# Patient Record
Sex: MALE | Race: WHITE | NOT HISPANIC OR LATINO | Employment: FULL TIME | URBAN - METROPOLITAN AREA
[De-identification: names, ages, dates, MRNs, and addresses within clinical notes are randomized per-mention and may not be internally consistent; named-entity substitution may affect disease eponyms.]

---

## 2020-06-26 PROBLEM — N18.9 CHRONIC KIDNEY DISEASE: Status: ACTIVE | Noted: 2020-06-26

## 2020-06-26 PROBLEM — K21.9 GASTROESOPHAGEAL REFLUX DISEASE: Status: ACTIVE | Noted: 2020-06-26

## 2020-06-26 PROBLEM — I10 HYPERTENSION: Status: ACTIVE | Noted: 2020-06-26

## 2020-06-26 PROBLEM — E78.2 MIXED HYPERLIPIDEMIA: Status: ACTIVE | Noted: 2020-06-26

## 2020-07-01 PROBLEM — N18.5 STAGE 5 CHRONIC KIDNEY DISEASE NOT ON CHRONIC DIALYSIS (HCC): Status: ACTIVE | Noted: 2020-06-26

## 2022-04-28 PROBLEM — R01.1 HEART MURMUR: Status: ACTIVE | Noted: 2022-04-28

## 2022-06-09 PROBLEM — N18.4 CKD (CHRONIC KIDNEY DISEASE) STAGE 4, GFR 15-29 ML/MIN (HCC): Status: ACTIVE | Noted: 2022-06-09

## 2022-06-09 PROBLEM — K11.5 SIALOLITHIASIS: Status: ACTIVE | Noted: 2022-06-09

## 2022-06-09 PROBLEM — M1A.09X0 CHRONIC GOUT OF MULTIPLE SITES: Status: ACTIVE | Noted: 2022-06-09

## 2022-12-19 ENCOUNTER — PRE-ADMISSION TESTING (OUTPATIENT)
Dept: ADMISSIONS | Facility: MEDICAL CENTER | Age: 57
End: 2022-12-19
Attending: INTERNAL MEDICINE
Payer: COMMERCIAL

## 2022-12-23 ENCOUNTER — APPOINTMENT (OUTPATIENT)
Dept: RADIOLOGY | Facility: MEDICAL CENTER | Age: 57
End: 2022-12-23
Attending: INTERNAL MEDICINE
Payer: COMMERCIAL

## 2022-12-23 ENCOUNTER — HOSPITAL ENCOUNTER (OUTPATIENT)
Facility: MEDICAL CENTER | Age: 57
End: 2022-12-23
Attending: INTERNAL MEDICINE | Admitting: INTERNAL MEDICINE
Payer: COMMERCIAL

## 2022-12-23 VITALS
DIASTOLIC BLOOD PRESSURE: 88 MMHG | WEIGHT: 181 LBS | SYSTOLIC BLOOD PRESSURE: 140 MMHG | OXYGEN SATURATION: 95 % | HEART RATE: 74 BPM | RESPIRATION RATE: 16 BRPM | HEIGHT: 67 IN | BODY MASS INDEX: 28.41 KG/M2 | TEMPERATURE: 97.2 F

## 2022-12-23 DIAGNOSIS — M32.9 SLE (SYSTEMIC LUPUS ERYTHEMATOSUS RELATED SYNDROME) (HCC): ICD-10-CM

## 2022-12-23 DIAGNOSIS — E78.5 HYPERLIPIDEMIA, UNSPECIFIED HYPERLIPIDEMIA TYPE: ICD-10-CM

## 2022-12-23 DIAGNOSIS — N25.81 SECONDARY HYPERPARATHYROIDISM (HCC): ICD-10-CM

## 2022-12-23 DIAGNOSIS — N18.4 CHRONIC KIDNEY DISEASE, STAGE IV (SEVERE) (HCC): ICD-10-CM

## 2022-12-23 LAB
INR PPP: 1.02 (ref 0.87–1.13)
PATHOLOGY CONSULT NOTE: NORMAL
PROTHROMBIN TIME: 13.3 SEC (ref 12–14.6)

## 2022-12-23 PROCEDURE — 160036 HCHG PACU - EA ADDL 30 MINS PHASE I

## 2022-12-23 PROCEDURE — 88346 IMFLUOR 1ST 1ANTB STAIN PX: CPT

## 2022-12-23 PROCEDURE — 85610 PROTHROMBIN TIME: CPT

## 2022-12-23 PROCEDURE — 88350 IMFLUOR EA ADDL 1ANTB STN PX: CPT

## 2022-12-23 PROCEDURE — 160002 HCHG RECOVERY MINUTES (STAT)

## 2022-12-23 PROCEDURE — 160035 HCHG PACU - 1ST 60 MINS PHASE I

## 2022-12-23 PROCEDURE — 88348 ELECTRON MICROSCOPY DX: CPT

## 2022-12-23 PROCEDURE — 36415 COLL VENOUS BLD VENIPUNCTURE: CPT

## 2022-12-23 PROCEDURE — 700111 HCHG RX REV CODE 636 W/ 250 OVERRIDE (IP): Performed by: RADIOLOGY

## 2022-12-23 PROCEDURE — 88300 SURGICAL PATH GROSS: CPT

## 2022-12-23 PROCEDURE — 88305 TISSUE EXAM BY PATHOLOGIST: CPT

## 2022-12-23 PROCEDURE — 77012 CT SCAN FOR NEEDLE BIOPSY: CPT

## 2022-12-23 PROCEDURE — 700111 HCHG RX REV CODE 636 W/ 250 OVERRIDE (IP)

## 2022-12-23 PROCEDURE — 160046 HCHG PACU - 1ST 60 MINS PHASE II

## 2022-12-23 PROCEDURE — 88313 SPECIAL STAINS GROUP 2: CPT | Mod: 91

## 2022-12-23 RX ORDER — ONDANSETRON 2 MG/ML
4 INJECTION INTRAMUSCULAR; INTRAVENOUS EVERY 8 HOURS PRN
Status: DISCONTINUED | OUTPATIENT
Start: 2022-12-23 | End: 2022-12-23 | Stop reason: HOSPADM

## 2022-12-23 RX ORDER — OXYCODONE HYDROCHLORIDE 5 MG/1
5 TABLET ORAL
Status: DISCONTINUED | OUTPATIENT
Start: 2022-12-23 | End: 2022-12-23 | Stop reason: HOSPADM

## 2022-12-23 RX ORDER — SODIUM CHLORIDE 9 MG/ML
INJECTION, SOLUTION INTRAVENOUS ONCE
Status: DISCONTINUED | OUTPATIENT
Start: 2022-12-23 | End: 2022-12-23 | Stop reason: HOSPADM

## 2022-12-23 RX ORDER — SODIUM CHLORIDE 9 MG/ML
500 INJECTION, SOLUTION INTRAVENOUS
Status: DISCONTINUED | OUTPATIENT
Start: 2022-12-23 | End: 2022-12-23 | Stop reason: HOSPADM

## 2022-12-23 RX ORDER — BETAMETHASONE DIPROPIONATE 0.05 %
1 OINTMENT (GRAM) TOPICAL 2 TIMES DAILY
COMMUNITY
Start: 2022-12-04 | End: 2023-03-03 | Stop reason: SDUPTHER

## 2022-12-23 RX ORDER — PERMETHRIN 50 MG/G
1 CREAM TOPICAL PRN
COMMUNITY
Start: 2022-12-06 | End: 2023-03-03

## 2022-12-23 RX ORDER — MIDAZOLAM HYDROCHLORIDE 1 MG/ML
.5-2 INJECTION INTRAMUSCULAR; INTRAVENOUS PRN
Status: DISCONTINUED | OUTPATIENT
Start: 2022-12-23 | End: 2022-12-23 | Stop reason: HOSPADM

## 2022-12-23 RX ORDER — HYDROMORPHONE HYDROCHLORIDE 1 MG/ML
0.5 INJECTION, SOLUTION INTRAMUSCULAR; INTRAVENOUS; SUBCUTANEOUS
Status: DISCONTINUED | OUTPATIENT
Start: 2022-12-23 | End: 2022-12-23 | Stop reason: HOSPADM

## 2022-12-23 RX ORDER — MIDAZOLAM HYDROCHLORIDE 1 MG/ML
INJECTION INTRAMUSCULAR; INTRAVENOUS
Status: COMPLETED
Start: 2022-12-23 | End: 2022-12-23

## 2022-12-23 RX ORDER — ONDANSETRON 2 MG/ML
4 INJECTION INTRAMUSCULAR; INTRAVENOUS PRN
Status: DISCONTINUED | OUTPATIENT
Start: 2022-12-23 | End: 2022-12-23 | Stop reason: HOSPADM

## 2022-12-23 RX ADMIN — FENTANYL CITRATE 50 MCG: 50 INJECTION INTRAMUSCULAR; INTRAVENOUS at 11:25

## 2022-12-23 RX ADMIN — MIDAZOLAM HYDROCHLORIDE 2 MG: 1 INJECTION, SOLUTION INTRAMUSCULAR; INTRAVENOUS at 11:25

## 2022-12-23 RX ADMIN — MIDAZOLAM HYDROCHLORIDE 2 MG: 1 INJECTION, SOLUTION INTRAMUSCULAR; INTRAVENOUS at 11:29

## 2022-12-23 RX ADMIN — FENTANYL CITRATE 50 MCG: 50 INJECTION, SOLUTION INTRAMUSCULAR; INTRAVENOUS at 11:29

## 2022-12-23 RX ADMIN — FENTANYL CITRATE 50 MCG: 50 INJECTION, SOLUTION INTRAMUSCULAR; INTRAVENOUS at 11:25

## 2022-12-23 ASSESSMENT — PAIN DESCRIPTION - PAIN TYPE
TYPE: SURGICAL PAIN

## 2022-12-23 ASSESSMENT — FIBROSIS 4 INDEX: FIB4 SCORE: 1.43

## 2022-12-23 NOTE — OR SURGEON
Immediate Post- Operative Note    PostOp Diagnosis: RENAL INSUFFICIENCY      Procedure(s): CT GUIDED LEFT RENAL BIOPSY    18G CORES BIOPINCE NEEDLE X 4    SUBMITTED TO ATTENDING PATHOLOGIST      Estimated Blood Loss: <5CC      FINDINGS: NEEDLE IN GOOD POSITION    SMALL - MOD PERINEPHRIC HEMATOMA, STABLE AFTER MANUAL COMPRESSION        Complications: NONE            12/23/2022     12:18 PM     Angel Luis Harris M.D.

## 2022-12-23 NOTE — DISCHARGE INSTRUCTIONS
Open Kidney Biopsy, Care After  This sheet gives you information about how to care for yourself after your procedure. Your health care provider may also give you more specific instructions. If you have problems or questions, contact your health care provider.  What can I expect after the procedure?  After the procedure, it is common to have:  Pain or soreness near the incision.  Bright pink or cloudy urine for 24 hours after the procedure. This is normal.  Follow these instructions at home:  Incision care    Follow instructions from your health care provider about how to take care of your incision. Make sure you:  Wash your hands with soap and water before you change your bandage (dressing). If soap and water are not available, use hand .  Change your dressing as told by your health care provider.  Leave stitches (sutures), skin glue, or adhesive strips in place. These skin closures may need to stay in place for 2 weeks or longer. If adhesive strip edges start to loosen and curl up, you may trim the loose edges. Do not remove adhesive strips completely unless your health care provider tells you to do that.  Check your incision area every day for signs of infection. Check for:  More redness, swelling, or pain.  More fluid or blood.  Warmth.  Pus or a bad smell.  Activity  Return to your normal activities as told by your health care provider. Ask your health care provider what activities are safe for you.  Do not lift anything that is heavier than 10 lb (4.5 kg), or the limit that you are told, until your health care provider tells you that it is safe.  Avoid activities that may put pressure on your abdomen, such as forceful coughing or straining.  Avoid activities that take a lot of effort (are strenuous) until your health care provider approves. Most people will have to wait 2 weeks before returning to activities such as exercise or sexual intercourse.  General instructions  Take over-the-counter and  prescription medicines only as told by your health care provider.  You may eat and drink after your procedure. Follow instructions from your health care provider about eating or drinking restrictions.  Keep all follow-up visits as told by your health care provider. This is important.  Contact a health care provider if:  You have more redness, swelling, or pain around your incision.  You have more fluid or blood coming from your incision.  Your incision feels warm to the touch.  You have pus or a bad smell coming from your incision.  You have blood in your urine more than 24 hours after your procedure.  Get help right away if:  Your urine is dark red or brown.  You have a fever.  You are unable to urinate.  You feel burning when you urinate.  You feel like you may faint.  You have severe pain in your abdomen or side.  Summary  After the procedure, it is common to have pain or soreness near the incision.  Bright pink or cloudy urine for 24 hours after the procedure is also normal.  Contact your health care provider if you have worsening pain, signs of infection, or blood in your urine more than 24 hours after your procedure.  This information is not intended to replace advice given to you by your health care provider. Make sure you discuss any questions you have with your health care provider.  Document Released: 06/21/2018 Document Revised: 04/17/2020 Document Reviewed: 06/21/2018  Elsevier Patient Education © 2020 [employer]

## 2022-12-23 NOTE — OR NURSING
Patient arrived to PACU in stable condition.  Biopsy site to L flank, dressing CDI  Patient comfortable and denies pain  Patient able to void, urine pale yellow, no signs of blood, sediment or clots in urine.   Paulette updated on patient condition   Patient tolerated clears without nausea or vomiting  Report given to Rae KINCAID. Patient transferred to phase 2

## 2022-12-23 NOTE — OR NURSING
Received hand off from PACU RN Britany    Reported pt did the required bed rest and was able to void clear urine in PACU meeting DC criteria  Belongings returned to bedside  PIV removed  Nil pain, n/v  Ambulatory  CDI x 1 dressing  Happy to go home  Dc instructions given to pt and wife

## 2023-03-03 PROBLEM — R22.31 LUMP OF SKIN OF RIGHT UPPER EXTREMITY: Status: ACTIVE | Noted: 2023-03-03

## 2023-03-03 PROBLEM — N05.1 FSGS (FOCAL SEGMENTAL GLOMERULOSCLEROSIS): Status: ACTIVE | Noted: 2021-10-11

## 2023-03-03 PROBLEM — L98.9 SKIN LESIONS: Status: ACTIVE | Noted: 2023-03-03

## 2023-03-03 PROBLEM — I35.0 AORTIC STENOSIS: Status: ACTIVE | Noted: 2021-07-01

## 2023-05-02 ENCOUNTER — OFFICE VISIT (OUTPATIENT)
Dept: VASCULAR SURGERY | Facility: MEDICAL CENTER | Age: 58
End: 2023-05-02
Payer: COMMERCIAL

## 2023-05-02 VITALS
BODY MASS INDEX: 28.33 KG/M2 | WEIGHT: 180.5 LBS | HEART RATE: 74 BPM | TEMPERATURE: 98.6 F | HEIGHT: 67 IN | OXYGEN SATURATION: 97 % | DIASTOLIC BLOOD PRESSURE: 80 MMHG | SYSTOLIC BLOOD PRESSURE: 144 MMHG

## 2023-05-02 DIAGNOSIS — E78.5 DYSLIPIDEMIA: ICD-10-CM

## 2023-05-02 DIAGNOSIS — I10 PRIMARY HYPERTENSION: ICD-10-CM

## 2023-05-02 DIAGNOSIS — N18.5 CHRONIC KIDNEY DISEASE, STAGE V (VERY SEVERE) (HCC): ICD-10-CM

## 2023-05-02 PROCEDURE — 99204 OFFICE O/P NEW MOD 45 MIN: CPT | Performed by: SURGERY

## 2023-05-02 RX ORDER — TAMSULOSIN HYDROCHLORIDE 0.4 MG/1
0.4 CAPSULE ORAL EVERY EVENING
COMMUNITY
Start: 2023-04-03 | End: 2023-06-07

## 2023-05-02 ASSESSMENT — FIBROSIS 4 INDEX: FIB4 SCORE: 1.26

## 2023-05-02 NOTE — PROGRESS NOTES
VASCULAR SURGERY SERVICE  CONSULT NOTE      Date: 5/2/2023    Referring Provider: [facility].    Consulting Physician: Shikha Alexander MD - [facility]     -------------------------------------------------------------------------------------------------    Reason for consultation:  Peritoneal dialysis catheter placement.    HPI:  This is a 58 y.o. male with multiple medical problems including stage V chronic kidney disease with hemodialysis anticipated.  Patient is referred for peritoneal dialysis catheter placement.  He does not want to be on hemodialysis.  Of note, patient had umbilical hernia repair x4.    Past Medical History:   Diagnosis Date    Heart murmur     Hypertension 2009    PONV (postoperative nausea and vomiting)     Renal disorder 2007    Stage IV       Past Surgical History:   Procedure Laterality Date    UMBILICAL HERNIA REPAIR  2012    4 separate surgeries between [id number]    OTHER  2007    Kidney biopsy, can't recall laterality,    HIP ARTHROSCOPY Bilateral 2002    TONSILLECTOMY N/A 1987    HAND SURGERY Right 1985    Hand and wrist    SHOULDER ARTHROSCOPY      Can't recall date       Current Outpatient Medications   Medication Sig Dispense Refill    tamsulosin (FLOMAX) 0.4 MG capsule Take 0.4 mg by mouth every evening.      betamethasone dipropionate 0.05 % Ointment Apply 1 Application. topically 2 times a day. Do not use for more than 2 weeks at a time 50 g 0    ezetimibe (ZETIA) 10 MG Tab Take 1 Tablet by mouth every evening. 30 Tablet 0    verapamil ER (CALAN-SR) 240 MG Tab CR Take 1 Tablet by mouth every morning. 90 Tablet 1    esomeprazole (NEXIUM) 40 MG delayed-release capsule Take 40 mg by mouth every evening.       No current facility-administered medications for this visit.       Social History     Socioeconomic History    Marital status:      Spouse name: Not on file    Number of children: Not on file    Years of education: Not on file    Highest  education level: Master's degree (e.g., MA, MS, Kenn, MEd, MSW, ELIEC[employer])   Occupational History    Not on file   Tobacco Use    Smoking status: Never    Smokeless tobacco: Never   Vaping Use    Vaping Use: Never used   Substance and Sexual Activity    Alcohol use: Not Currently    Drug use: Not Currently    Sexual activity: Yes     Partners: Female   Other Topics Concern    Not on file   Social History Narrative    Not on file     Social Determinants of Health     Financial Resource Strain: Low Risk     Difficulty of Paying Living Expenses: Not hard at all   Food Insecurity: No Food Insecurity    Worried About Running Out of Food in the Last Year: Never true    Ran Out of Food in the Last Year: Never true   Transportation Needs: No Transportation Needs    Lack of Transportation (Medical): No    Lack of Transportation (Non-Medical): No   Physical Activity: Sufficiently Active    Days of Exercise per Week: 3 days    Minutes of Exercise per Session: 60 min   Stress: Stress Concern Present    Feeling of Stress : To some extent   Social Connections: Moderately Isolated    Frequency of Communication with Friends and Family: Once a week    Frequency of Social Gatherings with Friends and Family: More than three times a week    Attends [employer] Services: Never    Active Member of Clubs or Organizations: No    Attends Club or Organization Meetings: Never    Marital Status: Living with partner   Intimate Partner Violence: Not on file   Housing Stability: Low Risk     Unable to Pay for Housing in the Last Year: No    Number of Places Lived in the Last Year: 1    Unstable Housing in the Last Year: No       Family History   Problem Relation Age of Onset    Hypertension Father        Allergies:  Allopurinol and Hydralazine hcl    Review of Systems:    Constitutional: Negative for fever, chills, weight loss,   HENT:   Negative for hearing loss or tinnitus    Eyes:    Negative for blurred vision, double vision, or loss of  "vision  Respiratory:  Negative for cough, hemoptysis, or wheezing    Cardiac:  Negative for chest pain or palpitations or orthopnea  Vascular:  Negative for claudication or rest pain   Gastrointestinal: Negative for nausea, vomiting, or abdominal pain     Negative for hematochezia or melena   Genitourinary: Negative for dysuria, frequency, or hematuria   Musculoskeletal: Negative for myalgias, back pain, or joint pain  Skin:   Negative for itching or rash  Neurological:  Negative for dizziness, headaches, or tremors     Negative for speech disturbance     Negative for extremity weakness or paresthesias  Endo/Heme:  Negative for easy bruising or bleeding  Psychiatric:  Negative for depression, suicidal ideas, or hallucinations    Physical Exam:  BP (!) 144/80 (BP Location: Left arm, Patient Position: Sitting, BP Cuff Size: Adult)   Pulse 74   Temp 37 °C (98.6 °F) (Temporal)   Ht 1.702 m (5' 7\")   Wt 81.9 kg (180 lb 8 oz)   SpO2 97%     Constitutional: Alert, oriented, no acute distress  HEENT:  Defect and scar on right parotid area   Neck:   Supple, no JVD  Cardiovascular: Regular rate and rhythm  Pulmonary:  Good air entry bilaterally  Abdominal:  Soft, non-tender, non-distended     Well-healed scars in umbilical area with palpable suture/mesh subcutaneously.  Musculoskeletal: No edema, no tenderness  Neurological:  CN II-XII grossly intact, no focal deficits  Skin:   Skin is warm and dry. No rash noted.  Psychiatric:  Normal mood and affect.  Vascular:  Extremities warm and well perfused      Assessment:  -Stage V chronic kidney disease with hemodialysis anticipated.  -Dyslipidemia.  -Hypertension.    Plan:  I had a long discussion with patient.  I discussed with him the option of undergoing laparoscopic placement of peritoneal dialysis catheter.  Risks and benefits were discussed.  Risks include, but not limited to, bleeding, infection, intestinal injury which if happened would require open repair, umbilical " hernia recurrence, and perioperative anesthetic complications.  The alternative of not having the peritoneal dialysis catheter placed and having a fistula created instead was also discussed.  Patient told me that he absolutely does not want to be on hemodialysis and insisted on having peritoneal dialysis catheter placed, fully understanding all risks him.  All questions were answered.      Shikha Alexander MD  [facility]   [place] preferred or call my office [phone]  __________________________________________________________________  Patient:Gurdeep Guerra   MRN:[id number]   CSN:[id number]

## 2023-05-04 ENCOUNTER — TELEPHONE (OUTPATIENT)
Dept: VASCULAR SURGERY | Facility: MEDICAL CENTER | Age: 58
End: 2023-05-04
Payer: COMMERCIAL

## 2023-05-04 ENCOUNTER — APPOINTMENT (OUTPATIENT)
Dept: ADMISSIONS | Facility: MEDICAL CENTER | Age: 58
End: 2023-05-04
Attending: SURGERY
Payer: COMMERCIAL

## 2023-05-04 NOTE — TELEPHONE ENCOUNTER
I called patient and scheduled procedure with Dr. Alexander on 5/15 @ 12:30 pm. Patient is to check in @ 10:30 am in the [facility] tower.

## 2023-05-12 ENCOUNTER — TELEPHONE (OUTPATIENT)
Dept: VASCULAR SURGERY | Facility: MEDICAL CENTER | Age: 58
End: 2023-05-12
Payer: COMMERCIAL

## 2023-05-12 ENCOUNTER — PRE-ADMISSION TESTING (OUTPATIENT)
Dept: ADMISSIONS | Facility: MEDICAL CENTER | Age: 58
End: 2023-05-12
Attending: SURGERY
Payer: COMMERCIAL

## 2023-05-12 NOTE — TELEPHONE ENCOUNTER
I called patient and confirmed date, time and location with Dr. Alexander on 5/15 @ 11:00 am.     Procedure time changed due to a cancellation. Patient is to check in @ 9:00 am in the [employer] tower.     Conf time change with Belen in OR as well.

## 2023-05-14 ENCOUNTER — ANESTHESIA EVENT (OUTPATIENT)
Dept: SURGERY | Facility: MEDICAL CENTER | Age: 58
End: 2023-05-14
Payer: COMMERCIAL

## 2023-05-15 ENCOUNTER — ANESTHESIA (OUTPATIENT)
Dept: SURGERY | Facility: MEDICAL CENTER | Age: 58
End: 2023-05-15
Payer: COMMERCIAL

## 2023-05-15 ENCOUNTER — HOSPITAL ENCOUNTER (OUTPATIENT)
Facility: MEDICAL CENTER | Age: 58
End: 2023-05-15
Attending: SURGERY | Admitting: SURGERY
Payer: COMMERCIAL

## 2023-05-15 VITALS
HEIGHT: 67 IN | BODY MASS INDEX: 27.54 KG/M2 | SYSTOLIC BLOOD PRESSURE: 94 MMHG | RESPIRATION RATE: 27 BRPM | HEART RATE: 75 BPM | WEIGHT: 175.49 LBS | TEMPERATURE: 97.8 F | OXYGEN SATURATION: 90 % | DIASTOLIC BLOOD PRESSURE: 61 MMHG

## 2023-05-15 DIAGNOSIS — N18.4 BENIGN HYPERTENSION WITH CKD (CHRONIC KIDNEY DISEASE) STAGE IV (HCC): ICD-10-CM

## 2023-05-15 DIAGNOSIS — I12.9 BENIGN HYPERTENSION WITH CKD (CHRONIC KIDNEY DISEASE) STAGE IV (HCC): ICD-10-CM

## 2023-05-15 DIAGNOSIS — I10 HYPERTENSION, UNSPECIFIED TYPE: ICD-10-CM

## 2023-05-15 LAB
ANION GAP SERPL CALC-SCNC: 18 MMOL/L (ref 7–16)
BUN SERPL-MCNC: 107 MG/DL (ref 8–22)
CALCIUM SERPL-MCNC: 8.4 MG/DL (ref 8.5–10.5)
CHLORIDE SERPL-SCNC: 103 MMOL/L (ref 96–112)
CO2 SERPL-SCNC: 15 MMOL/L (ref 20–33)
CREAT SERPL-MCNC: 8.8 MG/DL (ref 0.5–1.4)
EKG IMPRESSION: NORMAL
ERYTHROCYTE [DISTWIDTH] IN BLOOD BY AUTOMATED COUNT: 44.6 FL (ref 35.9–50)
GFR SERPLBLD CREATININE-BSD FMLA CKD-EPI: 6 ML/MIN/1.73 M 2
GLUCOSE SERPL-MCNC: 110 MG/DL (ref 65–99)
HCT VFR BLD AUTO: 40.3 % (ref 42–52)
HGB BLD-MCNC: 14.1 G/DL (ref 14–18)
MCH RBC QN AUTO: 32.3 PG (ref 27–33)
MCHC RBC AUTO-ENTMCNC: 35 G/DL (ref 33.7–35.3)
MCV RBC AUTO: 92.2 FL (ref 81.4–97.8)
PLATELET # BLD AUTO: 179 K/UL (ref 164–446)
PMV BLD AUTO: 9.8 FL (ref 9–12.9)
POTASSIUM SERPL-SCNC: 4.6 MMOL/L (ref 3.6–5.5)
RBC # BLD AUTO: 4.37 M/UL (ref 4.7–6.1)
SODIUM SERPL-SCNC: 136 MMOL/L (ref 135–145)
WBC # BLD AUTO: 6.1 K/UL (ref 4.8–10.8)

## 2023-05-15 PROCEDURE — 93010 ELECTROCARDIOGRAM REPORT: CPT | Performed by: INTERNAL MEDICINE

## 2023-05-15 PROCEDURE — 160028 HCHG SURGERY MINUTES - 1ST 30 MINS LEVEL 3: Performed by: SURGERY

## 2023-05-15 PROCEDURE — 93005 ELECTROCARDIOGRAM TRACING: CPT | Performed by: SURGERY

## 2023-05-15 PROCEDURE — 160039 HCHG SURGERY MINUTES - EA ADDL 1 MIN LEVEL 3: Performed by: SURGERY

## 2023-05-15 PROCEDURE — 80048 BASIC METABOLIC PNL TOTAL CA: CPT

## 2023-05-15 PROCEDURE — 700111 HCHG RX REV CODE 636 W/ 250 OVERRIDE (IP): Performed by: STUDENT IN AN ORGANIZED HEALTH CARE EDUCATION/TRAINING PROGRAM

## 2023-05-15 PROCEDURE — 700105 HCHG RX REV CODE 258: Performed by: STUDENT IN AN ORGANIZED HEALTH CARE EDUCATION/TRAINING PROGRAM

## 2023-05-15 PROCEDURE — 160048 HCHG OR STATISTICAL LEVEL 1-5: Performed by: SURGERY

## 2023-05-15 PROCEDURE — 36415 COLL VENOUS BLD VENIPUNCTURE: CPT

## 2023-05-15 PROCEDURE — 160009 HCHG ANES TIME/MIN: Performed by: SURGERY

## 2023-05-15 PROCEDURE — 700102 HCHG RX REV CODE 250 W/ 637 OVERRIDE(OP): Performed by: STUDENT IN AN ORGANIZED HEALTH CARE EDUCATION/TRAINING PROGRAM

## 2023-05-15 PROCEDURE — 160046 HCHG PACU - 1ST 60 MINS PHASE II: Performed by: SURGERY

## 2023-05-15 PROCEDURE — 85027 COMPLETE CBC AUTOMATED: CPT

## 2023-05-15 PROCEDURE — 700101 HCHG RX REV CODE 250: Performed by: SURGERY

## 2023-05-15 PROCEDURE — A9270 NON-COVERED ITEM OR SERVICE: HCPCS | Performed by: STUDENT IN AN ORGANIZED HEALTH CARE EDUCATION/TRAINING PROGRAM

## 2023-05-15 PROCEDURE — 160047 HCHG PACU  - EA ADDL 30 MINS PHASE II: Performed by: SURGERY

## 2023-05-15 PROCEDURE — 700105 HCHG RX REV CODE 258: Performed by: SURGERY

## 2023-05-15 PROCEDURE — 700111 HCHG RX REV CODE 636 W/ 250 OVERRIDE (IP): Performed by: SURGERY

## 2023-05-15 PROCEDURE — 00840 ANES IPER PX LOWER ABD NOS: CPT | Performed by: STUDENT IN AN ORGANIZED HEALTH CARE EDUCATION/TRAINING PROGRAM

## 2023-05-15 PROCEDURE — 160025 RECOVERY II MINUTES (STATS): Performed by: SURGERY

## 2023-05-15 PROCEDURE — 160035 HCHG PACU - 1ST 60 MINS PHASE I: Performed by: SURGERY

## 2023-05-15 PROCEDURE — 700101 HCHG RX REV CODE 250: Performed by: STUDENT IN AN ORGANIZED HEALTH CARE EDUCATION/TRAINING PROGRAM

## 2023-05-15 PROCEDURE — 110371 HCHG SHELL REV 272: Performed by: SURGERY

## 2023-05-15 PROCEDURE — 49324 LAP INSERT TUNNEL IP CATH: CPT | Performed by: SURGERY

## 2023-05-15 PROCEDURE — 160002 HCHG RECOVERY MINUTES (STAT): Performed by: SURGERY

## 2023-05-15 PROCEDURE — C1750 CATH, HEMODIALYSIS,LONG-TERM: HCPCS | Performed by: SURGERY

## 2023-05-15 DEVICE — IMPLANTABLE DEVICE: Type: IMPLANTABLE DEVICE | Site: ABDOMEN | Status: FUNCTIONAL

## 2023-05-15 RX ORDER — SODIUM CHLORIDE 9 MG/ML
INJECTION, SOLUTION INTRAVENOUS ONCE
Status: DISCONTINUED | OUTPATIENT
Start: 2023-05-15 | End: 2023-05-15 | Stop reason: HOSPADM

## 2023-05-15 RX ORDER — HYDROMORPHONE HYDROCHLORIDE 1 MG/ML
0.4 INJECTION, SOLUTION INTRAMUSCULAR; INTRAVENOUS; SUBCUTANEOUS
Status: DISCONTINUED | OUTPATIENT
Start: 2023-05-15 | End: 2023-05-15 | Stop reason: HOSPADM

## 2023-05-15 RX ORDER — CEFAZOLIN SODIUM 1 G/3ML
INJECTION, POWDER, FOR SOLUTION INTRAMUSCULAR; INTRAVENOUS PRN
Status: DISCONTINUED | OUTPATIENT
Start: 2023-05-15 | End: 2023-05-15 | Stop reason: SURG

## 2023-05-15 RX ORDER — BUPIVACAINE HYDROCHLORIDE AND EPINEPHRINE 5; 5 MG/ML; UG/ML
INJECTION, SOLUTION EPIDURAL; INTRACAUDAL; PERINEURAL
Status: DISCONTINUED
Start: 2023-05-15 | End: 2023-05-15 | Stop reason: HOSPADM

## 2023-05-15 RX ORDER — SCOLOPAMINE TRANSDERMAL SYSTEM 1 MG/1
PATCH, EXTENDED RELEASE TRANSDERMAL
Status: DISCONTINUED
Start: 2023-05-15 | End: 2023-05-15 | Stop reason: HOSPADM

## 2023-05-15 RX ORDER — LIDOCAINE HYDROCHLORIDE 20 MG/ML
INJECTION, SOLUTION EPIDURAL; INFILTRATION; INTRACAUDAL; PERINEURAL PRN
Status: DISCONTINUED | OUTPATIENT
Start: 2023-05-15 | End: 2023-05-15 | Stop reason: SURG

## 2023-05-15 RX ORDER — DEXAMETHASONE SODIUM PHOSPHATE 4 MG/ML
INJECTION, SOLUTION INTRA-ARTICULAR; INTRALESIONAL; INTRAMUSCULAR; INTRAVENOUS; SOFT TISSUE PRN
Status: DISCONTINUED | OUTPATIENT
Start: 2023-05-15 | End: 2023-05-15 | Stop reason: SURG

## 2023-05-15 RX ORDER — EPHEDRINE SULFATE 50 MG/ML
5 INJECTION, SOLUTION INTRAVENOUS
Status: DISCONTINUED | OUTPATIENT
Start: 2023-05-15 | End: 2023-05-15 | Stop reason: HOSPADM

## 2023-05-15 RX ORDER — SODIUM CHLORIDE 9 MG/ML
INJECTION, SOLUTION INTRAVENOUS
Status: DISCONTINUED | OUTPATIENT
Start: 2023-05-15 | End: 2023-05-15 | Stop reason: SURG

## 2023-05-15 RX ORDER — SODIUM CHLORIDE, SODIUM LACTATE, POTASSIUM CHLORIDE, CALCIUM CHLORIDE 600; 310; 30; 20 MG/100ML; MG/100ML; MG/100ML; MG/100ML
INJECTION, SOLUTION INTRAVENOUS CONTINUOUS
Status: DISCONTINUED | OUTPATIENT
Start: 2023-05-15 | End: 2023-05-15 | Stop reason: HOSPADM

## 2023-05-15 RX ORDER — HYDROMORPHONE HYDROCHLORIDE 1 MG/ML
0.2 INJECTION, SOLUTION INTRAMUSCULAR; INTRAVENOUS; SUBCUTANEOUS
Status: DISCONTINUED | OUTPATIENT
Start: 2023-05-15 | End: 2023-05-15 | Stop reason: HOSPADM

## 2023-05-15 RX ORDER — ONDANSETRON 2 MG/ML
4 INJECTION INTRAMUSCULAR; INTRAVENOUS
Status: COMPLETED | OUTPATIENT
Start: 2023-05-15 | End: 2023-05-15

## 2023-05-15 RX ORDER — OXYCODONE HCL 5 MG/5 ML
10 SOLUTION, ORAL ORAL
Status: COMPLETED | OUTPATIENT
Start: 2023-05-15 | End: 2023-05-15

## 2023-05-15 RX ORDER — VASOPRESSIN 20 U/ML
INJECTION PARENTERAL PRN
Status: DISCONTINUED | OUTPATIENT
Start: 2023-05-15 | End: 2023-05-15 | Stop reason: SURG

## 2023-05-15 RX ORDER — OXYCODONE HCL 5 MG/5 ML
5 SOLUTION, ORAL ORAL
Status: COMPLETED | OUTPATIENT
Start: 2023-05-15 | End: 2023-05-15

## 2023-05-15 RX ORDER — BUPIVACAINE HYDROCHLORIDE AND EPINEPHRINE 5; 5 MG/ML; UG/ML
INJECTION, SOLUTION EPIDURAL; INTRACAUDAL; PERINEURAL
Status: DISCONTINUED | OUTPATIENT
Start: 2023-05-15 | End: 2023-05-15 | Stop reason: HOSPADM

## 2023-05-15 RX ORDER — HALOPERIDOL 5 MG/ML
1 INJECTION INTRAMUSCULAR
Status: DISCONTINUED | OUTPATIENT
Start: 2023-05-15 | End: 2023-05-15 | Stop reason: HOSPADM

## 2023-05-15 RX ORDER — ALBUTEROL SULFATE 90 UG/1
AEROSOL, METERED RESPIRATORY (INHALATION) PRN
Status: DISCONTINUED | OUTPATIENT
Start: 2023-05-15 | End: 2023-05-15 | Stop reason: SURG

## 2023-05-15 RX ORDER — HYDROMORPHONE HYDROCHLORIDE 1 MG/ML
0.1 INJECTION, SOLUTION INTRAMUSCULAR; INTRAVENOUS; SUBCUTANEOUS
Status: DISCONTINUED | OUTPATIENT
Start: 2023-05-15 | End: 2023-05-15 | Stop reason: HOSPADM

## 2023-05-15 RX ORDER — PHENYLEPHRINE HYDROCHLORIDE 10 MG/ML
INJECTION, SOLUTION INTRAMUSCULAR; INTRAVENOUS; SUBCUTANEOUS PRN
Status: DISCONTINUED | OUTPATIENT
Start: 2023-05-15 | End: 2023-05-15 | Stop reason: SURG

## 2023-05-15 RX ORDER — DIPHENHYDRAMINE HYDROCHLORIDE 50 MG/ML
12.5 INJECTION INTRAMUSCULAR; INTRAVENOUS
Status: DISCONTINUED | OUTPATIENT
Start: 2023-05-15 | End: 2023-05-15 | Stop reason: HOSPADM

## 2023-05-15 RX ADMIN — PHENYLEPHRINE HYDROCHLORIDE 100 MCG: 10 INJECTION INTRAVENOUS at 12:04

## 2023-05-15 RX ADMIN — ROCURONIUM BROMIDE 15 MG: 10 INJECTION, SOLUTION INTRAVENOUS at 11:15

## 2023-05-15 RX ADMIN — PHENYLEPHRINE HYDROCHLORIDE 200 MCG: 10 INJECTION INTRAVENOUS at 12:07

## 2023-05-15 RX ADMIN — VASOPRESSIN 2 UNITS: 20 INJECTION INTRAVENOUS at 11:21

## 2023-05-15 RX ADMIN — VASOPRESSIN 1 UNITS: 20 INJECTION INTRAVENOUS at 11:25

## 2023-05-15 RX ADMIN — FENTANYL CITRATE 100 MCG: 50 INJECTION, SOLUTION INTRAMUSCULAR; INTRAVENOUS at 11:00

## 2023-05-15 RX ADMIN — ROCURONIUM BROMIDE 35 MG: 10 INJECTION, SOLUTION INTRAVENOUS at 11:00

## 2023-05-15 RX ADMIN — PHENYLEPHRINE HYDROCHLORIDE 200 MCG: 10 INJECTION INTRAVENOUS at 11:25

## 2023-05-15 RX ADMIN — PHENYLEPHRINE HYDROCHLORIDE 200 MCG: 10 INJECTION INTRAVENOUS at 11:19

## 2023-05-15 RX ADMIN — EPHEDRINE SULFATE 30 MG: 50 INJECTION, SOLUTION INTRAVENOUS at 11:23

## 2023-05-15 RX ADMIN — EPHEDRINE SULFATE 10 MG: 50 INJECTION, SOLUTION INTRAVENOUS at 11:21

## 2023-05-15 RX ADMIN — OXYCODONE HYDROCHLORIDE 10 MG: 5 SOLUTION ORAL at 13:01

## 2023-05-15 RX ADMIN — PROPOFOL 150 MG: 10 INJECTION, EMULSION INTRAVENOUS at 11:00

## 2023-05-15 RX ADMIN — ALBUTEROL SULFATE 4 PUFF: 90 AEROSOL, METERED RESPIRATORY (INHALATION) at 11:20

## 2023-05-15 RX ADMIN — CEFAZOLIN 2 G: 1 INJECTION, POWDER, FOR SOLUTION INTRAMUSCULAR; INTRAVENOUS at 11:05

## 2023-05-15 RX ADMIN — EPHEDRINE SULFATE 5 MG: 50 INJECTION, SOLUTION INTRAVENOUS at 11:20

## 2023-05-15 RX ADMIN — PHENYLEPHRINE HYDROCHLORIDE 100 MCG: 10 INJECTION INTRAVENOUS at 11:12

## 2023-05-15 RX ADMIN — VASOPRESSIN 1 UNITS: 20 INJECTION INTRAVENOUS at 11:27

## 2023-05-15 RX ADMIN — SUGAMMADEX 200 MG: 100 INJECTION, SOLUTION INTRAVENOUS at 12:21

## 2023-05-15 RX ADMIN — FENTANYL CITRATE 25 MCG: 50 INJECTION, SOLUTION INTRAMUSCULAR; INTRAVENOUS at 13:07

## 2023-05-15 RX ADMIN — LIDOCAINE HYDROCHLORIDE 80 MG: 20 INJECTION, SOLUTION EPIDURAL; INFILTRATION; INTRACAUDAL at 11:00

## 2023-05-15 RX ADMIN — DEXAMETHASONE SODIUM PHOSPHATE 4 MG: 4 INJECTION INTRA-ARTICULAR; INTRALESIONAL; INTRAMUSCULAR; INTRAVENOUS; SOFT TISSUE at 11:05

## 2023-05-15 RX ADMIN — PHENYLEPHRINE HYDROCHLORIDE 300 MCG: 10 INJECTION INTRAVENOUS at 11:17

## 2023-05-15 RX ADMIN — PHENYLEPHRINE HYDROCHLORIDE 200 MCG: 10 INJECTION INTRAVENOUS at 11:14

## 2023-05-15 RX ADMIN — VASOPRESSIN 1 UNITS: 20 INJECTION INTRAVENOUS at 11:20

## 2023-05-15 RX ADMIN — FENTANYL CITRATE 25 MCG: 50 INJECTION, SOLUTION INTRAMUSCULAR; INTRAVENOUS at 13:01

## 2023-05-15 RX ADMIN — VASOPRESSIN 2 UNITS: 20 INJECTION INTRAVENOUS at 11:23

## 2023-05-15 RX ADMIN — ONDANSETRON 4 MG: 2 INJECTION INTRAMUSCULAR; INTRAVENOUS at 14:20

## 2023-05-15 RX ADMIN — EPHEDRINE SULFATE 5 MG: 50 INJECTION, SOLUTION INTRAVENOUS at 11:18

## 2023-05-15 RX ADMIN — SODIUM CHLORIDE: 9 INJECTION, SOLUTION INTRAVENOUS at 10:57

## 2023-05-15 ASSESSMENT — PAIN DESCRIPTION - PAIN TYPE
TYPE: SURGICAL PAIN

## 2023-05-15 ASSESSMENT — FIBROSIS 4 INDEX: FIB4 SCORE: 1.26

## 2023-05-15 NOTE — ANESTHESIA TIME REPORT
Anesthesia Start and Stop Event Times     Date Time Event    5/15/2023 1028 Ready for Procedure     1057 Anesthesia Start     1240 Anesthesia Stop        Responsible Staff  05/15/23    Name Role Begin End    Antwan Street M.D. Anesth 1057 1240        Overtime Reason:  no overtime (within assigned shift)    Comments:

## 2023-05-15 NOTE — DISCHARGE INSTRUCTIONS
Activity: As tolerated except no lifting more than 10 pounds for 2 weeks.   Follow up with [facility] dialysis coordinator, for PD catheter maintenance teaching.  Phone  number [phone].   Resume home medications.    May take Tylenol as needed for pain.   Follow-up with Dr. Alexander in 2 to 3 weeks.  Call [phone] for appointment and for any problems.    If any questions arise, call your provider.  If your provider is not available, please feel free to call the [facility] at [phone].    MEDICATIONS: Resume taking daily medication.  Take prescribed pain medication with food.  If no medication is prescribed, you may take non-aspirin pain medication if needed.  PAIN MEDICATION CAN BE VERY CONSTIPATING.  Take a stool softener or laxative such as senokot, pericolace, or milk of magnesia if needed.    Last pain medication given at 1:00 pm- Oxycodone.     What to Expect Post Anesthesia    Rest and take it easy for the first 24 hours.  A responsible adult is recommended to remain with you during that time.  It is normal to feel sleepy.  We encourage you to not do anything that requires balance, judgment or coordination.    FOR 24 HOURS DO NOT:  Drive, operate machinery or run household appliances.  Drink beer or alcoholic beverages.  Make important decisions or sign legal documents.    To avoid nausea, slowly advance diet as tolerated, avoiding spicy or greasy foods for the first day.  Add more substantial food to your diet according to your provider's instructions.  Babies can be fed formula or breast milk as soon as they are hungry.  INCREASE FLUIDS AND FIBER TO AVOID CONSTIPATION.    MILD FLU-LIKE SYMPTOMS ARE NORMAL.  YOU MAY EXPERIENCE GENERALIZED MUSCLE ACHES, THROAT IRRITATION, HEADACHE AND/OR SOME NAUSEA.

## 2023-05-15 NOTE — ANESTHESIA PROCEDURE NOTES
Airway    Date/Time: 5/15/2023 11:03 AM    Performed by: Antwan Street M.D.  Authorized by: Antwan Street M.D.    Location:  OR  Urgency:  Elective  Indications for Airway Management:  Anesthesia      Spontaneous Ventilation: absent    Sedation Level:  Deep  Preoxygenated: Yes    Patient Position:  Sniffing  Final Airway Type:  Endotracheal airway  Final Endotracheal Airway:  ETT  Cuffed: Yes    Technique Used for Successful ETT Placement:  Direct laryngoscopy    Insertion Site:  Oral  Blade Type:  Donna  Laryngoscope Blade/Videolaryngoscope Blade Size:  4  ETT Size (mm):  6.5  Measured from:  Teeth  ETT to Teeth (cm):  22  Placement Verified by: auscultation and capnometry    Cormack-Lehane Classification:  Grade IIa - partial view of glottis  Number of Attempts at Approach:  1

## 2023-05-15 NOTE — OR NURSING
1237 received patient from OR. Report from Anesthesiologist and OR RN. Patient on 10L at O2. VSS. Monitor connected. S/P abdominal lap sites, incisions CDI with dialysis cath in place.    1250 significant other Paulette updated via phone call; states she is waiting in Percol[place]. Dr. Alexander at bedside, spoke with patient.     1305 Medicated for pain; see MAR. Pt tolerating po sips, denies nausea at this time.     1420 Zofran provided for nausea.     1444 Discharge education discussed with patient and SO at bedside, all questions answered. PIV removed. Tolerating room air    1447 Patient escorted out to responsible adult via wheelchair by RN. Belongings with patient, ambulated with steady gait.

## 2023-05-15 NOTE — OR SURGEON
VASCULAR SURGERY IMMEDIATE POST OP NOTE       PreOp Diagnosis: Severe chronic kidney disease with dialysis anticipated.      PostOp Diagnosis: Same.      Procedure(s):  LAPAROSCOPIC PLACEMENT OF PERITONEAL DIALYSIS CATHERTER - Wound Class: Clean    Surgeon(s):  Shikha Alexander M.D.    Anesthesiologist/Type of Anesthesia:  Anesthesiologist: Antwan Street M.D./General    Surgical Staff:  Circulator: Nguyen Roque R.N.; Daysi Handy R.N.  Scrub Person: Tiffany Curiel; Lalitha Garcia    Specimens removed if any:  * No specimens in log *    Estimated Blood Loss: 50 mL.      Findings: Catheter easily flushed.    Complications: None.    Dictated, #[id number].        5/15/2023 12:41 PM Shikha Alexander M.D.

## 2023-05-15 NOTE — ANESTHESIA POSTPROCEDURE EVALUATION
Patient: Gurdeep Guerra    Procedure Summary     Date: 05/15/23 Room / Location: [facility] ROOM 23 / SURGERY SAME DAY [facility]    Anesthesia Start: 1057 Anesthesia Stop: 1240    Procedure: LAPAROSCOPIC PLACEMENT OF PERITONEAL DIALYSIS CATHERTER (Abdomen) Diagnosis: (SEVERE CHRONIC KIDNEY DISEASE)    Surgeons: Shikha Alexander M.D. Responsible Provider: Antwan Street M.D.    Anesthesia Type: general ASA Status: 3          Final Anesthesia Type: general  Last vitals  BP   Blood Pressure: 94/61    Temp   36.6 °C (97.8 °F)    Pulse   75   Resp   (!) 27    SpO2   90 %      Anesthesia Post Evaluation    Patient location during evaluation: PACU  Patient participation: complete - patient participated  Level of consciousness: awake and alert    Airway patency: patent  Anesthetic complications: yes (Venous air embolus)  Cardiovascular status: hypotensive (will treat with ephedrine, reassess)  Respiratory status: acceptable  Hydration status: euvolemic    PONV: none          Encounter Notable Events   Notable Event Outcome Phase Comment   Venous air embolism  Intraprocedure most likely vs. IVC compression vs. variable right mainstem ETT on insufflation        Nurse Pain Score: 4 (NPRS)

## 2023-05-15 NOTE — OP REPORT
VASCULAR SURGERY OPERATIVE REPORT       DATE OF SERVICE:  05/15/2023     SURGEON:  Shikha Alexander MD     ANESTHESIOLOGIST:  Antwan Street MD     TYPE OF ANESTHESIA:  General anesthesia.     PREOPERATIVE DIAGNOSIS:  Severe chronic kidney disease with hemodialysis   anticipated.     POSTOPERATIVE DIAGNOSIS:  Severe chronic kidney disease with hemodialysis   anticipated.     PROCEDURE:  Laparoscopic placement of 62 cm long peritoneal dialysis catheter.     INDICATIONS FOR PROCEDURE:  This is a 58-year-old male with multiple medical   problems including severe chronic kidney disease with dialysis anticipated.    The patient is referred to see me for a laparoscopic peritoneal dialysis   catheter placement.  Discussion was made with the patient.  He would like to   proceed, fully understanding all risks.     DESCRIPTION OF PROCEDURE:  Informed consent was obtained.  The patient was   taken to the operating room and was placed in the supine position.  Sequential   compression devices were applied.  The patient was given Ancef intravenously.    General anesthesia was induced.     Next, his abdomen was sterilely prepped and draped in the normal fashion.  The   skin and subcutaneous tissue in the right subcostal area was anesthetized   with 0.5% Marcaine solution.  A small skin nick was made.  Veress needle was   inserted.  Insufflation of the abdominal cavity was performed.  The Veress   needle was then removed.  Next, the skin and subcutaneous tissues in the left   subcostal area was anesthetized with Marcaine solution.  A small incision was   made.  The 5 mm trocar was inserted.  Laparoscope was introduced into the   abdominal cavity.     Next, under direct visualization, another 5 mm trocar were placed on the left   mid abdomen, away from the omental adhesion.  The abdominal cavity was   explored.  There was a small bleeding at the falciform ligament.  This was   controlled with the Harmonic scalpel.  There were  omental adhesions stuck to   the abdominal wall.  The omental adhesion was taken down using Harmonic scalpel to allow   visualization of the pelvic cavity.     Next, a 5 mm trocar was tunneled subcutaneously and then into the peritoneal   cavity.  A 62 cm long double cuff dialysis catheter was inserted and placed   into the pelvis with the cuffs in the subcutaneous spaces.  The external part of   the catheter was brought inferiorly in a gentle curve configuration.  The   connector was connected.  The catheter was easily flushed with 100 mL of   heparinized saline solution.  The catheter was secured to the skin with 3-0   Prolene suture.  The wounds were closed with 4-0 Monocryl subcuticularly.  The   wounds were cleaned and sterile dressings were applied.     I contacted Chela, the dialysis coordinator postoperatively and informed her   that the procedure was done so that she can make arrangement for patient to be   seen for catheter maintenance teaching.        ______________________________  MD ELIZABETH Lincoln/SARATH/DAVIS    DD:  05/15/2023 12:47  DT:  05/15/2023 13:35    Job#:  [id number]

## 2023-05-15 NOTE — ANESTHESIA PREPROCEDURE EVALUATION
Case: [id number] Date/Time: 05/15/23 1100    Procedure: LAPAROSCOPIC PLACEMENT OF PERITONEAL DIALYSIS CATHERTER    Pre-op diagnosis: SEVERE CHRONIC KIDNEY DISEASE    Location: [facility] ROOM 23 / SURGERY SAME DAY [facility]    Surgeons: Shikha Alexander M.D.        59 yo M w/ CKD, HTN, h/o PONV    Relevant Problems   CARDIAC   (positive) Hypertension      GI   (positive) Gastroesophageal reflux disease         (positive) FSGS (focal segmental glomerulosclerosis)   (positive) Stage 5 chronic kidney disease not on chronic dialysis (HCC)      Other   (positive) Chronic gout of multiple sites       Physical Exam    Airway   Mallampati: III  TM distance: >3 FB  Neck ROM: full       Cardiovascular - normal exam  Rhythm: regular  Rate: normal  (-) murmur     Dental - normal exam           Pulmonary - normal exam  Breath sounds clear to auscultation     Abdominal    Neurological - normal exam                 Anesthesia Plan    ASA 3 (ESRD)       Plan - general       Airway plan will be ETT          Induction: intravenous    Postoperative Plan: Postoperative administration of opioids is intended.    Pertinent diagnostic labs and testing reviewed    Informed Consent:    Anesthetic plan and risks discussed with patient.    Use of blood products discussed with: patient whom consented to blood products.

## 2023-05-23 ENCOUNTER — APPOINTMENT (OUTPATIENT)
Dept: CARDIOLOGY | Facility: MEDICAL CENTER | Age: 58
DRG: 280 | End: 2023-05-23
Attending: INTERNAL MEDICINE
Payer: COMMERCIAL

## 2023-05-23 ENCOUNTER — TELEPHONE (OUTPATIENT)
Dept: VASCULAR SURGERY | Facility: MEDICAL CENTER | Age: 58
End: 2023-05-23
Payer: COMMERCIAL

## 2023-05-23 ENCOUNTER — APPOINTMENT (OUTPATIENT)
Dept: RADIOLOGY | Facility: MEDICAL CENTER | Age: 58
DRG: 280 | End: 2023-05-23
Attending: EMERGENCY MEDICINE
Payer: COMMERCIAL

## 2023-05-23 ENCOUNTER — HOSPITAL ENCOUNTER (INPATIENT)
Facility: MEDICAL CENTER | Age: 58
LOS: 4 days | DRG: 280 | End: 2023-05-27
Attending: EMERGENCY MEDICINE | Admitting: HOSPITALIST
Payer: COMMERCIAL

## 2023-05-23 DIAGNOSIS — R79.89 ELEVATED TROPONIN: ICD-10-CM

## 2023-05-23 DIAGNOSIS — I35.0 AORTIC VALVE STENOSIS, ETIOLOGY OF CARDIAC VALVE DISEASE UNSPECIFIED: ICD-10-CM

## 2023-05-23 DIAGNOSIS — R79.89 ELEVATED BRAIN NATRIURETIC PEPTIDE (BNP) LEVEL: ICD-10-CM

## 2023-05-23 DIAGNOSIS — R07.9 ACUTE CHEST PAIN: ICD-10-CM

## 2023-05-23 DIAGNOSIS — T85.9XXA DISORDER OF PERITONEAL DIALYSIS CATHETER, INITIAL ENCOUNTER: ICD-10-CM

## 2023-05-23 PROBLEM — I21.4 NSTEMI (NON-ST ELEVATED MYOCARDIAL INFARCTION) (HCC): Status: ACTIVE | Noted: 2023-05-23

## 2023-05-23 PROBLEM — E87.22 CHRONIC METABOLIC ACIDOSIS: Status: ACTIVE | Noted: 2023-05-23

## 2023-05-23 PROBLEM — R06.09 DYSPNEA ON EXERTION: Status: ACTIVE | Noted: 2023-05-23

## 2023-05-23 LAB
ALBUMIN SERPL BCP-MCNC: 3.4 G/DL (ref 3.2–4.9)
ALBUMIN/GLOB SERPL: 1.2 G/DL
ALP SERPL-CCNC: 56 U/L (ref 30–99)
ALT SERPL-CCNC: 10 U/L (ref 2–50)
ANION GAP SERPL CALC-SCNC: 17 MMOL/L (ref 7–16)
APTT PPP: 28.6 SEC (ref 24.7–36)
AST SERPL-CCNC: 25 U/L (ref 12–45)
BASOPHILS # BLD AUTO: 0.4 % (ref 0–1.8)
BASOPHILS # BLD: 0.02 K/UL (ref 0–0.12)
BILIRUB SERPL-MCNC: 0.3 MG/DL (ref 0.1–1.5)
BUN SERPL-MCNC: 104 MG/DL (ref 8–22)
CALCIUM ALBUM COR SERPL-MCNC: 9.1 MG/DL (ref 8.5–10.5)
CALCIUM SERPL-MCNC: 8.6 MG/DL (ref 8.5–10.5)
CHLORIDE SERPL-SCNC: 104 MMOL/L (ref 96–112)
CHOLEST SERPL-MCNC: 171 MG/DL (ref 100–199)
CO2 SERPL-SCNC: 17 MMOL/L (ref 20–33)
CREAT SERPL-MCNC: 9.34 MG/DL (ref 0.5–1.4)
EKG IMPRESSION: NORMAL
EOSINOPHIL # BLD AUTO: 0.07 K/UL (ref 0–0.51)
EOSINOPHIL NFR BLD: 1.3 % (ref 0–6.9)
ERYTHROCYTE [DISTWIDTH] IN BLOOD BY AUTOMATED COUNT: 45.2 FL (ref 35.9–50)
GFR SERPLBLD CREATININE-BSD FMLA CKD-EPI: 6 ML/MIN/1.73 M 2
GLOBULIN SER CALC-MCNC: 2.9 G/DL (ref 1.9–3.5)
GLUCOSE SERPL-MCNC: 93 MG/DL (ref 65–99)
HCT VFR BLD AUTO: 37.6 % (ref 42–52)
HDLC SERPL-MCNC: 40 MG/DL
HGB BLD-MCNC: 12.7 G/DL (ref 14–18)
IMM GRANULOCYTES # BLD AUTO: 0.02 K/UL (ref 0–0.11)
IMM GRANULOCYTES NFR BLD AUTO: 0.4 % (ref 0–0.9)
INR PPP: 1.2 (ref 0.87–1.13)
LDLC SERPL CALC-MCNC: 120 MG/DL
LV EJECT FRACT  99904: 60
LV EJECT FRACT MOD 2C 99903: 65.17
LV EJECT FRACT MOD 4C 99902: 53.53
LV EJECT FRACT MOD BP 99901: 61.25
LYMPHOCYTES # BLD AUTO: 0.48 K/UL (ref 1–4.8)
LYMPHOCYTES NFR BLD: 9.1 % (ref 22–41)
MCH RBC QN AUTO: 31.8 PG (ref 27–33)
MCHC RBC AUTO-ENTMCNC: 33.8 G/DL (ref 32.3–36.5)
MCV RBC AUTO: 94.2 FL (ref 81.4–97.8)
MONOCYTES # BLD AUTO: 0.34 K/UL (ref 0–0.85)
MONOCYTES NFR BLD AUTO: 6.5 % (ref 0–13.4)
NEUTROPHILS # BLD AUTO: 4.32 K/UL (ref 1.82–7.42)
NEUTROPHILS NFR BLD: 82.3 % (ref 44–72)
NRBC # BLD AUTO: 0 K/UL
NRBC BLD-RTO: 0 /100 WBC (ref 0–0.2)
NT-PROBNP SERPL IA-MCNC: 7374 PG/ML (ref 0–125)
PLATELET # BLD AUTO: 161 K/UL (ref 164–446)
PMV BLD AUTO: 9.6 FL (ref 9–12.9)
POTASSIUM SERPL-SCNC: 5 MMOL/L (ref 3.6–5.5)
PROT SERPL-MCNC: 6.3 G/DL (ref 6–8.2)
PROTHROMBIN TIME: 15 SEC (ref 12–14.6)
RBC # BLD AUTO: 3.99 M/UL (ref 4.7–6.1)
SODIUM SERPL-SCNC: 138 MMOL/L (ref 135–145)
TRIGL SERPL-MCNC: 56 MG/DL (ref 0–149)
TROPONIN T SERPL-MCNC: 294 NG/L (ref 6–19)
TROPONIN T SERPL-MCNC: 321 NG/L (ref 6–19)
TROPONIN T SERPL-MCNC: 344 NG/L (ref 6–19)
TROPONIN T SERPL-MCNC: 356 NG/L (ref 6–19)
UFH PPP CHRO-ACNC: 0.25 IU/ML
UFH PPP CHRO-ACNC: <0.1 IU/ML
WBC # BLD AUTO: 5.3 K/UL (ref 4.8–10.8)

## 2023-05-23 PROCEDURE — 85610 PROTHROMBIN TIME: CPT

## 2023-05-23 PROCEDURE — 770020 HCHG ROOM/CARE - TELE (206)

## 2023-05-23 PROCEDURE — 700102 HCHG RX REV CODE 250 W/ 637 OVERRIDE(OP)

## 2023-05-23 PROCEDURE — 85025 COMPLETE CBC W/AUTO DIFF WBC: CPT

## 2023-05-23 PROCEDURE — 93306 TTE W/DOPPLER COMPLETE: CPT | Mod: 26 | Performed by: INTERNAL MEDICINE

## 2023-05-23 PROCEDURE — 700111 HCHG RX REV CODE 636 W/ 250 OVERRIDE (IP)

## 2023-05-23 PROCEDURE — 96365 THER/PROPH/DIAG IV INF INIT: CPT

## 2023-05-23 PROCEDURE — 80053 COMPREHEN METABOLIC PANEL: CPT

## 2023-05-23 PROCEDURE — 36415 COLL VENOUS BLD VENIPUNCTURE: CPT

## 2023-05-23 PROCEDURE — 84484 ASSAY OF TROPONIN QUANT: CPT

## 2023-05-23 PROCEDURE — 93306 TTE W/DOPPLER COMPLETE: CPT

## 2023-05-23 PROCEDURE — 85520 HEPARIN ASSAY: CPT

## 2023-05-23 PROCEDURE — 99285 EMERGENCY DEPT VISIT HI MDM: CPT | Performed by: INTERNAL MEDICINE

## 2023-05-23 PROCEDURE — A9270 NON-COVERED ITEM OR SERVICE: HCPCS

## 2023-05-23 PROCEDURE — 71045 X-RAY EXAM CHEST 1 VIEW: CPT

## 2023-05-23 PROCEDURE — A9270 NON-COVERED ITEM OR SERVICE: HCPCS | Performed by: STUDENT IN AN ORGANIZED HEALTH CARE EDUCATION/TRAINING PROGRAM

## 2023-05-23 PROCEDURE — 83880 ASSAY OF NATRIURETIC PEPTIDE: CPT

## 2023-05-23 PROCEDURE — 93005 ELECTROCARDIOGRAM TRACING: CPT | Performed by: EMERGENCY MEDICINE

## 2023-05-23 PROCEDURE — 99285 EMERGENCY DEPT VISIT HI MDM: CPT

## 2023-05-23 PROCEDURE — 93005 ELECTROCARDIOGRAM TRACING: CPT

## 2023-05-23 PROCEDURE — 700102 HCHG RX REV CODE 250 W/ 637 OVERRIDE(OP): Performed by: STUDENT IN AN ORGANIZED HEALTH CARE EDUCATION/TRAINING PROGRAM

## 2023-05-23 PROCEDURE — 700102 HCHG RX REV CODE 250 W/ 637 OVERRIDE(OP): Performed by: EMERGENCY MEDICINE

## 2023-05-23 PROCEDURE — 96375 TX/PRO/DX INJ NEW DRUG ADDON: CPT

## 2023-05-23 PROCEDURE — 99223 1ST HOSP IP/OBS HIGH 75: CPT | Mod: GC | Performed by: HOSPITALIST

## 2023-05-23 PROCEDURE — 85730 THROMBOPLASTIN TIME PARTIAL: CPT

## 2023-05-23 PROCEDURE — 93010 ELECTROCARDIOGRAM REPORT: CPT | Performed by: INTERNAL MEDICINE

## 2023-05-23 PROCEDURE — A9270 NON-COVERED ITEM OR SERVICE: HCPCS | Performed by: EMERGENCY MEDICINE

## 2023-05-23 PROCEDURE — 80061 LIPID PANEL: CPT

## 2023-05-23 RX ORDER — HEPARIN SODIUM 1000 [USP'U]/ML
2000 INJECTION, SOLUTION INTRAVENOUS; SUBCUTANEOUS PRN
Status: DISCONTINUED | OUTPATIENT
Start: 2023-05-23 | End: 2023-05-25

## 2023-05-23 RX ORDER — VERAPAMIL HYDROCHLORIDE 240 MG/1
360 TABLET, FILM COATED, EXTENDED RELEASE ORAL EVERY MORNING
Status: ON HOLD | COMMUNITY
End: 2023-05-27

## 2023-05-23 RX ORDER — BETAMETHASONE DIPROPIONATE 0.05 %
1 OINTMENT (GRAM) TOPICAL 2 TIMES DAILY PRN
COMMUNITY
End: 2023-06-07

## 2023-05-23 RX ORDER — ASPIRIN 81 MG/1
81 TABLET ORAL DAILY
COMMUNITY

## 2023-05-23 RX ORDER — ATORVASTATIN CALCIUM 80 MG/1
80 TABLET, FILM COATED ORAL EVERY EVENING
Status: DISCONTINUED | OUTPATIENT
Start: 2023-05-23 | End: 2023-05-27 | Stop reason: HOSPADM

## 2023-05-23 RX ORDER — NITROGLYCERIN 0.4 MG/1
0.4 TABLET SUBLINGUAL ONCE
Status: DISPENSED | OUTPATIENT
Start: 2023-05-23 | End: 2023-05-24

## 2023-05-23 RX ORDER — BISACODYL 10 MG
10 SUPPOSITORY, RECTAL RECTAL
Status: DISCONTINUED | OUTPATIENT
Start: 2023-05-23 | End: 2023-05-27 | Stop reason: HOSPADM

## 2023-05-23 RX ORDER — AZITHROMYCIN 500 MG/1
500 TABLET, FILM COATED ORAL DAILY
Status: SHIPPED | COMMUNITY
Start: 2023-04-28 | End: 2023-05-23

## 2023-05-23 RX ORDER — AMOXICILLIN 250 MG
2 CAPSULE ORAL 2 TIMES DAILY
Status: DISCONTINUED | OUTPATIENT
Start: 2023-05-23 | End: 2023-05-27 | Stop reason: HOSPADM

## 2023-05-23 RX ORDER — MORPHINE SULFATE 4 MG/ML
1 INJECTION INTRAVENOUS EVERY 4 HOURS PRN
Status: DISCONTINUED | OUTPATIENT
Start: 2023-05-23 | End: 2023-05-27 | Stop reason: HOSPADM

## 2023-05-23 RX ORDER — HEPARIN SODIUM 5000 [USP'U]/100ML
0-30 INJECTION, SOLUTION INTRAVENOUS CONTINUOUS
Status: DISCONTINUED | OUTPATIENT
Start: 2023-05-23 | End: 2023-05-25

## 2023-05-23 RX ORDER — ACETAMINOPHEN 325 MG/1
650 TABLET ORAL EVERY 6 HOURS PRN
Status: DISCONTINUED | OUTPATIENT
Start: 2023-05-23 | End: 2023-05-27 | Stop reason: HOSPADM

## 2023-05-23 RX ORDER — NITROGLYCERIN 0.4 MG/1
0.4 TABLET SUBLINGUAL ONCE
Status: COMPLETED | OUTPATIENT
Start: 2023-05-23 | End: 2023-05-23

## 2023-05-23 RX ORDER — ASPIRIN 325 MG
325 TABLET ORAL ONCE
Status: COMPLETED | OUTPATIENT
Start: 2023-05-23 | End: 2023-05-23

## 2023-05-23 RX ORDER — OMEPRAZOLE 20 MG/1
20 CAPSULE, DELAYED RELEASE ORAL NIGHTLY
Status: DISCONTINUED | OUTPATIENT
Start: 2023-05-23 | End: 2023-05-27 | Stop reason: HOSPADM

## 2023-05-23 RX ORDER — HEPARIN SODIUM 1000 [USP'U]/ML
4000 INJECTION, SOLUTION INTRAVENOUS; SUBCUTANEOUS ONCE
Status: COMPLETED | OUTPATIENT
Start: 2023-05-23 | End: 2023-05-23

## 2023-05-23 RX ORDER — TAMSULOSIN HYDROCHLORIDE 0.4 MG/1
0.4 CAPSULE ORAL EVERY EVENING
Status: DISCONTINUED | OUTPATIENT
Start: 2023-05-23 | End: 2023-05-27 | Stop reason: HOSPADM

## 2023-05-23 RX ORDER — LABETALOL HYDROCHLORIDE 5 MG/ML
10 INJECTION, SOLUTION INTRAVENOUS EVERY 4 HOURS PRN
Status: DISCONTINUED | OUTPATIENT
Start: 2023-05-23 | End: 2023-05-27 | Stop reason: HOSPADM

## 2023-05-23 RX ORDER — POLYETHYLENE GLYCOL 3350 17 G/17G
1 POWDER, FOR SOLUTION ORAL
Status: DISCONTINUED | OUTPATIENT
Start: 2023-05-23 | End: 2023-05-27 | Stop reason: HOSPADM

## 2023-05-23 RX ORDER — METOPROLOL TARTRATE 50 MG/1
50 TABLET, FILM COATED ORAL TWICE DAILY
Status: DISCONTINUED | OUTPATIENT
Start: 2023-05-24 | End: 2023-05-27 | Stop reason: HOSPADM

## 2023-05-23 RX ORDER — ASPIRIN 81 MG/1
81 TABLET ORAL DAILY
Status: DISCONTINUED | OUTPATIENT
Start: 2023-05-24 | End: 2023-05-27 | Stop reason: HOSPADM

## 2023-05-23 RX ORDER — NITROGLYCERIN 0.4 MG/1
0.4 TABLET SUBLINGUAL
Status: DISCONTINUED | OUTPATIENT
Start: 2023-05-23 | End: 2023-05-27 | Stop reason: HOSPADM

## 2023-05-23 RX ADMIN — OMEPRAZOLE 20 MG: 20 CAPSULE, DELAYED RELEASE ORAL at 22:05

## 2023-05-23 RX ADMIN — ATORVASTATIN CALCIUM 80 MG: 80 TABLET, FILM COATED ORAL at 17:49

## 2023-05-23 RX ADMIN — METOPROLOL TARTRATE 25 MG: 25 TABLET, FILM COATED ORAL at 22:25

## 2023-05-23 RX ADMIN — TAMSULOSIN HYDROCHLORIDE 0.4 MG: 0.4 CAPSULE ORAL at 17:49

## 2023-05-23 RX ADMIN — HEPARIN SODIUM 2000 UNITS: 1000 INJECTION, SOLUTION INTRAVENOUS; SUBCUTANEOUS at 22:02

## 2023-05-23 RX ADMIN — ASPIRIN 325 MG: 325 TABLET ORAL at 13:06

## 2023-05-23 RX ADMIN — HEPARIN SODIUM 12 UNITS/KG/HR: 5000 INJECTION, SOLUTION INTRAVENOUS at 16:07

## 2023-05-23 RX ADMIN — HEPARIN SODIUM 4000 UNITS: 1000 INJECTION, SOLUTION INTRAVENOUS; SUBCUTANEOUS at 16:03

## 2023-05-23 RX ADMIN — NITROGLYCERIN 0.4 MG: 0.4 TABLET, ORALLY DISINTEGRATING SUBLINGUAL at 13:52

## 2023-05-23 ASSESSMENT — PATIENT HEALTH QUESTIONNAIRE - PHQ9
7. TROUBLE CONCENTRATING ON THINGS, SUCH AS READING THE NEWSPAPER OR WATCHING TELEVISION: NOT AT ALL
6. FEELING BAD ABOUT YOURSELF - OR THAT YOU ARE A FAILURE OR HAVE LET YOURSELF OR YOUR FAMILY DOWN: NOT AL ALL
3. TROUBLE FALLING OR STAYING ASLEEP OR SLEEPING TOO MUCH: NOT AT ALL
SUM OF ALL RESPONSES TO PHQ9 QUESTIONS 1 AND 2: 1
4. FEELING TIRED OR HAVING LITTLE ENERGY: NOT AT ALL
1. LITTLE INTEREST OR PLEASURE IN DOING THINGS: NOT AT ALL
8. MOVING OR SPEAKING SO SLOWLY THAT OTHER PEOPLE COULD HAVE NOTICED. OR THE OPPOSITE, BEING SO FIGETY OR RESTLESS THAT YOU HAVE BEEN MOVING AROUND A LOT MORE THAN USUAL: NOT AT ALL
9. THOUGHTS THAT YOU WOULD BE BETTER OFF DEAD, OR OF HURTING YOURSELF: NOT AT ALL
5. POOR APPETITE OR OVEREATING: NOT AT ALL
SUM OF ALL RESPONSES TO PHQ QUESTIONS 1-9: 1
2. FEELING DOWN, DEPRESSED, IRRITABLE, OR HOPELESS: SEVERAL DAYS

## 2023-05-23 ASSESSMENT — COGNITIVE AND FUNCTIONAL STATUS - GENERAL
SUGGESTED CMS G CODE MODIFIER DAILY ACTIVITY: CH
MOBILITY SCORE: 24
SUGGESTED CMS G CODE MODIFIER MOBILITY: CH
DAILY ACTIVITIY SCORE: 24

## 2023-05-23 ASSESSMENT — ENCOUNTER SYMPTOMS
FEVER: 0
ORTHOPNEA: 0
ABDOMINAL PAIN: 0
BLOOD IN STOOL: 0
FOCAL WEAKNESS: 0
VOMITING: 0
SPUTUM PRODUCTION: 0
DIAPHORESIS: 1
BLURRED VISION: 0
CHILLS: 0
SHORTNESS OF BREATH: 1
NAUSEA: 0
WEIGHT LOSS: 0
PALPITATIONS: 0
SENSORY CHANGE: 0
TINGLING: 0
HEADACHES: 1
DIZZINESS: 0
COUGH: 0
DIARRHEA: 0
CONSTIPATION: 0

## 2023-05-23 ASSESSMENT — FIBROSIS 4 INDEX
FIB4 SCORE: 1.2
FIB4 SCORE: 2.75

## 2023-05-23 ASSESSMENT — LIFESTYLE VARIABLES
ALCOHOL_USE: NO
CONSUMPTION TOTAL: NEGATIVE
EVER HAD A DRINK FIRST THING IN THE MORNING TO STEADY YOUR NERVES TO GET RID OF A HANGOVER: NO
EVER FELT BAD OR GUILTY ABOUT YOUR DRINKING: NO
HOW MANY TIMES IN THE PAST YEAR HAVE YOU HAD 5 OR MORE DRINKS IN A DAY: 0
HAVE PEOPLE ANNOYED YOU BY CRITICIZING YOUR DRINKING: NO
TOTAL SCORE: 0
DOES PATIENT WANT TO STOP DRINKING: NO
HAVE YOU EVER FELT YOU SHOULD CUT DOWN ON YOUR DRINKING: NO
TOTAL SCORE: 0
ON A TYPICAL DAY WHEN YOU DRINK ALCOHOL HOW MANY DRINKS DO YOU HAVE: 0
AVERAGE NUMBER OF DAYS PER WEEK YOU HAVE A DRINK CONTAINING ALCOHOL: 0
SUBSTANCE_ABUSE: 0
TOTAL SCORE: 0

## 2023-05-23 ASSESSMENT — PAIN DESCRIPTION - PAIN TYPE: TYPE: ACUTE PAIN

## 2023-05-23 ASSESSMENT — HEART SCORE
AGE: 45-64
ECG: NORMAL
HEART SCORE: 6
TROPONIN: GREATER THAN OR EQUAL TO 3 TIMES NORMAL LIMIT
HISTORY: HIGHLY SUSPICIOUS
RISK FACTORS: 1-2 RISK FACTORS

## 2023-05-23 NOTE — SENIOR ADMIT NOTE
"Senior Admit Note    Pertinent History  Pt states that at midnight he had a sudden onset of L sided \"pressure\" like and constant CP which did radiate to his jaw. He states that there is associated diaphoresis. He denies any N/V. He states that the CP is better with rest, worse with walking and is better with nitrates as well. He has no h/o similar sx. Although the CP is trending down, he felt that he was unable to go to work due to his concomitant fatigue.     ROS:  -SOB but no orthopnea  -denies F/C/S  -RLQ abd pain at his PD cath site ever since the PD cath was placed ~1 week ago    Pertinent Physical Exam Findings:  -systolic murmur  -no chest wall tenderness  -RLQ PD cath in place with no erythema/induration/crepitus/warmth. However, the RLQ is tender to palpation.     Assessment/Plan  58M with a h/o ESRD (secondary to genetic FSGS per [facility], which was dx by a kidney biopsy in 2022, with a PD cath placed on 5/15/23, but because he is making urine has never not yet required dialysis), Moderate/Severe AS, HTN, HLD, GERD, Umbilical hernia repair x4, Hip Arthroplasty (2002), and shoulder surgery.    He presented with CP.     Hospital Course:  -5/23 - on admission, due to pt's typical CP, cardiology was consulted who recommended trending trops. Because trop was trending up, a heparin drip was started. Serial trops/Nephro recs/Cards recs/Echo = pending.    ------------------------------------------------------------------------------------------------------------  #NSTEMI  (Acute onset that is typical with 3/3 features is concerning, but thankfully EKG does not show ST elevations. Lack of Tachycardia or Hypoxia makes PE unlikely. R/o PNA/PTX)  -trop = 294 --> 321   -ASA/statin  -PRN Nitrates  -PRN Morphine  -heparin drip (stated 05/23/23)  -per Cards, LHC may be considered soon  -ACEI could be considered at a later date  -tele    #RLQ Pain s/p PD Cath Placement  (No signs of infection on physical exam and no " leukocytosis)  -ctm    #AGMA in the setting of ESRD  (Most likely from organic acids)  -bicarb = 17  -AG = 17  -Nephro recs = pending        -DVT ppx = subQ Enoxaparin  -Code Status = Full Code     -Dispo = pending Cards and Nephro recs

## 2023-05-23 NOTE — ASSESSMENT & PLAN NOTE
Previous home regimen of verapamil. History of poor tolerance of other antihypertensives.   -Continue metoprolol (this will replace home verapamil)  -Amlodipine (new this admission)

## 2023-05-23 NOTE — ASSESSMENT & PLAN NOTE
Chronic. Patient has 6-month to 1 year history of dyspnea on exertion and paroxysmal nocturnal dyspnea.  I suspect this was related to his aortic valve disease.  Updated echo shows moderate to severe aortic stenosis and severe aortic regurgitation.  -Referral to structural heart disease, Dr. Sharma, on discharge

## 2023-05-23 NOTE — ED NOTES
"Pt ambulated to room with a steady gait, placed on monitoring, chart up for ERP.   Pt states he is concerned about a \"lump\" above his peritoneal dialysis catheter. Reports pain as well. States was just recently placed. Hx of FSGS, has been on transplant list for a few years, states was hoping to not have to do dialysis but has been told he needs to start. Has not had any sessions yet.   Labs collected while pt in lobby  "

## 2023-05-23 NOTE — ED NOTES
2nd PIV started, blue top sent to lab. Heparin bolus and gtt initiated per procotol with 2nd RN verification. Pt denies any chest pain at this time. Will continue to monitor.

## 2023-05-23 NOTE — H&P
[facility] Internal Medicine History & Physical Note    Date of Service  5/23/2023    [facility] Team: [facility] Team   Attending: JEREMIAH Jacobson M.d.  Senior Resident: Dr. Jose Angel Singh  Intern:  Dr. Phil Doty  Contact Number: [phone]    Primary Care Physician  Drew T. Blumberg, D.O.    Consultants  cardiology and nephrology    Specialist Names: Dr. Salty Lipscomb (cardiology)    Code Status  Full Code    Chief Complaint  Chief Complaint   Patient presents with    Post-Op Complications     Patient had a LAPAROSCOPIC PLACEMENT OF PERITONEAL DIALYSIS CATHERTER placed 5/15. Patient reports yesterday was 1st time flishing catherter and a lump on the right lower abd with pain.    Chest Pain     Patient reports chest pain since 0000. Patient states felt like he was SOB and something was sitting on his chest. Patient reports he took extra medications this morning and pain has decreased to a dull pain.        History of Presenting Illness (HPI):   Gurdeep Guerra is a 58 y.o. male with ESRD due to FSGS with recent PD cath placement and initiation 5/22, moderate to severe aortic stenosis and aortic regurgitation, family history of CAD, hypertension, hyperlipidemia, 30 year history of anabolic steroid use who presented 5/23/2023 with chest pain.  Chest pain woke patient from sleep around midnight last night.  Described as a severe chest pressure in the left side radiating to left shoulder and jaw.  He felt sweaty and with shortness of breath at the time.  He measured his blood pressure and it was about 220/120 and so he took a and extra verapamil.  The chest pressure eventually went away and he went back to sleep.  He woke up and went to work, but was feeling overall crappy with lethargy and general malaise.  The chest pain was still somewhat present and so he presented to the ED.  Currently, the chest pressure is still there, but not as intense.  The pressure was worse with walking and better with laying down.  No change with  laying flat.  No lower extremity edema.  No abdominal pain, nausea, vomiting, diarrhea, constipation, hematemesis or melena, dysuria.    Of note, he underwent surgery on the 15th for placement of a peritoneal dialysis catheter.  Yesterday, 5/22, he initiated his first round of peritoneal dialysis flushing and anticipating of starting peritoneal dialysis.    In the ED, vital signs were normal.  Labs notable for troponin of 294 that increased to 321 on repeat.  BNP elevated to 7300.  EKG shows junctional rhythm and no ST or T wave changes to suggest ischemia.  Metabolic acidosis with bicarb 17, chronic.  Borderline elevated potassium of 5.0.  Significantly elevated BUN and creatinine consistent with ESRD.  Hemoglobin 12.7.  He was given aspirin, nitroglycerin.  Cardiology was consulted and an echo is pending.  Initiating on heparin drip for NSTEMI.    I discussed the plan of care with patient.    Review of Systems  Review of Systems   Constitutional:  Positive for malaise/fatigue. Negative for chills, fever and weight loss.   Respiratory:  Positive for shortness of breath. Negative for cough and sputum production.    Cardiovascular:  Positive for chest pain. Negative for palpitations, orthopnea and leg swelling.   Gastrointestinal:  Negative for abdominal pain, blood in stool, constipation, diarrhea, melena, nausea and vomiting.   Genitourinary:  Negative for dysuria.   Neurological:  Positive for headaches. Negative for dizziness, sensory change and focal weakness.       Past Medical History   has a past medical history of Anesthesia, Aortic stenosis, Chronic gout of multiple sites, Dyspnea on exertion (5/23/2023), Elevated troponin (5/23/2023), Heart burn, Heart murmur, High cholesterol, Hypertension (2009), NSTEMI (non-ST elevated myocardial infarction) (HCC) (5/23/2023), Pain in the chest (5/23/2023), PONV (postoperative nausea and vomiting), Renal disorder (2007), Sleep apnea (2000), and Snoring  ().    Surgical History   has a past surgical history that includes other (); umbilical hernia repair (); shoulder arthroscopy; hip arthroscopy (Bilateral, ); tonsillectomy (N/A, ); hand surgery (Right, ); and cath placement capd (N/A, 5/15/2023).     Family History  family history includes Hyperlipidemia in his mother; Hypertension in his father.   Family history reviewed with patient.   His mother had her first MI at age 63 and later  from another MI.  Maternal grandmother also had an MI.  Father had hypertension.  Paternal grandmother had a stroke.    Social History  Tobacco: None, never smoker  Alcohol: None  Recreational drugs (illegal or prescription): 30+ year history of anabolic steroid use, and other performance-enhancing drugs such as clenbuterol and thyroid hormone, stopped 10 years ago  Employment: Works as a   Living Situation: Lives with significant other in [place]  Recent Travel: None  Primary Care Provider: Reviewed    Other (stressors, spirituality, exposures): None    Allergies  Allergies   Allergen Reactions    Allopurinol Unspecified     Pt states his reaction is similar to lupus    Hydralazine Hcl Unspecified     Pt states he had a reaction similar to lupus       Medications  Prior to Admission Medications   Prescriptions Last Dose Informant Patient Reported? Taking?   Lactobacillus (ACIDOPHILUS PO) 2023 at AM Patient Yes Yes   Sig: Take 1 Tablet by mouth every day.   aspirin 81 MG EC tablet 2023 at AM Patient Yes Yes   Sig: Take 81 mg by mouth every day.   azithromycin (ZITHROMAX) 500 MG tablet COMPLETED at COMPLETED Patient Yes No   Sig: Take 500 mg by mouth every day. 5 day course   betamethasone dipropionate 0.05 % Ointment 2023 at PM Patient Yes Yes   Sig: Apply 1 Application topically 2 times a day as needed.   esomeprazole (NEXIUM) 40 MG delayed-release capsule 2023 at PM Patient Yes No   Sig: Take 40 mg by mouth every evening.    ezetimibe (ZETIA) 10 MG Tab 5/22/2023 at PM Patient No No   Sig: Take 1 Tablet by mouth every evening.   tamsulosin (FLOMAX) 0.4 MG capsule 5/22/2023 at PM Patient Yes No   Sig: Take 0.4 mg by mouth every evening.   verapamil ER (CALAN-SR) 240 MG Tab CR 5/23/2023 at AM Patient Yes Yes   Sig: Take 360 mg by mouth every morning.      Facility-Administered Medications: None       Physical Exam  Temp:  [36.8 °C (98.2 °F)] 36.8 °C (98.2 °F)  Pulse:  [61-87] 66  Resp:  [15-18] 17  BP: (100-151)/(63-85) 151/81  SpO2:  [94 %-98 %] 97 %  Blood Pressure: 137/71   Temperature: 36.8 °C (98.2 °F)   Pulse: 67   Respiration: 15   Pulse Oximetry: 95 %       Physical Exam  Constitutional:       General: He is not in acute distress.     Appearance: He is not ill-appearing.   HENT:      Head: Normocephalic.      Mouth/Throat:      Mouth: Mucous membranes are moist.      Pharynx: Oropharynx is clear.   Eyes:      Extraocular Movements: Extraocular movements intact.      Conjunctiva/sclera: Conjunctivae normal.   Cardiovascular:      Rate and Rhythm: Normal rate and regular rhythm.      Heart sounds: Murmur (Holosystolic murmur heard in all areas and radiates to carotids) heard.   Pulmonary:      Effort: Pulmonary effort is normal.      Breath sounds: Normal breath sounds.   Abdominal:      Palpations: Abdomen is soft.      Tenderness: There is no abdominal tenderness.      Comments: Recently placed peritoneal dialysis catheter on the right side, with clean dressings, mild tenderness, no significant erythema or discharge   Musculoskeletal:         General: No swelling.      Cervical back: Normal range of motion.   Skin:     General: Skin is warm and dry.   Neurological:      General: No focal deficit present.      Mental Status: He is alert.   Psychiatric:         Mood and Affect: Mood normal.         Behavior: Behavior normal.         Laboratory:  Recent Labs     05/23/23  1108   WBC 5.3   RBC 3.99*   HEMOGLOBIN 12.7*   HEMATOCRIT  37.6*   MCV 94.2   MCH 31.8   MCHC 33.8   RDW 45.2   PLATELETCT 161*   MPV 9.6     Recent Labs     05/23/23  1108   SODIUM 138   POTASSIUM 5.0   CHLORIDE 104   CO2 17*   GLUCOSE 93   *   CREATININE 9.34*   CALCIUM 8.6     Recent Labs     05/23/23  1108   ALTSGPT 10   ASTSGOT 25   ALKPHOSPHAT 56   TBILIRUBIN 0.3   GLUCOSE 93         Recent Labs     05/23/23  1108   NTPROBNP 7374*         Recent Labs     05/23/23  1108 05/23/23  1314   TROPONINT 294* 321*       Imaging:  DX-CHEST-PORTABLE (1 VIEW)   Final Result      Cardiomegaly. No focal consolidation or pleural effusions.      EC-ECHOCARDIOGRAM COMPLETE W/O CONT    (Results Pending)       X-Ray:  My impression is: Cardiomegaly, otherwise normal chest x-ray  EKG:  My impression is: Accelerated junctional rhythm versus multifocal atrial tachycardia, PACs, no significant ST changes or T wave changes    Assessment/Plan:  Problem Representation:   Gurdeep Guerra is a 58 y.o. 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 Cardiology consult pending.     I anticipate this patient will require at least two midnights for appropriate medical management, necessitating inpatient admission because diagnosis of acute NSTEMI, necessity for cardiology consultation and possible procedure, nephrology consultation and consideration of dialysis    Patient will need a Telemetry bed on MEDICAL service .  The need is secondary to acute NSTEMI requiring close cardiac monitoring.    * NSTEMI (non-ST elevated myocardial infarction) (HCC)- (present on admission)  Assessment & Plan  Presented with typical chest pain consisting of chest pressure with exertional component and relieved with  rest, dyspnea, diaphoresis.  Initial troponin of 290 and slight increase on repeat. EKG shows accelerated junctional rhythm versus multifocal atrial tachycardia, PACs, no significant ST changes or T wave changes.  -Start heparin drip  -Aspirin 325 given, continue home aspirin 81  -Atorvastatin 80  -Cardiology consult for consideration of cath  -Resume home verapamil, will not add beta blocker  -Patient reports previous intolerance of ACE/ARB due to hyperkalemia    Aortic stenosis- (present on admission)  Assessment & Plan  Moderate to severe aortic stenosis and regurgitation on echo 2021.  Patient reports she saw a cardiologist once for this.  -Echo pending    Stage 5 chronic kidney disease not on chronic dialysis (HCC)- (present on admission)  Assessment & Plan  Due to FSGS, suspected related to long-term use of anabolic steroids.  Follows with [facility] nephrology and is on the transplant list.  Recently underwent placement of peritoneal dialysis catheter on 5/15 and underwent initial PD cath flushing 5/22.  No emergent need for dialysis.  -Nephrology consult for consideration of dialysis and further management  -Renally dose all medications  -Avoid nephrotoxins as possible    Chronic metabolic acidosis  Assessment & Plan  Secondary to ESRD and at baseline bicarb around 17.    Dyspnea on exertion  Assessment & Plan  See NSTEMI    Elevated troponin  Assessment & Plan  See NSTEMI    Pain in the chest  Assessment & Plan  The ASCVD Risk score ([place] DK, et al., 2019) failed to calculate for the following reasons:    The patient has a prior MI or stroke diagnosis  See above    Mixed hyperlipidemia- (present on admission)  Assessment & Plan  Outpatient regimen of ezetemibe.  - Lipid panel pending  - Start atorvastatin 80    Hypertension- (present on admission)  Assessment & Plan  Home regimen of verapamil. History of poor tolerance of other antihypertensives.         VTE prophylaxis: therapeutic anticoagulation with  heparin drip

## 2023-05-23 NOTE — ASSESSMENT & PLAN NOTE
Due to FSGS, suspected related to long-term use of anabolic steroids.  Follows with nephrology and is on the transplant list at [facility].  Recently underwent placement of peritoneal dialysis catheter on 5/15 and underwent initial PD cath flushing 5/22.  -Nephrology following, no emergent need for dialysis  -Sevelamer per nephrology  -Sodium bicarb per nephrology  -Renally dose all medications  -Avoid nephrotoxins as possible

## 2023-05-23 NOTE — CONSULTS
Cardiology Consult Note:    Memo Lipscomb M.D.  Date & Time note created:    5/23/2023   1:27 PM     Referring MD:  Dr. Dupree    Patient ID:   Name:             Gurdeep Guerra   YOB: 1965  Age:                 58 y.o.  male   MRN:               [id number]                                                             Chief Complaint / Reason for consult:  Elevated troponin, chest pain.    History of Present Illness:    This is a 58 years old man with end-stage renal disease on chronic dialysis, presented to the hospital with chest pain, onset last night.  Pressure-like sensation.  At the same time, patient noticed his blood pressure was extremely elevated with systolic blood pressure of over 200.  Patient presented to the ER today and found to have elevated troponin T of 294, elevated NT proBNP of 7374.    Patient did have recent transthoracic echocardiogram in 2021 which showed normal LV function without significant valvular disease.    Patient also had recent nuclear stress test in 2021 which showed no evidence of coronary ischemia at that time.    Of note, patient has been evaluated for kidney transplant.    I have personally interpreted EKG today with patient, there is no evidence of acute coronary syndrome, no evidence of prior infarct, normal IL and QT interval, no significant conduction disease. Sinus rhythm.    Review of Systems:      Constitutional: Denies fevers, Denies weight changes  Eyes: Denies changes in vision, no eye pain  Ears/Nose/Throat/Mouth: Denies nasal congestion or sore throat   Cardiovascular: no chest pain, no palpitations   Respiratory: no shortness of breath , Denies cough  Gastrointestinal/Hepatic: Denies abdominal pain, nausea, vomiting, diarrhea, constipation or GI bleeding   Genitourinary: Denies dysuria or frequency  Musculoskeletal/Rheum: Denies  joint pain and swelling   Skin: Denies rash  Neurological: Denies headache, confusion, memory loss or focal  weakness/parasthesias  Psychiatric: denies mood disorder   Endocrine: Melyssa thyroid problems  Heme/Oncology/Lymph Nodes: Denies enlarged lymph nodes, denies brusing or known bleeding disorder  All other systems were reviewed and are negative (AMA/CMS criteria)                Past Medical History:   Past Medical History:   Diagnosis Date    Anesthesia     Hiccups for over 24 hours after a previous sugery.    Aortic stenosis     Chronic gout of multiple sites     Dyspnea on exertion 5/23/2023    Elevated troponin 5/23/2023    Heart burn     1990    Heart murmur     High cholesterol     All always    Hypertension 2009    Pain in the chest 5/23/2023    PONV (postoperative nausea and vomiting)     Renal disorder 2007    Stage IV    Sleep apnea 2000    Snoring 1990     Active Hospital Problems    Diagnosis     Pain in the chest [R07.9]     Elevated troponin [R77.8]     Dyspnea on exertion [R06.09]     Stage 5 chronic kidney disease not on chronic dialysis (HCC) [N18.5]        Past Surgical History:  Past Surgical History:   Procedure Laterality Date    CATH PLACEMENT CAPD N/A 5/15/2023    Procedure: LAPAROSCOPIC PLACEMENT OF PERITONEAL DIALYSIS CATHERTER;  Surgeon: Shikha Alexander M.D.;  Location: SURGERY SAME DAY [facility];  Service: General    UMBILICAL HERNIA REPAIR  2012    4 separate surgeries between [id number]    OTHER  2007    Kidney biopsy, can't recall laterality,    HIP ARTHROSCOPY Bilateral 2002    TONSILLECTOMY N/A 1987    HAND SURGERY Right 1985    Hand and wrist    SHOULDER ARTHROSCOPY      Can't recall date       Hospital Medications:  No current facility-administered medications for this encounter.    Current Outpatient Medications:     ASPIRIN 81 PO, Take  by mouth every day., Disp: , Rfl:     tamsulosin (FLOMAX) 0.4 MG capsule, Take 0.4 mg by mouth every evening., Disp: , Rfl:     betamethasone dipropionate 0.05 % Ointment, Apply 1 Application. topically 2 times a day. Do not use for more than 2 weeks at  a time, Disp: 50 g, Rfl: 0    ezetimibe (ZETIA) 10 MG Tab, Take 1 Tablet by mouth every evening., Disp: 30 Tablet, Rfl: 0    verapamil ER (CALAN-SR) 240 MG Tab CR, Take 1 Tablet by mouth every morning. (Patient taking differently: Take 360 mg by mouth every morning.), Disp: 90 Tablet, Rfl: 1    esomeprazole (NEXIUM) 40 MG delayed-release capsule, Take 40 mg by mouth every evening., Disp: , Rfl:     Current Outpatient Medications:  (Not in a hospital admission)      Medication Allergy:  Allergies   Allergen Reactions    Allopurinol Unspecified     Pt states his reaction is similar to lupus    Hydralazine Hcl Unspecified     Pt states he had a reaction similar to lupus       Family History:  Family History   Problem Relation Age of Onset    Hyperlipidemia Mother     Hypertension Father        Social History:  Social History     Socioeconomic History    Marital status:      Spouse name: Not on file    Number of children: Not on file    Years of education: Not on file    Highest education level: Master's degree (e.g., MA, MS, Kenn, MEd, MSW, ELIECER)   Occupational History    Not on file   Tobacco Use    Smoking status: Never    Smokeless tobacco: Never   Vaping Use    Vaping Use: Never used   Substance and Sexual Activity    Alcohol use: Never    Drug use: Not Currently    Sexual activity: Yes     Partners: Female   Other Topics Concern    Not on file   Social History Narrative    Not on file     Social Determinants of Health     Financial Resource Strain: Low Risk  (1/4/2023)    Overall Financial Resource Strain (CARDIA)     Difficulty of Paying Living Expenses: Not hard at all   Food Insecurity: No Food Insecurity (1/4/2023)    Hunger Vital Sign     Worried About Running Out of Food in the Last Year: Never true     Ran Out of Food in the Last Year: Never true   Transportation Needs: No Transportation Needs (1/4/2023)    PRAPARE - Transportation     Lack of Transportation (Medical): No     Lack of Transportation  "(Non-Medical): No   Physical Activity: Sufficiently Active (1/4/2023)    Exercise Vital Sign     Days of Exercise per Week: 3 days     Minutes of Exercise per Session: 60 min   Stress: Stress Concern Present (1/4/2023)    [employer] - Occupational Stress Questionnaire     Feeling of Stress : To some extent   Social Connections: Moderately Isolated (1/4/2023)    Social Connection and Isolation Panel [NHANES]     Frequency of Communication with Friends and Family: Once a week     Frequency of Social Gatherings with Friends and Family: More than three times a week     Attends Sikhism Services: Never     Active Member of Clubs or Organizations: No     Attends Club or Organization Meetings: Never     Marital Status: Living with partner   Intimate Partner Violence: Not on file   Housing Stability: Low Risk  (1/4/2023)    Housing Stability Vital Sign     Unable to Pay for Housing in the Last Year: No     Number of Places Lived in the Last Year: 1     Unstable Housing in the Last Year: No         Physical Exam:  Vitals/ General Appearance:   Weight/BMI: Body mass index is 27.28 kg/m².  /79   Pulse 61   Temp 36.8 °C (98.2 °F) (Temporal)   Resp 16   Ht 1.702 m (5' 7\")   Wt 79 kg (174 lb 2.6 oz)   SpO2 96%   Vitals:    05/23/23 1055 05/23/23 1144 05/23/23 1202 05/23/23 1302   BP:  130/85 124/75 133/79   Pulse:  78 72 61   Resp:  17 16    Temp:       TempSrc:       SpO2:  97% 96% 96%   Weight: 79 kg (174 lb 2.6 oz)      Height: 1.702 m (5' 7\")        Oxygen Therapy:  Pulse Oximetry: 96 %    Constitutional:   No acute distress  HENMT:  Normocephalic, Atraumatic.  Eyes:  EOMI, No discharge.  Neck:  no JVD.  Cardiovascular:  Normal heart rate, Normal rhythm.  Extremitites with intact distal pulses, no cyanosis, or edema.  Lungs:  No respiratory distress.  Abdomen: Soft, No tenderness, No guarding, No rebound.  Skin: No significant rash.  Neurologic: Alert & oriented x 3, No focal " deficits noted, cranial nerves II through X are intact.  Psychiatric: Affect normal, Judgment normal, Mood normal.      MDM (Data Review):     Records reviewed and summarized in current documentation    Lab Data Review:  Recent Results (from the past 24 hour(s))   EKG    Collection Time: 23 11:02 AM   Result Value Ref Range    Report       [facility] Emergency Dept.    Test Date:  2023  Pt Name:    LORIE KAYE               Department: ER  MRN:        [id number]                      Room:  Gender:     Male                         Technician: [id number]  :        1965                   Requested By:ER TRIAGE PROTOCOL  Order #:    [id number]                    Reading MD: LARA MULLEN MD    Measurements  Intervals                                Axis  Rate:       79                           P:          0  GA:         151                          QRS:        15  QRSD:       77                           T:          41  QT:         385  QTc:        442    Interpretive Statements  Unknown rhythm, irregular rate  Probable left ventricular hypertrophy  Compared to ECG 05/15/2023 09:36:30  Early repolarization no longer present  Electronically Signed On 2023 13:02:42 PDT by LARA MULLEN MD     CBC with Differential    Collection Time: 23 11:08 AM   Result Value Ref Range    WBC 5.3 4.8 - 10.8 K/uL    RBC 3.99 (L) 4.70 - 6.10 M/uL    Hemoglobin 12.7 (L) 14.0 - 18.0 g/dL    Hematocrit 37.6 (L) 42.0 - 52.0 %    MCV 94.2 81.4 - 97.8 fL    MCH 31.8 27.0 - 33.0 pg    MCHC 33.8 32.3 - 36.5 g/dL    RDW 45.2 35.9 - 50.0 fL    Platelet Count 161 (L) 164 - 446 K/uL    MPV 9.6 9.0 - 12.9 fL    Neutrophils-Polys 82.30 (H) 44.00 - 72.00 %    Lymphocytes 9.10 (L) 22.00 - 41.00 %    Monocytes 6.50 0.00 - 13.40 %    Eosinophils 1.30 0.00 - 6.90 %    Basophils 0.40 0.00 - 1.80 %    Immature Granulocytes 0.40 0.00 - 0.90 %    Nucleated RBC 0.00 0.00 - 0.20 /100 WBC    Neutrophils  (Absolute) 4.32 1.82 - 7.42 K/uL    Lymphs (Absolute) 0.48 (L) 1.00 - 4.80 K/uL    Monos (Absolute) 0.34 0.00 - 0.85 K/uL    Eos (Absolute) 0.07 0.00 - 0.51 K/uL    Baso (Absolute) 0.02 0.00 - 0.12 K/uL    Immature Granulocytes (abs) 0.02 0.00 - 0.11 K/uL    NRBC (Absolute) 0.00 K/uL   Complete Metabolic Panel (CMP)    Collection Time: 23 11:08 AM   Result Value Ref Range    Sodium 138 135 - 145 mmol/L    Potassium 5.0 3.6 - 5.5 mmol/L    Chloride 104 96 - 112 mmol/L    Co2 17 (L) 20 - 33 mmol/L    Anion Gap 17.0 (H) 7.0 - 16.0    Glucose 93 65 - 99 mg/dL    Bun 104 (H) 8 - 22 mg/dL    Creatinine 9.34 (HH) 0.50 - 1.40 mg/dL    Calcium 8.6 8.5 - 10.5 mg/dL    AST(SGOT) 25 12 - 45 U/L    ALT(SGPT) 10 2 - 50 U/L    Alkaline Phosphatase 56 30 - 99 U/L    Total Bilirubin 0.3 0.1 - 1.5 mg/dL    Albumin 3.4 3.2 - 4.9 g/dL    Total Protein 6.3 6.0 - 8.2 g/dL    Globulin 2.9 1.9 - 3.5 g/dL    A-G Ratio 1.2 g/dL   Troponins NOW    Collection Time: 23 11:08 AM   Result Value Ref Range    Troponin T 294 (H) 6 - 19 ng/L   proBrain Natriuretic Peptide, NT    Collection Time: 23 11:08 AM   Result Value Ref Range    NT-proBNP 7374 (H) 0 - 125 pg/mL   CORRECTED CALCIUM    Collection Time: 23 11:08 AM   Result Value Ref Range    Correct Calcium 9.1 8.5 - 10.5 mg/dL   ESTIMATED GFR    Collection Time: 23 11:08 AM   Result Value Ref Range    GFR (CKD-EPI) 6 (A) >60 mL/min/1.73 m 2   EKG    Collection Time: 23  1:07 PM   Result Value Ref Range    Report       [facility] Emergency Dept.    Test Date:  2023  Pt Name:    LORIE KAYE               Department: ER  MRN:        [id number]                      Room:  Gender:     Male                         Technician: [id number]  :        1965                   Requested By:ER TRIAGE PROTOCOL  Order #:    [id number]                    Reading MD:    Measurements  Intervals                                Axis  Rate:       76                            P:  NC:                                      QRS:        20  QRSD:       112                          T:          68  QT:         387  QTc:        436    Interpretive Statements  Accelerated junctional rhythm  RSR' in V1 or V2, probably normal variant  Left ventricular hypertrophy  Compared to ECG 05/23/2023 11:02:39  Accelerated junctional rhythm now present  RSR' in V1 or V2 now present         Imaging/Procedures Review:    Chest Xray:  Reviewed    EKG:   As in HPI.     MDM (Assessment and Plan):     Active Hospital Problems    Diagnosis     Pain in the chest [R07.9]     Elevated troponin [R77.8]     Dyspnea on exertion [R06.09]     Stage 5 chronic kidney disease not on chronic dialysis (HCC) [N18.5]      I have independently interpreted and reviewed echocardiogram's actual images with patient which showed normal left ventricular systolic function. No wall motion abnormality. No evidence of pulmonary hypertension. Moderate AS, severe AI.    At this time, we will continue to trend his troponin.  Given the history and clinical presentation, could be hypertensive emergency related, especially in setting of aortic valve disease  However, based on his overall risk profile, we will continue to observe for potential acute coronary syndrome.  In the meantime, optimize blood pressure control.    Based on the normal EF on transthoracic echocardiogram, there is no indication for further invasive cardiac work-up at this time.  Overall, troponin elevation is likely related to demand ischemia secondary to hypertensive episode.    In terms of his renal transplant work-up, patient eventually will get an elective cardiac catheterization for work-up.  However, that does not need to be done during the hospitalization.  That could be done electively in the outpatient setting preferentially at the facility where he will be evaluated for renal transplant.      Thank you for referring this patient to our cardiology  service.  We will follow patient with you.      Memo Lipscomb MD.   Cardiology Inpatient Service.  [facility].  [phone].  [place].

## 2023-05-23 NOTE — ED TRIAGE NOTES
Chief Complaint   Patient presents with    Post-Op Complications     Patient had a LAPAROSCOPIC PLACEMENT OF PERITONEAL DIALYSIS CATHERTER placed 5/15. Patient reports yesterday was 1st time flishing catherter and a lump on the right lower abd with pain.    Chest Pain     Patient reports chest pain since 0000. Patient states felt like he was SOB and something was sitting on his chest. Patient reports he took extra medications this morning and pain has decreased to a dull pain.

## 2023-05-23 NOTE — ASSESSMENT & PLAN NOTE
The ASCVD Risk score (Sean CAT, et al., 2019) failed to calculate for the following reasons:    The patient has a prior MI or stroke diagnosis  See above

## 2023-05-23 NOTE — TELEPHONE ENCOUNTER
Patient called today stating that he has not been feeling well after surgery. Patient states that his blood pressure was in the 200's last night. He states that he is still not feeling well. Let patient know that he should follow up in the ER to get checked out as his kidney function is low. Patient wanted to know if it was ok for him to follow up over at the [facility], let the patient know that it was up to his discretion. Advised patient that our doctors here only have privileges here at [facility]. Patient states that he will come over here to the [facility] ER to be checked. Let patient know that one of our doctors is always on call, so if needed they will be paged. Patient understood and had no further questions.

## 2023-05-23 NOTE — H&P
"      [facility] MEDICINE H&P        PATIENT ID:  NAME:  Gurdeep Guerra  MRN:               [id number]  YOB: 1965    Date of Admission: 5/23/2023     Attending: JEREMIAH Jacobson M.d.    Student: Sage Odom, Student    Primary Care Physician:  Drew T. Blumberg, D.O.    CC:  Chest Pain    HPI: Patient is a 58 y.o. 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 Patient states the pain woke him up from sleep. Patient had associating dyspnea and diaphoresis. Patient then took his blood pressure and found it to be 210 systolic. Patient took Verapamil and this brought his BP to 170-180 systolic. Patient subsequently went back to sleep. The next morning, patient continued to feel lethargic while at work, and so he came to the ED for evaluation. Patient states the pain has improved, worsens with exertion, and radiates to his jaw. He also desciribes the pain as a \"pressure.\" Patient has associating muscle weakness, diaphoresis, SOB, and headache. Patient denies any recent similar episodes.    Patient recently had a Peritoneal Dialysis Catheter placed on May 15. Patient has had 1 flush, with 2 more anticipated flushes prior to use.       ERCourse:  Patient was given Aspirin and Nitroglycerin, and patient stated this improved his CP. EKG showed LVH, with no ST elevation. Initial Trop 294 and proBNP of 7300. Follow up Trop increased to 321. Patient was subsequently admitted for continued management of chest pain. Patient started on Heparin Drip    REVIEW OF SYSTEMS:   Review of Systems   Constitutional:  Positive for diaphoresis and malaise/fatigue. Negative for weight loss.   Eyes:  Negative for blurred vision.   Respiratory:  Positive for shortness of breath.    Cardiovascular:  Positive for chest pain. Negative for orthopnea and leg swelling. " "  Gastrointestinal:  Negative for abdominal pain, blood in stool, diarrhea, nausea and vomiting.   Genitourinary:  Negative for dysuria and hematuria.   Neurological:  Positive for headaches. Negative for tingling.   Psychiatric/Behavioral:  Negative for substance abuse.                  PAST MEDICAL HISTORY:  HTN  Aortic Stenosis  FSGS  Chronic Kidney Disease Stage IV  Drug-induced Lupus  Dyslipidemia  GERD    PAST SURGICAL HISTORY:  PD catheter placement 5/15/23  4 Umbilical Hernia Surgeries  B/L Hip Arthroscopy  Tonsillectomy  Hand and wrist surgery  Shoulder surgery  Parotid gland stone removal    FAMILY HISTORY:  Mother had 2 previous MI's, as well as an AV replacement  Father had HTN  Maternal Grandfather had \"heart issues\"  Paternal Grandmother had a stroke      SOCIAL HISTORY:   Patient states he currently lives in [facility] [facility] with his girlfriend and her son. Patient denies tobacco use. Rare alcohol use. Rare recreational marijuana use. Patient currently works as a  in [place]. Patient states he was a  for 30+ years (teens-40s). He consistently used anabolic steroids, and thyroid medication during this time period.    DIET:   Orders Placed This Encounter   Procedures    Diet NPO Restrict to: Sips with Medications     Standing Status:   Standing     Number of Occurrences:   1     Order Specific Question:   Diet NPO Restrict to:     Answer:   Sips with Medications [3]       ALLERGIES:  Allergies   Allergen Reactions    Allopurinol Unspecified     Pt states his reaction is similar to lupus    Hydralazine Hcl Unspecified     Pt states he had a reaction similar to lupus       OUTPATIENT MEDICATIONS:      aspirin 81 MG EC tablet, Take 81 mg by mouth every day., Disp: , Rfl:     betamethasone dipropionate 0.05 % Ointment, Apply 1 Application topically 2 times a day as needed., Disp: , Rfl:     verapamil ER (CALAN-SR) 240 MG Tab CR, Take 360 mg by mouth every morning., Disp: , Rfl:     " Lactobacillus (ACIDOPHILUS PO), Take 1 Tablet by mouth every day., Disp: , Rfl:     azithromycin (ZITHROMAX) 500 MG tablet, Take 500 mg by mouth every day. 5 day course, Disp: , Rfl:     tamsulosin (FLOMAX) 0.4 MG capsule, Take 0.4 mg by mouth every evening., Disp: , Rfl:     ezetimibe (ZETIA) 10 MG Tab, Take 1 Tablet by mouth every evening., Disp: 30 Tablet, Rfl: 0    esomeprazole (NEXIUM) 40 MG delayed-release capsule, Take 40 mg by mouth every evening., Disp: , Rfl:     PHYSICAL EXAM:  Vitals:    23 1302 23 1353 23 1402 23 1502   BP: 133/79 (!) 151/80 125/63 137/71   Pulse: 61 65 75 67   Resp:  18 15    Temp:       TempSrc:       SpO2: 96% 98% 94% 95%   Weight:       Height:       , Temp (24hrs), Av.8 °C (98.2 °F), Min:36.8 °C (98.2 °F), Max:36.8 °C (98.2 °F)  , Pulse Oximetry: 95 %    General: Pt resting in NAD, cooperative   Skin:  Pink, warm and dry.  No rashes  HEENT: NC/AT. EOMI. MMM. No nasal discharge.   Neck:  No JVD   Lungs:  Symmetrical.  CTAB with no W/R/R.  Good air movement   Cardiovascular:  Holosystolic murmur of in all areas  Abdomen:  Peritoneal Dialysis in place, with no surrounding erythema. Palpable internal abdominal tubing  Extremities:  Full range of motion. No gross deformities noted.   CNS:  Muscle tone is normal. Cranial nerves II-XII grossly intact.       LAB TESTS:   Recent Labs     23  1108   WBC 5.3   RBC 3.99*   HEMOGLOBIN 12.7*   HEMATOCRIT 37.6*   MCV 94.2   MCH 31.8   RDW 45.2   PLATELETCT 161*   MPV 9.6   NEUTSPOLYS 82.30*   LYMPHOCYTES 9.10*   MONOCYTES 6.50   EOSINOPHILS 1.30   BASOPHILS 0.40         Recent Labs     23  1108   SODIUM 138   POTASSIUM 5.0   CHLORIDE 104   CO2 17*   *   CREATININE 9.34*   CALCIUM 8.6   ALBUMIN 3.4         IMAGES:  DX-CHEST-PORTABLE (1 VIEW)   Final Result      Cardiomegaly. No focal consolidation or pleural effusions.      EC-ECHOCARDIOGRAM COMPLETE W/O CONT    (Results Pending)       CONSULTS:     [facility] - Cardiology    ASSESSMENT/PLAN:    RPatient is a 58 y.o. 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     #NSTEMI  Patient presented with Chest pain, with associating diaphoresis, and SOB. The pain was constant, sudden in onset, pressure-like, worse with exertion, and with radiation to the jaw. This is most consistent with a myocardial infarction. BNP of 7300, and troponin increase from 294 to 321. EKG showed no ST elevation, though with evidence of LVH. This is most consistent with an NSTEMI. CXR showed cardiomegaly. Pain also improved with initial Nitroglycerin. Patient  has used ACE inhibitors and ARBs in the past, with adverse side effects including an increase in K+ and muscle cramping. Patient currently on Ca Channel blocker   - Pharmacological Management    - Aspirin 81 mg PO qd    - Atorvastatin 80 mg PO qd    - Nitroglycerin 0.4 mg SL    - Heparin IV Drip    - Patient currently on Ca Channel blocker, consult with cardiology regarding Ca Channel blocker or B-Blocker use   - Cardiology Consult   - ECHO ordered   - Consider Heart Catheterization with increasing Troponin    # Stage 5 CKD  Previous diagnosis. Peritoneal Catheter placed on 5/15. Chronically elevated Cr, baseline increased from 3-4 to 6-8 in the past year. Cr on admission 9.34   - Nephrology Consult   - Continue to monitor electrolytes   - Continue to monitr Cr    #Dyslipidemia  Previous Diagnosis. Likely contributing to coronary pathology   - Atorvastatin 80 mg PO qd   - Lipid Profile    # GERD  Previous Diagnosis.    - Continue Esomeprazole 40 mg PO q    Disposition: Inpatient for continued evaluation and management of chest pain       Sage Odom, Student  [employer] [facility]

## 2023-05-23 NOTE — ED PROVIDER NOTES
ED Provider Note    CHIEF COMPLAINT  Chief Complaint   Patient presents with    Post-Op Complications     Patient had a LAPAROSCOPIC PLACEMENT OF PERITONEAL DIALYSIS CATHERTER placed 5/15. Patient reports yesterday was 1st time flishing catherter and a lump on the right lower abd with pain.    Chest Pain     Patient reports chest pain since 0000. Patient states felt like he was SOB and something was sitting on his chest. Patient reports he took extra medications this morning and pain has decreased to a dull pain.        EXTERNAL RECORDS REVIEWED  Other op report from 5/15/2023 by Dr. Alexander from vascular surgery regarding peritoneal dialysis catheter placement.    HPI/ROS  LIMITATION TO HISTORY   Select: : None  OUTSIDE HISTORIAN(S):  None    Gurdeep Guerra is a 58 y.o. 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    Patient states he had chest pain that was acute in onset at midnight last night.  He was at rest when this began.  He describes the pain as pressure-like, 6 out of 10, nonradiating and associated with shortness of breath.  He reports associated diaphoresis as well.  Patient checked his blood pressure at this time it was 200-2 10.  He took an extra verapamil which reduced his blood pressure to 180.  His pain continued throughout the night and is currently 4 out of 10.  He states the pain decreases with lying down.    Patient is also concerned regarding a bump/lump that he feels just above the insertion site of the peritoneal dialysis catheter which was used yesterday without issue.  He denies drainage, fever, chills, trauma.    PAST MEDICAL HISTORY   has a past medical history of Anesthesia, Aortic stenosis, Chronic gout of multiple sites, Dyspnea on exertion (5/23/2023), Elevated troponin (5/23/2023), Heart burn,  "Heart murmur, High cholesterol, Hypertension (2009), Pain in the chest (5/23/2023), PONV (postoperative nausea and vomiting), Renal disorder (2007), Sleep apnea (2000), and Snoring (1990).    SURGICAL HISTORY   has a past surgical history that includes other (2007); umbilical hernia repair (2012); shoulder arthroscopy; hip arthroscopy (Bilateral, 2002); tonsillectomy (N/A, 1987); hand surgery (Right, 1985); and cath placement capd (N/A, 5/15/2023).    FAMILY HISTORY  Family History   Problem Relation Age of Onset    Hyperlipidemia Mother     Hypertension Father        SOCIAL HISTORY  Social History     Tobacco Use    Smoking status: Never    Smokeless tobacco: Never   Vaping Use    Vaping Use: Never used   Substance and Sexual Activity    Alcohol use: Never    Drug use: Not Currently    Sexual activity: Yes     Partners: Female       CURRENT MEDICATIONS  Home Medications       Reviewed by Rhonda Rueda (Pharmacy Tech) on 05/23/23 at 1350  Med List Status: Complete     Medication Last Dose Status   aspirin 81 MG EC tablet 5/23/2023 Active   azithromycin (ZITHROMAX) 500 MG tablet COMPLETED Active   betamethasone dipropionate 0.05 % Ointment 5/22/2023 Active   esomeprazole (NEXIUM) 40 MG delayed-release capsule 5/22/2023 Active   ezetimibe (ZETIA) 10 MG Tab 5/22/2023 Active   Lactobacillus (ACIDOPHILUS PO) 5/23/2023 Active   tamsulosin (FLOMAX) 0.4 MG capsule 5/22/2023 Active   verapamil ER (CALAN-SR) 240 MG Tab CR 5/23/2023 Active                    ALLERGIES  Allergies   Allergen Reactions    Allopurinol Unspecified     Pt states his reaction is similar to lupus    Hydralazine Hcl Unspecified     Pt states he had a reaction similar to lupus       PHYSICAL EXAM  VITAL SIGNS: /79   Pulse 61   Temp 36.8 °C (98.2 °F) (Temporal)   Resp 16   Ht 1.702 m (5' 7\")   Wt 79 kg (174 lb 2.6 oz)   SpO2 96%   BMI 27.28 kg/m²    General:  WDWN male, nontoxic appearing in NAD; A+Ox3; V/S as above; afebrile, not " hypoxic or tachycardic  Skin: warm and dry; good color; no rash  HEENT: NCAT; EOMs intact; PERRL; no scleral icterus   Neck: FROM; JVD present  Cardiovascular: Regular heart rate and rhythm.  No murmurs, rubs, or gallops; pulses 2+ bilaterally radially and DP areas; no chest wall tenderness or crepitus  Lungs: No respiratory distress or tachypnea; Clear to auscultation with good air movement bilaterally.  No wheezes, rhonchi, or rales.   Abdomen: BS present; soft; NTND; no rebound, guarding, or rigidity.  No organomegaly or pulsatile mass; peritoneal dialysis catheter present with a firm, palpable, rigid area in the superior aspect of the insertion site.  No drainage or erythema, no streaking  Extremities: LUZ x 4; no e/o trauma; no pedal edema; neg Isidro's  Neurologic: CNs III-XII grossly intact; speech clear; distal sensation intact; strength 5/5 UE/LEs  Psychiatric: Appropriate affect, normal mood      DIAGNOSTIC STUDIES / PROCEDURES  EKG  I have independently interpreted this EKG. No acute ST changes on 2 EKGs.    LABS  Results for orders placed or performed during the hospital encounter of 05/23/23   CBC with Differential   Result Value Ref Range    WBC 5.3 4.8 - 10.8 K/uL    RBC 3.99 (L) 4.70 - 6.10 M/uL    Hemoglobin 12.7 (L) 14.0 - 18.0 g/dL    Hematocrit 37.6 (L) 42.0 - 52.0 %    MCV 94.2 81.4 - 97.8 fL    MCH 31.8 27.0 - 33.0 pg    MCHC 33.8 32.3 - 36.5 g/dL    RDW 45.2 35.9 - 50.0 fL    Platelet Count 161 (L) 164 - 446 K/uL    MPV 9.6 9.0 - 12.9 fL    Neutrophils-Polys 82.30 (H) 44.00 - 72.00 %    Lymphocytes 9.10 (L) 22.00 - 41.00 %    Monocytes 6.50 0.00 - 13.40 %    Eosinophils 1.30 0.00 - 6.90 %    Basophils 0.40 0.00 - 1.80 %    Immature Granulocytes 0.40 0.00 - 0.90 %    Nucleated RBC 0.00 0.00 - 0.20 /100 WBC    Neutrophils (Absolute) 4.32 1.82 - 7.42 K/uL    Lymphs (Absolute) 0.48 (L) 1.00 - 4.80 K/uL    Monos (Absolute) 0.34 0.00 - 0.85 K/uL    Eos (Absolute) 0.07 0.00 - 0.51 K/uL    Baso  (Absolute) 0.02 0.00 - 0.12 K/uL    Immature Granulocytes (abs) 0.02 0.00 - 0.11 K/uL    NRBC (Absolute) 0.00 K/uL   Complete Metabolic Panel (CMP)   Result Value Ref Range    Sodium 138 135 - 145 mmol/L    Potassium 5.0 3.6 - 5.5 mmol/L    Chloride 104 96 - 112 mmol/L    Co2 17 (L) 20 - 33 mmol/L    Anion Gap 17.0 (H) 7.0 - 16.0    Glucose 93 65 - 99 mg/dL    Bun 104 (H) 8 - 22 mg/dL    Creatinine 9.34 (HH) 0.50 - 1.40 mg/dL    Calcium 8.6 8.5 - 10.5 mg/dL    AST(SGOT) 25 12 - 45 U/L    ALT(SGPT) 10 2 - 50 U/L    Alkaline Phosphatase 56 30 - 99 U/L    Total Bilirubin 0.3 0.1 - 1.5 mg/dL    Albumin 3.4 3.2 - 4.9 g/dL    Total Protein 6.3 6.0 - 8.2 g/dL    Globulin 2.9 1.9 - 3.5 g/dL    A-G Ratio 1.2 g/dL   Troponins NOW   Result Value Ref Range    Troponin T 294 (H) 6 - 19 ng/L   proBrain Natriuretic Peptide, NT   Result Value Ref Range    NT-proBNP 7374 (H) 0 - 125 pg/mL   CORRECTED CALCIUM   Result Value Ref Range    Correct Calcium 9.1 8.5 - 10.5 mg/dL   ESTIMATED GFR   Result Value Ref Range    GFR (CKD-EPI) 6 (A) >60 mL/min/1.73 m 2   EKG   Result Value Ref Range    Report       [facility] Emergency Dept.    Test Date:  2023  Pt Name:    LORIE KAYE               Department: ER  MRN:        [id number]                      Room:  Gender:     Male                         Technician: [id number]  :        1965                   Requested By:ER TRIAGE PROTOCOL  Order #:    [id number]                    Reading MD: LARA MULLEN MD    Measurements  Intervals                                Axis  Rate:       79                           P:          0  NY:         151                          QRS:        15  QRSD:       77                           T:          41  QT:         385  QTc:        442    Interpretive Statements  Unknown rhythm, irregular rate  Probable left ventricular hypertrophy  Compared to ECG 05/15/2023 09:36:30  Early repolarization no longer present  Electronically  Signed On 2023 13:02:42 PDT by LARA MULLEN MD     EKG   Result Value Ref Range    Report       [facility] Emergency Dept.    Test Date:  2023  Pt Name:    LORIE KAYE               Department: ER  MRN:        [id number]                      Room:  Gender:     Male                         Technician: [id number]  :        1965                   Requested By:ER TRIAGE PROTOCOL  Order #:    [id number]                    Reading MD: LARA MULLEN MD    Measurements  Intervals                                Axis  Rate:       76                           P:  CO:                                      QRS:        20  QRSD:       112                          T:          68  QT:         387  QTc:        436    Interpretive Statements  Accelerated junctional rhythm  RSR' in V1 or V2, probably normal variant  Left ventricular hypertrophy  Compared to ECG 2023 11:02:39  Accelerated junctional rhythm now present  RSR' in V1 or V2 now present  Electronically Signed On 2023 13:44:39 PDT by LARA MULLEN MD           RADIOLOGY  I have independently interpreted the diagnostic imaging associated with this visit and am waiting the final reading from the radiologist.   My preliminary interpretation is as follows: No acute pathology  Radiologist interpretation:   DX-CHEST-PORTABLE (1 VIEW)   Final Result      Cardiomegaly. No focal consolidation or pleural effusions.      EC-ECHOCARDIOGRAM COMPLETE W/O CONT    (Results Pending)         COURSE & MEDICAL DECISION MAKING    ED Observation Status? No; Patient does not meet criteria for ED Observation.     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    Patient has JVD on exam but is not in respiratory distress.  He describes his current chest pain is 4 out of 10.  Aspirin administered given troponin of 294.  BNP is also elevated to 7374.    No prior troponins with which to compare today's value 2.  Creatinine is 9.34.  Potassium is normal at 5.    Discussed the case with Dr. Lipscomb from cardiology who will consult.  He has reviewed the EKGs and is aware of the elevated troponin.    Nitroglycerin ordered for 4 out of 10 chest pain.  Blood pressure 133 systolic currently.    1:45 PM  Paging nephrology    2:01 PM  I discussed the case with the resident team who agrees to admit the patient.  We discussed cardiology's recommendations which were trending the troponin, blood pressure control, and echo for now.  If next troponin is elevated, heparinization would be indicated.  Current thought was that this may be hypertensive emergency.      The total critical care time spent caring for this patient totaled 35 minutes due to the high probability of life-threatening condition such as non-STEMI versus CHF and threatened life/limb/vital organ function given elevated troponin to 294 and active chest pain.  This time included frequent reassessments and intervening with necessary action such as aspirin, nitroglycerin, repeat EKGs, having multiple conversations with multiple consultants including nephrology and cardiology, and speaking with the admitting physician.    DISPOSITION AND DISCUSSIONS  I have discussed management of the patient with the following physicians and JADEN's:    To  [facility] resident  Nephrology/[place]    Decision tools and prescription drugs considered including, but not limited to: HEART Score 4-6 .    FINAL DIAGNOSIS  1. Acute chest pain    2. Elevated troponin    3. Elevated brain natriuretic peptide (BNP) level    4. Disorder of peritoneal dialysis catheter, initial encounter           Electronically signed by: Chanda Dupree M.D., 5/23/2023  11:54 AM

## 2023-05-23 NOTE — ED NOTES
Dr. Dupree at bedside. EKG repeated & repeat trop sent, ASA given. Pt states CP is 4/10 currently, was 6/10 this morning.

## 2023-05-23 NOTE — ED NOTES
Med rec completed per pt   Allergies reviewed     Pt completed a 5 day course of Azithromycin about 3 weeks ago     Pt states that he took an extra dose of his Verapamil last night (5/22/2023)

## 2023-05-23 NOTE — ASSESSMENT & PLAN NOTE
Presented with typical chest pain consisting of chest pressure with exertional component and relieved with rest, dyspnea, diaphoresis.  Initial troponin of 290 and slight increase on repeat. EKG shows accelerated junctional rhythm versus multifocal atrial tachycardia, PACs, no significant ST changes or T wave changes.  Troponins gradually uptrending.  EKG remains without ST changes.  After a long discussion regarding the risks and benefits of left heart cath in the setting of his advanced CKD, eventually decided to proceed with left heart cath 5/25 which showed chronic coronary artery disease, no need for stenting.  Ultimately, NSTEMI is likely more demand ischemia related to aortic stenosis, possible hypertensive urgency, on top of ESRD.  -Stop anticoagulation  -Continue home aspirin 81  -Atorvastatin 80  -Metoprolol 50 mg twice daily (this will replace home verapamil)  -Patient reports previous intolerance of ACE/ARB due to hyperkalemia

## 2023-05-24 ENCOUNTER — APPOINTMENT (OUTPATIENT)
Dept: CARDIOLOGY | Facility: MEDICAL CENTER | Age: 58
DRG: 280 | End: 2023-05-24
Attending: PHYSICIAN ASSISTANT
Payer: COMMERCIAL

## 2023-05-24 PROBLEM — E83.39 HYPERPHOSPHATEMIA: Status: ACTIVE | Noted: 2023-05-24

## 2023-05-24 LAB
ALBUMIN SERPL BCP-MCNC: 2.9 G/DL (ref 3.2–4.9)
ALBUMIN/GLOB SERPL: 1.2 G/DL
ALP SERPL-CCNC: 45 U/L (ref 30–99)
ALT SERPL-CCNC: 12 U/L (ref 2–50)
ANION GAP SERPL CALC-SCNC: 16 MMOL/L (ref 7–16)
AST SERPL-CCNC: 18 U/L (ref 12–45)
BILIRUB SERPL-MCNC: 0.4 MG/DL (ref 0.1–1.5)
BUN SERPL-MCNC: 109 MG/DL (ref 8–22)
CALCIUM ALBUM COR SERPL-MCNC: 9.6 MG/DL (ref 8.5–10.5)
CALCIUM SERPL-MCNC: 8.7 MG/DL (ref 8.5–10.5)
CHLORIDE SERPL-SCNC: 109 MMOL/L (ref 96–112)
CO2 SERPL-SCNC: 16 MMOL/L (ref 20–33)
CREAT SERPL-MCNC: 9.45 MG/DL (ref 0.5–1.4)
ERYTHROCYTE [DISTWIDTH] IN BLOOD BY AUTOMATED COUNT: 43.7 FL (ref 35.9–50)
GFR SERPLBLD CREATININE-BSD FMLA CKD-EPI: 6 ML/MIN/1.73 M 2
GLOBULIN SER CALC-MCNC: 2.4 G/DL (ref 1.9–3.5)
GLUCOSE SERPL-MCNC: 81 MG/DL (ref 65–99)
HCT VFR BLD AUTO: 32.8 % (ref 42–52)
HGB BLD-MCNC: 11.5 G/DL (ref 14–18)
MAGNESIUM SERPL-MCNC: 1.6 MG/DL (ref 1.5–2.5)
MCH RBC QN AUTO: 32 PG (ref 27–33)
MCHC RBC AUTO-ENTMCNC: 35.1 G/DL (ref 32.3–36.5)
MCV RBC AUTO: 91.4 FL (ref 81.4–97.8)
PHOSPHATE SERPL-MCNC: 6.4 MG/DL (ref 2.5–4.5)
PLATELET # BLD AUTO: 154 K/UL (ref 164–446)
PMV BLD AUTO: 9.6 FL (ref 9–12.9)
POTASSIUM SERPL-SCNC: 4.5 MMOL/L (ref 3.6–5.5)
PROT SERPL-MCNC: 5.3 G/DL (ref 6–8.2)
RBC # BLD AUTO: 3.59 M/UL (ref 4.7–6.1)
SODIUM SERPL-SCNC: 141 MMOL/L (ref 135–145)
TROPONIN T SERPL-MCNC: 377 NG/L (ref 6–19)
TROPONIN T SERPL-MCNC: 397 NG/L (ref 6–19)
TROPONIN T SERPL-MCNC: 399 NG/L (ref 6–19)
TROPONIN T SERPL-MCNC: 402 NG/L (ref 6–19)
UFH PPP CHRO-ACNC: 0.35 IU/ML
UFH PPP CHRO-ACNC: 0.36 IU/ML
WBC # BLD AUTO: 5.2 K/UL (ref 4.8–10.8)

## 2023-05-24 PROCEDURE — 93005 ELECTROCARDIOGRAM TRACING: CPT

## 2023-05-24 PROCEDURE — 700102 HCHG RX REV CODE 250 W/ 637 OVERRIDE(OP)

## 2023-05-24 PROCEDURE — 99418 PROLNG IP/OBS E/M EA 15 MIN: CPT | Performed by: HOSPITALIST

## 2023-05-24 PROCEDURE — 36415 COLL VENOUS BLD VENIPUNCTURE: CPT

## 2023-05-24 PROCEDURE — A9270 NON-COVERED ITEM OR SERVICE: HCPCS | Performed by: INTERNAL MEDICINE

## 2023-05-24 PROCEDURE — 99291 CRITICAL CARE FIRST HOUR: CPT | Performed by: INTERNAL MEDICINE

## 2023-05-24 PROCEDURE — 85520 HEPARIN ASSAY: CPT

## 2023-05-24 PROCEDURE — 700111 HCHG RX REV CODE 636 W/ 250 OVERRIDE (IP)

## 2023-05-24 PROCEDURE — 700102 HCHG RX REV CODE 250 W/ 637 OVERRIDE(OP): Performed by: INTERNAL MEDICINE

## 2023-05-24 PROCEDURE — A9270 NON-COVERED ITEM OR SERVICE: HCPCS | Performed by: STUDENT IN AN ORGANIZED HEALTH CARE EDUCATION/TRAINING PROGRAM

## 2023-05-24 PROCEDURE — 83735 ASSAY OF MAGNESIUM: CPT

## 2023-05-24 PROCEDURE — 85027 COMPLETE CBC AUTOMATED: CPT

## 2023-05-24 PROCEDURE — 700102 HCHG RX REV CODE 250 W/ 637 OVERRIDE(OP): Performed by: STUDENT IN AN ORGANIZED HEALTH CARE EDUCATION/TRAINING PROGRAM

## 2023-05-24 PROCEDURE — 84100 ASSAY OF PHOSPHORUS: CPT

## 2023-05-24 PROCEDURE — 770020 HCHG ROOM/CARE - TELE (206)

## 2023-05-24 PROCEDURE — A9270 NON-COVERED ITEM OR SERVICE: HCPCS

## 2023-05-24 PROCEDURE — 84484 ASSAY OF TROPONIN QUANT: CPT

## 2023-05-24 PROCEDURE — 80053 COMPREHEN METABOLIC PANEL: CPT

## 2023-05-24 PROCEDURE — 99233 SBSQ HOSP IP/OBS HIGH 50: CPT | Performed by: INTERNAL MEDICINE

## 2023-05-24 PROCEDURE — 99233 SBSQ HOSP IP/OBS HIGH 50: CPT | Mod: GC | Performed by: HOSPITALIST

## 2023-05-24 RX ORDER — AMLODIPINE BESYLATE 10 MG/1
10 TABLET ORAL
Status: DISCONTINUED | OUTPATIENT
Start: 2023-05-24 | End: 2023-05-27 | Stop reason: HOSPADM

## 2023-05-24 RX ORDER — SODIUM BICARBONATE 650 MG/1
1300 TABLET ORAL 2 TIMES DAILY
Status: DISCONTINUED | OUTPATIENT
Start: 2023-05-24 | End: 2023-05-27 | Stop reason: HOSPADM

## 2023-05-24 RX ORDER — SEVELAMER CARBONATE 800 MG/1
800 TABLET, FILM COATED ORAL
Status: DISCONTINUED | OUTPATIENT
Start: 2023-05-24 | End: 2023-05-27 | Stop reason: HOSPADM

## 2023-05-24 RX ORDER — MAGNESIUM SULFATE HEPTAHYDRATE 40 MG/ML
2 INJECTION, SOLUTION INTRAVENOUS ONCE
Status: COMPLETED | OUTPATIENT
Start: 2023-05-24 | End: 2023-05-24

## 2023-05-24 RX ADMIN — METOPROLOL TARTRATE 50 MG: 50 TABLET, FILM COATED ORAL at 17:13

## 2023-05-24 RX ADMIN — ASPIRIN 81 MG: 81 TABLET, COATED ORAL at 05:11

## 2023-05-24 RX ADMIN — SODIUM BICARBONATE 1300 MG: 650 TABLET ORAL at 11:48

## 2023-05-24 RX ADMIN — MAGNESIUM SULFATE HEPTAHYDRATE 2 G: 2 INJECTION, SOLUTION INTRAVENOUS at 06:48

## 2023-05-24 RX ADMIN — METOPROLOL TARTRATE 50 MG: 50 TABLET, FILM COATED ORAL at 05:11

## 2023-05-24 RX ADMIN — SEVELAMER CARBONATE 800 MG: 800 TABLET, FILM COATED ORAL at 17:12

## 2023-05-24 RX ADMIN — SENNOSIDES AND DOCUSATE SODIUM 2 TABLET: 50; 8.6 TABLET ORAL at 05:12

## 2023-05-24 RX ADMIN — SODIUM BICARBONATE 1300 MG: 650 TABLET ORAL at 17:15

## 2023-05-24 RX ADMIN — ATORVASTATIN CALCIUM 80 MG: 80 TABLET, FILM COATED ORAL at 17:13

## 2023-05-24 RX ADMIN — AMLODIPINE BESYLATE 10 MG: 10 TABLET ORAL at 11:48

## 2023-05-24 RX ADMIN — TAMSULOSIN HYDROCHLORIDE 0.4 MG: 0.4 CAPSULE ORAL at 17:12

## 2023-05-24 RX ADMIN — OMEPRAZOLE 20 MG: 20 CAPSULE, DELAYED RELEASE ORAL at 21:35

## 2023-05-24 RX ADMIN — HEPARIN SODIUM 14 UNITS/KG/HR: 5000 INJECTION, SOLUTION INTRAVENOUS at 15:32

## 2023-05-24 RX ADMIN — SEVELAMER CARBONATE 800 MG: 800 TABLET, FILM COATED ORAL at 11:48

## 2023-05-24 ASSESSMENT — FIBROSIS 4 INDEX: FIB4 SCORE: 1.889509971893320684

## 2023-05-24 ASSESSMENT — ENCOUNTER SYMPTOMS
WEIGHT LOSS: 0
PND: 1
ABDOMINAL PAIN: 0
ORTHOPNEA: 0
FOCAL WEAKNESS: 0
VOMITING: 0
HEADACHES: 0
NAUSEA: 0
CONSTIPATION: 0
FEVER: 0
CHILLS: 0

## 2023-05-24 ASSESSMENT — PAIN DESCRIPTION - PAIN TYPE: TYPE: ACUTE PAIN

## 2023-05-24 NOTE — ED NOTES
Pt transported to T8 by RN on monitor with chart and all belongings in stable condition. Heparin gtt continued to floor.

## 2023-05-24 NOTE — PROGRESS NOTES
[facility] -  PROGRESS NOTE               Author: Kodak Harvey M.D. Date & Time: 5/24/2023  9:33 AM     HPI:  58 y.o. male with  chronic anabolic steroid use, ESRD sec to to Bx proven sec FSGS currently not on dialysis, moderate to moderate AS and  moderate to severe AR, HTN presenting to ER with CP,onset last night, CP not positional.   He noticed his BP was significantly elevated ~ 200/120. His BP improved to 160  after he took extra dose of  verapamil last night. His CP has continued through out the night and lasted during the day. He reports that the pain  gradually subsided  after he received ASA, NG.   Labs notable for troponin T of 294, repeat increased to 321  elevated NT proBNP of 7374.   He is is started  on heparin drip EKG with no ST- T wave changes,   Labs also noted for  unchanged from last month BUN ~ 107 and  Cr 9.34 increased increased from 7 a month ago.    He recently underwent PD catheter placement on May 15.   He had the PD  catheter was flushed and dressing changed yesterday with out any issues.   No F/C/N/V/SOB.  No melena, hematochezia, hematemesis.  No HA, visual changes, or abdominal pain. Denies dysgeusia, poor appetite.    He has ongoing fatigue which is chronic unchanged from baseline.     DAILY NEPHROLOGY SUMMARY:  5/23- Consult done   5/24- BP control sub optimal, denies CP, no complaints,case reviewed  with cardiology Dr. Lipscomb, per cardiology no compelling indication for LHC at this time and if pt to undergo LHC it will push him to needing dialysis given contrast exposure, pt wants to start PD as outpatient and would like to avoid tunnel dialysis cath.     REVIEW OF SYSTEMS:    10 point ROS reviewed and is as per HPI/daily summary or otherwise negative    PMH/PSH/SH/FH:  Reviewed and unchanged since admission note    CURRENT MEDICATIONS:  Reviewed from admission to present day    VS:  BP (!) 161/92   Pulse 65   Temp 36.1 °C (97 °F) (Temporal)    "Resp 17   Ht 1.702 m (5' 7\")   Wt 78.6 kg (173 lb 4.5 oz)   SpO2 96%   BMI 27.14 kg/m²   Physical Exam  Constitutional:       General: He is not in acute distress.     Appearance: He is not ill-appearing.   HENT:      Head: Normocephalic and atraumatic.      Mouth/Throat:      Mouth: Mucous membranes are moist.   Eyes:      Conjunctiva/sclera: Conjunctivae normal.   Cardiovascular:      Rate and Rhythm: Normal rate and regular rhythm.      Heart sounds: Murmur heard.      No friction rub.   Pulmonary:      Effort: Pulmonary effort is normal. No respiratory distress.      Breath sounds: Normal breath sounds. No wheezing or rales.   Abdominal:      General: Abdomen is flat.      Palpations: Abdomen is soft.      Tenderness: There is no abdominal tenderness.      Comments: PD cath RLQ,Exit site covered with dressing c/d/Kleber tunnel tenderness    Musculoskeletal:      Right lower leg: No edema.      Left lower leg: No edema.   Skin:     General: Skin is warm and moist.   Neurological:      Mental Status: He is alert and oriented to person, place, and time.   Psychiatric:         Mood and Affect: Affect normal.     Fluids:    Intake/Output Summary (Last 24 hours) at 5/24/2023 0933  Last data filed at 5/24/2023 0516  Gross per 24 hour   Intake --   Output 1100 ml   Net -1100 ml       LABS:  Labs reviewed, pertinent labs below.    Recent Labs     05/23/23  1108 05/23/23  1558 05/24/23  0111   RBC 3.99*  --  3.59*   HEMOGLOBIN 12.7*  --  11.5*   HEMATOCRIT 37.6*  --  32.8*   PLATELETCT 161*  --  154*   PROTHROMBTM  --  15.0*  --    APTT  --  28.6  --    INR  --  1.20*  --        Recent Labs     05/23/23  1108 05/24/23  0111   SODIUM 138 141   POTASSIUM 5.0 4.5   CHLORIDE 104 109   CO2 17* 16*   GLUCOSE 93 81   * 109*   CREATININE 9.34* 9.45*   CALCIUM 8.6 8.7        IMAGING:  All imaging reviewed from admission to present day      IMPRESSION:  # ESRD sec to sec FSGS currently not on dialysis   - s/p PD cath " placement 5/15 with plans to start PD in next couple of weeks   - No overt uremic symptoms at present, no pericardial rub on exam   # Chest pain   - Elevated troponin   - ECHO with normal LVEF, moderate AS and severe AI  - Currently being observed for ACS  # HTN   - BP not at goal   - No signs of hypervolemia on exam    # CKD- MBD  - Ca 8.6   - P 6.4   # Anemia not present   # Presumed metabolic acidosis   # Hyperphosphatemia      PLAN:  - No compelling indication for RRT  - Daily evaluation for RRT needs  -- Dose all meds per eGFR < 15  - Renal diet when not NPO  - Amlodipine 10 mg started 5/24   - Start Renvela 800 mg TID AC  - Start Sodium bicarbonate 1300 mg bid   - Defer cardiology reg LHC,If pt to undergo LHC and need to initiate dialysis will consider on initiating low volume PD, awaiting call back from  home dialysis PD nurse.

## 2023-05-24 NOTE — CARE PLAN
Problem: Knowledge Deficit - Standard  Goal: Patient and family/care givers will demonstrate understanding of plan of care, disease process/condition, diagnostic tests and medications  5/24/2023 0238 by Pao Matos R.N.  Outcome: Progressing  Note: Discuss and review POC with patient/family. Updated pts whiteboard. All questions answered at this time. Re-educate as needed.    5/24/2023 0238 by Pao Matos R.N.  Note: Discuss and review POC with patient/family. Updated pts whiteboard. All questions answered at this time. Re-educate as needed.       Problem: Respiratory  Goal: Patient will achieve/maintain optimum respiratory ventilation and gas exchange  5/24/2023 0238 by Pao Matos R.N.  Outcome: Progressing  Note: Assess and monitor pulmonary status. Encourage ambulation, turning, coughing and deep breathing. No complaints of SOB. Continue to monitor.     5/24/2023 0238 by Pao Matos R.N.  Note: Assess and monitor pulmonary status. Encourage ambulation, turning, coughing and deep breathing. No complaints of SOB. Continue to monitor.      The patient is Watcher - Medium risk of patient condition declining or worsening    Shift Goals  Clinical Goals: Heparin gtt, monitor chest pain, ekg, and trops  Patient Goals: Eat    Progress made toward(s) clinical / shift goals:  No complaints of SOB or chest pain. NPO at this time.     Patient is not progressing towards the following goals: Trops are still trending up.

## 2023-05-24 NOTE — HOSPITAL COURSE
Gurdeep Guerra is a 58 y.o. 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 ECHO 5/23 with EF 60%, moderate AS, moderate to severe AI. Left heart cath 5/25 with chronic CAD, but nothing to suggest ACS. NSTEMI likely secondary to aortic valve disease, hypertension, CKD.

## 2023-05-24 NOTE — PROGRESS NOTES
Bedside report received from Patricia KINCAID. Pt A&Ox4. Complains of no pain at this time. Pt is medical. Call light and belongings within reach. Bed locked and in lowest position. Alarm and fall precautions in place.

## 2023-05-24 NOTE — CARE PLAN
Problem: Knowledge Deficit - Standard  Goal: Patient and family/care givers will demonstrate understanding of plan of care, disease process/condition, diagnostic tests and medications  Outcome: Progressing     Problem: Hemodynamics  Goal: Patient's hemodynamics, fluid balance and neurologic status will be stable or improve  Outcome: Progressing     Problem: Respiratory  Goal: Patient will achieve/maintain optimum respiratory ventilation and gas exchange  Outcome: Progressing     Problem: Self Care  Goal: Patient will have the ability to perform ADLs independently or with assistance (bathe, groom, dress, toilet and feed)  Outcome: Progressing   The patient is Stable - Low risk of patient condition declining or worsening    Shift Goals  Clinical Goals: mobility, safety, possible heart cath  Patient Goals: communication from providers  Family Goals: marisa    Progress made toward(s) clinical / shift goals:  advance diet, heparin gtt    Patient is not progressing towards the following goals:

## 2023-05-24 NOTE — CONSULTS
[facility] -  CONSULTATION NOTE               Author: Kodak Harvey M.D. Date & Time: 5/23/2023  5:01 PM       REASON FOR CONSULTATION:   MCKENNA    CHIEF COMPLAINT:       HISTORY OF PRESENT ILLNESS:    58 y.o. male with  chronic anabolic steroid use, ESRD sec to to Bx proven sec FSGS currently not on dialysis, moderate to moderate AS and  moderate to severe AR, HTN presenting to ER with CP,onset last night, CP not positional.   He noticed his BP was significantly elevated ~ 200/120. His BP improved to 160  after he took extra dose of  verapamil last night. His CP has continued through out the night and lasted during the day. He reports that the pain  gradually subsided  after he received ASA, NG.   Labs notable for troponin T of 294, repeat increased to 321  elevated NT proBNP of 7374.   He is is started  on heparin drip EKG with no ST- T wave changes,   Labs also noted for  unchanged from last month BUN ~ 107 and  Cr 9.34 increased increased from 7 a month ago.    He recently underwent PD catheter placement on May 15.   He had the PD  catheter was flushed and dressing changed yesterday with out any issues.   No F/C/N/V/SOB.  No melena, hematochezia, hematemesis.  No HA, visual changes, or abdominal pain. Denies dysgeusia, poor appetite.    He has ongoing fatigue which is chronic unchanged from baseline.     REVIEW OF SYSTEMS:    10 point ROS was performed and is as per HPI or otherwise negative    PAST MEDICAL HISTORY:   Past Medical History:   Diagnosis Date    Anesthesia     Hiccups for over 24 hours after a previous sugery.    Aortic stenosis     Chronic gout of multiple sites     Dyspnea on exertion 5/23/2023    Elevated troponin 5/23/2023    Heart burn     1990    Heart murmur     High cholesterol     All always    Hypertension 2009    NSTEMI (non-ST elevated myocardial infarction) (HCC) 5/23/2023    Pain in the chest 5/23/2023    PONV (postoperative nausea and vomiting)      Renal disorder 2007    Stage IV    Sleep apnea 2000    Snoring 1990       PAST SURGICAL HISTORY:   Past Surgical History:   Procedure Laterality Date    CATH PLACEMENT CAPD N/A 5/15/2023    Procedure: LAPAROSCOPIC PLACEMENT OF PERITONEAL DIALYSIS CATHERTER;  Surgeon: Shikha Alexander M.D.;  Location: SURGERY SAME DAY [facility];  Service: General    UMBILICAL HERNIA REPAIR  2012    4 separate surgeries between [id number]    OTHER  2007    Kidney biopsy, can't recall laterality,    HIP ARTHROSCOPY Bilateral 2002    TONSILLECTOMY N/A 1987    HAND SURGERY Right 1985    Hand and wrist    SHOULDER ARTHROSCOPY      Can't recall date       FAMILY HISTORY:   family history includes Hyperlipidemia in his mother; Hypertension in his father.    SOCIAL HISTORY:   Social History     Tobacco Use    Smoking status: Never    Smokeless tobacco: Never   Vaping Use    Vaping Use: Never used   Substance Use Topics    Alcohol use: Never    Drug use: Not Currently       MEDICATIONS:   Home medications reviewed and documented in chart    No current facility-administered medications on file prior to encounter.     Current Outpatient Medications on File Prior to Encounter   Medication Sig Dispense Refill    aspirin 81 MG EC tablet Take 81 mg by mouth every day.      betamethasone dipropionate 0.05 % Ointment Apply 1 Application topically 2 times a day as needed.      verapamil ER (CALAN-SR) 240 MG Tab CR Take 360 mg by mouth every morning.      Lactobacillus (ACIDOPHILUS PO) Take 1 Tablet by mouth every day.      tamsulosin (FLOMAX) 0.4 MG capsule Take 0.4 mg by mouth every evening.      ezetimibe (ZETIA) 10 MG Tab Take 1 Tablet by mouth every evening. 30 Tablet 0    esomeprazole (NEXIUM) 40 MG delayed-release capsule Take 40 mg by mouth every evening.         Current Facility-Administered Medications   Medication Dose Route Frequency Provider Last Rate Last Admin    nitroglycerin (NITROSTAT) tablet 0.4 mg  0.4 mg Sublingual Once Phil Doty  M.D.        senna-docusate (PERICOLACE or SENOKOT S) 8.6-50 MG per tablet 2 Tablet  2 Tablet Oral BID Phil Doty M.D.        And    polyethylene glycol/lytes (MIRALAX) PACKET 1 Packet  1 Packet Oral QDAY PRN Phil Doty M.D.        And    magnesium hydroxide (MILK OF MAGNESIA) suspension 30 mL  30 mL Oral QDAY PRN Phil Doty M.D.        And    bisacodyl (DULCOLAX) suppository 10 mg  10 mg Rectal QDAY PRN Phil Doty M.D.        acetaminophen (Tylenol) tablet 650 mg  650 mg Oral Q6HRS PRN Phil Doty M.D.        nitroglycerin (NITROSTAT) tablet 0.4 mg  0.4 mg Sublingual Q5 MIN PRN Phil Doty M.D.        atorvastatin (LIPITOR) tablet 80 mg  80 mg Oral Q EVENING Phil Doty M.D.        heparin infusion 25,000 units in 500 mL 0.45% NACL  0-30 Units/kg/hr Intravenous Continuous Phil Doty M.D. 19 mL/hr at 05/23/23 1607 12 Units/kg/hr at 05/23/23 1607    heparin injection 2,000 Units  2,000 Units Intravenous PRN Phil Doty M.D.        [START ON 5/24/2023] aspirin EC tablet 81 mg  81 mg Oral DAILY Phil Doty M.D.        omeprazole (PRILOSEC) capsule 20 mg  20 mg Oral Nightly Phil Doty M.D.        tamsulosin (FLOMAX) capsule 0.4 mg  0.4 mg Oral Q EVENING Phil Doty M.D.        [START ON 5/24/2023] verapamil ER (CALAN-SR) tablet 360 mg  360 mg Oral QAM Phil Doty M.D.        morphine 4 MG/ML injection 1 mg  1 mg Intravenous Q4HRS PRN Poncho Singh M.D.         Current Outpatient Medications   Medication Sig Dispense Refill    aspirin 81 MG EC tablet Take 81 mg by mouth every day.      betamethasone dipropionate 0.05 % Ointment Apply 1 Application topically 2 times a day as needed.      verapamil ER (CALAN-SR) 240 MG Tab CR Take 360 mg by mouth every morning.      Lactobacillus (ACIDOPHILUS PO) Take 1 Tablet by mouth every day.      tamsulosin (FLOMAX) 0.4 MG capsule Take 0.4 mg by mouth every evening.      ezetimibe (ZETIA) 10 MG Tab Take 1 Tablet by mouth every evening. 30  "Tablet 0    esomeprazole (NEXIUM) 40 MG delayed-release capsule Take 40 mg by mouth every evening.         ALLERGIES:  Allopurinol and Hydralazine hcl    VS:  BP (!) 153/76   Pulse 73   Temp 36.8 °C (98.2 °F) (Temporal)   Resp 17   Ht 1.702 m (5' 7\")   Wt 79 kg (174 lb 2.6 oz)   SpO2 96%   BMI 27.28 kg/m²   Physical Exam  Constitutional:       General: He is not in acute distress.     Appearance: He is not ill-appearing.   HENT:      Head: Normocephalic and atraumatic.      Mouth/Throat:      Mouth: Mucous membranes are moist.   Eyes:      Conjunctiva/sclera: Conjunctivae normal.   Cardiovascular:      Rate and Rhythm: Normal rate and regular rhythm.      Heart sounds: Murmur heard.      No friction rub.   Pulmonary:      Effort: Pulmonary effort is normal. No respiratory distress.      Breath sounds: Normal breath sounds. No wheezing or rales.   Abdominal:      General: Abdomen is flat.      Palpations: Abdomen is soft.      Tenderness: There is no abdominal tenderness.      Comments: PD cath RLQ  Exit site covered with dressing c/d/I  No tunnel tenderness    Musculoskeletal:      Right lower leg: No edema.      Left lower leg: No edema.   Skin:     General: Skin is warm and moist.   Neurological:      Mental Status: He is alert and oriented to person, place, and time.   Psychiatric:         Mood and Affect: Affect normal.         FLUID BALANCE:  No intake or output data in the 24 hours ending 05/23/23 1701    LABS:  Recent Labs     05/23/23  1108 05/23/23  1558   RBC 3.99*  --    HEMOGLOBIN 12.7*  --    HEMATOCRIT 37.6*  --    PLATELETCT 161*  --    PROTHROMBTM  --  15.0*   APTT  --  28.6   INR  --  1.20*       Recent Labs     05/23/23  1108   SODIUM 138   POTASSIUM 5.0   CHLORIDE 104   CO2 17*   GLUCOSE 93   *   CREATININE 9.34*   CALCIUM 8.6        URINALYSIS:  No results found for: COLORURINE, CLARITY, SPECGRAVITY, PHURINE, KETONES, PROTEINURIN, BILIRUBINUR, UROBILU, NITRITE, LEUKESTERAS, " OCCULTBLOOD    [facility]  Lab Results   Component Value Date/Time    TOTPROTUR 43.3 (H) 01/26/2022 1200      Lab Results   Component Value Date/Time    CREATININEU 56 04/02/2023 1445       IMAGING:  Imaging studies reviewed by me    EC-ECHOCARDIOGRAM COMPLETE W/O CONT   Final Result      DX-CHEST-PORTABLE (1 VIEW)   Final Result      Cardiomegaly. No focal consolidation or pleural effusions.          IMPRESSION:  # ESRD sec to sec FSGS currently not on dialysis   - s/p PD cath placement 5/15 with plans to start PD in next couple of weeks   - No overt uremic symptoms at present, no pericardial rub on exam   # Chest pain   - Elevated troponin   - ECHO with normal LVEF, moderate AS and severe AI  - Currently being observed for ACS  # HTN   - BP not at goal   - No signs of hypervolemia on exam    # CKD- MBD  - Ca 8.6   - P pending   # Anemia not present     PLAN:  - No compelling indication for RRT  - Daily evaluation for RRT needs  - Dose all meds per eGFR < 15  - Renal diet when not NPO  - Titrate anti -HTN for BP goal < 140/90       Thank you for the consultation!

## 2023-05-24 NOTE — PROGRESS NOTES
Pt arrives to floor via gurney with ACLS RN. Tele box applied and monitor room called. Pt denies CP and SOB. VS within normal limits. Pt ambulated to bed standby assist. Bed locked and in its lowest position. Call light within reach. Heparin gtt is observed running at 12u/kg/hr for weight programmed as 79kg and 19mL/hr as ordered. ED RN started gtt at 1400, Xa lab draw timed for 2000 per protocol. Heart and lung sounds within normal limits. Pt denies any needs at this time. Resident messaged regarding trending troponin and diet order for patient.

## 2023-05-24 NOTE — PROGRESS NOTES
Cardiologist and nephrologist at bedside. Cardiologist states heart cath will be canceled and patient can be started on a cardiac renal diet. Resident notified. No diet order received at this time.

## 2023-05-24 NOTE — DISCHARGE PLANNING
HTH/SCP TCN chart review completed. Collaborated with JOSE Coughlin prior to meeting with the pt. The most current review of medical record, knowledge of pt's PLOF and social support, LACE+ score of 42, 6 clicks scores of 24 ADL's and 24 mobility were considered.  Per chart review, patient admitted for NSTEMI and patient is s/p cath placement on 5/15/23 with plans to start dialysis in the next couple weeks.      Pt seen at bedside. Introduced TCN program. Provided education regarding post acute levels of care. Discussed HTH/SCP plan benefits (Meds to Beds, medical uber and [facility] transitional care). Pt verbalizes understanding.     Patient states he lives in an apartment with his s/o Paulette and was independent with ADL's, IADL's, ambulation (no AD) and driving at baseline.  He has no concerns with food, housing or transportation and states he is at his baseline level of function.  He is on RA and has no AD's at home.      TCN will continue to follow and collaborate with discharge planning team as additional post acute needs arise. Thank you.     Completed today:  Choice obtained: None.  Patient reports at baseline level of function,   [facility] referral (N). Not sent.  Low LACE. Resides in [place].

## 2023-05-24 NOTE — PROGRESS NOTES
[facility] Internal Medicine Daily Progress Note    Date of Service  5/24/2023    R Team: [employer] [facility] Team   Attending: JEREMIAH Jacobson M.d.  Senior Resident: Dr. Jose Angel Singh   Intern:  Dr. Phil Doty  Contact Number: [phone]    Chief Complaint  Gurdeep Guerra is a 58 y.o. male admitted 5/23/2023 with chest pain    Hospital Course  Gurdeep Guerra is a 58 y.o. 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 ECHO 5/23 with EF 60%, moderate AS, moderate to severe AI. Cardiology considering risks/benefits of left heart cath in the setting of severe renal disease.     Interval Problem Update  Remained on heparin drip overnight and was chest pain free.  Troponin gradually, but steadily increasing.  Initial plans for left heart cath per cardiology APRN this morning, but after discussion with Dr. Lipscomb, Dr. Harvey and patient, elected to defer left heart cath due to risk of pushing patient into dialysis.  This was discussed again with our team and patient is agreeable to left heart cath if there is significant possibility that he may have stenosis and risk for a larger MI in the future.    Patient is otherwise feeling much better.  No chest pain since in the ED.  No shortness of breath or orthopnea.  Patient does report a 6-month 1 year history of dyspnea on exertion, relieved with rest, along with intermittent waking up gasping for air.    I have discussed this patient's plan of care and discharge plan at IDT rounds today with Case Management, Nursing, Nursing leadership, and other members of the IDT team.    Consultants/Specialty  cardiology and nephrology    Code Status  Full Code    Disposition  The patient is not  medically cleared for discharge to home or a post-acute facility.  Anticipate discharge to: home with close outpatient follow-up    I have placed the appropriate orders for post-discharge needs.    Review of Systems  Review of Systems   Constitutional:  Negative for chills, fever and weight loss.   Cardiovascular:  Positive for PND. Negative for chest pain, orthopnea and leg swelling.   Gastrointestinal:  Negative for abdominal pain, constipation, nausea and vomiting.   Genitourinary:  Negative for dysuria.   Neurological:  Negative for focal weakness and headaches.        Physical Exam  Temp:  [36.1 °C (97 °F)-37.2 °C (99 °F)] 36.6 °C (97.9 °F)  Pulse:  [62-73] 68  Resp:  [16-18] 18  BP: (102-172)/() 165/93  SpO2:  [94 %-98 %] 98 %    Physical Exam  Constitutional:       General: He is not in acute distress.  HENT:      Head: Normocephalic.      Mouth/Throat:      Mouth: Mucous membranes are moist.      Pharynx: Oropharynx is clear.   Eyes:      Extraocular Movements: Extraocular movements intact.      Conjunctiva/sclera: Conjunctivae normal.   Cardiovascular:      Rate and Rhythm: Normal rate and regular rhythm.      Heart sounds: Murmur (Holosystolic murmur heard in all fields and radiates to carotids) heard.   Pulmonary:      Effort: Pulmonary effort is normal.      Breath sounds: Normal breath sounds.   Abdominal:      Palpations: Abdomen is soft.      Tenderness: There is no abdominal tenderness.   Musculoskeletal:         General: No swelling.      Cervical back: Normal range of motion.   Skin:     General: Skin is warm and dry.   Neurological:      General: No focal deficit present.      Mental Status: He is alert.   Psychiatric:         Mood and Affect: Mood normal.         Behavior: Behavior normal.         Fluids    Intake/Output Summary (Last 24 hours) at 5/24/2023 1446  Last data filed at 5/24/2023 1300  Gross per 24 hour   Intake 140 ml   Output 1900 ml   Net -1760 ml       Laboratory  Recent  Labs     05/23/23  1108 05/24/23  0111   WBC 5.3 5.2   RBC 3.99* 3.59*   HEMOGLOBIN 12.7* 11.5*   HEMATOCRIT 37.6* 32.8*   MCV 94.2 91.4   MCH 31.8 32.0   MCHC 33.8 35.1   RDW 45.2 43.7   PLATELETCT 161* 154*   MPV 9.6 9.6     Recent Labs     05/23/23  1108 05/24/23  0111   SODIUM 138 141   POTASSIUM 5.0 4.5   CHLORIDE 104 109   CO2 17* 16*   GLUCOSE 93 81   * 109*   CREATININE 9.34* 9.45*   CALCIUM 8.6 8.7     Recent Labs     05/23/23  1558   APTT 28.6   INR 1.20*         Recent Labs     05/23/23  1108   TRIGLYCERIDE 56   HDL 40   *       Imaging  EC-ECHOCARDIOGRAM COMPLETE W/O CONT   Final Result      DX-CHEST-PORTABLE (1 VIEW)   Final Result      Cardiomegaly. No focal consolidation or pleural effusions.           Assessment/Plan  Problem Representation:    * NSTEMI (non-ST elevated myocardial infarction) (HCC)- (present on admission)  Assessment & Plan  Presented with typical chest pain consisting of chest pressure with exertional component and relieved with rest, dyspnea, diaphoresis.  Initial troponin of 290 and slight increase on repeat. EKG shows accelerated junctional rhythm versus multifocal atrial tachycardia, PACs, no significant ST changes or T wave changes.  Troponins gradually uptrending.  EKG remains stable and without ST changes. Ongoing discussion with cardiology, nephrology regarding the need for left heart cath and the risk of patient requiring hemodialysis, the patient already meets criteria for indefinite dialysis.  At this point, patient is agreeable to left heart cath if indicated, knowing it may push him to require dialysis.  -Continue heparin drip  -Continue home aspirin 81  -Atorvastatin 80  -Cardiology consult for consideration of cath, no plans at the moment  -Metoprolol 50 mg twice daily (this will replace home verapamil)  -Patient reports previous intolerance of ACE/ARB due to hyperkalemia    Aortic stenosis- (present on admission)  Assessment & Plan  Chronic. Patient has  6-month to 1 year history of dyspnea on exertion and paroxysmal nocturnal dyspnea.  I suspect this was related to his aortic valve disease.  Updated echo shows moderate to severe aortic stenosis and severe aortic regurgitation.  -Cardiology following  -Patient will likely require aortic valve replacement in the future    Stage 5 chronic kidney disease not on chronic dialysis (HCC)- (present on admission)  Assessment & Plan  Due to FSGS, suspected related to long-term use of anabolic steroids.  Follows with nephrology and is on the transplant list at [facility].  Recently underwent placement of peritoneal dialysis catheter on 5/15 and underwent initial PD cath flushing 5/22.  -Nephrology following, no emergent need for dialysis  -Sevelamer per nephrology  -Sodium bicarb per nephrology  -Renally dose all medications  -Avoid nephrotoxins as possible    Hyperphosphatemia  Assessment & Plan  Due to CKD.  -Sevelamer per nephrology    Chronic metabolic acidosis  Assessment & Plan  Secondary to ESRD and at baseline bicarb around 17.  -Sodium bicarb per nephrology    Dyspnea on exertion  Assessment & Plan  See NSTEMI    Elevated troponin  Assessment & Plan  See NSTEMI    Pain in the chest  Assessment & Plan  The ASCVD Risk score (Sean DK, et al., 2019) failed to calculate for the following reasons:    The patient has a prior MI or stroke diagnosis  See above    Mixed hyperlipidemia- (present on admission)  Assessment & Plan  Outpatient regimen of ezetemibe.  - Lipid panel pending  - Start atorvastatin 80    Hypertension- (present on admission)  Assessment & Plan  Previous home regimen of verapamil. History of poor tolerance of other antihypertensives.   -Continue metoprolol (this will replace home verapamil)  -Amlodipine (new this admission)         VTE prophylaxis: therapeutic anticoagulation with heparin    I have performed a physical exam and reviewed and updated ROS and Plan today (5/24/2023). In review of yesterday's  note (5/23/2023), there are no changes except as documented above.

## 2023-05-24 NOTE — PROGRESS NOTES
Informed by resident to keep pt NPO for abnormal EKGs. Cards note states no intervention at this time. Notified night shift RN and patient

## 2023-05-24 NOTE — PROGRESS NOTES
"       [facility] Cardiology Follow-up Note    Date of Service:    5/24/2023      Name:   Gurdeep Guerra   YOB: 1965  Age:   58 y.o.  male   MRN:   [id number]    Reason for cardiology consult: Chest pain    Attending Provider: Dr Jacobson    HPI:  Mr Guerra is a 58 y.o male with end-stage renal disease recently started PD, who presented to the hospital with chest pain x 12 hrs on 5/23/23. At the same time, patient noticed his blood pressure was extremely elevated with systolic blood pressure of over 200.  294, troponins have been elevated in the 300s proBNP of 7374.     Interim Events:  - Personal Telemetry interpretation: SR   - Overnight events: Chest pain lessened, mild overnight, \"barely present this morning\" per patient. Nitro helped with subsiding chest pain yesterday  - Vitals: BP much improved    - Labs reviewed: GFR 9, trops still slowly rising 297 >>> now 397  - Per Pt Mother has MI at 62    ROS  Constitutional: Denies fatigue, Denies weight gain.  Respiratory:  Denies shortness of breath, no cough.  Cardiovascular: mild chest pain.  No lower extremity edema.  Denies orthopnea or PND.  : No polyuria, no dysuria.  GI:  Denies nausea/vomiting.  No abdominal distention.  Neuro:  Denies dizziness, syncope.  Hem/lymph: Denies easy bleeding/bruising.      All other review of systems reviewed and negative.    Past medical, surgical, social, and family history reviewed and unchanged from admission except as noted in HPI.    Medications: Reviewed in MAR  Current Facility-Administered Medications   Medication Dose Frequency Provider Last Rate Last Admin    magnesium sulfate IVPB premix 2 g  2 g Once Phil Doty M.D. 25 mL/hr at 05/24/23 0648 2 g at 05/24/23 0648    nitroglycerin (NITROSTAT) tablet 0.4 mg  0.4 mg Once Phil Doty M.D.        senna-docusate (PERICOLACE or SENOKOT S) 8.6-50 MG per tablet 2 Tablet  2 Tablet BID Phil Doty M.D.   2 Tablet at 05/24/23 0512    And    polyethylene " "glycol/lytes (MIRALAX) PACKET 1 Packet  1 Packet QDAY PRN Phil Doty M.D.        And    magnesium hydroxide (MILK OF MAGNESIA) suspension 30 mL  30 mL QDAY PRN Phil Doty M.D.        And    bisacodyl (DULCOLAX) suppository 10 mg  10 mg QDAY PRN Phil Doty M.D.        acetaminophen (Tylenol) tablet 650 mg  650 mg Q6HRS PRN Phil Doty M.D.        nitroglycerin (NITROSTAT) tablet 0.4 mg  0.4 mg Q5 MIN PRN Phil Doty M.D.        atorvastatin (LIPITOR) tablet 80 mg  80 mg Q EVENING Phil Doty M.D.   80 mg at 05/23/23 1749    heparin infusion 25,000 units in 500 mL 0.45% NACL  0-30 Units/kg/hr Continuous Phil Doty M.D. 22.1 mL/hr at 05/24/23 0729 14 Units/kg/hr at 05/24/23 0729    heparin injection 2,000 Units  2,000 Units PRN Phil Doty M.D.   2,000 Units at 05/23/23 2202    aspirin EC tablet 81 mg  81 mg DAILY Phil Doty M.D.   81 mg at 05/24/23 0511    omeprazole (PRILOSEC) capsule 20 mg  20 mg Nightly Phil Doty M.D.   20 mg at 05/23/23 2205    tamsulosin (FLOMAX) capsule 0.4 mg  0.4 mg Q EVENING Phil Doty M.D.   0.4 mg at 05/23/23 1749    morphine 4 MG/ML injection 1 mg  1 mg Q4HRS PRN Poncho Singh M.D.        metoprolol tartrate (LOPRESSOR) tablet 50 mg  50 mg TWICE DAILY Sultan ELISA Clark M.D.   50 mg at 05/24/23 0511    labetalol (NORMODYNE/TRANDATE) injection 10 mg  10 mg Q4HRS PRN Sultan ELISA Clark M.D.       Last reviewed on 5/23/2023  1:50 PM by Estela Vigil PhT   Allergies   Allergen Reactions    Allopurinol Unspecified     Pt states his reaction is similar to lupus    Hydralazine Hcl Unspecified     Pt states he had a reaction similar to lupus       Physical Exam  Body mass index is 27.14 kg/m². /56   Pulse 68   Temp 37.2 °C (99 °F) (Temporal)   Resp 17   Ht 1.702 m (5' 7\")   Wt 78.6 kg (173 lb 4.5 oz)   SpO2 96%    Vitals:    05/23/23 1720 05/23/23 2045 05/23/23 2359 05/24/23 0449   BP:  (!) 157/90 (!) 152/92 102/56   Pulse:  73 62 68   Resp:  17 16 " "17   Temp:  37.2 °C (99 °F) 37 °C (98.6 °F) 37.2 °C (99 °F)   TempSrc:  Temporal Temporal Temporal   SpO2:  95% 94% 96%   Weight: 78.6 kg (173 lb 4.5 oz)      Height: 1.702 m (5' 7\")       Oxygen Therapy:  Pulse Oximetry: 96 %, O2 (LPM): 0, O2 Delivery Device: None - Room Air    General: no acute distress, well- appearing  Neck: No JVD, no bruits  Lungs: CTAB, normal effort. no wheezing, rales, or rhonchi  Heart: RRR, normal S1 /S2, no murmur, no rub  EXT: No lower extremity edema, 2+ radial pulses. 2+ pedal pulses.   Abdomen: soft, non tender, non distended  Neurological: No focal deficits, no facial asymmetry.  Normal speech  Psychiatric: Appropriate affect, alert and oriented x 3  Skin: Warm and dry extremities, no rashes    Labs (personally reviewed):     Lab Results   Component Value Date/Time    SODIUM 141 05/24/2023 01:11 AM    POTASSIUM 4.5 05/24/2023 01:11 AM    CHLORIDE 109 05/24/2023 01:11 AM    CO2 16 (L) 05/24/2023 01:11 AM    GLUCOSE 81 05/24/2023 01:11 AM     (H) 05/24/2023 01:11 AM    CREATININE 9.45 (HH) 05/24/2023 01:11 AM    BUNCREATRAT 17 05/09/2022 09:49 AM    GLOMRATE 7 (L) 04/02/2023 11:54 AM     Lab Results   Component Value Date/Time    ALKPHOSPHAT 45 05/24/2023 01:11 AM    ASTSGOT 18 05/24/2023 01:11 AM    ALTSGPT 12 05/24/2023 01:11 AM    TBILIRUBIN 0.4 05/24/2023 01:11 AM      Lab Results   Component Value Date/Time    CHOLSTRLTOT 171 05/23/2023 11:08 AM     (H) 05/23/2023 11:08 AM    HDL 40 05/23/2023 11:08 AM    TRIGLYCERIDE 56 05/23/2023 11:08 AM       Cardiac Imaging and Procedures Review:      (Please see attestation for imaging review by Dr Lipscomb)    Assessment and Medical Decision Making:    Elavated Trop  Chest pain  HTN urgency  ESDR Stage 5  Moderate AS, Severe AI  Familial History of early CAD    -BP has improved as well as CP  -Given continued rise in trop and family history of early CAD we will plan on [facility] today, discussed plan with Dr Lipscomb    The risks, benefits, " and alternatives to coronary angiography with IV sedation were discussed in great detail. Specific risks mentioned include bleeding, infection, kidney damage, allergic reaction, cardiac perforation with possible tamponade requiring pericardiocentesis or possibly open heart surgery. In addition, we discussed that 10% of patients will experience small to moderate bruising at the site of the arterial puncture. Lastly, the risks of heart attack, stroke and death were discussed; the risk of major complications such as heart attack or stroke caused by the angiogram is approximately 1%; the risk of death is approximately 1 in 1000. The patient verbalized understanding of the potential complications and wishes to proceed with this procedure.       Thank you for allowing me to participate in this patients care.  Please contact me with any questions or concerns.    Please see Dr Lipscomb's attestation for additions and further recommendations.    I personally spent a total of 16 minutes which includes face-to-face time and non-face-to-face time spent on preparing to see the patient, reviewing hospital notes and tests, obtaining history from the patient, performing a medically appropriate exam, counseling and educating the patient, ordering medications/tests/procedures/referrals as clinically indicated, and documenting information in the electronic medical record.    PLEASE NOTE: Some of this dictation was created using voice recognition software. I have made every reasonable attempt to correct obvious errors, but I expect that there are errors of grammar and possibly content that I did not discover before finalizing the note.     CARISSA Lauren.   [facility]  [phone]

## 2023-05-24 NOTE — PROGRESS NOTES
Monitor Summary  Rhythm: SR  Rate: 62-80  Ectopy: (R) PVC, (O) PVC, (R) PAC  .18 / .06 / .43

## 2023-05-25 ENCOUNTER — APPOINTMENT (OUTPATIENT)
Dept: CARDIOLOGY | Facility: MEDICAL CENTER | Age: 58
DRG: 280 | End: 2023-05-25
Attending: PHYSICIAN ASSISTANT
Payer: COMMERCIAL

## 2023-05-25 LAB
ALBUMIN SERPL BCP-MCNC: 2.9 G/DL (ref 3.2–4.9)
ALBUMIN/GLOB SERPL: 1.1 G/DL
ALP SERPL-CCNC: 53 U/L (ref 30–99)
ALT SERPL-CCNC: 10 U/L (ref 2–50)
ANION GAP SERPL CALC-SCNC: 14 MMOL/L (ref 7–16)
AST SERPL-CCNC: 19 U/L (ref 12–45)
BILIRUB SERPL-MCNC: 0.2 MG/DL (ref 0.1–1.5)
BUN SERPL-MCNC: 105 MG/DL (ref 8–22)
CALCIUM ALBUM COR SERPL-MCNC: 9.2 MG/DL (ref 8.5–10.5)
CALCIUM SERPL-MCNC: 8.3 MG/DL (ref 8.5–10.5)
CHLORIDE SERPL-SCNC: 105 MMOL/L (ref 96–112)
CO2 SERPL-SCNC: 20 MMOL/L (ref 20–33)
CREAT SERPL-MCNC: 9.44 MG/DL (ref 0.5–1.4)
EKG IMPRESSION: NORMAL
ERYTHROCYTE [DISTWIDTH] IN BLOOD BY AUTOMATED COUNT: 43.8 FL (ref 35.9–50)
GFR SERPLBLD CREATININE-BSD FMLA CKD-EPI: 6 ML/MIN/1.73 M 2
GLOBULIN SER CALC-MCNC: 2.6 G/DL (ref 1.9–3.5)
GLUCOSE SERPL-MCNC: 83 MG/DL (ref 65–99)
HCT VFR BLD AUTO: 36 % (ref 42–52)
HGB BLD-MCNC: 12.4 G/DL (ref 14–18)
MAGNESIUM SERPL-MCNC: 2.2 MG/DL (ref 1.5–2.5)
MCH RBC QN AUTO: 32.1 PG (ref 27–33)
MCHC RBC AUTO-ENTMCNC: 34.4 G/DL (ref 32.3–36.5)
MCV RBC AUTO: 93.3 FL (ref 81.4–97.8)
PHOSPHATE SERPL-MCNC: 5.8 MG/DL (ref 2.5–4.5)
PLATELET # BLD AUTO: 181 K/UL (ref 164–446)
PMV BLD AUTO: 10 FL (ref 9–12.9)
POTASSIUM SERPL-SCNC: 4.4 MMOL/L (ref 3.6–5.5)
PROT SERPL-MCNC: 5.5 G/DL (ref 6–8.2)
RBC # BLD AUTO: 3.86 M/UL (ref 4.7–6.1)
SODIUM SERPL-SCNC: 139 MMOL/L (ref 135–145)
TROPONIN T SERPL-MCNC: 380 NG/L (ref 6–19)
TROPONIN T SERPL-MCNC: 389 NG/L (ref 6–19)
TROPONIN T SERPL-MCNC: 401 NG/L (ref 6–19)
UFH PPP CHRO-ACNC: 0.28 IU/ML
UFH PPP CHRO-ACNC: 0.76 IU/ML
WBC # BLD AUTO: 6 K/UL (ref 4.8–10.8)

## 2023-05-25 PROCEDURE — 85027 COMPLETE CBC AUTOMATED: CPT

## 2023-05-25 PROCEDURE — 700105 HCHG RX REV CODE 258: Performed by: INTERNAL MEDICINE

## 2023-05-25 PROCEDURE — 93458 L HRT ARTERY/VENTRICLE ANGIO: CPT | Mod: 26 | Performed by: INTERNAL MEDICINE

## 2023-05-25 PROCEDURE — 700111 HCHG RX REV CODE 636 W/ 250 OVERRIDE (IP)

## 2023-05-25 PROCEDURE — 99153 MOD SED SAME PHYS/QHP EA: CPT

## 2023-05-25 PROCEDURE — 84100 ASSAY OF PHOSPHORUS: CPT

## 2023-05-25 PROCEDURE — 85520 HEPARIN ASSAY: CPT

## 2023-05-25 PROCEDURE — 700102 HCHG RX REV CODE 250 W/ 637 OVERRIDE(OP): Performed by: STUDENT IN AN ORGANIZED HEALTH CARE EDUCATION/TRAINING PROGRAM

## 2023-05-25 PROCEDURE — 700102 HCHG RX REV CODE 250 W/ 637 OVERRIDE(OP): Performed by: INTERNAL MEDICINE

## 2023-05-25 PROCEDURE — 99232 SBSQ HOSP IP/OBS MODERATE 35: CPT | Mod: GC | Performed by: HOSPITALIST

## 2023-05-25 PROCEDURE — 80053 COMPREHEN METABOLIC PANEL: CPT

## 2023-05-25 PROCEDURE — B2111ZZ FLUOROSCOPY OF MULTIPLE CORONARY ARTERIES USING LOW OSMOLAR CONTRAST: ICD-10-PCS | Performed by: INTERNAL MEDICINE

## 2023-05-25 PROCEDURE — 700101 HCHG RX REV CODE 250

## 2023-05-25 PROCEDURE — 99233 SBSQ HOSP IP/OBS HIGH 50: CPT | Performed by: INTERNAL MEDICINE

## 2023-05-25 PROCEDURE — A9270 NON-COVERED ITEM OR SERVICE: HCPCS | Performed by: INTERNAL MEDICINE

## 2023-05-25 PROCEDURE — 93005 ELECTROCARDIOGRAM TRACING: CPT

## 2023-05-25 PROCEDURE — 93010 ELECTROCARDIOGRAM REPORT: CPT | Mod: 59,76 | Performed by: INTERNAL MEDICINE

## 2023-05-25 PROCEDURE — 700117 HCHG RX CONTRAST REV CODE 255: Performed by: INTERNAL MEDICINE

## 2023-05-25 PROCEDURE — 93010 ELECTROCARDIOGRAM REPORT: CPT | Mod: 59 | Performed by: INTERNAL MEDICINE

## 2023-05-25 PROCEDURE — 4A023N7 MEASUREMENT OF CARDIAC SAMPLING AND PRESSURE, LEFT HEART, PERCUTANEOUS APPROACH: ICD-10-PCS | Performed by: INTERNAL MEDICINE

## 2023-05-25 PROCEDURE — 84484 ASSAY OF TROPONIN QUANT: CPT | Mod: 91

## 2023-05-25 PROCEDURE — 770020 HCHG ROOM/CARE - TELE (206)

## 2023-05-25 PROCEDURE — A9270 NON-COVERED ITEM OR SERVICE: HCPCS | Performed by: STUDENT IN AN ORGANIZED HEALTH CARE EDUCATION/TRAINING PROGRAM

## 2023-05-25 PROCEDURE — 83735 ASSAY OF MAGNESIUM: CPT

## 2023-05-25 PROCEDURE — 36415 COLL VENOUS BLD VENIPUNCTURE: CPT

## 2023-05-25 PROCEDURE — 99152 MOD SED SAME PHYS/QHP 5/>YRS: CPT | Performed by: INTERNAL MEDICINE

## 2023-05-25 PROCEDURE — 700101 HCHG RX REV CODE 250: Performed by: STUDENT IN AN ORGANIZED HEALTH CARE EDUCATION/TRAINING PROGRAM

## 2023-05-25 PROCEDURE — 700102 HCHG RX REV CODE 250 W/ 637 OVERRIDE(OP)

## 2023-05-25 PROCEDURE — A9270 NON-COVERED ITEM OR SERVICE: HCPCS

## 2023-05-25 RX ORDER — MIDAZOLAM HYDROCHLORIDE 1 MG/ML
INJECTION INTRAMUSCULAR; INTRAVENOUS
Status: COMPLETED
Start: 2023-05-25 | End: 2023-05-25

## 2023-05-25 RX ORDER — HEPARIN SODIUM 200 [USP'U]/100ML
INJECTION, SOLUTION INTRAVENOUS
Status: COMPLETED
Start: 2023-05-25 | End: 2023-05-25

## 2023-05-25 RX ORDER — SODIUM CHLORIDE 9 MG/ML
INJECTION, SOLUTION INTRAVENOUS CONTINUOUS
Status: ACTIVE | OUTPATIENT
Start: 2023-05-25 | End: 2023-05-25

## 2023-05-25 RX ORDER — VERAPAMIL HYDROCHLORIDE 2.5 MG/ML
INJECTION, SOLUTION INTRAVENOUS
Status: COMPLETED
Start: 2023-05-25 | End: 2023-05-25

## 2023-05-25 RX ORDER — ONDANSETRON 2 MG/ML
INJECTION INTRAMUSCULAR; INTRAVENOUS
Status: COMPLETED
Start: 2023-05-25 | End: 2023-05-25

## 2023-05-25 RX ORDER — LIDOCAINE HYDROCHLORIDE 20 MG/ML
INJECTION, SOLUTION INFILTRATION; PERINEURAL
Status: COMPLETED
Start: 2023-05-25 | End: 2023-05-25

## 2023-05-25 RX ORDER — HEPARIN SODIUM 1000 [USP'U]/ML
INJECTION, SOLUTION INTRAVENOUS; SUBCUTANEOUS
Status: COMPLETED
Start: 2023-05-25 | End: 2023-05-25

## 2023-05-25 RX ADMIN — SEVELAMER CARBONATE 800 MG: 800 TABLET, FILM COATED ORAL at 18:00

## 2023-05-25 RX ADMIN — FENTANYL CITRATE 100 MCG: 50 INJECTION, SOLUTION INTRAMUSCULAR; INTRAVENOUS at 17:08

## 2023-05-25 RX ADMIN — SODIUM BICARBONATE 1300 MG: 650 TABLET ORAL at 18:00

## 2023-05-25 RX ADMIN — ATORVASTATIN CALCIUM 80 MG: 80 TABLET, FILM COATED ORAL at 18:00

## 2023-05-25 RX ADMIN — IOHEXOL 36 ML: 350 INJECTION, SOLUTION INTRAVENOUS at 17:23

## 2023-05-25 RX ADMIN — TAMSULOSIN HYDROCHLORIDE 0.4 MG: 0.4 CAPSULE ORAL at 18:00

## 2023-05-25 RX ADMIN — HEPARIN SODIUM 2000 UNITS: 200 INJECTION, SOLUTION INTRAVENOUS at 16:33

## 2023-05-25 RX ADMIN — LIDOCAINE HYDROCHLORIDE: 20 INJECTION, SOLUTION INFILTRATION; PERINEURAL at 16:50

## 2023-05-25 RX ADMIN — SODIUM BICARBONATE 1300 MG: 650 TABLET ORAL at 05:00

## 2023-05-25 RX ADMIN — VERAPAMIL HYDROCHLORIDE 2.5 MG: 2.5 INJECTION, SOLUTION INTRAVENOUS at 16:50

## 2023-05-25 RX ADMIN — NITROGLYCERIN 10 ML: 20 INJECTION INTRAVENOUS at 16:50

## 2023-05-25 RX ADMIN — METOPROLOL TARTRATE 50 MG: 50 TABLET, FILM COATED ORAL at 18:01

## 2023-05-25 RX ADMIN — ASPIRIN 81 MG: 81 TABLET, COATED ORAL at 05:00

## 2023-05-25 RX ADMIN — MIDAZOLAM HYDROCHLORIDE 2 MG: 2 INJECTION, SOLUTION INTRAMUSCULAR; INTRAVENOUS at 16:57

## 2023-05-25 RX ADMIN — HEPARIN SODIUM 2000 UNITS: 1000 INJECTION, SOLUTION INTRAVENOUS; SUBCUTANEOUS at 12:28

## 2023-05-25 RX ADMIN — HEPARIN SODIUM 2000 UNITS: 200 INJECTION, SOLUTION INTRAVENOUS at 17:09

## 2023-05-25 RX ADMIN — LABETALOL HYDROCHLORIDE 10 MG: 5 INJECTION INTRAVENOUS at 18:31

## 2023-05-25 RX ADMIN — HEPARIN SODIUM: 1000 INJECTION, SOLUTION INTRAVENOUS; SUBCUTANEOUS at 16:50

## 2023-05-25 RX ADMIN — ONDANSETRON 4 MG: 2 INJECTION INTRAMUSCULAR; INTRAVENOUS at 16:51

## 2023-05-25 RX ADMIN — AMLODIPINE BESYLATE 10 MG: 10 TABLET ORAL at 05:00

## 2023-05-25 RX ADMIN — SODIUM CHLORIDE: 9 INJECTION, SOLUTION INTRAVENOUS at 18:12

## 2023-05-25 RX ADMIN — MIDAZOLAM HYDROCHLORIDE 0.5 MG: 2 INJECTION, SOLUTION INTRAMUSCULAR; INTRAVENOUS at 17:23

## 2023-05-25 RX ADMIN — HEPARIN SODIUM 16 UNITS/KG/HR: 5000 INJECTION, SOLUTION INTRAVENOUS at 12:31

## 2023-05-25 RX ADMIN — METOPROLOL TARTRATE 50 MG: 50 TABLET, FILM COATED ORAL at 05:00

## 2023-05-25 RX ADMIN — OMEPRAZOLE 20 MG: 20 CAPSULE, DELAYED RELEASE ORAL at 20:44

## 2023-05-25 RX ADMIN — SENNOSIDES AND DOCUSATE SODIUM 2 TABLET: 50; 8.6 TABLET ORAL at 18:00

## 2023-05-25 ASSESSMENT — ENCOUNTER SYMPTOMS
CONSTIPATION: 0
ORTHOPNEA: 0
VOMITING: 0
FOCAL WEAKNESS: 0
HEADACHES: 0
ABDOMINAL PAIN: 0
PND: 1
NAUSEA: 0
CHILLS: 0
FEVER: 0
WEIGHT LOSS: 0

## 2023-05-25 ASSESSMENT — PAIN DESCRIPTION - PAIN TYPE
TYPE: ACUTE PAIN
TYPE: ACUTE PAIN

## 2023-05-25 NOTE — DISCHARGE PLANNING
Outpatient Dialysis Note     Home PD clinic:     [address] [address]     Schedule: PD     Patient is followed by Dr. Gene Bernal     Spoke with Tiffanie at facility who confirmed patient information.   Forwarded records for review.     Xitlaly Reyes   Dialysis Coordinator / [facility]  Ph: [phone]

## 2023-05-25 NOTE — CARE PLAN
Problem: Knowledge Deficit - Standard  Goal: Patient and family/care givers will demonstrate understanding of plan of care, disease process/condition, diagnostic tests and medications  Outcome: Progressing  Note: Discuss and review POC with patient/family. Updated pts whiteboard. All questions answered at this time. Re-educate as needed.      Problem: Hemodynamics  Goal: Patient's hemodynamics, fluid balance and neurologic status will be stable or improve  5/25/2023 0230 by Pao Matos R.N.  Outcome: Progressing  Note: Monitor VS, monitoring ekgs and chest pain  5/25/2023 0230 by Pao Matos R.N.  Note: Monitor VS, monitoring ekgs and chest pain   The patient is Watcher - Medium risk of patient condition declining or worsening    Shift Goals  Clinical Goals: Monitor ekg and trops, cath tomorrow  Patient Goals: Rest  Family Goals: marisa    Progress made toward(s) clinical / shift goals:  No complaints of chest pain at this time. Monitoring trops and ekgs. Heparin gtt therapeutic.     Patient is not progressing towards the following goals:

## 2023-05-25 NOTE — CARE PLAN
Problem: Knowledge Deficit - Standard  Goal: Patient and family/care givers will demonstrate understanding of plan of care, disease process/condition, diagnostic tests and medications  Outcome: Progressing     Problem: Hemodynamics  Goal: Patient's hemodynamics, fluid balance and neurologic status will be stable or improve  Outcome: Progressing     Problem: Respiratory  Goal: Patient will achieve/maintain optimum respiratory ventilation and gas exchange  Outcome: Progressing     Problem: Self Care  Goal: Patient will have the ability to perform ADLs independently or with assistance (bathe, groom, dress, toilet and feed)  Outcome: Progressing     Problem: Pain - Standard  Goal: Alleviation of pain or a reduction in pain to the patient’s comfort goal  Outcome: Progressing   The patient is Stable - Low risk of patient condition declining or worsening    Shift Goals  Clinical Goals: monitor chest pain  Patient Goals: to have cath  Family Goals: marisa    Progress made toward(s) clinical / shift goals:  Plan of care discussed with patient. Awaiting heart cath. Chest pain rated at a 2/10.     Patient is not progressing towards the following goals:

## 2023-05-25 NOTE — PROGRESS NOTES
Cardiology Progress Note:    Memo Lipscomb M.D.  Date & Time note created:    5/25/2023   4:02 PM     Referring MD:  Dr. Jacobson    Patient ID:   Name:             Gurdeep Guerra   YOB: 1965  Age:                 58 y.o.  male   MRN:               [id number]                                                             Chief Complaint / Reason for consult:  Chest pain.    History of Present Illness:    This is a 58 years old man with end-stage renal disease not yet on dialysis due to FSGS, presented to the hospital with chest pain.  Patient was found to have moderate aortic stenosis and aortic regurgitation, normal LV function, extremely elevated high blood pressure, elevated troponin.    Overnight events:  Still having on and off chest pain.    Review of Systems:      Constitutional: Denies fevers, Denies weight changes  Eyes: Denies changes in vision, no eye pain  Ears/Nose/Throat/Mouth: Denies nasal congestion or sore throat   Cardiovascular: yes chest pain, no palpitations   Respiratory: no shortness of breath , Denies cough  Gastrointestinal/Hepatic: Denies abdominal pain, nausea, vomiting, diarrhea, constipation or GI bleeding   Genitourinary: Denies dysuria or frequency  Musculoskeletal/Rheum: Denies  joint pain and swelling   Skin: Denies rash  Neurological: Denies headache, confusion, memory loss or focal weakness/parasthesias  Psychiatric: denies mood disorder   Endocrine: Melyssa thyroid problems  Heme/Oncology/Lymph Nodes: Denies enlarged lymph nodes, denies brusing or known bleeding disorder  All other systems were reviewed and are negative (AMA/CMS criteria)                Past Medical History:   Past Medical History:   Diagnosis Date    Anesthesia     Hiccups for over 24 hours after a previous sugery.    Aortic stenosis     Chronic gout of multiple sites     Dyspnea on exertion 5/23/2023    Elevated troponin 5/23/2023    Heart burn     1990    Heart murmur     High cholesterol      All always    Hypertension 2009    NSTEMI (non-ST elevated myocardial infarction) (HCC) 5/23/2023    Pain in the chest 5/23/2023    PONV (postoperative nausea and vomiting)     Renal disorder 2007    Stage IV    Sleep apnea 2000    Snoring 1990     Active Hospital Problems    Diagnosis     Hyperphosphatemia [E83.39]     Pain in the chest [R07.9]     Elevated troponin [R77.8]     Dyspnea on exertion [R06.09]     NSTEMI (non-ST elevated myocardial infarction) (HCC) [I21.4]     Chronic metabolic acidosis [E87.22]     Aortic stenosis [I35.0]     Stage 5 chronic kidney disease not on chronic dialysis (HCC) [N18.5]     Hypertension [I10]     Mixed hyperlipidemia [E78.2]        Past Surgical History:  Past Surgical History:   Procedure Laterality Date    CATH PLACEMENT CAPD N/A 5/15/2023    Procedure: LAPAROSCOPIC PLACEMENT OF PERITONEAL DIALYSIS CATHERTER;  Surgeon: Shikha Alexander M.D.;  Location: SURGERY SAME DAY [facility];  Service: General    UMBILICAL HERNIA REPAIR  2012    4 separate surgeries between [id number]    OTHER  2007    Kidney biopsy, can't recall laterality,    HIP ARTHROSCOPY Bilateral 2002    TONSILLECTOMY N/A 1987    HAND SURGERY Right 1985    Hand and wrist    SHOULDER ARTHROSCOPY      Can't recall date       Hospital Medications:    Current Facility-Administered Medications:     amLODIPine (NORVASC) tablet 10 mg, 10 mg, Oral, Q DAY, Memo Lipscomb M.D., 10 mg at 05/25/23 0500    sodium bicarbonate tablet 1,300 mg, 1,300 mg, Oral, BID, Kodak Harvey M.D., 1,300 mg at 05/25/23 0500    sevelamer carbonate (RENVELA) tablet 800 mg, 800 mg, Oral, TID WITH MEALS, Kodak Harvey M.D., 800 mg at 05/24/23 1712    senna-docusate (PERICOLACE or SENOKOT S) 8.6-50 MG per tablet 2 Tablet, 2 Tablet, Oral, BID, 2 Tablet at 05/24/23 0512 **AND** polyethylene glycol/lytes (MIRALAX) PACKET 1 Packet, 1 Packet, Oral, QDAY PRN **AND** magnesium hydroxide (MILK OF MAGNESIA) suspension 30 mL, 30 mL, Oral,  QDAY PRN **AND** bisacodyl (DULCOLAX) suppository 10 mg, 10 mg, Rectal, QDAY PRN, Phil Doty M.D.    acetaminophen (Tylenol) tablet 650 mg, 650 mg, Oral, Q6HRS PRN, Phil Doty M.D.    nitroglycerin (NITROSTAT) tablet 0.4 mg, 0.4 mg, Sublingual, Q5 MIN PRN, Phil Doty M.D.    atorvastatin (LIPITOR) tablet 80 mg, 80 mg, Oral, Q EVENING, Phil Doty M.D., 80 mg at 05/24/23 1713    heparin infusion 25,000 units in 500 mL 0.45% NACL, 0-30 Units/kg/hr, Intravenous, Continuous, Phil Doty M.D., Last Rate: 25.3 mL/hr at 05/25/23 1231, 16 Units/kg/hr at 05/25/23 1231    heparin injection 2,000 Units, 2,000 Units, Intravenous, PRN, Phil Doty M.D., 2,000 Units at 05/25/23 1228    aspirin EC tablet 81 mg, 81 mg, Oral, DAILY, Phil Doty M.D., 81 mg at 05/25/23 0500    omeprazole (PRILOSEC) capsule 20 mg, 20 mg, Oral, Nightly, Phil Doty M.D., 20 mg at 05/24/23 2135    tamsulosin (FLOMAX) capsule 0.4 mg, 0.4 mg, Oral, Q EVENING, Phil Doty M.D., 0.4 mg at 05/24/23 1712    morphine 4 MG/ML injection 1 mg, 1 mg, Intravenous, Q4HRS PRN, Poncho Singh M.D.    metoprolol tartrate (LOPRESSOR) tablet 50 mg, 50 mg, Oral, TWICE DAILY, Sultan ELISA Clark M.D., 50 mg at 05/25/23 0500    labetalol (NORMODYNE/TRANDATE) injection 10 mg, 10 mg, Intravenous, Q4HRS PRN, Sultan ELISA Clark M.D.    Current Outpatient Medications:  Medications Prior to Admission   Medication Sig Dispense Refill Last Dose    aspirin 81 MG EC tablet Take 81 mg by mouth every day.   5/23/2023 at AM    betamethasone dipropionate 0.05 % Ointment Apply 1 Application topically 2 times a day as needed.   5/22/2023 at PM    verapamil ER (CALAN-SR) 240 MG Tab CR Take 360 mg by mouth every morning.   5/23/2023 at AM    Lactobacillus (ACIDOPHILUS PO) Take 1 Tablet by mouth every day.   5/23/2023 at AM    tamsulosin (FLOMAX) 0.4 MG capsule Take 0.4 mg by mouth every evening.   5/22/2023 at PM    ezetimibe (ZETIA) 10 MG Tab Take 1 Tablet by mouth  every evening. 30 Tablet 0 5/22/2023 at PM    esomeprazole (NEXIUM) 40 MG delayed-release capsule Take 40 mg by mouth every evening.   5/22/2023 at PM       Medication Allergy:  Allergies   Allergen Reactions    Allopurinol Unspecified     Pt states his reaction is similar to lupus    Hydralazine Hcl Unspecified     Pt states he had a reaction similar to lupus       Family History:  Family History   Problem Relation Age of Onset    Hyperlipidemia Mother     Hypertension Father        Social History:  Social History     Socioeconomic History    Marital status:      Spouse name: Not on file    Number of children: Not on file    Years of education: Not on file    Highest education level: Master's degree (e.g., MA, MS, Kenn, MEd, MSW, ELIECER)   Occupational History    Not on file   Tobacco Use    Smoking status: Never    Smokeless tobacco: Never   Vaping Use    Vaping Use: Never used   Substance and Sexual Activity    Alcohol use: Never    Drug use: Not Currently    Sexual activity: Yes     Partners: Female   Other Topics Concern    Not on file   Social History Narrative    Not on file     Social Determinants of Health     Financial Resource Strain: Low Risk  (1/4/2023)    Overall Financial Resource Strain (CARDIA)     Difficulty of Paying Living Expenses: Not hard at all   Food Insecurity: No Food Insecurity (1/4/2023)    Hunger Vital Sign     Worried About Running Out of Food in the Last Year: Never true     Ran Out of Food in the Last Year: Never true   Transportation Needs: No Transportation Needs (1/4/2023)    PRAPARE - Transportation     Lack of Transportation (Medical): No     Lack of Transportation (Non-Medical): No   Physical Activity: Sufficiently Active (1/4/2023)    Exercise Vital Sign     Days of Exercise per Week: 3 days     Minutes of Exercise per Session: 60 min   Stress: Stress Concern Present (1/4/2023)    [employer] - Occupational Stress Questionnaire     Feeling of  "Stress : To some extent   Social Connections: Moderately Isolated (1/4/2023)    Social Connection and Isolation Panel [NHANES]     Frequency of Communication with Friends and Family: Once a week     Frequency of Social Gatherings with Friends and Family: More than three times a week     Attends Worship Services: Never     Active Member of Clubs or Organizations: No     Attends Club or Organization Meetings: Never     Marital Status: Living with partner   Intimate Partner Violence: Not on file   Housing Stability: Low Risk  (1/4/2023)    Housing Stability Vital Sign     Unable to Pay for Housing in the Last Year: No     Number of Places Lived in the Last Year: 1     Unstable Housing in the Last Year: No         Physical Exam:  Vitals/ General Appearance:   Weight/BMI: Body mass index is 26.28 kg/m².  BP (!) 144/86   Pulse 60   Temp 36.8 °C (98.2 °F) (Temporal)   Resp 17   Ht 1.702 m (5' 7\")   Wt 76.1 kg (167 lb 12.3 oz)   SpO2 97%   Vitals:    05/25/23 0347 05/25/23 0752 05/25/23 1126 05/25/23 1549   BP: (!) 147/82 (!) 143/83 125/80 (!) 144/86   Pulse: 60 61 66 60   Resp: 16 17 18 17   Temp: 36.9 °C (98.4 °F) 36.4 °C (97.5 °F) 36.6 °C (97.9 °F) 36.8 °C (98.2 °F)   TempSrc: Temporal Temporal Temporal Temporal   SpO2: 97% 98% 96% 97%   Weight:       Height:         Oxygen Therapy:  Pulse Oximetry: 97 %, O2 (LPM): 0, O2 Delivery Device: None - Room Air    Constitutional:   Well developed, Well nourished, No acute distress  HENMT:  Normocephalic, Atraumatic, Oropharynx moist mucous membranes, No oral exudates, Nose normal.  No thyromegaly.  Eyes:  EOMI, Conjunctiva normal, No discharge.  Neck:  Normal range of motion, No cervical tenderness,  no JVD.  Cardiovascular:  Normal heart rate, Normal rhythm, No murmurs, No rubs, No gallops.   Extremitites with intact distal pulses, no cyanosis, or edema.  Lungs:  Normal breath sounds, breath sounds clear to auscultation bilaterally,  no rales, no rhonchi, no wheezing. "   Abdomen: Bowel sounds normal, Soft, No tenderness, No guarding, No rebound, No masses, No hepatosplenomegaly.  Skin: Warm, Dry, No erythema, No rash, no induration.  Neurologic: Alert & oriented x 3, No focal deficits noted, cranial nerves II through X are intact.  Psychiatric: Affect normal, Judgment normal, Mood normal.      MDM (Data Review):     Records reviewed and summarized in current documentation    Lab Data Review:  Recent Results (from the past 24 hour(s))   EKG    Collection Time: 23  6:35 PM   Result Value Ref Range    Report       [facility]    Test Date:  2023  Pt Name:    LORIE KAYE               Department: 183  MRN:        [id number]                      Room:       T821  Gender:     Male                         Technician: GARETT  :        1965                   Requested By:NEDA BERRIOS  Order #:    [id number]                    Reading MD: Arron Lomax MD    Measurements  Intervals                                Axis  Rate:       55                           P:          55  SC:         148                          QRS:        28  QRSD:       84                           T:          44  QT:         479  QTc:        459    Interpretive Statements  Sinus bradycardia  Probable left atrial enlargement  Left ventricular hypertrophy  Compared to ECG 2023 14:10:17  NO SIGNIFICANT CHANGES  Electronically Signed On 2023 0:38:37 PDT by Arron Lomax MD     TROPONIN    Collection Time: 23  6:53 PM   Result Value Ref Range    Troponin T 402 (H) 6 - 19 ng/L   EKG    Collection Time: 23 10:19 PM   Result Value Ref Range    Report       [facility]    Test Date:  2023  Pt Name:    LORIE KAYE               Department: 183  MRN:        [id number]                      Room:       T821  Gender:     Male                         Technician: FELIPE  :        1965                   Requested By:NEDA BERRIOS  Order #:    [id number]                     Reading MD: Arron Lomax MD    Measurements  Intervals                                Axis  Rate:       59                           P:          51  GA:         147                          QRS:        26  QRSD:       81                           T:          27  QT:         465  QTc:        461    Interpretive Statements  Sinus bradycardia  Left ventricular hypertrophy  Compared to ECG 05/24/2023 18:35:32  No significant changes  Electronically Signed On 5- 0:39:21 PDT by Arron Lomax MD     Comp Metabolic Panel    Collection Time: 05/25/23 12:55 AM   Result Value Ref Range    Sodium 139 135 - 145 mmol/L    Potassium 4.4 3.6 - 5.5 mmol/L    Chloride 105 96 - 112 mmol/L    Co2 20 20 - 33 mmol/L    Anion Gap 14.0 7.0 - 16.0    Glucose 83 65 - 99 mg/dL    Bun 105 (H) 8 - 22 mg/dL    Creatinine 9.44 (HH) 0.50 - 1.40 mg/dL    Calcium 8.3 (L) 8.5 - 10.5 mg/dL    AST(SGOT) 19 12 - 45 U/L    ALT(SGPT) 10 2 - 50 U/L    Alkaline Phosphatase 53 30 - 99 U/L    Total Bilirubin 0.2 0.1 - 1.5 mg/dL    Albumin 2.9 (L) 3.2 - 4.9 g/dL    Total Protein 5.5 (L) 6.0 - 8.2 g/dL    Globulin 2.6 1.9 - 3.5 g/dL    A-G Ratio 1.1 g/dL   PHOSPHORUS    Collection Time: 05/25/23 12:55 AM   Result Value Ref Range    Phosphorus 5.8 (H) 2.5 - 4.5 mg/dL   MAGNESIUM    Collection Time: 05/25/23 12:55 AM   Result Value Ref Range    Magnesium 2.2 1.5 - 2.5 mg/dL   CBC WITHOUT DIFFERENTIAL    Collection Time: 05/25/23 12:55 AM   Result Value Ref Range    WBC 6.0 4.8 - 10.8 K/uL    RBC 3.86 (L) 4.70 - 6.10 M/uL    Hemoglobin 12.4 (L) 14.0 - 18.0 g/dL    Hematocrit 36.0 (L) 42.0 - 52.0 %    MCV 93.3 81.4 - 97.8 fL    MCH 32.1 27.0 - 33.0 pg    MCHC 34.4 32.3 - 36.5 g/dL    RDW 43.8 35.9 - 50.0 fL    Platelet Count 181 164 - 446 K/uL    MPV 10.0 9.0 - 12.9 fL   TROPONIN    Collection Time: 05/25/23 12:55 AM   Result Value Ref Range    Troponin T 380 (H) 6 - 19 ng/L   CORRECTED CALCIUM    Collection Time: 05/25/23 12:55 AM    Result Value Ref Range    Correct Calcium 9.2 8.5 - 10.5 mg/dL   ESTIMATED GFR    Collection Time: 23 12:55 AM   Result Value Ref Range    GFR (CKD-EPI) 6 (A) >60 mL/min/1.73 m 2   EKG    Collection Time: 23  2:10 AM   Result Value Ref Range    Report       [facility]    Test Date:  2023  Pt Name:    LORIE KAYE               Department: 183  MRN:        [id number]                      Room:       T821  Gender:     Male                         Technician: FELIPE  :        1965                   Requested By:NEDA BERRIOS  Order #:    [id number]                    Reading MD: Arron Lomax MD    Measurements  Intervals                                Axis  Rate:       61                           P:          64  NE:         143                          QRS:        40  QRSD:       83                           T:          57  QT:         442  QTc:        446    Interpretive Statements  Sinus rhythm  Left ventricular hypertrophy  Compared to ECG 2023 22:19:55  NO SIGNIFICANT CHANGES  Electronically Signed On 2023 7:15:46 PDT by Arron Lomax MD     Heparin Anti-Xa    Collection Time: 23 10:35 AM   Result Value Ref Range    Heparin Xa (UFH) 0.28 IU/mL   TROPONIN    Collection Time: 23 10:35 AM   Result Value Ref Range    Troponin T 389 (H) 6 - 19 ng/L       Imaging/Procedures Review:    Chest Xray:  Reviewed    EKG:   As in HPI.     MDM (Assessment and Plan):     Active Hospital Problems    Diagnosis     Hyperphosphatemia [E83.39]     Pain in the chest [R07.9]     Elevated troponin [R77.8]     Dyspnea on exertion [R06.09]     NSTEMI (non-ST elevated myocardial infarction) (HCC) [I21.4]     Chronic metabolic acidosis [E87.22]     Aortic stenosis [I35.0]     Stage 5 chronic kidney disease not on chronic dialysis (HCC) [N18.5]     Hypertension [I10]     Mixed hyperlipidemia [E78.2]      Of note, I am concerned about aortic valve disease.    Patient has now  accepted the risk of permanent kidneys damage. He had a thorough conversation with his nephrologist and has agreed to proceed with cardiac catherization. In addition, both hospitalist team and nephrology strongly requests the cardiac catherization to be done due to patient's remote location as well. At this time, I do think that it is reasonable to proceed with cardiac catherization and we will also employ potential TAVR work up as well. I am greatly concerned about his aortic valve disease rather than obstructive CAD.    Thank you for referring this patient to our cardiology service.  We will follow patient with you.      Memo Lipscomb MD.   Cardiology Inpatient Service.  [facility].  [phone].  [place], [place].

## 2023-05-25 NOTE — PROGRESS NOTES
[facility] -  PROGRESS NOTE               Author: Kodak Harvey M.D. Date & Time: 5/25/2023  8:10 AM     HPI:  58 y.o. male with  chronic anabolic steroid use, ESRD sec to to Bx proven sec FSGS currently not on dialysis, moderate to moderate AS and  moderate to severe AR, HTN presenting to ER with CP,onset last night, CP not positional.   He noticed his BP was significantly elevated ~ 200/120. His BP improved to 160  after he took extra dose of  verapamil last night. His CP has continued through out the night and lasted during the day. He reports that the pain  gradually subsided  after he received ASA, NG.   Labs notable for troponin T of 294, repeat increased to 321  elevated NT proBNP of 7374.   He is is started  on heparin drip EKG with no ST- T wave changes,   Labs also noted for  unchanged from last month BUN ~ 107 and  Cr 9.34 increased increased from 7 a month ago.    He recently underwent PD catheter placement on May 15.   He had the PD  catheter was flushed and dressing changed yesterday with out any issues.   No F/C/N/V/SOB.  No melena, hematochezia, hematemesis.  No HA, visual changes, or abdominal pain. Denies dysgeusia, poor appetite.    He has ongoing fatigue which is chronic unchanged from baseline.     DAILY NEPHROLOGY SUMMARY:  5/23- Consult done   5/24- BP control sub optimal, denies CP, no complaints,case reviewed  with cardiology Dr. Lipscomb, per cardiology no compelling indication for LHC at this time and if pt to undergo LHC it will push him to needing dialysis given contrast exposure, pt wants to start PD as outpatient and would like to avoid tunnel dialysis cath.   5/25- Continue to have CP otherwise no other specific symptoms, going for LHC today     REVIEW OF SYSTEMS:    10 point ROS reviewed and is as per HPI/daily summary or otherwise negative    PMH/PSH/SH/FH:  Reviewed and unchanged since admission note    CURRENT MEDICATIONS:  Reviewed from admission  "to present day    VS:  BP (!) 143/83   Pulse 61   Temp 36.4 °C (97.5 °F) (Temporal)   Resp 17   Ht 1.702 m (5' 7\")   Wt 76.1 kg (167 lb 12.3 oz)   SpO2 98%   BMI 26.28 kg/m²   Physical Exam  Constitutional:       General: He is not in acute distress.     Appearance: He is not ill-appearing.   HENT:      Head: Normocephalic and atraumatic.      Mouth/Throat:      Mouth: Mucous membranes are moist.   Eyes:      Conjunctiva/sclera: Conjunctivae normal.   Cardiovascular:      Rate and Rhythm: Normal rate and regular rhythm.      Heart sounds: Murmur heard.      No friction rub.   Pulmonary:      Effort: Pulmonary effort is normal. No respiratory distress.      Breath sounds: Normal breath sounds. No wheezing or rales.   Abdominal:      General: Abdomen is flat.      Palpations: Abdomen is soft.      Tenderness: There is no abdominal tenderness.      Comments: PD cath RLQ,Exit site covered with dressing c/d/Kleber tunnel tenderness    Musculoskeletal:      Right lower leg: No edema.      Left lower leg: No edema.   Skin:     General: Skin is warm and moist.   Neurological:      Mental Status: He is alert and oriented to person, place, and time.   Psychiatric:         Mood and Affect: Affect normal.     Fluids:    Intake/Output Summary (Last 24 hours) at 5/25/2023 0810  Last data filed at 5/25/2023 0700  Gross per 24 hour   Intake 140 ml   Output 3405 ml   Net -3265 ml         LABS:  Labs reviewed, pertinent labs below.    Recent Labs     05/23/23  1108 05/23/23  1558 05/24/23  0111 05/25/23  0055   RBC 3.99*  --  3.59* 3.86*   HEMOGLOBIN 12.7*  --  11.5* 12.4*   HEMATOCRIT 37.6*  --  32.8* 36.0*   PLATELETCT 161*  --  154* 181   PROTHROMBTM  --  15.0*  --   --    APTT  --  28.6  --   --    INR  --  1.20*  --   --          Recent Labs     05/23/23  1108 05/24/23  0111 05/25/23  0055   SODIUM 138 141 139   POTASSIUM 5.0 4.5 4.4   CHLORIDE 104 109 105   CO2 17* 16* 20   GLUCOSE 93 81 83   * 109* 105*   CREATININE " 9.34* 9.45* 9.44*   CALCIUM 8.6 8.7 8.3*          IMAGING:  All imaging reviewed from admission to present day      IMPRESSION:  # ESRD sec to sec FSGS currently not on dialysis   - s/p PD cath placement 5/15 with plans to start PD in next couple of weeks   - No overt uremic symptoms at present, no pericardial rub on exam   # Chest pain   - Elevated troponin   - ECHO with normal LVEF, moderate AS and severe AI  - Currently being observed for ACS  # HTN   - BP not at goal   - No signs of hypervolemia on exam    # CKD- MBD  - Ca 8.6   - P 6.4   # Anemia not present   # Presumed metabolic acidosis   # Hyperphosphatemia      PLAN:  - No compelling indication for RRT  - Daily evaluation for RRT needs  -- Dose all meds per eGFR < 15  - Renal diet when not NPO  - Amlodipine 10 mg started 5/24   - Renvela 800 mg TID AC  - Sodium bicarbonate 1300 mg bid   -Will start low volume PD if pt would require initiation of PD during hospital stay

## 2023-05-25 NOTE — PROGRESS NOTES
Bedside report received from Patricia KINCAID. Pt A&Ox4. Complains of no pain at this time. SR 62 on the monitors. POC discussed with pt. Pt verbalizes understanding. Call light and belongings within reach. Bed locked and in lowest position. Alarm and fall precautions in place.

## 2023-05-25 NOTE — PROGRESS NOTES
[facility] Internal Medicine Daily Progress Note    Date of Service  5/25/2023    [employer] Team: [facility] Team   Attending: JEREMIAH Jacobson M.d.  Senior Resident: Dr. Jose Angel Singh   Intern:  Dr. Phil Doty  Contact Number: [phone]    Chief Complaint  Gurdeep Guerra is a 58 y.o. male admitted 5/23/2023 with chest pain    Hospital Course  Gurdeep Guerra is a 58 y.o. 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 ECHO 5/23 with EF 60%, moderate AS, moderate to severe AI. Cardiology considering risks/benefits of left heart cath in the setting of severe renal disease.     Interval Problem Update  Pt continues to endorse CP today, but it is clearly pleuritic this time.     I have discussed this patient's plan of care and discharge plan at IDT rounds today with Case Management, Nursing, Nursing leadership, and other members of the IDT team.    Consultants/Specialty  cardiology and nephrology    Code Status  Full Code    Disposition  Medically Cleared  I have placed the appropriate orders for post-discharge needs.    Review of Systems  Review of Systems   Constitutional:  Negative for chills, fever and weight loss.   Cardiovascular:  Positive for PND. Negative for chest pain, orthopnea and leg swelling.   Gastrointestinal:  Negative for abdominal pain, constipation, nausea and vomiting.   Genitourinary:  Negative for dysuria.   Neurological:  Negative for focal weakness and headaches.        Physical Exam  Temp:  [36.4 °C (97.5 °F)-37.1 °C (98.8 °F)] 36.6 °C (97.9 °F)  Pulse:  [60-66] 66  Resp:  [16-18] 18  BP: (104-147)/(64-84) 125/80  SpO2:  [96 %-99 %] 96 %    -General: NAD, converses well  -Cardio: no murmurs or  gallops  -Resp: lungs CTAB, symmetric expansion  -Abd: soft and nontender, no guarding or rebound      Fluids    Intake/Output Summary (Last 24 hours) at 5/25/2023 1547  Last data filed at 5/25/2023 0900  Gross per 24 hour   Intake --   Output 2255 ml   Net -2255 ml         Laboratory  Recent Labs     05/23/23  1108 05/24/23  0111 05/25/23  0055   WBC 5.3 5.2 6.0   RBC 3.99* 3.59* 3.86*   HEMOGLOBIN 12.7* 11.5* 12.4*   HEMATOCRIT 37.6* 32.8* 36.0*   MCV 94.2 91.4 93.3   MCH 31.8 32.0 32.1   MCHC 33.8 35.1 34.4   RDW 45.2 43.7 43.8   PLATELETCT 161* 154* 181   MPV 9.6 9.6 10.0       Recent Labs     05/23/23  1108 05/24/23  0111 05/25/23  0055   SODIUM 138 141 139   POTASSIUM 5.0 4.5 4.4   CHLORIDE 104 109 105   CO2 17* 16* 20   GLUCOSE 93 81 83   * 109* 105*   CREATININE 9.34* 9.45* 9.44*   CALCIUM 8.6 8.7 8.3*       Recent Labs     05/23/23  1558   APTT 28.6   INR 1.20*           Recent Labs     05/23/23  1108   TRIGLYCERIDE 56   HDL 40   *         Imaging  EC-ECHOCARDIOGRAM COMPLETE W/O CONT   Final Result      DX-CHEST-PORTABLE (1 VIEW)   Final Result      Cardiomegaly. No focal consolidation or pleural effusions.      CL-PRE-TRANSCATHETER AORTIC VALVE REPLACEMENT    (Results Pending)          Assessment/Plan  Problem Representation:    58M with a h/o ESRD (secondary to genetic FSGS per [facility], which was dx by a kidney biopsy in 2022, with a PD cath placed on 5/15/23, but because he is making urine has never not yet required dialysis, and pending kidney transplant), Moderate/Severe AS, HTN, HLD, GERD, BPH, Umbilical hernia repair x4, Hip Arthroplasty (2002), and shoulder surgery.    He was admitted with an NSTEMI. LHC on 5/25 is pending. Serial trops = pending.     * NSTEMI (non-ST elevated myocardial infarction) (HCC)- (present on admission)  Assessment & Plan  Presented with typical chest pain consisting of chest pressure with exertional component and relieved with rest, dyspnea, diaphoresis.   Initial troponin of 290 and slight increase on repeat. EKG shows accelerated junctional rhythm versus multifocal atrial tachycardia, PACs, no significant ST changes or T wave changes.  Troponins gradually uptrending.  EKG remains stable and without ST changes. Ongoing discussion with cardiology, nephrology regarding the need for left heart cath and the risk of patient requiring hemodialysis, the patient already meets criteria for indefinite dialysis.  At this point, patient is agreeable to left heart cath if indicated, knowing it may push him to require dialysis.  -Continue heparin drip  -Continue home aspirin 81  -Atorvastatin 80  -cath planned for 05/25/23  -still trending trops  -Metoprolol 50 mg twice daily (this will replace home verapamil)  -Patient reports previous intolerance of ACE/ARB due to hyperkalemia    Hyperphosphatemia  Assessment & Plan  Due to CKD.  -Sevelamer per nephrology    Chronic metabolic acidosis  Assessment & Plan  Secondary to ESRD and at baseline bicarb around 17.  -Sodium bicarb per nephrology    Dyspnea on exertion  Assessment & Plan  See NSTEMI    Elevated troponin  Assessment & Plan  See NSTEMI    Pain in the chest  Assessment & Plan  The ASCVD Risk score (Brook[place] DK, et al., 2019) failed to calculate for the following reasons:    The patient has a prior MI or stroke diagnosis  See above    Aortic stenosis- (present on admission)  Assessment & Plan  Chronic. Patient has 6-month to 1 year history of dyspnea on exertion and paroxysmal nocturnal dyspnea.  I suspect this was related to his aortic valve disease.  Updated echo shows moderate to severe aortic stenosis and severe aortic regurgitation.  -Cardiology following  -Patient will likely require aortic valve replacement in the future    Mixed hyperlipidemia- (present on admission)  Assessment & Plan  Outpatient regimen of ezetemibe.  - Lipid panel pending  - Start atorvastatin 80    Stage 5 chronic kidney disease not on chronic  dialysis (HCC)- (present on admission)  Assessment & Plan  Due to FSGS, suspected related to long-term use of anabolic steroids.  Follows with nephrology and is on the transplant list at [facility].  Recently underwent placement of peritoneal dialysis catheter on 5/15 and underwent initial PD cath flushing 5/22.  -Nephrology following, no emergent need for dialysis  -Sevelamer per nephrology  -Sodium bicarb per nephrology  -Renally dose all medications  -Avoid nephrotoxins as possible    Hypertension- (present on admission)  Assessment & Plan  Previous home regimen of verapamil. History of poor tolerance of other antihypertensives.   -Continue metoprolol (this will replace home verapamil)  -Amlodipine (new this admission)         VTE prophylaxis: therapeutic anticoagulation with heparin    I have performed a physical exam and reviewed and updated ROS and Plan today (5/25/2023). In review of yesterday's note (5/24/2023), there are no changes except as documented above.

## 2023-05-25 NOTE — PROGRESS NOTES
[facility] PROGRESS NOTE        Attending:   Dr. Jacobson    Student:   Sage Odom, Student    PATIENT:   Gurdeep Guerra; [id number]; 1965    SUBJECTIVE:   Hospital Course: Patient is 57 y/o M w/ HTN, AS, ESRD secondary to FSGS, Dyslipidemia, GERD, 30 year hx of anabolic steroid abuse, and PD catheter placement on 5/15/23, who was admitted for management of an NSTEMI. Patient currently undergoing pharmacological treatment for NSTEMI and HTN. Cardiology currently consulting as well, heart catheterization anticipated 5/25    Interval: Patient states he had an episode of lower L-sided, constant, stabbing chest pain last night at 12 am. It lasted approximately 1.5 hours. He had associating dyspnea. He notified staff, who told him there were no acute changes on tele monitoring. Patient went back to sleep. When he awoke this morning, the pain had subsided. However, patient states he can still feel some discomfort in that area during our conversation. He has not felt pain in this area before, and it is more inferior to the area of his previous chest pain.     Patient is also agreeable to the heart catheterization taking place today.     OBJECTIVE:  Vitals:    05/24/23 2339 05/25/23 0347 05/25/23 0752 05/25/23 1126   BP: 104/64 (!) 147/82 (!) 143/83 125/80   Pulse: 60 60 61 66   Resp: 16 16 17 18   Temp: 37.1 °C (98.8 °F) 36.9 °C (98.4 °F) 36.4 °C (97.5 °F) 36.6 °C (97.9 °F)   TempSrc: Temporal Temporal Temporal Temporal   SpO2: 96% 97% 98% 96%   Weight:       Height:           Intake/Output Summary (Last 24 hours) at 5/24/2023 0821  Last data filed at 5/24/2023 0516  Gross per 24 hour   Intake --   Output 1100 ml   Net -1100 ml       PHYSICAL EXAM:  General: Pt resting in NAD, cooperative   Skin:  Pink, warm and dry.  No rashes  HEENT: NC/AT. EOMI. MMM. No nasal discharge.   Neck:  No JVD   Lungs:  Symmetrical.  CTAB with no W/R/R.  Good air movement   Cardiovascular:  Holosystolic murmur of in all areas, radiating  to the carotids. Mild TTP of anterior lower chest  Abdomen:  Peritoneal Dialysis Catheter in place, with no surrounding erythema. Palpable internal abdominal tubing  Extremities:  Full range of motion. No gross deformities noted.   CNS:  Muscle tone is normal.     LABS:  Recent Labs     05/23/23 1108 05/24/23 0111 05/25/23  0055   WBC 5.3 5.2 6.0   RBC 3.99* 3.59* 3.86*   HEMOGLOBIN 12.7* 11.5* 12.4*   HEMATOCRIT 37.6* 32.8* 36.0*   MCV 94.2 91.4 93.3   MCH 31.8 32.0 32.1   RDW 45.2 43.7 43.8   PLATELETCT 161* 154* 181   MPV 9.6 9.6 10.0   NEUTSPOLYS 82.30*  --   --    LYMPHOCYTES 9.10*  --   --    MONOCYTES 6.50  --   --    EOSINOPHILS 1.30  --   --    BASOPHILS 0.40  --   --      Recent Labs     05/23/23 1108 05/24/23 0111 05/25/23  0055   SODIUM 138 141 139   POTASSIUM 5.0 4.5 4.4   CHLORIDE 104 109 105   CO2 17* 16* 20   * 109* 105*   CREATININE 9.34* 9.45* 9.44*   CALCIUM 8.6 8.7 8.3*   MAGNESIUM  --  1.6 2.2   PHOSPHORUS  --  6.4* 5.8*   ALBUMIN 3.4 2.9* 2.9*     Estimated GFR/CRCL = Estimated Creatinine Clearance: 8.2 mL/min (A) (by C-G formula based on SCr of 9.44 mg/dL ()).  Recent Labs     05/23/23 1108 05/24/23 0111 05/25/23  0055   GLUCOSE 93 81 83     Recent Labs     05/23/23 1108 05/23/23 1558 05/24/23  0111 05/25/23  0055   ASTSGOT 25  --  18 19   ALTSGPT 10  --  12 10   TBILIRUBIN 0.3  --  0.4 0.2   ALKPHOSPHAT 56  --  45 53   GLOBULIN 2.9  --  2.4 2.6   INR  --  1.20*  --   --              Recent Labs     05/23/23 1558   INR 1.20*   APTT 28.6         IMAGING:  EC-ECHOCARDIOGRAM COMPLETE W/O CONT   Final Result      DX-CHEST-PORTABLE (1 VIEW)   Final Result      Cardiomegaly. No focal consolidation or pleural effusions.      CL-PRE-TRANSCATHETER AORTIC VALVE REPLACEMENT    (Results Pending)       MEDS:  Current Facility-Administered Medications   Medication Last Admin    amLODIPine (NORVASC) tablet 10 mg 10 mg at 05/25/23 0500    sodium bicarbonate tablet 1,300 mg 1,300 mg at  05/25/23 0500    sevelamer carbonate (RENVELA) tablet 800 mg 800 mg at 05/24/23 1712    senna-docusate (PERICOLACE or SENOKOT S) 8.6-50 MG per tablet 2 Tablet 2 Tablet at 05/24/23 0512    And    polyethylene glycol/lytes (MIRALAX) PACKET 1 Packet      And    magnesium hydroxide (MILK OF MAGNESIA) suspension 30 mL      And    bisacodyl (DULCOLAX) suppository 10 mg      acetaminophen (Tylenol) tablet 650 mg      nitroglycerin (NITROSTAT) tablet 0.4 mg      atorvastatin (LIPITOR) tablet 80 mg 80 mg at 05/24/23 1713    heparin infusion 25,000 units in 500 mL 0.45% NACL 14 Units/kg/hr at 05/24/23 1922    heparin injection 2,000 Units 2,000 Units at 05/23/23 2202    aspirin EC tablet 81 mg 81 mg at 05/25/23 0500    omeprazole (PRILOSEC) capsule 20 mg 20 mg at 05/24/23 2135    tamsulosin (FLOMAX) capsule 0.4 mg 0.4 mg at 05/24/23 1712    morphine 4 MG/ML injection 1 mg      metoprolol tartrate (LOPRESSOR) tablet 50 mg 50 mg at 05/25/23 0500    labetalol (NORMODYNE/TRANDATE) injection 10 mg         ASSESSMENT/PLAN:  Patient is a 58 y.o. male with HTN, Aortic Stenosis, Dyslipidemia, Stage V CKD secondary to FSGS, 30 year history of anabolic steroid use, and PD catheter placement on 5/15/23  who was admitted on 5/23 for management of NSTEMI    #NSTEMI  Patient presented with sudden-onset, constant, pressure-like Chest pain, with associating diaphoresis, and SOB, most consistent with a ischemia. Initial BNP of 7300, troponin of 294. Troponin has continued to increase. EKG showed no ST elevation, though with evidence of LVH. This is most consistent with a NSTEMI. CXR showed cardiomegaly. ECHO showed EF of 60%, Mild MR, AV stenosis with Aortic Insufficiency and Tricuspid Reurgitation.CORNELL score of 4. Patient has used ACE inhibitors and ARBs in the past, with adverse side effects including an increase in K+ and muscle cramping.               - Pharmacological Management                          - Aspirin 81 mg PO qd                           - Atorvastatin 80 mg PO qd                          - Nitroglycerin 0.4 mg SL                          - Heparin IV Drip                          - Metoprolol 50 mg PO BID              - Cardiology Consult               - Recommended continued management of patient BP  - Heart catheterization on 5/25, with TAVR work-up  - Continue to trend Troponin     # Stage 5 CKD  Previous diagnosis. Peritoneal Catheter placed on 5/15. Chronically elevated Cr, baseline increased from 3-4 to 6-8 in the past year. Cr on admission 9.34, has remained stable               - Nephrology Consult  - No need for RRT at this time  - Will re-evaluate patient following heart catheterization              - Continue to monitor electrolytes              - Continue to monitor Cr    #Hypertension  Previous diagnosis. Patient was hypertensive during initial episode of CP. Patient has used ACE inhibitors and ARBs in the past, with adverse side effects including an increase in K+ and muscle cramping. Patient currently on Ca Channel blocker   - Pharmacological Management  - Metoprolol 50 mg PO BID  - Amlodipine 10mg PO qd  - Cardiology Consult.     #Aortic Stenosis  Patient stated he has had  SOB with exertion as well as paroxysmal nocturnal dyspnea. ECHO showed EF of 60%, Mild MR, AV stenosis with Aortic Insufficiency and Tricuspid Reurgitation   - Cardiology consult    - TAVR workup with heart catheterization     #Hyperphosphatemia  Phosphorus 6.4 on 5/24. Likely secondary to ESRD   - Sevelamer 800 mg 3x daily with meals     #Dyslipidemia  Previous Diagnosis. Likely contributing to coronary pathology. Lipid Profile shows elevated LDL              - Atorvastatin 80 mg PO qd     # GERD  Previous Diagnosis.               - Continue Esomeprazole 40 mg PO qd     Disposition: Inpatient for continued evaluation and management of NSTEMI    Sage Odom, Student

## 2023-05-26 PROBLEM — I25.10 CAD (CORONARY ARTERY DISEASE): Status: ACTIVE | Noted: 2023-05-26

## 2023-05-26 LAB
ALBUMIN SERPL BCP-MCNC: 2.9 G/DL (ref 3.2–4.9)
ALBUMIN/GLOB SERPL: 1.2 G/DL
ALP SERPL-CCNC: 53 U/L (ref 30–99)
ALT SERPL-CCNC: 9 U/L (ref 2–50)
ANION GAP SERPL CALC-SCNC: 12 MMOL/L (ref 7–16)
AST SERPL-CCNC: 18 U/L (ref 12–45)
BASOPHILS # BLD AUTO: 0.4 % (ref 0–1.8)
BASOPHILS # BLD: 0.02 K/UL (ref 0–0.12)
BILIRUB SERPL-MCNC: 0.2 MG/DL (ref 0.1–1.5)
BUN SERPL-MCNC: 105 MG/DL (ref 8–22)
CALCIUM ALBUM COR SERPL-MCNC: 9 MG/DL (ref 8.5–10.5)
CALCIUM SERPL-MCNC: 8.1 MG/DL (ref 8.5–10.5)
CHLORIDE SERPL-SCNC: 103 MMOL/L (ref 96–112)
CO2 SERPL-SCNC: 22 MMOL/L (ref 20–33)
CREAT SERPL-MCNC: 8.98 MG/DL (ref 0.5–1.4)
EOSINOPHIL # BLD AUTO: 0.2 K/UL (ref 0–0.51)
EOSINOPHIL NFR BLD: 4.3 % (ref 0–6.9)
ERYTHROCYTE [DISTWIDTH] IN BLOOD BY AUTOMATED COUNT: 44.6 FL (ref 35.9–50)
GFR SERPLBLD CREATININE-BSD FMLA CKD-EPI: 6 ML/MIN/1.73 M 2
GLOBULIN SER CALC-MCNC: 2.4 G/DL (ref 1.9–3.5)
GLUCOSE SERPL-MCNC: 102 MG/DL (ref 65–99)
HCT VFR BLD AUTO: 34 % (ref 42–52)
HGB BLD-MCNC: 11.8 G/DL (ref 14–18)
IMM GRANULOCYTES # BLD AUTO: 0.02 K/UL (ref 0–0.11)
IMM GRANULOCYTES NFR BLD AUTO: 0.4 % (ref 0–0.9)
LYMPHOCYTES # BLD AUTO: 0.78 K/UL (ref 1–4.8)
LYMPHOCYTES NFR BLD: 16.7 % (ref 22–41)
MAGNESIUM SERPL-MCNC: 2.1 MG/DL (ref 1.5–2.5)
MCH RBC QN AUTO: 32.4 PG (ref 27–33)
MCHC RBC AUTO-ENTMCNC: 34.7 G/DL (ref 32.3–36.5)
MCV RBC AUTO: 93.4 FL (ref 81.4–97.8)
MONOCYTES # BLD AUTO: 0.43 K/UL (ref 0–0.85)
MONOCYTES NFR BLD AUTO: 9.2 % (ref 0–13.4)
NEUTROPHILS # BLD AUTO: 3.23 K/UL (ref 1.82–7.42)
NEUTROPHILS NFR BLD: 69 % (ref 44–72)
NRBC # BLD AUTO: 0 K/UL
NRBC BLD-RTO: 0 /100 WBC (ref 0–0.2)
PHOSPHATE SERPL-MCNC: 6.4 MG/DL (ref 2.5–4.5)
PLATELET # BLD AUTO: 160 K/UL (ref 164–446)
PMV BLD AUTO: 9.9 FL (ref 9–12.9)
POTASSIUM SERPL-SCNC: 4.7 MMOL/L (ref 3.6–5.5)
PROT SERPL-MCNC: 5.3 G/DL (ref 6–8.2)
RBC # BLD AUTO: 3.64 M/UL (ref 4.7–6.1)
SODIUM SERPL-SCNC: 137 MMOL/L (ref 135–145)
TROPONIN T SERPL-MCNC: 443 NG/L (ref 6–19)
WBC # BLD AUTO: 4.7 K/UL (ref 4.8–10.8)

## 2023-05-26 PROCEDURE — 84484 ASSAY OF TROPONIN QUANT: CPT

## 2023-05-26 PROCEDURE — 700111 HCHG RX REV CODE 636 W/ 250 OVERRIDE (IP)

## 2023-05-26 PROCEDURE — 85025 COMPLETE CBC W/AUTO DIFF WBC: CPT

## 2023-05-26 PROCEDURE — A9270 NON-COVERED ITEM OR SERVICE: HCPCS | Performed by: STUDENT IN AN ORGANIZED HEALTH CARE EDUCATION/TRAINING PROGRAM

## 2023-05-26 PROCEDURE — A9270 NON-COVERED ITEM OR SERVICE: HCPCS | Performed by: INTERNAL MEDICINE

## 2023-05-26 PROCEDURE — 700102 HCHG RX REV CODE 250 W/ 637 OVERRIDE(OP): Performed by: INTERNAL MEDICINE

## 2023-05-26 PROCEDURE — A9270 NON-COVERED ITEM OR SERVICE: HCPCS

## 2023-05-26 PROCEDURE — 700102 HCHG RX REV CODE 250 W/ 637 OVERRIDE(OP)

## 2023-05-26 PROCEDURE — 99232 SBSQ HOSP IP/OBS MODERATE 35: CPT | Performed by: INTERNAL MEDICINE

## 2023-05-26 PROCEDURE — 700102 HCHG RX REV CODE 250 W/ 637 OVERRIDE(OP): Performed by: STUDENT IN AN ORGANIZED HEALTH CARE EDUCATION/TRAINING PROGRAM

## 2023-05-26 PROCEDURE — 84100 ASSAY OF PHOSPHORUS: CPT

## 2023-05-26 PROCEDURE — 99232 SBSQ HOSP IP/OBS MODERATE 35: CPT | Mod: GC | Performed by: HOSPITALIST

## 2023-05-26 PROCEDURE — 80053 COMPREHEN METABOLIC PANEL: CPT

## 2023-05-26 PROCEDURE — 770020 HCHG ROOM/CARE - TELE (206)

## 2023-05-26 PROCEDURE — 83735 ASSAY OF MAGNESIUM: CPT

## 2023-05-26 RX ORDER — HEPARIN SODIUM 5000 [USP'U]/ML
5000 INJECTION, SOLUTION INTRAVENOUS; SUBCUTANEOUS EVERY 8 HOURS
Status: DISCONTINUED | OUTPATIENT
Start: 2023-05-26 | End: 2023-05-27 | Stop reason: HOSPADM

## 2023-05-26 RX ADMIN — TAMSULOSIN HYDROCHLORIDE 0.4 MG: 0.4 CAPSULE ORAL at 16:24

## 2023-05-26 RX ADMIN — SEVELAMER CARBONATE 800 MG: 800 TABLET, FILM COATED ORAL at 16:24

## 2023-05-26 RX ADMIN — METOPROLOL TARTRATE 50 MG: 50 TABLET, FILM COATED ORAL at 16:24

## 2023-05-26 RX ADMIN — HEPARIN SODIUM 5000 UNITS: 5000 INJECTION, SOLUTION INTRAVENOUS; SUBCUTANEOUS at 20:40

## 2023-05-26 RX ADMIN — SEVELAMER CARBONATE 800 MG: 800 TABLET, FILM COATED ORAL at 11:28

## 2023-05-26 RX ADMIN — SODIUM BICARBONATE 1300 MG: 650 TABLET ORAL at 16:23

## 2023-05-26 RX ADMIN — METOPROLOL TARTRATE 50 MG: 50 TABLET, FILM COATED ORAL at 08:05

## 2023-05-26 RX ADMIN — HEPARIN SODIUM 5000 UNITS: 5000 INJECTION, SOLUTION INTRAVENOUS; SUBCUTANEOUS at 09:36

## 2023-05-26 RX ADMIN — SEVELAMER CARBONATE 800 MG: 800 TABLET, FILM COATED ORAL at 08:04

## 2023-05-26 RX ADMIN — ATORVASTATIN CALCIUM 80 MG: 80 TABLET, FILM COATED ORAL at 16:24

## 2023-05-26 RX ADMIN — HEPARIN SODIUM 5000 UNITS: 5000 INJECTION, SOLUTION INTRAVENOUS; SUBCUTANEOUS at 15:31

## 2023-05-26 RX ADMIN — AMLODIPINE BESYLATE 10 MG: 10 TABLET ORAL at 05:32

## 2023-05-26 RX ADMIN — OMEPRAZOLE 20 MG: 20 CAPSULE, DELAYED RELEASE ORAL at 20:40

## 2023-05-26 RX ADMIN — ASPIRIN 81 MG: 81 TABLET, COATED ORAL at 05:32

## 2023-05-26 RX ADMIN — SODIUM BICARBONATE 1300 MG: 650 TABLET ORAL at 05:33

## 2023-05-26 RX ADMIN — SENNOSIDES AND DOCUSATE SODIUM 2 TABLET: 50; 8.6 TABLET ORAL at 05:33

## 2023-05-26 RX ADMIN — ACETAMINOPHEN 650 MG: 325 TABLET, FILM COATED ORAL at 15:31

## 2023-05-26 ASSESSMENT — ENCOUNTER SYMPTOMS
NAUSEA: 0
FEVER: 0
FOCAL WEAKNESS: 0
ABDOMINAL PAIN: 0
PND: 0
ORTHOPNEA: 0
HEADACHES: 0
CONSTIPATION: 0
WEIGHT LOSS: 0
VOMITING: 0
CHILLS: 0

## 2023-05-26 ASSESSMENT — FIBROSIS 4 INDEX: FIB4 SCORE: 2.1

## 2023-05-26 ASSESSMENT — PAIN DESCRIPTION - PAIN TYPE: TYPE: ACUTE PAIN

## 2023-05-26 NOTE — DISCHARGE PLANNING
HTH/SCP TCN chart review completed. Current discharge considerations are home with follow up appointments. Patient reports that he is at baseline. His 6 clicks remain at 24 and he has been ambulating in house without AD. No new needs identified at this time.     TCN will continue to follow and collaborate with discharge planning team as additional post acute needs arise. Thank you.    Completed:  Choice obtained: None.  Patient reports at baseline level of function,   [facility] referral (N). Not sent.  Low LACE. Resides in [place].

## 2023-05-26 NOTE — DISCHARGE PLANNING
HTH/SCP TCN chart review completed. Current discharge considerations are home with follow up appointments. Patient reports that he is at baseline. His 6 clicks remain at 24 and he has been ambulating in house without AD. No new needs identified at this time.      TCN will continue to follow and collaborate with discharge planning team as additional post acute needs arise. Thank you.     Completed:  Choice obtained: None.  Patient reports at baseline level of function,   [facility] referral (N). Not sent.  Low LACE. Resides in [place].

## 2023-05-26 NOTE — CARE PLAN
The patient is Stable - Low risk of patient condition declining or worsening    Shift Goals  Clinical Goals: monitor cath site, monitor kidney funtion  Patient Goals: juice, discharge planning  Family Goals: n/a    Progress made toward(s) clinical / shift goals:    Problem: Knowledge Deficit - Standard  Goal: Patient and family/care givers will demonstrate understanding of plan of care, disease process/condition, diagnostic tests and medications  Outcome: Progressing     Problem: Hemodynamics  Goal: Patient's hemodynamics, fluid balance and neurologic status will be stable or improve  Outcome: Progressing     Problem: Respiratory  Goal: Patient will achieve/maintain optimum respiratory ventilation and gas exchange  Outcome: Progressing     Problem: Self Care  Goal: Patient will have the ability to perform ADLs independently or with assistance (bathe, groom, dress, toilet and feed)  Outcome: Progressing     Problem: Pain - Standard  Goal: Alleviation of pain or a reduction in pain to the patient’s comfort goal  Outcome: Progressing       Patient is not progressing towards the following goals:

## 2023-05-26 NOTE — DIETARY
Nutrition Services: Heart Healthy Nutrition Education Consult   Day 3 of admit.  Gurdeep Guerra is a 58 y.o. male with admitting DX of NSTEMI (non-ST elevated myocardial infarction) ([facility])    RD able to visit pt at bedside to provide heart healthy nutrition education. Pt declined education due to already following low saturated fat, low sodium, and high fiber diet. Pt declined handout. RD able to answer all questions to patient's satisfaction.     No other education needs identified at this time. Consider referral to outpatient nutrition services for continuation of education as indicated or per pt preferences.     Please re-consult RD as indicated.

## 2023-05-26 NOTE — PROCEDURES
"CARDIAC CATHETERIZATION REPORT    REFERRING: Salty Lipscomb M.D.    PROCEDURE PHYSICIAN: Dontrell Sharma MD, [facility]  ASSISTANT: None    IMPRESSIONS:  1.  Type II MI due to ESRD with superimposed hypertensive emergency  2. Obstructive one-vessel coronary artery disease involving total occlusions of the terminal circumflex and first obtuse marginal-supplying a small area of myocardium  3.  Diffuse, nonobstructive coronary atherosclerosis in other segments  4.  At least moderate aortic stenosis with invasive mean gradient of 33 mmHg and moderate aortic insufficiency by noninvasive evaluation  5.  Mild elevation LVEDP at 17 mmHg    Recommendations:  Further recommendations per the rounding team    Could consider work-up for aortic valve replacement pending   determination of need by transplant nephrology as well as clinical circumstance -which at the current time is ambiguous as to whether there are significant symptoms present related to the aortic valve    Pre-procedure diagnosis:  Chronic myocardial injury, aortic stenosis  Post-procedure diagnosis: Same    Procedure performed  Selective coronary angiography  Left heart catheterization    Conscious sedation was supervised by myself and administered by trained personnel using fentanyl and versed between 449 and 516. The patient tolerated sedation without complication.     Procedure Description  1. Access: 5/6 [place] right radial artery Micropuncture technique was utilized following local anesthesia with lidocaine.  Fluoroscopic guidance was utilized for femoral access    2. Diagnostic description: The catheter was passed to the central circulation with the aide of J tipped 0.35\" wire. A 5F TIG 4.0, 6F Multipurpose, and 6 [place] dual-lumen pigtail catheter  were used to inject the coronary circulation and enter the left ventricle during invasive hemodynamic monitoring  or recording simultaneous LV and aortic pressure tracings .      3. Closing: At completion of the " procedure the relevant equipment was removed from the body and hemostasis achieved by Radial band    Findings   Hemodynamics:   Aorta: 121/74 mmHg  LVEDP: 17 mmHg  Aortic valve mean gradient: 33 mmHg    Coronary Anatomy   Left Main:  Large, distal 10% stenosis   LAD:  Proximal 30% stenosis. The first diagonal has proximal 40% stenosis   LCx:  100% occluded in the terminal segment there is persistent faint filling of the terminal vessel which appears very small.  This area is also collateralized from the right coronary artery.  The first OM is a small to medium sized vessel and has 100% ostial occlusion and fills by L-L collaterals.  The second OM is the main branch of the circumflex and has proximal 40% stenosis.   RCA: Dominant, 30% proximal stenosis.  There is a high branching posterior descending artery from the mid segment of the vessel which is highly tortuous and without any significant stenosis.  The posterolateral branch has a 50% stenosis.    Technical Factors  1. Complications: None  2. Estimated Blood Loss: <50 cc  3. Specimens: None  4. Contrast Volume: 35 ml  5. Medications: Radial cocktail (Verapamil 2.5 mg, Nitroglycerin 100 mcg) Heparin 5000 Units  6. Radiation (air kerma): 235 mGy

## 2023-05-26 NOTE — PROGRESS NOTES
[facility] -  PROGRESS NOTE               Author: Kodak Harvey M.D. Date & Time: 5/26/2023  7:58 AM     HPI:  58 y.o. male with  chronic anabolic steroid use, ESRD sec to to Bx proven sec FSGS currently not on dialysis, moderate to moderate AS and  moderate to severe AR, HTN presenting to ER with CP,onset last night, CP not positional.   He noticed his BP was significantly elevated ~ 200/120. His BP improved to 160  after he took extra dose of  verapamil last night. His CP has continued through out the night and lasted during the day. He reports that the pain  gradually subsided  after he received ASA, NG.   Labs notable for troponin T of 294, repeat increased to 321  elevated NT proBNP of 7374.   He is is started  on heparin drip EKG with no ST- T wave changes,   Labs also noted for  unchanged from last month BUN ~ 107 and  Cr 9.34 increased increased from 7 a month ago.    He recently underwent PD catheter placement on May 15.   He had the PD  catheter was flushed and dressing changed yesterday with out any issues.   No F/C/N/V/SOB.  No melena, hematochezia, hematemesis.  No HA, visual changes, or abdominal pain. Denies dysgeusia, poor appetite.    He has ongoing fatigue which is chronic unchanged from baseline.     DAILY NEPHROLOGY SUMMARY:  5/23- Consult done   5/24- BP control sub optimal, denies CP, no complaints,case reviewed  with cardiology Dr. Lipscomb, per cardiology no compelling indication for LHC at this time and if pt to undergo LHC it will push him to needing dialysis given contrast exposure, pt wants to start PD as outpatient and would like to avoid tunnel dialysis cath.   5/25- Continue to have CP otherwise no other specific symptoms, going for LHC today   5/26 - LHC yesterday, no PCI, VSS, reports tingling in his L hand started this AM, no overt uremic symptoms      REVIEW OF SYSTEMS:    10 point ROS reviewed and is as per HPI/daily summary or otherwise  "negative    PMH/PSH/SH/FH:  Reviewed and unchanged since admission note    CURRENT MEDICATIONS:  Reviewed from admission to present day    VS:  /73   Pulse 60   Temp 36.5 °C (97.7 °F) (Temporal)   Resp 16   Ht 1.702 m (5' 7\")   Wt 76.1 kg (167 lb 12.3 oz)   SpO2 97%   BMI 26.28 kg/m²   Physical Exam  Constitutional:       General: He is not in acute distress.     Appearance: He is not ill-appearing.   HENT:      Head: Normocephalic and atraumatic.      Mouth/Throat:      Mouth: Mucous membranes are moist.   Eyes:      Conjunctiva/sclera: Conjunctivae normal.   Cardiovascular:      Rate and Rhythm: Normal rate and regular rhythm.      Heart sounds: Murmur heard.      No friction rub.   Pulmonary:      Effort: Pulmonary effort is normal. No respiratory distress.      Breath sounds: Normal breath sounds. No wheezing or rales.   Abdominal:      General: Abdomen is flat.      Palpations: Abdomen is soft.      Tenderness: There is no abdominal tenderness.      Comments: PD cath RLQ,Exit site covered with dressing c/d/Kleber tunnel tenderness    Musculoskeletal:      Right lower leg: No edema.      Left lower leg: No edema.   Skin:     General: Skin is warm and moist.   Neurological:      Mental Status: He is alert and oriented to person, place, and time.   Psychiatric:         Mood and Affect: Affect normal.     Fluids:    Intake/Output Summary (Last 24 hours) at 5/26/2023 0758  Last data filed at 5/25/2023 2000  Gross per 24 hour   Intake 240 ml   Output 830 ml   Net -590 ml       LABS:  Labs reviewed, pertinent labs below.    Recent Labs     05/23/23  1558 05/24/23  0111 05/25/23 0055 05/26/23  0127   RBC  --  3.59* 3.86* 3.64*   HEMOGLOBIN  --  11.5* 12.4* 11.8*   HEMATOCRIT  --  32.8* 36.0* 34.0*   PLATELETCT  --  154* 181 160*   PROTHROMBTM 15.0*  --   --   --    APTT 28.6  --   --   --    INR 1.20*  --   --   --        Recent Labs     05/24/23  0111 05/25/23 0055 05/26/23  0127   SODIUM 141 139 137 "   POTASSIUM 4.5 4.4 4.7   CHLORIDE 109 105 103   CO2 16* 20 22   GLUCOSE 81 83 102*   * 105* 105*   CREATININE 9.45* 9.44* 8.98*   CALCIUM 8.7 8.3* 8.1*        IMAGING:  All imaging reviewed from admission to present day      IMPRESSION:  # ESRD sec to sec FSGS currently not on dialysis   - s/p PD cath placement 5/15 with plans to start PD in next couple of weeks   - No overt uremic symptoms at present, no pericardial rub on exam   # Chest pain   - Elevated troponin   - ECHO with normal LVEF, moderate AS and severe AI  - s/p LHC, with no significant obstructive CAD for PCI  # HTN   - BP not at goal   - No signs of hypervolemia on exam    # CKD- MBD  - Ca 8.6   - P 6.4   # Anemia not present   # Presumed metabolic acidosis   # Hyperphosphatemia      PLAN:  - No compelling indication for RRT  - Daily evaluation for RRT needs  -- Dose all meds per eGFR < 15  - Renal diet when not NPO  - Amlodipine 10 mg started 5/24   - Renvela 800 mg TID AC  - Sodium bicarbonate 1300 mg bid    -Should he require to start  dialysis during hospital stay, will consider low volume PD   - L hand numbness evaluation and work up per primary team

## 2023-05-26 NOTE — CARE PLAN
The patient is Stable - Low risk of patient condition declining or worsening    Shift Goals  Clinical Goals: Monitor cath site, remove TR band without complication  Patient Goals: food  Family Goals: marisa    Progress made toward(s) clinical / shift goals:      Problem: Hemodynamics  Goal: Patient's hemodynamics, fluid balance and neurologic status will be stable or improve  Outcome: Progressing  Note: No complications following TR band removal, vR radial site is clean, dry, and soft. No signs of hematoma at this time. Vitals signs following procedure stable.      Problem: Self Care  Goal: Patient will have the ability to perform ADLs independently or with assistance (bathe, groom, dress, toilet and feed)  Outcome: Progressing  Note: Patient is up self and able to complete all ADLs.      Problem: Pain - Standard  Goal: Alleviation of pain or a reduction in pain to the patient’s comfort goal  Outcome: Progressing       Patient is not progressing towards the following goals:

## 2023-05-26 NOTE — ASSESSMENT & PLAN NOTE
Left heart cath 5/25 showed:  1. Obstructive one-vessel coronary artery disease involving total occlusions of the terminal circumflex and first obtuse marginal-supplying a small area of myocardium  2.  Diffuse, nonobstructive coronary atherosclerosis in other segments

## 2023-05-26 NOTE — PROGRESS NOTES
[facility] PROGRESS NOTE        Attending:   Dr. Jacobson    Student:   Sage Odom, Student    PATIENT:   Gurdeep Guerra; [id number]; 1965    SUBJECTIVE:   Hospital Course: Patient is 59 y/o M w/ HTN, AS, ESRD secondary to FSGS, Dyslipidemia, GERD, 30 year hx of anabolic steroid abuse, and PD catheter placement on 5/15/23, who was admitted for management of an NSTEMI. Patient currently undergoing pharmacological treatment for NSTEMI and HTN. Heart catheterization conducted on 5/25 showed chronic CAD with collateral circulation, not requiring PCI. Patient is currently continuing to be monitored following his heart catheterization with contrast, in the context of ESRD.     Interval: No acute overnight events. Patient denies CP or dyspnea. Patient states he is aware that the main concern at this time is his Aortic Valve stenosis. Patient states he would prefer the valve was managed as soon as possible, as he believes this will negatively impact his standing for a kidney transplant. Patient also states he has had a pruritic rash for nearly a year, that improves with steroid cream. He is concerned that they keep recurring, and that he has a rash near his eye that he is unable to apply steroid cream to.     OBJECTIVE:  Vitals:    05/25/23 2300 05/26/23 0300 05/26/23 0532 05/26/23 0802   BP: 108/61 118/67 137/73 128/78   Pulse: (!) 56 (!) 54 60 61   Resp: 16 16  18   Temp: 36.4 °C (97.6 °F) 36.5 °C (97.7 °F)  37 °C (98.6 °F)   TempSrc: Temporal Temporal  Temporal   SpO2: 96% 97%  97%   Weight:       Height:           Intake/Output Summary (Last 24 hours) at 5/24/2023 0821  Last data filed at 5/24/2023 0516  Gross per 24 hour   Intake --   Output 1100 ml   Net -1100 ml       PHYSICAL EXAM:  General: Pt resting in NAD, cooperative   Skin:  Pink, warm and dry.  No rashes  HEENT: NC/AT. EOMI. MMM. No nasal discharge.   Neck:  No JVD   Lungs:  Symmetrical.  CTAB with no W/R/R.  Good air movement   Cardiovascular:   Holosystolic murmur of in all areas, radiating to the carotids.   Abdomen:  Peritoneal Dialysis Catheter in place, with no surrounding erythema. Palpable internal abdominal tubing  Extremities:  Full range of motion. No gross deformities noted.   CNS:  Muscle tone is normal.     LABS:  Recent Labs     05/24/23 0111 05/25/23 0055 05/26/23 0127   WBC 5.2 6.0 4.7*   RBC 3.59* 3.86* 3.64*   HEMOGLOBIN 11.5* 12.4* 11.8*   HEMATOCRIT 32.8* 36.0* 34.0*   MCV 91.4 93.3 93.4   MCH 32.0 32.1 32.4   RDW 43.7 43.8 44.6   PLATELETCT 154* 181 160*   MPV 9.6 10.0 9.9   NEUTSPOLYS  --   --  69.00   LYMPHOCYTES  --   --  16.70*   MONOCYTES  --   --  9.20   EOSINOPHILS  --   --  4.30   BASOPHILS  --   --  0.40     Recent Labs     05/24/23 0111 05/25/23 0055 05/26/23 0127   SODIUM 141 139 137   POTASSIUM 4.5 4.4 4.7   CHLORIDE 109 105 103   CO2 16* 20 22   * 105* 105*   CREATININE 9.45* 9.44* 8.98*   CALCIUM 8.7 8.3* 8.1*   MAGNESIUM 1.6 2.2 2.1   PHOSPHORUS 6.4* 5.8* 6.4*   ALBUMIN 2.9* 2.9* 2.9*     Estimated GFR/CRCL = Estimated Creatinine Clearance: 8.6 mL/min (A) (by C-G formula based on SCr of 8.98 mg/dL ()).  Recent Labs     05/24/23 0111 05/25/23 0055 05/26/23 0127   GLUCOSE 81 83 102*     Recent Labs     05/23/23 1558 05/24/23 0111 05/25/23 0055 05/26/23 0127   ASTSGOT  --  18 19 18   ALTSGPT  --  12 10 9   TBILIRUBIN  --  0.4 0.2 0.2   ALKPHOSPHAT  --  45 53 53   GLOBULIN  --  2.4 2.6 2.4   INR 1.20*  --   --   --              Recent Labs     05/23/23  1558   INR 1.20*   APTT 28.6         IMAGING:  EC-ECHOCARDIOGRAM COMPLETE W/O CONT   Final Result      DX-CHEST-PORTABLE (1 VIEW)   Final Result      Cardiomegaly. No focal consolidation or pleural effusions.      CL-PRE-TRANSCATHETER AORTIC VALVE REPLACEMENT    (Results Pending)       MEDS:  Current Facility-Administered Medications   Medication Last Admin    heparin injection 5,000 Units 5,000 Units at 05/26/23 0936    amLODIPine (NORVASC) tablet 10 mg  10 mg at 05/26/23 0532    sodium bicarbonate tablet 1,300 mg 1,300 mg at 05/26/23 0533    sevelamer carbonate (RENVELA) tablet 800 mg 800 mg at 05/26/23 1128    senna-docusate (PERICOLACE or SENOKOT S) 8.6-50 MG per tablet 2 Tablet 2 Tablet at 05/26/23 0533    And    polyethylene glycol/lytes (MIRALAX) PACKET 1 Packet      And    magnesium hydroxide (MILK OF MAGNESIA) suspension 30 mL      And    bisacodyl (DULCOLAX) suppository 10 mg      acetaminophen (Tylenol) tablet 650 mg      nitroglycerin (NITROSTAT) tablet 0.4 mg      atorvastatin (LIPITOR) tablet 80 mg 80 mg at 05/25/23 1800    aspirin EC tablet 81 mg 81 mg at 05/26/23 0532    omeprazole (PRILOSEC) capsule 20 mg 20 mg at 05/25/23 2044    tamsulosin (FLOMAX) capsule 0.4 mg 0.4 mg at 05/25/23 1800    morphine 4 MG/ML injection 1 mg      metoprolol tartrate (LOPRESSOR) tablet 50 mg 50 mg at 05/26/23 0805    labetalol (NORMODYNE/TRANDATE) injection 10 mg 10 mg at 05/25/23 1831       ASSESSMENT/PLAN:  Patient is a 58 y.o. male with HTN, Aortic Stenosis, Dyslipidemia, Stage V CKD secondary to FSGS, 30 year history of anabolic steroid use, and PD catheter placement on 5/15/23 who was admitted on 5/23 for management of NSTEMI    #NSTEMI  Patient presented with sudden-onset, constant, pressure-like Chest pain, with associating diaphoresis, and SOB, most consistent with a ischemia. Initial BNP of 7300, troponin of 294. Troponin has continued to increase. EKG showed no ST elevation, though with evidence of LVH. This is most consistent with a NSTEMI. CXR showed cardiomegaly. ECHO showed EF of 60%, Mild MR, AV stenosis with Aortic Insufficiency and Tricuspid Reurgitation. Heart catheterization conducted on 5/25, showed distal occlusion of the L circumflex artery, with collateral circulation. This is most consistent with chronic CAD rather than ACS. Current presentation likely affected by AS. No surgical management at this time. With less concern for ACS and recent heart  catheterization, troponin does not need to be trended.               - Pharmacological Management                          - Aspirin 81 mg PO qd                          - Atorvastatin 80 mg PO qd                          - Nitroglycerin 0.4 mg SL                          - Heparin IV Drip     - Discontinued 5/26                          - Metoprolol 50 mg PO BID              - Cardiology Consult               - Recommended continued management of patient BP     # Stage 5 CKD  Previous diagnosis. Peritoneal Catheter placed on 5/15. Chronically elevated Cr, baseline increased from 3-4 to 6-8 in the past year. Cr on admission 9.34, has remained stable               - Nephrology Consult  - No need for RRT at this time  - Patient will continue to be monitored for the next 24-48 hours following heart catheterization for any acute changes in kidney function   - Can be discharged if there are no acute changes   - Will begin PD    #Hypertension  Previous diagnosis. Patient was hypertensive during initial episode of CP. Patient has used ACE inhibitors and ARBs in the past, with adverse side effects including an increase in K+ and muscle cramping. Patient currently on Ca Channel blocker at home   - Pharmacological Management  - Metoprolol 50 mg PO BID  - Amlodipine 10mg PO qd  - Cardiology Consult.     #Aortic Stenosis  Patient stated he has had SOB with exertion as well as paroxysmal nocturnal dyspnea. ECHO showed EF of 60%, Mild MR, AV stenosis with Aortic Insufficiency and Tricuspid Reurgitation   - Cardiology consult   - Will be managed by Cardiology outpatient for AV replacement     #Hyperphosphatemia  Phosphorus 6.4 on 5/24. Likely secondary to ESRD   - Sevelamer 800 mg 3x daily with meals     #Dyslipidemia  Previous Diagnosis. Likely contributing to coronary pathology. Lipid Profile shows elevated LDL              - Atorvastatin 80 mg PO qd     # GERD  Previous Diagnosis.               - Continue Esomeprazole 40 mg PO  qd     Disposition: Inpatient for continued monitoring of kidney function following heart catheterization. Can be discharged if no acute changes to kidney function, with outpatient cardiology follow-up    Sage Odom, Student

## 2023-05-26 NOTE — PROGRESS NOTES
Assumed care of patient, bedside report received from Michelle KINCAID. Updated on POC, call light within reach and fall precautions in place. Bed locked and in lowest position. Patient instructed to call for assistance before getting out of bed. All questions answered, no other needs at this time. TR band in place, air removed per protocol. No signs of bleeding or hematoma at R radial site.

## 2023-05-26 NOTE — PROGRESS NOTES
[facility] Internal Medicine Daily Progress Note    Date of Service  5/26/2023    [facility] Team: [facility]   Attending: JEREMIAH Jacobson M.d.  Senior Resident: Dr. Jose Angel Singh   Intern:  Dr. Phil Doty  Contact Number: [phone]    Chief Complaint  Gurdeep Guerra is a 58 y.o. male admitted 5/23/2023 with chest pain    Hospital Course  Gurdeep Guerra is a 58 y.o. 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 ECHO 5/23 with EF 60%, moderate AS, moderate to severe AI. Left heart cath 5/25 with chronic CAD, but nothing to suggest ACS. NSTEMI likely secondary to aortic valve disease, hypertension, CKD.     Interval Problem Update  No acute overnight events. Patient denies CP or dyspnea. Patient states he is aware that the main concern at this time is his Aortic Valve stenosis. Patient states he would prefer the valve was managed as soon as possible, as he believes this will negatively impact his standing for a kidney transplant.    Kidney function stable this morning, though he is not out of the window for contrast-induced nephropathy and will repeat labs tomorrow.    I have discussed this patient's plan of care and discharge plan at IDT rounds today with Case Management, Nursing, Nursing leadership, and other members of the IDT team.    Consultants/Specialty  cardiology and nephrology    Code Status  Full Code    Disposition  The patient is not medically cleared for discharge to home or a post-acute facility.  Anticipate discharge to: home with close outpatient follow-up    I have placed the appropriate orders for post-discharge needs.    Review of Systems  Review of Systems   Constitutional:  Negative for chills,  fever and weight loss.   Cardiovascular:  Negative for chest pain, orthopnea, leg swelling and PND.   Gastrointestinal:  Negative for abdominal pain, constipation, nausea and vomiting.   Genitourinary:  Negative for dysuria.   Neurological:  Negative for focal weakness and headaches.        Physical Exam  Temp:  [36.4 °C (97.6 °F)-37 °C (98.6 °F)] 37 °C (98.6 °F)  Pulse:  [54-69] 61  Resp:  [16-18] 18  BP: (108-168)/(61-90) 128/78  SpO2:  [92 %-99 %] 97 %    Physical Exam  Constitutional:       General: He is not in acute distress.     Appearance: He is normal weight. He is not ill-appearing.   HENT:      Head: Normocephalic.      Mouth/Throat:      Mouth: Mucous membranes are moist.      Pharynx: Oropharynx is clear.   Eyes:      Conjunctiva/sclera: Conjunctivae normal.   Cardiovascular:      Rate and Rhythm: Normal rate and regular rhythm.      Heart sounds: Murmur (Loud systolic murmur that radiates to the carotids) heard.   Pulmonary:      Effort: Pulmonary effort is normal.      Breath sounds: Normal breath sounds.   Abdominal:      General: Abdomen is flat.      Palpations: Abdomen is soft.      Tenderness: There is no abdominal tenderness.      Comments: Recently placed PD cath is clean dry and intact   Musculoskeletal:         General: No swelling.   Skin:     General: Skin is warm and dry.   Neurological:      General: No focal deficit present.      Mental Status: He is alert and oriented to person, place, and time.             Fluids    Intake/Output Summary (Last 24 hours) at 5/26/2023 1341  Last data filed at 5/26/2023 1029  Gross per 24 hour   Intake 360 ml   Output 730 ml   Net -370 ml       Laboratory  Recent Labs     05/24/23  0111 05/25/23  0055 05/26/23  0127   WBC 5.2 6.0 4.7*   RBC 3.59* 3.86* 3.64*   HEMOGLOBIN 11.5* 12.4* 11.8*   HEMATOCRIT 32.8* 36.0* 34.0*   MCV 91.4 93.3 93.4   MCH 32.0 32.1 32.4   MCHC 35.1 34.4 34.7   RDW 43.7 43.8 44.6   PLATELETCT 154* 181 160*   MPV 9.6 10.0 9.9      Recent Labs     05/24/23  0111 05/25/23  0055 05/26/23  0127   SODIUM 141 139 137   POTASSIUM 4.5 4.4 4.7   CHLORIDE 109 105 103   CO2 16* 20 22   GLUCOSE 81 83 102*   * 105* 105*   CREATININE 9.45* 9.44* 8.98*   CALCIUM 8.7 8.3* 8.1*     Recent Labs     05/23/23  1558   APTT 28.6   INR 1.20*                 Imaging  EC-ECHOCARDIOGRAM COMPLETE W/O CONT   Final Result      DX-CHEST-PORTABLE (1 VIEW)   Final Result      Cardiomegaly. No focal consolidation or pleural effusions.      CL-PRE-TRANSCATHETER AORTIC VALVE REPLACEMENT    (Results Pending)          Assessment/Plan  Problem Representation:    * NSTEMI (non-ST elevated myocardial infarction) (HCC)- (present on admission)  Assessment & Plan  Presented with typical chest pain consisting of chest pressure with exertional component and relieved with rest, dyspnea, diaphoresis.  Initial troponin of 290 and slight increase on repeat. EKG shows accelerated junctional rhythm versus multifocal atrial tachycardia, PACs, no significant ST changes or T wave changes.  Troponins gradually uptrending.  EKG remains without ST changes.  After a long discussion regarding the risks and benefits of left heart cath in the setting of his advanced CKD, eventually decided to proceed with left heart cath 5/25 which showed chronic coronary artery disease, no need for stenting.  Ultimately, NSTEMI is likely more demand ischemia related to aortic stenosis, possible hypertensive urgency, on top of ESRD.  -Stop anticoagulation  -Continue home aspirin 81  -Atorvastatin 80  -Metoprolol 50 mg twice daily (this will replace home verapamil)  -Patient reports previous intolerance of ACE/ARB due to hyperkalemia    Aortic stenosis- (present on admission)  Assessment & Plan  Chronic. Patient has 6-month to 1 year history of dyspnea on exertion and paroxysmal nocturnal dyspnea.  I suspect this was related to his aortic valve disease.  Updated echo shows moderate to severe aortic stenosis  and severe aortic regurgitation.  -Referral to structural heart disease, Dr. Sharma, on discharge    Stage 5 chronic kidney disease not on chronic dialysis (HCC)- (present on admission)  Assessment & Plan  Due to FSGS, suspected related to long-term use of anabolic steroids.  Follows with nephrology and is on the transplant list at [facility].  Recently underwent placement of peritoneal dialysis catheter on 5/15 and underwent initial PD cath flushing 5/22.  -Nephrology following, no emergent need for dialysis  -Sevelamer per nephrology  -Sodium bicarb per nephrology  -Renally dose all medications  -Avoid nephrotoxins as possible    CAD (coronary artery disease)  Assessment & Plan  Left heart cath 5/25 showed:  1. Obstructive one-vessel coronary artery disease involving total occlusions of the terminal circumflex and first obtuse marginal-supplying a small area of myocardium  2.  Diffuse, nonobstructive coronary atherosclerosis in other segments    Hyperphosphatemia  Assessment & Plan  Due to CKD.  -Sevelamer per nephrology    Chronic metabolic acidosis  Assessment & Plan  Secondary to ESRD and at baseline bicarb around 17.  -Sodium bicarb per nephrology    Dyspnea on exertion  Assessment & Plan  See NSTEMI    Elevated troponin  Assessment & Plan  See NSTEMI    Pain in the chest  Assessment & Plan  The ASCVD Risk score (Sean DK, et al., 2019) failed to calculate for the following reasons:    The patient has a prior MI or stroke diagnosis  See above    Mixed hyperlipidemia- (present on admission)  Assessment & Plan  Outpatient regimen of ezetemibe.  - Lipid panel pending  - Start atorvastatin 80    Hypertension- (present on admission)  Assessment & Plan  Previous home regimen of verapamil. History of poor tolerance of other antihypertensives.   -Continue metoprolol (this will replace home verapamil)  -Amlodipine (new this admission)         VTE prophylaxis: therapeutic anticoagulation with heparin    I have performed a  physical exam and reviewed and updated ROS and Plan today (5/26/2023). In review of yesterday's note (5/25/2023), there are no changes except as documented above.

## 2023-05-27 ENCOUNTER — PHARMACY VISIT (OUTPATIENT)
Dept: PHARMACY | Facility: MEDICAL CENTER | Age: 58
End: 2023-05-27
Payer: COMMERCIAL

## 2023-05-27 VITALS
RESPIRATION RATE: 17 BRPM | WEIGHT: 168.43 LBS | SYSTOLIC BLOOD PRESSURE: 138 MMHG | HEART RATE: 59 BPM | HEIGHT: 67 IN | TEMPERATURE: 97.7 F | DIASTOLIC BLOOD PRESSURE: 80 MMHG | BODY MASS INDEX: 26.44 KG/M2 | OXYGEN SATURATION: 99 %

## 2023-05-27 LAB
ALBUMIN SERPL BCP-MCNC: 2.9 G/DL (ref 3.2–4.9)
ALBUMIN/GLOB SERPL: 1.3 G/DL
ALP SERPL-CCNC: 53 U/L (ref 30–99)
ALT SERPL-CCNC: 14 U/L (ref 2–50)
ANION GAP SERPL CALC-SCNC: 13 MMOL/L (ref 7–16)
AST SERPL-CCNC: 21 U/L (ref 12–45)
BASOPHILS # BLD AUTO: 0.8 % (ref 0–1.8)
BASOPHILS # BLD: 0.04 K/UL (ref 0–0.12)
BILIRUB SERPL-MCNC: 0.2 MG/DL (ref 0.1–1.5)
BUN SERPL-MCNC: 101 MG/DL (ref 8–22)
CALCIUM ALBUM COR SERPL-MCNC: 8.9 MG/DL (ref 8.5–10.5)
CALCIUM SERPL-MCNC: 8 MG/DL (ref 8.5–10.5)
CHLORIDE SERPL-SCNC: 105 MMOL/L (ref 96–112)
CO2 SERPL-SCNC: 21 MMOL/L (ref 20–33)
CREAT SERPL-MCNC: 9.01 MG/DL (ref 0.5–1.4)
EOSINOPHIL # BLD AUTO: 0.26 K/UL (ref 0–0.51)
EOSINOPHIL NFR BLD: 5.2 % (ref 0–6.9)
ERYTHROCYTE [DISTWIDTH] IN BLOOD BY AUTOMATED COUNT: 44.2 FL (ref 35.9–50)
GFR SERPLBLD CREATININE-BSD FMLA CKD-EPI: 6 ML/MIN/1.73 M 2
GLOBULIN SER CALC-MCNC: 2.2 G/DL (ref 1.9–3.5)
GLUCOSE SERPL-MCNC: 103 MG/DL (ref 65–99)
HCT VFR BLD AUTO: 33.8 % (ref 42–52)
HGB BLD-MCNC: 11.5 G/DL (ref 14–18)
IMM GRANULOCYTES # BLD AUTO: 0.01 K/UL (ref 0–0.11)
IMM GRANULOCYTES NFR BLD AUTO: 0.2 % (ref 0–0.9)
LYMPHOCYTES # BLD AUTO: 0.89 K/UL (ref 1–4.8)
LYMPHOCYTES NFR BLD: 17.9 % (ref 22–41)
MAGNESIUM SERPL-MCNC: 1.8 MG/DL (ref 1.5–2.5)
MCH RBC QN AUTO: 31.8 PG (ref 27–33)
MCHC RBC AUTO-ENTMCNC: 34 G/DL (ref 32.3–36.5)
MCV RBC AUTO: 93.4 FL (ref 81.4–97.8)
MONOCYTES # BLD AUTO: 0.47 K/UL (ref 0–0.85)
MONOCYTES NFR BLD AUTO: 9.5 % (ref 0–13.4)
NEUTROPHILS # BLD AUTO: 3.29 K/UL (ref 1.82–7.42)
NEUTROPHILS NFR BLD: 66.4 % (ref 44–72)
NRBC # BLD AUTO: 0 K/UL
NRBC BLD-RTO: 0 /100 WBC (ref 0–0.2)
PHOSPHATE SERPL-MCNC: 5.3 MG/DL (ref 2.5–4.5)
PLATELET # BLD AUTO: 169 K/UL (ref 164–446)
PMV BLD AUTO: 10.2 FL (ref 9–12.9)
POTASSIUM SERPL-SCNC: 5 MMOL/L (ref 3.6–5.5)
PROT SERPL-MCNC: 5.1 G/DL (ref 6–8.2)
RBC # BLD AUTO: 3.62 M/UL (ref 4.7–6.1)
SODIUM SERPL-SCNC: 139 MMOL/L (ref 135–145)
WBC # BLD AUTO: 5 K/UL (ref 4.8–10.8)

## 2023-05-27 PROCEDURE — 83735 ASSAY OF MAGNESIUM: CPT

## 2023-05-27 PROCEDURE — 700102 HCHG RX REV CODE 250 W/ 637 OVERRIDE(OP)

## 2023-05-27 PROCEDURE — 84100 ASSAY OF PHOSPHORUS: CPT

## 2023-05-27 PROCEDURE — A9270 NON-COVERED ITEM OR SERVICE: HCPCS | Performed by: STUDENT IN AN ORGANIZED HEALTH CARE EDUCATION/TRAINING PROGRAM

## 2023-05-27 PROCEDURE — 99239 HOSP IP/OBS DSCHRG MGMT >30: CPT | Mod: GC | Performed by: HOSPITALIST

## 2023-05-27 PROCEDURE — 80053 COMPREHEN METABOLIC PANEL: CPT

## 2023-05-27 PROCEDURE — A9270 NON-COVERED ITEM OR SERVICE: HCPCS | Performed by: INTERNAL MEDICINE

## 2023-05-27 PROCEDURE — RXMED WILLOW AMBULATORY MEDICATION CHARGE

## 2023-05-27 PROCEDURE — A9270 NON-COVERED ITEM OR SERVICE: HCPCS

## 2023-05-27 PROCEDURE — 700102 HCHG RX REV CODE 250 W/ 637 OVERRIDE(OP): Performed by: INTERNAL MEDICINE

## 2023-05-27 PROCEDURE — 700102 HCHG RX REV CODE 250 W/ 637 OVERRIDE(OP): Performed by: STUDENT IN AN ORGANIZED HEALTH CARE EDUCATION/TRAINING PROGRAM

## 2023-05-27 PROCEDURE — 85025 COMPLETE CBC W/AUTO DIFF WBC: CPT

## 2023-05-27 PROCEDURE — 700111 HCHG RX REV CODE 636 W/ 250 OVERRIDE (IP)

## 2023-05-27 RX ORDER — ATORVASTATIN CALCIUM 80 MG/1
80 TABLET, FILM COATED ORAL EVERY EVENING
Qty: 30 TABLET | Refills: 0 | Status: SHIPPED | OUTPATIENT
Start: 2023-05-27 | End: 2023-06-07

## 2023-05-27 RX ORDER — AMLODIPINE BESYLATE 10 MG/1
10 TABLET ORAL DAILY
Qty: 30 TABLET | Refills: 0 | Status: SHIPPED | OUTPATIENT
Start: 2023-05-28 | End: 2023-06-07

## 2023-05-27 RX ORDER — METOPROLOL SUCCINATE 50 MG/1
50 TABLET, EXTENDED RELEASE ORAL DAILY
Qty: 30 TABLET | Refills: 0 | Status: SHIPPED | OUTPATIENT
Start: 2023-05-28 | End: 2023-06-07

## 2023-05-27 RX ORDER — SEVELAMER CARBONATE 800 MG/1
800 TABLET, FILM COATED ORAL
Qty: 90 TABLET | Refills: 0 | Status: SHIPPED | OUTPATIENT
Start: 2023-05-27 | End: 2023-06-07

## 2023-05-27 RX ORDER — NITROGLYCERIN 0.4 MG/1
0.4 TABLET SUBLINGUAL PRN
Qty: 25 TABLET | Refills: 0 | Status: SHIPPED | OUTPATIENT
Start: 2023-05-27 | End: 2023-11-27

## 2023-05-27 RX ORDER — SODIUM BICARBONATE 650 MG/1
1300 TABLET ORAL 2 TIMES DAILY
Qty: 120 TABLET | Refills: 0 | Status: SHIPPED | OUTPATIENT
Start: 2023-05-27 | End: 2023-06-07

## 2023-05-27 RX ADMIN — SODIUM BICARBONATE 1300 MG: 650 TABLET ORAL at 05:53

## 2023-05-27 RX ADMIN — SENNOSIDES AND DOCUSATE SODIUM 2 TABLET: 50; 8.6 TABLET ORAL at 05:53

## 2023-05-27 RX ADMIN — METOPROLOL TARTRATE 50 MG: 50 TABLET, FILM COATED ORAL at 05:52

## 2023-05-27 RX ADMIN — ACETAMINOPHEN 650 MG: 325 TABLET, FILM COATED ORAL at 07:30

## 2023-05-27 RX ADMIN — ASPIRIN 81 MG: 81 TABLET, COATED ORAL at 05:52

## 2023-05-27 RX ADMIN — HEPARIN SODIUM 5000 UNITS: 5000 INJECTION, SOLUTION INTRAVENOUS; SUBCUTANEOUS at 05:52

## 2023-05-27 RX ADMIN — SEVELAMER CARBONATE 800 MG: 800 TABLET, FILM COATED ORAL at 07:24

## 2023-05-27 RX ADMIN — AMLODIPINE BESYLATE 10 MG: 10 TABLET ORAL at 05:52

## 2023-05-27 NOTE — PROGRESS NOTES
[facility] -  PROGRESS NOTE               Author: Kodak Harvey M.D. Date & Time: 5/27/2023  10:23 AM     HPI:  58 y.o. male with  chronic anabolic steroid use, ESRD sec to to Bx proven sec FSGS currently not on dialysis, moderate to moderate AS and  moderate to severe AR, HTN presenting to ER with CP,onset last night, CP not positional.   He noticed his BP was significantly elevated ~ 200/120. His BP improved to 160  after he took extra dose of  verapamil last night. His CP has continued through out the night and lasted during the day. He reports that the pain  gradually subsided  after he received ASA, NG.   Labs notable for troponin T of 294, repeat increased to 321  elevated NT proBNP of 7374.   He is is started  on heparin drip EKG with no ST- T wave changes,   Labs also noted for  unchanged from last month BUN ~ 107 and  Cr 9.34 increased increased from 7 a month ago.    He recently underwent PD catheter placement on May 15.   He had the PD  catheter was flushed and dressing changed yesterday with out any issues.   No F/C/N/V/SOB.  No melena, hematochezia, hematemesis.  No HA, visual changes, or abdominal pain. Denies dysgeusia, poor appetite.    He has ongoing fatigue which is chronic unchanged from baseline.     DAILY NEPHROLOGY SUMMARY:  5/23- Consult done   5/24- BP control sub optimal, denies CP, no complaints,case reviewed  with cardiology Dr. Lipscomb, per cardiology no compelling indication for LHC at this time and if pt to undergo LHC it will push him to needing dialysis given contrast exposure, pt wants to start PD as outpatient and would like to avoid tunnel dialysis cath.   5/25- Continue to have CP otherwise no other specific symptoms, going for LHC today   5/26 - LHC yesterday, no PCI, VSS, reports tingling in his L hand started this AM, no overt uremic symptoms  5/27-No events, denies dyspnea,CP,N/V, abdominal pain, appetites is good, VSS, pt states he  would  "prefer to start PD next week if possible. He sees PD clinic on Wed.       REVIEW OF SYSTEMS:    10 point ROS reviewed and is as per HPI/daily summary or otherwise negative    PMH/PSH/SH/FH:  Reviewed and unchanged since admission note    CURRENT MEDICATIONS:  Reviewed from admission to present day    VS:  /80   Pulse (!) 59   Temp 36.5 °C (97.7 °F) (Temporal)   Resp 17   Ht 1.702 m (5' 7\")   Wt 76.4 kg (168 lb 6.9 oz)   SpO2 99%   BMI 26.38 kg/m²   Physical Exam  Constitutional:       General: He is not in acute distress.     Appearance: He is not ill-appearing.   HENT:      Head: Normocephalic and atraumatic.      Mouth/Throat:      Mouth: Mucous membranes are moist.   Eyes:      Conjunctiva/sclera: Conjunctivae normal.   Cardiovascular:      Rate and Rhythm: Normal rate and regular rhythm.      Heart sounds: Murmur heard.      No friction rub.   Pulmonary:      Effort: Pulmonary effort is normal. No respiratory distress.      Breath sounds: Normal breath sounds. No wheezing or rales.   Abdominal:      General: Abdomen is flat.      Palpations: Abdomen is soft.      Tenderness: There is no abdominal tenderness.      Comments: PD cath RLQ,Exit site covered with dressing c/d/Kleber tunnel tenderness    Musculoskeletal:      Right lower leg: No edema.      Left lower leg: No edema.   Skin:     General: Skin is warm and moist.   Neurological:      Mental Status: He is alert and oriented to person, place, and time.   Psychiatric:         Mood and Affect: Affect normal.     Fluids:    Intake/Output Summary (Last 24 hours) at 5/27/2023 1023  Last data filed at 5/27/2023 0600  Gross per 24 hour   Intake 1000 ml   Output 150 ml   Net 850 ml         LABS:  Labs reviewed, pertinent labs below.    Recent Labs     05/25/23  0055 05/26/23  0127 05/27/23  0144   RBC 3.86* 3.64* 3.62*   HEMOGLOBIN 12.4* 11.8* 11.5*   HEMATOCRIT 36.0* 34.0* 33.8*   PLATELETCT 181 160* 169         Recent Labs     05/25/23  0055 " 05/26/23  0127 05/27/23  0144   SODIUM 139 137 139   POTASSIUM 4.4 4.7 5.0   CHLORIDE 105 103 105   CO2 20 22 21   GLUCOSE 83 102* 103*   * 105* 101*   CREATININE 9.44* 8.98* 9.01*   CALCIUM 8.3* 8.1* 8.0*      Left heart cath 5/25 showed:  1. Obstructive one-vessel coronary artery disease involving total occlusions of the terminal circumflex and first obtuse marginal-supplying a small area of myocardium  2.  Diffuse, nonobstructive coronary atherosclerosis in other segments       IMAGING:  All imaging reviewed from admission to present day      IMPRESSION:  # ESRD sec to sec FSGS currently not on dialysis   - s/p PD cath placement 5/15 with plans to start PD in next couple of weeks   - No overt uremic symptoms at present, no pericardial rub on exam   # Chest pain   - Elevated troponin   - ECHO with normal LVEF, moderate AS and severe AI  - s/p LHC, with no significant obstructive CAD  - Referral to CT sx for evaluation for valve sx on DC   # HTN   - BP improved overall on amlodipine and metoprolol   - No signs of hypervolemia on exam    # CKD- MBD  - Ca 8.6   - P 6.4   # Anemia not present   # Presumed metabolic acidosis   # Hyperphosphatemia      PLAN:  - No compelling indication for RRT  - Daily evaluation for RRT needs  -- Dose all meds per eGFR < 15  - Renal diet   - Renvela 800 mg TID AC  - Sodium bicarbonate 1300 mg bid   - FU with home PD program next week  for PD cath flushes and start PD as outpatient     Discussed with primary team.

## 2023-05-27 NOTE — DISCHARGE INSTRUCTIONS
You were admitted for chest pain.  With your elevated cardiac enzymes (troponin), we were concerned that this may be a heart attack.  You were taken for a left heart catheterization which showed that you have coronary artery disease (atherosclerosis and narrowing of your coronary arteries).  However, this appeared chronic and it did not appear that you are having an acute heart attack.  You should stay on aspirin and a statin indefinitely to prevent future heart attack.  We have also made a few adjustments to your blood pressure regimen.  Please review this and make sure you are taking only what is prescribed and get rid of your old medications.    The echocardiogram of your heart shows that you have significant aortic valve disease with both narrowing/stenosis and regurgitation.  Please see the attached handout regarding this disease, symptoms and the possible treatment.  It is possible, that your chest pain was related to your valve disease.  It is also difficult to interpret the cardiac troponins in the setting of advanced kidney disease.  We will place a referral for you to follow-up with our cardiology team for consideration of valve replacement.    Please follow-up with your nephrologist with repeat labs and initiation of peritoneal dialysis.  You are started on a phosphate binder called sevelamer for hyperphosphatemia of CKD and bicarbonate tablets for CKD-related metabolic acidosis.

## 2023-05-27 NOTE — PROGRESS NOTES
Discharge instructions given to patient; patient verbalizes understanding, all questions answered.  Copy of DC summary provided, signed copy in chart.  5 prescriptions electronically sent to pt's pharmacy/provided to patient, copies in chart.  Pt states personal belongings are in possession.  Pt escorted off unit by this RN without incident.  Patient IV was discontinued prior to arrival.

## 2023-05-27 NOTE — DISCHARGE SUMMARY
[facility] Discharge Summary    Attending: Agustin Jacobson MD  Senior Resident: Dr. Jose Angel Singh  Intern:  Dr. Phil Doty   Contact Number: [phone]    CHIEF COMPLAINT ON ADMISSION  Chief Complaint   Patient presents with    Post-Op Complications     Patient had a LAPAROSCOPIC PLACEMENT OF PERITONEAL DIALYSIS CATHERTER placed 5/15. Patient reports yesterday was 1st time flishing catherter and a lump on the right lower abd with pain.    Chest Pain     Patient reports chest pain since 0000. Patient states felt like he was SOB and something was sitting on his chest. Patient reports he took extra medications this morning and pain has decreased to a dull pain.        Reason for Admission  Post Op Complication/Chest Pain     Admission Date  5/23/2023    Discharge Date  5/27/2023    CODE STATUS  Prior    HPI & HOSPITAL COURSE  Gurdeep Guerra is a 58 y.o. male with ESRD due to FSGS with recent PD cath placement and initiation 5/22, moderate to severe aortic stenosis and aortic regurgitation, family history of CAD, hypertension, hyperlipidemia, 30 year history of anabolic steroid use who presented 5/23/2023 with exertional chest pain and dyspnea, found to have NSTEMI and initiatied heparin drip. ECHO 5/23 with EF 60%, moderate AS, moderate to severe AI. Left heart cath 5/25 with chronic CAD, but nothing to suggest ACS. NSTEMI likely secondary to aortic valve disease, hypertension, CKD.  Renal function did not worsen and there is no indication for emergent dialysis new/discharged home with close outpatient follow-up.    * NSTEMI (non-ST elevated myocardial infarction) (HCC)- (present on admission)  CAD (coronary artery disease)  Assessment & Plan  Presented with typical cardiac chest pain consisting of chest pressure with exertional component and relieved with rest, dyspnea, diaphoresis.  Initial troponin of 290 and slight increase on repeat. EKG shows accelerated junctional rhythm versus multifocal atrial  tachycardia, PACs, no significant ST changes or T wave changes.  Troponins gradually uptrended, EKGs were trended and returned to normal sinus without ST changes.  After a long discussion regarding the risks and benefits of left heart cath in the setting of his advanced CKD, eventually decided to proceed with left heart cath 5/25 which showed chronic coronary artery disease, no need for stenting. Ultimately, NSTEMI is likely more demand ischemia related to aortic stenosis, possible hypertensive urgency, on top of ESRD.  -Continue home aspirin 81  -Start Atorvastatin 80  -Metoprolol 50 mg twice daily (this will replace home verapamil)     Aortic stenosis- (present on admission)  Assessment & Plan  Chronic. Patient has 6-month to 1 year history of dyspnea on exertion and paroxysmal nocturnal dyspnea.  I suspect this was related to his aortic valve disease.  Updated echo shows moderate to severe aortic stenosis and severe aortic regurgitation. Left heart cath included work up for aortic valve replacement.   -Referral to structural heart disease, Dr. Sharma, for further management      Stage 5 chronic kidney disease not on chronic dialysis (HCC)- (present on admission)  Metabolic acidosis  Hyperphosphatemia   Assessment & Plan  Due to FSGS, suspected related to long-term use of anabolic steroids.  Follows with nephrology and is on the transplant list at [facility].  Recently underwent placement of peritoneal dialysis catheter on 5/15 and underwent initial PD cath flushing 5/22. Renal function did not worsen and there is no indication for emergent dialysis.   -Follow up with nephrology   -Start Sevelamer per nephrology  -Start Sodium bicarb per nephrology       Hypertension- (present on admission)  Assessment & Plan  Previous home regimen of verapamil. History of poor tolerance of other antihypertensives.   -Start metoprolol (this will replace home verapamil)  -Start Amlodipine (new this admission)      Therefore, he is  discharged in good and stable condition to home with close outpatient follow-up.    The patient met 2-midnight criteria for an inpatient stay at the time of discharge.      Physical Exam on Day of Discharge  Constitutional:       General: He is not in acute distress.     Appearance: He is normal weight. He is not ill-appearing.   HENT:      Head: Normocephalic.      Mouth/Throat:      Mouth: Mucous membranes are moist.      Pharynx: Oropharynx is clear.   Eyes:      Conjunctiva/sclera: Conjunctivae normal.   Cardiovascular:      Rate and Rhythm: Normal rate and regular rhythm.      Heart sounds: Murmur (Loud systolic murmur that radiates to the carotids) heard.   Pulmonary:      Effort: Pulmonary effort is normal.      Breath sounds: Normal breath sounds.   Abdominal:      General: Abdomen is flat.      Palpations: Abdomen is soft.      Tenderness: There is no abdominal tenderness.      Comments: Recently placed PD cath is clean dry and intact   Musculoskeletal:         General: No swelling.   Skin:     General: Skin is warm and dry.   Neurological:      General: No focal deficit present.      Mental Status: He is alert and oriented to person, place, and time.     FOLLOW UP ITEMS POST DISCHARGE  Follow-up with nephrology for ESRD and initiation of dialysis  Follow-up with cardiology for aortic stenosis and coronary artery disease  Follow-up with primary care for all other chronic medical conditions    DISCHARGE DIAGNOSES  Principal Problem:    NSTEMI (non-ST elevated myocardial infarction) (HCC) (POA: Yes)  Active Problems:    Stage 5 chronic kidney disease not on chronic dialysis (HCC) (POA: Yes)    Aortic stenosis (POA: Yes)    Hypertension (POA: Yes)    Mixed hyperlipidemia (POA: Yes)    Pain in the chest (POA: Unknown)    Elevated troponin (POA: Unknown)    Dyspnea on exertion (POA: Unknown)    Chronic metabolic acidosis (POA: Unknown)    Hyperphosphatemia (POA: Unknown)    CAD (coronary artery disease) (POA:  Unknown)  Resolved Problems:    * No resolved hospital problems. *      FOLLOW UP  No future appointments.  Drew T. Blumberg, D.O.  [address]  [facility] [id number] [phone]    Follow up  follow up after discharge      MEDICATIONS ON DISCHARGE     Medication List        START taking these medications        Instructions   amLODIPine 10 MG Tabs  Start taking on: May 28, 2023  Commonly known as: NORVASC   Take 1 Tablet by mouth every day.  Dose: 10 mg     atorvastatin 80 MG tablet  Commonly known as: LIPITOR   Take 1 Tablet by mouth every evening.  Dose: 80 mg     metoprolol SR 50 MG Tb24  Start taking on: May 28, 2023  Commonly known as: TOPROL XL   Take 1 Tablet by mouth every day.  Dose: 50 mg     nitroglycerin 0.4 MG Subl  Commonly known as: NITROSTAT   Place 1 Tablet under the tongue as needed for Chest Pain.  Dose: 0.4 mg     sevelamer carbonate 800 MG Tabs tablet  Commonly known as: RENVELA   Take 1 Tablet by mouth 3 times a day with meals for 30 days.  Dose: 800 mg     sodium bicarbonate 650 MG Tabs  Commonly known as: SODIUM BICARBONATE   Take 2 Tablets by mouth 2 times a day for 30 days.  Dose: 1,300 mg            CONTINUE taking these medications        Instructions   ACIDOPHILUS PO   Take 1 Tablet by mouth every day.  Dose: 1 Tablet     aspirin 81 MG EC tablet   Take 81 mg by mouth every day.  Dose: 81 mg     betamethasone dipropionate 0.05 % Oint   Apply 1 Application topically 2 times a day as needed.  Dose: 1 Application     esomeprazole 40 MG delayed-release capsule  Commonly known as: NEXIUM   Take 40 mg by mouth every evening.  Dose: 40 mg     tamsulosin 0.4 MG capsule  Commonly known as: FLOMAX   Take 0.4 mg by mouth every evening.  Dose: 0.4 mg            STOP taking these medications      ezetimibe 10 MG Tabs  Commonly known as: ZETIA     verapamil  MG Tbcr  Commonly known as: CALAN-SR              Allergies  Allergies   Allergen Reactions    Allopurinol Unspecified     Pt  states his reaction is similar to lupus    Hydralazine Hcl Unspecified     Pt states he had a reaction similar to lupus       DIET  No orders of the defined types were placed in this encounter.      ACTIVITY  As tolerated.  Weight bearing as tolerated    CONSULTATIONS  Cardiology, nephrology    PROCEDURES  Left heart cath 5/25    LABORATORY  Lab Results   Component Value Date    SODIUM 139 05/27/2023    POTASSIUM 5.0 05/27/2023    CHLORIDE 105 05/27/2023    CO2 21 05/27/2023    GLUCOSE 103 (H) 05/27/2023     (H) 05/27/2023    CREATININE 9.01 (HH) 05/27/2023    GLOMRATE 7 (L) 04/02/2023        Lab Results   Component Value Date    WBC 5.0 05/27/2023    HEMOGLOBIN 11.5 (L) 05/27/2023    HEMATOCRIT 33.8 (L) 05/27/2023    PLATELETCT 169 05/27/2023        Total time of the discharge process was 31 minutes.

## 2023-05-27 NOTE — CARE PLAN
The patient is Stable - Low risk of patient condition declining or worsening    Shift Goals  Clinical Goals: Monitor labs, safety  Patient Goals: discharge  Family Goals: FARIDA    Progress made toward(s) clinical / shift goals:    Problem: Pain - Standard  Goal: Alleviation of pain or a reduction in pain to the patient’s comfort goal  Outcome: Progressing     Problem: Hemodynamics  Goal: Patient's hemodynamics, fluid balance and neurologic status will be stable or improve  Outcome: Progressing     Problem: Knowledge Deficit - Standard  Goal: Patient and family/care givers will demonstrate understanding of plan of care, disease process/condition, diagnostic tests and medications  Outcome: Progressing

## 2023-05-31 NOTE — DOCUMENTATION QUERY
"                                                                         [facility]                                                                       Query Response Note      PATIENT:               LORIE KAYE  ACCT #:                  [id number]  MRN:                     [id number]  :                      1965  ADMIT DATE:       2023 11:22 AM  DISCH DATE:        2023 11:48 AM  RESPONDING  PROVIDER #:        [id number]           QUERY TEXT:    58 year old male with chest pain and hypertension admitted with \"NSTEMI\" and \"typical chest pain consisting of chest pressure with exertional component and relieved with rest, dyspnea, diaphoresis\" per history and physical.   Cardiologist states \"sinus rhythm\", \"no evidence of acute coronary syndrome\", \"hypertensive emergency with troponin T's positive but flat, moderate aortic stenosis and aortic regurgitation\" and \"patient does not have evidence of acute coronary syndrome\".  Cath impression \"Type II MI due to ESRD with superimposed hypertensive emergency\"    NSTEMI and type II MI due to ESRD with superimposed hypertensive emergency is documented in the record. Can the patient's condition be further specified as any of the following:    The patient's clinical indicators include:  Clinical indicators: troponin 294, 321, 356, 344, 377, 397, 399, 402, 380  GFR remains at 6 throughout the stay thus far with Creatinine of 9.34, 9.45, 9.44.   Chest pain \"pressure-like sensation\", cardiologist states \"I have personally interpreted EKG today with patient, there is no evidence of acute coronary syndrome, no evidence of prior infarct, normal NV and QT interval, no significant conduction disease. Sinus rhythm\" and \"could be hypertensive emergency\".   cardiology progress note states \"hypertensive emergency with troponin T's positive but flat, moderate aortic stenosis and aortic regurgitation\" and \"patient does not have evidence of acute coronary " "syndrome\".  Potential workup for TAVR.   Pt reports BP at home 200/120 decreased to 160 after taking additional Verapamil.     Risk factors: ESRD, aortic stenosis, HTN, CAD     Treatment: heparin drip, ASA PO, Verapamil.     Thank you for your time and attention.    Janine Price RN, CCDS, Lead CDIS  [email or web address]  Connect via LalaTE or Teams messenger  Options provided:   -- Type II MI due to ESRD with superimposed hypertensive emergency, present on admission   -- NSTEMI, present on admission   -- Demand ischemia due to hypertensive emergency, present on admission   -- Other explanation, Please specify      Query created by: Janine Price on 5/31/2023 10:12 AM    RESPONSE TEXT:    Type II MI due to ESRD with superimposed hypertensive emergency, present on admission          Electronically signed by:  SPENCER CERVANTES MD 5/31/2023 12:49 PM              "

## 2023-06-02 ENCOUNTER — TELEPHONE (OUTPATIENT)
Dept: CARDIOLOGY | Facility: MEDICAL CENTER | Age: 58
End: 2023-06-02
Payer: COMMERCIAL

## 2023-06-02 NOTE — TELEPHONE ENCOUNTER
Referral from: Dr. Phil Doty for Moderate AS, Moderate-Severe AI    Patient called on 06/02/2023.    Discussed referral and recommendation for consultation. Patient scheduled to see Dr. Sharma 06/07/2023.    All questions answered.    Phone number given to patient for [facility] for any further questions or concerns.

## 2023-06-07 ENCOUNTER — OFFICE VISIT (OUTPATIENT)
Dept: CARDIOLOGY | Facility: MEDICAL CENTER | Age: 58
End: 2023-06-07
Attending: INTERNAL MEDICINE
Payer: COMMERCIAL

## 2023-06-07 VITALS
RESPIRATION RATE: 16 BRPM | WEIGHT: 168 LBS | SYSTOLIC BLOOD PRESSURE: 132 MMHG | HEART RATE: 54 BPM | OXYGEN SATURATION: 99 % | HEIGHT: 67 IN | DIASTOLIC BLOOD PRESSURE: 80 MMHG | BODY MASS INDEX: 26.37 KG/M2

## 2023-06-07 DIAGNOSIS — E78.5 DYSLIPIDEMIA: ICD-10-CM

## 2023-06-07 DIAGNOSIS — I25.10 CORONARY ARTERY DISEASE DUE TO LIPID RICH PLAQUE: ICD-10-CM

## 2023-06-07 DIAGNOSIS — I35.0 AORTIC VALVE STENOSIS, ETIOLOGY OF CARDIAC VALVE DISEASE UNSPECIFIED: ICD-10-CM

## 2023-06-07 DIAGNOSIS — I25.83 CORONARY ARTERY DISEASE DUE TO LIPID RICH PLAQUE: ICD-10-CM

## 2023-06-07 DIAGNOSIS — N18.5 STAGE 5 CHRONIC KIDNEY DISEASE NOT ON CHRONIC DIALYSIS (HCC): ICD-10-CM

## 2023-06-07 PROBLEM — I21.4 NSTEMI (NON-ST ELEVATED MYOCARDIAL INFARCTION) (HCC): Status: RESOLVED | Noted: 2023-05-23 | Resolved: 2023-06-07

## 2023-06-07 PROCEDURE — 99215 OFFICE O/P EST HI 40 MIN: CPT | Performed by: INTERNAL MEDICINE

## 2023-06-07 PROCEDURE — 3079F DIAST BP 80-89 MM HG: CPT | Performed by: INTERNAL MEDICINE

## 2023-06-07 PROCEDURE — 3075F SYST BP GE 130 - 139MM HG: CPT | Performed by: INTERNAL MEDICINE

## 2023-06-07 PROCEDURE — 99211 OFF/OP EST MAY X REQ PHY/QHP: CPT | Performed by: INTERNAL MEDICINE

## 2023-06-07 RX ORDER — ATORVASTATIN CALCIUM 40 MG/1
40 TABLET, FILM COATED ORAL NIGHTLY
Qty: 100 TABLET | Refills: 3 | Status: SHIPPED | OUTPATIENT
Start: 2023-06-07

## 2023-06-07 RX ORDER — VERAPAMIL HYDROCHLORIDE 240 MG/1
240 TABLET, FILM COATED, EXTENDED RELEASE ORAL DAILY
Qty: 100 TABLET | Refills: 3 | Status: ON HOLD | OUTPATIENT
Start: 2023-06-07 | End: 2023-11-17

## 2023-06-07 RX ORDER — EZETIMIBE 10 MG/1
10 TABLET ORAL EVERY EVENING
COMMUNITY
Start: 2023-06-05 | End: 2023-06-07

## 2023-06-07 ASSESSMENT — FIBROSIS 4 INDEX: FIB4 SCORE: 1.86

## 2023-06-07 NOTE — PROGRESS NOTES
"CARDIOLOGY STRUCTURAL HEART FOLLOWUP    PCP: Drew T. Blumberg, D.O.  REFERRING: Dr. Lipscomb    1. Aortic valve stenosis, etiology of cardiac valve disease unspecified    2. Stage 5 chronic kidney disease not on chronic dialysis (HCC)    3. Coronary artery disease due to lipid rich plaque    4. 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  I advised reassessment of his symptoms in 3 months as dialysis is planned to start in the next week or 2.  I also advised changing from metoprolol back to verapamil as he has developed diarrhea after making the change.  Furthermore, I advised using atorvastatin instead of Zetia.  He should continue on the other medications.  Blood pressure target less than 130/80.  LDL target less than 70.  He will contact me if the blood pressure becomes uncontrolled with these changes.    Follow up: 3 months    History: Gurdeep Guerra is a 58 y.o. male with history of end-stage renal disease presenting for assessment of aortic stenosis.  He was hospitalized last month with a bout of chest pain, elevated blood pressure, cardiac biomarker elevation.  He underwent catheterization which showed occlusion of a terminal OM branch which was managed medically.  LVEF is normal.  There is moderate to severe mixed AS and AI, an invasive gradient in the mid 30s.     Since discharge he has developed bilateral axillary discomfort, diarrhea and continues to feel general malaise.  Some of the symptoms did improve after a test flush on the peritoneal dialysis catheter.  And he is optimistic symptoms will continue to improve with initiation of dialysis next week.    PE:  /80 (BP Location: Left arm, Patient Position: Sitting, BP Cuff Size: Adult)   Pulse (!) 54   Resp 16   Ht 1.702 m (5' 7\")   Wt 76.2 kg (168 lb)   SpO2 99%   BMI 26.31 kg/m²   GEN: NAD  CARDIAC: +ERICKSON regular no JVP Normal S1 Preserved " S2  VASCULATURE: diminished and delayed carotid pulse  RESP: Clear to auscultation bilaterally  ABD: Soft, non-tender, non-distended  EXT: No edema  NEURO: No focal deficit    Past Medical History:   Diagnosis Date    Anesthesia     Hiccups for over 24 hours after a previous sugery.    Aortic stenosis     Chronic gout of multiple sites     Dyspnea on exertion 5/23/2023    Elevated troponin 5/23/2023    Heart burn     1990    Heart murmur     High cholesterol     All always    Hypertension 2009    NSTEMI (non-ST elevated myocardial infarction) (HCC) 5/23/2023    Pain in the chest 5/23/2023    PONV (postoperative nausea and vomiting)     Renal disorder 2007    Stage IV    Sleep apnea 2000    Snoring 1990     Allergies   Allergen Reactions    Allopurinol Unspecified     Pt states his reaction is similar to lupus    Hydralazine Hcl Unspecified     Pt states he had a reaction similar to lupus     Outpatient Encounter Medications as of 6/7/2023   Medication Sig Dispense Refill    verapamil ER (CALAN-SR) 240 MG Tab CR Take 1 Tablet by mouth every day. 100 Tablet 3    atorvastatin (LIPITOR) 40 MG Tab Take 1 Tablet by mouth every evening. 100 Tablet 3    nitroglycerin (NITROSTAT) 0.4 MG SL Tab Place 1 Tablet under the tongue as needed for Chest Pain. 25 Tablet 0    aspirin 81 MG EC tablet Take 81 mg by mouth every day.      esomeprazole (NEXIUM) 40 MG delayed-release capsule Take 40 mg by mouth every evening.      [DISCONTINUED] ezetimibe (ZETIA) 10 MG Tab Take 10 mg by mouth every evening.      [DISCONTINUED] amLODIPine (NORVASC) 10 MG Tab Take 1 Tablet by mouth every day. (Patient not taking: Reported on 6/7/2023) 30 Tablet 0    [DISCONTINUED] atorvastatin (LIPITOR) 80 MG tablet Take 1 Tablet by mouth every evening. (Patient not taking: Reported on 6/7/2023) 30 Tablet 0    [DISCONTINUED] sevelamer carbonate (RENVELA) 800 MG Tab tablet Take 1 Tablet by mouth 3 times a day with meals for 30 days. (Patient not taking:  Reported on 6/7/2023) 90 Tablet 0    [DISCONTINUED] sodium bicarbonate (SODIUM BICARBONATE) 650 MG Tab Take 2 Tablets by mouth 2 times a day for 30 days. (Patient not taking: Reported on 6/7/2023) 120 Tablet 0    [DISCONTINUED] metoprolol SR (TOPROL XL) 50 MG TABLET SR 24 HR Take 1 Tablet by mouth every day. 30 Tablet 0    [DISCONTINUED] betamethasone dipropionate 0.05 % Ointment Apply 1 Application topically 2 times a day as needed. (Patient not taking: Reported on 6/7/2023)      [DISCONTINUED] Lactobacillus (ACIDOPHILUS PO) Take 1 Tablet by mouth every day. (Patient not taking: Reported on 6/7/2023)      [DISCONTINUED] tamsulosin (FLOMAX) 0.4 MG capsule Take 0.4 mg by mouth every evening. (Patient not taking: Reported on 6/7/2023)       No facility-administered encounter medications on file as of 6/7/2023.     Social History     Socioeconomic History    Marital status:      Spouse name: Not on file    Number of children: Not on file    Years of education: Not on file    Highest education level: Master's degree (e.g., MA, MS, Kenn, MEd, MSW, ELIECER)   Occupational History    Not on file   Tobacco Use    Smoking status: Never    Smokeless tobacco: Never   Vaping Use    Vaping Use: Never used   Substance and Sexual Activity    Alcohol use: Never    Drug use: Not Currently    Sexual activity: Yes     Partners: Female   Other Topics Concern    Not on file   Social History Narrative    Not on file     Social Determinants of Health     Financial Resource Strain: Low Risk  (1/4/2023)    Overall Financial Resource Strain (CARDIA)     Difficulty of Paying Living Expenses: Not hard at all   Food Insecurity: No Food Insecurity (1/4/2023)    Hunger Vital Sign     Worried About Running Out of Food in the Last Year: Never true     Ran Out of Food in the Last Year: Never true   Transportation Needs: No Transportation Needs (1/4/2023)    PRAPARE - Transportation     Lack of Transportation (Medical): No     Lack of  Transportation (Non-Medical): No   Physical Activity: Sufficiently Active (1/4/2023)    Exercise Vital Sign     Days of Exercise per Week: 3 days     Minutes of Exercise per Session: 60 min   Stress: Stress Concern Present (1/4/2023)    [place] [employer] - Occupational Stress Questionnaire     Feeling of Stress : To some extent   Social Connections: Moderately Isolated (1/4/2023)    Social Connection and Isolation Panel [NHANES]     Frequency of Communication with Friends and Family: Once a week     Frequency of Social Gatherings with Friends and Family: More than three times a week     Attends Latter day Services: Never     Active Member of Clubs or Organizations: No     Attends Club or Organization Meetings: Never     Marital Status: Living with partner   Intimate Partner Violence: Not on file   Housing Stability: Low Risk  (1/4/2023)    Housing Stability Vital Sign     Unable to Pay for Housing in the Last Year: No     Number of Places Lived in the Last Year: 1     Unstable Housing in the Last Year: No       Studies  Lab Results   Component Value Date/Time    CHOLSTRLTOT 171 05/23/2023 11:08 AM     (H) 05/23/2023 11:08 AM    HDL 40 05/23/2023 11:08 AM    TRIGLYCERIDE 56 05/23/2023 11:08 AM       Lab Results   Component Value Date/Time    SODIUM 139 05/27/2023 01:44 AM    POTASSIUM 5.0 05/27/2023 01:44 AM    CHLORIDE 105 05/27/2023 01:44 AM    CO2 21 05/27/2023 01:44 AM    GLUCOSE 103 (H) 05/27/2023 01:44 AM     (H) 05/27/2023 01:44 AM    CREATININE 9.01 (HH) 05/27/2023 01:44 AM    BUNCREATRAT 17 05/09/2022 09:49 AM    GLOMRATE 7 (L) 04/02/2023 11:54 AM     Lab Results   Component Value Date/Time    ALKPHOSPHAT 53 05/27/2023 01:44 AM    ASTSGOT 21 05/27/2023 01:44 AM    ALTSGPT 14 05/27/2023 01:44 AM    TBILIRUBIN 0.2 05/27/2023 01:44 AM                 40-54 minutes of physician total time spent on the date of the encounter ([id number])    Chief Complaint   Patient presents with     Aortic Stenosis     ROS:   10 point review systems is otherwise negative except as per the HPI

## 2023-06-27 ENCOUNTER — OFFICE VISIT (OUTPATIENT)
Dept: VASCULAR SURGERY | Facility: MEDICAL CENTER | Age: 58
End: 2023-06-27
Payer: COMMERCIAL

## 2023-06-27 VITALS
WEIGHT: 180.3 LBS | HEIGHT: 67 IN | DIASTOLIC BLOOD PRESSURE: 76 MMHG | BODY MASS INDEX: 28.3 KG/M2 | SYSTOLIC BLOOD PRESSURE: 128 MMHG | OXYGEN SATURATION: 99 % | TEMPERATURE: 98.9 F | HEART RATE: 80 BPM

## 2023-06-27 DIAGNOSIS — N18.6 ESRD (END STAGE RENAL DISEASE) (HCC): ICD-10-CM

## 2023-06-27 PROCEDURE — 3074F SYST BP LT 130 MM HG: CPT | Performed by: SURGERY

## 2023-06-27 PROCEDURE — 99024 POSTOP FOLLOW-UP VISIT: CPT | Performed by: SURGERY

## 2023-06-27 PROCEDURE — 3078F DIAST BP <80 MM HG: CPT | Performed by: SURGERY

## 2023-06-27 ASSESSMENT — FIBROSIS 4 INDEX: FIB4 SCORE: 1.86

## 2023-06-27 NOTE — PROGRESS NOTES
"                 VASCULAR SURGERY                    Postop Note  _________________________________    6/27/2023    Mr. Guerra comes to the clinic today for follow-up, status post laparoscopic placement of peritoneal dialysis catheter on May 17, 2023.  He tells me that his catheter has been flushing and draining well.  Due to patient not having much body fat, the catheter could be seen subcutaneously underneath the skin.  Patient was told by the dialysis people that he needs to have the catheter re-tunneled.    Vitals  /76 (BP Location: Left arm, Patient Position: Sitting, BP Cuff Size: Adult)   Pulse 80   Temp 37.2 °C (98.9 °F) (Temporal)   Ht 1.702 m (5' 7\")   Wt 81.8 kg (180 lb 4.8 oz)   SpO2 99%   BMI 28.24 kg/m²     Exam  General: Pleasant male in none apparent distress.  Abdomen: Incisions are well-healed.  Catheter in place with no erythema, drainage or fluctuation at the exit site.  The subcutaneous part of the catheter is directly underneath the skin with no pressure pushing on the skin.    Assessment: Status post laparoscopic peritoneal dialysis catheter placement.    Plan: Doing well.  I told patient that since he essentially has no subcutaneous fat, the subcutaneous part of the catheter could be seen underneath the skin.  I told him that I would be more than happy to re-tunnel catheter more deeply but I have to re-tunnel it underneath some of the muscle.  Patient told me that the catheter does not bother him and therefore he would like to leave it alone.  I asked patient to keep an eye on it and let me know immediately if the catheter starts exerting pressure on the skin.  Patient indicated understanding and agreed with plan.  All questions were answered.      Shikha Alexander MD  [facility]  [phone]  [address], [place] [address]   "

## 2023-07-11 NOTE — PROGRESS NOTES
Cardiology Progress Note:    Memo Lipscomb M.D.  Date & Time note created:    5/26/2023   7:54 AM     Referring MD:  Dr. Jacobson    Patient ID:   Name:             Gurdeep Guerra   YOB: 1965  Age:                 58 y.o.  male   MRN:               [id number]                                                             Chief Complaint / Reason for consult:  Chest pain.    History of Present Illness:    This is a 58 years old man with end-stage renal disease not yet on dialysis due to FSGS, presented to the hospital with chest pain.  Patient was found to have moderate aortic stenosis and aortic regurgitation, normal LV function, extremely elevated high blood pressure, elevated troponin.    No significant obstructive CAD found on coronary angiogram. At least moderate AS seen.    Overnight events:  Still having on and off chest pain.    Review of Systems:      Constitutional: Denies fevers, Denies weight changes  Eyes: Denies changes in vision, no eye pain  Ears/Nose/Throat/Mouth: Denies nasal congestion or sore throat   Cardiovascular: yes chest pain, no palpitations   Respiratory: no shortness of breath , Denies cough  Gastrointestinal/Hepatic: Denies abdominal pain, nausea, vomiting, diarrhea, constipation or GI bleeding   Genitourinary: Denies dysuria or frequency  Musculoskeletal/Rheum: Denies  joint pain and swelling   Skin: Denies rash  Neurological: Denies headache, confusion, memory loss or focal weakness/parasthesias  Psychiatric: denies mood disorder   Endocrine: Melyssa thyroid problems  Heme/Oncology/Lymph Nodes: Denies enlarged lymph nodes, denies brusing or known bleeding disorder  All other systems were reviewed and are negative (AMA/CMS criteria)                Past Medical History:   Past Medical History:   Diagnosis Date    Anesthesia     Hiccups for over 24 hours after a previous sugery.    Aortic stenosis     Chronic gout of multiple sites     Dyspnea on exertion 5/23/2023  Please see the attached refill request.    Will establish with you in two months      Elevated troponin 5/23/2023    Heart burn     1990    Heart murmur     High cholesterol     All always    Hypertension 2009    NSTEMI (non-ST elevated myocardial infarction) (HCC) 5/23/2023    Pain in the chest 5/23/2023    PONV (postoperative nausea and vomiting)     Renal disorder 2007    Stage IV    Sleep apnea 2000    Snoring 1990     Active Hospital Problems    Diagnosis     Hyperphosphatemia [E83.39]     Pain in the chest [R07.9]     Elevated troponin [R77.8]     Dyspnea on exertion [R06.09]     NSTEMI (non-ST elevated myocardial infarction) (HCC) [I21.4]     Chronic metabolic acidosis [E87.22]     Aortic stenosis [I35.0]     Stage 5 chronic kidney disease not on chronic dialysis (HCC) [N18.5]     Hypertension [I10]     Mixed hyperlipidemia [E78.2]        Past Surgical History:  Past Surgical History:   Procedure Laterality Date    CATH PLACEMENT CAPD N/A 5/15/2023    Procedure: LAPAROSCOPIC PLACEMENT OF PERITONEAL DIALYSIS CATHERTER;  Surgeon: Shikha Alexander M.D.;  Location: SURGERY SAME DAY [facility];  Service: General    UMBILICAL HERNIA REPAIR  2012    4 separate surgeries between [id number]    OTHER  2007    Kidney biopsy, can't recall laterality,    HIP ARTHROSCOPY Bilateral 2002    TONSILLECTOMY N/A 1987    HAND SURGERY Right 1985    Hand and wrist    SHOULDER ARTHROSCOPY      Can't recall date       Hospital Medications:    Current Facility-Administered Medications:     amLODIPine (NORVASC) tablet 10 mg, 10 mg, Oral, Q DAY, Memo Lipscomb M.D., 10 mg at 05/26/23 0532    sodium bicarbonate tablet 1,300 mg, 1,300 mg, Oral, BID, Kodak Harvey M.D., 1,300 mg at 05/26/23 0533    sevelamer carbonate (RENVELA) tablet 800 mg, 800 mg, Oral, TID WITH MEALS, Kodak Harvey M.D., 800 mg at 05/25/23 1800    senna-docusate (PERICOLACE or SENOKOT S) 8.6-50 MG per tablet 2 Tablet, 2 Tablet, Oral, BID, 2 Tablet at 05/26/23 0533 **AND** polyethylene glycol/lytes (MIRALAX) PACKET 1 Packet, 1 Packet,  Oral, QDAY PRN **AND** magnesium hydroxide (MILK OF MAGNESIA) suspension 30 mL, 30 mL, Oral, QDAY PRN **AND** bisacodyl (DULCOLAX) suppository 10 mg, 10 mg, Rectal, QDAY PRN, Phil Doty M.D.    acetaminophen (Tylenol) tablet 650 mg, 650 mg, Oral, Q6HRS PRN, Phil Doty M.D.    nitroglycerin (NITROSTAT) tablet 0.4 mg, 0.4 mg, Sublingual, Q5 MIN PRN, Phil Doty M.D.    atorvastatin (LIPITOR) tablet 80 mg, 80 mg, Oral, Q EVENING, Phil Doty M.D., 80 mg at 05/25/23 1800    aspirin EC tablet 81 mg, 81 mg, Oral, DAILY, Phil Doty M.D., 81 mg at 05/26/23 0532    omeprazole (PRILOSEC) capsule 20 mg, 20 mg, Oral, Nightly, Phil Doty M.D., 20 mg at 05/25/23 2044    tamsulosin (FLOMAX) capsule 0.4 mg, 0.4 mg, Oral, Q EVENING, Phil Doty M.D., 0.4 mg at 05/25/23 1800    morphine 4 MG/ML injection 1 mg, 1 mg, Intravenous, Q4HRS PRN, Poncho Singh M.D.    metoprolol tartrate (LOPRESSOR) tablet 50 mg, 50 mg, Oral, TWICE DAILY, Sultan ELISA Clark M.D., 50 mg at 05/25/23 1801    labetalol (NORMODYNE/TRANDATE) injection 10 mg, 10 mg, Intravenous, Q4HRS PRN, Sultan ELISA Clark M.D., 10 mg at 05/25/23 1831    Current Outpatient Medications:  Medications Prior to Admission   Medication Sig Dispense Refill Last Dose    aspirin 81 MG EC tablet Take 81 mg by mouth every day.   5/23/2023 at AM    betamethasone dipropionate 0.05 % Ointment Apply 1 Application topically 2 times a day as needed.   5/22/2023 at PM    verapamil ER (CALAN-SR) 240 MG Tab CR Take 360 mg by mouth every morning.   5/23/2023 at AM    Lactobacillus (ACIDOPHILUS PO) Take 1 Tablet by mouth every day.   5/23/2023 at AM    tamsulosin (FLOMAX) 0.4 MG capsule Take 0.4 mg by mouth every evening.   5/22/2023 at PM    ezetimibe (ZETIA) 10 MG Tab Take 1 Tablet by mouth every evening. 30 Tablet 0 5/22/2023 at PM    esomeprazole (NEXIUM) 40 MG delayed-release capsule Take 40 mg by mouth every evening.   5/22/2023 at PM       Medication Allergy:  Allergies    Allergen Reactions    Allopurinol Unspecified     Pt states his reaction is similar to lupus    Hydralazine Hcl Unspecified     Pt states he had a reaction similar to lupus       Family History:  Family History   Problem Relation Age of Onset    Hyperlipidemia Mother     Hypertension Father        Social History:  Social History     Socioeconomic History    Marital status:      Spouse name: Not on file    Number of children: Not on file    Years of education: Not on file    Highest education level: Master's degree (e.g., MA, MS, Kenn, MEd, MSW, ELIECER)   Occupational History    Not on file   Tobacco Use    Smoking status: Never    Smokeless tobacco: Never   Vaping Use    Vaping Use: Never used   Substance and Sexual Activity    Alcohol use: Never    Drug use: Not Currently    Sexual activity: Yes     Partners: Female   Other Topics Concern    Not on file   Social History Narrative    Not on file     Social Determinants of Health     Financial Resource Strain: Low Risk  (1/4/2023)    Overall Financial Resource Strain (CARDIA)     Difficulty of Paying Living Expenses: Not hard at all   Food Insecurity: No Food Insecurity (1/4/2023)    Hunger Vital Sign     Worried About Running Out of Food in the Last Year: Never true     Ran Out of Food in the Last Year: Never true   Transportation Needs: No Transportation Needs (1/4/2023)    PRAPARE - Transportation     Lack of Transportation (Medical): No     Lack of Transportation (Non-Medical): No   Physical Activity: Sufficiently Active (1/4/2023)    Exercise Vital Sign     Days of Exercise per Week: 3 days     Minutes of Exercise per Session: 60 min   Stress: Stress Concern Present (1/4/2023)    [place] of [employer]ational Health - Occupational Stress Questionnaire     Feeling of Stress : To some extent   Social Connections: Moderately Isolated (1/4/2023)    Social Connection and Isolation Panel [NHANES]     Frequency of Communication with Friends and Family: Once  "a week     Frequency of Social Gatherings with Friends and Family: More than three times a week     Attends Sabianist Services: Never     Active Member of Clubs or Organizations: No     Attends Club or Organization Meetings: Never     Marital Status: Living with partner   Intimate Partner Violence: Not on file   Housing Stability: Low Risk  (1/4/2023)    Housing Stability Vital Sign     Unable to Pay for Housing in the Last Year: No     Number of Places Lived in the Last Year: 1     Unstable Housing in the Last Year: No         Physical Exam:  Vitals/ General Appearance:   Weight/BMI: Body mass index is 26.28 kg/m².  /73   Pulse 60   Temp 36.5 °C (97.7 °F) (Temporal)   Resp 16   Ht 1.702 m (5' 7\")   Wt 76.1 kg (167 lb 12.3 oz)   SpO2 97%   Vitals:    05/25/23 2100 05/25/23 2300 05/26/23 0300 05/26/23 0532   BP: 123/74 108/61 118/67 137/73   Pulse: (!) 57 (!) 56 (!) 54 60   Resp:  16 16    Temp:  36.4 °C (97.6 °F) 36.5 °C (97.7 °F)    TempSrc:  Temporal Temporal    SpO2: 98% 96% 97%    Weight:       Height:         Oxygen Therapy:  Pulse Oximetry: 97 %, O2 (LPM): 0, O2 Delivery Device: None - Room Air    Constitutional:   No acute distress  HENMT:  Normocephalic, Atraumatic, Oropharynx moist mucous membranes, No oral exudates, Nose normal.  No thyromegaly.  Eyes:  EOMI, Conjunctiva normal, No discharge.  Neck:  Normal range of motion, No cervical tenderness,  no JVD.  Cardiovascular:  Normal heart rate, Normal rhythm, No murmurs, No rubs, No gallops.   Extremitites with intact distal pulses, no cyanosis, or edema.  Lungs:  Normal breath sounds, breath sounds clear to auscultation bilaterally,  no rales, no rhonchi, no wheezing.   Abdomen: Bowel sounds normal, Soft, No tenderness, No guarding, No rebound, No masses, No hepatosplenomegaly.  Skin: Warm, Dry, No erythema, No rash, no induration.  Neurologic: Alert & oriented x 3, No focal deficits noted, cranial nerves II through X are intact.  Psychiatric: " Affect normal, Judgment normal, Mood normal.      MDM (Data Review):     Records reviewed and summarized in current documentation    Lab Data Review:  Recent Results (from the past 24 hour(s))   Heparin Anti-Xa    Collection Time: 05/25/23 10:35 AM   Result Value Ref Range    Heparin Xa (UFH) 0.28 IU/mL   TROPONIN    Collection Time: 05/25/23 10:35 AM   Result Value Ref Range    Troponin T 389 (H) 6 - 19 ng/L   TROPONIN    Collection Time: 05/25/23  5:46 PM   Result Value Ref Range    Troponin T 401 (H) 6 - 19 ng/L   Heparin Anti-Xa    Collection Time: 05/25/23  5:46 PM   Result Value Ref Range    Heparin Xa (UFH) 0.76 IU/mL   CBC WITH DIFFERENTIAL    Collection Time: 05/26/23  1:27 AM   Result Value Ref Range    WBC 4.7 (L) 4.8 - 10.8 K/uL    RBC 3.64 (L) 4.70 - 6.10 M/uL    Hemoglobin 11.8 (L) 14.0 - 18.0 g/dL    Hematocrit 34.0 (L) 42.0 - 52.0 %    MCV 93.4 81.4 - 97.8 fL    MCH 32.4 27.0 - 33.0 pg    MCHC 34.7 32.3 - 36.5 g/dL    RDW 44.6 35.9 - 50.0 fL    Platelet Count 160 (L) 164 - 446 K/uL    MPV 9.9 9.0 - 12.9 fL    Neutrophils-Polys 69.00 44.00 - 72.00 %    Lymphocytes 16.70 (L) 22.00 - 41.00 %    Monocytes 9.20 0.00 - 13.40 %    Eosinophils 4.30 0.00 - 6.90 %    Basophils 0.40 0.00 - 1.80 %    Immature Granulocytes 0.40 0.00 - 0.90 %    Nucleated RBC 0.00 0.00 - 0.20 /100 WBC    Neutrophils (Absolute) 3.23 1.82 - 7.42 K/uL    Lymphs (Absolute) 0.78 (L) 1.00 - 4.80 K/uL    Monos (Absolute) 0.43 0.00 - 0.85 K/uL    Eos (Absolute) 0.20 0.00 - 0.51 K/uL    Baso (Absolute) 0.02 0.00 - 0.12 K/uL    Immature Granulocytes (abs) 0.02 0.00 - 0.11 K/uL    NRBC (Absolute) 0.00 K/uL   Comp Metabolic Panel    Collection Time: 05/26/23  1:27 AM   Result Value Ref Range    Sodium 137 135 - 145 mmol/L    Potassium 4.7 3.6 - 5.5 mmol/L    Chloride 103 96 - 112 mmol/L    Co2 22 20 - 33 mmol/L    Anion Gap 12.0 7.0 - 16.0    Glucose 102 (H) 65 - 99 mg/dL    Bun 105 (H) 8 - 22 mg/dL    Creatinine 8.98 (HH) 0.50 - 1.40 mg/dL     Calcium 8.1 (L) 8.5 - 10.5 mg/dL    AST(SGOT) 18 12 - 45 U/L    ALT(SGPT) 9 2 - 50 U/L    Alkaline Phosphatase 53 30 - 99 U/L    Total Bilirubin 0.2 0.1 - 1.5 mg/dL    Albumin 2.9 (L) 3.2 - 4.9 g/dL    Total Protein 5.3 (L) 6.0 - 8.2 g/dL    Globulin 2.4 1.9 - 3.5 g/dL    A-G Ratio 1.2 g/dL   PHOSPHORUS    Collection Time: 05/26/23  1:27 AM   Result Value Ref Range    Phosphorus 6.4 (H) 2.5 - 4.5 mg/dL   MAGNESIUM    Collection Time: 05/26/23  1:27 AM   Result Value Ref Range    Magnesium 2.1 1.5 - 2.5 mg/dL   TROPONIN    Collection Time: 05/26/23  1:27 AM   Result Value Ref Range    Troponin T 443 (H) 6 - 19 ng/L   CORRECTED CALCIUM    Collection Time: 05/26/23  1:27 AM   Result Value Ref Range    Correct Calcium 9.0 8.5 - 10.5 mg/dL   ESTIMATED GFR    Collection Time: 05/26/23  1:27 AM   Result Value Ref Range    GFR (CKD-EPI) 6 (A) >60 mL/min/1.73 m 2       Imaging/Procedures Review:    Chest Xray:  Reviewed    EKG:   As in HPI.     MDM (Assessment and Plan):     Active Hospital Problems    Diagnosis     Hyperphosphatemia [E83.39]     Pain in the chest [R07.9]     Elevated troponin [R77.8]     Dyspnea on exertion [R06.09]     NSTEMI (non-ST elevated myocardial infarction) (HCC) [I21.4]     Chronic metabolic acidosis [E87.22]     Aortic stenosis [I35.0]     Stage 5 chronic kidney disease not on chronic dialysis (HCC) [N18.5]     Hypertension [I10]     Mixed hyperlipidemia [E78.2]      Of note, I am concerned about aortic valve disease.   Will follow with structural heart for evaluation of AS treatment.  Optimize meds for CAD with small branch disease.  Continue Metoprolol 50 mg BID and Atorvastatin 80 mg daily.  Amlodipine 10 mg daily.    Will sign off at this time, please call us with further questions.  Patient will be followed in the outpatient cardiology clinic for further cardiac care.    Thank you for referring this patient to our cardiology service.    Memo Lipscomb MD.   [facility].

## 2023-07-18 ENCOUNTER — TELEPHONE (OUTPATIENT)
Dept: VASCULAR SURGERY | Facility: MEDICAL CENTER | Age: 58
End: 2023-07-18
Payer: COMMERCIAL

## 2023-07-18 NOTE — PROGRESS NOTES
"Patient called me and told me that his PD catheter is not flowing and it \"has not been working from the get-go\".  This is contrary to the in-patient note from the nephrologist on July 27, 2023 stating that \"He had the PD catheter was flushed and dressing changed yesterday with out any issues\".  Furthermore, when I saw Mr. Guerra in the office on June 27, 2023 for the catheter pushing up on the skin from the subcutaneous tunnel, I offered to re-tunnel the catheter deeper for Mr. Guerra so it would not be obvious underneath the skin, he refused to have it done and told me that the catheter did not bother him and he saw no need to have anything done since the catheter has been flushing and draining well.    Mr. Guerra also complained that when he was admitted to the hospital for his MI, \"no one from vascular service came to see me\".  I told him that if we were not notified, we would not know that he was admitted.  He then changed the statement and told me \"one of your associates came by, took a look, and told me that everything looked fine\".    I offered to have Mr. Guerra come to see me in the clinic tomorrow so that I can schedule his catheter repositioning since sometime the catheter may be blocked by omentum and not draining well; however, Mr. Guerra yelled at me over the phone stating that \"You screwed up.  I do not want you to work on me again\".  I told Mr. Guerra that he has the option of seeing Dr. Sales or Dr. Rodas or one of the community surgeons.  He kept yelling at me.  I finally told him to call my office in the morning and schedule appointment to see Dr. Sales or Dr. Rodas if he wants to be seen by our group.      "

## 2023-07-18 NOTE — TELEPHONE ENCOUNTER
Dr. Alexander wanted me to reach out to Gurdeep regarding his PB Cath that he placed. Catherine Taveras got a call that it wasn't working anymore but the patient did not want to be scheduled with him again. Dr. Alexander let the patient know that Dr. Sales or Dr. Rodas could see him. I was reaching out to Gurdeep today to see if he did want to be schedule with a different provider.

## 2023-09-05 ENCOUNTER — OFFICE VISIT (OUTPATIENT)
Dept: VASCULAR SURGERY | Facility: MEDICAL CENTER | Age: 58
End: 2023-09-05
Payer: COMMERCIAL

## 2023-09-05 ENCOUNTER — APPOINTMENT (OUTPATIENT)
Dept: ADMISSIONS | Facility: MEDICAL CENTER | Age: 58
End: 2023-09-05
Attending: SURGERY
Payer: COMMERCIAL

## 2023-09-05 VITALS
WEIGHT: 180 LBS | OXYGEN SATURATION: 99 % | TEMPERATURE: 98 F | HEIGHT: 67 IN | SYSTOLIC BLOOD PRESSURE: 144 MMHG | HEART RATE: 96 BPM | DIASTOLIC BLOOD PRESSURE: 82 MMHG | BODY MASS INDEX: 28.25 KG/M2

## 2023-09-05 DIAGNOSIS — N18.6 ESRD (END STAGE RENAL DISEASE) (HCC): ICD-10-CM

## 2023-09-05 PROCEDURE — 99214 OFFICE O/P EST MOD 30 MIN: CPT | Performed by: SURGERY

## 2023-09-05 PROCEDURE — 3077F SYST BP >= 140 MM HG: CPT | Performed by: SURGERY

## 2023-09-05 PROCEDURE — 3079F DIAST BP 80-89 MM HG: CPT | Performed by: SURGERY

## 2023-09-05 ASSESSMENT — FIBROSIS 4 INDEX: FIB4 SCORE: 1.86

## 2023-09-05 NOTE — H&P
Vascular Surgery            New Patient Consultation    Patient:Gurdeep Guerra  MRN:[id number]  Primary care physician:Drew T. Blumberg, D.O.  Referring Provider: Drew T. Blumberg, D.O.    Vascular Consultant: Dontrell Sales MD    Date: 9/5/2023  _____________________________________________________    Chief Complaint:     PD cath malfunction    History of Present Illness:   Gurdeep Guerra  is a 58 y.o. year old male with a PD cath placed in May 2023. It is now functioning poorly and needs to be replaced.  Patient does get pain with fluid exchanges which I explained to him may continue happening even after replacing the catheter and he understands that.  He may also need a tunneled hemodialysis catheter while he's healing - will check with his nephrologist Gene Bernal.    Past Medical History:     Past Medical History:   Diagnosis Date    Anesthesia     Hiccups for over 24 hours after a previous sugery.    Aortic stenosis     Chronic gout of multiple sites     Dialysis patient ([facility])     peritoneal daily    Dyspnea on exertion 05/23/2023    Elevated troponin 05/23/2023    Heart burn     1990    Heart murmur     High cholesterol     All always    Hypertension 2009    NSTEMI (non-ST elevated myocardial infarction) ([facility]) 05/23/2023    no stents placed    Pain in the chest 05/23/2023    PONV (postoperative nausea and vomiting)     Renal disorder 2007    Stage IV    Sleep apnea 2000    Snoring 1990     Past Surgical History:     Past Surgical History:   Procedure Laterality Date    [place] INJ LUMBAR/SACRAL,W/ IMAGING Left 8/24/2023    Procedure: LUMBAR EPIDURAL STEROID INJECTION, LEFT L5-S1 INTERLAMINAR UNDER FLUOROSCOPY;  Surgeon: Benito Herrera D.O.;  Location: SURGERY [facility];  Service: Orthopedics    CATH PLACEMENT CAPD N/A 5/15/2023    Procedure: LAPAROSCOPIC PLACEMENT OF PERITONEAL DIALYSIS CATHERTER;  Surgeon: Shikha Alexander M.D.;  Location: SURGERY [facility];  Service: General     UMBILICAL HERNIA REPAIR  2012    4 separate surgeries between [id number]    OTHER  2007    Kidney biopsy, can't recall laterality,    HIP ARTHROSCOPY Bilateral 2002    TONSILLECTOMY N/A 1987    HAND SURGERY Right 1985    Hand and wrist    SHOULDER ARTHROSCOPY      Can't recall date     Allergies:     Allergies   Allergen Reactions    Allopurinol Itching    Hydralazine Hcl Unspecified     Pt states he had a reaction similar to lupus     Medications:     Outpatient Encounter Medications as of 9/5/2023   Medication Sig Dispense Refill    betamethasone dipropionate 0.05 % Cream APPLY CREAM TOPICALLY TWICE DAILY TO AFFECTED AREA      Peritoneal [facility]ialysis Solutions (ULTRABAG/DIANEAL PD-2/2.5% DEX) 396 MOSM/L Solution Inject  into the abdomen/abdominal cavity.      hydrocortisone 2.5 % Ointment Apply  topically.      verapamil ER (CALAN-SR) 240 MG Tab CR Take 1 Tablet by mouth every day. 100 Tablet 3    atorvastatin (LIPITOR) 40 MG Tab Take 1 Tablet by mouth every evening. 100 Tablet 3    nitroglycerin (NITROSTAT) 0.4 MG SL Tab Place 1 Tablet under the tongue as needed for Chest Pain. 25 Tablet 0    aspirin 81 MG EC tablet Take 81 mg by mouth every day.      esomeprazole (NEXIUM) 40 MG delayed-release capsule Take 40 mg by mouth every evening.       No facility-administered encounter medications on file as of 9/5/2023.     Social History:     Social History     Socioeconomic History    Marital status:      Spouse name: Not on file    Number of children: Not on file    Years of education: Not on file    Highest education level: Master's degree (e.g., MA, MS, Kenn, MEd, MSW, ELIECER)   Occupational History    Not on file   Tobacco Use    Smoking status: Never    Smokeless tobacco: Never   Vaping Use    Vaping Use: Never used   Substance and Sexual Activity    Alcohol use: Never    Drug use: Not Currently    Sexual activity: Yes     Partners: Female   Other Topics Concern    Not on file   Social History Narrative    Not on  file     Social Determinants of Health     Financial Resource Strain: Low Risk  (1/4/2023)    Overall Financial Resource Strain (CARDIA)     Difficulty of Paying Living Expenses: Not hard at all   Food Insecurity: No Food Insecurity (1/4/2023)    Hunger Vital Sign     Worried About Running Out of Food in the Last Year: Never true     Ran Out of Food in the Last Year: Never true   Transportation Needs: No Transportation Needs (1/4/2023)    PRAPARE - Transportation     Lack of Transportation (Medical): No     Lack of Transportation (Non-Medical): No   Physical Activity: Sufficiently Active (1/4/2023)    Exercise Vital Sign     Days of Exercise per Week: 3 days     Minutes of Exercise per Session: 60 min   Stress: Stress Concern Present (1/4/2023)    [employer] [place] [employer] - Occupational Stress Questionnaire     Feeling of Stress : To some extent   Social Connections: Moderately Isolated (1/4/2023)    Social Connection and Isolation Panel [NHANES]     Frequency of Communication with Friends and Family: Once a week     Frequency of Social Gatherings with Friends and Family: More than three times a week     Attends Worship Services: Never     Active Member of Clubs or Organizations: No     Attends Club or Organization Meetings: Never     Marital Status: Living with partner   Intimate Partner Violence: Not on file   Housing Stability: Low Risk  (1/4/2023)    Housing Stability Vital Sign     Unable to Pay for Housing in the Last Year: No     Number of Places Lived in the Last Year: 1     Unstable Housing in the Last Year: No      Social History     Tobacco Use   Smoking Status Never   Smokeless Tobacco Never     Social History     Substance and Sexual Activity   Alcohol Use Never     Social History     Substance and Sexual Activity   Drug Use Not Currently      Family History:     Family History   Problem Relation Age of Onset    Hyperlipidemia Mother     Hypertension Father        Review of Systems:  "  Constitutional: Negative for fever or chills  HENT:   Negative for hearing loss or tinnitus    Eyes:    Negative for blurred vision or loss of vision  Respiratory:  Negative for cough or hemoptysis  Cardiac:  Negative for chest pain or palpitations  Vascular:  Negative for claudication, Negative for rest pain  Gastrointestinal: Negative for vomiting or abdominal pain     Negative for hematochezia or melena   Genitourinary: Negative for dysuria or hematuria   Musculoskeletal: Negative for myalgias or acute joint pain  Skin:   Negative for itching or rash  Neurological:  Negative for dizziness or headaches     Negative for speech disturbance     Negative for extremity weakness or paresthesias  Endo/Heme:  Negative for easy bruising or bleeding       Exam:   BP (!) 144/82 (BP Location: Left arm, Patient Position: Sitting, BP Cuff Size: Adult)   Pulse 96   Temp 36.7 °C (98 °F) (Temporal)   Ht 1.702 m (5' 7\")   Wt 81.6 kg (180 lb)   SpO2 99%   BMI 28.19 kg/m²     Constitutional: Alert, oriented, no acute distress  HEENT:  Normocephalic and atraumatic, EOMI  Neck:   Supple, no JVD,   Cardiovascular: Regular rate and rhythm,   Pulmonary:  Good air entry bilaterally,    Abdominal:  Soft, non-tender, non-distended, PD catheter exiting right side of abdomen  Musculoskeletal: No tenderness, no deformity  Neurological:  CN II-XII grossly intact, no focal deficits  Skin:   Skin is warm and dry. No rash noted.  Vascular exam: Warm and well perfused      Assessment and Plan:   - ESRD  - PD Cath malfunction  - Severe aortic valve stenosis    Plan to replace the PD catheter and perform omentopexy. May also need a permacath while healing from the PD cath placement. Procedure and risks discussed, questions answered, agrees to proceed.      _____________________________________________________  Dontrell Sales MD  [facility]  [phone]  [address], [place] [address]      "

## 2023-09-06 ENCOUNTER — ANESTHESIA EVENT (OUTPATIENT)
Dept: SURGERY | Facility: MEDICAL CENTER | Age: 58
End: 2023-09-06
Payer: COMMERCIAL

## 2023-09-06 ENCOUNTER — APPOINTMENT (OUTPATIENT)
Dept: RADIOLOGY | Facility: MEDICAL CENTER | Age: 58
End: 2023-09-06
Attending: SURGERY
Payer: COMMERCIAL

## 2023-09-06 ENCOUNTER — HOSPITAL ENCOUNTER (OUTPATIENT)
Facility: MEDICAL CENTER | Age: 58
End: 2023-09-06
Attending: SURGERY | Admitting: SURGERY
Payer: COMMERCIAL

## 2023-09-06 ENCOUNTER — ANESTHESIA (OUTPATIENT)
Dept: SURGERY | Facility: MEDICAL CENTER | Age: 58
End: 2023-09-06
Payer: COMMERCIAL

## 2023-09-06 VITALS
RESPIRATION RATE: 18 BRPM | BODY MASS INDEX: 28.3 KG/M2 | DIASTOLIC BLOOD PRESSURE: 81 MMHG | HEART RATE: 71 BPM | HEIGHT: 67 IN | WEIGHT: 180.34 LBS | OXYGEN SATURATION: 95 % | TEMPERATURE: 97.7 F | SYSTOLIC BLOOD PRESSURE: 155 MMHG

## 2023-09-06 DIAGNOSIS — G89.18 POSTOPERATIVE PAIN: ICD-10-CM

## 2023-09-06 LAB
ALBUMIN SERPL BCP-MCNC: 4 G/DL (ref 3.2–4.9)
ALBUMIN/GLOB SERPL: 1.4 G/DL
ALP SERPL-CCNC: 72 U/L (ref 30–99)
ALT SERPL-CCNC: 22 U/L (ref 2–50)
ANION GAP SERPL CALC-SCNC: 14 MMOL/L (ref 7–16)
AST SERPL-CCNC: 19 U/L (ref 12–45)
BILIRUB SERPL-MCNC: 0.3 MG/DL (ref 0.1–1.5)
BUN SERPL-MCNC: 87 MG/DL (ref 8–22)
CALCIUM ALBUM COR SERPL-MCNC: 8.9 MG/DL (ref 8.5–10.5)
CALCIUM SERPL-MCNC: 8.9 MG/DL (ref 8.5–10.5)
CHLORIDE SERPL-SCNC: 106 MMOL/L (ref 96–112)
CO2 SERPL-SCNC: 23 MMOL/L (ref 20–33)
CREAT SERPL-MCNC: 8.75 MG/DL (ref 0.5–1.4)
EKG IMPRESSION: NORMAL
ERYTHROCYTE [DISTWIDTH] IN BLOOD BY AUTOMATED COUNT: 47.8 FL (ref 35.9–50)
GFR SERPLBLD CREATININE-BSD FMLA CKD-EPI: 7 ML/MIN/1.73 M 2
GLOBULIN SER CALC-MCNC: 2.8 G/DL (ref 1.9–3.5)
GLUCOSE SERPL-MCNC: 84 MG/DL (ref 65–99)
HCT VFR BLD AUTO: 49.5 % (ref 42–52)
HGB BLD-MCNC: 16.7 G/DL (ref 14–18)
MCH RBC QN AUTO: 33 PG (ref 27–33)
MCHC RBC AUTO-ENTMCNC: 33.7 G/DL (ref 32.3–36.5)
MCV RBC AUTO: 97.8 FL (ref 81.4–97.8)
PLATELET # BLD AUTO: 163 K/UL (ref 164–446)
PMV BLD AUTO: 10 FL (ref 9–12.9)
POTASSIUM SERPL-SCNC: 4.7 MMOL/L (ref 3.6–5.5)
PROT SERPL-MCNC: 6.8 G/DL (ref 6–8.2)
RBC # BLD AUTO: 5.06 M/UL (ref 4.7–6.1)
SODIUM SERPL-SCNC: 143 MMOL/L (ref 135–145)
WBC # BLD AUTO: 6.1 K/UL (ref 4.8–10.8)

## 2023-09-06 PROCEDURE — 36415 COLL VENOUS BLD VENIPUNCTURE: CPT

## 2023-09-06 PROCEDURE — 160009 HCHG ANES TIME/MIN: Performed by: SURGERY

## 2023-09-06 PROCEDURE — 110371 HCHG SHELL REV 272: Performed by: SURGERY

## 2023-09-06 PROCEDURE — 93005 ELECTROCARDIOGRAM TRACING: CPT | Performed by: SURGERY

## 2023-09-06 PROCEDURE — 85027 COMPLETE CBC AUTOMATED: CPT

## 2023-09-06 PROCEDURE — 93010 ELECTROCARDIOGRAM REPORT: CPT | Performed by: INTERNAL MEDICINE

## 2023-09-06 PROCEDURE — A9270 NON-COVERED ITEM OR SERVICE: HCPCS | Performed by: ANESTHESIOLOGY

## 2023-09-06 PROCEDURE — 160025 RECOVERY II MINUTES (STATS): Performed by: SURGERY

## 2023-09-06 PROCEDURE — 700111 HCHG RX REV CODE 636 W/ 250 OVERRIDE (IP): Mod: JZ | Performed by: ANESTHESIOLOGY

## 2023-09-06 PROCEDURE — 160039 HCHG SURGERY MINUTES - EA ADDL 1 MIN LEVEL 3: Performed by: SURGERY

## 2023-09-06 PROCEDURE — 160046 HCHG PACU - 1ST 60 MINS PHASE II: Performed by: SURGERY

## 2023-09-06 PROCEDURE — 700101 HCHG RX REV CODE 250: Performed by: SURGERY

## 2023-09-06 PROCEDURE — 700105 HCHG RX REV CODE 258: Performed by: SURGERY

## 2023-09-06 PROCEDURE — 700101 HCHG RX REV CODE 250: Performed by: ANESTHESIOLOGY

## 2023-09-06 PROCEDURE — 49324 LAP INSERT TUNNEL IP CATH: CPT | Performed by: SURGERY

## 2023-09-06 PROCEDURE — 160048 HCHG OR STATISTICAL LEVEL 1-5: Performed by: SURGERY

## 2023-09-06 PROCEDURE — 160002 HCHG RECOVERY MINUTES (STAT): Performed by: SURGERY

## 2023-09-06 PROCEDURE — 160035 HCHG PACU - 1ST 60 MINS PHASE I: Performed by: SURGERY

## 2023-09-06 PROCEDURE — 700102 HCHG RX REV CODE 250 W/ 637 OVERRIDE(OP): Performed by: ANESTHESIOLOGY

## 2023-09-06 PROCEDURE — 160036 HCHG PACU - EA ADDL 30 MINS PHASE I: Performed by: SURGERY

## 2023-09-06 PROCEDURE — 700105 HCHG RX REV CODE 258: Performed by: ANESTHESIOLOGY

## 2023-09-06 PROCEDURE — 80053 COMPREHEN METABOLIC PANEL: CPT

## 2023-09-06 PROCEDURE — C1750 CATH, HEMODIALYSIS,LONG-TERM: HCPCS | Performed by: SURGERY

## 2023-09-06 PROCEDURE — 160028 HCHG SURGERY MINUTES - 1ST 30 MINS LEVEL 3: Performed by: SURGERY

## 2023-09-06 DEVICE — IMPLANTABLE DEVICE: Type: IMPLANTABLE DEVICE | Site: ABDOMEN | Status: FUNCTIONAL

## 2023-09-06 RX ORDER — SODIUM CHLORIDE 9 MG/ML
INJECTION, SOLUTION INTRAVENOUS
Status: DISCONTINUED | OUTPATIENT
Start: 2023-09-06 | End: 2023-09-06 | Stop reason: SURG

## 2023-09-06 RX ORDER — OXYCODONE HCL 5 MG/5 ML
5 SOLUTION, ORAL ORAL
Status: DISCONTINUED | OUTPATIENT
Start: 2023-09-06 | End: 2023-09-06 | Stop reason: HOSPADM

## 2023-09-06 RX ORDER — DIPHENHYDRAMINE HYDROCHLORIDE 50 MG/ML
12.5 INJECTION INTRAMUSCULAR; INTRAVENOUS
Status: DISCONTINUED | OUTPATIENT
Start: 2023-09-06 | End: 2023-09-06 | Stop reason: HOSPADM

## 2023-09-06 RX ORDER — SODIUM CHLORIDE 9 MG/ML
INJECTION, SOLUTION INTRAVENOUS ONCE
Status: COMPLETED | OUTPATIENT
Start: 2023-09-06 | End: 2023-09-06

## 2023-09-06 RX ORDER — PHENYLEPHRINE HYDROCHLORIDE 10 MG/ML
INJECTION, SOLUTION INTRAMUSCULAR; INTRAVENOUS; SUBCUTANEOUS PRN
Status: DISCONTINUED | OUTPATIENT
Start: 2023-09-06 | End: 2023-09-06 | Stop reason: SURG

## 2023-09-06 RX ORDER — TRAMADOL HYDROCHLORIDE 50 MG/1
50-100 TABLET ORAL EVERY 6 HOURS PRN
Qty: 20 TABLET | Refills: 0 | Status: SHIPPED | OUTPATIENT
Start: 2023-09-06 | End: 2023-09-09

## 2023-09-06 RX ORDER — DEXAMETHASONE SODIUM PHOSPHATE 4 MG/ML
INJECTION, SOLUTION INTRA-ARTICULAR; INTRALESIONAL; INTRAMUSCULAR; INTRAVENOUS; SOFT TISSUE PRN
Status: DISCONTINUED | OUTPATIENT
Start: 2023-09-06 | End: 2023-09-06 | Stop reason: SURG

## 2023-09-06 RX ORDER — ROCURONIUM BROMIDE 10 MG/ML
INJECTION, SOLUTION INTRAVENOUS PRN
Status: DISCONTINUED | OUTPATIENT
Start: 2023-09-06 | End: 2023-09-06 | Stop reason: SURG

## 2023-09-06 RX ORDER — CEFAZOLIN SODIUM 1 G/3ML
INJECTION, POWDER, FOR SOLUTION INTRAMUSCULAR; INTRAVENOUS PRN
Status: DISCONTINUED | OUTPATIENT
Start: 2023-09-06 | End: 2023-09-06 | Stop reason: SURG

## 2023-09-06 RX ORDER — TAMSULOSIN HYDROCHLORIDE 0.4 MG/1
0.4 CAPSULE ORAL DAILY
COMMUNITY
End: 2024-03-14

## 2023-09-06 RX ORDER — OXYCODONE HCL 5 MG/5 ML
10 SOLUTION, ORAL ORAL
Status: DISCONTINUED | OUTPATIENT
Start: 2023-09-06 | End: 2023-09-06 | Stop reason: HOSPADM

## 2023-09-06 RX ORDER — BUPIVACAINE HYDROCHLORIDE AND EPINEPHRINE 5; 5 MG/ML; UG/ML
INJECTION, SOLUTION PERINEURAL
Status: DISCONTINUED | OUTPATIENT
Start: 2023-09-06 | End: 2023-09-06 | Stop reason: HOSPADM

## 2023-09-06 RX ORDER — HYDROMORPHONE HYDROCHLORIDE 1 MG/ML
0.4 INJECTION, SOLUTION INTRAMUSCULAR; INTRAVENOUS; SUBCUTANEOUS
Status: DISCONTINUED | OUTPATIENT
Start: 2023-09-06 | End: 2023-09-06 | Stop reason: HOSPADM

## 2023-09-06 RX ORDER — ACETAMINOPHEN 500 MG
1000 TABLET ORAL ONCE
Status: COMPLETED | OUTPATIENT
Start: 2023-09-06 | End: 2023-09-06

## 2023-09-06 RX ORDER — SODIUM CHLORIDE, SODIUM LACTATE, POTASSIUM CHLORIDE, CALCIUM CHLORIDE 600; 310; 30; 20 MG/100ML; MG/100ML; MG/100ML; MG/100ML
INJECTION, SOLUTION INTRAVENOUS CONTINUOUS
Status: CANCELLED | OUTPATIENT
Start: 2023-09-06 | End: 2023-09-06 | Stop reason: CLARIF

## 2023-09-06 RX ORDER — ONDANSETRON 2 MG/ML
4 INJECTION INTRAMUSCULAR; INTRAVENOUS
Status: DISCONTINUED | OUTPATIENT
Start: 2023-09-06 | End: 2023-09-06 | Stop reason: HOSPADM

## 2023-09-06 RX ORDER — ONDANSETRON 2 MG/ML
INJECTION INTRAMUSCULAR; INTRAVENOUS PRN
Status: DISCONTINUED | OUTPATIENT
Start: 2023-09-06 | End: 2023-09-06 | Stop reason: SURG

## 2023-09-06 RX ORDER — HYDROMORPHONE HYDROCHLORIDE 1 MG/ML
0.2 INJECTION, SOLUTION INTRAMUSCULAR; INTRAVENOUS; SUBCUTANEOUS
Status: DISCONTINUED | OUTPATIENT
Start: 2023-09-06 | End: 2023-09-06 | Stop reason: HOSPADM

## 2023-09-06 RX ORDER — HALOPERIDOL 5 MG/ML
1 INJECTION INTRAMUSCULAR
Status: DISCONTINUED | OUTPATIENT
Start: 2023-09-06 | End: 2023-09-06 | Stop reason: HOSPADM

## 2023-09-06 RX ORDER — LIDOCAINE HYDROCHLORIDE 40 MG/ML
SOLUTION TOPICAL PRN
Status: DISCONTINUED | OUTPATIENT
Start: 2023-09-06 | End: 2023-09-06 | Stop reason: SURG

## 2023-09-06 RX ORDER — HYDROMORPHONE HYDROCHLORIDE 1 MG/ML
0.1 INJECTION, SOLUTION INTRAMUSCULAR; INTRAVENOUS; SUBCUTANEOUS
Status: DISCONTINUED | OUTPATIENT
Start: 2023-09-06 | End: 2023-09-06 | Stop reason: HOSPADM

## 2023-09-06 RX ORDER — SODIUM CHLORIDE, SODIUM LACTATE, POTASSIUM CHLORIDE, CALCIUM CHLORIDE 600; 310; 30; 20 MG/100ML; MG/100ML; MG/100ML; MG/100ML
INJECTION, SOLUTION INTRAVENOUS CONTINUOUS
Status: DISCONTINUED | OUTPATIENT
Start: 2023-09-06 | End: 2023-09-06 | Stop reason: HOSPADM

## 2023-09-06 RX ORDER — LABETALOL HYDROCHLORIDE 5 MG/ML
5 INJECTION, SOLUTION INTRAVENOUS
Status: DISCONTINUED | OUTPATIENT
Start: 2023-09-06 | End: 2023-09-06 | Stop reason: HOSPADM

## 2023-09-06 RX ADMIN — SUGAMMADEX 200 MG: 100 INJECTION, SOLUTION INTRAVENOUS at 11:20

## 2023-09-06 RX ADMIN — CEFAZOLIN 2 G: 1 INJECTION, POWDER, FOR SOLUTION INTRAMUSCULAR; INTRAVENOUS at 10:38

## 2023-09-06 RX ADMIN — SODIUM CHLORIDE: 9 INJECTION, SOLUTION INTRAVENOUS at 10:30

## 2023-09-06 RX ADMIN — PHENYLEPHRINE HYDROCHLORIDE 200 MCG: 10 INJECTION INTRAVENOUS at 11:20

## 2023-09-06 RX ADMIN — ROCURONIUM BROMIDE 50 MG: 50 INJECTION, SOLUTION INTRAVENOUS at 10:35

## 2023-09-06 RX ADMIN — LIDOCAINE HYDROCHLORIDE 4 ML: 40 SOLUTION TOPICAL at 10:36

## 2023-09-06 RX ADMIN — ACETAMINOPHEN 1000 MG: 500 TABLET, FILM COATED ORAL at 13:02

## 2023-09-06 RX ADMIN — PHENYLEPHRINE HYDROCHLORIDE 100 MCG: 10 INJECTION INTRAVENOUS at 11:18

## 2023-09-06 RX ADMIN — PROPOFOL 200 MG: 10 INJECTION, EMULSION INTRAVENOUS at 10:35

## 2023-09-06 RX ADMIN — ONDANSETRON 4 MG: 2 INJECTION INTRAMUSCULAR; INTRAVENOUS at 10:35

## 2023-09-06 RX ADMIN — FENTANYL CITRATE 100 MCG: 50 INJECTION, SOLUTION INTRAMUSCULAR; INTRAVENOUS at 10:56

## 2023-09-06 RX ADMIN — DEXAMETHASONE SODIUM PHOSPHATE 4 MG: 4 INJECTION INTRA-ARTICULAR; INTRALESIONAL; INTRAMUSCULAR; INTRAVENOUS; SOFT TISSUE at 10:35

## 2023-09-06 RX ADMIN — PHENYLEPHRINE HYDROCHLORIDE 100 MCG: 10 INJECTION INTRAVENOUS at 11:16

## 2023-09-06 ASSESSMENT — PAIN DESCRIPTION - PAIN TYPE
TYPE: SURGICAL PAIN

## 2023-09-06 ASSESSMENT — FIBROSIS 4 INDEX: FIB4 SCORE: 1.86

## 2023-09-06 NOTE — ANESTHESIA POSTPROCEDURE EVALUATION
Patient: Gurdeep Guerra    Procedure Summary     Date: 09/06/23 Room / Location: [place] [address] / SURGERY [facility]    Anesthesia Start: 1030 Anesthesia Stop: 1130    Procedure: LAPAROSCOPIC INSERTION OF PERITONEAL DIALYSIS CATHETER (Right: Abdomen) Diagnosis: (END STAGE RENAL DISEASE)    Surgeons: Dontrell Sales M.D. Responsible Provider: Raúl Valentine M.D.    Anesthesia Type: general ASA Status: 4          Final Anesthesia Type: general  Last vitals  BP   Blood Pressure: 101/57    Temp   36.7 °C (98 °F)    Pulse   63   Resp   14    SpO2   98 %      Anesthesia Post Evaluation    Patient location during evaluation: PACU  Patient participation: complete - patient participated  Level of consciousness: awake and alert    Airway patency: patent  Anesthetic complications: no  Cardiovascular status: hemodynamically stable  Respiratory status: acceptable  Hydration status: euvolemic    PONV: none          No notable events documented.     Nurse Pain Score: 0 (NPRS)

## 2023-09-06 NOTE — ANESTHESIA TIME REPORT
Anesthesia Start and Stop Event Times     Date Time Event    9/6/2023 0940 Ready for Procedure     1030 Anesthesia Start     1130 Anesthesia Stop        Responsible Staff  09/06/23    Name Role Begin End    Raúl Valentine M.D. Anesth 1030 1130        Overtime Reason:  no overtime (within assigned shift)    Comments:

## 2023-09-06 NOTE — ANESTHESIA PREPROCEDURE EVALUATION
" Case: [id number] Date/Time: 09/06/23 1015    Procedure: LAPAROSCOPIC INSERTION OF PERITONEAL DIALYSIS CATHETER AND INSERTION OF TUNNELED HEMODIALYSIS CATHETER WITH FLUROSCOPY    Pre-op diagnosis: END STAGE RENAL DISEASE    Location: [facility] OR 10 / SURGERY [facility]    Surgeons: Dontrell Sales M.D.          Relevant Problems   PULMONARY   (positive) Dyspnea on exertion      CARDIAC   (positive) Aortic stenosis   (positive) CAD (coronary artery disease)   (positive) Dyspnea on exertion   (positive) Heart murmur   (positive) Hypertension      GI   (positive) Gastroesophageal reflux disease         (positive) CKD (chronic kidney disease) stage 4, GFR 15-29 ml/min (HCC)   (positive) FSGS (focal segmental glomerulosclerosis)   (positive) Stage 5 chronic kidney disease not on chronic dialysis (HCC)      Other   (positive) Chronic gout of multiple sites     BP (!) 143/83   Pulse 85   Temp 36.7 °C (98.1 °F) (Temporal)   Resp 16   Ht 1.702 m (5' 7\")   Wt 81.8 kg (180 lb 5.4 oz)   SpO2 98%   BMI 28.24 kg/m²     Physical Exam    Airway   Mallampati: II  TM distance: >3 FB  Neck ROM: full       Cardiovascular - normal exam  Rhythm: regular  Rate: normal  (-) murmur     Dental - normal exam           Pulmonary - normal exam  Breath sounds clear to auscultation     Abdominal    Neurological - normal exam                 Anesthesia Plan    ASA 4       Plan - general       Airway plan will be ETT          Induction: intravenous    Postoperative Plan: Postoperative administration of opioids is intended.    Pertinent diagnostic labs and testing reviewed    Informed Consent:    Anesthetic plan and risks discussed with patient.    Use of blood products discussed with: patient whom consented to blood products.         "

## 2023-09-06 NOTE — PROGRESS NOTES
Pt VSS, pain controlled, tolerating po intake. Pt and family given discharge education/instructions, all questions answered. IV discontinued, site wnl. Surgical site RLQ, drsg CDI, jaime w/ DB, CDI, site wnl. Pt discharged home in stable condition with all belongings.

## 2023-09-06 NOTE — DISCHARGE INSTRUCTIONS
HOME CARE INSTRUCTIONS    ACTIVITY: Rest and take it easy for the first 24 hours.  A responsible adult is recommended to remain with you during that time.  It is normal to feel sleepy.  We encourage you to not do anything that requires balance, judgment or coordination.    FOR 24 HOURS DO NOT:  Drive, operate machinery or run household appliances.  Drink beer or alcoholic beverages.  Make important decisions or sign legal documents.    SPECIAL INSTRUCTIONS: Discharge Instructions: Laparoscopic Peritoneal Catheter Insertion  ================================================    1. DIET: Resume regular diet    2. ACTIVITIES: After discharge from the hospital, you may resume routine activities. However, there should be no heavy lifting (greater than 15 pounds) and no strenuous activities for 2 weeks. Otherwise, routine activities of daily living are acceptable.    3. DRIVING: You may drive whenever you are off pain medications and are able to perform the activities needed to drive, i.e. turning, bending, twisting, etc.    4. BATHING: OK to shower starting two days after surgery.  Avoid submerging the incisions in water (tub, bath, pool) for at least a week. If the incisions are covered with skin glue it is waterproof and it will start to peel off on its own in 5-7 days which is normal.  If you have white bandages, you can remove then two days after surgery but leave the bandage on the catheter site.    5. BOWEL FUNCTION: A few patients, after this operation, will develop either frequent or loose stools after meals. This usually corrects itself after a few days to a few weeks. If this occurs, do not worry; it is not unusual and will resolve. Much more common than loose stools, is constipation. The combination of pain medication and decreased activity level can cause constipation in otherwise normal patients. If you feel this is occurring, take a laxative (Milk of Magnesia, Ex-Lax, Senokot, etc.) until the problem has  resolved.    6. PAIN MEDICATION: You may be given a prescription for pain medication at discharge. Please take these as directed. It is important to remember not to take medications on an empty stomach as this may cause nausea.  You can transition from the prescription-strength pain medication to over-the-counter medications as your pain improves, such as tylenol, ibuprofen, or Aleve.    7.CALL IF YOU HAVE: (1) Fevers to more than 101.5 F, (2) Unusual chest or leg pain, (3) Drainage or fluid from incision that may be foul smelling, increased tenderness or soreness at the wound or the wound edges are no longer together, redness or swelling at the incision site. Please do not hesitate to call with any other questions.     8. APPOINTMENT: Contact our office at [phone] to schedule a follow-up appointment about 2 weeks following your procedure.    If you have any additional questions, please do not hesitate to call the office and speak to either myself or the physician on call.    Office address:   [facility]  [facility] [address]  [place] [id number] [phone]  Fax [phone]    MEDICATIONS: Resume taking daily medication.  Take prescribed pain medication with food.  If no medication is prescribed, you may take non-aspirin pain medication if needed.  PAIN MEDICATION CAN BE VERY CONSTIPATING.  Take a stool softener or laxative such as senokot, pericolace, or milk of magnesia if needed.    Prescription given for _Tramadol_.      A follow-up appointment should be arranged with your doctor; call to schedule.    You should CALL YOUR PHYSICIAN if you develop:  Fever greater than 101 degrees F.  Pain not relieved by medication, or persistent nausea or vomiting.  Excessive bleeding (blood soaking through dressing) or unexpected drainage from the wound.  Extreme redness or swelling around the incision site, drainage of pus or foul smelling drainage.  Inability to urinate or empty your bladder within 8  hours.  Problems with breathing or chest pain.    You should call 911 if you develop problems with breathing or chest pain.  If you are unable to contact your doctor or surgical center, you should go to the nearest emergency room or urgent care center.  Physician's telephone #: [phone]    MILD FLU-LIKE SYMPTOMS ARE NORMAL.  YOU MAY EXPERIENCE GENERALIZED MUSCLE ACHES, THROAT IRRITATION, HEADACHE AND/OR SOME NAUSEA.    If any questions arise, call your doctor.  If your doctor is not available, please feel free to call the Surgical Center at [phone].  The Center is open Monday through Friday from 7AM to 7PM.      A registered nurse may call you a few days after your surgery to see how you are doing after your procedure.    You may also receive a survey in the mail within the next two weeks and we ask that you take a few moments to complete the survey and return it to us.  Our goal is to provide you with very good care and we value your comments.     Depression / Suicide Risk    As you are discharged from this [facility] facility, it is important to learn how to keep safe from harming yourself.    Recognize the warning signs:  Abrupt changes in personality, positive or negative- including increase in energy   Giving away possessions  Change in eating patterns- significant weight changes-  positive or negative  Change in sleeping patterns- unable to sleep or sleeping all the time   Unwillingness or inability to communicate  Depression  Unusual sadness, discouragement and loneliness  Talk of wanting to die  Neglect of personal appearance   Rebelliousness- reckless behavior  Withdrawal from people/activities they love  Confusion- inability to concentrate     If you or a loved one observes any of these behaviors or has concerns about self-harm, here's what you can do:  Talk about it- your feelings and reasons for harming yourself  Remove any means that you might use to hurt yourself (examples: pills, rope,  extension cords, firearm)  Get professional help from the community (Mental Health, Substance Abuse, psychological counseling)  Do not be alone:Call your Safe Contact- someone whom you trust who will be there for you.  Call your local CRISIS HOTLINE 484-8600 or 764-442-3880  Call your local Children's Mobile Crisis Response Team [place] (323) 620-0404 or www.[employer]  Call the toll free National Suicide Prevention Hotlines   National Suicide Prevention Lifeline 786-094-TCJG (4813)  [facility] Line Network 800-SUICIDE (453-9697)    I acknowledge receipt and understanding of these Home Care instructions.

## 2023-09-06 NOTE — OP REPORT
VASCULAR SURGERY SERVICE  Operative Note  __________________________________________________________    Date:  2023    Patient: Gurdeep Guerra  :  1965  MRN:  [id number]  __________________________________________________________    Preoperative Diagnosis:  - End-stage renal disease    Postoperative Diagnosis:  - End-stage renal disease    Procedure:  [id number]: Laparoscopic insertion of CAPD catheter  __________________________________________________________    Surgeon:                                 Dontrell Sales MD    Assistant:   Ernestine TOLLIVER    Anesthesia:                             General plus local anesthetic    EBL:                                        minimal     Complications:                        none    Disposition:                             Tolerated well, sent to recovery in stable condition    Justification for use of Surgical First Assist:  An experienced first assistant was utilized during this operation due to the complexity of the operation.  My assistant participated with patient preparation for surgery, incision, surgical exposure including retraction, dissection, and ligation to isolate the target structures and preserve nearby structures, and closure of the field of dissection.  The presence of the expert assist increased the efficiency of the operation and decreased the risk of intraoperative surgical complications.    __________________________________________________________       DESCRIPTION OF PROCEDURE:  Following informed consent, patient was placed supine on the operating table and general anesthesia was administered. Patient was prepped and draped in the usual sterile fashion.  Surgical time-out was called and correct.  Patient received preoperative antibiotics.     Local anesthetic was used at all port sites.  We began with a 5-mm incision in the left upper quadrant and we carefully entered the abdomen with a Veress needle and we then established  pneumoperitoneum to a pressure of 15.  Once insufflated, a 5-mm port was placed and the abdomen was inspected.  We found no evidence of intra-abdominal injury from insertion of the Veress needle or trocar.  There was no evidence of any inflammatory or malignant condition inside of the abdomen and everything appeared unremarkable. At this point, we made another 1-cm incision in the right upper quadrant and we tunneled through the subcutaneous tissue down to the right lower quadrant and entered into the abdominal cavity using an 11-mm laparoscopic trocar.  We then grasped the peritoneal catheter and inserted it through this port down into the pelvis and the port was removed and the cuffs were positioned in the subcutaneous tract.  The catheter itself was then brought out through a small stab incision just above the incision in the right upper quadrant and then the 1-cm incision that was used for the port insertion was closed with subcuticular 4-0 Vicryl suture and skin glue.  A dry sterile bandage was applied around the catheter.  We then desufflated the abdomen and we removed the 5-mm port in the left upper quadrant.  We closed the incision with subcuticular 4-0 Vicryl suture and a dressing was applied.       All counts were correct.  Patient tolerated the procedure well and was sent to recovery in stable condition.     ____________________________________________________    Dontrell Sales MD  [facility]     normal... well appearing, well nourished, and in no apparent distress.

## 2023-09-21 ENCOUNTER — OFFICE VISIT (OUTPATIENT)
Dept: CARDIOLOGY | Facility: MEDICAL CENTER | Age: 58
End: 2023-09-21
Attending: INTERNAL MEDICINE
Payer: COMMERCIAL

## 2023-09-21 VITALS
HEIGHT: 67 IN | RESPIRATION RATE: 18 BRPM | HEART RATE: 90 BPM | OXYGEN SATURATION: 96 % | WEIGHT: 176 LBS | SYSTOLIC BLOOD PRESSURE: 130 MMHG | DIASTOLIC BLOOD PRESSURE: 80 MMHG | BODY MASS INDEX: 27.62 KG/M2

## 2023-09-21 DIAGNOSIS — I25.83 CORONARY ARTERY DISEASE DUE TO LIPID RICH PLAQUE: ICD-10-CM

## 2023-09-21 DIAGNOSIS — I25.10 CORONARY ARTERY DISEASE DUE TO LIPID RICH PLAQUE: ICD-10-CM

## 2023-09-21 DIAGNOSIS — N18.6 ESRD (END STAGE RENAL DISEASE) (HCC): ICD-10-CM

## 2023-09-21 DIAGNOSIS — E78.5 DYSLIPIDEMIA: ICD-10-CM

## 2023-09-21 DIAGNOSIS — I35.0 NONRHEUMATIC AORTIC VALVE STENOSIS: ICD-10-CM

## 2023-09-21 PROBLEM — N18.4 CKD (CHRONIC KIDNEY DISEASE) STAGE 4, GFR 15-29 ML/MIN (HCC): Status: RESOLVED | Noted: 2022-06-09 | Resolved: 2023-09-21

## 2023-09-21 PROCEDURE — 3079F DIAST BP 80-89 MM HG: CPT | Performed by: INTERNAL MEDICINE

## 2023-09-21 PROCEDURE — 99212 OFFICE O/P EST SF 10 MIN: CPT | Performed by: INTERNAL MEDICINE

## 2023-09-21 PROCEDURE — 99215 OFFICE O/P EST HI 40 MIN: CPT | Performed by: INTERNAL MEDICINE

## 2023-09-21 PROCEDURE — 3075F SYST BP GE 130 - 139MM HG: CPT | Performed by: INTERNAL MEDICINE

## 2023-09-21 ASSESSMENT — FIBROSIS 4 INDEX: FIB4 SCORE: 1.39

## 2023-09-21 NOTE — PROGRESS NOTES
REFERRING PHYSICIAN: Dontrell Sharma MD    CONSULTING PHYSICIAN: Osmel Blanca MD, FACS    CHIEF COMPLAINT: Shortness of breath    HISTORY OF PRESENT ILLNESS: The patient is a 58 y.o. male with history of hypertension, hyperlipidemia, ESRD on peritoneal dialysis for the last few months, coronary artery disease and severe aortic stenosis. He is on the kidney transplant list with [facility] and has been for the last 3 years. Today, he states   He has shortness of breath for the last few months. He also has chronic cough and lower extremity edema. He has occasional chest pain that resolves on its own. He has dizziness with position changes. 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 He denies orthopnea.     PAST MEDICAL HISTORY:   Active Ambulatory Problems     Diagnosis Date Noted    Hypertension 06/26/2020    Stage 5 chronic kidney disease not on chronic dialysis (HCC) 06/26/2020    Mixed hyperlipidemia 06/26/2020    Gastroesophageal reflux disease 06/26/2020    Heart murmur 04/28/2022    Chronic gout of multiple sites 06/09/2022    Sialolithiasis 06/09/2022    Aortic stenosis 07/01/2021    FSGS (focal segmental glomerulosclerosis) 10/11/2021    Skin lesions 03/03/2023    Lump of skin of right upper extremity 03/03/2023    Pain in the chest 05/23/2023    Elevated troponin 05/23/2023    Dyspnea on exertion 05/23/2023    Chronic metabolic acidosis 05/23/2023    Hyperphosphatemia 05/24/2023    Coronary artery disease due to lipid rich plaque 05/26/2023    Dyslipidemia 09/21/2023     Resolved Ambulatory Problems     Diagnosis Date Noted    CKD (chronic kidney disease) stage 4, GFR 15-29 ml/min ([facility]) 06/09/2022    NSTEMI (non-ST elevated myocardial infarction) ([facility]) 05/23/2023     Past Medical History:   Diagnosis Date    Anesthesia     Dialysis patient ([facility])     Heart burn     High cholesterol     PONV (postoperative nausea and vomiting)     Renal disorder 2007    Sleep apnea 2000     Snoring 1990     PAST SURGICAL HISTORY:   Past Surgical History:   Procedure Laterality Date    CATH PLACEMENT CAPD Right 9/6/2023    Procedure: LAPAROSCOPIC INSERTION OF PERITONEAL DIALYSIS CATHETER;  Surgeon: Dontrell Sales M.D.;  Location: SURGERY [facility];  Service: Vascular    PA INJ LUMBAR/SACRAL,W/ IMAGING Left 8/24/2023    Procedure: LUMBAR EPIDURAL STEROID INJECTION, LEFT L5-S1 INTERLAMINAR UNDER FLUOROSCOPY;  Surgeon: Benito Herrera D.O.;  Location: SURGERY [facility];  Service: Orthopedics    CATH PLACEMENT CAPD N/A 5/15/2023    Procedure: LAPAROSCOPIC PLACEMENT OF PERITONEAL DIALYSIS CATHERTER;  Surgeon: Shikha Alexander M.D.;  Location: SURGERY SAME DAY [facility];  Service: General    UMBILICAL HERNIA REPAIR  2012    4 separate surgeries between [id number]    OTHER  2007    Kidney biopsy, can't recall laterality,    HIP ARTHROSCOPY Bilateral 2002    TONSILLECTOMY N/A 1987    HAND SURGERY Right 1985    Hand and wrist    SHOULDER ARTHROSCOPY      Can't recall date     ALLERGIES:   Allergies   Allergen Reactions    Allopurinol Itching    Hydralazine Hcl Unspecified     Pt states he had a reaction similar to lupus     CURRENT MEDICATIONS:   Current Outpatient Medications:     tamsulosin (FLOMAX) 0.4 MG capsule, Take 0.4 mg by mouth every day., Disp: , Rfl:     betamethasone dipropionate 0.05 % Cream, APPLY CREAM TOPICALLY TWICE DAILY TO AFFECTED AREA, Disp: , Rfl:     Peritoneal [facility] (ULTRABAG/DIANEAL PD-2/2.5% DEX) 396 MOSM/L Solution, Inject  into the abdomen/abdominal cavity., Disp: , Rfl:     hydrocortisone 2.5 % Ointment, Apply  topically., Disp: , Rfl:     verapamil ER (CALAN-SR) 240 MG Tab CR, Take 1 Tablet by mouth every day., Disp: 100 Tablet, Rfl: 3    atorvastatin (LIPITOR) 40 MG Tab, Take 1 Tablet by mouth every evening., Disp: 100 Tablet, Rfl: 3    nitroglycerin (NITROSTAT) 0.4 MG SL Tab, Place 1 Tablet under the tongue as needed for Chest Pain., Disp: 25 Tablet, Rfl: 0     aspirin 81 MG EC tablet, Take 81 mg by mouth every day., Disp: , Rfl:     esomeprazole (NEXIUM) 40 MG delayed-release capsule, Take 40 mg by mouth every evening., Disp: , Rfl:     FAMILY HISTORY:   Family History   Problem Relation Age of Onset    Hyperlipidemia Mother     Hypertension Father      SOCIAL HISTORY:   Social History     Socioeconomic History    Marital status:      Spouse name: Not on file    Number of children: Not on file    Years of education: Not on file    Highest education level: Master's degree (e.g., MA, MS, Kenn, MEd, MSW, ELIECER)   Occupational History    Not on file   Tobacco Use    Smoking status: Never    Smokeless tobacco: Never   Vaping Use    Vaping Use: Never used   Substance and Sexual Activity    Alcohol use: Never    Drug use: Not Currently    Sexual activity: Yes     Partners: Female   Other Topics Concern    Not on file   Social History Narrative    Not on file     Social Determinants of Health     Financial Resource Strain: Low Risk  (1/4/2023)    Overall Financial Resource Strain (CARDIA)     Difficulty of Paying Living Expenses: Not hard at all   Food Insecurity: No Food Insecurity (1/4/2023)    Hunger Vital Sign     Worried About Running Out of Food in the Last Year: Never true     Ran Out of Food in the Last Year: Never true   Transportation Needs: No Transportation Needs (1/4/2023)    PRAPARE - Transportation     Lack of Transportation (Medical): No     Lack of Transportation (Non-Medical): No   Physical Activity: Sufficiently Active (1/4/2023)    Exercise Vital Sign     Days of Exercise per Week: 3 days     Minutes of Exercise per Session: 60 min   Stress: Stress Concern Present (1/4/2023)    [employer] [place] [employer] - Occupational Stress Questionnaire     Feeling of Stress : To some extent   Social Connections: Moderately Isolated (1/4/2023)    Social Connection and Isolation Panel [NHANES]     Frequency of Communication with Friends and Family: Once a  "week     Frequency of Social Gatherings with Friends and Family: More than three times a week     Attends Gnosticism Services: Never     Active Member of Clubs or Organizations: No     Attends Club or Organization Meetings: Never     Marital Status: Living with partner   Intimate Partner Violence: Not on file   Housing Stability: Low Risk  (1/4/2023)    Housing Stability Vital Sign     Unable to Pay for Housing in the Last Year: No     Number of Places Lived in the Last Year: 1     Unstable Housing in the Last Year: No     REVIEW OF SYSTEMS:  Review of Systems   Constitutional:  Positive for malaise/fatigue.   HENT: Negative.     Eyes: Negative.    Respiratory:  Positive for cough and shortness of breath.    Cardiovascular:  Positive for chest pain and leg swelling.   Gastrointestinal: Negative.    Genitourinary: Negative.    Musculoskeletal:  Positive for back pain.   Skin: Negative.    Neurological:  Positive for dizziness.   Endo/Heme/Allergies: Negative.    Psychiatric/Behavioral: Negative.       PHYSICAL EXAMINATION:    BP (!) 150/74 (BP Location: Left arm, Patient Position: Sitting, BP Cuff Size: Adult)   Pulse 84   Temp 36.8 °C (98.2 °F) (Temporal)   Ht 1.702 m (5' 7\")   Wt 85.3 kg (188 lb)   SpO2 97%   BMI 29.44 kg/m²      Physical Exam  Constitutional:       General: He is not in acute distress.  HENT:      Head: Normocephalic.   Eyes:      Pupils: Pupils are equal, round, and reactive to light.   Cardiovascular:      Rate and Rhythm: Normal rate and regular rhythm.      Heart sounds: Murmur heard.      Systolic murmur is present with a grade of 3/6.      No gallop.   Pulmonary:      Effort: Pulmonary effort is normal. No respiratory distress.      Breath sounds: Normal breath sounds. No wheezing or rales.   Abdominal:      General: Bowel sounds are normal. There is no distension.      Palpations: Abdomen is soft.      Tenderness: There is no abdominal tenderness.      Comments: PD catheter "   Musculoskeletal:         General: Normal range of motion.      Cervical back: Neck supple.   Skin:     General: Skin is warm and dry.   Neurological:      Mental Status: He is alert and oriented to person, place, and time.   Psychiatric:         Mood and Affect: Mood and affect normal.         Cognition and Memory: Memory normal.         Judgment: Judgment normal.     LABS REVIEWED:  Lab Results   Component Value Date/Time    SODIUM 143 09/06/2023 09:28 AM    POTASSIUM 4.7 09/06/2023 09:28 AM    CHLORIDE 106 09/06/2023 09:28 AM    CO2 23 09/06/2023 09:28 AM    GLUCOSE 84 09/06/2023 09:28 AM    BUN 87 (H) 09/06/2023 09:28 AM    CREATININE 8.75 (HH) 09/06/2023 09:28 AM    BUNCREATRAT 17 05/09/2022 09:49 AM    GLOMRATE 7 (L) 04/02/2023 11:54 AM      Lab Results   Component Value Date/Time    PROTHROMBTM 15.0 (H) 05/23/2023 03:58 PM    INR 1.20 (H) 05/23/2023 03:58 PM      Lab Results   Component Value Date/Time    WBC 6.1 09/06/2023 09:28 AM    RBC 5.06 09/06/2023 09:28 AM    HEMOGLOBIN 16.7 09/06/2023 09:28 AM    HEMATOCRIT 49.5 09/06/2023 09:28 AM    MCV 97.8 09/06/2023 09:28 AM    MCH 33.0 09/06/2023 09:28 AM    MCHC 33.7 09/06/2023 09:28 AM    MPV 10.0 09/06/2023 09:28 AM    NEUTSPOLYS 66.40 05/27/2023 01:44 AM    LYMPHOCYTES 17.90 (L) 05/27/2023 01:44 AM    MONOCYTES 9.50 05/27/2023 01:44 AM    EOSINOPHILS 5.20 05/27/2023 01:44 AM    BASOPHILS 0.80 05/27/2023 01:44 AM     IMAGING REVIEWED AND INTERPRETED:    ECHOCARDIOGRAM [facility] 5/23/2023:  The left ventricular ejection fraction is 60%.  Mild mitral regurgitation.  Calcified aortic valve leaflets.  Moderate aortic valve stenosis.  Aortic valve area calculated from the continuity equation is 1.3 cm2. Vmax is 3.7 m/s. Transvalvular gradients are - Peak: 55 mmHg, Mean: 36 mmHg.  Moderate to severe aortic insufficiency.  Mild tricuspid regurgitation.  Estimated right ventricular systolic pressure is 40 mmHg.    CARDIAC CATHETERIZATION [facility] 5/25/2023:  1.  Type II MI  due to ESRD with superimposed hypertensive emergency  2. Obstructive one-vessel coronary artery disease involving total occlusions of the terminal circumflex and first obtuse marginal-supplying a small area of myocardium  3.  Diffuse, nonobstructive coronary atherosclerosis in other segments  4.  At least moderate aortic stenosis with invasive mean gradient of 33 mmHg and moderate aortic insufficiency by noninvasive evaluation  5.  Mild elevation LVEDP at 17 mmHg      IMPRESSION:  Moderate to severe aortic stenosis and regurgitation, ascending aortic aneurysm (4.5 cm by echo), ESRD on peritoneal dialysis, hypertension, dyslipidemia    PLAN:  I recommend that he undergo aortic valve replacement, ascending aortic aneurysm repair, possible aortic root replacement and intraoperative transesophageal echocardiography.    The procedure, its risks, benefits, potential complications and alternative treatments were discussed with the patient in detail including the risks should he decide not to undergo my recommended treatment. All of his questions were answered to his satisfaction and he is willing to proceed with the operation. The risks include death, stroke, infection: to include a rare bacterial infection related to the use of the heart/lung machine, jayy-operative myocardial infarction, dysrhythmias, diaphragmatic paralysis, chest wall paresthesia, tracheostomy, kidney or other organ failure, possible return to the operating room for bleeding, bleeding requiring transfusion with its attendant risks including AIDS or hepatitis, dehiscence of surgical incisions, respiratory complications including the need for prolonged ventilator support, Protamine or other drug reaction, peripheral neuropathy, loss of limb, and miscount of surgical items. The operative mortality risk is approximately 2%. The STS mortality risk score is 1.8% and the morbidity and mortality risk score is 18% aortic valve replacement. The scores were  discussed with patient.    The operation is scheduled for Friday, November 3, 2023 at 7:30 AM at [facility].  A CT chest without contrast will be obtained prior to the operation to document the size and extent of the aneurysm.  The differences between tissue and mechanical valves was discussed with the patient in detail including the anticoagulation requirements with each.  The patient would prefer to have a mechanical valve implanted.    Findings and recommendations have been discussed with the patient’s cardiologist, Dontrell Sharma MD.  Thank you for this very challenging consultation and participation in the patient’s care.  I will keep you apprised of all future developments.    Sincerely,    Osmel Blanca MD, FACS

## 2023-09-21 NOTE — PROGRESS NOTES
"CARDIOLOGY OUTPATIENT FOLLOWUP    PCP: Drew T. Blumberg, D.O.    1. Nonrheumatic aortic valve stenosis    2. Coronary artery disease due to lipid rich plaque    3. ESRD (end stage renal disease) ([facility])    4. 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  He requires aortic valve intervention and I recommended a mechanical aortic valve replacement though we did discuss a possible alternate pathway utilizing biologic valves and TAVR devices.  An added advantage of the surgical aortic valve would be the possibility that the occluded OM and moderate to severely diseased right coronary artery could be bypassed-though these territories are small and unclear if revascularization would lead to any meaningful long-term improvement.    Follow up: 3 months    History: Gurdeep Guerra is a 58 y.o. male with history of end-stage renal disease presenting for follow-up of aortic stenosis and coronary artery disease.  LVEF is normal.  He has moderate to severe mixed aortic valve disease as well as chronically occluded obtuse marginal branch-supplying a small territory and 60% stenosis of the small territory distal RCA.     When I saw him last he was having uremic symptoms and was difficult to sort out whether the valve was creating any problems.  He has now had uremia corrected after institution of dialysis and is now noticing persistent fatigue and shortness of breath.  He desires the best long-term durable treatment for his condition.      Physical Exam:  /80 (BP Location: Left arm, Patient Position: Sitting, BP Cuff Size: Adult)   Pulse 90   Resp 18   Ht 1.702 m (5' 7\")   Wt 79.8 kg (176 lb)   SpO2 96%   BMI 27.57 kg/m²   GEN: NAD  CARDIAC: +ERICSKON harsh regular no JVP Normal S1 Normal S2  VASCULATURE: Preserved carotid upstrokes  RESP: Clear to auscultation bilaterally  ABD: Soft, non-tender, non-distended  EXT: " No edema  NEURO: No focal deficit    The ASCVD Risk score (Pearlington DK, et al., 2019) failed to calculate.    40-54 minutes of physician total time spent on the date of the encounter (42176)    Studies  Lab Results   Component Value Date/Time    CHOLSTRLTOT 171 05/23/2023 11:08 AM     (H) 05/23/2023 11:08 AM    HDL 40 05/23/2023 11:08 AM    TRIGLYCERIDE 56 05/23/2023 11:08 AM       Lab Results   Component Value Date/Time    SODIUM 143 09/06/2023 09:28 AM    POTASSIUM 4.7 09/06/2023 09:28 AM    CHLORIDE 106 09/06/2023 09:28 AM    CO2 23 09/06/2023 09:28 AM    GLUCOSE 84 09/06/2023 09:28 AM    BUN 87 (H) 09/06/2023 09:28 AM    CREATININE 8.75 (HH) 09/06/2023 09:28 AM    BUNCREATRAT 17 05/09/2022 09:49 AM    GLOMRATE 7 (L) 04/02/2023 11:54 AM     Lab Results   Component Value Date/Time    ALKPHOSPHAT 72 09/06/2023 09:28 AM    ASTSGOT 19 09/06/2023 09:28 AM    ALTSGPT 22 09/06/2023 09:28 AM    TBILIRUBIN 0.3 09/06/2023 09:28 AM        Past Medical History:   Diagnosis Date    Anesthesia     Hiccups for over 24 hours after a previous sugery.    Aortic stenosis     Chronic gout of multiple sites     Dialysis patient ([facility])     peritoneal daily    Dyspnea on exertion 05/23/2023    Elevated troponin 05/23/2023    Heart burn     1990    Heart murmur     High cholesterol     All always    Hypertension 2009    NSTEMI (non-ST elevated myocardial infarction) ([facility]) 05/23/2023    no stents placed    Pain in the chest 05/23/2023    PONV (postoperative nausea and vomiting)     Renal disorder 2007    Stage IV    Sleep apnea 2000    Snoring 1990     Allergies   Allergen Reactions    Allopurinol Itching    Hydralazine Hcl Unspecified     Pt states he had a reaction similar to lupus     Outpatient Encounter Medications as of 9/21/2023   Medication Sig Dispense Refill    tamsulosin (FLOMAX) 0.4 MG capsule Take 0.4 mg by mouth every day.      betamethasone dipropionate 0.05 % Cream APPLY CREAM TOPICALLY TWICE DAILY TO AFFECTED AREA       Peritoneal Dialysis Solutions (ULTRABAG/DIANEAL PD-2/2.5% DEX) 396 MOSM/L Solution Inject  into the abdomen/abdominal cavity.      hydrocortisone 2.5 % Ointment Apply  topically.      verapamil ER (CALAN-SR) 240 MG Tab CR Take 1 Tablet by mouth every day. 100 Tablet 3    atorvastatin (LIPITOR) 40 MG Tab Take 1 Tablet by mouth every evening. 100 Tablet 3    nitroglycerin (NITROSTAT) 0.4 MG SL Tab Place 1 Tablet under the tongue as needed for Chest Pain. 25 Tablet 0    aspirin 81 MG EC tablet Take 81 mg by mouth every day.      esomeprazole (NEXIUM) 40 MG delayed-release capsule Take 40 mg by mouth every evening.       No facility-administered encounter medications on file as of 9/21/2023.     Social History     Socioeconomic History    Marital status:      Spouse name: Not on file    Number of children: Not on file    Years of education: Not on file    Highest education level: Master's degree (e.g., MA, MS, Kenn, MEd, MSW, ELIECER)   Occupational History    Not on file   Tobacco Use    Smoking status: Never    Smokeless tobacco: Never   Vaping Use    Vaping Use: Never used   Substance and Sexual Activity    Alcohol use: Never    Drug use: Not Currently    Sexual activity: Yes     Partners: Female   Other Topics Concern    Not on file   Social History Narrative    Not on file     Social Determinants of Health     Financial Resource Strain: Low Risk  (1/4/2023)    Overall Financial Resource Strain (CARDIA)     Difficulty of Paying Living Expenses: Not hard at all   Food Insecurity: No Food Insecurity (1/4/2023)    Hunger Vital Sign     Worried About Running Out of Food in the Last Year: Never true     Ran Out of Food in the Last Year: Never true   Transportation Needs: No Transportation Needs (1/4/2023)    PRAPARE - Transportation     Lack of Transportation (Medical): No     Lack of Transportation (Non-Medical): No   Physical Activity: Sufficiently Active (1/4/2023)    Exercise Vital Sign     Days of Exercise  per Week: 3 days     Minutes of Exercise per Session: 60 min   Stress: Stress Concern Present (1/4/2023)    [employer] [place] [employer] - Occupational Stress Questionnaire     Feeling of Stress : To some extent   Social Connections: Moderately Isolated (1/4/2023)    Social Connection and Isolation Panel [NHANES]     Frequency of Communication with Friends and Family: Once a week     Frequency of Social Gatherings with Friends and Family: More than three times a week     Attends Scientology Services: Never     Active Member of Clubs or Organizations: No     Attends Club or Organization Meetings: Never     Marital Status: Living with partner   Intimate Partner Violence: Not on file   Housing Stability: Low Risk  (1/4/2023)    Housing Stability Vital Sign     Unable to Pay for Housing in the Last Year: No     Number of Places Lived in the Last Year: 1     Unstable Housing in the Last Year: No         ROS:   10 point review systems is otherwise negative except as per the HPI    Chief Complaint   Patient presents with    Aortic Stenosis

## 2023-09-27 ENCOUNTER — TELEPHONE (OUTPATIENT)
Dept: CARDIOTHORACIC SURGERY | Facility: MEDICAL CENTER | Age: 58
End: 2023-09-27

## 2023-09-27 ENCOUNTER — OFFICE VISIT (OUTPATIENT)
Dept: CARDIOTHORACIC SURGERY | Facility: MEDICAL CENTER | Age: 58
End: 2023-09-27
Payer: COMMERCIAL

## 2023-09-27 VITALS
HEART RATE: 84 BPM | HEIGHT: 67 IN | DIASTOLIC BLOOD PRESSURE: 74 MMHG | SYSTOLIC BLOOD PRESSURE: 150 MMHG | TEMPERATURE: 98.2 F | BODY MASS INDEX: 29.51 KG/M2 | OXYGEN SATURATION: 97 % | WEIGHT: 188 LBS

## 2023-09-27 DIAGNOSIS — I35.0 SEVERE AORTIC STENOSIS: ICD-10-CM

## 2023-09-27 PROCEDURE — 99205 OFFICE O/P NEW HI 60 MIN: CPT | Performed by: THORACIC SURGERY (CARDIOTHORACIC VASCULAR SURGERY)

## 2023-09-27 PROCEDURE — 3078F DIAST BP <80 MM HG: CPT | Performed by: THORACIC SURGERY (CARDIOTHORACIC VASCULAR SURGERY)

## 2023-09-27 PROCEDURE — 3077F SYST BP >= 140 MM HG: CPT | Performed by: THORACIC SURGERY (CARDIOTHORACIC VASCULAR SURGERY)

## 2023-09-27 ASSESSMENT — FIBROSIS 4 INDEX: FIB4 SCORE: 1.39

## 2023-09-27 ASSESSMENT — ENCOUNTER SYMPTOMS
SHORTNESS OF BREATH: 1
BACK PAIN: 1
COUGH: 1
GASTROINTESTINAL NEGATIVE: 1
PSYCHIATRIC NEGATIVE: 1
EYES NEGATIVE: 1
DIZZINESS: 1

## 2023-09-27 NOTE — PROGRESS NOTES
Problem: Prehabilitation    Goal: optimize identified modifiable risk factors prior to cardiac surgery.     Intervention: Screening and interventions for the following  risk factors with educational materials provided if indicated  and patient demonstrates readiness to participate.  Dentition, malnutrition, CAD and dietary cholesterol,  obesity, alcohol and tobacco abuse, illegal drug use, and   social support system for post discharge planning.    Additionally, home exercise regimen initiation or continuation  (if appropriate), and teaching of and participation of  inspiratory muscle training via incentive spirometer (provided).    Review of post-surgical physical limitations, upcoming  appointments & testing, ordered diagnostics, and medication review  to identify and educate on anticoagulant and antihypertensives  that need cessation in the days prior to surgery.    The cardiac surgery prehabilitation sheet, surgery instruction sheet,  MAP, education booklet, vitals logbook, normals after heart surgery,  and cardiac rehab flier was provided for patient to read and review.    Surgical CHG wipes were also provided with instructions on use.    DENTITION:  Routine dental appointment prior to surgery is   indicated for valve procedure.    he was no encouraged to get a dental   cleaning as he  does get regular  dental cleaning and denies current dental   infection or issues that should be  addressed prior to surgery.    INCENTIVE SPIROMETRY:  Discussed importance of incentive spirometry (IS) use,  20 times a day AT MINIMUM but more often if possible to  optimize cardio-pulmonary function prior to surgery.  They were instructed to allow a 5 minute rest in  between uses to achieve max IS volume.  Education with return demonstration performed.   Patient is effective, coaching was not needed.    Prehabilitation IS baseline is 2500.    HOME EXERCISE REGIMEN:  We discussed importance of preventing deconditioning and  muscle  wasting in the time preceding surgery as this will occur  post surgery.    > 50 % left main disease present? NO  EF-- 60%  Active sub lingual nitro use? NO  he is appropriate for initiation  of a home exercise regimen.    he is minimally physically active; current  exercise tolerance/level is high due to some  symptoms of SOB with activity.    he  was educated to start an   exercise regimen of walking on   a flat surface 30 minutes a day, adjusting the  length for tolerance level.     he was educated  that if chest pain or SOB occurs patient is to stop  immediately and if symptoms do not  resolve after stopping to call 911.    he was also encouraged to practice sit to stand  from a chair with one arm to assist or no arm assistance  12 times a day to strengthen quadriceps and improve balance.    CAD:  Patient does not have known CAD; education on  cholesterol, diet, and the heart are not indicated  and were not provided.    OBESITY:  Patient's BMI is not over 30.  Education regarding portion sizing  and diet are not indicated and were not provided.    MALNUTRITION:  Malnutrition screening tool (MST) shows he is not at risk  with a score of 0. (0-1 not at risk; greater or equal to 2 is at risk).    Given MST score, functional capacity,   and no reported muscle loss, it was not  recommended they increase their protein  intake to 1.2 to 2.5 gram/kg/day    SMOKING:  Patient denies current smoking history.  Smoking risks  and cessation education not indicated and was not provided.    ALCOHOL ABUSE:  Patient denies alcohol abuse.  Alcohol risks and cessation  education not indicated and was not provided.    ILLEGAL DRUG USE:  Patient denies illicit drug use.  Illicit drug use risks  and cessation education not indicated and was not provided    SOCIAL SUPPORT SYSTEM FOR DISCHARGE NEEDS:    Patient does have family,  his GF Paulette to stay for  1-week post discharge to assist with ADL's. No barriers  to hospital discharge  anticipated.    PSYCHOLOGICAL PREPARATION:  We discussed the basics of physical limitation post op to include:               No driving for 4 weeks               No lifting, pushing or pulling > 10 lbs for 6 weeks  Sternal precautions to include moving within the tube and  safe mobility in and out of bed and the chair.    ANTIBIOTIC STEWARDSHIP:  MRSA swab will be collected by [facility] during pre-admit testing.    MEDICATION AN UPCOMING APPOINTMENTS REVIEW:  Current medications were reviewed that may need  specific stop dates prior to surgery. The patient was instructed   to stop the following medications after the indicated date.    No meds to stop    Vascular scheduling sheet was provided and explained.    Walk test Times: 4 4 4    A phone appointment for pre op education was made  for 10/31 at lunch time to review all provided education materials.

## 2023-09-28 ENCOUNTER — APPOINTMENT (OUTPATIENT)
Dept: ADMISSIONS | Facility: MEDICAL CENTER | Age: 58
DRG: 219 | End: 2023-09-28
Attending: THORACIC SURGERY (CARDIOTHORACIC VASCULAR SURGERY)
Payer: COMMERCIAL

## 2023-10-03 ENCOUNTER — HOSPITAL ENCOUNTER (OUTPATIENT)
Dept: RADIOLOGY | Facility: MEDICAL CENTER | Age: 58
End: 2023-10-03
Attending: NURSE PRACTITIONER
Payer: COMMERCIAL

## 2023-10-03 DIAGNOSIS — I35.0 NODULAR CALCIFIC AORTIC VALVE STENOSIS: ICD-10-CM

## 2023-10-03 DIAGNOSIS — I35.0 SEVERE AORTIC STENOSIS: ICD-10-CM

## 2023-10-03 PROCEDURE — 93880 EXTRACRANIAL BILAT STUDY: CPT

## 2023-10-03 PROCEDURE — 71250 CT THORAX DX C-: CPT

## 2023-10-11 ENCOUNTER — PRE-ADMISSION TESTING (OUTPATIENT)
Dept: ADMISSIONS | Facility: MEDICAL CENTER | Age: 58
DRG: 219 | End: 2023-10-11
Attending: THORACIC SURGERY (CARDIOTHORACIC VASCULAR SURGERY)
Payer: COMMERCIAL

## 2023-10-11 NOTE — OR NURSING
Surgical Clearance Recommended, per [facility] Pre-Procedural Clearance Protocol. Faxed to Dr. Blanca on 10/11/23 at [phone].

## 2023-10-31 ENCOUNTER — TELEPHONE (OUTPATIENT)
Dept: CARDIOTHORACIC SURGERY | Facility: MEDICAL CENTER | Age: 58
End: 2023-10-31

## 2023-10-31 ENCOUNTER — PRE-ADMISSION TESTING (OUTPATIENT)
Dept: ADMISSIONS | Facility: MEDICAL CENTER | Age: 58
DRG: 219 | End: 2023-10-31
Attending: THORACIC SURGERY (CARDIOTHORACIC VASCULAR SURGERY)
Payer: COMMERCIAL

## 2023-10-31 ENCOUNTER — HOSPITAL ENCOUNTER (OUTPATIENT)
Dept: RADIOLOGY | Facility: MEDICAL CENTER | Age: 58
DRG: 219 | End: 2023-10-31
Attending: THORACIC SURGERY (CARDIOTHORACIC VASCULAR SURGERY)
Payer: COMMERCIAL

## 2023-10-31 DIAGNOSIS — Z01.812 PRE-OPERATIVE LABORATORY EXAMINATION: ICD-10-CM

## 2023-10-31 DIAGNOSIS — Z01.811 PRE-OPERATIVE RESPIRATORY EXAMINATION: ICD-10-CM

## 2023-10-31 DIAGNOSIS — E78.2 MIXED HYPERLIPIDEMIA: ICD-10-CM

## 2023-10-31 DIAGNOSIS — Z01.810 PRE-OPERATIVE CARDIOVASCULAR EXAMINATION: ICD-10-CM

## 2023-10-31 DIAGNOSIS — E78.5 HYPERLIPIDEMIA, UNSPECIFIED HYPERLIPIDEMIA TYPE: ICD-10-CM

## 2023-10-31 LAB
ABO GROUP BLD: NORMAL
ALBUMIN SERPL BCP-MCNC: 3.9 G/DL (ref 3.2–4.9)
ALBUMIN/GLOB SERPL: 1.3 G/DL
ALP SERPL-CCNC: 69 U/L (ref 30–99)
ALT SERPL-CCNC: 15 U/L (ref 2–50)
AMPHET UR QL SCN: NEGATIVE
ANION GAP SERPL CALC-SCNC: 12 MMOL/L (ref 7–16)
APPEARANCE UR: CLEAR
APTT PPP: 27.9 SEC (ref 24.7–36)
AST SERPL-CCNC: 11 U/L (ref 12–45)
BACTERIA #/AREA URNS HPF: NEGATIVE /HPF
BARBITURATES UR QL SCN: NEGATIVE
BASOPHILS # BLD AUTO: 0.3 % (ref 0–1.8)
BASOPHILS # BLD: 0.02 K/UL (ref 0–0.12)
BENZODIAZ UR QL SCN: NEGATIVE
BILIRUB SERPL-MCNC: 0.3 MG/DL (ref 0.1–1.5)
BILIRUB UR QL STRIP.AUTO: NEGATIVE
BLD GP AB SCN SERPL QL: NORMAL
BUN SERPL-MCNC: 73 MG/DL (ref 8–22)
BZE UR QL SCN: NEGATIVE
CALCIUM ALBUM COR SERPL-MCNC: 9.5 MG/DL (ref 8.5–10.5)
CALCIUM SERPL-MCNC: 9.4 MG/DL (ref 8.5–10.5)
CANNABINOIDS UR QL SCN: NEGATIVE
CHLORIDE SERPL-SCNC: 104 MMOL/L (ref 96–112)
CO2 SERPL-SCNC: 26 MMOL/L (ref 20–33)
COLOR UR: YELLOW
CREAT SERPL-MCNC: 9.53 MG/DL (ref 0.5–1.4)
EOSINOPHIL # BLD AUTO: 0.2 K/UL (ref 0–0.51)
EOSINOPHIL NFR BLD: 3.5 % (ref 0–6.9)
EPI CELLS #/AREA URNS HPF: NEGATIVE /HPF
ERYTHROCYTE [DISTWIDTH] IN BLOOD BY AUTOMATED COUNT: 45.3 FL (ref 35.9–50)
EST. AVERAGE GLUCOSE BLD GHB EST-MCNC: 97 MG/DL
FENTANYL UR QL: NEGATIVE
GFR SERPLBLD CREATININE-BSD FMLA CKD-EPI: 6 ML/MIN/1.73 M 2
GLOBULIN SER CALC-MCNC: 2.9 G/DL (ref 1.9–3.5)
GLUCOSE SERPL-MCNC: 101 MG/DL (ref 65–99)
GLUCOSE UR STRIP.AUTO-MCNC: NEGATIVE MG/DL
HBA1C MFR BLD: 5 % (ref 4–5.6)
HCT VFR BLD AUTO: 48.2 % (ref 42–52)
HGB BLD-MCNC: 16.2 G/DL (ref 14–18)
HYALINE CASTS #/AREA URNS LPF: ABNORMAL /LPF
IMM GRANULOCYTES # BLD AUTO: 0.01 K/UL (ref 0–0.11)
IMM GRANULOCYTES NFR BLD AUTO: 0.2 % (ref 0–0.9)
INR PPP: 1.06 (ref 0.87–1.13)
KETONES UR STRIP.AUTO-MCNC: NEGATIVE MG/DL
LEUKOCYTE ESTERASE UR QL STRIP.AUTO: NEGATIVE
LYMPHOCYTES # BLD AUTO: 0.69 K/UL (ref 1–4.8)
LYMPHOCYTES NFR BLD: 11.9 % (ref 22–41)
MCH RBC QN AUTO: 33.1 PG (ref 27–33)
MCHC RBC AUTO-ENTMCNC: 33.6 G/DL (ref 32.3–36.5)
MCV RBC AUTO: 98.4 FL (ref 81.4–97.8)
METHADONE UR QL SCN: NEGATIVE
MICRO URNS: ABNORMAL
MONOCYTES # BLD AUTO: 0.39 K/UL (ref 0–0.85)
MONOCYTES NFR BLD AUTO: 6.7 % (ref 0–13.4)
NEUTROPHILS # BLD AUTO: 4.47 K/UL (ref 1.82–7.42)
NEUTROPHILS NFR BLD: 77.4 % (ref 44–72)
NITRITE UR QL STRIP.AUTO: NEGATIVE
NRBC # BLD AUTO: 0 K/UL
NRBC BLD-RTO: 0 /100 WBC (ref 0–0.2)
OPIATES UR QL SCN: NEGATIVE
OXYCODONE UR QL SCN: NEGATIVE
PCP UR QL SCN: NEGATIVE
PH UR STRIP.AUTO: 6.5 [PH] (ref 5–8)
PLATELET # BLD AUTO: 184 K/UL (ref 164–446)
PMV BLD AUTO: 9.8 FL (ref 9–12.9)
POTASSIUM SERPL-SCNC: 5.1 MMOL/L (ref 3.6–5.5)
PROPOXYPH UR QL SCN: NEGATIVE
PROT SERPL-MCNC: 6.8 G/DL (ref 6–8.2)
PROT UR QL STRIP: 300 MG/DL
PROTHROMBIN TIME: 14 SEC (ref 12–14.6)
RBC # BLD AUTO: 4.9 M/UL (ref 4.7–6.1)
RBC # URNS HPF: ABNORMAL /HPF
RBC UR QL AUTO: ABNORMAL
RH BLD: NORMAL
SCCMEC + MECA PNL NOSE NAA+PROBE: NEGATIVE
SCCMEC + MECA PNL NOSE NAA+PROBE: NEGATIVE
SODIUM SERPL-SCNC: 142 MMOL/L (ref 135–145)
SP GR UR STRIP.AUTO: 1.01
UROBILINOGEN UR STRIP.AUTO-MCNC: 0.2 MG/DL
WBC # BLD AUTO: 5.8 K/UL (ref 4.8–10.8)
WBC #/AREA URNS HPF: ABNORMAL /HPF

## 2023-10-31 PROCEDURE — 85610 PROTHROMBIN TIME: CPT

## 2023-10-31 PROCEDURE — 80053 COMPREHEN METABOLIC PANEL: CPT

## 2023-10-31 PROCEDURE — 83036 HEMOGLOBIN GLYCOSYLATED A1C: CPT

## 2023-10-31 PROCEDURE — 36415 COLL VENOUS BLD VENIPUNCTURE: CPT

## 2023-10-31 PROCEDURE — 71046 X-RAY EXAM CHEST 2 VIEWS: CPT

## 2023-10-31 PROCEDURE — 86901 BLOOD TYPING SEROLOGIC RH(D): CPT

## 2023-10-31 PROCEDURE — 80307 DRUG TEST PRSMV CHEM ANLYZR: CPT

## 2023-10-31 PROCEDURE — 85730 THROMBOPLASTIN TIME PARTIAL: CPT

## 2023-10-31 PROCEDURE — 85025 COMPLETE CBC W/AUTO DIFF WBC: CPT

## 2023-10-31 PROCEDURE — 86850 RBC ANTIBODY SCREEN: CPT

## 2023-10-31 PROCEDURE — 87640 STAPH A DNA AMP PROBE: CPT

## 2023-10-31 PROCEDURE — 87641 MR-STAPH DNA AMP PROBE: CPT

## 2023-10-31 PROCEDURE — 81001 URINALYSIS AUTO W/SCOPE: CPT

## 2023-10-31 PROCEDURE — 93005 ELECTROCARDIOGRAM TRACING: CPT

## 2023-10-31 PROCEDURE — 86900 BLOOD TYPING SEROLOGIC ABO: CPT

## 2023-10-31 NOTE — TELEPHONE ENCOUNTER
Patient was reminded that AVR, TAA repair, possible aortic root replacement surgery with Dr. Blanca is on 11/3 at 0730 in the morning. he   are aware check in is at 0515 am.    Baseline IS was 2500. he has been somewhat compliant   with the IS. Volume has not improved past 2500.    he was prescribed a walking regimen or  told to continue he current regimen and   he has been compliant.   he has been practicing sit to stand.    The CHG wipes instructions were reviewed and understood.    PAT date and time was reviewed with patient and  verified it is within 72 hours of surgery.    Vascular studies and procedures: carotids and CT of chest  were scheduled, completed,  and reviewed by myself for concerning  results did not need escalation to the JADEN/MD.    he did not need a dental check/work.    He had no meds to stop.    he was told that esomeprazole, tamsulosin, verapamil ER, medication (s) are okay to  take the morning of surgery prior to check in.     he was reminded no food after midnight.    Call time 9 minutes

## 2023-11-01 LAB — EKG IMPRESSION: NORMAL

## 2023-11-01 PROCEDURE — 93010 ELECTROCARDIOGRAM REPORT: CPT | Performed by: INTERNAL MEDICINE

## 2023-11-03 ENCOUNTER — APPOINTMENT (OUTPATIENT)
Dept: RADIOLOGY | Facility: MEDICAL CENTER | Age: 58
DRG: 219 | End: 2023-11-03
Attending: THORACIC SURGERY (CARDIOTHORACIC VASCULAR SURGERY)
Payer: COMMERCIAL

## 2023-11-03 ENCOUNTER — APPOINTMENT (OUTPATIENT)
Dept: CARDIOLOGY | Facility: MEDICAL CENTER | Age: 58
DRG: 219 | End: 2023-11-03
Attending: NURSE PRACTITIONER
Payer: COMMERCIAL

## 2023-11-03 ENCOUNTER — ANESTHESIA (OUTPATIENT)
Dept: SURGERY | Facility: MEDICAL CENTER | Age: 58
DRG: 219 | End: 2023-11-03
Payer: COMMERCIAL

## 2023-11-03 ENCOUNTER — APPOINTMENT (OUTPATIENT)
Dept: RADIOLOGY | Facility: MEDICAL CENTER | Age: 58
DRG: 219 | End: 2023-11-03
Attending: INTERNAL MEDICINE
Payer: COMMERCIAL

## 2023-11-03 ENCOUNTER — HOSPITAL ENCOUNTER (INPATIENT)
Facility: MEDICAL CENTER | Age: 58
LOS: 14 days | DRG: 219 | End: 2023-11-17
Attending: THORACIC SURGERY (CARDIOTHORACIC VASCULAR SURGERY) | Admitting: THORACIC SURGERY (CARDIOTHORACIC VASCULAR SURGERY)
Payer: COMMERCIAL

## 2023-11-03 ENCOUNTER — APPOINTMENT (OUTPATIENT)
Dept: CARDIOLOGY | Facility: MEDICAL CENTER | Age: 58
DRG: 219 | End: 2023-11-03
Attending: ANESTHESIOLOGY
Payer: COMMERCIAL

## 2023-11-03 ENCOUNTER — APPOINTMENT (OUTPATIENT)
Dept: CARDIOLOGY | Facility: MEDICAL CENTER | Age: 58
DRG: 219 | End: 2023-11-03
Attending: INTERNAL MEDICINE
Payer: COMMERCIAL

## 2023-11-03 ENCOUNTER — ANESTHESIA EVENT (OUTPATIENT)
Dept: SURGERY | Facility: MEDICAL CENTER | Age: 58
DRG: 219 | End: 2023-11-03
Payer: COMMERCIAL

## 2023-11-03 DIAGNOSIS — Z95.2 S/P AVR: ICD-10-CM

## 2023-11-03 DIAGNOSIS — Z86.79 S/P ASCENDING AORTIC ANEURYSM REPAIR: ICD-10-CM

## 2023-11-03 DIAGNOSIS — N18.6 ESRD (END STAGE RENAL DISEASE) (HCC): ICD-10-CM

## 2023-11-03 DIAGNOSIS — Z98.890 S/P ASCENDING AORTIC ANEURYSM REPAIR: ICD-10-CM

## 2023-11-03 DIAGNOSIS — G89.18 ACUTE POST-OPERATIVE PAIN: ICD-10-CM

## 2023-11-03 PROBLEM — Z99.11 ON MECHANICALLY ASSISTED VENTILATION (HCC): Status: ACTIVE | Noted: 2023-11-03

## 2023-11-03 LAB
ABO + RH BLD: NORMAL
ACT BLD: 119 SEC (ref 74–137)
ACT BLD: 131 SEC (ref 74–137)
ACT BLD: 474 SEC (ref 74–137)
ACT BLD: 528 SEC (ref 74–137)
ACT BLD: 552 SEC (ref 74–137)
ACT BLD: 594 SEC (ref 74–137)
APTT PPP: 30.6 SEC (ref 24.7–36)
ARTERIAL PATENCY WRIST A: ABNORMAL
BARCODED ABORH UBTYP: 6200
BARCODED ABORH UBTYP: 6200
BARCODED ABORH UBTYP: 8400
BARCODED ABORH UBTYP: 8400
BARCODED PRD CODE UBPRD: NORMAL
BARCODED UNIT NUM UBUNT: NORMAL
BASE EXCESS BLDA CALC-SCNC: -1 MMOL/L (ref -4–3)
BASE EXCESS BLDA CALC-SCNC: -1 MMOL/L (ref -4–3)
BASE EXCESS BLDA CALC-SCNC: -2 MMOL/L (ref -4–3)
BASE EXCESS BLDA CALC-SCNC: -2 MMOL/L (ref -4–3)
BASE EXCESS BLDA CALC-SCNC: -3 MMOL/L (ref -4–3)
BASE EXCESS BLDA CALC-SCNC: -3 MMOL/L (ref -4–3)
BASE EXCESS BLDA CALC-SCNC: -4 MMOL/L (ref -4–3)
BASE EXCESS BLDA CALC-SCNC: -5 MMOL/L (ref -4–3)
BASE EXCESS BLDA CALC-SCNC: -5 MMOL/L (ref -4–3)
BASE EXCESS BLDA CALC-SCNC: -7 MMOL/L (ref -4–3)
BASE EXCESS BLDA CALC-SCNC: -8 MMOL/L (ref -4–3)
BASE EXCESS BLDA CALC-SCNC: -9 MMOL/L (ref -4–3)
BASE EXCESS BLDA CALC-SCNC: 0 MMOL/L (ref -4–3)
BASE EXCESS BLDV CALC-SCNC: -2 MMOL/L (ref -4–3)
BODY TEMPERATURE: ABNORMAL DEGREES
BREATHS SETTING VENT: 24
BREATHS SETTING VENT: 28
BREATHS SETTING VENT: 28
CA-I BLD ISE-SCNC: 0.99 MMOL/L (ref 1.1–1.3)
CA-I BLD ISE-SCNC: 1.04 MMOL/L (ref 1.1–1.3)
CA-I BLD ISE-SCNC: 1.07 MMOL/L (ref 1.1–1.3)
CA-I BLD ISE-SCNC: 1.08 MMOL/L (ref 1.1–1.3)
CA-I BLD ISE-SCNC: 1.17 MMOL/L (ref 1.1–1.3)
CA-I BLD ISE-SCNC: 1.18 MMOL/L (ref 1.1–1.3)
CA-I BLD ISE-SCNC: 1.19 MMOL/L (ref 1.1–1.3)
CA-I BLD ISE-SCNC: 1.23 MMOL/L (ref 1.1–1.3)
CA-I BLD ISE-SCNC: 1.27 MMOL/L (ref 1.1–1.3)
CA-I BLD ISE-SCNC: 1.35 MMOL/L (ref 1.1–1.3)
CA-I BLD ISE-SCNC: 1.48 MMOL/L (ref 1.1–1.3)
CO2 BLDA-SCNC: 16 MMOL/L (ref 20–33)
CO2 BLDA-SCNC: 19 MMOL/L (ref 20–33)
CO2 BLDA-SCNC: 21 MMOL/L (ref 20–33)
CO2 BLDA-SCNC: 21 MMOL/L (ref 20–33)
CO2 BLDA-SCNC: 23 MMOL/L (ref 20–33)
CO2 BLDA-SCNC: 24 MMOL/L (ref 20–33)
CO2 BLDA-SCNC: 25 MMOL/L (ref 20–33)
CO2 BLDA-SCNC: 25 MMOL/L (ref 20–33)
CO2 BLDA-SCNC: 26 MMOL/L (ref 20–33)
CO2 BLDA-SCNC: 26 MMOL/L (ref 20–33)
CO2 BLDA-SCNC: 27 MMOL/L (ref 20–33)
CO2 BLDV-SCNC: 26 MMOL/L (ref 20–33)
COMPONENT CT 8504CT: NORMAL
COMPONENT F 8504F: NORMAL
COMPONENT F 8504F: NORMAL
COMPONENT P 8504P: NORMAL
DELSYS IDSYS: ABNORMAL
EKG IMPRESSION: NORMAL
END TIDAL CARBON DIOXIDE IECO2: 28 MMHG
END TIDAL CARBON DIOXIDE IECO2: 32 MMHG
END TIDAL CARBON DIOXIDE IECO2: 37 MMHG
END TIDAL CARBON DIOXIDE IECO2: 38 MMHG
END TIDAL CARBON DIOXIDE IECO2: 40 MMHG
END TIDAL CARBON DIOXIDE IECO2: 43 MMHG
FACT VIII ACT/NOR PPP: 193 % (ref 45–145)
GLUCOSE BLD STRIP.AUTO-MCNC: 123 MG/DL (ref 65–99)
GLUCOSE BLD STRIP.AUTO-MCNC: 126 MG/DL (ref 65–99)
GLUCOSE BLD STRIP.AUTO-MCNC: 139 MG/DL (ref 65–99)
GLUCOSE BLD STRIP.AUTO-MCNC: 143 MG/DL (ref 65–99)
GLUCOSE BLD STRIP.AUTO-MCNC: 143 MG/DL (ref 65–99)
GLUCOSE BLD STRIP.AUTO-MCNC: 145 MG/DL (ref 65–99)
GLUCOSE BLD STRIP.AUTO-MCNC: 151 MG/DL (ref 65–99)
GLUCOSE BLD STRIP.AUTO-MCNC: 157 MG/DL (ref 65–99)
GLUCOSE BLD STRIP.AUTO-MCNC: 162 MG/DL (ref 65–99)
GLUCOSE BLD STRIP.AUTO-MCNC: 173 MG/DL (ref 65–99)
HAV IGM SERPL QL IA: NORMAL
HBV CORE IGM SER QL: NORMAL
HBV SURFACE AB SERPL IA-ACNC: 497 MIU/ML (ref 0–10)
HBV SURFACE AG SER QL: NORMAL
HCO3 BLDA-SCNC: 15.6 MMOL/L (ref 17–25)
HCO3 BLDA-SCNC: 17.7 MMOL/L (ref 17–25)
HCO3 BLDA-SCNC: 19.3 MMOL/L (ref 17–25)
HCO3 BLDA-SCNC: 19.6 MMOL/L (ref 17–25)
HCO3 BLDA-SCNC: 21.5 MMOL/L (ref 17–25)
HCO3 BLDA-SCNC: 22 MMOL/L (ref 17–25)
HCO3 BLDA-SCNC: 22 MMOL/L (ref 17–25)
HCO3 BLDA-SCNC: 22.1 MMOL/L (ref 17–25)
HCO3 BLDA-SCNC: 22.2 MMOL/L (ref 17–25)
HCO3 BLDA-SCNC: 22.7 MMOL/L (ref 17–25)
HCO3 BLDA-SCNC: 22.7 MMOL/L (ref 17–25)
HCO3 BLDA-SCNC: 23.1 MMOL/L (ref 17–25)
HCO3 BLDA-SCNC: 23.1 MMOL/L (ref 17–25)
HCO3 BLDA-SCNC: 23.7 MMOL/L (ref 17–25)
HCO3 BLDA-SCNC: 24.7 MMOL/L (ref 17–25)
HCO3 BLDA-SCNC: 24.7 MMOL/L (ref 17–25)
HCO3 BLDA-SCNC: 25.4 MMOL/L (ref 17–25)
HCO3 BLDV-SCNC: 24.6 MMOL/L (ref 24–28)
HCT VFR BLD AUTO: 27.5 % (ref 42–52)
HCT VFR BLD AUTO: 27.7 % (ref 42–52)
HCT VFR BLD AUTO: 35.1 % (ref 42–52)
HCT VFR BLD CALC: 18 % (ref 42–52)
HCT VFR BLD CALC: 26 % (ref 42–52)
HCT VFR BLD CALC: 26 % (ref 42–52)
HCT VFR BLD CALC: 31 % (ref 42–52)
HCT VFR BLD CALC: 32 % (ref 42–52)
HCT VFR BLD CALC: 32 % (ref 42–52)
HCT VFR BLD CALC: 33 % (ref 42–52)
HCT VFR BLD CALC: 38 % (ref 42–52)
HCT VFR BLD CALC: 39 % (ref 42–52)
HCV AB SER QL: NORMAL
HGB BLD-MCNC: 10.5 G/DL (ref 14–18)
HGB BLD-MCNC: 10.9 G/DL (ref 14–18)
HGB BLD-MCNC: 10.9 G/DL (ref 14–18)
HGB BLD-MCNC: 11.2 G/DL (ref 14–18)
HGB BLD-MCNC: 12 G/DL (ref 14–18)
HGB BLD-MCNC: 12.9 G/DL (ref 14–18)
HGB BLD-MCNC: 13.3 G/DL (ref 14–18)
HGB BLD-MCNC: 6.1 G/DL (ref 14–18)
HGB BLD-MCNC: 8.8 G/DL (ref 14–18)
HGB BLD-MCNC: 8.8 G/DL (ref 14–18)
HGB BLD-MCNC: 9.4 G/DL (ref 14–18)
HGB BLD-MCNC: 9.7 G/DL (ref 14–18)
HOROWITZ INDEX BLDA+IHG-RTO: 108 MM[HG]
HOROWITZ INDEX BLDA+IHG-RTO: 140 MM[HG]
HOROWITZ INDEX BLDA+IHG-RTO: 153 MM[HG]
HOROWITZ INDEX BLDA+IHG-RTO: 163 MM[HG]
HOROWITZ INDEX BLDA+IHG-RTO: 235 MM[HG]
HOROWITZ INDEX BLDA+IHG-RTO: 239 MM[HG]
HOROWITZ INDEX BLDA+IHG-RTO: 245 MM[HG]
HOROWITZ INDEX BLDA+IHG-RTO: 270 MM[HG]
HOROWITZ INDEX BLDA+IHG-RTO: 290 MM[HG]
INHIBITOR INDICATED 1863: NO
INR PPP: 1.11 (ref 0.87–1.13)
INR PPP: 1.36 (ref 0.87–1.13)
LACTATE BLD-SCNC: 0.8 MMOL/L (ref 0.5–2)
LACTATE BLD-SCNC: 1.4 MMOL/L (ref 0.5–2)
LACTATE BLD-SCNC: 2.5 MMOL/L (ref 0.5–2)
LACTATE BLD-SCNC: 3.4 MMOL/L (ref 0.5–2)
MAGNESIUM SERPL-MCNC: 3.2 MG/DL (ref 1.5–2.5)
MODE IMODE: ABNORMAL
O2/TOTAL GAS SETTING VFR VENT: 100 %
O2/TOTAL GAS SETTING VFR VENT: 30 %
O2/TOTAL GAS SETTING VFR VENT: 40 %
O2/TOTAL GAS SETTING VFR VENT: 60 %
O2/TOTAL GAS SETTING VFR VENT: 80 %
PATHOLOGY CONSULT NOTE: NORMAL
PCO2 BLDA: 23.7 MMHG (ref 26–37)
PCO2 BLDA: 33.6 MMHG (ref 26–37)
PCO2 BLDA: 37.4 MMHG (ref 26–37)
PCO2 BLDA: 38 MMHG (ref 26–37)
PCO2 BLDA: 38.1 MMHG (ref 26–37)
PCO2 BLDA: 41.6 MMHG (ref 26–37)
PCO2 BLDA: 42.1 MMHG (ref 26–37)
PCO2 BLDA: 42.9 MMHG (ref 26–37)
PCO2 BLDA: 43.1 MMHG (ref 26–37)
PCO2 BLDA: 43.8 MMHG (ref 26–37)
PCO2 BLDA: 44.6 MMHG (ref 26–37)
PCO2 BLDA: 45.3 MMHG (ref 26–37)
PCO2 BLDA: 46.2 MMHG (ref 26–37)
PCO2 BLDA: 46.3 MMHG (ref 26–37)
PCO2 BLDA: 47.5 MMHG (ref 26–37)
PCO2 BLDA: 48 MMHG (ref 26–37)
PCO2 BLDA: 49 MMHG (ref 26–37)
PCO2 BLDV: 51.4 MMHG (ref 41–51)
PCO2 TEMP ADJ BLDA: 22.2 MMHG (ref 26–37)
PCO2 TEMP ADJ BLDA: 32.7 MMHG (ref 26–37)
PCO2 TEMP ADJ BLDA: 36.6 MMHG (ref 26–37)
PCO2 TEMP ADJ BLDA: 37.3 MMHG (ref 26–37)
PCO2 TEMP ADJ BLDA: 37.5 MMHG (ref 26–37)
PCO2 TEMP ADJ BLDA: 38.5 MMHG (ref 26–37)
PCO2 TEMP ADJ BLDA: 39.8 MMHG (ref 26–37)
PCO2 TEMP ADJ BLDA: 40.1 MMHG (ref 26–37)
PCO2 TEMP ADJ BLDA: 41.2 MMHG (ref 26–37)
PCO2 TEMP ADJ BLDA: 41.6 MMHG (ref 26–37)
PCO2 TEMP ADJ BLDA: 42.5 MMHG (ref 26–37)
PCO2 TEMP ADJ BLDA: 45.3 MMHG (ref 26–37)
PCO2 TEMP ADJ BLDA: 46.2 MMHG (ref 26–37)
PCO2 TEMP ADJ BLDA: 46.2 MMHG (ref 26–37)
PCO2 TEMP ADJ BLDA: 46.3 MMHG (ref 26–37)
PCO2 TEMP ADJ BLDA: 46.7 MMHG (ref 26–37)
PCO2 TEMP ADJ BLDA: 49 MMHG (ref 26–37)
PCO2 TEMP ADJ BLDV: 48.7 MMHG (ref 41–51)
PEEP END EXPIRATORY PRESSURE IPEEP: 8 CMH20
PERCENT MINUTE VOLUME IPMV: 130
PERCENT MINUTE VOLUME IPMV: 130
PERCENT MINUTE VOLUME IPMV: 160
PH BLDA: 7.22 [PH] (ref 7.4–7.5)
PH BLDA: 7.26 [PH] (ref 7.4–7.5)
PH BLDA: 7.27 [PH] (ref 7.4–7.5)
PH BLDA: 7.28 [PH] (ref 7.4–7.5)
PH BLDA: 7.29 [PH] (ref 7.4–7.5)
PH BLDA: 7.29 [PH] (ref 7.4–7.5)
PH BLDA: 7.31 [PH] (ref 7.4–7.5)
PH BLDA: 7.32 [PH] (ref 7.4–7.5)
PH BLDA: 7.34 [PH] (ref 7.4–7.5)
PH BLDA: 7.36 [PH] (ref 7.4–7.5)
PH BLDA: 7.37 [PH] (ref 7.4–7.5)
PH BLDA: 7.38 [PH] (ref 7.4–7.5)
PH BLDA: 7.38 [PH] (ref 7.4–7.5)
PH BLDA: 7.43 [PH] (ref 7.4–7.5)
PH BLDV: 7.29 [PH] (ref 7.31–7.45)
PH TEMP ADJ BLDA: 7.23 [PH] (ref 7.4–7.5)
PH TEMP ADJ BLDA: 7.26 [PH] (ref 7.4–7.5)
PH TEMP ADJ BLDA: 7.28 [PH] (ref 7.4–7.5)
PH TEMP ADJ BLDA: 7.29 [PH] (ref 7.4–7.5)
PH TEMP ADJ BLDA: 7.29 [PH] (ref 7.4–7.5)
PH TEMP ADJ BLDA: 7.3 [PH] (ref 7.4–7.5)
PH TEMP ADJ BLDA: 7.31 [PH] (ref 7.4–7.5)
PH TEMP ADJ BLDA: 7.33 [PH] (ref 7.4–7.5)
PH TEMP ADJ BLDA: 7.34 [PH] (ref 7.4–7.5)
PH TEMP ADJ BLDA: 7.37 [PH] (ref 7.4–7.5)
PH TEMP ADJ BLDA: 7.38 [PH] (ref 7.4–7.5)
PH TEMP ADJ BLDA: 7.39 [PH] (ref 7.4–7.5)
PH TEMP ADJ BLDA: 7.39 [PH] (ref 7.4–7.5)
PH TEMP ADJ BLDA: 7.4 [PH] (ref 7.4–7.5)
PH TEMP ADJ BLDA: 7.45 [PH] (ref 7.4–7.5)
PH TEMP ADJ BLDV: 7.3 [PH] (ref 7.31–7.45)
PLATELET # BLD AUTO: 166 K/UL (ref 164–446)
PO2 BLDA: 108 MMHG (ref 64–87)
PO2 BLDA: 108 MMHG (ref 64–87)
PO2 BLDA: 131 MMHG (ref 64–87)
PO2 BLDA: 141 MMHG (ref 64–87)
PO2 BLDA: 147 MMHG (ref 64–87)
PO2 BLDA: 191 MMHG (ref 64–87)
PO2 BLDA: 201 MMHG (ref 64–87)
PO2 BLDA: 215 MMHG (ref 64–87)
PO2 BLDA: 237 MMHG (ref 64–87)
PO2 BLDA: 244 MMHG (ref 64–87)
PO2 BLDA: 253 MMHG (ref 64–87)
PO2 BLDA: 271 MMHG (ref 64–87)
PO2 BLDA: 378 MMHG (ref 64–87)
PO2 BLDA: 61 MMHG (ref 64–87)
PO2 BLDA: 65 MMHG (ref 64–87)
PO2 BLDA: 84 MMHG (ref 64–87)
PO2 BLDA: 87 MMHG (ref 64–87)
PO2 BLDV: 58 MMHG (ref 25–40)
PO2 TEMP ADJ BLDA: 103 MMHG (ref 64–87)
PO2 TEMP ADJ BLDA: 106 MMHG (ref 64–87)
PO2 TEMP ADJ BLDA: 131 MMHG (ref 64–87)
PO2 TEMP ADJ BLDA: 134 MMHG (ref 64–87)
PO2 TEMP ADJ BLDA: 139 MMHG (ref 64–87)
PO2 TEMP ADJ BLDA: 187 MMHG (ref 64–87)
PO2 TEMP ADJ BLDA: 201 MMHG (ref 64–87)
PO2 TEMP ADJ BLDA: 215 MMHG (ref 64–87)
PO2 TEMP ADJ BLDA: 237 MMHG (ref 64–87)
PO2 TEMP ADJ BLDA: 242 MMHG (ref 64–87)
PO2 TEMP ADJ BLDA: 249 MMHG (ref 64–87)
PO2 TEMP ADJ BLDA: 266 MMHG (ref 64–87)
PO2 TEMP ADJ BLDA: 367 MMHG (ref 64–87)
PO2 TEMP ADJ BLDA: 58 MMHG (ref 64–87)
PO2 TEMP ADJ BLDA: 61 MMHG (ref 64–87)
PO2 TEMP ADJ BLDA: 75 MMHG (ref 64–87)
PO2 TEMP ADJ BLDA: 86 MMHG (ref 64–87)
PO2 TEMP ADJ BLDV: 54 MMHG (ref 25–40)
POTASSIUM BLD-SCNC: 4.2 MMOL/L (ref 3.6–5.5)
POTASSIUM BLD-SCNC: 4.4 MMOL/L (ref 3.6–5.5)
POTASSIUM BLD-SCNC: 4.5 MMOL/L (ref 3.6–5.5)
POTASSIUM BLD-SCNC: 4.7 MMOL/L (ref 3.6–5.5)
POTASSIUM BLD-SCNC: 4.7 MMOL/L (ref 3.6–5.5)
POTASSIUM BLD-SCNC: 4.8 MMOL/L (ref 3.6–5.5)
POTASSIUM BLD-SCNC: 4.9 MMOL/L (ref 3.6–5.5)
POTASSIUM BLD-SCNC: 4.9 MMOL/L (ref 3.6–5.5)
POTASSIUM BLD-SCNC: 5.3 MMOL/L (ref 3.6–5.5)
POTASSIUM SERPL-SCNC: 4.8 MMOL/L (ref 3.6–5.5)
POTASSIUM SERPL-SCNC: 5 MMOL/L (ref 3.6–5.5)
POTASSIUM SERPL-SCNC: 5 MMOL/L (ref 3.6–5.5)
POTASSIUM SERPL-SCNC: 5.2 MMOL/L (ref 3.6–5.5)
PRESSURE SUPPORT SETTING VENT: 5 CM[H2O]
PRODUCT TYPE UPROD: NORMAL
PROTHROMBIN TIME: 14.4 SEC (ref 12–14.6)
PROTHROMBIN TIME: 17 SEC (ref 12–14.6)
SAO2 % BLDA: 100 % (ref 93–99)
SAO2 % BLDA: 86 % (ref 93–99)
SAO2 % BLDA: 90 % (ref 93–99)
SAO2 % BLDA: 96 % (ref 93–99)
SAO2 % BLDA: 96 % (ref 93–99)
SAO2 % BLDA: 98 % (ref 93–99)
SAO2 % BLDA: 98 % (ref 93–99)
SAO2 % BLDA: 99 % (ref 93–99)
SAO2 % BLDV: 86 %
SODIUM BLD-SCNC: 139 MMOL/L (ref 135–145)
SODIUM BLD-SCNC: 139 MMOL/L (ref 135–145)
SODIUM BLD-SCNC: 140 MMOL/L (ref 135–145)
SODIUM BLD-SCNC: 141 MMOL/L (ref 135–145)
SODIUM BLD-SCNC: 144 MMOL/L (ref 135–145)
SODIUM BLD-SCNC: 145 MMOL/L (ref 135–145)
SPECIMEN DRAWN FROM PATIENT: ABNORMAL
TIDAL VOLUME IVT: 400 ML
UNIT STATUS USTAT: NORMAL

## 2023-11-03 PROCEDURE — 82962 GLUCOSE BLOOD TEST: CPT

## 2023-11-03 PROCEDURE — P9034 PLATELETS, PHERESIS: HCPCS

## 2023-11-03 PROCEDURE — 700105 HCHG RX REV CODE 258: Performed by: THORACIC SURGERY (CARDIOTHORACIC VASCULAR SURGERY)

## 2023-11-03 PROCEDURE — 160048 HCHG OR STATISTICAL LEVEL 1-5: Performed by: THORACIC SURGERY (CARDIOTHORACIC VASCULAR SURGERY)

## 2023-11-03 PROCEDURE — 85240 CLOT FACTOR VIII AHG 1 STAGE: CPT

## 2023-11-03 PROCEDURE — P9016 RBC LEUKOCYTES REDUCED: HCPCS

## 2023-11-03 PROCEDURE — 700111 HCHG RX REV CODE 636 W/ 250 OVERRIDE (IP): Mod: JZ | Performed by: ANESTHESIOLOGY

## 2023-11-03 PROCEDURE — 80074 ACUTE HEPATITIS PANEL: CPT

## 2023-11-03 PROCEDURE — P9047 ALBUMIN (HUMAN), 25%, 50ML: HCPCS

## 2023-11-03 PROCEDURE — 93319 3D ECHO IMG CGEN CAR ANOMAL: CPT

## 2023-11-03 PROCEDURE — 02UW0JZ SUPPLEMENT THORACIC AORTA, DESCENDING WITH SYNTHETIC SUBSTITUTE, OPEN APPROACH: ICD-10-PCS | Performed by: THORACIC SURGERY (CARDIOTHORACIC VASCULAR SURGERY)

## 2023-11-03 PROCEDURE — P9045 ALBUMIN (HUMAN), 5%, 250 ML: HCPCS | Mod: JZ | Performed by: ANESTHESIOLOGY

## 2023-11-03 PROCEDURE — 700101 HCHG RX REV CODE 250: Performed by: ANESTHESIOLOGY

## 2023-11-03 PROCEDURE — A9270 NON-COVERED ITEM OR SERVICE: HCPCS | Performed by: ANESTHESIOLOGY

## 2023-11-03 PROCEDURE — 700111 HCHG RX REV CODE 636 W/ 250 OVERRIDE (IP): Performed by: NURSE PRACTITIONER

## 2023-11-03 PROCEDURE — 93010 ELECTROCARDIOGRAM REPORT: CPT | Performed by: INTERNAL MEDICINE

## 2023-11-03 PROCEDURE — 71045 X-RAY EXAM CHEST 1 VIEW: CPT

## 2023-11-03 PROCEDURE — 35820 EXPLORE CHEST VESSELS: CPT | Mod: AS,78 | Performed by: NURSE PRACTITIONER

## 2023-11-03 PROCEDURE — 5A1221Z PERFORMANCE OF CARDIAC OUTPUT, CONTINUOUS: ICD-10-PCS | Performed by: THORACIC SURGERY (CARDIOTHORACIC VASCULAR SURGERY)

## 2023-11-03 PROCEDURE — 85014 HEMATOCRIT: CPT | Mod: 91

## 2023-11-03 PROCEDURE — 700101 HCHG RX REV CODE 250: Performed by: NURSE PRACTITIONER

## 2023-11-03 PROCEDURE — 30233N0 TRANSFUSION OF AUTOLOGOUS RED BLOOD CELLS INTO PERIPHERAL VEIN, PERCUTANEOUS APPROACH: ICD-10-PCS | Performed by: THORACIC SURGERY (CARDIOTHORACIC VASCULAR SURGERY)

## 2023-11-03 PROCEDURE — 700101 HCHG RX REV CODE 250

## 2023-11-03 PROCEDURE — 85610 PROTHROMBIN TIME: CPT

## 2023-11-03 PROCEDURE — 88304 TISSUE EXAM BY PATHOLOGIST: CPT

## 2023-11-03 PROCEDURE — C1729 CATH, DRAINAGE: HCPCS | Performed by: THORACIC SURGERY (CARDIOTHORACIC VASCULAR SURGERY)

## 2023-11-03 PROCEDURE — P9047 ALBUMIN (HUMAN), 25%, 50ML: HCPCS | Mod: JZ

## 2023-11-03 PROCEDURE — 93308 TTE F-UP OR LMTD: CPT | Mod: 26 | Performed by: INTERNAL MEDICINE

## 2023-11-03 PROCEDURE — 02RX0JZ REPLACEMENT OF THORACIC AORTA, ASCENDING/ARCH WITH SYNTHETIC SUBSTITUTE, OPEN APPROACH: ICD-10-PCS | Performed by: THORACIC SURGERY (CARDIOTHORACIC VASCULAR SURGERY)

## 2023-11-03 PROCEDURE — 700102 HCHG RX REV CODE 250 W/ 637 OVERRIDE(OP): Performed by: ANESTHESIOLOGY

## 2023-11-03 PROCEDURE — 82803 BLOOD GASES ANY COMBINATION: CPT | Mod: 91

## 2023-11-03 PROCEDURE — 700105 HCHG RX REV CODE 258: Performed by: ANESTHESIOLOGY

## 2023-11-03 PROCEDURE — 36556 INSERT NON-TUNNEL CV CATH: CPT | Performed by: INTERNAL MEDICINE

## 2023-11-03 PROCEDURE — 700105 HCHG RX REV CODE 258: Performed by: INTERNAL MEDICINE

## 2023-11-03 PROCEDURE — 90935 HEMODIALYSIS ONE EVALUATION: CPT

## 2023-11-03 PROCEDURE — 700111 HCHG RX REV CODE 636 W/ 250 OVERRIDE (IP): Performed by: INTERNAL MEDICINE

## 2023-11-03 PROCEDURE — 33859 AS-AORT GRF F/DS OTH/THN DSJ: CPT | Mod: AS | Performed by: NURSE PRACTITIONER

## 2023-11-03 PROCEDURE — 35820 EXPLORE CHEST VESSELS: CPT | Mod: XE | Performed by: THORACIC SURGERY (CARDIOTHORACIC VASCULAR SURGERY)

## 2023-11-03 PROCEDURE — P9017 PLASMA 1 DONOR FRZ W/IN 8 HR: HCPCS | Mod: 91

## 2023-11-03 PROCEDURE — 02HV33Z INSERTION OF INFUSION DEVICE INTO SUPERIOR VENA CAVA, PERCUTANEOUS APPROACH: ICD-10-PCS | Performed by: INTERNAL MEDICINE

## 2023-11-03 PROCEDURE — C1889 IMPLANT/INSERT DEVICE, NOC: HCPCS | Performed by: THORACIC SURGERY (CARDIOTHORACIC VASCULAR SURGERY)

## 2023-11-03 PROCEDURE — 0W3C0ZZ CONTROL BLEEDING IN MEDIASTINUM, OPEN APPROACH: ICD-10-PCS | Performed by: THORACIC SURGERY (CARDIOTHORACIC VASCULAR SURGERY)

## 2023-11-03 PROCEDURE — 85347 COAGULATION TIME ACTIVATED: CPT | Mod: 91

## 2023-11-03 PROCEDURE — 5A1D70Z PERFORMANCE OF URINARY FILTRATION, INTERMITTENT, LESS THAN 6 HOURS PER DAY: ICD-10-PCS | Performed by: INTERNAL MEDICINE

## 2023-11-03 PROCEDURE — 700111 HCHG RX REV CODE 636 W/ 250 OVERRIDE (IP)

## 2023-11-03 PROCEDURE — 84132 ASSAY OF SERUM POTASSIUM: CPT | Mod: 91

## 2023-11-03 PROCEDURE — 3E043XZ INTRODUCTION OF VASOPRESSOR INTO CENTRAL VEIN, PERCUTANEOUS APPROACH: ICD-10-PCS | Performed by: THORACIC SURGERY (CARDIOTHORACIC VASCULAR SURGERY)

## 2023-11-03 PROCEDURE — 99292 CRITICAL CARE ADDL 30 MIN: CPT | Mod: 25 | Performed by: INTERNAL MEDICINE

## 2023-11-03 PROCEDURE — 160031 HCHG SURGERY MINUTES - 1ST 30 MINS LEVEL 5: Performed by: THORACIC SURGERY (CARDIOTHORACIC VASCULAR SURGERY)

## 2023-11-03 PROCEDURE — 93005 ELECTROCARDIOGRAM TRACING: CPT | Performed by: NURSE PRACTITIONER

## 2023-11-03 PROCEDURE — 93005 ELECTROCARDIOGRAM TRACING: CPT | Performed by: THORACIC SURGERY (CARDIOTHORACIC VASCULAR SURGERY)

## 2023-11-03 PROCEDURE — 33859 AS-AORT GRF F/DS OTH/THN DSJ: CPT | Performed by: THORACIC SURGERY (CARDIOTHORACIC VASCULAR SURGERY)

## 2023-11-03 PROCEDURE — 02RF0JZ REPLACEMENT OF AORTIC VALVE WITH SYNTHETIC SUBSTITUTE, OPEN APPROACH: ICD-10-PCS | Performed by: THORACIC SURGERY (CARDIOTHORACIC VASCULAR SURGERY)

## 2023-11-03 PROCEDURE — 160009 HCHG ANES TIME/MIN: Performed by: THORACIC SURGERY (CARDIOTHORACIC VASCULAR SURGERY)

## 2023-11-03 PROCEDURE — 82330 ASSAY OF CALCIUM: CPT | Mod: 91

## 2023-11-03 PROCEDURE — 700105 HCHG RX REV CODE 258

## 2023-11-03 PROCEDURE — 700102 HCHG RX REV CODE 250 W/ 637 OVERRIDE(OP): Performed by: THORACIC SURGERY (CARDIOTHORACIC VASCULAR SURGERY)

## 2023-11-03 PROCEDURE — 33405 REPLACEMENT AORTIC VALVE OPN: CPT | Mod: AS | Performed by: NURSE PRACTITIONER

## 2023-11-03 PROCEDURE — 700111 HCHG RX REV CODE 636 W/ 250 OVERRIDE (IP): Mod: JZ | Performed by: INTERNAL MEDICINE

## 2023-11-03 PROCEDURE — C1751 CATH, INF, PER/CENT/MIDLINE: HCPCS | Performed by: THORACIC SURGERY (CARDIOTHORACIC VASCULAR SURGERY)

## 2023-11-03 PROCEDURE — C1713 ANCHOR/SCREW BN/BN,TIS/BN: HCPCS | Performed by: THORACIC SURGERY (CARDIOTHORACIC VASCULAR SURGERY)

## 2023-11-03 PROCEDURE — 770022 HCHG ROOM/CARE - ICU (200)

## 2023-11-03 PROCEDURE — 700101 HCHG RX REV CODE 250: Performed by: THORACIC SURGERY (CARDIOTHORACIC VASCULAR SURGERY)

## 2023-11-03 PROCEDURE — 94799 UNLISTED PULMONARY SVC/PX: CPT

## 2023-11-03 PROCEDURE — 503001 HCHG PERFUSION: Performed by: THORACIC SURGERY (CARDIOTHORACIC VASCULAR SURGERY)

## 2023-11-03 PROCEDURE — 88311 DECALCIFY TISSUE: CPT

## 2023-11-03 PROCEDURE — C1751 CATH, INF, PER/CENT/MIDLINE: HCPCS

## 2023-11-03 PROCEDURE — 88305 TISSUE EXAM BY PATHOLOGIST: CPT

## 2023-11-03 PROCEDURE — P9012 CRYOPRECIPITATE EACH UNIT: HCPCS | Mod: 91

## 2023-11-03 PROCEDURE — 84295 ASSAY OF SERUM SODIUM: CPT | Mod: 91

## 2023-11-03 PROCEDURE — 86706 HEP B SURFACE ANTIBODY: CPT

## 2023-11-03 PROCEDURE — 85730 THROMBOPLASTIN TIME PARTIAL: CPT

## 2023-11-03 PROCEDURE — 94003 VENT MGMT INPAT SUBQ DAY: CPT

## 2023-11-03 PROCEDURE — 36556 INSERT NON-TUNNEL CV CATH: CPT

## 2023-11-03 PROCEDURE — 93325 DOPPLER ECHO COLOR FLOW MAPG: CPT

## 2023-11-03 PROCEDURE — 30233R1 TRANSFUSION OF NONAUTOLOGOUS PLATELETS INTO PERIPHERAL VEIN, PERCUTANEOUS APPROACH: ICD-10-PCS | Performed by: THORACIC SURGERY (CARDIOTHORACIC VASCULAR SURGERY)

## 2023-11-03 PROCEDURE — 700105 HCHG RX REV CODE 258: Performed by: NURSE PRACTITIONER

## 2023-11-03 PROCEDURE — 0W9D0ZZ DRAINAGE OF PERICARDIAL CAVITY, OPEN APPROACH: ICD-10-PCS | Performed by: THORACIC SURGERY (CARDIOTHORACIC VASCULAR SURGERY)

## 2023-11-03 PROCEDURE — A9270 NON-COVERED ITEM OR SERVICE: HCPCS | Performed by: THORACIC SURGERY (CARDIOTHORACIC VASCULAR SURGERY)

## 2023-11-03 PROCEDURE — P9047 ALBUMIN (HUMAN), 25%, 50ML: HCPCS | Mod: JZ | Performed by: INTERNAL MEDICINE

## 2023-11-03 PROCEDURE — 83605 ASSAY OF LACTIC ACID: CPT | Mod: 91

## 2023-11-03 PROCEDURE — 99291 CRITICAL CARE FIRST HOUR: CPT | Mod: 25 | Performed by: INTERNAL MEDICINE

## 2023-11-03 PROCEDURE — 700111 HCHG RX REV CODE 636 W/ 250 OVERRIDE (IP): Mod: JZ

## 2023-11-03 PROCEDURE — 30233N1 TRANSFUSION OF NONAUTOLOGOUS RED BLOOD CELLS INTO PERIPHERAL VEIN, PERCUTANEOUS APPROACH: ICD-10-PCS | Performed by: THORACIC SURGERY (CARDIOTHORACIC VASCULAR SURGERY)

## 2023-11-03 PROCEDURE — 160042 HCHG SURGERY MINUTES - EA ADDL 1 MIN LEVEL 5: Performed by: THORACIC SURGERY (CARDIOTHORACIC VASCULAR SURGERY)

## 2023-11-03 PROCEDURE — 85018 HEMOGLOBIN: CPT

## 2023-11-03 PROCEDURE — C1768 GRAFT, VASCULAR: HCPCS | Performed by: THORACIC SURGERY (CARDIOTHORACIC VASCULAR SURGERY)

## 2023-11-03 PROCEDURE — 85049 AUTOMATED PLATELET COUNT: CPT

## 2023-11-03 PROCEDURE — 36430 TRANSFUSION BLD/BLD COMPNT: CPT

## 2023-11-03 PROCEDURE — 94002 VENT MGMT INPAT INIT DAY: CPT

## 2023-11-03 PROCEDURE — C1898 LEAD, PMKR, OTHER THAN TRANS: HCPCS | Performed by: THORACIC SURGERY (CARDIOTHORACIC VASCULAR SURGERY)

## 2023-11-03 PROCEDURE — 83735 ASSAY OF MAGNESIUM: CPT

## 2023-11-03 PROCEDURE — 94150 VITAL CAPACITY TEST: CPT

## 2023-11-03 PROCEDURE — 86923 COMPATIBILITY TEST ELECTRIC: CPT

## 2023-11-03 PROCEDURE — B24BZZ4 ULTRASONOGRAPHY OF HEART WITH AORTA, TRANSESOPHAGEAL: ICD-10-PCS | Performed by: ANESTHESIOLOGY

## 2023-11-03 PROCEDURE — 33405 REPLACEMENT AORTIC VALVE OPN: CPT | Performed by: THORACIC SURGERY (CARDIOTHORACIC VASCULAR SURGERY)

## 2023-11-03 PROCEDURE — 36415 COLL VENOUS BLD VENIPUNCTURE: CPT

## 2023-11-03 DEVICE — BONE PUTTY HEMOSTATIC ABSORBABLE (12/BX): Type: IMPLANTABLE DEVICE | Site: HEART | Status: FUNCTIONAL

## 2023-11-03 DEVICE — IMPLANTABLE DEVICE: Type: IMPLANTABLE DEVICE | Site: HEART | Status: FUNCTIONAL

## 2023-11-03 DEVICE — GRAFT HEMASHIELD PLAT 30MM - 30CM WDV: Type: IMPLANTABLE DEVICE | Site: HEART | Status: FUNCTIONAL

## 2023-11-03 DEVICE — GRAFT HEMASHIELD PLAT 32MM - 30CM WDV: Type: IMPLANTABLE DEVICE | Site: HEART | Status: FUNCTIONAL

## 2023-11-03 RX ORDER — MORPHINE SULFATE 4 MG/ML
4 INJECTION INTRAVENOUS
Status: DISCONTINUED | OUTPATIENT
Start: 2023-11-03 | End: 2023-11-04

## 2023-11-03 RX ORDER — HEPARIN SODIUM,PORCINE 1000/ML
VIAL (ML) INJECTION PRN
Status: DISCONTINUED | OUTPATIENT
Start: 2023-11-03 | End: 2023-11-03

## 2023-11-03 RX ORDER — VASOPRESSIN 20 U/ML
INJECTION PARENTERAL PRN
Status: DISCONTINUED | OUTPATIENT
Start: 2023-11-03 | End: 2023-11-03 | Stop reason: SURG

## 2023-11-03 RX ORDER — PHENYLEPHRINE HYDROCHLORIDE 10 MG/ML
INJECTION, SOLUTION INTRAMUSCULAR; INTRAVENOUS; SUBCUTANEOUS PRN
Status: DISCONTINUED | OUTPATIENT
Start: 2023-11-03 | End: 2023-11-03 | Stop reason: SURG

## 2023-11-03 RX ORDER — SODIUM CHLORIDE 9 MG/ML
INJECTION, SOLUTION INTRAVENOUS
Status: DISCONTINUED | OUTPATIENT
Start: 2023-11-03 | End: 2023-11-03

## 2023-11-03 RX ORDER — DEXMEDETOMIDINE HYDROCHLORIDE 4 UG/ML
0-1.5 INJECTION, SOLUTION INTRAVENOUS CONTINUOUS
Status: DISCONTINUED | OUTPATIENT
Start: 2023-11-03 | End: 2023-11-05

## 2023-11-03 RX ORDER — MAGNESIUM SULFATE 1 G/100ML
1 INJECTION INTRAVENOUS DAILY
Status: DISPENSED | OUTPATIENT
Start: 2023-11-03 | End: 2023-11-06

## 2023-11-03 RX ORDER — AMIODARONE HYDROCHLORIDE 200 MG/1
400 TABLET ORAL 2 TIMES DAILY
Status: DISCONTINUED | OUTPATIENT
Start: 2023-11-04 | End: 2023-11-06

## 2023-11-03 RX ORDER — SODIUM CHLORIDE, SODIUM GLUCONATE, SODIUM ACETATE, POTASSIUM CHLORIDE AND MAGNESIUM CHLORIDE 526; 502; 368; 37; 30 MG/100ML; MG/100ML; MG/100ML; MG/100ML; MG/100ML
INJECTION, SOLUTION INTRAVENOUS
Status: DISCONTINUED | OUTPATIENT
Start: 2023-11-03 | End: 2023-11-03 | Stop reason: SURG

## 2023-11-03 RX ORDER — SODIUM CHLORIDE 9 MG/ML
INJECTION, SOLUTION INTRAVENOUS PRN
Status: DISCONTINUED | OUTPATIENT
Start: 2023-11-03 | End: 2023-11-08

## 2023-11-03 RX ORDER — ALBUMIN (HUMAN) 12.5 G/50ML
50 SOLUTION INTRAVENOUS ONCE
Status: COMPLETED | OUTPATIENT
Start: 2023-11-03 | End: 2023-11-04

## 2023-11-03 RX ORDER — LIDOCAINE HYDROCHLORIDE 20 MG/ML
INJECTION, SOLUTION EPIDURAL; INFILTRATION; INTRACAUDAL; PERINEURAL PRN
Status: DISCONTINUED | OUTPATIENT
Start: 2023-11-03 | End: 2023-11-03

## 2023-11-03 RX ORDER — NOREPINEPHRINE BITARTRATE 0.03 MG/ML
0-1 INJECTION, SOLUTION INTRAVENOUS CONTINUOUS
Status: DISCONTINUED | OUTPATIENT
Start: 2023-11-03 | End: 2023-11-05

## 2023-11-03 RX ORDER — DEXMEDETOMIDINE HYDROCHLORIDE 4 UG/ML
0-1.5 INJECTION, SOLUTION INTRAVENOUS CONTINUOUS
Status: DISCONTINUED | OUTPATIENT
Start: 2023-11-03 | End: 2023-11-03

## 2023-11-03 RX ORDER — OMEPRAZOLE 20 MG/1
20 CAPSULE, DELAYED RELEASE ORAL DAILY
Status: DISCONTINUED | OUTPATIENT
Start: 2023-11-04 | End: 2023-11-11

## 2023-11-03 RX ORDER — ACETAMINOPHEN 500 MG
1000 TABLET ORAL EVERY 6 HOURS
Status: DISPENSED | OUTPATIENT
Start: 2023-11-03 | End: 2023-11-13

## 2023-11-03 RX ORDER — DEXMEDETOMIDINE HYDROCHLORIDE 4 UG/ML
INJECTION, SOLUTION INTRAVENOUS
Status: DISCONTINUED | OUTPATIENT
Start: 2023-11-03 | End: 2023-11-03 | Stop reason: SURG

## 2023-11-03 RX ORDER — OXYCODONE HYDROCHLORIDE 5 MG/1
5 TABLET ORAL
Status: DISCONTINUED | OUTPATIENT
Start: 2023-11-03 | End: 2023-11-17 | Stop reason: HOSPADM

## 2023-11-03 RX ORDER — SODIUM CHLORIDE 9 MG/ML
INJECTION, SOLUTION INTRAVENOUS CONTINUOUS
Status: DISCONTINUED | OUTPATIENT
Start: 2023-11-03 | End: 2023-11-05

## 2023-11-03 RX ORDER — ACETAMINOPHEN 500 MG
1000 TABLET ORAL ONCE
Status: COMPLETED | OUTPATIENT
Start: 2023-11-03 | End: 2023-11-03

## 2023-11-03 RX ORDER — HYDROMORPHONE HYDROCHLORIDE 2 MG/ML
INJECTION, SOLUTION INTRAMUSCULAR; INTRAVENOUS; SUBCUTANEOUS PRN
Status: DISCONTINUED | OUTPATIENT
Start: 2023-11-03 | End: 2023-11-03

## 2023-11-03 RX ORDER — INSULIN LISPRO 100 [IU]/ML
0-14 INJECTION, SOLUTION INTRAVENOUS; SUBCUTANEOUS
Status: DISCONTINUED | OUTPATIENT
Start: 2023-11-03 | End: 2023-11-03

## 2023-11-03 RX ORDER — HEPARIN SODIUM 1000 [USP'U]/ML
2400 INJECTION, SOLUTION INTRAVENOUS; SUBCUTANEOUS
Status: DISCONTINUED | OUTPATIENT
Start: 2023-11-03 | End: 2023-11-13

## 2023-11-03 RX ORDER — LORAZEPAM 0.5 MG/1
0.5 TABLET ORAL ONCE
Status: COMPLETED | OUTPATIENT
Start: 2023-11-03 | End: 2023-11-03

## 2023-11-03 RX ORDER — PHENYLEPHRINE HCL IN 0.9% NACL 0.5 MG/5ML
SYRINGE (ML) INTRAVENOUS PRN
Status: DISCONTINUED | OUTPATIENT
Start: 2023-11-03 | End: 2023-11-03

## 2023-11-03 RX ORDER — SODIUM CHLORIDE, SODIUM GLUCONATE, SODIUM ACETATE, POTASSIUM CHLORIDE AND MAGNESIUM CHLORIDE 526; 502; 368; 37; 30 MG/100ML; MG/100ML; MG/100ML; MG/100ML; MG/100ML
INJECTION, SOLUTION INTRAVENOUS
Status: DISCONTINUED | OUTPATIENT
Start: 2023-11-03 | End: 2023-11-03

## 2023-11-03 RX ORDER — ACETAMINOPHEN 650 MG/1
650 SUPPOSITORY RECTAL EVERY 4 HOURS PRN
Status: DISCONTINUED | OUTPATIENT
Start: 2023-11-03 | End: 2023-11-17 | Stop reason: HOSPADM

## 2023-11-03 RX ORDER — ASPIRIN 81 MG/1
81 TABLET ORAL DAILY
Status: DISCONTINUED | OUTPATIENT
Start: 2023-11-04 | End: 2023-11-17 | Stop reason: HOSPADM

## 2023-11-03 RX ORDER — ALBUMIN, HUMAN INJ 5% 5 %
SOLUTION INTRAVENOUS PRN
Status: DISCONTINUED | OUTPATIENT
Start: 2023-11-03 | End: 2023-11-03 | Stop reason: SURG

## 2023-11-03 RX ORDER — ALBUMIN (HUMAN) 12.5 G/50ML
50 SOLUTION INTRAVENOUS ONCE
Status: DISCONTINUED | OUTPATIENT
Start: 2023-11-03 | End: 2023-11-03

## 2023-11-03 RX ORDER — EPINEPHRINE HCL IN 0.9 % NACL 4MG/250ML
0-.5 PLASTIC BAG, INJECTION (ML) INTRAVENOUS CONTINUOUS
Status: DISCONTINUED | OUTPATIENT
Start: 2023-11-03 | End: 2023-11-05

## 2023-11-03 RX ORDER — CEFAZOLIN SODIUM 1 G/3ML
INJECTION, POWDER, FOR SOLUTION INTRAMUSCULAR; INTRAVENOUS PRN
Status: DISCONTINUED | OUTPATIENT
Start: 2023-11-03 | End: 2023-11-03

## 2023-11-03 RX ORDER — HEPARIN SODIUM 5000 [USP'U]/ML
5000 INJECTION, SOLUTION INTRAVENOUS; SUBCUTANEOUS EVERY 8 HOURS
Status: DISCONTINUED | OUTPATIENT
Start: 2023-11-04 | End: 2023-11-15

## 2023-11-03 RX ORDER — SODIUM CHLORIDE, SODIUM LACTATE, POTASSIUM CHLORIDE, CALCIUM CHLORIDE 600; 310; 30; 20 MG/100ML; MG/100ML; MG/100ML; MG/100ML
INJECTION, SOLUTION INTRAVENOUS
Status: DISCONTINUED | OUTPATIENT
Start: 2023-11-03 | End: 2023-11-03 | Stop reason: SURG

## 2023-11-03 RX ORDER — CEFAZOLIN SODIUM 1 G/3ML
INJECTION, POWDER, FOR SOLUTION INTRAMUSCULAR; INTRAVENOUS PRN
Status: DISCONTINUED | OUTPATIENT
Start: 2023-11-03 | End: 2023-11-03 | Stop reason: SURG

## 2023-11-03 RX ORDER — EPINEPHRINE HCL IN 0.9 % NACL 4MG/250ML
0-.5 PLASTIC BAG, INJECTION (ML) INTRAVENOUS CONTINUOUS
Status: DISCONTINUED | OUTPATIENT
Start: 2023-11-03 | End: 2023-11-03

## 2023-11-03 RX ORDER — ALUMINA, MAGNESIA, AND SIMETHICONE 2400; 2400; 240 MG/30ML; MG/30ML; MG/30ML
30 SUSPENSION ORAL EVERY 4 HOURS PRN
Status: DISCONTINUED | OUTPATIENT
Start: 2023-11-03 | End: 2023-11-12

## 2023-11-03 RX ORDER — ACETAMINOPHEN 500 MG
1000 TABLET ORAL EVERY 6 HOURS PRN
Status: DISCONTINUED | OUTPATIENT
Start: 2023-11-13 | End: 2023-11-17 | Stop reason: HOSPADM

## 2023-11-03 RX ORDER — ENOXAPARIN SODIUM 100 MG/ML
40 INJECTION SUBCUTANEOUS DAILY
Status: DISCONTINUED | OUTPATIENT
Start: 2023-11-04 | End: 2023-11-03

## 2023-11-03 RX ORDER — POLYETHYLENE GLYCOL 3350 17 G/17G
1 POWDER, FOR SOLUTION ORAL DAILY
Status: DISCONTINUED | OUTPATIENT
Start: 2023-11-04 | End: 2023-11-17 | Stop reason: HOSPADM

## 2023-11-03 RX ORDER — ALBUMIN (HUMAN) 12.5 G/50ML
SOLUTION INTRAVENOUS
Status: COMPLETED
Start: 2023-11-03 | End: 2023-11-03

## 2023-11-03 RX ORDER — BISACODYL 10 MG
10 SUPPOSITORY, RECTAL RECTAL
Status: DISCONTINUED | OUTPATIENT
Start: 2023-11-03 | End: 2023-11-17 | Stop reason: HOSPADM

## 2023-11-03 RX ORDER — CALCIUM CHLORIDE 100 MG/ML
INJECTION INTRAVENOUS; INTRAVENTRICULAR PRN
Status: DISCONTINUED | OUTPATIENT
Start: 2023-11-03 | End: 2023-11-03 | Stop reason: SURG

## 2023-11-03 RX ORDER — METHADONE HYDROCHLORIDE 10 MG/ML
INJECTION, SOLUTION INTRAMUSCULAR; INTRAVENOUS; SUBCUTANEOUS PRN
Status: DISCONTINUED | OUTPATIENT
Start: 2023-11-03 | End: 2023-11-03 | Stop reason: SURG

## 2023-11-03 RX ORDER — ONDANSETRON 2 MG/ML
8 INJECTION INTRAMUSCULAR; INTRAVENOUS EVERY 6 HOURS PRN
Status: DISCONTINUED | OUTPATIENT
Start: 2023-11-03 | End: 2023-11-17 | Stop reason: HOSPADM

## 2023-11-03 RX ORDER — ALBUMIN (HUMAN) 12.5 G/50ML
50 SOLUTION INTRAVENOUS ONCE
Status: COMPLETED | OUTPATIENT
Start: 2023-11-03 | End: 2023-11-03

## 2023-11-03 RX ORDER — OXYCODONE HYDROCHLORIDE 10 MG/1
10 TABLET ORAL
Status: DISCONTINUED | OUTPATIENT
Start: 2023-11-03 | End: 2023-11-17 | Stop reason: HOSPADM

## 2023-11-03 RX ORDER — PROTAMINE SULFATE 10 MG/ML
INJECTION, SOLUTION INTRAVENOUS PRN
Status: DISCONTINUED | OUTPATIENT
Start: 2023-11-03 | End: 2023-11-03

## 2023-11-03 RX ORDER — NITROGLYCERIN 20 MG/100ML
0-100 INJECTION INTRAVENOUS CONTINUOUS
Status: DISCONTINUED | OUTPATIENT
Start: 2023-11-03 | End: 2023-11-05

## 2023-11-03 RX ORDER — SODIUM CHLORIDE 9 MG/ML
INJECTION, SOLUTION INTRAVENOUS ONCE
Status: COMPLETED | OUTPATIENT
Start: 2023-11-03 | End: 2023-11-03

## 2023-11-03 RX ORDER — TRAMADOL HYDROCHLORIDE 50 MG/1
50 TABLET ORAL EVERY 12 HOURS PRN
Status: DISCONTINUED | OUTPATIENT
Start: 2023-11-03 | End: 2023-11-17 | Stop reason: HOSPADM

## 2023-11-03 RX ORDER — ACETAMINOPHEN 325 MG/1
650 TABLET ORAL EVERY 4 HOURS PRN
Status: DISCONTINUED | OUTPATIENT
Start: 2023-11-03 | End: 2023-11-17 | Stop reason: HOSPADM

## 2023-11-03 RX ORDER — MIDAZOLAM HYDROCHLORIDE 1 MG/ML
2 INJECTION INTRAMUSCULAR; INTRAVENOUS
Status: DISCONTINUED | OUTPATIENT
Start: 2023-11-03 | End: 2023-11-04

## 2023-11-03 RX ORDER — AMOXICILLIN 250 MG
2 CAPSULE ORAL 2 TIMES DAILY
Status: DISCONTINUED | OUTPATIENT
Start: 2023-11-03 | End: 2023-11-17 | Stop reason: HOSPADM

## 2023-11-03 RX ORDER — NOREPINEPHRINE BITARTRATE 0.03 MG/ML
0-1 INJECTION, SOLUTION INTRAVENOUS CONTINUOUS
Status: DISCONTINUED | OUTPATIENT
Start: 2023-11-03 | End: 2023-11-03

## 2023-11-03 RX ORDER — CALCIUM CHLORIDE 100 MG/ML
INJECTION INTRAVENOUS; INTRAVENTRICULAR PRN
Status: DISCONTINUED | OUTPATIENT
Start: 2023-11-03 | End: 2023-11-03

## 2023-11-03 RX ORDER — PROCHLORPERAZINE EDISYLATE 5 MG/ML
10 INJECTION INTRAMUSCULAR; INTRAVENOUS EVERY 6 HOURS PRN
Status: DISCONTINUED | OUTPATIENT
Start: 2023-11-03 | End: 2023-11-17 | Stop reason: HOSPADM

## 2023-11-03 RX ORDER — ROCURONIUM BROMIDE 10 MG/ML
INJECTION, SOLUTION INTRAVENOUS PRN
Status: DISCONTINUED | OUTPATIENT
Start: 2023-11-03 | End: 2023-11-03

## 2023-11-03 RX ADMIN — SODIUM CHLORIDE 2 UNITS/HR: 9 INJECTION, SOLUTION INTRAVENOUS at 08:36

## 2023-11-03 RX ADMIN — SODIUM CHLORIDE 1000 ML: 9 INJECTION, SOLUTION INTRAVENOUS at 14:45

## 2023-11-03 RX ADMIN — ALBUMIN (HUMAN) 25 G: 12.5 SOLUTION INTRAVENOUS at 21:03

## 2023-11-03 RX ADMIN — PHENYLEPHRINE HYDROCHLORIDE 200 MCG: 10 INJECTION INTRAVENOUS at 15:45

## 2023-11-03 RX ADMIN — DEXMEDETOMIDINE HYDROCHLORIDE 0.8 MCG/KG/HR: 100 INJECTION, SOLUTION INTRAVENOUS at 19:44

## 2023-11-03 RX ADMIN — SODIUM BICARBONATE 50 MEQ: 84 INJECTION INTRAVENOUS at 14:15

## 2023-11-03 RX ADMIN — NOREPINEPHRINE BITARTRATE 0.1 MCG/KG/MIN: 1 INJECTION, SOLUTION, CONCENTRATE INTRAVENOUS at 07:46

## 2023-11-03 RX ADMIN — COAGULATION FACTOR VIIA (RECOMBINANT) 1000 MCG: KIT at 17:06

## 2023-11-03 RX ADMIN — ROCURONIUM BROMIDE 50 MG: 50 INJECTION, SOLUTION INTRAVENOUS at 08:28

## 2023-11-03 RX ADMIN — HEPARIN SODIUM 30000 UNITS: 1000 INJECTION, SOLUTION INTRAVENOUS; SUBCUTANEOUS at 08:35

## 2023-11-03 RX ADMIN — ROCURONIUM BROMIDE 100 MG: 10 INJECTION, SOLUTION INTRAVENOUS at 15:22

## 2023-11-03 RX ADMIN — CALCIUM CHLORIDE 1 G: 100 INJECTION, SOLUTION INTRAVENOUS; INTRAVENTRICULAR at 11:16

## 2023-11-03 RX ADMIN — Medication 1 APPLICATOR: at 12:15

## 2023-11-03 RX ADMIN — Medication 1 APPLICATOR: at 21:33

## 2023-11-03 RX ADMIN — EPINEPHRINE 0.01 MCG/KG/MIN: 1 INJECTION INTRAMUSCULAR; INTRAVENOUS; SUBCUTANEOUS at 14:09

## 2023-11-03 RX ADMIN — MIDAZOLAM 2 MG: 1 INJECTION, SOLUTION INTRAMUSCULAR; INTRAVENOUS at 18:43

## 2023-11-03 RX ADMIN — SODIUM BICARBONATE 50 MEQ: 84 INJECTION INTRAVENOUS at 14:35

## 2023-11-03 RX ADMIN — NOREPINEPHRINE BITARTRATE 0.09 MCG/KG/MIN: 1 INJECTION, SOLUTION, CONCENTRATE INTRAVENOUS at 15:22

## 2023-11-03 RX ADMIN — HYDROMORPHONE HYDROCHLORIDE 0.5 MG: 2 INJECTION INTRAMUSCULAR; INTRAVENOUS; SUBCUTANEOUS at 08:46

## 2023-11-03 RX ADMIN — SODIUM CHLORIDE: 9 INJECTION, SOLUTION INTRAVENOUS at 07:27

## 2023-11-03 RX ADMIN — VASOPRESSIN 3 UNITS: 20 INJECTION INTRAVENOUS at 15:49

## 2023-11-03 RX ADMIN — METHADONE HYDROCHLORIDE 10 MG: 10 INJECTION, SOLUTION INTRAMUSCULAR; INTRAVENOUS; SUBCUTANEOUS at 08:30

## 2023-11-03 RX ADMIN — ALBUMIN (HUMAN) 50 G: 12.5 SOLUTION INTRAVENOUS at 14:46

## 2023-11-03 RX ADMIN — AMINOCAPROIC ACID 2 G/HR: 250 INJECTION, SOLUTION INTRAVENOUS at 09:05

## 2023-11-03 RX ADMIN — SODIUM CHLORIDE 1000 ML: 9 INJECTION, SOLUTION INTRAVENOUS at 12:27

## 2023-11-03 RX ADMIN — SODIUM CHLORIDE: 9 INJECTION, SOLUTION INTRAVENOUS at 12:57

## 2023-11-03 RX ADMIN — DEXMEDETOMIDINE HYDROCHLORIDE 0.5 MCG/KG/HR: 100 INJECTION, SOLUTION INTRAVENOUS at 10:48

## 2023-11-03 RX ADMIN — SODIUM CHLORIDE, POTASSIUM CHLORIDE, SODIUM LACTATE AND CALCIUM CHLORIDE: 600; 310; 30; 20 INJECTION, SOLUTION INTRAVENOUS at 15:22

## 2023-11-03 RX ADMIN — SODIUM BICARBONATE 50 MEQ: 84 INJECTION, SOLUTION INTRAVENOUS at 16:46

## 2023-11-03 RX ADMIN — MIDAZOLAM HYDROCHLORIDE 2 MG: 2 INJECTION, SOLUTION INTRAMUSCULAR; INTRAVENOUS at 07:34

## 2023-11-03 RX ADMIN — PHENYLEPHRINE HYDROCHLORIDE 0.5 MCG/KG/MIN: 100 INJECTION INTRAVENOUS at 15:12

## 2023-11-03 RX ADMIN — METHADONE HYDROCHLORIDE 10 MG: 10 INJECTION, SOLUTION INTRAMUSCULAR; INTRAVENOUS; SUBCUTANEOUS at 07:37

## 2023-11-03 RX ADMIN — PHENYLEPHRINE HYDROCHLORIDE 300 MCG: 10 INJECTION INTRAVENOUS at 15:46

## 2023-11-03 RX ADMIN — VASOPRESSIN 0.03 UNITS/MIN: 20 INJECTION INTRAVENOUS at 15:22

## 2023-11-03 RX ADMIN — ROCURONIUM BROMIDE 50 MG: 50 INJECTION, SOLUTION INTRAVENOUS at 10:28

## 2023-11-03 RX ADMIN — SODIUM CHLORIDE: 9 INJECTION, SOLUTION INTRAVENOUS at 06:50

## 2023-11-03 RX ADMIN — ALBUMIN (HUMAN) 250 ML: 2.5 SOLUTION INTRAVENOUS at 11:33

## 2023-11-03 RX ADMIN — PROTAMINE SULFATE 250 MG: 10 INJECTION, SOLUTION INTRAVENOUS at 10:46

## 2023-11-03 RX ADMIN — SODIUM CHLORIDE, SODIUM GLUCONATE, SODIUM ACETATE, POTASSIUM CHLORIDE AND MAGNESIUM CHLORIDE: 526; 502; 368; 37; 30 INJECTION, SOLUTION INTRAVENOUS at 15:27

## 2023-11-03 RX ADMIN — LORAZEPAM 0.5 MG: 0.5 TABLET ORAL at 06:54

## 2023-11-03 RX ADMIN — SODIUM CHLORIDE: 9 INJECTION, SOLUTION INTRAVENOUS at 13:34

## 2023-11-03 RX ADMIN — METHADONE HYDROCHLORIDE 10 MG: 10 INJECTION, SOLUTION INTRAMUSCULAR; INTRAVENOUS; SUBCUTANEOUS at 17:03

## 2023-11-03 RX ADMIN — MIDAZOLAM 2 MG: 1 INJECTION, SOLUTION INTRAMUSCULAR; INTRAVENOUS at 18:36

## 2023-11-03 RX ADMIN — PROPOFOL 150 MG: 10 INJECTION, EMULSION INTRAVENOUS at 07:45

## 2023-11-03 RX ADMIN — METOPROLOL TARTRATE 12.5 MG: 25 TABLET, FILM COATED ORAL at 06:50

## 2023-11-03 RX ADMIN — HEPARIN SODIUM 2400 UNITS: 1000 INJECTION, SOLUTION INTRAVENOUS; SUBCUTANEOUS at 22:25

## 2023-11-03 RX ADMIN — NOREPINEPHRINE BITARTRATE 0.04 MCG/KG/MIN: 1 INJECTION, SOLUTION, CONCENTRATE INTRAVENOUS at 23:50

## 2023-11-03 RX ADMIN — DEXMEDETOMIDINE HYDROCHLORIDE 1 MCG/KG/HR: 100 INJECTION, SOLUTION INTRAVENOUS at 17:09

## 2023-11-03 RX ADMIN — VASOPRESSIN 0.03 UNITS/MIN: 20 INJECTION INTRAVENOUS at 14:34

## 2023-11-03 RX ADMIN — SODIUM CHLORIDE, SODIUM GLUCONATE, SODIUM ACETATE, POTASSIUM CHLORIDE AND MAGNESIUM CHLORIDE: 526; 502; 368; 37; 30 INJECTION, SOLUTION INTRAVENOUS at 16:35

## 2023-11-03 RX ADMIN — DESMOPRESSIN ACETATE 25 MCG: 4 INJECTION INTRAVENOUS; SUBCUTANEOUS at 10:46

## 2023-11-03 RX ADMIN — SODIUM CHLORIDE, SODIUM GLUCONATE, SODIUM ACETATE, POTASSIUM CHLORIDE AND MAGNESIUM CHLORIDE: 526; 502; 368; 37; 30 INJECTION, SOLUTION INTRAVENOUS at 08:00

## 2023-11-03 RX ADMIN — CEFAZOLIN 2 G: 1 INJECTION, POWDER, FOR SOLUTION INTRAMUSCULAR; INTRAVENOUS at 15:24

## 2023-11-03 RX ADMIN — CALCIUM CHLORIDE 1 G: 100 INJECTION, SOLUTION INTRAVENOUS; INTRAVENTRICULAR at 17:07

## 2023-11-03 RX ADMIN — Medication 200 MCG: at 08:59

## 2023-11-03 RX ADMIN — SODIUM CHLORIDE 4 UNITS/HR: 9 INJECTION, SOLUTION INTRAVENOUS at 15:22

## 2023-11-03 RX ADMIN — ROCURONIUM BROMIDE 50 MG: 50 INJECTION, SOLUTION INTRAVENOUS at 07:45

## 2023-11-03 RX ADMIN — AMIODARONE HYDROCHLORIDE 1 MG/MIN: 1.8 INJECTION, SOLUTION INTRAVENOUS at 21:29

## 2023-11-03 RX ADMIN — COAGULATION FACTOR VIIA (RECOMBINANT) 1000 MCG: KIT at 14:40

## 2023-11-03 RX ADMIN — AMINOCAPROIC ACID 8.53 G: 250 INJECTION, SOLUTION INTRAVENOUS at 08:35

## 2023-11-03 RX ADMIN — ACETAMINOPHEN 1000 MG: 500 TABLET, FILM COATED ORAL at 06:49

## 2023-11-03 RX ADMIN — CEFAZOLIN 2 G: 1 INJECTION, POWDER, FOR SOLUTION INTRAMUSCULAR; INTRAVENOUS at 08:25

## 2023-11-03 RX ADMIN — SODIUM CHLORIDE, POTASSIUM CHLORIDE, SODIUM LACTATE AND CALCIUM CHLORIDE: 600; 310; 30; 20 INJECTION, SOLUTION INTRAVENOUS at 16:35

## 2023-11-03 RX ADMIN — CALCIUM CHLORIDE 1 G: 100 INJECTION, SOLUTION INTRAVENOUS; INTRAVENTRICULAR at 15:47

## 2023-11-03 RX ADMIN — LIDOCAINE HYDROCHLORIDE 60 MG: 20 INJECTION, SOLUTION EPIDURAL; INFILTRATION; INTRACAUDAL at 07:45

## 2023-11-03 RX ADMIN — CALCIUM CHLORIDE 1000 MG: 100 INJECTION, SOLUTION INTRAVENOUS at 12:58

## 2023-11-03 RX ADMIN — ROCURONIUM BROMIDE 50 MG: 50 INJECTION, SOLUTION INTRAVENOUS at 08:59

## 2023-11-03 RX ADMIN — CEFAZOLIN 2 G: 2 INJECTION, POWDER, FOR SOLUTION INTRAMUSCULAR; INTRAVENOUS at 23:48

## 2023-11-03 RX ADMIN — SODIUM BICARBONATE 50 MEQ: 84 INJECTION INTRAVENOUS at 15:10

## 2023-11-03 ASSESSMENT — COPD QUESTIONNAIRES
DO YOU EVER COUGH UP ANY MUCUS OR PHLEGM?: NO/ONLY WITH OCCASIONAL COLDS OR INFECTIONS
DURING THE PAST 4 WEEKS HOW MUCH DID YOU FEEL SHORT OF BREATH: NONE/LITTLE OF THE TIME
HAVE YOU SMOKED AT LEAST 100 CIGARETTES IN YOUR ENTIRE LIFE: NO/DON'T KNOW
COPD SCREENING SCORE: 1

## 2023-11-03 ASSESSMENT — PAIN DESCRIPTION - PAIN TYPE
TYPE: SURGICAL PAIN

## 2023-11-03 ASSESSMENT — FIBROSIS 4 INDEX: FIB4 SCORE: 0.86

## 2023-11-03 ASSESSMENT — PULMONARY FUNCTION TESTS: FVC: 1.1

## 2023-11-03 ASSESSMENT — ENCOUNTER SYMPTOMS
MUSCULOSKELETAL NEGATIVE: 1
GASTROINTESTINAL NEGATIVE: 1
PSYCHIATRIC NEGATIVE: 1
CHILLS: 0
SHORTNESS OF BREATH: 1
EYES NEGATIVE: 1
WEIGHT LOSS: 0
NEUROLOGICAL NEGATIVE: 1

## 2023-11-03 NOTE — CARE PLAN
The patient is Unstable - High likelihood or risk of patient condition declining or worsening      Progress made toward(s) clinical / shift goals:  returned from OR after hematoma evacuation on less pressor support and less output from mediastinal tube, plan to extubate in 6 hours if stable    Patient is not progressing towards the following goals:      Problem: Day of surgery post CABG/Heart valve replacement  Goal: Stabilization in immediate post op period  Outcome: Progressing  Intervention: VS q 15 min x 4 hours, then q 1 hour. Include temperature immediately upon arrival. Check CO/CI q 2-4 hours and PRN  Note: [place] not in place; vitals performed q15min and via continuous arterial line.   Intervention: If radial artery used, elevate arm, no BP checks or needle sticks from affected arm, monitor ulnar pulse and capillary refill  Note: Radial artery not used  Intervention: First post op hour labs and EKG per order  Note: EKG shows junctional rhythm 50-55, decision made to pace at 90 bpm, after returning from OR patient is sinus rhythm  Intervention: Serum K q 6 hours x 24 hours.  ABG and CBC prn.  Note: Completed; patient is ESRD with nightly periotneal dialysis, did not require potassium or magnesium replacement  Intervention: Initiate post cardiac insulin infusion protocol orders for FSBS greater than 140 and check frequency per protocol  Note: Insulin running from OR; BG checked Q1hour  Intervention: FSBS frequency as per Cardiac Surgery Insulin Drip Protocol  Note: Checked Q1 hour  Intervention: For patients on Beta Blockers: verify dose given prior to surgery or within 6 hours after arrival to the unit  Note: Beta blocker given in pre op  Intervention: Chest tube to 20 cm suction, record CT drainage with VS, and check for air leak  Note: Air leak not present, CT drainage checked per protocol  Intervention: For CT drainage >300 mL in first hour post op and/or 150 mL in subsequent hours: Stat platelets, PT, INR,  TEG, iSTAT, and H&H per order  Note: Chest tube output 875 for first hour on floor, APRN aware, 2 FFP and 1 cryo given shortly after arrival to unit; APRN Daniel suctioned mediastinal tube and evacuated small amount of clot. Drainage from chest tube slowed and was at a total of 1200mL before patient returned to OR for hematoma evacuation. After OR output more stable  Intervention: VAP protocol in place  Note: Oral care performed x1  Intervention: IS q 1 hour while awake post extubation  Note: Unable to perform  Intervention: Bedrest until extubated and groin lines out  Note: completed  Intervention: Dangle within 4 hours post extubation  Note: Unable to complete  Intervention: Up in chair 4 hours, day of extubation  Note: Unable to complete  Intervention: Maintain all original surgical dressings per provider orders and specifications  Note: Surgical dressing maintained, island dressing on mediastinal incision in place  Intervention: Discontinue [place] ken and arterial line 12-18 hours post op if hemodynamically stable and off vasoactive drips  Note: [place] ken not in place, arterial line present at end of shift  Intervention: A-Fib and DVT prophylaxis per MD order or contraindications documented (refer to DVT/VTE problem on Care Plan)  Note: SCDs worn while in bed; no occurrence of afib  Intervention: Amiodarone protocol per MD order  Note: Not applicable       Problem: Pain - Standard  Goal: Alleviation of pain or a reduction in pain to the patient’s comfort goal  Outcome: Progressing  Note: CPOT 0

## 2023-11-03 NOTE — PROGRESS NOTES
Carmina at bedside to suction chest tubes. Few clots removed. 5-10cc total blood removed with suctioning. 1g calcium ordered. Decision made to hold Factor VII at this time.

## 2023-11-03 NOTE — DIETARY
Nutrition Services: Day 0 of admit.  Gurdeep Guerra is a 58 y.o. male with admitting DX of Severe aortic stenosis.    Consult received for Cardiac Rehab Diet Education.  S/p AORTIC VALVE REPLACEMENT, ASCENDING AORTIC ANEURYSM REPAIR.  Specific cardiac diet education not indicated for the above Dx/procedures.

## 2023-11-03 NOTE — PROGRESS NOTES
Updated Carmina that pt bp dropped to 50's. Started pacing pt at 90 and increased Levo to .4 from .2. Pt had received 2 FFP and 1 cryo. 1L out of chest tube.   Was able to not pace and reduce vasopressor support after approx 5 min

## 2023-11-03 NOTE — ANESTHESIA PROCEDURE NOTES
Central Venous Line    Performed by: Robert Mayo M.D.  Authorized by: Robert Mayo M.D.    Start Time:  11/3/2023 7:56 AM  Patient Location:  OR  Indication: central venous access and hemodynamic monitoring        provider hand hygiene performed prior to central venous catheter insertion, all 5 sterile barriers used (gloves, gown, cap, mask, large sterile drape) during central venous catheter insertion and skin prep agent completely dried prior to procedure    Patient Position:  Trendelenburg  Laterality:  Right  Site:  Subclavian  Prep:  Chlorhexidine  Catheter Size:  7 Fr  Catheter Length (cm):  20  Number of Lumens:  Triple lumen  target vein identified, needle advanced into vein and blood aspirated and guidewire advanced into vein    Seldinger Technique?: Yes    Intravenous Verification: venous blood return and chest x-ray pending    all ports aspirated, all ports flushed easily, guidewire was removed intact, biopatch was applied, line was sutured in place and dressing was applied    Events: patient tolerated procedure well with no complications     Placed with one needle pass

## 2023-11-03 NOTE — ASSESSMENT & PLAN NOTE
On PD normally  No transitioning to HD for the time being as PD was causing fluid shifts including increased CT output  Requiring vasopressor support to tolerate HD iso hypotension    Minimal edema, no significant VOL

## 2023-11-03 NOTE — ASSESSMENT & PLAN NOTE
AVR and Ao root repair with Dr. Blanca 11/3  C/b focal pericardial tamponade that was managed operatively  Appropriate post-cardiac surgery protocols are in place

## 2023-11-03 NOTE — ANESTHESIA PREPROCEDURE EVALUATION
Case: [id number] Date/Time: 11/03/23 0715    Procedures:       AORTIC VALVE REPLACEMENT, ASCENDING AORTIC ANEURYSM REPAIR, POSSIBLE AORTIC ROOT REPLACEMENT, TRANSESOPHAGEAL ECHOCARDIOGRAM (Chest)      REPAIR, ANEURYSM OR DISSECTION, AORTA, ASCENDING (Chest)      ECHOCARDIOGRAM, TRANSESOPHAGEAL, INTRAOPERATIVE (Esophagus)    Anesthesia type: General    Pre-op diagnosis: SEVERE AORTIC STENOSIS    Location: [place] [id number] / SURGERY [facility]    Surgeons: Osmel Blanca M.D.        Past Medical History:   Diagnosis Date   • Anesthesia     Hiccups for over 24 hours after a previous sugery.   • Aortic stenosis    • Chronic gout of multiple sites    • Dialysis patient ([facility])     peritoneal daily   • Dyspnea on exertion 05/23/2023   • Elevated troponin 05/23/2023   • Heart burn     1990   • Heart murmur    • High cholesterol     All always   • Hypertension 2009   • NSTEMI (non-ST elevated myocardial infarction) ([facility]) 05/23/2023    no stents placed   • Pain in the chest 05/23/2023   • PONV (postoperative nausea and vomiting)    • Renal disorder 2007    Stage IV   • Sleep apnea 2000   • Snoring 1990     Past Surgical History:   Procedure Laterality Date   • CATH PLACEMENT CAPD Right 9/6/2023    Procedure: LAPAROSCOPIC INSERTION OF PERITONEAL DIALYSIS CATHETER;  Surgeon: Dontrell Sales M.D.;  Location: SURGERY [facility];  Service: Vascular   • VT INJ LUMBAR/SACRAL,W/ IMAGING Left 8/24/2023    Procedure: LUMBAR EPIDURAL STEROID INJECTION, LEFT L5-S1 INTERLAMINAR UNDER FLUOROSCOPY;  Surgeon: Benito Herrera D.O.;  Location: SURGERY [facility];  Service: Orthopedics   • CATH PLACEMENT CAPD N/A 5/15/2023    Procedure: LAPAROSCOPIC PLACEMENT OF PERITONEAL DIALYSIS CATHERTER;  Surgeon: Shikha Alexander M.D.;  Location: SURGERY SAME DAY [facility];  Service: General   • UMBILICAL HERNIA REPAIR  2012    4 separate surgeries between [id number]   • OTHER  2007    Kidney biopsy, can't recall laterality,   • HIP ARTHROSCOPY Bilateral 2002   •  TONSILLECTOMY N/A 1987   • HAND SURGERY Right 1985    Hand and wrist   • SHOULDER ARTHROSCOPY      Can't recall date     Current Outpatient Medications   Medication Instructions   • aspirin 81 mg, Oral, DAILY   • atorvastatin (LIPITOR) 40 mg, Oral, NIGHTLY   • betamethasone dipropionate 0.05 % Cream APPLY CREAM TOPICALLY TWICE DAILY TO AFFECTED AREA   • CALCIUM PO Oral   • esomeprazole (NEXIUM) 40 mg, Oral, NIGHTLY   • Ferrous Sulfate (IRON PO) Oral   • hydrocortisone 2.5 % Ointment Topical, PRN   • nitroglycerin (NITROSTAT) 0.4 mg, Sublingual, PRN   • Peritoneal Dialysis Solutions (ULTRABAG/DIANEAL PD-2/2.5% DEX) 396 MOSM/L Solution Intraperitoneal   • tamsulosin (FLOMAX) 0.4 mg, Oral, DAILY   • verapamil ER (CALAN SR) 240 mg, Oral, DAILY     Vitals:    11/03/23 0515   BP: (!) 147/81   Pulse: 82   Resp:    Temp:    SpO2: 98%     Allergies   Allergen Reactions   • Allopurinol Itching   • Hydralazine Hcl Unspecified     Pt states he had a reaction similar to lupus     Lab Results   Component Value Date/Time    SODIUM 142 10/31/2023 10:25 AM    POTASSIUM 5.1 10/31/2023 10:25 AM    CHLORIDE 104 10/31/2023 10:25 AM    CO2 26 10/31/2023 10:25 AM    GLUCOSE 101 (H) 10/31/2023 10:25 AM    BUN 73 (H) 10/31/2023 10:25 AM    CREATININE 9.53 (HH) 10/31/2023 10:25 AM    BUNCREATRAT 17 05/09/2022 09:49 AM    GLOMRATE 7 (L) 04/02/2023 11:54 AM      Lab Results   Component Value Date/Time    WBC 5.8 10/31/2023 09:25 AM    RBC 4.90 10/31/2023 09:25 AM    HEMOGLOBIN 16.2 10/31/2023 09:25 AM    HEMATOCRIT 48.2 10/31/2023 09:25 AM    MCV 98.4 (H) 10/31/2023 09:25 AM    MCH 33.1 (H) 10/31/2023 09:25 AM    MCHC 33.6 10/31/2023 09:25 AM    MPV 9.8 10/31/2023 09:25 AM    NEUTSPOLYS 77.40 (H) 10/31/2023 09:25 AM    LYMPHOCYTES 11.90 (L) 10/31/2023 09:25 AM    MONOCYTES 6.70 10/31/2023 09:25 AM    EOSINOPHILS 3.50 10/31/2023 09:25 AM    BASOPHILS 0.30 10/31/2023 09:25 AM      Lab Results   Component Value Date/Time    PROTHROMBTM 14.0  10/31/2023 09:25 AM    INR 1.06 10/31/2023 09:25 AM          TTE 5/2023:  CONCLUSIONS  No prior study is available for comparison.   Normal left ventricular systolic function.  The left ventricular ejection fraction is visually estimated to be 60%.  Mild mitral regurgitation.  Calcified aortic valve leaflets.  Moderate aortic valve stenosis.  Aortic valve area calculated from the continuity equation is 1.3 cm2.  Vmax is 3.7 m/s. Transvalvular gradients are - Peak: 55 mmHg, Mean: 36 mmHg.  Moderate to severe aortic insufficiency.  Mild tricuspid regurgitation.  Estimated right ventricular systolic pressure is 40 mmHg.    [place] [facility]:  1.  Type II MI due to ESRD with superimposed hypertensive emergency  2. Obstructive one-vessel coronary artery disease involving total occlusions of the terminal circumflex and first obtuse marginal-supplying a small area of myocardium  3.  Diffuse, nonobstructive coronary atherosclerosis in other segments  4.  At least moderate aortic stenosis with invasive mean gradient of 33 mmHg and moderate aortic insufficiency by noninvasive evaluation  5.  Mild elevation LVEDP at 17 mmHg    Relevant Problems   PULMONARY   (positive) Dyspnea on exertion      CARDIAC   (positive) Aortic stenosis   (positive) Coronary artery disease due to lipid rich plaque   (positive) Dyspnea on exertion   (positive) Heart murmur   (positive) Hypertension      GI   (positive) Gastroesophageal reflux disease         (positive) FSGS (focal segmental glomerulosclerosis)   (positive) Stage 5 chronic kidney disease not on chronic dialysis (HCC)      Other   (positive) Chronic gout of multiple sites       Physical Exam    Airway   Mallampati: II  TM distance: >3 FB  Neck ROM: full       Cardiovascular   Rhythm: regular  Rate: normal  (+) murmur     Dental - normal exam           Pulmonary - normal exam  Breath sounds clear to auscultation     Abdominal    Neurological - normal exam                  Anesthesia Plan    ASA 4 (Aortic stenosis, CAD)       Plan - general       Airway plan will be ETT  CARMELITA Planned  (Arterial and central lines)      Induction: intravenous    Postoperative Plan: Postoperative administration of opioids is intended.    Pertinent diagnostic labs and testing reviewed    Informed Consent:    Anesthetic plan and risks discussed with patient.    Use of blood products discussed with: patient whom consented to blood products.       Patient fully consented for general anesthesia. Anesthetic procedure and risks discussed in detail. Risks include but are not limited to PONV, pain, sore throat, damage to teeth/lips/gums, positioning injury, allergic reaction, vocal cord or esophageal injury, and/or cardiopulmonary problems up to and including death. Patient indicates complete understanding. All patient questions answered to full satisfaction and they agree to proceed as planned.

## 2023-11-03 NOTE — PROGRESS NOTES
1335: Pt starting to wake up. Opens eyes slightly to voice. Not following commands. Initiating his own breaths on ASV.  BP dropped again to the 50's tirtrated up to 0.6 on levo with minimal change. Fluids also being infused.  1340: Updated Carmina of above and that new H&H arrived with result of 9.7 and 27. And 185 out of chest tube over last hour. Total output:1060.  1350: Updated Carmina on starting pacing pt again at 90 to assist bp and up to 0.8 on levo and 1L of NS infused with bp remaining 50-60's    Orders received to start vaso, turn up pacing to 100 and assess efficacy. Infuse 1 PRBC

## 2023-11-03 NOTE — ANESTHESIA PROCEDURE NOTES
Airway    Date/Time: 11/3/2023 7:46 AM    Performed by: Robert Mayo M.D.  Authorized by: Robert Mayo M.D.    Location:  OR  Urgency:  Elective  Indications for Airway Management:  Anesthesia      Spontaneous Ventilation: absent    Sedation Level:  Deep  Preoxygenated: Yes    Patient Position:  Sniffing  Final Airway Type:  Endotracheal airway  Final Endotracheal Airway:  ETT  Cuffed: Yes    Technique Used for Successful ETT Placement:  Direct laryngoscopy    Insertion Site:  Oral  Blade Type:  Donna  Laryngoscope Blade/Videolaryngoscope Blade Size:  4  ETT Size (mm):  8.0  Measured from:  Teeth  ETT to Teeth (cm):  22  Placement Verified by: auscultation and capnometry    Cormack-Lehane Classification:  Grade I - full view of glottis  Number of Attempts at Approach:  1

## 2023-11-03 NOTE — ANESTHESIA PROCEDURE NOTES
Arterial Line    Performed by: Robert Mayo M.D.  Authorized by: Robert Mayo M.D.    Start Time:  11/3/2023 7:40 AM  Localization: surface landmarks    Patient Location:  OR  Indication: continuous blood pressure monitoring        Catheter Size:  20 G  Seldinger Technique?: Yes    Laterality:  Left  Site:  Radial artery  Line Secured:  Antimicrobial disc, tape and transparent dressing  Events: patient tolerated procedure well with no complications and hematoma     Attempted R. Unable to thread. + hematoma. Pressure dressing. One attempt on L.

## 2023-11-03 NOTE — ANESTHESIA PROCEDURE NOTES
CARMELITA    Date/Time: 11/3/2023 8:03 AM    Performed by: Robert Mayo M.D.  Authorized by: Robert Mayo M.D.    Start Time:11/3/2023 8:03 AM  Preanesthetic Checklist: patient identified, IV checked, site marked, risks and benefits discussed, surgical consent, monitors and equipment checked, pre-op evaluation and timeout performed    Indication for CARMELITA: diagnostic   Patient Location: OR  Intubated: Yes  Bite Block: Yes  Heart Visualized: Yes  Insertion: atraumatic    **See FULL CARMELITA report in patient's chart via CV Synapse**

## 2023-11-03 NOTE — PROGRESS NOTES
1415: Dr. Marrufo called to bedside to perform bedside echo as Bp's remained 50-60's now maxed on levo. Awaiting vaso to initiate.   Orders received to pressure bag fluids in. 1 PRBC pressure bagged in along with 1.5L NS. 50 mEq of bicarb given for ABG per protocol. Stat Echo ordered.   Dr. Marrufo called and spoke with Carmina TOLLIVER requesting her and Evangelist to bedside.    1420: Factor VII ordered. BP remains 50-60's with aggressive fluid administration and pressor support. Chest tubes suctioned by Dr. Marrufo. Small clots removed 10-15ml blood removed. Pt placed on CMV mode for vent per Dr. Marrufo.     1440: BP starting to increase to 80-90's. Vaso gtt started. Factor VII given. Dr Marrufo ordered albumin 25% 50g. Another 50mEq bicarb ordered and given. also phenylephrine ordered as a back up if we need more vasopressor support. Dex increased to 1 per MD.    1445: Dr Blanca and Carmina at bedside. Chest tubes suctioned by MD with a few small clots removed. Decision made to return to OR. OR team called and Pt prepped to return to OR. Messaged left with significant other to call unit for update.    1515: New blood gas obtained. Additional 50mEq given per protocol. Updated significant other over phone of plan to return to OR. Understands plan of care. Pt transported with Dr. Bland and OR staff to OR on Zoll monitor.

## 2023-11-03 NOTE — ANESTHESIA TIME REPORT
Anesthesia Start and Stop Event Times     Date Time Event    11/3/2023 0719 Ready for Procedure     0727 Anesthesia Start     1201 Anesthesia Stop        Responsible Staff  11/03/23    Name Role Begin End    Robert Mayo M.D. Anesth 0727 1201        Overtime Reason:  no overtime (within assigned shift)    Comments: Arterial line with U/S, central line with U/S, and complete CARMELITA exam

## 2023-11-03 NOTE — OR SURGEON
OPERATIVE REPORT    Operation date: 11/3/2023    Referring physician: Dontrell Sharma MD    PreOp Diagnosis: Moderate to severe aortic stenosis and regurgitation, ascending aortic aneurysm (4.5 cm by echo), ESRD on peritoneal dialysis, hypertension, dyslipidemia    PostOp Diagnosis: Moderate to severe aortic stenosis and regurgitation, ascending aortic aneurysm (4.5 cm by echo), ESRD on peritoneal dialysis, hypertension, dyslipidemia    Procedure(s):  AORTIC VALVE REPLACEMENT (23 mm On-X mechanical valve), ASCENDING AORTIC ANEURYSM REPAIR (30 mm Hemashield tube graft) and intraoperative transesophageal echocardiography    Surgeon(s):  Osmel Blanca M.D.    Assistant:  CALI Dsouza    Anesthesiologist/Type of Anesthesia:  Anesthesiologist: Robert Mayo M.D./General    Surgical Staff:  Circulator: Agusitn Lewis R.N.  Perfusionist: Anna Riley Circulator: Mayra Barrett R.N.  Scrub Person: Ame Williamson Assist: TEX Lyle    Specimens removed if any:  ID Type Source Tests Collected by Time Destination   A : ascending aortic aneurysm Tissue Heart PATHOLOGY SPECIMEN Osmel Blanca M.D. 11/3/2023  9:11 AM    B : aortic Tissue Heart Valve PATHOLOGY SPECIMEN Osmel Blanca M.D. 11/3/2023  9:19 AM      Estimated Blood Loss: Minimal    Findings: Moderate aortic stenosis and regurgitation, ascending aortic aneurysm (4.5 cm)    Complications: None    Indications:  Mr. Guerra is a 58-year-old male with end-stage renal disease on peritoneal dialysis who has moderate aortic stenosis and regurgitation and a 4.5 cm ascending aortic aneurysm.    Procedure:  The patient was brought to the operating room and placed on the operating room table in the supine position.  After successful induction of general anesthesia endotracheal intubation, the patient was prepped and draped in the usual sterile fashion.  CALI Dsouza assisted with retraction and exposure during the  operation and closed the sternal wound.  Preoperative transesophageal echocardiography showed moderate aortic stenosis and regurgitation and good left ventricular ejection fraction of approximately 60%.    An incision was made from the sternal notch to the xiphoid.  The sternum was opened longitudinally with a sternal saw.  Hemostasis was obtained with electrocautery at the sternal edges.  The patient was systemically heparinized.  The pericardium was opened longitudinally and tented anteriorly with Ethibond stay stitches.  A 22 [place] femoral arterial cannula was placed in the proximal arch of the aorta in the usual fashion followed by a dual stage venous cannula.  An antegrade cardioplegia cannula was placed in the distal ascending aorta.  Cardiopulmonary bypass was instituted.  The aorta was crossclamped and the patient was given 1 L of cold [place] cardioplegia in an antegrade fashion.  There was prompt cardiac arrest.  Ice slush was placed on the heart for further myocardial protection.  A phrenic nerve protector pad was used.    The mid the ascending aorta was aneurysmal and visually appeared to be 4.5 cm.  It was excised from the sinotubular junction to just below the aortic cross-clamp.  The aortic valve was tricuspid with moderate to severe calcifications.  The aortic valve leaflets were excised and the annulus was sharply debrided with a rongeur.  Pledgeted #2-0 Ethibond stitches were then placed around the aortic valve annulus and horizontal mattress fashion with the pledgets in the subannular position.  A 23 mm On-X mechanical valve was then placed in the aortic valve annulus and secured in place with the Ethibond stitches utilizing the CorKnot device.  The valve was properly positioned and both coronary ostia were visualized and were patent.  Rewarming of the patient was initiated.  A proximal anastomosis was performed between a 30 mm Hemashield tube graft and the aorta at the sinotubular junction  using a #5-0 Prolene suture in a running fashion.  A distal anastomosis was performed between the distal ascending aorta and the Hemashield tube graft using a #5-0 Prolene suture in a running fashion.  Approximately 300 mL of warm blood was given in an antegrade fashion and the aortic cross-clamp was removed.  Aortic cross-clamp time was 87 minutes and total cardiopulmonary bypass time was 102 minutes.  The left ventricle was de-aired in the usual fashion.  The carbon dioxide which had been released over the operative field during the operation was discontinued.  A straight and an angled 32 [place] chest tubes were placed in the mediastinum.  Temporary epicardial ventricular pacemaker wires were inserted.  There was spontaneous conversion into sinus rhythm.  The antegrade cardioplegia cannula was removed.  When he was adequately warmed, slowly taken off cardiopulmonary bypass which he tolerated well.  The dual stage venous cannula was removed.  Protamine was given to reverse the effects of the heparin.  The aortic cannula was removed.  When adequate hemostasis had been obtained, the sternum was reapproximated using size 5 sternal wires and the remainder of the incision was closed in several layers using Vicryl sutures.    Intraoperative transesophageal echocardiography showed a properly positioned and functioning aortic valve mechanical prosthesis without any paravalvular leaks left ventricular ejection fraction remained normal at approximately 60%.  There were no apparent complications.  The patient tolerated the procedure well and left the operating room in guarded condition.      11/3/2023 11:15 AM Osmel Blanca MD, FACS

## 2023-11-03 NOTE — PROGRESS NOTES
Received orders for 2 FFp and 1 cryo upon arrival to unit from [facility].  Updated Carmina APRN of 210 output in first 15 min from chest tubes

## 2023-11-03 NOTE — ANESTHESIA PROCEDURE NOTES
CARMELITA    Date/Time: 11/3/2023 3:27 PM    Performed by: Danie Bland M.D.  Authorized by: Danie Bland M.D.    Start Time:11/3/2023 3:27 PM  Preanesthetic Checklist: patient identified, IV checked, site marked, risks and benefits discussed, surgical consent, monitors and equipment checked, pre-op evaluation and timeout performed    Indication for CARMELITA: diagnostic   Patient Location: OR  Intubated: Yes  Bite Block: No  Heart Visualized: Yes  Insertion: atraumatic    **See FULL CARMELITA report in patient's chart via [facility]**

## 2023-11-03 NOTE — PROGRESS NOTES
Pt arrived to unit s/p AVReplace and AAA repair. Report received from Anesthesiologist Gael. V wires in place Pacer tested back up rate 50bpm 10Ma 2Mv. Temperature 36.7 no Altagracia hugger applied at this time. Pt has peritoneal dialysis catheter in place on R upper quadrant. Consult placed to nephrologist for dialysis plan of care.

## 2023-11-03 NOTE — CONSULTS
Critical Care Consultation    Date of consult: 11/3/2023    Referring Physician  Osmel Blanca M.D.    Reason for Consultation  Post-AVR    History of Presenting Illness  58 y.o. male who presented 11/3/2023 for aortic valve replacement and aortic root repair.  He has a history of ESRD on PD (and on transplant list) d/t FSGS, HTN, DM, coronary disease. Underwent uneventful procedure today with Dr. Blanca.  Returns to the ICU on precedex and levophed.     During his post-op course patient developed significant hypotension and progressed to a junctional rhythm.  Pacing was initiated and vasopressors titrated. Patient became progressively hypotensive so I was called to bedside.  POCUS showed a fluid collection compressing the RA, which I communicated emergently to the cardiothoracic team.  I suctioned his mediastinal tubes with some small clot output. I escalate vasopressors including adding vasopressin and phenylephrine and uptitrating levophed.  I also gave volume rapidly including PRBC, bicarb, albumin and crystalloid. Factor VII given her [facility]S request. Ventilator switched to APV-CMV and rate increased given acidosis. Sonographer TTE confirmed my finding, but between my images and the echo tech the fluid collection developed the appearance of clot rather than simple fluid. [facility] service arrived at bedside and will take patient back to the OR.  I gave signout directly to the anesthesiologist.     Code Status  Full Code    Review of Systems  Review of Systems   Unable to perform ROS: Intubated       Past Medical History   has a past medical history of Anesthesia, Aortic stenosis, Chronic gout of multiple sites, Dialysis patient (Formerly [facility]), Dyspnea on exertion (05/23/2023), Elevated troponin (05/23/2023), Heart burn, Heart murmur, High cholesterol, Hypertension (2009), NSTEMI (non-ST elevated myocardial infarction) (Formerly [facility]) (05/23/2023), Pain in the chest (05/23/2023), PONV (postoperative nausea and vomiting), Renal disorder  (2007), Sleep apnea (2000), and Snoring (1990).    He has no past medical history of Acute nasopharyngitis, Anginal syndrome (HCC), Arrhythmia, Asthma, Blood clotting disorder (HCC), Bowel habit changes, Breath shortness, Bronchitis, Cancer (HCC), Carcinoma in situ of respiratory system, Cataract, Congestive heart failure (HCC), Continuous ambulatory peritoneal dialysis status (HCC), COPD (chronic obstructive pulmonary disease) (HCC), Coughing blood, Dental disorder, Diabetes (HCC), Disorder of thyroid, Emphysema of lung (HCC), Glaucoma, Gynecological disorder, Hemorrhagic disorder (HCC), Hepatitis A, Hepatitis B, Hepatitis C, Hiatus hernia syndrome, Indigestion, Infectious disease, Jaundice, Pacemaker, Pneumonia, Pregnant, Psychiatric problem, Rheumatic fever, Seizure (HCC), Stroke (HCC), Tuberculosis, Urinary bladder disorder, or Urinary incontinence.    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    Family History  family history includes Hyperlipidemia in his mother; Hypertension in his father.    Social History   reports that he has never smoked. He has never used smokeless tobacco. He reports that he does not currently use drugs. He reports that he does not drink alcohol.    Medications  Home Medications       Reviewed by Lindsey Swann R.N. (Registered Nurse) on 11/03/23 at 0643  Med List Status: Complete     Medication Last Dose Status   aspirin 81 MG EC tablet 11/2/2023 Active   atorvastatin (LIPITOR) 40 MG Tab 11/1/2023 Active   betamethasone dipropionate 0.05 %  Cream 11/2/2023 Active   CALCIUM PO 11/1/2023 Active   esomeprazole (NEXIUM) 40 MG delayed-release capsule 11/2/2023 Active   Ferrous Sulfate (IRON PO) 11/1/2023 Active   hydrocortisone 2.5 % Ointment 11/2/2023 Active   nitroglycerin (NITROSTAT) 0.4 MG SL Tab  Active   Peritoneal [employer] [employer] (ULTRABAG/DIANEAL PD-2/2.5% DEX) 396 MOSM/L Solution  Active   tamsulosin (FLOMAX) 0.4 MG capsule 11/3/2023 Active   verapamil ER (CALAN-SR) 240 MG Tab CR 11/3/2023 Active                  Current Facility-Administered Medications   Medication Dose Route Frequency Provider Last Rate Last Admin    insulin regular (HumuLIN R,NovoLIN R) injection  0-14 Units Intravenous Once Osmel Blanca M.D.        insulin regular (Humulin R) 100 Units in  mL Infusion  0-29 Units/hr Intravenous Continuous Osmel Blanca M.D.        dextrose 10 % BOLUS 12.5-25 g  12.5-25 g Intravenous PRN Osmel Blanca M.D.        [START ON 11/4/2023] MD Alert...Pharmacy to initiate transition from insulin infusion to subcutaneous insulin for cardiothoracic surgery 1 Each  1 Each Other Continuous Osmel Blanca M.D.        norepinephrine (Levophed) 8 mg in 250 mL NS infusion (premix)  0-1 mcg/kg/min (Ideal) Intravenous Continuous Osmel Blanca M.D. 37.2 mL/hr at 11/03/23 1256 0.3 mcg/kg/min at 11/03/23 1256    EPINEPHrine (Adrenalin) infusion 4 mg/250 mL (premix)  0-0.5 mcg/kg/min (Ideal) Intravenous Continuous Osmel Blanca M.D. 2.5 mL/hr at 11/03/23 1409 0.01 mcg/kg/min at 11/03/23 1409    Respiratory Therapy Consult   Nebulization Continuous RT FRANKLIN LyleP.RYolyNYoly        NS infusion   Intravenous Continuous MAGGY Lyle.RYolyN.   Stopped at 11/03/23 1257    NS infusion   Intravenous Continuous FRANKLIN LyleP.RYolyN. 30 mL/hr at 11/03/23 1334 New Bag at 11/03/23 1334    NS (Bolus) 0.9 infusion   Intravenous PRN FRANKLIN LyleP.R.NYoly 999 mL/hr at 11/03/23 1227 1,000 mL at 11/03/23 1227    Nozin nasal  swab  1 Applicator  Each Nostril BID Major Barfield, A.P.R.N.   1 Applicator at 11/03/23 1215    calcium CHLORIDE 10 % 1,000 mg in  mL IVPB  1,000 mg Intravenous Once PRN Major Barfield, A.P.R.N.        magnesium sulfate in D5W IVPB premix 1 g  1 g Intravenous DAILY Major Barfield, A.P.R.N.   Held at 11/03/23 1215    K+ Scale: Goal of 4.5  1 Each Intravenous Q6HRS Major Barfield, A.P.R.N.        [START ON 11/4/2023] metoprolol tartrate (Lopressor) tablet 12.5 mg  12.5 mg Oral BID Major Barfield, A.P.R.N.        Followed by    [START ON 11/5/2023] metoprolol tartrate (Lopressor) tablet 25 mg  25 mg Oral BID Major Barfield, A.P.R.N.        [START ON 11/4/2023] aspirin EC tablet 81 mg  81 mg Oral DAILY Major Barfield, A.P.R.N.        clevidipine (Cleviprex) IV emulsion  0-21 mg/hr Intravenous Continuous Major Barfield, A.P.R.N.   Dose not Required at 11/03/23 1215    nitroglycerin 50 mg in D5W 250 ml infusion  0-100 mcg/min Intravenous Continuous Major Barfield, A.P.R.N.   Dose not Required at 11/03/23 1215    Pharmacy Consult Request ...Pain Management Review 1 Each  1 Each Other PHARMACY TO DOSE Major Barfield, A.P.R.N.        acetaminophen (Tylenol) tablet 1,000 mg  1,000 mg Oral Q6HRS Major Barfield, A.P.R.N.        Followed by    [START ON 11/13/2023] acetaminophen (Tylenol) tablet 1,000 mg  1,000 mg Oral Q6HRS PRN Major Barfield, A.P.R.N.        oxyCODONE immediate-release (Roxicodone) tablet 5 mg  5 mg Oral Q3HRS PRN Major Barfield, A.P.R.N.        Or    oxyCODONE immediate release (Roxicodone) tablet 10 mg  10 mg Oral Q3HRS PRN Major Barfield, A.P.R.N.        Or    fentaNYL (Sublimaze) injection 50 mcg  50 mcg Intravenous Q3HRS PRN Major Barfield, A.P.R.N.        traMADol (Ultram) 50 MG tablet 50 mg  50 mg Oral Q12HRS PRN Major Barfield, A.P.R.N.        midazolam (Versed) injection 2 mg  2 mg Intravenous Q HOUR PRN Major Barfield, A.P.R.N.        dexmedetomidine (PRECEDEX) 400 mcg/100mL NS premix infusion  0-1.5 mcg/kg/hr (Ideal) Intravenous  Continuous Major Barfield, A.P.R.N. 8.3 mL/hr at 11/03/23 1052 0.5 mcg/kg/hr at 11/03/23 1052    sodium bicarbonate 8.4 % injection 50 mEq  50 mEq Intravenous Q HOUR PRN Major Barfield, A.P.R.N.   50 mEq at 11/03/23 1435    morphine 4 MG/ML injection 4 mg  4 mg Intravenous Q HOUR PRN Major Barfield, A.P.R.N.        ondansetron (Zofran) syringe/vial injection 8 mg  8 mg Intravenous Q6HRS PRN Major Barfield, A.P.R.N.        Or    prochlorperazine (Compazine) injection 10 mg  10 mg Intravenous Q6HRS PRN Major Barfield, A.P.R.N.        acetaminophen (Tylenol) tablet 650 mg  650 mg Oral Q4HRS PRN Major Barfield, A.P.R.N.        Or    acetaminophen (Tylenol) suppository 650 mg  650 mg Rectal Q4HRS PRN Major Barfield, A.P.R.N.        senna-docusate (Pericolace Or Senokot S) 8.6-50 MG per tablet 2 Tablet  2 Tablet Oral BID Major Barfield, A.P.R.N.        And    [START ON 11/4/2023] polyethylene glycol/lytes (Miralax) PACKET 1 Packet  1 Packet Oral DAILY Major Barfield, A.P.R.N.        And    [START ON 11/5/2023] magnesium hydroxide (Milk Of Magnesia) suspension 30 mL  30 mL Oral DAILY Major Barfield, A.P.R.N.        And    bisacodyl (Dulcolax) suppository 10 mg  10 mg Rectal QDAY PRN Major Barfield, A.P.R.N.        [START ON 11/4/2023] omeprazole (PriLOSEC) capsule 20 mg  20 mg Oral DAILY Major Barfield, A.P.R.N.        mag hydrox-al hydrox-simeth (Maalox Plus Es Or Mylanta Ds) suspension 30 mL  30 mL Oral Q4HRS PRN Major Barfield, A.P.R.N.        ceFAZolin (Ancef) 2 g in  mL IVPB  2 g Intravenous Once TEX Lyle        [START ON 11/4/2023] MD Alert...Warfarin per Pharmacy   Other PHARMACY TO DOSE TEX Lyle        [START ON 11/4/2023] heparin injection 5,000 Units  5,000 Units Subcutaneous Q8HRS TEX Lyle        vasopressin (Vasostrict) 20 Units in  mL Infusion  0.03 Units/min Intravenous Continuous TEX Mclaughlin 9 mL/hr at 11/03/23 1434 0.03 Units/min at 11/03/23 1434     coagulation factor VIIa recomb (Novoseven) inj 1,000 mcg  1,000 mcg Intravenous Once Shruti Marrufo M.D.        ALBUMIN HUMAN 25 % IV SOLN                Allergies  Allergies   Allergen Reactions    Allopurinol Itching    Hydralazine Hcl Unspecified     Pt states he had a reaction similar to lupus       Vital Signs last 24 hours  Temp:  [36.2 °C (97.2 °F)-36.8 °C (98.2 °F)] 36.2 °C (97.2 °F)  Pulse:  [] 99  Resp:  [16-29] 21  BP: ()/(32-90) 52/32  SpO2:  [97 %-100 %] 100 %    Physical Exam  Physical Exam  Vitals and nursing note reviewed.   Constitutional:       Appearance: He is ill-appearing.      Interventions: He is sedated and intubated.   Eyes:      Pupils: Pupils are equal, round, and reactive to light.   Cardiovascular:      Rate and Rhythm: Normal rate and regular rhythm.   Pulmonary:      Effort: He is intubated.      Comments: Passive on ventilator  Abdominal:      General: Bowel sounds are normal.      Palpations: Abdomen is soft.      Comments: PD catheter dressed, c/d/i   Musculoskeletal:      Right lower leg: No edema.      Left lower leg: No edema.   Skin:     General: Skin is warm and dry.   Neurological:      Comments: Remains deeply sedated   Psychiatric:      Comments: Unable to assess         Fluids    Intake/Output Summary (Last 24 hours) at 11/3/2023 1440  Last data filed at 11/3/2023 1300  Gross per 24 hour   Intake 5251.3 ml   Output 2189 ml   Net 3062.3 ml       Laboratory  Recent Results (from the past 48 hour(s))   COMPONENT CELLULAR    Collection Time: 11/03/23  5:00 AM   Result Value Ref Range    Component R       R66                 Red Blood Cells6    [id number]   issued       11/03/23   14:09      Product Type Red Blood Cells  LR Pheresis     Dispense Status issued     Unit Number (Barcoded) [id number]     Product Code (Barcoded) D1986F87     Blood Type (Barcoded) 0600    PLATELETS REQUEST    Collection Time: 11/03/23  6:10 AM   Result Value Ref Range    Component  P       P72                 Plts,Pheresis       [id number]   issued       11/03/23   11:00      Product Type Platelets Pheresis LR     Dispense Status issued     Unit Number (Barcoded) [id number]     Product Code (Barcoded) A9009U41     Blood Type (Barcoded) 6200    ABO Rh Confirm    Collection Time: 11/03/23  6:44 AM   Result Value Ref Range    ABO Rh Confirm A POS    Potassium Serum (K)    Collection Time: 11/03/23  6:44 AM   Result Value Ref Range    Potassium 5.2 3.6 - 5.5 mmol/L   POCT glucose device results    Collection Time: 11/03/23  8:03 AM   Result Value Ref Range    POC Glucose, Blood 123 (H) 65 - 99 mg/dL   POCT activated clotting time device results    Collection Time: 11/03/23  8:04 AM   Result Value Ref Range    Istat Activated Clotting Time 131 74 - 137 sec   POCT arterial blood gas device results    Collection Time: 11/03/23  8:05 AM   Result Value Ref Range    Ph 7.319 (L) 7.400 - 7.500    Pco2 42.9 (H) 26.0 - 37.0 mmHg    Po2 253 (H) 64 - 87 mmHg    Tco2 23 20 - 33 mmol/L    S02 100 (H) 93 - 99 %    Hco3 22.0 17.0 - 25.0 mmol/L    BE -4 -4 - 3 mmol/L    Body Temp 36.1 C degrees    Ph Temp Madelaine 7.332 (L) 7.400 - 7.500    Pco2 Temp Co 41.2 (H) 26.0 - 37.0 mmHg    Po2 Temp Cor 249 (H) 64 - 87 mmHg    Specimen Arterial    POCT sodium device results    Collection Time: 11/03/23  8:05 AM   Result Value Ref Range    Istat Sodium 141 135 - 145 mmol/L   POCT potassium device results    Collection Time: 11/03/23  8:05 AM   Result Value Ref Range    Istat Potassium 4.4 3.6 - 5.5 mmol/L   POCT ionized CA device results    Collection Time: 11/03/23  8:05 AM   Result Value Ref Range    Istat Ionized Calcium 1.17 1.10 - 1.30 mmol/L   POCT hematocrit and hemoglobin device results    Collection Time: 11/03/23  8:05 AM   Result Value Ref Range    Istat Hematocrit 38 (L) 42 - 52 %    Istat Hemoglobin 12.9 (L) 14.0 - 18.0 g/dL   POCT glucose device results    Collection Time: 11/03/23  8:41 AM   Result Value  Ref Range    POC Glucose, Blood 151 (H) 65 - 99 mg/dL   POCT activated clotting time device results    Collection Time: 11/03/23  8:42 AM   Result Value Ref Range    Istat Activated Clotting Time 528 (H) 74 - 137 sec   POCT arterial blood gas device results    Collection Time: 11/03/23  8:43 AM   Result Value Ref Range    Ph 7.295 (LL) 7.400 - 7.500    Pco2 47.5 (H) 26.0 - 37.0 mmHg    Po2 244 (H) 64 - 87 mmHg    Tco2 25 20 - 33 mmol/L    S02 100 (H) 93 - 99 %    Hco3 23.1 17.0 - 25.0 mmol/L    BE -4 -4 - 3 mmol/L    Body Temp 36.6 C degrees    Ph Temp Madelaine 7.301 (L) 7.400 - 7.500    Pco2 Temp Co 46.7 (H) 26.0 - 37.0 mmHg    Po2 Temp Cor 242 (H) 64 - 87 mmHg    Specimen Arterial    POCT sodium device results    Collection Time: 11/03/23  8:43 AM   Result Value Ref Range    Istat Sodium 140 135 - 145 mmol/L   POCT potassium device results    Collection Time: 11/03/23  8:43 AM   Result Value Ref Range    Istat Potassium 4.5 3.6 - 5.5 mmol/L   POCT ionized CA device results    Collection Time: 11/03/23  8:43 AM   Result Value Ref Range    Istat Ionized Calcium 1.19 1.10 - 1.30 mmol/L   POCT hematocrit and hemoglobin device results    Collection Time: 11/03/23  8:43 AM   Result Value Ref Range    Istat Hematocrit 39 (L) 42 - 52 %    Istat Hemoglobin 13.3 (L) 14.0 - 18.0 g/dL   Histology Request    Collection Time: 11/03/23  9:19 AM   Result Value Ref Range    Pathology Request Sent to Histo    POCT glucose device results    Collection Time: 11/03/23  9:30 AM   Result Value Ref Range    POC Glucose, Blood 143 (H) 65 - 99 mg/dL   POCT activated clotting time device results    Collection Time: 11/03/23  9:31 AM   Result Value Ref Range    Istat Activated Clotting Time 474 (H) 74 - 137 sec   POCT venous blood gas device results    Collection Time: 11/03/23  9:32 AM   Result Value Ref Range    Ph 7.288 (L) 7.310 - 7.450    Pco2 51.4 (H) 41.0 - 51.0 mmHg    Po2 58 (H) 25 - 40 mmHg    Tco2 26 20 - 33 mmol/L    SO2 86 %    Hco3  24.6 24.0 - 28.0 mmol/L    BE -2 -4 - 3 mmol/L    Body Temp 35.8 C degrees    Ph Temp Correc 7.305 (L) 7.310 - 7.450    Pco2 Temp Madelaine 48.7 41.0 - 51.0 mmHg    Po2 Temp Corre 54 (H) 25 - 40 mmHg    Specimen Venous    POCT sodium device results    Collection Time: 11/03/23  9:32 AM   Result Value Ref Range    Istat Sodium 140 135 - 145 mmol/L   POCT potassium device results    Collection Time: 11/03/23  9:32 AM   Result Value Ref Range    Istat Potassium 4.8 3.6 - 5.5 mmol/L   POCT ionized CA device results    Collection Time: 11/03/23  9:32 AM   Result Value Ref Range    Istat Ionized Calcium 1.08 (L) 1.10 - 1.30 mmol/L   POCT hematocrit and hemoglobin device results    Collection Time: 11/03/23  9:32 AM   Result Value Ref Range    Istat Hematocrit 32 (L) 42 - 52 %    Istat Hemoglobin 10.9 (L) 14.0 - 18.0 g/dL   POCT glucose device results    Collection Time: 11/03/23  9:57 AM   Result Value Ref Range    POC Glucose, Blood 126 (H) 65 - 99 mg/dL   POCT activated clotting time device results    Collection Time: 11/03/23  9:58 AM   Result Value Ref Range    Istat Activated Clotting Time 552 (H) 74 - 137 sec   POCT arterial blood gas device results    Collection Time: 11/03/23  9:59 AM   Result Value Ref Range    Ph 7.359 (L) 7.400 - 7.500    Pco2 43.8 (H) 26.0 - 37.0 mmHg    Po2 378 (H) 64 - 87 mmHg    Tco2 26 20 - 33 mmol/L    S02 100 (H) 93 - 99 %    Hco3 24.7 17.0 - 25.0 mmol/L    BE -1 -4 - 3 mmol/L    Body Temp 34.8 C degrees    Ph Temp Madelaine 7.391 (L) 7.400 - 7.500    Pco2 Temp Co 39.8 (H) 26.0 - 37.0 mmHg    Po2 Temp Cor 367 (H) 64 - 87 mmHg    Specimen Arterial    POCT sodium device results    Collection Time: 11/03/23  9:59 AM   Result Value Ref Range    Istat Sodium 139 135 - 145 mmol/L   POCT potassium device results    Collection Time: 11/03/23  9:59 AM   Result Value Ref Range    Istat Potassium 4.8 3.6 - 5.5 mmol/L   POCT ionized CA device results    Collection Time: 11/03/23  9:59 AM   Result Value Ref  Range    Istat Ionized Calcium 1.04 (L) 1.10 - 1.30 mmol/L   POCT hematocrit and hemoglobin device results    Collection Time: 11/03/23  9:59 AM   Result Value Ref Range    Istat Hematocrit 31 (L) 42 - 52 %    Istat Hemoglobin 10.5 (L) 14.0 - 18.0 g/dL   POCT glucose device results    Collection Time: 11/03/23 10:26 AM   Result Value Ref Range    POC Glucose, Blood 139 (H) 65 - 99 mg/dL   POCT activated clotting time device results    Collection Time: 11/03/23 10:27 AM   Result Value Ref Range    Istat Activated Clotting Time 594 (H) 74 - 137 sec   POCT arterial blood gas device results    Collection Time: 11/03/23 10:28 AM   Result Value Ref Range    Ph 7.363 (L) 7.400 - 7.500    Pco2 44.6 (H) 26.0 - 37.0 mmHg    Po2 271 (H) 64 - 87 mmHg    Tco2 27 20 - 33 mmol/L    S02 100 (H) 93 - 99 %    Hco3 25.4 (H) 17.0 - 25.0 mmol/L    BE 0 -4 - 3 mmol/L    Body Temp 35.9 C degrees    Ph Temp Madelaine 7.379 (L) 7.400 - 7.500    Pco2 Temp Co 42.5 (H) 26.0 - 37.0 mmHg    Po2 Temp Cor 266 (H) 64 - 87 mmHg    Specimen Arterial    POCT sodium device results    Collection Time: 11/03/23 10:28 AM   Result Value Ref Range    Istat Sodium 139 135 - 145 mmol/L   POCT potassium device results    Collection Time: 11/03/23 10:28 AM   Result Value Ref Range    Istat Potassium 5.3 3.6 - 5.5 mmol/L   POCT ionized CA device results    Collection Time: 11/03/23 10:28 AM   Result Value Ref Range    Istat Ionized Calcium 1.07 (L) 1.10 - 1.30 mmol/L   POCT hematocrit and hemoglobin device results    Collection Time: 11/03/23 10:28 AM   Result Value Ref Range    Istat Hematocrit 33 (L) 42 - 52 %    Istat Hemoglobin 11.2 (L) 14.0 - 18.0 g/dL   POCT activated clotting time device results    Collection Time: 11/03/23 11:12 AM   Result Value Ref Range    Istat Activated Clotting Time 119 74 - 137 sec   POCT glucose device results    Collection Time: 11/03/23 11:12 AM   Result Value Ref Range    POC Glucose, Blood 162 (H) 65 - 99 mg/dL   POCT arterial  blood gas device results    Collection Time: 11/03/23 11:13 AM   Result Value Ref Range    Ph 7.307 (L) 7.400 - 7.500    Pco2 46.2 (H) 26.0 - 37.0 mmHg    Po2 131 (H) 64 - 87 mmHg    Tco2 24 20 - 33 mmol/L    S02 99 93 - 99 %    Hco3 23.1 17.0 - 25.0 mmol/L    BE -3 -4 - 3 mmol/L    Body Temp 37.0 C degrees    Ph Temp Madelaine 7.307 (L) 7.400 - 7.500    Pco2 Temp Co 46.2 (H) 26.0 - 37.0 mmHg    Po2 Temp Cor 131 (H) 64 - 87 mmHg    Specimen Arterial    POCT sodium device results    Collection Time: 11/03/23 11:13 AM   Result Value Ref Range    Istat Sodium 140 135 - 145 mmol/L   POCT potassium device results    Collection Time: 11/03/23 11:13 AM   Result Value Ref Range    Istat Potassium 4.9 3.6 - 5.5 mmol/L   POCT ionized CA device results    Collection Time: 11/03/23 11:13 AM   Result Value Ref Range    Istat Ionized Calcium 0.99 (L) 1.10 - 1.30 mmol/L   POCT hematocrit and hemoglobin device results    Collection Time: 11/03/23 11:13 AM   Result Value Ref Range    Istat Hematocrit 32 (L) 42 - 52 %    Istat Hemoglobin 10.9 (L) 14.0 - 18.0 g/dL   Platelet count    Collection Time: 11/03/23 11:53 AM   Result Value Ref Range    Platelet Count 166 164 - 446 K/uL   Magnesium    Collection Time: 11/03/23 11:53 AM   Result Value Ref Range    Magnesium 3.2 (H) 1.5 - 2.5 mg/dL   Prothrombin time (INR)    Collection Time: 11/03/23 11:53 AM   Result Value Ref Range    PT 17.0 (H) 12.0 - 14.6 sec    INR 1.36 (H) 0.87 - 1.13   APTT (PTT)    Collection Time: 11/03/23 11:53 AM   Result Value Ref Range    APTT 30.6 24.7 - 36.0 sec   Hemoglobin and Hematocrit upon arrival to unit    Collection Time: 11/03/23 11:53 AM   Result Value Ref Range    Hemoglobin 12.0 (L) 14.0 - 18.0 g/dL    Hematocrit 35.1 (L) 42.0 - 52.0 %   POTASSIUM SERUM (K)    Collection Time: 11/03/23 11:53 AM   Result Value Ref Range    Potassium 4.8 3.6 - 5.5 mmol/L   CRYOPRECIPITATE    Collection Time: 11/03/23 11:54 AM   Result Value Ref Range    Component Ct        CTP                 Cryoprecipitate     [id number]   issued       23   12:11      Product Type CTP     Dispense Status issued     Unit Number (Barcoded) [id number]     Product Code (Barcoded) E0396H40     Blood Type (Barcoded) 8400    FRESH FROZEN PLASMA    Collection Time: 23 11:54 AM   Result Value Ref Range    Component F       TA1                 Thawed Plasma 1     [id number]   issued       23   11:59      Product Type Thawed Apheresis Plasma 1st Cont     Dispense Status issued     Unit Number (Barcoded) [id number]     Product Code (Barcoded) U4526A14     Blood Type (Barcoded) 6200     Component F       TA2                 Thawed Plasma 2     [id number]   issued       23   12:21      Product Type Thawed Apheresis Plasma 2nd Cont     Dispense Status issued     Unit Number (Barcoded) [id number]     Product Code (Barcoded) I7308J14     Blood Type (Barcoded) 8400    EKG on arrival to CSU    Collection Time: 23 11:56 AM   Result Value Ref Range    Report       [facility]    Test Date:  2023  Pt Name:    LORIE KAYE               Department: 161  MRN:        [id number]                      Room:       [facility]  Gender:     Male                         Technician: [facility]  :        [id number]                   Requested By:MADDY GONZALES  Order #:    [id number]                    Reading MD: Dontrell Sharma MD    Measurements  Intervals                                Axis  Rate:       59                           P:          0  VA:         0                            QRS:        42  QRSD:       75                           T:          30  QT:         539  QTc:        534    Interpretive Statements  JUNCTIONAL RHYTHM  LEFT VENTRICULAR HYPERTROPHY  PROLONGED QT INTERVAL  Electronically Signed On 2023 14:08:22 PDT by Dontrell Sharma MD     POCT arterial blood gas device results    Collection Time: 23 11:57 AM   Result Value Ref Range    Ph 7.361 (L) 7.400  - 7.500    Pco2 38.0 (H) 26.0 - 37.0 mmHg    Po2 108 (H) 64 - 87 mmHg    Tco2 23 20 - 33 mmol/L    S02 98 93 - 99 %    Hco3 21.5 17.0 - 25.0 mmol/L    BE -4 -4 - 3 mmol/L    Body Temp 36.7 C degrees    O2 Therapy 100 %    iPF Ratio 108     Ph Temp Madelaine 7.365 (L) 7.400 - 7.500    Pco2 Temp Co 37.5 (H) 26.0 - 37.0 mmHg    Po2 Temp Cor 106 (H) 64 - 87 mmHg    Specimen Arterial     DelSys Vent     Peep End Expiratory Pressure 8 cmh20    Percent Minute Volume 160     Mode ASV    POCT sodium device results    Collection Time: 11/03/23 11:57 AM   Result Value Ref Range    Istat Sodium 140 135 - 145 mmol/L   POCT potassium device results    Collection Time: 11/03/23 11:57 AM   Result Value Ref Range    Istat Potassium 4.7 3.6 - 5.5 mmol/L   POCT ionized CA device results    Collection Time: 11/03/23 11:57 AM   Result Value Ref Range    Istat Ionized Calcium 1.23 1.10 - 1.30 mmol/L   POCT arterial blood gas device results    Collection Time: 11/03/23 12:50 PM   Result Value Ref Range    Ph 7.374 (L) 7.400 - 7.500    Pco2 33.6 26.0 - 37.0 mmHg    Po2 191 (H) 64 - 87 mmHg    Tco2 21 20 - 33 mmol/L    S02 100 (H) 93 - 99 %    Hco3 19.6 17.0 - 25.0 mmol/L    BE -5 (L) -4 - 3 mmol/L    Body Temp 36.4 C degrees    O2 Therapy 80 %    iPF Ratio 239     Ph Temp Madelaine 7.383 (L) 7.400 - 7.500    Pco2 Temp Co 32.7 26.0 - 37.0 mmHg    Po2 Temp Cor 187 (H) 64 - 87 mmHg    Specimen Arterial     DelSys Vent     Peep End Expiratory Pressure 8 cmh20    Percent Minute Volume 160     Mode ASV    POCT lactate device results    Collection Time: 11/03/23 12:50 PM   Result Value Ref Range    iStat Lactate 1.4 0.5 - 2.0 mmol/L   HEMOGLOBIN AND HEMATOCRIT    Collection Time: 11/03/23 12:51 PM   Result Value Ref Range    Hemoglobin 9.7 (L) 14.0 - 18.0 g/dL    Hematocrit 27.7 (L) 42.0 - 52.0 %   POCT arterial blood gas device results    Collection Time: 11/03/23  2:10 PM   Result Value Ref Range    Ph 7.222 (LL) 7.400 - 7.500    Pco2 43.1 (H) 26.0 -  37.0 mmHg    Po2 61 (L) 64 - 87 mmHg    Tco2 19 (L) 20 - 33 mmol/L    S02 86 (L) 93 - 99 %    Hco3 17.7 17.0 - 25.0 mmol/L    BE -9 (L) -4 - 3 mmol/L    Body Temp 36.2 C degrees    O2 Therapy 40 %    iPF Ratio 153     Ph Temp Madelaine 7.233 (LL) 7.400 - 7.500    Pco2 Temp Co 41.6 (H) 26.0 - 37.0 mmHg    Po2 Temp Cor 58 (L) 64 - 87 mmHg    Specimen Arterial     DelSys Vent     End Tidal Carbon Dioxide 32 mmhg    Peep End Expiratory Pressure 8 cmh20    Percent Minute Volume 130     Mode ASV    POCT lactate device results    Collection Time: 11/03/23  2:10 PM   Result Value Ref Range    iStat Lactate 3.4 (H) 0.5 - 2.0 mmol/L       Imaging  EC-CARMELITA W/O CONT         EC-ECHOCARDIOGRAM LTD W/O CONT   Final Result      DX-CHEST-PORTABLE (1 VIEW)   Final Result      Satisfactory postoperative appearance of the chest with BILATERAL perihilar and LEFT basilar opacities which are probably areas of atelectasis          Assessment/Plan  Status post cardiac surgery  Assessment & Plan  AVR and Ao root repair with Dr. Blanca 11/3  C/b hematoma compressing RA, back to the OR emergently  I optimized mean-circulatory filling volume with pressors and fluids to fill the RA while prepping patient for the OR again    On mechanically assisted ventilation (HCC)  Assessment & Plan  Switched off rapid ASV wean to control ventilation given his decompensation  Intubated for cardiac surgery  Intubated date: 11/3  Lung protective ventilation   ABCDEF bundle  CXR as indicated: monitor lung volumes and tube/line placement  VAP bundle prevention, oral care, post pyloric feeding if remains intubated  Head of bed > 30 degree  GI prophylaxis  Daily awakening and SBT trials unless contraindicated  Monitor for liberation  Respiratory treatments: prn    Dyslipidemia- (present on admission)  Assessment & Plan  statin    Aortic stenosis- (present on admission)  Assessment & Plan  Now s/p aortic valve replacement and aortic root repair    Gastroesophageal reflux  disease- (present on admission)  Assessment & Plan  PPI    Hypertension- (present on admission)  Assessment & Plan  Post-cardiac surgery BP protocols        Discussed patient condition and risk of morbidity and/or mortality with RN, RT, Pharmacy, [employer] resident, Charge nurse / hot rounds, Patient, and CVS.    The patient remains critically ill on mechanical ventilation, vasopressors with life-threatening jayy-cardiac hematoma requiring emergent return to the OR.  Critical care time = 90 minutes in directly providing and coordinating critical care and extensive data review.  No time overlap and excludes procedures.

## 2023-11-03 NOTE — H&P
REFERRING PHYSICIAN: Dontrell Sharma MD    CONSULTING PHYSICIAN: Osmel Blanca MD, FACS    CHIEF COMPLAINT: Shortness of breath    HISTORY OF PRESENT ILLNESS: The patient is a 58 y.o. male with history of hypertension, hyperlipidemia, ESRD on peritoneal dialysis for the last few months, coronary artery disease and severe aortic stenosis. He is on the kidney transplant list with [facility] and has been for the last 3 years. Today, he states   He has shortness of breath for the last few months. He also has chronic cough and lower extremity edema. He has occasional chest pain that resolves on its own. He has dizziness with position changes. 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 He denies orthopnea. He has elected to undergo surgical intervention.     PAST MEDICAL HISTORY:   Past Medical History:   Diagnosis Date    Anesthesia     Hiccups for over 24 hours after a previous sugery.    Aortic stenosis     Chronic gout of multiple sites     Dialysis patient ([facility])     peritoneal daily    Dyspnea on exertion 05/23/2023    Elevated troponin 05/23/2023    Heart burn     1990    Heart murmur     High cholesterol     All always    Hypertension 2009    NSTEMI (non-ST elevated myocardial infarction) ([facility]) 05/23/2023    no stents placed    Pain in the chest 05/23/2023    PONV (postoperative nausea and vomiting)     Renal disorder 2007    Stage IV    Sleep apnea 2000    Snoring 1990     PAST SURGICAL HISTORY:   Past Surgical History:   Procedure Laterality Date    CATH PLACEMENT CAPD Right 9/6/2023    Procedure: LAPAROSCOPIC INSERTION OF PERITONEAL DIALYSIS CATHETER;  Surgeon: Dontrell Sales M.D.;  Location: SURGERY [facility];  Service: Vascular    GA INJ LUMBAR/SACRAL,W/ IMAGING Left 8/24/2023    Procedure: LUMBAR EPIDURAL STEROID INJECTION, LEFT L5-S1 INTERLAMINAR UNDER FLUOROSCOPY;  Surgeon: Benito Herrera D.O.;  Location: SURGERY [facility];  Service: Orthopedics    CATH  PLACEMENT CAPD N/A 5/15/2023    Procedure: LAPAROSCOPIC PLACEMENT OF PERITONEAL DIALYSIS CATHERTER;  Surgeon: Shikha Alexander M.D.;  Location: SURGERY SAME DAY [facility];  Service: General    UMBILICAL HERNIA REPAIR  2012    4 separate surgeries between [id number]    OTHER  2007    Kidney biopsy, can't recall laterality,    HIP ARTHROSCOPY Bilateral 2002    TONSILLECTOMY N/A 1987    HAND SURGERY Right 1985    Hand and wrist    SHOULDER ARTHROSCOPY      Can't recall date     FAMILY HISTORY:   Family History   Problem Relation Age of Onset    Hyperlipidemia Mother     Hypertension Father      SOCIAL HISTORY:   Social History     Socioeconomic History    Marital status:      Spouse name: Not on file    Number of children: Not on file    Years of education: Not on file    Highest education level: Master's degree (e.g., MA, MS, Kenn, MEd, MSW, ELIECER)   Occupational History    Not on file   Tobacco Use    Smoking status: Never    Smokeless tobacco: Never   Vaping Use    Vaping Use: Never used   Substance and Sexual Activity    Alcohol use: Never    Drug use: Not Currently    Sexual activity: Yes     Partners: Female   Other Topics Concern    Not on file   Social History Narrative    Not on file     Social Determinants of Health     Financial Resource Strain: Low Risk  (1/4/2023)    Overall Financial Resource Strain (CARDIA)     Difficulty of Paying Living Expenses: Not hard at all   Food Insecurity: No Food Insecurity (1/4/2023)    Hunger Vital Sign     Worried About Running Out of Food in the Last Year: Never true     Ran Out of Food in the Last Year: Never true   Transportation Needs: No Transportation Needs (1/4/2023)    PRAPARE - Transportation     Lack of Transportation (Medical): No     Lack of Transportation (Non-Medical): No   Physical Activity: Sufficiently Active (1/4/2023)    Exercise Vital Sign     Days of Exercise per Week: 3 days     Minutes of Exercise per Session: 60 min   Stress: Stress Concern  Present (1/4/2023)    [facility] Occupational Health - Occupational Stress Questionnaire     Feeling of Stress : To some extent   Social Connections: Moderately Isolated (1/4/2023)    Social Connection and Isolation Panel [NHANES]     Frequency of Communication with Friends and Family: Once a week     Frequency of Social Gatherings with Friends and Family: More than three times a week     Attends Restoration Services: Never     Active Member of Clubs or Organizations: No     Attends Club or Organization Meetings: Never     Marital Status: Living with partner   Intimate Partner Violence: Not on file   Housing Stability: Low Risk  (1/4/2023)    Housing Stability Vital Sign     Unable to Pay for Housing in the Last Year: No     Number of Places Lived in the Last Year: 1     Unstable Housing in the Last Year: No     ALLERGIES:   Allergies   Allergen Reactions    Allopurinol Itching    Hydralazine Hcl Unspecified     Pt states he had a reaction similar to lupus     CURRENT MEDICATIONS:     Current Facility-Administered Medications:     acetaminophen (Tylenol) tablet 1,000 mg, 1,000 mg, Oral, Once, Osmel Blanca M.D.    Cardiac Surgery Prophylactic Antibiotics per Pharmacy, , Other, PHARMACY TO DOSE, Osmel Blanca M.D.    lidocaine (Xylocaine) 1 % injection 0.5 mL, 0.5 mL, Intradermal, Once PRN, Osmel Blanca M.D.    metoprolol tartrate (Lopressor) tablet 12.5 mg, 12.5 mg, Oral, Once, Osmel Blanca M.D.    ceFAZolin (Ancef) 2 g in  mL IVPB, 2 g, Intravenous, Once, Osmel Blanca M.D.    lidocaine (Xylocaine) 1 % injection 0.5 mL, 0.5 mL, Intradermal, Once PRN, Osmel Blanca M.D.    NS infusion, , Intravenous, Once, Osmel Blanca M.D.     LABS REVIEWED:  Lab Results   Component Value Date/Time    SODIUM 142 10/31/2023 10:25 AM    POTASSIUM 5.1 10/31/2023 10:25 AM    CHLORIDE 104 10/31/2023 10:25 AM    CO2 26 10/31/2023 10:25 AM    GLUCOSE 101 (H) 10/31/2023 10:25 AM    BUN 73 (H) 10/31/2023 10:25  AM    CREATININE 9.53 (HH) 10/31/2023 10:25 AM    BUNCREATRAT 17 05/09/2022 09:49 AM    GLOMRATE 7 (L) 04/02/2023 11:54 AM      Lab Results   Component Value Date/Time    PROTHROMBTM 14.0 10/31/2023 09:25 AM    INR 1.06 10/31/2023 09:25 AM      Lab Results   Component Value Date/Time    WBC 5.8 10/31/2023 09:25 AM    RBC 4.90 10/31/2023 09:25 AM    HEMOGLOBIN 16.2 10/31/2023 09:25 AM    HEMATOCRIT 48.2 10/31/2023 09:25 AM    MCV 98.4 (H) 10/31/2023 09:25 AM    MCH 33.1 (H) 10/31/2023 09:25 AM    MCHC 33.6 10/31/2023 09:25 AM    MPV 9.8 10/31/2023 09:25 AM    NEUTSPOLYS 77.40 (H) 10/31/2023 09:25 AM    LYMPHOCYTES 11.90 (L) 10/31/2023 09:25 AM    MONOCYTES 6.70 10/31/2023 09:25 AM    EOSINOPHILS 3.50 10/31/2023 09:25 AM    BASOPHILS 0.30 10/31/2023 09:25 AM        IMAGING REVIEWED AND INTERPRETED:    ECHOCARDIOGRAM [facility] 5/23/2023:  The left ventricular ejection fraction is 60%.  Mild mitral regurgitation.  Calcified aortic valve leaflets.  Moderate aortic valve stenosis.  Aortic valve area calculated from the continuity equation is 1.3 cm2. Vmax is 3.7 m/s. Transvalvular gradients are - Peak: 55 mmHg, Mean: 36 mmHg.  Moderate to severe aortic insufficiency.  Mild tricuspid regurgitation.  Estimated right ventricular systolic pressure is 40 mmHg.     CARDIAC CATHETERIZATION [facility] 5/25/2023:  1.  Type II MI due to ESRD with superimposed hypertensive emergency  2. Obstructive one-vessel coronary artery disease involving total occlusions of the terminal circumflex and first obtuse marginal-supplying a small area of myocardium  3.  Diffuse, nonobstructive coronary atherosclerosis in other segments  4.  At least moderate aortic stenosis with invasive mean gradient of 33 mmHg and moderate aortic insufficiency by noninvasive evaluation  5.  Mild elevation LVEDP at 17 mmHg     CT Chest 10/3/23 [facility]  1. No acute process.  2. There are two tiny benign left lung nodules which measure 1 mm and 2 mm.  3. Cardiomegaly.  4. 4.5 cm  "ascending thoracic aortic aneurysm.  5. Aortic valve and coronary artery calcifications.  6. 7 cm left back lipoma, overlying the scapula.    REVIEW OF SYSTEMS:   Review of Systems   Constitutional:  Positive for malaise/fatigue. Negative for chills and weight loss.   HENT: Negative.     Eyes: Negative.    Respiratory:  Positive for shortness of breath.    Cardiovascular:  Negative for chest pain.   Gastrointestinal: Negative.    Genitourinary: Negative.    Musculoskeletal: Negative.    Skin: Negative.    Neurological: Negative.    Endo/Heme/Allergies: Negative.    Psychiatric/Behavioral: Negative.       PHYSICAL EXAMINATION:   BP (!) 147/81   Pulse 82   Temp 36.5 °C (97.7 °F) (Temporal)   Resp 16   Ht 1.702 m (5' 7\")   Wt 84.6 kg (186 lb 8.2 oz)   SpO2 98%   BMI 29.21 kg/m²     Physical Exam  Vitals reviewed.   Constitutional:       General: He is not in acute distress.     Appearance: Normal appearance.   HENT:      Head: Normocephalic and atraumatic.      Right Ear: External ear normal.      Left Ear: External ear normal.      Nose: Nose normal.      Mouth/Throat:      Mouth: Mucous membranes are moist.   Eyes:      Extraocular Movements: Extraocular movements intact.      Conjunctiva/sclera: Conjunctivae normal.      Pupils: Pupils are equal, round, and reactive to light.   Cardiovascular:      Rate and Rhythm: Normal rate and regular rhythm.      Pulses: Normal pulses.      Heart sounds: Murmur heard.   Pulmonary:      Effort: Pulmonary effort is normal.   Abdominal:      General: Abdomen is flat. There is no distension.      Palpations: Abdomen is soft.      Comments: Pd catheter   Musculoskeletal:         General: Normal range of motion.      Cervical back: Normal range of motion.   Skin:     General: Skin is warm and dry.      Capillary Refill: Capillary refill takes less than 2 seconds.   Neurological:      General: No focal deficit present.      Mental Status: He is alert and oriented to person, " place, and time.   Psychiatric:         Mood and Affect: Mood normal.         Behavior: Behavior normal.       IMPRESSION:  Moderate to severe aortic stenosis and regurgitation, ascending aortic aneurysm (4.5 cm by echo), ESRD on peritoneal dialysis, hypertension, dyslipidemia    PLAN:  I recommend that he undergo aortic valve replacement, ascending aortic aneurysm repair, possible aortic root replacement and intraoperative transesophageal echocardiography.     The procedure, its risks, benefits, potential complications and alternative treatments were discussed with the patient in detail including the risks should he decide not to undergo my recommended treatment. All of his questions were answered to his satisfaction and he is willing to proceed with the operation. The risks include death, stroke, infection: to include a rare bacterial infection related to the use of the heart/lung machine, jayy-operative myocardial infarction, dysrhythmias, diaphragmatic paralysis, chest wall paresthesia, tracheostomy, kidney or other organ failure, possible return to the operating room for bleeding, bleeding requiring transfusion with its attendant risks including AIDS or hepatitis, dehiscence of surgical incisions, respiratory complications including the need for prolonged ventilator support, Protamine or other drug reaction, peripheral neuropathy, loss of limb, and miscount of surgical items. The operative mortality risk is approximately 2%. The STS mortality risk score is 1.8% and the morbidity and mortality risk score is 18% aortic valve replacement. The scores were discussed with patient.     The operation is scheduled for Friday, November 3, 2023 at 7:30 AM at [facility].  A CT chest without contrast will be obtained prior to the operation to document the size and extent of the aneurysm.  The differences between tissue and mechanical valves was discussed with the patient in detail including the  anticoagulation requirements with each.  The patient would prefer to have a mechanical valve implanted.     Findings and recommendations have been discussed with the patient’s cardiologist, Dontrell Sharma MD.  Thank you for this very challenging consultation and participation in the patient’s care.  I will keep you apprised of all future developments.     Sincerely,     Osmel Blanca MD, FACS

## 2023-11-03 NOTE — CONSULTS
"[facility] -  CONSULTATION NOTE               Author: Justo Ríos M.D. Date & Time: 11/3/2023  3:21 PM       REASON FOR CONSULTATION:   Inpatient hemodialysis management.    CHIEF COMPLAINT:   \"Aortic valve repair\"    HISTORY OF PRESENT ILLNESS:    58 y.o. male with  chronic anabolic steroid use, ESRD sec to to Bx proven sec FSGS currently on PD and compeltes three excahnges/ day, moderate to moderate AS and  moderate to severe AR, HTN presented for aortic valve and ascending anerysm repair. Pt had post operative complication of tamponade and is back in OR. Pt has received signficant colloid/ IVF for support. He remains intubated and not able to offer additional history    REVIEW OF SYSTEMS:    10 point ROS not performed second to critical illness    PAST MEDICAL HISTORY:   Past Medical History:   Diagnosis Date    Anesthesia     Hiccups for over 24 hours after a previous sugery.    Aortic stenosis     Chronic gout of multiple sites     Dialysis patient ([facility])     peritoneal daily    Dyspnea on exertion 05/23/2023    Elevated troponin 05/23/2023    Heart burn     1990    Heart murmur     High cholesterol     All always    Hypertension 2009    NSTEMI (non-ST elevated myocardial infarction) ([facility]) 05/23/2023    no stents placed    Pain in the chest 05/23/2023    PONV (postoperative nausea and vomiting)     Renal disorder 2007    Stage IV    Sleep apnea 2000    Snoring 1990       PAST SURGICAL HISTORY:   Past Surgical History:   Procedure Laterality Date    AORTIC VALVE REPLACEMENT  11/3/2023    Procedure: AORTIC VALVE REPLACEMENT, ASCENDING AORTIC ANEURYSM REPAIR, TRANSESOPHAGEAL ECHOCARDIOGRAM;  Surgeon: Osmel Blanca M.D.;  Location: SURGERY [facility];  Service: Cardiothoracic    AORTIC ASCENDING DISSECTION  11/3/2023    Procedure: REPAIR, ANEURYSM OR DISSECTION, AORTA, ASCENDING;  Surgeon: Osmel Blanca M.D.;  Location: SURGERY [facility];  Service: Cardiothoracic    " "ECHOCARDIOGRAM, TRANSESOPHAGEAL, INTRAOPERATIVE  11/3/2023    Procedure: ECHOCARDIOGRAM, TRANSESOPHAGEAL, INTRAOPERATIVE;  Surgeon: Osmel Blanca M.D.;  Location: SURGERY [facility];  Service: Cardiothoracic    CATH PLACEMENT CAPD Right 9/6/2023    Procedure: LAPAROSCOPIC INSERTION OF PERITONEAL DIALYSIS CATHETER;  Surgeon: Dontrell Sales M.D.;  Location: SURGERY [facility];  Service: Vascular    MD INJ LUMBAR/SACRAL,W/ IMAGING Left 8/24/2023    Procedure: LUMBAR EPIDURAL STEROID INJECTION, LEFT L5-S1 INTERLAMINAR UNDER FLUOROSCOPY;  Surgeon: Benito Herrera D.O.;  Location: SURGERY [facility];  Service: Orthopedics    CATH PLACEMENT CAPD N/A 5/15/2023    Procedure: LAPAROSCOPIC PLACEMENT OF PERITONEAL DIALYSIS CATHERTER;  Surgeon: Shikha Alexander M.D.;  Location: SURGERY SAME DAY [facility];  Service: General    UMBILICAL HERNIA REPAIR  2012    4 separate surgeries between 9469-0350    OTHER  2007    Kidney biopsy, can't recall laterality,    HIP ARTHROSCOPY Bilateral 2002    TONSILLECTOMY N/A 1987    HAND SURGERY Right 1985    Hand and wrist    SHOULDER ARTHROSCOPY      Can't recall date       FAMILY HISTORY:   Family History   Problem Relation Age of Onset    Hyperlipidemia Mother     Hypertension Father        SOCIAL HISTORY:   Social History     Tobacco Use   Smoking Status Never   Smokeless Tobacco Never     Social History     Substance and Sexual Activity   Alcohol Use Never     Social History     Substance and Sexual Activity   Drug Use Not Currently       HOME MEDICATIONS:   Reviewed and documented in chart.    LABORATORY STUDIES:   Recent Labs     11/03/23  0644 11/03/23  1153   POTASSIUM 5.2 4.8       ALLERGIES:  Allopurinol and Hydralazine hcl    VS:  BP (!) 52/32   Pulse 100   Temp 36.2 °C (97.2 °F)   Resp (!) 21   Ht 1.702 m (5' 7.01\")   Wt 84.6 kg (186 lb 8.2 oz)   SpO2 100%   BMI 29.20 kg/m²     Physical Exam     General: ill appearing  HEENT: ETT in palce  Heart: RR   Pulm: thoracic drain " in place  Abdomen: PD cath in place  Extremites: no edema  Derm: no rashes  Neuro: sedated  Vasc Access: PD catheter  FLUID BALANCE:  No intake/output data recorded.    IMAGING:  All imaging reviewed from admission to present day    IMPRESSION:  # ESRD  - Etiology likely 2/2 FSGS  - PD  # Volume overload  -may require HD or PD 4.25  # S/P AVR/ Ascending aneurysm repair  # Anemia of CKD  # CKD-MBD  # REspiratoy FAilure on vent      PLAN:  - REnal replacemnt therapy once stabilized from OR  - will discuss with ICU, but probably short term HD with UF  - Vent per ICU    Thank you for the consultation!

## 2023-11-03 NOTE — ANESTHESIA PREPROCEDURE EVALUATION
Case: [id number] Anesthesia Start Date/Time: 11/03/23 1522    Procedures:       CONTROL OF HEMORRHAGE (Chest)      STERNOTOMY, EVACUATION OF CLOT AND WASHOUT    Anesthesia type: general    Location: [place] [address] / SURGERY [facility]    Surgeons: Osmel Blanca M.D.      Hemorrhagic shock    Relevant Problems   PULMONARY   (positive) Dyspnea on exertion      CARDIAC   (positive) Aortic stenosis   (positive) Coronary artery disease due to lipid rich plaque   (positive) Dyspnea on exertion   (positive) Heart murmur   (positive) Hypertension      GI   (positive) Gastroesophageal reflux disease         (positive) ESRD (end stage renal disease) (HCC)   (positive) FSGS (focal segmental glomerulosclerosis)   (positive) Stage 5 chronic kidney disease not on chronic dialysis (HCC)      Other   (positive) Chronic gout of multiple sites       Physical Exam    Airway - unable to assess  Patient is intubated/trached     Cardiovascular - normal exam  Rhythm: regular  Rate: normal  (-) murmur     Dental     Unable to assess dental       Pulmonary - normal exam  Breath sounds clear to auscultation     Abdominal    Neurological - sedated/unconcious                 Anesthesia Plan    ASA 4- EMERGENT   ASA physical status 4 criteria: shockASA physical status emergent criteria: acute hemorrhage    Plan - general       Airway plan will be ETT  CARMELITA Planned        Induction: intravenous      Pertinent diagnostic labs and testing reviewed    Informed Consent:  Emergent - Consent given by clinician        
Yes

## 2023-11-04 ENCOUNTER — APPOINTMENT (OUTPATIENT)
Dept: RADIOLOGY | Facility: MEDICAL CENTER | Age: 58
DRG: 219 | End: 2023-11-04
Attending: NURSE PRACTITIONER
Payer: COMMERCIAL

## 2023-11-04 LAB
ANION GAP SERPL CALC-SCNC: 11 MMOL/L (ref 7–16)
BARCODED ABORH UBTYP: 5100
BARCODED ABORH UBTYP: 600
BARCODED ABORH UBTYP: 6200
BARCODED ABORH UBTYP: 6200
BARCODED PRD CODE UBPRD: NORMAL
BARCODED UNIT NUM UBUNT: NORMAL
BUN SERPL-MCNC: 50 MG/DL (ref 8–22)
CALCIUM SERPL-MCNC: 8.4 MG/DL (ref 8.5–10.5)
CHLORIDE SERPL-SCNC: 108 MMOL/L (ref 96–112)
CO2 SERPL-SCNC: 24 MMOL/L (ref 20–33)
COMPONENT R 8504R: NORMAL
CREAT SERPL-MCNC: 5.66 MG/DL (ref 0.5–1.4)
EKG IMPRESSION: NORMAL
EKG IMPRESSION: NORMAL
ERYTHROCYTE [DISTWIDTH] IN BLOOD BY AUTOMATED COUNT: 53.2 FL (ref 35.9–50)
GFR SERPLBLD CREATININE-BSD FMLA CKD-EPI: 11 ML/MIN/1.73 M 2
GLUCOSE BLD STRIP.AUTO-MCNC: 101 MG/DL (ref 65–99)
GLUCOSE BLD STRIP.AUTO-MCNC: 102 MG/DL (ref 65–99)
GLUCOSE BLD STRIP.AUTO-MCNC: 108 MG/DL (ref 65–99)
GLUCOSE BLD STRIP.AUTO-MCNC: 112 MG/DL (ref 65–99)
GLUCOSE BLD STRIP.AUTO-MCNC: 113 MG/DL (ref 65–99)
GLUCOSE BLD STRIP.AUTO-MCNC: 115 MG/DL (ref 65–99)
GLUCOSE BLD STRIP.AUTO-MCNC: 123 MG/DL (ref 65–99)
GLUCOSE BLD STRIP.AUTO-MCNC: 125 MG/DL (ref 65–99)
GLUCOSE BLD STRIP.AUTO-MCNC: 126 MG/DL (ref 65–99)
GLUCOSE BLD STRIP.AUTO-MCNC: 126 MG/DL (ref 65–99)
GLUCOSE BLD STRIP.AUTO-MCNC: 131 MG/DL (ref 65–99)
GLUCOSE BLD STRIP.AUTO-MCNC: 131 MG/DL (ref 65–99)
GLUCOSE BLD STRIP.AUTO-MCNC: 98 MG/DL (ref 65–99)
GLUCOSE SERPL-MCNC: 124 MG/DL (ref 65–99)
HCT VFR BLD AUTO: 22.4 % (ref 42–52)
HGB BLD-MCNC: 7.8 G/DL (ref 14–18)
INR PPP: 1.28 (ref 0.87–1.13)
LV EJECT FRACT  99904: 60
MAGNESIUM SERPL-MCNC: 2.2 MG/DL (ref 1.5–2.5)
MCH RBC QN AUTO: 32.4 PG (ref 27–33)
MCHC RBC AUTO-ENTMCNC: 34.8 G/DL (ref 32.3–36.5)
MCV RBC AUTO: 92.9 FL (ref 81.4–97.8)
PLATELET # BLD AUTO: 83 K/UL (ref 164–446)
PLATELETS.RETICULATED NFR BLD AUTO: 3.9 % (ref 0.6–13.1)
PMV BLD AUTO: 10.3 FL (ref 9–12.9)
POTASSIUM SERPL-SCNC: 4.8 MMOL/L (ref 3.6–5.5)
POTASSIUM SERPL-SCNC: 5.1 MMOL/L (ref 3.6–5.5)
PRODUCT TYPE UPROD: NORMAL
PROTHROMBIN TIME: 16.1 SEC (ref 12–14.6)
RBC # BLD AUTO: 2.41 M/UL (ref 4.7–6.1)
SODIUM SERPL-SCNC: 143 MMOL/L (ref 135–145)
UNIT STATUS USTAT: NORMAL
WBC # BLD AUTO: 6.5 K/UL (ref 4.8–10.8)

## 2023-11-04 PROCEDURE — 99233 SBSQ HOSP IP/OBS HIGH 50: CPT | Performed by: INTERNAL MEDICINE

## 2023-11-04 PROCEDURE — 700111 HCHG RX REV CODE 636 W/ 250 OVERRIDE (IP): Mod: JZ | Performed by: NURSE PRACTITIONER

## 2023-11-04 PROCEDURE — 86923 COMPATIBILITY TEST ELECTRIC: CPT

## 2023-11-04 PROCEDURE — 770022 HCHG ROOM/CARE - ICU (200)

## 2023-11-04 PROCEDURE — 85055 RETICULATED PLATELET ASSAY: CPT

## 2023-11-04 PROCEDURE — 93010 ELECTROCARDIOGRAM REPORT: CPT | Mod: 76 | Performed by: INTERNAL MEDICINE

## 2023-11-04 PROCEDURE — 93010 ELECTROCARDIOGRAM REPORT: CPT | Performed by: INTERNAL MEDICINE

## 2023-11-04 PROCEDURE — 80048 BASIC METABOLIC PNL TOTAL CA: CPT

## 2023-11-04 PROCEDURE — 71045 X-RAY EXAM CHEST 1 VIEW: CPT

## 2023-11-04 PROCEDURE — 700102 HCHG RX REV CODE 250 W/ 637 OVERRIDE(OP): Performed by: NURSE PRACTITIONER

## 2023-11-04 PROCEDURE — 85027 COMPLETE CBC AUTOMATED: CPT

## 2023-11-04 PROCEDURE — 85610 PROTHROMBIN TIME: CPT

## 2023-11-04 PROCEDURE — 83735 ASSAY OF MAGNESIUM: CPT

## 2023-11-04 PROCEDURE — 94669 MECHANICAL CHEST WALL OSCILL: CPT

## 2023-11-04 PROCEDURE — 82962 GLUCOSE BLOOD TEST: CPT | Mod: 91

## 2023-11-04 PROCEDURE — 700111 HCHG RX REV CODE 636 W/ 250 OVERRIDE (IP): Performed by: NURSE PRACTITIONER

## 2023-11-04 PROCEDURE — 84132 ASSAY OF SERUM POTASSIUM: CPT

## 2023-11-04 PROCEDURE — A9270 NON-COVERED ITEM OR SERVICE: HCPCS | Performed by: NURSE PRACTITIONER

## 2023-11-04 PROCEDURE — 90945 DIALYSIS ONE EVALUATION: CPT

## 2023-11-04 PROCEDURE — P9016 RBC LEUKOCYTES REDUCED: HCPCS

## 2023-11-04 PROCEDURE — 93005 ELECTROCARDIOGRAM TRACING: CPT | Performed by: NURSE PRACTITIONER

## 2023-11-04 PROCEDURE — 36430 TRANSFUSION BLD/BLD COMPNT: CPT

## 2023-11-04 RX ORDER — CARVEDILOL 3.12 MG/1
3.12 TABLET ORAL 2 TIMES DAILY WITH MEALS
Status: DISCONTINUED | OUTPATIENT
Start: 2023-11-04 | End: 2023-11-06

## 2023-11-04 RX ORDER — GENTAMICIN SULFATE 1 MG/G
CREAM TOPICAL
Status: DISPENSED | OUTPATIENT
Start: 2023-11-05 | End: 2023-11-11

## 2023-11-04 RX ORDER — GABAPENTIN 100 MG/1
200 CAPSULE ORAL
Status: DISCONTINUED | OUTPATIENT
Start: 2023-11-04 | End: 2023-11-17 | Stop reason: HOSPADM

## 2023-11-04 RX ORDER — BACLOFEN 5 MG/1
2.5 TABLET ORAL 2 TIMES DAILY PRN
Status: DISCONTINUED | OUTPATIENT
Start: 2023-11-04 | End: 2023-11-17 | Stop reason: HOSPADM

## 2023-11-04 RX ADMIN — DOCUSATE SODIUM 50 MG AND SENNOSIDES 8.6 MG 2 TABLET: 8.6; 5 TABLET, FILM COATED ORAL at 17:16

## 2023-11-04 RX ADMIN — POLYETHYLENE GLYCOL 3350 1 PACKET: 17 POWDER, FOR SOLUTION ORAL at 05:20

## 2023-11-04 RX ADMIN — PROCHLORPERAZINE EDISYLATE 10 MG: 5 INJECTION INTRAMUSCULAR; INTRAVENOUS at 05:20

## 2023-11-04 RX ADMIN — OMEPRAZOLE 20 MG: 20 CAPSULE, DELAYED RELEASE ORAL at 05:20

## 2023-11-04 RX ADMIN — Medication 1 APPLICATOR: at 08:13

## 2023-11-04 RX ADMIN — OXYCODONE 5 MG: 5 TABLET ORAL at 06:24

## 2023-11-04 RX ADMIN — GABAPENTIN 200 MG: 100 CAPSULE ORAL at 16:07

## 2023-11-04 RX ADMIN — ACETAMINOPHEN 1000 MG: 500 TABLET, FILM COATED ORAL at 00:51

## 2023-11-04 RX ADMIN — ACETAMINOPHEN 1000 MG: 500 TABLET, FILM COATED ORAL at 17:16

## 2023-11-04 RX ADMIN — OXYCODONE HYDROCHLORIDE 10 MG: 10 TABLET ORAL at 15:10

## 2023-11-04 RX ADMIN — FENTANYL CITRATE 50 MCG: 50 INJECTION, SOLUTION INTRAMUSCULAR; INTRAVENOUS at 13:19

## 2023-11-04 RX ADMIN — ASPIRIN 81 MG: 81 TABLET, COATED ORAL at 05:20

## 2023-11-04 RX ADMIN — DOCUSATE SODIUM 50 MG AND SENNOSIDES 8.6 MG 2 TABLET: 8.6; 5 TABLET, FILM COATED ORAL at 05:20

## 2023-11-04 RX ADMIN — HEPARIN SODIUM 5000 UNITS: 5000 INJECTION, SOLUTION INTRAVENOUS; SUBCUTANEOUS at 21:16

## 2023-11-04 RX ADMIN — OXYCODONE HYDROCHLORIDE 10 MG: 10 TABLET ORAL at 12:01

## 2023-11-04 RX ADMIN — ACETAMINOPHEN 1000 MG: 500 TABLET, FILM COATED ORAL at 23:16

## 2023-11-04 RX ADMIN — AMIODARONE HYDROCHLORIDE 400 MG: 200 TABLET ORAL at 08:13

## 2023-11-04 RX ADMIN — OXYCODONE HYDROCHLORIDE 10 MG: 10 TABLET ORAL at 18:07

## 2023-11-04 RX ADMIN — ACETAMINOPHEN 1000 MG: 500 TABLET, FILM COATED ORAL at 11:11

## 2023-11-04 RX ADMIN — AMIODARONE HYDROCHLORIDE 400 MG: 200 TABLET ORAL at 21:16

## 2023-11-04 RX ADMIN — CARVEDILOL 3.12 MG: 3.12 TABLET, FILM COATED ORAL at 17:16

## 2023-11-04 RX ADMIN — TRAMADOL HYDROCHLORIDE 50 MG: 50 TABLET ORAL at 10:29

## 2023-11-04 RX ADMIN — ACETAMINOPHEN 1000 MG: 500 TABLET, FILM COATED ORAL at 05:20

## 2023-11-04 RX ADMIN — Medication 1 APPLICATOR: at 21:16

## 2023-11-04 RX ADMIN — AMIODARONE HYDROCHLORIDE 0.5 MG/MIN: 1.8 INJECTION, SOLUTION INTRAVENOUS at 03:10

## 2023-11-04 RX ADMIN — OXYCODONE 5 MG: 5 TABLET ORAL at 00:52

## 2023-11-04 RX ADMIN — OXYCODONE HYDROCHLORIDE 10 MG: 10 TABLET ORAL at 21:16

## 2023-11-04 RX ADMIN — TRAMADOL HYDROCHLORIDE 50 MG: 50 TABLET ORAL at 03:33

## 2023-11-04 ASSESSMENT — PAIN DESCRIPTION - PAIN TYPE
TYPE: SURGICAL PAIN

## 2023-11-04 ASSESSMENT — COGNITIVE AND FUNCTIONAL STATUS - GENERAL
DRESSING REGULAR LOWER BODY CLOTHING: A LITTLE
CLIMB 3 TO 5 STEPS WITH RAILING: A LOT
SUGGESTED CMS G CODE MODIFIER DAILY ACTIVITY: CJ
DAILY ACTIVITIY SCORE: 20
TURNING FROM BACK TO SIDE WHILE IN FLAT BAD: A LITTLE
DRESSING REGULAR UPPER BODY CLOTHING: A LITTLE
SUGGESTED CMS G CODE MODIFIER MOBILITY: CK
MOBILITY SCORE: 15
TOILETING: A LITTLE
HELP NEEDED FOR BATHING: A LITTLE
STANDING UP FROM CHAIR USING ARMS: A LOT
WALKING IN HOSPITAL ROOM: A LITTLE
MOVING FROM LYING ON BACK TO SITTING ON SIDE OF FLAT BED: A LITTLE
MOVING TO AND FROM BED TO CHAIR: A LOT

## 2023-11-04 ASSESSMENT — LIFESTYLE VARIABLES
ALCOHOL_USE: NO
TOTAL SCORE: 0
TOTAL SCORE: 0
EVER HAD A DRINK FIRST THING IN THE MORNING TO STEADY YOUR NERVES TO GET RID OF A HANGOVER: NO
ON A TYPICAL DAY WHEN YOU DRINK ALCOHOL HOW MANY DRINKS DO YOU HAVE: 0
TOTAL SCORE: 0
CONSUMPTION TOTAL: NEGATIVE
HAVE PEOPLE ANNOYED YOU BY CRITICIZING YOUR DRINKING: NO
HOW MANY TIMES IN THE PAST YEAR HAVE YOU HAD 5 OR MORE DRINKS IN A DAY: 0
EVER FELT BAD OR GUILTY ABOUT YOUR DRINKING: NO
HAVE YOU EVER FELT YOU SHOULD CUT DOWN ON YOUR DRINKING: NO
AVERAGE NUMBER OF DAYS PER WEEK YOU HAVE A DRINK CONTAINING ALCOHOL: 0

## 2023-11-04 ASSESSMENT — PAIN SCALES - GENERAL: PAIN_LEVEL: 0

## 2023-11-04 ASSESSMENT — FIBROSIS 4 INDEX: FIB4 SCORE: 1.92

## 2023-11-04 ASSESSMENT — ENCOUNTER SYMPTOMS
SHORTNESS OF BREATH: 0
DIZZINESS: 0

## 2023-11-04 ASSESSMENT — PATIENT HEALTH QUESTIONNAIRE - PHQ9
2. FEELING DOWN, DEPRESSED, IRRITABLE, OR HOPELESS: NOT AT ALL
1. LITTLE INTEREST OR PLEASURE IN DOING THINGS: NOT AT ALL
SUM OF ALL RESPONSES TO PHQ9 QUESTIONS 1 AND 2: 0

## 2023-11-04 NOTE — PROGRESS NOTES
[facility] Progress Note     Hemodialysis treatment #1 x 2 hours completed as ordered per Dr. Ríos/Dr. Freed  Treatment performed at bedside started at 2014 and ended at 2215  Treatment shortened by 30 mins, total run time 2 hours per Dr. Freed     Net UF Removed: 0 per orders- modified mid treatment by Dr. Freed     Hemodialysis treatment orders and patient labs reviewed.  Consent for treatment earlier from patient's wife through phone per PCN  Patient and dialysis access assessed pre and post treatment.  Patient became hypotensive within 15 minutes after initiation of treatment requiring increased vasopressor support, additional vasopressor initiated and gradually titrated by PCN per ICU parameters with positive effect.  Cardiac rhythm changes noted intra treatment from SR to A. Fib, w/ ectopies PVCs  Nephrologist on call Dr. Freed notified and made aware of patient intradialytic hypotension and cardiac rhythm changes with orders to administer Albumin 25 gms IV stat and another Albumin 25 gms IV after an hour (see MAR)  and to shorten treatment time to 2 hours with no UF.   Right IJ non-tunneled triple lumen CVC access with good patency and flow x 2  Blood pressures more stable post treatment, cardiac rhythm eventually converted to SR per PCN.  See dialysis  flow sheets under media for details.      Post tx access: Right IJ non-tunneled HD catheter flushed with saline then locked with Heparin 1000 units/ml per designated amount in each lumen (see MAR) then clamped, capped aseptically and labeled properly. CVC dressings clean, dry and intact. Heparin lock to be aspirated prior to next dialysis/CVC use by dialysis RN only. Please do not flush or draw from ports.     Please notify Nephrologist/Dialysis for follow-up.     Report given to primary care nurse RAY Vasquez RN.

## 2023-11-04 NOTE — CARE PLAN
The patient is Watcher - Medium risk of patient condition declining or worsening    Shift Goals  Clinical Goals: Hemodialysis, extubate  Patient Goals: FARIDA  Family Goals: FARIDA    Progress made toward(s) clinical / shift goals:      Problem: Knowledge Deficit - Standard  Goal: Patient and family/care givers will demonstrate understanding of plan of care, disease process/condition, diagnostic tests and medications  Outcome: Progressing     Problem: Pain - Standard  Goal: Alleviation of pain or a reduction in pain to the patient’s comfort goal  Outcome: Progressing     Problem: Respiratory  Goal: Patient will achieve/maintain optimum respiratory ventilation and gas exchange  Outcome: Progressing     Problem: Hemodynamics  Goal: Patient's hemodynamics, fluid balance and neurologic status will be stable or improve  Outcome: Progressing     Problem: Day of surgery post CABG/Heart valve replacement  Goal: Stabilization in immediate post op period  Outcome: Progressing  Intervention: VS q 15 min x 4 hours, then q 1 hour. Include temperature immediately upon arrival. Check CO/CI q 2-4 hours and PRN  Note: Following protocol, temp 36.6 glynn hugger discontinued at 2120. No PA catheter.  Intervention: If radial artery used, elevate arm, no BP checks or needle sticks from affected arm, monitor ulnar pulse and capillary refill  Note: Art line discontinued per protocol   Intervention: First post op hour labs and EKG per order  Note: Completed  Intervention: Serum K q 6 hours x 24 hours.  ABG and CBC prn.  Note: Serum K checked q 6hrs. ABG PRN.  Intervention: Initiate post cardiac insulin infusion protocol orders for FSBS greater than 140 and check frequency per protocol  Note: BG checked q1hr. BG less than 140.  Intervention: FSBS frequency as per Cardiac Surgery Insulin Drip Protocol  Note: Insulin gtt not started BG less than 180.  Intervention: Chest tube to 20 cm suction, record CT drainage with VS, and check for air  leak  Note: Chest tube to 20 cm suction, 5-30 cc serosanguinous out, no air leak.  Intervention: For CT drainage >300 mL in first hour post op and/or 150 mL in subsequent hours: Stat platelets, PT, INR, TEG, iSTAT, and H&H per order  Note: CT drainage minimal 5-30 cc/hr.  Intervention: Titrate and wean off vasoactive drips per patient's condition and per MD order while maintaining SBP  mmHg per MD order  Note: Norepinephrine and vasopressin off. Hemodynamically stable, maintaining goal  mmHg.  Intervention: Wean from Vent per protocol (see protocol), extubation goal within 6 hours post op  Note: Extubated at 2324. Total intubation time 5 hours and 56 mins.  Intervention: IS q 1 hour while awake post extubation  Note:   Intervention: Bedrest until extubated and groin lines out  Note: Edge off bed 4 hours post extubation.  Intervention: Dangle within 4 hours post extubation  Note: Edge of bed, tolerated well.  Intervention: Up in chair 4 hours, day of extubation  Note: UP in chair for breakfast.  Intervention: Maintain all original surgical dressings per provider orders and specifications  Note: Dressings intact and in place.  Intervention: Clear liquids post extubation, order carbohydrate free (post cardiac surgery) diet, advance as tolerated  Note: Passed swallow evaluation. Clear liquid diet ordered.  Intervention: Discontinue Arnold ken and arterial line 12-18 hours post op if hemodynamically stable and off vasoactive drips  Note: Off vasopressors, art line discontinued.  Intervention: A-Fib and DVT prophylaxis per MD order or contraindications documented (refer to DVT/VTE problem on Care Plan)  Note: Pt converted to Afib upon starting dialysis. Amiodarone gtt started. Pt converted back to SR.  Intervention: Amiodarone protocol per MD order  Note: Amiodarone protocol ordered.       Patient is not progressing towards the following goals:

## 2023-11-04 NOTE — ANESTHESIA POSTPROCEDURE EVALUATION
Patient: Gurdeep Guerra    Procedure Summary     Date: 11/03/23 Room / Location: [facility] 02 / SURGERY [facility]    Anesthesia Start: 1522 Anesthesia Stop: 1743    Procedures:       RESTORATION OF HEMOSTATIS (Chest)      MEDIASTINAL EXPLORATION (Chest) Diagnosis: (POST OPERATIVE BLEEDING)    Surgeons: Osmel Blanca M.D. Responsible Provider: Danie Bland M.D.    Anesthesia Type: general ASA Status: 4 - Emergent          Final Anesthesia Type: general  Last vitals  BP   Blood Pressure: 93/51, Arterial BP: (!) 86/53    Temp   36.8 °C (98.2 °F)    Pulse   71   Resp   18    SpO2   98 %      Anesthesia Post Evaluation    Patient location during evaluation: ICU  Level of consciousness: awake and alert    Airway patency: patent  Anesthetic complications: no  Cardiovascular status: adequate  Respiratory status: acceptable  Hydration status: hypervolemic              No notable events documented.     Nurse Pain Score: 2 (NPRS)

## 2023-11-04 NOTE — PROGRESS NOTES
Pt arrived to unit s/p hematoma evacuation. Report received from MD Bland. Pacer tested set at back up 50/10/2. Altagracia stein applied for temp 35.2. Orders received from Dr.Romanas to wean per protocol for extubation.     Updated Dr. Ríos of pt being back from OR as he tried to see pt for consult as pt was leaving to go to OR.

## 2023-11-04 NOTE — PROGRESS NOTES
Critical Care Progress Note    Date of admission  11/3/2023    Chief Complaint  58 y.o. male who presented 11/3/2023 for aortic valve replacement and aortic root repair.  He has a history of ESRD on PD (and on transplant list) d/t FSGS, HTN, DM, coronary disease. Underwent uneventful procedure 11/3  with Dr. Blanca. Post-op course c/b focal tamponade with large hematoma compressing the RA. Patient required large volume and vasopressor resuscitation and brought emergently back to the OR for management of the hematoma.  HD catheter placed post-OR and attempted HD for volume removal but got quite hypotensive with HD.     Hospital Course  No notes on file    Interval Problem Update  Reviewed last 24 hour events:  Neuro: awake, alert  HR: afib during HD. Oral amio, now sinus  SBP: MAP hovering 65  CVP 6-10  Tmax: AF  GI: cardiac diet, BM PTA  I/O: 350  Lines: subclavian TLC, PIV, HD cath  1U PRBC  Mobility: up to chair  Resp: PEP, IS   Vte: hold warfarin, DESTINEY  PPI/H2:omeprazole  Antibx: na    Tried HD overnight, got hypotensive  Review of Systems  Review of Systems   Respiratory:  Negative for shortness of breath.    Cardiovascular:  Positive for chest pain.   Neurological:  Negative for dizziness.        Vital Signs for last 24 hours   Temp:  [35.3 °C (95.5 °F)-36.8 °C (98.2 °F)] 36.8 °C (98.2 °F)  Pulse:  [] 73  Resp:  [11-32] 11  BP: ()/(32-95) 101/55  SpO2:  [93 %-100 %] 98 %    Hemodynamic parameters for last 24 hours  CVP:  [4 MM HG-27 MM HG] 5 MM HG    Respiratory Information for the last 24 hours  Vent Mode: ASV  Rate (breaths/min): 28  Vt Target (mL): 400  PEEP/CPAP: 8  MAP: 12  Control VTE (exp VT): 678    Physical Exam   Physical Exam  Constitutional:       General: He is not in acute distress.     Appearance: He is ill-appearing.   HENT:      Mouth/Throat:      Mouth: Mucous membranes are dry.   Eyes:      Pupils: Pupils are equal, round, and reactive to light.   Cardiovascular:      Rate and  Rhythm: Normal rate and regular rhythm.      Comments: Mechanical valve click  Pulmonary:      Effort: Pulmonary effort is normal.      Breath sounds: Normal breath sounds.   Abdominal:      General: Bowel sounds are normal.      Tenderness: There is no abdominal tenderness.   Musculoskeletal:      Right lower leg: Edema (trace) present.      Left lower leg: Edema (trace) present.   Skin:     General: Skin is warm and dry.   Neurological:      General: No focal deficit present.      Mental Status: He is alert and oriented to person, place, and time.   Psychiatric:         Mood and Affect: Mood normal.         Medications  Current Facility-Administered Medications   Medication Dose Route Frequency Provider Last Rate Last Admin    insulin regular (HumuLIN R,NovoLIN R) injection  0-14 Units Intravenous Once Osmel Blanca M.D.        insulin regular (Humulin R) 100 Units in  mL Infusion  0-29 Units/hr Intravenous Continuous Osmel Blanca M.D.   Stopped at 11/03/23 1607    dextrose 10 % BOLUS 12.5-25 g  12.5-25 g Intravenous PRN Osmel Blanca M.D. MD Alert...Pharmacy to initiate transition from insulin infusion to subcutaneous insulin for cardiothoracic surgery 1 Each  1 Each Other Continuous Osmel Blanca M.D.        norepinephrine (Levophed) 8 mg in 250 mL NS infusion (premix)  0-1 mcg/kg/min (Ideal) Intravenous Continuous Osmel Blanca M.D.   Stopped at 11/04/23 0117    EPINEPHrine (Adrenalin) infusion 4 mg/250 mL (premix)  0-0.5 mcg/kg/min (Ideal) Intravenous Continuous Osmel Blanca M.D.   Stopped at 11/03/23 1502    Respiratory Therapy Consult   Nebulization Continuous RT Major Barfield A.P.R.N.        NS infusion   Intravenous Continuous ANIBAL Lyle.P.R.N.   Stopped at 11/04/23 0108    NS infusion   Intravenous Continuous ANIBAL Lyle.P.R.N.   Stopped at 11/04/23 0400    NS (Bolus) 0.9 infusion   Intravenous PRN ANIBAL Lyle.P.R.N. 999 mL/hr at 11/03/23 1445 1,000 mL at  11/03/23 1445    Nozin nasal  swab  1 Applicator Each Nostril BID Major Barfield, A.P.R.N.   1 Applicator at 11/03/23 2133    calcium CHLORIDE 10 % 1,000 mg in  mL IVPB  1,000 mg Intravenous Once PRN Major Barfield, A.P.R.N.        magnesium sulfate in D5W IVPB premix 1 g  1 g Intravenous DAILY Major Barfield, A.P.R.N.   Held at 11/03/23 1215    K+ Scale: Goal of 4.5  1 Each Intravenous Q6HRS Major Barfield, A.P.R.N.        metoprolol tartrate (Lopressor) tablet 12.5 mg  12.5 mg Oral BID Major Barfield, A.P.R.N.        Followed by    [START ON 11/5/2023] metoprolol tartrate (Lopressor) tablet 25 mg  25 mg Oral BID Major Barfield, A.P.R.N.        aspirin EC tablet 81 mg  81 mg Oral DAILY Major Barifeld, A.P.R.N.   81 mg at 11/04/23 0520    clevidipine (Cleviprex) IV emulsion  0-21 mg/hr Intravenous Continuous Major Barfield, A.P.R.N.   Dose not Required at 11/03/23 1215    nitroglycerin 50 mg in D5W 250 ml infusion  0-100 mcg/min Intravenous Continuous Major Barfield, A.P.R.N.   Dose not Required at 11/03/23 1215    Pharmacy Consult Request ...Pain Management Review 1 Each  1 Each Other PHARMACY TO DOSE Major Barfield, A.P.R.N.        acetaminophen (Tylenol) tablet 1,000 mg  1,000 mg Oral Q6HRS Major Barfield, A.P.R.N.   1,000 mg at 11/04/23 0520    Followed by    [START ON 11/13/2023] acetaminophen (Tylenol) tablet 1,000 mg  1,000 mg Oral Q6HRS PRN Major Barfield, A.P.R.N.        oxyCODONE immediate-release (Roxicodone) tablet 5 mg  5 mg Oral Q3HRS PRN Major Barfield, A.P.R.N.   5 mg at 11/04/23 0624    Or    oxyCODONE immediate release (Roxicodone) tablet 10 mg  10 mg Oral Q3HRS PRN Major Barfield, A.P.R.N.        Or    fentaNYL (Sublimaze) injection 50 mcg  50 mcg Intravenous Q3HRS PRN Major Barfield, A.P.R.N.        traMADol (Ultram) 50 MG tablet 50 mg  50 mg Oral Q12HRS PRN Major Barfield, A.P.R.N.   50 mg at 11/04/23 0333    dexmedetomidine (PRECEDEX) 400 mcg/100mL NS premix infusion  0-1.5 mcg/kg/hr (Ideal)  Intravenous Continuous Major Barfield, A.P.R.N.   Stopped at 11/03/23 2228    sodium bicarbonate 8.4 % injection 50 mEq  50 mEq Intravenous Q HOUR PRN Major Barfield, A.P.R.N.   50 mEq at 11/03/23 1510    ondansetron (Zofran) syringe/vial injection 8 mg  8 mg Intravenous Q6HRS PRN Major Barfield, A.P.R.N.        Or    prochlorperazine (Compazine) injection 10 mg  10 mg Intravenous Q6HRS PRN Major Barfield, A.P.R.N.   10 mg at 11/04/23 0520    acetaminophen (Tylenol) tablet 650 mg  650 mg Oral Q4HRS PRN Major Barfield, A.P.R.N.        Or    acetaminophen (Tylenol) suppository 650 mg  650 mg Rectal Q4HRS PRN Major Barfield, A.P.R.N.        senna-docusate (Pericolace Or Senokot S) 8.6-50 MG per tablet 2 Tablet  2 Tablet Oral BID Major Barfield, A.P.R.N.   2 Tablet at 11/04/23 0520    And    polyethylene glycol/lytes (Miralax) PACKET 1 Packet  1 Packet Oral DAILY Major Barfield A.P.R.N.   1 Packet at 11/04/23 0520    And    [START ON 11/5/2023] magnesium hydroxide (Milk Of Magnesia) suspension 30 mL  30 mL Oral DAILY Major Barfield, A.P.R.N.        And    bisacodyl (Dulcolax) suppository 10 mg  10 mg Rectal QDAY PRN Major Barfield, A.P.R.N.        omeprazole (PriLOSEC) capsule 20 mg  20 mg Oral DAILY Major Barfield, A.P.R.N.   20 mg at 11/04/23 0520    mag hydrox-al hydrox-simeth (Maalox Plus Es Or Mylanta Ds) suspension 30 mL  30 mL Oral Q4HRS PRN Major Barfield, A.P.R.N.        MD Alert...Warfarin per Pharmacy   Other PHARMACY TO DOSE ANIBAL Lyle.P.R.N.        heparin injection 5,000 Units  5,000 Units Subcutaneous Q8HRS Major Barfield, A.P.R.N.        vasopressin (Vasostrict) 20 Units in  mL Infusion  0.03 Units/min Intravenous Continuous TEX Mclaughlin   Stopped at 11/03/23 2217    phenylephrine 40 mg/250 mL NS premix  0-5 mcg/kg/min (Ideal) Intravenous Continuous Shruti Marrufo M.D.   Stopped at 11/03/23 1512    amiodarone (Cordarone) tablet 400 mg  400 mg Enteral Tube BID TEX Mclaughlin         amiodarone (Nexterone) 360 mg/200 mL infusion  0.5 mg/min Intravenous Continuous TEX Mclaughlin 16.7 mL/hr at 11/04/23 0310 0.5 mg/min at 11/04/23 0310    heparin injection 2,400 Units  2,400 Units Intracatheter ACUTE DIALYSIS PRN Justo Ríos M.D.   2,400 Units at 11/03/23 2225       Fluids    Intake/Output Summary (Last 24 hours) at 11/4/2023 0801  Last data filed at 11/4/2023 0707  Gross per 24 hour   Intake 40321.33 ml   Output 5320 ml   Net 8753.33 ml       Laboratory  Recent Labs     11/03/23  1857 11/03/23  2100 11/03/23  2312   ISTATAPH 7.427 7.383* 7.381*   ISTATAPCO2 23.7* 38.1* 37.4*   ISTATAPO2 147* 108* 87   ISTATATCO2 16* 24 23   JGRSNOI4VPW 99 98 96   ISTATARTHCO3 15.6* 22.7 22.2   ISTATARTBE -8* -2 -3   ISTATTEMP 35.5 C 36.1 C 36.9 C   ISTATFIO2 60 40 30   ISTATSPEC Arterial Arterial Arterial   ISTATAPHTC 7.449 7.396* 7.383*   UDAEONNM6AD 139* 103* 86         Recent Labs     11/03/23  1153 11/03/23  1740 11/03/23  2116 11/04/23  0000 11/04/23  0430   SODIUM  --   --   --  143  --    POTASSIUM 4.8   < > 5.0 4.8 5.1   CHLORIDE  --   --   --  108  --    CO2  --   --   --  24  --    BUN  --   --   --  50*  --    CREATININE  --   --   --  5.66*  --    MAGNESIUM 3.2*  --   --  2.2  --    CALCIUM  --   --   --  8.4*  --     < > = values in this interval not displayed.     Recent Labs     11/04/23  0000   GLUCOSE 124*     Recent Labs     11/04/23  0430   WBC 6.5     Recent Labs     11/03/23  1153 11/03/23  1251 11/03/23  1458 11/03/23  1740 11/04/23  0430   RBC  --   --   --   --  2.41*   HEMOGLOBIN 12.0* 9.7*  --  9.4* 7.8*   HEMATOCRIT 35.1* 27.7*  --  27.5* 22.4*   PLATELETCT 166  --   --   --  83*   PROTHROMBTM 17.0*  --  14.4  --  16.1*   APTT 30.6  --   --   --   --    INR 1.36*  --  1.11  --  1.28*       Imaging  X-Ray:  I have personally reviewed the images and compared with prior images.    Assessment/Plan  Status post cardiac surgery  Assessment & Plan  AVR and Ao root repair  with Dr. Blanca 11/3  C/b focal pericardial tamponade that was managed operatively  Appropriate post-cardiac surgery protocols are in place    On mechanically assisted ventilation (HCC)  Assessment & Plan  Successfully extubated  Low flow O2    ESRD (end stage renal disease) ([facility])  Assessment & Plan  On PD normally  Required large volume resuscitation to maintain BP with the tamponade physiology  HD catheter was placed last night and HD attempted, but this resulted in hypotension and afib while on circuit.  Patient is only minimally edematous.  Will discuss with nephrology but he can likely slowly remove the excess fluid with PD and a higher sugar concentration    Dyslipidemia- (present on admission)  Assessment & Plan  statin    Aortic stenosis- (present on admission)  Assessment & Plan  Now s/p aortic valve replacement and aortic root repair    Gastroesophageal reflux disease- (present on admission)  Assessment & Plan  PPI    Hypertension- (present on admission)  Assessment & Plan  Post-cardiac surgery BP protocols         VTE:  Contraindicated  Ulcer: PPI  Lines: Central Line  Ongoing indication addressed, Arterial Line  Ongoing indication addressed, and Flowers Catheter  Ongoing indication addressed    I have performed a physical exam and reviewed and updated ROS and Plan today (11/4/2023). In review of yesterday's note (11/3/2023), there are no changes except as documented above.     Discussed patient condition and risk of morbidity and/or mortality with Family, RN, RT, Charge nurse / hot rounds, Patient, and CVS

## 2023-11-04 NOTE — PROGRESS NOTES
Patient meeting all extubation parameters, follows commands, LUZ, NIF -30, Vt 915. ABG WNL.      Patient extubated at 2324 by RT, placed on 4 L NC oxygen saturation 97%. No stridor.

## 2023-11-04 NOTE — PROCEDURES
Central Line Insertion    Date/Time: 11/3/2023 7:08 PM    Performed by: Poncho Claudio D.O.  Authorized by: Poncho Claudio D.O.    Consent:     Consent obtained:  Verbal    Consent given by:  Spouse    Risks discussed:  Arterial puncture, incorrect placement, infection, bleeding, pneumothorax and nerve damage    Alternatives discussed:  No treatment, delayed treatment, alternative treatment and observation  Pre-procedure details:     Hand hygiene: Hand hygiene performed prior to insertion      Sterile barrier technique: All elements of maximal sterile technique followed      Skin preparation:  Betadine    Skin preparation agent: Skin preparation agent completely dried prior to procedure    Sedation:     Sedation type:  Deep (on ventilator)  Procedure details:     Location:  R internal jugular    Patient position:  Flat    Procedural supplies:  Triple lumen    Landmarks identified: yes      Ultrasound guidance: yes      Sterile ultrasound techniques: Sterile gel and sterile probe covers were used      Number of attempts:  1    Successful placement: yes    Post-procedure details:     Post-procedure:  Line sutured and dressing applied    Guidewire: guidewire removal confirmed      Assessment:  Blood return through all ports (CXR pending)    Patient tolerance of procedure:  Tolerated well, no immediate complications  Comments:      ICU time: 25 minutes  Poncho Claudio D.O.

## 2023-11-04 NOTE — THERAPY
Physical Therapy Contact Note    Patient Name: Gurdeep Guerra  Age:  58 y.o., Sex:  male  Medical Record #: [id number]  Today's Date: 11/4/2023 11/04/23 0712   Interdisciplinary Plan of Care Collaboration   Collaboration Comments PT consult received, pt POD #1 OHS per chart.  Will HOLD PT eval until at least POD #2 per orders.

## 2023-11-04 NOTE — OR SURGEON
OPERATIVE REPORT    Operation date: 11/3/2023    Referring physician: Dontrell Sharma MD    PreOp Diagnosis: Postoperative bleeding, status post aortic valve replacement and ascending aortic aneurysm repair    PostOp Diagnosis: Postoperative bleeding, status post aortic valve replacement and ascending aortic aneurysm repair    Procedure(s):  MEDIASTINAL EXPLORATION FOR POSTOPERATIVE BLEEDING AND RESTORATION OF HEMOSTASIS    Surgeon(s):  Osmel Blanca M.D.    Assistant:  CALI Rojo    Anesthesiologist/Type of Anesthesia:  Anesthesiologist: Danie Bland M.D./General    Surgical Staff:  Assistant: TEX Mclaughlin  Circulator: Ame Dutton R.N.  Perfusionist: Lakshmi Tobar  Scrub Person: Irma Govea; Luis F Cruz    Specimens removed if any: None    Estimated Blood Loss: Approximately 800 mL of blood and 500 mL of thrombus    Findings: Intrapericardial thrombus, hemopericardium, pericardial tamponade    Complications: None    Indications:  Mr. Guerra 58-year-old male who earlier in the day underwent aortic valve replacement and ascending aortic aneurysm repair.  He developed significant postoperative bleeding with pericardial tamponade.    Procedure:  The patient was brought to the operating room and placed on the operating room table in the supine position.  Since he was already endotracheally intubated general anesthesia was administered.  He was then prepped and draped in the usual sterile fashion.  CALI Jin assisted with retraction and exposure during the operation and closed the sternal wound.  Intraoperative transesophageal echocardiography showed a large pericardial thrombus causing pericardial tamponade.    The sternal wound was reopened and the sternal wires were removed.  Approximately 500 mL of thrombus was evacuated from the pericardial cavity.  The pericardium was tented anteriorly with Ethibond stay stitches.  Approximately 800 mL of blood was evacuated from the  pericardial cavity.  A small amount of bleeding was coming out of the posterior aspect of the distal anastomosis.  A single pledgeted #4-0 Ethibond suture was required to obtain hemostasis.  Most of the blood was coming from the left side of the thymus.  There were several thymic veins that were bleeding.  This was controlled with several large clips and electrocautery.  The distal ascending aorta was wrapped with a 2 mm Hemashield tube graft.  Fibrillar was placed in the thymic area and posterior aorta overlying the right pulmonary artery.  When adequate hemostasis had been obtained, the sternum was reapproximated using size 5 sternal wires and the remainder of the incision was closed in multiple layers using Vicryl sutures.  There were no apparent complications.  The patient tolerated the procedure well and left the operating room in guarded condition.      11/3/2023 5:12 PM Osmel Blanca MD, FACS

## 2023-11-04 NOTE — PROGRESS NOTES
Updates:    Out of OR this evening.  Received 3L cystalloid and 2 U PRBC + cell saver.  Received 2 amps of calcium and 1 amp of bicarbonate.  On low dose levophed post-op.     Additional ICU time: 10 minutes    Poncho Claudio D.O.

## 2023-11-04 NOTE — PROGRESS NOTES
Called [facility] for clarification of orders s/p coming back from OR  Updated on most recent H&H9.4 and 27.5 chest tube output 150 since arrival, Dr. Ríos wanting a dialysis catheter to be placed for pt. Asked about antibiotics and free fluid plans    Orders for ancef 8 hrs post op per normal protocol, Can give 500ml of NS and see how pt responds and update her for further orders for any other fluid resuscitation.

## 2023-11-04 NOTE — PROGRESS NOTES
4 Eyes Skin Assessment Completed by CODI Adkins and CODI Villanueva.    Head WDL  Ears WDL  Nose WDL  Mouth WDL  Neck WDL  Breast/Chest WDL, midline incision  Shoulder Blades WDL  Spine WDL  (R) Arm/Elbow/Hand WDL  (L) Arm/Elbow/Hand WDL  Abdomen WDL  Groin WDL  Scrotum/Coccyx/Buttocks WDL  (R) Leg WDL  (L) Leg WDL  (R) Heel/Foot/Toe WDL  (L) Heel/Foot/Toe WDL          Devices In Places ECG, Blood Pressure Cuff, Pulse Ox, Flowers, Central Line, Pacer, and Nasal Cannula      Interventions In Place Chair Waffle, Pillows, Q2 Turns, and Low Air Loss Mattress    Possible Skin Injury No    Pictures Uploaded Into Epic N/A  Wound Consult Placed N/A  RN Wound Prevention Protocol Ordered No

## 2023-11-04 NOTE — PROGRESS NOTES
Cardiovascular Surgery Progress Note    Name: Gurdeep Guerra  MRN: [id number]  : 1965  Admit Date: 11/3/2023  4:59 AM  1 Day Post-Op     Procedure:  Procedure(s) and Anesthesia Type:  *AORTIC VALVE REPLACEMENT (23 mm On-X mechanical valve), ASCENDING AORTIC ANEURYSM REPAIR (30 mm Hemashield tube graft) and intraoperative transesophageal echocardiography    *MEDIASTINAL EXPLORATION FOR POSTOPERATIVE BLEEDING AND RESTORATION OF HEMOSTASIS    Vitals:  Vitals:    23 0630 23 0800 23 0815 23 0830   BP: 101/55 (!) 83/48 (!) 87/49 95/54   Pulse: 73 72 87 75   Resp: (!) 11 (!) 10 (!) 25 (!) 11   Temp:  37 °C (98.6 °F)     TempSrc:  Bladder     SpO2: 98% 97% 96% 97%   Weight:       Height:          Temp (24hrs), Av.4 °C (97.5 °F), Min:35.3 °C (95.5 °F), Max:37 °C (98.6 °F)      Respiratory:  Vent Mode: ASV, Rate (breaths/min): 28, PEEP/CPAP: 8, PIP: 14, MAP: 12 Respiration: (!) 11, Pulse Oximetry: 97 %       Fluids:    Intake/Output Summary (Last 24 hours) at 2023 0858  Last data filed at 2023 0800  Gross per 24 hour   Intake 27045.66 ml   Output 5320 ml   Net 8886.66 ml     Admit weight: Weight: 84.6 kg (186 lb 8.2 oz)  Current weight: Weight: 91 kg (200 lb 9.9 oz) (23 0600)    Labs:  Recent Labs     23  1153 23  1251 23  1740 23  0430   WBC  --   --   --  6.5   RBC  --   --   --  2.41*   HEMOGLOBIN 12.0* 9.7* 9.4* 7.8*   HEMATOCRIT 35.1* 27.7* 27.5* 22.4*   MCV  --   --   --  92.9   MCH  --   --   --  32.4   MCHC  --   --   --  34.8   RDW  --   --   --  53.2*   PLATELETCT 166  --   --  83*   MPV  --   --   --  10.3     Recent Labs     23  2116 23  0000 23   SODIUM  --  143  --    POTASSIUM 5.0 4.8 5.1   CHLORIDE  --  108  --    CO2  --  24  --    GLUCOSE  --  124*  --    BUN  --  50*  --    CREATININE  --  5.66*  --    CALCIUM  --  8.4*  --      Recent Labs     23  1153 23  1458 23  043   APTT 30.6  --    --    INR 1.36* 1.11 1.28*       HgbA1c:  5    Diabetic Educator Consult:  no    Medications:  Scheduled Medications   Medication Dose Frequency    insulin regular  0-14 Units Once    Nozin nasal  swab  1 Applicator BID    magnesium sulfate  1 g DAILY    K+ Scale: Goal of 4.5  1 Each Q6HRS    metoprolol tartrate  12.5 mg BID    Followed by    [START ON 11/5/2023] metoprolol tartrate  25 mg BID    aspirin  81 mg DAILY    Pharmacy Consult Request  1 Each PHARMACY TO DOSE    acetaminophen  1,000 mg Q6HRS    senna-docusate  2 Tablet BID    And    polyethylene glycol/lytes  1 Packet DAILY    And    [START ON 11/5/2023] magnesium hydroxide  30 mL DAILY    omeprazole  20 mg DAILY    MD Alert...Warfarin per Pharmacy   PHARMACY TO DOSE    heparin  5,000 Units Q8HRS    amiodarone  400 mg BID        Exam:   Physical Exam  Vitals and nursing note reviewed.   Constitutional:       General: He is not in acute distress.     Appearance: Normal appearance.   HENT:      Head: Normocephalic.      Right Ear: External ear normal.      Left Ear: External ear normal.      Nose: Nose normal. No congestion.      Mouth/Throat:      Mouth: Mucous membranes are moist.      Pharynx: Oropharynx is clear.   Eyes:      Extraocular Movements: Extraocular movements intact.      Pupils: Pupils are equal, round, and reactive to light.   Cardiovascular:      Rate and Rhythm: Normal rate and regular rhythm.      Pulses: Normal pulses.      Heart sounds: Normal heart sounds.   Pulmonary:      Effort: Pulmonary effort is normal.      Breath sounds: Decreased breath sounds present.   Abdominal:      General: Bowel sounds are decreased. There is no distension.      Palpations: Abdomen is soft.      Comments: PD catheter   Musculoskeletal:      Cervical back: Normal range of motion and neck supple. No tenderness.      Right lower leg: Edema present.      Left lower leg: Edema present.   Skin:     General: Skin is warm and dry.      Comments: Midline  surgical incision   Neurological:      General: No focal deficit present.      Mental Status: He is alert and oriented to person, place, and time. Mental status is at baseline.   Psychiatric:         Mood and Affect: Mood normal.         Behavior: Behavior normal.         Thought Content: Thought content normal.         Cardiac Medications:    ASA - Yes    Plavix - No; contraindicated because of On Coumadin / NOAC    Post-operative Beta Blockers - Yes    Ace/ARB- No; contraindicated because of Normal EF    Statin - No; contraindicated because of No CAD    Aldactone- No; contraindicated because of Normal EF    SGLT2i-  No contraindicated because of No; contraindicated because of Normal EF    Ejection Fraction:  60%    Telemetry:   11/4 SR- a fib overnight    Assessment/Plan:  POD 1  HDS, SR- was a fib overnight, on amio gtt, neuro intact, wounds intact, abdomen soft, fluid balance positive, wt up,  2 L NC. 270 mL out of chest tubes overnight after return to OR. H/H 7.8/22.4, plat 83. Plan: One unit PRBCs this morning. Keep chest tubes and pacing wires. DC amio gtt, continue PO. Dialysis per neph. IS/ambulate.     Disposition:  TBD

## 2023-11-04 NOTE — CARE PLAN
Problem: Hyperinflation  Goal: Prevent or improve atelectasis  Description: Target End Date:  3 to 4 days    1. Instruct incentive spirometry usage  2.  Perform hyperinflation therapy as indicated  Outcome: Progressing     PEP QID  IS: 500

## 2023-11-04 NOTE — ANESTHESIA POSTPROCEDURE EVALUATION
Patient: Gurdeep Guerra    Procedure Summary     Date: 11/03/23 Room / Location: [facility] 02 / SURGERY [facility]    Anesthesia Start: 0727 Anesthesia Stop: 1201    Procedures:       AORTIC VALVE REPLACEMENT, ASCENDING AORTIC ANEURYSM REPAIR, TRANSESOPHAGEAL ECHOCARDIOGRAM (Chest)      REPAIR, ANEURYSM OR DISSECTION, AORTA, ASCENDING (Chest)      ECHOCARDIOGRAM, TRANSESOPHAGEAL, INTRAOPERATIVE (Esophagus) Diagnosis: (SEVERE AORTIC STENOSIS)    Surgeons: Osmel Blanca M.D. Responsible Provider: Robert Mayo M.D.    Anesthesia Type: general ASA Status: 4          Final Anesthesia Type: general  Last vitals  BP   Blood Pressure: 111/55, Arterial BP: (!) 86/53    Temp   36.8 °C (98.2 °F)    Pulse   68   Resp   (!) 25    SpO2   99 %      Anesthesia Post Evaluation    Patient location during evaluation: ICU  Patient participation: complete - patient cannot participate  Level of consciousness: obtunded/minimal responses  Pain score: 0    Airway patency: patent  Anesthetic complications: no  Cardiovascular status: hypotensive and hemodynamically unstable  Respiratory status: intubated and ventilator  Hydration status: hypovolemic  Comments: Patient unstable with severe hypotension and high pressure requirements.  Postop bleeding and possible pericardial tamponade on bedside echo.  Likely will return to OR for exploration.    PONV: none          There were no known notable events for this encounter.     Nurse Pain Score: 2 (NPRS)

## 2023-11-04 NOTE — PROGRESS NOTES
Pt became hypotensive SBP 70s-80s upon starting dialysis. Norepinephrine titrated to goal. Also pt had a rhythm change. Stat EKG ordered. Pt converted to Afib 60s-70s. Notified Carmina TOLLIVER. Orders received to start vasopressin if norepi get up 0.2 mcg/kg/hr. Also start amiodarone gtt, no bolus. See MAR.

## 2023-11-04 NOTE — ANESTHESIA TIME REPORT
Anesthesia Start and Stop Event Times     Date Time Event    11/3/2023 1522 Anesthesia Start     1743 Anesthesia Stop        Responsible Staff  11/03/23    Name Role Begin End    Danie Bland M.D. Anesth 1522 1741        Overtime Reason:  no overtime (within assigned shift)    Comments:

## 2023-11-04 NOTE — ANESTHESIA POSTPROCEDURE EVALUATION
Patient: Gurdeep Guerra    Procedure Summary     Date: 11/03/23 Room / Location: [facility] OR 02 / SURGERY [facility]    Anesthesia Start: 0727 Anesthesia Stop: 1201    Procedures:       AORTIC VALVE REPLACEMENT, ASCENDING AORTIC ANEURYSM REPAIR, TRANSESOPHAGEAL ECHOCARDIOGRAM (Chest)      REPAIR, ANEURYSM OR DISSECTION, AORTA, ASCENDING (Chest)      ECHOCARDIOGRAM, TRANSESOPHAGEAL, INTRAOPERATIVE (Esophagus) Diagnosis: (SEVERE AORTIC STENOSIS)    Surgeons: Osmel Blanca M.D. Responsible Provider: Robert Mayo M.D.    Anesthesia Type: general ASA Status: 4          Final Anesthesia Type: general  Last vitals  BP   Blood Pressure: 111/55, Arterial BP: (!) 86/53    Temp   36.8 °C (98.2 °F)    Pulse   68   Resp   (!) 25    SpO2   99 %      Anesthesia Post Evaluation    There were no known notable events for this encounter.     Nurse Pain Score: 2 (NPRS)

## 2023-11-05 LAB
ANION GAP SERPL CALC-SCNC: 14 MMOL/L (ref 7–16)
BUN SERPL-MCNC: 67 MG/DL (ref 8–22)
CALCIUM SERPL-MCNC: 8.5 MG/DL (ref 8.5–10.5)
CHLORIDE SERPL-SCNC: 97 MMOL/L (ref 96–112)
CO2 SERPL-SCNC: 25 MMOL/L (ref 20–33)
CREAT SERPL-MCNC: 7.57 MG/DL (ref 0.5–1.4)
ERYTHROCYTE [DISTWIDTH] IN BLOOD BY AUTOMATED COUNT: 55.4 FL (ref 35.9–50)
GFR SERPLBLD CREATININE-BSD FMLA CKD-EPI: 8 ML/MIN/1.73 M 2
GLUCOSE BLD STRIP.AUTO-MCNC: 114 MG/DL (ref 65–99)
GLUCOSE BLD STRIP.AUTO-MCNC: 125 MG/DL (ref 65–99)
GLUCOSE BLD STRIP.AUTO-MCNC: 126 MG/DL (ref 65–99)
GLUCOSE BLD STRIP.AUTO-MCNC: 85 MG/DL (ref 65–99)
GLUCOSE SERPL-MCNC: 129 MG/DL (ref 65–99)
HCT VFR BLD AUTO: 28.6 % (ref 42–52)
HGB BLD-MCNC: 9.3 G/DL (ref 14–18)
INR PPP: 1.29 (ref 0.87–1.13)
MCH RBC QN AUTO: 30.9 PG (ref 27–33)
MCHC RBC AUTO-ENTMCNC: 32.5 G/DL (ref 32.3–36.5)
MCV RBC AUTO: 95 FL (ref 81.4–97.8)
PLATELET # BLD AUTO: 100 K/UL (ref 164–446)
PLATELETS.RETICULATED NFR BLD AUTO: 4.3 % (ref 0.6–13.1)
PMV BLD AUTO: 10.6 FL (ref 9–12.9)
POTASSIUM SERPL-SCNC: 4.8 MMOL/L (ref 3.6–5.5)
PROTHROMBIN TIME: 16.2 SEC (ref 12–14.6)
RBC # BLD AUTO: 3.01 M/UL (ref 4.7–6.1)
SODIUM SERPL-SCNC: 136 MMOL/L (ref 135–145)
WBC # BLD AUTO: 10.3 K/UL (ref 4.8–10.8)

## 2023-11-05 PROCEDURE — A9270 NON-COVERED ITEM OR SERVICE: HCPCS | Performed by: NURSE PRACTITIONER

## 2023-11-05 PROCEDURE — 94669 MECHANICAL CHEST WALL OSCILL: CPT

## 2023-11-05 PROCEDURE — 97535 SELF CARE MNGMENT TRAINING: CPT

## 2023-11-05 PROCEDURE — 90935 HEMODIALYSIS ONE EVALUATION: CPT

## 2023-11-05 PROCEDURE — 80048 BASIC METABOLIC PNL TOTAL CA: CPT

## 2023-11-05 PROCEDURE — 3E1M39Z IRRIGATION OF PERITONEAL CAVITY USING DIALYSATE, PERCUTANEOUS APPROACH: ICD-10-PCS | Performed by: INTERNAL MEDICINE

## 2023-11-05 PROCEDURE — 700111 HCHG RX REV CODE 636 W/ 250 OVERRIDE (IP): Performed by: NURSE PRACTITIONER

## 2023-11-05 PROCEDURE — 700102 HCHG RX REV CODE 250 W/ 637 OVERRIDE(OP): Performed by: NURSE PRACTITIONER

## 2023-11-05 PROCEDURE — 85610 PROTHROMBIN TIME: CPT

## 2023-11-05 PROCEDURE — 85055 RETICULATED PLATELET ASSAY: CPT

## 2023-11-05 PROCEDURE — 90945 DIALYSIS ONE EVALUATION: CPT

## 2023-11-05 PROCEDURE — 85027 COMPLETE CBC AUTOMATED: CPT

## 2023-11-05 PROCEDURE — 82962 GLUCOSE BLOOD TEST: CPT

## 2023-11-05 PROCEDURE — 97162 PT EVAL MOD COMPLEX 30 MIN: CPT

## 2023-11-05 PROCEDURE — 770020 HCHG ROOM/CARE - TELE (206)

## 2023-11-05 RX ORDER — TAMSULOSIN HYDROCHLORIDE 0.4 MG/1
0.4 CAPSULE ORAL
Status: DISCONTINUED | OUTPATIENT
Start: 2023-11-05 | End: 2023-11-17 | Stop reason: HOSPADM

## 2023-11-05 RX ORDER — VERAPAMIL HYDROCHLORIDE 80 MG/1
80 TABLET ORAL EVERY 8 HOURS
Status: DISCONTINUED | OUTPATIENT
Start: 2023-11-05 | End: 2023-11-06

## 2023-11-05 RX ADMIN — PROCHLORPERAZINE EDISYLATE 10 MG: 5 INJECTION INTRAMUSCULAR; INTRAVENOUS at 10:38

## 2023-11-05 RX ADMIN — VERAPAMIL HYDROCHLORIDE 80 MG: 80 TABLET ORAL at 15:31

## 2023-11-05 RX ADMIN — TAMSULOSIN HYDROCHLORIDE 0.4 MG: 0.4 CAPSULE ORAL at 11:41

## 2023-11-05 RX ADMIN — ACETAMINOPHEN 1000 MG: 500 TABLET, FILM COATED ORAL at 05:17

## 2023-11-05 RX ADMIN — ACETAMINOPHEN 1000 MG: 500 TABLET, FILM COATED ORAL at 11:42

## 2023-11-05 RX ADMIN — OXYCODONE HYDROCHLORIDE 10 MG: 10 TABLET ORAL at 04:19

## 2023-11-05 RX ADMIN — GABAPENTIN 200 MG: 100 CAPSULE ORAL at 05:17

## 2023-11-05 RX ADMIN — ONDANSETRON 8 MG: 2 INJECTION INTRAMUSCULAR; INTRAVENOUS at 04:14

## 2023-11-05 RX ADMIN — ASPIRIN 81 MG: 81 TABLET, COATED ORAL at 05:18

## 2023-11-05 RX ADMIN — AMIODARONE HYDROCHLORIDE 400 MG: 200 TABLET ORAL at 21:31

## 2023-11-05 RX ADMIN — HEPARIN SODIUM 5000 UNITS: 5000 INJECTION, SOLUTION INTRAVENOUS; SUBCUTANEOUS at 05:18

## 2023-11-05 RX ADMIN — POLYETHYLENE GLYCOL 3350 1 PACKET: 17 POWDER, FOR SOLUTION ORAL at 05:16

## 2023-11-05 RX ADMIN — VERAPAMIL HYDROCHLORIDE 80 MG: 80 TABLET ORAL at 08:11

## 2023-11-05 RX ADMIN — HEPARIN SODIUM 2400 UNITS: 1000 INJECTION, SOLUTION INTRAVENOUS; SUBCUTANEOUS at 15:15

## 2023-11-05 RX ADMIN — AMIODARONE HYDROCHLORIDE 400 MG: 200 TABLET ORAL at 08:02

## 2023-11-05 RX ADMIN — HEPARIN SODIUM 5000 UNITS: 5000 INJECTION, SOLUTION INTRAVENOUS; SUBCUTANEOUS at 15:08

## 2023-11-05 RX ADMIN — CARVEDILOL 3.12 MG: 3.12 TABLET, FILM COATED ORAL at 08:02

## 2023-11-05 RX ADMIN — DOCUSATE SODIUM 50 MG AND SENNOSIDES 8.6 MG 2 TABLET: 8.6; 5 TABLET, FILM COATED ORAL at 05:17

## 2023-11-05 RX ADMIN — HEPARIN SODIUM 5000 UNITS: 5000 INJECTION, SOLUTION INTRAVENOUS; SUBCUTANEOUS at 21:32

## 2023-11-05 RX ADMIN — CARVEDILOL 3.12 MG: 3.12 TABLET, FILM COATED ORAL at 18:16

## 2023-11-05 RX ADMIN — Medication 1 APPLICATOR: at 08:03

## 2023-11-05 RX ADMIN — Medication 1 APPLICATOR: at 21:32

## 2023-11-05 RX ADMIN — OMEPRAZOLE 20 MG: 20 CAPSULE, DELAYED RELEASE ORAL at 05:17

## 2023-11-05 RX ADMIN — ACETAMINOPHEN 1000 MG: 500 TABLET, FILM COATED ORAL at 23:29

## 2023-11-05 RX ADMIN — DOCUSATE SODIUM 50 MG AND SENNOSIDES 8.6 MG 2 TABLET: 8.6; 5 TABLET, FILM COATED ORAL at 18:16

## 2023-11-05 RX ADMIN — OXYCODONE HYDROCHLORIDE 10 MG: 10 TABLET ORAL at 08:02

## 2023-11-05 RX ADMIN — VERAPAMIL HYDROCHLORIDE 80 MG: 80 TABLET ORAL at 23:29

## 2023-11-05 RX ADMIN — ACETAMINOPHEN 1000 MG: 500 TABLET, FILM COATED ORAL at 18:16

## 2023-11-05 RX ADMIN — MAGNESIUM HYDROXIDE 30 ML: 1200 LIQUID ORAL at 05:15

## 2023-11-05 RX ADMIN — ALUMINUM HYDROXIDE, MAGNESIUM HYDROXIDE, AND DIMETHICONE 30 ML: 400; 400; 40 SUSPENSION ORAL at 21:38

## 2023-11-05 ASSESSMENT — FIBROSIS 4 INDEX: FIB4 SCORE: 1.59

## 2023-11-05 ASSESSMENT — COGNITIVE AND FUNCTIONAL STATUS - GENERAL
MOVING TO AND FROM BED TO CHAIR: A LITTLE
MOBILITY SCORE: 17
CLIMB 3 TO 5 STEPS WITH RAILING: A LOT
MOVING FROM LYING ON BACK TO SITTING ON SIDE OF FLAT BED: A LITTLE
TURNING FROM BACK TO SIDE WHILE IN FLAT BAD: A LITTLE
WALKING IN HOSPITAL ROOM: A LITTLE
STANDING UP FROM CHAIR USING ARMS: A LITTLE
SUGGESTED CMS G CODE MODIFIER MOBILITY: CK

## 2023-11-05 ASSESSMENT — PAIN DESCRIPTION - PAIN TYPE
TYPE: SURGICAL PAIN
TYPE: SURGICAL PAIN;ACUTE PAIN

## 2023-11-05 ASSESSMENT — GAIT ASSESSMENTS: GAIT LEVEL OF ASSIST: UNABLE TO PARTICIPATE

## 2023-11-05 NOTE — PROGRESS NOTES
Received report and assumed patient care. Pt is awake and finishing up PD. Will get patient to chair once PD finished. Pt is alert and orientedx4, VSS

## 2023-11-05 NOTE — THERAPY
"Physical Therapy   Initial Evaluation (Cardiac Rehab Phase I)     Patient Name: Gurdeep Guerra  Age:  58 y.o., Sex:  male  Medical Record #: [id number]  Today's Date: 11/5/2023     Precautions  Precautions: Cardiac Precautions (See Comments);Sternal Precautions (See Comments)    Assessment  Patient is 58 y.o. male presenting POD2 AVR and AAA repair w/ a PMH of ESRD on peritoneal dialysis, HTN, HLD, CAD and severe aortic stenosis.  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      Currently, the pt displays reduced balance and poor functional endurance 2/2 symptoms of dizziness and nausea, otherwise demonstrating adequate strength and ROM for functional mobility tasks.  He was received in his chair, and able to demo STS CGA w/ FWW, however unable to participate in gait 2/2 dizziness and severe nausea (HR, BP and O2 stable throughout).  Provided pt and his wife verbal and written edu re: sternal precautions, use of RPE scale/\"talk test\"/HR to monitor and progress exercise, home activity modifications, and importance of avoiding valsalva during functional mobility tasks w/ good understanding verbalized by the pt and his wife.  Recommend pt DC home w/ OP cardiac rehab f/u as appropriate given dx and extent of strength and mobility currently demo'd by the pt in anticipation of quick progression of functional endurance w/ resolution of dizziness and nausea.  Will follow for acute PT needs.     Plan    Physical Therapy Initial Treatment Plan   Treatment Plan : Bed Mobility, Equipment, Gait Training, Neuro Re-Education / Balance, Self Care / Home Evaluation, Stair Training, Therapeutic Activities, Therapeutic Exercise  Treatment Frequency: 5 Times per Week  Duration: Until Therapy Goals Met    DC Equipment Recommendations: Front-Wheel Walker  Discharge Recommendations: Other - (OP cardiac rehab as " "appropriate)       Subjective/Objective       11/05/23 1024   Prior Living Situation   Prior Services Home-Independent   Housing / Facility 1 Story Apartment / Condo   Steps Into Home 0   Steps In Home 0   Equipment Owned None   Lives with - Patient's Self Care Capacity Spouse   Comments Pt lives w/ his wife, Yamilet (present throughout), who works during the day but is able to assist w/ IADL's when home. He works as a  and spends a lot of time on his feet.   Prior Level of Functional Mobility   Bed Mobility Independent   Transfer Status Independent   Ambulation Independent   Ambulation Distance Community distances   Assistive Devices Used None   Stairs Independent   History of Falls   History of Falls No   Date of Last Fall   (pt denies any recent falls)   Cognition    Cognition / Consciousness X   Level of Consciousness Responds to voice   Comments pt lethargic, however pleasant and cooperative   Passive ROM Lower Body   Passive ROM Lower Body WDL   Active ROM Lower Body    Active ROM Lower Body  WDL   Comments observed during functional mobility   Strength Lower Body   Lower Body Strength  WDL   Comments as above   Sensation Lower Body   Lower Extremity Sensation   Not Tested   Coordination Lower Body    Coordination Lower Body  Not Tested   Other Treatments   Other Treatments Provided Edu re: sternal precautions, use of RPE scale/\"talk test\"/HR to monitor and progress exercise, and home activity modifications   Balance Assessment   Sitting Balance (Static) Fair +   Sitting Balance (Dynamic) Fair   Standing Balance (Static) Fair -   Standing Balance (Dynamic) Fair -   Weight Shift Sitting Good   Weight Shift Standing Fair   Comments stand w/ FWW   Bed Mobility    Comments pt position seated in recliner pre- and post-assessment   Gait Analysis   Gait Level Of Assist Unable to Participate   Weight Bearing Status no restrictions   Comments Gait deferred 2/2 dizziness and severe nausea  (HR, BP and O2 stable " "throughout mobility tasks)   Functional Mobility   Sit to Stand Contact Guard Assist   Bed, Chair, Wheelchair Transfer Unable to Participate   Mobility STS recliner w/ FWW   Activity Tolerance   Sitting in Chair >30min   Standing 5min   Edema / Skin Assessment   Edema / Skin  X   Comments Chest tube sealed, in-place   Short Term Goals    Short Term Goal # 1 The pt will demo supine<>sit EOB spv w/ HOB flat and no rails in 6 visits for independence w/ bed mobility tasks.   Short Term Goal # 2 The pt will demo stand-step txr EOB<>chair w/ FWW spv in 6 visits for OOB mobility.   Short Term Goal # 3 The pt will demo gait >150' using FWW spv in a moving environment in 6 visits for household ambulation.   Short Term Goal # 4 The pt will demo proper use of RPE scale, \"talk test\" and/or HR to monitor and progress exercise independently in 6 visits.   Education Group   Education Provided Sternal Precautions;Cardiac Precautions;Role of Physical Therapist   Cardiac Precautions Patient Response Patient;Significant Other;Acceptance;Explanation;Demonstration;Handout;Verbal Demonstration;Action Demonstration   Sternal Precautions Patient Response Patient;Significant Other;Acceptance;Explanation;Demonstration;Handout;Verbal Demonstration;Action Demonstration   Role of Physical Therapist Patient Response Patient;Significant Other;Acceptance;Explanation;Demonstration;Action Demonstration   Additional Comments Pt and pt's wife both very receptive of edu provided   Problem List    Problems Pain;Impaired Bed Mobility;Impaired Transfers;Impaired Ambulation;Impaired Balance;Decreased Activity Tolerance   Interdisciplinary Plan of Care Collaboration   IDT Collaboration with  Nursing;Family / Caregiver   Patient Position at End of Therapy Seated;Call Light within Reach;Tray Table within Reach;Phone within Reach;Family / Friend in Room   Collaboration Comments regarding outcome of tx session   Session Information   Date / Session Number  " 11/5- 1 (1/5, 11/11)   Priority   ([facility])

## 2023-11-05 NOTE — CARE PLAN
The patient is Watcher - Medium risk of patient condition declining or worsening    Shift Goals  Clinical Goals: hemodynamic stability, wean 02 mobility  Patient Goals: FARIDA  Family Goals: FARIDA    Progress made toward(s) clinical / shift goals:    Problem: Pain - Standard  Goal: Alleviation of pain or a reduction in pain to the patient’s comfort goal  Outcome: Progressing  Note: Pt having pain in chest from incision site. Carmina TOLLIVER ordered more medications to assist with pain management.      Problem: Post Op Day 1 CABG/Heart Valve Replacement  Goal: Optimal care of the post op CABG/heart valve replacement Post Op Day 1  Intervention: EKG and CXR completed  Note: Completed this am  Intervention: All valve patients: PT/INR daily  Note: Obtained with morning labs. Plan to hold off on warfarin for today and reassess tomorrow  Intervention: Antibiotics are discontinued within 24 hours of anesthesia end time unless indication documented for continuation beyond 24 hours  Note: D/C'd  Intervention: Daily weights in the morning  Note: Completed with noc shift  Intervention: Up in chair for all meals  Note: Pt up in chair for all meals  Intervention: Ambulate in am if stable. First ambulation 25 feet. Repeat x 3 as tolerated  Note: Pt walked with walker 25ft. Shuffle with walker  Intervention: Discontinue freeman catheter unless documented reason for continuation  Note: Keep per orders  Intervention: Assess surgical dressing and check provider orders for potential removal  Note: Dressing removed per protocol at 24 hrs  Intervention: Ensure referal to intensive cardiac rehab is ordered, and smoking cessation education if appropriate  Note: Completed on admot  Intervention: [facility] trained RN to remove chest tubes if ordered by provider  Note: Chest tubes to remain in place  Intervention: IS q 1 hour while awake and record best IS volume  Note: Best   Intervention: Knee high ARIEL hose, on during the day, off at night  Note: On  for day shift  Intervention: Saline lock IV  Note: IV's saline locked  Intervention: Transfer to tele status, begin VS q 4 hours  Note: N/A will be completed on Post op day 2 per APRN  Intervention: After 24th hour post-anesthesia end time, transition patient to Cardiac Surgery SQ Insulin Protocol  Note: SubQ initiated  Intervention: If patient is CABG or on home beta-blocker, start/resume beta-blocker on POD 1 or POD 2 or document contraindication  Note: Beta blocker initiated       Patient is not progressing towards the following goals:

## 2023-11-05 NOTE — PROGRESS NOTES
[facility] Progress Note     CCPD tx completed. Vital signs stable; patient awake and oriented x 4; denies chest pain.Aseptically disconnected PD catheter from cycle; PD catheter secured to patient.    Total UF : 538 mL (Initial Drain 79 mL +  mL)  Effluent bloody; Dr. Aida Peraza informed     Report given to Primary RN Caity Ash

## 2023-11-05 NOTE — PROGRESS NOTES
"[facility] -  PROGRESS NOTE               Author: Aida Peraza M.D. Date & Time: 11/4/2023  5:27 PM     HPI:          DAILY NEPHROLOGY SUMMARY:  58 y.o. male with  chronic anabolic steroid use, ESRD sec to to Bx proven sec FSGS currently on PD and compeltes three excahnges/ day, moderate to moderate AS and  moderate to severe AR, HTN presented for aortic valve and ascending anerysm repair. Pt had post operative complication of tamponade and is back in OR. Pt has received signficant colloid/ IVF for support. He remains intubated and not able to offer additional history    REVIEW OF SYSTEMS:    10 point ROS reviewed and is as per HPI/daily summary or otherwise negative    PMH/PSH/SH/FH: Reviewed and unchanged since admission note  CURRENT MEDICATIONS: Reviewed from admission to present day    VS:  /63   Pulse 87   Temp 36.9 °C (98.4 °F) (Bladder)   Resp (!) 28   Ht 1.702 m (5' 7.01\")   Wt 91 kg (200 lb 9.9 oz)   SpO2 94%   BMI 31.41 kg/m²   Physical Exam  Vitals reviewed.   HENT:      Head: Normocephalic and atraumatic.      Mouth/Throat:      Mouth: Mucous membranes are moist.   Eyes:      Extraocular Movements: Extraocular movements intact.      Conjunctiva/sclera: Conjunctivae normal.      Pupils: Pupils are equal, round, and reactive to light.   Cardiovascular:      Rate and Rhythm: Normal rate. Rhythm irregular.      Comments: Right IJ HD catheter  Pulmonary:      Breath sounds: Decreased breath sounds present. No rhonchi.      Comments: Patient with shallow breaths  Abdominal:      General: Bowel sounds are normal.      Palpations: Abdomen is soft.      Comments: PD catheter in place   Musculoskeletal:         General: Swelling present.      Cervical back: Neck supple.   Skin:     General: Skin is warm and dry.   Neurological:      General: No focal deficit present.      Mental Status: He is alert and oriented to person, place, and time.   Psychiatric:         Mood and " Affect: Mood normal.         Fluids:  In: 05782.3 [P.O.:120; I.V.:27719.1; Blood:2253; Dialysis:500]  Out: 5320     LABS:  Recent Labs     11/03/23 2116 11/04/23  0000 11/04/23  0430   SODIUM  --  143  --    POTASSIUM 5.0 4.8 5.1   CHLORIDE  --  108  --    CO2  --  24  --    GLUCOSE  --  124*  --    BUN  --  50*  --    CREATININE  --  5.66*  --    CALCIUM  --  8.4*  --        IMPRESSION:    # ESRD  - Etiology likely 2/2 FSGS  - PD  # Volume overload  - Start PD tonight  # S/P AVR/ Ascending aneurysm repair  # Anemia of CKD  # CKD-MBD  # REspiratoy FAilure on vent    PLAN:    - Plan PD tonight  - intolerant to HD with Atrial fibrillation and hemodynamic instability  - transfusion as per primary team  - Dose all medications as per ESRD  - monitor H/H

## 2023-11-05 NOTE — PROGRESS NOTES
Discussed plan of care with UNRULY Sanchez. Will leave chest tubes for increased output overnight. Okay to remove freeman. Plan to transfer patient to tele.

## 2023-11-05 NOTE — PROGRESS NOTES
Doing well off drips.  Critical care will sign off. Don't hesitate to re-consult with additional questions or concerns.

## 2023-11-05 NOTE — PROGRESS NOTES
[facility] Progress Note     Hemodialysis treatment x2.5 hours completed as ordered per    Treatment performed at bedside started at 1330 and ended at 1515.    @1500 Pt went back in A-Fib, but vitals were still stable and Pt stated no symptoms felt. MD notified. RN told to turn off UF and run treatment for 15 more minutes to see if Pt will go back to Sinus Rhythm. Pt was not able to go back to Sinus in that time frame so Treatment terminated early. Blood was returned successfully. Pt stated no symptoms felt during entirety of treatment.       Net UF Removed: 1041 mL     Patient tolerated treatment well. UF goal reached as tolerated.  R-side IJ non tunneled CVC access with good patency and flow x 2  Patient stable during and post treatment.   See Acute HD flow sheets on clinical data notes under media for details.      Post tx access: R-side IJ non tunneled HD catheter flushed with saline then locked with heparin 1000 units/ml per designated amount in each lumen (see MAR) then clamped, capped aseptically and labeled properly. CVC dressings clean, dry and intact. No s/s of infection to HD catheter site. Heparin lock to be aspirated prior to next dialysis/CVC use by dialysis RN only. Please do not flush or draw from ports.     Please notify Nephrologist/Dialysis for follow-up.     Report given to primary care nurse .

## 2023-11-05 NOTE — PROGRESS NOTES
[facility] Progress Note     CCPD therapy initiated at 2240 x 8 hours,1500 mL fills x 3 cycles using  2.5% PD solutions ordered per Dr. FRANKLIN Peraza  Orders reviewed with patient, CCPD therapy program settings entered and verified on cycler.  Patient and PD access assessed prior to therapy  Patient AOx4, resting calmly, no signs of distress, VS stable, denies complaints at this time  RLQ PD catheter exit site in good condition, slightly pinkish, no signs of infection noted, no drainage, no pain or discomfort reported, CDI dressing in place  PD catheter exit site cleansed with antiseptic and dressings changed per policy     Initial drain: 79 mL (bloody effluent, no fibrin or large clots)    Dr. Peraza notified and made aware of bloody initial drainage.     Report and in service/trouble shooting given to primary care nurse RAY Rendon RN     Patient on dwell 1/3 prior to departure from bedside.     Please contact on call dialysis RN for any issues with persistent alarms/assistance with troubleshooting or patient not tolerating therapy.     For on-call Dialysis RN, pls contact [facility] at [phone] or Voalte under Dialysis

## 2023-11-05 NOTE — CARE PLAN
The patient is Watcher - Medium risk of patient condition declining or worsening    Shift Goals  Clinical Goals: Stable Hemodynamics, peritoneal dialysis  Patient Goals: Dialysis  Family Goals: Rest    Progress made toward(s) clinical / shift goals:      Problem: Knowledge Deficit - Standard  Goal: Patient and family/care givers will demonstrate understanding of plan of care, disease process/condition, diagnostic tests and medications  Outcome: Progressing     Problem: Pain - Standard  Goal: Alleviation of pain or a reduction in pain to the patient’s comfort goal  Outcome: Progressing     Problem: Post Op Day 1 CABG/Heart Valve Replacement  Goal: Optimal care of the post op CABG/heart valve replacement Post Op Day 1  Intervention: EKG and CXR completed  Note: EKG & CXR Completed  Intervention: All valve patients: PT/INR daily  Note: PT/INR completed  Intervention: Antibiotics are discontinued within 24 hours of anesthesia end time unless indication documented for continuation beyond 24 hours  Note: Antibiotics D/C'd  Intervention: Daily weights in the morning  Note: Pt. Is still hooked up to peritoneal dialysis, day shift will take AM standing weight once pt. Is disconnected from dialysis.   Intervention: Up in chair for all meals  Note: Pt. Up in chair for all meals  Intervention: Ambulate in am if stable. First ambulation 25 feet. Repeat x 3 as tolerated  Note: Pt. Ambulated 3x   Intervention: Discontinue freeman catheter unless documented reason for continuation  Note: Freeman still in per order  Intervention: Assess surgical dressing and check provider orders for potential removal  Note: Surgical dressing done and care was done with foam cleanser and wash cloth  Intervention: [facility] trained RN to remove chest tubes if ordered by provider  Note: Chest tubes still in place  Intervention: IS q 1 hour while awake and record best IS volume  Note: Q1 IS completed and 600 was best volume  Intervention: Knee high ARIEL hose, on  during the day, off at night  Note: ARIEL hose on during the day and off at night  Intervention: Saline lock IV  Note: All IV's Saline locked  Intervention: Transfer to tele status, begin VS q 4 hours  Note: Pt. Still ICU status  Intervention: After 24th hour post-anesthesia end time, transition patient to Cardiac Surgery SQ Insulin Protocol  Note: Pt. On SQ insulin protocol.  Intervention: If patient is CABG or on home beta-blocker, start/resume beta-blocker on POD 1 or POD 2 or document contraindication  Note: Beta-blocker started

## 2023-11-06 ENCOUNTER — APPOINTMENT (OUTPATIENT)
Dept: RADIOLOGY | Facility: MEDICAL CENTER | Age: 58
DRG: 219 | End: 2023-11-06
Attending: NURSE PRACTITIONER
Payer: COMMERCIAL

## 2023-11-06 PROBLEM — R57.9 SHOCK (HCC): Status: ACTIVE | Noted: 2023-11-06

## 2023-11-06 PROBLEM — Z99.11 ON MECHANICALLY ASSISTED VENTILATION (HCC): Status: RESOLVED | Noted: 2023-11-03 | Resolved: 2023-11-06

## 2023-11-06 LAB
ANION GAP SERPL CALC-SCNC: 12 MMOL/L (ref 7–16)
BUN SERPL-MCNC: 53 MG/DL (ref 8–22)
CALCIUM SERPL-MCNC: 8 MG/DL (ref 8.5–10.5)
CHLORIDE SERPL-SCNC: 97 MMOL/L (ref 96–112)
CO2 SERPL-SCNC: 27 MMOL/L (ref 20–33)
CREAT SERPL-MCNC: 6.67 MG/DL (ref 0.5–1.4)
EKG IMPRESSION: NORMAL
EKG IMPRESSION: NORMAL
ERYTHROCYTE [DISTWIDTH] IN BLOOD BY AUTOMATED COUNT: 52.6 FL (ref 35.9–50)
GFR SERPLBLD CREATININE-BSD FMLA CKD-EPI: 9 ML/MIN/1.73 M 2
GLUCOSE SERPL-MCNC: 118 MG/DL (ref 65–99)
GRAM STN SPEC: NORMAL
HCT VFR BLD AUTO: 27.6 % (ref 42–52)
HGB BLD-MCNC: 9.4 G/DL (ref 14–18)
INR PPP: 1.3 (ref 0.87–1.13)
LACTATE SERPL-SCNC: 0.7 MMOL/L (ref 0.5–2)
LACTATE SERPL-SCNC: 0.9 MMOL/L (ref 0.5–2)
MAGNESIUM SERPL-MCNC: 2.3 MG/DL (ref 1.5–2.5)
MCH RBC QN AUTO: 32.2 PG (ref 27–33)
MCHC RBC AUTO-ENTMCNC: 34.1 G/DL (ref 32.3–36.5)
MCV RBC AUTO: 94.5 FL (ref 81.4–97.8)
PLATELET # BLD AUTO: 99 K/UL (ref 164–446)
PLATELETS.RETICULATED NFR BLD AUTO: 3.2 % (ref 0.6–13.1)
PMV BLD AUTO: 10.7 FL (ref 9–12.9)
POTASSIUM SERPL-SCNC: 4.6 MMOL/L (ref 3.6–5.5)
POTASSIUM SERPL-SCNC: 5 MMOL/L (ref 3.6–5.5)
PROTHROMBIN TIME: 16.3 SEC (ref 12–14.6)
RBC # BLD AUTO: 2.92 M/UL (ref 4.7–6.1)
SIGNIFICANT IND 70042: NORMAL
SITE SITE: NORMAL
SODIUM SERPL-SCNC: 136 MMOL/L (ref 135–145)
SOURCE SOURCE: NORMAL
WBC # BLD AUTO: 7 K/UL (ref 4.8–10.8)

## 2023-11-06 PROCEDURE — 93005 ELECTROCARDIOGRAM TRACING: CPT | Performed by: THORACIC SURGERY (CARDIOTHORACIC VASCULAR SURGERY)

## 2023-11-06 PROCEDURE — 700101 HCHG RX REV CODE 250: Performed by: NURSE PRACTITIONER

## 2023-11-06 PROCEDURE — 84132 ASSAY OF SERUM POTASSIUM: CPT

## 2023-11-06 PROCEDURE — 93010 ELECTROCARDIOGRAM REPORT: CPT | Performed by: INTERNAL MEDICINE

## 2023-11-06 PROCEDURE — 700111 HCHG RX REV CODE 636 W/ 250 OVERRIDE (IP): Mod: JZ | Performed by: NURSE PRACTITIONER

## 2023-11-06 PROCEDURE — 700105 HCHG RX REV CODE 258: Performed by: NURSE PRACTITIONER

## 2023-11-06 PROCEDURE — 99291 CRITICAL CARE FIRST HOUR: CPT | Performed by: EMERGENCY MEDICINE

## 2023-11-06 PROCEDURE — 770022 HCHG ROOM/CARE - ICU (200)

## 2023-11-06 PROCEDURE — 700102 HCHG RX REV CODE 250 W/ 637 OVERRIDE(OP): Performed by: NURSE PRACTITIONER

## 2023-11-06 PROCEDURE — 87040 BLOOD CULTURE FOR BACTERIA: CPT

## 2023-11-06 PROCEDURE — 87205 SMEAR GRAM STAIN: CPT

## 2023-11-06 PROCEDURE — 85055 RETICULATED PLATELET ASSAY: CPT

## 2023-11-06 PROCEDURE — 99024 POSTOP FOLLOW-UP VISIT: CPT | Performed by: NURSE PRACTITIONER

## 2023-11-06 PROCEDURE — 85610 PROTHROMBIN TIME: CPT

## 2023-11-06 PROCEDURE — 93971 EXTREMITY STUDY: CPT | Mod: LT

## 2023-11-06 PROCEDURE — A9270 NON-COVERED ITEM OR SERVICE: HCPCS | Performed by: NURSE PRACTITIONER

## 2023-11-06 PROCEDURE — 80048 BASIC METABOLIC PNL TOTAL CA: CPT

## 2023-11-06 PROCEDURE — 90945 DIALYSIS ONE EVALUATION: CPT

## 2023-11-06 PROCEDURE — 87070 CULTURE OTHR SPECIMN AEROBIC: CPT

## 2023-11-06 PROCEDURE — 90935 HEMODIALYSIS ONE EVALUATION: CPT

## 2023-11-06 PROCEDURE — 94669 MECHANICAL CHEST WALL OSCILL: CPT

## 2023-11-06 PROCEDURE — 85027 COMPLETE CBC AUTOMATED: CPT

## 2023-11-06 PROCEDURE — 83605 ASSAY OF LACTIC ACID: CPT

## 2023-11-06 PROCEDURE — 83735 ASSAY OF MAGNESIUM: CPT

## 2023-11-06 PROCEDURE — 700111 HCHG RX REV CODE 636 W/ 250 OVERRIDE (IP): Performed by: NURSE PRACTITIONER

## 2023-11-06 PROCEDURE — 700111 HCHG RX REV CODE 636 W/ 250 OVERRIDE (IP): Performed by: INTERNAL MEDICINE

## 2023-11-06 RX ORDER — AMIODARONE HYDROCHLORIDE 200 MG/1
400 TABLET ORAL
Status: DISCONTINUED | OUTPATIENT
Start: 2023-11-07 | End: 2023-11-06

## 2023-11-06 RX ORDER — NOREPINEPHRINE BITARTRATE 0.03 MG/ML
0-1 INJECTION, SOLUTION INTRAVENOUS CONTINUOUS
Status: DISCONTINUED | OUTPATIENT
Start: 2023-11-06 | End: 2023-11-08

## 2023-11-06 RX ORDER — AMIODARONE HYDROCHLORIDE 200 MG/1
400 TABLET ORAL 2 TIMES DAILY
Status: DISCONTINUED | OUTPATIENT
Start: 2023-11-07 | End: 2023-11-09

## 2023-11-06 RX ADMIN — ACETAMINOPHEN 1000 MG: 500 TABLET, FILM COATED ORAL at 12:10

## 2023-11-06 RX ADMIN — TRAMADOL HYDROCHLORIDE 50 MG: 50 TABLET ORAL at 04:19

## 2023-11-06 RX ADMIN — OMEPRAZOLE 20 MG: 20 CAPSULE, DELAYED RELEASE ORAL at 04:19

## 2023-11-06 RX ADMIN — HEPARIN SODIUM 2400 UNITS: 1000 INJECTION, SOLUTION INTRAVENOUS; SUBCUTANEOUS at 20:15

## 2023-11-06 RX ADMIN — DOCUSATE SODIUM 50 MG AND SENNOSIDES 8.6 MG 2 TABLET: 8.6; 5 TABLET, FILM COATED ORAL at 04:19

## 2023-11-06 RX ADMIN — VERAPAMIL HYDROCHLORIDE 80 MG: 80 TABLET ORAL at 04:18

## 2023-11-06 RX ADMIN — TAMSULOSIN HYDROCHLORIDE 0.4 MG: 0.4 CAPSULE ORAL at 08:23

## 2023-11-06 RX ADMIN — Medication 1 APPLICATOR: at 08:22

## 2023-11-06 RX ADMIN — ONDANSETRON 8 MG: 2 INJECTION INTRAMUSCULAR; INTRAVENOUS at 04:19

## 2023-11-06 RX ADMIN — DOCUSATE SODIUM 50 MG AND SENNOSIDES 8.6 MG 2 TABLET: 8.6; 5 TABLET, FILM COATED ORAL at 17:14

## 2023-11-06 RX ADMIN — Medication 1 APPLICATOR: at 21:49

## 2023-11-06 RX ADMIN — AMIODARONE HYDROCHLORIDE 150 MG: 1.5 INJECTION, SOLUTION INTRAVENOUS at 15:32

## 2023-11-06 RX ADMIN — ACETAMINOPHEN 1000 MG: 500 TABLET, FILM COATED ORAL at 17:12

## 2023-11-06 RX ADMIN — ASPIRIN 81 MG: 81 TABLET, COATED ORAL at 04:19

## 2023-11-06 RX ADMIN — ACETAMINOPHEN 1000 MG: 500 TABLET, FILM COATED ORAL at 04:18

## 2023-11-06 RX ADMIN — GABAPENTIN 200 MG: 100 CAPSULE ORAL at 04:19

## 2023-11-06 RX ADMIN — NOREPINEPHRINE BITARTRATE 0.02 MCG/KG/MIN: 1 INJECTION, SOLUTION, CONCENTRATE INTRAVENOUS at 10:56

## 2023-11-06 RX ADMIN — PIPERACILLIN AND TAZOBACTAM 4.5 G: 4; .5 INJECTION, POWDER, FOR SOLUTION INTRAVENOUS at 12:09

## 2023-11-06 RX ADMIN — VANCOMYCIN HYDROCHLORIDE 2250 MG: 5 INJECTION, POWDER, LYOPHILIZED, FOR SOLUTION INTRAVENOUS at 13:44

## 2023-11-06 RX ADMIN — PIPERACILLIN AND TAZOBACTAM 4.5 G: 4; .5 INJECTION, POWDER, FOR SOLUTION INTRAVENOUS at 16:34

## 2023-11-06 RX ADMIN — MAGNESIUM HYDROXIDE 30 ML: 1200 LIQUID ORAL at 04:19

## 2023-11-06 RX ADMIN — HEPARIN SODIUM 5000 UNITS: 5000 INJECTION, SOLUTION INTRAVENOUS; SUBCUTANEOUS at 16:35

## 2023-11-06 RX ADMIN — HEPARIN SODIUM 5000 UNITS: 5000 INJECTION, SOLUTION INTRAVENOUS; SUBCUTANEOUS at 21:49

## 2023-11-06 RX ADMIN — AMIODARONE HYDROCHLORIDE 1 MG/MIN: 1.8 INJECTION, SOLUTION INTRAVENOUS at 15:43

## 2023-11-06 RX ADMIN — AMIODARONE HYDROCHLORIDE 0.5 MG/MIN: 1.8 INJECTION, SOLUTION INTRAVENOUS at 21:48

## 2023-11-06 RX ADMIN — HEPARIN SODIUM 5000 UNITS: 5000 INJECTION, SOLUTION INTRAVENOUS; SUBCUTANEOUS at 04:20

## 2023-11-06 RX ADMIN — POLYETHYLENE GLYCOL 3350 1 PACKET: 17 POWDER, FOR SOLUTION ORAL at 04:18

## 2023-11-06 ASSESSMENT — PAIN DESCRIPTION - PAIN TYPE
TYPE: SURGICAL PAIN

## 2023-11-06 ASSESSMENT — FIBROSIS 4 INDEX: FIB4 SCORE: 1.606570869537891449

## 2023-11-06 NOTE — PROGRESS NOTES
Patient arrived to T629 from T7.  Bedside report received from CODI Guerin.  Patient is A&O x4.  Substernal chest tube in place, dressing CDI.  No output since 0730, verified no air leak and to 20 cm suction.  Accelerated junctional rhythm, rate in 70s. Safety precautions in place, no needs at this time.

## 2023-11-06 NOTE — CARE PLAN
Problem: Hyperinflation  Goal: Prevent or improve atelectasis  Description: Target End Date:  3 to 4 days    1. Instruct incentive spirometry usage  2.  Perform hyperinflation therapy as indicated  Outcome: Not Met   PEP QID

## 2023-11-06 NOTE — PROGRESS NOTES
Assumed care on patient post bedside report, alert and oriented x 4, on 2LNC and respiration even and unlabored. PD ongoing and catheter intact and patent. Denied pain, c/o nausea. Tele on and heart rhythm and rate checked on monitor.     CT present, draining serousanguineous fluid, dressing cleam, dry and intact.    Plan of care - v/s q 4, peritoneal dialysis, check lab values, monitor chest tube output and patency, ambulate in hallway and maintain safety, he verbalized understanding.     Safety precautions are non skid socks on, side rails up x 2, bed to lowest level and bed alarm on. Instructed to use call light for needs and assistance, will round hourly and as needed.

## 2023-11-06 NOTE — PROGRESS NOTES
4 Eyes Skin Assessment Completed by CODI Ren and CODI Mckeon.    Head WDL  Ears WDL  Nose WDL  Mouth WDL  Neck Incision (R IJ for HD)  Breast/Chest Incision (midsternal incision, staples intact open to air.)   Shoulder Blades WDL  Spine WDL  (R) Arm/Elbow/Hand WDL  (L) Arm/Elbow/Hand WDL  Abdomen Incision (Chest tube incision sites, dressing CDI, PD, dressings CDI)  Groin WDL  Scrotum/Coccyx/Buttocks WDL  (R) Leg WDL  (L) Leg WDL  (R) Heel/Foot/Toe WDL  (L) Heel/Foot/Toe WDL          Devices In Places Tele Box, Blood Pressure Cuff, Pulse Ox, Central Line, Nasal Cannula, and ARIEL's      Interventions In Place Pillows and Pressure Redistribution Mattress    Possible Skin Injury No    Pictures Uploaded Into Epic N/A  Wound Consult Placed N/A  RN Wound Prevention Protocol Ordered No

## 2023-11-06 NOTE — CARE PLAN
The patient is Stable - Low risk of patient condition declining or worsening    Shift Goals  Clinical Goals: stable hemodynamics, PD, check lab values, control pain, ambulate in hallway  Patient Goals: sleep  Family Goals: Rest    Progress made toward(s) clinical / shift goals:      Problem: Knowledge Deficit - Standard  Goal: Patient and family/care givers will demonstrate understanding of plan of care, disease process/condition, diagnostic tests and medications  Description: Target End Date:  1-3 days or as soon as patient condition allows    Document in Patient Education    1.  Patient and family/caregiver oriented to unit, equipment, visitation policy and means for communicating concern  2.  Complete/review Learning Assessment  3.  Assess knowledge level of disease process/condition, treatment plan, diagnostic tests and medications  4.  Explain disease process/condition, treatment plan, diagnostic tests and medications  Outcome: Progressing     Problem: Post Op Day 3 CABG/Heart Valve replacement  Goal: Optimal care of the post op CABG/Heart Valve replacement post op day 3  Note: UNABLE TO AMBULATE PATIENT IN  THE EVENING D/T PERITONEAL DIALYSIS ONGOING. PAIN AT TOLERABLE RATE. WILL SHOWER AROUND 0500.      Problem: Hemodynamics  Goal: Patient's hemodynamics, fluid balance and neurologic status will be stable or improve  Description: Target End Date:  Prior to discharge or change in level of care    Document on Assessment and I/O flowsheet templates    1.  Monitor vital signs, pulse oximetry and cardiac monitor per provider order and/or policy  2.  Maintain blood pressure per provider order  3.  Hemodynamic monitoring per provider order  4.  Manage IV fluids and IV infusions  5.  Monitor intake and output  6.  Daily weights per unit policy or provider order  7.  Assess peripheral pulses and capillary refill  8.  Assess color and body temperature  9.  Position patient for maximum circulation/cardiac output  10.  Monitor for signs/symptoms of excessive bleeding  11. Assess mental status, restlessness and changes in level of consciousness  12. Monitor temperature and report fever or hypothermia to provider immediately. Consideration of targeted temperature management.  Outcome: Progressing       Patient is not progressing towards the following goals:      Problem: Nutrition - Advanced  Goal: Patient will display progressive weight gain toward goal have adequate food and fluid intake  Description: Target End Date:  Prior to discharge or change in level of care    1.  Daily weights  2.  Monitor dietary intake  3.  Promote systemic fluid hydration within cardiac tolerance  4.  Albumin and PreAlbumin per provider order  5.  Enteral and parenteral nutrition per provider order  6.  Calorie count  7.  Provide oral care  8.  Collaborate with Clinical Dietician  9.  Consider Speech Therapy consult for swallowing difficulties  10. Administer supplemental oxygen during meals as indicated  Outcome: Not Progressing  Note: POOR APPETITE D/T NAUSEA.     Problem: Self Care  Goal: Patient will have the ability to perform ADLs independently or with assistance (bathe, groom, dress, toilet and feed)  Description: Target End Date:  Prior to discharge or change in level of care    Document on ADL flowsheet    1.  Assess the capability and level of deficiency to perform ADLs  2.  Encourage family/care giver involvement  3.  Provide assistive devices  4.  Consider PT/OT evaluations  5.  Maintain support, give positive feedback, encourage self-care allowing extra time and verbal cuing as needed  6.  Avoid doing something for patients they can do themselves, but provide assistance as needed  7.  Assist in anticipating/planning individual needs  8.  Collaborate with Case Management and  to meet discharge needs  Outcome: Not Progressing     Problem: Pain - Standard  Goal: Alleviation of pain or a reduction in pain to the patient’s comfort  goal  Description: Target End Date:  Prior to discharge or change in level of care    Document on Vitals flowsheet    1.  Document pain using the appropriate pain scale per order or unit policy  2.  Educate and implement non-pharmacologic comfort measures (i.e. relaxation, distraction, massage, cold/heat therapy, etc.)  3.  Pain management medications as ordered  4.  Reassess pain after pain med administration per policy  5.  If opiods administered assess patient's response to pain medication is appropriate per POSS sedation scale  6.  Follow pain management plan developed in collaboration with patient and interdisciplinary team (including palliative care or pain specialists if applicable)  Outcome: Progressing     Problem: Skin Integrity  Goal: Skin integrity is maintained or improved  Description: Target End Date:  Prior to discharge or change in level of care    Document interventions on Skin Risk/Hira flowsheet groups and corresponding LDA    1.  Assess and monitor skin integrity, appearance and/or temperature  2.  Assess risk factors for impaired skin integrity and/or pressures ulcers  3.  Implement precautions to protect skin integrity in collaboration with interdisciplinary team  4.  Implement pressure ulcer prevention protocol if at risk for skin breakdown  5.  Confirm wound care consult if at risk for skin breakdown  6.  Ensure patient use of pressure relieving devices  (Low air loss bed, waffle overlay, heel protectors, ROHO cushion, etc)  Outcome: Progressing     Problem: Fall Risk  Goal: Patient will remain free from falls  Description: Target End Date:  Prior to discharge or change in level of care    Document interventions on the [facility] Fall Risk Assessment    1.  Assess for fall risk factors  2.  Implement fall precautions  Outcome: Progressing

## 2023-11-06 NOTE — PROGRESS NOTES
Pharmacy Vancomycin Kinetics Note for 11/6/2023     58 y.o. male on Vancomycin day # 1     Vancomycin Indication (Trough based Dosing): Sepsis (goal trough 15-20)    Provider specified end date: 11/13/23    Active Antibiotics (From admission, onward)      Ordered     Ordering Provider       Mon Nov 6, 2023  9:57 AM    11/06/23 0957  vancomycin (Vancocin) 2,250 mg in  mL IVPB  (vancomycin (VANCOCIN) IV (LD + Maintenance))  ONCE        Note to Pharmacy: Per P&T Kinetics Protocol    JOAN Munoz       Mon Nov 6, 2023  9:54 AM    11/06/23 0954  MD Alert...Vancomycin per Pharmacy  PHARMACY TO DOSE        Question:  Indication(s) for vancomycin?  Answer:  Unknown source of infection    JOAN Munoz       Mon Nov 6, 2023  9:51 AM    11/06/23 0951  piperacillin-tazobactam (Zosyn) 4.5 g in  mL IVPB  (piperacillin-tazobactam (ZOSYN) IV (Extended-infusion) PANEL )  EVERY 12 HOURS        See [facility] for full Linked Orders Report.    JOAN Munoz            Dosing Weight: 89.3 kg (196 lb 13.9 oz)      Admission History: Admitted on 11/3/2023 for Severe aortic stenosis [I35.0]  Pertinent history: Patient is POD 3 for aortic valve replacement and aortic aneurysm repair.  Transferred back to the ICU this morning with hypotension and concern for septic shock/possible mediastinitis.  Empiric Zosyn/vancomycin strated.    Allergies:     Allopurinol and Hydralazine hcl     Pertinent cultures to date:     Results       Procedure Component Value Units Date/Time    FLUID CULTURE W/GRAM STAIN [[id number]] Collected: 11/06/23 1127    Order Status: Sent Specimen: Body Fluid from Pleural Fluid Updated: 11/06/23 1221    Narrative:      [facility]  Release to patient->Immediate    BLOOD CULTURE [[id number]] Collected: 11/06/23 1105    Order Status: Sent Specimen: Blood from Peripheral Updated: 11/06/23 1124    Narrative:      Per Hospital Policy: Only change Specimen Src:  "to \"Line\" if  specified by physician order.  Release to patient->Immediate    BLOOD CULTURE [[id number]] Collected: 23 1105    Order Status: Sent Specimen: Blood from Peripheral Updated: 23 112    Narrative:      Per Hospital Policy: Only change Specimen Src: to \"Line\" if  specified by physician order.  Release to patient->Immediate    URINE CULTURE(NEW) [[id number]]     Order Status: No result Specimen: Urine, Clean Catch             Labs:     Estimated Creatinine Clearance: 13.2 mL/min (A) (by C-G formula based on SCr of 6.67 mg/dL (HH)).  Recent Labs     23  0430 23  0417 23  0000   WBC 6.5 10.3 7.0     Recent Labs     23  0000 23  0417 23  0000   BUN 50* 67* 53*   CREATININE 5.66* 7.57* 6.67*       Intake/Output Summary (Last 24 hours) at 2023 1406  Last data filed at 2023 1333  Gross per 24 hour   Intake 797.22 ml   Output 3135 ml   Net -2337.78 ml      Blood Pressure 92/50   Pulse (Abnormal) 104   Temperature 36.7 °C (98.1 °F) (Temporal)   Respiration (Abnormal) 23   Height 1.702 m (5' 7.01\")   Weight 89.3 kg (196 lb 13.9 oz)   Oxygen Saturation 95%  Temp (24hrs), Av.1 °C (98.8 °F), Min:36.7 °C (98.1 °F), Max:37.4 °C (99.3 °F)      List concerns for Vancomycin clearance:     BUN/Scr ratio greater than 20:1;ESRD;Hypermetabolic State (SIRS);Obesity    Pharmacokinetics:   Trough kinetics:   No results for input(s): \"VANCOTROUGH\", \"VANCOPEAK\", \"VANCORANDOM\" in the last 72 hours.    A/P:     -  Vancomycin dose: 2250 mg IV x 1     -  Next vancomycin level(s):    - Vr with AM labs (not ordered)     -  Comments: Empiric antibiotics initiated for possible septic shock.  Patient has ESRD so pulse dosing initiated.  Plan for repeat level as above.  Cultures collected and will follow for de-escalation.      Kiya Mckay, PharmD, BCPS, BCCCP      "

## 2023-11-06 NOTE — PROGRESS NOTES
[facility] connected aseptically at 1700.   Patient stable, alert and oriented x 4.   PD exit site assessed. No s/sx of infection, good-looking exit site , no discharges. PD dressing clean dry and intact.    PD dressing change done for tonight. Will be due tomorrow.     Effluent is bloody and pink-tinged. MD notified. Patient stated that it was lighter color compared to yesterday and felt no discomfort or pain around CCPD Catheter. MD gave OK to continue treatment and to monitor effluent color and patient condition.     Initial drain of 167mL        In service report given to Primary Care Nurse

## 2023-11-06 NOTE — DISCHARGE PLANNING
Discharge Appointments/outpatient referrals/ and HH set up:    Cardiac surgery follow up appointment made for 4-5 weeks out    Hospital discharge team/schedulers called for cardiology f/u appointment for MD or APRN within 3-4 weeks if possible.    Aortic surveillance program/vascular medicine referral needed, orders placed.    Anticoagulation referral/ coumadin clinic referral needed and orders placed .    INR draws are indicated for mechanical On-X AVR procedure and warfarin     Will speak with patient this morning regarding INR draw plan.    Will await PT/OT notes and medical progression to determine further discharge needs.

## 2023-11-06 NOTE — CODE DOCUMENTATION
Patient dizzy, BP 98/70 in bed. AOx4, chest tube otuput 890 mL overnight. Bethany TOLLIVER at bedside. Pending her decision with transfer back to [facility].

## 2023-11-06 NOTE — PROGRESS NOTES
Unable to ambulate patient d/t ongoing peritoneal dialysis. Treatment completed. Able to get a hold of dialysis nurse on call (CODI Bentley) and she stated to leave it connected to patient, per patient request connection clamped. Dialysis RN will round around 0600. Will continue to monitor patient.

## 2023-11-06 NOTE — PROGRESS NOTES
Bedside report received from NOC RN. Assumed care of pt. Pt awake, laying in bed. A/Ox4, VSS. Patient reporting feeling tired. Pain midsternal pain 5/10 tolerable at this time. Pt educated to call before getting out of bed. POC reviewed and white board updated. Tele box on. Aflutt 73 on the monitor. Call light in reach. Bed locked in lowest position with 2 upper bed rails up. Bed alarm on.

## 2023-11-06 NOTE — PROGRESS NOTES
[facility] treatment aseptically disconnected at 0515.Effluent remains bloody.PD cath dressing change due tonight.BP 93/57.Patient  with c/o dizziness.Report given to primary Rn.      24  H UF : 214 ml ( TUF 47 ml +  ml - LF 0 ml )

## 2023-11-06 NOTE — CARE PLAN
Problem: Post op day 2 CABG/Heart Valve Replacement  Goal: Optimal care of the post op CABG/heart valve replacement post op day 2  Outcome: Progressing  Intervention: FSBS: when 2 consecutive BS < 130 after post op day 2, discontinue FSBS unless patient is insulin dependent diabetic  Note: Completed  Intervention: Daily weights in the morning  Note: Pt weighed on stand up scale this am  Intervention: Up in chair for all meals  Note: Pt up to meal for breakfast, back to bed before lunch for Dialysis   Intervention: Ambulate 4 times daily, increasing the distance each time  Note: Pt ambulated 100 ft today, with FWW and one person assist.   Intervention: Stand at sink and wash up with assistance.  Clean incisions twice daily with soap and water.  Note: Completed, incision care education completed  Intervention: IS q 1 hour while awake and record best IS volume  Note:   Intervention: Consider pacer wire removal by MD  Note: Completed  Intervention: Consider removal of freeman and chest tube if not already done  Note: CT not pulled for high output     Problem: Pain - Standard  Goal: Alleviation of pain or a reduction in pain to the patient’s comfort goal  Outcome: Progressing     The patient is Watcher - Medium risk of patient condition declining or worsening    Shift Goals  Clinical Goals: Stable Hemodynamics, peritoneal dialysis  Patient Goals: Dialysis  Family Goals: Rest    Progress made toward(s) clinical / shift goals:  Freeman removed    Patient is not progressing towards the following goals: Not meeting ambulation goals, CT in place

## 2023-11-06 NOTE — PROGRESS NOTES
Patient arrived to unit-[id number] via wheelchair Received report from Caity RN, Pt assessed, A&Ox4,  vitals stable, pt reports 6/10 mid sternal pain, tolerable at this time. Pt is on 2L 02 via NC. Pt has Peritoneal Dialysis Catheter to Right lower abdomen, Chest Tube Left Mediastinal, Chest Tube Right Mediastinal, mid sternal incision open to air.Pt updated on plan of care, Call light within reach, personal belongings within reach.  Bed in lowest position. Tele monitor on and monitor room notified, rhythm is SR 75.. Will continue to monitor. Hourly rounding in place.

## 2023-11-06 NOTE — THERAPY
Physical Therapy Contact Note    Patient Name: Gurdeep Guerra  Age:  58 y.o., Sex:  male  Medical Record #: [id number]  Today's Date: 11/6/2023      Rapid response called this am, pt transferred to T6. PT to follow and resume once reorders are received. -Maryam Crawford, PT     11/06/23 1140   Interdisciplinary Plan of Care Collaboration   Collaboration Comments Rapid response called due to low BP, dizzy and in aflutter. Will need reorders.   Session Information   Date / Session Number  11/6: HOLD  (need reorders)

## 2023-11-06 NOTE — PROGRESS NOTES
RAPID RN NOTIFIED OF CHEST TUBE OUTPUT @ 400 ML SEROUSANGUINEOUS. AND AT BEDSIDE TO SEE PATIENT. WILL CONTINUE TO MONITOR.

## 2023-11-06 NOTE — PROGRESS NOTES
Cardiovascular Surgery Progress Note    Name: Gurdeep Guerra  MRN: [id number]  : 1965  Admit Date: 11/3/2023  4:59 AM  3 Days Post-Op     Procedure:  Procedure(s) and Anesthesia Type:  *AORTIC VALVE REPLACEMENT (23 mm On-X mechanical valve), ASCENDING AORTIC ANEURYSM REPAIR (30 mm Hemashield tube graft) and intraoperative transesophageal echocardiography    *MEDIASTINAL EXPLORATION FOR POSTOPERATIVE BLEEDING AND RESTORATION OF HEMOSTASIS    Vitals:  Vitals:    23 0728 23 0819 23 0938 23 0952   BP: 90/48 (!) 85/51 98/70    Pulse: 75 77 76 74   Resp: 18  18 18   Temp: 36.7 °C (98.1 °F)      TempSrc: Temporal      SpO2: 92%  93% 93%   Weight:       Height:          Temp (24hrs), Av.1 °C (98.8 °F), Min:36.7 °C (98.1 °F), Max:37.4 °C (99.3 °F)      Respiratory:    Respiration: 18, Pulse Oximetry: 93 %       Fluids:    Intake/Output Summary (Last 24 hours) at 2023 1002  Last data filed at 2023 0600  Gross per 24 hour   Intake 700 ml   Output 3210 ml   Net -2510 ml       Admit weight: Weight: 84.6 kg (186 lb 8.2 oz)  Current weight: Weight: 89.3 kg (196 lb 13.9 oz) (23 0334)    Labs:  Recent Labs     23  0430 23  0417 23  0000   WBC 6.5 10.3 7.0   RBC 2.41* 3.01* 2.92*   HEMOGLOBIN 7.8* 9.3* 9.4*   HEMATOCRIT 22.4* 28.6* 27.6*   MCV 92.9 95.0 94.5   MCH 32.4 30.9 32.2   MCHC 34.8 32.5 34.1   RDW 53.2* 55.4* 52.6*   PLATELETCT 83* 100* 99*   MPV 10.3 10.6 10.7       Recent Labs     23  0000 23  0430 23  0417 23  0000   SODIUM 143  --  136 136   POTASSIUM 4.8 5.1 4.8 4.6   CHLORIDE 108  --  97 97   CO2 24  --  25 27   GLUCOSE 124*  --  129* 118*   BUN 50*  --  67* 53*   CREATININE 5.66*  --  7.57* 6.67*   CALCIUM 8.4*  --  8.5 8.0*       Recent Labs     23  1153 23  1458 23  0430 23  0417 23  0000   APTT 30.6  --   --   --   --    INR 1.36*   < > 1.28* 1.29* 1.30*    < > = values in this interval not  displayed.         HgbA1c:  5    Diabetic Educator Consult:  no    Medications:  Scheduled Medications   Medication Dose Frequency    piperacillin-tazobactam  4.5 g Once    And    piperacillin-tazobactam  4.5 g Q12HRS    MD Alert...Vancomycin per Pharmacy   PHARMACY TO DOSE    vancomycin  25 mg/kg Once    verapamil  80 mg Q8HRS    tamsulosin  0.4 mg AFTER BREAKFAST    [Held by provider] MD Alert...Warfarin per Pharmacy   PHARMACY TO DOSE    gabapentin  200 mg Q DAY    carvedilol  3.125 mg BID WITH MEALS    Nozin nasal  swab  1 Applicator BID    aspirin  81 mg DAILY    Pharmacy Consult Request  1 Each PHARMACY TO DOSE    acetaminophen  1,000 mg Q6HRS    senna-docusate  2 Tablet BID    And    polyethylene glycol/lytes  1 Packet DAILY    And    magnesium hydroxide  30 mL DAILY    omeprazole  20 mg DAILY    heparin  5,000 Units Q8HRS    amiodarone  400 mg BID        Exam:   Physical Exam  Vitals and nursing note reviewed.   Constitutional:       General: He is not in acute distress.     Appearance: Normal appearance.   HENT:      Head: Normocephalic.      Right Ear: External ear normal.      Left Ear: External ear normal.      Nose: Nose normal. No congestion.      Mouth/Throat:      Mouth: Mucous membranes are moist.      Pharynx: Oropharynx is clear.   Eyes:      Extraocular Movements: Extraocular movements intact.      Pupils: Pupils are equal, round, and reactive to light.   Cardiovascular:      Rate and Rhythm: Normal rate and regular rhythm.      Pulses: Normal pulses.      Heart sounds: Normal heart sounds.   Pulmonary:      Effort: Pulmonary effort is normal.      Breath sounds: Decreased breath sounds present.   Abdominal:      General: Bowel sounds are decreased. There is no distension.      Palpations: Abdomen is soft.      Comments: PD catheter   Musculoskeletal:      Cervical back: Normal range of motion and neck supple. No tenderness.      Right lower leg: Edema present.      Left lower leg: Edema  present.      Comments: LUE swelling/ecchymosis   Skin:     General: Skin is warm and dry.      Comments: Midline surgical incision   Neurological:      General: No focal deficit present.      Mental Status: He is alert and oriented to person, place, and time. Mental status is at baseline.   Psychiatric:         Mood and Affect: Mood normal.         Behavior: Behavior normal.         Thought Content: Thought content normal.         Cardiac Medications:    ASA - Yes    Plavix - No; contraindicated because of On Coumadin / NOAC    Post-operative Beta Blockers - Yes    Ace/ARB- No; contraindicated because of Normal EF    Statin - No; contraindicated because of No CAD    Aldactone- No; contraindicated because of Normal EF    SGLT2i-  No contraindicated because of No; contraindicated because of Normal EF    Ejection Fraction:  60%    Telemetry:   11/4 SR- a fib overnight  11/5 SR  11/6 SR/Aflutter    Assessment/Plan:  POD 1  HDS, SR- was a fib overnight, on amio gtt, neuro intact, wounds intact, abdomen soft, fluid balance positive, wt up,  2 L NC. 270 mL out of chest tubes overnight after return to OR. H/H 7.8/22.4, plat 83. Plan: One unit PRBCs this morning. Keep chest tubes and pacing wires. DC amio gtt, continue PO. Dialysis per neph. IS/ambulate.     POD 2  HTN, SR, neuro intact, wounds intact, abdomen soft no BM, fluid balance positive, wt up, 2 L NC. 590 mL out of chest tubes overnight. 500 off with PD last night. Pacing wires removed.  Plan:  Keep chest tubes for moderate output. Hold coumadin one more day. Resume verapamil, IS/ambulate. Transfer to tele.     POD 3 HOTN- transfer back to [facility]- start pressors, SR/flutter- non-sustained- on amio PO, Oxygen demands up- fluid overload, ESRD- concern PD dilalysate came out of mediastinal CT- will have to convert to HD until peritoneum heals and scars down, Risk for mediastinitis/sepsis- pan culture- send CT fluid for culture- start Zoysn/vanco, LUE swelling/ecchymosis  - ck US    Disposition:  TBD

## 2023-11-06 NOTE — PROGRESS NOTES
"[facility] -  PROGRESS NOTE               Author: Zac Daily D.O. Date & Time: 11/6/2023  1:49 PM     HPI:  58 y.o. male with  chronic anabolic steroid use, ESRD sec to to Bx proven sec FSGS currently on PD and compeltes three excahnges/ day, moderate to moderate AS and  moderate to severe AR, HTN presented for aortic valve and ascending anerysm repair. Pt had post operative complication of tamponade and is back in OR. Pt has received signficant colloid/ IVF for support.     DAILY NEPHROLOGY SUMMARY:  11/4- Patient intolerant to HD last night  11/5- Patient s/p PD last night. Reports shortness of breath but better then yesterday  11/6: pt had PD done with 214cc UF removed. 1000cc drainage noted from chest tube overnight. Cardiology concerned for PD fluid draining from chest tube.    REVIEW OF SYSTEMS:    10 point ROS reviewed and is as per HPI/daily summary or otherwise negative    PMH/PSH/SH/FH: Reviewed and unchanged since admission note  CURRENT MEDICATIONS: Reviewed from admission to present day    VS:  BP 92/50   Pulse (!) 104   Temp 36.7 °C (98.1 °F) (Temporal)   Resp (!) 23   Ht 1.702 m (5' 7.01\")   Wt 89.3 kg (196 lb 13.9 oz)   SpO2 95%   BMI 30.83 kg/m²   Physical Exam  Vitals reviewed.   HENT:      Head: Normocephalic and atraumatic.      Mouth/Throat:      Mouth: Mucous membranes are moist.   Eyes:      Extraocular Movements: Extraocular movements intact.      Conjunctiva/sclera: Conjunctivae normal.      Pupils: Pupils are equal, round, and reactive to light.   Cardiovascular:      Rate and Rhythm: Normal rate and regular rhythm.      Comments: Right IJ HD catheter  Pulmonary:      Breath sounds: Decreased breath sounds present. No rhonchi.      Comments: Patient with shallow breaths  Abdominal:      General: Bowel sounds are normal.      Palpations: Abdomen is soft.      Comments: PD catheter in place   Musculoskeletal:         General: Swelling present.      Cervical " back: Neck supple.   Skin:     General: Skin is warm and dry.   Neurological:      General: No focal deficit present.      Mental Status: He is alert and oriented to person, place, and time.   Psychiatric:         Mood and Affect: Mood normal.         Fluids:  In: 700 [P.O.:200; Dialysis:500]  Out: 3748     LABS:  Recent Labs     11/04/23  0000 11/04/23  0430 11/05/23  0417 11/06/23  0000   SODIUM 143  --  136 136   POTASSIUM 4.8 5.1 4.8 4.6   CHLORIDE 108  --  97 97   CO2 24  --  25 27   GLUCOSE 124*  --  129* 118*   BUN 50*  --  67* 53*   CREATININE 5.66*  --  7.57* 6.67*   CALCIUM 8.4*  --  8.5 8.0*         IMPRESSION:    # ESRD on outpt PD    - Etiology likely 2/2 FSGS    - has catheter for iHD during hospitalization  # Volume overload  # S/P AVR/ Ascending aneurysm repair     -High chest tube drainage only noted at night while on PD     -concern for PD fluid leaking through into chest cavity per cards         On 11/6  # Anemia of CKD  # CKD-MBD  # Acute Respiratoy failure on vent, extubated    PLAN:    - Pt remains hypervolemic with hypotension  - PD able to remove some fluid but only 214cc UF   - cardiology concerned for PD fluid leaking into chest cavity  - will abort PD to prevent complications and perform iHD  - recommended obtaining non-con CT Chest/ABD/Pelv to further eval, but defer to primary team  - agree with upgrade to ICU, and pt may need pressor support for UF removal via hemodialysis  - will PUF today to reduce risk of hypotension   - will order additional HD tomorrow and eval for daily needs  - transfusion as per primary team  - Dose all medications as per ESRD    Discussed case directly with cardiology team  Please page nephrology with any questions or concerns

## 2023-11-06 NOTE — PROGRESS NOTES
"[facility] -  PROGRESS NOTE               Author: Aida Peraza M.D. Date & Time: 11/5/2023  4:07 PM     HPI:  58 y.o. male with  chronic anabolic steroid use, ESRD sec to to Bx proven sec FSGS currently on PD and compeltes three excahnges/ day, moderate to moderate AS and  moderate to severe AR, HTN presented for aortic valve and ascending anerysm repair. Pt had post operative complication of tamponade and is back in OR. Pt has received signficant colloid/ IVF for support.     DAILY NEPHROLOGY SUMMARY:  11/4- Patient intolerant to HD last night  11/5- Patient s/p PD last night. Reports shortness of breath but better then yesterday    REVIEW OF SYSTEMS:    10 point ROS reviewed and is as per HPI/daily summary or otherwise negative    PMH/PSH/SH/FH: Reviewed and unchanged since admission note  CURRENT MEDICATIONS: Reviewed from admission to present day    VS:  /71   Pulse 75   Temp 37.3 °C (99.1 °F) (Bladder)   Resp (!) 24   Ht 1.702 m (5' 7.01\")   Wt 92.7 kg (204 lb 5.9 oz)   SpO2 94%   BMI 32.00 kg/m²   Physical Exam  Vitals reviewed.   HENT:      Head: Normocephalic and atraumatic.      Mouth/Throat:      Mouth: Mucous membranes are moist.   Eyes:      Extraocular Movements: Extraocular movements intact.      Conjunctiva/sclera: Conjunctivae normal.      Pupils: Pupils are equal, round, and reactive to light.   Cardiovascular:      Rate and Rhythm: Normal rate and regular rhythm.      Comments: Right IJ HD catheter  Pulmonary:      Breath sounds: Decreased breath sounds present. No rhonchi.      Comments: Patient with shallow breaths  Abdominal:      General: Bowel sounds are normal.      Palpations: Abdomen is soft.      Comments: PD catheter in place   Musculoskeletal:         General: Swelling present.      Cervical back: Neck supple.   Skin:     General: Skin is warm and dry.   Neurological:      General: No focal deficit present.      Mental Status: He is alert and " oriented to person, place, and time.   Psychiatric:         Mood and Affect: Mood normal.         Fluids:  In: 2152.3 [P.O.:1550; I.V.:302.3; Blood:300]  Out: 1230     LABS:  Recent Labs     11/04/23  0000 11/04/23  0430 11/05/23  0417   SODIUM 143  --  136   POTASSIUM 4.8 5.1 4.8   CHLORIDE 108  --  97   CO2 24  --  25   GLUCOSE 124*  --  129*   BUN 50*  --  67*   CREATININE 5.66*  --  7.57*   CALCIUM 8.4*  --  8.5         IMPRESSION:    # ESRD  - Etiology likely 2/2 FSGS  - PD  # Volume overload  - Start PD tonight  # S/P AVR/ Ascending aneurysm repair  # Anemia of CKD  # CKD-MBD  # REspiratoy FAilure on vent    PLAN:    - Plan PD tonight  - intolerant to HD with Atrial fibrillation and hemodynamic [id number]  -Marked volume overload- attempt HD with UF as tolerated  - transfusion as per primary team  - Dose all medications as per ESRD  - monitor H/H

## 2023-11-06 NOTE — PROGRESS NOTES
08:25 DAHIANA Munoz. paged to the bedside due to patient's low BP 85/51, and sustaining in aflutter 70s. Patient reporting dizziness.      09:30 Rapid called for above concerns. Patient converted to Accelerated junctional, BP 85/51.     0938  DAHIANA Munoz at the bedside. Patient will be transferred to [facility]. Family notified, report given to CODI Kaye.

## 2023-11-06 NOTE — PROGRESS NOTES
Monitor Summary  SR 70s-80s, intermittent Afib during dialysis, frequent PACs and PVCs

## 2023-11-06 NOTE — PROGRESS NOTES
CHG completed, dressings changed, sheets replaced and assisted patient to chair @ 0430. Due meds given.     @ 0530 Dialysis nurse in to complete CCPD treatment, patient c/o dizziness. Patient encouraged to remain in chair for mobilization and for breakfast.    @ 0550 patient asked for charge RN, charge RN in with CNA to assist patient back to bed. Up to chair for meals, increase mobility, IS use education reviewed with patient and he verbalized understanding. Will endorse.

## 2023-11-06 NOTE — ASSESSMENT & PLAN NOTE
11/6 Developing hypotension transferred back to ICU for vasopressor support  Etiology uncertain, pre- op TTE with preserved BiV function but with e/o elevated PASP    11/7-Off NE, lactate clear, well appearing, tolerating HD without support    Pan culture  Empiric antibiotics  Vaso actives to maintain MAP targets, requiring low-dose NE currently  Afebrile, lactate negative, no overt s/o sepsis  Rec CT C/A/P to evaluate for abscess or occult source

## 2023-11-06 NOTE — CODE DOCUMENTATION
Per Bethany Castillo, plan is to start antibiotics and do hemodialysis instead of peritoneal dialysis. Will consult Dr. Hammond for ICU.

## 2023-11-06 NOTE — CONSULTS
Critical Care Consultation    Date of consult: 11/6/2023    Referring Physician  Osmel Blanca M.D.    Reason for Consultation  Hypotension, post-op AVR    History of Presenting Illness  58 y.o. male who presented 11/3/2023 with past medical history of hypertension, hyperlipidemia, ESRD, he is on PD at home, CAD, severe aortic stenosis.  He is currently on the transplant list at [facility] for the last few years.     On 11/3/2023 patient was taken to the operating room by CTS for redo sternotomy for AVR, ascending aortic aneurysm repair.  He went back to the OR for mediastinal exploration for postoperative bleeding and restoration of hemostasis same day in the setting of worsening shock, requiring transfusions management of metabolic acidosis.    Patient was transferred to the floor 11/5 and subsequently has developed worsening hypotension with large volume output from chest tubes.  Patient is in a flutter on Amio increased oxygen demands, volume overload and has been receiving peritoneal dialysis.    ICU was consulted for hypotension, requiring hemodialysis and vasopressor support.    Code Status  Full Code    Review of Systems  Review of Systems   All other systems reviewed and are negative.      Past Medical History   has a past medical history of Anesthesia, Aortic stenosis, Chronic gout of multiple sites, Dialysis patient ([facility]), Dyspnea on exertion (05/23/2023), Elevated troponin (05/23/2023), Heart burn, Heart murmur, High cholesterol, Hypertension (2009), NSTEMI (non-ST elevated myocardial infarction) ([facility]) (05/23/2023), Pain in the chest (05/23/2023), PONV (postoperative nausea and vomiting), Renal disorder (2007), Sleep apnea (2000), and Snoring (1990).    He has no past medical history of Acute nasopharyngitis, Anginal syndrome ([facility]), Arrhythmia, Asthma, Blood clotting disorder ([facility]), Bowel habit changes, Breath shortness, Bronchitis, Cancer ([facility]), Carcinoma in situ of respiratory system, Cataract,  Congestive heart failure (HCC), Continuous ambulatory peritoneal dialysis status (HCC), COPD (chronic obstructive pulmonary disease) (HCC), Coughing blood, Dental disorder, Diabetes (HCC), Disorder of thyroid, Emphysema of lung (HCC), Glaucoma, Gynecological disorder, Hemorrhagic disorder (HCC), Hepatitis A, Hepatitis B, Hepatitis C, Hiatus hernia syndrome, Indigestion, Infectious disease, Jaundice, Pacemaker, Pneumonia, Pregnant, Psychiatric problem, Rheumatic fever, Seizure (HCC), Stroke (HCC), Tuberculosis, Urinary bladder disorder, or Urinary incontinence.    Surgical History   has a past surgical history that includes other (2007); umbilical hernia repair (2012); shoulder arthroscopy; hip arthroscopy (Bilateral, 2002); tonsillectomy (N/A, 1987); hand surgery (Right, 1985); cath placement capd (N/A, 5/15/2023); pr inj lumbar/sacral,w/ imaging (Left, 8/24/2023); cath placement capd (Right, 9/6/2023); aortic valve replacement (11/3/2023); aortic ascending dissection (11/3/2023); echocardiogram, transesophageal, intraoperative (11/3/2023); restorate hemostas (11/3/2023); and sternotomy (11/3/2023).    Family History  family history includes Hyperlipidemia in his mother; Hypertension in his father.    Social History   reports that he has never smoked. He has never used smokeless tobacco. He reports that he does not currently use drugs. He reports that he does not drink alcohol.    Medications  Home Medications       Reviewed by Ame Dutton R.N. (Registered Nurse) on 11/03/23 at 1538  Med List Status: Complete     Medication Last Dose Status   acetaminophen (Tylenol) suppository 650 mg  Active   acetaminophen (Tylenol) tablet 1,000 mg  Active   acetaminophen (Tylenol) tablet 1,000 mg  Active   acetaminophen (Tylenol) tablet 650 mg  Active   aspirin 81 MG EC tablet 11/2/2023 Active   aspirin EC tablet 81 mg  Active   atorvastatin (LIPITOR) 40 MG Tab 11/1/2023 Active   betamethasone dipropionate 0.05 % Cream  11/2/2023 Active   bisacodyl (Dulcolax) suppository 10 mg  Active   calcium CHLORIDE 10 % 1,000 mg in  mL IVPB  Active   CALCIUM PO 11/1/2023 Active   ceFAZolin (Ancef) 2 g in  mL IVPB  Active   clevidipine (Cleviprex) IV emulsion  Active   dexmedetomidine (PRECEDEX) 400 mcg/100mL NS premix infusion  Active   dextrose 10 % BOLUS 12.5-25 g  Active   EPINEPHrine (Adrenalin) infusion 4 mg/250 mL (premix)  Active   esomeprazole (NEXIUM) 40 MG delayed-release capsule 11/2/2023 Active   fentaNYL (Sublimaze) injection 50 mcg  Active   Ferrous Sulfate (IRON PO) 11/1/2023 Active   heparin injection 5,000 Units  Active   hydrocortisone 2.5 % Ointment 11/2/2023 Active   insulin regular (Humulin R) 100 Units in  mL Infusion  Active   insulin regular (HumuLIN R,NovoLIN R) injection  Active   K+ Scale: Goal of 4.5  Active   mag hydrox-al hydrox-simeth (Maalox Plus Es Or Mylanta Ds) suspension 30 mL  Active   magnesium hydroxide (Milk Of Magnesia) suspension 30 mL  Active   magnesium sulfate in D5W IVPB premix 1 g  Active   MD Alert...Pharmacy to initiate transition from insulin infusion to subcutaneous insulin for cardiothoracic surgery 1 Each  Active   MD Alert...Warfarin per Pharmacy  Active   metoprolol tartrate (Lopressor) tablet 12.5 mg  Active   metoprolol tartrate (Lopressor) tablet 25 mg  Active   midazolam (Versed) injection 2 mg  Active   morphine 4 MG/ML injection 4 mg  Active   nitroglycerin (NITROSTAT) 0.4 MG SL Tab  Active   nitroglycerin 50 mg in D5W 250 ml infusion  Active   norepinephrine (Levophed) 8 mg in 250 mL NS infusion (premix)  Active   Nozin nasal  swab  Active   NS (Bolus) 0.9 infusion  Active   NS infusion  Active   NS infusion  Active   omeprazole (PriLOSEC) capsule 20 mg  Active   ondansetron (Zofran) syringe/vial injection 8 mg  Active   oxyCODONE immediate release (Roxicodone) tablet 10 mg  Active   oxyCODONE immediate-release (Roxicodone) tablet 5 mg  Active   Peritoneal  [employer] [employer] (ULTRABAG/DIANEAL PD-2/2.5% DEX) 396 MOSM/L Solution  Active   Pharmacy Consult Request ...Pain Management Review 1 Each  Active   phenylephrine 40 mg/250 mL NS premix  Active   polyethylene glycol/lytes (Miralax) PACKET 1 Packet  Active   prochlorperazine (Compazine) injection 10 mg  Active   Respiratory Therapy Consult  Active   senna-docusate (Pericolace Or Senokot S) 8.6-50 MG per tablet 2 Tablet  Active   sodium bicarbonate 8.4 % injection 50 mEq  Active   tamsulosin (FLOMAX) 0.4 MG capsule 11/3/2023 Active   traMADol (Ultram) 50 MG tablet 50 mg  Active   vasopressin (Vasostrict) 20 Units in  mL Infusion  Active   verapamil ER (CALAN-SR) 240 MG Tab CR 11/3/2023 Active                  Current Facility-Administered Medications   Medication Dose Route Frequency Provider Last Rate Last Admin    piperacillin-tazobactam (Zosyn) 4.5 g in  mL IVPB  4.5 g Intravenous Q12HRS Bethany Castillo A.P.N. 25 mL/hr at 11/06/23 1634 4.5 g at 11/06/23 1634    MD Alert...Vancomycin per Pharmacy   Other PHARMACY TO DOSE Bethany Castillo A.P.N.        norepinephrine (Levophed) 8 mg in 250 mL NS infusion (premix)  0-1 mcg/kg/min (Ideal) Intravenous Continuous Bethany Castillo A.P.N. 24.8 mL/hr at 11/06/23 1056 0.2 mcg/kg/min at 11/06/23 1056    amiodarone (Nexterone) 360 mg/200 mL infusion  1 mg/min Intravenous Once Bethany Castillo A.P.N. 33.3 mL/hr at 11/06/23 1630 1 mg/min at 11/06/23 1630    Followed by    amiodarone (Nexterone) 360 mg/200 mL infusion  0.5 mg/min Intravenous Continuous Bethany Castillo A.P.N.        [START ON 11/7/2023] amiodarone (Cordarone) tablet 400 mg  400 mg Oral BID Bethany Castillo A.P.N.        tamsulosin (Flomax) capsule 0.4 mg  0.4 mg Oral AFTER BREAKFAST TEX Mclaughlin   0.4 mg at 11/06/23 0823    [Held by provider] MD Alert...Warfarin per Pharmacy   Other PHARMACY TO DOSE TEX Mclaughlin        gabapentin (Neurontin) capsule 200 mg  200 mg Oral Q  DAY FRANKLIN MclaughlinP.R.N.   200 mg at 11/06/23 0419    baclofen (Lioresal) tablet 2.5 mg  2.5 mg Oral BID PRN FRANKLIN MclaughlinP.R.NYoly        gentamicin (Garamycin) 0.1 % cream   Topical ACUTE DIALYSIS PRN Aida Peraza M.D.        Respiratory Therapy Consult   Nebulization Continuous RT ANIBAL Lyle.P.R.DANDRE        NS (Bolus) 0.9 infusion   Intravenous PRN ANIBAL Lyle.P.R.N. 999 mL/hr at 11/03/23 1445 1,000 mL at 11/03/23 1445    Nozin nasal  swab  1 Applicator Each Nostril BID ANIBAL Lyle.P.R.N.   1 Applicator at 11/06/23 0822    aspirin EC tablet 81 mg  81 mg Oral DAILY Major Barfield A.P.R.N.   81 mg at 11/06/23 0419    Pharmacy Consult Request ...Pain Management Review 1 Each  1 Each Other PHARMACY TO DOSE ANIBAL Lyle.P.R.NYoly        acetaminophen (Tylenol) tablet 1,000 mg  1,000 mg Oral Q6HRS Major Barfield A.P.R.N.   1,000 mg at 11/06/23 1712    Followed by    [START ON 11/13/2023] acetaminophen (Tylenol) tablet 1,000 mg  1,000 mg Oral Q6HRS PRN Major Barfield, A.P.R.N.        oxyCODONE immediate-release (Roxicodone) tablet 5 mg  5 mg Oral Q3HRS PRN Major Barfield, A.P.R.N.   5 mg at 11/04/23 0624    Or    oxyCODONE immediate release (Roxicodone) tablet 10 mg  10 mg Oral Q3HRS PRN Major Barfield, A.P.R.N.   10 mg at 11/05/23 0802    Or    fentaNYL (Sublimaze) injection 50 mcg  50 mcg Intravenous Q3HRS PRN Major Barfield, A.P.R.N.   50 mcg at 11/04/23 1319    traMADol (Ultram) 50 MG tablet 50 mg  50 mg Oral Q12HRS PRN Major Barfield, A.P.R.N.   50 mg at 11/06/23 0419    sodium bicarbonate 8.4 % injection 50 mEq  50 mEq Intravenous Q HOUR PRN Major Barfield A.P.R.N.   50 mEq at 11/03/23 1510    ondansetron (Zofran) syringe/vial injection 8 mg  8 mg Intravenous Q6HRS PRN Major Barfield, A.P.R.N.   8 mg at 11/06/23 0419    Or    prochlorperazine (Compazine) injection 10 mg  10 mg Intravenous Q6HRS PRN Major Barfield, A.P.R.N.   10 mg at 11/05/23 1038    acetaminophen (Tylenol) tablet 650 mg   650 mg Oral Q4HRS PRN Major Barfield, A.P.R.N.        Or    acetaminophen (Tylenol) suppository 650 mg  650 mg Rectal Q4HRS PRN Major Barfield, A.P.R.N.        senna-docusate (Pericolace Or Senokot S) 8.6-50 MG per tablet 2 Tablet  2 Tablet Oral BID Major Barfield, A.P.R.N.   2 Tablet at 11/06/23 1714    And    polyethylene glycol/lytes (Miralax) PACKET 1 Packet  1 Packet Oral DAILY Major Barfield, A.P.R.N.   1 Packet at 11/06/23 0418    And    magnesium hydroxide (Milk Of Magnesia) suspension 30 mL  30 mL Oral DAILY Major Barfield, A.P.R.N.   30 mL at 11/06/23 0419    And    bisacodyl (Dulcolax) suppository 10 mg  10 mg Rectal QDAY PRN Major Barfield, A.P.R.N.        omeprazole (PriLOSEC) capsule 20 mg  20 mg Oral DAILY Major Barfield, A.P.R.N.   20 mg at 11/06/23 0419    mag hydrox-al hydrox-simeth (Maalox Plus Es Or Mylanta Ds) suspension 30 mL  30 mL Oral Q4HRS PRN Major Barfield, A.P.R.N.   30 mL at 11/05/23 2138    heparin injection 5,000 Units  5,000 Units Subcutaneous Q8HRS Major Barfield, A.P.R.N.   5,000 Units at 11/06/23 1635    heparin injection 2,400 Units  2,400 Units Intracatheter ACUTE DIALYSIS PRN Justo Ríos M.D.   2,400 Units at 11/05/23 1515       Allergies  Allergies   Allergen Reactions    Allopurinol Itching    Hydralazine Hcl Unspecified     Pt states he had a reaction similar to lupus       Vital Signs last 24 hours  Temp:  [36.3 °C (97.4 °F)-37.4 °C (99.3 °F)] 36.3 °C (97.4 °F)  Pulse:  [] 110  Resp:  [18-27] 20  BP: ()/(48-83) 138/66  SpO2:  [89 %-98 %] 98 %    Physical Exam  Physical Exam  Constitutional:       General: He is not in acute distress.     Appearance: He is not ill-appearing.   HENT:      Head: Normocephalic.      Mouth/Throat:      Mouth: Mucous membranes are dry.   Eyes:      Pupils: Pupils are equal, round, and reactive to light.   Cardiovascular:      Rate and Rhythm: Normal rate and regular rhythm.      Comments: Mechanical valve click  Pulmonary:      Effort:  Pulmonary effort is normal.      Breath sounds: Normal breath sounds.   Abdominal:      General: Bowel sounds are normal.      Tenderness: There is no abdominal tenderness.   Musculoskeletal:      Right lower leg: No edema (trace).      Left lower leg: No edema (trace).      Comments: LUE with some bruising to forewarm, minimal ttp , compartments are soft   Skin:     General: Skin is warm and dry.   Neurological:      General: No focal deficit present.      Mental Status: He is alert and oriented to person, place, and time.   Psychiatric:         Mood and Affect: Mood normal.         Fluids    Intake/Output Summary (Last 24 hours) at 11/6/2023 1828  Last data filed at 11/6/2023 1630  Gross per 24 hour   Intake 416.97 ml   Output 1454 ml   Net -1037.03 ml       Laboratory  Recent Results (from the past 48 hour(s))   POCT glucose device results    Collection Time: 11/04/23  8:55 PM   Result Value Ref Range    POC Glucose, Blood 125 (H) 65 - 99 mg/dL   CBC without Differential Critical Care 0130    Collection Time: 11/05/23  4:17 AM   Result Value Ref Range    WBC 10.3 4.8 - 10.8 K/uL    RBC 3.01 (L) 4.70 - 6.10 M/uL    Hemoglobin 9.3 (L) 14.0 - 18.0 g/dL    Hematocrit 28.6 (L) 42.0 - 52.0 %    MCV 95.0 81.4 - 97.8 fL    MCH 30.9 27.0 - 33.0 pg    MCHC 32.5 32.3 - 36.5 g/dL    RDW 55.4 (H) 35.9 - 50.0 fL    Platelet Count 100 (L) 164 - 446 K/uL    MPV 10.6 9.0 - 12.9 fL   Basic Metabolic Panel (BMP) Critical Care 0130    Collection Time: 11/05/23  4:17 AM   Result Value Ref Range    Sodium 136 135 - 145 mmol/L    Potassium 4.8 3.6 - 5.5 mmol/L    Chloride 97 96 - 112 mmol/L    Co2 25 20 - 33 mmol/L    Glucose 129 (H) 65 - 99 mg/dL    Bun 67 (H) 8 - 22 mg/dL    Creatinine 7.57 (HH) 0.50 - 1.40 mg/dL    Calcium 8.5 8.5 - 10.5 mg/dL    Anion Gap 14.0 7.0 - 16.0   Prothrombin Time    Collection Time: 11/05/23  4:17 AM   Result Value Ref Range    PT 16.2 (H) 12.0 - 14.6 sec    INR 1.29 (H) 0.87 - 1.13   IMMATURE PLT  FRACTION    Collection Time: 11/05/23  4:17 AM   Result Value Ref Range    Imm. Plt Fraction 4.3 0.6 - 13.1 %   ESTIMATED GFR    Collection Time: 11/05/23  4:17 AM   Result Value Ref Range    GFR (CKD-EPI) 8 (A) >60 mL/min/1.73 m 2   POCT glucose device results    Collection Time: 11/05/23  8:00 AM   Result Value Ref Range    POC Glucose, Blood 114 (H) 65 - 99 mg/dL   POCT glucose device results    Collection Time: 11/05/23  9:29 PM   Result Value Ref Range    POC Glucose, Blood 126 (H) 65 - 99 mg/dL   CBC without Differential Critical Care 0130    Collection Time: 11/06/23 12:00 AM   Result Value Ref Range    WBC 7.0 4.8 - 10.8 K/uL    RBC 2.92 (L) 4.70 - 6.10 M/uL    Hemoglobin 9.4 (L) 14.0 - 18.0 g/dL    Hematocrit 27.6 (L) 42.0 - 52.0 %    MCV 94.5 81.4 - 97.8 fL    MCH 32.2 27.0 - 33.0 pg    MCHC 34.1 32.3 - 36.5 g/dL    RDW 52.6 (H) 35.9 - 50.0 fL    Platelet Count 99 (L) 164 - 446 K/uL    MPV 10.7 9.0 - 12.9 fL   Basic Metabolic Panel (BMP) Critical Care 0130    Collection Time: 11/06/23 12:00 AM   Result Value Ref Range    Sodium 136 135 - 145 mmol/L    Potassium 4.6 3.6 - 5.5 mmol/L    Chloride 97 96 - 112 mmol/L    Co2 27 20 - 33 mmol/L    Glucose 118 (H) 65 - 99 mg/dL    Bun 53 (H) 8 - 22 mg/dL    Creatinine 6.67 (HH) 0.50 - 1.40 mg/dL    Calcium 8.0 (L) 8.5 - 10.5 mg/dL    Anion Gap 12.0 7.0 - 16.0   Prothrombin Time    Collection Time: 11/06/23 12:00 AM   Result Value Ref Range    PT 16.3 (H) 12.0 - 14.6 sec    INR 1.30 (H) 0.87 - 1.13   IMMATURE PLT FRACTION    Collection Time: 11/06/23 12:00 AM   Result Value Ref Range    Imm. Plt Fraction 3.2 0.6 - 13.1 %   ESTIMATED GFR    Collection Time: 11/06/23 12:00 AM   Result Value Ref Range    GFR (CKD-EPI) 9 (A) >60 mL/min/1.73 m 2   EKG    Collection Time: 11/06/23  1:07 AM   Result Value Ref Range    Report       [facility]    Test Date:  2023-11-06  Pt Name:    LORIE KAYE               Department: 171  MRN:        [id number]                       Room:       T723  Gender:     Male                         Technician: TXM  :        1965                   Requested By:KESHA CASANOVA  Order #:    [id number]                    Reading MD: Memo Lipscomb MD    Measurements  Intervals                                Axis  Rate:       89                           P:          36  GA:         208                          QRS:        9  QRSD:       90                           T:          186  QT:         360  QTc:        439    Interpretive Statements  Sinus rhythm  Borderline prolonged GA interval  Left ventricular hypertrophy  Abnormal T, consider ischemia, diffuse leads  Compared to ECG 2023 00:59:16  T-wave abnormality now present  Possible ischemia now present  ST (T wave) deviation no longer present  Early repolarization no longer present  Prolonged QT interval no longer p resent  Electronically Signed On 2023 05:45:08 PST by Memo Lipscomb MD     EKG    Collection Time: 23  8:08 AM   Result Value Ref Range    Report       [facility]    Test Date:  2023  Pt Name:    LORIE KAYE               Department: [facility]  MRN:        [id number]                      Room:       [facility]  Gender:     Male                         Technician: FGJ  :        1965                   Requested By:KESHA CASANOVA  Order #:    [id number]                    Reading MD: Danielito Anna MD    Measurements  Intervals                                Axis  Rate:       78                           P:          0  GA:         0                            QRS:        7  QRSD:       92                           T:          260  QT:         407  QTc:        464    Interpretive Statements  Atrial flutter with predominant 3:1 AV block  Nonspecific T abnormalities, inferior leads  Electronically Signed On 2023 08:45:12 PST by Danielito Anna MD     EKG    Collection Time: 23  9:38 AM   Result Value Ref Range    Report       [facility]    Test Date:  2023  Pt Name:    LORIE KAYE               Department: 171  MRN:        [id number]                      Room:       T723  Gender:     Male                         Technician: [facility]  :        1965                   Requested By:KESHA CASANOVA  Order #:    [id number]                    Reading MD:    Measurements  Intervals                                Axis  Rate:       74                           P:          214  IN:         278                          QRS:        3  QRSD:       80                           T:          120  QT:         378  QTc:        420    Interpretive Statements  SINUS OR ECTOPIC ATRIAL RHYTHM  PROLONGED IN INTERVAL  CONSIDER LEFT VENTRICULAR HYPERTROPHY  NONSPECIFIC T ABNORMALITIES, LATERAL LEADS  ST ELEVATION, CONSIDER LATERAL INJURY  Compared to ECG 2023 08:08:50  Ectopic atrial rhythm now present  First degree AV block now present  ST (T wave) deviation now present  Myocardial infarct finding now present   Atrial flutter no longer present  AV block, advanced (high-grade) no longer present  T-wave abnormality still present     FLUID CULTURE W/GRAM STAIN    Collection Time: 23 11:27 AM    Specimen: Pleural Fluid; Body Fluid   Result Value Ref Range    Significant Indicator NEG     Source BF     Site Peritoneal Dialysate     Culture Result -     Gram Stain Result Few WBCs.  No organisms seen.      GRAM STAIN    Collection Time: 23 11:27 AM    Specimen: Body Fluid   Result Value Ref Range    Significant Indicator .     Source BF     Site Peritoneal Dialysate     Gram Stain Result Few WBCs.  No organisms seen.      LACTIC ACID    Collection Time: 23 12:20 PM   Result Value Ref Range    Lactic Acid 0.7 0.5 - 2.0 mmol/L   Magnesium    Collection Time: 23  5:15 PM   Result Value Ref Range    Magnesium 2.3 1.5 - 2.5 mg/dL       Imaging  US-EXTREMITY VENOUS UPPER UNILAT LEFT   Final Result      DX-CHEST-PORTABLE (1 VIEW)   Final  Result      1.  Low lung volumes. Bibasilar atelectasis.      DX-CHEST-PORTABLE (1 VIEW)   Final Result      1.  Tubes and lines in good position.      2.  Right basilar atelectasis.      DX-CHEST-PORTABLE (1 VIEW)   Final Result      1.  Mild cardiomegaly.      2.  Tubes and lines in good position.      3.  Right basilar atelectasis.      EC-CARMELITA W/O CONT   Final Result      EC-ECHOCARDIOGRAM LTD W/O CONT   Final Result      DX-CHEST-PORTABLE (1 VIEW)   Final Result      Satisfactory postoperative appearance of the chest with BILATERAL perihilar and LEFT basilar opacities which are probably areas of atelectasis          Assessment/Plan  Shock (HCC)  Assessment & Plan  11/6 Developing hypotension transferred back to ICU for vasopressor support  Etiology uncertain, pre- op TTE with preserved BiV function but with e/o elevated PASP    Pan culture  Empiric antibiotics  Vaso actives to maintain MAP targets, requiring low-dose NE currently  Afebrile, lactate negative, no overt s/o sepsis  Rec CT C/A/P to evaluate for abscess or occult source        Status post cardiac surgery  Assessment & Plan  AVR and Ao root repair with Dr. Blanca 11/3  C/b focal pericardial tamponade that was managed operatively  Appropriate post-cardiac surgery protocols are in place    ESRD (end stage renal disease) ([facility])  Assessment & Plan  On PD normally  No transitioning to HD for the time being as PD was causing fluid shifts including increased CT output  Requiring vasopressor support to tolerate HD iso hypotension    Minimal edema, no significant VOL    Dyslipidemia- (present on admission)  Assessment & Plan  statin    Aortic stenosis- (present on admission)  Assessment & Plan  Now s/p aortic valve replacement and aortic root repair    Gastroesophageal reflux disease- (present on admission)  Assessment & Plan  PPI    Hypertension- (present on admission)  Assessment & Plan  Post-cardiac surgery BP protocols        Discussed patient condition  and risk of morbidity and/or mortality with RN, RT, Therapies, Pharmacy, Dietary, Patient, and CVS.    The patient remains critically ill.  Critical care time = 40 minutes in directly providing and coordinating critical care and extensive data review.  No time overlap and excludes procedures.

## 2023-11-06 NOTE — THERAPY
Occupational Therapy Contact Note    Patient Name: Gurdeep Guerra  Age:  58 y.o., Sex:  male  Medical Record #: [id number]  Today's Date: 11/6/2023 11/06/23 1231   Interdisciplinary Plan of Care Collaboration   Collaboration Comments HOLD rapid this am and transfer to CICU, will monitor and reapproach as able/appropriate to participate in therapy

## 2023-11-07 ENCOUNTER — APPOINTMENT (OUTPATIENT)
Dept: RADIOLOGY | Facility: MEDICAL CENTER | Age: 58
DRG: 219 | End: 2023-11-07
Attending: NURSE PRACTITIONER
Payer: COMMERCIAL

## 2023-11-07 LAB
ANION GAP SERPL CALC-SCNC: 11 MMOL/L (ref 7–16)
BUN SERPL-MCNC: 72 MG/DL (ref 8–22)
CALCIUM SERPL-MCNC: 7.7 MG/DL (ref 8.5–10.5)
CHLORIDE SERPL-SCNC: 95 MMOL/L (ref 96–112)
CO2 SERPL-SCNC: 25 MMOL/L (ref 20–33)
CREAT SERPL-MCNC: 8.32 MG/DL (ref 0.5–1.4)
EKG IMPRESSION: NORMAL
ERYTHROCYTE [DISTWIDTH] IN BLOOD BY AUTOMATED COUNT: 52 FL (ref 35.9–50)
GFR SERPLBLD CREATININE-BSD FMLA CKD-EPI: 7 ML/MIN/1.73 M 2
GLUCOSE SERPL-MCNC: 103 MG/DL (ref 65–99)
HCT VFR BLD AUTO: 24.5 % (ref 42–52)
HGB BLD-MCNC: 8 G/DL (ref 14–18)
INR PPP: 1.36 (ref 0.87–1.13)
MCH RBC QN AUTO: 31.6 PG (ref 27–33)
MCHC RBC AUTO-ENTMCNC: 32.7 G/DL (ref 32.3–36.5)
MCV RBC AUTO: 96.8 FL (ref 81.4–97.8)
PLATELET # BLD AUTO: 83 K/UL (ref 164–446)
PLATELETS.RETICULATED NFR BLD AUTO: 4.6 % (ref 0.6–13.1)
PMV BLD AUTO: 10.7 FL (ref 9–12.9)
POTASSIUM SERPL-SCNC: 5 MMOL/L (ref 3.6–5.5)
PROTHROMBIN TIME: 17 SEC (ref 12–14.6)
RBC # BLD AUTO: 2.53 M/UL (ref 4.7–6.1)
SODIUM SERPL-SCNC: 131 MMOL/L (ref 135–145)
WBC # BLD AUTO: 3.7 K/UL (ref 4.8–10.8)

## 2023-11-07 PROCEDURE — 700105 HCHG RX REV CODE 258: Performed by: NURSE PRACTITIONER

## 2023-11-07 PROCEDURE — A9270 NON-COVERED ITEM OR SERVICE: HCPCS | Performed by: NURSE PRACTITIONER

## 2023-11-07 PROCEDURE — 85027 COMPLETE CBC AUTOMATED: CPT

## 2023-11-07 PROCEDURE — 94669 MECHANICAL CHEST WALL OSCILL: CPT

## 2023-11-07 PROCEDURE — 700111 HCHG RX REV CODE 636 W/ 250 OVERRIDE (IP): Performed by: INTERNAL MEDICINE

## 2023-11-07 PROCEDURE — 700111 HCHG RX REV CODE 636 W/ 250 OVERRIDE (IP): Performed by: NURSE PRACTITIONER

## 2023-11-07 PROCEDURE — 700102 HCHG RX REV CODE 250 W/ 637 OVERRIDE(OP): Performed by: NURSE PRACTITIONER

## 2023-11-07 PROCEDURE — 99233 SBSQ HOSP IP/OBS HIGH 50: CPT | Performed by: EMERGENCY MEDICINE

## 2023-11-07 PROCEDURE — 90935 HEMODIALYSIS ONE EVALUATION: CPT

## 2023-11-07 PROCEDURE — 700111 HCHG RX REV CODE 636 W/ 250 OVERRIDE (IP): Mod: JZ | Performed by: NURSE PRACTITIONER

## 2023-11-07 PROCEDURE — 770022 HCHG ROOM/CARE - ICU (200)

## 2023-11-07 PROCEDURE — 85610 PROTHROMBIN TIME: CPT

## 2023-11-07 PROCEDURE — 99024 POSTOP FOLLOW-UP VISIT: CPT | Performed by: NURSE PRACTITIONER

## 2023-11-07 PROCEDURE — 93010 ELECTROCARDIOGRAM REPORT: CPT | Mod: 76 | Performed by: INTERNAL MEDICINE

## 2023-11-07 PROCEDURE — 80048 BASIC METABOLIC PNL TOTAL CA: CPT

## 2023-11-07 PROCEDURE — 85055 RETICULATED PLATELET ASSAY: CPT

## 2023-11-07 PROCEDURE — 71045 X-RAY EXAM CHEST 1 VIEW: CPT

## 2023-11-07 RX ORDER — WARFARIN SODIUM 2.5 MG/1
2.5 TABLET ORAL DAILY
Status: DISCONTINUED | OUTPATIENT
Start: 2023-11-07 | End: 2023-11-17 | Stop reason: HOSPADM

## 2023-11-07 RX ADMIN — DOCUSATE SODIUM 50 MG AND SENNOSIDES 8.6 MG 2 TABLET: 8.6; 5 TABLET, FILM COATED ORAL at 06:05

## 2023-11-07 RX ADMIN — HEPARIN SODIUM 5000 UNITS: 5000 INJECTION, SOLUTION INTRAVENOUS; SUBCUTANEOUS at 21:25

## 2023-11-07 RX ADMIN — MAGNESIUM HYDROXIDE 30 ML: 1200 LIQUID ORAL at 06:05

## 2023-11-07 RX ADMIN — DOCUSATE SODIUM 50 MG AND SENNOSIDES 8.6 MG 2 TABLET: 8.6; 5 TABLET, FILM COATED ORAL at 17:13

## 2023-11-07 RX ADMIN — HEPARIN SODIUM 2400 UNITS: 1000 INJECTION, SOLUTION INTRAVENOUS; SUBCUTANEOUS at 12:20

## 2023-11-07 RX ADMIN — ACETAMINOPHEN 1000 MG: 500 TABLET, FILM COATED ORAL at 06:04

## 2023-11-07 RX ADMIN — AMIODARONE HYDROCHLORIDE 400 MG: 200 TABLET ORAL at 06:04

## 2023-11-07 RX ADMIN — AMIODARONE HYDROCHLORIDE 400 MG: 200 TABLET ORAL at 17:13

## 2023-11-07 RX ADMIN — Medication 1 APPLICATOR: at 21:25

## 2023-11-07 RX ADMIN — GABAPENTIN 200 MG: 100 CAPSULE ORAL at 06:04

## 2023-11-07 RX ADMIN — ACETAMINOPHEN 1000 MG: 500 TABLET, FILM COATED ORAL at 17:14

## 2023-11-07 RX ADMIN — Medication 1 APPLICATOR: at 09:24

## 2023-11-07 RX ADMIN — POLYETHYLENE GLYCOL 3350 1 PACKET: 17 POWDER, FOR SOLUTION ORAL at 06:10

## 2023-11-07 RX ADMIN — PIPERACILLIN AND TAZOBACTAM 4.5 G: 4; .5 INJECTION, POWDER, FOR SOLUTION INTRAVENOUS at 17:23

## 2023-11-07 RX ADMIN — OMEPRAZOLE 20 MG: 20 CAPSULE, DELAYED RELEASE ORAL at 06:04

## 2023-11-07 RX ADMIN — ACETAMINOPHEN 1000 MG: 500 TABLET, FILM COATED ORAL at 12:19

## 2023-11-07 RX ADMIN — HEPARIN SODIUM 5000 UNITS: 5000 INJECTION, SOLUTION INTRAVENOUS; SUBCUTANEOUS at 14:11

## 2023-11-07 RX ADMIN — AMIODARONE HYDROCHLORIDE 0.5 MG/MIN: 1.8 INJECTION, SOLUTION INTRAVENOUS at 09:26

## 2023-11-07 RX ADMIN — AMIODARONE HYDROCHLORIDE 0.5 MG/MIN: 1.8 INJECTION, SOLUTION INTRAVENOUS at 21:28

## 2023-11-07 RX ADMIN — PIPERACILLIN AND TAZOBACTAM 4.5 G: 4; .5 INJECTION, POWDER, FOR SOLUTION INTRAVENOUS at 06:13

## 2023-11-07 RX ADMIN — ACETAMINOPHEN 1000 MG: 500 TABLET, FILM COATED ORAL at 01:09

## 2023-11-07 RX ADMIN — HEPARIN SODIUM 5000 UNITS: 5000 INJECTION, SOLUTION INTRAVENOUS; SUBCUTANEOUS at 06:04

## 2023-11-07 RX ADMIN — WARFARIN SODIUM 2.5 MG: 2.5 TABLET ORAL at 17:14

## 2023-11-07 RX ADMIN — TAMSULOSIN HYDROCHLORIDE 0.4 MG: 0.4 CAPSULE ORAL at 09:23

## 2023-11-07 RX ADMIN — ASPIRIN 81 MG: 81 TABLET, COATED ORAL at 06:04

## 2023-11-07 ASSESSMENT — PAIN DESCRIPTION - PAIN TYPE
TYPE: SURGICAL PAIN
TYPE: SURGICAL PAIN
TYPE: ACUTE PAIN;SURGICAL PAIN
TYPE: SURGICAL PAIN
TYPE: SURGICAL PAIN
TYPE: ACUTE PAIN;SURGICAL PAIN
TYPE: SURGICAL PAIN
TYPE: SURGICAL PAIN
TYPE: SURGICAL PAIN;ACUTE PAIN
TYPE: SURGICAL PAIN
TYPE: SURGICAL PAIN

## 2023-11-07 ASSESSMENT — FIBROSIS 4 INDEX: FIB4 SCORE: 1.92

## 2023-11-07 NOTE — PROGRESS NOTES
Inpatient Anticoagulation Service Note for 11/7/2023     Hemoglobin Value: (!) 8  Hematocrit Value: (!) 24.5  Lab Platelet Value: (!) 83 (Results confirmed by repeat testing.)  Target INR: 2.0 to 3.0    INR from last 7 days       Date/Time INR Value    11/07/23 0432 1.36    11/06/23 0000 1.3    11/05/23 0417 1.29    11/04/23 0430 1.28    11/03/23 1458 1.11    11/03/23 1153 1.36          Dose from last 7 days       Date/Time Dose (mg)    11/07/23 1125 2.5          Average Dose (mg):  N/a - New start.  Significant Interactions: Amiodarone, Antibiotics, Antiplatelet Medications   Reversal Agent Administered: Not Applicable    Assessment/Plan: Warfarin initiation following On-X aortic valve replacement on 11/3. Post-operatively, patient required factor VII, transfusion, and return to OR for hematoma evacuation. Patient then developed atrial fibrillation, now on amiodarone, and presumed septic shock, on antibiotics. Patient also continuing on daily aspirin and heparin SQ for DVTpx. Per CTS, will stop heparin when INR is 2.0. Patient has a cardiac diet ordered. H/H is being closely monitored. Will conservatively dose warfarin 2.5 mg tonight and check INR tomorrow.    Education Material Provided?: No (Will need, new start warfarin.)    Pharmacist suggested discharge dosing: TBD pending therapeutic INR, consider 2.5 mg daily as initial dosing with INR at 48-72 hours post-discharge.      Felecia Conde, PharmD

## 2023-11-07 NOTE — PROGRESS NOTES
4 Eyes Skin Assessment Completed by CODI Kaye and CODI Gupta.    Head WDL  Ears WDL  Nose WDL  Mouth WDL  Neck WDL  Breast/Chest Incision  Shoulder Blades WDL  Spine WDL  (R) Arm/Elbow/Hand WDL  (L) Arm/Elbow/Hand Bruising and Edema  Abdomen WDL  Groin WDL  Scrotum/Coccyx/Buttocks WDL  (R) Leg WDL  (L) Leg WDL  (R) Heel/Foot/Toe WDL  (L) Heel/Foot/Toe WDL          Devices In Places ECG, Blood Pressure Cuff, Pulse Ox, SCD's, Central Line, and Nasal Cannula      Interventions In Place Gray Ear Foams and Pillows    Possible Skin Injury No    Pictures Uploaded Into Epic N/A  Wound Consult Placed N/A  RN Wound Prevention Protocol Ordered No

## 2023-11-07 NOTE — PROGRESS NOTES
[facility] Service Progress note:     Treatment ordered by Dr. Daily for 3 hour PUF,  HD started at 1709 and ended at 2010    Pt A&Ox4, tx started without complications. VSS prior to initiating tx.  PCN at bedside and was notified to hold abx. Clarified with Dr. Daily and per carmela BATEMAN to hold and give abx after. Tx completed as ordered. VSS post tx, no signs of discomfort. Report given to PCN.     see e-flow sheets for details  Net UF removed 2000 mL    CVC dressing clean, dry, and intact. Heparin locked post HD, no s/s of infection noted.

## 2023-11-07 NOTE — PROGRESS NOTES
Patient has met the following  - patient has been out of bed, dangled or turned from side to side    - Chest tube drainage should be <30cc/hour unless otherwise approved by physician. Only 30 ml out for shift.   - Place chest drainage unit to water-seal:  absence of fluctuations in water seal-chamber or presence of air leaks.       Verified order with MD/APRN. Explained the procedure to the patient. Patient in semi fowlers position. CT clamped, sutures removed, CT removed upon expiration. Dressing applied, no complaint of pain or excessive drainage. Primary RN to order PRN chest x-ray if patient develops dyspnea or hypoxia.

## 2023-11-07 NOTE — PROGRESS NOTES
"[facility] -  PROGRESS NOTE               Author: Zac Daily D.O. Date & Time: 11/7/2023  3:08 PM     HPI:  58 y.o. male with  chronic anabolic steroid use, ESRD sec to to Bx proven sec FSGS currently on PD and compeltes three excahnges/ day, moderate to moderate AS and  moderate to severe AR, HTN presented for aortic valve and ascending anerysm repair. Pt had post operative complication of tamponade and is back in OR. Pt has received signficant colloid/ IVF for support.     DAILY NEPHROLOGY SUMMARY:  11/4- Patient intolerant to HD last night  11/5- Patient s/p PD last night. Reports shortness of breath but better then yesterday  11/6: pt had PD done with 214cc UF removed. 1000cc drainage noted from chest tube overnight. Cardiology concerned for PD fluid draining from chest tube.  11/7: pt had PUF yesterday w 2L removed. Had HD again today with 2.5L removed. Pt feels much better.    REVIEW OF SYSTEMS:    Denies SOB, no CP, no N/V  10 point ROS reviewed and is as per HPI/daily summary or otherwise negative    PMH/PSH/SH/FH: Reviewed and unchanged since admission note  CURRENT MEDICATIONS: Reviewed from admission to present day    VS:  BP (!) 148/82   Pulse 82   Temp 36.3 °C (97.4 °F) (Oral)   Resp 20   Ht 1.702 m (5' 7.01\")   Wt 90.5 kg (199 lb 8.3 oz)   SpO2 98%   BMI 31.24 kg/m²     Exam  General: Awake, no acute distress  HEENT: head normocephalic, atruamtic  Neck: neck supple, no visible masses  Respiratory: clear to auscultation bilaterally, no rales, no ronchi, or wheezing  Cardiac: normal rate, normal rhythm,  Abdomen: non tender, non-distended, no guarding  Musculoskelatal: no joint tenderness, moves limbs spontaneously  Extremities: no significant joint abnormalities, trace/1+ edema  Skin: no lesions, no rashes noted    Access: R IJ Temp HD catheter, Abd PD catheter      Fluids:  In: 2153.5 [P.O.:580; I.V.:465.3; Dialysis:500]  Out: 2845     LABS:  Recent Labs     " 11/05/23  0417 11/06/23  0000 11/06/23  1715 11/07/23  0432   SODIUM 136 136  --  131*   POTASSIUM 4.8 4.6 5.0 5.0   CHLORIDE 97 97  --  95*   CO2 25 27  --  25   GLUCOSE 129* 118*  --  103*   BUN 67* 53*  --  72*   CREATININE 7.57* 6.67*  --  8.32*   CALCIUM 8.5 8.0*  --  7.7*         IMPRESSION:    # ESRD on outpt PD    - Etiology likely 2/2 FSGS    - has catheter for iHD during hospitalization  # Volume overload, improving  # S/P AVR/ Ascending aneurysm repair     -High chest tube drainage only noted at night while on PD     -concern for PD fluid leaking through into chest cavity per cards         On 11/6  # Anemia of CKD  # CKD-MBD  # Acute Respiratoy failure on vent, extubated    PLAN:    - pt tolerating HD and UF removal  - pt much improved but still has fluid overload  - will order additional HD tomorrow and likely transition to qMWF   - transfusion as per primary team  - Dose all medications as per ESRD    Please page nephrology with any questions or concerns

## 2023-11-07 NOTE — CARE PLAN
The patient is Watcher - Medium risk of patient condition declining or worsening    Shift Goals  Clinical Goals: Mobilize, stable hemodynamics  Patient Goals: Rest  Family Goals: Sole    Progress made toward(s) clinical / shift goals:    Problem: Post Op Day 3 CABG/Heart Valve replacement  Goal: Optimal care of the post op CABG/Heart Valve replacement post op day 3  Outcome: Progressing  Intervention: Shower daily and clean incisions twice daily with soap and water  Note: Pt.'s incision cleansed twice  Intervention: Ambulate 4 times daily, increasing the distance each time  Note: Pt. Ambulated twice during my shift  Intervention: Consider removal of freeman, chest tube and pacer wires if not already done  Note: Freeman D/C'd, Chest tube still in place  Intervention: Daily weights in the morning  Note: Weight completed in the AM  Intervention: Up in chair for all meals  Note: Pt. Up in meal for all meals  Intervention: IS q 1 hour while awake and record best IS volume  Note: Best      Problem: Pain - Standard  Goal: Alleviation of pain or a reduction in pain to the patient’s comfort goal  Outcome: Progressing       Patient is not progressing towards the following goals:

## 2023-11-07 NOTE — PROGRESS NOTES
[facility] Services Progress Note     HD treatment x 3 hours today ordered by Dr. Zac Daily.  HD tx initiated at 0913 and ended at 1215.    Pt is alert, oriented x 4, not in any signs of distress at this time. Right IJ catheter with clean, dry and intact dressing. No signs of infection noted. Aspirated 3mL on both catheter ports and saline flushed, both patent. Pt reports post op sternum pain with scale of 5/10. HD treatment completed and tolerated well. UF goal achieved without issues.    UF Net: 2L    Post tx: Right IJ catheter with clean, dry and intact dressing. No signs of infection. Both catheter ports saline flushed, heparin locked as prescribed, clamped and capped. Pt reports about the same pain as previous. PCN medicated pt post HD.    Report given to primary RN RAY Godwin. See electronic flowsheets for additional information.

## 2023-11-07 NOTE — PROGRESS NOTES
Critical Care Progress Note    Date of admission  11/3/2023    Chief Complaint  58 y.o. male who presented 11/3/2023 with past medical history of hypertension, hyperlipidemia, ESRD, he is on PD at home, CAD, severe aortic stenosis.  He is currently on the transplant list at [facility] for the last few years.      On 11/3/2023 patient was taken to the operating room by CTS for redo sternotomy for AVR, ascending aortic aneurysm repair.  He went back to the OR for mediastinal exploration for postoperative bleeding and restoration of hemostasis same day in the setting of worsening shock, requiring transfusions management of metabolic acidosis.     Patient was transferred to the floor 11/5 and subsequently has developed worsening hypotension with large volume output from chest tubes.  Patient is in a flutter on Amio increased oxygen demands, volume overload and has been receiving peritoneal dialysis.     ICU was consulted for hypotension, requiring hemodialysis and vasopressor support.    Hospital Course  No notes on file    Interval Problem Update  Reviewed last 24 hour events:  PUF yesterday via HD catheter, 2L UF    Neuro:   AxO    CV:  HR 70s-80s  -130s  Off NE  Amio gtt    Resp:   4L NC  CXR not done    CT output 70 cc total    I/O: -691   UOP: 275  2L UF    Tmax: AF    Heme: WBC 3.7  Abx:   P/Tazo, empiric    Micro: 11/6-PD dialysate, NGTD  11/6-Bcxs NGTD    Endo: BG WTR    LDA: RIJ trialysis, SC CVC, PD catheter, CT x 2  SUP: PPI  VTE: SQH  Diet: Cardiac      Review of Systems  Review of Systems   All other systems reviewed and are negative.       Vital Signs for last 24 hours   Temp:  [36.1 °C (97 °F)-37.2 °C (98.9 °F)] 37.2 °C (98.9 °F)  Pulse:  [] 117  Resp:  [13-38] 29  BP: ()/(48-87) 133/87  SpO2:  [88 %-98 %] 95 %    Hemodynamic parameters for last 24 hours       Respiratory Information for the last 24 hours       Physical Exam   Physical Exam  Constitutional:       General: He is not in  acute distress.     Appearance: He is not ill-appearing.   HENT:      Head: Normocephalic.      Mouth/Throat:      Mouth: Mucous membranes are dry.   Eyes:      Pupils: Pupils are equal, round, and reactive to light.   Cardiovascular:      Rate and Rhythm: Normal rate and regular rhythm.      Comments: Mechanical valve click  Pulmonary:      Effort: Pulmonary effort is normal.      Breath sounds: Normal breath sounds.   Abdominal:      General: Bowel sounds are normal.      Tenderness: There is no abdominal tenderness.   Musculoskeletal:      Right lower leg: No edema (trace).      Left lower leg: No edema (trace).      Comments: LUE with some bruising to forewarm, minimal ttp , compartments are soft   Skin:     General: Skin is warm and dry.   Neurological:      General: No focal deficit present.      Mental Status: He is alert and oriented to person, place, and time.   Psychiatric:         Mood and Affect: Mood normal.         Medications  Current Facility-Administered Medications   Medication Dose Route Frequency Provider Last Rate Last Admin    piperacillin-tazobactam (Zosyn) 4.5 g in  mL IVPB  4.5 g Intravenous Q12HRS ANIBAL Munoz.P.N. 25 mL/hr at 11/07/23 0613 4.5 g at 11/07/23 0613    MD Alert...Vancomycin per Pharmacy   Other PHARMACY TO DOSE ANIBAL Munoz.P.N.        norepinephrine (Levophed) 8 mg in 250 mL NS infusion (premix)  0-1 mcg/kg/min (Ideal) Intravenous Continuous ANIBAL Munoz.P.N.   Stopped at 11/07/23 0000    amiodarone (Nexterone) 360 mg/200 mL infusion  0.5 mg/min Intravenous Continuous ANIBAL Munoz.P.N. 16.7 mL/hr at 11/06/23 2148 0.5 mg/min at 11/06/23 2148    amiodarone (Cordarone) tablet 400 mg  400 mg Oral BID Bethany Castillo A.P.N.   400 mg at 11/07/23 0604    tamsulosin (Flomax) capsule 0.4 mg  0.4 mg Oral AFTER BREAKFAST SG MclaughlinRYolyNYoly   0.4 mg at 11/06/23 0823    [Held by provider] MD Alert...Warfarin per Pharmacy   Other PHARMACY TO DOSE  FRANKLIN MclaughlinPYolyRTREV.        gabapentin (Neurontin) capsule 200 mg  200 mg Oral Q DAY FRANKLIN MclaughlinP.R.N.   200 mg at 11/07/23 0604    baclofen (Lioresal) tablet 2.5 mg  2.5 mg Oral BID PRN FRANKLIN MclaughlinPYolyRYolyN.        gentamicin (Garamycin) 0.1 % cream   Topical ACUTE DIALYSIS PRN Aida Peraza M.D.        Respiratory Therapy Consult   Nebulization Continuous RT FRANKLIN LyleP.RSAAD        NS (Bolus) 0.9 infusion   Intravenous PRN ANIBAL Lyle.P.R.N. 999 mL/hr at 11/03/23 1445 1,000 mL at 11/03/23 1445    Nozin nasal  swab  1 Applicator Each Nostril BID ANIBAL Lyle.P.R.N.   1 Applicator at 11/06/23 2149    aspirin EC tablet 81 mg  81 mg Oral DAILY Major Barfield A.P.R.N.   81 mg at 11/07/23 0604    Pharmacy Consult Request ...Pain Management Review 1 Each  1 Each Other PHARMACY TO DOSE ANIBAL Lyle.P.R.N.        acetaminophen (Tylenol) tablet 1,000 mg  1,000 mg Oral Q6HRS Major Barfield A.P.R.N.   1,000 mg at 11/07/23 0604    Followed by    [START ON 11/13/2023] acetaminophen (Tylenol) tablet 1,000 mg  1,000 mg Oral Q6HRS PRN Major Barfield A.P.R.N.        oxyCODONE immediate-release (Roxicodone) tablet 5 mg  5 mg Oral Q3HRS PRN Major Barfield A.P.R.N.   5 mg at 11/04/23 0624    Or    oxyCODONE immediate release (Roxicodone) tablet 10 mg  10 mg Oral Q3HRS PRN Major Barfield A.P.R.N.   10 mg at 11/05/23 0802    Or    fentaNYL (Sublimaze) injection 50 mcg  50 mcg Intravenous Q3HRS PRN Major Barfield A.P.R.N.   50 mcg at 11/04/23 1319    traMADol (Ultram) 50 MG tablet 50 mg  50 mg Oral Q12HRS PRN Major Barfield, A.P.R.N.   50 mg at 11/06/23 0419    sodium bicarbonate 8.4 % injection 50 mEq  50 mEq Intravenous Q HOUR PRN Major Barfield A.P.R.N.   50 mEq at 11/03/23 1510    ondansetron (Zofran) syringe/vial injection 8 mg  8 mg Intravenous Q6HRS PRN Major Barfield, A.P.R.N.   8 mg at 11/06/23 0419    Or    prochlorperazine (Compazine) injection 10 mg  10 mg Intravenous Q6HRS PRN  Major Barfield, A.P.R.N.   10 mg at 11/05/23 1038    acetaminophen (Tylenol) tablet 650 mg  650 mg Oral Q4HRS PRN Major Barfield, A.P.R.N.        Or    acetaminophen (Tylenol) suppository 650 mg  650 mg Rectal Q4HRS PRN Major Barfield, A.P.R.N.        senna-docusate (Pericolace Or Senokot S) 8.6-50 MG per tablet 2 Tablet  2 Tablet Oral BID Major Barfield, A.P.R.N.   2 Tablet at 11/07/23 0605    And    polyethylene glycol/lytes (Miralax) PACKET 1 Packet  1 Packet Oral DAILY Major Barfield, A.P.R.N.   1 Packet at 11/07/23 0610    And    magnesium hydroxide (Milk Of Magnesia) suspension 30 mL  30 mL Oral DAILY Major Barfield, A.P.R.N.   30 mL at 11/07/23 0605    And    bisacodyl (Dulcolax) suppository 10 mg  10 mg Rectal QDAY PRN Major Barfield, A.P.R.N.        omeprazole (PriLOSEC) capsule 20 mg  20 mg Oral DAILY Major Barfield, A.P.R.N.   20 mg at 11/07/23 0604    mag hydrox-al hydrox-simeth (Maalox Plus Es Or Mylanta Ds) suspension 30 mL  30 mL Oral Q4HRS PRN Major Barfield, A.P.R.N.   30 mL at 11/05/23 2138    heparin injection 5,000 Units  5,000 Units Subcutaneous Q8HRS Major Barfield, A.P.R.N.   5,000 Units at 11/07/23 0604    heparin injection 2,400 Units  2,400 Units Intracatheter ACUTE DIALYSIS PRN Justo Ríos M.D.   2,400 Units at 11/06/23 2015       Fluids    Intake/Output Summary (Last 24 hours) at 11/7/2023 0653  Last data filed at 11/7/2023 0600  Gross per 24 hour   Intake 2153.47 ml   Output 2845 ml   Net -691.53 ml       Laboratory          Recent Labs     11/05/23  0417 11/06/23  0000 11/06/23  1715 11/07/23  0432   SODIUM 136 136  --  131*   POTASSIUM 4.8 4.6 5.0 5.0   CHLORIDE 97 97  --  95*   CO2 25 27  --  25   BUN 67* 53*  --  72*   CREATININE 7.57* 6.67*  --  8.32*   MAGNESIUM  --   --  2.3  --    CALCIUM 8.5 8.0*  --  7.7*     Recent Labs     11/05/23 0417 11/06/23  0000 11/07/23 0432   GLUCOSE 129* 118* 103*     Recent Labs     11/05/23 0417 11/06/23  0000 11/07/23 0432   WBC 10.3 7.0 3.7*      Recent Labs     11/05/23  0417 11/06/23  0000 11/07/23  0432   RBC 3.01* 2.92* 2.53*   HEMOGLOBIN 9.3* 9.4* 8.0*   HEMATOCRIT 28.6* 27.6* 24.5*   PLATELETCT 100* 99* 83*   PROTHROMBTM 16.2* 16.3* 17.0*   INR 1.29* 1.30* 1.36*       Imaging  X-Ray:  I have personally reviewed the images and compared with prior images.    Assessment/Plan    Patient looks much improved today, uncertain cause for hypotension.  No apparent sepsis physiology, tolerating HD this AM without vasoactive support. Ok to transfer out of ICU.  [facility] will sign off, please reach out anytime if condition changes.      Shock (HCC)  Assessment & Plan  11/6 Developing hypotension transferred back to ICU for vasopressor support  Etiology uncertain, pre- op TTE with preserved BiV function but with e/o elevated PASP    Pan culture  Empiric antibiotics  Vaso actives to maintain MAP targets, requiring low-dose NE currently  Afebrile, lactate negative, no overt s/o sepsis  Rec CT C/A/P to evaluate for abscess or occult source        Status post cardiac surgery  Assessment & Plan  AVR and Ao root repair with Dr. Blanca 11/3  C/b focal pericardial tamponade that was managed operatively  Appropriate post-cardiac surgery protocols are in place    ESRD (end stage renal disease) ([facility])  Assessment & Plan  On PD normally  No transitioning to HD for the time being as PD was causing fluid shifts including increased CT output  Requiring vasopressor support to tolerate HD iso hypotension    Minimal edema, no significant VOL    Dyslipidemia- (present on admission)  Assessment & Plan  statin    Aortic stenosis- (present on admission)  Assessment & Plan  Now s/p aortic valve replacement and aortic root repair    Gastroesophageal reflux disease- (present on admission)  Assessment & Plan  PPI    Hypertension- (present on admission)  Assessment & Plan  Post-cardiac surgery BP protocols           I have performed a physical exam and reviewed and updated ROS and Plan today  (11/7/2023). In review of yesterday's note (11/6/2023), there are no changes except as documented above.     Discussed patient condition and risk of morbidity and/or mortality with RN, RT, Therapies, Pharmacy, Patient, and CVS

## 2023-11-07 NOTE — PROGRESS NOTES
Notified by monitor tech patient in a-flutter, rate in 110s.  Texted Dr Byrne, received orders for amio gtt from UNRULY Castillo.

## 2023-11-07 NOTE — PROGRESS NOTES
Cardiovascular Surgery Progress Note    Name: Gurdeep Guerra  MRN: [id number]  : 1965  Admit Date: 11/3/2023  4:59 AM  4 Days Post-Op     Procedure:  Procedure(s) and Anesthesia Type:  *AORTIC VALVE REPLACEMENT (23 mm On-X mechanical valve), ASCENDING AORTIC ANEURYSM REPAIR (30 mm Hemashield tube graft) and intraoperative transesophageal echocardiography    *MEDIASTINAL EXPLORATION FOR POSTOPERATIVE BLEEDING AND RESTORATION OF HEMOSTASIS    Vitals:  Vitals:    23 0500 23 0600 23 0700 23 0800   BP: (!) 140/73 133/87 124/76 128/74   Pulse: 82 (!) 117 (!) 116 (!) 114   Resp: 18 (!) 29     Temp:       TempSrc:       SpO2: 95% 95% 97% 97%   Weight: 90.5 kg (199 lb 8.3 oz)      Height:          Temp (24hrs), Av.6 °C (97.9 °F), Min:36.1 °C (97 °F), Max:37.2 °C (98.9 °F)      Respiratory:    Respiration: (!) 29, Pulse Oximetry: 97 %       Fluids:    Intake/Output Summary (Last 24 hours) at 2023 0838  Last data filed at 2023 0800  Gross per 24 hour   Intake 2186.79 ml   Output 2855 ml   Net -668.21 ml     Admit weight: Weight: 84.6 kg (186 lb 8.2 oz)  Current weight: Weight: 90.5 kg (199 lb 8.3 oz) (23 0500)    Labs:  Recent Labs     23  0417 23  0000 23  0432   WBC 10.3 7.0 3.7*   RBC 3.01* 2.92* 2.53*   HEMOGLOBIN 9.3* 9.4* 8.0*   HEMATOCRIT 28.6* 27.6* 24.5*   MCV 95.0 94.5 96.8   MCH 30.9 32.2 31.6   MCHC 32.5 34.1 32.7   RDW 55.4* 52.6* 52.0*   PLATELETCT 100* 99* 83*   MPV 10.6 10.7 10.7     Recent Labs     23  0417 23  0000 23  1715 23   SODIUM 136 136  --  131*   POTASSIUM 4.8 4.6 5.0 5.0   CHLORIDE 97 97  --  95*   CO2 25 27  --  25   GLUCOSE 129* 118*  --  103*   BUN 67* 53*  --  72*   CREATININE 7.57* 6.67*  --  8.32*   CALCIUM 8.5 8.0*  --  7.7*     Recent Labs     237 23  0000 23   INR 1.29* 1.30* 1.36*       HgbA1c:  5    Diabetic Educator Consult:  no    Medications:  Scheduled  Medications   Medication Dose Frequency    piperacillin-tazobactam  4.5 g Q12HRS    MD Alert...Vancomycin per Pharmacy   PHARMACY TO DOSE    amiodarone  400 mg BID    tamsulosin  0.4 mg AFTER BREAKFAST    [Held by provider] MD Alert...Warfarin per Pharmacy   PHARMACY TO DOSE    gabapentin  200 mg Q DAY    Nozin nasal  swab  1 Applicator BID    aspirin  81 mg DAILY    Pharmacy Consult Request  1 Each PHARMACY TO DOSE    acetaminophen  1,000 mg Q6HRS    senna-docusate  2 Tablet BID    And    polyethylene glycol/lytes  1 Packet DAILY    And    magnesium hydroxide  30 mL DAILY    omeprazole  20 mg DAILY    heparin  5,000 Units Q8HRS        Exam:   Physical Exam  Vitals and nursing note reviewed.   Constitutional:       General: He is not in acute distress.     Appearance: Normal appearance.   HENT:      Head: Normocephalic.      Right Ear: External ear normal.      Left Ear: External ear normal.      Nose: Nose normal. No congestion.      Mouth/Throat:      Mouth: Mucous membranes are moist.      Pharynx: Oropharynx is clear.   Eyes:      Extraocular Movements: Extraocular movements intact.      Pupils: Pupils are equal, round, and reactive to light.   Cardiovascular:      Rate and Rhythm: Normal rate and regular rhythm.      Pulses: Normal pulses.      Heart sounds: Normal heart sounds.   Pulmonary:      Effort: Pulmonary effort is normal.      Breath sounds: Decreased breath sounds present.   Abdominal:      General: Bowel sounds are decreased. There is no distension.      Palpations: Abdomen is soft.      Comments: PD catheter   Musculoskeletal:      Cervical back: Normal range of motion and neck supple. No tenderness.      Right lower leg: Edema present.      Left lower leg: Edema present.      Comments: LUE swelling/ecchymosis   Skin:     General: Skin is warm and dry.      Comments: Midline surgical incision   Neurological:      General: No focal deficit present.      Mental Status: He is alert and  oriented to person, place, and time. Mental status is at baseline.   Psychiatric:         Mood and Affect: Mood normal.         Behavior: Behavior normal.         Thought Content: Thought content normal.         Cardiac Medications:    ASA - Yes    Plavix - No; contraindicated because of On Coumadin / NOAC    Post-operative Beta Blockers - Yes    Ace/ARB- No; contraindicated because of Normal EF    Statin - No; contraindicated because of No CAD    Aldactone- No; contraindicated because of Normal EF    SGLT2i-  No contraindicated because of No; contraindicated because of Normal EF    Ejection Fraction:  60%    Telemetry:   11/4 SR- a fib overnight  11/5 SR  11/6 SR/Aflutter  11/7 Junctional/flutter    Assessment/Plan:  POD 1  HDS, SR- was a fib overnight, on amio gtt, neuro intact, wounds intact, abdomen soft, fluid balance positive, wt up,  2 L NC. 270 mL out of chest tubes overnight after return to OR. H/H 7.8/22.4, plat 83. Plan: One unit PRBCs this morning. Keep chest tubes and pacing wires. DC amio gtt, continue PO. Dialysis per neph. IS/ambulate.     POD 2  HTN, SR, neuro intact, wounds intact, abdomen soft no BM, fluid balance positive, wt up, 2 L NC. 590 mL out of chest tubes overnight. 500 off with PD last night. Pacing wires removed.  Plan:  Keep chest tubes for moderate output. Hold coumadin one more day. Resume verapamil, IS/ambulate. Transfer to [facility].     POD 3 HOTN- transfer back to [facility]- start pressors, SR/flutter- non-sustained- on amio PO, Oxygen demands up- fluid overload, ESRD- concern PD dilalysate came out of mediastinal CT- will have to convert to HD until peritoneum heals and scars down, Risk for mediastinitis/sepsis- pan culture- send CT fluid for culture- start Zoysn/vanco, LUE swelling/ecchymosis - ck US    POD 4 HOTN- on levo- during HD, Aflutter/junctional- on amio gtt- cont, HD today- removing fluid, leukocytosis- improved, normal lactate, Cont antibiotics- for high risk of mediastinitis,  d/c mediastinal tubes    Disposition:  TBD

## 2023-11-07 NOTE — DISCHARGE PLANNING
Case Management Discharge Planning    Admission Date: 11/3/2023  GMLOS: 8.7  ALOS: 4    6-Clicks ADL Score: 20  6-Clicks Mobility Score: 17  PT and/or OT Eval ordered: Yes  Post-acute Referrals Ordered: No  Post-acute Choice Obtained: No  Has referral(s) been sent to post-acute provider:  No      Anticipated Discharge Dispo: Discharge Disposition: Discharged to home/self care (01)  Discharge Address: [address] [place] [id number]    DME Needed: No    Action(s) Taken: DC Assessment Complete (See below)    LSW met with pt at bedside to complete DC assessment. LSW Introduced self and department roles. Pt A&Ox4 and able to verify the information on the face sheet. Pt lives with his S/O Paulette in a single-story apartment in [place]. Pt verified address on face sheet ([address] [place] [id number]). Pt verified PCP as Drew Blumberg, D.O. Prior to this hospitalization pt was independent at home with ADLs and most IADLs. Pt does not use any DME at baseline. Pt stated he drives and has support from his S/O Paulette. Pt states he works full time as a . Pt denies any SA or MH concerns. Pt does not have an advance directive. Pt stated his S/O paulette will transport pt home.     Community Resources for Social Determinants added to AVS.     Escalations Completed: None    Medically Clear: No    Next Steps:  f/u with pt and medical team to discuss dc needs and barriers.     Barriers to Discharge: Medical clearance    Is the patient up for discharge tomorrow: No      Care Transition Team Assessment    Information Source  Orientation Level: Oriented X4  Information Given By: Patient  Who is responsible for making decisions for patient? : Patient    Readmission Evaluation  Is this a readmission?: No    Elopement Risk  Legal Hold: No  Ambulatory or Self Mobile in Wheelchair: Yes  Disoriented: No  Psychiatric Symptoms: None  History of Wandering: No  Elopement this Admit: No  Vocalizing  Wanting to Leave: No  Displays Behaviors, Body Language Wanting to Leave: No-Not at Risk for Elopement  Elopement Risk: Not at Risk for Elopement    Interdisciplinary Discharge Planning  Lives with - Patient's Self Care Capacity: Spouse  Patient or legal guardian wants to designate a caregiver: No  Support Systems: Spouse / Significant Other, Friends / Neighbors  Housing / Facility: 1 Story Apartment / Condo  Prior Services: Home-Independent  Durable Medical Equipment: Unknown    Discharge Preparedness  What is your plan after discharge?: Home with help  What are your discharge supports?: Partner  Prior Functional Level: Ambulatory, Drives Self, Independent with Activities of Daily Living, Independent with Medication Management  Difficulity with ADLs: None  Difficulity with IADLs: None    Functional Assesment  Prior Functional Level: Ambulatory, Drives Self, Independent with Activities of Daily Living, Independent with Medication Management    Finances  Financial Barriers to Discharge: No  Prescription Coverage: Yes    Vision / Hearing Impairment  Vision Impairment : Yes  Right Eye Vision: Wears Glasses  Left Eye Vision: Wears Glasses    Advance Directive  Advance Directive?: None    Domestic Abuse  Have you ever been the victim of abuse or violence?: No  Physical Abuse or Sexual Abuse: No  Verbal Abuse or Emotional Abuse: No  Possible Abuse/Neglect Reported to:: Not Applicable    Psychological Assessment  History of Substance Abuse: None  History of Psychiatric Problems: No  Non-compliant with Treatment: No       Anticipated Discharge Information  Discharge Disposition: Discharged to home/self care (01)  Discharge Address: [address] [place] [id number]

## 2023-11-08 LAB
ANION GAP SERPL CALC-SCNC: 10 MMOL/L (ref 7–16)
BUN SERPL-MCNC: 58 MG/DL (ref 8–22)
CALCIUM SERPL-MCNC: 7.9 MG/DL (ref 8.5–10.5)
CHLORIDE SERPL-SCNC: 96 MMOL/L (ref 96–112)
CO2 SERPL-SCNC: 27 MMOL/L (ref 20–33)
CREAT SERPL-MCNC: 6.48 MG/DL (ref 0.5–1.4)
ERYTHROCYTE [DISTWIDTH] IN BLOOD BY AUTOMATED COUNT: 51.2 FL (ref 35.9–50)
GFR SERPLBLD CREATININE-BSD FMLA CKD-EPI: 9 ML/MIN/1.73 M 2
GLUCOSE SERPL-MCNC: 118 MG/DL (ref 65–99)
HCT VFR BLD AUTO: 25.7 % (ref 42–52)
HGB BLD-MCNC: 8.5 G/DL (ref 14–18)
INR PPP: 1.44 (ref 0.87–1.13)
MCH RBC QN AUTO: 31.5 PG (ref 27–33)
MCHC RBC AUTO-ENTMCNC: 33.1 G/DL (ref 32.3–36.5)
MCV RBC AUTO: 95.2 FL (ref 81.4–97.8)
PLATELET # BLD AUTO: 121 K/UL (ref 164–446)
PMV BLD AUTO: 10.5 FL (ref 9–12.9)
POTASSIUM SERPL-SCNC: 4.3 MMOL/L (ref 3.6–5.5)
PROTHROMBIN TIME: 17.7 SEC (ref 12–14.6)
RBC # BLD AUTO: 2.7 M/UL (ref 4.7–6.1)
SODIUM SERPL-SCNC: 133 MMOL/L (ref 135–145)
VANCOMYCIN SERPL-MCNC: 13.7 UG/ML
WBC # BLD AUTO: 5.9 K/UL (ref 4.8–10.8)

## 2023-11-08 PROCEDURE — 700111 HCHG RX REV CODE 636 W/ 250 OVERRIDE (IP): Mod: JZ | Performed by: NURSE PRACTITIONER

## 2023-11-08 PROCEDURE — A9270 NON-COVERED ITEM OR SERVICE: HCPCS | Performed by: NURSE PRACTITIONER

## 2023-11-08 PROCEDURE — 700105 HCHG RX REV CODE 258: Performed by: NURSE PRACTITIONER

## 2023-11-08 PROCEDURE — 700102 HCHG RX REV CODE 250 W/ 637 OVERRIDE(OP): Performed by: NURSE PRACTITIONER

## 2023-11-08 PROCEDURE — 90935 HEMODIALYSIS ONE EVALUATION: CPT

## 2023-11-08 PROCEDURE — 80202 ASSAY OF VANCOMYCIN: CPT

## 2023-11-08 PROCEDURE — 85027 COMPLETE CBC AUTOMATED: CPT

## 2023-11-08 PROCEDURE — 770020 HCHG ROOM/CARE - TELE (206)

## 2023-11-08 PROCEDURE — 85610 PROTHROMBIN TIME: CPT

## 2023-11-08 PROCEDURE — 80048 BASIC METABOLIC PNL TOTAL CA: CPT

## 2023-11-08 PROCEDURE — 94669 MECHANICAL CHEST WALL OSCILL: CPT

## 2023-11-08 PROCEDURE — 700111 HCHG RX REV CODE 636 W/ 250 OVERRIDE (IP): Performed by: NURSE PRACTITIONER

## 2023-11-08 RX ORDER — CARVEDILOL 6.25 MG/1
6.25 TABLET ORAL 2 TIMES DAILY WITH MEALS
Status: DISCONTINUED | OUTPATIENT
Start: 2023-11-08 | End: 2023-11-10

## 2023-11-08 RX ORDER — AMOXICILLIN AND CLAVULANATE POTASSIUM 500; 125 MG/1; MG/1
1 TABLET, FILM COATED ORAL EVERY 24 HOURS
Status: COMPLETED | OUTPATIENT
Start: 2023-11-08 | End: 2023-11-14

## 2023-11-08 RX ADMIN — ASPIRIN 81 MG: 81 TABLET, COATED ORAL at 05:33

## 2023-11-08 RX ADMIN — ACETAMINOPHEN 1000 MG: 500 TABLET, FILM COATED ORAL at 23:47

## 2023-11-08 RX ADMIN — ACETAMINOPHEN 1000 MG: 500 TABLET, FILM COATED ORAL at 17:13

## 2023-11-08 RX ADMIN — GABAPENTIN 200 MG: 100 CAPSULE ORAL at 05:33

## 2023-11-08 RX ADMIN — CARVEDILOL 6.25 MG: 6.25 TABLET, FILM COATED ORAL at 10:35

## 2023-11-08 RX ADMIN — AMIODARONE HYDROCHLORIDE 0.5 MG/MIN: 1.8 INJECTION, SOLUTION INTRAVENOUS at 10:37

## 2023-11-08 RX ADMIN — AMIODARONE HYDROCHLORIDE 400 MG: 200 TABLET ORAL at 05:36

## 2023-11-08 RX ADMIN — OMEPRAZOLE 20 MG: 20 CAPSULE, DELAYED RELEASE ORAL at 05:33

## 2023-11-08 RX ADMIN — HEPARIN SODIUM 5000 UNITS: 5000 INJECTION, SOLUTION INTRAVENOUS; SUBCUTANEOUS at 05:33

## 2023-11-08 RX ADMIN — PIPERACILLIN AND TAZOBACTAM 4.5 G: 4; .5 INJECTION, POWDER, FOR SOLUTION INTRAVENOUS at 05:33

## 2023-11-08 RX ADMIN — TAMSULOSIN HYDROCHLORIDE 0.4 MG: 0.4 CAPSULE ORAL at 09:31

## 2023-11-08 RX ADMIN — Medication 1 APPLICATOR: at 20:05

## 2023-11-08 RX ADMIN — ACETAMINOPHEN 1000 MG: 500 TABLET, FILM COATED ORAL at 00:24

## 2023-11-08 RX ADMIN — HEPARIN SODIUM 2400 UNITS: 1000 INJECTION, SOLUTION INTRAVENOUS; SUBCUTANEOUS at 12:25

## 2023-11-08 RX ADMIN — ACETAMINOPHEN 1000 MG: 500 TABLET, FILM COATED ORAL at 14:03

## 2023-11-08 RX ADMIN — AMIODARONE HYDROCHLORIDE 400 MG: 200 TABLET ORAL at 17:13

## 2023-11-08 RX ADMIN — Medication 1 APPLICATOR: at 09:31

## 2023-11-08 RX ADMIN — ACETAMINOPHEN 1000 MG: 500 TABLET, FILM COATED ORAL at 05:33

## 2023-11-08 RX ADMIN — AMOXICILLIN AND CLAVULANATE POTASSIUM 1 TABLET: 500; 125 TABLET, FILM COATED ORAL at 17:13

## 2023-11-08 RX ADMIN — CARVEDILOL 6.25 MG: 6.25 TABLET, FILM COATED ORAL at 17:14

## 2023-11-08 ASSESSMENT — FIBROSIS 4 INDEX: FIB4 SCORE: 1.31

## 2023-11-08 NOTE — PROGRESS NOTES
"[facility] -  PROGRESS NOTE               Author: Zac Daily D.O. Date & Time: 11/8/2023  12:02 PM     HPI:  58 y.o. male with  chronic anabolic steroid use, ESRD sec to to Bx proven sec FSGS currently on PD and compeltes three excahnges/ day, moderate to moderate AS and  moderate to severe AR, HTN presented for aortic valve and ascending anerysm repair. Pt had post operative complication of tamponade and is back in OR. Pt has received signficant colloid/ IVF for support.     DAILY NEPHROLOGY SUMMARY:  11/4- Patient intolerant to HD last night  11/5- Patient s/p PD last night. Reports shortness of breath but better then yesterday  11/6: pt had PD done with 214cc UF removed. 1000cc drainage noted from chest tube overnight. Cardiology concerned for PD fluid draining from chest tube.  11/7: pt had PUF yesterday w 2L removed. Had HD again today with 2.5L removed. Pt feels much better.  11/8: pt scheduled for HD again. Feeling okay    REVIEW OF SYSTEMS:    Denies SOB, no CP, no N/V  10 point ROS reviewed and is as per HPI/daily summary or otherwise negative    PMH/PSH/SH/FH: Reviewed and unchanged since admission note  CURRENT MEDICATIONS: Reviewed from admission to present day    VS:  BP (!) 151/82 Comment: APRN made aware  Pulse 84   Temp 37 °C (98.6 °F) (Temporal)   Resp 18   Ht 1.702 m (5' 7.01\")   Wt 89.4 kg (197 lb 1.5 oz)   SpO2 93%   BMI 30.86 kg/m²     Exam  General: Awake, no acute distress  HEENT: head normocephalic, atruamtic  Neck: neck supple, no visible masses  Respiratory: clear to auscultation bilaterally, no rales, no ronchi, or wheezing  Cardiac: normal rate, normal rhythm,  Abdomen: non tender, non-distended, no guarding  Musculoskelatal: no joint tenderness, moves limbs spontaneously  Extremities: no significant joint abnormalities, trace/1+ edema  Skin: no lesions, no rashes noted    Access: R IJ Temp HD catheter, Abd PD catheter      Fluids:  In: 879.6 [I.V.:379.6; " Dialysis:500]  Out: 2910     LABS:  Recent Labs     11/06/23  0000 11/06/23  1715 11/07/23  0432 11/08/23  0327   SODIUM 136  --  131* 133*   POTASSIUM 4.6 5.0 5.0 4.3   CHLORIDE 97  --  95* 96   CO2 27  --  25 27   GLUCOSE 118*  --  103* 118*   BUN 53*  --  72* 58*   CREATININE 6.67*  --  8.32* 6.48*   CALCIUM 8.0*  --  7.7* 7.9*         IMPRESSION:    # ESRD on outpt PD    - Etiology likely 2/2 FSGS    - has catheter for iHD during hospitalization  # Volume overload, improving  # S/P AVR/ Ascending aneurysm repair     -High chest tube drainage only noted at night while on PD     -concern for PD fluid leaking through into chest cavity per cards         On 11/6     -CVT recommending at least 2 weeks of PD avoidance to allow          Chest/ABD cavity to heal  # Anemia of CKD  # CKD-MBD  # Acute Respiratoy failure on vent, extubated    PLAN:    - pt receiving HD today, volume overload resolving  - will likely start qMWF HD at this point  - as CVT wants at least 2 weeks off PD to allow for healing, pt will need outpt HD placement  - transfusion as per primary team  - Dose all medications as per ESRD    Dispo: please place permcath for outpt HD. Renal SW notified to look for outpt HD placement until pt can resume PD    Please page nephrology with any questions or concerns

## 2023-11-08 NOTE — PROGRESS NOTES
Inpatient Anticoagulation Service Note for 11/8/2023    Reason for Anticoagulation: On-X Aortic Valve Replacement     Hemoglobin Value: (!) 8.5  Hematocrit Value: (!) 25.7  Lab Platelet Value: (!) 121  Target INR: 2.0 to 3.0    INR from last 7 days       Date/Time INR Value    11/08/23 0327 1.44    11/07/23 0432 1.36    11/06/23 0000 1.3    11/05/23 0417 1.29    11/04/23 0430 1.28    11/03/23 1458 1.11    11/03/23 1153 1.36          Dose from last 7 days       Date/Time Dose (mg)    11/08/23 1046 2.5    11/07/23 1125 2.5          Average Dose (mg):  (New start.)  Significant Interactions: Amiodarone, Antibiotics, Antiplatelet Medications   Reversal Agent Administered: Not Applicable    Assessment/Plan: Warfarin initiation following On-X aortic valve replacement on 11/3. Post-operatively, patient required factor VII, transfusion, and return to OR for hematoma evacuation. Patient then developed atrial fibrillation, now on amiodarone, and presumed septic shock, on antibiotics. Patient also continuing on daily aspirin and heparin SQ for DVTpx. Per CTS, will stop heparin when INR is 2.0. Received first dose of warfarin yesterday PM, INR remains subtherapeutic this AM. Patient has a cardiac diet ordered. H/H is being closely monitored. Will conservatively dose warfarin 2.5 mg tonight and check INR tomorrow.    Education Material Provided?: No (Will need prior to discharge.)    Pharmacist suggested discharge dosing: TBD pending therapeutic INR, consider 2.5 mg daily as initial dosing with INR at 48-72 hours post-discharge.       Felecia Conde, EstefanyD

## 2023-11-08 NOTE — CARE PLAN
The patient is Watcher - Medium risk of patient condition declining or worsening    Shift Goals  Clinical Goals: Mobilize.  Patient Goals: Rest  Family Goals: FARIDA    Progress made toward(s) clinical / shift goals:           Problem: Post Op Day 4 CABG/Heart Valve Replacement  Goal: Optimal care of the Post Op CABG/Heart Valve replacement Post Op Day 4  Outcome: Progressing  Intervention: Daily weights in the morning  Note: Done and charted  Intervention: Up in chair for all meals  Note: Pt stated he want to lay in bed fr now and agrees to get up to the chair for breakfast.   Intervention: IS q 1 hour while awake and record best IS volume  Note: Best   Intervention: Shower daily and clean incisions twice daily with soap and water  Note: Incision cleaned.  Intervention: Ambulate 4 times daily, increasing the distance each time  Note: Done     Problem: Self Care  Goal: Patient will have the ability to perform ADLs independently or with assistance (bathe, groom, dress, toilet and feed)  Outcome: Progressing     Problem: Bowel Elimination - Post Surgical  Goal: Patient will resume regular bowel sounds and function with no discomfort or distention  Outcome: Progressing     Problem: Pain - Standard  Goal: Alleviation of pain or a reduction in pain to the patient’s comfort goal  Outcome: Progressing     Problem: Fall Risk  Goal: Patient will remain free from falls  Outcome: Progressing    Problem: Knowledge Deficit - Standard  Goal: Patient and family/care givers will demonstrate understanding of plan of care, disease process/condition, diagnostic tests and medications  Outcome: Progressing       Patient is not progressing towards the following goals:

## 2023-11-08 NOTE — PROGRESS NOTES
[facility] Services Progress Note      HD today x 3 hours per Dr. Daily.   Initiated at 0922 and ended at 1222.     Assessment:    Patient stable, alert and oriented x 4. No SOB, denies pain. BP stable..     Access used: RIJ catheter, clean ,dry and intact. Reinforced dressing.      Net Fluid Removed: 2,000 mL      Procedure Events/ Complications:   Patient tolerated treatment. No hypotensive episodes.      Post Access Care:   Blood returned. Flushed with NS.  CVC locked with Heparin 1000 units/ mL   Arterial port:  1.2 mL   Venous port: 1.2 mL  Clamped, capped and secured. Labeled   Dressing change: Due on 11/11/23         Report given to Primary FRANKLIN Ken RN.

## 2023-11-08 NOTE — CARE PLAN
Problem: Hyperinflation  Goal: Prevent or improve atelectasis  Description: Target End Date:  3 to 4 days    1. Instruct incentive spirometry usage  2.  Perform hyperinflation therapy as indicated  Outcome: Progressing     PEP QID  IS: 800

## 2023-11-08 NOTE — THERAPY
Physical Therapy Contact Note    Patient Name: Gurdeep Guerra  Age:  58 y.o., Sex:  male  Medical Record #: [id number]  Today's Date: 11/8/2023    Discussed missed therapy with RN, Pt cleared to participate in PT but currently in dialysis. PT to attempt again as able.        11/08/23 0951   Interdisciplinary Plan of Care Collaboration   Collaboration Comments PT attempted, Pt in dialysis, nsg to obtain reorders due to transfer to higher level of care.

## 2023-11-08 NOTE — PROGRESS NOTES
AFL\, accelerated junctional, and sinus/sinus tach 80's-120's     Sinus measurements: WI 0.158; QRS 0.109; Qtc 0.436      AFL measurements: QRS 0.075

## 2023-11-08 NOTE — DISCHARGE PLANNING
Outpatient Dialysis Placement Notification     Received notification of pt switching from PD to HD from Dr. Daily.      Met with patient, confirmed demographics and insurance information.  Provided patient with dialysis education materials and list of HD centers.    Referral initiated to:    [facility] [place] [address] [place] [address]      Pending medical and financial clearance.     Xitlaly Reyes   Dialysis Coordinator / [facility]   Ph: [phone]

## 2023-11-08 NOTE — PROGRESS NOTES
Cardiovascular Surgery Progress Note    Name: Gurdeep Guerra  MRN: [id number]  : 1965  Admit Date: 11/3/2023  4:59 AM  5 Days Post-Op     Procedure:  Procedure(s) and Anesthesia Type:  *AORTIC VALVE REPLACEMENT (23 mm On-X mechanical valve), ASCENDING AORTIC ANEURYSM REPAIR (30 mm Hemashield tube graft) and intraoperative transesophageal echocardiography    *MEDIASTINAL EXPLORATION FOR POSTOPERATIVE BLEEDING AND RESTORATION OF HEMOSTASIS    Vitals:  Vitals:    23 0600 23 0700 23 0800 23 0900   BP: 139/85 (!) 148/81 (!) 144/81 (!) 151/82   Pulse: 85 87 87 84   Resp:       Temp:   37 °C (98.6 °F)    TempSrc:   Temporal    SpO2: 98% 96% 95% 93%   Weight: 89.4 kg (197 lb 1.5 oz)      Height:          Temp (24hrs), Av.6 °C (97.9 °F), Min:36.3 °C (97.4 °F), Max:37 °C (98.6 °F)      Respiratory:    Respiration: 18, Pulse Oximetry: 93 %       Fluids:    Intake/Output Summary (Last 24 hours) at 2023 1000  Last data filed at 2023 0800  Gross per 24 hour   Intake 839.49 ml   Output 2775 ml   Net -1935.51 ml       Admit weight: Weight: 84.6 kg (186 lb 8.2 oz)  Current weight: Weight: 89.4 kg (197 lb 1.5 oz) (23 0600)    Labs:  Recent Labs     23  0000 23  0432 23  0327   WBC 7.0 3.7* 5.9   RBC 2.92* 2.53* 2.70*   HEMOGLOBIN 9.4* 8.0* 8.5*   HEMATOCRIT 27.6* 24.5* 25.7*   MCV 94.5 96.8 95.2   MCH 32.2 31.6 31.5   MCHC 34.1 32.7 33.1   RDW 52.6* 52.0* 51.2*   PLATELETCT 99* 83* 121*   MPV 10.7 10.7 10.5       Recent Labs     23  0000 23  1715 23  0432 23   SODIUM 136  --  131* 133*   POTASSIUM 4.6 5.0 5.0 4.3   CHLORIDE 97  --  95* 96   CO2 27  --  25 27   GLUCOSE 118*  --  103* 118*   BUN 53*  --  72* 58*   CREATININE 6.67*  --  8.32* 6.48*   CALCIUM 8.0*  --  7.7* 7.9*       Recent Labs     23  0000 23  0432 23   INR 1.30* 1.36* 1.44*         HgbA1c:  5    Diabetic Educator  Consult:  no    Medications:  Scheduled Medications   Medication Dose Frequency    warfarin  2.5 mg DAILY AT 1800    piperacillin-tazobactam  4.5 g Q12HRS    MD Alert...Vancomycin per Pharmacy   PHARMACY TO DOSE    amiodarone  400 mg BID    tamsulosin  0.4 mg AFTER BREAKFAST    MD Alert...Warfarin per Pharmacy   PHARMACY TO DOSE    gabapentin  200 mg Q DAY    Nozin nasal  swab  1 Applicator BID    aspirin  81 mg DAILY    Pharmacy Consult Request  1 Each PHARMACY TO DOSE    acetaminophen  1,000 mg Q6HRS    senna-docusate  2 Tablet BID    And    polyethylene glycol/lytes  1 Packet DAILY    And    magnesium hydroxide  30 mL DAILY    omeprazole  20 mg DAILY    heparin  5,000 Units Q8HRS        Exam:   Physical Exam  Vitals and nursing note reviewed.   Constitutional:       General: He is not in acute distress.     Appearance: Normal appearance.   HENT:      Head: Normocephalic.      Right Ear: External ear normal.      Left Ear: External ear normal.      Nose: Nose normal. No congestion.      Mouth/Throat:      Mouth: Mucous membranes are moist.      Pharynx: Oropharynx is clear.   Eyes:      Extraocular Movements: Extraocular movements intact.      Pupils: Pupils are equal, round, and reactive to light.   Cardiovascular:      Rate and Rhythm: Normal rate and regular rhythm.      Pulses: Normal pulses.      Heart sounds: Normal heart sounds.   Pulmonary:      Effort: Pulmonary effort is normal.      Breath sounds: Decreased breath sounds present.   Abdominal:      General: Bowel sounds are decreased. There is no distension.      Palpations: Abdomen is soft.      Comments: PD catheter   Musculoskeletal:      Cervical back: Normal range of motion and neck supple. No tenderness.      Right lower leg: Edema present.      Left lower leg: Edema present.      Comments: LUE swelling/ecchymosis   Skin:     General: Skin is warm and dry.      Comments: Midline surgical incision   Neurological:      General: No focal  deficit present.      Mental Status: He is alert and oriented to person, place, and time. Mental status is at baseline.   Psychiatric:         Mood and Affect: Mood normal.         Behavior: Behavior normal.         Thought Content: Thought content normal.       Cardiac Medications:    ASA - Yes    Plavix - No; contraindicated because of On Coumadin / NOAC    Post-operative Beta Blockers - Yes    Ace/ARB- No; contraindicated because of Normal EF    Statin - No; contraindicated because of No CAD    Aldactone- No; contraindicated because of Normal EF    SGLT2i-  No contraindicated because of No; contraindicated because of Normal EF    Ejection Fraction:  60%    Telemetry:   11/4 SR- a fib overnight  11/5 SR  11/6 SR/Aflutter  11/7 Junctional/flutter  11/8 SR    Assessment/Plan:  POD 1  HDS, SR- was a fib overnight, on amio gtt, neuro intact, wounds intact, abdomen soft, fluid balance positive, wt up,  2 L NC. 270 mL out of chest tubes overnight after return to OR. H/H 7.8/22.4, plat 83. Plan: One unit PRBCs this morning. Keep chest tubes and pacing wires. DC amio gtt, continue PO. Dialysis per neph. IS/ambulate.     POD 2  HTN, SR, neuro intact, wounds intact, abdomen soft no BM, fluid balance positive, wt up, 2 L NC. 590 mL out of chest tubes overnight. 500 off with PD last night. Pacing wires removed.  Plan:  Keep chest tubes for moderate output. Hold coumadin one more day. Resume verapamil, IS/ambulate. Transfer to [facility].     POD 3 HOTN- transfer back to [facility]- start pressors, SR/flutter- non-sustained- on amio PO, Oxygen demands up- fluid overload, ESRD- concern PD dilalysate came out of mediastinal CT- will have to convert to HD until peritoneum heals and scars down, Risk for mediastinitis/sepsis- pan culture- send CT fluid for culture- start Zoysn/vanco, LUE swelling/ecchymosis - ck US    POD 4 HOTN- on levo- during HD, Aflutter/junctional- on amio gtt- cont, HD today- removing fluid, leukocytosis- improved, normal  lactate, Cont antibiotics- for high risk of mediastinitis, d/c mediastinal tubes    POD 5 HTN, SR.  Amio gtt.  HD for 2 weeks due to poss mediastinitis r/t CAPD fluid.  Normal WBC, cx negative.  Empiric abx.  Small amount of sanguinous drainage on the superior aspect of the wound.  Hgb 8.5 Cr 6.48.  Plan: Transfer to [facility].  Wean oxygen.  Add coreg.      Disposition:  TBD

## 2023-11-09 ENCOUNTER — APPOINTMENT (OUTPATIENT)
Dept: RADIOLOGY | Facility: MEDICAL CENTER | Age: 58
DRG: 219 | End: 2023-11-09
Attending: NURSE PRACTITIONER
Payer: COMMERCIAL

## 2023-11-09 LAB
ANION GAP SERPL CALC-SCNC: 10 MMOL/L (ref 7–16)
BUN SERPL-MCNC: 42 MG/DL (ref 8–22)
CALCIUM SERPL-MCNC: 7.7 MG/DL (ref 8.5–10.5)
CHLORIDE SERPL-SCNC: 97 MMOL/L (ref 96–112)
CO2 SERPL-SCNC: 28 MMOL/L (ref 20–33)
CREAT SERPL-MCNC: 5.88 MG/DL (ref 0.5–1.4)
ERYTHROCYTE [DISTWIDTH] IN BLOOD BY AUTOMATED COUNT: 52.8 FL (ref 35.9–50)
GFR SERPLBLD CREATININE-BSD FMLA CKD-EPI: 10 ML/MIN/1.73 M 2
GLUCOSE SERPL-MCNC: 100 MG/DL (ref 65–99)
HCT VFR BLD AUTO: 24.9 % (ref 42–52)
HGB BLD-MCNC: 8.1 G/DL (ref 14–18)
INR PPP: 1.67 (ref 0.87–1.13)
MCH RBC QN AUTO: 31.4 PG (ref 27–33)
MCHC RBC AUTO-ENTMCNC: 32.5 G/DL (ref 32.3–36.5)
MCV RBC AUTO: 96.5 FL (ref 81.4–97.8)
PLATELET # BLD AUTO: 136 K/UL (ref 164–446)
PMV BLD AUTO: 10.2 FL (ref 9–12.9)
POTASSIUM SERPL-SCNC: 4.2 MMOL/L (ref 3.6–5.5)
PROTHROMBIN TIME: 20 SEC (ref 12–14.6)
RBC # BLD AUTO: 2.58 M/UL (ref 4.7–6.1)
SODIUM SERPL-SCNC: 135 MMOL/L (ref 135–145)
WBC # BLD AUTO: 6.9 K/UL (ref 4.8–10.8)

## 2023-11-09 PROCEDURE — 85027 COMPLETE CBC AUTOMATED: CPT

## 2023-11-09 PROCEDURE — 85610 PROTHROMBIN TIME: CPT

## 2023-11-09 PROCEDURE — 700102 HCHG RX REV CODE 250 W/ 637 OVERRIDE(OP): Performed by: NURSE PRACTITIONER

## 2023-11-09 PROCEDURE — 71046 X-RAY EXAM CHEST 2 VIEWS: CPT

## 2023-11-09 PROCEDURE — 97530 THERAPEUTIC ACTIVITIES: CPT

## 2023-11-09 PROCEDURE — A9270 NON-COVERED ITEM OR SERVICE: HCPCS | Performed by: NURSE PRACTITIONER

## 2023-11-09 PROCEDURE — 97165 OT EVAL LOW COMPLEX 30 MIN: CPT

## 2023-11-09 PROCEDURE — 97535 SELF CARE MNGMENT TRAINING: CPT

## 2023-11-09 PROCEDURE — 36415 COLL VENOUS BLD VENIPUNCTURE: CPT

## 2023-11-09 PROCEDURE — 80048 BASIC METABOLIC PNL TOTAL CA: CPT

## 2023-11-09 PROCEDURE — 770020 HCHG ROOM/CARE - TELE (206)

## 2023-11-09 PROCEDURE — 94669 MECHANICAL CHEST WALL OSCILL: CPT

## 2023-11-09 PROCEDURE — 86480 TB TEST CELL IMMUN MEASURE: CPT

## 2023-11-09 PROCEDURE — 93005 ELECTROCARDIOGRAM TRACING: CPT | Performed by: NURSE PRACTITIONER

## 2023-11-09 PROCEDURE — 700111 HCHG RX REV CODE 636 W/ 250 OVERRIDE (IP): Mod: JZ | Performed by: NURSE PRACTITIONER

## 2023-11-09 RX ORDER — AMIODARONE HYDROCHLORIDE 200 MG/1
200 TABLET ORAL 2 TIMES DAILY
Status: DISCONTINUED | OUTPATIENT
Start: 2023-11-09 | End: 2023-11-17 | Stop reason: HOSPADM

## 2023-11-09 RX ADMIN — ONDANSETRON 8 MG: 2 INJECTION INTRAMUSCULAR; INTRAVENOUS at 06:57

## 2023-11-09 RX ADMIN — AMIODARONE HYDROCHLORIDE 200 MG: 200 TABLET ORAL at 19:43

## 2023-11-09 RX ADMIN — AMOXICILLIN AND CLAVULANATE POTASSIUM 1 TABLET: 500; 125 TABLET, FILM COATED ORAL at 19:44

## 2023-11-09 RX ADMIN — OMEPRAZOLE 20 MG: 20 CAPSULE, DELAYED RELEASE ORAL at 04:34

## 2023-11-09 RX ADMIN — ACETAMINOPHEN 1000 MG: 500 TABLET, FILM COATED ORAL at 19:43

## 2023-11-09 RX ADMIN — CARVEDILOL 6.25 MG: 6.25 TABLET, FILM COATED ORAL at 19:44

## 2023-11-09 RX ADMIN — CARVEDILOL 6.25 MG: 6.25 TABLET, FILM COATED ORAL at 09:12

## 2023-11-09 RX ADMIN — ACETAMINOPHEN 1000 MG: 500 TABLET, FILM COATED ORAL at 11:01

## 2023-11-09 RX ADMIN — BISMUTH SUBSALICYLATE 524 MG: 525 LIQUID ORAL at 19:23

## 2023-11-09 RX ADMIN — ALUMINUM HYDROXIDE, MAGNESIUM HYDROXIDE, AND DIMETHICONE 30 ML: 400; 400; 40 SUSPENSION ORAL at 03:26

## 2023-11-09 RX ADMIN — ALUMINUM HYDROXIDE, MAGNESIUM HYDROXIDE, AND DIMETHICONE 30 ML: 400; 400; 40 SUSPENSION ORAL at 21:18

## 2023-11-09 RX ADMIN — GABAPENTIN 200 MG: 100 CAPSULE ORAL at 04:34

## 2023-11-09 RX ADMIN — AMIODARONE HYDROCHLORIDE 400 MG: 200 TABLET ORAL at 03:25

## 2023-11-09 ASSESSMENT — FIBROSIS 4 INDEX
FIB4 SCORE: 1.17
FIB4 SCORE: 1.17

## 2023-11-09 ASSESSMENT — ACTIVITIES OF DAILY LIVING (ADL): TOILETING: INDEPENDENT

## 2023-11-09 ASSESSMENT — GAIT ASSESSMENTS
GAIT LEVEL OF ASSIST: CONTACT GUARD ASSIST
DISTANCE (FEET): 100

## 2023-11-09 ASSESSMENT — COGNITIVE AND FUNCTIONAL STATUS - GENERAL
MOVING TO AND FROM BED TO CHAIR: A LITTLE
HELP NEEDED FOR BATHING: A LITTLE
TURNING FROM BACK TO SIDE WHILE IN FLAT BAD: A LITTLE
SUGGESTED CMS G CODE MODIFIER MOBILITY: CK
DAILY ACTIVITIY SCORE: 23
MOVING FROM LYING ON BACK TO SITTING ON SIDE OF FLAT BED: A LITTLE
CLIMB 3 TO 5 STEPS WITH RAILING: A LOT
SUGGESTED CMS G CODE MODIFIER DAILY ACTIVITY: CI
WALKING IN HOSPITAL ROOM: A LITTLE
STANDING UP FROM CHAIR USING ARMS: A LITTLE
MOBILITY SCORE: 17

## 2023-11-09 NOTE — THERAPY
Physical Therapy   Daily Treatment     Patient Name: Gurdeep Guerra  Age:  58 y.o., Sex:  male  Medical Record #: [id number]  Today's Date: 11/9/2023     Precautions  Precautions: Cardiac Precautions (See Comments);Sternal Precautions (See Comments)    Assessment    Pt agreeable to PT tx session, continues to be limited by c/o dizziness.  Pt mobilized as detailed below with CGA for ambulation, declined further ambulation due to not having had breakfast.  Pt demo'd LOB when turning to sit down in chair, had to steady himself on EOB with slight assistance from therapist.  Will continue to follow to progress ambulation & reinforce cardiac & sternal precautions.    Plan    Treatment Plan Status: Continue Current Treatment Plan  Type of Treatment: Bed Mobility, Equipment, Gait Training, Neuro Re-Education / Balance, Self Care / Home Evaluation, Stair Training, Therapeutic Activities, Therapeutic Exercise  Treatment Frequency: 5 Times per Week  Treatment Duration: Until Therapy Goals Met    DC Equipment Recommendations: None  Discharge Recommendations:  (Outpatient cardiac rehab as appropriate.)     Objective     11/09/23 0947   Precautions   Precautions Cardiac Precautions (See Comments);Sternal Precautions (See Comments)   Vitals   Patient BP Position Sitting   Blood Pressure (!) 159/103   Pain 0 - 10 Group   Therapist Pain Assessment Post Activity Pain Same as Prior to Activity;Nurse Notified;0   Cognition    Cognition / Consciousness X   Level of Consciousness Alert   Comments Cooperative, seems anxious.  Hyperverbose   Balance   Sitting Balance (Static) Fair +   Sitting Balance (Dynamic) Fair   Standing Balance (Static) Fair   Standing Balance (Dynamic) Fair -   Weight Shift Sitting Good   Weight Shift Standing Good   Skilled Intervention Verbal Cuing   Bed Mobility    Supine to Sit (NT, up with OT pre session)   Sit to Supine (NT, left up in chair)   Gait Analysis   Gait Level Of Assist Contact Guard Assist  "  Assistive Device None   Distance (Feet) 100   # of Times Distance was Traveled 1   Deviation (mildly flexed trunk)   Skilled Intervention Verbal Cuing;Tactile Cuing   Comments LOB with turning to sit in chair, had to brace himself on the EOB   Functional Mobility   Sit to Stand Supervised   Bed, Chair, Wheelchair Transfer Supervised   Mobility ambulation   Skilled Intervention Verbal Cuing   Activity Tolerance   Sitting in Chair Post session   Standing 8 min   Short Term Goals    Short Term Goal # 1 The pt will demo supine<>sit EOB spv w/ HOB flat and no rails in 6 visits for independence w/ bed mobility tasks.   Goal Outcome # 1 Progressing as expected   Short Term Goal # 2 The pt will demo stand-step txr EOB<>chair w/ FWW spv in 6 visits for OOB mobility.   Goal Outcome # 2 Goal met   Short Term Goal # 3 The pt will demo gait >150' using FWW spv in a moving environment in 6 visits for household ambulation.   Goal Outcome # 3 Progressing as expected   Short Term Goal # 4 The pt will demo proper use of RPE scale, \"talk test\" and/or HR to monitor and progress exercise independently in 6 visits.   Goal Outcome # 4 Progressing as expected   Physical Therapy Treatment Plan   Physical Therapy Treatment Plan Continue Current Treatment Plan     "

## 2023-11-09 NOTE — THERAPY
"Occupational Therapy   Initial Evaluation     Patient Name: Gurdeep Guerra  Age:  58 y.o., Sex:  male  Medical Record #: [id number]  Today's Date: 11/9/2023       Precautions: Cardiac Precautions, Sternal Precautions     Assessment  Patient is 58 y.o. male admitted for worsening SOB. PMHx: HTN, HLD, ESRD on peritoneal dialysis, CAD, and severe aortic stenosis, as well as pending a kidney transplant.   Pt reports being independnet and working FT prior to admit.   This admission pt is dx w/moderate to severe aortic stenosis, & ascending aortic aneurysm. Pt is s/p AV replacement, AAA repaire and intraoperative transesophageal echocardiography.   Pt is POD #6 and functionally doing well, pt demonstrated ability to perform ADL's and txfs w/o assist. Pt has been educated on sternal and cardiac prec to apply during ADL's and IADl's. Pt does express need/desire to quickly returned to work discussed following prec w/regards to driving and stress management. Pt reports SO is able to assist at dc     Plan  Occupational Therapy Initial Treatment Plan   Duration: Evaluation only    DC Equipment Recommendations: None  Discharge Recommendations: Anticipate that the patient will have no further occupational therapy needs after discharge from the hospital     Subjective  \"I will just take it day by day\"      Objective   11/09/23 0935   Charge Group   OT Evaluation OT Evaluation Low   OT Self Care / ADL (Units) 1   Total Time Spent   OT Time Spent Yes   OT Self Care / ADL (Minutes) 15   OT Evaluation (Minutes) 15   OT Total Time Spent (Calculated) 30   Initial Contact Note    Initial Contact Note Order Received and Verified, Evaluation Only - Patient Does Not Require Further Acute Occupational Therapy at this Time.  However, May Benefit from Post Acute Therapy for Higher Level Functional Deficits.   Prior Living Situation   Prior Services Home-Independent   Housing / Facility 1 Story Apartment / Condo   Steps Into Home 0   Steps In " Home 0   Bathroom Set up Walk In Shower   Equipment Owned None   Lives with - Patient's Self Care Capacity Significant Other   Comments Pt reports living w/his GF who works but is able to assist PRN   Prior Level of ADL Function   Self Feeding Independent   Grooming / Hygiene Independent   Bathing Independent   Dressing Independent   Toileting Independent   Prior Level of IADL Function   Medication Management Independent   Laundry Independent   Kitchen Mobility Independent   Finances Independent   Home Management Independent   Shopping Independent   Prior Level Of Mobility Independent Without Device in Community   Driving / Transportation Driving Independent   Occupation (Pre-Hospital Vocational) Employed Full Time   Precautions   Precautions Cardiac Precautions (See Comments);Sternal Precautions (See Comments)   Pain 0 - 10 Group   Therapist Pain Assessment 0;Nurse Notified   Cognition    Cognition / Consciousness X   Level of Consciousness Alert   Comments pt is alert and cooperative does appear anxious at times   Passive ROM Upper Body   Passive ROM Upper Body WDL   Active ROM Upper Body   Active ROM Upper Body  WDL   Strength Upper Body   Upper Body Strength  WDL   Upper Body Muscle Tone   Upper Body Muscle Tone  WDL   Neurological Concerns   Neurological Concerns No   Balance Assessment   Sitting Balance (Static) Fair +   Sitting Balance (Dynamic) Fair   Standing Balance (Static) Fair   Standing Balance (Dynamic) Fair   Weight Shift Sitting Good   Weight Shift Standing Good   Comments no AD no LOB   Bed Mobility    Supine to Sit Supervised   Comments up in chair w/PT present   ADL Assessment   Grooming Supervision;Standing   Upper Body Dressing Supervision   Lower Body Dressing Supervision   Toileting Supervision   Comments don/doffed pants, on/off toilet standing grooming and reviewed energy conservation   How much help from another person does the patient currently need...   6 Clicks Daily Activity Score 23    Functional Mobility   Sit to Stand Supervised   Bed, Chair, Wheelchair Transfer Supervised   Toilet Transfers Supervised   Mobility walking in room no AD no LOB   Visual Perception   Visual Perception  Not Tested   Comments mild c/o dizziness   Education Group   Education Provided Cardiac Precautions;Sternal Precautions   Role of Occupational Therapist Patient Response Patient;Acceptance;Explanation;Demonstration   Cardiac Precautions Patient Response Patient;Acceptance;Explanation;Demonstration;Reinforcement Needed   Sternal Precautions Patient Response Patient;Acceptance;Explanation;Demonstration;Reinforcement Needed   Occupational Therapy Initial Treatment Plan    Duration Evaluation only   Problem List   Problem List None   Anticipated Discharge Equipment and Recommendations   DC Equipment Recommendations None   Discharge Recommendations Anticipate that the patient will have no further occupational therapy needs after discharge from the hospital   Interdisciplinary Plan of Care Collaboration   IDT Collaboration with  Nursing;Physical Therapist   Patient Position at End of Therapy In Bed;Call Light within Reach;Tray Table within Reach;Phone within Reach   Collaboration Comments RN aware of OT eval and pts efforts   Session Information   Date / Session Number  11/9 #1 eval only

## 2023-11-09 NOTE — CARE PLAN
"  Problem: Post Op Day 5 CABG/Heart Valve Replacement  Goal: Optimal care of the Post Op CABG/Heart Valve replacement Post Op Day 5  Outcome: Progressing  Intervention: Daily weights in the morning  Note: 89.4 kg    Intervention: Shower daily and clean incisions twice daily with soap and water  Note: Shower complete incision cleaned with soap and water and dressed due to drainage.  Intervention: Up in chair for all meals  Note: Up in chair for lunch and dinner. Received am dialysis  Intervention: Ambulate 4 times daily, increasing the distance each time  Note: Ambulate x 2  Intervention: IS q 1 hour while awake and record best IS volume  Note: Best   Intervention: Complete \"Home O2 Assessment' in vitals flowsheets if unable to wean off O2  Note: To be completed  Intervention: Consider removal of freeman, chest tube and pacer wires if not already done  Note: completed     Problem: Pain - Standard  Goal: Alleviation of pain or a reduction in pain to the patient’s comfort goal  Outcome: Progressing   The patient is Stable - Low risk of patient condition declining or worsening    Shift Goals  Clinical Goals: Mobilize  Patient Goals: Rest  Family Goals: Sole    Progress made toward(s) clinical / shift goals:  showered and CHG, saline locked    Patient is not progressing towards the following goals:      "

## 2023-11-09 NOTE — PROGRESS NOTES
Bedside report received from Adele PORTER RN. Pt A&Ox4. POC discussed with pt. Pt verbalizes understanding. Call light and belongings within reach. Bed locked and in lowest position. Alarm and fall precautions in place. Provider messaged regarding patients concerns.

## 2023-11-09 NOTE — DISCHARGE PLANNING
Case Management Discharge Planning    Admission Date: 11/3/2023  GMLOS: 8.7  ALOS: 6    6-Clicks ADL Score: 23  6-Clicks Mobility Score: 17  PT and/or OT Eval ordered: Yes  Post-acute Referrals Ordered: No  Post-acute Choice Obtained: No  Has referral(s) been sent to post-acute provider:  No      Anticipated Discharge Dispo: Discharge Disposition: Discharged to home/self care (01)  Discharge Address: [address] [place] [id number]    DME Needed: No    Action(s) Taken: Pt being followed by PT and OT, anticipated no needs for continued therapy at discharge. CM RN spoke with Fatimah and chair in [place] is pending time and insurance authorization at this time. Pt is needing HD catheter placed as well.    Escalations Completed: None    Medically Clear: No    Barriers to Discharge: Medical clearance and Dialysis

## 2023-11-09 NOTE — PROGRESS NOTES
"[facility] -  PROGRESS NOTE               Author: Zac Daily D.O. Date & Time: 11/9/2023  12:42 PM     HPI:  58 y.o. male with  chronic anabolic steroid use, ESRD sec to to Bx proven sec FSGS currently on PD and compeltes three excahnges/ day, moderate to moderate AS and  moderate to severe AR, HTN presented for aortic valve and ascending anerysm repair. Pt had post operative complication of tamponade and is back in OR. Pt has received signficant colloid/ IVF for support.     DAILY NEPHROLOGY SUMMARY:  11/4- Patient intolerant to HD last night  11/5- Patient s/p PD last night. Reports shortness of breath but better then yesterday  11/6: pt had PD done with 214cc UF removed. 1000cc drainage noted from chest tube overnight. Cardiology concerned for PD fluid draining from chest tube.  11/7: pt had PUF yesterday w 2L removed. Had HD again today with 2.5L removed. Pt feels much better.  11/8: pt scheduled for HD again. Feeling okay  11/9: pt had HD with 2L UF removed. Feels nauseous this AM from amiodarone.     REVIEW OF SYSTEMS:    Denies SOB, no CP, no V  10 point ROS reviewed and is as per HPI/daily summary or otherwise negative    PMH/PSH/SH/FH: Reviewed and unchanged since admission note  CURRENT MEDICATIONS: Reviewed from admission to present day    VS:  BP (!) 155/86   Pulse 94   Temp 36.4 °C (97.6 °F) (Temporal)   Resp 18   Ht 1.702 m (5' 7\")   Wt 88.9 kg (195 lb 15.8 oz)   SpO2 97%   BMI 30.70 kg/m²     Exam  General: Awake, no acute distress  HEENT: head normocephalic, atruamtic  Neck: neck supple, no visible masses  Respiratory: clear to auscultation bilaterally, no rales, no ronchi, or wheezing  Cardiac: normal rate, normal rhythm,  Abdomen: non tender, non-distended, no guarding  Musculoskelatal: no joint tenderness, moves limbs spontaneously  Extremities: no significant joint abnormalities, trace/1+ edema  Skin: no lesions, no rashes noted    Access: R IJ Temp HD catheter, " Abd PD catheter      Fluids:  In: 1102.8 [P.O.:360; I.V.:142.8; Dialysis:500]  Out: 2500     LABS:  Recent Labs     11/07/23  0432 11/08/23  0327 11/09/23  0301   SODIUM 131* 133* 135   POTASSIUM 5.0 4.3 4.2   CHLORIDE 95* 96 97   CO2 25 27 28   GLUCOSE 103* 118* 100*   BUN 72* 58* 42*   CREATININE 8.32* 6.48* 5.88*   CALCIUM 7.7* 7.9* 7.7*         IMPRESSION:    # ESRD on outpt PD    - Etiology likely 2/2 FSGS    - has temp catheter for iHD during hospitalization  # Volume overload, improving  # S/P AVR/ Ascending aneurysm repair     -High chest tube drainage only noted at night while on PD     -concern for PD fluid leaking through into chest cavity per cards         On 11/6     -CVT recommending at least 2 weeks of PD avoidance to allow          Chest/ABD cavity to heal  # Anemia of CKD     EPO 6000U IV w HD to start 11/10  # CKD-MBD  # Acute Respiratoy failure on vent, extubated    PLAN:    - no HD today, start qMWF HD tomorrow  - as CVT wants at least 2 weeks off PD to allow for healing, pt will need outpt HD placement  -await permcath placement  - transfusion as per primary team  - Dose all medications as per ESRD    Dispo:await permcath for outpt HD. Renal SW notified to look for outpt HD placement until pt can resume PD    Please page nephrology with any questions or concerns

## 2023-11-09 NOTE — PROGRESS NOTES
The patient is Watcher - Medium risk of patient condition declining or worsening     Shift Goals  Clinical Goals: Mobilize./Transfer to Tele  Patient Goals: Rest  Family Goals: FARIDA     Progress made toward(s) clinical / shift goals:             Intervention: Daily weights in the morning  Note: Done and charted    Intervention: Up in chair for all meals  Note: Pt agreeing to get up to chair for breakfast in the AM    Intervention: IS q 1 hour while awake and record best IS volume  Note: Best IS 1000    Intervention: Shower daily and clean incisions twice daily with soap and water  Note: Incision sight cleaned     Intervention: Ambulate 4 times daily, increasing the distance each time  Note: Pt ambulated twice this shift.        Problem: Pain - Standard  Goal: Alleviation of pain or a reduction in pain to the patient’s comfort goal  Outcome: Progressing     Problem: Fall Risk  Goal: Patient will remain free from falls  Outcome: Progressing     Problem: Knowledge Deficit - Standard  Goal: Patient and family/care givers will demonstrate understanding of plan of care, disease process/condition, diagnostic tests and medications  Outcome: Progressing

## 2023-11-09 NOTE — PROGRESS NOTES
Inpatient Anticoagulation Service Note for 11/9/2023      Reason for Anticoagulation: On-X Aortic Valve Replacement      Hemoglobin Value: (!) 8.1  Hematocrit Value: (!) 24.9  Lab Platelet Value: (!) 136  Target INR: 2.0 to 3.0    INR from last 7 days       Date/Time INR Value    11/09/23 0301 1.67    11/08/23 0327 1.44    11/07/23 0432 1.36    11/06/23 0000 1.3    11/05/23 0417 1.29    11/04/23 0430 1.28    11/03/23 1458 1.11    11/03/23 1153 1.36          Dose from last 7 days       Date/Time Dose (mg)    11/09/23 1331 0    11/08/23 1046 0    11/07/23 1125 2.5          Average Dose (mg):  (New start.)  Significant Interactions: Amiodarone, Antibiotics, Antiplatelet Medications     Reversal Agent Administered: Not Applicable    Comments: Warfarin put on hold yesterday for IR procedure. IR procedure postponed until tomorrow, warfarin remains on Provider Hold (along with ASA and heparin DVT ppx). Will follow.    Education Material Provided?: No (Will need prior to discharge.)    Pharmacist suggested discharge dosing: TBD, possibly warfarin 2.5 mg daily     Hector Sorensen, PharmD

## 2023-11-09 NOTE — PROGRESS NOTES
Cardiovascular Surgery Progress Note    Name: Gurdeep Guerra  MRN: [id number]  : 1965  Admit Date: 11/3/2023  4:59 AM  6 Days Post-Op     Procedure:  Procedure(s) and Anesthesia Type:  *AORTIC VALVE REPLACEMENT (23 mm On-X mechanical valve), ASCENDING AORTIC ANEURYSM REPAIR (30 mm Hemashield tube graft) and intraoperative transesophageal echocardiography    *MEDIASTINAL EXPLORATION FOR POSTOPERATIVE BLEEDING AND RESTORATION OF HEMOSTASIS    Vitals:  Vitals:    23 0439 23 0723 23 0808 23 0912   BP: 137/88  117/77 (!) 155/86   Pulse: (!) 110 (!) 101 92 94   Resp:  18 18    Temp:   36.4 °C (97.6 °F)    TempSrc:   Temporal    SpO2:  96% 97%    Weight:       Height:          Temp (24hrs), Av.1 °C (98.7 °F), Min:36.4 °C (97.6 °F), Max:37.3 °C (99.1 °F)      Respiratory:    Respiration: 18, Pulse Oximetry: 97 %       Fluids:    Intake/Output Summary (Last 24 hours) at 2023 1043  Last data filed at 2023 1715  Gross per 24 hour   Intake 1078.93 ml   Output 2500 ml   Net -1421.07 ml       Admit weight: Weight: 84.6 kg (186 lb 8.2 oz)  Current weight: Weight: 88.9 kg (195 lb 15.8 oz) (23 0310)    Labs:  Recent Labs     23  0432 237 23  0301   WBC 3.7* 5.9 6.9   RBC 2.53* 2.70* 2.58*   HEMOGLOBIN 8.0* 8.5* 8.1*   HEMATOCRIT 24.5* 25.7* 24.9*   MCV 96.8 95.2 96.5   MCH 31.6 31.5 31.4   MCHC 32.7 33.1 32.5   RDW 52.0* 51.2* 52.8*   PLATELETCT 83* 121* 136*   MPV 10.7 10.5 10.2       Recent Labs     23  0432 23  0327 23  0301   SODIUM 131* 133* 135   POTASSIUM 5.0 4.3 4.2   CHLORIDE 95* 96 97   CO2 25 27 28   GLUCOSE 103* 118* 100*   BUN 72* 58* 42*   CREATININE 8.32* 6.48* 5.88*   CALCIUM 7.7* 7.9* 7.7*       Recent Labs     23  0432 23  0327 23  0301   INR 1.36* 1.44* 1.67*         HgbA1c:  5    Diabetic Educator Consult:  no    Medications:  Scheduled Medications   Medication Dose Frequency    amiodarone  200 mg BID     carvedilol  6.25 mg BID WITH MEALS    amoxicillin-clavulanate  1 Tablet Q24HRS    epoetin  6,000 Units MO, WE + FR    [Held by provider] warfarin  2.5 mg DAILY AT 1800    tamsulosin  0.4 mg AFTER BREAKFAST    MD Alert...Warfarin per Pharmacy   PHARMACY TO DOSE    gabapentin  200 mg Q DAY    Nozin nasal  swab  1 Applicator BID    [Held by provider] aspirin  81 mg DAILY    Pharmacy Consult Request  1 Each PHARMACY TO DOSE    acetaminophen  1,000 mg Q6HRS    senna-docusate  2 Tablet BID    And    polyethylene glycol/lytes  1 Packet DAILY    And    magnesium hydroxide  30 mL DAILY    omeprazole  20 mg DAILY    [Held by provider] heparin  5,000 Units Q8HRS        Exam:   Physical Exam  Vitals and nursing note reviewed.   Constitutional:       General: He is not in acute distress.     Appearance: Normal appearance.   HENT:      Head: Normocephalic.      Right Ear: External ear normal.      Left Ear: External ear normal.      Nose: Nose normal. No congestion.      Mouth/Throat:      Mouth: Mucous membranes are moist.      Pharynx: Oropharynx is clear.   Eyes:      Extraocular Movements: Extraocular movements intact.      Pupils: Pupils are equal, round, and reactive to light.   Cardiovascular:      Rate and Rhythm: Normal rate and regular rhythm.      Pulses: Normal pulses.      Heart sounds: Normal heart sounds.   Pulmonary:      Effort: Pulmonary effort is normal.      Breath sounds: Decreased breath sounds present.   Abdominal:      General: Bowel sounds are decreased. There is no distension.      Palpations: Abdomen is soft.      Comments: PD catheter   Musculoskeletal:      Cervical back: Normal range of motion and neck supple. No tenderness.      Right lower leg: Edema present.      Left lower leg: Edema present.      Comments: LUE swelling/ecchymosis   Skin:     General: Skin is warm and dry.      Comments: Midline surgical incision   Neurological:      General: No focal deficit present.      Mental  Status: He is alert and oriented to person, place, and time. Mental status is at baseline.   Psychiatric:         Mood and Affect: Mood normal.         Behavior: Behavior normal.         Thought Content: Thought content normal.         Cardiac Medications:    ASA - Yes    Plavix - No; contraindicated because of On Coumadin / NOAC    Post-operative Beta Blockers - Yes    Ace/ARB- No; contraindicated because of Normal EF    Statin - No; contraindicated because of No CAD    Aldactone- No; contraindicated because of Normal EF    SGLT2i-  No contraindicated because of No; contraindicated because of Normal EF    Ejection Fraction:  60%    Telemetry:   11/4 SR- a fib overnight  11/5 SR  11/6 SR/Aflutter  11/7 Junctional/flutter  11/8 SR  11/9 Converted to SR this AM    Assessment/Plan:  POD 1  HDS, SR- was a fib overnight, on amio gtt, neuro intact, wounds intact, abdomen soft, fluid balance positive, wt up,  2 L NC. 270 mL out of chest tubes overnight after return to OR. H/H 7.8/22.4, plat 83. Plan: One unit PRBCs this morning. Keep chest tubes and pacing wires. DC amio gtt, continue PO. Dialysis per neph. IS/ambulate.     POD 2  HTN, SR, neuro intact, wounds intact, abdomen soft no BM, fluid balance positive, wt up, 2 L NC. 590 mL out of chest tubes overnight. 500 off with PD last night. Pacing wires removed.  Plan:  Keep chest tubes for moderate output. Hold coumadin one more day. Resume verapamil, IS/ambulate. Transfer to tele.     POD 3 HOTN- transfer back to [facility]- start pressors, SR/flutter- non-sustained- on amio PO, Oxygen demands up- fluid overload, ESRD- concern PD dilalysate came out of mediastinal CT- will have to convert to HD until peritoneum heals and scars down, Risk for mediastinitis/sepsis- pan culture- send CT fluid for culture- start Zoysn/vanco, LUE swelling/ecchymosis - ck US    POD 4 HOTN- on levo- during HD, Aflutter/junctional- on amio gtt- cont, HD today- removing fluid, leukocytosis- improved,  normal lactate, Cont antibiotics- for high risk of mediastinitis, d/c mediastinal tubes    POD 5 HTN, SR.  Amio gtt.  HD for 2 weeks due to poss mediastinitis r/t CAPD fluid.  Normal WBC, cx negative.  Empiric abx.  Small amount of sanguinous drainage on the superior aspect of the wound.  Hgb 8.5 Cr 6.48.  Plan: Transfer to [facility].  Wean oxygen.  Add coreg.      POD 6 HDS. SR.  Neuro intact.  Wounds-drainage improved.  WBC down.  HD MWF per neph.  Refused IR today due to nausea/feeling poorly overnight, would like to proceed tomorrow.  Plan: Resume cardiac diet today, NPO at midnight.  Hold ASA, lovenox, coumadin for IR tomorrow.  Half of amio dosage per Evangelist to see if he tolerates it with less nausea.    Disposition:  OT no needs PT?

## 2023-11-09 NOTE — PROGRESS NOTES
"Patient up to new room in T801. [facility]. Patient is on a monitor, the tech states patient's heart rhythm is aflutter, and heart rate 104. Patient is A&Ox4, states 0/10 pain. Patient oriented to new room. Bed locked and in lowest position. Call light and belongings within reach. All questions answered, no further needs at this time.     0615: Patient stated he is not feeling good, patient states either the Malox, amiodarone, or gabapentin administered this morning is making him feel nauseous. This RN offered patient anti-nausea medication, and patient stated, \"I am not taking anything else. These pills are making me feel worse. I am not a pill machine. If you can justify the medications I might take them. Tell the doctor I am not doing this test today until I feel better. I need some food.\"   "

## 2023-11-09 NOTE — PROGRESS NOTES
Pt transported to T801. Pt belongings with pt on transfer. Report was given to receiving CODI Angulo and all questions were answered.

## 2023-11-09 NOTE — PROGRESS NOTES
Orders from Major LLANOS APRN to place cardiac diet and notify IR regarding postponing procedure. Also, notified team regarding patient refusing Wiley hoses.

## 2023-11-10 ENCOUNTER — APPOINTMENT (OUTPATIENT)
Dept: RADIOLOGY | Facility: MEDICAL CENTER | Age: 58
DRG: 219 | End: 2023-11-10
Attending: NURSE PRACTITIONER
Payer: COMMERCIAL

## 2023-11-10 LAB
ANION GAP SERPL CALC-SCNC: 11 MMOL/L (ref 7–16)
BUN SERPL-MCNC: 57 MG/DL (ref 8–22)
CALCIUM SERPL-MCNC: 7.8 MG/DL (ref 8.5–10.5)
CHLORIDE SERPL-SCNC: 94 MMOL/L (ref 96–112)
CO2 SERPL-SCNC: 27 MMOL/L (ref 20–33)
CREAT SERPL-MCNC: 7.78 MG/DL (ref 0.5–1.4)
ERYTHROCYTE [DISTWIDTH] IN BLOOD BY AUTOMATED COUNT: 52.3 FL (ref 35.9–50)
GAMMA INTERFERON BACKGROUND BLD IA-ACNC: 0.04 IU/ML
GFR SERPLBLD CREATININE-BSD FMLA CKD-EPI: 8 ML/MIN/1.73 M 2
GLUCOSE SERPL-MCNC: 111 MG/DL (ref 65–99)
HCT VFR BLD AUTO: 25.5 % (ref 42–52)
HGB BLD-MCNC: 8.6 G/DL (ref 14–18)
INR PPP: 1.46 (ref 0.87–1.13)
M TB IFN-G BLD-IMP: NEGATIVE
M TB IFN-G CD4+ BCKGRND COR BLD-ACNC: 0 IU/ML
MCH RBC QN AUTO: 32.5 PG (ref 27–33)
MCHC RBC AUTO-ENTMCNC: 33.7 G/DL (ref 32.3–36.5)
MCV RBC AUTO: 96.2 FL (ref 81.4–97.8)
MITOGEN IGNF BCKGRD COR BLD-ACNC: 0.84 IU/ML
PLATELET # BLD AUTO: 163 K/UL (ref 164–446)
PMV BLD AUTO: 10.2 FL (ref 9–12.9)
POTASSIUM SERPL-SCNC: 4.5 MMOL/L (ref 3.6–5.5)
PROTHROMBIN TIME: 17.9 SEC (ref 12–14.6)
QFT TB2 - NIL TBQ2: 0 IU/ML
RBC # BLD AUTO: 2.65 M/UL (ref 4.7–6.1)
SODIUM SERPL-SCNC: 132 MMOL/L (ref 135–145)
WBC # BLD AUTO: 8.6 K/UL (ref 4.8–10.8)

## 2023-11-10 PROCEDURE — 90935 HEMODIALYSIS ONE EVALUATION: CPT

## 2023-11-10 PROCEDURE — 0JH63XZ INSERTION OF TUNNELED VASCULAR ACCESS DEVICE INTO CHEST SUBCUTANEOUS TISSUE AND FASCIA, PERCUTANEOUS APPROACH: ICD-10-PCS | Performed by: RADIOLOGY

## 2023-11-10 PROCEDURE — 05HY33Z INSERTION OF INFUSION DEVICE INTO UPPER VEIN, PERCUTANEOUS APPROACH: ICD-10-PCS | Performed by: RADIOLOGY

## 2023-11-10 PROCEDURE — A9270 NON-COVERED ITEM OR SERVICE: HCPCS | Performed by: NURSE PRACTITIONER

## 2023-11-10 PROCEDURE — 700102 HCHG RX REV CODE 250 W/ 637 OVERRIDE(OP): Performed by: NURSE PRACTITIONER

## 2023-11-10 PROCEDURE — 700101 HCHG RX REV CODE 250

## 2023-11-10 PROCEDURE — 700111 HCHG RX REV CODE 636 W/ 250 OVERRIDE (IP)

## 2023-11-10 PROCEDURE — 770020 HCHG ROOM/CARE - TELE (206)

## 2023-11-10 PROCEDURE — 36558 INSERT TUNNELED CV CATH: CPT

## 2023-11-10 PROCEDURE — 99024 POSTOP FOLLOW-UP VISIT: CPT | Performed by: NURSE PRACTITIONER

## 2023-11-10 PROCEDURE — 700111 HCHG RX REV CODE 636 W/ 250 OVERRIDE (IP): Mod: JZ | Performed by: RADIOLOGY

## 2023-11-10 PROCEDURE — 02PYX3Z REMOVAL OF INFUSION DEVICE FROM GREAT VESSEL, EXTERNAL APPROACH: ICD-10-PCS | Performed by: RADIOLOGY

## 2023-11-10 PROCEDURE — 85610 PROTHROMBIN TIME: CPT

## 2023-11-10 PROCEDURE — 80048 BASIC METABOLIC PNL TOTAL CA: CPT

## 2023-11-10 PROCEDURE — 85027 COMPLETE CBC AUTOMATED: CPT

## 2023-11-10 RX ORDER — MIDAZOLAM HYDROCHLORIDE 1 MG/ML
INJECTION INTRAMUSCULAR; INTRAVENOUS
Status: COMPLETED
Start: 2023-11-10 | End: 2023-11-10

## 2023-11-10 RX ORDER — CARVEDILOL 12.5 MG/1
12.5 TABLET ORAL 2 TIMES DAILY WITH MEALS
Status: DISCONTINUED | OUTPATIENT
Start: 2023-11-10 | End: 2023-11-12

## 2023-11-10 RX ORDER — SODIUM CHLORIDE 9 MG/ML
500 INJECTION, SOLUTION INTRAVENOUS
Status: ACTIVE | OUTPATIENT
Start: 2023-11-10 | End: 2023-11-10

## 2023-11-10 RX ORDER — ONDANSETRON 2 MG/ML
4 INJECTION INTRAMUSCULAR; INTRAVENOUS PRN
Status: ACTIVE | OUTPATIENT
Start: 2023-11-10 | End: 2023-11-10

## 2023-11-10 RX ORDER — LIDOCAINE HYDROCHLORIDE AND EPINEPHRINE BITARTRATE 20; .01 MG/ML; MG/ML
INJECTION, SOLUTION SUBCUTANEOUS
Status: COMPLETED
Start: 2023-11-10 | End: 2023-11-10

## 2023-11-10 RX ORDER — HEPARIN SODIUM 1000 [USP'U]/ML
INJECTION, SOLUTION INTRAVENOUS; SUBCUTANEOUS
Status: COMPLETED
Start: 2023-11-10 | End: 2023-11-10

## 2023-11-10 RX ORDER — MIDAZOLAM HYDROCHLORIDE 1 MG/ML
.5-2 INJECTION INTRAMUSCULAR; INTRAVENOUS PRN
Status: ACTIVE | OUTPATIENT
Start: 2023-11-10 | End: 2023-11-10

## 2023-11-10 RX ADMIN — TAMSULOSIN HYDROCHLORIDE 0.4 MG: 0.4 CAPSULE ORAL at 12:17

## 2023-11-10 RX ADMIN — HEPARIN SODIUM 2400 UNITS: 1000 INJECTION, SOLUTION INTRAVENOUS; SUBCUTANEOUS at 11:38

## 2023-11-10 RX ADMIN — EPOETIN ALFA-EPBX 6000 UNITS: 3000 INJECTION, SOLUTION INTRAVENOUS; SUBCUTANEOUS at 11:14

## 2023-11-10 RX ADMIN — CARVEDILOL 12.5 MG: 12.5 TABLET, FILM COATED ORAL at 16:52

## 2023-11-10 RX ADMIN — Medication 1 APPLICATOR: at 21:06

## 2023-11-10 RX ADMIN — MIDAZOLAM HYDROCHLORIDE 1 MG: 1 INJECTION INTRAMUSCULAR; INTRAVENOUS at 15:47

## 2023-11-10 RX ADMIN — CARVEDILOL 12.5 MG: 12.5 TABLET, FILM COATED ORAL at 12:17

## 2023-11-10 RX ADMIN — AMIODARONE HYDROCHLORIDE 200 MG: 200 TABLET ORAL at 05:54

## 2023-11-10 RX ADMIN — ACETAMINOPHEN 1000 MG: 500 TABLET, FILM COATED ORAL at 01:08

## 2023-11-10 RX ADMIN — AMIODARONE HYDROCHLORIDE 200 MG: 200 TABLET ORAL at 16:52

## 2023-11-10 RX ADMIN — HEPARIN SODIUM 3.7 UNITS: 1000 INJECTION, SOLUTION INTRAVENOUS; SUBCUTANEOUS at 15:00

## 2023-11-10 RX ADMIN — GABAPENTIN 200 MG: 100 CAPSULE ORAL at 05:54

## 2023-11-10 RX ADMIN — OMEPRAZOLE 20 MG: 20 CAPSULE, DELAYED RELEASE ORAL at 05:54

## 2023-11-10 RX ADMIN — FENTANYL CITRATE 50 MCG: 50 INJECTION, SOLUTION INTRAMUSCULAR; INTRAVENOUS at 15:47

## 2023-11-10 RX ADMIN — MIDAZOLAM HYDROCHLORIDE 1 MG: 1 INJECTION, SOLUTION INTRAMUSCULAR; INTRAVENOUS at 15:47

## 2023-11-10 RX ADMIN — LIDOCAINE HYDROCHLORIDE,EPINEPHRINE BITARTRATE: 20; .01 INJECTION, SOLUTION INFILTRATION; PERINEURAL at 15:47

## 2023-11-10 RX ADMIN — Medication 1 APPLICATOR: at 12:16

## 2023-11-10 RX ADMIN — AMOXICILLIN AND CLAVULANATE POTASSIUM 1 TABLET: 500; 125 TABLET, FILM COATED ORAL at 16:57

## 2023-11-10 RX ADMIN — ACETAMINOPHEN 1000 MG: 500 TABLET, FILM COATED ORAL at 05:54

## 2023-11-10 RX ADMIN — ACETAMINOPHEN 1000 MG: 500 TABLET, FILM COATED ORAL at 12:17

## 2023-11-10 ASSESSMENT — FIBROSIS 4 INDEX: FIB4 SCORE: 0.98

## 2023-11-10 ASSESSMENT — PAIN DESCRIPTION - PAIN TYPE: TYPE: ACUTE PAIN

## 2023-11-10 NOTE — PROGRESS NOTES
Monitor Summary:   Rhythm: afib-SR  Rate:   Measurement: .20/.08/.34  Ectopy: convert to sr at 0843

## 2023-11-10 NOTE — PROGRESS NOTES
IR RN note    Patient underwent a Temp to Tunneled HD catheter placement by Dr. Moncada.  Procedure site was verified by MD using imaging guidance. Consent was signed.  DEMETRIA Arellano and Lo assisted. Patient was placed in a supine position.  Vitals were taken every 5 minutes and remained stable during procedure (see doc flow sheet for results).  CO2 waveform capnography was monitored throughout procedure, see chart.  Right chest  access site.   A suture, biopatch and tegaderm dressing was placed over surgical site. Report called to Jarocho KINCAID. Pt transported by zaheer with RN to T8, with monitor .   Reviewed sedation orders with MD BATEMAN procedure ended at 1554       [facility] Long_term Hemodialysis  14.5Fr X 23cm   REF # [id number]  LOT # [id number]  Exp. 03-

## 2023-11-10 NOTE — PROGRESS NOTES
"[facility] -  PROGRESS NOTE               Author: Zac Daily D.O. Date & Time: 11/10/2023  12:15 PM     HPI:  58 y.o. male with  chronic anabolic steroid use, ESRD sec to to Bx proven sec FSGS currently on PD and compeltes three excahnges/ day, moderate to moderate AS and  moderate to severe AR, HTN presented for aortic valve and ascending anerysm repair. Pt had post operative complication of tamponade and is back in OR. Pt has received signficant colloid/ IVF for support.     DAILY NEPHROLOGY SUMMARY:  11/4- Patient intolerant to HD last night  11/5- Patient s/p PD last night. Reports shortness of breath but better then yesterday  11/6: pt had PD done with 214cc UF removed. 1000cc drainage noted from chest tube overnight. Cardiology concerned for PD fluid draining from chest tube.  11/7: pt had PUF yesterday w 2L removed. Had HD again today with 2.5L removed. Pt feels much better.  11/8: pt scheduled for HD again. Feeling okay  11/9: pt had HD with 2L UF removed. Feels nauseous this AM from amiodarone.   11/10: seen on HD and tolerating. Awaiting permcath placement    REVIEW OF SYSTEMS:    Denies SOB, no CP, no V  10 point ROS reviewed and is as per HPI/daily summary or otherwise negative    PMH/PSH/SH/FH: Reviewed and unchanged since admission note  CURRENT MEDICATIONS: Reviewed from admission to present day    VS:  /86   Pulse 92   Temp 36.8 °C (98.2 °F) (Temporal)   Resp 20   Ht 1.702 m (5' 7\")   Wt 90.2 kg (198 lb 13.7 oz)   SpO2 94%   BMI 31.15 kg/m²     Exam  General: Awake, no acute distress  HEENT: head normocephalic, atruamtic  Neck: neck supple, no visible masses  Respiratory: clear to auscultation bilaterally, no rales, no ronchi, or wheezing  Cardiac: normal rate, normal rhythm,  Abdomen: non tender, non-distended, no guarding  Musculoskelatal: no joint tenderness, moves limbs spontaneously  Extremities: no significant joint abnormalities, trace/1+ edema  Skin: no " lesions, no rashes noted    Access: R IJ Temp HD catheter, Abd PD catheter      Fluids:  In: 600 [P.O.:600]  Out: 200     LABS:  Recent Labs     11/08/23  0327 11/09/23  0301 11/10/23  0118   SODIUM 133* 135 132*   POTASSIUM 4.3 4.2 4.5   CHLORIDE 96 97 94*   CO2 27 28 27   GLUCOSE 118* 100* 111*   BUN 58* 42* 57*   CREATININE 6.48* 5.88* 7.78*   CALCIUM 7.9* 7.7* 7.8*         IMPRESSION:    # ESRD on outpt PD    - Etiology likely 2/2 FSGS    - has temp catheter for iHD , pending permcath on 11/10  # Volume overload, improving  # S/P AVR/ Ascending aneurysm repair     -High chest tube drainage only noted at night while on PD     -concern for PD fluid leaking through into chest cavity per cards         On 11/6     -CVT recommending at least 2 weeks of PD avoidance to allow          Chest/ABD cavity to heal  # Anemia of CKD     EPO 6000U IV w HD to start 11/10  # CKD-MBD  # Acute Respiratoy failure on vent, extubated    PLAN:    - await permcath placement  - pt to have maintenance qMWF HD at this point  - cont EPO  - transfusion as per primary team  - Dose all medications as per ESRD    Dispo:await permcath for outpt HD. Renal SW awaiting TB clearance for placement at [facility]    Please page nephrology with any questions or concerns

## 2023-11-10 NOTE — PROGRESS NOTES
While on RRT rounds patient's RN asked to consult on possible EKG changes. EKG ordered and reviewed by RRT CIC RN, T-7 RN and UNRULY Sanchez. Per Daniel no acute changes seen, no further testing needed and no orders advised.

## 2023-11-10 NOTE — PROGRESS NOTES
Patient refusing to ambulate the halls, states that he is having to many bowel movements. Re-educated multiple times throughout the day, continues to refuse.

## 2023-11-10 NOTE — PROGRESS NOTES
[facility] Progress Note      HD today x 3 hours per Dr. Daily.   Initiated at 0835 and ended at 1135.     Assessment:    Patient stable, alert and oriented x4. Not in distress. On NPO for catheter placement.      Access used: RIJ non tunneled catheter, CDI.      Net Fluid Removed: 1,500 mL      Procedure Events/ Complications:   Patient tolerated treatment. Feels tired while on dialysis. Lowered UF goal. No complaints. Patient still on NPO.      Post Access Care:   Blood returned. Flushed with NS.  CVC locked with Heparin 1000 units/ mL   Arterial port: 1.2 mL   Venous port: 1.2 mL  Clamped, capped and secured. Labeled   Dressing change: Yes / Due on         Report given to Primary RADHAMES Conde RN.

## 2023-11-10 NOTE — PROGRESS NOTES
Monitor summary    Rhythm: ST/SR  Rate:   Ectopy: none  Measurement: .22/.05/.29      Patient converted to aflutter at 0518 and converted back to SR at 0639

## 2023-11-10 NOTE — DISCHARGE PLANNING
Outpatient Dialysis Note    Pt pending TB clearance for outpatient HD confirmation.   Quantiferon in progress.     Placed PC to [facility] to follow up on referral, per clinic dietician administrative personelle is not in office today. May be able to follow up with Charge RN, but they are not available until later today.     Left detailed info for call back from RN.      Xitlaly Reyes   Dialysis Coordinator / [facility]  Ph: [phone]

## 2023-11-10 NOTE — CARE PLAN
The patient is Stable - Low risk of patient condition declining or worsening    Shift Goals  Clinical Goals: OPH ADL's, education on POC  Patient Goals: Pepto bismal, reduced bowel movements  Family Goals: FARIDA      Problem: Knowledge Deficit - Standard  Goal: Patient and family/care givers will demonstrate understanding of plan of care, disease process/condition, diagnostic tests and medications  Outcome: Progressing  Note: Patient educated to perform OPH ADL's, encouragement required. Patient ambulating short distances, shower performed, daily weights performed in the morning, and patient going to IR procedure for HD CATH today. Patient educated on POC, verbalizes understanding.      Problem: Post Op Day 7 CABG/Heart Valve Replacement  Goal: Optimal care of the Post Op CABG/Heart Valve replacement Post Op Day 5  Outcome: Progressing  Intervention: Ambulate 4 times daily, increasing the distance each time  Note: Patient ambulating short distances, about 25-50 feet. Patient encouraged to ambulate farther distances when able to tolerate.        Progress made toward(s) clinical / shift goals:  Progressing

## 2023-11-10 NOTE — PROGRESS NOTES
Assumed care of patient after receiving report from Shyann night shift RN. Patient is currently Alert and Oriented x4 on 2L NC. Bed locked and in lowest position. Call light within reach.

## 2023-11-10 NOTE — PROGRESS NOTES
Cardiovascular Surgery Progress Note    Name: Gurdeep Guerra  MRN: [id number]  : 1965  Admit Date: 11/3/2023  4:59 AM  7 Days Post-Op     Procedure:  Procedure(s) and Anesthesia Type:  *AORTIC VALVE REPLACEMENT (23 mm On-X mechanical valve), ASCENDING AORTIC ANEURYSM REPAIR (30 mm Hemashield tube graft) and intraoperative transesophageal echocardiography    *MEDIASTINAL EXPLORATION FOR POSTOPERATIVE BLEEDING AND RESTORATION OF HEMOSTASIS    Vitals:  Vitals:    11/10/23 0108 11/10/23 0435 11/10/23 0553 11/10/23 0730   BP: 114/77 126/87  126/86   Pulse: 94 86  92   Resp: 18 18  20   Temp: 36.9 °C (98.4 °F) 36.7 °C (98.1 °F)  36.8 °C (98.2 °F)   TempSrc: Temporal Temporal  Temporal   SpO2: 96% 97%  94%   Weight:   90.2 kg (198 lb 13.7 oz)    Height:          Temp (24hrs), Av.9 °C (98.5 °F), Min:36.3 °C (97.3 °F), Max:37.6 °C (99.7 °F)      Respiratory:    Respiration: 20, Pulse Oximetry: 94 %       Fluids:    Intake/Output Summary (Last 24 hours) at 11/10/2023 1118  Last data filed at 2023 1930  Gross per 24 hour   Intake 480 ml   Output --   Net 480 ml       Admit weight: Weight: 84.6 kg (186 lb 8.2 oz)  Current weight: Weight: 90.2 kg (198 lb 13.7 oz) (11/10/23 0553)    Labs:  Recent Labs     23  0327 23  0301 11/10/23  0118   WBC 5.9 6.9 8.6   RBC 2.70* 2.58* 2.65*   HEMOGLOBIN 8.5* 8.1* 8.6*   HEMATOCRIT 25.7* 24.9* 25.5*   MCV 95.2 96.5 96.2   MCH 31.5 31.4 32.5   MCHC 33.1 32.5 33.7   RDW 51.2* 52.8* 52.3*   PLATELETCT 121* 136* 163*   MPV 10.5 10.2 10.2       Recent Labs     237 23  0301 11/10/23  0118   SODIUM 133* 135 132*   POTASSIUM 4.3 4.2 4.5   CHLORIDE 96 97 94*   CO2 27 28 27   GLUCOSE 118* 100* 111*   BUN 58* 42* 57*   CREATININE 6.48* 5.88* 7.78*   CALCIUM 7.9* 7.7* 7.8*       Recent Labs     231 11/10/23  011   INR 1.44* 1.67* 1.46*         HgbA1c:  5    Diabetic Educator Consult:  no    Medications:  Scheduled Medications    Medication Dose Frequency    heparin       carvedilol  12.5 mg BID WITH MEALS    amiodarone  200 mg BID    amoxicillin-clavulanate  1 Tablet Q24HRS    epoetin  6,000 Units MO, WE + FR    [Held by provider] warfarin  2.5 mg DAILY AT 1800    tamsulosin  0.4 mg AFTER BREAKFAST    MD Alert...Warfarin per Pharmacy   PHARMACY TO DOSE    gabapentin  200 mg Q DAY    Nozin nasal  swab  1 Applicator BID    [Held by provider] aspirin  81 mg DAILY    Pharmacy Consult Request  1 Each PHARMACY TO DOSE    acetaminophen  1,000 mg Q6HRS    senna-docusate  2 Tablet BID    And    polyethylene glycol/lytes  1 Packet DAILY    And    magnesium hydroxide  30 mL DAILY    omeprazole  20 mg DAILY    [Held by provider] heparin  5,000 Units Q8HRS        Exam:   Physical Exam  Vitals and nursing note reviewed.   Constitutional:       General: He is not in acute distress.     Appearance: Normal appearance.   HENT:      Head: Normocephalic.      Right Ear: External ear normal.      Left Ear: External ear normal.      Nose: Nose normal. No congestion.      Mouth/Throat:      Mouth: Mucous membranes are moist.      Pharynx: Oropharynx is clear.   Eyes:      Extraocular Movements: Extraocular movements intact.      Pupils: Pupils are equal, round, and reactive to light.   Cardiovascular:      Rate and Rhythm: Normal rate and regular rhythm.      Pulses: Normal pulses.      Heart sounds: Normal heart sounds.   Pulmonary:      Effort: Pulmonary effort is normal.      Breath sounds: Decreased breath sounds present.   Abdominal:      General: Bowel sounds are decreased. There is no distension.      Palpations: Abdomen is soft.      Comments: PD catheter   Musculoskeletal:      Cervical back: Normal range of motion and neck supple. No tenderness.      Right lower leg: Edema present.      Left lower leg: Edema present.   Skin:     General: Skin is warm and dry.      Comments: Midline surgical incision   Neurological:      General: No focal  deficit present.      Mental Status: He is alert and oriented to person, place, and time. Mental status is at baseline.   Psychiatric:         Mood and Affect: Mood normal.         Behavior: Behavior normal.         Thought Content: Thought content normal.         Cardiac Medications:    ASA - Yes    Plavix - No; contraindicated because of On Coumadin / NOAC    Post-operative Beta Blockers - Yes    Ace/ARB- No; contraindicated because of Normal EF    Statin - No; contraindicated because of No CAD    Aldactone- No; contraindicated because of Normal EF    SGLT2i-  No contraindicated because of No; contraindicated because of Normal EF    Ejection Fraction:  60%    Telemetry:   11/4 SR- a fib overnight  11/5 SR  11/6 SR/Aflutter  11/7 Junctional/flutter  11/8 SR  11/9 Converted to SR this AM  11/10 SR/ST    Assessment/Plan:  POD 1  HDS, SR- was a fib overnight, on amio gtt, neuro intact, wounds intact, abdomen soft, fluid balance positive, wt up,  2 L NC. 270 mL out of chest tubes overnight after return to OR. H/H 7.8/22.4, plat 83. Plan: One unit PRBCs this morning. Keep chest tubes and pacing wires. DC amio gtt, continue PO. Dialysis per neph. IS/ambulate.     POD 2  HTN, SR, neuro intact, wounds intact, abdomen soft no BM, fluid balance positive, wt up, 2 L NC. 590 mL out of chest tubes overnight. 500 off with PD last night. Pacing wires removed.  Plan:  Keep chest tubes for moderate output. Hold coumadin one more day. Resume verapamil, IS/ambulate. Transfer to tele.     POD 3 HOTN- transfer back to CIC- start pressors, SR/flutter- non-sustained- on amio PO, Oxygen demands up- fluid overload, ESRD- concern PD dilalysate came out of mediastinal CT- will have to convert to HD until peritoneum heals and scars down, Risk for mediastinitis/sepsis- pan culture- send CT fluid for culture- start Zoysn/vanco, LUE swelling/ecchymosis - ck US    POD 4 HOTN- on levo- during HD, Aflutter/junctional- on amio gtt- cont, HD today-  removing fluid, leukocytosis- improved, normal lactate, Cont antibiotics- for high risk of mediastinitis, d/c mediastinal tubes    POD 5 HTN, SR.  Amio gtt.  HD for 2 weeks due to poss mediastinitis r/t CAPD fluid.  Normal WBC, cx negative.  Empiric abx.  Small amount of sanguinous drainage on the superior aspect of the wound.  Hgb 8.5 Cr 6.48.  Plan: Transfer to tele.  Wean oxygen.  Add coreg.      POD 6 HDS. SR.  Neuro intact.  Wounds-drainage improved.  WBC down.  HD MWF per neph.  Refused IR today due to nausea/feeling poorly overnight, would like to proceed tomorrow.  Plan: Resume cardiac diet today, NPO at midnight.  Hold ASA, lovenox, coumadin for IR tomorrow.  Half of amio dosage per Evangelist to see if he tolerates it with less nausea.      POD 7 Seen in HD.  HDS. SR/ST.  Neuro intact.  Wounds CDI-no drainage.  Cr 7.78 Hgb 8.6.  CXR with small left effusion.  Tunneled dialysis cath today.  Plan: Tunneled dialysis catheter today.  Increase coreg.  Repeat CXR tomorrow, thora if indicated.    Disposition:  OT no needs PT?

## 2023-11-10 NOTE — OR SURGEON
Immediate Post- Operative Note        Findings: ARF.      Procedure(s): Permcath placement. Tempcath removal.       Estimated Blood Loss: Less than 5 ml        Complications: None            11/10/2023     3:58 PM     Justice Moncada M.D.

## 2023-11-10 NOTE — CARE PLAN
"  Problem: Post Op Day 5 CABG/Heart Valve Replacement  Goal: Optimal care of the Post Op CABG/Heart Valve replacement Post Op Day 5  Outcome: Progressing  Intervention: Daily weights in the morning  Note: 88.9kg  Intervention: Shower daily and clean incisions twice daily with soap and water  Note: Shower and incision care completed   Intervention: Up in chair for all meals  Note: Patient sitting up in chair for meals   Intervention: Ambulate 4 times daily, increasing the distance each time  Note: Patient only ambulated once with PT, refusing to ambulate due to multiple bowel movements, CT surgery APRN notified.   Intervention: IS q 1 hour while awake and record best IS volume  Note: IS up to 1,000 mL  Intervention: Complete \"Home O2 Assessment' in vitals flowsheets if unable to wean off O2  Note: 2 liters of oxygen required while ambulating. Two view CXR completed.  Intervention: Consider removal of freeman, chest tube and pacer wires if not already done  Note: Removed prior to transfer      The patient is Stable - Low risk of patient condition declining or worsening    Shift Goals: NPO for procedure  Clinical Goals: Education on plan of care  Patient Goals: Discussion with CT surgery team  Family Goals: Sole    Progress made toward(s) clinical / shift goals: Shower completed    Patient is not progressing towards the following goals: Refusing to ambulate       "

## 2023-11-10 NOTE — PROGRESS NOTES
Assumed care of patient. Updates received from Keri KINCAID. Updated POC, call light within reach, fall precautions in place. Assessment completed, patient is A&Ox4, states 0/10 pain. Patient received pepto bismal, will take other medications after dinner. Bed locked, and in lowest position. Patient instructed to call for assistance. All questions answered, no further needs at this time.     2040: The monitor tech notified this RN that patient had ST elevation in 3 leads. Stat EKG placed. Patient states he is not having chest pain, just aches all over his body which have improved. Vitals taken, BP is 153/92, heart rate 99, O2 at 98%. Charge RN notified, and the rapid RN's notified as well.     2115: New EKG in, Carmina TOLLIVER notified of patient's EKG and also patient's upset stomach. Per Daniel, give malox and EKG WNL.

## 2023-11-10 NOTE — DISCHARGE PLANNING
Case Management Discharge Planning    Admission Date: 11/3/2023  GMLOS: 8.7  ALOS: 7    6-Clicks ADL Score: 23  6-Clicks Mobility Score: 17  PT and/or OT Eval ordered: Yes  Post-acute Referrals Ordered: Yes  Post-acute Choice Obtained: Yes  Has referral(s) been sent to post-acute provider:  Yes      Anticipated Discharge Dispo: Discharge Disposition: Discharged to home/self care (01)  Discharge Address: [address] [place] [id number]    DME Needed: No    Action(s) Taken: Updated Provider/Nurse on Discharge Plan  Received a call from Fatimah and he has been accepted at [facility] in [place] but will not be able to have first dialysis on Thursday on 11/16/23.  He will need 2 more dialysis here before discharge.  He is awaiting for a permanent dialysis catheter.    Escalations Completed: None    Medically Clear: No    Next Steps: Get permanent HD catheter placed and then discharge to home.    Barriers to Discharge: Medical clearance

## 2023-11-11 ENCOUNTER — APPOINTMENT (OUTPATIENT)
Dept: RADIOLOGY | Facility: MEDICAL CENTER | Age: 58
DRG: 219 | End: 2023-11-11
Attending: NURSE PRACTITIONER
Payer: COMMERCIAL

## 2023-11-11 PROBLEM — I71.40 AAA (ABDOMINAL AORTIC ANEURYSM) (HCC): Status: ACTIVE | Noted: 2023-11-11

## 2023-11-11 PROBLEM — G47.00 INSOMNIA: Status: ACTIVE | Noted: 2023-11-11

## 2023-11-11 PROBLEM — I48.0 PAROXYSMAL A-FIB (HCC): Status: ACTIVE | Noted: 2023-11-11

## 2023-11-11 PROBLEM — J90 PLEURAL EFFUSION ON LEFT: Status: ACTIVE | Noted: 2023-11-11

## 2023-11-11 PROBLEM — K92.2 GIB (GASTROINTESTINAL BLEEDING): Status: ACTIVE | Noted: 2023-11-11

## 2023-11-11 LAB
ANION GAP SERPL CALC-SCNC: 11 MMOL/L (ref 7–16)
BACTERIA BLD CULT: NORMAL
BACTERIA BLD CULT: NORMAL
BUN SERPL-MCNC: 39 MG/DL (ref 8–22)
CALCIUM SERPL-MCNC: 8.1 MG/DL (ref 8.5–10.5)
CHLORIDE SERPL-SCNC: 95 MMOL/L (ref 96–112)
CO2 SERPL-SCNC: 27 MMOL/L (ref 20–33)
CREAT SERPL-MCNC: 6.18 MG/DL (ref 0.5–1.4)
ERYTHROCYTE [DISTWIDTH] IN BLOOD BY AUTOMATED COUNT: 55.5 FL (ref 35.9–50)
GFR SERPLBLD CREATININE-BSD FMLA CKD-EPI: 10 ML/MIN/1.73 M 2
GLUCOSE SERPL-MCNC: 140 MG/DL (ref 65–99)
HCT VFR BLD AUTO: 26.9 % (ref 42–52)
HGB BLD-MCNC: 8.8 G/DL (ref 14–18)
INR PPP: 1.37 (ref 0.87–1.13)
MAGNESIUM SERPL-MCNC: 2.1 MG/DL (ref 1.5–2.5)
MCH RBC QN AUTO: 32 PG (ref 27–33)
MCHC RBC AUTO-ENTMCNC: 32.7 G/DL (ref 32.3–36.5)
MCV RBC AUTO: 97.8 FL (ref 81.4–97.8)
PLATELET # BLD AUTO: 204 K/UL (ref 164–446)
PMV BLD AUTO: 10.2 FL (ref 9–12.9)
POTASSIUM SERPL-SCNC: 4.4 MMOL/L (ref 3.6–5.5)
PROTHROMBIN TIME: 17.1 SEC (ref 12–14.6)
RBC # BLD AUTO: 2.75 M/UL (ref 4.7–6.1)
SIGNIFICANT IND 70042: NORMAL
SIGNIFICANT IND 70042: NORMAL
SITE SITE: NORMAL
SITE SITE: NORMAL
SODIUM SERPL-SCNC: 133 MMOL/L (ref 135–145)
SOURCE SOURCE: NORMAL
SOURCE SOURCE: NORMAL
WBC # BLD AUTO: 8 K/UL (ref 4.8–10.8)

## 2023-11-11 PROCEDURE — 700102 HCHG RX REV CODE 250 W/ 637 OVERRIDE(OP): Performed by: NURSE PRACTITIONER

## 2023-11-11 PROCEDURE — 85027 COMPLETE CBC AUTOMATED: CPT

## 2023-11-11 PROCEDURE — A9270 NON-COVERED ITEM OR SERVICE: HCPCS | Performed by: NURSE PRACTITIONER

## 2023-11-11 PROCEDURE — 85610 PROTHROMBIN TIME: CPT

## 2023-11-11 PROCEDURE — 83735 ASSAY OF MAGNESIUM: CPT

## 2023-11-11 PROCEDURE — 80048 BASIC METABOLIC PNL TOTAL CA: CPT

## 2023-11-11 PROCEDURE — 99255 IP/OBS CONSLTJ NEW/EST HI 80: CPT | Performed by: STUDENT IN AN ORGANIZED HEALTH CARE EDUCATION/TRAINING PROGRAM

## 2023-11-11 PROCEDURE — 94760 N-INVAS EAR/PLS OXIMETRY 1: CPT

## 2023-11-11 PROCEDURE — 71046 X-RAY EXAM CHEST 2 VIEWS: CPT

## 2023-11-11 PROCEDURE — 700111 HCHG RX REV CODE 636 W/ 250 OVERRIDE (IP): Performed by: STUDENT IN AN ORGANIZED HEALTH CARE EDUCATION/TRAINING PROGRAM

## 2023-11-11 PROCEDURE — 700102 HCHG RX REV CODE 250 W/ 637 OVERRIDE(OP): Performed by: STUDENT IN AN ORGANIZED HEALTH CARE EDUCATION/TRAINING PROGRAM

## 2023-11-11 PROCEDURE — 74018 RADEX ABDOMEN 1 VIEW: CPT

## 2023-11-11 PROCEDURE — C1729 CATH, DRAINAGE: HCPCS

## 2023-11-11 PROCEDURE — 700111 HCHG RX REV CODE 636 W/ 250 OVERRIDE (IP): Mod: JZ | Performed by: NURSE PRACTITIONER

## 2023-11-11 PROCEDURE — 99024 POSTOP FOLLOW-UP VISIT: CPT | Performed by: NURSE PRACTITIONER

## 2023-11-11 PROCEDURE — 0W9B3ZZ DRAINAGE OF LEFT PLEURAL CAVITY, PERCUTANEOUS APPROACH: ICD-10-PCS | Performed by: RADIOLOGY

## 2023-11-11 PROCEDURE — A9270 NON-COVERED ITEM OR SERVICE: HCPCS | Performed by: STUDENT IN AN ORGANIZED HEALTH CARE EDUCATION/TRAINING PROGRAM

## 2023-11-11 PROCEDURE — 770020 HCHG ROOM/CARE - TELE (206)

## 2023-11-11 PROCEDURE — 71045 X-RAY EXAM CHEST 1 VIEW: CPT

## 2023-11-11 PROCEDURE — C9113 INJ PANTOPRAZOLE SODIUM, VIA: HCPCS | Performed by: STUDENT IN AN ORGANIZED HEALTH CARE EDUCATION/TRAINING PROGRAM

## 2023-11-11 RX ORDER — TEMAZEPAM 15 MG/1
15 CAPSULE ORAL
Status: DISCONTINUED | OUTPATIENT
Start: 2023-11-11 | End: 2023-11-14

## 2023-11-11 RX ORDER — PANTOPRAZOLE SODIUM 40 MG/10ML
40 INJECTION, POWDER, LYOPHILIZED, FOR SOLUTION INTRAVENOUS 2 TIMES DAILY
Status: DISCONTINUED | OUTPATIENT
Start: 2023-11-11 | End: 2023-11-13

## 2023-11-11 RX ORDER — ATORVASTATIN CALCIUM 40 MG/1
40 TABLET, FILM COATED ORAL NIGHTLY
Status: DISCONTINUED | OUTPATIENT
Start: 2023-11-11 | End: 2023-11-17 | Stop reason: HOSPADM

## 2023-11-11 RX ORDER — LORAZEPAM 1 MG/1
1 TABLET ORAL EVERY 4 HOURS PRN
Status: DISCONTINUED | OUTPATIENT
Start: 2023-11-11 | End: 2023-11-14

## 2023-11-11 RX ADMIN — AMIODARONE HYDROCHLORIDE 0.5 MG/MIN: 1.8 INJECTION, SOLUTION INTRAVENOUS at 18:02

## 2023-11-11 RX ADMIN — ASPIRIN 81 MG: 81 TABLET, COATED ORAL at 11:39

## 2023-11-11 RX ADMIN — ACETAMINOPHEN 1000 MG: 500 TABLET, FILM COATED ORAL at 00:19

## 2023-11-11 RX ADMIN — WARFARIN SODIUM 2.5 MG: 2.5 TABLET ORAL at 17:53

## 2023-11-11 RX ADMIN — AMIODARONE HYDROCHLORIDE 150 MG: 1.5 INJECTION, SOLUTION INTRAVENOUS at 11:55

## 2023-11-11 RX ADMIN — CARVEDILOL 12.5 MG: 12.5 TABLET, FILM COATED ORAL at 09:48

## 2023-11-11 RX ADMIN — HEPARIN SODIUM 5000 UNITS: 5000 INJECTION, SOLUTION INTRAVENOUS; SUBCUTANEOUS at 21:36

## 2023-11-11 RX ADMIN — Medication 1 APPLICATOR: at 09:48

## 2023-11-11 RX ADMIN — AMIODARONE HYDROCHLORIDE 200 MG: 200 TABLET ORAL at 17:51

## 2023-11-11 RX ADMIN — ATORVASTATIN CALCIUM 40 MG: 40 TABLET, FILM COATED ORAL at 20:13

## 2023-11-11 RX ADMIN — GABAPENTIN 200 MG: 100 CAPSULE ORAL at 04:54

## 2023-11-11 RX ADMIN — OXYCODONE HYDROCHLORIDE 10 MG: 10 TABLET ORAL at 21:38

## 2023-11-11 RX ADMIN — ACETAMINOPHEN 1000 MG: 500 TABLET, FILM COATED ORAL at 23:24

## 2023-11-11 RX ADMIN — POLYETHYLENE GLYCOL 3350 1 PACKET: 17 POWDER, FOR SOLUTION ORAL at 23:23

## 2023-11-11 RX ADMIN — LORAZEPAM 1 MG: 1 TABLET ORAL at 14:44

## 2023-11-11 RX ADMIN — ACETAMINOPHEN 1000 MG: 500 TABLET, FILM COATED ORAL at 17:51

## 2023-11-11 RX ADMIN — TAMSULOSIN HYDROCHLORIDE 0.4 MG: 0.4 CAPSULE ORAL at 09:48

## 2023-11-11 RX ADMIN — PANTOPRAZOLE SODIUM 40 MG: 40 INJECTION, POWDER, FOR SOLUTION INTRAVENOUS at 17:52

## 2023-11-11 RX ADMIN — BISMUTH SUBSALICYLATE 524 MG: 525 LIQUID ORAL at 04:53

## 2023-11-11 RX ADMIN — HEPARIN SODIUM 5000 UNITS: 5000 INJECTION, SOLUTION INTRAVENOUS; SUBCUTANEOUS at 14:44

## 2023-11-11 RX ADMIN — AMOXICILLIN AND CLAVULANATE POTASSIUM 1 TABLET: 500; 125 TABLET, FILM COATED ORAL at 17:53

## 2023-11-11 RX ADMIN — AMIODARONE HYDROCHLORIDE 1 MG/MIN: 1.8 INJECTION, SOLUTION INTRAVENOUS at 12:21

## 2023-11-11 RX ADMIN — PANTOPRAZOLE SODIUM 40 MG: 40 INJECTION, POWDER, FOR SOLUTION INTRAVENOUS at 11:40

## 2023-11-11 RX ADMIN — Medication 1 APPLICATOR: at 20:13

## 2023-11-11 RX ADMIN — ACETAMINOPHEN 1000 MG: 500 TABLET, FILM COATED ORAL at 04:53

## 2023-11-11 RX ADMIN — CARVEDILOL 12.5 MG: 12.5 TABLET, FILM COATED ORAL at 17:51

## 2023-11-11 RX ADMIN — OMEPRAZOLE 20 MG: 20 CAPSULE, DELAYED RELEASE ORAL at 04:53

## 2023-11-11 RX ADMIN — AMIODARONE HYDROCHLORIDE 200 MG: 200 TABLET ORAL at 04:53

## 2023-11-11 ASSESSMENT — ENCOUNTER SYMPTOMS
FEVER: 0
PALPITATIONS: 0
ABDOMINAL PAIN: 0
DEPRESSION: 0
COUGH: 0
BLURRED VISION: 0
DIZZINESS: 0
BRUISES/BLEEDS EASILY: 0
WEAKNESS: 1
VOMITING: 0
NAUSEA: 0
DOUBLE VISION: 0
CHILLS: 0
SHORTNESS OF BREATH: 1
HEADACHES: 0
MYALGIAS: 0
HEARTBURN: 0

## 2023-11-11 ASSESSMENT — LIFESTYLE VARIABLES: SUBSTANCE_ABUSE: 0

## 2023-11-11 ASSESSMENT — FIBROSIS 4 INDEX: FIB4 SCORE: 0.78

## 2023-11-11 ASSESSMENT — PAIN DESCRIPTION - PAIN TYPE: TYPE: ACUTE PAIN

## 2023-11-11 NOTE — PROGRESS NOTES
Pt complaining of severe SOB and lightheadness. SPO2 75% RA, BP 84/59, RR 22, HR 93. PT placed on 5lpm of oxygen via NC.  RN notified on call hospitalist. Rechecked VS; /65, SPO2 96%, RR 18, HR 93. RN weaned oxygen down to 3lpm with SPO2 at 98%. RN noted wheezing and notified on call RT to have breathing treatment. Pt noting feeling much better, SOB has subsided and lightheadedness is better.

## 2023-11-11 NOTE — PROGRESS NOTES
Cardiovascular Surgery Progress Note    Name: Gurdeep Guerra  MRN: [id number]  : 1965  Admit Date: 11/3/2023  4:59 AM  8 Days Post-Op     Procedure:  Procedure(s) and Anesthesia Type:  *AORTIC VALVE REPLACEMENT (23 mm On-X mechanical valve), ASCENDING AORTIC ANEURYSM REPAIR (30 mm Hemashield tube graft) and intraoperative transesophageal echocardiography    *MEDIASTINAL EXPLORATION FOR POSTOPERATIVE BLEEDING AND RESTORATION OF HEMOSTASIS    Vitals:  Vitals:    11/10/23 2323 23 0451 23 0453 23 0654   BP: 110/67 139/86  (!) 133/92   Pulse: (!) 108 (!) 110  (!) 121   Resp: 16 16  16   Temp: 37.3 °C (99.1 °F) 37.1 °C (98.8 °F)  37.6 °C (99.7 °F)   TempSrc: Temporal Temporal  Temporal   SpO2: 95% 96%  98%   Weight:   90.5 kg (199 lb 8.3 oz)    Height:          Temp (24hrs), Av.9 °C (98.5 °F), Min:35.9 °C (96.7 °F), Max:37.6 °C (99.7 °F)      Respiratory:    Respiration: 16, Pulse Oximetry: 98 %       Fluids:    Intake/Output Summary (Last 24 hours) at 2023 0958  Last data filed at 11/10/2023 2200  Gross per 24 hour   Intake 1300 ml   Output 2000 ml   Net -700 ml       Admit weight: Weight: 84.6 kg (186 lb 8.2 oz)  Current weight: Weight: 90.5 kg (199 lb 8.3 oz) (23)    Labs:  Recent Labs     23  0301 11/10/23  0118 23  0030   WBC 6.9 8.6 8.0   RBC 2.58* 2.65* 2.75*   HEMOGLOBIN 8.1* 8.6* 8.8*   HEMATOCRIT 24.9* 25.5* 26.9*   MCV 96.5 96.2 97.8   MCH 31.4 32.5 32.0   MCHC 32.5 33.7 32.7   RDW 52.8* 52.3* 55.5*   PLATELETCT 136* 163* 204   MPV 10.2 10.2 10.2       Recent Labs     23  0301 11/10/23  0118 23  0030   SODIUM 135 132* 133*   POTASSIUM 4.2 4.5 4.4   CHLORIDE 97 94* 95*   CO2 28 27 27   GLUCOSE 100* 111* 140*   BUN 42* 57* 39*   CREATININE 5.88* 7.78* 6.18*   CALCIUM 7.7* 7.8* 8.1*       Recent Labs     23  0301 11/10/23  0118 23  0030   INR 1.67* 1.46* 1.37*         HgbA1c:  5    Diabetic Educator  Consult:  no    Medications:  Scheduled Medications   Medication Dose Frequency    amiodarone infusion  1 mg/min Once    amiodarone 150 mg IVPB (LOAD)  150 mg Once    carvedilol  12.5 mg BID WITH MEALS    amiodarone  200 mg BID    amoxicillin-clavulanate  1 Tablet Q24HRS    epoetin  6,000 Units MO, WE + FR    warfarin  2.5 mg DAILY AT 1800    tamsulosin  0.4 mg AFTER BREAKFAST    MD Alert...Warfarin per Pharmacy   PHARMACY TO DOSE    gabapentin  200 mg Q DAY    Nozin nasal  swab  1 Applicator BID    aspirin  81 mg DAILY    Pharmacy Consult Request  1 Each PHARMACY TO DOSE    acetaminophen  1,000 mg Q6HRS    senna-docusate  2 Tablet BID    And    polyethylene glycol/lytes  1 Packet DAILY    And    magnesium hydroxide  30 mL DAILY    omeprazole  20 mg DAILY    heparin  5,000 Units Q8HRS        Exam:   Physical Exam  Vitals and nursing note reviewed.   Constitutional:       General: He is not in acute distress.     Appearance: Normal appearance.   HENT:      Head: Normocephalic.      Right Ear: External ear normal.      Left Ear: External ear normal.      Nose: Nose normal. No congestion.      Mouth/Throat:      Mouth: Mucous membranes are moist.      Pharynx: Oropharynx is clear.   Eyes:      Extraocular Movements: Extraocular movements intact.      Pupils: Pupils are equal, round, and reactive to light.   Cardiovascular:      Rate and Rhythm: Normal rate and regular rhythm.      Pulses: Normal pulses.      Heart sounds: Normal heart sounds.   Pulmonary:      Effort: Pulmonary effort is normal.      Breath sounds: Decreased breath sounds present.   Abdominal:      General: Bowel sounds are decreased. There is no distension.      Palpations: Abdomen is soft.      Comments: PD catheter   Musculoskeletal:      Cervical back: Normal range of motion and neck supple. No tenderness.      Right lower leg: Edema present.      Left lower leg: Edema present.   Skin:     General: Skin is warm and dry.      Comments:  Midline surgical incision   Neurological:      General: No focal deficit present.      Mental Status: He is alert and oriented to person, place, and time. Mental status is at baseline.   Psychiatric:         Mood and Affect: Mood normal.         Behavior: Behavior normal.         Thought Content: Thought content normal.       Cardiac Medications:    ASA - Yes    Plavix - No; contraindicated because of On Coumadin / NOAC    Post-operative Beta Blockers - Yes    Ace/ARB- No; contraindicated because of Normal EF    Statin - No; contraindicated because of No CAD    Aldactone- No; contraindicated because of Normal EF    SGLT2i-  No contraindicated because of No; contraindicated because of Normal EF    Ejection Fraction:  60%    Telemetry:   11/4 SR- a fib overnight  11/5 SR  11/6 SR/Aflutter  11/7 Junctional/flutter  11/8 SR  11/9 Converted to SR this AM  11/10 SR/ST    Assessment/Plan:  POD 1  HDS, SR- was a fib overnight, on amio gtt, neuro intact, wounds intact, abdomen soft, fluid balance positive, wt up,  2 L NC. 270 mL out of chest tubes overnight after return to OR. H/H 7.8/22.4, plat 83. Plan: One unit PRBCs this morning. Keep chest tubes and pacing wires. DC amio gtt, continue PO. Dialysis per neph. IS/ambulate.     POD 2  HTN, SR, neuro intact, wounds intact, abdomen soft no BM, fluid balance positive, wt up, 2 L NC. 590 mL out of chest tubes overnight. 500 off with PD last night. Pacing wires removed.  Plan:  Keep chest tubes for moderate output. Hold coumadin one more day. Resume verapamil, IS/ambulate. Transfer to tele.     POD 3 HOTN- transfer back to [facility]- start pressors, SR/flutter- non-sustained- on amio PO, Oxygen demands up- fluid overload, ESRD- concern PD dilalysate came out of mediastinal CT- will have to convert to HD until peritoneum heals and scars down, Risk for mediastinitis/sepsis- pan culture- send CT fluid for culture- start Zoysn/vanco, LUE swelling/ecchymosis - ck US    POD 4 HOTN- on levo-  during HD, Aflutter/junctional- on amio gtt- cont, HD today- removing fluid, leukocytosis- improved, normal lactate, Cont antibiotics- for high risk of mediastinitis, d/c mediastinal tubes    POD 5 HTN, SR.  Amio gtt.  HD for 2 weeks due to poss mediastinitis r/t CAPD fluid.  Normal WBC, cx negative.  Empiric abx.  Small amount of sanguinous drainage on the superior aspect of the wound.  Hgb 8.5 Cr 6.48.  Plan: Transfer to tele.  Wean oxygen.  Add coreg.      POD 6 HDS. SR.  Neuro intact.  Wounds-drainage improved.  WBC down.  HD MWF per neph.  Refused IR today due to nausea/feeling poorly overnight, would like to proceed tomorrow.  Plan: Resume cardiac diet today, NPO at midnight.  Hold ASA, lovenox, coumadin for IR tomorrow.  Half of amio dosage per Evangelist to see if he tolerates it with less nausea.      POD 7 Seen in HD.  HDS. SR/ST.  Neuro intact.  Wounds CDI-no drainage.  Cr 7.78 Hgb 8.6.  CXR with small left effusion.  Tunneled dialysis cath today.  Plan: Tunneled dialysis catheter today.  Increase coreg.  Repeat CXR tomorrow, thora if indicated.      POD 8 HDS. Afib 120s.  Back on 3LNC.  Neuro intact.  Wounds CDI- no drainage.  Cr 6.18 Hgb 8.8-improved.  Verbalizing small amount of blood on stool unable to been seen by RN at the time, blood thinners have been held last 2 days for procedure with increase in Hgb today.  Plan: Check guiac.  Restart amio gtt and bolus.  Restart coumadin for mechanical valve-discussed with IR. Monitor stools.  Thoracentesis if pleural effusion large enough.    Disposition:  OT no needs PT?

## 2023-11-11 NOTE — PROGRESS NOTES
Assumed care of pt. Bedside report received from RN. Pt was updated on plan of care. AOx4. Pt pain level 0/10. Pt is on telemetry, Afib 99. Call light, phone and personal belongings in reach. Bed alarm off due to low fall risk and pt up self. Bed in lowest position, and locked.

## 2023-11-11 NOTE — PROGRESS NOTES
Patient has walked twice so far today. He is currently getting a shower. Patient was off the floor for dialysis and dialysis cath placement for majority of my shift. Will communicate need for additional walks to night shift.

## 2023-11-11 NOTE — PROGRESS NOTES
Assumed care of patient after receiving report from Sofiya night shift RN. Patient is currently Alert and Oriented x4 on 3L NC. Bed locked and in lowest position. Call light within reach.

## 2023-11-11 NOTE — CARE PLAN
The patient is Stable - Low risk of patient condition declining or worsening    Shift Goals  Clinical Goals: [facility] Goals, hemodynamic stability  Patient Goals: reduced bowel movements  Family Goals: FARIDA    Progress made toward(s) clinical / shift goals:  Patient has walked twice and showered so far today. Patient stable through shift

## 2023-11-11 NOTE — ASSESSMENT & PLAN NOTE
Questionable bloody stool  Iron studies show anemia of chronic disease.  Guaiac pending  Reports normal colonoscopy within the past 5 years.  Hemoglobin continues to remain stable  Serial CBC

## 2023-11-11 NOTE — CONSULTS
Hospital Medicine Consultation    Date of Service  11/11/2023    Referring Physician  Osmel Blanca M.D.    Consulting Physician  Corby Mario M.D.    Reason for Consultation  Shortness of breath    History of Presenting Illness  58 y.o. 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  Patient has been followed by nephrology and has been receiving hemodialysis while inpatient.  On 11/11/2023, concern for left-sided pleural effusion as well as questionable GI bleed, therefore medicine service was consulted to continue following up with patient and for assistance with management of chronic comorbidities.    Review of Systems  Review of Systems   Constitutional:  Positive for malaise/fatigue. Negative for chills and fever.   HENT:  Negative for hearing loss and tinnitus.    Eyes:  Negative for blurred vision and double vision.   Respiratory:  Positive for shortness of breath. Negative for cough.    Cardiovascular:  Negative for chest pain and palpitations.   Gastrointestinal:  Negative for abdominal pain, heartburn, nausea and vomiting.   Genitourinary:  Negative for dysuria and hematuria.   Musculoskeletal:  Negative for joint pain and myalgias.   Skin:  Negative for itching and rash.   Neurological:  Positive for weakness. Negative for dizziness and headaches.   Endo/Heme/Allergies:  Negative for environmental allergies. Does not bruise/bleed easily.   Psychiatric/Behavioral:  Negative for depression and substance abuse.    All other systems reviewed and are negative.      Past Medical History   has a past medical history of Anesthesia, Aortic stenosis, Chronic gout of multiple sites, Dialysis patient (HCC), Dyspnea on exertion (05/23/2023), Elevated troponin (05/23/2023), Heart burn, Heart murmur, High cholesterol,  Hypertension (2009), NSTEMI (non-ST elevated myocardial infarction) (HCC) (05/23/2023), Pain in the chest (05/23/2023), Paroxysmal A-fib (HCC) (11/11/2023), PONV (postoperative nausea and vomiting), Renal disorder (2007), Sleep apnea (2000), and Snoring (1990).    He has no past medical history of Acute nasopharyngitis, Anginal syndrome (HCC), Asthma, Blood clotting disorder (HCC), Bowel habit changes, Breath shortness, Bronchitis, Cancer (HCC), Carcinoma in situ of respiratory system, Cataract, Congestive heart failure (HCC), Continuous ambulatory peritoneal dialysis status (HCC), COPD (chronic obstructive pulmonary disease) (HCC), Coughing blood, Dental disorder, Diabetes (HCC), Disorder of thyroid, Emphysema of lung (HCC), Glaucoma, Gynecological disorder, Hemorrhagic disorder (HCC), Hepatitis A, Hepatitis B, Hepatitis C, Hiatus hernia syndrome, Indigestion, Infectious disease, Jaundice, Pacemaker, Pneumonia, Pregnant, Psychiatric problem, Rheumatic fever, Seizure (HCC), Stroke (HCC), Tuberculosis, Urinary bladder disorder, or Urinary incontinence.    Surgical History   has a past surgical history that includes other (2007); umbilical hernia repair (2012); shoulder arthroscopy; hip arthroscopy (Bilateral, 2002); tonsillectomy (N/A, 1987); hand surgery (Right, 1985); cath placement capd (N/A, 5/15/2023); pr inj lumbar/sacral,w/ imaging (Left, 8/24/2023); cath placement capd (Right, 9/6/2023); aortic valve replacement (11/3/2023); aortic ascending dissection (11/3/2023); echocardiogram, transesophageal, intraoperative (11/3/2023); restorate hemostas (11/3/2023); and sternotomy (11/3/2023).    Family History  family history includes Hyperlipidemia in his mother; Hypertension in his father.    Social History   reports that he has never smoked. He has never used smokeless tobacco. He reports that he does not currently use drugs. He reports that he does not drink alcohol.    Medications  Prior to Admission Medications    Prescriptions Last Dose Informant Patient Reported? Taking?   CALCIUM PO 11/1/2023 at am Patient Yes No   Sig: Take  by mouth.   Ferrous Sulfate (IRON PO) 11/1/2023 at am Patient Yes No   Sig: Take  by mouth.   Peritoneal Dialysis Solutions (ULTRABAG/DIANEAL PD-2/2.5% DEX) 396 MOSM/L Solution  Patient Yes No   Sig: Inject  into the abdomen/abdominal cavity.   aspirin 81 MG EC tablet 11/2/2023 at am Patient Yes No   Sig: Take 81 mg by mouth every day.   atorvastatin (LIPITOR) 40 MG Tab 11/1/2023 at pm Patient No No   Sig: Take 1 Tablet by mouth every evening.   betamethasone dipropionate 0.05 % Cream 11/2/2023 Patient Yes No   Sig: APPLY CREAM TOPICALLY TWICE DAILY TO AFFECTED AREA   esomeprazole (NEXIUM) 40 MG delayed-release capsule 11/2/2023 at pm Patient Yes No   Sig: Take 40 mg by mouth every evening.   hydrocortisone 2.5 % Ointment 11/2/2023 Patient Yes No   Sig: Apply  topically as needed.   nitroglycerin (NITROSTAT) 0.4 MG SL Tab  at has not needed Patient No No   Sig: Place 1 Tablet under the tongue as needed for Chest Pain.   tamsulosin (FLOMAX) 0.4 MG capsule 11/3/2023 at 0345 Patient Yes No   Sig: Take 0.4 mg by mouth every day.   verapamil ER (CALAN-SR) 240 MG Tab CR 11/3/2023 at 0345 Patient No No   Sig: Take 1 Tablet by mouth every day.      Facility-Administered Medications: None       Allergies  Allergies   Allergen Reactions    Allopurinol Itching    Hydralazine Hcl Unspecified     Pt states he had a reaction similar to lupus       Physical Exam  Temp:  [36.6 °C (97.9 °F)-37.6 °C (99.7 °F)] 36.6 °C (97.9 °F)  Pulse:  [] 117  Resp:  [16-28] 16  BP: ()/(55-92) 131/81  SpO2:  [76 %-99 %] 96 %    Physical Exam  Vitals and nursing note reviewed.   Constitutional:       General: He is not in acute distress.  HENT:      Head: Normocephalic and atraumatic.      Nose: Nose normal.      Mouth/Throat:      Mouth: Mucous membranes are moist.      Pharynx: Oropharynx is clear.   Eyes:      General:  No scleral icterus.     Extraocular Movements: Extraocular movements intact.   Cardiovascular:      Rate and Rhythm: Normal rate. Rhythm irregular.      Pulses: Normal pulses.      Heart sounds:      No friction rub.   Pulmonary:      Breath sounds: Rales present.      Comments: Crackles in left base  Abdominal:      General: There is no distension.      Tenderness: There is no abdominal tenderness. There is no guarding or rebound.   Genitourinary:     Comments: Right IJ HD cath  Musculoskeletal:         General: No tenderness.      Cervical back: Neck supple. No tenderness.      Right lower leg: No edema.      Left lower leg: No edema.   Skin:     General: Skin is warm and dry.      Capillary Refill: Capillary refill takes less than 2 seconds.   Neurological:      General: No focal deficit present.      Mental Status: He is alert and oriented to person, place, and time.   Psychiatric:         Mood and Affect: Mood normal.         Fluids      Laboratory  Recent Labs     11/09/23  0301 11/10/23  0118 11/11/23  0030   WBC 6.9 8.6 8.0   RBC 2.58* 2.65* 2.75*   HEMOGLOBIN 8.1* 8.6* 8.8*   HEMATOCRIT 24.9* 25.5* 26.9*   MCV 96.5 96.2 97.8   MCH 31.4 32.5 32.0   MCHC 32.5 33.7 32.7   RDW 52.8* 52.3* 55.5*   PLATELETCT 136* 163* 204   MPV 10.2 10.2 10.2     Recent Labs     11/09/23  0301 11/10/23  0118 11/11/23  0030   SODIUM 135 132* 133*   POTASSIUM 4.2 4.5 4.4   CHLORIDE 97 94* 95*   CO2 28 27 27   GLUCOSE 100* 111* 140*   BUN 42* 57* 39*   CREATININE 5.88* 7.78* 6.18*   CALCIUM 7.7* 7.8* 8.1*     Recent Labs     11/09/23  0301 11/10/23  0118 11/11/23  0030   INR 1.67* 1.46* 1.37*                 Imaging  DX-CHEST-2 VIEWS   Final Result         1.  Left midlung and lower lobe infiltrate stable to slightly increased.   2.  Layering left pleural effusion, similar to prior study.   3.  Cardiomegaly      IR-TORRES,GROSHONG PLACEMENT >5   Final Result      1. Ultrasound and fluoroscopic guided placement of a right internal  jugular 14.5 [place] HemoSplit tunneled dialysis catheter.      2. The hemodialysis catheter may be used immediately as clinically indicated. Flushes per protocol.      3. Removal of the right-sided temporary dialysis catheter.         DX-CHEST-2 VIEWS   Final Result      Stable appearance of the chest.      DX-CHEST-PORTABLE (1 VIEW)   Final Result      1.  Bibasilar underinflation atelectasis which could obscure an additional process. This has increased on the LEFT and is similar to the prior on the RIGHT.   2.  Suspect small LEFT pleural effusion   3.  Persistently enlarged cardiac silhouette      US-EXTREMITY VENOUS UPPER UNILAT LEFT   Final Result      DX-CHEST-PORTABLE (1 VIEW)   Final Result      1.  Low lung volumes. Bibasilar atelectasis.      DX-CHEST-PORTABLE (1 VIEW)   Final Result      1.  Tubes and lines in good position.      2.  Right basilar atelectasis.      DX-CHEST-PORTABLE (1 VIEW)   Final Result      1.  Mild cardiomegaly.      2.  Tubes and lines in good position.      3.  Right basilar atelectasis.      EC-CARMELITA W/O CONT   Final Result      EC-ECHOCARDIOGRAM LTD W/O CONT   Final Result      DX-CHEST-PORTABLE (1 VIEW)   Final Result      Satisfactory postoperative appearance of the chest with BILATERAL perihilar and LEFT basilar opacities which are probably areas of atelectasis      US-THORACENTESIS PUNCTURE LEFT    (Results Pending)   [id number] VIEW    (Results Pending)         Assessment/Plan  * Aortic stenosis- (present on admission)  Assessment & Plan  S/p mechanical valve replacement by CTS 11/3  Warfarin, dosing as per pharmacy to target therapeutic INR goal    AAA (abdominal aortic aneurysm) (HCC)  Assessment & Plan  S/p repair by CTS 11/3  CTS f/u appreciated    Pleural effusion on left  Assessment & Plan  Agree with US-guided thoracocentesis  HD as per nephrology    GIB (gastrointestinal bleeding)  Assessment & Plan  Questionable bloody stool  Check fecal occult blood  IV protonix for  now  Continue warfarin given presence of mechanical aortic valve    Paroxysmal A-fib (HCC)  Assessment & Plan  Postop mechanical AVR and AAA repair  Currently on amiodarone drip  Coreg, warfarin    ESRD (end stage renal disease) ([facility])  Assessment & Plan  Was on peritoneal dialysis  Currently has right IJ temp HD cath  Hemodialysis as per nephrology  Nephrology follow-up appreciated    Dyslipidemia- (present on admission)  Assessment & Plan  Statin    Coronary artery disease due to lipid rich plaque  Assessment & Plan  Optimize CAD meds: Warfarin, ASA, BB, statin    Insomnia  Assessment & Plan  Temazepam        Thank you for consulting hospitalist medicine service.  Medicine service will follow along.

## 2023-11-11 NOTE — ASSESSMENT & PLAN NOTE
S/p mechanical valve replacement by CTS 11/3  Warfarin, dosing as per pharmacy to target therapeutic INR goal

## 2023-11-11 NOTE — PROGRESS NOTES
Inpatient Anticoagulation Service Note for 11/11/2023      Reason for Anticoagulation: On-X Aortic Valve Replacement     Hemoglobin Value: (!) 8.8  Hematocrit Value: (!) 26.9  Lab Platelet Value: 204  Target INR: 2.0 to 3.0    INR from last 7 days       Date/Time INR Value    11/11/23 0030 1.37    11/10/23 0118 1.46    11/09/23 0301 1.67    11/08/23 0327 1.44    11/07/23 0432 1.36    11/06/23 0000 1.3    11/05/23 0417 1.29          Dose from last 7 days       Date/Time Dose (mg)    11/11/23 1349 2.5    11/10/23 1315 0    11/09/23 1331 0    11/08/23 1046 0    11/07/23 1125 2.5          Average Dose (mg):  (New start.)  Significant Interactions: Amiodarone, Antibiotics, Antiplatelet Medications  Bridge Therapy: No (Heparin sq for ppx)    Reversal Agent Administered: Not Applicable    Comments: Perma-Cath placed, warfarin unheld on the MAR as well as the heparin sq ppx. INR remains relatively elevated despite held warfarin doses. Will resume the previous warfarin 2.5 mg daily for now and trend the INR. DDI between warfarin and Amio and abx noted.    Education Material Provided?: No (Will need prior to discharge.)    Pharmacist suggested discharge dosing: TBD, warfarin 2.5 mg daily for now. Pt will need a follow up INR within 2-3 days of discharge.     Hector Sorensen, PharmD

## 2023-11-11 NOTE — CARE PLAN
The patient is Watcher - Medium risk of patient condition declining or worsening    Shift Goals  Clinical Goals: monitor VS and oxygen demand, promote mobility  Patient Goals: stop SOB, figure out plan of care  Family Goals: FARIDA    Progress made toward(s) clinical / shift goals:    Problem: Post Op Day 7-8 CABG/Heart Valve Replacement  Goal: Optimal care of the Post Op CABG/Heart Valve replacement Post Op Day 7-8  11/11/2023 0718 by Sofiya Villarreal RYolyN.  Outcome: Progressing  11/11/2023 0718 by HASMUKH Paez.N.  Outcome: Progressing  Intervention: Ambulate 4 times daily, increasing the distance each time  Note: Refusing PM and AM ambulation-pt educated on need for mobility   Intervention: Discharge Education  Note: Pt educated on HD chair set up and first HD outpatient is 11/16 per discharge note        Patient is not progressing towards the following goals:      Problem: Respiratory  Goal: Patient will achieve/maintain optimum respiratory ventilation and gas exchange  11/11/2023 0718 by Sofiya Villarreal R.N.  Outcome: Not Progressing  Flowsheets  Taken 11/11/2023 0654 by Seble Mckeon  O2 Delivery Device: Silicone Nasal Cannula  Taken 11/10/2023 2200 by Sofiya Villarreal, R.N.  Incentive Spirometer: Effective  Incentive Spirometer Volume: 1000 mL  Deep Breathe and Cough: Performs Correctly  11/11/2023 0718 by Sofiya Villarreal, R.N.  Outcome: Not Progressing

## 2023-11-11 NOTE — PROGRESS NOTES
"[facility] -  PROGRESS NOTE               Author: Zac Daily D.O. Date & Time: 11/11/2023  1:45 PM     HPI:  58 y.o. male with  chronic anabolic steroid use, ESRD sec to to Bx proven sec FSGS currently on PD and compeltes three excahnges/ day, moderate to moderate AS and  moderate to severe AR, HTN presented for aortic valve and ascending anerysm repair. Pt had post operative complication of tamponade and is back in OR. Pt has received signficant colloid/ IVF for support.     DAILY NEPHROLOGY SUMMARY:  11/4- Patient intolerant to HD last night  11/5- Patient s/p PD last night. Reports shortness of breath but better then yesterday  11/6: pt had PD done with 214cc UF removed. 1000cc drainage noted from chest tube overnight. Cardiology concerned for PD fluid draining from chest tube.  11/7: pt had PUF yesterday w 2L removed. Had HD again today with 2.5L removed. Pt feels much better.  11/8: pt scheduled for HD again. Feeling okay  11/9: pt had HD with 2L UF removed. Feels nauseous this AM from amiodarone.   11/10: seen on HD and tolerating. Awaiting permcath placement  11/11: permcath placed, pt feels well.     REVIEW OF SYSTEMS:    Denies SOB, no CP, no V  10 point ROS reviewed and is as per HPI/daily summary or otherwise negative    PMH/PSH/SH/FH: Reviewed and unchanged since admission note  CURRENT MEDICATIONS: Reviewed from admission to present day    VS:  /81   Pulse (!) 105   Temp 36.6 °C (97.9 °F) (Temporal)   Resp 16   Ht 1.702 m (5' 7\")   Wt 90.5 kg (199 lb 8.3 oz)   SpO2 98%   BMI 31.25 kg/m²     Exam  General: Awake, no acute distress  HEENT: head normocephalic, atruamtic  Neck: neck supple, no visible masses  Respiratory: clear to auscultation bilaterally, no rales, no ronchi, or wheezing  Cardiac: normal rate, normal rhythm,  Abdomen: non tender, non-distended, no guarding  Musculoskelatal: no joint tenderness, moves limbs spontaneously  Extremities: no significant " joint abnormalities, trace/1+ edema  Skin: no lesions, no rashes noted    Access: R IJ Temp HD catheter, Abd PD catheter      Fluids:  In: 1300 [P.O.:800; Dialysis:500]  Out: 2000     LABS:  Recent Labs     11/09/23  0301 11/10/23  0118 11/11/23  0030   SODIUM 135 132* 133*   POTASSIUM 4.2 4.5 4.4   CHLORIDE 97 94* 95*   CO2 28 27 27   GLUCOSE 100* 111* 140*   BUN 42* 57* 39*   CREATININE 5.88* 7.78* 6.18*   CALCIUM 7.7* 7.8* 8.1*         IMPRESSION:    # ESRD on outpt PD    - Etiology likely 2/2 FSGS    - s/p permcath on 11/10 for temp outpt HD  # Volume overload, improving  # S/P AVR/ Ascending aneurysm repair     -High chest tube drainage only noted at night while on PD     -concern for PD fluid leaking through into chest cavity per cards         On 11/6     -CVT recommending at least 2 weeks of PD avoidance to allow          Chest/ABD cavity to heal  # Anemia of CKD     EPO 6000U IV w HD to start 11/10  # CKD-MBD  # Acute Respiratoy failure on vent, extubated    PLAN:    - no indication for HD today  - pt to have maintenance qMWF HD at this point  - cont EPO  - transfusion as per primary team  - Dose all medications as per ESRD    Dispo: Renal SW awaiting TB clearance for placement at [facility]    Please page nephrology with any questions or concerns

## 2023-11-11 NOTE — ASSESSMENT & PLAN NOTE
Was on peritoneal dialysis  Currently has right IJ temp HD cath  Hemodialysis as per nephrology, while in house, with plans to transition back to PD on discharge  Nephrology follow-up appreciated

## 2023-11-12 LAB
ALBUMIN SERPL BCP-MCNC: 2.7 G/DL (ref 3.2–4.9)
ALBUMIN/GLOB SERPL: 1.1 G/DL
ALP SERPL-CCNC: 53 U/L (ref 30–99)
ALT SERPL-CCNC: 14 U/L (ref 2–50)
ANION GAP SERPL CALC-SCNC: 12 MMOL/L (ref 7–16)
AST SERPL-CCNC: 26 U/L (ref 12–45)
BASOPHILS # BLD AUTO: 0.1 % (ref 0–1.8)
BASOPHILS # BLD: 0.01 K/UL (ref 0–0.12)
BILIRUB SERPL-MCNC: 0.2 MG/DL (ref 0.1–1.5)
BUN SERPL-MCNC: 56 MG/DL (ref 8–22)
CALCIUM ALBUM COR SERPL-MCNC: 9.1 MG/DL (ref 8.5–10.5)
CALCIUM SERPL-MCNC: 8.1 MG/DL (ref 8.5–10.5)
CHLORIDE SERPL-SCNC: 94 MMOL/L (ref 96–112)
CO2 SERPL-SCNC: 26 MMOL/L (ref 20–33)
CREAT SERPL-MCNC: 7.7 MG/DL (ref 0.5–1.4)
EKG IMPRESSION: NORMAL
EOSINOPHIL # BLD AUTO: 0.14 K/UL (ref 0–0.51)
EOSINOPHIL NFR BLD: 1.6 % (ref 0–6.9)
ERYTHROCYTE [DISTWIDTH] IN BLOOD BY AUTOMATED COUNT: 55.3 FL (ref 35.9–50)
GFR SERPLBLD CREATININE-BSD FMLA CKD-EPI: 8 ML/MIN/1.73 M 2
GLOBULIN SER CALC-MCNC: 2.4 G/DL (ref 1.9–3.5)
GLUCOSE SERPL-MCNC: 111 MG/DL (ref 65–99)
HCT VFR BLD AUTO: 24.8 % (ref 42–52)
HGB BLD-MCNC: 8.1 G/DL (ref 14–18)
IMM GRANULOCYTES # BLD AUTO: 0.05 K/UL (ref 0–0.11)
IMM GRANULOCYTES NFR BLD AUTO: 0.6 % (ref 0–0.9)
INR PPP: 1.34 (ref 0.87–1.13)
LYMPHOCYTES # BLD AUTO: 0.65 K/UL (ref 1–4.8)
LYMPHOCYTES NFR BLD: 7.5 % (ref 22–41)
MAGNESIUM SERPL-MCNC: 2.1 MG/DL (ref 1.5–2.5)
MCH RBC QN AUTO: 32.4 PG (ref 27–33)
MCHC RBC AUTO-ENTMCNC: 32.7 G/DL (ref 32.3–36.5)
MCV RBC AUTO: 99.2 FL (ref 81.4–97.8)
MONOCYTES # BLD AUTO: 0.61 K/UL (ref 0–0.85)
MONOCYTES NFR BLD AUTO: 7.1 % (ref 0–13.4)
NEUTROPHILS # BLD AUTO: 7.16 K/UL (ref 1.82–7.42)
NEUTROPHILS NFR BLD: 83.1 % (ref 44–72)
NRBC # BLD AUTO: 0.02 K/UL
NRBC BLD-RTO: 0.2 /100 WBC (ref 0–0.2)
PHOSPHATE SERPL-MCNC: 4.6 MG/DL (ref 2.5–4.5)
PLATELET # BLD AUTO: 217 K/UL (ref 164–446)
PMV BLD AUTO: 9.7 FL (ref 9–12.9)
POTASSIUM SERPL-SCNC: 4.5 MMOL/L (ref 3.6–5.5)
PROT SERPL-MCNC: 5.1 G/DL (ref 6–8.2)
PROTHROMBIN TIME: 16.7 SEC (ref 12–14.6)
RBC # BLD AUTO: 2.5 M/UL (ref 4.7–6.1)
SODIUM SERPL-SCNC: 132 MMOL/L (ref 135–145)
WBC # BLD AUTO: 8.6 K/UL (ref 4.8–10.8)

## 2023-11-12 PROCEDURE — C9113 INJ PANTOPRAZOLE SODIUM, VIA: HCPCS | Performed by: STUDENT IN AN ORGANIZED HEALTH CARE EDUCATION/TRAINING PROGRAM

## 2023-11-12 PROCEDURE — A9270 NON-COVERED ITEM OR SERVICE: HCPCS | Performed by: STUDENT IN AN ORGANIZED HEALTH CARE EDUCATION/TRAINING PROGRAM

## 2023-11-12 PROCEDURE — 84100 ASSAY OF PHOSPHORUS: CPT

## 2023-11-12 PROCEDURE — 83735 ASSAY OF MAGNESIUM: CPT

## 2023-11-12 PROCEDURE — 93010 ELECTROCARDIOGRAM REPORT: CPT | Performed by: INTERNAL MEDICINE

## 2023-11-12 PROCEDURE — A9270 NON-COVERED ITEM OR SERVICE: HCPCS | Performed by: NURSE PRACTITIONER

## 2023-11-12 PROCEDURE — 700102 HCHG RX REV CODE 250 W/ 637 OVERRIDE(OP): Performed by: NURSE PRACTITIONER

## 2023-11-12 PROCEDURE — 85025 COMPLETE CBC W/AUTO DIFF WBC: CPT

## 2023-11-12 PROCEDURE — 80053 COMPREHEN METABOLIC PANEL: CPT

## 2023-11-12 PROCEDURE — 700111 HCHG RX REV CODE 636 W/ 250 OVERRIDE (IP): Performed by: STUDENT IN AN ORGANIZED HEALTH CARE EDUCATION/TRAINING PROGRAM

## 2023-11-12 PROCEDURE — 93005 ELECTROCARDIOGRAM TRACING: CPT | Performed by: THORACIC SURGERY (CARDIOTHORACIC VASCULAR SURGERY)

## 2023-11-12 PROCEDURE — 700111 HCHG RX REV CODE 636 W/ 250 OVERRIDE (IP): Mod: JZ | Performed by: NURSE PRACTITIONER

## 2023-11-12 PROCEDURE — 85610 PROTHROMBIN TIME: CPT

## 2023-11-12 PROCEDURE — 99233 SBSQ HOSP IP/OBS HIGH 50: CPT | Performed by: INTERNAL MEDICINE

## 2023-11-12 PROCEDURE — 94760 N-INVAS EAR/PLS OXIMETRY 1: CPT

## 2023-11-12 PROCEDURE — 700102 HCHG RX REV CODE 250 W/ 637 OVERRIDE(OP): Performed by: STUDENT IN AN ORGANIZED HEALTH CARE EDUCATION/TRAINING PROGRAM

## 2023-11-12 PROCEDURE — 99024 POSTOP FOLLOW-UP VISIT: CPT | Performed by: NURSE PRACTITIONER

## 2023-11-12 PROCEDURE — 770020 HCHG ROOM/CARE - TELE (206)

## 2023-11-12 RX ORDER — CARVEDILOL 25 MG/1
25 TABLET ORAL 2 TIMES DAILY WITH MEALS
Status: DISCONTINUED | OUTPATIENT
Start: 2023-11-12 | End: 2023-11-13

## 2023-11-12 RX ORDER — ALUMINA, MAGNESIA, AND SIMETHICONE 2400; 2400; 240 MG/30ML; MG/30ML; MG/30ML
30 SUSPENSION ORAL EVERY 4 HOURS PRN
Status: DISCONTINUED | OUTPATIENT
Start: 2023-11-12 | End: 2023-11-17 | Stop reason: HOSPADM

## 2023-11-12 RX ADMIN — ACETAMINOPHEN 1000 MG: 500 TABLET, FILM COATED ORAL at 05:00

## 2023-11-12 RX ADMIN — AMIODARONE HYDROCHLORIDE 200 MG: 200 TABLET ORAL at 05:00

## 2023-11-12 RX ADMIN — CARVEDILOL 12.5 MG: 12.5 TABLET, FILM COATED ORAL at 08:28

## 2023-11-12 RX ADMIN — ACETAMINOPHEN 1000 MG: 500 TABLET, FILM COATED ORAL at 16:44

## 2023-11-12 RX ADMIN — DOCUSATE SODIUM 50 MG AND SENNOSIDES 8.6 MG 2 TABLET: 8.6; 5 TABLET, FILM COATED ORAL at 05:01

## 2023-11-12 RX ADMIN — TAMSULOSIN HYDROCHLORIDE 0.4 MG: 0.4 CAPSULE ORAL at 08:28

## 2023-11-12 RX ADMIN — Medication 1 APPLICATOR: at 20:28

## 2023-11-12 RX ADMIN — TEMAZEPAM 15 MG: 15 CAPSULE ORAL at 22:57

## 2023-11-12 RX ADMIN — PANTOPRAZOLE SODIUM 40 MG: 40 INJECTION, POWDER, FOR SOLUTION INTRAVENOUS at 16:44

## 2023-11-12 RX ADMIN — DOCUSATE SODIUM 50 MG AND SENNOSIDES 8.6 MG 2 TABLET: 8.6; 5 TABLET, FILM COATED ORAL at 16:46

## 2023-11-12 RX ADMIN — HEPARIN SODIUM 5000 UNITS: 5000 INJECTION, SOLUTION INTRAVENOUS; SUBCUTANEOUS at 05:01

## 2023-11-12 RX ADMIN — ASPIRIN 81 MG: 81 TABLET, COATED ORAL at 05:00

## 2023-11-12 RX ADMIN — GABAPENTIN 200 MG: 100 CAPSULE ORAL at 05:01

## 2023-11-12 RX ADMIN — AMIODARONE HYDROCHLORIDE 150 MG: 1.5 INJECTION, SOLUTION INTRAVENOUS at 10:39

## 2023-11-12 RX ADMIN — HEPARIN SODIUM 5000 UNITS: 5000 INJECTION, SOLUTION INTRAVENOUS; SUBCUTANEOUS at 14:47

## 2023-11-12 RX ADMIN — AMIODARONE HYDROCHLORIDE 0.5 MG/MIN: 1.8 INJECTION, SOLUTION INTRAVENOUS at 16:57

## 2023-11-12 RX ADMIN — Medication 1 APPLICATOR: at 08:28

## 2023-11-12 RX ADMIN — AMOXICILLIN AND CLAVULANATE POTASSIUM 1 TABLET: 500; 125 TABLET, FILM COATED ORAL at 16:47

## 2023-11-12 RX ADMIN — HEPARIN SODIUM 5000 UNITS: 5000 INJECTION, SOLUTION INTRAVENOUS; SUBCUTANEOUS at 20:28

## 2023-11-12 RX ADMIN — WARFARIN SODIUM 2.5 MG: 2.5 TABLET ORAL at 16:47

## 2023-11-12 RX ADMIN — CARVEDILOL 25 MG: 25 TABLET, FILM COATED ORAL at 16:46

## 2023-11-12 RX ADMIN — AMIODARONE HYDROCHLORIDE 0.5 MG/MIN: 1.8 INJECTION, SOLUTION INTRAVENOUS at 05:00

## 2023-11-12 RX ADMIN — ATORVASTATIN CALCIUM 40 MG: 40 TABLET, FILM COATED ORAL at 20:28

## 2023-11-12 RX ADMIN — PANTOPRAZOLE SODIUM 40 MG: 40 INJECTION, POWDER, FOR SOLUTION INTRAVENOUS at 05:01

## 2023-11-12 RX ADMIN — ACETAMINOPHEN 1000 MG: 500 TABLET, FILM COATED ORAL at 12:18

## 2023-11-12 RX ADMIN — AMIODARONE HYDROCHLORIDE 200 MG: 200 TABLET ORAL at 16:46

## 2023-11-12 ASSESSMENT — FIBROSIS 4 INDEX: FIB4 SCORE: 1.79

## 2023-11-12 ASSESSMENT — PAIN DESCRIPTION - PAIN TYPE
TYPE: ACUTE PAIN
TYPE: ACUTE PAIN

## 2023-11-12 ASSESSMENT — ENCOUNTER SYMPTOMS
ABDOMINAL PAIN: 1
CONSTIPATION: 1

## 2023-11-12 NOTE — PROGRESS NOTES
Received call from monitor room notifying of ST elevation. STAT EKG ordered and uploaded to chart. Patient denies chest pain at this time, VSS. UNRULY Barfield notified

## 2023-11-12 NOTE — PROGRESS NOTES
Bedside report received from night shift RN. Assumed care of patient at 0700. Pt is A&O 4. Patient is in bed. Patient reports 0/10 pain. Patient was updated on plan of care. Patient has call light, personal belongings and bedside table with in reach. Bed is in low locked position.

## 2023-11-12 NOTE — PROGRESS NOTES
Assumed care of pt. Bedside report received from RN. Pt was updated on plan of care. AOx4. Pt pain level 0/10. PT is on telemetry, AFIB/AFLUTTER 112. Call light, phone and personal belongings in reach. Bed alarm off due to pt low fall risk and up self. Bed in lowest position, and locked.

## 2023-11-12 NOTE — CARE PLAN
The patient is Unstable - High likelihood or risk of patient condition declining or worsening    Shift Goals  Clinical Goals: Open Heart Care Plan, Hemodynamic Stability  Patient Goals: Rest, stop pooping  Family Goals: FARIDA    Progress made toward(s) clinical / shift goals:  Patient stabilized during shift.     Patient is not progressing towards the following goals: Patient remains largely incontinent of stool at this time.

## 2023-11-12 NOTE — OR SURGEON
EXAMINATION:  11/11/2023 3:53 PM    HISTORY/REASON FOR EXAM:  Fluid collection  Left pleural effusion    TECHNIQUE/EXAM DESCRIPTION:   Ultrasound-guided thoracentesis.    Indication:  LEFT pleural fluid collection.    COMPARISON:  None    PROCEDURE:     Informed consent was obtained. A timeout was taken. A left pleural effusion was localized with real-time ultrasound guidance. The left posterior chest wall was prepped and draped in a sterile manner. Following local anesthesia with 1% lidocaine, a 5 Mongol[place] Yueh pigtail catheter was advanced into the pleural space with trocar technique and pleural fluid was drained. The patient tolerated the procedure well without evidence of complication. A post thoracentesis chest radiograph is forthcoming.    FINDINGS:         Fluid was not sent to the laboratory. No specimen. No blood loss.    Fluid character: bloody    IMPRESSION:  1. Ultrasound guided left sided therapeutic thoracentesis.    2. 400 mL of fluid withdrawn.

## 2023-11-12 NOTE — PROGRESS NOTES
NOC HOSPITALIST CROSS COVER    Notified by RN regarding patient requesting to speak to provider regarding results of abdominal x-ray.  Abdominal x-ray was notable for no obstruction and mildly increased colonic stool.  Patient reports he has been feeling distention and abdominal discomfort.  He also reports that he has been having watery stools.  Given findings on abdominal x-ray RN advised to trial MiraLAX as patient's perceived diarrhea may actually be overflow diarrhea from constipation.  Continue to closely monitor.  Patient is amenable to plan of care as above.          -----------------------------------------------------------------------------------------------------------    Electronically signed by:  CRISTHIAN Sorensen PA-C  Hospitalist Services

## 2023-11-12 NOTE — PROGRESS NOTES
Cardiovascular Surgery Progress Note    Name: Gurdeep Guerra  MRN: [id number]  : 1965  Admit Date: 11/3/2023  4:59 AM  9 Days Post-Op     Procedure:  Procedure(s) and Anesthesia Type:  *AORTIC VALVE REPLACEMENT (23 mm On-X mechanical valve), ASCENDING AORTIC ANEURYSM REPAIR (30 mm Hemashield tube graft) and intraoperative transesophageal echocardiography    *MEDIASTINAL EXPLORATION FOR POSTOPERATIVE BLEEDING AND RESTORATION OF HEMOSTASIS    Vitals:  Vitals:    23 1936 23 2345 23 0336 23 0740   BP: 102/71 126/85 108/64 (!) 158/105   Pulse: (!) 112 (!) 119 (!) 104 (!) 117   Resp: 16 18 18 18   Temp: 37.3 °C (99.1 °F) 36.7 °C (98.1 °F) 37.2 °C (99 °F) 36.7 °C (98.1 °F)   TempSrc: Temporal Temporal Temporal Temporal   SpO2: 95% 95% 93% 95%   Weight:   92.8 kg (204 lb 9.4 oz)    Height:          Temp (24hrs), Av.9 °C (98.4 °F), Min:36.6 °C (97.9 °F), Max:37.3 °C (99.1 °F)      Respiratory:    Respiration: 18, Pulse Oximetry: 95 %       Fluids:  No intake or output data in the 24 hours ending 23 1014    Admit weight: Weight: 84.6 kg (186 lb 8.2 oz)  Current weight: Weight: 92.8 kg (204 lb 9.4 oz) (23 0336)    Labs:  Recent Labs     11/10/23  0118 23  0030 23  0049   WBC 8.6 8.0 8.6   RBC 2.65* 2.75* 2.50*   HEMOGLOBIN 8.6* 8.8* 8.1*   HEMATOCRIT 25.5* 26.9* 24.8*   MCV 96.2 97.8 99.2*   MCH 32.5 32.0 32.4   MCHC 33.7 32.7 32.7   RDW 52.3* 55.5* 55.3*   PLATELETCT 163* 204 217   MPV 10.2 10.2 9.7       Recent Labs     11/10/23  0118 230 23  004   SODIUM 132* 133* 132*   POTASSIUM 4.5 4.4 4.5   CHLORIDE 94* 95* 94*   CO2 27 27 26   GLUCOSE 111* 140* 111*   BUN 57* 39* 56*   CREATININE 7.78* 6.18* 7.70*   CALCIUM 7.8* 8.1* 8.1*       Recent Labs     11/10/23  0118 230 23  004   INR 1.46* 1.37* 1.34*         HgbA1c:  5    Diabetic Educator Consult:  no    Medications:  Scheduled Medications   Medication Dose Frequency     amiodarone 150 mg IVPB (LOAD)  150 mg Once    carvedilol  25 mg BID WITH MEALS    pantoprazole  40 mg BID    atorvastatin  40 mg Nightly    amiodarone  200 mg BID    amoxicillin-clavulanate  1 Tablet Q24HRS    epoetin  6,000 Units MO, WE + FR    warfarin  2.5 mg DAILY AT 1800    tamsulosin  0.4 mg AFTER BREAKFAST    MD Alert...Warfarin per Pharmacy   PHARMACY TO DOSE    gabapentin  200 mg Q DAY    Nozin nasal  swab  1 Applicator BID    aspirin  81 mg DAILY    Pharmacy Consult Request  1 Each PHARMACY TO DOSE    acetaminophen  1,000 mg Q6HRS    senna-docusate  2 Tablet BID    And    polyethylene glycol/lytes  1 Packet DAILY    And    magnesium hydroxide  30 mL DAILY    heparin  5,000 Units Q8HRS        Exam:   Physical Exam  Vitals and nursing note reviewed.   Constitutional:       General: He is not in acute distress.     Appearance: Normal appearance.   HENT:      Head: Normocephalic.      Right Ear: External ear normal.      Left Ear: External ear normal.      Nose: Nose normal. No congestion.      Mouth/Throat:      Mouth: Mucous membranes are moist.      Pharynx: Oropharynx is clear.   Eyes:      Extraocular Movements: Extraocular movements intact.      Pupils: Pupils are equal, round, and reactive to light.   Cardiovascular:      Rate and Rhythm: Normal rate and regular rhythm.      Pulses: Normal pulses.      Heart sounds: Normal heart sounds.   Pulmonary:      Effort: Pulmonary effort is normal.      Breath sounds: Decreased breath sounds present.   Abdominal:      General: Bowel sounds are decreased. There is no distension.      Palpations: Abdomen is soft.      Comments: PD catheter   Musculoskeletal:      Cervical back: Normal range of motion and neck supple. No tenderness.      Right lower leg: Edema present.      Left lower leg: Edema present.   Skin:     General: Skin is warm and dry.      Comments: Midline surgical incision   Neurological:      General: No focal deficit present.      Mental  Status: He is alert and oriented to person, place, and time. Mental status is at baseline.   Psychiatric:         Mood and Affect: Mood normal.         Behavior: Behavior normal.         Thought Content: Thought content normal.         Cardiac Medications:    ASA - Yes    Plavix - No; contraindicated because of On Coumadin / NOAC    Post-operative Beta Blockers - Yes    Ace/ARB- No; contraindicated because of Normal EF    Statin - No; contraindicated because of No CAD    Aldactone- No; contraindicated because of Normal EF    SGLT2i-  No contraindicated because of No; contraindicated because of Normal EF    Ejection Fraction:  60%    Telemetry:   11/4 SR- a fib overnight  11/5 SR  11/6 SR/Aflutter  11/7 Junctional/flutter  11/8 SR  11/9 Converted to SR this AM  11/10 SR/ST  11/11 Aflutter 100-120s  11/12 Afib/flutter 90-110s    Assessment/Plan:  POD 1  HDS, SR- was a fib overnight, on amio gtt, neuro intact, wounds intact, abdomen soft, fluid balance positive, wt up,  2 L NC. 270 mL out of chest tubes overnight after return to OR. H/H 7.8/22.4, plat 83. Plan: One unit PRBCs this morning. Keep chest tubes and pacing wires. DC amio gtt, continue PO. Dialysis per neph. IS/ambulate.     POD 2  HTN, SR, neuro intact, wounds intact, abdomen soft no BM, fluid balance positive, wt up, 2 L NC. 590 mL out of chest tubes overnight. 500 off with PD last night. Pacing wires removed.  Plan:  Keep chest tubes for moderate output. Hold coumadin one more day. Resume verapamil, IS/ambulate. Transfer to tele.     POD 3 HOTN- transfer back to [facility]- start pressors, SR/flutter- non-sustained- on amio PO, Oxygen demands up- fluid overload, ESRD- concern PD dilalysate came out of mediastinal CT- will have to convert to HD until peritoneum heals and scars down, Risk for mediastinitis/sepsis- pan culture- send CT fluid for culture- start Zoysn/vanco, LUE swelling/ecchymosis - ck US    POD 4 HOTN- on levo- during HD, Aflutter/junctional- on  amio gtt- cont, HD today- removing fluid, leukocytosis- improved, normal lactate, Cont antibiotics- for high risk of mediastinitis, d/c mediastinal tubes    POD 5 HTN, SR.  Amio gtt.  HD for 2 weeks due to poss mediastinitis r/t CAPD fluid.  Normal WBC, cx negative.  Empiric abx.  Small amount of sanguinous drainage on the superior aspect of the wound.  Hgb 8.5 Cr 6.48.  Plan: Transfer to tele.  Wean oxygen.  Add coreg.      POD 6 HDS. SR.  Neuro intact.  Wounds-drainage improved.  WBC down.  HD MWF per neph.  Refused IR today due to nausea/feeling poorly overnight, would like to proceed tomorrow.  Plan: Resume cardiac diet today, NPO at midnight.  Hold ASA, lovenox, coumadin for IR tomorrow.  Half of amio dosage per Evangelist to see if he tolerates it with less nausea.      POD 7 Seen in HD.  HDS. SR/ST.  Neuro intact.  Wounds CDI-no drainage.  Cr 7.78 Hgb 8.6.  CXR with small left effusion.  Tunneled dialysis cath today.  Plan: Tunneled dialysis catheter today.  Increase coreg.  Repeat CXR tomorrow, thora if indicated.      POD 8 HDS. Afib 120s.  Back on 3LNC.  Neuro intact.  Wounds CDI- no drainage.  Cr 6.18 Hgb 8.8-improved.  Verbalizing small amount of blood on stool unable to been seen by RN at the time, blood thinners have been held last 2 days for procedure with increase in Hgb today.  Plan: Check guiac.  Restart amio gtt and bolus.  Restart coumadin for mechanical valve-discussed with IR. Monitor stools.  Thoracentesis if pleural effusion large enough.      POD 9 HDS.  Afib 90-110s.  1LNC.  Neuro intact.  Wounds CDI.  Thora yesterday 400ml off.  Cr 7.7 Hgb 8.1-watch.  Abdomen distension. Abdominal xray negative, no fever/leukocytosis.  Plan: Increase coreg.  Repeat amio bolus.  Wean oxygen.    Disposition:  PT/OT no needs  Outpatient dialysis chair available Thursday for HD

## 2023-11-12 NOTE — PROGRESS NOTES
"[facility] -  PROGRESS NOTE               Author: Zac Daily D.O. Date & Time: 11/12/2023  1:36 PM     HPI:  58 y.o. male with  chronic anabolic steroid use, ESRD sec to to Bx proven sec FSGS currently on PD and compeltes three excahnges/ day, moderate to moderate AS and  moderate to severe AR, HTN presented for aortic valve and ascending anerysm repair. Pt had post operative complication of tamponade and is back in OR. Pt has received signficant colloid/ IVF for support.     DAILY NEPHROLOGY SUMMARY:  11/4- Patient intolerant to HD last night  11/5- Patient s/p PD last night. Reports shortness of breath but better then yesterday  11/6: pt had PD done with 214cc UF removed. 1000cc drainage noted from chest tube overnight. Cardiology concerned for PD fluid draining from chest tube.  11/7: pt had PUF yesterday w 2L removed. Had HD again today with 2.5L removed. Pt feels much better.  11/8: pt scheduled for HD again. Feeling okay  11/9: pt had HD with 2L UF removed. Feels nauseous this AM from amiodarone.   11/10: seen on HD and tolerating. Awaiting permcath placement  11/11: permcath placed, pt feels well.   11/12: pt seen eating breakfast this AM. No complaints.    REVIEW OF SYSTEMS:    Denies SOB, no CP, no V  10 point ROS reviewed and is as per HPI/daily summary or otherwise negative    PMH/PSH/SH/FH: Reviewed and unchanged since admission note  CURRENT MEDICATIONS: Reviewed from admission to present day    VS:  /63   Pulse (!) 112   Temp 36.6 °C (97.9 °F) (Temporal)   Resp 16   Ht 1.702 m (5' 7\")   Wt 92.8 kg (204 lb 9.4 oz)   SpO2 91%   BMI 32.04 kg/m²     Exam  General: Awake, no acute distress  HEENT: head normocephalic, atruamtic  Neck: neck supple, no visible masses  Respiratory: clear to auscultation bilaterally, no rales, no ronchi, or wheezing  Cardiac: normal rate, normal rhythm,  Abdomen: non tender, non-distended, no guarding  Musculoskelatal: no joint tenderness, " moves limbs spontaneously  Extremities: no significant joint abnormalities, trace/1+ edema  Skin: no lesions, no rashes noted    Access: R IJ HD permcatheter, Abd PD catheter      Fluids:  No intake/output data recorded.    LABS:  Recent Labs     11/10/23  0118 11/11/23  0030 11/12/23  0049   SODIUM 132* 133* 132*   POTASSIUM 4.5 4.4 4.5   CHLORIDE 94* 95* 94*   CO2 27 27 26   GLUCOSE 111* 140* 111*   BUN 57* 39* 56*   CREATININE 7.78* 6.18* 7.70*   CALCIUM 7.8* 8.1* 8.1*         IMPRESSION:    # ESRD on outpt PD    - Etiology likely 2/2 FSGS    - s/p permcath on 11/10 for temp outpt HD  # Volume overload, improving  # S/P AVR/ Ascending aneurysm repair     -High chest tube drainage only noted at night while on PD     -concern for PD fluid leaking through into chest cavity per cards         On 11/6     -CVT recommending at least 2 weeks of PD avoidance to allow          Chest/ABD cavity to heal  # Anemia of CKD     EPO 6000U IV w HD to start 11/10  # CKD-MBD  # Acute Respiratoy failure on vent, extubated    PLAN:    - no indication for HD today  - pt to have maintenance qMWF HD at this point  - cont EPO  - transfusion as per primary team  - Dose all medications as per ESRD    Dispo: Renal SW awaiting TB clearance for placement at [facility]    Please page nephrology with any questions or concerns

## 2023-11-12 NOTE — PROGRESS NOTES
Patient has refused any further walks or showers at this time citing he is tired. Importance of walks and showers reinforced with patient. Patient verbalized understanding of importance.    Normal vision: sees adequately in most situations; can see medication labels, newsprint

## 2023-11-12 NOTE — PROGRESS NOTES
Monitor Summary  Rhythm: Atrial Fibrillation  Rate: 100-110s  Ectopy: PVCs  .- / .06 / .-

## 2023-11-12 NOTE — PROGRESS NOTES
Inpatient Anticoagulation Service Note for 11/12/2023  Reason for Anticoagulation: Atrial Fibrillation, On-X Aortic Valve Replacement   Hemoglobin Value: (!) 8.1  Hematocrit Value: (!) 24.8  Lab Platelet Value: 217  Target INR: 2.0 to 3.0    INR from last 7 days       Date/Time INR Value    11/12/23 0049 1.34    11/11/23 0030 1.37    11/10/23 0118 1.46    11/09/23 0301 1.67    11/08/23 0327 1.44    11/07/23 0432 1.36    11/06/23 0000 1.3          Dose from last 7 days       Date/Time Dose (mg)    11/12/23 1213 2.5    11/11/23 1349 2.5    11/10/23 1315 0    11/09/23 1331 0    11/08/23 1046 0    11/07/23 1125 2.5          Average Dose (mg): TBD(new start warfarin therapy)  Significant Interactions: Amiodarone, Antibiotics, Antiplatelet Medications  Bridge Therapy: No (heparin prophylaxis dosing)  Reversal Agent Administered: Not Applicable  Comments: INR below goal. Noted bleed concern. H/H low. PLT improved. Warfarin interactions noted. Will give warfarin 2.5 mg today. INR with AM labs. Likely to need dose adjustments.    Plan:  warfarin 2.5 mg 11/12/23  Education Material Provided?: No (needs education prior to discharge)    Pharmacist suggested discharge dosing: warfarin 2.5 mg daily     Jarocho Sorensen, PharmD

## 2023-11-13 ENCOUNTER — APPOINTMENT (OUTPATIENT)
Dept: RADIOLOGY | Facility: MEDICAL CENTER | Age: 58
DRG: 219 | End: 2023-11-13
Attending: NURSE PRACTITIONER
Payer: COMMERCIAL

## 2023-11-13 LAB
ANION GAP SERPL CALC-SCNC: 16 MMOL/L (ref 7–16)
BACTERIA FLD AEROBE CULT: NORMAL
BUN SERPL-MCNC: 68 MG/DL (ref 8–22)
CALCIUM SERPL-MCNC: 8.1 MG/DL (ref 8.5–10.5)
CHLORIDE SERPL-SCNC: 87 MMOL/L (ref 96–112)
CO2 SERPL-SCNC: 22 MMOL/L (ref 20–33)
CREAT SERPL-MCNC: 8.68 MG/DL (ref 0.5–1.4)
EKG IMPRESSION: NORMAL
ERYTHROCYTE [DISTWIDTH] IN BLOOD BY AUTOMATED COUNT: 52 FL (ref 35.9–50)
FERRITIN SERPL-MCNC: 730 NG/ML (ref 22–322)
GFR SERPLBLD CREATININE-BSD FMLA CKD-EPI: 7 ML/MIN/1.73 M 2
GLUCOSE SERPL-MCNC: 118 MG/DL (ref 65–99)
GRAM STN SPEC: NORMAL
HCT VFR BLD AUTO: 25.4 % (ref 42–52)
HGB BLD-MCNC: 8.4 G/DL (ref 14–18)
HGB RETIC QN AUTO: 29.1 PG/CELL (ref 29–35)
IMM RETICS NFR: 33.2 % (ref 2.6–16.1)
INR PPP: 1.43 (ref 0.87–1.13)
IRON SATN MFR SERPL: 17 % (ref 15–55)
IRON SERPL-MCNC: 25 UG/DL (ref 50–180)
MAGNESIUM SERPL-MCNC: 2 MG/DL (ref 1.5–2.5)
MCH RBC QN AUTO: 31.6 PG (ref 27–33)
MCHC RBC AUTO-ENTMCNC: 33.1 G/DL (ref 32.3–36.5)
MCV RBC AUTO: 95.5 FL (ref 81.4–97.8)
PHOSPHATE SERPL-MCNC: 5.3 MG/DL (ref 2.5–4.5)
PLATELET # BLD AUTO: 266 K/UL (ref 164–446)
PMV BLD AUTO: 9.9 FL (ref 9–12.9)
POTASSIUM SERPL-SCNC: 4.4 MMOL/L (ref 3.6–5.5)
PROTHROMBIN TIME: 17.6 SEC (ref 12–14.6)
RBC # BLD AUTO: 2.66 M/UL (ref 4.7–6.1)
RETICS # AUTO: 0.1 M/UL (ref 0.04–0.12)
RETICS/RBC NFR: 3.7 % (ref 0.8–2.6)
SIGNIFICANT IND 70042: NORMAL
SITE SITE: NORMAL
SODIUM SERPL-SCNC: 125 MMOL/L (ref 135–145)
SOURCE SOURCE: NORMAL
TIBC SERPL-MCNC: 146 UG/DL (ref 250–450)
TRANSFERRIN SERPL-MCNC: 122 MG/DL (ref 200–370)
UIBC SERPL-MCNC: 121 UG/DL (ref 110–370)
VIT B12 SERPL-MCNC: 723 PG/ML (ref 211–911)
WBC # BLD AUTO: 8.9 K/UL (ref 4.8–10.8)

## 2023-11-13 PROCEDURE — 80048 BASIC METABOLIC PNL TOTAL CA: CPT

## 2023-11-13 PROCEDURE — 85027 COMPLETE CBC AUTOMATED: CPT

## 2023-11-13 PROCEDURE — 700102 HCHG RX REV CODE 250 W/ 637 OVERRIDE(OP): Performed by: NURSE PRACTITIONER

## 2023-11-13 PROCEDURE — 83540 ASSAY OF IRON: CPT

## 2023-11-13 PROCEDURE — 99233 SBSQ HOSP IP/OBS HIGH 50: CPT | Performed by: INTERNAL MEDICINE

## 2023-11-13 PROCEDURE — A9270 NON-COVERED ITEM OR SERVICE: HCPCS | Performed by: NURSE PRACTITIONER

## 2023-11-13 PROCEDURE — 71046 X-RAY EXAM CHEST 2 VIEWS: CPT

## 2023-11-13 PROCEDURE — 85610 PROTHROMBIN TIME: CPT

## 2023-11-13 PROCEDURE — 90935 HEMODIALYSIS ONE EVALUATION: CPT

## 2023-11-13 PROCEDURE — 700111 HCHG RX REV CODE 636 W/ 250 OVERRIDE (IP): Mod: JZ | Performed by: INTERNAL MEDICINE

## 2023-11-13 PROCEDURE — 97530 THERAPEUTIC ACTIVITIES: CPT

## 2023-11-13 PROCEDURE — 82607 VITAMIN B-12: CPT

## 2023-11-13 PROCEDURE — 700102 HCHG RX REV CODE 250 W/ 637 OVERRIDE(OP): Performed by: STUDENT IN AN ORGANIZED HEALTH CARE EDUCATION/TRAINING PROGRAM

## 2023-11-13 PROCEDURE — 85046 RETICYTE/HGB CONCENTRATE: CPT

## 2023-11-13 PROCEDURE — 99024 POSTOP FOLLOW-UP VISIT: CPT | Performed by: NURSE PRACTITIONER

## 2023-11-13 PROCEDURE — 700111 HCHG RX REV CODE 636 W/ 250 OVERRIDE (IP): Mod: JZ

## 2023-11-13 PROCEDURE — A9270 NON-COVERED ITEM OR SERVICE: HCPCS | Performed by: THORACIC SURGERY (CARDIOTHORACIC VASCULAR SURGERY)

## 2023-11-13 PROCEDURE — 700111 HCHG RX REV CODE 636 W/ 250 OVERRIDE (IP): Performed by: STUDENT IN AN ORGANIZED HEALTH CARE EDUCATION/TRAINING PROGRAM

## 2023-11-13 PROCEDURE — A9270 NON-COVERED ITEM OR SERVICE: HCPCS | Performed by: STUDENT IN AN ORGANIZED HEALTH CARE EDUCATION/TRAINING PROGRAM

## 2023-11-13 PROCEDURE — 93010 ELECTROCARDIOGRAM REPORT: CPT | Performed by: INTERNAL MEDICINE

## 2023-11-13 PROCEDURE — 770020 HCHG ROOM/CARE - TELE (206)

## 2023-11-13 PROCEDURE — 82728 ASSAY OF FERRITIN: CPT

## 2023-11-13 PROCEDURE — 83735 ASSAY OF MAGNESIUM: CPT

## 2023-11-13 PROCEDURE — 83550 IRON BINDING TEST: CPT

## 2023-11-13 PROCEDURE — 700102 HCHG RX REV CODE 250 W/ 637 OVERRIDE(OP): Performed by: THORACIC SURGERY (CARDIOTHORACIC VASCULAR SURGERY)

## 2023-11-13 PROCEDURE — 700111 HCHG RX REV CODE 636 W/ 250 OVERRIDE (IP): Performed by: NURSE PRACTITIONER

## 2023-11-13 PROCEDURE — 700105 HCHG RX REV CODE 258: Performed by: NURSE PRACTITIONER

## 2023-11-13 PROCEDURE — 84466 ASSAY OF TRANSFERRIN: CPT

## 2023-11-13 PROCEDURE — C9113 INJ PANTOPRAZOLE SODIUM, VIA: HCPCS | Performed by: STUDENT IN AN ORGANIZED HEALTH CARE EDUCATION/TRAINING PROGRAM

## 2023-11-13 PROCEDURE — 84100 ASSAY OF PHOSPHORUS: CPT

## 2023-11-13 RX ORDER — OMEPRAZOLE 20 MG/1
20 CAPSULE, DELAYED RELEASE ORAL 2 TIMES DAILY
Status: DISCONTINUED | OUTPATIENT
Start: 2023-11-13 | End: 2023-11-17 | Stop reason: HOSPADM

## 2023-11-13 RX ORDER — HEPARIN SODIUM 1000 [USP'U]/ML
3700 INJECTION, SOLUTION INTRAVENOUS; SUBCUTANEOUS
Status: DISCONTINUED | OUTPATIENT
Start: 2023-11-13 | End: 2023-11-17 | Stop reason: HOSPADM

## 2023-11-13 RX ORDER — HEPARIN SODIUM 1000 [USP'U]/ML
INJECTION, SOLUTION INTRAVENOUS; SUBCUTANEOUS
Status: COMPLETED
Start: 2023-11-13 | End: 2023-11-13

## 2023-11-13 RX ORDER — VERAPAMIL HYDROCHLORIDE 80 MG/1
80 TABLET ORAL EVERY 8 HOURS
Status: DISCONTINUED | OUTPATIENT
Start: 2023-11-13 | End: 2023-11-14

## 2023-11-13 RX ORDER — SODIUM CHLORIDE, SODIUM LACTATE, POTASSIUM CHLORIDE, AND CALCIUM CHLORIDE .6; .31; .03; .02 G/100ML; G/100ML; G/100ML; G/100ML
500 INJECTION, SOLUTION INTRAVENOUS ONCE
Status: COMPLETED | OUTPATIENT
Start: 2023-11-13 | End: 2023-11-13

## 2023-11-13 RX ORDER — GUAIFENESIN 600 MG/1
600 TABLET, EXTENDED RELEASE ORAL EVERY 12 HOURS
Status: DISCONTINUED | OUTPATIENT
Start: 2023-11-13 | End: 2023-11-17 | Stop reason: HOSPADM

## 2023-11-13 RX ORDER — BENZONATATE 100 MG/1
100 CAPSULE ORAL 3 TIMES DAILY PRN
Status: DISCONTINUED | OUTPATIENT
Start: 2023-11-13 | End: 2023-11-17 | Stop reason: HOSPADM

## 2023-11-13 RX ADMIN — EPOETIN ALFA-EPBX 6000 UNITS: 3000 INJECTION, SOLUTION INTRAVENOUS; SUBCUTANEOUS at 13:23

## 2023-11-13 RX ADMIN — TAMSULOSIN HYDROCHLORIDE 0.4 MG: 0.4 CAPSULE ORAL at 09:17

## 2023-11-13 RX ADMIN — CARVEDILOL 25 MG: 25 TABLET, FILM COATED ORAL at 18:31

## 2023-11-13 RX ADMIN — AMIODARONE HYDROCHLORIDE 200 MG: 200 TABLET ORAL at 18:31

## 2023-11-13 RX ADMIN — PANTOPRAZOLE SODIUM 40 MG: 40 INJECTION, POWDER, FOR SOLUTION INTRAVENOUS at 09:19

## 2023-11-13 RX ADMIN — ATORVASTATIN CALCIUM 40 MG: 40 TABLET, FILM COATED ORAL at 19:55

## 2023-11-13 RX ADMIN — HEPARIN SODIUM 3700 UNITS: 1000 INJECTION, SOLUTION INTRAVENOUS; SUBCUTANEOUS at 13:54

## 2023-11-13 RX ADMIN — WARFARIN SODIUM 2.5 MG: 2.5 TABLET ORAL at 18:31

## 2023-11-13 RX ADMIN — AMIODARONE HYDROCHLORIDE 200 MG: 200 TABLET ORAL at 09:18

## 2023-11-13 RX ADMIN — HEPARIN SODIUM 5000 UNITS: 5000 INJECTION, SOLUTION INTRAVENOUS; SUBCUTANEOUS at 21:06

## 2023-11-13 RX ADMIN — TEMAZEPAM 15 MG: 15 CAPSULE ORAL at 22:18

## 2023-11-13 RX ADMIN — Medication 1 APPLICATOR: at 19:54

## 2023-11-13 RX ADMIN — ACETAMINOPHEN 1000 MG: 500 TABLET, FILM COATED ORAL at 09:17

## 2023-11-13 RX ADMIN — VERAPAMIL HYDROCHLORIDE 80 MG: 80 TABLET ORAL at 22:18

## 2023-11-13 RX ADMIN — GUAIFENESIN 600 MG: 600 TABLET, EXTENDED RELEASE ORAL at 18:32

## 2023-11-13 RX ADMIN — ACETAMINOPHEN 1000 MG: 500 TABLET, FILM COATED ORAL at 00:26

## 2023-11-13 RX ADMIN — CARVEDILOL 25 MG: 25 TABLET, FILM COATED ORAL at 09:19

## 2023-11-13 RX ADMIN — HEPARIN SODIUM 5000 UNITS: 5000 INJECTION, SOLUTION INTRAVENOUS; SUBCUTANEOUS at 15:21

## 2023-11-13 RX ADMIN — ASPIRIN 81 MG: 81 TABLET, COATED ORAL at 09:18

## 2023-11-13 RX ADMIN — OMEPRAZOLE 20 MG: 20 CAPSULE, DELAYED RELEASE ORAL at 18:31

## 2023-11-13 RX ADMIN — SODIUM CHLORIDE, POTASSIUM CHLORIDE, SODIUM LACTATE AND CALCIUM CHLORIDE 500 ML: 600; 310; 30; 20 INJECTION, SOLUTION INTRAVENOUS at 20:38

## 2023-11-13 RX ADMIN — ACETAMINOPHEN 1000 MG: 500 TABLET, FILM COATED ORAL at 18:31

## 2023-11-13 RX ADMIN — DOCUSATE SODIUM 50 MG AND SENNOSIDES 8.6 MG 2 TABLET: 8.6; 5 TABLET, FILM COATED ORAL at 09:18

## 2023-11-13 RX ADMIN — ACETAMINOPHEN 1000 MG: 500 TABLET, FILM COATED ORAL at 15:20

## 2023-11-13 RX ADMIN — Medication 1 APPLICATOR: at 09:38

## 2023-11-13 RX ADMIN — HEPARIN SODIUM 5000 UNITS: 5000 INJECTION, SOLUTION INTRAVENOUS; SUBCUTANEOUS at 09:19

## 2023-11-13 RX ADMIN — AMOXICILLIN AND CLAVULANATE POTASSIUM 1 TABLET: 500; 125 TABLET, FILM COATED ORAL at 18:32

## 2023-11-13 RX ADMIN — GABAPENTIN 200 MG: 100 CAPSULE ORAL at 09:18

## 2023-11-13 RX ADMIN — EPOETIN ALFA-EPBX 6000 UNITS: 2000 INJECTION, SOLUTION INTRAVENOUS; SUBCUTANEOUS at 13:23

## 2023-11-13 ASSESSMENT — ENCOUNTER SYMPTOMS
ABDOMINAL PAIN: 1
CONSTIPATION: 1

## 2023-11-13 ASSESSMENT — GAIT ASSESSMENTS
GAIT LEVEL OF ASSIST: SUPERVISED
DISTANCE (FEET): 50

## 2023-11-13 ASSESSMENT — PAIN DESCRIPTION - PAIN TYPE
TYPE: ACUTE PAIN

## 2023-11-13 ASSESSMENT — COGNITIVE AND FUNCTIONAL STATUS - GENERAL
MOVING TO AND FROM BED TO CHAIR: A LITTLE
TURNING FROM BACK TO SIDE WHILE IN FLAT BAD: A LITTLE
SUGGESTED CMS G CODE MODIFIER MOBILITY: CK
STANDING UP FROM CHAIR USING ARMS: A LITTLE
WALKING IN HOSPITAL ROOM: A LITTLE
MOBILITY SCORE: 18
MOVING FROM LYING ON BACK TO SITTING ON SIDE OF FLAT BED: A LITTLE
CLIMB 3 TO 5 STEPS WITH RAILING: A LITTLE

## 2023-11-13 ASSESSMENT — FIBROSIS 4 INDEX: FIB4 SCORE: 1.46

## 2023-11-13 NOTE — PROGRESS NOTES
Inpatient Anticoagulation Service Note for 11/13/2023  Reason for Anticoagulation: Atrial Fibrillation, On-X Aortic Valve Replacement   Hemoglobin Value: (!) 8.4  Hematocrit Value: (!) 25.4  Lab Platelet Value: 266  Target INR: 2.0 to 3.0    INR from last 7 days       Date/Time INR Value    11/13/23 0046 1.43    11/12/23 0049 1.34    11/11/23 0030 1.37    11/10/23 0118 1.46    11/09/23 0301 1.67    11/08/23 0327 1.44    11/07/23 0432 1.36          Dose from last 7 days       Date/Time Dose (mg)    11/13/23 0859 2.5    11/12/23 1213 2.5    11/11/23 1349 2.5    11/10/23 1315 0    11/09/23 1331 0    11/08/23 1046 0    11/07/23 1125 2.5          Average Dose (mg): TBD (new start warfarin therapy)  Significant Interactions: Amiodarone, Antibiotics, Antiplatelet Medications  Bridge Therapy: No (heparin prophylaxis dosing)  Reversal Agent Administered: Not Applicable  Comments: INR below goal. Noted bleed concern. H/H low. PLT improved. Warfarin interactions noted. Will give warfarin 2.5 mg today. INR with AM labs. Likely to need dose adjustments.    Plan:  warfarin 2.5 mg 11/13/23  Education Material Provided?: No (needs education prior to discharge)    Pharmacist suggested discharge dosing: warfarin 2.5 mg daily     Jarocho Sorensen, PharmD

## 2023-11-13 NOTE — DISCHARGE PLANNING
HTH/SCP TCN chart review completed.. Current discharge considerations are home with no needs. Noted PT/OT recommendations, and Kardex documenting patinet ambulated 500 feet with no AD or assist, and prior TCN note reporting patient states he is close to baseline.     No TCN needs anticipated in patient discharge planning at this time. Should post acute discharge needs arise warranting TCN involvement, please contact TCN team via Voalte. Thank you.     Completed::  PT recommends outpatient cardiac rehab on 11/09/23 (as directed by appropriate).    OT with recommendations- Anticipate that the patient will have no further occupational therapy needs on 11/09/23 after discharge from the hospital.    Choice obtained: None.  See above.    [facility] with a Non-[facility] PCP.  Patient states he  will schedule his own Follow up appointment.

## 2023-11-13 NOTE — DISCHARGE PLANNING
JOSE spoke with Fatimah just now to f/u on bed set up in [place] for HD.  The manager of the [place] [facility] called and they are not wanting to accept this patient as he is on a driving restriction for 30 days and will not be able to get to dialysis 3X/wk.  CM will f/u with patient to look at options

## 2023-11-13 NOTE — THERAPY
"Physical Therapy   Discharge     Patient Name: Gurdeep Guerra  Age:  58 y.o., Sex:  male  Medical Record #: [id number]  Today's Date: 11/13/2023     Precautions  Precautions: Sternal Precautions (See Comments);Cardiac Precautions (See Comments)    Assessment    Pt was seen for PT tx session.  He progressed to demonstrate all functional mobility with SPV without AD as described below.  He required standing rest breaks during ambulation due to SOB, required frequent cues to adjust pacing.  Reinforced post-op OHS education as outlined below.  Encouraged pt to continue ambulating daily with nursing staff.  Patient will not be actively followed for physical therapy services at this time, however may be seen if requested by physician for 1 more visit within 30 days to address any discharge or equipment needs.    Plan    Reason for Discharge From Therapy: Discharge Secondary to Goals Met    DC Equipment Recommendations: None  Discharge Recommendations: (Outpatient cardiac rehab as appropriate.)     Objective     11/13/23 0946   Precautions   Precautions Sternal Precautions (See Comments);Cardiac Precautions (See Comments)   Vitals   Patient BP Position Sitting (post ambulation)   Blood Pressure (!) 168/95   Pain 0 - 10 Group   Therapist Pain Assessment During Activity;Nurse Notified (Not quantified)   Cognition    Cognition / Consciousness X   Level of Consciousness Alert   Comments Cooperative, somewhat dismissive of cues & reminders of precautions, states he understands however requires reminders.  When cued to maintain sternal precautions pt responds \"No I know, it doesn't hurt.\"   Other Treatments   Other Treatments Provided Reinforced education on sternal precuations, importance of multiple short walks a day/home walking program, & talk test.  Educated on proper use of IS (per RN only pulling 500 with incorrect use).   Balance   Sitting Balance (Static) Fair +   Sitting Balance (Dynamic) Fair   Standing Balance " "(Static) Fair   Standing Balance (Dynamic) Fair   Weight Shift Sitting Good   Weight Shift Standing Good   Skilled Intervention Verbal Cuing   Bed Mobility    Supine to Sit Supervised   Sit to Supine Supervised   Skilled Intervention Verbal Cuing   Comments HOB flat, no rails   Gait Analysis   Gait Level Of Assist Supervised   Assistive Device None   Distance (Feet) 50   # of Times Distance was Traveled 4   Weight Bearing Status No restrictions   Skilled Intervention Verbal Cuing   Comments Standing rest breaks during ambulation, required frequent cues for appropriate pacing to increase tolerance for ambulation.   Functional Mobility   Sit to Stand Supervised   Bed, Chair, Wheelchair Transfer Supervised   Mobility bed mobility, ambulation   Skilled Intervention Verbal Cuing   Activity Tolerance   Sitting in Chair Pre & post session   Standing 8-10 min   Short Term Goals    Short Term Goal # 1 The pt will demo supine<>sit EOB spv w/ HOB flat and no rails in 6 visits for independence w/ bed mobility tasks.   Goal Outcome # 1 Goal met   Short Term Goal # 2 The pt will demo stand-step txr EOB<>chair w/ FWW spv in 6 visits for OOB mobility.   Goal Outcome # 2 Goal met   Short Term Goal # 3 The pt will demo gait >150' using FWW spv in a moving environment in 6 visits for household ambulation.   Goal Outcome # 3 Goal met   Short Term Goal # 4 The pt will demo proper use of RPE scale, \"talk test\" and/or HR to monitor and progress exercise independently in 6 visits.   Goal Outcome # 4 Goal met   Physical Therapy Treatment Plan   Reason For Discharge Discharge Secondary to Goals Met       "

## 2023-11-13 NOTE — CARE PLAN
The patient is Watcher - Medium risk of patient condition declining or worsening    Shift Goals  Clinical Goals: monitor heart rhythm, promote mobility and self care  Patient Goals: have a good day again tomorrow  Family Goals: FARIDA    Progress made toward(s) clinical / shift goals:    Problem: Pain - Standard  Goal: Alleviation of pain or a reduction in pain to the patient’s comfort goal  Outcome: Progressing     Problem: Skin Integrity  Goal: Skin integrity is maintained or improved  Outcome: Progressing       Patient is not progressing towards the following goals:      Problem: Post Op Day 10 CABG/Heart Valve Replacement  Goal: Optimal care of the Post Op CABG/Heart Valve replacement Post Op Day 5  Outcome: Not Progressing  Note: Pt refusing 4 daily walks

## 2023-11-13 NOTE — PROGRESS NOTES
"0703 - Report received from Sofiya KINCAID at patient's bedside. Patient resting in bed quietly with no complaints at this time. Telemetry monitor intact et functioning. Call light and belongings within reach, safety measures intact, white board updated.     0800 - Patient declines to take medications until food comes. IS pull only at 500. Patient reinstructed on proper usage.     0930 - Patient given snack and took AM medications. Attempted to discuss relationship with breathing/IS/atelectasis/activity. Patient ambulated with PT. Encouraged IS, only pulls 500. Attempts x 3, then sets down.     1035 - Patient off floor to HD    1410 - Patient returned to room from HD. Spoke with HD RN, who reported patient had low BP during session. Discussed that AM BP was elevated with systolic   and medication needed to be given due to aortic repair and atrial fibrillation. Reported to Carmina TOLLIVER. Encouraged IS, only pulls 500. Attempts x 3, then sets down.     1550 - Off floor to CXR    1620 - Returned to room.     1800 - RN entered room to give PM medications. Patient was on the phone, appears upset. States to RN, \"You need to call my nephrologist\" RN attempted to explain to patient that external providers don't govern care while patients are admitted to the hospital. Patient upset abut retaining fluid to legs and weight gain. States that he is currently 20 kg overweight. RN again attempted to explain that patient had HD today with fluid removal but hypotension prevented fluid loss, to which patient responded \"Yeah because you gave me too much blood pressure medication and didn't know I was on dialysis.\" RN left room.     1815 - RN returned with additional RN, Sofiya and discussed with patient his healthcare plan including risk of aorta damage with elevated BP and also set boundaries for speaking to staff even if patient is upset. Patient took medications and agreed to ambulate with RN.    1830 - Patient ambulated " with RN. Encouraged IS, only pulls 500. Attempts x 3, then sets down.     1940 - Reported off to Jacquie KINCAID.

## 2023-11-13 NOTE — CARE PLAN
The patient is Stable - Low risk of patient condition declining or worsening    Shift Goals  Clinical Goals: monitor heart rhythm, promote mobility and self care  Patient Goals: have a good day again tomorrow  Family Goals: FARIDA    Progress made toward(s) clinical / shift goals:  Encourage patient to participate in care, progressive mobilization, IS, DC planning      Problem: Knowledge Deficit - Standard  Goal: Patient and family/care givers will demonstrate understanding of plan of care, disease process/condition, diagnostic tests and medications  Outcome: Progressing     Problem: Post Op Day 5 CABG/Heart Valve Replacement  Goal: Optimal care of the Post Op CABG/Heart Valve replacement Post Op Day 5  Outcome: Progressing  Intervention: Ambulate 4 times daily, increasing the distance each time     Problem: Hemodynamics  Goal: Patient's hemodynamics, fluid balance and neurologic status will be stable or improve  Outcome: Progressing     Problem: Respiratory  Goal: Patient will achieve/maintain optimum respiratory ventilation and gas exchange  Outcome: Progressing  Flowsheets  Taken 11/13/2023 0810 by Evelyn Valentine, R.N.  Incentive Spirometer:   Weak Effort   Ineffective, Needs Coaching  Deep Breathe and Cough: Performs Correctly  Taken 11/13/2023 0652 by Getachew Paredes  O2 Delivery Device: Silicone Nasal Cannula  Taken 11/12/2023 0000 by Sofiya Villarreal RSAAD  Incentive Spirometer Volume: 1000 mL     Problem: Nutrition - Advanced  Goal: Patient will display progressive weight gain toward goal have adequate food and fluid intake  Outcome: Progressing     Problem: Self Care  Goal: Patient will have the ability to perform ADLs independently or with assistance (bathe, groom, dress, toilet and feed)  Outcome: Progressing     Problem: Bowel Elimination - Post Surgical  Goal: Patient will resume regular bowel sounds and function with no discomfort or distention  Outcome: Progressing  Flowsheets (Taken 11/12/2023 2100 by  Sofiya Villarreal R.N.)  Last BM: 11/12/23     Problem: Pain - Standard  Goal: Alleviation of pain or a reduction in pain to the patient’s comfort goal  Outcome: Progressing  Flowsheets (Taken 11/13/2023 0810)  Non Verbal Scale:   Calm   Sleeping     Problem: Skin Integrity  Goal: Skin integrity is maintained or improved  Outcome: Progressing     Problem: Fall Risk  Goal: Patient will remain free from falls  Outcome: Progressing       Patient is not progressing towards the following goals:

## 2023-11-13 NOTE — PROGRESS NOTES
Pt takes on and off-RN educated pt on need to leave oxygen on and not keep taking if off. Pt continues to remove oxygen, A&Ox4. RN will continue to educate and promote need to continue to wear oxygen and not continually remove it.

## 2023-11-13 NOTE — PROGRESS NOTES
Assumed care of pt. Bedside report received from RN. Pt was updated on plan of care. AOx4. Pt pain level 0/10. Pt is on telemetry, SR 86. Call light, phone and personal belongings in reach. Bed alarm off d/t pt is up self and low fall risk,  bed in lowest position, and locked. Pt sitting in chair next to bed.

## 2023-11-13 NOTE — PROGRESS NOTES
"[facility] -  PROGRESS NOTE               Author: Philipp Vaz M.D. Date & Time: 11/13/2023  12:12 PM     HPI:  58 y.o. male with  chronic anabolic steroid use, ESRD sec to to Bx proven sec FSGS currently on PD and compeltes three excahnges/ day, moderate to moderate AS and  moderate to severe AR, HTN presented for aortic valve and ascending anerysm repair. Pt had post operative complication of tamponade and is back in OR. Pt has received signficant colloid/ IVF for support.     DAILY NEPHROLOGY SUMMARY:  11/4- Patient intolerant to HD last night  11/5- Patient s/p PD last night. Reports shortness of breath but better then yesterday  11/6: pt had PD done with 214cc UF removed. 1000cc drainage noted from chest tube overnight. Cardiology concerned for PD fluid draining from chest tube.  11/7: pt had PUF yesterday w 2L removed. Had HD again today with 2.5L removed. Pt feels much better.  11/8: pt scheduled for HD again. Feeling okay  11/9: pt had HD with 2L UF removed. Feels nauseous this AM from amiodarone.   11/10: seen on HD and tolerating. Awaiting permcath placement  11/11: permcath placed, pt feels well.   11/12: pt seen eating breakfast this AM. No complaints.  11/13: seen on HD, BP low 80/52 - was given BP meds this am for high BP, has gross anasarca and 4+ pitting edema B/L LE     REVIEW OF SYSTEMS:    Denies SOB, no CP, no V  10 point ROS reviewed and is as per HPI/daily summary or otherwise negative    PMH/PSH/SH/FH: Reviewed and unchanged since admission note  CURRENT MEDICATIONS: Reviewed from admission to present day    VS:  BP (!) 168/95   Pulse 89   Temp 36.6 °C (97.9 °F) (Temporal)   Resp 17   Ht 1.702 m (5' 7\")   Wt 96.3 kg (212 lb 4.9 oz)   SpO2 97%   BMI 33.25 kg/m²     Exam  General: Awake, no acute distress  HEENT: head normocephalic, atruamtic  Neck: neck supple, no visible masses  Respiratory: clear to auscultation bilaterally, no rales, no ronchi, or " wheezing  Cardiac: normal rate, normal rhythm,  Abdomen: non tender, non-distended, no guarding  Musculoskelatal: no joint tenderness, moves limbs spontaneously  Extremities: no significant joint abnormalities, +++ edema  Skin: no lesions, no rashes noted    Access: R IJ HD permcatheter, Abd PD catheter      Fluids:  In: 800 [P.O.:800]  Out: -     LABS:  Recent Labs     11/11/23  0030 11/12/23  0049 11/13/23  0046   SODIUM 133* 132* 125*   POTASSIUM 4.4 4.5 4.4   CHLORIDE 95* 94* 87*   CO2 27 26 22   GLUCOSE 140* 111* 118*   BUN 39* 56* 68*   CREATININE 6.18* 7.70* 8.68*   CALCIUM 8.1* 8.1* 8.1*       IMPRESSION:    # ESRD on outpt PD    - Etiology likely 2/2 FSGS    - s/p permcath on 11/10 for temp outpt HD   # Volume overload, improving  # S/P AVR/ Ascending aneurysm repair     -High chest tube drainage only noted at night while on PD     -concern for PD fluid leaking through into chest cavity per cards         On 11/6     -CVT recommending at least 2 weeks of PD avoidance to allow          Chest/ABD cavity to heal  # Anemia of CKD     EPO 6000U IV w HD to start 11/10  # CKD-MBD  # Acute Respiratoy failure on vent, extubated    PLAN:  - HD today and will try to remove as much UF but may be limited due to low BP - Cont iHD q MWF  - Hold BP meds in AM of dialysis   - Plan additional PUF tomorrow and Thursday to help address fluid overload and anasarca  - cont EPO  - transfusion as per primary team  - Dose all medications as per ESRD    Dispo: Renal SW awaiting TB clearance for placement at [facility]    Please page nephrology with any questions or concerns

## 2023-11-13 NOTE — PROGRESS NOTES
Cardiovascular Surgery Progress Note    Name: Gurdeep Guerra  MRN: [id number]  : 1965  Admit Date: 11/3/2023  4:59 AM  10 Days Post-Op     Procedure:  Procedure(s) and Anesthesia Type:  *AORTIC VALVE REPLACEMENT (23 mm On-X mechanical valve), ASCENDING AORTIC ANEURYSM REPAIR (30 mm Hemashield tube graft) and intraoperative transesophageal echocardiography    *MEDIASTINAL EXPLORATION FOR POSTOPERATIVE BLEEDING AND RESTORATION OF HEMOSTASIS    Vitals:  Vitals:    23 0112 23 0304 23 0652 23 0919   BP:  (!) 129/92 (!) 158/101 (!) 163/95   Pulse:  (!) 114 (!) 103 89   Resp:  17 17    Temp:  37.1 °C (98.8 °F) 36.6 °C (97.9 °F)    TempSrc:  Temporal Temporal    SpO2:  97% 97%    Weight: 96.3 kg (212 lb 4.9 oz)      Height:          Temp (24hrs), Av.9 °C (98.4 °F), Min:36.6 °C (97.9 °F), Max:37.2 °C (99 °F)      Respiratory:    Respiration: 17, Pulse Oximetry: 97 %       Fluids:    Intake/Output Summary (Last 24 hours) at 2023 1042  Last data filed at 2023 0900  Gross per 24 hour   Intake 1050 ml   Output --   Net 1050 ml       Admit weight: Weight: 84.6 kg (186 lb 8.2 oz)  Current weight: Weight: 96.3 kg (212 lb 4.9 oz) (23 0112)    Labs:  Recent Labs     23  0030 23  0049 23  0046   WBC 8.0 8.6 8.9   RBC 2.75* 2.50* 2.66*   HEMOGLOBIN 8.8* 8.1* 8.4*   HEMATOCRIT 26.9* 24.8* 25.4*   MCV 97.8 99.2* 95.5   MCH 32.0 32.4 31.6   MCHC 32.7 32.7 33.1   RDW 55.5* 55.3* 52.0*   PLATELETCT 204 217 266   MPV 10.2 9.7 9.9       Recent Labs     23  0030 239 23   SODIUM 133* 132* 125*   POTASSIUM 4.4 4.5 4.4   CHLORIDE 95* 94* 87*   CO2 27 26 22   GLUCOSE 140* 111* 118*   BUN 39* 56* 68*   CREATININE 6.18* 7.70* 8.68*   CALCIUM 8.1* 8.1* 8.1*       Recent Labs     23  0030 239 23   INR 1.37* 1.34* 1.43*         HgbA1c:  5    Diabetic Educator Consult:  no    Medications:  Scheduled Medications   Medication  Dose Frequency    carvedilol  25 mg BID WITH MEALS    pantoprazole  40 mg BID    atorvastatin  40 mg Nightly    amiodarone  200 mg BID    amoxicillin-clavulanate  1 Tablet Q24HRS    epoetin  6,000 Units MO, WE + FR    warfarin  2.5 mg DAILY AT 1800    tamsulosin  0.4 mg AFTER BREAKFAST    MD Alert...Warfarin per Pharmacy   PHARMACY TO DOSE    gabapentin  200 mg Q DAY    Nozin nasal  swab  1 Applicator BID    aspirin  81 mg DAILY    Pharmacy Consult Request  1 Each PHARMACY TO DOSE    acetaminophen  1,000 mg Q6HRS    senna-docusate  2 Tablet BID    And    polyethylene glycol/lytes  1 Packet DAILY    And    magnesium hydroxide  30 mL DAILY    heparin  5,000 Units Q8HRS        Exam:   Physical Exam  Vitals and nursing note reviewed.   Constitutional:       General: He is not in acute distress.     Appearance: Normal appearance.   HENT:      Head: Normocephalic.      Right Ear: External ear normal.      Left Ear: External ear normal.      Nose: Nose normal. No congestion.      Mouth/Throat:      Mouth: Mucous membranes are moist.      Pharynx: Oropharynx is clear.   Eyes:      Extraocular Movements: Extraocular movements intact.      Pupils: Pupils are equal, round, and reactive to light.   Cardiovascular:      Rate and Rhythm: Normal rate and regular rhythm.      Pulses: Normal pulses.      Heart sounds: Normal heart sounds.   Pulmonary:      Effort: Pulmonary effort is normal.      Breath sounds: Decreased breath sounds present.   Abdominal:      General: Bowel sounds are decreased. There is no distension.      Palpations: Abdomen is soft.      Comments: PD catheter   Musculoskeletal:      Cervical back: Normal range of motion and neck supple. No tenderness.      Right lower leg: Edema present.      Left lower leg: Edema present.   Skin:     General: Skin is warm and dry.      Comments: Midline surgical incision   Neurological:      General: No focal deficit present.      Mental Status: He is alert and  oriented to person, place, and time. Mental status is at baseline.   Psychiatric:         Mood and Affect: Mood normal.         Behavior: Behavior normal.         Thought Content: Thought content normal.         Cardiac Medications:    ASA - Yes    Plavix - No; contraindicated because of On Coumadin / NOAC    Post-operative Beta Blockers - Yes    Ace/ARB- No; contraindicated because of Normal EF    Statin - No; contraindicated because of No CAD    Aldactone- No; contraindicated because of Normal EF    SGLT2i-  No contraindicated because of No; contraindicated because of Normal EF    Ejection Fraction:  60%    Telemetry:   11/4 SR- a fib overnight  11/5 SR  11/6 SR/Aflutter  11/7 Junctional/flutter  11/8 SR  11/9 Converted to SR this AM  11/10 SR/ST  11/11 Aflutter 100-120s  11/12 Afib/flutter 90-110s  11/13 a fib 90s    Assessment/Plan:  POD 1  HDS, SR- was a fib overnight, on amio gtt, neuro intact, wounds intact, abdomen soft, fluid balance positive, wt up,  2 L NC. 270 mL out of chest tubes overnight after return to OR. H/H 7.8/22.4, plat 83. Plan: One unit PRBCs this morning. Keep chest tubes and pacing wires. DC amio gtt, continue PO. Dialysis per neph. IS/ambulate.     POD 2  HTN, SR, neuro intact, wounds intact, abdomen soft no BM, fluid balance positive, wt up, 2 L NC. 590 mL out of chest tubes overnight. 500 off with PD last night. Pacing wires removed.  Plan:  Keep chest tubes for moderate output. Hold coumadin one more day. Resume verapamil, IS/ambulate. Transfer to tele.     POD 3 HOTN- transfer back to [facility]- start pressors, SR/flutter- non-sustained- on amio PO, Oxygen demands up- fluid overload, ESRD- concern PD dilalysate came out of mediastinal CT- will have to convert to HD until peritoneum heals and scars down, Risk for mediastinitis/sepsis- pan culture- send CT fluid for culture- start Zoysn/vanco, LUE swelling/ecchymosis - ck US    POD 4 HOTN- on levo- during HD, Aflutter/junctional- on amio gtt-  cont, HD today- removing fluid, leukocytosis- improved, normal lactate, Cont antibiotics- for high risk of mediastinitis, d/c mediastinal tubes    POD 5 HTN, SR.  Amio gtt.  HD for 2 weeks due to poss mediastinitis r/t CAPD fluid.  Normal WBC, cx negative.  Empiric abx.  Small amount of sanguinous drainage on the superior aspect of the wound.  Hgb 8.5 Cr 6.48.  Plan: Transfer to [facility].  Wean oxygen.  Add coreg.      POD 6 HDS. SR.  Neuro intact.  Wounds-drainage improved.  WBC down.  HD MWF per neph.  Refused IR today due to nausea/feeling poorly overnight, would like to proceed tomorrow.  Plan: Resume cardiac diet today, NPO at midnight.  Hold ASA, lovenox, coumadin for IR tomorrow.  Half of amio dosage per Evangelist to see if he tolerates it with less nausea.      POD 7 Seen in HD.  HDS. SR/ST.  Neuro intact.  Wounds CDI-no drainage.  Cr 7.78 Hgb 8.6.  CXR with small left effusion.  Tunneled dialysis cath today.  Plan: Tunneled dialysis catheter today.  Increase coreg.  Repeat CXR tomorrow, thora if indicated.      POD 8 HDS. Afib 120s.  Back on 3LNC.  Neuro intact.  Wounds CDI- no drainage.  Cr 6.18 Hgb 8.8-improved.  Verbalizing small amount of blood on stool unable to been seen by RN at the time, blood thinners have been held last 2 days for procedure with increase in Hgb today.  Plan: Check guiac.  Restart amio gtt and bolus.  Restart coumadin for mechanical valve-discussed with IR. Monitor stools.  Thoracentesis if pleural effusion large enough.      POD 9 HDS.  Afib 90-110s.  1LNC.  Neuro intact.  Wounds CDI.  Thora yesterday 400ml off.  Cr 7.7 Hgb 8.1-watch.  Abdomen distension. Abdominal xray negative, no fever/leukocytosis.  Plan: Increase coreg.  Repeat amio bolus.  Wean oxygen.      POD 10  HTN, a fib rate controlled, neuro intact, wounds intact, abdomen soft +BM, fluid balance negative, wt up,  2 L NC. Pt c/o SOB and cough.  Plan:  Resume home verapamil. 2 view CXR, add tessalon and mucinex. HD per  nephrology, IS/ambulate.     Disposition:  PT/OT no needs  Outpatient dialysis chair available Thursday for HD

## 2023-11-13 NOTE — PROGRESS NOTES
[facility] Medicine Daily Progress Note - consultation    Date of Service  11/12/2023    Chief Complaint  Gurdeep Guerra is a 58 y.o. male admitted 11/3/2023 with shortness of breath, left pleural effusion, concern for GI bleed    Hospital Course  No notes on file    58 y.o. 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  Patient has been followed by nephrology and has been receiving hemodialysis while inpatient.  On 11/11/2023, concern for left-sided pleural effusion as well as questionable GI bleed, therefore medicine service was consulted to continue following up with patient and for assistance with management of chronic comorbidities.     Interval Problem Update  Patient was seen and examined at bedside.  I have personally reviewed and interpreted vitals, labs, and imaging.    11/12.  Afebrile.  Tachycardic.  On 1-3 L nasal cannula.  Denies fevers, chills, chest pains, shortness of breath.  Had some abdominal bloating last night.  Earlier this afternoon there was concern for ST elevations on telemetry.  EKG reviewed and interpreted as nonspecific ST changes.  Patient was not having chest pain.  Status post thoracentesis yesterday with 400 cc removed.  Guaiac still pending.  Ordered iron panel with morning labs.  Continue Coumadin dosed by pharmacy.  No dark/tarry stools.  Trend hemoglobin closely.    I have discussed this patient's plan of care and discharge plan at IDT rounds today with Case Management, Nursing, Nursing leadership, and other members of the IDT team.    Consultants/Specialty  cardiovascular surgeon and nephrology    Code Status  Full Code    Disposition  Medically Cleared  I have placed the appropriate orders for post-discharge needs.    Review of Systems  Review of Systems   Gastrointestinal:   Positive for abdominal pain and constipation.        Physical Exam  Temp:  [36.6 °C (97.9 °F)-37.3 °C (99.1 °F)] 36.9 °C (98.4 °F)  Pulse:  [] 110  Resp:  [16-18] 16  BP: (100-158)/() 128/78  SpO2:  [91 %-99 %] 97 %    Physical Exam  Vitals and nursing note reviewed.   Constitutional:       Appearance: Normal appearance. He is obese. He is ill-appearing.   HENT:      Head: Normocephalic and atraumatic.      Nose: Nose normal.      Mouth/Throat:      Mouth: Mucous membranes are moist.      Pharynx: Oropharynx is clear.   Eyes:      Extraocular Movements: Extraocular movements intact.      Conjunctiva/sclera: Conjunctivae normal.   Cardiovascular:      Rate and Rhythm: Regular rhythm. Tachycardia present.      Pulses: Normal pulses.      Heart sounds: Normal heart sounds. No murmur heard.     No friction rub. No gallop.   Pulmonary:      Effort: Pulmonary effort is normal. No respiratory distress.      Breath sounds: Normal breath sounds. No wheezing or rales.   Chest:      Chest wall: No tenderness.   Abdominal:      General: Abdomen is flat. Bowel sounds are normal. There is no distension.      Palpations: Abdomen is soft. There is no mass.      Tenderness: There is no abdominal tenderness. There is no guarding.   Musculoskeletal:         General: Normal range of motion.      Cervical back: Normal range of motion and neck supple.      Comments: HD catheter   Skin:     General: Skin is warm.      Capillary Refill: Capillary refill takes less than 2 seconds.   Neurological:      General: No focal deficit present.      Mental Status: He is alert and oriented to person, place, and time. Mental status is at baseline.      Cranial Nerves: No cranial nerve deficit.      Motor: No weakness.   Psychiatric:         Mood and Affect: Mood normal.         Behavior: Behavior normal.         Fluids  No intake or output data in the 24 hours ending 11/12/23 1626    Laboratory  Recent Labs     11/10/23  1230  11/11/23 0030 11/12/23 0049   WBC 8.6 8.0 8.6   RBC 2.65* 2.75* 2.50*   HEMOGLOBIN 8.6* 8.8* 8.1*   HEMATOCRIT 25.5* 26.9* 24.8*   MCV 96.2 97.8 99.2*   MCH 32.5 32.0 32.4   MCHC 33.7 32.7 32.7   RDW 52.3* 55.5* 55.3*   PLATELETCT 163* 204 217   MPV 10.2 10.2 9.7     Recent Labs     11/10/23  0118 11/11/23 0030 11/12/23 0049   SODIUM 132* 133* 132*   POTASSIUM 4.5 4.4 4.5   CHLORIDE 94* 95* 94*   CO2 27 27 26   GLUCOSE 111* 140* 111*   BUN 57* 39* 56*   CREATININE 7.78* 6.18* 7.70*   CALCIUM 7.8* 8.1* 8.1*     Recent Labs     11/10/23  0118 11/11/23 0030 11/12/23  0049   INR 1.46* 1.37* 1.34*               Imaging  US-THORACENTESIS PUNCTURE LEFT   Final Result      1. Ultrasound guided left sided therapeutic thoracentesis.      2. 400 mL of fluid withdrawn.      DX-CHEST-PORTABLE (1 VIEW)   Final Result      1.  No significant change from prior exam.   2.  No pneumothorax.      RC-[id number] VIEW   Final Result      1.  No evidence of bowel obstruction.   2.  Mildly increased colonic stool.      DX-CHEST-2 VIEWS   Final Result         1.  Left midlung and lower lobe infiltrate stable to slightly increased.   2.  Layering left pleural effusion, similar to prior study.   3.  Cardiomegaly      IR-TORRES,GROSHONG PLACEMENT >5   Final Result      1. Ultrasound and fluoroscopic guided placement of a right internal jugular 14.5 Danish HemoSplit tunneled dialysis catheter.      2. The hemodialysis catheter may be used immediately as clinically indicated. Flushes per protocol.      3. Removal of the right-sided temporary dialysis catheter.         DX-CHEST-2 VIEWS   Final Result      Stable appearance of the chest.      DX-CHEST-PORTABLE (1 VIEW)   Final Result      1.  Bibasilar underinflation atelectasis which could obscure an additional process. This has increased on the LEFT and is similar to the prior on the RIGHT.   2.  Suspect small LEFT pleural effusion   3.  Persistently enlarged cardiac silhouette       US-EXTREMITY VENOUS UPPER UNILAT LEFT   Final Result      DX-CHEST-PORTABLE (1 VIEW)   Final Result      1.  Low lung volumes. Bibasilar atelectasis.      DX-CHEST-PORTABLE (1 VIEW)   Final Result      1.  Tubes and lines in good position.      2.  Right basilar atelectasis.      DX-CHEST-PORTABLE (1 VIEW)   Final Result      1.  Mild cardiomegaly.      2.  Tubes and lines in good position.      3.  Right basilar atelectasis.      EC-CARMELITA W/O CONT   Final Result      EC-ECHOCARDIOGRAM LTD W/O CONT   Final Result      DX-CHEST-PORTABLE (1 VIEW)   Final Result      Satisfactory postoperative appearance of the chest with BILATERAL perihilar and LEFT basilar opacities which are probably areas of atelectasis           Assessment/Plan  * Aortic stenosis- (present on admission)  Assessment & Plan  11/12/2023  S/p mechanical valve replacement by CTS 11/3  Warfarin, dosing as per pharmacy to target therapeutic INR goal    Pleural effusion on left  Assessment & Plan  11/12/2023  Agree with US-guided thoracocentesis  HD as per nephrology    Insomnia  Assessment & Plan  11/12/2023  Temazepam    GIB (gastrointestinal bleeding)  Assessment & Plan  11/12/2023  Questionable bloody stool  Check fecal occult blood  IV protonix for now  Continue warfarin given presence of mechanical aortic valve    Paroxysmal A-fib (HCC)  Assessment & Plan  11/12/2023  Postop mechanical AVR and AAA repair  Currently on amiodarone drip  Coreg, warfarin    AAA (abdominal aortic aneurysm) ([facility])  Assessment & Plan  11/12/2023  S/p repair by CTS 11/3  CTS f/u appreciated    ESRD (end stage renal disease) ([facility])  Assessment & Plan  11/12/2023  Was on peritoneal dialysis  Currently has right IJ temp HD cath  Hemodialysis as per nephrology  Nephrology follow-up appreciated    Dyslipidemia- (present on admission)  Assessment & Plan  11/12/2023  Statin    Coronary artery disease due to lipid rich plaque  Assessment & Plan  Optimize CAD meds: Warfarin, ASA, BB,  statin         VTE prophylaxis: Coumadin    I have performed a physical exam and reviewed and updated ROS and Plan today (11/12/2023). In review of yesterday's note (11/11/2023), there are no changes except as documented above.      Greater than 51 minutes spent prepping to see patient (e.g. review of tests) obtaining and/or reviewing separately obtained history. Performing a medically appropriate examination and/ evaluation.  Counseling and educating the patient/family/caregiver.  Ordering medications, tests, or procedures.  Referring and communicating with other health care professionals.  Documenting clinical information in EPIC.  Independently interpreting results and communicating results to patient/family/caregiver.  Care coordination.

## 2023-11-13 NOTE — PROGRESS NOTES
Per monitors pt converted to SR at approx 1715. UNRULY Barfield notified. Received orders to continue IV amio for 12 more hours, if patient remains in SR then amio can be discontinued after the 12 hours. If patient converts back into afib during this time then keep the amio gtt running

## 2023-11-13 NOTE — CARE PLAN
Problem: Knowledge Deficit - Standard  Goal: Patient and family/care givers will demonstrate understanding of plan of care, disease process/condition, diagnostic tests and medications  Outcome: Progressing  Note: Pt's whiteboard is updated, pt has been updated on POC, all questions have been answered at this time      Problem: Pain - Standard  Goal: Alleviation of pain or a reduction in pain to the patient’s comfort goal  Outcome: Progressing  Note: Patient denies pain/pain interventions      Problem: Fall Risk  Goal: Patient will remain free from falls  Outcome: Progressing  Note: Bed locked in lowest position. Treaded socks in use. Call light and belongings within reach. Pt educated to call for assistance. Pt verbalized understanding. Hourly rounding in place.    The patient is Watcher - Medium risk of patient condition declining or worsening    Shift Goals  Clinical Goals: monitor labs, titrate o2, OHS ADL  Patient Goals: sleep  Family Goals: FARIDA    Progress made toward(s) clinical / shift goals:  ambulate, rest, shower     Patient is not progressing towards the following goals:

## 2023-11-13 NOTE — DISCHARGE PLANNING
HTH/SCP TCN chart review completed. Collaborated with JOSE Ureña prior to meeting with the pt. The most current review of medical record, knowledge of pt's PLOF and social support, LACE+ score of 50, 6 clicks scores of 23 ADL's and 17 mobility were considered.      Pt seen at bedside. Introduced TCN program. Provided education regarding post acute levels of care. Discussed HTH/SCP plan benefits (Meds to Beds, medical uber and [facility] transitional care). Pt verbalizes understanding.     Patient states he lives in a first floor apartment, no steps with his s/o and was Independent with ADL's, IADL's, mobility (no AD) and driving at his baseline.  He denies any concerns with food, housing or transportation and states he is near his baseline level of function.      TCN will continue to follow and collaborate with discharge planning team as additional post acute needs arise. Thank you.     Completed today:  PT recommends outpatient cardiac rehab on 11/09/23 (as directed by appropriate).    OT with recommendations- Anticipate that the patient will have no further occupational therapy needs on 11/09/23 after discharge from the hospital.    Choice obtained: None.  See above.    [facility] with a Non-[facility] PCP.  Patient states he  will schedule his own Follow up appointment.

## 2023-11-14 PROBLEM — F41.9 ANXIETY: Status: ACTIVE | Noted: 2023-11-14

## 2023-11-14 LAB
ANION GAP SERPL CALC-SCNC: 13 MMOL/L (ref 7–16)
BUN SERPL-MCNC: 49 MG/DL (ref 8–22)
CALCIUM SERPL-MCNC: 7.8 MG/DL (ref 8.5–10.5)
CHLORIDE SERPL-SCNC: 92 MMOL/L (ref 96–112)
CO2 SERPL-SCNC: 24 MMOL/L (ref 20–33)
CREAT SERPL-MCNC: 6.77 MG/DL (ref 0.5–1.4)
ERYTHROCYTE [DISTWIDTH] IN BLOOD BY AUTOMATED COUNT: 52.3 FL (ref 35.9–50)
GFR SERPLBLD CREATININE-BSD FMLA CKD-EPI: 9 ML/MIN/1.73 M 2
GLUCOSE SERPL-MCNC: 97 MG/DL (ref 65–99)
HCT VFR BLD AUTO: 25 % (ref 42–52)
HGB BLD-MCNC: 8.1 G/DL (ref 14–18)
INR PPP: 1.68 (ref 0.87–1.13)
MCH RBC QN AUTO: 30.8 PG (ref 27–33)
MCHC RBC AUTO-ENTMCNC: 32.4 G/DL (ref 32.3–36.5)
MCV RBC AUTO: 95.1 FL (ref 81.4–97.8)
PLATELET # BLD AUTO: 291 K/UL (ref 164–446)
PMV BLD AUTO: 9.7 FL (ref 9–12.9)
POTASSIUM SERPL-SCNC: 4.5 MMOL/L (ref 3.6–5.5)
PROTHROMBIN TIME: 20 SEC (ref 12–14.6)
RBC # BLD AUTO: 2.63 M/UL (ref 4.7–6.1)
SODIUM SERPL-SCNC: 129 MMOL/L (ref 135–145)
WBC # BLD AUTO: 8.7 K/UL (ref 4.8–10.8)

## 2023-11-14 PROCEDURE — 700102 HCHG RX REV CODE 250 W/ 637 OVERRIDE(OP): Performed by: NURSE PRACTITIONER

## 2023-11-14 PROCEDURE — A9270 NON-COVERED ITEM OR SERVICE: HCPCS | Performed by: STUDENT IN AN ORGANIZED HEALTH CARE EDUCATION/TRAINING PROGRAM

## 2023-11-14 PROCEDURE — A9270 NON-COVERED ITEM OR SERVICE: HCPCS | Performed by: NURSE PRACTITIONER

## 2023-11-14 PROCEDURE — 770020 HCHG ROOM/CARE - TELE (206)

## 2023-11-14 PROCEDURE — 99024 POSTOP FOLLOW-UP VISIT: CPT | Performed by: NURSE PRACTITIONER

## 2023-11-14 PROCEDURE — 99232 SBSQ HOSP IP/OBS MODERATE 35: CPT | Performed by: INTERNAL MEDICINE

## 2023-11-14 PROCEDURE — A9270 NON-COVERED ITEM OR SERVICE: HCPCS | Performed by: THORACIC SURGERY (CARDIOTHORACIC VASCULAR SURGERY)

## 2023-11-14 PROCEDURE — 80048 BASIC METABOLIC PNL TOTAL CA: CPT

## 2023-11-14 PROCEDURE — 700111 HCHG RX REV CODE 636 W/ 250 OVERRIDE (IP): Performed by: INTERNAL MEDICINE

## 2023-11-14 PROCEDURE — 90935 HEMODIALYSIS ONE EVALUATION: CPT

## 2023-11-14 PROCEDURE — 700102 HCHG RX REV CODE 250 W/ 637 OVERRIDE(OP): Performed by: STUDENT IN AN ORGANIZED HEALTH CARE EDUCATION/TRAINING PROGRAM

## 2023-11-14 PROCEDURE — 85610 PROTHROMBIN TIME: CPT

## 2023-11-14 PROCEDURE — 700102 HCHG RX REV CODE 250 W/ 637 OVERRIDE(OP): Performed by: THORACIC SURGERY (CARDIOTHORACIC VASCULAR SURGERY)

## 2023-11-14 PROCEDURE — 85027 COMPLETE CBC AUTOMATED: CPT

## 2023-11-14 PROCEDURE — 36415 COLL VENOUS BLD VENIPUNCTURE: CPT

## 2023-11-14 PROCEDURE — 700111 HCHG RX REV CODE 636 W/ 250 OVERRIDE (IP): Performed by: NURSE PRACTITIONER

## 2023-11-14 RX ORDER — ALPRAZOLAM 0.25 MG/1
0.25 TABLET ORAL NIGHTLY PRN
Status: DISCONTINUED | OUTPATIENT
Start: 2023-11-14 | End: 2023-11-15

## 2023-11-14 RX ORDER — CLONIDINE HYDROCHLORIDE 0.1 MG/1
0.1 TABLET ORAL EVERY 6 HOURS PRN
Status: DISCONTINUED | OUTPATIENT
Start: 2023-11-14 | End: 2023-11-17 | Stop reason: HOSPADM

## 2023-11-14 RX ORDER — MIDODRINE HYDROCHLORIDE 5 MG/1
2.5 TABLET ORAL 3 TIMES DAILY PRN
Status: DISCONTINUED | OUTPATIENT
Start: 2023-11-14 | End: 2023-11-17 | Stop reason: HOSPADM

## 2023-11-14 RX ORDER — HYDROXYZINE HYDROCHLORIDE 10 MG/1
10 TABLET, FILM COATED ORAL 3 TIMES DAILY PRN
Status: DISCONTINUED | OUTPATIENT
Start: 2023-11-14 | End: 2023-11-15

## 2023-11-14 RX ORDER — CARVEDILOL 3.12 MG/1
3.12 TABLET ORAL 2 TIMES DAILY WITH MEALS
Status: DISCONTINUED | OUTPATIENT
Start: 2023-11-14 | End: 2023-11-17 | Stop reason: HOSPADM

## 2023-11-14 RX ADMIN — GUAIFENESIN 600 MG: 600 TABLET, EXTENDED RELEASE ORAL at 16:43

## 2023-11-14 RX ADMIN — OMEPRAZOLE 20 MG: 20 CAPSULE, DELAYED RELEASE ORAL at 16:43

## 2023-11-14 RX ADMIN — AMOXICILLIN AND CLAVULANATE POTASSIUM 1 TABLET: 500; 125 TABLET, FILM COATED ORAL at 16:43

## 2023-11-14 RX ADMIN — HEPARIN SODIUM 5000 UNITS: 5000 INJECTION, SOLUTION INTRAVENOUS; SUBCUTANEOUS at 04:49

## 2023-11-14 RX ADMIN — LORAZEPAM 1 MG: 1 TABLET ORAL at 04:49

## 2023-11-14 RX ADMIN — WARFARIN SODIUM 2.5 MG: 2.5 TABLET ORAL at 16:43

## 2023-11-14 RX ADMIN — HEPARIN SODIUM 5000 UNITS: 5000 INJECTION, SOLUTION INTRAVENOUS; SUBCUTANEOUS at 20:56

## 2023-11-14 RX ADMIN — HEPARIN SODIUM 3700 UNITS: 1000 INJECTION, SOLUTION INTRAVENOUS; SUBCUTANEOUS at 16:21

## 2023-11-14 RX ADMIN — Medication 1 APPLICATOR: at 20:57

## 2023-11-14 RX ADMIN — AMIODARONE HYDROCHLORIDE 200 MG: 200 TABLET ORAL at 04:53

## 2023-11-14 RX ADMIN — ACETAMINOPHEN 1000 MG: 500 TABLET, FILM COATED ORAL at 15:24

## 2023-11-14 RX ADMIN — ATORVASTATIN CALCIUM 40 MG: 40 TABLET, FILM COATED ORAL at 20:56

## 2023-11-14 RX ADMIN — GUAIFENESIN 600 MG: 600 TABLET, EXTENDED RELEASE ORAL at 04:57

## 2023-11-14 RX ADMIN — AMIODARONE HYDROCHLORIDE 200 MG: 200 TABLET ORAL at 16:43

## 2023-11-14 RX ADMIN — TAMSULOSIN HYDROCHLORIDE 0.4 MG: 0.4 CAPSULE ORAL at 11:06

## 2023-11-14 RX ADMIN — ASPIRIN 81 MG: 81 TABLET, COATED ORAL at 04:50

## 2023-11-14 RX ADMIN — OMEPRAZOLE 20 MG: 20 CAPSULE, DELAYED RELEASE ORAL at 04:49

## 2023-11-14 ASSESSMENT — PAIN DESCRIPTION - PAIN TYPE: TYPE: ACUTE PAIN;SURGICAL PAIN

## 2023-11-14 ASSESSMENT — ENCOUNTER SYMPTOMS: NERVOUS/ANXIOUS: 1

## 2023-11-14 ASSESSMENT — FIBROSIS 4 INDEX: FIB4 SCORE: 1.34

## 2023-11-14 NOTE — PROGRESS NOTES
Cardiovascular Surgery Progress Note    Name: Gurdeep Guerra  MRN: [id number]  : 1965  Admit Date: 11/3/2023  4:59 AM  11 Days Post-Op     Procedure:  Procedure(s) and Anesthesia Type:  *AORTIC VALVE REPLACEMENT (23 mm On-X mechanical valve), ASCENDING AORTIC ANEURYSM REPAIR (30 mm Hemashield tube graft) and intraoperative transesophageal echocardiography    *MEDIASTINAL EXPLORATION FOR POSTOPERATIVE BLEEDING AND RESTORATION OF HEMOSTASIS    Vitals:  Vitals:    23 0000 23 0400 23 0453 23 0717   BP: 118/69 (!) 143/88 (!) 148/83 (!) 150/89   Pulse: 68 99 98 85   Resp: 20 20  18   Temp: 37.1 °C (98.8 °F) 36.9 °C (98.4 °F)  36.8 °C (98.2 °F)   TempSrc: Temporal Temporal  Temporal   SpO2: 95% 97%  91%   Weight:  95.7 kg (210 lb 15.7 oz)     Height:          Temp (24hrs), Av °C (98.6 °F), Min:36.8 °C (98.2 °F), Max:37.2 °C (99 °F)      Respiratory:    Respiration: 18, Pulse Oximetry: 91 %       Fluids:    Intake/Output Summary (Last 24 hours) at 2023 1022  Last data filed at 2023 0300  Gross per 24 hour   Intake 2100 ml   Output 2250 ml   Net -150 ml       Admit weight: Weight: 84.6 kg (186 lb 8.2 oz)  Current weight: Weight: 95.7 kg (210 lb 15.7 oz) (23 0400)    Labs:  Recent Labs     23  0049 23  0046 23  0136   WBC 8.6 8.9 8.7   RBC 2.50* 2.66* 2.63*   HEMOGLOBIN 8.1* 8.4* 8.1*   HEMATOCRIT 24.8* 25.4* 25.0*   MCV 99.2* 95.5 95.1   MCH 32.4 31.6 30.8   MCHC 32.7 33.1 32.4   RDW 55.3* 52.0* 52.3*   PLATELETCT 217 266 291   MPV 9.7 9.9 9.7       Recent Labs     23   SODIUM 132* 125* 129*   POTASSIUM 4.5 4.4 4.5   CHLORIDE 94* 87* 92*   CO2 26 22 24   GLUCOSE 111* 118* 97   BUN 56* 68* 49*   CREATININE 7.70* 8.68* 6.77*   CALCIUM 8.1* 8.1* 7.8*       Recent Labs     23   INR 1.34* 1.43* 1.68*         HgbA1c:  5    Diabetic Educator  Consult:  no    Medications:  Scheduled Medications   Medication Dose Frequency    verapamil  80 mg Q8HRS    guaiFENesin ER  600 mg Q12HRS    omeprazole  20 mg BID    atorvastatin  40 mg Nightly    amiodarone  200 mg BID    amoxicillin-clavulanate  1 Tablet Q24HRS    epoetin  6,000 Units MO, WE + FR    warfarin  2.5 mg DAILY AT 1800    tamsulosin  0.4 mg AFTER BREAKFAST    MD Alert...Warfarin per Pharmacy   PHARMACY TO DOSE    gabapentin  200 mg Q DAY    Nozin nasal  swab  1 Applicator BID    aspirin  81 mg DAILY    Pharmacy Consult Request  1 Each PHARMACY TO DOSE    senna-docusate  2 Tablet BID    And    polyethylene glycol/lytes  1 Packet DAILY    And    magnesium hydroxide  30 mL DAILY    heparin  5,000 Units Q8HRS        Exam:   Physical Exam  Vitals and nursing note reviewed.   Constitutional:       General: He is not in acute distress.     Appearance: Normal appearance.   HENT:      Head: Normocephalic.      Right Ear: External ear normal.      Left Ear: External ear normal.      Nose: Nose normal. No congestion.      Mouth/Throat:      Mouth: Mucous membranes are moist.      Pharynx: Oropharynx is clear.   Eyes:      Extraocular Movements: Extraocular movements intact.      Pupils: Pupils are equal, round, and reactive to light.   Cardiovascular:      Rate and Rhythm: Normal rate and regular rhythm.      Pulses: Normal pulses.      Heart sounds: Normal heart sounds.   Pulmonary:      Effort: Pulmonary effort is normal.      Breath sounds: Decreased breath sounds present.   Abdominal:      General: Bowel sounds are decreased. There is no distension.      Palpations: Abdomen is soft.      Comments: PD catheter   Musculoskeletal:      Cervical back: Normal range of motion and neck supple. No tenderness.      Right lower leg: Edema present.      Left lower leg: Edema present.   Skin:     General: Skin is warm and dry.      Comments: Midline surgical incision   Neurological:      General: No focal  deficit present.      Mental Status: He is alert and oriented to person, place, and time. Mental status is at baseline.   Psychiatric:         Mood and Affect: Mood normal.         Behavior: Behavior normal.         Thought Content: Thought content normal.         Cardiac Medications:    ASA - Yes    Plavix - No; contraindicated because of On Coumadin / NOAC    Post-operative Beta Blockers - Yes    Ace/ARB- No; contraindicated because of Normal EF    Statin - No; contraindicated because of No CAD    Aldactone- No; contraindicated because of Normal EF    SGLT2i-  No contraindicated because of No; contraindicated because of Normal EF    Ejection Fraction:  60%    Telemetry:   11/4 SR- a fib overnight  11/5 SR  11/6 SR/Aflutter  11/7 Junctional/flutter  11/8 SR  11/9 Converted to SR this AM  11/10 SR/ST  11/11 Aflutter 100-120s  11/12 Afib/flutter 90-110s  11/13 a fib 90s  11/14 SR 70's    Assessment/Plan:  POD 1  HDS, SR- was a fib overnight, on amio gtt, neuro intact, wounds intact, abdomen soft, fluid balance positive, wt up,  2 L NC. 270 mL out of chest tubes overnight after return to OR. H/H 7.8/22.4, plat 83. Plan: One unit PRBCs this morning. Keep chest tubes and pacing wires. DC amio gtt, continue PO. Dialysis per neph. IS/ambulate.     POD 2  HTN, SR, neuro intact, wounds intact, abdomen soft no BM, fluid balance positive, wt up, 2 L NC. 590 mL out of chest tubes overnight. 500 off with PD last night. Pacing wires removed.  Plan:  Keep chest tubes for moderate output. Hold coumadin one more day. Resume verapamil, IS/ambulate. Transfer to tele.     POD 3 HOTN- transfer back to [facility]- start pressors, SR/flutter- non-sustained- on amio PO, Oxygen demands up- fluid overload, ESRD- concern PD dilalysate came out of mediastinal CT- will have to convert to HD until peritoneum heals and scars down, Risk for mediastinitis/sepsis- pan culture- send CT fluid for culture- start Zoysn/vanco, LUE swelling/ecchymosis - ck  US    POD 4 HOTN- on levo- during HD, Aflutter/junctional- on amio gtt- cont, HD today- removing fluid, leukocytosis- improved, normal lactate, Cont antibiotics- for high risk of mediastinitis, d/c mediastinal tubes    POD 5 HTN, SR.  Amio gtt.  HD for 2 weeks due to poss mediastinitis r/t CAPD fluid.  Normal WBC, cx negative.  Empiric abx.  Small amount of sanguinous drainage on the superior aspect of the wound.  Hgb 8.5 Cr 6.48.  Plan: Transfer to tele.  Wean oxygen.  Add coreg.      POD 6 HDS. SR.  Neuro intact.  Wounds-drainage improved.  WBC down.  HD MWF per neph.  Refused IR today due to nausea/feeling poorly overnight, would like to proceed tomorrow.  Plan: Resume cardiac diet today, NPO at midnight.  Hold ASA, lovenox, coumadin for IR tomorrow.  Half of amio dosage per Evangelist to see if he tolerates it with less nausea.      POD 7 Seen in HD.  HDS. SR/ST.  Neuro intact.  Wounds CDI-no drainage.  Cr 7.78 Hgb 8.6.  CXR with small left effusion.  Tunneled dialysis cath today.  Plan: Tunneled dialysis catheter today.  Increase coreg.  Repeat CXR tomorrow, thora if indicated.      POD 8 HDS. Afib 120s.  Back on 3LNC.  Neuro intact.  Wounds CDI- no drainage.  Cr 6.18 Hgb 8.8-improved.  Verbalizing small amount of blood on stool unable to been seen by RN at the time, blood thinners have been held last 2 days for procedure with increase in Hgb today.  Plan: Check guiac.  Restart amio gtt and bolus.  Restart coumadin for mechanical valve-discussed with IR. Monitor stools.  Thoracentesis if pleural effusion large enough.      POD 9 HDS.  Afib 90-110s.  1LNC.  Neuro intact.  Wounds CDI.  Thora yesterday 400ml off.  Cr 7.7 Hgb 8.1-watch.  Abdomen distension. Abdominal xray negative, no fever/leukocytosis.  Plan: Increase coreg.  Repeat amio bolus.  Wean oxygen.      POD 10  HTN, a fib rate controlled, neuro intact, wounds intact, abdomen soft +BM, fluid balance negative, wt up,  2 L NC. Pt c/o SOB and cough.  Plan:   Resume home verapamil. 2 view CXR, add tessalon and mucinex. HD per nephrology, IS/ambulate.     POD 11 HDS- BP labile- will d/c verapamil- add coreg on non-dialysis days- add , SR- on PO amio, Wt - up/edema- HD today hopefully can pull fluid due to better BP, Plan to re-attempt PD on Friday, Amb/enc IS, wean oxygen    Disposition:  PT/OT no needs  Outpatient dialysis chair available Thursday for HD

## 2023-11-14 NOTE — PROGRESS NOTES
Inpatient Anticoagulation Service Note for 11/14/2023  Reason for Anticoagulation: Atrial Fibrillation, On-X Aortic Valve Replacement   Hemoglobin Value: (!) 8.1  Hematocrit Value: (!) 25  Lab Platelet Value: 291  Target INR: 2.0 to 3.0    INR from last 7 days       Date/Time INR Value    11/14/23 0136 1.68    11/13/23 0046 1.43    11/12/23 0049 1.34    11/11/23 0030 1.37    11/10/23 0118 1.46    11/09/23 0301 1.67    11/08/23 0327 1.44          Dose from last 7 days       Date/Time Dose (mg)    11/14/23 0818 2.5    11/13/23 0859 2.5    11/12/23 1213 2.5    11/11/23 1349 2.5    11/10/23 1315 0    11/09/23 1331 0    11/08/23 1046 0    11/07/23 1125 2.5          Average Dose (mg): TBD (new start warfarin therapy)  Significant Interactions: Amiodarone, Antibiotics, Antiplatelet Medications  Bridge Therapy: No (heparin prophylaxis dosing)   Reversal Agent Administered: Not Applicable  Comments: INR below goal. Noted bleed concern. H/H low. PLT improved. Warfarin interactions noted. Will give warfarin 2.5 mg today. INR with AM labs. Likely to need dose adjustments.    Plan:  warfarin 2.5 mg 11/14/23  Education Material Provided?: No (needs education prior to discharge)    Pharmacist suggested discharge dosing: warfarin 2.5 mg daily     Jarocho Sorensen, PharmD

## 2023-11-14 NOTE — PROGRESS NOTES
Monitor Summary  SR-Aflutter  Ectopy: PVC  Rate: 76-92  .20/.06/.38      Waiting for strip

## 2023-11-14 NOTE — PROGRESS NOTES
[facility] -  PROGRESS NOTE               Author: Philipp Vaz M.D. Date & Time: 11/14/2023  12:26 PM     HPI:  58 y.o. male with  chronic anabolic steroid use, ESRD sec to to Bx proven sec FSGS currently on PD and compeltes three excahnges/ day, moderate to moderate AS and  moderate to severe AR, HTN presented for aortic valve and ascending anerysm repair. Pt had post operative complication of tamponade and is back in OR. Pt has received signficant colloid/ IVF for support.     DAILY NEPHROLOGY SUMMARY:  11/4- Patient intolerant to HD last night  11/5- Patient s/p PD last night. Reports shortness of breath but better then yesterday  11/6: pt had PD done with 214cc UF removed. 1000cc drainage noted from chest tube overnight. Cardiology concerned for PD fluid draining from chest tube.  11/7: pt had PUF yesterday w 2L removed. Had HD again today with 2.5L removed. Pt feels much better.  11/8: pt scheduled for HD again. Feeling okay  11/9: pt had HD with 2L UF removed. Feels nauseous this AM from amiodarone.   11/10: seen on HD and tolerating. Awaiting permcath placement  11/11: permcath placed, pt feels well.   11/12: pt seen eating breakfast this AM. No complaints.  11/13: seen on HD, BP low 80/52 - was given BP meds this am for high BP, has gross anasarca and 4+ pitting edema B/L LE   11/14: HD yesterday but could remove only 1.5 L as blood pressure remained low throughout HD, blood pressure better today, scheduled for puff for more ultrafiltration.  He is determined to go back on PD but at this point is grossly fluid overloaded    REVIEW OF SYSTEMS:    Denies SOB, no CP, no V  10 point ROS reviewed and is as per HPI/daily summary or otherwise negative    PMH/PSH/SH/FH: Reviewed and unchanged since admission note  CURRENT MEDICATIONS: Reviewed from admission to present day    VS:  BP (!) 150/89 Comment: RN notified  Pulse 85   Temp 36.8 °C (98.2 °F) (Temporal)   Resp 18   Ht 1.702 m  "(5' 7\")   Wt 95.7 kg (210 lb 15.7 oz)   SpO2 91%   BMI 33.04 kg/m²     Exam  General: Awake, no acute distress  HEENT: head normocephalic, atruamtic  Neck: neck supple, no visible masses  Respiratory: clear to auscultation bilaterally, no rales, no ronchi, or wheezing  Cardiac: normal rate, normal rhythm,  Abdomen: non tender, non-distended, no guarding  Musculoskelatal: no joint tenderness, moves limbs spontaneously  Extremities: no significant joint abnormalities, +++ edema  Skin: no lesions, no rashes noted    Access: R IJ HD permcatheter, Abd PD catheter      Fluids:  In: 2350 [P.O.:1850; Dialysis:500]  Out: 2250     LABS:  Recent Labs     11/12/23  0049 11/13/23  0046 11/14/23  0136   SODIUM 132* 125* 129*   POTASSIUM 4.5 4.4 4.5   CHLORIDE 94* 87* 92*   CO2 26 22 24   GLUCOSE 111* 118* 97   BUN 56* 68* 49*   CREATININE 7.70* 8.68* 6.77*   CALCIUM 8.1* 8.1* 7.8*       IMPRESSION:    # ESRD on outpt PD    - Etiology likely 2/2 FSGS    - s/p permcath on 11/10 for temp outpt HD   # Volume overload, improving  # S/P AVR/ Ascending aneurysm repair     -High chest tube drainage only noted at night while on PD     -concern for PD fluid leaking through into chest cavity per cards         On 11/6     -CVT recommending at least 2 weeks of PD avoidance to allow          Chest/ABD cavity to heal  # Anemia of CKD     EPO 6000U IV w HD to start 11/10  # CKD-MBD  # Acute Respiratoy failure on vent, extubated    PLAN:  -Repeat above today for 3 hours, will a.m. for 3 to 4 L UF if blood pressure tolerates   -Plan HD again in a.m., cont iHD q MWF  - Hold BP meds in AM of dialysis   -We will alternate HD with PUF this week to help address fluid overload and anasarca  - cont EPO  - transfusion as per primary team  - Dose all medications as per ESRD    Dispo: Renal SW awaiting TB clearance for placement at [facility].  Patient is hesitant to go back on HD and wants to go on PD but this depends on how HD and above goes over " the next few days.  Discussed with patient and case management at bedside.    Please page nephrology with any questions or concerns

## 2023-11-14 NOTE — PROGRESS NOTES
BP 88/52 on call CT surg notified  500ml LR bolus ordered with order to call back if BP still under 100 systolic  Coreg AM dose held at this time

## 2023-11-14 NOTE — CARE PLAN
Problem: Knowledge Deficit - Standard  Goal: Patient and family/care givers will demonstrate understanding of plan of care, disease process/condition, diagnostic tests and medications  Outcome: Progressing     Problem: Post Op Day 5 CABG/Heart Valve Replacement  Goal: Optimal care of the Post Op CABG/Heart Valve replacement Post Op Day 5  Outcome: Progressing   The patient is Stable - Low risk of patient condition declining or worsening    Shift Goals  Clinical Goals: monitor vitals, IS, mobility  Patient Goals: breathing  Family Goals: n/a    Progress made toward(s) clinical / shift goals:  HD, mobility    Patient is not progressing towards the following goals:

## 2023-11-14 NOTE — CARE PLAN
Problem: Knowledge Deficit - Standard  Goal: Patient and family/care givers will demonstrate understanding of plan of care, disease process/condition, diagnostic tests and medications  11/13/2023 2331 by Jacquie Peralta R.N.  Outcome: Progressing  Note: Patient is axo x4. Patient updated on plan of care by day team providers. Patient states he still has questions for providers and was encouraged to write down any questions for the morning rounds.   11/13/2023 2321 by Jacquie Peralta R.N.  Outcome: Progressing  Note: Patient was updated on plan of care by day team providers. Patient is axo x4, still has questions on plan of care. Was encouraged to write down questions for day team providers.      Problem: Post Op Day 5 CABG/Heart Valve Replacement  Goal: Optimal care of the Post Op CABG/Heart Valve replacement Post Op Day 5  11/13/2023 2331 by Jacquie Peralta R.N.  Outcome: Not Progressing  Note: Patient showered today. Reeducated on sternal precautions. Patient has weak effort with IS 500ml. Educated on use of IS and encouraged to use more per hour. Patient refusing ARIEL hose, education completed.   11/13/2023 2321 by JORDAN BrennanN.  Outcome: Not Progressing  Note: Patient reeducated on IS use. Weak effort 500ml. Patient refusing ARIEL hose, patient educated. Patient showered today and reeducated on sternal precuations.   Intervention: Ambulate 4 times daily, increasing the distance each time  11/13/2023 2331 by Jacquie Peralta, R.N.  Note: Patient completed 3 of 4 walks today. Complaint of SOB.   11/13/2023 2321 by Jacquie Peralta R.N.  Note: Patient completed 3 of the 4 walks. Patient complains of SOB making walks short 100 ft.      Problem: Post Op Day 5 CABG/Heart Valve Replacement  Goal: Optimal care of the Post Op CABG/Heart Valve replacement Post Op Day 5  Intervention: Ambulate 4 times daily, increasing the distance each time  11/13/2023 2331 by HASMUKH Brennan.N.  Note: Patient completed  3 of 4 walks today. Complaint of SOB.   11/13/2023 2321 by Jacquie Peralta R.N.  Note: Patient completed 3 of the 4 walks. Patient complains of SOB making walks short 100 ft.      Problem: Hemodynamics  Goal: Patient's hemodynamics, fluid balance and neurologic status will be stable or improve  Outcome: Not Progressing  Note: BP low during beginning of shift. On call provider notified. Coreg held for AM dose.      Problem: Respiratory  Goal: Patient will achieve/maintain optimum respiratory ventilation and gas exchange  Outcome: Not Progressing  Note: Patient is on 2L NC. Complaint for SOB.      Problem: Nutrition - Advanced  Goal: Patient will display progressive weight gain toward goal have adequate food and fluid intake  Outcome: Progressing     Problem: Self Care  Goal: Patient will have the ability to perform ADLs independently or with assistance (bathe, groom, dress, toilet and feed)  Outcome: Progressing  Note: Patient able to completed ADLs.      Problem: Bowel Elimination - Post Surgical  Goal: Patient will resume regular bowel sounds and function with no discomfort or distention  Outcome: Progressing  Note: Multiple bowel movements today.      Problem: Pain - Standard  Goal: Alleviation of pain or a reduction in pain to the patient’s comfort goal  Outcome: Progressing     Problem: Skin Integrity  Goal: Skin integrity is maintained or improved  Outcome: Progressing  Note: Surgical sites cleansed and CHG bath completed.      Problem: Fall Risk  Goal: Patient will remain free from falls  Outcome: Progressing  Note: Patient educated on fall precautions.    The patient is Watcher - Medium risk of patient condition declining or worsening    Shift Goals  Clinical Goals: monitor vitals, IS, mobility  Patient Goals: breathing  Family Goals: n/a    Progress made toward(s) clinical / shift goals:      Patient is not progressing towards the following goals:      Problem: Post Op Day 5 CABG/Heart Valve  Replacement  Goal: Optimal care of the Post Op CABG/Heart Valve replacement Post Op Day 5  11/13/2023 2331 by Jacquie Peralta, R.N.  Outcome: Not Progressing  Note: Patient showered today. Reeducated on sternal precautions. Patient has weak effort with IS 500ml. Educated on use of IS and encouraged to use more per hour. Patient refusing ARIEL hose, education completed.   11/13/2023 2321 by Jacquie Peralta, R.N.  Outcome: Not Progressing  Note: Patient reeducated on IS use. Weak effort 500ml. Patient refusing ARIEL hose, patient educated. Patient showered today and reeducated on sternal precuations.      Problem: Hemodynamics  Goal: Patient's hemodynamics, fluid balance and neurologic status will be stable or improve  Outcome: Not Progressing  Note: BP low during beginning of shift. On call provider notified. Coreg held for AM dose.      Problem: Respiratory  Goal: Patient will achieve/maintain optimum respiratory ventilation and gas exchange  Outcome: Not Progressing  Note: Patient is on 2L NC. Complaint for SOB.

## 2023-11-14 NOTE — ASSESSMENT & PLAN NOTE
AVR and Ao root repair with Dr. Blanca 11/3  C/b focal pericardial tamponade that was managed operatively  Appropriate post-cardiac surgery protocols are in place  Cardiothoracic surgery is following.  Management per CT surgery

## 2023-11-14 NOTE — DISCHARGE PLANNING
"Case Management Discharge Planning    Admission Date: 11/3/2023  GMLOS: 8.7  ALOS: 11    6-Clicks ADL Score: 23  6-Clicks Mobility Score: 18      Anticipated Discharge Dispo: Discharge Disposition: Discharged to home/self care (01)  Discharge Address: [address] [place] [address] [address]   [facility] [id number]    DME Needed: No    Action(s) Taken: Updated Provider/Nurse on Discharge Plan  RN JOSE attended IDT rounds and collaborated with the team regarding discharge planning needs and barriers.  Per CM handoff, pt is pending an OP HD chair in [facility], however, transportation may be a barrier.  RN CM met with pt at bedside  Pt reports he is normally on peritoneal dialysis (PD) and is anticipated to return to this prior to his DC  He reports he is getting daily dialysis this week, with plans to trial PD on Friday 11/17. If PD is successful, he anticipates he will DC home with resumption of his PD  We discussed the \"what if\" he needs to continue OP hemodialysis - he confirms and acknowledges he has driving restrictions, he confirms his S.O works and is not available, he reports he can arrange and pay for taxi transports if needed. He again expresses his goal for return to PD   Nephrology MD arrived during visit, he confirms the plan for HD today and again tomorrow, he reports the transition back to PD is dependent on pts fluid levels - he reports pt is not medically cleared for DC for several days  Updated Fatimah HD Coordinator - she reports she is still working on confirmation of OP HD chair and time - currently in [facility]  Also updated CM leadership    Escalations Completed: None    Medically Clear: No    Next Steps: Follow-up with the Attending and Bedside RN for any issues/changes and update the discharge plan accordingly.      Barriers to Discharge: Medical clearance    Is the patient up for discharge tomorrow: No        "

## 2023-11-14 NOTE — PROGRESS NOTES
[facility] Progress Note         HD today x 3 hours per Dr. Vaz.  Tx initiated at 1053 and ended at 1354    Pt A/O x 4, with SOB, on O2 at 2 L/min, O2 sat of 94%, diminished BS, wheezes, labile BP and generalized edema. RTDC patent, dsg CDI. (+) consent for HD       NET UF: 1500 ml    Tx tolerated but UFG was decreased d/t pre tx hypotension; Dr. Vaz was informed. Blood pressure improved towards the end of the treatment. Blood returned, ports flushed with NS. Heparin 1000 units/ml used to lock catheter per designated amount. CVC ports clamped and capped. Epoetin 6000 units IV given. Aspirate heparin prior to next CVC use. See eflow sheets for further details.    PD dressing changed aseptically.     Report given to CODI Simons

## 2023-11-14 NOTE — ASSESSMENT & PLAN NOTE
Post-cardiac surgery BP protocols  Patient currently is on verapamil blood pressure has been fluctuating.  Continue monitor closely on telemetry.

## 2023-11-14 NOTE — PROGRESS NOTES
[facility] Medicine Daily Progress Note - consultation    Date of Service  11/13/2023    Chief Complaint  Gurdeep Guerra is a 58 y.o. male admitted 11/3/2023 with shortness of breath, left pleural effusion, concern for GI bleed    Hospital Course  No notes on file    58 y.o. 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  Patient has been followed by nephrology and has been receiving hemodialysis while inpatient.  On 11/11/2023, concern for left-sided pleural effusion as well as questionable GI bleed, therefore medicine service was consulted to continue following up with patient and for assistance with management of chronic comorbidities.     Interval Problem Update  Patient was seen and examined at bedside.  I have personally reviewed and interpreted vitals, labs, and imaging.    11/12.  Afebrile.  Tachycardic.  On 1-3 L nasal cannula.  Denies fevers, chills, chest pains, shortness of breath.  Had some abdominal bloating last night.  Earlier this afternoon there was concern for ST elevations on telemetry.  EKG reviewed and interpreted as nonspecific ST changes.  Patient was not having chest pain.  Status post thoracentesis yesterday with 400 cc removed.  Guaiac still pending.  Ordered iron panel with morning labs.  Continue Coumadin dosed by pharmacy.  No dark/tarry stools.  Trend hemoglobin closely.  11/13.  Afebrile.  Tachycardic.  Hypertensive.  On 1-2 L nasal cannula.  Denies fever, chills, chest pains.  Shortness of breath is improved.  Feels very lethargic.  States is not sleeping well.  Feels like he got too many stool softeners and had some diarrhea.  Complains of cough.  Start Tessalon Perles, Mucinex.  De-escalate to Augmentin.  Tolerated dialysis today.    I have discussed this patient's plan of care  and discharge plan at IDT rounds today with Case Management, Nursing, Nursing leadership, and other members of the IDT team.    Consultants/Specialty  cardiovascular surgeon and nephrology    Code Status  Full Code    Disposition  The patient is not medically cleared for discharge to home or a post-acute facility.  Anticipate discharge to: home with organized home healthcare and close outpatient follow-up    I have placed the appropriate orders for post-discharge needs.    Review of Systems  Review of Systems   Gastrointestinal:  Positive for abdominal pain and constipation.        Physical Exam  Temp:  [36.6 °C (97.9 °F)-37.2 °C (99 °F)] 36.6 °C (97.9 °F)  Pulse:  [] 87  Resp:  [15-17] 17  BP: ()/() 103/65  SpO2:  [94 %-100 %] 97 %    Physical Exam  Vitals and nursing note reviewed.   Constitutional:       Appearance: Normal appearance. He is obese. He is ill-appearing.   HENT:      Head: Normocephalic and atraumatic.      Nose: Nose normal.      Mouth/Throat:      Mouth: Mucous membranes are moist.      Pharynx: Oropharynx is clear.   Eyes:      Extraocular Movements: Extraocular movements intact.      Conjunctiva/sclera: Conjunctivae normal.   Cardiovascular:      Rate and Rhythm: Regular rhythm. Tachycardia present.      Pulses: Normal pulses.      Heart sounds: Normal heart sounds. No murmur heard.     No friction rub. No gallop.   Pulmonary:      Effort: Pulmonary effort is normal. No respiratory distress.      Breath sounds: Normal breath sounds. No wheezing or rales.   Chest:      Chest wall: No tenderness.   Abdominal:      General: Abdomen is flat. Bowel sounds are normal. There is no distension.      Palpations: Abdomen is soft. There is no mass.      Tenderness: There is no abdominal tenderness. There is no guarding.   Musculoskeletal:         General: Normal range of motion.      Cervical back: Normal range of motion and neck supple.      Comments: HD catheter   Skin:     General: Skin  is warm.      Capillary Refill: Capillary refill takes less than 2 seconds.   Neurological:      General: No focal deficit present.      Mental Status: He is alert and oriented to person, place, and time. Mental status is at baseline.      Cranial Nerves: No cranial nerve deficit.      Motor: No weakness.   Psychiatric:         Mood and Affect: Mood normal.         Behavior: Behavior normal.         Fluids    Intake/Output Summary (Last 24 hours) at 11/13/2023 1822  Last data filed at 11/13/2023 0900  Gross per 24 hour   Intake 1050 ml   Output --   Net 1050 ml       Laboratory  Recent Labs     11/11/23  0030 11/12/23  0049 11/13/23 0046   WBC 8.0 8.6 8.9   RBC 2.75* 2.50* 2.66*   HEMOGLOBIN 8.8* 8.1* 8.4*   HEMATOCRIT 26.9* 24.8* 25.4*   MCV 97.8 99.2* 95.5   MCH 32.0 32.4 31.6   MCHC 32.7 32.7 33.1   RDW 55.5* 55.3* 52.0*   PLATELETCT 204 217 266   MPV 10.2 9.7 9.9       Recent Labs     11/11/23  0030 11/12/23  0049 11/13/23  0046   SODIUM 133* 132* 125*   POTASSIUM 4.4 4.5 4.4   CHLORIDE 95* 94* 87*   CO2 27 26 22   GLUCOSE 140* 111* 118*   BUN 39* 56* 68*   CREATININE 6.18* 7.70* 8.68*   CALCIUM 8.1* 8.1* 8.1*       Recent Labs     11/11/23  0030 11/12/23  0049 11/13/23  0046   INR 1.37* 1.34* 1.43*                 Imaging  DX-CHEST-2 VIEWS   Final Result      1.  Tubes and lines unchanged in position.      2.  Marked cardiomegaly.      3.  Small left pleural effusion.      US-THORACENTESIS PUNCTURE LEFT   Final Result      1. Ultrasound guided left sided therapeutic thoracentesis.      2. 400 mL of fluid withdrawn.      DX-CHEST-PORTABLE (1 VIEW)   Final Result      1.  No significant change from prior exam.   2.  No pneumothorax.      [id number]-5 VIEW   Final Result      1.  No evidence of bowel obstruction.   2.  Mildly increased colonic stool.      DX-CHEST-2 VIEWS   Final Result         1.  Left midlung and lower lobe infiltrate stable to slightly increased.   2.  Layering left pleural effusion, similar  to prior study.   3.  Cardiomegaly      IR-REUBEN TORRES PLACEMENT >5   Final Result      1. Ultrasound and fluoroscopic guided placement of a right internal jugular 14.5 Wallisian HemoSplit tunneled dialysis catheter.      2. The hemodialysis catheter may be used immediately as clinically indicated. Flushes per protocol.      3. Removal of the right-sided temporary dialysis catheter.         DX-CHEST-2 VIEWS   Final Result      Stable appearance of the chest.      DX-CHEST-PORTABLE (1 VIEW)   Final Result      1.  Bibasilar underinflation atelectasis which could obscure an additional process. This has increased on the LEFT and is similar to the prior on the RIGHT.   2.  Suspect small LEFT pleural effusion   3.  Persistently enlarged cardiac silhouette      US-EXTREMITY VENOUS UPPER UNILAT LEFT   Final Result      DX-CHEST-PORTABLE (1 VIEW)   Final Result      1.  Low lung volumes. Bibasilar atelectasis.      DX-CHEST-PORTABLE (1 VIEW)   Final Result      1.  Tubes and lines in good position.      2.  Right basilar atelectasis.      DX-CHEST-PORTABLE (1 VIEW)   Final Result      1.  Mild cardiomegaly.      2.  Tubes and lines in good position.      3.  Right basilar atelectasis.      EC-CARMELITA W/O CONT   Final Result      EC-ECHOCARDIOGRAM LTD W/O CONT   Final Result      DX-CHEST-PORTABLE (1 VIEW)   Final Result      Satisfactory postoperative appearance of the chest with BILATERAL perihilar and LEFT basilar opacities which are probably areas of atelectasis           Assessment/Plan  * Aortic stenosis- (present on admission)  Assessment & Plan  11/13/2023  S/p mechanical valve replacement by CTS 11/3  Warfarin, dosing as per pharmacy to target therapeutic INR goal    Pleural effusion on left  Assessment & Plan  11/13/2023  Agree with US-guided thoracocentesis  HD as per nephrology    Insomnia  Assessment & Plan  11/13/2023  Temazepam    GIB (gastrointestinal bleeding)  Assessment & Plan  11/13/2023  Questionable  bloody stool  Check fecal occult blood  Iron studies show anemia of chronic disease.  Guaiac pending  Reports normal colonoscopy within the past 5 years.    Paroxysmal A-fib (HCC)  Assessment & Plan  11/13/2023  Postop mechanical AVR and AAA repair  Currently on amiodarone drip  Coreg, warfarin    AAA (abdominal aortic aneurysm) ([facility])  Assessment & Plan  11/13/2023  S/p repair by CTS 11/3  CTS f/u appreciated    ESRD (end stage renal disease) ([facility])  Assessment & Plan  11/13/2023  Was on peritoneal dialysis  Currently has right IJ temp HD cath  Hemodialysis as per nephrology  Nephrology follow-up appreciated    Dyslipidemia- (present on admission)  Assessment & Plan  11/13/2023  Statin    Coronary artery disease due to lipid rich plaque  Assessment & Plan  Optimize CAD meds: Warfarin, ASA, BB, statin         VTE prophylaxis: Coumadin    I have performed a physical exam and reviewed and updated ROS and Plan today (11/13/2023). In review of yesterday's note (11/12/2023), there are no changes except as documented above.      Greater than 51 minutes spent prepping to see patient (e.g. review of tests) obtaining and/or reviewing separately obtained history. Performing a medically appropriate examination and/ evaluation.  Counseling and educating the patient/family/caregiver.  Ordering medications, tests, or procedures.  Referring and communicating with other health care professionals.  Documenting clinical information in EPIC.  Independently interpreting results and communicating results to patient/family/caregiver.  Care coordination.

## 2023-11-14 NOTE — PROGRESS NOTES
Assumed care of patient, bedside report received from Evelyn day shift RN. Updated on plan of care, call light within reach and fall precautions are in place and bed lock and in lowest position. Patient instructed to call for assistance before getting out of bed. All questions answered at this time. No other needs.

## 2023-11-14 NOTE — PROGRESS NOTES
"Hospital Medicine Daily Progress Note - consultation    Date of Service  11/14/2023    Chief Complaint  Gurdeep Guerra is a 58 y.o. male admitted 11/3/2023 with shortness of breath, left pleural effusion, concern for GI bleed    Hospital Course    As per Dr. Agosto note  \"58 y.o. 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  Patient has been followed by nephrology and has been receiving hemodialysis while inpatient.  On 11/11/2023, concern for left-sided pleural effusion as well as questionable GI bleed, therefore medicine service was consulted to continue following up with patient and for assistance with management of chronic comorbidities.     Patient was seen and examined at bedside.  I have personally reviewed and interpreted vitals, labs, and imaging.    11/12.  Afebrile.  Tachycardic.  On 1-3 L nasal cannula.  Denies fevers, chills, chest pains, shortness of breath.  Had some abdominal bloating last night.  Earlier this afternoon there was concern for ST elevations on telemetry.  EKG reviewed and interpreted as nonspecific ST changes.  Patient was not having chest pain.  Status post thoracentesis yesterday with 400 cc removed.  Guaiac still pending.  Ordered iron panel with morning labs.  Continue Coumadin dosed by pharmacy.  No dark/tarry stools.  Trend hemoglobin closely.  11/13.  Afebrile.  Tachycardic.  Hypertensive.  On 1-2 L nasal cannula.  Denies fever, chills, chest pains.  Shortness of breath is improved.  Feels very lethargic.  States is not sleeping well.  Feels like he got too many stool softeners and had some diarrhea.  Complains of cough.  Start Tessalon Perles, Mucinex.  De-escalate to Augmentin.  Tolerated dialysis today.\"        Interval Problem Update    11/14/23    I evaluated and " examined him at the bedside.  He reported that he feels very anxious and he could not sleep for last few nights.  I started him on Atarax for anxiety and I also started him on Xanax at nighttime and discontinued his temazepam.  Blood pressure running on the higher side.  Currently he is on verapamil for blood pressure control.  Current white blood cell count is 8.7, hemoglobin of 8.1 and it remained stable.  Stool occult blood test still pending.  Although his hemoglobin remained stable in last few days.  Current INR is 1.68  Cultures are negative to date.  I personally discussed case with nephrologist Dr. Vaz regarding patient current mental condition and plan of care.    I have discussed this patient's plan of care and discharge plan at IDT rounds today with Case Management, Nursing, Nursing leadership, and other members of the IDT team.    Consultants/Specialty  cardiovascular surgeon and nephrology    Code Status  Full Code    Disposition  The patient is not medically cleared for discharge to home or a post-acute facility.  Anticipate discharge to: home with close outpatient follow-up    I have placed the appropriate orders for post-discharge needs.    Review of Systems  Review of Systems   Cardiovascular:  Positive for leg swelling.   Psychiatric/Behavioral:  The patient is nervous/anxious.         Physical Exam  Temp:  [36.8 °C (98.2 °F)-37.2 °C (99 °F)] 36.8 °C (98.2 °F)  Pulse:  [68-99] 85  Resp:  [16-20] 18  BP: ()/(52-95) 150/89  SpO2:  [91 %-97 %] 91 %    Physical Exam  Vitals reviewed.   Constitutional:       General: He is not in acute distress.     Appearance: He is ill-appearing.   HENT:      Head: Normocephalic and atraumatic. No contusion.      Right Ear: External ear normal.      Left Ear: External ear normal.      Nose: Nose normal.      Comments: Oxygen nasal cannula     Mouth/Throat:      Pharynx: No oropharyngeal exudate.   Eyes:      General:         Right eye: No discharge.          Left eye: No discharge.      Pupils: Pupils are equal, round, and reactive to light.   Cardiovascular:      Rate and Rhythm: Normal rate and regular rhythm.      Heart sounds: No murmur heard.     No friction rub. No gallop.   Pulmonary:      Effort: Pulmonary effort is normal.      Breath sounds: No wheezing or rhonchi.   Abdominal:      General: Bowel sounds are normal. There is no distension.      Palpations: Abdomen is soft.      Tenderness: There is no abdominal tenderness. There is no rebound.   Musculoskeletal:         General: No swelling or tenderness. Normal range of motion.      Cervical back: No rigidity. No muscular tenderness.      Right lower leg: Edema present.      Left lower leg: Edema present.   Skin:     General: Skin is warm and dry.      Coloration: Skin is not jaundiced.   Neurological:      General: No focal deficit present.      Mental Status: He is alert and oriented to person, place, and time.      Cranial Nerves: No cranial nerve deficit.      Sensory: No sensory deficit.      Comments: He is following commands and moving all his extremities   Psychiatric:         Mood and Affect: Mood is anxious.         Fluids    Intake/Output Summary (Last 24 hours) at 11/14/2023 0736  Last data filed at 11/14/2023 0300  Gross per 24 hour   Intake 2350 ml   Output 2250 ml   Net 100 ml         Laboratory  Recent Labs     11/12/23 0049 11/13/23 0046 11/14/23 0136   WBC 8.6 8.9 8.7   RBC 2.50* 2.66* 2.63*   HEMOGLOBIN 8.1* 8.4* 8.1*   HEMATOCRIT 24.8* 25.4* 25.0*   MCV 99.2* 95.5 95.1   MCH 32.4 31.6 30.8   MCHC 32.7 33.1 32.4   RDW 55.3* 52.0* 52.3*   PLATELETCT 217 266 291   MPV 9.7 9.9 9.7       Recent Labs     11/12/23 0049 11/13/23 0046 11/14/23 0136   SODIUM 132* 125* 129*   POTASSIUM 4.5 4.4 4.5   CHLORIDE 94* 87* 92*   CO2 26 22 24   GLUCOSE 111* 118* 97   BUN 56* 68* 49*   CREATININE 7.70* 8.68* 6.77*   CALCIUM 8.1* 8.1* 7.8*       Recent Labs     11/12/23 0049 11/13/23 0046  11/14/23  0136   INR 1.34* 1.43* 1.68*                 Imaging  DX-CHEST-2 VIEWS   Final Result      1.  Tubes and lines unchanged in position.      2.  Marked cardiomegaly.      3.  Small left pleural effusion.      US-THORACENTESIS PUNCTURE LEFT   Final Result      1. Ultrasound guided left sided therapeutic thoracentesis.      2. 400 mL of fluid withdrawn.      DX-CHEST-PORTABLE (1 VIEW)   Final Result      1.  No significant change from prior exam.   2.  No pneumothorax.      [id number] VIEW   Final Result      1.  No evidence of bowel obstruction.   2.  Mildly increased colonic stool.      DX-CHEST-2 VIEWS   Final Result         1.  Left midlung and lower lobe infiltrate stable to slightly increased.   2.  Layering left pleural effusion, similar to prior study.   3.  Cardiomegaly      IR-TORRES,GROSHONG PLACEMENT >5   Final Result      1. Ultrasound and fluoroscopic guided placement of a right internal jugular 14.5 English HemoSplit tunneled dialysis catheter.      2. The hemodialysis catheter may be used immediately as clinically indicated. Flushes per protocol.      3. Removal of the right-sided temporary dialysis catheter.         DX-CHEST-2 VIEWS   Final Result      Stable appearance of the chest.      DX-CHEST-PORTABLE (1 VIEW)   Final Result      1.  Bibasilar underinflation atelectasis which could obscure an additional process. This has increased on the LEFT and is similar to the prior on the RIGHT.   2.  Suspect small LEFT pleural effusion   3.  Persistently enlarged cardiac silhouette      US-EXTREMITY VENOUS UPPER UNILAT LEFT   Final Result      DX-CHEST-PORTABLE (1 VIEW)   Final Result      1.  Low lung volumes. Bibasilar atelectasis.      DX-CHEST-PORTABLE (1 VIEW)   Final Result      1.  Tubes and lines in good position.      2.  Right basilar atelectasis.      DX-CHEST-PORTABLE (1 VIEW)   Final Result      1.  Mild cardiomegaly.      2.  Tubes and lines in good position.      3.  Right basilar  atelectasis.      EC-CARMELITA W/O CONT   Final Result      EC-ECHOCARDIOGRAM LTD W/O CONT   Final Result      DX-CHEST-PORTABLE (1 VIEW)   Final Result      Satisfactory postoperative appearance of the chest with BILATERAL perihilar and LEFT basilar opacities which are probably areas of atelectasis           Assessment/Plan  * Aortic stenosis- (present on admission)  Assessment & Plan  11/14/2023  S/p mechanical valve replacement by CTS 11/3  Warfarin, dosing as per pharmacy to target therapeutic INR goal  Current INR is 1.68    Anxiety  Assessment & Plan  I started him on Atarax 10 mg 3 times daily for anxiety pain  I also ordered Xanax for insomnia.    Pleural effusion on left  Assessment & Plan  11/14/2023  S/p thoracentesis.  Continue hemodialysis per nephrology.    Insomnia  Assessment & Plan  11/14/2023  Discontinued temazepam and started him on Xanax for sleep.    GIB (gastrointestinal bleeding)  Assessment & Plan  11/14/2023  Questionable bloody stool  Check fecal occult blood and its pending  Iron studies show anemia of chronic disease.  Guaiac pending  Reports normal colonoscopy within the past 5 years.  Patient current hemoglobin remained stable and his hemoglobin is 8.1 and it was 8.1 on November 9th as well    Paroxysmal A-fib (HCC)  Assessment & Plan  11/14/2023  Postop mechanical AVR and AAA repair  Continue amiodarone  Continue warfarin    AAA (abdominal aortic aneurysm) (HCC)  Assessment & Plan  11/14/2023  S/p repair by CTS 11/3  CTS f/u appreciated    Shock (HCC)  Assessment & Plan  Now resolved    Status post cardiac surgery  Assessment & Plan  AVR and Ao root repair with Dr. Blanca 11/3  C/b focal pericardial tamponade that was managed operatively  Appropriate post-cardiac surgery protocols are in place  Cardiothoracic surgery is following.  Management per CT surgery    ESRD (end stage renal disease) (HCC)  Assessment & Plan  11/14/2023  Was on peritoneal dialysis  Currently has right IJ temp HD  cath  Hemodialysis as per nephrology  Nephrology follow-up appreciated  I discussed plan of care with him.    Dyslipidemia- (present on admission)  Assessment & Plan  11/14/2023  Statin    Coronary artery disease due to lipid rich plaque  Assessment & Plan  Optimize CAD meds: Warfarin, ASA, BB, statin    Gastroesophageal reflux disease- (present on admission)  Assessment & Plan  Continue PPI.    Hypertension- (present on admission)  Assessment & Plan  Post-cardiac surgery BP protocols  Patient currently is on verapamil blood pressure has been fluctuating.  Continue monitor closely on telemetry.      I personally discussed case with nephrology Dr. Vaz regarding patient current condition and plan of care.    I discussed plan of care during multidisciplinary rounds regarding patient's current medical condition and plan of care.      VTE prophylaxis: Coumadin    I have performed a physical exam and reviewed and updated ROS and Plan today (11/14/2023). In review of yesterday's note (11/13/2023), there are no changes except as documented above.

## 2023-11-14 NOTE — PROGRESS NOTES
[facility] Progress Note     PUF treatment x 3 hours today ordered by Dr. Philipp Vaz.  Tx initiated at 1319 and ended at 1620.    Pt is alert, oriented x 4, not in any signs of distress at this time. Right chest tunnelled catheter with clean, dry and intact dressing. No signs of infection noted. Aspirated 3mL on both catheter ports and saline flushed, both patent. Treatment completed and tolerated well. UF goal achieved without issues.    UF Net: 4 L    Post tx: Right chest tunnlled catheter with clean, dry and intact dressing. No signs of infection. Both catheter ports saline flushed, heparin locked as prescribed, clamped and capped.    Report given to primary RN TYRONE Jin. See electronic flowsheets for additional information.

## 2023-11-14 NOTE — DISCHARGE PLANNING
HTH/SCP TCN chart review completed. Current discharge considerations are hoem with no needs.  Noted per chart patient able to ambulate 4 x 50 feet with no AD or assist.  No further TCN needs at this time.     TCN will continue to follow and collaborate with discharge planning team as additional post acute needs arise. Thank you.    Completed:  PT recommends outpatient cardiac rehab on 11/09/  OT recommend no further OT needs on 11/09    Choice obtained: None.  See above.    [facility] with a Non-[facility] PCP.  Patient states he  will schedule his own Follow up appointment.

## 2023-11-14 NOTE — PROGRESS NOTES
Monitor Summary:   Rhythm: NSR/ST/atrial fibrillation  Rate: 86 - 114  Measurement: .19/.07/.34  Ectopy: Rare PVC    12 hour chart check

## 2023-11-15 ENCOUNTER — APPOINTMENT (OUTPATIENT)
Dept: CARDIOLOGY | Facility: MEDICAL CENTER | Age: 58
DRG: 219 | End: 2023-11-15
Attending: NURSE PRACTITIONER
Payer: COMMERCIAL

## 2023-11-15 LAB
ANION GAP SERPL CALC-SCNC: 13 MMOL/L (ref 7–16)
BUN SERPL-MCNC: 58 MG/DL (ref 8–22)
CALCIUM SERPL-MCNC: 8.2 MG/DL (ref 8.5–10.5)
CHLORIDE SERPL-SCNC: 91 MMOL/L (ref 96–112)
CO2 SERPL-SCNC: 24 MMOL/L (ref 20–33)
CREAT SERPL-MCNC: 8.19 MG/DL (ref 0.5–1.4)
ERYTHROCYTE [DISTWIDTH] IN BLOOD BY AUTOMATED COUNT: 52.9 FL (ref 35.9–50)
GFR SERPLBLD CREATININE-BSD FMLA CKD-EPI: 7 ML/MIN/1.73 M 2
GLUCOSE SERPL-MCNC: 109 MG/DL (ref 65–99)
HCT VFR BLD AUTO: 25.3 % (ref 42–52)
HGB BLD-MCNC: 8.6 G/DL (ref 14–18)
INR PPP: 1.94 (ref 0.87–1.13)
LV EJECT FRACT MOD 2C 99903: 36.61
LV EJECT FRACT MOD 4C 99902: 59.08
LV EJECT FRACT MOD BP 99901: 47.57
MCH RBC QN AUTO: 32.1 PG (ref 27–33)
MCHC RBC AUTO-ENTMCNC: 34 G/DL (ref 32.3–36.5)
MCV RBC AUTO: 94.4 FL (ref 81.4–97.8)
PLATELET # BLD AUTO: 340 K/UL (ref 164–446)
PMV BLD AUTO: 9.7 FL (ref 9–12.9)
POTASSIUM SERPL-SCNC: 4.3 MMOL/L (ref 3.6–5.5)
PROTHROMBIN TIME: 22.4 SEC (ref 12–14.6)
RBC # BLD AUTO: 2.68 M/UL (ref 4.7–6.1)
SODIUM SERPL-SCNC: 128 MMOL/L (ref 135–145)
WBC # BLD AUTO: 10.5 K/UL (ref 4.8–10.8)

## 2023-11-15 PROCEDURE — A9270 NON-COVERED ITEM OR SERVICE: HCPCS | Performed by: NURSE PRACTITIONER

## 2023-11-15 PROCEDURE — A9270 NON-COVERED ITEM OR SERVICE: HCPCS | Performed by: INTERNAL MEDICINE

## 2023-11-15 PROCEDURE — 700102 HCHG RX REV CODE 250 W/ 637 OVERRIDE(OP): Performed by: NURSE PRACTITIONER

## 2023-11-15 PROCEDURE — 700102 HCHG RX REV CODE 250 W/ 637 OVERRIDE(OP): Performed by: INTERNAL MEDICINE

## 2023-11-15 PROCEDURE — 700102 HCHG RX REV CODE 250 W/ 637 OVERRIDE(OP): Performed by: THORACIC SURGERY (CARDIOTHORACIC VASCULAR SURGERY)

## 2023-11-15 PROCEDURE — 90935 HEMODIALYSIS ONE EVALUATION: CPT

## 2023-11-15 PROCEDURE — 700111 HCHG RX REV CODE 636 W/ 250 OVERRIDE (IP): Performed by: NURSE PRACTITIONER

## 2023-11-15 PROCEDURE — 85027 COMPLETE CBC AUTOMATED: CPT

## 2023-11-15 PROCEDURE — A9270 NON-COVERED ITEM OR SERVICE: HCPCS | Performed by: THORACIC SURGERY (CARDIOTHORACIC VASCULAR SURGERY)

## 2023-11-15 PROCEDURE — 700102 HCHG RX REV CODE 250 W/ 637 OVERRIDE(OP): Performed by: STUDENT IN AN ORGANIZED HEALTH CARE EDUCATION/TRAINING PROGRAM

## 2023-11-15 PROCEDURE — 80048 BASIC METABOLIC PNL TOTAL CA: CPT

## 2023-11-15 PROCEDURE — A9270 NON-COVERED ITEM OR SERVICE: HCPCS | Performed by: STUDENT IN AN ORGANIZED HEALTH CARE EDUCATION/TRAINING PROGRAM

## 2023-11-15 PROCEDURE — 770020 HCHG ROOM/CARE - TELE (206)

## 2023-11-15 PROCEDURE — 85610 PROTHROMBIN TIME: CPT

## 2023-11-15 PROCEDURE — 93308 TTE F-UP OR LMTD: CPT

## 2023-11-15 PROCEDURE — 99233 SBSQ HOSP IP/OBS HIGH 50: CPT | Performed by: GENERAL PRACTICE

## 2023-11-15 PROCEDURE — A9270 NON-COVERED ITEM OR SERVICE: HCPCS | Performed by: GENERAL PRACTICE

## 2023-11-15 PROCEDURE — 700102 HCHG RX REV CODE 250 W/ 637 OVERRIDE(OP): Performed by: GENERAL PRACTICE

## 2023-11-15 PROCEDURE — 700111 HCHG RX REV CODE 636 W/ 250 OVERRIDE (IP)

## 2023-11-15 PROCEDURE — 93308 TTE F-UP OR LMTD: CPT | Mod: 26 | Performed by: INTERNAL MEDICINE

## 2023-11-15 PROCEDURE — 99024 POSTOP FOLLOW-UP VISIT: CPT | Performed by: NURSE PRACTITIONER

## 2023-11-15 RX ORDER — HYDROXYZINE HYDROCHLORIDE 25 MG/1
25 TABLET, FILM COATED ORAL 3 TIMES DAILY PRN
Status: DISCONTINUED | OUTPATIENT
Start: 2023-11-15 | End: 2023-11-17 | Stop reason: HOSPADM

## 2023-11-15 RX ORDER — HEPARIN SODIUM 1000 [USP'U]/ML
INJECTION, SOLUTION INTRAVENOUS; SUBCUTANEOUS
Status: COMPLETED
Start: 2023-11-15 | End: 2023-11-15

## 2023-11-15 RX ORDER — TEMAZEPAM 7.5 MG/1
7.5 CAPSULE ORAL
Status: DISCONTINUED | OUTPATIENT
Start: 2023-11-15 | End: 2023-11-17 | Stop reason: HOSPADM

## 2023-11-15 RX ADMIN — OMEPRAZOLE 20 MG: 20 CAPSULE, DELAYED RELEASE ORAL at 16:08

## 2023-11-15 RX ADMIN — WARFARIN SODIUM 2.5 MG: 2.5 TABLET ORAL at 16:08

## 2023-11-15 RX ADMIN — HYDROXYZINE HYDROCHLORIDE 10 MG: 10 TABLET ORAL at 00:02

## 2023-11-15 RX ADMIN — GUAIFENESIN 600 MG: 600 TABLET, EXTENDED RELEASE ORAL at 05:23

## 2023-11-15 RX ADMIN — TAMSULOSIN HYDROCHLORIDE 0.4 MG: 0.4 CAPSULE ORAL at 12:54

## 2023-11-15 RX ADMIN — ASPIRIN 81 MG: 81 TABLET, COATED ORAL at 05:23

## 2023-11-15 RX ADMIN — EPOETIN ALFA-EPBX 6000 UNITS: 3000 INJECTION, SOLUTION INTRAVENOUS; SUBCUTANEOUS at 11:58

## 2023-11-15 RX ADMIN — ACETAMINOPHEN 1000 MG: 500 TABLET, FILM COATED ORAL at 20:10

## 2023-11-15 RX ADMIN — HEPARIN SODIUM 3700 UNITS: 1000 INJECTION, SOLUTION INTRAVENOUS; SUBCUTANEOUS at 12:22

## 2023-11-15 RX ADMIN — DOCUSATE SODIUM 50 MG AND SENNOSIDES 8.6 MG 2 TABLET: 8.6; 5 TABLET, FILM COATED ORAL at 05:24

## 2023-11-15 RX ADMIN — HEPARIN SODIUM 5000 UNITS: 5000 INJECTION, SOLUTION INTRAVENOUS; SUBCUTANEOUS at 05:24

## 2023-11-15 RX ADMIN — GABAPENTIN 200 MG: 100 CAPSULE ORAL at 05:23

## 2023-11-15 RX ADMIN — AMIODARONE HYDROCHLORIDE 200 MG: 200 TABLET ORAL at 16:08

## 2023-11-15 RX ADMIN — Medication 1 APPLICATOR: at 12:54

## 2023-11-15 RX ADMIN — Medication 1 APPLICATOR: at 20:05

## 2023-11-15 RX ADMIN — AMIODARONE HYDROCHLORIDE 200 MG: 200 TABLET ORAL at 05:23

## 2023-11-15 RX ADMIN — ATORVASTATIN CALCIUM 40 MG: 40 TABLET, FILM COATED ORAL at 20:05

## 2023-11-15 RX ADMIN — GUAIFENESIN 600 MG: 600 TABLET, EXTENDED RELEASE ORAL at 16:08

## 2023-11-15 RX ADMIN — OXYCODONE HYDROCHLORIDE 10 MG: 10 TABLET ORAL at 05:23

## 2023-11-15 RX ADMIN — OMEPRAZOLE 20 MG: 20 CAPSULE, DELAYED RELEASE ORAL at 05:23

## 2023-11-15 RX ADMIN — ACETAMINOPHEN 1000 MG: 500 TABLET, FILM COATED ORAL at 05:23

## 2023-11-15 RX ADMIN — TEMAZEPAM 7.5 MG: 7.5 CAPSULE ORAL at 23:16

## 2023-11-15 ASSESSMENT — ENCOUNTER SYMPTOMS: SHORTNESS OF BREATH: 0

## 2023-11-15 ASSESSMENT — FIBROSIS 4 INDEX: FIB4 SCORE: 1.14

## 2023-11-15 ASSESSMENT — PAIN DESCRIPTION - PAIN TYPE: TYPE: ACUTE PAIN;SURGICAL PAIN

## 2023-11-15 NOTE — PROGRESS NOTES
[facility] -  PROGRESS NOTE               Author: Philipp Vaz M.D. Date & Time: 11/15/2023  10:24 AM     HPI:  58 y.o. male with  chronic anabolic steroid use, ESRD sec to to Bx proven sec FSGS currently on PD and compeltes three excahnges/ day, moderate to moderate AS and  moderate to severe AR, HTN presented for aortic valve and ascending anerysm repair. Pt had post operative complication of tamponade and is back in OR. Pt has received signficant colloid/ IVF for support.     DAILY NEPHROLOGY SUMMARY:  11/4- Patient intolerant to HD last night  11/5- Patient s/p PD last night. Reports shortness of breath but better then yesterday  11/6: pt had PD done with 214cc UF removed. 1000cc drainage noted from chest tube overnight. Cardiology concerned for PD fluid draining from chest tube.  11/7: pt had PUF yesterday w 2L removed. Had HD again today with 2.5L removed. Pt feels much better.  11/8: pt scheduled for HD again. Feeling okay  11/9: pt had HD with 2L UF removed. Feels nauseous this AM from amiodarone.   11/10: seen on HD and tolerating. Awaiting permcath placement  11/11: permcath placed, pt feels well.   11/12: pt seen eating breakfast this AM. No complaints.  11/13: seen on HD, BP low 80/52 - was given BP meds this am for high BP, has gross anasarca and 4+ pitting edema B/L LE   11/14: HD yesterday but could remove only 1.5 L as blood pressure remained low throughout HD, blood pressure better today, scheduled for puff for more ultrafiltration.  He is determined to go back on PD but at this point is grossly fluid overloaded  11/15: Repeat PUF yesterday and had 4L UF removed, feels better but still is hypervolemic, wants to go back on PD on discharge     REVIEW OF SYSTEMS:    Denies SOB, no CP, no V  10 point ROS reviewed and is as per HPI/daily summary or otherwise negative    PMH/PSH/SH/FH: Reviewed and unchanged since admission note  CURRENT MEDICATIONS: Reviewed from admission  "to present day    VS:  BP (!) 139/90 Comment: RN notified   Pulse 88   Temp 36.9 °C (98.4 °F) (Temporal)   Resp 20   Ht 1.702 m (5' 7\")   Wt 93.3 kg (205 lb 11 oz)   SpO2 96%   BMI 32.22 kg/m²     Exam  General: Awake, no acute distress  HEENT: head normocephalic, atruamtic  Neck: neck supple, no visible masses  Respiratory: clear to auscultation bilaterally, no rales, no ronchi, or wheezing  Cardiac: normal rate, normal rhythm,  Abdomen: non tender, non-distended, no guarding  Musculoskelatal: no joint tenderness, moves limbs spontaneously  Extremities: no significant joint abnormalities, +++ edema  Skin: no lesions, no rashes noted    Access: R IJ HD permcatheter, Abd PD catheter      Fluids:  In: 500 [Dialysis:500]  Out: 4800     LABS:  Recent Labs     11/13/23  0046 11/14/23  0136 11/15/23  0247   SODIUM 125* 129* 128*   POTASSIUM 4.4 4.5 4.3   CHLORIDE 87* 92* 91*   CO2 22 24 24   GLUCOSE 118* 97 109*   BUN 68* 49* 58*   CREATININE 8.68* 6.77* 8.19*   CALCIUM 8.1* 7.8* 8.2*       IMPRESSION:    # ESRD on outpt PD    - Etiology likely 2/2 FSGS    - s/p permcath on 11/10 for temp outpt HD   # Volume overload, improving  # S/P AVR/ Ascending aneurysm repair     -High chest tube drainage only noted at night while on PD     -concern for PD fluid leaking through into chest cavity per cards         On 11/6     -CVT recommending at least 2 weeks of PD avoidance to allow          Chest/ABD cavity to heal  # Anemia of CKD     EPO 6000U IV w HD to start 11/10  # CKD-MBD  # Acute Respiratoy failure on vent, extubated    PLAN:  -Alternating HD with PUF this week to help address fluid overload and anasarca  - HD today and plan for PUF again in AM  - Will resume CCPD tomorrow after PUF in anticipation for discharge, hopefully on Friday and can resume PD on discharge if he continues to improve over next 48 hrs   - cont EPO, H/H stable   - Dose all medications as per ESRD      Please page nephrology with any questions or " concerns

## 2023-11-15 NOTE — HOSPITAL COURSE
This is a 58 year old male with PMHx of hypertension, hyperlipidemia, severe AS, and ESRD secondary to FSGS on PD and ascending aortic aneurysm who presented 11/3/2023 for elective CT surgery for mechanical aortic valve replacement complicated by cardiac tamponade and AAA repair, admitted to CT surgery service postoperatively.  Medicine team was consulted for pleural effusion and for concern for possible GI bleed.    Patient has been followed by nephrology, initially on PD, HD catheter was placed 11/11.  Patient underwent multiple sessions of hemodialysis.  On discharge patient will resume peritoneal dialysis.    On 11/11/2023, concerned for left-sided pleural effusion, patient underwent thoracentesis on 11/12, 400 cc removed.    There was also concern for possible GI bleed, patient's Coumadin was held for 48 hours, hemoglobin remained stable.  No further evidence of bleeding.  Patient to continue with Coumadin.    TTE noted moderate pericardial effusion without any evidence of tamponade, 1.7 cm x 0.5 cm mobile hyperechoic density seen on the anterior mitral valve leaflet -patient is for CARMELITA on 11/17.

## 2023-11-15 NOTE — PROGRESS NOTES
Inpatient Anticoagulation Service Note for 11/15/2023      Reason for Anticoagulation: Atrial Fibrillation, On-X Aortic Valve Replacement      Hemoglobin Value: (!) 8.6  Hematocrit Value: (!) 25.3  Lab Platelet Value: 340  Target INR: 2.0 to 3.0    INR from last 7 days       Date/Time INR Value    11/15/23 0247 1.94    11/14/23 0136 1.68    11/13/23 0046 1.43    11/12/23 0049 1.34    11/11/23 0030 1.37    11/10/23 0118 1.46    11/09/23 0301 1.67          Dose from last 7 days       Date/Time Dose (mg)    11/15/23 1410 2.5    11/14/23 0818 2.5    11/13/23 0859 2.5    11/12/23 1213 2.5    11/11/23 1349 2.5    11/10/23 1315 0    11/09/23 1331 0          Average Dose (mg):  (new start warfarin therapy)  Significant Interactions: Amiodarone, Antibiotics, Antiplatelet Medications  Bridge Therapy: No (heparin prophylaxis dosing)    Reversal Agent Administered: Not Applicable    Comments: INR up to 1.94. Will continue warfarin 2.5 mg daily and keep trending the INR.  Will stop the heparin prophylactic dosing once INR > or = to 2.0.    Education Material Provided?: No (needs education prior to discharge)    Pharmacist suggested discharge dosing: warfarin 2.5 mg daily at this point. Pt will need a follow up INR within 2-3 days of discharge.     Hector Sorensen, PharmD

## 2023-11-15 NOTE — ASSESSMENT & PLAN NOTE
Atarax as needed  Patient does not want to have a trial of Xanax  Patient responded well to Restoril, order placed

## 2023-11-15 NOTE — CARE PLAN
The patient is Stable - Low risk of patient condition declining or worsening    Shift Goals  Clinical Goals: Monitor VS, mobility, dialysis  Patient Goals: Sleep  Family Goals: n/a    Progress made toward(s) clinical / shift goals:    Problem: Post Op Day 5 CABG/Heart Valve Replacement  Goal: Optimal care of the Post Op CABG/Heart Valve replacement Post Op Day 5  Outcome: Progressing  Note: Patient showered today. Ambulated documeted. Daily weight documented. Best IS documented. Patient refusing ARIEL hose/SCDs, education completed. Incisions cleansed with soap and water.     Problem: Fall Risk  Goal: Patient will remain free from falls  Outcome: Progressing  Note: Treaded socks and bed/strip alarm on, side rails up x 2. Call light within reach. Pt educated to call for assistance. Reinforce as needed. Continue to monitor.           Patient is not progressing towards the following goals:

## 2023-11-15 NOTE — DISCHARGE PLANNING
"HTH/SCP TCN chart review completed. Current discharge considerations are home with no needs.  Per CM notes, per \"HD Coordinator - she reports she is still working on confirmation of OP HD chair and time\" depending on HD vs. PD dialysis need  Noted patient ambulated 1 x 250 feet feet on 11/14.     TCN will continue to follow and collaborate with discharge planning team as additional post acute needs arise. Thank you.    Completed:  PT recommends outpatient cardiac rehab on 11/09  OT recommend no further OT needs on 11/09    Choice obtained: None.  See above.    HTH with a Non-[facility] PCP.  Patient states he  will schedule his own Follow up appointment.      "

## 2023-11-15 NOTE — PROGRESS NOTES
[facility] -  PROGRESS NOTE               Author: CARISSA Hammonds. Date & Time: 11/15/2023  12:45 PM     HPI:  58 y.o. male with  chronic anabolic steroid use, ESRD sec to to Bx proven sec FSGS currently on PD and compeltes three excahnges/ day, moderate to moderate AS and  moderate to severe AR, HTN presented for aortic valve and ascending anerysm repair. Pt had post operative complication of tamponade and is back in OR. Pt has received signficant colloid/ IVF for support.     DAILY NEPHROLOGY SUMMARY:  11/4- Patient intolerant to HD last night  11/5- Patient s/p PD last night. Reports shortness of breath but better then yesterday  11/6: pt had PD done with 214cc UF removed. 1000cc drainage noted from chest tube overnight. Cardiology concerned for PD fluid draining from chest tube.  11/7: pt had PUF yesterday w 2L removed. Had HD again today with 2.5L removed. Pt feels much better.  11/8: pt scheduled for HD again. Feeling okay  11/9: pt had HD with 2L UF removed. Feels nauseous this AM from amiodarone.   11/10: seen on HD and tolerating. Awaiting permcath placement  11/11: permcath placed, pt feels well.   11/12: pt seen eating breakfast this AM. No complaints.  11/13: seen on HD, BP low 80/52 - was given BP meds this am for high BP, has gross anasarca and 4+ pitting edema B/L LE   11/14: HD yesterday but could remove only 1.5 L as blood pressure remained low throughout HD, blood pressure better today, scheduled for puff for more ultrafiltration.  He is determined to go back on PD but at this point is grossly fluid overloaded  11/15: Repeat PUF yesterday and had 4L UF removed, feels better but still is hypervolemic, wants to go back on PD on discharge.  Seen on HD VSS, resting comfortably without complaints.     REVIEW OF SYSTEMS:    Denies SOB, no CP, no V  10 point ROS reviewed and is as per HPI/daily summary or otherwise negative    PMH/PSH/SH/FH: Reviewed and  "unchanged since admission note  CURRENT MEDICATIONS: Reviewed from admission to present day    VS:  BP (!) 139/90 Comment: RN notified   Pulse 88   Temp 36.9 °C (98.4 °F) (Temporal)   Resp 20   Ht 1.702 m (5' 7\")   Wt 93.3 kg (205 lb 11 oz)   SpO2 96%   BMI 32.22 kg/m²     Exam  General: Awake, no acute distress  HEENT: head normocephalic, atruamtic  Neck: neck supple, no visible masses  Respiratory: clear to auscultation bilaterally, no rales, no ronchi, or wheezing  Cardiac: normal rate, normal rhythm,  Abdomen: non tender, non-distended, no guarding  Musculoskelatal: no joint tenderness, moves limbs spontaneously  Extremities: no significant joint abnormalities, +++ edema  Skin: no lesions, no rashes noted    Access: R IJ HD permcatheter, Abd PD catheter      Fluids:  In: 500 [Dialysis:500]  Out: 4800     LABS:  Recent Labs     11/13/23  0046 11/14/23  0136 11/15/23  0247   SODIUM 125* 129* 128*   POTASSIUM 4.4 4.5 4.3   CHLORIDE 87* 92* 91*   CO2 22 24 24   GLUCOSE 118* 97 109*   BUN 68* 49* 58*   CREATININE 8.68* 6.77* 8.19*   CALCIUM 8.1* 7.8* 8.2*         IMPRESSION:    # ESRD on outpt PD    - Etiology likely 2/2 FSGS    - s/p permcath on 11/10 for temp outpt HD   # Volume overload, improving  # S/P AVR/ Ascending aneurysm repair     -High chest tube drainage only noted at night while on PD     -concern for PD fluid leaking through into chest cavity per cards         On 11/6     -CVT recommending at least 2 weeks of PD avoidance to allow          Chest/ABD cavity to heal  # Anemia of CKD     EPO 6000U IV w HD to start 11/10  # CKD-MBD  # Acute Respiratoy failure on vent, extubated    PLAN:  -Alternating HD with PUF this week to help address fluid overload and anasarca  - HD today complete (WED) and plan for PUF again in AM (THUR)  - Will resume CCPD tomorrow after PUF in anticipation for discharge, hopefully on Friday and can resume PD on discharge if he continues to improve over next 48 hrs   - cont " EPO, H/H stable   - Dose all medications as per ESRD      Please page nephrology with any questions or concerns

## 2023-11-15 NOTE — PROGRESS NOTES
Hospital Medicine Daily Progress Note    Date of Service  11/15/2023    Chief Complaint  Gurdeep Guerra is a 58 y.o. male admitted 11/3/2023 with AVR replacement    Hospital Course  This is a 58 year old male with PMHx of hypertension, hyperlipidemia, severe AS, and ESRD secondary to FSGS on PD and ascending aortic aneurysm who presented 11/3/2023 for elective CT surgery for mechanical aortic valve replacement complicated by cardiac tamponade and AAA repair, admitted to CT surgery service postoperatively.  Medicine team was consulted for pleural effusion and for concern for possible GI bleed.    Patient has been followed by nephrology, initially on PD, HD catheter was placed 11/11. At this time will continue with HD MWF. QFT gold 11/9/2023 negative.    On 11/11/2023, concerned for left-sided pleural effusion, patient underwent thoracentesis on 11/12, 400 cc removed.    There was also concern for possible GI bleed, patient's Coumadin was held for 48 hours, hemoglobin remained stable.  No further evidence of GI bleed.  Patient to continue with Coumadin.    Interval Problem Update  Patient is currently still requiring 2 L.  We will continue to wean as tolerated as patient continues with HD.  If patient continues to require oxygen closer to discharge, home O2 evaluation will be performed.    Labs reviewed, hemoglobin stable.    Patient to continue with cardiac monitoring.    Continuing with HD while in house, patient is to resume PD on discharge.    I have discussed this patient's plan of care and discharge plan at IDT rounds today with Case Management, Nursing, Nursing leadership, and other members of the IDT team.    Consultants/Specialty  cardiovascular surgeon, critical care, and nephrology    Code Status  Full Code    Disposition  The patient is not medically cleared for discharge to home or a post-acute facility.  Anticipate discharge to: home with organized home healthcare and close outpatient follow-up    I  have placed the appropriate orders for post-discharge needs.    Review of Systems  Review of Systems   Respiratory:  Negative for shortness of breath.    All other systems reviewed and are negative.       Physical Exam  Temp:  [36.2 °C (97.1 °F)-37.5 °C (99.5 °F)] 36.6 °C (97.8 °F)  Pulse:  [] 96  Resp:  [18-20] 18  BP: (106-151)/(56-90) 106/56  SpO2:  [94 %-100 %] 98 %    Physical Exam  Vitals and nursing note reviewed.   Constitutional:       General: He is not in acute distress.     Appearance: Normal appearance.   HENT:      Head: Normocephalic and atraumatic.   Eyes:      Extraocular Movements: Extraocular movements intact.      Conjunctiva/sclera: Conjunctivae normal.      Pupils: Pupils are equal, round, and reactive to light.   Cardiovascular:      Rate and Rhythm: Normal rate and regular rhythm.      Pulses: Normal pulses.      Heart sounds: No murmur heard.     No friction rub. No gallop.   Pulmonary:      Effort: Pulmonary effort is normal. No respiratory distress.      Breath sounds: Normal breath sounds. No wheezing, rhonchi or rales.   Abdominal:      General: Bowel sounds are normal. There is distension.      Palpations: Abdomen is soft.      Tenderness: There is no abdominal tenderness.   Musculoskeletal:         General: No swelling or tenderness. Normal range of motion.      Cervical back: Normal range of motion and neck supple. No muscular tenderness.      Right lower leg: Edema (minimal) present.      Left lower leg: Edema (minimal) present.   Skin:     General: Skin is warm and dry.      Capillary Refill: Capillary refill takes less than 2 seconds.      Findings: No bruising, erythema or rash.   Neurological:      General: No focal deficit present.      Mental Status: He is alert and oriented to person, place, and time.         Fluids    Intake/Output Summary (Last 24 hours) at 11/15/2023 1437  Last data filed at 11/15/2023 1235  Gross per 24 hour   Intake 1000 ml   Output 9300 ml   Net  -8300 ml       Laboratory  Recent Labs     11/13/23  0046 11/14/23 0136 11/15/23  0247   WBC 8.9 8.7 10.5   RBC 2.66* 2.63* 2.68*   HEMOGLOBIN 8.4* 8.1* 8.6*   HEMATOCRIT 25.4* 25.0* 25.3*   MCV 95.5 95.1 94.4   MCH 31.6 30.8 32.1   MCHC 33.1 32.4 34.0   RDW 52.0* 52.3* 52.9*   PLATELETCT 266 291 340   MPV 9.9 9.7 9.7     Recent Labs     11/13/23  0046 11/14/23  0136 11/15/23  0247   SODIUM 125* 129* 128*   POTASSIUM 4.4 4.5 4.3   CHLORIDE 87* 92* 91*   CO2 22 24 24   GLUCOSE 118* 97 109*   BUN 68* 49* 58*   CREATININE 8.68* 6.77* 8.19*   CALCIUM 8.1* 7.8* 8.2*     Recent Labs     11/13/23  0046 11/14/23  0136 11/15/23  0247   INR 1.43* 1.68* 1.94*               Imaging  DX-CHEST-2 VIEWS   Final Result      1.  Tubes and lines unchanged in position.      2.  Marked cardiomegaly.      3.  Small left pleural effusion.      US-THORACENTESIS PUNCTURE LEFT   Final Result      1. Ultrasound guided left sided therapeutic thoracentesis.      2. 400 mL of fluid withdrawn.      DX-CHEST-PORTABLE (1 VIEW)   Final Result      1.  No significant change from prior exam.   2.  No pneumothorax.      [id number] VIEW   Final Result      1.  No evidence of bowel obstruction.   2.  Mildly increased colonic stool.      DX-CHEST-2 VIEWS   Final Result         1.  Left midlung and lower lobe infiltrate stable to slightly increased.   2.  Layering left pleural effusion, similar to prior study.   3.  Cardiomegaly      IR-TORRES,GROSHONG PLACEMENT >5   Final Result      1. Ultrasound and fluoroscopic guided placement of a right internal jugular 14.5 [place] HemoSplit tunneled dialysis catheter.      2. The hemodialysis catheter may be used immediately as clinically indicated. Flushes per protocol.      3. Removal of the right-sided temporary dialysis catheter.         DX-CHEST-2 VIEWS   Final Result      Stable appearance of the chest.      DX-CHEST-PORTABLE (1 VIEW)   Final Result      1.  Bibasilar underinflation atelectasis which could  obscure an additional process. This has increased on the LEFT and is similar to the prior on the RIGHT.   2.  Suspect small LEFT pleural effusion   3.  Persistently enlarged cardiac silhouette      US-EXTREMITY VENOUS UPPER UNILAT LEFT   Final Result      DX-CHEST-PORTABLE (1 VIEW)   Final Result      1.  Low lung volumes. Bibasilar atelectasis.      DX-CHEST-PORTABLE (1 VIEW)   Final Result      1.  Tubes and lines in good position.      2.  Right basilar atelectasis.      DX-CHEST-PORTABLE (1 VIEW)   Final Result      1.  Mild cardiomegaly.      2.  Tubes and lines in good position.      3.  Right basilar atelectasis.      EC-CARMELITA W/O CONT   Final Result      EC-ECHOCARDIOGRAM LTD W/O CONT   Final Result      DX-CHEST-PORTABLE (1 VIEW)   Final Result      Satisfactory postoperative appearance of the chest with BILATERAL perihilar and LEFT basilar opacities which are probably areas of atelectasis      EC-ECHOCARDIOGRAM LTD W/O CONT    (Results Pending)        Assessment/Plan  * Aortic stenosis- (present on admission)  Assessment & Plan  S/p mechanical valve replacement by CTS 11/3  Warfarin, dosing as per pharmacy to target therapeutic INR goal    GIB (gastrointestinal bleeding)  Assessment & Plan  Questionable bloody stool  Iron studies show anemia of chronic disease.  Guaiac pending  Reports normal colonoscopy within the past 5 years.  Hemoglobin continues to remain stable  Serial CBC    AAA (abdominal aortic aneurysm) ([facility])  Assessment & Plan  S/p repair by CTS 11/3  CTS f/u appreciated    Status post cardiac surgery  Assessment & Plan  AVR and Ao root repair with Dr. Blanca 11/3  C/b focal pericardial tamponade that was managed operatively  Appropriate post-cardiac surgery protocols are in place  Cardiothoracic surgery is following.  Management per CT surgery    ESRD (end stage renal disease) ([facility])  Assessment & Plan  Was on peritoneal dialysis  Currently has right IJ temp HD cath  Hemodialysis as per  nephrology, while in house, with plans to transition back to PD on discharge  Nephrology follow-up appreciated    Paroxysmal A-fib (HCC)  Assessment & Plan  Postop mechanical AVR and AAA repair  Continue amiodarone  Continue warfarin    Anxiety  Assessment & Plan  Atarax as needed  Patient does not want to have a trial of Xanax  Patient responded well to Restoril, order placed    Pleural effusion on left  Assessment & Plan  S/p thoracentesis.  Continue hemodialysis per nephrology.    Insomnia  Assessment & Plan  Continue with Restoril    Shock (HCC)  Assessment & Plan  Now resolved    Dyslipidemia- (present on admission)  Assessment & Plan  Continue with statin    Coronary artery disease due to lipid rich plaque  Assessment & Plan  Optimize CAD meds: Warfarin, ASA, BB, statin    Gastroesophageal reflux disease- (present on admission)  Assessment & Plan  Continue PPI.    Hypertension- (present on admission)  Assessment & Plan  Post-cardiac surgery BP protocols  Patient currently is on verapamil blood pressure has been fluctuating.  Continue monitor closely on telemetry.         VTE prophylaxis: Coumadin    I have performed a physical exam and reviewed and updated ROS and Plan today (11/15/2023). In review of yesterday's note (11/14/2023), there are no changes except as documented above.      Greater than 51 minutes spent prepping to see patient (e.g. reviewing EMR) obtaining and/or reviewing separately obtained history. Performing a medically appropriate examination and evaluation. Independently interpreting results and communicating results to patient/family/caregiver. Counseling and educating the patient/family/caregiver. Ordering medications, tests, and/or procedures. Referring and communicating with other health care professionals.  Documenting clinical information in EPIC. Care coordination.

## 2023-11-15 NOTE — PROGRESS NOTES
Cardiovascular Surgery Progress Note    Name: Gurdeep Guerra  MRN: [id number]  : 1965  Admit Date: 11/3/2023  4:59 AM  12 Days Post-Op     Procedure:  Procedure(s) and Anesthesia Type:  *AORTIC VALVE REPLACEMENT (23 mm On-X mechanical valve), ASCENDING AORTIC ANEURYSM REPAIR (30 mm Hemashield tube graft) and intraoperative transesophageal echocardiography    *MEDIASTINAL EXPLORATION FOR POSTOPERATIVE BLEEDING AND RESTORATION OF HEMOSTASIS    Vitals:  Vitals:    11/15/23 0005 11/15/23 0416 11/15/23 0532 11/15/23 0742   BP: (!) 140/83 (!) 151/87  (!) 139/90   Pulse: 75 88  88   Resp: 20 20  20   Temp: 37.3 °C (99.1 °F) 37.5 °C (99.5 °F)  36.9 °C (98.4 °F)   TempSrc: Temporal Temporal  Temporal   SpO2: 98% 94%  96%   Weight:   93.3 kg (205 lb 11 oz)    Height:          Temp (24hrs), Av.9 °C (98.5 °F), Min:36.2 °C (97.1 °F), Max:37.5 °C (99.5 °F)      Respiratory:    Respiration: 20, Pulse Oximetry: 96 %       Fluids:    Intake/Output Summary (Last 24 hours) at 11/15/2023 1102  Last data filed at 11/15/2023 0528  Gross per 24 hour   Intake 500 ml   Output 4800 ml   Net -4300 ml       Admit weight: Weight: 84.6 kg (186 lb 8.2 oz)  Current weight: Weight: 93.3 kg (205 lb 11 oz) (11/15/23 0532)    Labs:  Recent Labs     23  0046 23  0136 11/15/23  0247   WBC 8.9 8.7 10.5   RBC 2.66* 2.63* 2.68*   HEMOGLOBIN 8.4* 8.1* 8.6*   HEMATOCRIT 25.4* 25.0* 25.3*   MCV 95.5 95.1 94.4   MCH 31.6 30.8 32.1   MCHC 33.1 32.4 34.0   RDW 52.0* 52.3* 52.9*   PLATELETCT 266 291 340   MPV 9.9 9.7 9.7       Recent Labs     236 236 11/15/23  0247   SODIUM 125* 129* 128*   POTASSIUM 4.4 4.5 4.3   CHLORIDE 87* 92* 91*   CO2 22 24 24   GLUCOSE 118* 97 109*   BUN 68* 49* 58*   CREATININE 8.68* 6.77* 8.19*   CALCIUM 8.1* 7.8* 8.2*       Recent Labs     236 236 11/15/23  0247   INR 1.43* 1.68* 1.94*         HgbA1c:  5    Diabetic Educator Consult:  no    Medications:  Scheduled  Medications   Medication Dose Frequency    carvedilol  3.125 mg BID WITH MEALS    guaiFENesin ER  600 mg Q12HRS    omeprazole  20 mg BID    atorvastatin  40 mg Nightly    amiodarone  200 mg BID    epoetin  6,000 Units MO, WE + FR    warfarin  2.5 mg DAILY AT 1800    tamsulosin  0.4 mg AFTER BREAKFAST    MD Alert...Warfarin per Pharmacy   PHARMACY TO DOSE    gabapentin  200 mg Q DAY    Nozin nasal  swab  1 Applicator BID    aspirin  81 mg DAILY    Pharmacy Consult Request  1 Each PHARMACY TO DOSE    senna-docusate  2 Tablet BID    And    polyethylene glycol/lytes  1 Packet DAILY    And    magnesium hydroxide  30 mL DAILY    heparin  5,000 Units Q8HRS        Exam:   Physical Exam  Vitals and nursing note reviewed.   Constitutional:       General: He is not in acute distress.     Appearance: Normal appearance.   HENT:      Head: Normocephalic.      Right Ear: External ear normal.      Left Ear: External ear normal.      Nose: Nose normal. No congestion.      Mouth/Throat:      Mouth: Mucous membranes are moist.      Pharynx: Oropharynx is clear.   Eyes:      Extraocular Movements: Extraocular movements intact.      Pupils: Pupils are equal, round, and reactive to light.   Cardiovascular:      Rate and Rhythm: Normal rate and regular rhythm.      Pulses: Normal pulses.      Heart sounds: Normal heart sounds.   Pulmonary:      Effort: Pulmonary effort is normal.      Breath sounds: Decreased breath sounds present.   Abdominal:      General: Bowel sounds are decreased. There is no distension.      Palpations: Abdomen is soft.      Comments: PD catheter   Musculoskeletal:      Cervical back: Normal range of motion and neck supple. No tenderness.      Right lower leg: Edema present.      Left lower leg: Edema present.   Skin:     General: Skin is warm and dry.      Comments: Midline surgical incision   Neurological:      General: No focal deficit present.      Mental Status: He is alert and oriented to person,  place, and time. Mental status is at baseline.   Psychiatric:         Mood and Affect: Mood normal.         Behavior: Behavior normal.         Thought Content: Thought content normal.         Cardiac Medications:    ASA - Yes    Plavix - No; contraindicated because of On Coumadin / NOAC    Post-operative Beta Blockers - Yes    Ace/ARB- No; contraindicated because of Normal EF    Statin - No; contraindicated because of No CAD    Aldactone- No; contraindicated because of Normal EF    SGLT2i-  No contraindicated because of No; contraindicated because of Normal EF    Ejection Fraction:  60%    Telemetry:   11/4 SR- a fib overnight  11/5 SR  11/6 SR/Aflutter  11/7 Junctional/flutter  11/8 SR  11/9 Converted to SR this AM  11/10 SR/ST  11/11 Aflutter 100-120s  11/12 Afib/flutter 90-110s  11/13 a fib 90s  11/14 SR 70's  11/15 a flutter 100-110s    Assessment/Plan:  POD 1  HDS, SR- was a fib overnight, on amio gtt, neuro intact, wounds intact, abdomen soft, fluid balance positive, wt up,  2 L NC. 270 mL out of chest tubes overnight after return to OR. H/H 7.8/22.4, plat 83. Plan: One unit PRBCs this morning. Keep chest tubes and pacing wires. DC amio gtt, continue PO. Dialysis per neph. IS/ambulate.     POD 2  HTN, SR, neuro intact, wounds intact, abdomen soft no BM, fluid balance positive, wt up, 2 L NC. 590 mL out of chest tubes overnight. 500 off with PD last night. Pacing wires removed.  Plan:  Keep chest tubes for moderate output. Hold coumadin one more day. Resume verapamil, IS/ambulate. Transfer to tele.     POD 3 HOTN- transfer back to [facility]- start pressors, SR/flutter- non-sustained- on amio PO, Oxygen demands up- fluid overload, ESRD- concern PD dilalysate came out of mediastinal CT- will have to convert to HD until peritoneum heals and scars down, Risk for mediastinitis/sepsis- pan culture- send CT fluid for culture- start Zoysn/vanco, LUE swelling/ecchymosis - ck US    POD 4 HOTN- on levo- during HD,  Aflutter/junctional- on amio gtt- cont, HD today- removing fluid, leukocytosis- improved, normal lactate, Cont antibiotics- for high risk of mediastinitis, d/c mediastinal tubes    POD 5 HTN, SR.  Amio gtt.  HD for 2 weeks due to poss mediastinitis r/t CAPD fluid.  Normal WBC, cx negative.  Empiric abx.  Small amount of sanguinous drainage on the superior aspect of the wound.  Hgb 8.5 Cr 6.48.  Plan: Transfer to tele.  Wean oxygen.  Add coreg.      POD 6 HDS. SR.  Neuro intact.  Wounds-drainage improved.  WBC down.  HD MWF per neph.  Refused IR today due to nausea/feeling poorly overnight, would like to proceed tomorrow.  Plan: Resume cardiac diet today, NPO at midnight.  Hold ASA, lovenox, coumadin for IR tomorrow.  Half of amio dosage per Evangelist to see if he tolerates it with less nausea.      POD 7 Seen in HD.  HDS. SR/ST.  Neuro intact.  Wounds CDI-no drainage.  Cr 7.78 Hgb 8.6.  CXR with small left effusion.  Tunneled dialysis cath today.  Plan: Tunneled dialysis catheter today.  Increase coreg.  Repeat CXR tomorrow, thora if indicated.      POD 8 HDS. Afib 120s.  Back on 3LNC.  Neuro intact.  Wounds CDI- no drainage.  Cr 6.18 Hgb 8.8-improved.  Verbalizing small amount of blood on stool unable to been seen by RN at the time, blood thinners have been held last 2 days for procedure with increase in Hgb today.  Plan: Check guiac.  Restart amio gtt and bolus.  Restart coumadin for mechanical valve-discussed with IR. Monitor stools.  Thoracentesis if pleural effusion large enough.      POD 9 HDS.  Afib 90-110s.  1LNC.  Neuro intact.  Wounds CDI.  Thora yesterday 400ml off.  Cr 7.7 Hgb 8.1-watch.  Abdomen distension. Abdominal xray negative, no fever/leukocytosis.  Plan: Increase coreg.  Repeat amio bolus.  Wean oxygen.      POD 10  HTN, a fib rate controlled, neuro intact, wounds intact, abdomen soft +BM, fluid balance negative, wt up,  2 L NC. Pt c/o SOB and cough.  Plan:  Resume home verapamil. 2 view CXR, add  tessalon and mucinex. HD per nephrology, IS/ambulate.     POD 11 HDS- BP labile- will d/c verapamil- add coreg on non-dialysis days- add , SR- on PO amio, Wt - up/edema- HD today hopefully can pull fluid due to better BP, Plan to re-attempt PD on Friday, Amb/enc IS, wean oxygen    POD 12  HDS- BP improved, a flutter, neuro intact, wounds intact, abdomen soft +BM, fluid balance positve, wt down,  2 L NC.  Plan:  Continue HD to pull fluid today and tomorrow, anticipate PD tomorrow night. IS/ambulate.     Disposition:  PT/OT no needs  Outpatient dialysis chair available Thursday for HD

## 2023-11-15 NOTE — PROGRESS NOTES
Bedside report received from Pierre KINCAID. Pt A&Ox4. Aflutter 85 on the monitor. POC discussed with pt. Pt verbalizes understanding. Call light and belongings within reach. Bed locked and in lowest position. Alarm and fall precautions in place.

## 2023-11-15 NOTE — PROGRESS NOTES
[facility] Progress Note    Hemodialysis treatment ordered today per Dr. Vaz x 3 hours. Treatment initiated at 0926 and ended at 1226.    Pt A/Ox4, VSS, requested 4L UF Net , okayed by Dr Vaz, coarse lung sounds, no respiratory distress reported,  BLE edema noted, denies any discomfort.    Pt tolerated treatment; see e-flow sheets for details.    Net UF 4,000 mL    Post tx, CVC flushed with saline then locked with heparin 1000 units/mL per designated amount in each wing then clamped and capped. Aspirate heparin prior to next CVC use.    Report given to Primary RN.

## 2023-11-16 PROBLEM — I31.39 PERICARDIAL EFFUSION: Status: ACTIVE | Noted: 2023-11-16

## 2023-11-16 PROBLEM — I05.8 MITRAL VALVE MASS: Status: ACTIVE | Noted: 2023-11-16

## 2023-11-16 LAB
ALBUMIN SERPL BCP-MCNC: 2.8 G/DL (ref 3.2–4.9)
ALBUMIN/GLOB SERPL: 1 G/DL
ALP SERPL-CCNC: 54 U/L (ref 30–99)
ALT SERPL-CCNC: 16 U/L (ref 2–50)
ANION GAP SERPL CALC-SCNC: 14 MMOL/L (ref 7–16)
AST SERPL-CCNC: 22 U/L (ref 12–45)
BILIRUB SERPL-MCNC: 0.3 MG/DL (ref 0.1–1.5)
BUN SERPL-MCNC: 47 MG/DL (ref 8–22)
CALCIUM ALBUM COR SERPL-MCNC: 9.2 MG/DL (ref 8.5–10.5)
CALCIUM SERPL-MCNC: 8.2 MG/DL (ref 8.5–10.5)
CHLORIDE SERPL-SCNC: 92 MMOL/L (ref 96–112)
CO2 SERPL-SCNC: 24 MMOL/L (ref 20–33)
CREAT SERPL-MCNC: 6.8 MG/DL (ref 0.5–1.4)
ERYTHROCYTE [DISTWIDTH] IN BLOOD BY AUTOMATED COUNT: 54.6 FL (ref 35.9–50)
GFR SERPLBLD CREATININE-BSD FMLA CKD-EPI: 9 ML/MIN/1.73 M 2
GLOBULIN SER CALC-MCNC: 2.7 G/DL (ref 1.9–3.5)
GLUCOSE SERPL-MCNC: 97 MG/DL (ref 65–99)
HCT VFR BLD AUTO: 25.6 % (ref 42–52)
HGB BLD-MCNC: 8.4 G/DL (ref 14–18)
INR PPP: 2.12 (ref 0.87–1.13)
MCH RBC QN AUTO: 31 PG (ref 27–33)
MCHC RBC AUTO-ENTMCNC: 32.8 G/DL (ref 32.3–36.5)
MCV RBC AUTO: 94.5 FL (ref 81.4–97.8)
PLATELET # BLD AUTO: 364 K/UL (ref 164–446)
PMV BLD AUTO: 9.5 FL (ref 9–12.9)
POTASSIUM SERPL-SCNC: 4.6 MMOL/L (ref 3.6–5.5)
PROT SERPL-MCNC: 5.5 G/DL (ref 6–8.2)
PROTHROMBIN TIME: 24 SEC (ref 12–14.6)
RBC # BLD AUTO: 2.71 M/UL (ref 4.7–6.1)
SODIUM SERPL-SCNC: 130 MMOL/L (ref 135–145)
WBC # BLD AUTO: 10.8 K/UL (ref 4.8–10.8)

## 2023-11-16 PROCEDURE — 90945 DIALYSIS ONE EVALUATION: CPT

## 2023-11-16 PROCEDURE — 770020 HCHG ROOM/CARE - TELE (206)

## 2023-11-16 PROCEDURE — 80053 COMPREHEN METABOLIC PANEL: CPT

## 2023-11-16 PROCEDURE — A9270 NON-COVERED ITEM OR SERVICE: HCPCS | Performed by: NURSE PRACTITIONER

## 2023-11-16 PROCEDURE — A9270 NON-COVERED ITEM OR SERVICE: HCPCS | Performed by: STUDENT IN AN ORGANIZED HEALTH CARE EDUCATION/TRAINING PROGRAM

## 2023-11-16 PROCEDURE — 700102 HCHG RX REV CODE 250 W/ 637 OVERRIDE(OP): Performed by: NURSE PRACTITIONER

## 2023-11-16 PROCEDURE — 700102 HCHG RX REV CODE 250 W/ 637 OVERRIDE(OP): Performed by: THORACIC SURGERY (CARDIOTHORACIC VASCULAR SURGERY)

## 2023-11-16 PROCEDURE — A9270 NON-COVERED ITEM OR SERVICE: HCPCS | Performed by: THORACIC SURGERY (CARDIOTHORACIC VASCULAR SURGERY)

## 2023-11-16 PROCEDURE — 99024 POSTOP FOLLOW-UP VISIT: CPT | Performed by: NURSE PRACTITIONER

## 2023-11-16 PROCEDURE — 85610 PROTHROMBIN TIME: CPT

## 2023-11-16 PROCEDURE — 85027 COMPLETE CBC AUTOMATED: CPT

## 2023-11-16 PROCEDURE — 700102 HCHG RX REV CODE 250 W/ 637 OVERRIDE(OP): Performed by: STUDENT IN AN ORGANIZED HEALTH CARE EDUCATION/TRAINING PROGRAM

## 2023-11-16 PROCEDURE — 99233 SBSQ HOSP IP/OBS HIGH 50: CPT | Performed by: GENERAL PRACTICE

## 2023-11-16 PROCEDURE — 90935 HEMODIALYSIS ONE EVALUATION: CPT

## 2023-11-16 PROCEDURE — 700111 HCHG RX REV CODE 636 W/ 250 OVERRIDE (IP)

## 2023-11-16 RX ORDER — HEPARIN SODIUM 1000 [USP'U]/ML
INJECTION, SOLUTION INTRAVENOUS; SUBCUTANEOUS
Status: COMPLETED
Start: 2023-11-16 | End: 2023-11-16

## 2023-11-16 RX ADMIN — Medication 1 APPLICATOR: at 20:27

## 2023-11-16 RX ADMIN — ACETAMINOPHEN 1000 MG: 500 TABLET, FILM COATED ORAL at 06:40

## 2023-11-16 RX ADMIN — TAMSULOSIN HYDROCHLORIDE 0.4 MG: 0.4 CAPSULE ORAL at 09:33

## 2023-11-16 RX ADMIN — GUAIFENESIN 600 MG: 600 TABLET, EXTENDED RELEASE ORAL at 16:47

## 2023-11-16 RX ADMIN — ASPIRIN 81 MG: 81 TABLET, COATED ORAL at 05:15

## 2023-11-16 RX ADMIN — OMEPRAZOLE 20 MG: 20 CAPSULE, DELAYED RELEASE ORAL at 05:15

## 2023-11-16 RX ADMIN — OMEPRAZOLE 20 MG: 20 CAPSULE, DELAYED RELEASE ORAL at 16:47

## 2023-11-16 RX ADMIN — HEPARIN SODIUM 3700 UNITS: 1000 INJECTION, SOLUTION INTRAVENOUS; SUBCUTANEOUS at 11:52

## 2023-11-16 RX ADMIN — GUAIFENESIN 600 MG: 600 TABLET, EXTENDED RELEASE ORAL at 05:15

## 2023-11-16 RX ADMIN — AMIODARONE HYDROCHLORIDE 200 MG: 200 TABLET ORAL at 16:47

## 2023-11-16 RX ADMIN — WARFARIN SODIUM 2.5 MG: 2.5 TABLET ORAL at 16:47

## 2023-11-16 RX ADMIN — AMIODARONE HYDROCHLORIDE 200 MG: 200 TABLET ORAL at 05:15

## 2023-11-16 RX ADMIN — ACETAMINOPHEN 1000 MG: 500 TABLET, FILM COATED ORAL at 20:32

## 2023-11-16 RX ADMIN — DOCUSATE SODIUM 50 MG AND SENNOSIDES 8.6 MG 2 TABLET: 8.6; 5 TABLET, FILM COATED ORAL at 05:15

## 2023-11-16 RX ADMIN — Medication 1 APPLICATOR: at 09:00

## 2023-11-16 RX ADMIN — ATORVASTATIN CALCIUM 40 MG: 40 TABLET, FILM COATED ORAL at 20:27

## 2023-11-16 RX ADMIN — GABAPENTIN 200 MG: 100 CAPSULE ORAL at 05:15

## 2023-11-16 ASSESSMENT — PAIN DESCRIPTION - PAIN TYPE
TYPE: ACUTE PAIN
TYPE: SURGICAL PAIN

## 2023-11-16 ASSESSMENT — FIBROSIS 4 INDEX: FIB4 SCORE: .8461538461538461538

## 2023-11-16 ASSESSMENT — ENCOUNTER SYMPTOMS: SHORTNESS OF BREATH: 0

## 2023-11-16 NOTE — PROGRESS NOTES
Hospital Medicine Daily Progress Note    Date of Service  11/16/2023    Chief Complaint  Gurdeep Guerra is a 58 y.o. male admitted 11/3/2023 with AVR replacement    Hospital Course  This is a 58 year old male with PMHx of hypertension, hyperlipidemia, severe AS, and ESRD secondary to FSGS on PD and ascending aortic aneurysm who presented 11/3/2023 for elective CT surgery for mechanical aortic valve replacement complicated by cardiac tamponade and AAA repair, admitted to CT surgery service postoperatively.  Medicine team was consulted for pleural effusion and for concern for possible GI bleed.    Patient has been followed by nephrology, initially on PD, HD catheter was placed 11/11.  Patient underwent multiple sessions of hemodialysis.  On discharge patient will resume peritoneal dialysis.    On 11/11/2023, concerned for left-sided pleural effusion, patient underwent thoracentesis on 11/12, 400 cc removed.    There was also concern for possible GI bleed, patient's Coumadin was held for 48 hours, hemoglobin remained stable.  No further evidence of bleeding.  Patient to continue with Coumadin.    TTE noted moderate pericardial effusion without any evidence of tamponade, 1.7 cm x 0.5 cm mobile hyperechoic density seen on the anterior mitral valve leaflet -patient is for CARMELITA on 11/17.    Interval Problem Update  Patient is now on room air, s/p HD today, with plans to resume PD tonight.    TTE noted moderate pericardial effusion without any evidence of tamponade, 1.7 cm x 0.5 cm mobile hyperechoic density seen on the anterior mitral valve leaflet -patient is for CARMELITA on 11/17.    I have discussed this patient's plan of care and discharge plan at IDT rounds today with Case Management, Nursing, Nursing leadership, and other members of the IDT team.    Consultants/Specialty  cardiovascular surgeon, critical care, and nephrology    Code Status  Full Code    Disposition  The patient is not medically cleared for discharge  to home or a post-acute facility.  Anticipate discharge to: home with close outpatient follow-up    I have placed the appropriate orders for post-discharge needs.    Review of Systems  Review of Systems   Respiratory:  Negative for shortness of breath.    All other systems reviewed and are negative.       Physical Exam  Temp:  [36.4 °C (97.6 °F)-37.2 °C (99 °F)] 36.4 °C (97.6 °F)  Pulse:  [] 92  Resp:  [16-18] 18  BP: ()/(46-85) 101/46  SpO2:  [92 %-95 %] 95 %    Physical Exam  Vitals and nursing note reviewed.   Constitutional:       General: He is not in acute distress.     Appearance: Normal appearance.   HENT:      Head: Normocephalic and atraumatic.   Eyes:      Extraocular Movements: Extraocular movements intact.      Conjunctiva/sclera: Conjunctivae normal.      Pupils: Pupils are equal, round, and reactive to light.   Cardiovascular:      Rate and Rhythm: Normal rate and regular rhythm.      Pulses: Normal pulses.      Heart sounds: No murmur heard.     No friction rub. No gallop.      Comments: Midline scar with no evidence of erythema or cellulitis  Pulmonary:      Effort: Pulmonary effort is normal. No respiratory distress.      Breath sounds: Normal breath sounds. No wheezing, rhonchi or rales.   Abdominal:      General: Bowel sounds are normal. There is no distension.      Palpations: Abdomen is soft.      Tenderness: There is no abdominal tenderness.   Musculoskeletal:         General: No swelling or tenderness. Normal range of motion.      Cervical back: Normal range of motion and neck supple. No muscular tenderness.      Right lower leg: Edema (minimal) present.      Left lower leg: Edema (minimal) present.   Skin:     General: Skin is warm and dry.      Capillary Refill: Capillary refill takes less than 2 seconds.      Findings: No bruising, erythema or rash.   Neurological:      General: No focal deficit present.      Mental Status: He is alert and oriented to person, place, and time.          Fluids    Intake/Output Summary (Last 24 hours) at 11/16/2023 1318  Last data filed at 11/16/2023 1200  Gross per 24 hour   Intake 1660 ml   Output 3800 ml   Net -2140 ml       Laboratory  Recent Labs     11/14/23  0136 11/15/23  0247 11/16/23  0205   WBC 8.7 10.5 10.8   RBC 2.63* 2.68* 2.71*   HEMOGLOBIN 8.1* 8.6* 8.4*   HEMATOCRIT 25.0* 25.3* 25.6*   MCV 95.1 94.4 94.5   MCH 30.8 32.1 31.0   MCHC 32.4 34.0 32.8   RDW 52.3* 52.9* 54.6*   PLATELETCT 291 340 364   MPV 9.7 9.7 9.5     Recent Labs     11/14/23  0136 11/15/23  0247 11/16/23  0205   SODIUM 129* 128* 130*   POTASSIUM 4.5 4.3 4.6   CHLORIDE 92* 91* 92*   CO2 24 24 24   GLUCOSE 97 109* 97   BUN 49* 58* 47*   CREATININE 6.77* 8.19* 6.80*   CALCIUM 7.8* 8.2* 8.2*     Recent Labs     11/14/23  0136 11/15/23  0247 11/16/23  0205   INR 1.68* 1.94* 2.12*               Imaging  EC-ECHOCARDIOGRAM LTD W/O CONT   Final Result      DX-CHEST-2 VIEWS   Final Result      1.  Tubes and lines unchanged in position.      2.  Marked cardiomegaly.      3.  Small left pleural effusion.      US-THORACENTESIS PUNCTURE LEFT   Final Result      1. Ultrasound guided left sided therapeutic thoracentesis.      2. 400 mL of fluid withdrawn.      DX-CHEST-PORTABLE (1 VIEW)   Final Result      1.  No significant change from prior exam.   2.  No pneumothorax.      [id number] VIEW   Final Result      1.  No evidence of bowel obstruction.   2.  Mildly increased colonic stool.      DX-CHEST-2 VIEWS   Final Result         1.  Left midlung and lower lobe infiltrate stable to slightly increased.   2.  Layering left pleural effusion, similar to prior study.   3.  Cardiomegaly      IR-TORRES,GROSHONG PLACEMENT >5   Final Result      1. Ultrasound and fluoroscopic guided placement of a right internal jugular 14.5 [place] HemoSplit tunneled dialysis catheter.      2. The hemodialysis catheter may be used immediately as clinically indicated. Flushes per protocol.      3. Removal of the  right-sided temporary dialysis catheter.         DX-CHEST-2 VIEWS   Final Result      Stable appearance of the chest.      DX-CHEST-PORTABLE (1 VIEW)   Final Result      1.  Bibasilar underinflation atelectasis which could obscure an additional process. This has increased on the LEFT and is similar to the prior on the RIGHT.   2.  Suspect small LEFT pleural effusion   3.  Persistently enlarged cardiac silhouette      US-EXTREMITY VENOUS UPPER UNILAT LEFT   Final Result      DX-CHEST-PORTABLE (1 VIEW)   Final Result      1.  Low lung volumes. Bibasilar atelectasis.      DX-CHEST-PORTABLE (1 VIEW)   Final Result      1.  Tubes and lines in good position.      2.  Right basilar atelectasis.      DX-CHEST-PORTABLE (1 VIEW)   Final Result      1.  Mild cardiomegaly.      2.  Tubes and lines in good position.      3.  Right basilar atelectasis.      EC-CARMELITA W/O CONT   Final Result      EC-ECHOCARDIOGRAM LTD W/O CONT   Final Result      DX-CHEST-PORTABLE (1 VIEW)   Final Result      Satisfactory postoperative appearance of the chest with BILATERAL perihilar and LEFT basilar opacities which are probably areas of atelectasis           Assessment/Plan  * Aortic stenosis- (present on admission)  Assessment & Plan  S/p mechanical valve replacement by CTS 11/3  Warfarin, dosing as per pharmacy to target therapeutic INR goal    Mitral valve mass  Assessment & Plan  TTE noted moderate pericardial effusion without any evidence of tamponade  1.7 cm x 0.5 cm mobile hyperechoic density seen on the anterior mitral valve leaflet   Patient is for CARMELITA on 11/17    Pericardial effusion  Assessment & Plan  TTE noted moderate pericardial effusion without any evidence of tamponade    AAA (abdominal aortic aneurysm) ([facility])  Assessment & Plan  S/p repair by CTS 11/3  CTS f/u appreciated    Status post cardiac surgery  Assessment & Plan  AVR and Ao root repair with Dr. Blanca 11/3  C/b focal pericardial tamponade that was managed  operatively  Appropriate post-cardiac surgery protocols are in place  Cardiothoracic surgery is following.  Management per CT surgery    ESRD (end stage renal disease) ([facility])  Assessment & Plan  Was on peritoneal dialysis  Currently has right IJ temp HD cath  Hemodialysis as per nephrology, while in house, with plans to transition back to PD on discharge  Nephrology follow-up appreciated    GIB (gastrointestinal bleeding)  Assessment & Plan  Questionable bloody stool  Iron studies show anemia of chronic disease.  Guaiac pending  Reports normal colonoscopy within the past 5 years.  Hemoglobin continues to remain stable  Serial CBC    Paroxysmal A-fib ([facility])  Assessment & Plan  Postop mechanical AVR and AAA repair  Continue amiodarone  Continue warfarin    Anxiety  Assessment & Plan  Atarax as needed  Patient does not want to have a trial of Xanax  Patient responded well to Restoril, order placed    Pleural effusion on left  Assessment & Plan  S/p thoracentesis.  Continue hemodialysis per nephrology.    Insomnia  Assessment & Plan  Continue with Restoril    Shock ([facility])  Assessment & Plan  Now resolved    Dyslipidemia- (present on admission)  Assessment & Plan  Continue with statin    Coronary artery disease due to lipid rich plaque  Assessment & Plan  Optimize CAD meds: Warfarin, ASA, BB, statin    Gastroesophageal reflux disease- (present on admission)  Assessment & Plan  Continue PPI.    Hypertension- (present on admission)  Assessment & Plan  Post-cardiac surgery BP protocols  Patient currently is on verapamil blood pressure has been fluctuating.  Continue monitor closely on telemetry.         VTE prophylaxis: Coumadin    I have performed a physical exam and reviewed and updated ROS and Plan today (11/16/2023). In review of yesterday's note (11/15/2023), there are no changes except as documented above.      Greater than 52 minutes spent prepping to see patient (e.g. reviewing EMR) obtaining and/or reviewing  separately obtained history. Performing a medically appropriate examination and evaluation. Independently interpreting results and communicating results to patient/family/caregiver. Counseling and educating the patient/family/caregiver. Ordering medications, tests, and/or procedures. Referring and communicating with other health care professionals.  Documenting clinical information in EPIC. Care coordination.

## 2023-11-16 NOTE — DISCHARGE PLANNING
HTH/SCP TCN chart review completed.  Current discharge considerations are home with no needs.  Per chart plan is d/c today, with PD tonight. TCN will continue to follow and collaborate with discharge planning team as additional post acute needs arise. Thank you.    Completed:  PT recommends outpatient cardiac rehab on 11/09  OT recommend no further OT needs on 11/09    Choice obtained: None.  See above.    [facility] with a Non-[facility] PCP.  Patient states he  will schedule his own Follow up appointment.

## 2023-11-16 NOTE — CARE PLAN
Progress made toward(s) clinical / shift goals:    Problem: Knowledge Deficit - Standard  Goal: Patient and family/care givers will demonstrate understanding of plan of care, disease process/condition, diagnostic tests and medications  Outcome: Progressing     Problem: Post Op Day 12 CABG/Heart Valve Replacement  Goal: Optimal care of the Post Op CABG/Heart Valve replacement Post Op Day 5  Outcome: Progressing   The patient is Stable - Low risk of patient condition declining or worsening    Shift Goals  Clinical Goals: Monitor VS, mobility, dialysis  Patient Goals: Get PD cath  Family Goals: n/a    Problem: Hemodynamics  Goal: Patient's hemodynamics, fluid balance and neurologic status will be stable or improve  Outcome: Progressing     Patient is not progressing towards the following goals:

## 2023-11-16 NOTE — CARE PLAN
Problem: Knowledge Deficit - Standard  Goal: Patient and family/care givers will demonstrate understanding of plan of care, disease process/condition, diagnostic tests and medications  Outcome: Progressing     Problem: Post Op Day 5 CABG/Heart Valve Replacement  Goal: Optimal care of the Post Op CABG/Heart Valve replacement Post Op Day 5  Outcome: Progressing   The patient is Stable - Low risk of patient condition declining or worsening    Shift Goals  Clinical Goals: Monitor VS, mobility, dialysis  Patient Goals: Sleep  Family Goals: n/a    Progress made toward(s) clinical / shift goals:  O2    Patient is not progressing towards the following goals:

## 2023-11-16 NOTE — PROGRESS NOTES
Cardiovascular Surgery Progress Note    Name: Gurdeep Guerra  MRN: [id number]  : 1965  Admit Date: 11/3/2023  4:59 AM  13 Days Post-Op     Procedure:  Procedure(s) and Anesthesia Type:  *AORTIC VALVE REPLACEMENT (23 mm On-X mechanical valve), ASCENDING AORTIC ANEURYSM REPAIR (30 mm Hemashield tube graft) and intraoperative transesophageal echocardiography    *MEDIASTINAL EXPLORATION FOR POSTOPERATIVE BLEEDING AND RESTORATION OF HEMOSTASIS    Vitals:  Vitals:    23 0026 23 0400 23 0515 23 0819   BP: 100/73 116/68 116/68 133/75   Pulse:  74  82   Resp:  18  18   Temp:  37.2 °C (99 °F)  37.1 °C (98.8 °F)   TempSrc:  Temporal  Temporal   SpO2:  92%  92%   Weight:  89.9 kg (198 lb 3.1 oz)     Height:          Temp (24hrs), Av °C (98.6 °F), Min:36.6 °C (97.8 °F), Max:37.2 °C (99 °F)      Respiratory:    Respiration: 18, Pulse Oximetry: 92 %       Fluids:    Intake/Output Summary (Last 24 hours) at 2023 0959  Last data filed at 2023 0515  Gross per 24 hour   Intake 1660 ml   Output 4500 ml   Net -2840 ml       Admit weight: Weight: 84.6 kg (186 lb 8.2 oz)  Current weight: Weight: 89.9 kg (198 lb 3.1 oz) (23 0400)    Labs:  Recent Labs     23  0136 11/15/23  0247 23  0205   WBC 8.7 10.5 10.8   RBC 2.63* 2.68* 2.71*   HEMOGLOBIN 8.1* 8.6* 8.4*   HEMATOCRIT 25.0* 25.3* 25.6*   MCV 95.1 94.4 94.5   MCH 30.8 32.1 31.0   MCHC 32.4 34.0 32.8   RDW 52.3* 52.9* 54.6*   PLATELETCT 291 340 364   MPV 9.7 9.7 9.5       Recent Labs     23  0136 11/1523  020   SODIUM 129* 128* 130*   POTASSIUM 4.5 4.3 4.6   CHLORIDE 92* 91* 92*   CO2 24 24 24   GLUCOSE 97 109* 97   BUN 49* 58* 47*   CREATININE 6.77* 8.19* 6.80*   CALCIUM 7.8* 8.2* 8.2*       Recent Labs     23  0136 11/15/23  0247 11/16/23  020   INR 1.68* 1.94* 2.12*         HgbA1c:  5    Diabetic Educator Consult:  no    Medications:  Scheduled Medications   Medication Dose Frequency     carvedilol  3.125 mg BID WITH MEALS    guaiFENesin ER  600 mg Q12HRS    omeprazole  20 mg BID    atorvastatin  40 mg Nightly    amiodarone  200 mg BID    warfarin  2.5 mg DAILY AT 1800    tamsulosin  0.4 mg AFTER BREAKFAST    MD Alert...Warfarin per Pharmacy   PHARMACY TO DOSE    gabapentin  200 mg Q DAY    Nozin nasal  swab  1 Applicator BID    aspirin  81 mg DAILY    Pharmacy Consult Request  1 Each PHARMACY TO DOSE    senna-docusate  2 Tablet BID    And    polyethylene glycol/lytes  1 Packet DAILY    And    magnesium hydroxide  30 mL DAILY        Exam:   Physical Exam  Vitals and nursing note reviewed.   Constitutional:       General: He is not in acute distress.     Appearance: Normal appearance.   HENT:      Head: Normocephalic.      Right Ear: External ear normal.      Left Ear: External ear normal.      Nose: Nose normal. No congestion.      Mouth/Throat:      Mouth: Mucous membranes are moist.      Pharynx: Oropharynx is clear.   Eyes:      Extraocular Movements: Extraocular movements intact.      Pupils: Pupils are equal, round, and reactive to light.   Cardiovascular:      Rate and Rhythm: Normal rate and regular rhythm.      Pulses: Normal pulses.      Heart sounds: Normal heart sounds.   Pulmonary:      Effort: Pulmonary effort is normal.      Breath sounds: Decreased breath sounds present.   Abdominal:      General: Bowel sounds are decreased. There is no distension.      Palpations: Abdomen is soft.      Comments: PD catheter   Musculoskeletal:      Cervical back: Normal range of motion and neck supple. No tenderness.      Right lower leg: Edema present.      Left lower leg: Edema present.   Skin:     General: Skin is warm and dry.      Comments: Midline surgical incision   Neurological:      General: No focal deficit present.      Mental Status: He is alert and oriented to person, place, and time. Mental status is at baseline.   Psychiatric:         Mood and Affect: Mood normal.          Behavior: Behavior normal.         Thought Content: Thought content normal.         Cardiac Medications:    ASA - Yes    Plavix - No; contraindicated because of On Coumadin / NOAC    Post-operative Beta Blockers - Yes    Ace/ARB- No; contraindicated because of Normal EF    Statin - No; contraindicated because of No CAD    Aldactone- No; contraindicated because of Normal EF    SGLT2i-  No contraindicated because of No; contraindicated because of Normal EF    Ejection Fraction:  60%    Telemetry:   11/4 SR- a fib overnight  11/5 SR  11/6 SR/Aflutter  11/7 Junctional/flutter  11/8 SR  11/9 Converted to SR this AM  11/10 SR/ST  11/11 Aflutter 100-120s  11/12 Afib/flutter 90-110s  11/13 a fib 90s  11/14 SR 70's  11/15 a flutter 100-110s  11/16 a flutter 80-90s    Assessment/Plan:  POD 1  HDS, SR- was a fib overnight, on amio gtt, neuro intact, wounds intact, abdomen soft, fluid balance positive, wt up,  2 L NC. 270 mL out of chest tubes overnight after return to OR. H/H 7.8/22.4, plat 83. Plan: One unit PRBCs this morning. Keep chest tubes and pacing wires. DC amio gtt, continue PO. Dialysis per neph. IS/ambulate.     POD 2  HTN, SR, neuro intact, wounds intact, abdomen soft no BM, fluid balance positive, wt up, 2 L NC. 590 mL out of chest tubes overnight. 500 off with PD last night. Pacing wires removed.  Plan:  Keep chest tubes for moderate output. Hold coumadin one more day. Resume verapamil, IS/ambulate. Transfer to tele.     POD 3 HOTN- transfer back to [facility]- start pressors, SR/flutter- non-sustained- on amio PO, Oxygen demands up- fluid overload, ESRD- concern PD dilalysate came out of mediastinal CT- will have to convert to HD until peritoneum heals and scars down, Risk for mediastinitis/sepsis- pan culture- send CT fluid for culture- start Zoysn/vanco, LUE swelling/ecchymosis - ck US    POD 4 HOTN- on levo- during HD, Aflutter/junctional- on amio gtt- cont, HD today- removing fluid, leukocytosis- improved, normal  lactate, Cont antibiotics- for high risk of mediastinitis, d/c mediastinal tubes    POD 5 HTN, SR.  Amio gtt.  HD for 2 weeks due to poss mediastinitis r/t CAPD fluid.  Normal WBC, cx negative.  Empiric abx.  Small amount of sanguinous drainage on the superior aspect of the wound.  Hgb 8.5 Cr 6.48.  Plan: Transfer to tele.  Wean oxygen.  Add coreg.      POD 6 HDS. SR.  Neuro intact.  Wounds-drainage improved.  WBC down.  HD MWF per neph.  Refused IR today due to nausea/feeling poorly overnight, would like to proceed tomorrow.  Plan: Resume cardiac diet today, NPO at midnight.  Hold ASA, lovenox, coumadin for IR tomorrow.  Half of amio dosage per Evangelist to see if he tolerates it with less nausea.      POD 7 Seen in HD.  HDS. SR/ST.  Neuro intact.  Wounds CDI-no drainage.  Cr 7.78 Hgb 8.6.  CXR with small left effusion.  Tunneled dialysis cath today.  Plan: Tunneled dialysis catheter today.  Increase coreg.  Repeat CXR tomorrow, thora if indicated.      POD 8 HDS. Afib 120s.  Back on 3LNC.  Neuro intact.  Wounds CDI- no drainage.  Cr 6.18 Hgb 8.8-improved.  Verbalizing small amount of blood on stool unable to been seen by RN at the time, blood thinners have been held last 2 days for procedure with increase in Hgb today.  Plan: Check guiac.  Restart amio gtt and bolus.  Restart coumadin for mechanical valve-discussed with IR. Monitor stools.  Thoracentesis if pleural effusion large enough.      POD 9 HDS.  Afib 90-110s.  1LNC.  Neuro intact.  Wounds CDI.  Thora yesterday 400ml off.  Cr 7.7 Hgb 8.1-watch.  Abdomen distension. Abdominal xray negative, no fever/leukocytosis.  Plan: Increase coreg.  Repeat amio bolus.  Wean oxygen.      POD 10  HTN, a fib rate controlled, neuro intact, wounds intact, abdomen soft +BM, fluid balance negative, wt up,  2 L NC. Pt c/o SOB and cough.  Plan:  Resume home verapamil. 2 view CXR, add tessalon and mucinex. HD per nephrology, IS/ambulate.     POD 11 HDS- BP labile- will d/c  verapamil- add coreg on non-dialysis days- add , SR- on PO amio, Wt - up/edema- HD today hopefully can pull fluid due to better BP, Plan to re-attempt PD on Friday, Amb/enc IS, wean oxygen    POD 12  HDS- BP improved, a flutter, neuro intact, wounds intact, abdomen soft +BM, fluid balance positve, wt down,  2 L NC.  Plan:  Continue HD to pull fluid today and tomorrow, anticipate PD tomorrow night. IS/ambulate.     POD 13  HDS, room air, neuro intact, wounds intact, abdomen soft +BM, fluid balance negative, wt down, room air.  Plan:  HD today, plan for PD tonight. CARMELITA in the tomorrow. NPO at midnight. IS/ambulate.     Disposition:  PT/OT no needs

## 2023-11-16 NOTE — PROGRESS NOTES
Bedside report received from Pierre KINCAID. Pt A&Ox4. Aflutter 94 on the monitors. POC discussed with pt. Pt verbalizes understanding. Call light and belongings within reach. Bed locked and in lowest position. Alarm and fall precautions in place.

## 2023-11-16 NOTE — PROGRESS NOTES
Rec'd report from previous nurse regarding prior 12 hours.  POC reviewed with pt.  White board updated.  Pt verbalizes understanding.  Call light within reach.  Pt tolerated morning meds.  Patient maintaining adequate saturation on RA no and up self in the room. Plan is for another round of HD today and further discharge planning with the resumption of PD at home.

## 2023-11-16 NOTE — ASSESSMENT & PLAN NOTE
TTE noted moderate pericardial effusion without any evidence of tamponade  1.7 cm x 0.5 cm mobile hyperechoic density seen on the anterior mitral valve leaflet   Patient is for CARMELITA on 11/17

## 2023-11-16 NOTE — PROGRESS NOTES
[facility] Services Progress Note    Ultrafiltration treatment ordered today per Dr. Vaz x 3 hours. Treatment initiated at 0852 and ended at 1152.    Pt A/Ox4, VSS, denies any discomfort , requested 4L Net UF.    Pt's BP trended down to 90s/30-40s during HD, Pt asymptomatic, UF goal adjusted as BP tolerates, VSS post tx; see e-flow sheets for details.    Net UF 3,300 mL    Post tx, CVC flushed with saline then locked with heparin 1000 units/mL per designated amount in each wing then clamped and capped. Aspirate heparin prior to next CVC use.    Report given to Primary RN.

## 2023-11-16 NOTE — PROGRESS NOTES
Inpatient Anticoagulation Service Note for 11/16/2023      Reason for Anticoagulation: Atrial Fibrillation, On-X Aortic Valve Replacement      Hemoglobin Value: (!) 8.4  Hematocrit Value: (!) 25.6  Lab Platelet Value: 364  Target INR: 2.0 to 3.0    INR from last 7 days       Date/Time INR Value    11/16/23 0205 2.12    11/15/23 0247 1.94    11/14/23 0136 1.68    11/13/23 0046 1.43    11/12/23 0049 1.34    11/11/23 0030 1.37    11/10/23 0118 1.46          Dose from last 7 days       Date/Time Dose (mg)    11/16/23 1423 2.5    11/15/23 1410 2.5    11/14/23 0818 2.5    11/13/23 0859 2.5    11/12/23 1213 2.5    11/11/23 1349 2.5    11/10/23 1315 0          Average Dose (mg):  (new start warfarin therapy)  Significant Interactions: Amiodarone, Antiplatelet Medications  Bridge Therapy: No    Reversal Agent Administered: Not Applicable    Comments: INR is now therapeutic at 2.12. Will continue warfarin 2.5 mg daily and keep trending the INR for now.    Education Material Provided?: No (needs education prior to discharge)    Pharmacist suggested discharge dosing: warfarin 2.5 mg daily at this point. Pt will need a follow up INR within 2-3 days of discharge.      Hector Sorensen, PharmD

## 2023-11-16 NOTE — PROGRESS NOTES
[facility] -  PROGRESS NOTE               Author: TEX Caban Date & Time: 11/16/2023  12:50 PM     HPI:  58 y.o. male with  chronic anabolic steroid use, ESRD sec to to Bx proven sec FSGS currently on PD and compeltes three excahnges/ day, moderate to moderate AS and  moderate to severe AR, HTN presented for aortic valve and ascending anerysm repair. Pt had post operative complication of tamponade and is back in OR. Pt has received signficant colloid/ IVF for support.     DAILY NEPHROLOGY SUMMARY:  11/4- Patient intolerant to HD last night  11/5- Patient s/p PD last night. Reports shortness of breath but better then yesterday  11/6: pt had PD done with 214cc UF removed. 1000cc drainage noted from chest tube overnight. Cardiology concerned for PD fluid draining from chest tube.  11/7: pt had PUF yesterday w 2L removed. Had HD again today with 2.5L removed. Pt feels much better.  11/8: pt scheduled for HD again. Feeling okay  11/9: pt had HD with 2L UF removed. Feels nauseous this AM from amiodarone.   11/10: seen on HD and tolerating. Awaiting permcath placement  11/11: permcath placed, pt feels well.   11/12: pt seen eating breakfast this AM. No complaints.  11/13: seen on HD, BP low 80/52 - was given BP meds this am for high BP, has gross anasarca and 4+ pitting edema B/L LE   11/14: HD yesterday but could remove only 1.5 L as blood pressure remained low throughout HD, blood pressure better today, scheduled for puff for more ultrafiltration.  He is determined to go back on PD but at this point is grossly fluid overloaded  11/15: Repeat PUF yesterday and had 4L UF removed, feels better but still is hypervolemic, wants to go back on PD on discharge   11/16: sitting up in chair, just got PUF with 3.3L UF, feels much improved, edema improving, will resume PD tonight to assess function and will DC tomorrow hopefully on PD     REVIEW OF SYSTEMS:    Denies SOB, no CP, no V  10 point  "ROS reviewed and is as per HPI/daily summary or otherwise negative    PMH/PSH/SH/FH: Reviewed and unchanged since admission note  CURRENT MEDICATIONS: Reviewed from admission to present day    VS:  /46   Pulse 92   Temp 36.4 °C (97.6 °F) (Temporal)   Resp 18   Ht 1.702 m (5' 7\")   Wt 89.9 kg (198 lb 3.1 oz)   SpO2 95%   BMI 31.04 kg/m²     Exam  General: Awake, no acute distress  HEENT: head normocephalic, atruamtic  Neck: neck supple, no visible masses  Respiratory: clear to auscultation bilaterally, no rales, no ronchi, or wheezing  Cardiac: normal rate, normal rhythm,  Abdomen: non tender, non-distended, no guarding  Musculoskelatal: no joint tenderness, moves limbs spontaneously  Extremities: no significant joint abnormalities, +++ edema  Skin: no lesions, no rashes noted    Access: R IJ HD permcatheter, Abd PD catheter      Fluids:  In: 1660 [P.O.:1160; Dialysis:500]  Out: 4500     LABS:  Recent Labs     11/14/23  0136 11/15/23  0247 11/16/23  0205   SODIUM 129* 128* 130*   POTASSIUM 4.5 4.3 4.6   CHLORIDE 92* 91* 92*   CO2 24 24 24   GLUCOSE 97 109* 97   BUN 49* 58* 47*   CREATININE 6.77* 8.19* 6.80*   CALCIUM 7.8* 8.2* 8.2*         IMPRESSION:    # ESRD on outpt PD    - Etiology likely 2/2 FSGS    - s/p permcath on 11/10 for temp outpt HD   # Volume overload, improving  # S/P AVR/ Ascending aneurysm repair     -High chest tube drainage only noted at night while on PD     -concern for PD fluid leaking through into chest cavity per cards         On 11/6     -CVT recommending at least 2 weeks of PD avoidance to allow          Chest/ABD cavity to heal  # Anemia of CKD     EPO 6000U IV w HD to start 11/10  # CKD-MBD  # Acute Respiratoy failure on vent, extubated    PLAN:  -Alternating HD with PUF this week to help address fluid overload and anasarca  - PUF today (THUR)  - Will resume CCPD tonight after PUF in anticipation for discharge, hopefully on Friday and can resume PD on discharge if he " continues to improve over next 48 hrs   - cont EPO, H/H stable   - Dose all medications as per ESRD      Please page nephrology with any questions or concerns

## 2023-11-16 NOTE — DISCHARGE PLANNING
Case Management Discharge Planning    Admission Date: 11/3/2023  GMLOS: 8.7  ALOS: 13    6-Clicks ADL Score: 23  6-Clicks Mobility Score: 18      Anticipated Discharge Dispo: Discharge Disposition: Discharged to home/self care (01)  Discharge Address: [address] [place] [id number]    DME Needed: No    Action(s) Taken: CM RN met with pt at bedside to discuss ride at discharge. Per pt his girl friend will be picking him up at discharge. Pt is planned for PD tonight.     Escalations Completed: None

## 2023-11-17 ENCOUNTER — PHARMACY VISIT (OUTPATIENT)
Dept: PHARMACY | Facility: MEDICAL CENTER | Age: 58
End: 2023-11-17
Payer: COMMERCIAL

## 2023-11-17 ENCOUNTER — APPOINTMENT (OUTPATIENT)
Dept: CARDIOLOGY | Facility: MEDICAL CENTER | Age: 58
DRG: 219 | End: 2023-11-17
Attending: NURSE PRACTITIONER
Payer: COMMERCIAL

## 2023-11-17 VITALS
WEIGHT: 191.14 LBS | DIASTOLIC BLOOD PRESSURE: 71 MMHG | HEIGHT: 67 IN | OXYGEN SATURATION: 98 % | BODY MASS INDEX: 30 KG/M2 | HEART RATE: 117 BPM | TEMPERATURE: 98.6 F | RESPIRATION RATE: 19 BRPM | SYSTOLIC BLOOD PRESSURE: 102 MMHG

## 2023-11-17 PROBLEM — E83.42 HYPOMAGNESEMIA: Status: ACTIVE | Noted: 2023-11-17

## 2023-11-17 LAB
ANION GAP SERPL CALC-SCNC: 16 MMOL/L (ref 7–16)
BUN SERPL-MCNC: 54 MG/DL (ref 8–22)
CALCIUM SERPL-MCNC: 8.5 MG/DL (ref 8.5–10.5)
CHLORIDE SERPL-SCNC: 92 MMOL/L (ref 96–112)
CO2 SERPL-SCNC: 24 MMOL/L (ref 20–33)
CREAT SERPL-MCNC: 8.07 MG/DL (ref 0.5–1.4)
ERYTHROCYTE [DISTWIDTH] IN BLOOD BY AUTOMATED COUNT: 54.6 FL (ref 35.9–50)
GFR SERPLBLD CREATININE-BSD FMLA CKD-EPI: 7 ML/MIN/1.73 M 2
GLUCOSE SERPL-MCNC: 114 MG/DL (ref 65–99)
HCT VFR BLD AUTO: 27.7 % (ref 42–52)
HGB BLD-MCNC: 9.2 G/DL (ref 14–18)
INR PPP: 2.09 (ref 0.87–1.13)
LV EJECT FRACT  99904: 55
MAGNESIUM SERPL-MCNC: 1.8 MG/DL (ref 1.5–2.5)
MCH RBC QN AUTO: 31.2 PG (ref 27–33)
MCHC RBC AUTO-ENTMCNC: 33.2 G/DL (ref 32.3–36.5)
MCV RBC AUTO: 93.9 FL (ref 81.4–97.8)
PHOSPHATE SERPL-MCNC: 4.7 MG/DL (ref 2.5–4.5)
PLATELET # BLD AUTO: 418 K/UL (ref 164–446)
PMV BLD AUTO: 9.4 FL (ref 9–12.9)
POTASSIUM SERPL-SCNC: 4.3 MMOL/L (ref 3.6–5.5)
PROTHROMBIN TIME: 23.7 SEC (ref 12–14.6)
RBC # BLD AUTO: 2.95 M/UL (ref 4.7–6.1)
SODIUM SERPL-SCNC: 132 MMOL/L (ref 135–145)
WBC # BLD AUTO: 10.8 K/UL (ref 4.8–10.8)

## 2023-11-17 PROCEDURE — 84100 ASSAY OF PHOSPHORUS: CPT

## 2023-11-17 PROCEDURE — 99024 POSTOP FOLLOW-UP VISIT: CPT | Performed by: NURSE PRACTITIONER

## 2023-11-17 PROCEDURE — 160035 HCHG PACU - 1ST 60 MINS PHASE I

## 2023-11-17 PROCEDURE — 93319 3D ECHO IMG CGEN CAR ANOMAL: CPT

## 2023-11-17 PROCEDURE — 85610 PROTHROMBIN TIME: CPT

## 2023-11-17 PROCEDURE — 90945 DIALYSIS ONE EVALUATION: CPT

## 2023-11-17 PROCEDURE — 80048 BASIC METABOLIC PNL TOTAL CA: CPT

## 2023-11-17 PROCEDURE — 85027 COMPLETE CBC AUTOMATED: CPT

## 2023-11-17 PROCEDURE — 700111 HCHG RX REV CODE 636 W/ 250 OVERRIDE (IP): Mod: JZ

## 2023-11-17 PROCEDURE — 700102 HCHG RX REV CODE 250 W/ 637 OVERRIDE(OP): Performed by: NURSE PRACTITIONER

## 2023-11-17 PROCEDURE — 700102 HCHG RX REV CODE 250 W/ 637 OVERRIDE(OP): Performed by: THORACIC SURGERY (CARDIOTHORACIC VASCULAR SURGERY)

## 2023-11-17 PROCEDURE — A9270 NON-COVERED ITEM OR SERVICE: HCPCS | Performed by: NURSE PRACTITIONER

## 2023-11-17 PROCEDURE — A9270 NON-COVERED ITEM OR SERVICE: HCPCS | Performed by: THORACIC SURGERY (CARDIOTHORACIC VASCULAR SURGERY)

## 2023-11-17 PROCEDURE — B24BZZ4 ULTRASONOGRAPHY OF HEART WITH AORTA, TRANSESOPHAGEAL: ICD-10-PCS | Performed by: ANESTHESIOLOGY

## 2023-11-17 PROCEDURE — 306310 ANTI-EMBOLISM STOCKINGS XXLRG REG: Performed by: THORACIC SURGERY (CARDIOTHORACIC VASCULAR SURGERY)

## 2023-11-17 PROCEDURE — 700111 HCHG RX REV CODE 636 W/ 250 OVERRIDE (IP): Mod: JZ | Performed by: ANESTHESIOLOGY

## 2023-11-17 PROCEDURE — 99232 SBSQ HOSP IP/OBS MODERATE 35: CPT | Performed by: GENERAL PRACTICE

## 2023-11-17 PROCEDURE — RXMED WILLOW AMBULATORY MEDICATION CHARGE: Performed by: NURSE PRACTITIONER

## 2023-11-17 PROCEDURE — 83735 ASSAY OF MAGNESIUM: CPT

## 2023-11-17 PROCEDURE — 160002 HCHG RECOVERY MINUTES (STAT)

## 2023-11-17 RX ORDER — OMEPRAZOLE 20 MG/1
20 CAPSULE, DELAYED RELEASE ORAL 2 TIMES DAILY
Qty: 30 CAPSULE | Refills: 2 | Status: SHIPPED | OUTPATIENT
Start: 2023-11-17 | End: 2024-03-14

## 2023-11-17 RX ORDER — SODIUM CHLORIDE 9 MG/ML
500 INJECTION, SOLUTION INTRAVENOUS
Status: ACTIVE | OUTPATIENT
Start: 2023-11-17 | End: 2023-11-17

## 2023-11-17 RX ORDER — CARVEDILOL 3.12 MG/1
3.12 TABLET ORAL 2 TIMES DAILY WITH MEALS
Qty: 60 TABLET | Refills: 2 | Status: SHIPPED | OUTPATIENT
Start: 2023-11-17 | End: 2023-11-27

## 2023-11-17 RX ORDER — WARFARIN SODIUM 2.5 MG/1
2.5 TABLET ORAL DAILY
Qty: 30 TABLET | Refills: 3 | Status: ON HOLD | OUTPATIENT
Start: 2023-11-17 | End: 2023-12-26

## 2023-11-17 RX ORDER — SODIUM CHLORIDE 9 MG/ML
500 INJECTION, SOLUTION INTRAVENOUS ONCE
Status: DISCONTINUED | OUTPATIENT
Start: 2023-11-17 | End: 2023-11-17

## 2023-11-17 RX ORDER — ACETAMINOPHEN 500 MG
1000 TABLET ORAL EVERY 6 HOURS PRN
Qty: 30 TABLET | Refills: 0 | COMMUNITY
Start: 2023-11-17 | End: 2023-11-27

## 2023-11-17 RX ORDER — MIDAZOLAM HYDROCHLORIDE 1 MG/ML
.5-2 INJECTION INTRAMUSCULAR; INTRAVENOUS PRN
Status: ACTIVE | OUTPATIENT
Start: 2023-11-17 | End: 2023-11-17

## 2023-11-17 RX ORDER — MAGNESIUM SULFATE HEPTAHYDRATE 40 MG/ML
2 INJECTION, SOLUTION INTRAVENOUS ONCE
Status: COMPLETED | OUTPATIENT
Start: 2023-11-17 | End: 2023-11-17

## 2023-11-17 RX ORDER — AMIODARONE HYDROCHLORIDE 200 MG/1
TABLET ORAL
Qty: 56 TABLET | Refills: 0 | Status: SHIPPED | OUTPATIENT
Start: 2023-11-17 | End: 2023-11-27

## 2023-11-17 RX ORDER — TRAMADOL HYDROCHLORIDE 50 MG/1
50 TABLET ORAL EVERY 6 HOURS PRN
Qty: 56 TABLET | Refills: 0 | Status: ON HOLD | OUTPATIENT
Start: 2023-11-17 | End: 2023-12-06

## 2023-11-17 RX ADMIN — MIDAZOLAM HYDROCHLORIDE 1 MG: 1 INJECTION, SOLUTION INTRAMUSCULAR; INTRAVENOUS at 12:01

## 2023-11-17 RX ADMIN — MIDAZOLAM HYDROCHLORIDE 1 MG: 1 INJECTION, SOLUTION INTRAMUSCULAR; INTRAVENOUS at 12:00

## 2023-11-17 RX ADMIN — FENTANYL CITRATE 25 MCG: 50 INJECTION, SOLUTION INTRAMUSCULAR; INTRAVENOUS at 12:04

## 2023-11-17 RX ADMIN — GABAPENTIN 200 MG: 100 CAPSULE ORAL at 04:40

## 2023-11-17 RX ADMIN — AMIODARONE HYDROCHLORIDE 200 MG: 200 TABLET ORAL at 04:40

## 2023-11-17 RX ADMIN — TAMSULOSIN HYDROCHLORIDE 0.4 MG: 0.4 CAPSULE ORAL at 07:43

## 2023-11-17 RX ADMIN — AMIODARONE HYDROCHLORIDE 200 MG: 200 TABLET ORAL at 18:43

## 2023-11-17 RX ADMIN — FENTANYL CITRATE 25 MCG: 50 INJECTION, SOLUTION INTRAMUSCULAR; INTRAVENOUS at 12:10

## 2023-11-17 RX ADMIN — FENTANYL CITRATE 50 MCG: 50 INJECTION, SOLUTION INTRAMUSCULAR; INTRAVENOUS at 12:01

## 2023-11-17 RX ADMIN — OMEPRAZOLE 20 MG: 20 CAPSULE, DELAYED RELEASE ORAL at 04:40

## 2023-11-17 RX ADMIN — GUAIFENESIN 600 MG: 600 TABLET, EXTENDED RELEASE ORAL at 04:40

## 2023-11-17 RX ADMIN — Medication 1 APPLICATOR: at 07:43

## 2023-11-17 RX ADMIN — WARFARIN SODIUM 2.5 MG: 2.5 TABLET ORAL at 18:43

## 2023-11-17 RX ADMIN — FENTANYL CITRATE 50 MCG: 50 INJECTION, SOLUTION INTRAMUSCULAR; INTRAVENOUS at 11:59

## 2023-11-17 RX ADMIN — MIDAZOLAM HYDROCHLORIDE 1 MG: 1 INJECTION, SOLUTION INTRAMUSCULAR; INTRAVENOUS at 12:09

## 2023-11-17 RX ADMIN — ACETAMINOPHEN 1000 MG: 500 TABLET, FILM COATED ORAL at 04:44

## 2023-11-17 RX ADMIN — ASPIRIN 81 MG: 81 TABLET, COATED ORAL at 04:40

## 2023-11-17 RX ADMIN — MAGNESIUM SULFATE HEPTAHYDRATE 2 G: 2 INJECTION, SOLUTION INTRAVENOUS at 04:43

## 2023-11-17 RX ADMIN — FENTANYL CITRATE 25 MCG: 50 INJECTION, SOLUTION INTRAMUSCULAR; INTRAVENOUS at 12:06

## 2023-11-17 RX ADMIN — MIDAZOLAM HYDROCHLORIDE 1 MG: 1 INJECTION, SOLUTION INTRAMUSCULAR; INTRAVENOUS at 12:03

## 2023-11-17 ASSESSMENT — ENCOUNTER SYMPTOMS: SHORTNESS OF BREATH: 0

## 2023-11-17 ASSESSMENT — PAIN DESCRIPTION - PAIN TYPE: TYPE: ACUTE PAIN

## 2023-11-17 ASSESSMENT — FIBROSIS 4 INDEX: FIB4 SCORE: .7368421052631578947

## 2023-11-17 NOTE — DISCHARGE PLANNING
HT/SCP TCN chart review completed. Collaborated with JOSE Flores. Discussed current discharge considerations noted to home with no needs. Noted patient remains inpatient for ongoing medical needs at this time.     TCN will continue to follow and collaborate with discharge planning team should additional post acute needs arise; although no further TCN needs anticipated in patient discharge planning at this time. Thank you.    Completed  PT recommends outpatient cardiac rehab on 11/09  OT recommend no further OT needs on 11/09    Choice obtained: None.  See above.    [facility] with a Non-[facility] PCP.  Patient states he  will schedule his own Follow up appointment.

## 2023-11-17 NOTE — PROGRESS NOTES
Patient went for CARMELITA, monitor room called and notified. Pt transported on gurney with ACLS certified RN.

## 2023-11-17 NOTE — CARE PLAN
The patient is Stable - Low risk of patient condition declining or worsening    Shift Goals  Clinical Goals: Ambulate, CARMELITA  Patient Goals: Go home  Family Goals: N/A    Progress made toward(s) clinical / shift goals:    Problem: Knowledge Deficit - Standard  Goal: Patient and family/care givers will demonstrate understanding of plan of care, disease process/condition, diagnostic tests and medications  Outcome: Progressing  Note: Discuss and review POC with patient/family. Updated pts whiteboard. All questions answered at this time. Re-educate as needed.       Problem: Post Op Day 14 CABG/Heart Valve Replacement  Goal: Optimal care of the Post Op CABG/Heart Valve replacement Post Op Day 5  Outcome: Progressing       Patient is not progressing towards the following goals:

## 2023-11-17 NOTE — PROGRESS NOTES
"This is a 58 year old male with PMHx of hypertension, hyperlipidemia, severe AS, and ESRD secondary to FSGS on PD and ascending aortic aneurysm who presented 11/3/2023 for elective CT surgery for mechanical aortic valve replacement complicated by cardiac tamponade and AAA repair, admitted to CT surgery service postoperatively.  Medicine team was consulted for pleural effusion and for concern for possible GI bleed.    I was consulted by CT surgery to perform CARMELITA on this patient due to transthoracic finding of possible mobile mass on mitral valve leaflet.       /69   Pulse (!) 104   Temp 37.1 °C (98.8 °F) (Temporal)   Resp 17   Ht 1.702 m (5' 7\")   Wt 86.7 kg (191 lb 2.2 oz)   SpO2 93%   BMI 29.94 kg/m²     Physical Exam  Vitals reviewed.   Constitutional:       Appearance: Normal appearance.   HENT:      Head: Normocephalic and atraumatic.      Nose: Nose normal.   Eyes:      Pupils: Pupils are equal, round, and reactive to light.   Cardiovascular:      Rate and Rhythm: Normal rate and regular rhythm.      Pulses: Normal pulses.      Heart sounds: Normal heart sounds.   Pulmonary:      Effort: Pulmonary effort is normal.      Breath sounds: Normal breath sounds.   Abdominal:      General: Abdomen is flat.   Musculoskeletal:      Cervical back: Normal range of motion and neck supple.   Skin:     General: Skin is warm and dry.   Neurological:      General: No focal deficit present.      Mental Status: He is alert.        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    Plan   Will perform CARMELITA with moderate sedation by Nurse.  Patient explained risks of procedure, including trauma to lips/teeth/throat/esophagus, as well as risks of medications.  All questions answered.  Patient agrees to proceed.   "

## 2023-11-17 NOTE — PROGRESS NOTES
Inpatient Anticoagulation Service Note for 11/17/2023      Reason for Anticoagulation: Atrial Fibrillation, On-X Aortic Valve Replacement     Hemoglobin Value: (!) 9.2  Hematocrit Value: (!) 27.7  Lab Platelet Value: 418  Target INR: 2.0 to 3.0    INR from last 7 days       Date/Time INR Value    11/17/23 0243 2.09    11/16/23 0205 2.12    11/15/23 0247 1.94    11/14/23 0136 1.68    11/13/23 0046 1.43    11/12/23 0049 1.34    11/11/23 0030 1.37          Dose from last 7 days       Date/Time Dose (mg)    11/17/23 1254 2.5    11/16/23 1423 2.5    11/15/23 1410 2.5    11/14/23 0818 2.5    11/13/23 0859 2.5    11/12/23 1213 2.5    11/11/23 1349 2.5    11/10/23 1315 0          Average Dose (mg):  (new start warfarin therapy)  Significant Interactions: Amiodarone, Antibiotics, Antiplatelet Medications  Bridge Therapy: No    Reversal Agent Administered: Not Applicable    Comments: INR remains therapeutic. Will continue warfarin 2.5 mg daily. Pt may need future dosig adjustment.    Education Material Provided?: No (needs education prior to discharge)    Pharmacist suggested discharge dosing: warfarin 2.5 mg daily at this point. Pt will need a follow up INR within 2-3 days of discharge.       Hector Sorensen, PharmD

## 2023-11-17 NOTE — ASSESSMENT & PLAN NOTE
IV supplementation  Serial BMP, magnesium, phosphorus levels ordered for tomorrow morning to continue to monitor electrolytes

## 2023-11-17 NOTE — PROGRESS NOTES
Bedside report received from Carmina KINCAID. Pt A&Ox4. Aflutter 98 on the monitors. PD cath currently running. POC discussed with pt. Pt verbalizes understanding. Call light and belongings within reach. Bed locked and in lowest position. Alarm and fall precautions in place.

## 2023-11-17 NOTE — DISCHARGE INSTRUCTIONS
DIVISION OF CARDIAC SURGERY   DISCHARGE INSTRUCTIONS    Activity:    NO driving for 4 weeks after surgery. You may ride as a passenger.  NO lifting, pushing, or pulling more than 10 pounds for 6 weeks.  For the next 6 weeks, keep your elbows close to your body and move within a pain-free motion when lifting, pushing or pulling.  Do not stretch both arms backwards at the same time.    Walk at least 4 times per day, there is no maximum. The goal is to increase your distance over time.  Continue using incentive spirometer for 2 weeks or until your baseline volume is reached.  If you are going home on oxygen and you were not on oxygen prior to surgery, keep using until you are oxygen free.  Weigh yourself daily.  Call your Cardiologist for a weight gain of 3 or more pounds in 1 day or more than 5 pounds in 7 days.  Take all of your medications as prescribed. Do not use a pill box for the first month at home. If you have questions, please call your nurse navigator at [phone].  Continue to wear the ARIEL (compression) stockings for 2-4 weeks or until all swelling is gone. You may take them off when you are in bed or when your legs are elevated.    Incision Care:    Make sure to clean your incision(s) TWICE DAILY.  Once by showering AND once using the no rinse Foam cleanser provided in the hospital.  During the shower, cleanse the incision(s) with a perfume and dye free soap (Dial, Dove, Cristiane Spring)  Use gentle pressure and rub up and down over incision with your hands or a washcloth. Rinse off and pat incision(s) dry with clean towel.  Keep the incision open to air. No creams or lotions on your incision(s). No baths.  If there is any increased redness or swelling, separation of the incision line, or thick drainage from any of your incisions, call the Cardiac Surgeons ([phone]).      General Instructions:    You have been referred to Cardiac Rehab.  You can start Cardiac Rehab 30 days after surgery.  If you do  not have an appointment at the time of discharge call [phone] to schedule an appointment.  Your Primary Care Doctor typically handles home oxygen. Oxygen may be stopped when your oxygen level is consistently greater than 90.  Check with your Primary Care Doctor if you are unsure.  Take all of your medications (including pain medications) as prescribed.  Taking medications other than prescribed can result in serious injury.    For Patients Discharged with Narcotic Pain Medication:     If a refill is needed, understand that only 1 refill will be provided and you must come to the Cardiac Surgeons’ office for an appointment (72 hours’ notice is required to schedule and there are no weekend appointments).  If the pain medications you are discharged on are not working, you will need to bring your remaining prescription into the office in order to receive a new prescription.  If you were taking narcotics prior to your heart surgery, the Cardiac Surgeons will provide you with one prescription and additional medications will need to be provided by your pain management doctor.  Do not drink alcohol while taking narcotics.  Lost or stolen medications will not be refilled.  If medications are stolen, report to law enforcement.    Contact Cardiac Surgery at [phone] if you have any questions.

## 2023-11-17 NOTE — PROGRESS NOTES
[facility] Services Progress Note    [facility] disconnected aseptically at 0600.   Patient stable, alert and oriented x 4. On room air. O2 sat 98 %  PD exit site assessed. . PD dressing clean dry and intact.    PD dressing tonight.  Lines secured and taped to prevent pulling/ dislodged accidentally.    Effluent is clear but bloody without fibrin noted.       24 hour UF: 1,910 mL  (Total UF 1,566 mL   + Initial drain 344 mL  - Last fill 0 mL)    ** Patient concerns regarding PD prescription once discharge. Patient aware that this RN will ask the Nephrologist about this.       All supplies for tonight's CCPD complete and at bedside.      Report given to Primary SILVIO Matos R.N.

## 2023-11-17 NOTE — OR NURSING
1225 - Pt to PACU 4B from OR.  Bedside report from anesthesiologist and RN.  Attached to monitoring, breathing is calm and unlabored. No signs pain or nausea.  On 10L mask, first 2 blood pressures low.  Pt arouses, states no symptoms to low BP.  Dr. Valentine gave order for 250 mL fluid bolus.      1235 - Now BP improved, pt more awake, Dr. Valentine states to hold off on fluids for now.     1250 - Report to bedside RN Rosana to take patient back on T8.  Pt now on 3L NC, denies pain or nausea.      1315 - Pt BP low again, both arms, multiple repositions and retaking of BP.  Pt more awake, having water, Dr. Valentine ok to give 250mL NS bolus x1 now.       1350 - Pt taken back to T8, NS running on pump.  VSS, on 3L NC. RN and CNA present in room when pt arrived, placed pt back on tele box.

## 2023-11-17 NOTE — PROGRESS NOTES
[facility] Medicine Daily Progress Note    Date of Service  11/17/2023    Chief Complaint  Gurdeep Guerra is a 58 y.o. male admitted 11/3/2023 with AVR replacement    Hospital Course  This is a 58 year old male with PMHx of hypertension, hyperlipidemia, severe AS, and ESRD secondary to FSGS on PD and ascending aortic aneurysm who presented 11/3/2023 for elective CT surgery for mechanical aortic valve replacement complicated by cardiac tamponade and AAA repair, admitted to CT surgery service postoperatively.  Medicine team was consulted for pleural effusion and for concern for possible GI bleed.    Patient has been followed by nephrology, initially on PD, HD catheter was placed 11/11.  Patient underwent multiple sessions of hemodialysis.  On discharge patient will resume peritoneal dialysis.    On 11/11/2023, concerned for left-sided pleural effusion, patient underwent thoracentesis on 11/12, 400 cc removed.    There was also concern for possible GI bleed, patient's Coumadin was held for 48 hours, hemoglobin remained stable.  No further evidence of bleeding.  Patient to continue with Coumadin.    TTE noted moderate pericardial effusion without any evidence of tamponade, 1.7 cm x 0.5 cm mobile hyperechoic density seen on the anterior mitral valve leaflet -patient is for CARMELITA on 11/17.    Interval Problem Update  Patient underwent CARMELITA with no evidence of mobile mass on mitral valve, moderate large pericardial effusion.  Cleared by CTS for discharge.    Patient had hemodialysis yesterday and had a session of PD dialysis overnight.  Okay to discharge home with right IJ TDC in place in case intermittent HD is required outpatient.    Medicine team will sign off, please reconsult if necessary.    I have discussed this patient's plan of care and discharge plan at IDT rounds today with Case Management, Nursing, Nursing leadership, and other members of the IDT team.    Consultants/Specialty  cardiovascular surgeon, critical  care, and nephrology    Code Status  Full Code    Disposition  The patient is medically cleared for discharge to home or a post-acute facility.  Anticipate discharge to: home with close outpatient follow-up    I have placed the appropriate orders for post-discharge needs.    Review of Systems  Review of Systems   Respiratory:  Negative for shortness of breath.    All other systems reviewed and are negative.       Physical Exam  Temp:  [36.8 °C (98.2 °F)-37.1 °C (98.8 °F)] 37 °C (98.6 °F)  Pulse:  [] 114  Resp:  [12-20] 19  BP: ()/(49-84) 105/56  SpO2:  [91 %-100 %] 97 %    Physical Exam  Vitals and nursing note reviewed.   Constitutional:       General: He is not in acute distress.     Appearance: Normal appearance.   HENT:      Head: Normocephalic and atraumatic.   Eyes:      Extraocular Movements: Extraocular movements intact.      Conjunctiva/sclera: Conjunctivae normal.      Pupils: Pupils are equal, round, and reactive to light.   Cardiovascular:      Rate and Rhythm: Normal rate and regular rhythm.      Pulses: Normal pulses.      Heart sounds: No murmur heard.     No friction rub. No gallop.      Comments: Midline scar with no evidence of erythema or cellulitis  Pulmonary:      Effort: Pulmonary effort is normal. No respiratory distress.      Breath sounds: Normal breath sounds. No wheezing, rhonchi or rales.   Abdominal:      General: Bowel sounds are normal. There is no distension.      Palpations: Abdomen is soft.      Tenderness: There is no abdominal tenderness.   Musculoskeletal:         General: No swelling or tenderness. Normal range of motion.      Cervical back: Normal range of motion and neck supple. No muscular tenderness.      Right lower leg: Edema (minimal) present.      Left lower leg: Edema (minimal) present.   Skin:     General: Skin is warm and dry.      Capillary Refill: Capillary refill takes less than 2 seconds.      Findings: No bruising, erythema or rash.   Neurological:       General: No focal deficit present.      Mental Status: He is alert and oriented to person, place, and time.         Fluids    Intake/Output Summary (Last 24 hours) at 11/17/2023 1258  Last data filed at 11/17/2023 0615  Gross per 24 hour   Intake 480 ml   Output 1910 ml   Net -1430 ml       Laboratory  Recent Labs     11/15/23  0247 11/16/23  0205 11/17/23  0243   WBC 10.5 10.8 10.8   RBC 2.68* 2.71* 2.95*   HEMOGLOBIN 8.6* 8.4* 9.2*   HEMATOCRIT 25.3* 25.6* 27.7*   MCV 94.4 94.5 93.9   MCH 32.1 31.0 31.2   MCHC 34.0 32.8 33.2   RDW 52.9* 54.6* 54.6*   PLATELETCT 340 364 418   MPV 9.7 9.5 9.4     Recent Labs     11/15/23  0247 11/16/23  0205 11/17/23  0243   SODIUM 128* 130* 132*   POTASSIUM 4.3 4.6 4.3   CHLORIDE 91* 92* 92*   CO2 24 24 24   GLUCOSE 109* 97 114*   BUN 58* 47* 54*   CREATININE 8.19* 6.80* 8.07*   CALCIUM 8.2* 8.2* 8.5     Recent Labs     11/15/23  0247 11/16/23  0205 11/17/23  0243   INR 1.94* 2.12* 2.09*               Imaging  EC-CARMELITA W/O CONT         EC-ECHOCARDIOGRAM LTD W/O CONT   Final Result      DX-CHEST-2 VIEWS   Final Result      1.  Tubes and lines unchanged in position.      2.  Marked cardiomegaly.      3.  Small left pleural effusion.      US-THORACENTESIS PUNCTURE LEFT   Final Result      1. Ultrasound guided left sided therapeutic thoracentesis.      2. 400 mL of fluid withdrawn.      DX-CHEST-PORTABLE (1 VIEW)   Final Result      1.  No significant change from prior exam.   2.  No pneumothorax.      UE-LEQUZUI-5 VIEW   Final Result      1.  No evidence of bowel obstruction.   2.  Mildly increased colonic stool.      DX-CHEST-2 VIEWS   Final Result         1.  Left midlung and lower lobe infiltrate stable to slightly increased.   2.  Layering left pleural effusion, similar to prior study.   3.  Cardiomegaly      IR-TORRES,GROSHONG PLACEMENT >5   Final Result      1. Ultrasound and fluoroscopic guided placement of a right internal jugular 14.5 [place] HemoSplit tunneled dialysis  catheter.      2. The hemodialysis catheter may be used immediately as clinically indicated. Flushes per protocol.      3. Removal of the right-sided temporary dialysis catheter.         DX-CHEST-2 VIEWS   Final Result      Stable appearance of the chest.      DX-CHEST-PORTABLE (1 VIEW)   Final Result      1.  Bibasilar underinflation atelectasis which could obscure an additional process. This has increased on the LEFT and is similar to the prior on the RIGHT.   2.  Suspect small LEFT pleural effusion   3.  Persistently enlarged cardiac silhouette      US-EXTREMITY VENOUS UPPER UNILAT LEFT   Final Result      DX-CHEST-PORTABLE (1 VIEW)   Final Result      1.  Low lung volumes. Bibasilar atelectasis.      DX-CHEST-PORTABLE (1 VIEW)   Final Result      1.  Tubes and lines in good position.      2.  Right basilar atelectasis.      DX-CHEST-PORTABLE (1 VIEW)   Final Result      1.  Mild cardiomegaly.      2.  Tubes and lines in good position.      3.  Right basilar atelectasis.      EC-CARMELITA W/O CONT   Final Result      EC-ECHOCARDIOGRAM LTD W/O CONT   Final Result      DX-CHEST-PORTABLE (1 VIEW)   Final Result      Satisfactory postoperative appearance of the chest with BILATERAL perihilar and LEFT basilar opacities which are probably areas of atelectasis           Assessment/Plan  * Aortic stenosis- (present on admission)  Assessment & Plan  S/p mechanical valve replacement by CTS 11/3  Warfarin, dosing as per pharmacy to target therapeutic INR goal    Mitral valve mass  Assessment & Plan  TTE noted moderate pericardial effusion without any evidence of tamponade  1.7 cm x 0.5 cm mobile hyperechoic density seen on the anterior mitral valve leaflet   Patient is for CARMELITA on 11/17    Pericardial effusion  Assessment & Plan  TTE noted moderate pericardial effusion without any evidence of tamponade    AAA (abdominal aortic aneurysm) ([facility])  Assessment & Plan  S/p repair by CTS 11/3  CTS f/u appreciated    Status post cardiac  surgery  Assessment & Plan  AVR and Ao root repair with Dr. Blanca 11/3  C/b focal pericardial tamponade that was managed operatively  Appropriate post-cardiac surgery protocols are in place  Cardiothoracic surgery is following.  Management per CT surgery    ESRD (end stage renal disease) ([facility])  Assessment & Plan  Was on peritoneal dialysis  Currently has right IJ temp HD cath  Hemodialysis as per nephrology, while in house, with plans to transition back to PD on discharge  Nephrology follow-up appreciated    GIB (gastrointestinal bleeding)  Assessment & Plan  Questionable bloody stool  Iron studies show anemia of chronic disease.  Guaiac pending  Reports normal colonoscopy within the past 5 years.  Hemoglobin continues to remain stable  Serial CBC    Paroxysmal A-fib ([facility])  Assessment & Plan  Postop mechanical AVR and AAA repair  Continue amiodarone  Continue warfarin    Hypomagnesemia  Assessment & Plan  IV supplementation  Serial BMP, magnesium, phosphorus levels ordered for tomorrow morning to continue to monitor electrolytes     Anxiety  Assessment & Plan  Atarax as needed  Patient does not want to have a trial of Xanax  Patient responded well to Restoril, order placed    Pleural effusion on left  Assessment & Plan  S/p thoracentesis.  Continue hemodialysis per nephrology.    Insomnia  Assessment & Plan  Continue with Restoril    Shock ([facility])  Assessment & Plan  Now resolved    Dyslipidemia- (present on admission)  Assessment & Plan  Continue with statin    Coronary artery disease due to lipid rich plaque  Assessment & Plan  Optimize CAD meds: Warfarin, ASA, BB, statin    Gastroesophageal reflux disease- (present on admission)  Assessment & Plan  Continue PPI.    Hypertension- (present on admission)  Assessment & Plan  Post-cardiac surgery BP protocols  Patient currently is on verapamil blood pressure has been fluctuating.  Continue monitor closely on telemetry.         VTE prophylaxis: Coumadin    I have  performed a physical exam and reviewed and updated ROS and Plan today (11/17/2023). In review of yesterday's note (11/16/2023), there are no changes except as documented above.

## 2023-11-17 NOTE — DISCHARGE PLANNING
Voalte received from JOSE Flores notifying patient now in need for home healthcare services at time of discharge.    TCN met with patient at bedside. Obtained HH choice, faxed to [facility] and given to JOSE. In discussion regarding home healthcare benefits, patient advised that he plans to return to work on 11/27.     Collaborated with CM following visit with patient at bedside. TCN will continue to follow and collaborate with discharge planning team as additional post acute needs arise. Thank you.    Completed  PT recommends outpatient cardiac rehab on 11/09  OT recommend no further OT needs on 11/09    Choice obtained: [facility] (see above)  HT with a Non-[facility] PCP.  Patient states he will schedule his own Follow up appointment

## 2023-11-17 NOTE — DISCHARGE PLANNING
Received choice form at: [id number]  Agency/Facility name: [facility]  Referral sent per choice form at:  [phone]

## 2023-11-17 NOTE — PROGRESS NOTES
Brief CARMELITA note:    Moderate sedation supervised by me: [id number]    Administered 4 mg midazolam, 175 mcg fentanyl in divided doses by RN.     Brief CARMELITA result:    No evidence of mobile mass on mitral valve. Echodensity on TTE likely represent calcified anterior mitral valve leaflet cut off axis on TTE.  Moderate-large pericardial effusion without mass effect.  Small left pleural effusion.  Mechanical aortic valve functioning normally.

## 2023-11-17 NOTE — PROGRESS NOTES
[facility] Progress Note        [employer] connected aseptically at 1725 X10 hours. Pt stable, VSS, alert and oriented. PD exit site CDI. Dressing changed this afternoon prior to PD and covered with gauze dressing.     Effluent bloody this afternoon with first drain.      Education provided to primary RN regarding troubleshooting. Report given to TYRONE Phelan RN.     Call [facility]/On Call at [phone] for further assistance.

## 2023-11-17 NOTE — DISCHARGE SUMMARY
DISCHARGE SUMMARY    ADMISSION DATE: 11/3/2023    DISCHARGE DATE: 11/17/23    ADMITTING DIAGNOSES: Moderate to severe aortic stenosis and regurgitation, ascending aortic aneurysm (4.5 cm by echo), ESRD on peritoneal dialysis, hypertension, dyslipidemia     DISCHARGE DIAGNOSES: Moderate to severe aortic stenosis and regurgitation, ascending aortic aneurysm (4.5 cm by echo), ESRD on peritoneal dialysis, hypertension, dyslipidemia     PROCEDURES PERFORMED:   11/3/23 Dr. Blanca  AORTIC VALVE REPLACEMENT (23 mm On-X mechanical valve), ASCENDING AORTIC ANEURYSM REPAIR (30 mm Hemashield tube graft) and intraoperative transesophageal echocardiography     11/3/23 Dr. Blanca  MEDIASTINAL EXPLORATION FOR POSTOPERATIVE BLEEDING AND RESTORATION OF HEMOSTASIS     HISTORY OF PRESENT ILLNESS:  The patient is a 58 y.o. male with history of hypertension, hyperlipidemia, ESRD on peritoneal dialysis for the last few months, coronary artery disease and severe aortic stenosis. He is on the kidney transplant list with [facility] and has been for the last 3 years. Today, he states   He has shortness of breath for the last few months. He also has chronic cough and lower extremity edema. He has occasional chest pain that resolves on its own. He has dizziness with position changes. 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 He denies orthopnea.     HOSPITAL COURSE:   POD 1  HDS, SR- was a fib overnight, on amio gtt, neuro intact, wounds intact, abdomen soft, fluid balance positive, wt up,  2 L NC. 270 mL out of chest tubes overnight after return to OR. H/H 7.8/22.4, plat 83. Plan: One unit PRBCs this morning. Keep chest tubes and pacing wires. DC amio gtt, continue PO. Dialysis per neph. IS/ambulate.      POD 2  HTN, SR, neuro intact, wounds intact, abdomen soft no BM, fluid balance positive, wt up, 2 L NC. 590 mL out of chest tubes overnight. 500 off with PD last night. Pacing wires removed.   Plan:  Keep chest tubes for moderate output. Hold coumadin one more day. Resume verapamil, IS/ambulate. Transfer to tele.      POD 3 HOTN- transfer back to [facility]- start pressors, SR/flutter- non-sustained- on amio PO, Oxygen demands up- fluid overload, ESRD- concern PD dilalysate came out of mediastinal CT- will have to convert to HD until peritoneum heals and scars down, Risk for mediastinitis/sepsis- pan culture- send CT fluid for culture- start Zoysn/vanco, LUE swelling/ecchymosis - ck US     POD 4 HOTN- on levo- during HD, Aflutter/junctional- on amio gtt- cont, HD today- removing fluid, leukocytosis- improved, normal lactate, Cont antibiotics- for high risk of mediastinitis, d/c mediastinal tubes     POD 5 HTN, SR.  Amio gtt.  HD for 2 weeks due to poss mediastinitis r/t CAPD fluid.  Normal WBC, cx negative.  Empiric abx.  Small amount of sanguinous drainage on the superior aspect of the wound.  Hgb 8.5 Cr 6.48.  Plan: Transfer to tele.  Wean oxygen.  Add coreg.       POD 6 HDS. SR.  Neuro intact.  Wounds-drainage improved.  WBC down.  HD MWF per neph.  Refused IR today due to nausea/feeling poorly overnight, would like to proceed tomorrow.  Plan: Resume cardiac diet today, NPO at midnight.  Hold ASA, lovenox, coumadin for IR tomorrow.  Half of amio dosage per Evangelist to see if he tolerates it with less nausea.       POD 7 Seen in HD.  HDS. SR/ST.  Neuro intact.  Wounds CDI-no drainage.  Cr 7.78 Hgb 8.6.  CXR with small left effusion.  Tunneled dialysis cath today.  Plan: Tunneled dialysis catheter today.  Increase coreg.  Repeat CXR tomorrow, thora if indicated.       POD 8 HDS. Afib 120s.  Back on 3LNC.  Neuro intact.  Wounds CDI- no drainage.  Cr 6.18 Hgb 8.8-improved.  Verbalizing small amount of blood on stool unable to been seen by RN at the time, blood thinners have been held last 2 days for procedure with increase in Hgb today.  Plan: Check guiac.  Restart amio gtt and bolus.  Restart coumadin for  mechanical valve-discussed with IR. Monitor stools.  Thoracentesis if pleural effusion large enough.       POD 9 HDS.  Afib 90-110s.  1LNC.  Neuro intact.  Wounds CDI.  Thora yesterday 400ml off.  Cr 7.7 Hgb 8.1-watch.  Abdomen distension. Abdominal xray negative, no fever/leukocytosis.  Plan: Increase coreg.  Repeat amio bolus.  Wean oxygen.       POD 10  HTN, a fib rate controlled, neuro intact, wounds intact, abdomen soft +BM, fluid balance negative, wt up,  2 L NC. Pt c/o SOB and cough.  Plan:  Resume home verapamil. 2 view CXR, add tessalon and mucinex. HD per nephrology, IS/ambulate.      POD 11 HDS- BP labile- will d/c verapamil- add coreg on non-dialysis days- add , SR- on PO amio, Wt - up/edema- HD today hopefully can pull fluid due to better BP, Plan to re-attempt PD on Friday, Amb/enc IS, wean oxygen     POD 12  HDS- BP improved, a flutter, neuro intact, wounds intact, abdomen soft +BM, fluid balance positve, wt down,  2 L NC.  Plan:  Continue HD to pull fluid today and tomorrow, anticipate PD tomorrow night. IS/ambulate.      POD 13  HDS, room air, neuro intact, wounds intact, abdomen soft +BM, fluid balance negative, wt down, room air.  Plan:  HD today, plan for PD tonight. CARMELITA in the tomorrow. NPO at midnight. IS/ambulate.     POD 14  HDS, a flutter, neuro intact, wounds intact, abdomen soft +BM, fluid balance negative, wt down,  room air. CARMELITA today with  no evidence of mobile mass on mitral valve, mod-large pericardial effusion. Plan:  Discharge home today. Keep temporary dialysis catheter per nephrology. Continue PD. Pt to follow up in clinic.    TELEMETRY:  11/4 SR- a fib overnight  11/5 SR  11/6 SR/Aflutter  11/7 Junctional/flutter  11/8 SR  11/9 Converted to SR this AM  11/10 SR/ST  11/11 Aflutter 100-120s  11/12 Afib/flutter 90-110s  11/13 a fib 90s  11/14 SR 70's  11/15 a flutter 100-110s  11/16 a flutter 80-90s  11/17 a flutter    RECENT LABS:     Lab Results   Component Value Date/Time     SODIUM 132 (L) 11/17/2023 02:43 AM    POTASSIUM 4.3 11/17/2023 02:43 AM    CHLORIDE 92 (L) 11/17/2023 02:43 AM    CO2 24 11/17/2023 02:43 AM    GLUCOSE 114 (H) 11/17/2023 02:43 AM    BUN 54 (H) 11/17/2023 02:43 AM    CREATININE 8.07 (HH) 11/17/2023 02:43 AM    BUNCREATRAT 17 05/09/2022 09:49 AM    GLOMRATE 7 (L) 04/02/2023 11:54 AM      Lab Results   Component Value Date/Time    WBC 10.8 11/17/2023 02:43 AM    RBC 2.95 (L) 11/17/2023 02:43 AM    HEMOGLOBIN 9.2 (L) 11/17/2023 02:43 AM    HEMATOCRIT 27.7 (L) 11/17/2023 02:43 AM    MCV 93.9 11/17/2023 02:43 AM    MCH 31.2 11/17/2023 02:43 AM    MCHC 33.2 11/17/2023 02:43 AM    MPV 9.4 11/17/2023 02:43 AM    NEUTSPOLYS 83.10 (H) 11/12/2023 12:49 AM    LYMPHOCYTES 7.50 (L) 11/12/2023 12:49 AM    MONOCYTES 7.10 11/12/2023 12:49 AM    EOSINOPHILS 1.60 11/12/2023 12:49 AM    BASOPHILS 0.10 11/12/2023 12:49 AM      Lab Results   Component Value Date/Time    PROTHROMBTM 23.7 (H) 11/17/2023 02:43 AM    INR 2.09 (H) 11/17/2023 02:43 AM        Fluids:    Intake/Output Summary (Last 24 hours) at 11/17/2023 1240  Last data filed at 11/17/2023 0615  Gross per 24 hour   Intake 480 ml   Output 1910 ml   Net -1430 ml     Admit weight: Weight: 84.6 kg (186 lb 8.2 oz)  Current weight: Weight: 86.7 kg (191 lb 2.2 oz) (11/17/23 1669)    ALLERGIES:     Allopurinol and Hydralazine hcl    EJECTION FRACTION:  60%    CARDIAC MEDICATIONS:    ASA - Yes    Plavix - No; contraindicated because of On Coumadin / NOAC    Post-operative Beta Blockers - Yes    Ace/ARB- No; contraindicated because of Normal EF    Statin - No; contraindicated because of No CAD    Aldactone- No; contraindicated because of Normal EF    SGLT2i-  No contraindicated because of No; contraindicated because of Normal EF    DISCHARGE MEDICATIONS:      Medication List        START taking these medications        Instructions   acetaminophen 500 MG Tabs  Commonly known as: Tylenol   Take 2 Tablets by mouth every 6 hours as needed  for Mild Pain or Moderate Pain.  Dose: 1,000 mg     amiodarone 200 MG Tabs  Commonly known as: Cordarone   Doctor's comments: Take 2 Tablets BID x 7 days, Then Take Two Tablets daily for 7 days, Then Take 1 Tablet Daily.  Take 1 Tablet by mouth 2 times a day.  Dose: 200 mg     carvedilol 3.125 MG Tabs  Commonly known as: Coreg   Take 1 Tablet by mouth 2 times a day with meals.  Dose: 3.125 mg     omeprazole 20 MG delayed-release capsule  Commonly known as: PriLOSEC   Take 1 Capsule by mouth 2 times a day.  Dose: 20 mg     traMADol 50 MG Tabs  Commonly known as: Ultram   Take 1 Tablet by mouth every 6 hours as needed for Moderate Pain or Severe Pain for up to 14 days.  Dose: 50 mg     warfarin 2.5 MG Tabs  Commonly known as: Coumadin   Take 1 Tablet by mouth every day at 6 PM.  Dose: 2.5 mg            CONTINUE taking these medications        Instructions   aspirin 81 MG EC tablet   Take 81 mg by mouth every day.  Dose: 81 mg     atorvastatin 40 MG Tabs  Commonly known as: Lipitor   Take 1 Tablet by mouth every evening.  Dose: 40 mg     betamethasone dipropionate 0.05 % Crea   APPLY CREAM TOPICALLY TWICE DAILY TO AFFECTED AREA     CALCIUM PO   Take  by mouth.     esomeprazole 40 MG delayed-release capsule  Commonly known as: NexIUM   Take 40 mg by mouth every evening.  Dose: 40 mg     hydrocortisone 2.5 % Oint   Apply  topically as needed.     IRON PO   Take  by mouth.     nitroglycerin 0.4 MG Subl  Commonly known as: Nitrostat   Place 1 Tablet under the tongue as needed for Chest Pain.  Dose: 0.4 mg     tamsulosin 0.4 MG capsule  Commonly known as: Flomax   Take 0.4 mg by mouth every day.  Dose: 0.4 mg     UltraBag/Dianeal PD-2/2.5% Dex 396 MOSM/L Soln   Inject  into the abdomen/abdominal cavity.            STOP taking these medications      verapamil  MG Tbcr  Commonly known as: Calan SR              NARCOTIC PAIN MEDICATIONS:   In prescribing controlled substances to this patient, I certify that I have  obtained and reviewed their medical history. I have also made a good ashleigh effort to obtain applicable records from other providers who have treated the patient .    I have conducted a physical exam and documented it. I have reviewed the patient's prescription history as maintained by the [place] Prescription Monitoring Program.     I have assessed the patient’s risk for abuse, dependency, and addiction using the validated Opioid Risk Tool.    Given the above, I believe the benefits of controlled substance therapy outweigh the risks. The reasons for prescribing controlled substances include non-narcotic, oral analgesic alternatives have been inadequate for pain control. Accordingly, I have discussed the risk and benefits, treatment plan, and alternative therapies with the patient.     Pt understands this prescription is a controlled substance which is potentially habit-forming and its use is regulated by the [place]. It must be submitted to the pharmacy within 5 days of the date written and can not be called in or faxed to the pharmacy. Refills are subject to terms of a medicine agreement. Any refill requires a new prescription that must be obtained from this office during regular office hours Monday through Thursday 7 am to 4 pm. We ask for 72 hours notice to get an appointment for a narcotic pain medication refill. This medicine can cause nausea, significant constipation, sedation, confusion.     DIET:   Cardiac diet    DISCHARGE INSTRUCTIONS DISCUSSED WITH THE PATIENT:      1. NO driving for 4 weeks after surgery. You may ride as a passenger.  2. NO lifting of any item over 10 lbs (e.g. gallon of milk) for 6 weeks after surgery.  3. DO walk as much as possible! Walk a minimum of once a day. Depending on your fatigue and comfort level, you may walk as much as you wish. There is no maximum.  4. Other physical activities (sex, housework, gardening, etc.) are OK after 4 weeks   5. Continue using incentive spirometer for 2  weeks, especially if going home on oxygen.    Incision Care:  1. SHOWER ONLY - no baths. Clean incision daily with plain Ivory ® soap or any other dye or perfume free soap. Then pat incision dry with clean towel. Avoid creams or lotions on the incision(s).  a. If there is any increase in redness or swelling, or separation of the incision line, or thick drainage* from any of the incisions, call right away  * Clear, thin drainage is not abnormal especially from the leg incision and/or                         chest tube sites.  2. Continue to wear your ARIEL Stockings for 4 weeks. You may take off the stockings when in bed or when the legs are elevated.    Patient instructed to call [facility] cardiac surgery at [phone]  if any increased shortness of breath, uncontrolled pain, weight gain greater than 3 pounds in 1 day or 5 pounds in 1 week, SBP >140, HR <60 or redness swelling or drainage of incisions.      FOLLOW-UP:   Future Appointments   Date Time Provider Department Center   11/30/2023  4:15 PM TEX Ocasio None   12/4/2023  1:15 PM CT RESOURCE PROVIDER [facility] None   12/15/2023  2:00 PM EMILIA Brink None

## 2023-11-17 NOTE — PROGRESS NOTES
[facility] -  PROGRESS NOTE               Author: Philipp Vaz M.D. Date & Time: 11/17/2023  11:46 AM     HPI:  58 y.o. male with  chronic anabolic steroid use, ESRD sec to to Bx proven sec FSGS currently on PD and compeltes three excahnges/ day, moderate to moderate AS and  moderate to severe AR, HTN presented for aortic valve and ascending anerysm repair. Pt had post operative complication of tamponade and is back in OR. Pt has received signficant colloid/ IVF for support.     DAILY NEPHROLOGY SUMMARY:  11/4- Patient intolerant to HD last night  11/5- Patient s/p PD last night. Reports shortness of breath but better then yesterday  11/6: pt had PD done with 214cc UF removed. 1000cc drainage noted from chest tube overnight. Cardiology concerned for PD fluid draining from chest tube.  11/7: pt had PUF yesterday w 2L removed. Had HD again today with 2.5L removed. Pt feels much better.  11/8: pt scheduled for HD again. Feeling okay  11/9: pt had HD with 2L UF removed. Feels nauseous this AM from amiodarone.   11/10: seen on HD and tolerating. Awaiting permcath placement  11/11: permcath placed, pt feels well.   11/12: pt seen eating breakfast this AM. No complaints.  11/13: seen on HD, BP low 80/52 - was given BP meds this am for high BP, has gross anasarca and 4+ pitting edema B/L LE   11/14: HD yesterday but could remove only 1.5 L as blood pressure remained low throughout HD, blood pressure better today, scheduled for puff for more ultrafiltration.  He is determined to go back on PD but at this point is grossly fluid overloaded  11/15: Repeat PUF yesterday and had 4L UF removed, feels better but still is hypervolemic, wants to go back on PD on discharge   11/16: sitting up in chair, just got PUF with 3.3L UF, feels much improved, edema improving, will resume PD tonight to assess function and will DC tomorrow hopefully on PD   11/17: Started on CCPD overnight and had Net 1.9L UF, had HD  "as well yesterday, overall feeling much better and feels ready to go home, scheduled for CARMELITA today per CTS     REVIEW OF SYSTEMS:    Denies SOB, no CP, no V  10 point ROS reviewed and is as per HPI/daily summary or otherwise negative    PMH/PSH/SH/FH: Reviewed and unchanged since admission note  CURRENT MEDICATIONS: Reviewed from admission to present day    VS:  /69   Pulse (!) 104   Temp 37.1 °C (98.8 °F) (Temporal)   Resp 17   Ht 1.702 m (5' 7\")   Wt 86.7 kg (191 lb 2.2 oz)   SpO2 93%   BMI 29.94 kg/m²     Exam  General: Awake, no acute distress  HEENT: head normocephalic, atruamtic  Neck: neck supple, no visible masses  Respiratory: clear to auscultation bilaterally, no rales, no ronchi, or wheezing  Cardiac: normal rate, normal rhythm,  Abdomen: non tender, non-distended, no guarding  Musculoskelatal: no joint tenderness, moves limbs spontaneously  Extremities: no significant joint abnormalities, +++ edema  Skin: no lesions, no rashes noted    Access: R IJ HD permcatheter, Abd PD catheter      Fluids:  In: 1100 [P.O.:600; Dialysis:500]  Out: 5710     LABS:  Recent Labs     11/15/23  0247 11/16/23  0205 11/17/23  0243   SODIUM 128* 130* 132*   POTASSIUM 4.3 4.6 4.3   CHLORIDE 91* 92* 92*   CO2 24 24 24   GLUCOSE 109* 97 114*   BUN 58* 47* 54*   CREATININE 8.19* 6.80* 8.07*   CALCIUM 8.2* 8.2* 8.5       IMPRESSION:    # ESRD on outpt PD    - Etiology likely 2/2 FSGS    - s/p permcath on 11/10 for temp outpt HD   # Volume overload, improving  # S/P AVR/ Ascending aneurysm repair     -High chest tube drainage only noted at night while on PD     -concern for PD fluid leaking through into chest cavity per cards         On 11/6     -CVT recommending at least 2 weeks of PD avoidance to allow          Chest/ABD cavity to heal  # Anemia of CKD     EPO 6000U IV w HD to start 11/10  # CKD-MBD  # Acute Respiratoy failure on vent, extubated    PLAN:  -Underwent alternating HD with PUF all this week to help " address fluid overload and anasarca. Doing much better from volume status.   - Started back on PD last night and did well  - No plans for repeat HD today, to continue CCPD at home - use red+green bags for next few days for more UF on PD.   - D/C home with the Rt IJ TDC in place in case he needs intermittent HD outpatient. TDC can be removed as outpatient and we will co-ordinate this through his outpatient unit and access center .   - Dose all medications as per ESRD  - OK for discharge home from Nephrology standpoint when OK with Primary team and CTS     Please page nephrology with any questions or concerns

## 2023-11-17 NOTE — CARE PLAN
Problem: Knowledge Deficit - Standard  Goal: Patient and family/care givers will demonstrate understanding of plan of care, disease process/condition, diagnostic tests and medications  Outcome: Progressing     Problem: Hemodynamics  Goal: Patient's hemodynamics, fluid balance and neurologic status will be stable or improve  Outcome: Progressing     Problem: Respiratory  Goal: Patient will achieve/maintain optimum respiratory ventilation and gas exchange  Outcome: Progressing  Note: Patient on room air.     Problem: Skin Integrity  Goal: Skin integrity is maintained or improved  Outcome: Progressing     Problem: Fall Risk  Goal: Patient will remain free from falls  Outcome: Progressing  Note: Patient remains free from falls    Problem: Post Op Day 13 CABG/Heart Valve Replacement  Goal: Optimal care of the Post Op CABG/Heart Valve replacement Post Op Day 5  Outcome: Progressing   The patient is Stable - Low risk of patient condition declining or worsening    Shift Goals  Clinical Goals: HD, walk, shower, home O2 test  Patient Goals: transition to PD, discharge  Family Goals: N/A    Progress made toward(s) clinical / shift goals:  HD complete  shower complete, on room air, trial on PD this evening with NPO orders for CARMELITA tomorrow  Patient weighed daily on standing scale  Patient self motivated with IS    Patient is not progressing towards the following goals: patient still unable to ambulate entire lap of unit

## 2023-11-18 NOTE — PROGRESS NOTES
Notified hospitalist Jostin of patients BP in the 80's.  Pt asymptomatic, denies dizziness lightheadedness. Pt was sitting in chair, and had pt lie in bed flat with improved /70.

## 2023-11-18 NOTE — PROGRESS NOTES
Patient okay to discharge by hospitalist Soni. / 71. Pts central RIJ removed. HD cath in place. Pt given meds delivered at bedside. Pt off the box, monitor room called and notified. Discharge paperwork discussed, all questions answered at this time. Pt wheeled out to car in wheelchair with all belongings.

## 2023-11-20 ENCOUNTER — ANTICOAGULATION MONITORING (OUTPATIENT)
Dept: VASCULAR LAB | Facility: MEDICAL CENTER | Age: 58
End: 2023-11-20
Payer: COMMERCIAL

## 2023-11-20 ENCOUNTER — TELEPHONE (OUTPATIENT)
Dept: CARDIOLOGY | Facility: MEDICAL CENTER | Age: 58
End: 2023-11-20
Payer: COMMERCIAL

## 2023-11-20 ENCOUNTER — TELEPHONE (OUTPATIENT)
Dept: VASCULAR LAB | Facility: MEDICAL CENTER | Age: 58
End: 2023-11-20
Payer: COMMERCIAL

## 2023-11-20 DIAGNOSIS — Z95.2 S/P AVR: ICD-10-CM

## 2023-11-20 LAB — INR PPP: 4.8 (ref 2–3.5)

## 2023-11-20 NOTE — TELEPHONE ENCOUNTER
BE    Caller: CODI Rosas    Name and Department:     [facility]  P: NO CALL BACK NEEDED    Topic/Issue: MEDICAL ADVICE    Per Alison;    Gurdeep has been feeling extremely lethargic, weak and SOB since his discharge on 11/17/2023 he has also had elevated HR readings ranging from 118-120bpm. He is also in the middle of battling a chest cold that isn't helping his symptoms at all. He would like a call back to discuss his options for his next step of care. Please advise.    Thank you,  Bridger RICCI    Callback Number or Extension: [phone] (pt home)

## 2023-11-20 NOTE — TELEPHONE ENCOUNTER
Phone Number Called: [phone]    Call outcome: Spoke to patient regarding message below.    Message: Spoke to patient about elevated HR readings. Patient reports that resting HR is 122. Patient is short of breathe. Patient reports a cough with yellow sputum. BP is regular at this time. Patient is still taking amiodarone. Patient reports that when he took carvedilol a couple days ago SBP was in 60-70's and that he almost passed out so he did not take it again. Patient reports that dialysis clinic upped fluid intake that was going to be removed from patient at this time as patient has been retaining fluid. RN advised that patient should go to the ER with symptoms and elevated HR. Patient is hesitant at this time as he was inpatient for 15 days. ER precautions reinforced and this RN advised that she would reach out to CT surgery for recommendations. All questions answered at this time. Advised to call back with any further questions or concerns.       Ruel Pitts RN: can you follow up with patient, he was very reluctant to go to ER but was asking about medications for rate control, thank you!

## 2023-11-20 NOTE — TELEPHONE ENCOUNTER
[facility]    Pt referred to our anticoagulation clinic due to valvular replacement.  S/w pt and Sonam R[facility].  Sonam RICH is reaching out to their nurse to see if an INR can be obtained today.    [facility]am R[facility]- Please obtain INR today if possible.    Tien Reynolds, PharmD     Faxed to Son[facility] R[facility] [phone]

## 2023-11-21 ENCOUNTER — TELEPHONE (OUTPATIENT)
Dept: CARDIOTHORACIC SURGERY | Facility: MEDICAL CENTER | Age: 58
End: 2023-11-21
Payer: COMMERCIAL

## 2023-11-21 NOTE — TELEPHONE ENCOUNTER
I left a VM for both him and Paulette lindo sig other with my call back number to address his and the cardiology RN concerns (via [employer] message) about tachycardia, chest cold, fluid retention, and SOB.

## 2023-11-21 NOTE — TELEPHONE ENCOUNTER
Chief concerns: tachycardia, SOB    He is addressing his fluid status with nephrology, he states it is getting better but also has some SOB.    He was told to continue with the IS religiously.    May have a chest cold; he has started mucinex. He denies fevers. No surgery intervetion for this likely common cold; pt to monitor.      Hospital follow up call    he is showering everyday or every other day.    he states that all incisions are healing well and approximated with no s/s of infection (redness, puss, fever, purulent drainage, or tenderness).    he states that the post op pain is well controlled.  Narcotics are not being utilized. Tylenol is being utilized.    he is using the IS; volume is not improved. Current volume is 1000 ml, baseline was 2500.    he is walking everyday, distance is increased from the hospital.    he is obtaining daily weights. He was gaining weight; this is to be addressed with nephrology    he is taking all prescribed meds as directed.  he has no medication questions.    he is taking vitals (HR and BP).    BP's: 110's to 120's  HR's: 115 to 120's    He is not taking coreg due to sever hypotension, SBP in the 60's upon taking it; has not taken it since.    He was told to restart his verapamil 240 mg.    he has had a bowel movement since discharge.    He was set up with Sonma RICH.    he has no additional questions at this time. Follow up education was not needed on anything. Follow up appointments were reviewed and I ensured they have my number if issues or concerns arise.      Follow up call time was 12 minutes.

## 2023-11-21 NOTE — PROGRESS NOTES
Date:  Time: 1631    1st call    Attempted to contact pt regarding supratherapeutic INR  Left message for pt to HOLD warfarin tonight and to call us back so we can provide warfarin education     Will attempt again later    Veronica Bedolla, EstefanyD        OP Anticoagulation Service Note    Date: 2023    Anticoagulation Summary  As of 2023      INR goal:  2.0-3.0   TTR:  --   INR used for dosin.80 (2023)   Warfarin maintenance plan:  1.25 mg (2.5 mg x 0.5) every Tue, Thu; 2.5 mg (2.5 mg x 1) all other days   Weekly warfarin total:  15 mg   Plan last modified:  Benito Bales PharmD (2023)   Next INR check:  2023   Target end date:      Indications    S/P AVR [Z95.2]                 Anticoagulation Episode Summary       INR check location:      Preferred lab:      Send INR reminders to:      Comments:  Sonam RICH  On-X AVR 11/3/23 - INR goal 2-3, then 1.5-2.0 on 2/3/24          Anticoagulation Care Providers       Provider Role Specialty Phone number    [facility]   [phone]          Anticoagulation Patient Findings        Patient's preferred phone number:  [phone]    HPI:   The reason for today's call is to prevent morbidity and mortality from a blood clot and/or stroke and to reduce the risk of bleeding while on a anticoagulant.     PCP:  Drew T. Blumberg, D.O.  [address] [place], [place] [address]  [facility] [address]    Assessment:     INR goal for this PT: 2.0-3.0  INR   Date Value Ref Range Status   2023 4.80 (A) 2 - 3.5 Final       Lab Results   Component Value Date/Time    BUN 54 (H) 2023 02:43 AM    CREATININE 8.07 (HH) 2023 02:43 AM    BUNCREATRAT 17 2022 09:49 AM    GLOMRATE 7 (L) 2023 11:54 AM     Lab Results   Component Value Date/Time    HEMOGLOBIN 9.2 (L) 2023 02:43 AM    HEMATOCRIT 27.7 (L) 2023 02:43 AM    PLATELETCT 418 2023 02:43 AM    ALKPHOSPHAT 54 2023 02:05 AM    ASTSGOT 22 2023 02:05  AM    ALTSGPT 16 11/16/2023 02:05 AM          Current Outpatient Medications:     acetaminophen, 1,000 mg, Oral, Q6HRS PRN    amiodarone, Take 2 Tablets by mouth 2 times a day for 7 days, THEN 2 Tablets every day for 7 days, THEN 1 Tablet every day for 14 days.    carvedilol, 3.125 mg, Oral, BID WITH MEALS    omeprazole, 20 mg, Oral, BID    traMADol, 50 mg, Oral, Q6HRS PRN    warfarin, 2.5 mg, Oral, DAILY AT 1800    Ferrous Sulfate (IRON PO), Take  by mouth.    CALCIUM PO, Take  by mouth.    tamsulosin, 0.4 mg, Oral, DAILY    betamethasone dipropionate, APPLY CREAM TOPICALLY TWICE DAILY TO AFFECTED AREA    UltraBag/Dianeal PD-2/2.5% Dex, Inject  into the abdomen/abdominal cavity.    hydrocortisone, Apply  topically as needed.    atorvastatin, 40 mg, Oral, Nightly    nitroglycerin, 0.4 mg, Sublingual, PRN    aspirin, 81 mg, Oral, DAILY    esomeprazole, 40 mg, Oral, Nightly      Plan:     Decrease weekly warfarin dose as noted above.   Sonam RICH- please call INR into on-call pharmacist @[phone]      Follow-up:     On date seen above    Additional information discussed with patient:     Asked patient to please call the anticoagulation clinic if they have any signs/symptoms of bleeding and/or thrombosis or any changes to diet or medications.      National recommendations regarding anticoagulation therapy:     The CHEST guidelines recommends frequent INR monitoring at regular intervals (a few days up to a max of 12 weeks) to ensure patients are on the proper dose of warfarin, and patients are not having any complications from therapy.  INRs can dramatically change over a short time period due to diet, medications, and medical conditions.       [facility]  Phone: [phone]  Fax: [phone]  On call: [phone]  General scheduling/information [phone]  For emergencies please dial [phone]  Please do not use [employer] for urgent matters, call the phone numbers listed above.  [employer]  messages are answered Monday through Friday.     This note was created using voice recognition software (Dragon). The accuracy of the dictation is limited by the abilities of the software. I have reviewed the note prior to signing, however some errors in grammar and context are still possible. If you have any questions related to this note please do not hesitate to contact our office.         [facility]  Phone: [phone]  Fax: [phone]  On call: [phone]  General scheduling/information [phone]  For emergencies please dial [phone]  Please do not use frestyl for urgent matters, call the phone numbers listed above.    This note was created using voice recognition software (Dragon). The accuracy of the dictation is limited by the abilities of the software. I have reviewed the note prior to signing, however some errors in grammar and context are still possible. If you have any questions related to this note please do not hesitate to contact our office.

## 2023-11-22 ENCOUNTER — TELEPHONE (OUTPATIENT)
Dept: CARDIOTHORACIC SURGERY | Facility: MEDICAL CENTER | Age: 58
End: 2023-11-22
Payer: COMMERCIAL

## 2023-11-22 NOTE — TELEPHONE ENCOUNTER
Restarted verapamil 240 mg daily with improved HR down to the 80's.    Current issue: HR increasing by the evening time    Per UNRULY Sanchez if HR is < or equal to 110 and stable hold current regimen.    He is creeping up to high 90's in the evening and if he wakes up in the night; he is up to a max of 110.    He sees cardiology on 11/30; he was told to address with Su Ya during that appointment but of course call us beforehand if he becomes symptomatic.    Call time 5 minutes

## 2023-11-24 ENCOUNTER — ANTICOAGULATION MONITORING (OUTPATIENT)
Dept: VASCULAR LAB | Facility: MEDICAL CENTER | Age: 58
End: 2023-11-24
Payer: COMMERCIAL

## 2023-11-24 DIAGNOSIS — Z95.2 S/P AVR: ICD-10-CM

## 2023-11-24 LAB — INR PPP: 5.3 (ref 2–3.5)

## 2023-11-25 NOTE — PROGRESS NOTES
OP Anticoagulation Service Note    Date: 2023    Anticoagulation Summary  As of 2023      INR goal:  2.0-3.0   TTR:  --   INR used for dosin.30 (2023)   Warfarin maintenance plan:  1.25 mg (2.5 mg x 0.5) every day   Weekly warfarin total:  8.75 mg   Plan last modified:  Benito Bales, PharmD (2023)   Next INR check:  2023   Target end date:      Indications    S/P AVR [Z95.2]                 Anticoagulation Episode Summary       INR check location:      Preferred lab:      Send INR reminders to:      Comments:  Sonam  [phone];  On-X AVR 11/3/23 - INR goal 2-3, then 1.5-2.0 on 2/3/24          Anticoagulation Care Providers       Provider Role Specialty Phone number    [facility]   [phone]          Anticoagulation Patient Findings        Patient's preferred phone number:  [phone]        HPI:   The reason for today's call is to prevent morbidity and mortality from a blood clot and/or stroke and to reduce the risk of bleeding while on a anticoagulant.     PCP:  Drew T. Blumberg, D.O.  [address] [place], [place] [address]  [facility] [address]    Assessment:     INR goal for this PT: 2.0-3.0  INR   Date Value Ref Range Status   2023 5.30 (A) 2 - 3.5 Final       Lab Results   Component Value Date/Time    BUN 54 (H) 2023 02:43 AM    CREATININE 8.07 (HH) 2023 02:43 AM    BUNCREATRAT 17 2022 09:49 AM    GLOMRATE 7 (L) 2023 11:54 AM     Lab Results   Component Value Date/Time    HEMOGLOBIN 9.2 (L) 2023 02:43 AM    HEMATOCRIT 27.7 (L) 2023 02:43 AM    PLATELETCT 418 2023 02:43 AM    ALKPHOSPHAT 54 2023 02:05 AM    ASTSGOT 22 2023 02:05 AM    ALTSGPT 16 2023 02:05 AM          Current Outpatient Medications:     acetaminophen, 1,000 mg, Oral, Q6HRS PRN    amiodarone, Take 2 Tablets by mouth 2 times a day for 7 days, THEN 2 Tablets every day for 7 days, THEN 1 Tablet every day for 14 days.     carvedilol, 3.125 mg, Oral, BID WITH MEALS    omeprazole, 20 mg, Oral, BID    traMADol, 50 mg, Oral, Q6HRS PRN    warfarin, 2.5 mg, Oral, DAILY AT 1800    Ferrous Sulfate (IRON PO), Take  by mouth.    CALCIUM PO, Take  by mouth.    tamsulosin, 0.4 mg, Oral, DAILY    betamethasone dipropionate, APPLY CREAM TOPICALLY TWICE DAILY TO AFFECTED AREA    UltraBag/Dianeal PD-2/2.5% Dex, Inject  into the abdomen/abdominal cavity.    hydrocortisone, Apply  topically as needed.    atorvastatin, 40 mg, Oral, Nightly    nitroglycerin, 0.4 mg, Sublingual, PRN    aspirin, 81 mg, Oral, DAILY    esomeprazole, 40 mg, Oral, Nightly      Plan:     Hold x 2 then Decrease weekly warfarin dose as noted above.       Follow-up:     On date seen above    Additional information discussed with patient:     Asked patient to please call the anticoagulation clinic if they have any signs/symptoms of bleeding and/or thrombosis or any changes to diet or medications.      National recommendations regarding anticoagulation therapy:     The CHEST guidelines recommends frequent INR monitoring at regular intervals (a few days up to a max of 12 weeks) to ensure patients are on the proper dose of warfarin, and patients are not having any complications from therapy.  INRs can dramatically change over a short time period due to diet, medications, and medical conditions.       [facility]  Phone: [phone]  Fax: [phone]  On call: [phone]  General scheduling/information [phone]  For emergencies please dial [phone]  Please do not use [employer] for urgent matters, call the phone numbers listed above.    This note was created using voice recognition software (Dragon). The accuracy of the dictation is limited by the abilities of the software. I have reviewed the note prior to signing, however some errors in grammar and context are still possible. If you have any questions related to this note please do not hesitate to  contact our office.

## 2023-11-27 ENCOUNTER — TELEPHONE (OUTPATIENT)
Dept: CARDIOTHORACIC SURGERY | Facility: MEDICAL CENTER | Age: 58
End: 2023-11-27
Payer: COMMERCIAL

## 2023-11-27 ENCOUNTER — HOSPITAL ENCOUNTER (INPATIENT)
Facility: MEDICAL CENTER | Age: 58
LOS: 29 days | DRG: 981 | End: 2023-12-26
Attending: STUDENT IN AN ORGANIZED HEALTH CARE EDUCATION/TRAINING PROGRAM | Admitting: STUDENT IN AN ORGANIZED HEALTH CARE EDUCATION/TRAINING PROGRAM
Payer: COMMERCIAL

## 2023-11-27 ENCOUNTER — APPOINTMENT (OUTPATIENT)
Dept: RADIOLOGY | Facility: MEDICAL CENTER | Age: 58
DRG: 981 | End: 2023-11-27
Attending: STUDENT IN AN ORGANIZED HEALTH CARE EDUCATION/TRAINING PROGRAM
Payer: COMMERCIAL

## 2023-11-27 ENCOUNTER — TELEPHONE (OUTPATIENT)
Dept: CARDIOLOGY | Facility: MEDICAL CENTER | Age: 58
End: 2023-11-27
Payer: COMMERCIAL

## 2023-11-27 ENCOUNTER — APPOINTMENT (OUTPATIENT)
Dept: RADIOLOGY | Facility: MEDICAL CENTER | Age: 58
DRG: 981 | End: 2023-11-27
Payer: COMMERCIAL

## 2023-11-27 DIAGNOSIS — I48.91 ATRIAL FIBRILLATION, UNSPECIFIED TYPE (HCC): ICD-10-CM

## 2023-11-27 DIAGNOSIS — I31.39 PERICARDIAL EFFUSION: ICD-10-CM

## 2023-11-27 DIAGNOSIS — R63.0 DECREASED APPETITE: ICD-10-CM

## 2023-11-27 DIAGNOSIS — I05.8 MITRAL VALVE MASS: ICD-10-CM

## 2023-11-27 DIAGNOSIS — Z95.2 HISTORY OF MECHANICAL AORTIC VALVE REPLACEMENT: ICD-10-CM

## 2023-11-27 DIAGNOSIS — K59.03 DRUG-INDUCED CONSTIPATION: ICD-10-CM

## 2023-11-27 DIAGNOSIS — A04.72 C. DIFFICILE COLITIS: ICD-10-CM

## 2023-11-27 DIAGNOSIS — R22.31 LUMP OF SKIN OF RIGHT UPPER EXTREMITY: ICD-10-CM

## 2023-11-27 DIAGNOSIS — E83.39 HYPERPHOSPHATEMIA: ICD-10-CM

## 2023-11-27 DIAGNOSIS — L98.9 SKIN LESIONS: ICD-10-CM

## 2023-11-27 DIAGNOSIS — Z95.2 S/P AVR: ICD-10-CM

## 2023-11-27 DIAGNOSIS — R11.2 NAUSEA AND VOMITING, UNSPECIFIED VOMITING TYPE: ICD-10-CM

## 2023-11-27 DIAGNOSIS — I48.92 PAROXYSMAL ATRIAL FLUTTER (HCC): ICD-10-CM

## 2023-11-27 PROBLEM — K52.9 TYPHLITIS: Status: ACTIVE | Noted: 2023-11-27

## 2023-11-27 LAB
ALBUMIN SERPL BCP-MCNC: 2.5 G/DL (ref 3.2–4.9)
ALBUMIN/GLOB SERPL: 0.7 G/DL
ALP SERPL-CCNC: 75 U/L (ref 30–99)
ALT SERPL-CCNC: 46 U/L (ref 2–50)
ANION GAP SERPL CALC-SCNC: 21 MMOL/L (ref 7–16)
AST SERPL-CCNC: 26 U/L (ref 12–45)
BASOPHILS # BLD AUTO: 0.3 % (ref 0–1.8)
BASOPHILS # BLD: 0.04 K/UL (ref 0–0.12)
BILIRUB SERPL-MCNC: 0.3 MG/DL (ref 0.1–1.5)
BUN SERPL-MCNC: 91 MG/DL (ref 8–22)
CALCIUM ALBUM COR SERPL-MCNC: 10.1 MG/DL (ref 8.5–10.5)
CALCIUM SERPL-MCNC: 8.9 MG/DL (ref 8.5–10.5)
CHLORIDE SERPL-SCNC: 92 MMOL/L (ref 96–112)
CO2 SERPL-SCNC: 23 MMOL/L (ref 20–33)
CREAT SERPL-MCNC: 14.77 MG/DL (ref 0.5–1.4)
EKG IMPRESSION: NORMAL
EOSINOPHIL # BLD AUTO: 0.24 K/UL (ref 0–0.51)
EOSINOPHIL NFR BLD: 2 % (ref 0–6.9)
ERYTHROCYTE [DISTWIDTH] IN BLOOD BY AUTOMATED COUNT: 51.5 FL (ref 35.9–50)
GFR SERPLBLD CREATININE-BSD FMLA CKD-EPI: 3 ML/MIN/1.73 M 2
GLOBULIN SER CALC-MCNC: 3.6 G/DL (ref 1.9–3.5)
GLUCOSE SERPL-MCNC: 103 MG/DL (ref 65–99)
HCT VFR BLD AUTO: 33.4 % (ref 42–52)
HGB BLD-MCNC: 10.9 G/DL (ref 14–18)
IMM GRANULOCYTES # BLD AUTO: 0.07 K/UL (ref 0–0.11)
IMM GRANULOCYTES NFR BLD AUTO: 0.6 % (ref 0–0.9)
INR PPP: 1.92 (ref 0.87–1.13)
LACTATE SERPL-SCNC: 0.7 MMOL/L (ref 0.5–2)
LIPASE SERPL-CCNC: 22 U/L (ref 11–82)
LYMPHOCYTES # BLD AUTO: 0.61 K/UL (ref 1–4.8)
LYMPHOCYTES NFR BLD: 5.1 % (ref 22–41)
MCH RBC QN AUTO: 30.4 PG (ref 27–33)
MCHC RBC AUTO-ENTMCNC: 32.6 G/DL (ref 32.3–36.5)
MCV RBC AUTO: 93.3 FL (ref 81.4–97.8)
MONOCYTES # BLD AUTO: 0.6 K/UL (ref 0–0.85)
MONOCYTES NFR BLD AUTO: 5 % (ref 0–13.4)
NEUTROPHILS # BLD AUTO: 10.34 K/UL (ref 1.82–7.42)
NEUTROPHILS NFR BLD: 87 % (ref 44–72)
NRBC # BLD AUTO: 0 K/UL
NRBC BLD-RTO: 0 /100 WBC (ref 0–0.2)
PLATELET # BLD AUTO: 570 K/UL (ref 164–446)
PMV BLD AUTO: 8.6 FL (ref 9–12.9)
POTASSIUM SERPL-SCNC: 4.2 MMOL/L (ref 3.6–5.5)
PROT SERPL-MCNC: 6.1 G/DL (ref 6–8.2)
PROTHROMBIN TIME: 22.2 SEC (ref 12–14.6)
RBC # BLD AUTO: 3.58 M/UL (ref 4.7–6.1)
SODIUM SERPL-SCNC: 136 MMOL/L (ref 135–145)
TROPONIN T SERPL-MCNC: 566 NG/L (ref 6–19)
TROPONIN T SERPL-MCNC: 606 NG/L (ref 6–19)
TROPONIN T SERPL-MCNC: 622 NG/L (ref 6–19)
WBC # BLD AUTO: 11.9 K/UL (ref 4.8–10.8)

## 2023-11-27 PROCEDURE — 84484 ASSAY OF TROPONIN QUANT: CPT

## 2023-11-27 PROCEDURE — 99285 EMERGENCY DEPT VISIT HI MDM: CPT

## 2023-11-27 PROCEDURE — 83605 ASSAY OF LACTIC ACID: CPT

## 2023-11-27 PROCEDURE — 96375 TX/PRO/DX INJ NEW DRUG ADDON: CPT

## 2023-11-27 PROCEDURE — 71045 X-RAY EXAM CHEST 1 VIEW: CPT

## 2023-11-27 PROCEDURE — 700111 HCHG RX REV CODE 636 W/ 250 OVERRIDE (IP): Mod: JZ | Performed by: STUDENT IN AN ORGANIZED HEALTH CARE EDUCATION/TRAINING PROGRAM

## 2023-11-27 PROCEDURE — 85610 PROTHROMBIN TIME: CPT

## 2023-11-27 PROCEDURE — 36415 COLL VENOUS BLD VENIPUNCTURE: CPT

## 2023-11-27 PROCEDURE — 700102 HCHG RX REV CODE 250 W/ 637 OVERRIDE(OP): Performed by: STUDENT IN AN ORGANIZED HEALTH CARE EDUCATION/TRAINING PROGRAM

## 2023-11-27 PROCEDURE — 93005 ELECTROCARDIOGRAM TRACING: CPT

## 2023-11-27 PROCEDURE — 74176 CT ABD & PELVIS W/O CONTRAST: CPT

## 2023-11-27 PROCEDURE — 80053 COMPREHEN METABOLIC PANEL: CPT

## 2023-11-27 PROCEDURE — A9270 NON-COVERED ITEM OR SERVICE: HCPCS | Performed by: STUDENT IN AN ORGANIZED HEALTH CARE EDUCATION/TRAINING PROGRAM

## 2023-11-27 PROCEDURE — 83690 ASSAY OF LIPASE: CPT

## 2023-11-27 PROCEDURE — 96365 THER/PROPH/DIAG IV INF INIT: CPT

## 2023-11-27 PROCEDURE — 93005 ELECTROCARDIOGRAM TRACING: CPT | Performed by: STUDENT IN AN ORGANIZED HEALTH CARE EDUCATION/TRAINING PROGRAM

## 2023-11-27 PROCEDURE — 85025 COMPLETE CBC W/AUTO DIFF WBC: CPT

## 2023-11-27 PROCEDURE — 770001 HCHG ROOM/CARE - MED/SURG/GYN PRIV*

## 2023-11-27 RX ORDER — VERAPAMIL HYDROCHLORIDE 240 MG/1
120 TABLET, FILM COATED, EXTENDED RELEASE ORAL DAILY
Status: ON HOLD | COMMUNITY
End: 2023-12-06

## 2023-11-27 RX ORDER — OMEPRAZOLE 20 MG/1
20 CAPSULE, DELAYED RELEASE ORAL 2 TIMES DAILY
Status: DISCONTINUED | OUTPATIENT
Start: 2023-11-27 | End: 2023-11-28

## 2023-11-27 RX ORDER — METRONIDAZOLE 500 MG/100ML
500 INJECTION, SOLUTION INTRAVENOUS EVERY 12 HOURS
Status: DISCONTINUED | OUTPATIENT
Start: 2023-11-28 | End: 2023-11-29

## 2023-11-27 RX ORDER — METRONIDAZOLE 500 MG/100ML
500 INJECTION, SOLUTION INTRAVENOUS ONCE
Status: COMPLETED | OUTPATIENT
Start: 2023-11-27 | End: 2023-11-27

## 2023-11-27 RX ORDER — VERAPAMIL HYDROCHLORIDE 120 MG/1
120 CAPSULE, EXTENDED RELEASE ORAL DAILY
Status: DISCONTINUED | OUTPATIENT
Start: 2023-11-28 | End: 2023-11-30

## 2023-11-27 RX ORDER — ONDANSETRON 2 MG/ML
4 INJECTION INTRAMUSCULAR; INTRAVENOUS ONCE
Status: COMPLETED | OUTPATIENT
Start: 2023-11-27 | End: 2023-11-27

## 2023-11-27 RX ORDER — TAMSULOSIN HYDROCHLORIDE 0.4 MG/1
0.4 CAPSULE ORAL DAILY
Status: DISCONTINUED | OUTPATIENT
Start: 2023-11-28 | End: 2023-12-26 | Stop reason: HOSPADM

## 2023-11-27 RX ORDER — ONDANSETRON 2 MG/ML
4 INJECTION INTRAMUSCULAR; INTRAVENOUS EVERY 4 HOURS PRN
Status: DISCONTINUED | OUTPATIENT
Start: 2023-11-27 | End: 2023-11-29

## 2023-11-27 RX ORDER — CEFTRIAXONE 2 G/1
2000 INJECTION, POWDER, FOR SOLUTION INTRAMUSCULAR; INTRAVENOUS ONCE
Status: COMPLETED | OUTPATIENT
Start: 2023-11-27 | End: 2023-11-27

## 2023-11-27 RX ORDER — TRAMADOL HYDROCHLORIDE 50 MG/1
50 TABLET ORAL EVERY 12 HOURS PRN
Status: DISCONTINUED | OUTPATIENT
Start: 2023-11-27 | End: 2023-11-30

## 2023-11-27 RX ORDER — PROMETHAZINE HYDROCHLORIDE 25 MG/1
12.5-25 TABLET ORAL EVERY 4 HOURS PRN
Status: DISCONTINUED | OUTPATIENT
Start: 2023-11-27 | End: 2023-12-26 | Stop reason: HOSPADM

## 2023-11-27 RX ORDER — HEPARIN SODIUM 5000 [USP'U]/ML
5000 INJECTION, SOLUTION INTRAVENOUS; SUBCUTANEOUS EVERY 8 HOURS
Status: DISCONTINUED | OUTPATIENT
Start: 2023-11-27 | End: 2023-11-27

## 2023-11-27 RX ORDER — ACETAMINOPHEN 500 MG
1000 TABLET ORAL ONCE
Status: COMPLETED | OUTPATIENT
Start: 2023-11-27 | End: 2023-11-27

## 2023-11-27 RX ORDER — ATORVASTATIN CALCIUM 40 MG/1
40 TABLET, FILM COATED ORAL NIGHTLY
Status: DISCONTINUED | OUTPATIENT
Start: 2023-11-27 | End: 2023-12-26 | Stop reason: HOSPADM

## 2023-11-27 RX ORDER — WARFARIN SODIUM 2.5 MG/1
1.25 TABLET ORAL ONCE
Status: ON HOLD | COMMUNITY
End: 2023-12-06

## 2023-11-27 RX ORDER — PROMETHAZINE HYDROCHLORIDE 25 MG/1
12.5-25 SUPPOSITORY RECTAL EVERY 4 HOURS PRN
Status: DISCONTINUED | OUTPATIENT
Start: 2023-11-27 | End: 2023-12-26 | Stop reason: HOSPADM

## 2023-11-27 RX ORDER — ASPIRIN 81 MG/1
81 TABLET ORAL DAILY
Status: DISCONTINUED | OUTPATIENT
Start: 2023-11-28 | End: 2023-12-26 | Stop reason: HOSPADM

## 2023-11-27 RX ORDER — PROCHLORPERAZINE EDISYLATE 5 MG/ML
5-10 INJECTION INTRAMUSCULAR; INTRAVENOUS EVERY 4 HOURS PRN
Status: DISCONTINUED | OUTPATIENT
Start: 2023-11-27 | End: 2023-12-26 | Stop reason: HOSPADM

## 2023-11-27 RX ORDER — ONDANSETRON 4 MG/1
4 TABLET, ORALLY DISINTEGRATING ORAL EVERY 4 HOURS PRN
Status: DISCONTINUED | OUTPATIENT
Start: 2023-11-27 | End: 2023-12-26 | Stop reason: HOSPADM

## 2023-11-27 RX ADMIN — ONDANSETRON 4 MG: 2 INJECTION INTRAMUSCULAR; INTRAVENOUS at 16:24

## 2023-11-27 RX ADMIN — CEFTRIAXONE SODIUM 2000 MG: 2 INJECTION, POWDER, FOR SOLUTION INTRAMUSCULAR; INTRAVENOUS at 19:46

## 2023-11-27 RX ADMIN — ONDANSETRON 4 MG: 2 INJECTION INTRAMUSCULAR; INTRAVENOUS at 22:07

## 2023-11-27 RX ADMIN — ACETAMINOPHEN 1000 MG: 500 TABLET ORAL at 17:31

## 2023-11-27 RX ADMIN — OMEPRAZOLE 20 MG: 20 CAPSULE, DELAYED RELEASE ORAL at 21:12

## 2023-11-27 RX ADMIN — METRONIDAZOLE 500 MG: 500 INJECTION, SOLUTION INTRAVENOUS at 19:53

## 2023-11-27 RX ADMIN — ATORVASTATIN CALCIUM 40 MG: 40 TABLET, FILM COATED ORAL at 21:12

## 2023-11-27 ASSESSMENT — HEART SCORE
TROPONIN: GREATER THAN OR EQUAL TO 3 TIMES NORMAL LIMIT
ECG: NON-SPECIFIC REPOLARIZATION DISTURBANCE
HEART SCORE: 6
AGE: 45-64
RISK FACTORS: >2 RISK FACTORS OR HX OF ATHEROSCLEROTIC DISEASE
HISTORY: SLIGHTLY SUSPICIOUS

## 2023-11-27 ASSESSMENT — COGNITIVE AND FUNCTIONAL STATUS - GENERAL
CLIMB 3 TO 5 STEPS WITH RAILING: A LITTLE
MOBILITY SCORE: 23
SUGGESTED CMS G CODE MODIFIER DAILY ACTIVITY: CH
DAILY ACTIVITIY SCORE: 24
SUGGESTED CMS G CODE MODIFIER MOBILITY: CI

## 2023-11-27 ASSESSMENT — PAIN DESCRIPTION - PAIN TYPE
TYPE: ACUTE PAIN
TYPE: ACUTE PAIN

## 2023-11-27 ASSESSMENT — FIBROSIS 4 INDEX: FIB4 SCORE: .7368421052631578947

## 2023-11-27 NOTE — TELEPHONE ENCOUNTER
Called pt to inform him that a wheelchair can be provided day of his appointment. Per pt, plans of returning back to the ER due to bad surgery recovery. Per pt to reschedule appt. Scheduled pt for soonest available.

## 2023-11-27 NOTE — Clinical Note
The site was marked. Prepped: right chest. Prepped with: ChloraPrep. The site was clipped. The patient was draped. Right back

## 2023-11-27 NOTE — TELEPHONE ENCOUNTER
Patient left VM that he has N/V with the amio and wants an alternative.    I called back and left him a VM.

## 2023-11-27 NOTE — TELEPHONE ENCOUNTER
"N/V with amio X 3 days.  Last amio dose 11/24. Stomach cramping, bloated, dizziness is persistent after stopping amio. He has daily BM's and diarrhea.    His appetite is poor, but he is eating. He is taking omeprazole, nexium, on mylanta as well without relief.    States he cannot walk the 10 yards from curbside into the building and up to cardiology's office for his appointment on 11/30, stating \"I will drop\" due to stomach issues and dizziness secondary to his stomach.    He states HR is 80's to 90's. He is taking half of his verapamil which began on Sunday due to hypotension, he states he couldn't get a reading and almost went to the ED the other day.    He was told cutting his sustained release verapamil is not helping his stomach issues and is all the more reason to see cardiology for a medication review and adjustment.    Reports captured SBP's are 110's to 120's.    Per UNRULY Castillo, given patients reports of extreme abdominal issues that impair mobility that much and that he is a PD patient, she wants him to go the the ED for evaluation.    I called and let him know this is our recommendation. He states \"it's not like I can't walk, it's just a real struggle\" as he can get to the bathroom and bedroom.  He was told this amount of discomfort he described is unlikely due to medication and certainly not surgery and should be evaluated.  He states he will work on getting a ride to get checked out.    Call times 15 minutes                  "

## 2023-11-27 NOTE — TELEPHONE ENCOUNTER
----- Message from Gisselle Caputo R.N. sent at 11/27/2023  1:03 PM PST -----  Regarding: wheelchair to appointment 11/30 ?  Hi team,     This gentleman has an appointment with chuck Abreu on 11/30 and is having a rough physical post op recovery.  He states he cannot walk from the curbside to the elevators and to the lobby.  He really needs this appointment for medication review and adjustment.    Anyway you can coordinate a wheelchair for him?  I have no idea how this works with cards but any help for this [place] is appreciated!    Thanks!

## 2023-11-28 ENCOUNTER — APPOINTMENT (OUTPATIENT)
Dept: CARDIOLOGY | Facility: MEDICAL CENTER | Age: 58
DRG: 981 | End: 2023-11-28
Attending: FAMILY MEDICINE
Payer: COMMERCIAL

## 2023-11-28 LAB
APPEARANCE FLD: NORMAL
BASOPHILS NFR FLD: 1 %
BODY FLD TYPE: NORMAL
COLOR FLD: YELLOW
EOSINOPHIL NFR FLD: 1 %
ERYTHROCYTE [DISTWIDTH] IN BLOOD BY AUTOMATED COUNT: 52.8 FL (ref 35.9–50)
GRAM STN SPEC: NORMAL
HCT VFR BLD AUTO: 31 % (ref 42–52)
HGB BLD-MCNC: 9.8 G/DL (ref 14–18)
LV EJECT FRACT  99904: 70
LV EJECT FRACT MOD 2C 99903: 47.42
LV EJECT FRACT MOD 4C 99902: 67.91
MCH RBC QN AUTO: 30.1 PG (ref 27–33)
MCHC RBC AUTO-ENTMCNC: 31.6 G/DL (ref 32.3–36.5)
MCV RBC AUTO: 95.1 FL (ref 81.4–97.8)
MONOS+MACROS NFR FLD MANUAL: 17 %
NEUTROPHILS NFR FLD: 81 %
NT-PROBNP SERPL IA-MCNC: ABNORMAL PG/ML (ref 0–125)
NUC CELL # FLD: 217 CELLS/UL
PLATELET # BLD AUTO: 430 K/UL (ref 164–446)
PMV BLD AUTO: 8.8 FL (ref 9–12.9)
RBC # BLD AUTO: 3.26 M/UL (ref 4.7–6.1)
RBC # FLD: <2000 CELLS/UL
SIGNIFICANT IND 70042: NORMAL
SITE SITE: NORMAL
SOURCE SOURCE: NORMAL
TROPONIN T SERPL-MCNC: 542 NG/L (ref 6–19)
TROPONIN T SERPL-MCNC: 595 NG/L (ref 6–19)
WBC # BLD AUTO: 12 K/UL (ref 4.8–10.8)

## 2023-11-28 PROCEDURE — 87324 CLOSTRIDIUM AG IA: CPT

## 2023-11-28 PROCEDURE — 700105 HCHG RX REV CODE 258: Performed by: STUDENT IN AN ORGANIZED HEALTH CARE EDUCATION/TRAINING PROGRAM

## 2023-11-28 PROCEDURE — A9270 NON-COVERED ITEM OR SERVICE: HCPCS | Performed by: FAMILY MEDICINE

## 2023-11-28 PROCEDURE — 87045 FECES CULTURE AEROBIC BACT: CPT

## 2023-11-28 PROCEDURE — 93306 TTE W/DOPPLER COMPLETE: CPT

## 2023-11-28 PROCEDURE — 83880 ASSAY OF NATRIURETIC PEPTIDE: CPT

## 2023-11-28 PROCEDURE — 36415 COLL VENOUS BLD VENIPUNCTURE: CPT

## 2023-11-28 PROCEDURE — 93306 TTE W/DOPPLER COMPLETE: CPT | Mod: 26 | Performed by: INTERNAL MEDICINE

## 2023-11-28 PROCEDURE — 83630 LACTOFERRIN FECAL (QUAL): CPT

## 2023-11-28 PROCEDURE — 85027 COMPLETE CBC AUTOMATED: CPT

## 2023-11-28 PROCEDURE — 87046 STOOL CULTR AEROBIC BACT EA: CPT

## 2023-11-28 PROCEDURE — 700102 HCHG RX REV CODE 250 W/ 637 OVERRIDE(OP): Performed by: FAMILY MEDICINE

## 2023-11-28 PROCEDURE — 770020 HCHG ROOM/CARE - TELE (206)

## 2023-11-28 PROCEDURE — 700111 HCHG RX REV CODE 636 W/ 250 OVERRIDE (IP)

## 2023-11-28 PROCEDURE — 90945 DIALYSIS ONE EVALUATION: CPT

## 2023-11-28 PROCEDURE — 8E0ZXY6 ISOLATION: ICD-10-PCS | Performed by: FAMILY MEDICINE

## 2023-11-28 PROCEDURE — 99223 1ST HOSP IP/OBS HIGH 75: CPT | Mod: AI | Performed by: STUDENT IN AN ORGANIZED HEALTH CARE EDUCATION/TRAINING PROGRAM

## 2023-11-28 PROCEDURE — 87899 AGENT NOS ASSAY W/OPTIC: CPT

## 2023-11-28 PROCEDURE — 89051 BODY FLUID CELL COUNT: CPT

## 2023-11-28 PROCEDURE — 700102 HCHG RX REV CODE 250 W/ 637 OVERRIDE(OP): Performed by: STUDENT IN AN ORGANIZED HEALTH CARE EDUCATION/TRAINING PROGRAM

## 2023-11-28 PROCEDURE — 700111 HCHG RX REV CODE 636 W/ 250 OVERRIDE (IP): Mod: JZ | Performed by: STUDENT IN AN ORGANIZED HEALTH CARE EDUCATION/TRAINING PROGRAM

## 2023-11-28 PROCEDURE — 87077 CULTURE AEROBIC IDENTIFY: CPT

## 2023-11-28 PROCEDURE — A9270 NON-COVERED ITEM OR SERVICE: HCPCS | Performed by: STUDENT IN AN ORGANIZED HEALTH CARE EDUCATION/TRAINING PROGRAM

## 2023-11-28 PROCEDURE — 87205 SMEAR GRAM STAIN: CPT

## 2023-11-28 PROCEDURE — 87493 C DIFF AMPLIFIED PROBE: CPT

## 2023-11-28 PROCEDURE — 87084 CULTURE OF SPECIMEN BY KIT: CPT

## 2023-11-28 PROCEDURE — 84484 ASSAY OF TROPONIN QUANT: CPT

## 2023-11-28 PROCEDURE — 87040 BLOOD CULTURE FOR BACTERIA: CPT | Mod: 91

## 2023-11-28 RX ORDER — GENTAMICIN SULFATE 1 MG/G
CREAM TOPICAL
Status: DISPENSED | OUTPATIENT
Start: 2023-11-28 | End: 2023-12-15

## 2023-11-28 RX ORDER — WARFARIN SODIUM 2.5 MG/1
1.25 TABLET ORAL DAILY
Status: DISCONTINUED | OUTPATIENT
Start: 2023-11-28 | End: 2023-11-30

## 2023-11-28 RX ORDER — HEPARIN SODIUM 1000 [USP'U]/ML
3700 INJECTION, SOLUTION INTRAVENOUS; SUBCUTANEOUS PRN
Status: DISCONTINUED | OUTPATIENT
Start: 2023-11-28 | End: 2023-12-26 | Stop reason: HOSPADM

## 2023-11-28 RX ORDER — HEPARIN SODIUM 1000 [USP'U]/ML
INJECTION, SOLUTION INTRAVENOUS; SUBCUTANEOUS
Status: COMPLETED
Start: 2023-11-28 | End: 2023-11-28

## 2023-11-28 RX ORDER — WARFARIN SODIUM 2.5 MG/1
1.25 TABLET ORAL ONCE
Status: COMPLETED | OUTPATIENT
Start: 2023-11-28 | End: 2023-11-28

## 2023-11-28 RX ADMIN — HEPARIN SODIUM 3700 UNITS: 1000 INJECTION, SOLUTION INTRAVENOUS; SUBCUTANEOUS at 12:35

## 2023-11-28 RX ADMIN — ATORVASTATIN CALCIUM 40 MG: 40 TABLET, FILM COATED ORAL at 21:46

## 2023-11-28 RX ADMIN — METRONIDAZOLE 500 MG: 500 INJECTION, SOLUTION INTRAVENOUS at 17:21

## 2023-11-28 RX ADMIN — CEFEPIME 1 G: 1 INJECTION, POWDER, FOR SOLUTION INTRAMUSCULAR; INTRAVENOUS at 05:19

## 2023-11-28 RX ADMIN — ONDANSETRON 4 MG: 4 TABLET, ORALLY DISINTEGRATING ORAL at 18:49

## 2023-11-28 RX ADMIN — OMEPRAZOLE 20 MG: 20 CAPSULE, DELAYED RELEASE ORAL at 05:07

## 2023-11-28 RX ADMIN — WARFARIN SODIUM 1.25 MG: 2.5 TABLET ORAL at 09:52

## 2023-11-28 RX ADMIN — ONDANSETRON 4 MG: 4 TABLET, ORALLY DISINTEGRATING ORAL at 09:55

## 2023-11-28 RX ADMIN — ONDANSETRON 4 MG: 2 INJECTION INTRAMUSCULAR; INTRAVENOUS at 05:07

## 2023-11-28 RX ADMIN — METRONIDAZOLE 500 MG: 500 INJECTION, SOLUTION INTRAVENOUS at 06:01

## 2023-11-28 RX ADMIN — ASPIRIN 81 MG: 81 TABLET, COATED ORAL at 05:06

## 2023-11-28 RX ADMIN — TAMSULOSIN HYDROCHLORIDE 0.4 MG: 0.4 CAPSULE ORAL at 05:07

## 2023-11-28 RX ADMIN — TRAMADOL HYDROCHLORIDE 50 MG: 50 TABLET ORAL at 05:06

## 2023-11-28 RX ADMIN — WARFARIN SODIUM 1.25 MG: 2.5 TABLET ORAL at 17:21

## 2023-11-28 ASSESSMENT — LIFESTYLE VARIABLES
HOW MANY TIMES IN THE PAST YEAR HAVE YOU HAD 5 OR MORE DRINKS IN A DAY: 0
TOTAL SCORE: 0
EVER HAD A DRINK FIRST THING IN THE MORNING TO STEADY YOUR NERVES TO GET RID OF A HANGOVER: NO
HAVE YOU EVER FELT YOU SHOULD CUT DOWN ON YOUR DRINKING: NO
ALCOHOL_USE: NO
DOES PATIENT WANT TO STOP DRINKING: NO
EVER FELT BAD OR GUILTY ABOUT YOUR DRINKING: NO
HAVE PEOPLE ANNOYED YOU BY CRITICIZING YOUR DRINKING: NO
CONSUMPTION TOTAL: NEGATIVE
ON A TYPICAL DAY WHEN YOU DRINK ALCOHOL HOW MANY DRINKS DO YOU HAVE: 0
AVERAGE NUMBER OF DAYS PER WEEK YOU HAVE A DRINK CONTAINING ALCOHOL: 0

## 2023-11-28 ASSESSMENT — ENCOUNTER SYMPTOMS
VOMITING: 1
NAUSEA: 1
ABDOMINAL PAIN: 1

## 2023-11-28 ASSESSMENT — PATIENT HEALTH QUESTIONNAIRE - PHQ9
2. FEELING DOWN, DEPRESSED, IRRITABLE, OR HOPELESS: NOT AT ALL
SUM OF ALL RESPONSES TO PHQ9 QUESTIONS 1 AND 2: 0
1. LITTLE INTEREST OR PLEASURE IN DOING THINGS: NOT AT ALL

## 2023-11-28 ASSESSMENT — PAIN DESCRIPTION - PAIN TYPE
TYPE: ACUTE PAIN

## 2023-11-28 NOTE — DISCHARGE PLANNING
Case Management Discharge Planning    Admission Date: 11/27/2023  GMLOS: 3.9  ALOS: 1    6-Clicks ADL Score: 24  6-Clicks Mobility Score: 23      Anticipated Discharge Dispo: Discharge Disposition: D/T to home under [facility] care in anticipation of covered skilled care (06)  Discharge Address: [address] [place] [address] [address]  Discharge Contact Phone Number: [phone]    DME Needed: Yes    DME Ordered: No    Action(s) Taken: DC Assessment Complete (See below)    BRITTNEY Encarnacion notified LSW of obtaining choice for 02 and HH to resume care with Sonam. BRITTNEY Encarnacion reported she faxed choice forms to [facility].     Escalations Completed: None    Medically Clear: No    Next Steps: LSW to follow    Barriers to Discharge: Medical clearance    Is the patient up for discharge tomorrow: No          Care Transition Team Assessment    LSW met with pt at bedside to complete discharge planning assessment. LSW confirmed pt's demographics.     Pt reported he is currently living in a ground floor apartment with his girlfriend in [place]. Pt reported he has his mom, Laurie, who lives in [place], brother, Alonzo, in the , and brother Daquan in [place], as support. Pt requested his mother, Laurie, to be added as his secondary emergency contact ([phone]).   Pt reported he recently had surgery at [facility] on 11/3/23.   Pt stated he is independent with all ADL's/IADL's. Pt stated he does drive, but stated his girlfriend will be taking him home.   Pt reported he is currently working at [employer], and reported his income is 8K/month.   Pt reported preferred pharmacy is [facility] in [place].   Pt reported PCP is Dr. Bloomberg in [place].   Pt reported no DME at baseline. Pt stated he is on 02 while inpatient, but does not have 02 at home.     Information Source  Orientation Level: Oriented X4  Information Given By: Patient  Who is responsible for making decisions for patient? : Patient    Readmission Evaluation  Is this a readmission?: Yes - unplanned  readmission  Why do you think you were readmitted?: Pt readmitted with abdominal pain, nausea, and vomiting    Elopement Risk  Legal Hold: No  Ambulatory or Self Mobile in Wheelchair: Yes  Disoriented: No  Psychiatric Symptoms: None  History of Wandering: No  Elopement this Admit: No  Vocalizing Wanting to Leave: No  Displays Behaviors, Body Language Wanting to Leave: No-Not at Risk for Elopement  Elopement Risk: Not at Risk for Elopement    Interdisciplinary Discharge Planning  Lives with - Patient's Self Care Capacity: Significant Other  Patient or legal guardian wants to designate a caregiver: No  Support Systems: Friends / Neighbors, Spouse / Significant Other  Housing / Facility: 1 Story Apartment / Condo  Durable Medical Equipment: Other - Specify (PD Dialyzer)    Discharge Preparedness  What is your plan after discharge?: Home with help, Home health care  What are your discharge supports?: Partner  Prior Functional Level: Ambulatory, Drives Self, Independent with Activities of Daily Living  Difficulity with ADLs: None  Difficulity with IADLs: None    Functional Assesment  Prior Functional Level: Ambulatory, Drives Self, Independent with Activities of Daily Living    Finances  Financial Barriers to Discharge: No  Prescription Coverage: Yes    Advance Directive  Advance Directive?: None    Domestic Abuse  Have you ever been the victim of abuse or violence?: No  Physical Abuse or Sexual Abuse: No  Verbal Abuse or Emotional Abuse: No  Possible Abuse/Neglect Reported to:: Not Applicable    Psychological Assessment  History of Substance Abuse: None  History of Psychiatric Problems: No  Non-compliant with Treatment: No  Newly Diagnosed Illness: No    Discharge Risks or Barriers  Discharge risks or barriers?: Complex medical needs  Patient risk factors: Complex medical needs, Readmission    Anticipated Discharge Information  Discharge Disposition: D/T to home under [facility] care in anticipation of covered skilled care  (06)  Discharge Address: [address] [place] [address] [address]  Discharge Contact Phone Number: [phone]

## 2023-11-28 NOTE — PROGRESS NOTES
Inpatient Anticoagulation Service Note for 11/28/2023    Reason for Anticoagulation: On-X Aortic/Mitral Valve Replacement (11/03/2023)     Hemoglobin Value: (!) 10.9  Hematocrit Value: (!) 33.4  Lab Platelet Value: (!) 570  Target INR: 2.0 to 3.0    INR from last 7 days       Date/Time INR Value    11/27/23 1545 1.92          Dose from last 7 days       Date/Time Dose (mg)    11/28/23 0932 --          Average Dose (mg):  (1.25mg (AM) + 1.25mg (PM))    Significant Interactions: Antibiotics    Bridge Therapy: No     Reversal Agent Administered: Not Applicable    Comments:   Continue PTA Warfarin for On-X Aortic Valve (11/03/2023). INR goal currently 2-3, plan to switch to INR goal fo 1.5 - 2.0 on 02/03/2024. Patient follows with [facility] and current home regimen is 1.25mg daily. Previously patient was on 1.25mg Tues/Thursday and 2.5mg all other days (15mg weekly total). On this regimen, patient's INR was supratherapeutic at 5.30, so warfarin regimen was decreased to 1.25mg daily (8.75mg weekly total).     Patient has a cardiac diet ordered (no intake charted as of yet). Patient currently on antibiotics, which can increase INR.     Plan:    INR on admission was sub-therapeutic at 1.92. Patiet's last dose of warfarin was 11/26, patient did not recieve a dose of warfarin last night. Will dose patient with a dose of 1.25mg this AM to account for missed PM dose. Will proceed with another 1.25mg tonight. Due to drug-drug interactions, anticipate that INR will start to rise, so will keep home dosing at this time. If INR level decreases or remains subtherapeutic, recommend bridging with a parenteral anticoagulant as appropriate.     Education Material Provided?: No (warfarin PTA)    Pharmacist suggested discharge dosing: Anticipate patient can be discharged on 1.25 - 2.5mg once daily pending INR trend. Recommend INR 24-48 hours after discharge.      Johanne Culver, PharmD

## 2023-11-28 NOTE — H&P
Hospital Medicine History & Physical Note    Date of Service  11/28/2023    Primary Care Physician  Drew T. Blumberg, D.O.    Consultants  none    Code Status  Full Code    Chief Complaint  Chief Complaint   Patient presents with    Sent by MD     Patient reports open heart surgery 11/3. Patient reports spoke to surgeon today and told to come to ER. Patient reports also a dialysis patient and did his dialysis last night at home.      Weakness     Patient reports increased weakness over the last three weeks.     Shortness of Breath     X 3 weeks.        History of Presenting Illness  Gurdeep Guerra is a 58 y.o. male past medical history of paroxysmal A-fib, ESRD on peritoneal dialysis, recent moderate to severe aortic stenosis and regurgitation, ascending aortic aneurysm with recent aortic valve replacement and ascending aortic aneurysm repair who presented 11/27/2023 with weakness and shortness of breath over the last 3 weeks.  Patient reports doing peritoneal dialysis last done on 11/26/2023.  Denies any fevers or chills.  Reports having diffuse abdominal pain.  In the ER, CT of the abdomen pelvis showed typhlitis started on broad-spectrum IV antibiotics.  Hospital admission requested for continued IV antibiotics.    I discussed the plan of care with patient and ERP .    Review of Systems  Review of Systems   Gastrointestinal:  Positive for abdominal pain, nausea and vomiting.       Past Medical History   has a past medical history of Anesthesia, Aortic stenosis, Chronic gout of multiple sites, Dialysis patient ([facility]), Dyspnea on exertion (05/23/2023), Elevated troponin (05/23/2023), Heart burn, Heart murmur, High cholesterol, Hypertension (2009), NSTEMI (non-ST elevated myocardial infarction) ([facility]) (05/23/2023), Pain in the chest (05/23/2023), Paroxysmal A-fib ([facility]) (11/11/2023), PONV (postoperative nausea and vomiting), Renal disorder (2007), Sleep apnea (2000), and Snoring (1990).    Surgical History   has a  past surgical history that includes other (2007); umbilical hernia repair (2012); shoulder arthroscopy; hip arthroscopy (Bilateral, 2002); tonsillectomy (N/A, 1987); hand surgery (Right, 1985); cath placement capd (N/A, 5/15/2023); pr inj lumbar/sacral,w/ imaging (Left, 8/24/2023); cath placement capd (Right, 9/6/2023); aortic valve replacement (11/3/2023); aortic ascending dissection (11/3/2023); echocardiogram, transesophageal, intraoperative (11/3/2023); restorate hemostas (11/3/2023); and sternotomy (11/3/2023).     Family History  family history includes Hyperlipidemia in his mother; Hypertension in his father.   Family history reviewed with patient. There is no family history that is pertinent to the chief complaint.     Social History   reports that he has never smoked. He has never used smokeless tobacco. He reports that he does not currently use drugs. He reports that he does not drink alcohol.    Allergies  Allergies   Allergen Reactions    Allopurinol Itching    Hydralazine Hcl Unspecified     Pt states he had a reaction similar to lupus       Medications  Prior to Admission Medications   Prescriptions Last Dose Informant Patient Reported? Taking?   aspirin 81 MG EC tablet 11/27/2023 at 0800 Patient Yes No   Sig: Take 81 mg by mouth every day.   atorvastatin (LIPITOR) 40 MG Tab 11/26/2023 at 1930 Patient No No   Sig: Take 1 Tablet by mouth every evening.   omeprazole (PRILOSEC) 20 MG delayed-release capsule 11/27/2023 at 0800 Patient No No   Sig: Take 1 Capsule by mouth 2 times a day.   tamsulosin (FLOMAX) 0.4 MG capsule 11/23/2023 at 0900 Patient Yes No   Sig: Take 0.4 mg by mouth every day.   traMADol (ULTRAM) 50 MG Tab 11/26/2023 at 2200 Patient No No   Sig: Take 1 Tablet by mouth every 6 hours as needed for Moderate Pain or Severe Pain for up to 14 days.   verapamil ER (CALAN SR) 240 MG Tab CR 11/26/2023 at 0900  Yes Yes   Sig: Take 120 mg by mouth every day. .5 tab = 120 mg   warfarin (COUMADIN) 2.5  MG Tab 11/23/2023 at 1800 Patient No No   Sig: Take 1 Tablet by mouth every day at 6 PM.   warfarin (COUMADIN) 2.5 MG Tab 11/26/2023 at 1800 Patient Yes Yes   Sig: Take 1.25 mg by mouth Once. .5 tab = 1.25 mg      Facility-Administered Medications: None       Physical Exam  Temp:  [36.5 °C (97.7 °F)-37.2 °C (99 °F)] 36.5 °C (97.7 °F)  Pulse:  [] 117  Resp:  [] 20  BP: (102-135)/(55-87) 135/87  SpO2:  [88 %-96 %] 93 %  Blood Pressure: 135/87   Temperature: 36.5 °C (97.7 °F)   Pulse: (!) 117   Respiration: 20   Pulse Oximetry: 93 %       Physical Exam  Vitals and nursing note reviewed.   Constitutional:       Appearance: Normal appearance.   HENT:      Head: Normocephalic and atraumatic.      Right Ear: Tympanic membrane normal.      Left Ear: Tympanic membrane normal.      Nose: Nose normal.      Mouth/Throat:      Mouth: Mucous membranes are moist.      Pharynx: Oropharynx is clear.   Eyes:      Extraocular Movements: Extraocular movements intact.      Pupils: Pupils are equal, round, and reactive to light.   Cardiovascular:      Rate and Rhythm: Normal rate and regular rhythm.      Pulses: Normal pulses.      Heart sounds: Normal heart sounds.      Comments: Healing CABG scar  Right HD catheter   Pulmonary:      Effort: Pulmonary effort is normal.      Breath sounds: Normal breath sounds.   Abdominal:      General: Bowel sounds are normal. There is no distension.      Palpations: Abdomen is soft. There is no mass.      Tenderness: There is abdominal tenderness.   Musculoskeletal:         General: Normal range of motion.      Cervical back: Neck supple.      Right lower leg: Edema present.      Left lower leg: Edema present.   Skin:     General: Skin is warm.      Capillary Refill: Capillary refill takes less than 2 seconds.   Neurological:      General: No focal deficit present.      Mental Status: He is alert and oriented to person, place, and time. Mental status is at baseline.   Psychiatric:          "Mood and Affect: Mood normal.         Behavior: Behavior normal.         Laboratory:  Recent Labs     11/27/23  1545   WBC 11.9*   RBC 3.58*   HEMOGLOBIN 10.9*   HEMATOCRIT 33.4*   MCV 93.3   MCH 30.4   MCHC 32.6   RDW 51.5*   PLATELETCT 570*   MPV 8.6*     Recent Labs     11/27/23  1545   SODIUM 136   POTASSIUM 4.2   CHLORIDE 92*   CO2 23   GLUCOSE 103*   BUN 91*   CREATININE 14.77*   CALCIUM 8.9     Recent Labs     11/27/23  1545   ALTSGPT 46   ASTSGOT 26   ALKPHOSPHAT 75   TBILIRUBIN 0.3   LIPASE 22   GLUCOSE 103*     Recent Labs     11/27/23  1545   INR 1.92*     No results for input(s): \"NTPROBNP\" in the last 72 hours.      Recent Labs     11/27/23  1545 11/27/23  1738 11/27/23  2102   TROPONINT 622* 606* 566*       Imaging:  CT-ABDOMEN-PELVIS W/O   Final Result      1.  Fat stranding adjacent to the cecum, concerning for colitis/typhlitis.   2.  Colonic diverticulosis.   3.  Nonobstructing, tiny left renal stone.   4.  Likely partially loculated moderate right pleural effusion.   5.  Small left pleural effusion.   6.  Adjacent right middle lobe and bilateral lower lobe consolidations, likely atelectasis.   7.  Small pericardial effusion.      DX-CHEST-PORTABLE (1 VIEW)   Final Result      1.  Resolved or improved small left pleural effusion with persistent left basilar atelectasis and/or scarring.   2.  New small right pleural effusion with associated atelectasis and/or consolidation.          X-Ray:  I have personally reviewed the images and compared with prior images.  EKG:  I have personally reviewed the images and compared with prior images.    Assessment/Plan:  Justification for Admission Status  I anticipate this patient will require at least two midnights for appropriate medical management, necessitating inpatient admission because Typhilitis requiring IV antibiotics.    Patient will need a Med/Surg bed on MEDICAL service .  The need is secondary to see above.    * Typhlitis- (present on " admission)  Assessment & Plan  Bowel rest     IV Cefepime and flagyl   F/u cultures      Paroxysmal A-fib (HCC)- (present on admission)  Assessment & Plan  C/w warfarin    ESRD (end stage renal disease) (HCC)- (present on admission)  Assessment & Plan  Nephrology consult in a.m. for peritoneal dialysis    Dyslipidemia- (present on admission)  Assessment & Plan  Continue atorvastatin    Coronary artery disease due to lipid rich plaque- (present on admission)  Assessment & Plan  Continue with aspirin/warfarin/statin    Elevated troponin- (present on admission)  Assessment & Plan  Chronically elevated suspect secondary to demand and renal dysfunction  Nephrology consult in a.m. for peritoneal dialysis    Hypertension- (present on admission)  Assessment & Plan  Continue with verpamil        VTE prophylaxis: SCDs/TEDs and therapeutic anticoagulation with warfarin

## 2023-11-28 NOTE — PROGRESS NOTES
Assessment completed. Pt a&o 4. Resting comfortably in bed with call light, bedside table in reach. No c/o at this time. Side rails up 2. Instructed to use call light when needing to get OOB verbalized understanding. Bed alarm on, bed in low position. Will continue to monitor.

## 2023-11-28 NOTE — ED NOTES
Pt medicated per MAR. Pt reports being able to eat most of cold dinner that was provided which is an improvement. Pt port draws back and flushes easily.

## 2023-11-28 NOTE — ED NOTES
Bedside report received from off going RN: Lew, assumed care of patient.  POC discussed with patient. Call light within reach, all needs addressed at this time.       Fall risk interventions in place: Patient's personal possessions are with in their safe reach, Place socks on patient, and Keep floor surfaces clean and dry (all applicable per [place] Fall risk assessment)   Continuous monitoring: Cardiac Leads, Pulse Ox, or Blood Pressure  IVF/IV medications: Not Applicable   Oxygen: How many liters 1L and Traced the line to wall oxygen  Bedside sitter: Not Applicable   Isolation: Not Applicable

## 2023-11-28 NOTE — TELEPHONE ENCOUNTER
Call outcome:  spoke with Haevn from     Message: Called to inform that INR can be drawn tomorrow but that patient is currently in ED at this time. All questions answered at this time. Advised to call back with any further questions or concerns.

## 2023-11-28 NOTE — CARE PLAN
The patient is Watcher - Medium risk of patient condition declining or worsening    Shift Goals  Clinical Goals: cardiac monitoring, ABX  Patient Goals: rest  Family Goals: n/a    Progress made toward(s) clinical / shift goals:  Pt medicated per MAR, resting. Cardiac telemonitoring in place. Monitoring labs. Updated pt on POC.    Patient is not progressing towards the following goals:

## 2023-11-28 NOTE — ED PROVIDER NOTES
CHIEF COMPLAINT  Chief Complaint   Patient presents with    Sent by MD     Patient reports open heart surgery 11/3. Patient reports spoke to surgeon today and told to come to ER. Patient reports also a dialysis patient and did his dialysis last night at home.      Weakness     Patient reports increased weakness over the last three weeks.     Shortness of Breath     X 3 weeks.        LIMITATION TO HISTORY   Select: None    HPI    Gurdeep Guerra is a 58 y.o. male who presents to the Emergency Department for evaluation of diffuse abdominal pain nausea vomiting and generalized weakness over the past 3 weeks.  Patient was recently admitted on 11 3 for an AVR, stated he was doing well until being discharged a few days later he developed diffuse abdominal pain, vomiting, with increasing generalized weakness.  Patient does have a history of atrial fibrillation, is currently on warfarin, he denies any recent fevers, does admit to diarrhea, no hematemesis black tarry stools bloody stools.  Patient is a hemodialysis patient, and undergoes PD nightly has not missed any of his nightly dialysis sessions, states that the fluid has been draining clear.    OUTSIDE HISTORIAN(S):  Select: Wife states that the patient has not missed any of his peritoneal dialysis.    EXTERNAL RECORDS REVIEWED  Select: Other reviewed his discharge summary patient was admitted for an aortic valve replacement.  Does have a history of paroxysmal atrial fibrillation and a flutter      PAST MEDICAL HISTORY  Past Medical History:   Diagnosis Date    Anesthesia     Hiccups for over 24 hours after a previous sugery.    Aortic stenosis     Chronic gout of multiple sites     Dialysis patient ([facility])     peritoneal daily    Dyspnea on exertion 05/23/2023    Elevated troponin 05/23/2023    Heart burn     1990    Heart murmur     High cholesterol     All always    Hypertension 2009    NSTEMI (non-ST elevated myocardial infarction) ([facility]) 05/23/2023    no stents  placed    Pain in the chest 05/23/2023    Paroxysmal A-fib (HCC) 11/11/2023    PONV (postoperative nausea and vomiting)     Renal disorder 2007    Stage IV    Sleep apnea 2000    Snoring 1990     .    SURGICAL HISTORY  Past Surgical History:   Procedure Laterality Date    AORTIC VALVE REPLACEMENT  11/3/2023    Procedure: AORTIC VALVE REPLACEMENT, ASCENDING AORTIC ANEURYSM REPAIR, TRANSESOPHAGEAL ECHOCARDIOGRAM;  Surgeon: Osmel Blanca M.D.;  Location: SURGERY [facility];  Service: Cardiothoracic    AORTIC ASCENDING DISSECTION  11/3/2023    Procedure: REPAIR, ANEURYSM OR DISSECTION, AORTA, ASCENDING;  Surgeon: Osmel Blanca M.D.;  Location: SURGERY [facility];  Service: Cardiothoracic    ECHOCARDIOGRAM, TRANSESOPHAGEAL, INTRAOPERATIVE  11/3/2023    Procedure: ECHOCARDIOGRAM, TRANSESOPHAGEAL, INTRAOPERATIVE;  Surgeon: Osmel Blanca M.D.;  Location: SURGERY [facility];  Service: Cardiothoracic    RESTORATE HEMOSTAS  11/3/2023    Procedure: RESTORATION OF HEMOSTATIS;  Surgeon: Osmel Blanca M.D.;  Location: SURGERY [facility];  Service: Cardiothoracic    STERNOTOMY  11/3/2023    Procedure: MEDIASTINAL EXPLORATION;  Surgeon: Osmel Blanca M.D.;  Location: SURGERY [facility];  Service: Cardiothoracic    CATH PLACEMENT CAPD Right 9/6/2023    Procedure: LAPAROSCOPIC INSERTION OF PERITONEAL DIALYSIS CATHETER;  Surgeon: Dontrell Sales M.D.;  Location: SURGERY [facility];  Service: Vascular    MT INJ LUMBAR/SACRAL,W/ IMAGING Left 8/24/2023    Procedure: LUMBAR EPIDURAL STEROID INJECTION, LEFT L5-S1 INTERLAMINAR UNDER FLUOROSCOPY;  Surgeon: Benito Herrera D.O.;  Location: SURGERY [facility];  Service: Orthopedics    CATH PLACEMENT CAPD N/A 5/15/2023    Procedure: LAPAROSCOPIC PLACEMENT OF PERITONEAL DIALYSIS CATHERTER;  Surgeon: Shikha Alexander M.D.;  Location: SURGERY [facility];  Service: General    UMBILICAL HERNIA REPAIR  2012    4 separate surgeries between [id number]    OTHER  2007    Kidney  biopsy, can't recall laterality,    HIP ARTHROSCOPY Bilateral 2002    TONSILLECTOMY N/A 1987    HAND SURGERY Right 1985    Hand and wrist    SHOULDER ARTHROSCOPY      Can't recall date         FAMILY HISTORY  Family History   Problem Relation Age of Onset    Hyperlipidemia Mother     Hypertension Father           SOCIAL HISTORY  Social History     Socioeconomic History    Marital status:      Spouse name: Not on file    Number of children: Not on file    Years of education: Not on file    Highest education level: Master's degree (e.g., MA, MS, Kenn, MEd, MSW, ELIECER)   Occupational History    Not on file   Tobacco Use    Smoking status: Never    Smokeless tobacco: Never   Vaping Use    Vaping Use: Never used   Substance and Sexual Activity    Alcohol use: Never    Drug use: Not Currently    Sexual activity: Yes     Partners: Female   Other Topics Concern    Not on file   Social History Narrative    Not on file     Social Determinants of Health     Financial Resource Strain: Low Risk  (1/4/2023)    Overall Financial Resource Strain (CARDIA)     Difficulty of Paying Living Expenses: Not hard at all   Food Insecurity: No Food Insecurity (1/4/2023)    Hunger Vital Sign     Worried About Running Out of Food in the Last Year: Never true     Ran Out of Food in the Last Year: Never true   Transportation Needs: No Transportation Needs (1/4/2023)    PRAPARE - Transportation     Lack of Transportation (Medical): No     Lack of Transportation (Non-Medical): No   Physical Activity: Sufficiently Active (1/4/2023)    Exercise Vital Sign     Days of Exercise per Week: 3 days     Minutes of Exercise per Session: 60 min   Stress: Stress Concern Present (1/4/2023)    [place] [employer] - Occupational Stress Questionnaire     Feeling of Stress : To some extent   Social Connections: Moderately Isolated (1/4/2023)    Social Connection and Isolation Panel [NHANES]     Frequency of Communication with Friends and  Family: Once a week     Frequency of Social Gatherings with Friends and Family: More than three times a week     Attends Evangelical Services: Never     Active Member of Clubs or Organizations: No     Attends Club or Organization Meetings: Never     Marital Status: Living with partner   Intimate Partner Violence: Not on file   Housing Stability: Low Risk  (1/4/2023)    Housing Stability Vital Sign     Unable to Pay for Housing in the Last Year: No     Number of Places Lived in the Last Year: 1     Unstable Housing in the Last Year: No         CURRENT MEDICATIONS  No current facility-administered medications on file prior to encounter.     Current Outpatient Medications on File Prior to Encounter   Medication Sig Dispense Refill    acetaminophen (TYLENOL) 500 MG Tab Take 2 Tablets by mouth every 6 hours as needed for Mild Pain or Moderate Pain. 30 Tablet 0    amiodarone (CORDARONE) 200 MG Tab Take 2 Tablets by mouth 2 times a day for 7 days, THEN 2 Tablets every day for 7 days, THEN 1 Tablet every day for 14 days. 56 Tablet 0    carvedilol (COREG) 3.125 MG Tab Take 1 Tablet by mouth 2 times a day with meals. 60 Tablet 2    omeprazole (PRILOSEC) 20 MG delayed-release capsule Take 1 Capsule by mouth 2 times a day. 30 Capsule 2    traMADol (ULTRAM) 50 MG Tab Take 1 Tablet by mouth every 6 hours as needed for Moderate Pain or Severe Pain for up to 14 days. 56 Tablet 0    warfarin (COUMADIN) 2.5 MG Tab Take 1 Tablet by mouth every day at 6 PM. 30 Tablet 3    Ferrous Sulfate (IRON PO) Take  by mouth.      CALCIUM PO Take  by mouth.      tamsulosin (FLOMAX) 0.4 MG capsule Take 0.4 mg by mouth every day.      betamethasone dipropionate 0.05 % Cream APPLY CREAM TOPICALLY TWICE DAILY TO AFFECTED AREA      [facility] (ULTRABAG/DIANEAL PD-2/2.5% DEX) 396 MOSM/L Solution Inject  into the abdomen/abdominal cavity.      hydrocortisone 2.5 % Ointment Apply  topically as needed.      atorvastatin (LIPITOR) 40 MG Tab  "Take 1 Tablet by mouth every evening. 100 Tablet 3    nitroglycerin (NITROSTAT) 0.4 MG SL Tab Place 1 Tablet under the tongue as needed for Chest Pain. 25 Tablet 0    aspirin 81 MG EC tablet Take 81 mg by mouth every day.      esomeprazole (NEXIUM) 40 MG delayed-release capsule Take 40 mg by mouth every evening.             ALLERGIES  Allergies   Allergen Reactions    Allopurinol Itching    Hydralazine Hcl Unspecified     Pt states he had a reaction similar to lupus       PHYSICAL EXAM  VITAL SIGNS:/77   Pulse 76   Temp 37 °C (98.6 °F) (Temporal)   Resp 18   Ht 1.702 m (5' 7\")   Wt 77 kg (169 lb 12.1 oz)   SpO2 90%   BMI 26.59 kg/m²       VITALS - vital signs documented prior to this note have been reviewed and noted,  GENERAL - awake, alert, oriented, GCS 15, no apparent distress, non-toxic  appearing  HEENT - normocephalic, atraumatic, pupils equal, sclera anicteric, mucus  membranes moist  NECK - supple, no meningismus, full active range of motion, trachea midline  CARDIOVASCULAR - regular rate/rhythm, no murmurs/gallops/rubs  PULMONARY - no respiratory distress, speaking in full sentences, clear to  auscultation bilaterally, no wheezing/ronchi/rales, no accessory muscle use  GASTROINTESTINAL -diffusely tender peritoneal dialysis catheter in the right mid abdomen  GENITOURINARY - Deferred  NEUROLOGIC - Awake alert, normal mental status, speech fluid, cognition  normal, moves all extremities  MUSCULOSKELETAL - no obvious asymmetry or deformities present  EXTREMITIES - warm, well-perfused, no cyanosis or significant edema  DERMATOLOGIC - warm, dry, no rashes, no jaundice  PSYCHIATRIC - normal affect, normal insight, normal concentration    DIAGNOSTIC STUDIES / PROCEDURES  EKG  I have independently interpreted this EKG  Interpreted below by myself as EKG shows an atrial flutter at a rate of 91 with a normal axis normal QRS QTc intervals, inverted T waves in V4 through V6, LVH criteria, interpretation is " atrial flutter with LVH no STEMI      LABS  Labs Reviewed   CBC WITH DIFFERENTIAL    Narrative:     Biotin intake of greater than 5 mg per day may interfere with                  troponin levels, causing false low values.   COMP METABOLIC PANEL    Narrative:     Biotin intake of greater than 5 mg per day may interfere with                  troponin levels, causing false low values.   TROPONIN    Narrative:     Biotin intake of greater than 5 mg per day may interfere with                  troponin levels, causing false low values.   TROPONIN   LIPASE   LACTIC ACID   PROTHROMBIN TIME       Labs remarkable for an elevated troponin, heart score 6 though no chest pain doubt Mace patient does have a chronically elevated troponin, I believe his elevated troponin today is likely secondary to his recent cardiac surgery, and end-stage renal disease doubt ACS  RADIOLOGY  I have independently interpreted the diagnostic imaging associated with this visit and am waiting the final reading from the radiologist.   My preliminary interpretation is as follows: CT shows colitis      Radiologist interpretation:   DX-CHEST-PORTABLE (1 VIEW)    (Results Pending)   CT-ABDOMEN-PELVIS W/O    (Results Pending)        COURSE & MEDICAL DECISION MAKING    ED COURSE:    ED Observation Status? no    INTERVENTIONS BY ME:  Medications   ondansetron (Zofran) syringe/vial injection 4 mg (has no administration in time range)       INITIAL ASSESSMENT, COURSE AND PLAN  Care Narrative: Patient presented for evaluation of nausea vomiting and diffuse abdominal pain that is worse on the left lower quadrant.  He did have a recent open heart surgery, though denies any chest pain, increasing shortness of breath but does have a persistent cough.  X-ray was obtained she did show worsening of right-sided pleural effusion, his troponin is elevated at 600, though his baseline troponin appears 400 he has no complaints of chest pain today, his EKG does not demonstrate  any acute ST elevation or depression lowering concern for ACS at this time relieves his troponin is likely secondary his recent cardiac surgery, as well as his history of end-stage renal disease.  CMP was remarkable for a elevated BUN/creatinine which is consistent with the patient's history of end-stage renal disease, no significant hyperkalemia or acidosis, do not believe requires emergent dialysis this evening.  No only SIRS criteria as is heart rate thus a septic workup was not initiated.  Lactic acid is normal lowering concern for acute bowel ischemia.  There was a slight leukocytosis at 11.9,  CT abdomen pelvis was obtained and did show a typhlitis and colitis which may explain his abdominal pain.  Given his nausea vomiting diarrhea will admit for bowel rest IV fluids, as well as antibiotics.  n  Spoke with Dr. Malhotra who graciously agreed to a subsequent service he will be admitted          ADDITIONAL PROBLEM LIST    DISPOSITION AND DISCUSSIONS  I have discussed management of the patient with the following physicians and JADEN's: Dr. Malhotra        Decision tools and prescription drugs considered including, but not limited to: HEART Score 6 .    FINAL DIAGNOSIS  #1 typhlitis  2.  Nausea vomiting  3 diarrhea  4.  NSTEMI  5 end-stage renal disease  6.  Pleural effusion    Electronically signed by: Moise Duarte DO ,4:12 PM 11/27/23

## 2023-11-28 NOTE — PROGRESS NOTES
CAPD was ordered by Dr Mac for laboratory purpose. Had to wait for an order and warmed up pd solution prior to start. Drain initial volume of 100 ml then instilled 1000 ml of 1.5% pd solution to leave in for 2 hrs at 1230. Effluent has blood tinged. Per pt he has that when he's coughing hard at home.  Dressing was changed in pt's pd catheter and chronic infection was noted at access site. Per pt he is using gentamicin at home for his pd access site. Got an order from Dr Mac for gentamicin that needs to be applied everyday when dressing pd catheter changed. Per primary CODI Melendez, primary RN in ER dept used pt's CVC to give antibiotic. Notified Dr Mac and wasn't aware that CVC was use. Ordered heparin to instill on pt's CVC due to it wasn't lock with heparin. Labs was also drawn from venous port prior to instilling heparin. Dressing changed also on his  right CVC due to dressing is coming off. Gave report to primary CODI Melendez.

## 2023-11-28 NOTE — PROGRESS NOTES
HD RN came back at bedside at 1415 and prep pt for draining of pd effluent. Collected effluent total of 120 ml and will send to lab for cell count and culture as ordered by Dr Mac. Effluent is cloudy with presence of fibrin. No blood tinged noted and was  draining very slow. Was able to drain 800 ml and pt absorbed 80 ml of effluent. See flow sheet for details.gave report to omar Melendez RN.

## 2023-11-28 NOTE — PROGRESS NOTES
4 Eyes Skin Assessment Completed by OCDI Paece and CODI Blanchard.    Head WDL  Ears WDL  Nose WDL  Mouth WDL  Neck WDL  Breast/Chest old surgical chest scar, R port  Shoulder Blades WDL  Spine WDL  (R) Arm/Elbow/Hand Bruising  (L) Arm/Elbow/Hand Bruising  Abdomen PD catheter  Groin WDL  Scrotum/Coccyx/Buttocks Redness and Blanching  (R) Leg WDL  (L) Leg WDL  (R) Heel/Foot/Toe Redness and Blanching  (L) Heel/Foot/Toe Redness and Blanching          Devices In Places Blood Pressure Cuff, Pulse Ox, Central Line, and Nasal Cannula, PD catheter      Interventions In Place Gray Ear Foams and Pillows    Possible Skin Injury No    Pictures Uploaded Into Epic N/A  Wound Consult Placed N/A  RN Wound Prevention Protocol Ordered No

## 2023-11-28 NOTE — ED NOTES
Pt resting in bed, with even rise and fall of chest noted. No obvious signs of pain or distress, reposition self occasionally, bed in low position. Updated pt of POC.

## 2023-11-28 NOTE — PROGRESS NOTES
Admitted today with complaints of abdominal pain, nausea, vomiting and diarrhea.  CT scan showed evidence of possible colitis.  Patient started on empiric coverage with IV cefepime and Flagyl.  Recently underwent mechanical AVR, aortic aneurysm repair, discharged on 11/17/2023.  Informed cardiac surgery of patient's admission.  Patient also with known history of ESRD on PD, temporary dialysis catheter in place which was used on his previous admission.  Nephrology consulted.  Check blood cultures, check PD fluid culture, check stool for C. difficile and culture.  Resume Coumadin for anticoagulation.  Recent history of atrial flutter, on verapamil.  Was tried on amiodarone but apparently had side effects to the medication.

## 2023-11-28 NOTE — TELEPHONE ENCOUNTER
BE    Caller: [facility]     Topic/issue: [facility] calling back and states call dropped earlier while talking to Ruth. [facility] would like call back to confirm that it will be ok that moving forward they get the INR done for patient now every Tuesday.    Callback Number:  Main number for [facility]: [phone]    (See routing comments for [facility]'s cell number)    Thank you,  Tosin CASILLAS

## 2023-11-28 NOTE — ASSESSMENT & PLAN NOTE
Tachycardic on 12/16/2023 ordered IV labbetol as he has been refusing his oral verapamil.  Patient counseled about the necessity of taking his oral verapamil  Verapamil, patient's been refusing some doses.  Warfarin and heparin drip  Patient stable after ablation with sinus

## 2023-11-28 NOTE — CONSULTS
[facility] -  CONSULTATION NOTE               Author: Justice Mac M.D. Date & Time: 11/28/2023  9:28 AM       REASON FOR CONSULTATION:   Inpatient hemodialysis management.    CHIEF COMPLAINT:   weakness    HISTORY OF PRESENT ILLNESS:    58 y.o. 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 Abd imaging demonstrated colitis. C. Diff pending and started on abx. He notes abd pain and diarrhea for several weeks. Edema improving with 2.5% dextrose solution. Still has HD permacath in-place. Pain with peritoneal dialysis but no cloudy effluent of fibrin clots. No f/c/s. Normally does [id number] fills all green.     REVIEW OF SYSTEMS:    12 point ROS performed, negative other than stated above    PAST MEDICAL HISTORY:   Past Medical History:   Diagnosis Date    Anesthesia     Hiccups for over 24 hours after a previous sugery.    Aortic stenosis     Chronic gout of multiple sites     Dialysis patient (Formerly [facility])     peritoneal daily    Dyspnea on exertion 05/23/2023    Elevated troponin 05/23/2023    Heart burn     1990    Heart murmur     High cholesterol     All always    Hypertension 2009    NSTEMI (non-ST elevated myocardial infarction) (Formerly [facility]) 05/23/2023    no stents placed    Pain in the chest 05/23/2023    Paroxysmal A-fib (Formerly [facility]) 11/11/2023    PONV (postoperative nausea and vomiting)     Renal disorder 2007    Stage IV    Sleep apnea 2000    Snoring 1990         PAST SURGICAL HISTORY:   Past Surgical History:   Procedure Laterality Date    AORTIC VALVE REPLACEMENT  11/3/2023    Procedure: AORTIC VALVE REPLACEMENT, ASCENDING AORTIC ANEURYSM REPAIR, TRANSESOPHAGEAL ECHOCARDIOGRAM;  Surgeon: Osmel Blanca M.D.;  Location:  SURGERY [facility];  Service: Cardiothoracic    AORTIC ASCENDING DISSECTION  11/3/2023    Procedure: REPAIR, ANEURYSM OR DISSECTION, AORTA, ASCENDING;  Surgeon: Osmel Blanca M.D.;  Location: SURGERY [facility];  Service: Cardiothoracic    ECHOCARDIOGRAM, TRANSESOPHAGEAL, INTRAOPERATIVE  11/3/2023    Procedure: ECHOCARDIOGRAM, TRANSESOPHAGEAL, INTRAOPERATIVE;  Surgeon: Osmel Blanca M.D.;  Location: SURGERY [facility];  Service: Cardiothoracic    RESTORATE HEMOSTAS  11/3/2023    Procedure: RESTORATION OF HEMOSTATIS;  Surgeon: Osmel Blanca M.D.;  Location: SURGERY [facility];  Service: Cardiothoracic    STERNOTOMY  11/3/2023    Procedure: MEDIASTINAL EXPLORATION;  Surgeon: Osmel Blanca M.D.;  Location: SURGERY [facility];  Service: Cardiothoracic    CATH PLACEMENT CAPD Right 9/6/2023    Procedure: LAPAROSCOPIC INSERTION OF PERITONEAL DIALYSIS CATHETER;  Surgeon: Dontrell Sales M.D.;  Location: SURGERY [facility];  Service: Vascular    WA INJ LUMBAR/SACRAL,W/ IMAGING Left 8/24/2023    Procedure: LUMBAR EPIDURAL STEROID INJECTION, LEFT L5-S1 INTERLAMINAR UNDER FLUOROSCOPY;  Surgeon: Benito Herrera D.O.;  Location: SURGERY [facility];  Service: Orthopedics    CATH PLACEMENT CAPD N/A 5/15/2023    Procedure: LAPAROSCOPIC PLACEMENT OF PERITONEAL DIALYSIS CATHERTER;  Surgeon: Shikha Alexander M.D.;  Location: SURGERY SAME DAY [facility];  Service: General    UMBILICAL HERNIA REPAIR  2012    4 separate surgeries between [id number]    OTHER  2007    Kidney biopsy, can't recall laterality,    HIP ARTHROSCOPY Bilateral 2002    TONSILLECTOMY N/A 1987    HAND SURGERY Right 1985    Hand and wrist    SHOULDER ARTHROSCOPY      Can't recall date       FAMILY HISTORY:   No family history of kidney disease    SOCIAL HISTORY:   No smoking, no etoh, no illicit drugs.    HOME MEDICATIONS:   Reviewed and documented in chart    ALLERGIES:  Allopurinol and Hydralazine hcl    PHYSICAL EXAM:  VS:  /79   Pulse (!) 105  "Comment: Rn notified   Temp 37.1 °C (98.8 °F) (Temporal)   Resp 18   Ht 1.702 m (5' 7\")   Wt 77 kg (169 lb 12.1 oz)   SpO2 90%   BMI 26.59 kg/m²   GENERAL: no acute distress  CV: no edema  RESP: non-labored  GI: TTP  MSK: No joint deformities   SKIN: sternal incision healing well  NEURO: AOx3  PSYCH: Cooperative    LABS:  Recent Results (from the past 24 hour(s))   EKG    Collection Time: 23  3:27 PM   Result Value Ref Range    Report       [facility]    Test Date:  2023  Pt Name:    LORIE KAYE               Department: ER  MRN:        [id number]                      Room:  Gender:     Male                         Technician: [id number]  :        1965                   Requested By:ER TRIAGE PROTOCOL  Order #:    [id number]                    Reading MD:    Measurements  Intervals                                Axis  Rate:       92                           P:          0  MD:         0                            QRS:        -7  QRSD:       162                          T:          0  QT:         389  QTc:        482    Interpretive Statements  Atrial flutter  LVH with secondary repolarization abnormality  Inferior infarct, acute (RCA)  Probable RV involvement, suggest recording right precordial leads  Baseline wander in lead(s) V1  Compared to ECG 2023 14:53:41  Left ventricular hypertrophy now present  Early repolarization now present  Sinus tachycardia no longer present   ST (T wave) deviation no longer present  Myocardial infarct finding still present     CBC with Differential    Collection Time: 23  3:45 PM   Result Value Ref Range    WBC 11.9 (H) 4.8 - 10.8 K/uL    RBC 3.58 (L) 4.70 - 6.10 M/uL    Hemoglobin 10.9 (L) 14.0 - 18.0 g/dL    Hematocrit 33.4 (L) 42.0 - 52.0 %    MCV 93.3 81.4 - 97.8 fL    MCH 30.4 27.0 - 33.0 pg    MCHC 32.6 32.3 - 36.5 g/dL    RDW 51.5 (H) 35.9 - 50.0 fL    Platelet Count 570 (H) 164 - 446 K/uL    MPV 8.6 (L) 9.0 - " 12.9 fL    Neutrophils-Polys 87.00 (H) 44.00 - 72.00 %    Lymphocytes 5.10 (L) 22.00 - 41.00 %    Monocytes 5.00 0.00 - 13.40 %    Eosinophils 2.00 0.00 - 6.90 %    Basophils 0.30 0.00 - 1.80 %    Immature Granulocytes 0.60 0.00 - 0.90 %    Nucleated RBC 0.00 0.00 - 0.20 /100 WBC    Neutrophils (Absolute) 10.34 (H) 1.82 - 7.42 K/uL    Lymphs (Absolute) 0.61 (L) 1.00 - 4.80 K/uL    Monos (Absolute) 0.60 0.00 - 0.85 K/uL    Eos (Absolute) 0.24 0.00 - 0.51 K/uL    Baso (Absolute) 0.04 0.00 - 0.12 K/uL    Immature Granulocytes (abs) 0.07 0.00 - 0.11 K/uL    NRBC (Absolute) 0.00 K/uL   Complete Metabolic Panel (CMP)    Collection Time: 11/27/23  3:45 PM   Result Value Ref Range    Sodium 136 135 - 145 mmol/L    Potassium 4.2 3.6 - 5.5 mmol/L    Chloride 92 (L) 96 - 112 mmol/L    Co2 23 20 - 33 mmol/L    Anion Gap 21.0 (H) 7.0 - 16.0    Glucose 103 (H) 65 - 99 mg/dL    Bun 91 (H) 8 - 22 mg/dL    Creatinine 14.77 (HH) 0.50 - 1.40 mg/dL    Calcium 8.9 8.5 - 10.5 mg/dL    Correct Calcium 10.1 8.5 - 10.5 mg/dL    AST(SGOT) 26 12 - 45 U/L    ALT(SGPT) 46 2 - 50 U/L    Alkaline Phosphatase 75 30 - 99 U/L    Total Bilirubin 0.3 0.1 - 1.5 mg/dL    Albumin 2.5 (L) 3.2 - 4.9 g/dL    Total Protein 6.1 6.0 - 8.2 g/dL    Globulin 3.6 (H) 1.9 - 3.5 g/dL    A-G Ratio 0.7 g/dL   Troponins NOW    Collection Time: 11/27/23  3:45 PM   Result Value Ref Range    Troponin T 622 (H) 6 - 19 ng/L   PT/INR    Collection Time: 11/27/23  3:45 PM   Result Value Ref Range    PT 22.2 (H) 12.0 - 14.6 sec    INR 1.92 (H) 0.87 - 1.13   ESTIMATED GFR    Collection Time: 11/27/23  3:45 PM   Result Value Ref Range    GFR (CKD-EPI) 3 (A) >60 mL/min/1.73 m 2   LIPASE    Collection Time: 11/27/23  3:45 PM   Result Value Ref Range    Lipase 22 11 - 82 U/L   LACTIC ACID    Collection Time: 11/27/23  4:27 PM   Result Value Ref Range    Lactic Acid 0.7 0.5 - 2.0 mmol/L   Troponins in two (2) hours    Collection Time: 11/27/23  5:38 PM   Result Value Ref Range     Troponin T 606 (H) 6 - 19 ng/L   TROPONIN    Collection Time: 11/27/23  9:02 PM   Result Value Ref Range    Troponin T 566 (H) 6 - 19 ng/L   TROPONIN    Collection Time: 11/28/23  3:56 AM   Result Value Ref Range    Troponin T 595 (H) 6 - 19 ng/L       (click the triangle to expand results)    IMAGING:  CT-ABDOMEN-PELVIS W/O   Final Result      1.  Fat stranding adjacent to the cecum, concerning for colitis/typhlitis.   2.  Colonic diverticulosis.   3.  Nonobstructing, tiny left renal stone.   4.  Likely partially loculated moderate right pleural effusion.   5.  Small left pleural effusion.   6.  Adjacent right middle lobe and bilateral lower lobe consolidations, likely atelectasis.   7.  Small pericardial effusion.      DX-CHEST-PORTABLE (1 VIEW)   Final Result      1.  Resolved or improved small left pleural effusion with persistent left basilar atelectasis and/or scarring.   2.  New small right pleural effusion with associated atelectasis and/or consolidation.      EC-ECHOCARDIOGRAM COMPLETE W/O CONT    (Results Pending)       ASSESSMENT:  # ESRD on outpt PD   Etiology 2/2 FSGS  - s/p permcath on 11/10   # S/P AVR/ Ascending aneurysm repair  # CKD-MBD  # Anemia of CKD: at goal  # BL pleural effusions  # Pericardial effusion        PLAN:  -Sending PD fluid for cell count and culture to eval for PD cath assocaited peritonitis  -Transitioning to HD for now with next session tomorrow given pain with colitis  -Colitis work-up/abx per primary  -Renal diet  -Dose all medications as per ESRD    Discussed with hospitalist    Justice Mac MD

## 2023-11-28 NOTE — CARE PLAN
The patient is Stable - Low risk of patient condition declining or worsening    Shift Goals  Clinical Goals: IV ABX for Typhlitis, wean O2 as tolerated, ECHO, f/u on cultures  Patient Goals: rest  Family Goals: FARIDA    Progress made toward(s) clinical / shift goals:  Rest     Problem: Pain - Standard  Goal: Alleviation of pain or a reduction in pain to the patient’s comfort goal  Outcome: Progressing  Note: Pt declines pain at this time, RN will continue to assess. Goal is for pt to remain pain free at time of DC.      Problem: Knowledge Deficit - Standard  Goal: Patient and family/care givers will demonstrate understanding of plan of care, disease process/condition, diagnostic tests and medications  Outcome: Progressing  Note: Pt aware of POC: IV ABX for Typhlitis, wean O2 as tolerated, ECHO, f/u on cultures. Pt has no additional questions at this time. Goal is for pt to be involved in POC and to be updated in the moment.        Patient is not progressing towards the following goals:

## 2023-11-28 NOTE — PROGRESS NOTES
Received report, assumed pt care. Pt asleep without any signs of distress, VSS. Resting comfortably in bed with call light, bedside table in reach. Side rails up 2. Bed alarm on, bed in low position. Will continue to monitor.

## 2023-11-28 NOTE — DISCHARGE PLANNING
9021  Agency/Facility Name: [facility]  Sent Referral per Choice Form at: [phone]  DPA placed HH Choice in Pt media file.

## 2023-11-28 NOTE — ASSESSMENT & PLAN NOTE
Chronic kidney disease on dialysis   Received IV iron  Dialysis per nephrology, continue hemodialysis at this time and reevaluate the patient for PT as outpatient

## 2023-11-28 NOTE — ASSESSMENT & PLAN NOTE
Second recurrence   Treated with vancomycin during the first infection  Recheck stool for C. difficile on 12/16 and was positive  ID was consulted and planning for long tapering  Close monitoring and consider GI consult for stool transplant if needed  Improving, no leukocytosis, no fever and improving on his diarrhea

## 2023-11-28 NOTE — ED NOTES
Repeat troponin drawn and sent to lab.   Port flushed and saline locked. Pt A&Ox4, even and unlabored respirations. Updated that room upstairs is ready and waiting for transport to bring him up.

## 2023-11-28 NOTE — ED NOTES
Amrita from lab called with critical result of Troponin of 622 at 1636. Critical lab result read back to Amrita.   Dr. Lugo notified of critical lab result at 1636.  Critical lab result read back by Dr. Lugo.

## 2023-11-28 NOTE — PROGRESS NOTES
[facility] RN Rambo Hewitt contacted regarding patients hemodialysis catheter that was accessed in the ED. Per Bertha KINCAID, OK to leave dialysis catheter saline locked temporarily. Bertha KINCAID states he will reach out to his charge RN in the morning to get catheter heparin locked.

## 2023-11-28 NOTE — ED NOTES
Pt given food per inpatient physician permission. Pt sitting up in bed watching TV, denies any pain or nausea at this time.   Call light within reach.

## 2023-11-28 NOTE — DISCHARGE PLANNING
Case Management Discharge Planning    Admission Date: 11/27/2023  GMLOS: 3.9  ALOS: 1    6-Clicks ADL Score: 24  6-Clicks Mobility Score: 23    LSW attempted to meet with pt for discharge planning assessment. Pt reported being nauseous and was throwing up. LSW will follow and attempt again.

## 2023-11-28 NOTE — TELEPHONE ENCOUNTER
BE  Caller: [facility]     Topic/issue: [facility] called to notify patients cardiologist , they received the orders for patients  INR draws 11/27/23 but were unable to get a RN out to his location. They would like to know if the orders have been switched/signed for every Tuesday. They will have a nurse do the patients INR draws 11/28/23.    Callback Number: Disconnected     Thank you   Ruth REID

## 2023-11-28 NOTE — ASSESSMENT & PLAN NOTE
Aspirin, Lipitor  Troponin elevated but no chest pain, patient is end-stage renal disease, patient is on heparin for A-fib with RVR and holding his oral anticoagulation.  Troponin chronically elevated

## 2023-11-28 NOTE — DIETARY
"Nutrition services: Day 1 of admit.  Gurdeep Guerra is a 58 y.o. male with admitting DX of Typhlitis [K52.9]     Consult received for MST 2: unplanned wt loss x <1 week, poor PO intake PTA. RD attempted bedside visit x2 this afternoon; pt unavailable and w/ other staff during attempts.    Assessment:  Height: 170.2 cm (5' 7\")  Weight: 77 kg (169 lb 12.1 oz)  Body mass index is 26.59 kg/m²., BMI classification: Overweight  Diet/Intake: Cardiac; Renal; PO intake not yet documented    Evaluation:   PMHx includes ESRD on PD and tep dialysis catheter, open-heart surgery 11/3/23, chronic gout, HTN, NSTEMI, Afib. Pt admitted w/ shortness of breath/weakness x3 weeks PTA. Dx typhlitis.  Labs (11/27): glu 103, BUN 91, crea 14.77, GFR 3, alb 2.5  MAR: PRN zofran, coumadin  Skin: no documented wounds or edema  Wt hx highly variable, possibly r/t fluid status changes w/ ESRD on dialysis. Wt Readings from Last 17 Encounters:  11/27/23 77 kg (169 lb 12.1 oz)  11/17/23 86.7 kg (191 lb 2.2 oz)  09/27/23 85.3 kg (188 lb)  09/21/23 79.8 kg (176 lb)  09/06/23 81.8 kg (180 lb 5.4 oz)  09/05/23 81.6 kg (180 lb)  08/22/23 81.6 kg (180 lb)  08/02/23 83.9 kg (185 lb)  06/27/23 81.8 kg (180 lb 4.8 oz)  06/07/23 76.2 kg (168 lb)  05/26/23 76.4 kg (168 lb 6.9 oz)  05/15/23 79.6 kg (175 lb 7.8 oz)  05/02/23 81.9 kg (180 lb 8 oz)  03/03/23 82.6 kg (182 lb)  01/23/23 86.2 kg (190 lb)  01/05/23 88.5 kg (195 lb)  12/23/22 82.1 kg (181 lb)    Malnutrition Risk: Unable to determine    Recommendations/Plan:  RD to follow up w/ pt for interview/Nutrition-focused physical exam as able prior to pt discharge.   Pt may benefit from Nepro nutrition supplement QD to support increased nutritional needs w/ ESRD on HD in setting of reported poor PO intake PTA. RD to order QD w/ meals. Encourage intake of meals and supplements >75%.  Document intake of all PO as % taken in ADL's to provide interdisciplinary communication across all shifts.   Monitor " weight.  Nutrition rep will continue to see patient for ongoing meal and snack preferences.     RD following.

## 2023-11-28 NOTE — ASSESSMENT & PLAN NOTE
Chronically elevated suspect secondary to demand and renal dysfunction.  Serial troponins plateaued and down trended  Monitor for symptom changes

## 2023-11-28 NOTE — PROGRESS NOTES
"Pt admitted to room T415 via transport in [facility] from ED at 2130.  Pt pain reported at 5 on a scale of 0-10. Oriented to room call light and smoking policy.  Reviewed plan of care (equipment, incentive spirometer, sequential compression devices, medications, activity, diet, fall precautions, skin care, and pain) with patient and family. Welcome packet given and reviewed with pt , all questions answered. Education provided on oral hygiene program.     /87   Pulse (!) 120   Temp 37.2 °C (99 °F) (Temporal)   Resp (!) 121   Ht 1.702 m (5' 7\")   Wt 77 kg (169 lb 12.1 oz)   SpO2 94%   BMI 26.59 kg/m²      Educated patient regarding pharmacologic and non pharmacologic modalities for pain management. Oriented to room call light and smoking policy.  Reviewed plan of care (equipment, incentive spirometer, sequential compression devices, medications, activity, diet, fall precautions, skin care, and pain) with patient and family. Welcome packet given and reviewed with patient, all questions answered. Education provided on oral hygiene program.    AA&Ox4. Denies CP/SOB.  See 2 RN skin note  Tolerating cardiac diet. Pt report nausea.  + void. + BM. Last BM PTA  Pt ambulates SBA.  All needs met at this time. Call light within reach. Pt calls appropriately. Bed low and locked, non skid socks in place. Hourly rounding in place.  "

## 2023-11-28 NOTE — ED NOTES
Med rec updated and complete. Allergies reviewed.  Confirmed name and date of birth.    Pt stated that he was recently started on Amiodarone but stopped it because of the side effects he was having. ( GI upset). Pt also stopped the carvedilol  3.125 mg ( BID) because it was affecting his blood pressure and HR to much.   He was instructed by his PCP to re-start his verapamil .  However, pt reports that he was unable to tolerate a fill dose   And has been taking 120 mg. Pt has been cutting the tablet in half.  Last dose of coumadin 2.5 mg =11/23/23 . Pt  stated his   Pt/inr was high.   Pt took 1.25 mg (.5 tab) on 11/26/23.      No outpatient antibiotic use in last 30 days at home.    Home pharmacy  [facility] = [phone]

## 2023-11-29 LAB
ANION GAP SERPL CALC-SCNC: 20 MMOL/L (ref 7–16)
BUN SERPL-MCNC: 102 MG/DL (ref 8–22)
C DIFF DNA SPEC QL NAA+PROBE: NORMAL
C DIFF TOX A+B STL QL IA: POSITIVE
C DIFF TOX GENS STL QL NAA+PROBE: NORMAL
CALCIUM SERPL-MCNC: 8.5 MG/DL (ref 8.5–10.5)
CHLORIDE SERPL-SCNC: 91 MMOL/L (ref 96–112)
CO2 SERPL-SCNC: 20 MMOL/L (ref 20–33)
CREAT SERPL-MCNC: 15.35 MG/DL (ref 0.5–1.4)
ERYTHROCYTE [DISTWIDTH] IN BLOOD BY AUTOMATED COUNT: 51.4 FL (ref 35.9–50)
GFR SERPLBLD CREATININE-BSD FMLA CKD-EPI: 3 ML/MIN/1.73 M 2
GLUCOSE SERPL-MCNC: 89 MG/DL (ref 65–99)
HCT VFR BLD AUTO: 34.2 % (ref 42–52)
HGB BLD-MCNC: 11.1 G/DL (ref 14–18)
INR PPP: 1.64 (ref 0.87–1.13)
MCH RBC QN AUTO: 30.2 PG (ref 27–33)
MCHC RBC AUTO-ENTMCNC: 32.5 G/DL (ref 32.3–36.5)
MCV RBC AUTO: 92.9 FL (ref 81.4–97.8)
PLATELET # BLD AUTO: 498 K/UL (ref 164–446)
PMV BLD AUTO: 9 FL (ref 9–12.9)
POTASSIUM SERPL-SCNC: 4.8 MMOL/L (ref 3.6–5.5)
PROTHROMBIN TIME: 19.6 SEC (ref 12–14.6)
RBC # BLD AUTO: 3.68 M/UL (ref 4.7–6.1)
SODIUM SERPL-SCNC: 131 MMOL/L (ref 135–145)
TROPONIN T SERPL-MCNC: 489 NG/L (ref 6–19)
TROPONIN T SERPL-MCNC: 530 NG/L (ref 6–19)
WBC # BLD AUTO: 13.7 K/UL (ref 4.8–10.8)

## 2023-11-29 PROCEDURE — 700105 HCHG RX REV CODE 258: Performed by: STUDENT IN AN ORGANIZED HEALTH CARE EDUCATION/TRAINING PROGRAM

## 2023-11-29 PROCEDURE — 700102 HCHG RX REV CODE 250 W/ 637 OVERRIDE(OP): Performed by: STUDENT IN AN ORGANIZED HEALTH CARE EDUCATION/TRAINING PROGRAM

## 2023-11-29 PROCEDURE — 770020 HCHG ROOM/CARE - TELE (206)

## 2023-11-29 PROCEDURE — A9270 NON-COVERED ITEM OR SERVICE: HCPCS | Performed by: INTERNAL MEDICINE

## 2023-11-29 PROCEDURE — 700102 HCHG RX REV CODE 250 W/ 637 OVERRIDE(OP): Performed by: FAMILY MEDICINE

## 2023-11-29 PROCEDURE — A9270 NON-COVERED ITEM OR SERVICE: HCPCS | Performed by: FAMILY MEDICINE

## 2023-11-29 PROCEDURE — 90935 HEMODIALYSIS ONE EVALUATION: CPT

## 2023-11-29 PROCEDURE — 80048 BASIC METABOLIC PNL TOTAL CA: CPT

## 2023-11-29 PROCEDURE — 36415 COLL VENOUS BLD VENIPUNCTURE: CPT

## 2023-11-29 PROCEDURE — 700111 HCHG RX REV CODE 636 W/ 250 OVERRIDE (IP): Performed by: INTERNAL MEDICINE

## 2023-11-29 PROCEDURE — 5A1D70Z PERFORMANCE OF URINARY FILTRATION, INTERMITTENT, LESS THAN 6 HOURS PER DAY: ICD-10-PCS | Performed by: INTERNAL MEDICINE

## 2023-11-29 PROCEDURE — 99233 SBSQ HOSP IP/OBS HIGH 50: CPT | Performed by: INTERNAL MEDICINE

## 2023-11-29 PROCEDURE — 85027 COMPLETE CBC AUTOMATED: CPT

## 2023-11-29 PROCEDURE — 700102 HCHG RX REV CODE 250 W/ 637 OVERRIDE(OP): Performed by: INTERNAL MEDICINE

## 2023-11-29 PROCEDURE — 84484 ASSAY OF TROPONIN QUANT: CPT

## 2023-11-29 PROCEDURE — A9270 NON-COVERED ITEM OR SERVICE: HCPCS | Performed by: STUDENT IN AN ORGANIZED HEALTH CARE EDUCATION/TRAINING PROGRAM

## 2023-11-29 PROCEDURE — 700101 HCHG RX REV CODE 250: Performed by: INTERNAL MEDICINE

## 2023-11-29 PROCEDURE — 700111 HCHG RX REV CODE 636 W/ 250 OVERRIDE (IP): Performed by: STUDENT IN AN ORGANIZED HEALTH CARE EDUCATION/TRAINING PROGRAM

## 2023-11-29 PROCEDURE — 85610 PROTHROMBIN TIME: CPT

## 2023-11-29 RX ORDER — SIMETHICONE 125 MG
125 TABLET,CHEWABLE ORAL 3 TIMES DAILY PRN
Status: DISCONTINUED | OUTPATIENT
Start: 2023-11-29 | End: 2023-11-29

## 2023-11-29 RX ORDER — ONDANSETRON 2 MG/ML
4 INJECTION INTRAMUSCULAR; INTRAVENOUS
Status: DISCONTINUED | OUTPATIENT
Start: 2023-11-29 | End: 2023-11-30

## 2023-11-29 RX ORDER — HEPARIN SODIUM 5000 [USP'U]/ML
5000 INJECTION, SOLUTION INTRAVENOUS; SUBCUTANEOUS EVERY 8 HOURS
Status: DISCONTINUED | OUTPATIENT
Start: 2023-11-29 | End: 2023-11-29

## 2023-11-29 RX ADMIN — ATORVASTATIN CALCIUM 40 MG: 40 TABLET, FILM COATED ORAL at 21:32

## 2023-11-29 RX ADMIN — VANCOMYCIN HYDROCHLORIDE 125 MG: 5 INJECTION, POWDER, LYOPHILIZED, FOR SOLUTION INTRAVENOUS at 16:15

## 2023-11-29 RX ADMIN — TAMSULOSIN HYDROCHLORIDE 0.4 MG: 0.4 CAPSULE ORAL at 04:47

## 2023-11-29 RX ADMIN — ASPIRIN 81 MG: 81 TABLET, COATED ORAL at 04:47

## 2023-11-29 RX ADMIN — VERAPAMIL HYDROCHLORIDE 120 MG: 120 CAPSULE, DELAYED RELEASE PELLETS ORAL at 04:47

## 2023-11-29 RX ADMIN — VANCOMYCIN HYDROCHLORIDE 125 MG: 5 INJECTION, POWDER, LYOPHILIZED, FOR SOLUTION INTRAVENOUS at 18:14

## 2023-11-29 RX ADMIN — GENTAMICIN SULFATE: 1 CREAM TOPICAL at 18:12

## 2023-11-29 RX ADMIN — ONDANSETRON 4 MG: 2 INJECTION INTRAMUSCULAR; INTRAVENOUS at 16:15

## 2023-11-29 RX ADMIN — METRONIDAZOLE 500 MG: 500 INJECTION, SOLUTION INTRAVENOUS at 04:42

## 2023-11-29 RX ADMIN — WARFARIN SODIUM 1.25 MG: 2.5 TABLET ORAL at 18:14

## 2023-11-29 RX ADMIN — HEPARIN SODIUM 3700 UNITS: 1000 INJECTION, SOLUTION INTRAVENOUS; SUBCUTANEOUS at 11:30

## 2023-11-29 RX ADMIN — CEFEPIME 1 G: 1 INJECTION, POWDER, FOR SOLUTION INTRAMUSCULAR; INTRAVENOUS at 04:52

## 2023-11-29 RX ADMIN — ONDANSETRON 4 MG: 4 TABLET, ORALLY DISINTEGRATING ORAL at 04:45

## 2023-11-29 RX ADMIN — ONDANSETRON 4 MG: 4 TABLET, ORALLY DISINTEGRATING ORAL at 21:32

## 2023-11-29 RX ADMIN — ONDANSETRON 4 MG: 2 INJECTION INTRAMUSCULAR; INTRAVENOUS at 11:47

## 2023-11-29 ASSESSMENT — ENCOUNTER SYMPTOMS
ABDOMINAL PAIN: 1
NAUSEA: 1
DIARRHEA: 1
VOMITING: 0
WEAKNESS: 1
SHORTNESS OF BREATH: 0

## 2023-11-29 ASSESSMENT — PAIN DESCRIPTION - PAIN TYPE
TYPE: ACUTE PAIN
TYPE: ACUTE PAIN

## 2023-11-29 NOTE — PROGRESS NOTES
Hospital Medicine Daily Progress Note    Date of Service  11/29/2023    Chief Complaint  Gurdeep Guerra is a 58 y.o. male admitted 11/27/2023 with abd pain    Hospital Course  57 yo man with A-fib, ESRD on PD, ascending aortic aneurysm with repair and aortic valve replacement who presented with abdominal pain.  He had a CTAP that showed cecal inflammation concerning for colitis or to focus.  He was admitted on IV's cefepime and Flagyl.  Nephrology was consulted.  Peritoneal fluid was negative for infection.    Interval Problem Update  A-fib on telemetry, rate 100-120s  Leukocytosis increasing to 13.7  I discussed with Dr. Mac, peritoneal fluid concerning for peritonitis, will transition to intraperitoneal cefepime tomorrow  Patient says about a week after his heart surgery, he developed abdominal pain with nausea vomiting and diarrhea at home.  His nausea vomiting is doing better but he still has abdominal pain and diarrhea.  C. difficile is pending  TTE showed EF 70%, mechanical aortic valve was not well-visualized.  Patient denies chest pain or shortness of breath    I have discussed this patient's plan of care and discharge plan at IDT rounds today with Case Management, Nursing, Nursing leadership, and other members of the IDT team.    Consultants/Specialty  nephrology    Code Status  Full Code    Disposition  The patient is not medically cleared for discharge to home or a post-acute facility.  Anticipate discharge to: home with close outpatient follow-up    I have placed the appropriate orders for post-discharge needs.    Review of Systems  Review of Systems   Constitutional:  Positive for malaise/fatigue.   Respiratory:  Negative for shortness of breath.    Cardiovascular:  Negative for chest pain.   Gastrointestinal:  Positive for abdominal pain, diarrhea and nausea. Negative for vomiting.   Neurological:  Positive for weakness.        Physical Exam  Temp:  [36.7 °C (98.1 °F)-36.8 °C (98.2 °F)] 36.7  °C (98.1 °F)  Pulse:  [109-127] 127  Resp:  [16-20] 16  BP: (106-137)/(67-87) 106/67  SpO2:  [93 %-97 %] 94 %    Physical Exam  Vitals and nursing note reviewed.   Constitutional:       General: He is not in acute distress.     Appearance: He is not toxic-appearing.   HENT:      Head: Normocephalic.      Mouth/Throat:      Mouth: Mucous membranes are moist.   Eyes:      General:         Right eye: No discharge.         Left eye: No discharge.   Cardiovascular:      Rate and Rhythm: Tachycardia present. Rhythm irregular.   Pulmonary:      Effort: Pulmonary effort is normal. No respiratory distress.      Breath sounds: No wheezing or rales.      Comments: Healing sternal surgical wound  Abdominal:      General: There is distension.      Palpations: Abdomen is soft.      Tenderness: There is no abdominal tenderness. There is no guarding or rebound.      Comments: Peritoneal cath in place   Musculoskeletal:         General: No swelling.      Cervical back: Neck supple.   Skin:     General: Skin is warm and dry.   Neurological:      Mental Status: He is alert and oriented to person, place, and time.         Fluids    Intake/Output Summary (Last 24 hours) at 11/29/2023 1336  Last data filed at 11/29/2023 1300  Gross per 24 hour   Intake 1045 ml   Output 2000 ml   Net -955 ml       Laboratory  Recent Labs     11/27/23  1545 11/28/23  1209 11/29/23  0507   WBC 11.9* 12.0* 13.7*   RBC 3.58* 3.26* 3.68*   HEMOGLOBIN 10.9* 9.8* 11.1*   HEMATOCRIT 33.4* 31.0* 34.2*   MCV 93.3 95.1 92.9   MCH 30.4 30.1 30.2   MCHC 32.6 31.6* 32.5   RDW 51.5* 52.8* 51.4*   PLATELETCT 570* 430 498*   MPV 8.6* 8.8* 9.0     Recent Labs     11/27/23  1545 11/29/23  0708   SODIUM 136 131*   POTASSIUM 4.2 4.8   CHLORIDE 92* 91*   CO2 23 20   GLUCOSE 103* 89   BUN 91* 102*   CREATININE 14.77* 15.35*   CALCIUM 8.9 8.5     Recent Labs     11/27/23  1545 11/29/23  0507   INR 1.92* 1.64*               Imaging  EC-ECHOCARDIOGRAM COMPLETE W/O CONT   Final  Result      CT-ABDOMEN-PELVIS W/O   Final Result      1.  Fat stranding adjacent to the cecum, concerning for colitis/typhlitis.   2.  Colonic diverticulosis.   3.  Nonobstructing, tiny left renal stone.   4.  Likely partially loculated moderate right pleural effusion.   5.  Small left pleural effusion.   6.  Adjacent right middle lobe and bilateral lower lobe consolidations, likely atelectasis.   7.  Small pericardial effusion.      DX-CHEST-PORTABLE (1 VIEW)   Final Result      1.  Resolved or improved small left pleural effusion with persistent left basilar atelectasis and/or scarring.   2.  New small right pleural effusion with associated atelectasis and/or consolidation.           Assessment/Plan  * Typhlitis- (present on admission)  Assessment & Plan  Typhlitis on CT versus peritonitis  Ruling out C. Difficile  Continuing on IV cefepime and Flagyl.  Nephrology to start intraperitoneal cefepime tomorrow  Leukocytosis-trend CBC  Blood cultures pending-so far no growth    S/P AVR- (present on admission)  Assessment & Plan  Recent aortic valve replacement  Was not well-visualized on this visits TTE    Paroxysmal A-fib (HCC)- (present on admission)  Assessment & Plan  In A-fib, tachycardic but may be from underlying infection  Monitor on telemetry  Continue verapamil and Coumadin    ESRD (end stage renal disease) (HCC)- (present on admission)  Assessment & Plan  Nephrology following for dialysis    Dyslipidemia- (present on admission)  Assessment & Plan  Continue atorvastatin    Coronary artery disease due to lipid rich plaque- (present on admission)  Assessment & Plan  Continue with aspirin/warfarin/statin    Elevated troponin- (present on admission)  Assessment & Plan  Chronically elevated suspect secondary to demand and renal dysfunction.  Serial troponins plateaued and down trended  Monitor for symptom changes    Hypertension- (present on admission)  Assessment & Plan  Continue with verpamil         VTE  prophylaxis:    heparin ppx      I have performed a physical exam and reviewed and updated ROS and Plan today (11/29/2023). In review of yesterday's note (11/28/2023), there are no changes except as documented above.

## 2023-11-29 NOTE — DISCHARGE PLANNING
HTH/SCP TCN chart review completed. Current discharge considerations are for dc to home with resumption of HH with [facility]. Note per review, pt has been accepted to [facility] as well. TCN will continue to follow and collaborate with discharge planning team as additional post acute needs arise. Thank you.    Completed:  Choice obtained: HH (Resumption of [facility]) on 11/28/23.    SCP with [facility] PCP.  Patient request to set up his own Follow up PCP appointment.  He states that he has a cardiac f/u appt. On 12/12/23.

## 2023-11-29 NOTE — PSYCHIATRY
Hemodialysis done today, started @ 0935 and ended @ 1239 with net UF= 1500 ml.UF decreased due to dropping  lower BP and increasing HR, Dr Mac notified.  Report given to CODI Anglin. See PATH flow sheet for details

## 2023-11-29 NOTE — CARE PLAN
The patient is Stable - Low risk of patient condition declining or worsening    Shift Goals  Clinical Goals: IV abx, stool sample, pain control  Patient Goals: rest  Family Goals: FARIDA    Progress made toward(s) clinical / shift goals:  ABX administered per MAR, stool sample sent to lab.    Patient is not progressing towards the following goals:

## 2023-11-29 NOTE — PROGRESS NOTES
"Bedside report received.  Assessment complete.  A&O x 4. Patient calls appropriately.  Patient ambulates with STBY assist. Bed alarm off.   Patient has 4/10 pain. Pain managed with prescribed medications.  Denies N&V. Tolerating cardiac/renal diet.  HD cath to R upper chest.  + flatus, + BM.  Patient denies SOB.  Patient calm and cooperative with staff and POC at this time.  Review plan with of care with patient. Call light and personal belongings within reach. Hourly rounding in place. All needs met at this time.    /76   Pulse (!) 109   Temp 36.8 °C (98.2 °F) (Temporal)   Resp 18   Ht 1.702 m (5' 7\")   Wt 77 kg (169 lb 12.1 oz)   SpO2 96%   BMI 26.59 kg/m²     "

## 2023-11-29 NOTE — DISCHARGE PLANNING
HTH/SCP TCN chart review completed. Collaborated with [place].  The most current review of medical record, knowledge of pt's PLOF and social support, LACE+ score of 60, 6 clicks scores of 24 ADL's and 23 mobility were considered.  Per Kardex, he is ambulating 10 feet X 1 no AD.  Per chart review, patient had a kidney transplant at [facility] on 1/5/23.  ESRD on peritoneal dialysis.  He had a recent aortic valve replacement with ascending aortic aneurysm repair on 11/3/23.  He stated c/o increased weakness and SOB the last 3 weeks.      Pt seen at bedside. Introduced TCN program. Provided education regarding post acute levels of care. Discussed HTH/SCP plan benefits (Meds to Beds, medical uber and [facility] transitional care). Pt verbalizes understanding.     Patient states he lives in a 1st floor apartment in [place], works full-time and was independent with ADL's, IADL's, mobility (no AD) and driving at baseline.  He denies any concerns with food, housing or transportation and states his s/o Paulette will provide transportation home at discharge.  He is on RA at baseline and is currently on 1 L/min O2.  Patient will likely wean from O2 prior to discharge.  TCN will monitor for O2 needs at discharge.  He states he is not at his baseline level of function and would like to resume [facility] services.      Choice proactively obtained for HH (Resumption of [facility]), faxed to [facility] and given to CM.   TCN will continue to follow and collaborate with discharge planning team as additional post acute needs arise. Thank you.     Completed today:  Choice obtained: HH (Resumption of [facility]) on 11/28/23.    SCP with [facility] PCP.  Patient request to set up his own Follow up PCP appointment.  He states that he has a cardiac f/u appt. On 12/12/23.

## 2023-11-29 NOTE — PROGRESS NOTES
"[facility] -  PROGRESS NOTE               Author: Justice Mac M.D. Date & Time: 11/29/2023  8:18 AM     HPI:  58 y.o. 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 Abd imaging demonstrated colitis. C. Diff pending and started on abx. He notes abd pain and diarrhea for several weeks. Edema improving with 2.5% dextrose solution. Still has HD permacath in-place. Pain with peritoneal dialysis but no cloudy effluent of fibrin clots. No f/c/s. Normally does [id number] fills all green.     DAILY NEPHROLOGY SUMMARY:  11/28: consult done  11/29: ongoing abd pain, seen on HD-tolerating procedure well, PD fluid concerning for peritonitis    PAST FAMILY HISTORY: Reviewed and Unchanged  SOCIAL HISTORY: Reviewed and Unchanged  CURRENT MEDICATIONS: Reviewed  IMAGING STUDIES: Reviewed    ROS  10 point ROS performed, negative other than stated above     PHYSICAL EXAM  VS:  /87   Pulse (!) 121   Temp 36.7 °C (98.1 °F) (Temporal)   Resp 16   Ht 1.702 m (5' 7\")   Wt 77 kg (169 lb 12.1 oz)   SpO2 96%   BMI 26.59 kg/m²   GENERAL: no acute distress  CV: no edema  RESP: non-labored  GI: TTP  MSK: No joint deformities   SKIN: sternal incision healing well  NEURO: AOx3  PSYCH: Cooperative    Fluids:  In: 545 [P.O.:545]  Out: 110     LABS:  Recent Results (from the past 24 hour(s))   BLOOD CULTURE    Collection Time: 11/28/23  9:58 AM    Specimen: Peripheral; Blood   Result Value Ref Range    Significant Indicator NEG     Source BLD     Site PERIPHERAL     Culture Result       No Growth  Note: Blood cultures are incubated for 5 days and  are monitored continuously.Positive blood cultures  are called to the RN and reported as soon " as  they are identified.     BLOOD CULTURE    Collection Time: 11/28/23  9:58 AM    Specimen: Peripheral; Blood   Result Value Ref Range    Significant Indicator NEG     Source BLD     Site PERIPHERAL     Culture Result       No Growth  Note: Blood cultures are incubated for 5 days and  are monitored continuously.Positive blood cultures  are called to the RN and reported as soon as  they are identified.     TROPONIN    Collection Time: 11/28/23 12:09 PM   Result Value Ref Range    Troponin T 542 (H) 6 - 19 ng/L   CBC WITHOUT DIFFERENTIAL    Collection Time: 11/28/23 12:09 PM   Result Value Ref Range    WBC 12.0 (H) 4.8 - 10.8 K/uL    RBC 3.26 (L) 4.70 - 6.10 M/uL    Hemoglobin 9.8 (L) 14.0 - 18.0 g/dL    Hematocrit 31.0 (L) 42.0 - 52.0 %    MCV 95.1 81.4 - 97.8 fL    MCH 30.1 27.0 - 33.0 pg    MCHC 31.6 (L) 32.3 - 36.5 g/dL    RDW 52.8 (H) 35.9 - 50.0 fL    Platelet Count 430 164 - 446 K/uL    MPV 8.8 (L) 9.0 - 12.9 fL   proBrain Natriuretic Peptide, NT    Collection Time: 11/28/23 12:09 PM   Result Value Ref Range    NT-proBNP 87782 (H) 0 - 125 pg/mL   EC-ECHOCARDIOGRAM COMPLETE W/O CONT    Collection Time: 11/28/23 12:09 PM   Result Value Ref Range    Eject.Frac. MOD 4C 67.91     Eject.Frac. MOD 2C 47.42     Left Ventrical Ejection Fraction 70    Fluid Cell Count    Collection Time: 11/28/23  2:30 PM   Result Value Ref Range    Fluid Type Ascites     Color-Body Fluid Yellow     Character-Body Fluid Hazy     Total RBC Count <2000 cells/uL    FL Total Nucleated Cells 217 cells/uL    Polys 81 %    Eosinophils - CSF 1 %    Fluid Basophil 1 %    Fl Mono Macrophages 17 %   CAPD FLUID CULTURE    Collection Time: 11/28/23  2:30 PM    Specimen: CAPD Fluid   Result Value Ref Range    Significant Indicator NEG     Source CAPD     Site Peritoneal Dialysate     Culture Result -     Gram Stain Result No organisms seen.    GRAM STAIN    Collection Time: 11/28/23  2:30 PM    Specimen: CAPD Fluid   Result Value Ref Range     Significant Indicator .     Source CAPD     Site Peritoneal Dialysate     Gram Stain Result No organisms seen.    TROPONIN    Collection Time: 11/28/23 11:32 PM   Result Value Ref Range    Troponin T 530 (H) 6 - 19 ng/L   PROTHROMBIN TIME    Collection Time: 11/29/23  5:07 AM   Result Value Ref Range    PT 19.6 (H) 12.0 - 14.6 sec    INR 1.64 (H) 0.87 - 1.13   CBC WITHOUT DIFFERENTIAL    Collection Time: 11/29/23  5:07 AM   Result Value Ref Range    WBC 13.7 (H) 4.8 - 10.8 K/uL    RBC 3.68 (L) 4.70 - 6.10 M/uL    Hemoglobin 11.1 (L) 14.0 - 18.0 g/dL    Hematocrit 34.2 (L) 42.0 - 52.0 %    MCV 92.9 81.4 - 97.8 fL    MCH 30.2 27.0 - 33.0 pg    MCHC 32.5 32.3 - 36.5 g/dL    RDW 51.4 (H) 35.9 - 50.0 fL    Platelet Count 498 (H) 164 - 446 K/uL    MPV 9.0 9.0 - 12.9 fL   TROPONIN    Collection Time: 11/29/23  7:08 AM   Result Value Ref Range    Troponin T 489 (H) 6 - 19 ng/L   Basic Metabolic Panel    Collection Time: 11/29/23  7:08 AM   Result Value Ref Range    Sodium 131 (L) 135 - 145 mmol/L    Potassium 4.8 3.6 - 5.5 mmol/L    Chloride 91 (L) 96 - 112 mmol/L    Co2 20 20 - 33 mmol/L    Glucose 89 65 - 99 mg/dL    Bun 102 (H) 8 - 22 mg/dL    Creatinine 15.35 (HH) 0.50 - 1.40 mg/dL    Calcium 8.5 8.5 - 10.5 mg/dL    Anion Gap 20.0 (H) 7.0 - 16.0   ESTIMATED GFR    Collection Time: 11/29/23  7:08 AM   Result Value Ref Range    GFR (CKD-EPI) 3 (A) >60 mL/min/1.73 m 2       (click the triangle to expand results)    ASSESSMENT:  # ESRD on outpt PD   -Etiology 2/2 FSGS  - s/p permcath on 11/10   - concern for PD cath associated peritonitis based peritoneal studies  # S/P AVR/ Ascending aneurysm repair  # CKD-MBD  # Anemia of CKD: at goal  # BL pleural effusions  # Pericardial effusion   # Abd pain: imaging concerning for  -pd fluid concerning for peritonitis.      PLAN:  -Continuing with HD for now with pain, MWF schedule for now  -Pan to transition cefepime to IP tomorrow for treatment of possible peritonitis  -Colitis  work-up/abx per primary  -Renal diet  -Dose all medications as per ESRD     Discussed with hospitalist     Justice Mac MD

## 2023-11-29 NOTE — PROGRESS NOTES
Inpatient Anticoagulation Service Note for 11/29/2023      Reason for Anticoagulation: On-X Mitral Valve Replacement, On-X Aortic Valve Replacement     Hemoglobin Value: (!) 11.1  Hematocrit Value: (!) 34.2  Lab Platelet Value: (!) 498  Target INR: 2.0 to 3.0    INR from last 7 days       Date/Time INR Value    11/29/23 0507 1.64    11/27/23 1545 1.92          Dose from last 7 days       Date/Time Dose (mg)    11/29/23 0923 1.25    11/28/23 0932 --          Average Dose (mg):  (1.25mg daily)  Significant Interactions: Antiplatelet Medications, Antibiotics  Bridge Therapy: No     Reversal Agent Administered: Not Applicable  Comments: Patient's INR remains subtherapeutic at 1.64. Patient recieved x2 doses of 1.25mg warfarin yesterday in the AM and PM. Will not increase dose or change current dosing since the effect of the extra dose has not been reflected on the INR. Patient continues on antibiotics that can also increase INR. Continue 1.25mg today. If INR continues to drop would recommend a small load tomorrow.    Plan:  warfarin 1.25mg today. Repeat INR tomorrow   Education Material Provided?: No    Pharmacist suggested discharge dosing: Recommend continuing patient's home warfarin dose of 1.25mg. Follow with the outpatient AC clinic within 48 hrs of discharge for repeat INR      Haven Arteaga, EstefanyD

## 2023-11-30 PROBLEM — A04.72 C. DIFFICILE COLITIS: Status: ACTIVE | Noted: 2023-11-27

## 2023-11-30 PROBLEM — K65.9 PERITONITIS (HCC): Status: ACTIVE | Noted: 2023-11-30

## 2023-11-30 LAB
ALBUMIN SERPL BCP-MCNC: 2.4 G/DL (ref 3.2–4.9)
BASOPHILS # BLD AUTO: 0.4 % (ref 0–1.8)
BASOPHILS # BLD: 0.04 K/UL (ref 0–0.12)
BUN SERPL-MCNC: 70 MG/DL (ref 8–22)
CALCIUM ALBUM COR SERPL-MCNC: 9.7 MG/DL (ref 8.5–10.5)
CALCIUM SERPL-MCNC: 8.4 MG/DL (ref 8.5–10.5)
CHLORIDE SERPL-SCNC: 94 MMOL/L (ref 96–112)
CO2 SERPL-SCNC: 26 MMOL/L (ref 20–33)
CREAT SERPL-MCNC: 11.68 MG/DL (ref 0.5–1.4)
E COLI SXT1+2 STL IA: NORMAL
EOSINOPHIL # BLD AUTO: 0.31 K/UL (ref 0–0.51)
EOSINOPHIL NFR BLD: 3 % (ref 0–6.9)
ERYTHROCYTE [DISTWIDTH] IN BLOOD BY AUTOMATED COUNT: 52.3 FL (ref 35.9–50)
GFR SERPLBLD CREATININE-BSD FMLA CKD-EPI: 5 ML/MIN/1.73 M 2
GLUCOSE SERPL-MCNC: 111 MG/DL (ref 65–99)
HCT VFR BLD AUTO: 32.6 % (ref 42–52)
HGB BLD-MCNC: 10.2 G/DL (ref 14–18)
IMM GRANULOCYTES # BLD AUTO: 0.07 K/UL (ref 0–0.11)
IMM GRANULOCYTES NFR BLD AUTO: 0.7 % (ref 0–0.9)
INR PPP: 1.58 (ref 0.87–1.13)
LACTOFERRIN STL QL IA: POSITIVE
LYMPHOCYTES # BLD AUTO: 0.77 K/UL (ref 1–4.8)
LYMPHOCYTES NFR BLD: 7.4 % (ref 22–41)
MCH RBC QN AUTO: 30 PG (ref 27–33)
MCHC RBC AUTO-ENTMCNC: 31.3 G/DL (ref 32.3–36.5)
MCV RBC AUTO: 95.9 FL (ref 81.4–97.8)
MONOCYTES # BLD AUTO: 0.69 K/UL (ref 0–0.85)
MONOCYTES NFR BLD AUTO: 6.6 % (ref 0–13.4)
NEUTROPHILS # BLD AUTO: 8.58 K/UL (ref 1.82–7.42)
NEUTROPHILS NFR BLD: 81.9 % (ref 44–72)
NRBC # BLD AUTO: 0 K/UL
NRBC BLD-RTO: 0 /100 WBC (ref 0–0.2)
PHOSPHATE SERPL-MCNC: 6.9 MG/DL (ref 2.5–4.5)
PLATELET # BLD AUTO: 390 K/UL (ref 164–446)
PMV BLD AUTO: 8.6 FL (ref 9–12.9)
POTASSIUM SERPL-SCNC: 4.6 MMOL/L (ref 3.6–5.5)
PROTHROMBIN TIME: 19.1 SEC (ref 12–14.6)
RBC # BLD AUTO: 3.4 M/UL (ref 4.7–6.1)
SIGNIFICANT IND 70042: NORMAL
SITE SITE: NORMAL
SODIUM SERPL-SCNC: 134 MMOL/L (ref 135–145)
SOURCE SOURCE: NORMAL
WBC # BLD AUTO: 10.5 K/UL (ref 4.8–10.8)

## 2023-11-30 PROCEDURE — 85025 COMPLETE CBC W/AUTO DIFF WBC: CPT

## 2023-11-30 PROCEDURE — 700102 HCHG RX REV CODE 250 W/ 637 OVERRIDE(OP): Performed by: INTERNAL MEDICINE

## 2023-11-30 PROCEDURE — A9270 NON-COVERED ITEM OR SERVICE: HCPCS | Performed by: INTERNAL MEDICINE

## 2023-11-30 PROCEDURE — 700111 HCHG RX REV CODE 636 W/ 250 OVERRIDE (IP): Mod: JZ | Performed by: INTERNAL MEDICINE

## 2023-11-30 PROCEDURE — 770020 HCHG ROOM/CARE - TELE (206)

## 2023-11-30 PROCEDURE — 90935 HEMODIALYSIS ONE EVALUATION: CPT

## 2023-11-30 PROCEDURE — 80069 RENAL FUNCTION PANEL: CPT

## 2023-11-30 PROCEDURE — 700102 HCHG RX REV CODE 250 W/ 637 OVERRIDE(OP): Performed by: STUDENT IN AN ORGANIZED HEALTH CARE EDUCATION/TRAINING PROGRAM

## 2023-11-30 PROCEDURE — 90945 DIALYSIS ONE EVALUATION: CPT

## 2023-11-30 PROCEDURE — 99233 SBSQ HOSP IP/OBS HIGH 50: CPT | Performed by: INTERNAL MEDICINE

## 2023-11-30 PROCEDURE — 700101 HCHG RX REV CODE 250: Performed by: INTERNAL MEDICINE

## 2023-11-30 PROCEDURE — A9270 NON-COVERED ITEM OR SERVICE: HCPCS | Performed by: STUDENT IN AN ORGANIZED HEALTH CARE EDUCATION/TRAINING PROGRAM

## 2023-11-30 PROCEDURE — 85610 PROTHROMBIN TIME: CPT

## 2023-11-30 PROCEDURE — 36415 COLL VENOUS BLD VENIPUNCTURE: CPT

## 2023-11-30 RX ORDER — ACETAMINOPHEN 325 MG/1
650 TABLET ORAL EVERY 4 HOURS PRN
Status: DISCONTINUED | OUTPATIENT
Start: 2023-11-30 | End: 2023-12-06

## 2023-11-30 RX ORDER — VERAPAMIL HYDROCHLORIDE 80 MG/1
40 TABLET ORAL EVERY 8 HOURS
Status: DISCONTINUED | OUTPATIENT
Start: 2023-11-30 | End: 2023-12-01

## 2023-11-30 RX ORDER — WARFARIN SODIUM 2.5 MG/1
1.25 TABLET ORAL DAILY
Status: DISCONTINUED | OUTPATIENT
Start: 2023-12-01 | End: 2023-12-01

## 2023-11-30 RX ORDER — DILTIAZEM HYDROCHLORIDE 5 MG/ML
15 INJECTION INTRAVENOUS EVERY 6 HOURS PRN
Status: DISCONTINUED | OUTPATIENT
Start: 2023-11-30 | End: 2023-12-08

## 2023-11-30 RX ORDER — HEPARIN SODIUM 5000 [USP'U]/ML
5000 INJECTION, SOLUTION INTRAVENOUS; SUBCUTANEOUS EVERY 8 HOURS
Status: DISCONTINUED | OUTPATIENT
Start: 2023-11-30 | End: 2023-12-01

## 2023-11-30 RX ORDER — WARFARIN SODIUM 2.5 MG/1
2.5 TABLET ORAL
Status: COMPLETED | OUTPATIENT
Start: 2023-11-30 | End: 2023-11-30

## 2023-11-30 RX ORDER — DILTIAZEM HYDROCHLORIDE 5 MG/ML
15 INJECTION INTRAVENOUS EVERY 6 HOURS PRN
Status: DISCONTINUED | OUTPATIENT
Start: 2023-11-30 | End: 2023-11-30

## 2023-11-30 RX ORDER — ONDANSETRON 2 MG/ML
4 INJECTION INTRAMUSCULAR; INTRAVENOUS EVERY 6 HOURS PRN
Status: DISCONTINUED | OUTPATIENT
Start: 2023-11-30 | End: 2023-12-26 | Stop reason: HOSPADM

## 2023-11-30 RX ADMIN — ACETAMINOPHEN 650 MG: 325 TABLET, FILM COATED ORAL at 11:47

## 2023-11-30 RX ADMIN — Medication 1 APPLICATOR: at 04:40

## 2023-11-30 RX ADMIN — ONDANSETRON 4 MG: 2 INJECTION INTRAMUSCULAR; INTRAVENOUS at 06:44

## 2023-11-30 RX ADMIN — VANCOMYCIN HYDROCHLORIDE 125 MG: 5 INJECTION, POWDER, LYOPHILIZED, FOR SOLUTION INTRAVENOUS at 12:30

## 2023-11-30 RX ADMIN — ATORVASTATIN CALCIUM 40 MG: 40 TABLET, FILM COATED ORAL at 21:14

## 2023-11-30 RX ADMIN — TAMSULOSIN HYDROCHLORIDE 0.4 MG: 0.4 CAPSULE ORAL at 04:39

## 2023-11-30 RX ADMIN — HEPARIN SODIUM 5000 UNITS: 5000 INJECTION, SOLUTION INTRAVENOUS; SUBCUTANEOUS at 08:24

## 2023-11-30 RX ADMIN — DILTIAZEM HYDROCHLORIDE 15 MG: 5 INJECTION, SOLUTION INTRAVENOUS at 16:12

## 2023-11-30 RX ADMIN — HEPARIN SODIUM 5000 UNITS: 5000 INJECTION, SOLUTION INTRAVENOUS; SUBCUTANEOUS at 21:19

## 2023-11-30 RX ADMIN — VANCOMYCIN HYDROCHLORIDE 125 MG: 5 INJECTION, POWDER, LYOPHILIZED, FOR SOLUTION INTRAVENOUS at 17:12

## 2023-11-30 RX ADMIN — GENTAMICIN SULFATE: 1 CREAM TOPICAL at 15:30

## 2023-11-30 RX ADMIN — VERAPAMIL HYDROCHLORIDE 40 MG: 80 TABLET ORAL at 13:32

## 2023-11-30 RX ADMIN — VERAPAMIL HYDROCHLORIDE 40 MG: 80 TABLET ORAL at 21:14

## 2023-11-30 RX ADMIN — VANCOMYCIN HYDROCHLORIDE 125 MG: 5 INJECTION, POWDER, LYOPHILIZED, FOR SOLUTION INTRAVENOUS at 00:06

## 2023-11-30 RX ADMIN — WARFARIN SODIUM 2.5 MG: 2.5 TABLET ORAL at 18:00

## 2023-11-30 RX ADMIN — CEFEPIME 1000 MG: 1 INJECTION, POWDER, FOR SOLUTION INTRAMUSCULAR; INTRAVENOUS at 06:22

## 2023-11-30 RX ADMIN — VANCOMYCIN HYDROCHLORIDE 125 MG: 5 INJECTION, POWDER, LYOPHILIZED, FOR SOLUTION INTRAVENOUS at 06:02

## 2023-11-30 RX ADMIN — ASPIRIN 81 MG: 81 TABLET, COATED ORAL at 04:39

## 2023-11-30 RX ADMIN — HEPARIN SODIUM 5000 UNITS: 5000 INJECTION, SOLUTION INTRAVENOUS; SUBCUTANEOUS at 13:32

## 2023-11-30 RX ADMIN — Medication 1 APPLICATOR: at 18:00

## 2023-11-30 ASSESSMENT — CHA2DS2 SCORE
CHA2DS2 VASC SCORE: 2
DIABETES: NO
PRIOR STROKE OR TIA OR THROMBOEMBOLISM: NO
AGE 75 OR GREATER: NO
SEX: MALE
VASCULAR DISEASE: YES
HYPERTENSION: YES
CHF OR LEFT VENTRICULAR DYSFUNCTION: NO
AGE 65 TO 74: NO

## 2023-11-30 ASSESSMENT — ENCOUNTER SYMPTOMS
SHORTNESS OF BREATH: 0
WEAKNESS: 1
DIARRHEA: 1
VOMITING: 0
NAUSEA: 0
ABDOMINAL PAIN: 1

## 2023-11-30 ASSESSMENT — FIBROSIS 4 INDEX: FIB4 SCORE: 0.55

## 2023-11-30 ASSESSMENT — PAIN DESCRIPTION - PAIN TYPE
TYPE: ACUTE PAIN

## 2023-11-30 NOTE — PROGRESS NOTES
Inpatient Anticoagulation Service Note for 11/30/2023      Reason for Anticoagulation: On-X Mitral Valve Replacement, On-X Aortic Valve Replacement, Atrial Fibrillation   [id number] VASc Score: 2  HAS-BLED Score: 3    Hemoglobin Value: (!) 10.2  Hematocrit Value: (!) 32.6  Lab Platelet Value: 390  Target INR: 2.0 to 3.0    INR from last 7 days       Date/Time INR Value    11/30/23 0643 1.58    11/29/23 0507 1.64    11/27/23 1545 1.92          Dose from last 7 days       Date/Time Dose (mg)    11/30/23 1232 2.5    11/29/23 0923 1.25    11/28/23 0932 2.5          Average Dose (mg):  1.25 mg daily  Significant Interactions: Antiplatelet Medications  Bridge Therapy: No (Ppx heparin)  Reversal Agent Administered: Not Applicable    Comments: INR subtherapeutic. Dose given last night. H/H stable and no s/s of overt bleeding. Heparin ppx placed on MAR today until INR therapeutic. Will give one time bolus dose of 2.5 mg PO tonight then resume 1.25 mg PO daily starting tomorrow. INR ordered 12/1.    Plan:  Warfarin 2.5 mg PO once  Education Material Provided?: No (Established warfarin patient)    Pharmacist suggested discharge dosing: Warfarin 1.25 mg PO daily with close follow-up post-discharge with an INR within 48 to 72 hours.       Thank you!    Suzie Gibson, PharmD, BCPS

## 2023-11-30 NOTE — PROGRESS NOTES
0730-Assumed patient care and received report Ale KINCAID. Assessment completed. Pt A&Ox4. Respirations are even and unlabored RA. Pt denies pain. Tele pt, VS stable, call light and belongings are within reach. POC updated. Pt is in A-Flutter per monitor summary, Tele monitoring in place. Pt had a large watery stool accident walking to bathroom this morning. Pt has a Rt side HD cath, Rt PD cath that is currently infusing cefepime. Orders in to tranfer to a tele unit. Pt educated on room and call light, pt verbalized understanding. Needs met.

## 2023-11-30 NOTE — PROGRESS NOTES
[facility] Progress Note        CAPD connected aseptically at 0630, to dwell X8 hours. Cefepime 1 gram instilled into 1.5% dialysate solution. Pt tolerated procedure well without issues. Pt stable, VSS, alert and oriented. PD exit site CDI. Dressing changed yesterday.     Report given to NATHANIEL Barba RN.

## 2023-11-30 NOTE — CARE PLAN
The patient is Stable - Low risk of patient condition declining or worsening    Shift Goals  Clinical Goals: monitor tele, nausea control, mobility  Patient Goals: rest  Family Goals: FARIDA    Progress made toward(s) clinical / shift goals:  medicating for nausea per MAR, assisting pt to restroom for frequent bowel movements. Tele monitor in place with no concerns at this time.    Patient is not progressing towards the following goals:

## 2023-11-30 NOTE — PROGRESS NOTES
Hospital Medicine Daily Progress Note    Date of Service  11/30/2023    Chief Complaint  Gurdeep Guerra is a 58 y.o. male admitted 11/27/2023 with abd pain    Hospital Course  59 yo man with A-fib, ESRD on PD, ascending aortic aneurysm with repair and aortic valve replacement who presented with abdominal pain.  He had a CTAP that showed cecal inflammation concerning for colitis or to focus.  He was admitted on IV's cefepime and Flagyl.  Nephrology was consulted.  Peritoneal fluid was negative for infection.    Interval Problem Update  11/29 - A-fib on telemetry, rate 100-120s  Leukocytosis increasing to 13.7  I discussed with Dr. Mac, peritoneal fluid concerning for peritonitis, will transition to intraperitoneal cefepime tomorrow  Patient says about a week after his heart surgery, he developed abdominal pain with nausea vomiting and diarrhea at home.  His nausea vomiting is doing better but he still has abdominal pain and diarrhea.  C. difficile is pending  TTE showed EF 70%, mechanical aortic valve was not well-visualized.  Patient denies chest pain or shortness of breath    11/30 -A-flutter on telemetry, rate 120s.  Patient did not want to take his verapamil this morning, he is concerned about having hypotension.  He denies shortness of breath or chest pain.  I will transfer to telemetry and change verapamil to short acting with hold parameters, IV diltiazem as needed. Positive for C. difficile and started on oral Vanco, already seeing improvement of abdominal pain, nausea and diarrhea.  WBC has decreased to 10.5    I have discussed this patient's plan of care and discharge plan at IDT rounds today with Case Management, Nursing, Nursing leadership, and other members of the IDT team.    Consultants/Specialty  nephrology    Code Status  Full Code    Disposition  The patient is not medically cleared for discharge to home or a post-acute facility.  Anticipate discharge to: home with close outpatient  follow-up    I have placed the appropriate orders for post-discharge needs.    Review of Systems  Review of Systems   Constitutional:  Positive for malaise/fatigue.   Respiratory:  Negative for shortness of breath.    Cardiovascular:  Negative for chest pain.   Gastrointestinal:  Positive for abdominal pain and diarrhea. Negative for nausea and vomiting.   Neurological:  Positive for weakness.        Physical Exam  Temp:  [36.4 °C (97.5 °F)-37 °C (98.6 °F)] 36.4 °C (97.5 °F)  Pulse:  [122-128] 123  Resp:  [16-18] 16  BP: ()/(70-82) 98/70  SpO2:  [84 %-98 %] 95 %    Physical Exam  Vitals and nursing note reviewed.   Constitutional:       General: He is not in acute distress.     Appearance: He is not toxic-appearing.   HENT:      Head: Normocephalic.      Mouth/Throat:      Mouth: Mucous membranes are moist.   Eyes:      General:         Right eye: No discharge.         Left eye: No discharge.   Cardiovascular:      Rate and Rhythm: Tachycardia present. Rhythm irregular.   Pulmonary:      Effort: Pulmonary effort is normal. No respiratory distress.      Breath sounds: No wheezing or rales.      Comments: Healing sternal surgical wound  Abdominal:      General: There is distension.      Palpations: Abdomen is soft.      Tenderness: There is no abdominal tenderness. There is no guarding or rebound.      Comments: Peritoneal cath in place   Musculoskeletal:         General: No swelling.      Cervical back: Neck supple.   Skin:     General: Skin is warm and dry.   Neurological:      Mental Status: He is alert and oriented to person, place, and time.         Fluids    Intake/Output Summary (Last 24 hours) at 11/30/2023 1230  Last data filed at 11/30/2023 0938  Gross per 24 hour   Intake 750 ml   Output 2000 ml   Net -1250 ml       Laboratory  Recent Labs     11/28/23  1209 11/29/23  0507 11/30/23  0643   WBC 12.0* 13.7* 10.5   RBC 3.26* 3.68* 3.40*   HEMOGLOBIN 9.8* 11.1* 10.2*   HEMATOCRIT 31.0* 34.2* 32.6*   MCV  95.1 92.9 95.9   MCH 30.1 30.2 30.0   MCHC 31.6* 32.5 31.3*   RDW 52.8* 51.4* 52.3*   PLATELETCT 430 498* 390   MPV 8.8* 9.0 8.6*     Recent Labs     11/27/23  1545 11/29/23  0708 11/30/23  0643   SODIUM 136 131* 134*   POTASSIUM 4.2 4.8 4.6   CHLORIDE 92* 91* 94*   CO2 23 20 26   GLUCOSE 103* 89 111*   BUN 91* 102* 70*   CREATININE 14.77* 15.35* 11.68*   CALCIUM 8.9 8.5 8.4*     Recent Labs     11/27/23  1545 11/29/23  0507 11/30/23  0643   INR 1.92* 1.64* 1.58*               Imaging  EC-ECHOCARDIOGRAM COMPLETE W/O CONT   Final Result      CT-ABDOMEN-PELVIS W/O   Final Result      1.  Fat stranding adjacent to the cecum, concerning for colitis/typhlitis.   2.  Colonic diverticulosis.   3.  Nonobstructing, tiny left renal stone.   4.  Likely partially loculated moderate right pleural effusion.   5.  Small left pleural effusion.   6.  Adjacent right middle lobe and bilateral lower lobe consolidations, likely atelectasis.   7.  Small pericardial effusion.      DX-CHEST-PORTABLE (1 VIEW)   Final Result      1.  Resolved or improved small left pleural effusion with persistent left basilar atelectasis and/or scarring.   2.  New small right pleural effusion with associated atelectasis and/or consolidation.           Assessment/Plan  * C. difficile colitis- (present on admission)  Assessment & Plan  CT showed colitis, C. difficile is positive  Continue on oral vancomycin  Isolation  Leukocytosis improving, trend CBC  Blood cultures have been negative    Peritonitis (HCC)  Assessment & Plan  I discussed with nephrology, receiving intraperitoneal cefepime  Follow culture, so far no growth    S/P AVR- (present on admission)  Assessment & Plan  Recent aortic valve replacement  Was not well-visualized on this visit's TTE    Paroxysmal A-fib (HCC)- (present on admission)  Assessment & Plan  In A-fib, tachycardic but may be related to underlying infection.  Patient says carvedilol was dropping his blood pressure at home and  amiodarone was causing side effects and so cardiology recommended he take for verapamil.  Continue on verapamil with hold parameters  IV diltiazem as needed   Monitor on telemetry  Continue warfarin, INR remains low.  Discussed with pharmacy    ESRD (end stage renal disease) (HCC)- (present on admission)  Assessment & Plan  Nephrology following for dialysis    Dyslipidemia- (present on admission)  Assessment & Plan  Continue atorvastatin    Coronary artery disease due to lipid rich plaque- (present on admission)  Assessment & Plan  Continue with aspirin/warfarin/statin    Elevated troponin- (present on admission)  Assessment & Plan  Chronically elevated suspect secondary to demand and renal dysfunction.  Serial troponins plateaued and down trended  Monitor for symptom changes    Hypertension- (present on admission)  Assessment & Plan  Blood pressure has been stable but patient concerned about hypotension if he takes verapamil.  I changed to short acting with hold parameters         VTE prophylaxis:    heparin ppx      I have performed a physical exam and reviewed and updated ROS and Plan today (11/30/2023). In review of yesterday's note (11/29/2023), there are no changes except as documented above.

## 2023-11-30 NOTE — DIETARY
Nutrition Services: Update   Day 3 of admit.  Gurdeep Guerra is a 58 y.o. male with admitting DX of Typhlitis     Problem: Nutritional:  Goal: Achieve adequate nutritional intake  Description: Patient will consume >75% from combined supplement and meal consumption  Outcome: Progressing     Pt is currently on renal diet. Currently receives Nepro once daily. RD attempted to visit pt at bedside to monitor for PO adequacy, though pt transferred out of room at time of attempted visit. RD observed unopened Nepro on tray table. Intake may be improving, as two most recent meals are documented at 50-75% and % consumption, compared to previous <25-50%. Noted pt experiencing improvement in abdominal pain, nausea and diarrhea since starting oral vancomycin today. RD optimistic for improved PO intake.     Malnutrition Risk: Does not currently meet criteria with information obtained at this time, to re-evaluate as indicated and able during course of admission     Recommendations/Plan:  Continue Nepro once daily   Encourage intake of meals  Document intake of all meals as % taken in ADL's to provide interdisciplinary communication across all shifts.   Monitor weight.  Nutrition rep will continue to see patient for ongoing meal and snack preferences.    RD following

## 2023-11-30 NOTE — DISCHARGE PLANNING
[facility] TCN chart review completed. Current discharge considerations are home with home health. Per kardex patient ambulated 2 x 25 feet with no AD.     TCN will continue to follow and collaborate with discharge planning team as additional post acute needs arise. Thank you.    Completed:  Choice obtained: HILARIO (Resumption of Sonam RICH) on 11/28/23.    SCP with [facility] PCP.  Patient request to set up his own Follow up PCP appointment.  He states that he has a cardiac f/u appt. On 12/12/23.

## 2023-11-30 NOTE — CARE PLAN
The patient is Stable - Low risk of patient condition declining or worsening    Shift Goals  Clinical Goals: Cardiac monitoring, nausea control, pain control  Patient Goals: Rest    Progress made toward(s) clinical / shift goals:  Patient on cardiac telemonitoring through shift. Nausea and pain managed with prescribed PRN medications and rest.    Problem: Pain - Standard  Goal: Alleviation of pain or a reduction in pain to the patient’s comfort goal  Outcome: Progressing     Problem: Knowledge Deficit - Standard  Goal: Patient and family/care givers will demonstrate understanding of plan of care, disease process/condition, diagnostic tests and medications  Outcome: Progressing     Problem: Fall Risk  Goal: Patient will remain free from falls  Outcome: Progressing       Patient is not progressing towards the following goals:

## 2023-11-30 NOTE — PROGRESS NOTES
CAPD drain done today, obtained 700 ml from 1000 ml with cefepime  1 gram instilled this am. Effluent clear yellow with 1 small fibrin noted,Dr Mac notified. Dressing changed, no S/S of infection noted, gentamicin ointment applied to the PD cath. entry site. Report given to CODI Dominique

## 2023-11-30 NOTE — PROGRESS NOTES
Monitor Summary:     Donna 119-130  -/.11/-              Subjective:   Patient ID:  Rea Vail is a 72 y.o. male who presents for follow up of Atrial Fibrillation and Congestive Heart Failure      73 yo male, came in for discharge f/u. F/u at CHF clinic  PMH CHFpEF, chronic AFIB, COPD, on home o2, GUMARO. HTN and CKD III.  ECHO done in  normal BIV function, PRISCILLA and pAP 50 mmHg.  On , had allergic reaction with low SaO2 and lethargy when he had secind iron infstion at [facility], had epinephrne shot and sent to ER. Had Afib with RVR in ER Ct showed constipation vs diverticulosis. Aifb with RVR was controlled by cardiazem. Discharged next day.  Today, states that no ramsey, chest pain and palpitation,  Exercises at Y daily  HGB 10 to 11.  EKG showed Afib and LBBB, new compared to prior EKG in                 Past Medical History:   Diagnosis Date    Anemia     Asthma     Atrial fibrillation     CHF (congestive heart failure)     COPD (chronic obstructive pulmonary disease)     COPD (chronic obstructive pulmonary disease) 2012    Diverticular disease     Gout 2012    Hyperlipidemia     Hypertension     GUMARO (obstructive sleep apnea) 2018    Other and unspecified hyperlipidemia     Stroke        No past surgical history on file.    Social History     Tobacco Use    Smoking status: Former Smoker     Types: Cigarettes     Last attempt to quit: 2006     Years since quittin.3   Substance Use Topics    Alcohol use: No    Drug use: No       Family History   Problem Relation Age of Onset    Stroke Cousin          Review of Systems   Constitution: Negative for diaphoresis, weakness, malaise/fatigue, weight gain and weight loss.   HENT: Negative for congestion and nosebleeds.    Cardiovascular: Negative for chest pain, claudication, cyanosis, dyspnea on exertion, irregular heartbeat, leg swelling, near-syncope, orthopnea, palpitations, paroxysmal nocturnal dyspnea and syncope.   Respiratory: Positive for shortness of breath (chronic, but  stable). Negative for cough, hemoptysis, sleep disturbances due to breathing, snoring, sputum production and wheezing.         Wears oxygen PRN   Hematologic/Lymphatic: Negative for bleeding problem. Does not bruise/bleed easily.   Skin: Negative for rash.   Musculoskeletal: Positive for arthritis and joint pain. Negative for back pain, falls, muscle cramps and muscle weakness.   Gastrointestinal: Positive for hemorrhoids. Negative for abdominal pain, constipation, diarrhea, heartburn, hematemesis, hematochezia, melena and nausea.   Genitourinary: Negative for dysuria, hematuria and nocturia.   Neurological: Negative for excessive daytime sleepiness, dizziness, headaches, light-headedness, loss of balance, numbness and vertigo.       Objective:   Physical Exam   Constitutional: He is oriented to person, place, and time. He appears well-developed and well-nourished. No distress.   HENT:   Head: Normocephalic and atraumatic.   Eyes: EOM are normal. Pupils are equal, round, and reactive to light. Right eye exhibits no discharge. Left eye exhibits no discharge.   Neck: Neck supple. JVD present. No thyromegaly present.   Cardiovascular: Normal rate and intact distal pulses. An irregularly irregular rhythm present. Exam reveals no gallop and no friction rub.   Murmur heard.   Medium-pitched mid to late systolic murmur is present at the lower left sternal border.  Pulses:       Carotid pulses are 2+ on the right side, and 2+ on the left side.       Radial pulses are 2+ on the right side, and 2+ on the left side.        Femoral pulses are 2+ on the right side, and 2+ on the left side.       Popliteal pulses are 2+ on the right side, and 2+ on the left side.        Dorsalis pedis pulses are 2+ on the right side, and 2+ on the left side.        Posterior tibial pulses are 2+ on the right side, and 2+ on the left side.   Pulmonary/Chest: Effort normal. No respiratory distress. He has wheezes. He has no rales. He exhibits no  tenderness.   Abdominal: Soft. Bowel sounds are normal. He exhibits no distension. There is no tenderness.   obese   Musculoskeletal: Normal range of motion. He exhibits no edema.   Neurological: He is alert and oriented to person, place, and time. No cranial nerve deficit.   Skin: Skin is warm and dry. No rash noted. He is not diaphoretic. No erythema.   Psychiatric: He has a normal mood and affect. His behavior is normal.   Nursing note and vitals reviewed.      Lab Results   Component Value Date    CHOL 149 03/21/2018    CHOL 133 02/08/2018    CHOL 177 01/15/2015     Lab Results   Component Value Date    HDL 35 (L) 03/21/2018    HDL 40 02/08/2018    HDL 34 (L) 01/15/2015     Lab Results   Component Value Date    LDLCALC 91.0 03/21/2018    LDLCALC 79.4 02/08/2018    LDLCALC 98.8 01/15/2015     Lab Results   Component Value Date    TRIG 115 03/21/2018    TRIG 68 02/08/2018    TRIG 221 (H) 01/15/2015     Lab Results   Component Value Date    CHOLHDL 23.5 03/21/2018    CHOLHDL 30.1 02/08/2018    CHOLHDL 19.2 (L) 01/15/2015       Chemistry        Component Value Date/Time     10/04/2018 0756     10/04/2018 0756    K 4.7 10/04/2018 0756    K 4.5 10/04/2018 0756     10/04/2018 0756     10/04/2018 0756    CO2 34 (H) 10/04/2018 0756    CO2 31 (H) 10/04/2018 0756    BUN 23 10/04/2018 0756    BUN 22 10/04/2018 0756    CREATININE 1.5 (H) 10/04/2018 0756    CREATININE 1.4 10/04/2018 0756     10/04/2018 0756     10/04/2018 0756        Component Value Date/Time    CALCIUM 9.9 10/04/2018 0756    CALCIUM 9.8 10/04/2018 0756    ALKPHOS 134 10/04/2018 0756    AST 32 10/04/2018 0756    ALT 22 10/04/2018 0756    BILITOT 0.5 10/04/2018 0756    ESTGFRAFRICA 53.8 (A) 10/04/2018 0756    ESTGFRAFRICA 58 (A) 10/04/2018 0756    EGFRNONAA 46.5 (A) 10/04/2018 0756    EGFRNONAA 51 (A) 10/04/2018 0756          Lab Results   Component Value Date    HGBA1C 5.7 (H) 10/04/2018     Lab Results   Component Value  Date    TSH 1.350 02/07/2018     Lab Results   Component Value Date    INR 1.1 03/21/2018    INR 1.0 02/12/2018     Lab Results   Component Value Date    WBC 8.46 10/04/2018    HGB 9.3 (L) 10/04/2018    HCT 33.4 (L) 10/04/2018    MCV 94 10/04/2018     10/04/2018     BMP  Sodium   Date Value Ref Range Status   10/04/2018 141 136 - 145 mmol/L Final   10/04/2018 143 136 - 145 mmol/L Final     Potassium   Date Value Ref Range Status   10/04/2018 4.7 3.5 - 5.1 mmol/L Final   10/04/2018 4.5 3.5 - 5.1 mmol/L Final     Chloride   Date Value Ref Range Status   10/04/2018 101 95 - 110 mmol/L Final   10/04/2018 102 95 - 110 mmol/L Final     CO2   Date Value Ref Range Status   10/04/2018 34 (H) 23 - 29 mmol/L Final   10/04/2018 31 (H) 23 - 29 mmol/L Final     BUN, Bld   Date Value Ref Range Status   10/04/2018 23 8 - 23 mg/dL Final   10/04/2018 22 8 - 23 mg/dL Final     Creatinine   Date Value Ref Range Status   10/04/2018 1.5 (H) 0.5 - 1.4 mg/dL Final   10/04/2018 1.4 0.5 - 1.4 mg/dL Final     Calcium   Date Value Ref Range Status   10/04/2018 9.9 8.7 - 10.5 mg/dL Final   10/04/2018 9.8 8.7 - 10.5 mg/dL Final     Anion Gap   Date Value Ref Range Status   10/04/2018 6 (L) 8 - 16 mmol/L Final   10/04/2018 10 8 - 16 mmol/L Final     eGFR if    Date Value Ref Range Status   10/04/2018 53.8 (A) >60 mL/min/1.73 m^2 Final   10/04/2018 58 (A) >60 mL/min/1.73 m^2 Final     eGFR if non    Date Value Ref Range Status   10/04/2018 46.5 (A) >60 mL/min/1.73 m^2 Final     Comment:     Calculation used to obtain the estimated glomerular filtration  rate (eGFR) is the CKD-EPI equation.      10/04/2018 51 (A) >60 mL/min/1.73 m^2 Final     Comment:     Calculation used to obtain the estimated glomerular filtration  rate (eGFR) is the CKD-EPI equation.        BNP  @LABRCNTIP(BNP,BNPTRIAGEBLO)@  @LABRCNTIP(troponini)@  CrCl cannot be calculated (Patient's most recent lab result is older than the maximum 7  days allowed.).  No results found in the last 24 hours.  No results found in the last 24 hours.  No results found in the last 24 hours.    Assessment:      1. Chronic diastolic congestive heart failure    2. Chronic atrial fibrillation    3. Pulmonary hypertension    4. COPD, severe    5. Dyslipidemia    6. Essential hypertension    7. Cerebrovascular accident (CVA), unspecified mechanism    8. LBBB (left bundle branch block)      EKG showed Afib and new LBBB    Plan:     Nuclear stress due to new LBBB on EKG  D/u with hem for HGB  Continue ASa, Eliquis,  Bystolic, arb, Statin and lasix  F/u with LUCIA after stress test.

## 2023-11-30 NOTE — PROGRESS NOTES
"[facility] -  PROGRESS NOTE               Author: Justice Mac M.D. Date & Time: 11/30/2023  8:50 AM     HPI:  58 y.o. 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 Abd imaging demonstrated colitis. C. Diff pending and started on abx. He notes abd pain and diarrhea for several weeks. Edema improving with 2.5% dextrose solution. Still has HD permacath in-place. Pain with peritoneal dialysis but no cloudy effluent of fibrin clots. No f/c/s. Normally does [id number] fills all green.     DAILY NEPHROLOGY SUMMARY:  11/28: consult done  11/29: ongoing abd pain, seen on HD-tolerating procedure well, PD fluid concerning for peritonitis  11/30:c.diff positive, started on oral vanc, new onset aflutter-transferred to [facility], wbc improving, Peritoneal cultures remain negative, abd pain improving,     PAST FAMILY HISTORY: Reviewed and Unchanged  SOCIAL HISTORY: Reviewed and Unchanged  CURRENT MEDICATIONS: Reviewed  IMAGING STUDIES: Reviewed    ROS  10 point ROS performed, negative other than stated above     PHYSICAL EXAM  VS:  /82   Pulse (!) 122   Temp 36.7 °C (98.1 °F) (Temporal)   Resp 17   Ht 1.702 m (5' 7\")   Wt 77 kg (169 lb 12.1 oz)   SpO2 98%   BMI 26.59 kg/m²   GENERAL: no acute distress  CV: no edema  RESP: non-labored  GI: TTP  MSK: No joint deformities   SKIN: sternal incision healing well  NEURO: AOx3  PSYCH: Cooperative    Fluids:  In: 680 [P.O.:180; Dialysis:500]  Out: 2000     LABS:  Recent Results (from the past 24 hour(s))   PROTHROMBIN TIME    Collection Time: 11/30/23  6:43 AM   Result Value Ref Range    PT 19.1 (H) 12.0 - 14.6 sec    INR 1.58 (H) 0.87 - 1.13   CBC WITH DIFFERENTIAL    Collection " Time: 11/30/23  6:43 AM   Result Value Ref Range    WBC 10.5 4.8 - 10.8 K/uL    RBC 3.40 (L) 4.70 - 6.10 M/uL    Hemoglobin 10.2 (L) 14.0 - 18.0 g/dL    Hematocrit 32.6 (L) 42.0 - 52.0 %    MCV 95.9 81.4 - 97.8 fL    MCH 30.0 27.0 - 33.0 pg    MCHC 31.3 (L) 32.3 - 36.5 g/dL    RDW 52.3 (H) 35.9 - 50.0 fL    Platelet Count 390 164 - 446 K/uL    MPV 8.6 (L) 9.0 - 12.9 fL    Neutrophils-Polys 81.90 (H) 44.00 - 72.00 %    Lymphocytes 7.40 (L) 22.00 - 41.00 %    Monocytes 6.60 0.00 - 13.40 %    Eosinophils 3.00 0.00 - 6.90 %    Basophils 0.40 0.00 - 1.80 %    Immature Granulocytes 0.70 0.00 - 0.90 %    Nucleated RBC 0.00 0.00 - 0.20 /100 WBC    Neutrophils (Absolute) 8.58 (H) 1.82 - 7.42 K/uL    Lymphs (Absolute) 0.77 (L) 1.00 - 4.80 K/uL    Monos (Absolute) 0.69 0.00 - 0.85 K/uL    Eos (Absolute) 0.31 0.00 - 0.51 K/uL    Baso (Absolute) 0.04 0.00 - 0.12 K/uL    Immature Granulocytes (abs) 0.07 0.00 - 0.11 K/uL    NRBC (Absolute) 0.00 K/uL   Renal Function Panel    Collection Time: 11/30/23  6:43 AM   Result Value Ref Range    Sodium 134 (L) 135 - 145 mmol/L    Potassium 4.6 3.6 - 5.5 mmol/L    Chloride 94 (L) 96 - 112 mmol/L    Co2 26 20 - 33 mmol/L    Glucose 111 (H) 65 - 99 mg/dL    Creatinine 11.68 (HH) 0.50 - 1.40 mg/dL    Bun 70 (H) 8 - 22 mg/dL    Calcium 8.4 (L) 8.5 - 10.5 mg/dL    Correct Calcium 9.7 8.5 - 10.5 mg/dL    Phosphorus 6.9 (H) 2.5 - 4.5 mg/dL    Albumin 2.4 (L) 3.2 - 4.9 g/dL   ESTIMATED GFR    Collection Time: 11/30/23  6:43 AM   Result Value Ref Range    GFR (CKD-EPI) 5 (A) >60 mL/min/1.73 m 2       (click the triangle to expand results)    ASSESSMENT:  # ESRD on outpt PD   -Etiology 2/2 FSGS  - s/p permcath on 11/10   - concern for PD cath associated peritonitis based peritoneal studies  # S/P AVR/ Ascending aneurysm repair  # CKD-MBD  # Anemia of CKD: at goal  # BL pleural effusions  # Pericardial effusion   # C. Diff colitis  # Abd pain:   -imaging concerning for colitis  -C.diff positive  -pd  fluid concerning for peritonitis.   # Aflutter     PLAN:  -Continuing with HD for now with pain, MWF schedule for now  -Continue IP cefapime daily for empiric PD cath associated peritonitis treatment. FU cultures, may be able to de-escalate/stop course early  -oral vanc per primary  -Renal diet  -Dose all medications as per ESRD     Discussed with hospitalist     Justice Mac MD

## 2023-11-30 NOTE — CARE PLAN
Problem: Pain - Standard  Goal: Alleviation of pain or a reduction in pain to the patient’s comfort goal  Outcome: Progressing     Problem: Knowledge Deficit - Standard  Goal: Patient and family/care givers will demonstrate understanding of plan of care, disease process/condition, diagnostic tests and medications  Outcome: Progressing     Problem: Fall Risk  Goal: Patient will remain free from falls  Outcome: Progressing   The patient is Stable - Low risk of patient condition declining or worsening    Shift Goals  Clinical Goals: Tele monitor, PD cath infusing ABX, hemodynamic stability, pain mangement,  Patient Goals: Feel better  Family Goals: None present    Progress made toward(s) clinical / shift goals:  Plan of care discussed with patient.     Patient is not progressing towards the following goals:

## 2023-11-30 NOTE — PROGRESS NOTES
Received report from previous shift RN.  Assessment complete.  A&O x 4. Patient calls appropriately.  Patient ambulates with standby assist.  Patient denies SOB.  Patient has 4/10 pain. Pain managed per MAR.  Denies N&V. Tolerating renal diet.  Skin per flowsheets.  + void, + flatus, last BM 11/29.  Patient pleasant and cooperative with plan of care.  Call light and personal belongings with in reach. Hourly rounding in place. All needs met at this time.

## 2023-11-30 NOTE — PROGRESS NOTES
Dressing change on PD catheter entry site done today,  No S/S of infection noted, gentamicin ointment  applied. Report given to CODI Anglin.

## 2023-12-01 LAB
ALBUMIN SERPL BCP-MCNC: 2.5 G/DL (ref 3.2–4.9)
APTT PPP: 31.2 SEC (ref 24.7–36)
BASOPHILS # BLD AUTO: 0.3 % (ref 0–1.8)
BASOPHILS # BLD: 0.03 K/UL (ref 0–0.12)
BUN SERPL-MCNC: 80 MG/DL (ref 8–22)
CALCIUM ALBUM COR SERPL-MCNC: 9.6 MG/DL (ref 8.5–10.5)
CALCIUM SERPL-MCNC: 8.4 MG/DL (ref 8.5–10.5)
CHLORIDE SERPL-SCNC: 94 MMOL/L (ref 96–112)
CO2 SERPL-SCNC: 25 MMOL/L (ref 20–33)
CREAT SERPL-MCNC: 12.65 MG/DL (ref 0.5–1.4)
EOSINOPHIL # BLD AUTO: 0.33 K/UL (ref 0–0.51)
EOSINOPHIL NFR BLD: 3.4 % (ref 0–6.9)
ERYTHROCYTE [DISTWIDTH] IN BLOOD BY AUTOMATED COUNT: 49.7 FL (ref 35.9–50)
FERRITIN SERPL-MCNC: 956 NG/ML (ref 22–322)
GFR SERPLBLD CREATININE-BSD FMLA CKD-EPI: 4 ML/MIN/1.73 M 2
GLUCOSE SERPL-MCNC: 96 MG/DL (ref 65–99)
HCT VFR BLD AUTO: 29.7 % (ref 42–52)
HGB BLD-MCNC: 9.6 G/DL (ref 14–18)
IMM GRANULOCYTES # BLD AUTO: 0.09 K/UL (ref 0–0.11)
IMM GRANULOCYTES NFR BLD AUTO: 0.9 % (ref 0–0.9)
INR PPP: 1.44 (ref 0.87–1.13)
INR PPP: 1.52 (ref 0.87–1.13)
IRON SATN MFR SERPL: 19 % (ref 15–55)
IRON SERPL-MCNC: 32 UG/DL (ref 50–180)
LYMPHOCYTES # BLD AUTO: 0.68 K/UL (ref 1–4.8)
LYMPHOCYTES NFR BLD: 7 % (ref 22–41)
MCH RBC QN AUTO: 29.9 PG (ref 27–33)
MCHC RBC AUTO-ENTMCNC: 32.3 G/DL (ref 32.3–36.5)
MCV RBC AUTO: 92.5 FL (ref 81.4–97.8)
MONOCYTES # BLD AUTO: 0.46 K/UL (ref 0–0.85)
MONOCYTES NFR BLD AUTO: 4.7 % (ref 0–13.4)
NEUTROPHILS # BLD AUTO: 8.14 K/UL (ref 1.82–7.42)
NEUTROPHILS NFR BLD: 83.7 % (ref 44–72)
NRBC # BLD AUTO: 0 K/UL
NRBC BLD-RTO: 0 /100 WBC (ref 0–0.2)
PHOSPHATE SERPL-MCNC: 7.6 MG/DL (ref 2.5–4.5)
PLATELET # BLD AUTO: 398 K/UL (ref 164–446)
PMV BLD AUTO: 8.8 FL (ref 9–12.9)
POTASSIUM SERPL-SCNC: 4.7 MMOL/L (ref 3.6–5.5)
PROTHROMBIN TIME: 17.8 SEC (ref 12–14.6)
PROTHROMBIN TIME: 18.5 SEC (ref 12–14.6)
RBC # BLD AUTO: 3.21 M/UL (ref 4.7–6.1)
SODIUM SERPL-SCNC: 136 MMOL/L (ref 135–145)
TIBC SERPL-MCNC: 169 UG/DL (ref 250–450)
UFH PPP CHRO-ACNC: <0.1 IU/ML
UIBC SERPL-MCNC: 137 UG/DL (ref 110–370)
WBC # BLD AUTO: 9.7 K/UL (ref 4.8–10.8)

## 2023-12-01 PROCEDURE — 700102 HCHG RX REV CODE 250 W/ 637 OVERRIDE(OP): Performed by: INTERNAL MEDICINE

## 2023-12-01 PROCEDURE — A9270 NON-COVERED ITEM OR SERVICE: HCPCS | Performed by: INTERNAL MEDICINE

## 2023-12-01 PROCEDURE — 700111 HCHG RX REV CODE 636 W/ 250 OVERRIDE (IP): Performed by: STUDENT IN AN ORGANIZED HEALTH CARE EDUCATION/TRAINING PROGRAM

## 2023-12-01 PROCEDURE — 36415 COLL VENOUS BLD VENIPUNCTURE: CPT

## 2023-12-01 PROCEDURE — A9270 NON-COVERED ITEM OR SERVICE: HCPCS | Performed by: STUDENT IN AN ORGANIZED HEALTH CARE EDUCATION/TRAINING PROGRAM

## 2023-12-01 PROCEDURE — 85730 THROMBOPLASTIN TIME PARTIAL: CPT

## 2023-12-01 PROCEDURE — 90935 HEMODIALYSIS ONE EVALUATION: CPT

## 2023-12-01 PROCEDURE — 83550 IRON BINDING TEST: CPT

## 2023-12-01 PROCEDURE — 85025 COMPLETE CBC W/AUTO DIFF WBC: CPT

## 2023-12-01 PROCEDURE — 700101 HCHG RX REV CODE 250: Performed by: INTERNAL MEDICINE

## 2023-12-01 PROCEDURE — 700102 HCHG RX REV CODE 250 W/ 637 OVERRIDE(OP): Performed by: STUDENT IN AN ORGANIZED HEALTH CARE EDUCATION/TRAINING PROGRAM

## 2023-12-01 PROCEDURE — 85610 PROTHROMBIN TIME: CPT

## 2023-12-01 PROCEDURE — 770020 HCHG ROOM/CARE - TELE (206)

## 2023-12-01 PROCEDURE — 83540 ASSAY OF IRON: CPT

## 2023-12-01 PROCEDURE — 700111 HCHG RX REV CODE 636 W/ 250 OVERRIDE (IP): Mod: JZ | Performed by: INTERNAL MEDICINE

## 2023-12-01 PROCEDURE — 90945 DIALYSIS ONE EVALUATION: CPT

## 2023-12-01 PROCEDURE — 99233 SBSQ HOSP IP/OBS HIGH 50: CPT | Performed by: STUDENT IN AN ORGANIZED HEALTH CARE EDUCATION/TRAINING PROGRAM

## 2023-12-01 PROCEDURE — 80069 RENAL FUNCTION PANEL: CPT

## 2023-12-01 PROCEDURE — 85520 HEPARIN ASSAY: CPT

## 2023-12-01 PROCEDURE — 700111 HCHG RX REV CODE 636 W/ 250 OVERRIDE (IP)

## 2023-12-01 PROCEDURE — 82728 ASSAY OF FERRITIN: CPT

## 2023-12-01 RX ORDER — VERAPAMIL HYDROCHLORIDE 80 MG/1
80 TABLET ORAL EVERY 8 HOURS
Status: DISCONTINUED | OUTPATIENT
Start: 2023-12-01 | End: 2023-12-02

## 2023-12-01 RX ORDER — SEVELAMER CARBONATE 800 MG/1
1600 TABLET, FILM COATED ORAL
Status: DISCONTINUED | OUTPATIENT
Start: 2023-12-01 | End: 2023-12-26 | Stop reason: HOSPADM

## 2023-12-01 RX ORDER — HEPARIN SODIUM 5000 [USP'U]/100ML
0-30 INJECTION, SOLUTION INTRAVENOUS CONTINUOUS
Status: DISCONTINUED | OUTPATIENT
Start: 2023-12-01 | End: 2023-12-09

## 2023-12-01 RX ORDER — HEPARIN SODIUM 1000 [USP'U]/ML
80 INJECTION, SOLUTION INTRAVENOUS; SUBCUTANEOUS ONCE
Status: COMPLETED | OUTPATIENT
Start: 2023-12-01 | End: 2023-12-01

## 2023-12-01 RX ORDER — DRONABINOL 5 MG/1
5 CAPSULE ORAL
Status: DISCONTINUED | OUTPATIENT
Start: 2023-12-01 | End: 2023-12-26 | Stop reason: HOSPADM

## 2023-12-01 RX ORDER — WARFARIN SODIUM 2.5 MG/1
2.5 TABLET ORAL DAILY
Status: DISCONTINUED | OUTPATIENT
Start: 2023-12-01 | End: 2023-12-03

## 2023-12-01 RX ORDER — HEPARIN SODIUM 1000 [USP'U]/ML
40 INJECTION, SOLUTION INTRAVENOUS; SUBCUTANEOUS PRN
Status: DISCONTINUED | OUTPATIENT
Start: 2023-12-01 | End: 2023-12-09

## 2023-12-01 RX ORDER — HEPARIN SODIUM 1000 [USP'U]/ML
INJECTION, SOLUTION INTRAVENOUS; SUBCUTANEOUS
Status: COMPLETED
Start: 2023-12-01 | End: 2023-12-01

## 2023-12-01 RX ORDER — OMEPRAZOLE 20 MG/1
20 CAPSULE, DELAYED RELEASE ORAL DAILY
Status: DISCONTINUED | OUTPATIENT
Start: 2023-12-01 | End: 2023-12-17

## 2023-12-01 RX ADMIN — HEPARIN SODIUM 3700 UNITS: 1000 INJECTION, SOLUTION INTRAVENOUS; SUBCUTANEOUS at 11:40

## 2023-12-01 RX ADMIN — HEPARIN SODIUM 6300 UNITS: 1000 INJECTION, SOLUTION INTRAVENOUS; SUBCUTANEOUS at 18:04

## 2023-12-01 RX ADMIN — SEVELAMER CARBONATE 1600 MG: 800 TABLET, FILM COATED ORAL at 09:03

## 2023-12-01 RX ADMIN — OMEPRAZOLE 20 MG: 20 CAPSULE, DELAYED RELEASE ORAL at 09:03

## 2023-12-01 RX ADMIN — DILTIAZEM HYDROCHLORIDE 15 MG: 5 INJECTION, SOLUTION INTRAVENOUS at 20:33

## 2023-12-01 RX ADMIN — TAMSULOSIN HYDROCHLORIDE 0.4 MG: 0.4 CAPSULE ORAL at 04:56

## 2023-12-01 RX ADMIN — VANCOMYCIN HYDROCHLORIDE 125 MG: 5 INJECTION, POWDER, LYOPHILIZED, FOR SOLUTION INTRAVENOUS at 17:56

## 2023-12-01 RX ADMIN — ONDANSETRON 4 MG: 4 TABLET, ORALLY DISINTEGRATING ORAL at 20:22

## 2023-12-01 RX ADMIN — WARFARIN SODIUM 2.5 MG: 2.5 TABLET ORAL at 17:56

## 2023-12-01 RX ADMIN — HEPARIN SODIUM 5000 UNITS: 5000 INJECTION, SOLUTION INTRAVENOUS; SUBCUTANEOUS at 04:58

## 2023-12-01 RX ADMIN — VANCOMYCIN HYDROCHLORIDE 125 MG: 5 INJECTION, POWDER, LYOPHILIZED, FOR SOLUTION INTRAVENOUS at 00:11

## 2023-12-01 RX ADMIN — DILTIAZEM HYDROCHLORIDE 15 MG: 5 INJECTION, SOLUTION INTRAVENOUS at 09:03

## 2023-12-01 RX ADMIN — VANCOMYCIN HYDROCHLORIDE 125 MG: 5 INJECTION, POWDER, LYOPHILIZED, FOR SOLUTION INTRAVENOUS at 11:44

## 2023-12-01 RX ADMIN — HEPARIN SODIUM 18 UNITS/KG/HR: 5000 INJECTION, SOLUTION INTRAVENOUS at 18:02

## 2023-12-01 RX ADMIN — ATORVASTATIN CALCIUM 40 MG: 40 TABLET, FILM COATED ORAL at 21:35

## 2023-12-01 RX ADMIN — VERAPAMIL HYDROCHLORIDE 80 MG: 80 TABLET ORAL at 21:35

## 2023-12-01 RX ADMIN — VANCOMYCIN HYDROCHLORIDE 125 MG: 5 INJECTION, POWDER, LYOPHILIZED, FOR SOLUTION INTRAVENOUS at 04:56

## 2023-12-01 RX ADMIN — VERAPAMIL HYDROCHLORIDE 40 MG: 80 TABLET ORAL at 04:56

## 2023-12-01 RX ADMIN — DRONABINOL 5 MG: 5 CAPSULE ORAL at 17:56

## 2023-12-01 RX ADMIN — CEFAZOLIN 1500 MG: 1 INJECTION, POWDER, FOR SOLUTION INTRAMUSCULAR; INTRAVENOUS at 11:10

## 2023-12-01 RX ADMIN — VERAPAMIL HYDROCHLORIDE 80 MG: 80 TABLET ORAL at 16:06

## 2023-12-01 RX ADMIN — SEVELAMER CARBONATE 1600 MG: 800 TABLET, FILM COATED ORAL at 12:54

## 2023-12-01 RX ADMIN — ASPIRIN 81 MG: 81 TABLET, COATED ORAL at 04:56

## 2023-12-01 RX ADMIN — Medication 1 APPLICATOR: at 04:55

## 2023-12-01 ASSESSMENT — ENCOUNTER SYMPTOMS
VOMITING: 0
ABDOMINAL PAIN: 1
DIARRHEA: 1
WEAKNESS: 1
SHORTNESS OF BREATH: 0
NAUSEA: 0

## 2023-12-01 ASSESSMENT — FIBROSIS 4 INDEX: FIB4 SCORE: 0.54

## 2023-12-01 NOTE — PROGRESS NOTES
4 Eyes Skin Assessment Completed by CODI Boothe and CODI Ibarra.    Head WDL  Ears WDL  Nose WDL  Mouth WDL  Neck WDL  Breast/Chest Scar and Incision  Shoulder Blades WDL  Spine WDL  (R) Arm/Elbow/Hand Redness, Blanching, and Bruising  (L) Arm/Elbow/Hand Redness, Blanching, and Bruising  Abdomen Scar and Incision RLQ PD cath site   Groin WDL  Scrotum/Coccyx/Buttocks WDL  (R) Leg WDL  (L) Leg WDL  (R) Heel/Foot/Toe WDL  (L) Heel/Foot/Toe Scab          Devices In Places Tele Box, Blood Pressure Cuff, and Pulse Ox      Interventions In Place Gray Ear Foams and Pillows    Possible Skin Injury No    Pictures Uploaded Into Epic N/A  Wound Consult Placed N/A  RN Wound Prevention Protocol Ordered No

## 2023-12-01 NOTE — PROGRESS NOTES
Inpatient Anticoagulation Service Note for 12/1/2023      Reason for Anticoagulation: On-X Mitral Valve Replacement, On-X Aortic Valve Replacement, Atrial Fibrillation   [id number] VASc Score: 2  HAS-BLED Score: 3    Hemoglobin Value: (!) 9.6  Hematocrit Value: (!) 29.7  Lab Platelet Value: 398  Target INR: 2.0 to 3.0    INR from last 7 days       Date/Time INR Value    12/01/23 0332 1.44    11/30/23 0643 1.58    11/29/23 0507 1.64    11/27/23 1545 1.92          Dose from last 7 days       Date/Time Dose (mg)    12/01/23 1345 2.5    11/30/23 1232 2.5    11/29/23 0923 1.25    11/28/23 0932 --          Average Dose (mg):  (1.25mg daily)  Significant Interactions: Antiplatelet Medications  Bridge Therapy: No (Ppx heparin)     Reversal Agent Administered: Not Applicable    Comments: INR continues to trend down, now at 1.44. Pt seems to be fairly labile with INR levels. He was discharged home on warfarin 2.5 mg daily but subsequent outpt INRs were 4-5 and the dosing was decreased to 1.25 mg daily a little prior to admit. The pt's INR would likely increase too rapidly if a warfarin dose larger than 2.5 mg is used. Will increase warfarin back to 2.5 mg daily for now and keep trending the INR. D/W CTS APRN re: bridging for On-X aortic valve and a heparin drip was recommended until the INR is back in goal range. Discussed with MD.    Education Material Provided?: No (Established warfarin patient)    Pharmacist suggested discharge dosing: TBD, warfarin 2.5 mg daily for now, but the dosing will likely need further adjustments     Hector Sorensen, PharmD

## 2023-12-01 NOTE — PROGRESS NOTES
Assumed care of patient. A&Ox4 and anxious about antibiotics in PD catheter. MD notified patient would like to talk to nephrologist about suspectied catheter infection. Fall precautions in place and call light within reach. HR above 120 sustained in a flutter and diltiazem given per MAR. All needs met at this time; will continue to monitor.

## 2023-12-01 NOTE — DISCHARGE PLANNING
"Met at bedside with pt who was being dialyzed, A&Ox4, so s/s of distress, able to converse  appropriately. Pt reports being at home since mid-November recovering from AAA and Aortic Valve repair, when he began having severe abdominal pain with diarrhea. He was compliant with all appointments and treatment after discharge, and Home Health visited twice weekly. RNCM will continue to follow this pt to anticipate any further DCP needs.  Care Transition Team Assessment    Information Source  Orientation Level: Oriented X4  Information Given By: Patient  Who is responsible for making decisions for patient? : Patient    Readmission Evaluation  Is this a readmission?: Yes - unplanned readmission  Why do you think you were readmitted?:  (\"The diarrhea and stomach problems are worse than the opne heart things')  Was an appointment arranged for you prior to discharge?: Yes, attended appointment  Were there new prescriptions you were supposed to fill after you were discharged?: Yes, prescriptions filled  Did you understand your discharge instructions?: Yes  Did you have enough support after your last discharge?: Yes    Elopement Risk  Legal Hold: No  Ambulatory or Self Mobile in Wheelchair: Yes  Disoriented: No  Psychiatric Symptoms: None  History of Wandering: No  Elopement this Admit: No  Vocalizing Wanting to Leave: No  Displays Behaviors, Body Language Wanting to Leave: No-Not at Risk for Elopement  Elopement Risk: Not at Risk for Elopement    Interdisciplinary Discharge Planning  Lives with - Patient's Self Care Capacity: Significant Other  Patient or legal guardian wants to designate a caregiver: No  Support Systems: Friends / Neighbors, Spouse / Significant Other  Housing / Facility: 1 Story Apartment / Condo  Durable Medical Equipment: Other - Specify (PD Dialyzer)    Discharge Preparedness  What is your plan after discharge?: Home health care  What are your discharge supports?: Other (comment) (Donna ENGEL [phone])  Prior Functional Level: Ambulatory, Drives Self, Independent with Activities of Daily Living, Independent with Medication Management  Difficulity with ADLs: None  Difficulity with IADLs: None    Functional Assesment  Prior Functional Level: Ambulatory, Drives Self, Independent with Activities of Daily Living, Independent with Medication Management    Finances  Financial Barriers to Discharge: No  Prescription Coverage: Yes    Vision / Hearing Impairment  Right Eye Vision: Impaired, Wears Glasses  Left Eye Vision: Impaired, Wears Glasses    Values / Beliefs / Concerns  Values / Beliefs Concerns : No    Advance Directive  Advance Directive?: None    Domestic Abuse  Have you ever been the victim of abuse or violence?: No  Physical Abuse or Sexual Abuse: No  Verbal Abuse or Emotional Abuse: No  Possible Abuse/Neglect Reported to:: Not Applicable    Psychological Assessment  History of Substance Abuse: None  History of Psychiatric Problems: No  Non-compliant with Treatment: No  Newly Diagnosed Illness: Yes    Discharge Risks or Barriers  Discharge risks or barriers?: Complex medical needs  Patient risk factors: Multiple organizational systems involved    Anticipated Discharge Information  Discharge Disposition: Discharged to home/self care (01)  Discharge Address: Home ([address] [place] [address])  Discharge Contact Phone Number: S/O Donna [phone]

## 2023-12-01 NOTE — PROGRESS NOTES
[facility] Nursing Notes     HD today x 3hrs per Dr RADHAMES Mac  Initiated at 0835 and ended 1135     Pre HD assessment     Received patient alert and oriented.   No Sob. No chest pain  Lungs Clear. Vital Signs stable  No complains pre HD.     Edema - no edema     Access: - Right PC , Tunneled     No s/s of infection: no redness, no tenderness, no pus, no oozing of blood, warm to touch.     Intra HD    No issues during HD  Vital signs stable  No complaints  Able to rest and sleep during treatment        Post HD     UF goal  achieved: 2500mLS  - 500 mL (200mls prime + 300mls rinseback)       Target Net UF : 2000mls     Completed HD tolerated well  Post vital signs stable  Nil complaints  Dressing: dry and intact  Heparin 3700units, A-1.8mls and V1.9mls intracatheter instilled     Documented by  OSIEL HALEY RN    Report given to PCN Laura Castaneda RN

## 2023-12-01 NOTE — PROGRESS NOTES
Bedside report received. Patient A/Ox 4. Oxygen requirements 1L. Telemetry monitoring Aflutter. Complains of pain 4/10, patient repositioned. POC discussed with patient. Pt verbalizes understanding. Call light and belongings within reach. Bed locked and lowest position, alarm and fall precautions in place.

## 2023-12-01 NOTE — PROGRESS NOTES
CAPD treatment attempted at 0515 by this Bertha Rn.Patient unfortunately refusing his second dose of IP Cefepime at this time Patient would like to have a discussion with the rounding nephrologist first since it was his understanding that he's just getting a one time dose.Primary Rn notified of patient's refusal.

## 2023-12-01 NOTE — PROGRESS NOTES
"[facility] -  PROGRESS NOTE               Author: Justice Mac M.D. Date & Time: 12/1/2023  7:49 AM     HPI:  58 y.o. 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 Abd imaging demonstrated colitis. C. Diff pending and started on abx. He notes abd pain and diarrhea for several weeks. Edema improving with 2.5% dextrose solution. Still has HD permacath in-place. Pain with peritoneal dialysis but no cloudy effluent of fibrin clots. No f/c/s. Normally does [id number] fills all green.     DAILY NEPHROLOGY SUMMARY:  11/28: consult done  11/29: ongoing abd pain, seen on HD-tolerating procedure well, PD fluid concerning for peritonitis  11/30:c.diff positive, started on oral vanc, new onset aflutter-transferred to [facility], wbc improving, Peritoneal cultures remain negative, abd pain improving  12/1: PD culture remains negative, seen on HD-tolerating procedure, abd pain improving, less diarrhea      PAST FAMILY HISTORY: Reviewed and Unchanged  SOCIAL HISTORY: Reviewed and Unchanged  CURRENT MEDICATIONS: Reviewed  IMAGING STUDIES: Reviewed    ROS  10 point ROS performed, negative other than stated above     PHYSICAL EXAM  VS:  /88   Pulse (!) 122   Temp 36.5 °C (97.7 °F) (Temporal)   Resp 18   Ht 1.702 m (5' 7\")   Wt 78.4 kg (172 lb 13.5 oz)   SpO2 95%   BMI 27.07 kg/m²   GENERAL: no acute distress  CV: no edema  RESP: non-labored  GI: non-tender  MSK: No joint deformities   SKIN: sternal incision healing well  NEURO: AOx3  PSYCH: Cooperative    Fluids:  In: 1570 [P.O.:570; Dialysis:1000]  Out: 700     LABS:  Recent Results (from the past 24 hour(s))   PROTHROMBIN TIME    Collection Time: 12/01/23  3:32 AM   Result " Value Ref Range    PT 17.8 (H) 12.0 - 14.6 sec    INR 1.44 (H) 0.87 - 1.13   CBC WITH DIFFERENTIAL    Collection Time: 12/01/23  3:32 AM   Result Value Ref Range    WBC 9.7 4.8 - 10.8 K/uL    RBC 3.21 (L) 4.70 - 6.10 M/uL    Hemoglobin 9.6 (L) 14.0 - 18.0 g/dL    Hematocrit 29.7 (L) 42.0 - 52.0 %    MCV 92.5 81.4 - 97.8 fL    MCH 29.9 27.0 - 33.0 pg    MCHC 32.3 32.3 - 36.5 g/dL    RDW 49.7 35.9 - 50.0 fL    Platelet Count 398 164 - 446 K/uL    MPV 8.8 (L) 9.0 - 12.9 fL    Neutrophils-Polys 83.70 (H) 44.00 - 72.00 %    Lymphocytes 7.00 (L) 22.00 - 41.00 %    Monocytes 4.70 0.00 - 13.40 %    Eosinophils 3.40 0.00 - 6.90 %    Basophils 0.30 0.00 - 1.80 %    Immature Granulocytes 0.90 0.00 - 0.90 %    Nucleated RBC 0.00 0.00 - 0.20 /100 WBC    Neutrophils (Absolute) 8.14 (H) 1.82 - 7.42 K/uL    Lymphs (Absolute) 0.68 (L) 1.00 - 4.80 K/uL    Monos (Absolute) 0.46 0.00 - 0.85 K/uL    Eos (Absolute) 0.33 0.00 - 0.51 K/uL    Baso (Absolute) 0.03 0.00 - 0.12 K/uL    Immature Granulocytes (abs) 0.09 0.00 - 0.11 K/uL    NRBC (Absolute) 0.00 K/uL   Renal Function Panel    Collection Time: 12/01/23  3:32 AM   Result Value Ref Range    Sodium 136 135 - 145 mmol/L    Potassium 4.7 3.6 - 5.5 mmol/L    Chloride 94 (L) 96 - 112 mmol/L    Co2 25 20 - 33 mmol/L    Glucose 96 65 - 99 mg/dL    Creatinine 12.65 (HH) 0.50 - 1.40 mg/dL    Bun 80 (H) 8 - 22 mg/dL    Calcium 8.4 (L) 8.5 - 10.5 mg/dL    Correct Calcium 9.6 8.5 - 10.5 mg/dL    Phosphorus 7.6 (H) 2.5 - 4.5 mg/dL    Albumin 2.5 (L) 3.2 - 4.9 g/dL   ESTIMATED GFR    Collection Time: 12/01/23  3:32 AM   Result Value Ref Range    GFR (CKD-EPI) 4 (A) >60 mL/min/1.73 m 2       (click the triangle to expand results)    ASSESSMENT:  # ESRD on outpt PD   -Etiology 2/2 FSGS  - s/p permcath on 11/10   # R/O PD cath associated peritonitis  - concern for PD cath associated peritonitis based peritoneal studies but in setting of c. Diff.  -symptoms improved with initiation of oral  vanc  -guidelines vague on scenarios such as this  # S/P AVR/ Ascending aneurysm repair  # CKD-MBD  -phos elevated  # Anemia of CKD: at goal  # BL pleural effusions  # Pericardial effusion   # C. Diff colitis  # Abd pain:   -imaging concerning for colitis  -C.diff positive  # C.diff  # Aflutter     PLAN:  -HD again today and then plan to switch back to PD tomorrow night as abd pain improved and believes can tolerate again  -De-escalating IP abx to ancef. Unclear if presentation represents true pd cath associated peritonitis or related to c. Diff colitis. Doubt translocation/c. Diff peritonitis as symptoms improving without IP/systemic vanc. If abd pain worsens may need to add IP vanc and/or re-escalate cephalosporin therapy as well.   -oral vanc per primary  -holding APPLE for now  -Renal diet  -Phos binder with meals   -Dose all medications as per ESRD     Discussed with hospitalist     Justice Mac MD

## 2023-12-01 NOTE — PROGRESS NOTES
[facility] Progress Note       CAPD treatment initiated at 1140 without any issues  With IV Cefazolin (Ancef) 1500mg in 20mls instilled in 1.5%    Fill volume - 1L  Dwell time - 6hrs    Patient and PD access assessed prior to therapy  Patient AOx4, resting calmly, interactive, (+) abd distention, denies pain/abd discomfort, stable VS.    Patient with intact and patent RMQ PD catheter aseptically connected per policy.    RLQ PD catheter and exit site in good condition, no signs of infection.    Initial drain: 500 mL    Vital signs stable    Effluent yellow and clear    Report given to primary care nurse Laura Castaneda RN

## 2023-12-01 NOTE — CARE PLAN
The patient is Watcher - Medium risk of patient condition declining or worsening    Shift Goals  Clinical Goals: Monitor heart rhythm/HR, BM's and safety  Patient Goals: Go home feeling better  Family Goals: None present    Progress made toward(s) clinical / shift goals:    Problem: Knowledge Deficit - Standard  Goal: Patient and family/care givers will demonstrate understanding of plan of care, disease process/condition, diagnostic tests and medications  Outcome: Progressing  Note: POC discussed. Patient educated on new verapamil parameters.     Problem: Fall Risk  Goal: Patient will remain free from falls  Outcome: Progressing  Note: Bed locked and in lowest position, bed alarm on. Patient reminded to call for assistance. Personal belongings and call light within reach.       Patient is not progressing towards the following goals:

## 2023-12-02 ENCOUNTER — APPOINTMENT (OUTPATIENT)
Dept: RADIOLOGY | Facility: MEDICAL CENTER | Age: 58
DRG: 981 | End: 2023-12-02
Attending: INTERNAL MEDICINE
Payer: COMMERCIAL

## 2023-12-02 LAB
ALBUMIN SERPL BCP-MCNC: 2.4 G/DL (ref 3.2–4.9)
ALBUMIN/GLOB SERPL: 0.8 G/DL
ALP SERPL-CCNC: 70 U/L (ref 30–99)
ALT SERPL-CCNC: 24 U/L (ref 2–50)
ANION GAP SERPL CALC-SCNC: 13 MMOL/L (ref 7–16)
AST SERPL-CCNC: 25 U/L (ref 12–45)
BACTERIA STL CULT: NORMAL
BASOPHILS # BLD AUTO: 0.3 % (ref 0–1.8)
BASOPHILS # BLD: 0.03 K/UL (ref 0–0.12)
BILIRUB SERPL-MCNC: <0.2 MG/DL (ref 0.1–1.5)
BUN SERPL-MCNC: 55 MG/DL (ref 8–22)
CALCIUM ALBUM COR SERPL-MCNC: 9.5 MG/DL (ref 8.5–10.5)
CALCIUM SERPL-MCNC: 8.2 MG/DL (ref 8.5–10.5)
CHLORIDE SERPL-SCNC: 99 MMOL/L (ref 96–112)
CO2 SERPL-SCNC: 24 MMOL/L (ref 20–33)
CREAT SERPL-MCNC: 9.29 MG/DL (ref 0.5–1.4)
E COLI SXT1+2 STL IA: NORMAL
EOSINOPHIL # BLD AUTO: 0.28 K/UL (ref 0–0.51)
EOSINOPHIL NFR BLD: 2.9 % (ref 0–6.9)
ERYTHROCYTE [DISTWIDTH] IN BLOOD BY AUTOMATED COUNT: 51.8 FL (ref 35.9–50)
GFR SERPLBLD CREATININE-BSD FMLA CKD-EPI: 6 ML/MIN/1.73 M 2
GLOBULIN SER CALC-MCNC: 3 G/DL (ref 1.9–3.5)
GLUCOSE SERPL-MCNC: 92 MG/DL (ref 65–99)
HCT VFR BLD AUTO: 31.3 % (ref 42–52)
HGB BLD-MCNC: 9.9 G/DL (ref 14–18)
IMM GRANULOCYTES # BLD AUTO: 0.1 K/UL (ref 0–0.11)
IMM GRANULOCYTES NFR BLD AUTO: 1 % (ref 0–0.9)
INR PPP: 1.7 (ref 0.87–1.13)
LYMPHOCYTES # BLD AUTO: 0.5 K/UL (ref 1–4.8)
LYMPHOCYTES NFR BLD: 5.2 % (ref 22–41)
MAGNESIUM SERPL-MCNC: 2.1 MG/DL (ref 1.5–2.5)
MCH RBC QN AUTO: 30.2 PG (ref 27–33)
MCHC RBC AUTO-ENTMCNC: 31.6 G/DL (ref 32.3–36.5)
MCV RBC AUTO: 95.4 FL (ref 81.4–97.8)
MONOCYTES # BLD AUTO: 0.54 K/UL (ref 0–0.85)
MONOCYTES NFR BLD AUTO: 5.6 % (ref 0–13.4)
NEUTROPHILS # BLD AUTO: 8.15 K/UL (ref 1.82–7.42)
NEUTROPHILS NFR BLD: 85 % (ref 44–72)
NRBC # BLD AUTO: 0.02 K/UL
NRBC BLD-RTO: 0.2 /100 WBC (ref 0–0.2)
NT-PROBNP SERPL IA-MCNC: ABNORMAL PG/ML (ref 0–125)
PHOSPHATE SERPL-MCNC: 5.4 MG/DL (ref 2.5–4.5)
PLATELET # BLD AUTO: 390 K/UL (ref 164–446)
PMV BLD AUTO: 9 FL (ref 9–12.9)
POTASSIUM SERPL-SCNC: 4.7 MMOL/L (ref 3.6–5.5)
PROT SERPL-MCNC: 5.4 G/DL (ref 6–8.2)
PROTHROMBIN TIME: 20.2 SEC (ref 12–14.6)
RBC # BLD AUTO: 3.28 M/UL (ref 4.7–6.1)
SIGNIFICANT IND 70042: NORMAL
SITE SITE: NORMAL
SODIUM SERPL-SCNC: 136 MMOL/L (ref 135–145)
SOURCE SOURCE: NORMAL
UFH PPP CHRO-ACNC: 0.54 IU/ML
UFH PPP CHRO-ACNC: 0.54 IU/ML
WBC # BLD AUTO: 9.6 K/UL (ref 4.8–10.8)

## 2023-12-02 PROCEDURE — 700102 HCHG RX REV CODE 250 W/ 637 OVERRIDE(OP): Performed by: STUDENT IN AN ORGANIZED HEALTH CARE EDUCATION/TRAINING PROGRAM

## 2023-12-02 PROCEDURE — 83880 ASSAY OF NATRIURETIC PEPTIDE: CPT

## 2023-12-02 PROCEDURE — 700111 HCHG RX REV CODE 636 W/ 250 OVERRIDE (IP): Performed by: STUDENT IN AN ORGANIZED HEALTH CARE EDUCATION/TRAINING PROGRAM

## 2023-12-02 PROCEDURE — 83735 ASSAY OF MAGNESIUM: CPT

## 2023-12-02 PROCEDURE — 770020 HCHG ROOM/CARE - TELE (206)

## 2023-12-02 PROCEDURE — 99233 SBSQ HOSP IP/OBS HIGH 50: CPT | Performed by: STUDENT IN AN ORGANIZED HEALTH CARE EDUCATION/TRAINING PROGRAM

## 2023-12-02 PROCEDURE — 700101 HCHG RX REV CODE 250: Performed by: INTERNAL MEDICINE

## 2023-12-02 PROCEDURE — 90935 HEMODIALYSIS ONE EVALUATION: CPT

## 2023-12-02 PROCEDURE — 36415 COLL VENOUS BLD VENIPUNCTURE: CPT

## 2023-12-02 PROCEDURE — 84100 ASSAY OF PHOSPHORUS: CPT

## 2023-12-02 PROCEDURE — 71045 X-RAY EXAM CHEST 1 VIEW: CPT

## 2023-12-02 PROCEDURE — 80053 COMPREHEN METABOLIC PANEL: CPT

## 2023-12-02 PROCEDURE — 85610 PROTHROMBIN TIME: CPT

## 2023-12-02 PROCEDURE — 85025 COMPLETE CBC W/AUTO DIFF WBC: CPT

## 2023-12-02 PROCEDURE — A9270 NON-COVERED ITEM OR SERVICE: HCPCS | Performed by: STUDENT IN AN ORGANIZED HEALTH CARE EDUCATION/TRAINING PROGRAM

## 2023-12-02 PROCEDURE — 700111 HCHG RX REV CODE 636 W/ 250 OVERRIDE (IP): Mod: JZ | Performed by: INTERNAL MEDICINE

## 2023-12-02 PROCEDURE — 90945 DIALYSIS ONE EVALUATION: CPT

## 2023-12-02 PROCEDURE — 85520 HEPARIN ASSAY: CPT | Mod: 91

## 2023-12-02 RX ORDER — LACTOBACILLUS RHAMNOSUS GG 10B CELL
1 CAPSULE ORAL
Status: DISCONTINUED | OUTPATIENT
Start: 2023-12-02 | End: 2023-12-26 | Stop reason: HOSPADM

## 2023-12-02 RX ORDER — DEXTROMETHORPHAN POLISTIREX 30 MG/5ML
60 SUSPENSION ORAL 2 TIMES DAILY
Status: DISCONTINUED | OUTPATIENT
Start: 2023-12-02 | End: 2023-12-04

## 2023-12-02 RX ORDER — VERAPAMIL HYDROCHLORIDE 120 MG/1
120 TABLET, FILM COATED ORAL EVERY 8 HOURS
Status: DISCONTINUED | OUTPATIENT
Start: 2023-12-02 | End: 2023-12-06

## 2023-12-02 RX ADMIN — WARFARIN SODIUM 2.5 MG: 2.5 TABLET ORAL at 18:13

## 2023-12-02 RX ADMIN — ASPIRIN 81 MG: 81 TABLET, COATED ORAL at 04:22

## 2023-12-02 RX ADMIN — Medication 1 APPLICATOR: at 04:22

## 2023-12-02 RX ADMIN — VERAPAMIL HYDROCHLORIDE 120 MG: 120 TABLET ORAL at 14:21

## 2023-12-02 RX ADMIN — BENZOCAINE AND MENTHOL 1 LOZENGE: 15; 3.6 LOZENGE ORAL at 19:56

## 2023-12-02 RX ADMIN — VERAPAMIL HYDROCHLORIDE 120 MG: 120 TABLET ORAL at 21:56

## 2023-12-02 RX ADMIN — DRONABINOL 5 MG: 5 CAPSULE ORAL at 18:13

## 2023-12-02 RX ADMIN — DRONABINOL 5 MG: 5 CAPSULE ORAL at 10:52

## 2023-12-02 RX ADMIN — VANCOMYCIN HYDROCHLORIDE 125 MG: 5 INJECTION, POWDER, LYOPHILIZED, FOR SOLUTION INTRAVENOUS at 01:12

## 2023-12-02 RX ADMIN — Medication 1 CAPSULE: at 10:53

## 2023-12-02 RX ADMIN — Medication 1 LOZENGE: at 18:15

## 2023-12-02 RX ADMIN — OMEPRAZOLE 20 MG: 20 CAPSULE, DELAYED RELEASE ORAL at 04:22

## 2023-12-02 RX ADMIN — Medication 1 APPLICATOR: at 18:13

## 2023-12-02 RX ADMIN — HEPARIN SODIUM 18 UNITS/KG/HR: 5000 INJECTION, SOLUTION INTRAVENOUS at 11:04

## 2023-12-02 RX ADMIN — DEXTROMETHORPHAN 60 MG: 30 SUSPENSION, EXTENDED RELEASE ORAL at 21:55

## 2023-12-02 RX ADMIN — ONDANSETRON 4 MG: 4 TABLET, ORALLY DISINTEGRATING ORAL at 04:06

## 2023-12-02 RX ADMIN — TAMSULOSIN HYDROCHLORIDE 0.4 MG: 0.4 CAPSULE ORAL at 04:22

## 2023-12-02 RX ADMIN — GENTAMICIN SULFATE: 1 CREAM TOPICAL at 17:05

## 2023-12-02 RX ADMIN — ATORVASTATIN CALCIUM 40 MG: 40 TABLET, FILM COATED ORAL at 21:56

## 2023-12-02 RX ADMIN — VANCOMYCIN HYDROCHLORIDE 125 MG: 5 INJECTION, POWDER, LYOPHILIZED, FOR SOLUTION INTRAVENOUS at 11:42

## 2023-12-02 RX ADMIN — VERAPAMIL HYDROCHLORIDE 80 MG: 80 TABLET ORAL at 04:22

## 2023-12-02 RX ADMIN — VANCOMYCIN HYDROCHLORIDE 125 MG: 5 INJECTION, POWDER, LYOPHILIZED, FOR SOLUTION INTRAVENOUS at 06:07

## 2023-12-02 RX ADMIN — DILTIAZEM HYDROCHLORIDE 15 MG: 5 INJECTION, SOLUTION INTRAVENOUS at 04:22

## 2023-12-02 RX ADMIN — VANCOMYCIN HYDROCHLORIDE 125 MG: 5 INJECTION, POWDER, LYOPHILIZED, FOR SOLUTION INTRAVENOUS at 18:13

## 2023-12-02 ASSESSMENT — ENCOUNTER SYMPTOMS
ABDOMINAL PAIN: 1
DIARRHEA: 1
WEAKNESS: 1
SHORTNESS OF BREATH: 0
VOMITING: 0
NAUSEA: 0

## 2023-12-02 ASSESSMENT — FIBROSIS 4 INDEX: FIB4 SCORE: 0.54

## 2023-12-02 ASSESSMENT — PAIN DESCRIPTION - PAIN TYPE
TYPE: ACUTE PAIN
TYPE: ACUTE PAIN

## 2023-12-02 NOTE — PROGRESS NOTES
"Pt refusing bed rails on bed asking if staff can put them down because, \"I get up easier with then down. It's hard to get up when this rail is in the way.\" RN educated pt on need to bed rails and it places pt at a high risk for falling out of bed since there is not bed rail to prevent him from rolling off. Pt states, \"I'm not going to fall.\" RN educated charge RN and on call hospitalist of refusal.  Pt refusing bed alarm as well, RN educated need for bed alarms has we want to prevent falls and we want to promote the best level of safety for all patients. Pt states, \"I'm not going to fall, but I understand. I want these alarms off. I won't get up without pushing the button.\" RN notified charge RN and on call hospitalist of refusal for bed alarms as well.   "

## 2023-12-02 NOTE — PROGRESS NOTES
Assumed care of pt. Bedside report received from RN. Pt was updated on plan of care. AOx4. Pt pain level 0/10. Pt is on telemetry, Aflutter 109. Call light, phone and personal belongings in reach. Bed alarm on and working properly, bed in lowest position, and locked.

## 2023-12-02 NOTE — PROGRESS NOTES
"[facility] -  PROGRESS NOTE               Author: Justo Ríos M.D. Date & Time: 12/2/2023  10:52 AM     HPI:  58 y.o. 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 Abd imaging demonstrated colitis. C. Diff pending and started on abx. He notes abd pain and diarrhea for several weeks. Edema improving with 2.5% dextrose solution. Still has HD permacath in-place. Pain with peritoneal dialysis but no cloudy effluent of fibrin clots. No f/c/s. Normally does [id number] fills all green.     DAILY NEPHROLOGY SUMMARY:  11/28: consult done  11/29: ongoing abd pain, seen on HD-tolerating procedure well, PD fluid concerning for peritonitis  11/30:c.diff positive, started on oral vanc, new onset aflutter-transferred to [facility], wbc improving, Peritoneal cultures remain negative, abd pain improving  12/1: PD culture remains negative, seen on HD-tolerating procedure, abd pain improving, less diarrhea    12/2: HD yesterday 2.5 liter UF, still with SOB decreased abdoimnal pain    PAST FAMILY HISTORY: Reviewed and Unchanged  SOCIAL HISTORY: Reviewed and Unchanged  CURRENT MEDICATIONS: Reviewed  IMAGING STUDIES: Reviewed    ROS  10 point ROS performed, negative other than stated above     PHYSICAL EXAM  VS:  /66   Pulse (!) 125 Comment: Rn notified  Temp 36.7 °C (98.1 °F) (Temporal)   Resp 16   Ht 1.702 m (5' 7\")   Wt 77.2 kg (170 lb 3.1 oz)   SpO2 93%   BMI 26.66 kg/m²   GENERAL: no acute distress  CV: no edema  RESP: decreased air movement in bases  GI: non-tender, PD cath in place  MSK: No joint deformities   SKIN: sternal incision healing well  NEURO: AOx3  PSYCH: Cooperative  Vasc Access: RIJ tunnel " cath    Fluids:  In: 900 [P.O.:400; Dialysis:500]  Out: 3500     LABS:  Recent Results (from the past 24 hour(s))   Heparin Xa (Unfractionated)    Collection Time: 12/01/23  3:39 PM   Result Value Ref Range    Heparin Xa (UFH) <0.10 IU/mL   Prothrombin Time    Collection Time: 12/01/23  3:39 PM   Result Value Ref Range    PT 18.5 (H) 12.0 - 14.6 sec    INR 1.52 (H) 0.87 - 1.13   APTT    Collection Time: 12/01/23  3:39 PM   Result Value Ref Range    APTT 31.2 24.7 - 36.0 sec   CBC WITH DIFFERENTIAL    Collection Time: 12/02/23 12:01 AM   Result Value Ref Range    WBC 9.6 4.8 - 10.8 K/uL    RBC 3.28 (L) 4.70 - 6.10 M/uL    Hemoglobin 9.9 (L) 14.0 - 18.0 g/dL    Hematocrit 31.3 (L) 42.0 - 52.0 %    MCV 95.4 81.4 - 97.8 fL    MCH 30.2 27.0 - 33.0 pg    MCHC 31.6 (L) 32.3 - 36.5 g/dL    RDW 51.8 (H) 35.9 - 50.0 fL    Platelet Count 390 164 - 446 K/uL    MPV 9.0 9.0 - 12.9 fL    Neutrophils-Polys 85.00 (H) 44.00 - 72.00 %    Lymphocytes 5.20 (L) 22.00 - 41.00 %    Monocytes 5.60 0.00 - 13.40 %    Eosinophils 2.90 0.00 - 6.90 %    Basophils 0.30 0.00 - 1.80 %    Immature Granulocytes 1.00 (H) 0.00 - 0.90 %    Nucleated RBC 0.20 0.00 - 0.20 /100 WBC    Neutrophils (Absolute) 8.15 (H) 1.82 - 7.42 K/uL    Lymphs (Absolute) 0.50 (L) 1.00 - 4.80 K/uL    Monos (Absolute) 0.54 0.00 - 0.85 K/uL    Eos (Absolute) 0.28 0.00 - 0.51 K/uL    Baso (Absolute) 0.03 0.00 - 0.12 K/uL    Immature Granulocytes (abs) 0.10 0.00 - 0.11 K/uL    NRBC (Absolute) 0.02 K/uL   Comp Metabolic Panel    Collection Time: 12/02/23 12:01 AM   Result Value Ref Range    Sodium 136 135 - 145 mmol/L    Potassium 4.7 3.6 - 5.5 mmol/L    Chloride 99 96 - 112 mmol/L    Co2 24 20 - 33 mmol/L    Anion Gap 13.0 7.0 - 16.0    Glucose 92 65 - 99 mg/dL    Bun 55 (H) 8 - 22 mg/dL    Creatinine 9.29 (HH) 0.50 - 1.40 mg/dL    Calcium 8.2 (L) 8.5 - 10.5 mg/dL    Correct Calcium 9.5 8.5 - 10.5 mg/dL    AST(SGOT) 25 12 - 45 U/L    ALT(SGPT) 24 2 - 50 U/L    Alkaline  Phosphatase 70 30 - 99 U/L    Total Bilirubin <0.2 0.1 - 1.5 mg/dL    Albumin 2.4 (L) 3.2 - 4.9 g/dL    Total Protein 5.4 (L) 6.0 - 8.2 g/dL    Globulin 3.0 1.9 - 3.5 g/dL    A-G Ratio 0.8 g/dL   MAGNESIUM    Collection Time: 12/02/23 12:01 AM   Result Value Ref Range    Magnesium 2.1 1.5 - 2.5 mg/dL   PHOSPHORUS    Collection Time: 12/02/23 12:01 AM   Result Value Ref Range    Phosphorus 5.4 (H) 2.5 - 4.5 mg/dL   Heparin Xa (Unfractionated)    Collection Time: 12/02/23 12:01 AM   Result Value Ref Range    Heparin Xa (UFH) 0.54 IU/mL   Prothrombin Time    Collection Time: 12/02/23 12:01 AM   Result Value Ref Range    PT 20.2 (H) 12.0 - 14.6 sec    INR 1.70 (H) 0.87 - 1.13   ESTIMATED GFR    Collection Time: 12/02/23 12:01 AM   Result Value Ref Range    GFR (CKD-EPI) 6 (A) >60 mL/min/1.73 m 2   proBrain Natriuretic Peptide, NT    Collection Time: 12/02/23 12:01 AM   Result Value Ref Range    NT-proBNP 93618 (H) 0 - 125 pg/mL   Heparin Xa (Unfractionated)    Collection Time: 12/02/23  7:48 AM   Result Value Ref Range    Heparin Xa (UFH) 0.54 IU/mL       (click the triangle to expand results)    ASSESSMENT:  # ESRD on outpt PD   -Etiology 2/2 FSGS  - s/p permcath on 11/10   # R/O PD cath associated peritonitis  - concern for PD cath associated peritonitis based peritoneal studies but in setting of c. Diff.  -symptoms improved with initiation of oral vanc  -guidelines vague on scenarios such as this  # S/P AVR/ Ascending aneurysm repair  # CKD-MBD  -phos elevated  # Anemia of CKD: at goal  # BL pleural effusions  # Pericardial effusion   # C. Diff colitis  # Abd pain:   -imaging concerning for colitis  -C.diff positive  # C.diff  # Aflutter     PLAN:  -PD today, ordered  -CXR  -oral vanc per primary  -holding APPLE for now  -Renal diet  -Phos binder with meals   -Dose all medications as per ESRD          Emotional support was provided to pt. and family. Psychological preparation for procedure was provided through pictures and medical materials.

## 2023-12-02 NOTE — CARE PLAN
The patient is Watcher - Medium risk of patient condition declining or worsening    Shift Goals  Clinical Goals: monitor HR, monitor heparin drip, promote self care  Patient Goals: figure out what is going on  Family Goals: n/a    Progress made toward(s) clinical / shift goals:    Problem: Pain - Standard  Goal: Alleviation of pain or a reduction in pain to the patient’s comfort goal  Outcome: Progressing  Pt remaining pain free     Problem: Knowledge Deficit - Standard  Goal: Patient and family/care givers will demonstrate understanding of plan of care, disease process/condition, diagnostic tests and medications  Outcome: Progressing  Pt understands POC and transition to PD again      Problem: Fall Risk  Goal: Patient will remain free from falls  Outcome: Progressing   Pt calls appropriately     Patient is not progressing towards the following goals:

## 2023-12-02 NOTE — CARE PLAN
The patient is Watcher - Medium risk of patient condition declining or worsening    Shift Goals  Clinical Goals: monitor HR, safety  Patient Goals: fix lungs adn breathing  Family Goals: POC updates, talk with     Progress made toward(s) clinical / shift goals:    Problem: Pain - Standard  Goal: Alleviation of pain or a reduction in pain to the patient’s comfort goal  Outcome: Progressing  Note: Assess and monitor for pain. Provide pharmacological and non-pharmacological interventions as appropriate. Re-evaluate and continue to monitor.        Problem: Fall Risk  Goal: Patient will remain free from falls  Outcome: Progressing  Note: Treaded socks, side rails up x 2. Call light within reach. Pt educated to call for assistance. Reinforce as needed. Continue to monitor.           Patient is not progressing towards the following goals:      Problem: Respiratory  Goal: Patient will achieve/maintain optimum respiratory ventilation and gas exchange  Outcome: Not Progressing  Note: Assess and monitor pulmonary status. Adjust oxygen as needed to maintain adequate saturation, pt needing 1L of oxygen. Encourage ambulation, turning, coughing and deep breathing. Educate and encourage use of IS as needed. Continue to monitor.

## 2023-12-02 NOTE — PROGRESS NOTES
[facility] Services Progress Note     CAPD drain aseptically initiated at 2010, effluent clear and yellow without fibrin clot. RLQ PD catheter intact, dsg CDI. Pt A/O x 4, not in distress, no bleeding, no edema, but with productive cough.     OUTPUT: 1000ml     PD catheter was capped aseptically and was secured with adhesive dressing  Report was given to the PCN

## 2023-12-02 NOTE — PROGRESS NOTES
"Pt refusing bed alarm states \"he will call if he needs to get up\". Education provided that with heparin and oxygen he is at an increased risk for falls, pt still refusing. Charge RN notified  " Normal rate, regular rhythm.  Heart sounds S1, S2.  No murmurs, rubs or gallops.

## 2023-12-02 NOTE — PROGRESS NOTES
Inpatient Anticoagulation Service Note for 12/2/2023      Reason for Anticoagulation: On-X Mitral Valve Replacement, On-X Aortic Valve Replacement, Atrial Fibrillation   [id number] VASc Score: 2  HAS-BLED Score: 3    Hemoglobin Value: (!) 9.9  Hematocrit Value: (!) 31.3  Lab Platelet Value: 390  Target INR: 2.0 to 3.0    INR from last 7 days       Date/Time INR Value    12/02/23 0001 1.7    12/01/23 1539 1.52    12/01/23 0332 1.44    11/30/23 0643 1.58    11/29/23 0507 1.64    11/27/23 1545 1.92          Dose from last 7 days       Date/Time Dose (mg)    12/02/23 1321 2.5    12/01/23 1345 2.5    11/30/23 1232 2.5    11/29/23 0923 1.25    11/28/23 0932 --          Average Dose (mg):  (1.25mg daily)  Significant Interactions: Antiplatelet Medications  Bridge Therapy: Yes (Heparin drip)     Reversal Agent Administered: Not Applicable    Comments: INR now trending up, 1.7 today. Pt's INR seems to fairly labile. Pt had been discharged on warfarin 2.5 mg daily, but as an outpt he needed his dose decreased to 1.25 mg daily for elevated INRs. Pt was started on heparin drip yesterday and this should continue until the INR is therapeutic again. Will keep the warfarin dosing at 2.5 mg daily for now and trend the INR. Pt will likely need further warfarin dose adjustments.    Education Material Provided?: No (Established warfarin patient)    Pharmacist suggested discharge dosing: TBD, warfarin 2.5 mg daily for now, but the warfarin dosing will likely need further adjustments      Hector Sorensen, PharmD

## 2023-12-02 NOTE — PROGRESS NOTES
[facility] Medicine Daily Progress Note    Date of Service  12/1/2023    Chief Complaint  Grudeep uGerra is a 58 y.o. male admitted 11/27/2023 with abd pain    Hospital Course  59 yo man with A-fib, ESRD on PD, ascending aortic aneurysm with repair and aortic valve replacement who presented with abdominal pain.  He had a CTAP that showed cecal inflammation concerning for colitis or to focus.  He was admitted on IV's cefepime and Flagyl.  Nephrology was consulted.  Peritoneal fluid was negative for infection.    Interval Problem Update  11/29 - A-fib on telemetry, rate 100-120s  Leukocytosis increasing to 13.7  I discussed with Dr. Mac, peritoneal fluid concerning for peritonitis, will transition to intraperitoneal cefepime tomorrow  Patient says about a week after his heart surgery, he developed abdominal pain with nausea vomiting and diarrhea at home.  His nausea vomiting is doing better but he still has abdominal pain and diarrhea.  C. difficile is pending  TTE showed EF 70%, mechanical aortic valve was not well-visualized.  Patient denies chest pain or shortness of breath    11/30 -A-flutter on telemetry, rate 120s.  Patient did not want to take his verapamil this morning, he is concerned about having hypotension.  He denies shortness of breath or chest pain.  I will transfer to telemetry and change verapamil to short acting with hold parameters, IV diltiazem as needed. Positive for C. difficile and started on oral Vanco, already seeing improvement of abdominal pain, nausea and diarrhea.  WBC has decreased to 10.5    12/1  Still with abdominal cramping, fatigue, dyspnea with exertion, frequent watery stools, tachypnea. Getting HD today and another dose of PD directed abx. Increased verapamil. Still getting dilt pushes. Pharmacy discussed with CT sx team, they do recommend a heparin bridge till therapeutic INR (ESRD, recent cardiac/valve surgery). PPI added at patient request. Poor appetite, requesting to  try appetite stimulant, Marinol started.  Afebrile HR  RR 16-21 SBP 90's-123, SpO2 92-97% on 1-2 L NC. Follow electrolytes closely, chech mag, CMP, phos. Nephrology following. Plan to switch back to PD tomorrow if patient can tolerate. Patient stated he wanted a cardiac diet as opposed to renal diet, switched.       I have discussed this patient's plan of care and discharge plan at IDT rounds today with Case Management, Nursing, Nursing leadership, and other members of the IDT team.    Consultants/Specialty  nephrology    Code Status  Full Code    Disposition  The patient is not medically cleared for discharge to home or a post-acute facility.      I have placed the appropriate orders for post-discharge needs.    Review of Systems  Review of Systems   Constitutional:  Positive for malaise/fatigue.   Respiratory:  Negative for shortness of breath.    Cardiovascular:  Negative for chest pain.   Gastrointestinal:  Positive for abdominal pain and diarrhea. Negative for nausea and vomiting.   Neurological:  Positive for weakness.        Physical Exam  Temp:  [36.1 °C (97 °F)-37.1 °C (98.8 °F)] 36.7 °C (98 °F)  Pulse:  [] 124  Resp:  [16-21] 18  BP: ()/(59-88) 100/59  SpO2:  [92 %-97 %] 95 %    Physical Exam  Vitals and nursing note reviewed.   Constitutional:       General: He is not in acute distress.     Appearance: He is not toxic-appearing.   HENT:      Head: Normocephalic.      Nose: No rhinorrhea.      Mouth/Throat:      Mouth: Mucous membranes are moist.      Pharynx: Oropharynx is clear.   Eyes:      General:         Right eye: No discharge.         Left eye: No discharge.      Conjunctiva/sclera: Conjunctivae normal.   Cardiovascular:      Rate and Rhythm: Tachycardia present. Rhythm irregular.   Pulmonary:      Effort: Pulmonary effort is normal. No respiratory distress.      Breath sounds: No wheezing or rales.      Comments: Healing sternal surgical wound  Abdominal:      General: There is  distension.      Palpations: Abdomen is soft.      Tenderness: There is no abdominal tenderness. There is no guarding or rebound.      Comments: Peritoneal cath in place   Musculoskeletal:         General: No swelling.      Cervical back: Neck supple. No rigidity.   Skin:     General: Skin is warm and dry.   Neurological:      Mental Status: He is alert and oriented to person, place, and time.         Fluids    Intake/Output Summary (Last 24 hours) at 12/1/2023 2323  Last data filed at 12/1/2023 2100  Gross per 24 hour   Intake 500 ml   Output 3500 ml   Net -3000 ml         Laboratory  Recent Labs     11/29/23  0507 11/30/23  0643 12/01/23  0332   WBC 13.7* 10.5 9.7   RBC 3.68* 3.40* 3.21*   HEMOGLOBIN 11.1* 10.2* 9.6*   HEMATOCRIT 34.2* 32.6* 29.7*   MCV 92.9 95.9 92.5   MCH 30.2 30.0 29.9   MCHC 32.5 31.3* 32.3   RDW 51.4* 52.3* 49.7   PLATELETCT 498* 390 398   MPV 9.0 8.6* 8.8*       Recent Labs     11/29/23  0708 11/30/23  0643 12/01/23  0332   SODIUM 131* 134* 136   POTASSIUM 4.8 4.6 4.7   CHLORIDE 91* 94* 94*   CO2 20 26 25   GLUCOSE 89 111* 96   * 70* 80*   CREATININE 15.35* 11.68* 12.65*   CALCIUM 8.5 8.4* 8.4*       Recent Labs     11/30/23  0643 12/01/23  0332 12/01/23  1539   APTT  --   --  31.2   INR 1.58* 1.44* 1.52*                 Imaging  EC-ECHOCARDIOGRAM COMPLETE W/O CONT   Final Result      CT-ABDOMEN-PELVIS W/O   Final Result      1.  Fat stranding adjacent to the cecum, concerning for colitis/typhlitis.   2.  Colonic diverticulosis.   3.  Nonobstructing, tiny left renal stone.   4.  Likely partially loculated moderate right pleural effusion.   5.  Small left pleural effusion.   6.  Adjacent right middle lobe and bilateral lower lobe consolidations, likely atelectasis.   7.  Small pericardial effusion.      DX-CHEST-PORTABLE (1 VIEW)   Final Result      1.  Resolved or improved small left pleural effusion with persistent left basilar atelectasis and/or scarring.   2.  New small right  pleural effusion with associated atelectasis and/or consolidation.           Assessment/Plan  * C. difficile colitis- (present on admission)  Assessment & Plan  CT showed colitis, C. difficile is positive  Continue on oral vancomycin  Isolation  Leukocytosis improving, trend CBC  Blood cultures have been negative    Peritonitis ([facility])  Assessment & Plan  I discussed with nephrology, receiving intraperitoneal cefepime  Follow culture, so far no growth    S/P AVR- (present on admission)  Assessment & Plan  Recent aortic valve replacement  Was not well-visualized on this visit's TTE    Paroxysmal A-fib ([facility])- (present on admission)  Assessment & Plan  In A-fib, tachycardic but may be related to underlying infection.  Patient says carvedilol was dropping his blood pressure at home and amiodarone was causing side effects and so cardiology recommended he take for verapamil.  Continue on verapamil with hold parameters  IV diltiazem as needed   Monitor on telemetry  Continue warfarin, INR remains low.  Discussed with pharmacy    ESRD (end stage renal disease) ([facility])- (present on admission)  Assessment & Plan  Nephrology following for dialysis    Dyslipidemia- (present on admission)  Assessment & Plan  Continue atorvastatin    Coronary artery disease due to lipid rich plaque- (present on admission)  Assessment & Plan  Continue with aspirin/warfarin/statin    Elevated troponin- (present on admission)  Assessment & Plan  Chronically elevated suspect secondary to demand and renal dysfunction.  Serial troponins plateaued and down trended  Monitor for symptom changes    Hypertension- (present on admission)  Assessment & Plan  Blood pressure has been stable but patient concerned about hypotension if he takes verapamil.  I changed to short acting with hold parameters         VTE prophylaxis:   SCDs/TEDs   therapeutic anticoagulation with weight-based heparin gtt, pharmacy to adjust PRN      I have performed a physical exam and  reviewed and updated ROS and Plan today (12/1/2023). In review of yesterday's note (11/30/2023), there are no changes except as documented above.    Total time spent55 minutes. I spent greater than 50% of the time for patient care, counseling, and coordination on this date, including unit/floor time, and face-to-face time with the patient as per interval events, my own review of patient's imaging and lab analysis and developing my assessment and plan above.

## 2023-12-02 NOTE — PROGRESS NOTES
Bedside report received from night RN, pt care assumed, assessment completed. Pt is A&O4, pain 0/10, aflutter on the monitor. Updated on POC, questions answered. Bed in lowest, locked position, treaded socks on, call light and belongings within reach.

## 2023-12-03 LAB
ALBUMIN SERPL BCP-MCNC: 2.7 G/DL (ref 3.2–4.9)
ALBUMIN/GLOB SERPL: 0.9 G/DL
ALP SERPL-CCNC: 64 U/L (ref 30–99)
ALT SERPL-CCNC: 11 U/L (ref 2–50)
ANION GAP SERPL CALC-SCNC: 15 MMOL/L (ref 7–16)
AST SERPL-CCNC: 19 U/L (ref 12–45)
BACTERIA BLD CULT: NORMAL
BACTERIA BLD CULT: NORMAL
BASOPHILS # BLD AUTO: 0.4 % (ref 0–1.8)
BASOPHILS # BLD: 0.04 K/UL (ref 0–0.12)
BILIRUB SERPL-MCNC: <0.2 MG/DL (ref 0.1–1.5)
BUN SERPL-MCNC: 61 MG/DL (ref 8–22)
CALCIUM ALBUM COR SERPL-MCNC: 9.5 MG/DL (ref 8.5–10.5)
CALCIUM SERPL-MCNC: 8.5 MG/DL (ref 8.5–10.5)
CHLORIDE SERPL-SCNC: 98 MMOL/L (ref 96–112)
CO2 SERPL-SCNC: 23 MMOL/L (ref 20–33)
CREAT SERPL-MCNC: 10.58 MG/DL (ref 0.5–1.4)
EKG IMPRESSION: NORMAL
EOSINOPHIL # BLD AUTO: 0.34 K/UL (ref 0–0.51)
EOSINOPHIL NFR BLD: 3.7 % (ref 0–6.9)
ERYTHROCYTE [DISTWIDTH] IN BLOOD BY AUTOMATED COUNT: 51.5 FL (ref 35.9–50)
GFR SERPLBLD CREATININE-BSD FMLA CKD-EPI: 5 ML/MIN/1.73 M 2
GLOBULIN SER CALC-MCNC: 2.9 G/DL (ref 1.9–3.5)
GLUCOSE SERPL-MCNC: 115 MG/DL (ref 65–99)
HCT VFR BLD AUTO: 30.9 % (ref 42–52)
HGB BLD-MCNC: 9.6 G/DL (ref 14–18)
IMM GRANULOCYTES # BLD AUTO: 0.14 K/UL (ref 0–0.11)
IMM GRANULOCYTES NFR BLD AUTO: 1.5 % (ref 0–0.9)
INR PPP: 1.72 (ref 0.87–1.13)
LYMPHOCYTES # BLD AUTO: 0.67 K/UL (ref 1–4.8)
LYMPHOCYTES NFR BLD: 7.3 % (ref 22–41)
MAGNESIUM SERPL-MCNC: 2 MG/DL (ref 1.5–2.5)
MCH RBC QN AUTO: 29.7 PG (ref 27–33)
MCHC RBC AUTO-ENTMCNC: 31.1 G/DL (ref 32.3–36.5)
MCV RBC AUTO: 95.7 FL (ref 81.4–97.8)
MONOCYTES # BLD AUTO: 0.46 K/UL (ref 0–0.85)
MONOCYTES NFR BLD AUTO: 5 % (ref 0–13.4)
NEUTROPHILS # BLD AUTO: 7.49 K/UL (ref 1.82–7.42)
NEUTROPHILS NFR BLD: 82.1 % (ref 44–72)
NRBC # BLD AUTO: 0 K/UL
NRBC BLD-RTO: 0 /100 WBC (ref 0–0.2)
PHOSPHATE SERPL-MCNC: 5.3 MG/DL (ref 2.5–4.5)
PLATELET # BLD AUTO: 376 K/UL (ref 164–446)
PMV BLD AUTO: 8.8 FL (ref 9–12.9)
POTASSIUM SERPL-SCNC: 4.5 MMOL/L (ref 3.6–5.5)
PROT SERPL-MCNC: 5.6 G/DL (ref 6–8.2)
PROTHROMBIN TIME: 20.4 SEC (ref 12–14.6)
RBC # BLD AUTO: 3.23 M/UL (ref 4.7–6.1)
SIGNIFICANT IND 70042: NORMAL
SIGNIFICANT IND 70042: NORMAL
SITE SITE: NORMAL
SITE SITE: NORMAL
SODIUM SERPL-SCNC: 136 MMOL/L (ref 135–145)
SOURCE SOURCE: NORMAL
SOURCE SOURCE: NORMAL
UFH PPP CHRO-ACNC: 0.7 IU/ML
WBC # BLD AUTO: 9.1 K/UL (ref 4.8–10.8)

## 2023-12-03 PROCEDURE — 84100 ASSAY OF PHOSPHORUS: CPT

## 2023-12-03 PROCEDURE — 85610 PROTHROMBIN TIME: CPT

## 2023-12-03 PROCEDURE — 700102 HCHG RX REV CODE 250 W/ 637 OVERRIDE(OP): Performed by: STUDENT IN AN ORGANIZED HEALTH CARE EDUCATION/TRAINING PROGRAM

## 2023-12-03 PROCEDURE — A9270 NON-COVERED ITEM OR SERVICE: HCPCS | Performed by: STUDENT IN AN ORGANIZED HEALTH CARE EDUCATION/TRAINING PROGRAM

## 2023-12-03 PROCEDURE — 80053 COMPREHEN METABOLIC PANEL: CPT

## 2023-12-03 PROCEDURE — 700111 HCHG RX REV CODE 636 W/ 250 OVERRIDE (IP): Performed by: INTERNAL MEDICINE

## 2023-12-03 PROCEDURE — 90945 DIALYSIS ONE EVALUATION: CPT

## 2023-12-03 PROCEDURE — 85025 COMPLETE CBC W/AUTO DIFF WBC: CPT

## 2023-12-03 PROCEDURE — 93005 ELECTROCARDIOGRAM TRACING: CPT | Performed by: STUDENT IN AN ORGANIZED HEALTH CARE EDUCATION/TRAINING PROGRAM

## 2023-12-03 PROCEDURE — 85520 HEPARIN ASSAY: CPT

## 2023-12-03 PROCEDURE — 93010 ELECTROCARDIOGRAM REPORT: CPT | Performed by: INTERNAL MEDICINE

## 2023-12-03 PROCEDURE — 700111 HCHG RX REV CODE 636 W/ 250 OVERRIDE (IP): Performed by: STUDENT IN AN ORGANIZED HEALTH CARE EDUCATION/TRAINING PROGRAM

## 2023-12-03 PROCEDURE — 83735 ASSAY OF MAGNESIUM: CPT

## 2023-12-03 PROCEDURE — 770020 HCHG ROOM/CARE - TELE (206)

## 2023-12-03 PROCEDURE — 36415 COLL VENOUS BLD VENIPUNCTURE: CPT

## 2023-12-03 PROCEDURE — 99233 SBSQ HOSP IP/OBS HIGH 50: CPT | Performed by: STUDENT IN AN ORGANIZED HEALTH CARE EDUCATION/TRAINING PROGRAM

## 2023-12-03 PROCEDURE — 90935 HEMODIALYSIS ONE EVALUATION: CPT

## 2023-12-03 PROCEDURE — 700101 HCHG RX REV CODE 250: Performed by: INTERNAL MEDICINE

## 2023-12-03 RX ORDER — WARFARIN SODIUM 2.5 MG/1
2.5 TABLET ORAL
Status: COMPLETED | OUTPATIENT
Start: 2023-12-03 | End: 2023-12-03

## 2023-12-03 RX ORDER — WARFARIN SODIUM 2.5 MG/1
1.25 TABLET ORAL DAILY
Status: DISCONTINUED | OUTPATIENT
Start: 2023-12-04 | End: 2023-12-04

## 2023-12-03 RX ORDER — HEPARIN SODIUM 1000 [USP'U]/ML
2000 INJECTION, SOLUTION INTRAVENOUS; SUBCUTANEOUS
Status: DISCONTINUED | OUTPATIENT
Start: 2023-12-03 | End: 2023-12-26 | Stop reason: HOSPADM

## 2023-12-03 RX ADMIN — DRONABINOL 5 MG: 5 CAPSULE ORAL at 17:46

## 2023-12-03 RX ADMIN — VANCOMYCIN HYDROCHLORIDE 125 MG: 5 INJECTION, POWDER, LYOPHILIZED, FOR SOLUTION INTRAVENOUS at 00:31

## 2023-12-03 RX ADMIN — OMEPRAZOLE 20 MG: 20 CAPSULE, DELAYED RELEASE ORAL at 06:13

## 2023-12-03 RX ADMIN — HEPARIN SODIUM 18 UNITS/KG/HR: 5000 INJECTION, SOLUTION INTRAVENOUS at 20:55

## 2023-12-03 RX ADMIN — VERAPAMIL HYDROCHLORIDE 120 MG: 120 TABLET ORAL at 06:13

## 2023-12-03 RX ADMIN — VANCOMYCIN HYDROCHLORIDE 125 MG: 5 INJECTION, POWDER, LYOPHILIZED, FOR SOLUTION INTRAVENOUS at 12:44

## 2023-12-03 RX ADMIN — Medication 1 APPLICATOR: at 06:24

## 2023-12-03 RX ADMIN — ATORVASTATIN CALCIUM 40 MG: 40 TABLET, FILM COATED ORAL at 21:02

## 2023-12-03 RX ADMIN — HEPARIN SODIUM 3700 UNITS: 1000 INJECTION, SOLUTION INTRAVENOUS; SUBCUTANEOUS at 16:13

## 2023-12-03 RX ADMIN — TAMSULOSIN HYDROCHLORIDE 0.4 MG: 0.4 CAPSULE ORAL at 06:13

## 2023-12-03 RX ADMIN — VANCOMYCIN HYDROCHLORIDE 125 MG: 5 INJECTION, POWDER, LYOPHILIZED, FOR SOLUTION INTRAVENOUS at 17:46

## 2023-12-03 RX ADMIN — HEPARIN SODIUM 2000 UNITS: 1000 INJECTION, SOLUTION INTRAVENOUS; SUBCUTANEOUS at 13:12

## 2023-12-03 RX ADMIN — Medication 1 APPLICATOR: at 17:47

## 2023-12-03 RX ADMIN — VANCOMYCIN HYDROCHLORIDE 125 MG: 5 INJECTION, POWDER, LYOPHILIZED, FOR SOLUTION INTRAVENOUS at 23:26

## 2023-12-03 RX ADMIN — HEPARIN SODIUM 18 UNITS/KG/HR: 5000 INJECTION, SOLUTION INTRAVENOUS at 03:07

## 2023-12-03 RX ADMIN — ASPIRIN 81 MG: 81 TABLET, COATED ORAL at 06:13

## 2023-12-03 RX ADMIN — WARFARIN SODIUM 2.5 MG: 2.5 TABLET ORAL at 17:46

## 2023-12-03 RX ADMIN — ONDANSETRON 4 MG: 2 INJECTION INTRAMUSCULAR; INTRAVENOUS at 09:37

## 2023-12-03 RX ADMIN — VANCOMYCIN HYDROCHLORIDE 125 MG: 5 INJECTION, POWDER, LYOPHILIZED, FOR SOLUTION INTRAVENOUS at 06:12

## 2023-12-03 ASSESSMENT — FIBROSIS 4 INDEX: FIB4 SCORE: 0.85

## 2023-12-03 ASSESSMENT — PAIN DESCRIPTION - PAIN TYPE
TYPE: ACUTE PAIN
TYPE: ACUTE PAIN

## 2023-12-03 ASSESSMENT — COGNITIVE AND FUNCTIONAL STATUS - GENERAL
CLIMB 3 TO 5 STEPS WITH RAILING: A LITTLE
MOVING FROM LYING ON BACK TO SITTING ON SIDE OF FLAT BED: A LITTLE
SUGGESTED CMS G CODE MODIFIER DAILY ACTIVITY: CJ
MOBILITY SCORE: 19
TOILETING: A LITTLE
WALKING IN HOSPITAL ROOM: A LITTLE
MOVING TO AND FROM BED TO CHAIR: A LITTLE
HELP NEEDED FOR BATHING: A LITTLE
STANDING UP FROM CHAIR USING ARMS: A LITTLE
DAILY ACTIVITIY SCORE: 21
SUGGESTED CMS G CODE MODIFIER MOBILITY: CK
DRESSING REGULAR LOWER BODY CLOTHING: A LITTLE

## 2023-12-03 ASSESSMENT — PATIENT HEALTH QUESTIONNAIRE - PHQ9
1. LITTLE INTEREST OR PLEASURE IN DOING THINGS: NOT AT ALL
SUM OF ALL RESPONSES TO PHQ9 QUESTIONS 1 AND 2: 0
2. FEELING DOWN, DEPRESSED, IRRITABLE, OR HOPELESS: NOT AT ALL

## 2023-12-03 NOTE — CARE PLAN
The patient is Watcher - Medium risk of patient condition declining or worsening    Shift Goals  Clinical Goals: promote self care and mobility, encourage safety, monitor labs, monitor VS  Patient Goals: stop having this SOB, get better soon, stop having PD  Family Goals: POC updates, talk with     Progress made toward(s) clinical / shift goals:      Patient is not progressing towards the following goals:      Problem: Fall Risk  Goal: Patient will remain free from falls  Outcome: Not Progressing  Note: Refusing bed alarm and bed rails     Problem: Respiratory  Goal: Patient will achieve/maintain optimum respiratory ventilation and gas exchange  Outcome: Not Progressing  Flowsheets  Taken 12/3/2023 0300 by Sofiya Villarreal, R.N.  Incentive Spirometer:   Effective   Needs Assistance   Weak Effort  Incentive Spirometer Volume: 750 mL  Deep Breathe and Cough: Performs Correctly  Taken 12/3/2023 0050 by Stiven Penn  O2 Delivery Device: Silicone Nasal Cannula  Note: Still requiring oxygen-pt frequently reminded to wear his oxygen as he takes it off frequently

## 2023-12-03 NOTE — PROGRESS NOTES
Pt continuing to refuse bed alarm and side rails. Pt educated and understands risks of falls. Charge RN notified of continued refusal.

## 2023-12-03 NOTE — PROGRESS NOTES
Bedside report received from night RN, pt care assumed, assessment completed. Pt is A&O4, pain 0, aflutter on the monitor. Updated on POC, questions answered. Bed in lowest, locked position, treaded socks on, call light and belongings within reach.

## 2023-12-03 NOTE — PROGRESS NOTES
Discussed with MD and Pharmacist about patient refusing Xa draws and medications, aware of patient's refusal, continue on infusion and just monitor for complications.

## 2023-12-03 NOTE — PROGRESS NOTES
Assumed care of pt. Bedside report received from RN. Pt was updated on plan of care. AOx4. Pt pain level 0/10. Pt is on telemetry, Aflutter 121. Call light, phone and personal belongings in reach. Bed alarm off due to pt refusal-charge RN notified- bed in lowest position, and locked. One side rail is down as well because of pt refusal to have both railings up on the bed.

## 2023-12-03 NOTE — PROGRESS NOTES
[facility] Services Progress Notes     CCPD treatment ended. Patient aseptically disconnected from PD cycler at 0645.  Patient resting calmly, anxious, no signs of distress, on Heparin drip.  Eflluent clear yellow, with (+) small to medium sized fibrin noted, will notify nephrologist.  Patient denies abdominal pain or discomfort.  Patient tolerated treatment overnight, no alarms noted.  Patient BP soft 94/60, HR: 71, O2 sats 88% on 1 L/NC, O2 increased to 2L/NC,denies chest pain/SOB.  RLQ PD catheter intact and patent with clean, dry, intact dressings, secured with tape.    NET PD UF: 1527  mL (CCPD UF of 1452 mL + initial drain of 75 mL)      Report given to primary RN RAY Tavares RN.

## 2023-12-03 NOTE — PROGRESS NOTES
[facility] Services Progress Note     CCPD therapy initiated at 1720 x 8 hrs with 1800 mL fills x 4 cycles using all 2.5 % PD solutions ordered per nephrologist-Dr. Ríos  Orders reviewed, prescription entered verified with   Patient and PD access assessed prior to therapy  Patient calm, no distress, denies complaints, consent for treatment verified  VS stable, BP soft, 104/72, asymptomatic, elevated HR 120s, denies chest pain or SOB, no diarrhea- checked prior to treatment  Patient with intact and patent RLQ PD catheter   RLQ catheter exit site good, no dischrages.  RLQ catheter exit site cleansed with approved PD antiseptic solution, Gentamicin antibiotic cream applied per orders and dressings changed per policy     Initial drain: 75 mL (clear, yellow, no fibrin)     Estimated end time: 0520 (12/03/23)     Report given to primary care nurse TYRONE Delaney RN     Patient on dwell 1/4 prior to departure from bedside.     Please contact on call dialysis RN for any issues with persistent alarms/assistance with troubleshooting or patient not tolerating therapy..      For on-call Dialysis, pls contact [facility] at [phone] or see on call dialysis RN on Voalte

## 2023-12-03 NOTE — PROGRESS NOTES
Patient refusing to have blood drawn for Xa, refused his medications and wanting a zofran, ordered EKG since last QT was .48 taken 11/27.  Explained to patient why and he was upset why won't I just give the medication. Explained it to him about medication causing torsades with wife at bedside.

## 2023-12-03 NOTE — PROGRESS NOTES
Inpatient Anticoagulation Service Note for 12/3/2023  Reason for Anticoagulation: Atrial Fibrillation, On-X Aortic/Mitral Valve Replacement   [id number] VASc Score: 2  HAS-BLED Score: 3  Hemoglobin Value: (!) 9.6  Hematocrit Value: (!) 30.9  Lab Platelet Value: 376  Target INR: 2.0 to 3.0    INR from last 7 days       Date/Time INR Value    12/03/23 0049 1.72    12/02/23 0001 1.7    12/01/23 1539 1.52    12/01/23 0332 1.44    11/30/23 0643 1.58    11/29/23 0507 1.64    11/27/23 1545 1.92          Dose from last 7 days       Date/Time Dose (mg)    12/03/23 0929 2.5    12/02/23 1321 2.5    12/01/23 1345 2.5    11/30/23 1232 2.5    11/29/23 0923 1.25    11/28/23 0932 --          Average Dose (mg): TBD (Home Dose: 1.25 mg daily)  Significant Interactions: Antiplatelet Medications  Bridge Therapy: Yes (heparin infusion)  Bridge Therapy Start Date: 12/01/23  Days of Overlap Therapy: 2   INR Value Greater than 2 Prior to Discontinuation of Parenteral Anticoagulation: Not Applicable   Reversal Agent Administered: Not Applicable  Comments: INR below goal. Noted bleed concern. H/H low. PLT down. Noted heparin overlap. Noted labile INR. Noted warfarin interactions. Will give warfarin 2.5 mg today. INR with AM labs. Likely to need dose adjustments.    Plan:  warfarin 2.5 mg 12/23/23  Education Material Provided?: No (chronic warfarin patient)    Pharmacist suggested discharge dosing: warfarin 1.25 mg daily     Jarocho Sorensen, PharmD

## 2023-12-03 NOTE — PROGRESS NOTES
[facility] Medicine Daily Progress Note    Date of Service  12/2/2023    Chief Complaint  Gurdeep Guerra is a 58 y.o. male admitted 11/27/2023 with abd pain    Hospital Course  57 yo man with A-fib, ESRD on PD, ascending aortic aneurysm with repair and aortic valve replacement who presented with abdominal pain.  He had a CTAP that showed cecal inflammation concerning for colitis or to focus.  He was admitted on IV's cefepime and Flagyl.  Nephrology was consulted.  Peritoneal fluid was negative for infection.    Interval Problem Update  11/29 - A-fib on telemetry, rate 100-120s  Leukocytosis increasing to 13.7  I discussed with Dr. Mac, peritoneal fluid concerning for peritonitis, will transition to intraperitoneal cefepime tomorrow  Patient says about a week after his heart surgery, he developed abdominal pain with nausea vomiting and diarrhea at home.  His nausea vomiting is doing better but he still has abdominal pain and diarrhea.  C. difficile is pending  TTE showed EF 70%, mechanical aortic valve was not well-visualized.  Patient denies chest pain or shortness of breath    11/30 -A-flutter on telemetry, rate 120s.  Patient did not want to take his verapamil this morning, he is concerned about having hypotension.  He denies shortness of breath or chest pain.  I will transfer to telemetry and change verapamil to short acting with hold parameters, IV diltiazem as needed. Positive for C. difficile and started on oral Vanco, already seeing improvement of abdominal pain, nausea and diarrhea.  WBC has decreased to 10.5    12/1  Still with abdominal cramping, fatigue, dyspnea with exertion, frequent watery stools, tachypnea. Getting HD today and another dose of PD directed abx. Increased verapamil. Still getting dilt pushes. Pharmacy discussed with CT sx team, they do recommend a heparin bridge till therapeutic INR (ESRD, recent cardiac/valve surgery). PPI added at patient request. Poor appetite, requesting to  try appetite stimulant, Marinol started.  Afebrile HR  RR 16-21 SBP 90's-123, SpO2 92-97% on 1-2 L NC. Follow electrolytes closely, chech mag, CMP, phos. Nephrology following. Plan to switch back to PD tomorrow if patient can tolerate. Patient stated he wanted a cardiac diet as opposed to renal diet, switched.     12/2  Examined in his inpatient room, afebrile, pulse  respiratory rate 16-18 systolic blood pressure 104 133 pulse ox 93 to 98% on 1 L nasal cannula.  No leukocytosis stable anemia normal platelet count INR up to 1.72.  He is feeling improved today compared to days prior.  Still with frequent watery bowel movements.  Less myalgias, less overall fatigue and malaise.  Still quite weak.    I have discussed this patient's plan of care and discharge plan at IDT rounds today with Case Management, Nursing, Nursing leadership, and other members of the IDT team.    Consultants/Specialty  nephrology    Code Status  Full Code    Disposition  The patient is not medically cleared for discharge to home or a post-acute facility.      I have placed the appropriate orders for post-discharge needs.    Review of Systems  Review of Systems   Constitutional:  Positive for malaise/fatigue.   Respiratory:  Negative for shortness of breath.    Cardiovascular:  Negative for chest pain.   Gastrointestinal:  Positive for abdominal pain and diarrhea. Negative for nausea and vomiting.   Neurological:  Positive for weakness.        Physical Exam  Temp:  [36.4 °C (97.5 °F)-36.9 °C (98.4 °F)] 36.4 °C (97.5 °F)  Pulse:  [107-127] 114  Resp:  [16-18] 18  BP: (100-133)/(59-86) 106/72  SpO2:  [93 %-95 %] 93 %    Physical Exam  Vitals and nursing note reviewed.   Constitutional:       General: He is not in acute distress.     Appearance: He is ill-appearing. He is not toxic-appearing.   HENT:      Head: Normocephalic.      Nose: No rhinorrhea.      Mouth/Throat:      Mouth: Mucous membranes are moist.      Pharynx: Oropharynx is  clear.   Eyes:      General:         Right eye: No discharge.         Left eye: No discharge.      Conjunctiva/sclera: Conjunctivae normal.   Cardiovascular:      Rate and Rhythm: Tachycardia present. Rhythm irregular.   Pulmonary:      Effort: Pulmonary effort is normal. No respiratory distress.      Breath sounds: No wheezing or rales.      Comments: Healing sternal surgical wound  Abdominal:      General: There is distension.      Palpations: Abdomen is soft.      Tenderness: There is no abdominal tenderness. There is no guarding or rebound.      Comments: Peritoneal cath in place   Musculoskeletal:         General: No swelling.      Cervical back: Neck supple. No rigidity.   Skin:     General: Skin is warm and dry.      Coloration: Skin is not jaundiced.   Neurological:      Mental Status: He is alert and oriented to person, place, and time. Mental status is at baseline.         Fluids    Intake/Output Summary (Last 24 hours) at 12/2/2023 2003  Last data filed at 12/2/2023 0900  Gross per 24 hour   Intake 1600 ml   Output 1000 ml   Net 600 ml         Laboratory  Recent Labs     11/30/23  0643 12/01/23 0332 12/02/23  0001   WBC 10.5 9.7 9.6   RBC 3.40* 3.21* 3.28*   HEMOGLOBIN 10.2* 9.6* 9.9*   HEMATOCRIT 32.6* 29.7* 31.3*   MCV 95.9 92.5 95.4   MCH 30.0 29.9 30.2   MCHC 31.3* 32.3 31.6*   RDW 52.3* 49.7 51.8*   PLATELETCT 390 398 390   MPV 8.6* 8.8* 9.0       Recent Labs     11/30/23  0643 12/01/23 0332 12/02/23  0001   SODIUM 134* 136 136   POTASSIUM 4.6 4.7 4.7   CHLORIDE 94* 94* 99   CO2 26 25 24   GLUCOSE 111* 96 92   BUN 70* 80* 55*   CREATININE 11.68* 12.65* 9.29*   CALCIUM 8.4* 8.4* 8.2*       Recent Labs     12/01/23 0332 12/01/23  1539 12/02/23  0001   APTT  --  31.2  --    INR 1.44* 1.52* 1.70*                 Imaging  DX-CHEST-PORTABLE (1 VIEW)   Final Result      1.  Enlarged cardiac silhouette with vascular congestion/edema.   2.  Lower lobe airspace disease could be due to edema, atelectasis or  pneumonitis.      EC-ECHOCARDIOGRAM COMPLETE W/O CONT   Final Result      CT-ABDOMEN-PELVIS W/O   Final Result      1.  Fat stranding adjacent to the cecum, concerning for colitis/typhlitis.   2.  Colonic diverticulosis.   3.  Nonobstructing, tiny left renal stone.   4.  Likely partially loculated moderate right pleural effusion.   5.  Small left pleural effusion.   6.  Adjacent right middle lobe and bilateral lower lobe consolidations, likely atelectasis.   7.  Small pericardial effusion.      DX-CHEST-PORTABLE (1 VIEW)   Final Result      1.  Resolved or improved small left pleural effusion with persistent left basilar atelectasis and/or scarring.   2.  New small right pleural effusion with associated atelectasis and/or consolidation.           Assessment/Plan  * C. difficile colitis- (present on admission)  Assessment & Plan  CT showed colitis, C. difficile is positive  Continue on oral vancomycin  Isolation  Leukocytosis improving, trend CBC  Blood cultures have been negative    Peritonitis ([facility])  Assessment & Plan  I discussed with nephrology, receiving intraperitoneal cefepime  Follow culture, so far no growth    S/P AVR- (present on admission)  Assessment & Plan  Recent aortic valve replacement  Was not well-visualized on this visit's TTE    Paroxysmal A-fib ([facility])- (present on admission)  Assessment & Plan  In A-fib, tachycardic but may be related to underlying infection.  Patient says carvedilol was dropping his blood pressure at home and amiodarone was causing side effects and so cardiology recommended he take for verapamil.  Continue on verapamil with hold parameters  IV diltiazem as needed   Monitor on telemetry  Continue warfarin, INR remains low.  Discussed with pharmacy    ESRD (end stage renal disease) ([facility])- (present on admission)  Assessment & Plan  Nephrology following for dialysis    Dyslipidemia- (present on admission)  Assessment & Plan  Continue atorvastatin    Coronary artery disease due to  lipid rich plaque- (present on admission)  Assessment & Plan  Continue with aspirin/warfarin/statin    Elevated troponin- (present on admission)  Assessment & Plan  Chronically elevated suspect secondary to demand and renal dysfunction.  Serial troponins plateaued and down trended  Monitor for symptom changes    Hypertension- (present on admission)  Assessment & Plan  Blood pressure has been stable but patient concerned about hypotension if he takes verapamil.  I changed to short acting with hold parameters         VTE prophylaxis:   SCDs/TEDs      I have performed a physical exam and reviewed and updated ROS and Plan today (12/2/2023). In review of yesterday's note (12/1/2023), there are no changes except as documented above.    Total time spent54 minutes. I spent greater than 50% of the time for patient care, counseling, and coordination on this date, including unit/floor time, and face-to-face time with the patient as per interval events, my own review of patient's imaging and lab analysis and developing my assessment and plan above.

## 2023-12-03 NOTE — PROGRESS NOTES
"[facility] -  PROGRESS NOTE               Author: Justo Ríos M.D. Date & Time: 12/3/2023  11:29 AM     HPI:  58 y.o. 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 Abd imaging demonstrated colitis. C. Diff pending and started on abx. He notes abd pain and diarrhea for several weeks. Edema improving with 2.5% dextrose solution. Still has HD permacath in-place. Pain with peritoneal dialysis but no cloudy effluent of fibrin clots. No f/c/s. Normally does [id number] fills all green.     DAILY NEPHROLOGY SUMMARY:  11/28: consult done  11/29: ongoing abd pain, seen on HD-tolerating procedure well, PD fluid concerning for peritonitis  11/30:c.diff positive, started on oral vanc, new onset aflutter-transferred to [facility], wbc improving, Peritoneal cultures remain negative, abd pain improving  12/1: PD culture remains negative, seen on HD-tolerating procedure, abd pain improving, less diarrhea    12/2: HD yesterday 2.5 liter UF, still with SOB decreased abdoimnal pain  12/3: PD last night 1.5 UF, pt still with SOB    PAST FAMILY HISTORY: Reviewed and Unchanged  SOCIAL HISTORY: Reviewed and Unchanged  CURRENT MEDICATIONS: Reviewed  IMAGING STUDIES: Reviewed    ROS  10 point ROS performed, negative other than stated above     PHYSICAL EXAM  VS:  /64   Pulse 80   Temp 36.5 °C (97.7 °F) (Temporal)   Resp 20   Ht 1.702 m (5' 7\")   Wt 77.3 kg (170 lb 6.7 oz)   SpO2 92%   BMI 26.69 kg/m²   GENERAL: no acute distress  CV: no edema  RESP: decreased air movement in bases  GI: non-tender, PD cath in place  MSK: No joint deformities   SKIN: sternal incision healing well  NEURO: AOx3  PSYCH: Cooperative  Vasc Access: RIJ " tunnel cath    Fluids:  In: 1200 [P.O.:1200]  Out: -     LABS:  Recent Results (from the past 24 hour(s))   PROTHROMBIN TIME    Collection Time: 12/03/23 12:49 AM   Result Value Ref Range    PT 20.4 (H) 12.0 - 14.6 sec    INR 1.72 (H) 0.87 - 1.13   CBC WITH DIFFERENTIAL    Collection Time: 12/03/23 12:49 AM   Result Value Ref Range    WBC 9.1 4.8 - 10.8 K/uL    RBC 3.23 (L) 4.70 - 6.10 M/uL    Hemoglobin 9.6 (L) 14.0 - 18.0 g/dL    Hematocrit 30.9 (L) 42.0 - 52.0 %    MCV 95.7 81.4 - 97.8 fL    MCH 29.7 27.0 - 33.0 pg    MCHC 31.1 (L) 32.3 - 36.5 g/dL    RDW 51.5 (H) 35.9 - 50.0 fL    Platelet Count 376 164 - 446 K/uL    MPV 8.8 (L) 9.0 - 12.9 fL    Neutrophils-Polys 82.10 (H) 44.00 - 72.00 %    Lymphocytes 7.30 (L) 22.00 - 41.00 %    Monocytes 5.00 0.00 - 13.40 %    Eosinophils 3.70 0.00 - 6.90 %    Basophils 0.40 0.00 - 1.80 %    Immature Granulocytes 1.50 (H) 0.00 - 0.90 %    Nucleated RBC 0.00 0.00 - 0.20 /100 WBC    Neutrophils (Absolute) 7.49 (H) 1.82 - 7.42 K/uL    Lymphs (Absolute) 0.67 (L) 1.00 - 4.80 K/uL    Monos (Absolute) 0.46 0.00 - 0.85 K/uL    Eos (Absolute) 0.34 0.00 - 0.51 K/uL    Baso (Absolute) 0.04 0.00 - 0.12 K/uL    Immature Granulocytes (abs) 0.14 (H) 0.00 - 0.11 K/uL    NRBC (Absolute) 0.00 K/uL   Comp Metabolic Panel    Collection Time: 12/03/23 12:49 AM   Result Value Ref Range    Sodium 136 135 - 145 mmol/L    Potassium 4.5 3.6 - 5.5 mmol/L    Chloride 98 96 - 112 mmol/L    Co2 23 20 - 33 mmol/L    Anion Gap 15.0 7.0 - 16.0    Glucose 115 (H) 65 - 99 mg/dL    Bun 61 (H) 8 - 22 mg/dL    Creatinine 10.58 (HH) 0.50 - 1.40 mg/dL    Calcium 8.5 8.5 - 10.5 mg/dL    Correct Calcium 9.5 8.5 - 10.5 mg/dL    AST(SGOT) 19 12 - 45 U/L    ALT(SGPT) 11 2 - 50 U/L    Alkaline Phosphatase 64 30 - 99 U/L    Total Bilirubin <0.2 0.1 - 1.5 mg/dL    Albumin 2.7 (L) 3.2 - 4.9 g/dL    Total Protein 5.6 (L) 6.0 - 8.2 g/dL    Globulin 2.9 1.9 - 3.5 g/dL    A-G Ratio 0.9 g/dL   MAGNESIUM    Collection Time:  23 12:49 AM   Result Value Ref Range    Magnesium 2.0 1.5 - 2.5 mg/dL   PHOSPHORUS    Collection Time: 23 12:49 AM   Result Value Ref Range    Phosphorus 5.3 (H) 2.5 - 4.5 mg/dL   ESTIMATED GFR    Collection Time: 23 12:49 AM   Result Value Ref Range    GFR (CKD-EPI) 5 (A) >60 mL/min/1.73 m 2   EKG (IP)    Collection Time: 23  9:13 AM   Result Value Ref Range    Report       [facility]    Test Date:  2023  Pt Name:    LORIE KAYE               Department: 183  MRN:        [id number]                      Room:       T809  Gender:     Male                         Technician: [facility]  :        1965                   Requested By:HOLGER WINN  Order #:    [id number]                    Reading MD: Memo Lipscomb MD    Measurements  Intervals                                Axis  Rate:       80                           P:          0  CT:         0                            QRS:        5  QRSD:       90                           T:          133  QT:         417  QTc:        482    Interpretive Statements  ATRIAL FLUTTER WITH PREDOMINANT 3:1 AV BLOCK  PROBABLE LVH WITH SECONDARY REPOL ABNRM  ST ELEVATION, CONSIDER INFERIOR INJURY  BORDERLINE PROLONGED QT INTERVAL  Compared to ECG 2023 15:27:47  AV block, advanced (high-grade) now present  ST (T wave) deviation now present  Myocardial infarct finding still present  Electronically Sig ant On 2023 10:01:09 PST by Memo Lipscomb MD         (click the triangle to expand results)    ASSESSMENT:  # ESRD on outpt PD   -Etiology 2/2 FSGS  - s/p permcath on 11/10   # R/O PD cath associated peritonitis  - CX negawtive  # c. Diff.  -symptoms improved with initiation of oral vanc  -guidelines vague on scenarios such as this  # S/P AVR/ Ascending aneurysm repair  # CKD-MBD  -phos elevated  # Anemia of CKD: at goal  # BL pleural effusions/congestion  # Pericardial effusion   # C. Diff colitis  # Abd pain:   -imaging  concerning for colitis  -C.diff positive  # C.diff  # Aflutter     PLAN:  -HD today for abnml xray/SOB  -oral vanc per primary  -holding APPLE for now  -Renal diet  -Phos binder with meals   -Dose all medications as per ESRD

## 2023-12-04 LAB
INR PPP: 1.93 (ref 0.87–1.13)
PROTHROMBIN TIME: 22.3 SEC (ref 12–14.6)
UFH PPP CHRO-ACNC: 0.49 IU/ML

## 2023-12-04 PROCEDURE — 85610 PROTHROMBIN TIME: CPT

## 2023-12-04 PROCEDURE — 36415 COLL VENOUS BLD VENIPUNCTURE: CPT

## 2023-12-04 PROCEDURE — 770020 HCHG ROOM/CARE - TELE (206)

## 2023-12-04 PROCEDURE — 700111 HCHG RX REV CODE 636 W/ 250 OVERRIDE (IP): Mod: JZ | Performed by: INTERNAL MEDICINE

## 2023-12-04 PROCEDURE — A9270 NON-COVERED ITEM OR SERVICE: HCPCS | Performed by: STUDENT IN AN ORGANIZED HEALTH CARE EDUCATION/TRAINING PROGRAM

## 2023-12-04 PROCEDURE — 700102 HCHG RX REV CODE 250 W/ 637 OVERRIDE(OP): Performed by: STUDENT IN AN ORGANIZED HEALTH CARE EDUCATION/TRAINING PROGRAM

## 2023-12-04 PROCEDURE — A9270 NON-COVERED ITEM OR SERVICE: HCPCS | Performed by: INTERNAL MEDICINE

## 2023-12-04 PROCEDURE — 85520 HEPARIN ASSAY: CPT

## 2023-12-04 PROCEDURE — 700101 HCHG RX REV CODE 250: Performed by: INTERNAL MEDICINE

## 2023-12-04 PROCEDURE — 99233 SBSQ HOSP IP/OBS HIGH 50: CPT | Performed by: STUDENT IN AN ORGANIZED HEALTH CARE EDUCATION/TRAINING PROGRAM

## 2023-12-04 PROCEDURE — 700102 HCHG RX REV CODE 250 W/ 637 OVERRIDE(OP): Performed by: INTERNAL MEDICINE

## 2023-12-04 PROCEDURE — 700111 HCHG RX REV CODE 636 W/ 250 OVERRIDE (IP): Performed by: STUDENT IN AN ORGANIZED HEALTH CARE EDUCATION/TRAINING PROGRAM

## 2023-12-04 RX ORDER — DEXTROMETHORPHAN POLISTIREX 30 MG/5ML
60 SUSPENSION ORAL
Status: DISCONTINUED | OUTPATIENT
Start: 2023-12-04 | End: 2023-12-26 | Stop reason: HOSPADM

## 2023-12-04 RX ORDER — WARFARIN SODIUM 2.5 MG/1
1.25 TABLET ORAL DAILY
Status: DISCONTINUED | OUTPATIENT
Start: 2023-12-05 | End: 2023-12-05

## 2023-12-04 RX ORDER — WARFARIN SODIUM 2.5 MG/1
2.5 TABLET ORAL
Status: COMPLETED | OUTPATIENT
Start: 2023-12-04 | End: 2023-12-04

## 2023-12-04 RX ADMIN — ACETAMINOPHEN 650 MG: 325 TABLET, FILM COATED ORAL at 21:12

## 2023-12-04 RX ADMIN — DRONABINOL 5 MG: 5 CAPSULE ORAL at 17:39

## 2023-12-04 RX ADMIN — VERAPAMIL HYDROCHLORIDE 120 MG: 120 TABLET ORAL at 21:08

## 2023-12-04 RX ADMIN — Medication 1 CAPSULE: at 08:11

## 2023-12-04 RX ADMIN — OMEPRAZOLE 20 MG: 20 CAPSULE, DELAYED RELEASE ORAL at 05:11

## 2023-12-04 RX ADMIN — HEPARIN SODIUM 18 UNITS/KG/HR: 5000 INJECTION, SOLUTION INTRAVENOUS at 14:31

## 2023-12-04 RX ADMIN — DRONABINOL 5 MG: 5 CAPSULE ORAL at 11:51

## 2023-12-04 RX ADMIN — Medication 1 LOZENGE: at 22:57

## 2023-12-04 RX ADMIN — VERAPAMIL HYDROCHLORIDE 120 MG: 120 TABLET ORAL at 05:11

## 2023-12-04 RX ADMIN — VANCOMYCIN HYDROCHLORIDE 125 MG: 5 INJECTION, POWDER, LYOPHILIZED, FOR SOLUTION INTRAVENOUS at 11:51

## 2023-12-04 RX ADMIN — Medication 1 APPLICATOR: at 17:40

## 2023-12-04 RX ADMIN — Medication 1 APPLICATOR: at 05:12

## 2023-12-04 RX ADMIN — DILTIAZEM HYDROCHLORIDE 15 MG: 5 INJECTION, SOLUTION INTRAVENOUS at 05:11

## 2023-12-04 RX ADMIN — ASPIRIN 81 MG: 81 TABLET, COATED ORAL at 05:12

## 2023-12-04 RX ADMIN — ATORVASTATIN CALCIUM 40 MG: 40 TABLET, FILM COATED ORAL at 21:08

## 2023-12-04 RX ADMIN — TAMSULOSIN HYDROCHLORIDE 0.4 MG: 0.4 CAPSULE ORAL at 05:11

## 2023-12-04 RX ADMIN — DEXTROMETHORPHAN 60 MG: 30 SUSPENSION, EXTENDED RELEASE ORAL at 05:11

## 2023-12-04 RX ADMIN — VANCOMYCIN HYDROCHLORIDE 125 MG: 5 INJECTION, POWDER, LYOPHILIZED, FOR SOLUTION INTRAVENOUS at 23:00

## 2023-12-04 RX ADMIN — WARFARIN SODIUM 2.5 MG: 2.5 TABLET ORAL at 17:39

## 2023-12-04 RX ADMIN — VANCOMYCIN HYDROCHLORIDE 125 MG: 5 INJECTION, POWDER, LYOPHILIZED, FOR SOLUTION INTRAVENOUS at 17:40

## 2023-12-04 RX ADMIN — VERAPAMIL HYDROCHLORIDE 120 MG: 120 TABLET ORAL at 14:48

## 2023-12-04 RX ADMIN — VANCOMYCIN HYDROCHLORIDE 125 MG: 5 INJECTION, POWDER, LYOPHILIZED, FOR SOLUTION INTRAVENOUS at 05:11

## 2023-12-04 ASSESSMENT — ENCOUNTER SYMPTOMS
DIARRHEA: 1
WEAKNESS: 1
NAUSEA: 0
ABDOMINAL PAIN: 1
VOMITING: 0
SHORTNESS OF BREATH: 0

## 2023-12-04 ASSESSMENT — FIBROSIS 4 INDEX: FIB4 SCORE: 0.85

## 2023-12-04 ASSESSMENT — PAIN DESCRIPTION - PAIN TYPE: TYPE: ACUTE PAIN

## 2023-12-04 NOTE — CARE PLAN
Problem: Pain - Standard  Goal: Alleviation of pain or a reduction in pain to the patient’s comfort goal  Outcome: Progressing     Problem: Knowledge Deficit - Standard  Goal: Patient and family/care givers will demonstrate understanding of plan of care, disease process/condition, diagnostic tests and medications  Outcome: Progressing     Problem: Fall Risk  Goal: Patient will remain free from falls  Outcome: Progressing     Problem: Respiratory  Goal: Patient will achieve/maintain optimum respiratory ventilation and gas exchange  Outcome: Progressing     Problem: Infection - Standard  Goal: Patient will remain free from infection  Outcome: Progressing     Problem: Wound/ / Incision Healing  Goal: Patient's wound/surgical incision will decrease in size and heals properly  Outcome: Progressing   The patient is Stable - Low risk of patient condition declining or worsening    Shift Goals  Clinical Goals: heparin gtt, monitor lab values  Patient Goals: no more blood draws and just go home  Family Goals: POC updates, talk with     Progress made toward(s) clinical / shift goals:  Progressing    Patient is not progressing towards the following goals:

## 2023-12-04 NOTE — PROGRESS NOTES
"[facility] -  PROGRESS NOTE               Author: Alma Rosa Sommers M.D. Date & Time: 12/4/2023  8:25 AM     HPI:  58 y.o. 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 Abd imaging demonstrated colitis. C. Diff pending and started on abx. He notes abd pain and diarrhea for several weeks. Edema improving with 2.5% dextrose solution. Still has HD permacath in-place. Pain with peritoneal dialysis but no cloudy effluent of fibrin clots. No f/c/s. Normally does [id number] fills all green.     DAILY NEPHROLOGY SUMMARY:  11/28: consult done  11/29: ongoing abd pain, seen on HD-tolerating procedure well, PD fluid concerning for peritonitis  11/30:c.diff positive, started on oral vanc, new onset aflutter-transferred to [facility], wbc improving, Peritoneal cultures remain negative, abd pain improving  12/1: PD culture remains negative, seen on HD-tolerating procedure, abd pain improving, less diarrhea    12/2: HD yesterday 2.5 liter UF, still with SOB decreased abdoimnal pain  12/3: PD last night 1.5 UF, pt still with SOB  12/4: No events, tolerated HD yest with 2.5L UF, BP stable, tachycardic this am, stable on RA, reports SOB has resolved, still with crampy abd discomfort, reports diarrhea is starting to become more formed    REVIEW OF SYSTEMS:    10 point ROS reviewed and is as per HPI/daily summary or otherwise negative    PMH/PSH/SH/FH:   Reviewed and unchanged since admission note    CURRENT MEDICATIONS:   Reviewed from admission to present day    VS:  /72   Pulse (!) 121 Comment: Rn notified   Temp 36.8 °C (98.2 °F) (Temporal)   Resp 15   Ht 1.702 m (5' 7\")   Wt 77 kg (169 lb 12.1 oz)   SpO2 96%   BMI 26.59 kg/m² "     Physical Exam  Nursing note reviewed.   Constitutional:       General: He is not in acute distress.     Appearance: Normal appearance.   HENT:      Head: Normocephalic and atraumatic.   Eyes:      General: No scleral icterus.     Extraocular Movements: Extraocular movements intact.   Cardiovascular:      Rate and Rhythm: Normal rate and regular rhythm.      Comments: +R chest PC  Pulmonary:      Effort: Pulmonary effort is normal.   Abdominal:      General: Bowel sounds are normal. There is distension.      Palpations: Abdomen is soft.      Tenderness: There is no abdominal tenderness.   Musculoskeletal:         General: No deformity.      Right lower leg: No edema.      Left lower leg: No edema.   Skin:     General: Skin is warm and dry.      Findings: No rash.   Neurological:      General: No focal deficit present.      Mental Status: He is alert and oriented to person, place, and time.   Psychiatric:         Mood and Affect: Mood normal.         Behavior: Behavior normal. Behavior is cooperative.         Fluids:  In: 660 [P.O.:160; Dialysis:500]  Out: 4527     LABS:  Recent Labs     12/02/23  0001 12/03/23  0049   SODIUM 136 136   POTASSIUM 4.7 4.5   CHLORIDE 99 98   CO2 24 23   GLUCOSE 92 115*   BUN 55* 61*   CREATININE 9.29* 10.58*   CALCIUM 8.2* 8.5       IMAGING:   All imaging reviewed from admission to present day    IMPRESSION:  # ESRD on outpt PD   -Etiology 2/2 FSGS  - s/p permcath on 11/10   # R/O PD cath associated peritonitis  - CX negative  # c. Diff.  -symptoms improved with initiation of oral vanc  -guidelines vague on scenarios such as this  # S/P AVR/ Ascending aneurysm repair  # CKD-MBD  -phos elevated  # Anemia of CKD: at goal  # BL pleural effusions/congestion  # Pericardial effusion   # C. Diff colitis  # Abd pain:   -imaging concerning for colitis  -C.diff positive  # C.diff  # Aflutter     PLAN:  -No HD today, will re-eval for HD in am (TUES)  -No PD today, pt reports abd discomfort  still   -oral vanc per primary  -holding APPLE for now  -Renal diet  -Encourage protein intake given low albumin  -Phos binder with meals   -Dose all medications as per ESRD

## 2023-12-04 NOTE — PROGRESS NOTES
Patient refusing bed alarm. Charge nurse notified, patient has history of same this admission. Patient is A/O 4.

## 2023-12-04 NOTE — PROGRESS NOTES
[facility] Progress Note     Hemodialysis treatment ordered today per Dr. Justo Ríos x 3 hours.   Treatment initiated at 1315 ended at 1600.  Tx ended 15 minutes early due to c/o dizziness; nausea; SBP at 90's  Improved post return of blood. Dr. JORDAN Ríos nephrologist made aware   See electronic flow sheet for details.      Net UF 2500 mL.      Post tx, CVC flushed with saline then locked with heparin 1000 units/mL per designated amount in each wing then clamped and capped. Aspirate heparin prior to next CVC use.     Report given to Primary RN Jarrett Tavares.

## 2023-12-04 NOTE — CARE PLAN
"The patient is   Problem: Pain - Standard  Goal: Alleviation of pain or a reduction in pain to the patient’s comfort goal  Outcome: Progressing  Note: No pain was reported during assessment and rounding      Problem: Fall Risk  Goal: Patient will remain free from falls  Outcome: Progressing       Shift Goals  Clinical Goals: Monitor VS; discussed plan of care  Patient Goals: no more blood draws and just go home  Family Goals: POC updates, talk with     Progress made toward(s) clinical / shift goals:  call light within reach, bed alarm refused by patient and Charge RN is aware,/74   Pulse (!) 103   Temp 36.2 °C (97.2 °F) (Temporal)   Resp 18   Ht 1.702 m (5' 7\")   Wt 77.3 kg (170 lb 6.7 oz)   SpO2 94%             Problem: Knowledge Deficit - Standard  Goal: Patient and family/care givers will demonstrate understanding of plan of care, disease process/condition, diagnostic tests and medications  Outcome: Not Progressing  Note: Discussed plan of care but patient is non compliant with medication and lab draws for his Xa since  he is still on heparin gtt     "

## 2023-12-04 NOTE — PROGRESS NOTES
Inpatient Anticoagulation Service Note for 12/4/2023  Reason for Anticoagulation: Atrial Fibrillation, On-X Aortic/Mitral Valve Replacement   [id number] VASc Score: 2  HAS-BLED Score: 3  Hemoglobin Value: (!) 9.6  Hematocrit Value: (!) 30.9  Lab Platelet Value: 376  Target INR: 2.0 to 3.0    INR from last 7 days       Date/Time INR Value    12/04/23 0014 1.93    12/03/23 0049 1.72    12/02/23 0001 1.7    12/01/23 1539 1.52    12/01/23 0332 1.44    11/30/23 0643 1.58    11/29/23 0507 1.64    11/27/23 1545 1.92          Dose from last 7 days       Date/Time Dose (mg)    12/04/23 0928 2.5    12/03/23 0929 2.5    12/02/23 1321 2.5    12/01/23 1345 2.5    11/30/23 1232 2.5    11/29/23 0923 1.25    11/28/23 0932 --          Average Dose (mg): TBD (Home Dose: 1.25 mg daily)  Significant Interactions: Antibiotics, Antiplatelet Medications  Bridge Therapy: Yes (heparin infusion)  Bridge Therapy Start Date: 12/01/23  Days of Overlap Therapy: 3   INR Value Greater than 2 Prior to Discontinuation of Parenteral Anticoagulation: Not Applicable   Reversal Agent Administered: Not Applicable  Comments: INR below goal. Noted bleed concern. No new H/H or PLT yet today. Noted heparin overlap. Noted labile INR. Noted warfarin interactions. Will give warfarin 2.5 mg today. INR with AM labs. Likely to need dose adjustments.    Plan:  warfarin 2.5 mg 12/4/23  Education Material Provided?: No (chronic warfarin patient)    Pharmacist suggested discharge dosing: warfarin 1.25 mg daily     Jarocho Sorensen, PharmD

## 2023-12-04 NOTE — DISCHARGE PLANNING
Case Management Discharge Planning    Admission Date: 11/27/2023  GMLOS: 5.4  ALOS: 7    6-Clicks ADL Score: 21  6-Clicks Mobility Score: 19      Anticipated Discharge Dispo: Discharge Disposition: Discharged to home/self care (01)  Discharge Address: Home ([address] [place] [id number])  Discharge Contact Phone Number: S/O Donna [phone]  Per chart review pt resides in [place], [place]    Family support:  Name Relation Home Work Mobile   Paulette Connors Significant other [phone]   Current Insurance on file:[employer]     DME Needed:  pending hospital course    Action(s) Taken: chart reviewed.Pt discussed during IDT rounds. LMSW sent LA paper work to [facility] Completed: None    Medically Clear: No    Next Steps: : f/u with pt and medical team to discuss dc needs and barriers.    Barriers to Discharge: Medical clearance    Is the patient up for discharge tomorrow: Possible DC tomorrow.

## 2023-12-04 NOTE — PROGRESS NOTES
[facility] Medicine Daily Progress Note    Date of Service  12/3/2023    Chief Complaint  Gurdeep Guerra is a 58 y.o. male admitted 11/27/2023 with abd pain    Hospital Course  57 yo man with A-fib, ESRD on PD, ascending aortic aneurysm with repair and aortic valve replacement who presented with abdominal pain.  He had a CTAP that showed cecal inflammation concerning for colitis or to focus.  He was admitted on IV's cefepime and Flagyl.  Nephrology was consulted.  Peritoneal fluid was negative for infection.    Interval Problem Update  11/29 - A-fib on telemetry, rate 100-120s  Leukocytosis increasing to 13.7  I discussed with Dr. Mac, peritoneal fluid concerning for peritonitis, will transition to intraperitoneal cefepime tomorrow  Patient says about a week after his heart surgery, he developed abdominal pain with nausea vomiting and diarrhea at home.  His nausea vomiting is doing better but he still has abdominal pain and diarrhea.  C. difficile is pending  TTE showed EF 70%, mechanical aortic valve was not well-visualized.  Patient denies chest pain or shortness of breath    11/30 -A-flutter on telemetry, rate 120s.  Patient did not want to take his verapamil this morning, he is concerned about having hypotension.  He denies shortness of breath or chest pain.  I will transfer to telemetry and change verapamil to short acting with hold parameters, IV diltiazem as needed. Positive for C. difficile and started on oral Vanco, already seeing improvement of abdominal pain, nausea and diarrhea.  WBC has decreased to 10.5    12/1  Still with abdominal cramping, fatigue, dyspnea with exertion, frequent watery stools, tachypnea. Getting HD today and another dose of PD directed abx. Increased verapamil. Still getting dilt pushes. Pharmacy discussed with CT sx team, they do recommend a heparin bridge till therapeutic INR (ESRD, recent cardiac/valve surgery). PPI added at patient request. Poor appetite, requesting to  try appetite stimulant, Marinol started.  Afebrile HR  RR 16-21 SBP 90's-123, SpO2 92-97% on 1-2 L NC. Follow electrolytes closely, chech mag, CMP, phos. Nephrology following. Plan to switch back to PD tomorrow if patient can tolerate. Patient stated he wanted a cardiac diet as opposed to renal diet, switched.     12/2  Examined in his inpatient room, afebrile, pulse  respiratory rate 16-18 systolic blood pressure 104 133 pulse ox 93 to 98% on 1 L nasal cannula.  No leukocytosis stable anemia normal platelet count INR up to 1.72.  He is feeling improved today compared to days prior.  Still with frequent watery bowel movements.  Less myalgias, less overall fatigue and malaise.  Still quite weak.    12/3  He had bad diarrhea again overnight and through the day, slowly decreasing frequency/urgency of stooling. HR . Pretty tired with HD today. Quite weak still. At times refuses PO medications, lab draws 2/2 his frustration and feeling poorly. Explained to him that he would likely feel better if he were compliant with all of his treatment plans. Watch HR trend and adjust medications as needed. Labs stable, nephrology following.    I have discussed this patient's plan of care and discharge plan at IDT rounds today with Case Management, Nursing, Nursing leadership, and other members of the IDT team.    Consultants/Specialty  nephrology    Code Status  Full Code    Disposition  The patient is not medically cleared for discharge to home or a post-acute facility.      I have placed the appropriate orders for post-discharge needs.    Review of Systems  Review of Systems   Constitutional:  Positive for malaise/fatigue.   Respiratory:  Negative for shortness of breath.    Cardiovascular:  Negative for chest pain.   Gastrointestinal:  Positive for abdominal pain and diarrhea. Negative for nausea and vomiting.   Neurological:  Positive for weakness.        Physical Exam  Temp:  [36.2 °C (97.2 °F)-36.8 °C (98.2 °F)]  36.8 °C (98.2 °F)  Pulse:  [103-124] 121  Resp:  [15-18] 15  BP: ()/(60-74) 107/72  SpO2:  [90 %-96 %] 96 %    Physical Exam  Vitals and nursing note reviewed.   Constitutional:       General: He is not in acute distress.     Appearance: He is ill-appearing. He is not toxic-appearing.   HENT:      Head: Normocephalic.      Nose: No rhinorrhea.      Mouth/Throat:      Mouth: Mucous membranes are moist.      Pharynx: Oropharynx is clear.   Eyes:      General:         Right eye: No discharge.         Left eye: No discharge.      Conjunctiva/sclera: Conjunctivae normal.   Cardiovascular:      Rate and Rhythm: Tachycardia present. Rhythm irregular.   Pulmonary:      Effort: Pulmonary effort is normal. No respiratory distress.      Breath sounds: No wheezing or rales.      Comments: Healing sternal surgical wound  Abdominal:      General: There is distension.      Palpations: Abdomen is soft.      Tenderness: There is no abdominal tenderness. There is no guarding or rebound.      Comments: Peritoneal cath in place   Musculoskeletal:         General: No swelling.      Cervical back: Neck supple. No rigidity.   Skin:     General: Skin is warm and dry.      Coloration: Skin is not jaundiced.   Neurological:      Mental Status: He is alert and oriented to person, place, and time. Mental status is at baseline.         Fluids    Intake/Output Summary (Last 24 hours) at 12/4/2023 0905  Last data filed at 12/3/2023 1615  Gross per 24 hour   Intake 500 ml   Output 3000 ml   Net -2500 ml         Laboratory  Recent Labs     12/02/23  0001 12/03/23  0049   WBC 9.6 9.1   RBC 3.28* 3.23*   HEMOGLOBIN 9.9* 9.6*   HEMATOCRIT 31.3* 30.9*   MCV 95.4 95.7   MCH 30.2 29.7   MCHC 31.6* 31.1*   RDW 51.8* 51.5*   PLATELETCT 390 376   MPV 9.0 8.8*       Recent Labs     12/02/23  0001 12/03/23 0049   SODIUM 136 136   POTASSIUM 4.7 4.5   CHLORIDE 99 98   CO2 24 23   GLUCOSE 92 115*   BUN 55* 61*   CREATININE 9.29* 10.58*   CALCIUM 8.2* 8.5        Recent Labs     12/01/23  1539 12/02/23  0001 12/03/23  0049 12/04/23  0014   APTT 31.2  --   --   --    INR 1.52* 1.70* 1.72* 1.93*                 Imaging  DX-CHEST-PORTABLE (1 VIEW)   Final Result      1.  Enlarged cardiac silhouette with vascular congestion/edema.   2.  Lower lobe airspace disease could be due to edema, atelectasis or pneumonitis.      EC-ECHOCARDIOGRAM COMPLETE W/O CONT   Final Result      CT-ABDOMEN-PELVIS W/O   Final Result      1.  Fat stranding adjacent to the cecum, concerning for colitis/typhlitis.   2.  Colonic diverticulosis.   3.  Nonobstructing, tiny left renal stone.   4.  Likely partially loculated moderate right pleural effusion.   5.  Small left pleural effusion.   6.  Adjacent right middle lobe and bilateral lower lobe consolidations, likely atelectasis.   7.  Small pericardial effusion.      DX-CHEST-PORTABLE (1 VIEW)   Final Result      1.  Resolved or improved small left pleural effusion with persistent left basilar atelectasis and/or scarring.   2.  New small right pleural effusion with associated atelectasis and/or consolidation.           Assessment/Plan  * C. difficile colitis- (present on admission)  Assessment & Plan  CT showed colitis, C. difficile is positive  Continue on oral vancomycin  Isolation  Leukocytosis improving, trend CBC  Blood cultures have been negative    Peritonitis (HCC)  Assessment & Plan  I discussed with nephrology, receiving intraperitoneal cefepime  Follow culture, so far no growth    S/P AVR- (present on admission)  Assessment & Plan  Recent aortic valve replacement  Was not well-visualized on this visit's TTE    Paroxysmal A-fib (HCC)- (present on admission)  Assessment & Plan  In A-fib, tachycardic but may be related to underlying infection.  Patient says carvedilol was dropping his blood pressure at home and amiodarone was causing side effects and so cardiology recommended he take for verapamil.  Continue on verapamil with hold  parameters  IV diltiazem as needed   Monitor on telemetry  Continue warfarin, INR remains low.  Discussed with pharmacy    ESRD (end stage renal disease) (HCC)- (present on admission)  Assessment & Plan  Nephrology following for dialysis    Dyslipidemia- (present on admission)  Assessment & Plan  Continue atorvastatin    Coronary artery disease due to lipid rich plaque- (present on admission)  Assessment & Plan  Continue with aspirin/warfarin/statin    Elevated troponin- (present on admission)  Assessment & Plan  Chronically elevated suspect secondary to demand and renal dysfunction.  Serial troponins plateaued and down trended  Monitor for symptom changes    Hypertension- (present on admission)  Assessment & Plan  Blood pressure has been stable but patient concerned about hypotension if he takes verapamil.  I changed to short acting with hold parameters         VTE prophylaxis:   SCDs/TEDs   therapeutic anticoagulation with coumadin dosing per pharmacy, adjust PRN      I have performed a physical exam and reviewed and updated ROS and Plan today (12/3/2023). In review of yesterday's note (12/2/2023), there are no changes except as documented above.    Total time spent 52 minutes. I spent greater than 50% of the time for patient care, counseling, and coordination on this date, including unit/floor time, and face-to-face time with the patient as per interval events, my own review of patient's imaging and lab analysis and developing my assessment and plan above.

## 2023-12-04 NOTE — PROGRESS NOTES
Monitor Summary:   Rhythm: afib/aflutter  Rate: 110-124  Measurement: .-/.09/.-  Ectopy: pvc's

## 2023-12-05 ENCOUNTER — APPOINTMENT (OUTPATIENT)
Dept: RADIOLOGY | Facility: MEDICAL CENTER | Age: 58
DRG: 981 | End: 2023-12-05
Attending: STUDENT IN AN ORGANIZED HEALTH CARE EDUCATION/TRAINING PROGRAM
Payer: COMMERCIAL

## 2023-12-05 PROBLEM — I48.92 PAROXYSMAL ATRIAL FLUTTER (HCC): Status: ACTIVE | Noted: 2023-11-11

## 2023-12-05 PROBLEM — K59.00 CONSTIPATION: Status: ACTIVE | Noted: 2023-12-05

## 2023-12-05 PROBLEM — R11.2 NAUSEA & VOMITING: Status: ACTIVE | Noted: 2023-12-05

## 2023-12-05 LAB
ALBUMIN SERPL BCP-MCNC: 2.4 G/DL (ref 3.2–4.9)
ALBUMIN/GLOB SERPL: 0.9 G/DL
ALP SERPL-CCNC: 60 U/L (ref 30–99)
ALT SERPL-CCNC: 9 U/L (ref 2–50)
ANION GAP SERPL CALC-SCNC: 14 MMOL/L (ref 7–16)
AST SERPL-CCNC: 15 U/L (ref 12–45)
BACTERIA DIAFP CULT: NORMAL
BASOPHILS # BLD AUTO: 0.4 % (ref 0–1.8)
BASOPHILS # BLD: 0.04 K/UL (ref 0–0.12)
BILIRUB SERPL-MCNC: 0.2 MG/DL (ref 0.1–1.5)
BUN SERPL-MCNC: 61 MG/DL (ref 8–22)
CALCIUM ALBUM COR SERPL-MCNC: 9.5 MG/DL (ref 8.5–10.5)
CALCIUM SERPL-MCNC: 8.2 MG/DL (ref 8.5–10.5)
CHLORIDE SERPL-SCNC: 95 MMOL/L (ref 96–112)
CO2 SERPL-SCNC: 24 MMOL/L (ref 20–33)
CREAT SERPL-MCNC: 10.97 MG/DL (ref 0.5–1.4)
EOSINOPHIL # BLD AUTO: 0.17 K/UL (ref 0–0.51)
EOSINOPHIL NFR BLD: 1.8 % (ref 0–6.9)
ERYTHROCYTE [DISTWIDTH] IN BLOOD BY AUTOMATED COUNT: 52 FL (ref 35.9–50)
GFR SERPLBLD CREATININE-BSD FMLA CKD-EPI: 5 ML/MIN/1.73 M 2
GLOBULIN SER CALC-MCNC: 2.8 G/DL (ref 1.9–3.5)
GLUCOSE SERPL-MCNC: 102 MG/DL (ref 65–99)
GRAM STN SPEC: NORMAL
HCT VFR BLD AUTO: 31.2 % (ref 42–52)
HGB BLD-MCNC: 9.8 G/DL (ref 14–18)
IMM GRANULOCYTES # BLD AUTO: 0.09 K/UL (ref 0–0.11)
IMM GRANULOCYTES NFR BLD AUTO: 0.9 % (ref 0–0.9)
INR PPP: 1.94 (ref 0.87–1.13)
INR PPP: 2.06 (ref 0.87–1.13)
LYMPHOCYTES # BLD AUTO: 0.41 K/UL (ref 1–4.8)
LYMPHOCYTES NFR BLD: 4.3 % (ref 22–41)
MAGNESIUM SERPL-MCNC: 1.9 MG/DL (ref 1.5–2.5)
MCH RBC QN AUTO: 30.4 PG (ref 27–33)
MCHC RBC AUTO-ENTMCNC: 31.4 G/DL (ref 32.3–36.5)
MCV RBC AUTO: 96.9 FL (ref 81.4–97.8)
MONOCYTES # BLD AUTO: 0.46 K/UL (ref 0–0.85)
MONOCYTES NFR BLD AUTO: 4.8 % (ref 0–13.4)
NEUTROPHILS # BLD AUTO: 8.44 K/UL (ref 1.82–7.42)
NEUTROPHILS NFR BLD: 87.8 % (ref 44–72)
NRBC # BLD AUTO: 0 K/UL
NRBC BLD-RTO: 0 /100 WBC (ref 0–0.2)
PHOSPHATE SERPL-MCNC: 5.4 MG/DL (ref 2.5–4.5)
PLATELET # BLD AUTO: 299 K/UL (ref 164–446)
PMV BLD AUTO: 9.1 FL (ref 9–12.9)
POTASSIUM SERPL-SCNC: 4.4 MMOL/L (ref 3.6–5.5)
PROT SERPL-MCNC: 5.2 G/DL (ref 6–8.2)
PROTHROMBIN TIME: 22.4 SEC (ref 12–14.6)
PROTHROMBIN TIME: 23.5 SEC (ref 12–14.6)
RBC # BLD AUTO: 3.22 M/UL (ref 4.7–6.1)
SIGNIFICANT IND 70042: NORMAL
SITE SITE: NORMAL
SODIUM SERPL-SCNC: 133 MMOL/L (ref 135–145)
SOURCE SOURCE: NORMAL
UFH PPP CHRO-ACNC: 0.29 IU/ML
UFH PPP CHRO-ACNC: 0.41 IU/ML
UFH PPP CHRO-ACNC: 0.71 IU/ML
UFH PPP CHRO-ACNC: 0.99 IU/ML
WBC # BLD AUTO: 9.6 K/UL (ref 4.8–10.8)

## 2023-12-05 PROCEDURE — 700111 HCHG RX REV CODE 636 W/ 250 OVERRIDE (IP): Performed by: INTERNAL MEDICINE

## 2023-12-05 PROCEDURE — 36415 COLL VENOUS BLD VENIPUNCTURE: CPT

## 2023-12-05 PROCEDURE — 85520 HEPARIN ASSAY: CPT | Mod: 91

## 2023-12-05 PROCEDURE — 700102 HCHG RX REV CODE 250 W/ 637 OVERRIDE(OP): Performed by: STUDENT IN AN ORGANIZED HEALTH CARE EDUCATION/TRAINING PROGRAM

## 2023-12-05 PROCEDURE — 80053 COMPREHEN METABOLIC PANEL: CPT

## 2023-12-05 PROCEDURE — 85025 COMPLETE CBC W/AUTO DIFF WBC: CPT

## 2023-12-05 PROCEDURE — 84100 ASSAY OF PHOSPHORUS: CPT

## 2023-12-05 PROCEDURE — 700102 HCHG RX REV CODE 250 W/ 637 OVERRIDE(OP): Performed by: INTERNAL MEDICINE

## 2023-12-05 PROCEDURE — 700101 HCHG RX REV CODE 250: Performed by: INTERNAL MEDICINE

## 2023-12-05 PROCEDURE — A9270 NON-COVERED ITEM OR SERVICE: HCPCS | Performed by: STUDENT IN AN ORGANIZED HEALTH CARE EDUCATION/TRAINING PROGRAM

## 2023-12-05 PROCEDURE — 83735 ASSAY OF MAGNESIUM: CPT

## 2023-12-05 PROCEDURE — A9270 NON-COVERED ITEM OR SERVICE: HCPCS | Performed by: INTERNAL MEDICINE

## 2023-12-05 PROCEDURE — 74018 RADEX ABDOMEN 1 VIEW: CPT

## 2023-12-05 PROCEDURE — 85610 PROTHROMBIN TIME: CPT

## 2023-12-05 PROCEDURE — 770020 HCHG ROOM/CARE - TELE (206)

## 2023-12-05 PROCEDURE — 90935 HEMODIALYSIS ONE EVALUATION: CPT

## 2023-12-05 PROCEDURE — 99233 SBSQ HOSP IP/OBS HIGH 50: CPT | Performed by: STUDENT IN AN ORGANIZED HEALTH CARE EDUCATION/TRAINING PROGRAM

## 2023-12-05 PROCEDURE — 700111 HCHG RX REV CODE 636 W/ 250 OVERRIDE (IP): Performed by: STUDENT IN AN ORGANIZED HEALTH CARE EDUCATION/TRAINING PROGRAM

## 2023-12-05 RX ORDER — POLYETHYLENE GLYCOL 3350 17 G/17G
1 POWDER, FOR SOLUTION ORAL 2 TIMES DAILY
Status: DISCONTINUED | OUTPATIENT
Start: 2023-12-05 | End: 2023-12-09

## 2023-12-05 RX ORDER — LANOLIN ALCOHOL/MO/W.PET/CERES
400 CREAM (GRAM) TOPICAL DAILY
Status: DISCONTINUED | OUTPATIENT
Start: 2023-12-05 | End: 2023-12-09

## 2023-12-05 RX ORDER — DOCUSATE SODIUM 100 MG/1
100 CAPSULE, LIQUID FILLED ORAL 2 TIMES DAILY
Status: DISCONTINUED | OUTPATIENT
Start: 2023-12-05 | End: 2023-12-16

## 2023-12-05 RX ORDER — WARFARIN SODIUM 2.5 MG/1
1.25 TABLET ORAL DAILY
Status: DISCONTINUED | OUTPATIENT
Start: 2023-12-05 | End: 2023-12-06

## 2023-12-05 RX ADMIN — WARFARIN SODIUM 1.25 MG: 2.5 TABLET ORAL at 17:11

## 2023-12-05 RX ADMIN — VANCOMYCIN HYDROCHLORIDE 125 MG: 5 INJECTION, POWDER, LYOPHILIZED, FOR SOLUTION INTRAVENOUS at 23:36

## 2023-12-05 RX ADMIN — Medication 1 APPLICATOR: at 17:10

## 2023-12-05 RX ADMIN — Medication 1 APPLICATOR: at 05:58

## 2023-12-05 RX ADMIN — OMEPRAZOLE 20 MG: 20 CAPSULE, DELAYED RELEASE ORAL at 05:57

## 2023-12-05 RX ADMIN — ONDANSETRON 4 MG: 2 INJECTION INTRAMUSCULAR; INTRAVENOUS at 20:53

## 2023-12-05 RX ADMIN — VERAPAMIL HYDROCHLORIDE 120 MG: 120 TABLET ORAL at 20:51

## 2023-12-05 RX ADMIN — VANCOMYCIN HYDROCHLORIDE 125 MG: 5 INJECTION, POWDER, LYOPHILIZED, FOR SOLUTION INTRAVENOUS at 05:57

## 2023-12-05 RX ADMIN — Medication 400 MG: at 08:27

## 2023-12-05 RX ADMIN — ATORVASTATIN CALCIUM 40 MG: 40 TABLET, FILM COATED ORAL at 20:51

## 2023-12-05 RX ADMIN — DRONABINOL 5 MG: 5 CAPSULE ORAL at 21:30

## 2023-12-05 RX ADMIN — HEPARIN SODIUM 3100 UNITS: 1000 INJECTION, SOLUTION INTRAVENOUS; SUBCUTANEOUS at 14:00

## 2023-12-05 RX ADMIN — HEPARIN SODIUM 20 UNITS/KG/HR: 5000 INJECTION, SOLUTION INTRAVENOUS at 06:13

## 2023-12-05 RX ADMIN — VANCOMYCIN HYDROCHLORIDE 125 MG: 5 INJECTION, POWDER, LYOPHILIZED, FOR SOLUTION INTRAVENOUS at 12:28

## 2023-12-05 RX ADMIN — ONDANSETRON 4 MG: 2 INJECTION INTRAMUSCULAR; INTRAVENOUS at 05:57

## 2023-12-05 RX ADMIN — DOCUSATE SODIUM 100 MG: 100 CAPSULE, LIQUID FILLED ORAL at 20:51

## 2023-12-05 RX ADMIN — HEPARIN SODIUM 16 UNITS/KG/HR: 5000 INJECTION, SOLUTION INTRAVENOUS at 23:55

## 2023-12-05 RX ADMIN — HEPARIN SODIUM 3100 UNITS: 1000 INJECTION, SOLUTION INTRAVENOUS; SUBCUTANEOUS at 02:55

## 2023-12-05 RX ADMIN — ASPIRIN 81 MG: 81 TABLET, COATED ORAL at 05:57

## 2023-12-05 RX ADMIN — HEPARIN SODIUM 2000 UNITS: 1000 INJECTION, SOLUTION INTRAVENOUS; SUBCUTANEOUS at 10:58

## 2023-12-05 RX ADMIN — VANCOMYCIN HYDROCHLORIDE 125 MG: 5 INJECTION, POWDER, LYOPHILIZED, FOR SOLUTION INTRAVENOUS at 17:09

## 2023-12-05 RX ADMIN — ACETAMINOPHEN 650 MG: 325 TABLET, FILM COATED ORAL at 10:16

## 2023-12-05 RX ADMIN — VERAPAMIL HYDROCHLORIDE 120 MG: 120 TABLET ORAL at 06:02

## 2023-12-05 RX ADMIN — Medication 1 CAPSULE: at 08:27

## 2023-12-05 RX ADMIN — DRONABINOL 5 MG: 5 CAPSULE ORAL at 10:16

## 2023-12-05 ASSESSMENT — ENCOUNTER SYMPTOMS
COUGH: 0
VOMITING: 0
ABDOMINAL PAIN: 1
SHORTNESS OF BREATH: 0
DIZZINESS: 0
DIARRHEA: 1
CHILLS: 0
HEADACHES: 0
MYALGIAS: 0
PALPITATIONS: 0
NAUSEA: 1
CONSTIPATION: 0
FEVER: 0

## 2023-12-05 ASSESSMENT — PAIN DESCRIPTION - PAIN TYPE: TYPE: ACUTE PAIN

## 2023-12-05 ASSESSMENT — FIBROSIS 4 INDEX: FIB4 SCORE: 0.85

## 2023-12-05 NOTE — CARE PLAN
Problem: Pain - Standard  Goal: Alleviation of pain or a reduction in pain to the patient’s comfort goal  Outcome: Progressing     Problem: Knowledge Deficit - Standard  Goal: Patient and family/care givers will demonstrate understanding of plan of care, disease process/condition, diagnostic tests and medications  Outcome: Progressing     Problem: Fall Risk  Goal: Patient will remain free from falls  Outcome: Progressing     Problem: Respiratory  Goal: Patient will achieve/maintain optimum respiratory ventilation and gas exchange  Outcome: Progressing     Problem: Wound/ / Incision Healing  Goal: Patient's wound/surgical incision will decrease in size and heals properly  Outcome: Progressing   The patient is Stable - Low risk of patient condition declining or worsening    Shift Goals  Clinical Goals: control heart rate  Patient Goals: sleep and rest  Family Goals: updates    Progress made toward(s) clinical / shift goals:  Progressing    Patient is not progressing towards the following goals:

## 2023-12-05 NOTE — PROGRESS NOTES
Monitor Summary:   Rhythm: aflutter  Rate:   Measurement: .-/.08/.-  Ectopy: pvc's, couplets

## 2023-12-05 NOTE — PROGRESS NOTES
[facility] Services Progress Note    Hemodialysis treatment x 3 hours completed as ordered per Dr. Sommers.  Treatment performed at bedside started at 1058 and ended at 1358.      Net UF Removed: 1000 mL    Hemodialysis treatment orders and patient labs reviewed.  Procedure explained, verbalized understanding, consent for treatment obtained.  Patient and dialysis access assessed pre and post treatment.  Seen by nephrologist prior to initiation of treatment  Patient tolerated treatment well without issues. UF goal as tolerated   Right chest tunneled CVC access with good patency and flow x 2  Patient VS overall stable during and post treatment.   See dialysis  flow sheets under media for details.      Post tx access: Right chest tunneled HD catheter flushed with saline then locked with Heparin 1000 units/ml per designated amount in each lumen (see MAR) then clamped, capped aseptically and labeled properly. CVC dressings clean, dry and intact no dressing change required at this time. Heparin lock to be aspirated prior to next dialysis/CVC use by dialysis RN only. Please do not flush or draw from ports.    Please notify Nephrologist/Dialysis for follow-up.     Report given to primary care nurse RAY Tavares RN.

## 2023-12-05 NOTE — CARE PLAN
"The patient is Watcher - Medium risk of patient condition declining or worsening    Shift Goals  Clinical Goals: Monitor VS; safety from fall  Patient Goals: sleep and rest  Family Goals: updates    Progress made toward(s) clinical / shift goals:  /74   Pulse (!) 121   Temp 36.8 °C (98.2 °F) (Temporal)   Resp 15   Ht 1.702 m (5' 7\")   Wt 77 kg (169 lb 12.1 oz)   SpO2 95%           Problem: Knowledge Deficit - Standard  Goal: Patient and family/care givers will demonstrate understanding of plan of care, disease process/condition, diagnostic tests and medications  Outcome: Not Progressing  Note: Remains non compliant with medications and lab draws     "

## 2023-12-05 NOTE — DISCHARGE PLANNING
Outpatient Dialysis Note     Home clinic:     [id number] [place] [address]     Schedule: PD at home     Pt is followed by Dr. Bernal.   Spoke with Drew at clinic who confirmed pt info.     Forwarded pt records.     Xitlaly Reyes   Dialysis Coordinator / [facility]  Ph: [phone]

## 2023-12-05 NOTE — DISCHARGE PLANNING
HTH/SCP TCN chart review completed. Collaborated with JOSE Flores. Current discharge considerations are for Best with Resumption of [facility] when medically cleared.   Per collaboration with JOSE, plan for possible discharge home tomorrow.  TCN will continue to follow and collaborate with discharge planning team as additional post acute needs arise. Thank you.    Completed:  Choice obtained: HH (Resumption of [facility]) on 11/28/23.    SCP with [facility] PCP.  Patient request to set up his own Follow up PCP appointment.  He states that he has a cardiac f/u appt. On 12/12/23.

## 2023-12-05 NOTE — PROGRESS NOTES
Hospital Medicine Daily Progress Note    Date of Service  12/4/2023    Chief Complaint  Gurdeep Guerra is a 58 y.o. male admitted 11/27/2023 with abd pain    Hospital Course  59 yo man with A-fib, ESRD on PD, ascending aortic aneurysm with repair and aortic valve replacement who presented with abdominal pain.  He had a CTAP that showed cecal inflammation concerning for colitis or to focus.  He was admitted on IV's cefepime and Flagyl.  Nephrology was consulted.  Peritoneal fluid was negative for infection.    11/29 - A-fib on telemetry, rate 100-120s  Leukocytosis increasing to 13.7  I discussed with Dr. Mac, peritoneal fluid concerning for peritonitis, will transition to intraperitoneal cefepime tomorrow  Patient says about a week after his heart surgery, he developed abdominal pain with nausea vomiting and diarrhea at home.  His nausea vomiting is doing better but he still has abdominal pain and diarrhea.  C. difficile is pending  TTE showed EF 70%, mechanical aortic valve was not well-visualized.  Patient denies chest pain or shortness of breath    11/30 -A-flutter on telemetry, rate 120s.  Patient did not want to take his verapamil this morning, he is concerned about having hypotension.  He denies shortness of breath or chest pain.  I will transfer to telemetry and change verapamil to short acting with hold parameters, IV diltiazem as needed. Positive for C. difficile and started on oral Vanco, already seeing improvement of abdominal pain, nausea and diarrhea.  WBC has decreased to 10.5    12/1  Still with abdominal cramping, fatigue, dyspnea with exertion, frequent watery stools, tachypnea. Getting HD today and another dose of PD directed abx. Increased verapamil. Still getting dilt pushes. Pharmacy discussed with CT sx team, they do recommend a heparin bridge till therapeutic INR (ESRD, recent cardiac/valve surgery). PPI added at patient request. Poor appetite, requesting to try appetite stimulant,  Marinol started.  Afebrile HR  RR 16-21 SBP 90's-123, SpO2 92-97% on 1-2 L NC. Follow electrolytes closely, chech mag, CMP, phos. Nephrology following. Plan to switch back to PD tomorrow if patient can tolerate. Patient stated he wanted a cardiac diet as opposed to renal diet, switched.     12/2  Examined in his inpatient room, afebrile, pulse  respiratory rate 16-18 systolic blood pressure 104 133 pulse ox 93 to 98% on 1 L nasal cannula.  No leukocytosis stable anemia normal platelet count INR up to 1.72.  He is feeling improved today compared to days prior.  Still with frequent watery bowel movements.  Less myalgias, less overall fatigue and malaise.  Still quite weak.    12/3  He had bad diarrhea again overnight and through the day, slowly decreasing frequency/urgency of stooling. HR . Pretty tired with HD today. Quite weak still. At times refuses PO medications, lab draws 2/2 his frustration and feeling poorly. Explained to him that he would likely feel better if he were compliant with all of his treatment plans. Watch HR trend and adjust medications as needed. Labs stable, nephrology following.    12/4 INR almost therapeutic. He has improving symptoms although still with watery diarrhea. Continue PO vanc. Still feels quite weak. No chest pains, no dyspnea. IF able to maintain PO intake can consider DC once therapeutic INR. No new complaints, diet adjusted at patient preference to regular.     I have discussed this patient's plan of care and discharge plan at IDT rounds today with Case Management, Nursing, Nursing leadership, and other members of the IDT team.    Consultants/Specialty  nephrology    Code Status  Full Code    Disposition  Medically Cleared  I have placed the appropriate orders for post-discharge needs.    Review of Systems  Review of Systems   Constitutional:  Positive for malaise/fatigue.   Respiratory:  Negative for shortness of breath.    Cardiovascular:  Negative for chest pain.    Gastrointestinal:  Positive for abdominal pain and diarrhea. Negative for nausea and vomiting.   Neurological:  Positive for weakness.        Physical Exam  Temp:  [36.5 °C (97.7 °F)-36.8 °C (98.2 °F)] 36.5 °C (97.7 °F)  Pulse:  [] 106  Resp:  [15-17] 17  BP: (103-118)/(63-74) 107/66  SpO2:  [88 %-96 %] 88 %    Physical Exam  Vitals and nursing note reviewed.   Constitutional:       General: He is not in acute distress.     Appearance: He is ill-appearing. He is not toxic-appearing.   HENT:      Head: Normocephalic.      Nose: No rhinorrhea.      Mouth/Throat:      Mouth: Mucous membranes are moist.      Pharynx: Oropharynx is clear.   Eyes:      General:         Right eye: No discharge.         Left eye: No discharge.      Conjunctiva/sclera: Conjunctivae normal.   Cardiovascular:      Rate and Rhythm: Tachycardia present. Rhythm irregular.   Pulmonary:      Effort: Pulmonary effort is normal. No respiratory distress.      Breath sounds: No wheezing or rales.      Comments: Healing sternal surgical wound  Abdominal:      General: There is distension.      Palpations: Abdomen is soft.      Tenderness: There is no abdominal tenderness. There is no guarding or rebound.      Comments: Peritoneal cath in place   Musculoskeletal:         General: No swelling or deformity.      Cervical back: Neck supple. No rigidity.   Skin:     General: Skin is warm and dry.      Coloration: Skin is not jaundiced.   Neurological:      Mental Status: He is alert and oriented to person, place, and time. Mental status is at baseline.         Fluids    Intake/Output Summary (Last 24 hours) at 12/4/2023 6891  Last data filed at 12/4/2023 0900  Gross per 24 hour   Intake 160 ml   Output --   Net 160 ml         Laboratory  Recent Labs     12/02/23  0001 12/03/23  0049   WBC 9.6 9.1   RBC 3.28* 3.23*   HEMOGLOBIN 9.9* 9.6*   HEMATOCRIT 31.3* 30.9*   MCV 95.4 95.7   MCH 30.2 29.7   MCHC 31.6* 31.1*   RDW 51.8* 51.5*   PLATELETCT 390 376    MPV 9.0 8.8*       Recent Labs     12/02/23  0001 12/03/23  0049   SODIUM 136 136   POTASSIUM 4.7 4.5   CHLORIDE 99 98   CO2 24 23   GLUCOSE 92 115*   BUN 55* 61*   CREATININE 9.29* 10.58*   CALCIUM 8.2* 8.5       Recent Labs     12/02/23  0001 12/03/23  0049 12/04/23  0014   INR 1.70* 1.72* 1.93*                 Imaging  DX-CHEST-PORTABLE (1 VIEW)   Final Result      1.  Enlarged cardiac silhouette with vascular congestion/edema.   2.  Lower lobe airspace disease could be due to edema, atelectasis or pneumonitis.      EC-ECHOCARDIOGRAM COMPLETE W/O CONT   Final Result      CT-ABDOMEN-PELVIS W/O   Final Result      1.  Fat stranding adjacent to the cecum, concerning for colitis/typhlitis.   2.  Colonic diverticulosis.   3.  Nonobstructing, tiny left renal stone.   4.  Likely partially loculated moderate right pleural effusion.   5.  Small left pleural effusion.   6.  Adjacent right middle lobe and bilateral lower lobe consolidations, likely atelectasis.   7.  Small pericardial effusion.      DX-CHEST-PORTABLE (1 VIEW)   Final Result      1.  Resolved or improved small left pleural effusion with persistent left basilar atelectasis and/or scarring.   2.  New small right pleural effusion with associated atelectasis and/or consolidation.           Assessment/Plan  * C. difficile colitis- (present on admission)  Assessment & Plan  CT showed colitis, C. difficile is positive  Continue on oral vancomycin  Isolation  Leukocytosis improving, trend CBC  Blood cultures have been negative    Peritonitis (HCC)  Assessment & Plan  I discussed with nephrology, receiving intraperitoneal cefepime  Follow culture, so far no growth    S/P AVR- (present on admission)  Assessment & Plan  Recent aortic valve replacement  Was not well-visualized on this visit's TTE    Paroxysmal A-fib (HCC)- (present on admission)  Assessment & Plan  In A-fib, tachycardic but may be related to underlying infection.  Patient says carvedilol was dropping  his blood pressure at home and amiodarone was causing side effects and so cardiology recommended he take for verapamil.  Continue on verapamil with hold parameters  IV diltiazem as needed   Monitor on telemetry  Continue warfarin, INR remains low.  Discussed with pharmacy    ESRD (end stage renal disease) (HCC)- (present on admission)  Assessment & Plan  Nephrology following for dialysis    Dyslipidemia- (present on admission)  Assessment & Plan  Continue atorvastatin    Coronary artery disease due to lipid rich plaque- (present on admission)  Assessment & Plan  Continue with aspirin/warfarin/statin    Elevated troponin- (present on admission)  Assessment & Plan  Chronically elevated suspect secondary to demand and renal dysfunction.  Serial troponins plateaued and down trended  Monitor for symptom changes    Hypertension- (present on admission)  Assessment & Plan  Blood pressure has been stable but patient concerned about hypotension if he takes verapamil.  I changed to short acting with hold parameters         VTE prophylaxis:   SCDs/TEDs   therapeutic anticoagulation with coumadin dosing per pharmacy, adjust PRN      I have performed a physical exam and reviewed and updated ROS and Plan today (12/4/2023). In review of yesterday's note (12/3/2023), there are no changes except as documented above.    Total time spent 53 minutes. I spent greater than 50% of the time for patient care, counseling, and coordination on this date, including unit/floor time, and face-to-face time with the patient as per interval events, my own review of patient's imaging and lab analysis and developing my assessment and plan above.

## 2023-12-05 NOTE — PROGRESS NOTES
[facility] -  PROGRESS NOTE               Author: Alma Rosa Sommers M.D. Date & Time: 12/5/2023  6:59 AM     HPI:  58 y.o. 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 Abd imaging demonstrated colitis. C. Diff pending and started on abx. He notes abd pain and diarrhea for several weeks. Edema improving with 2.5% dextrose solution. Still has HD permacath in-place. Pain with peritoneal dialysis but no cloudy effluent of fibrin clots. No f/c/s. Normally does [id number] fills all green.     DAILY NEPHROLOGY SUMMARY:  11/28: consult done  11/29: ongoing abd pain, seen on HD-tolerating procedure well, PD fluid concerning for peritonitis  11/30:c.diff positive, started on oral vanc, new onset aflutter-transferred to [facility], wbc improving, Peritoneal cultures remain negative, abd pain improving  12/1: PD culture remains negative, seen on HD-tolerating procedure, abd pain improving, less diarrhea    12/2: HD yesterday 2.5 liter UF, still with SOB decreased abdoimnal pain  12/3: PD last night 1.5 UF, pt still with SOB  12/4: No events, tolerated HD yest with 2.5L UF, BP stable, tachycardic this am, stable on RA, reports SOB has resolved, still with crampy abd discomfort, reports diarrhea is starting to become more formed  12/5: No events, no new labs, BP stable, tachycardic, stable on RA this am, still with crampy abd pain, diarrhea improving, still with nausea/anorexia, denies any CP/LE edema, reports mild SOB    REVIEW OF SYSTEMS:    10 point ROS reviewed and is as per HPI/daily summary or otherwise negative    PMH/PSH/SH/FH:   Reviewed and unchanged since admission note    CURRENT MEDICATIONS:   Reviewed from admission to  "present day    VS:  /80   Pulse (!) 124 Comment: Rn notified   Temp 36.4 °C (97.5 °F) (Temporal)   Resp 17   Ht 1.702 m (5' 7\")   Wt 76 kg (167 lb 8.8 oz)   SpO2 94%   BMI 26.24 kg/m²     Physical Exam  Vitals and nursing note reviewed.   Constitutional:       General: He is not in acute distress.     Appearance: Normal appearance.   HENT:      Head: Normocephalic and atraumatic.   Eyes:      General: No scleral icterus.     Extraocular Movements: Extraocular movements intact.   Cardiovascular:      Rate and Rhythm: Normal rate and regular rhythm.      Comments: +R chest PC  Pulmonary:      Effort: Pulmonary effort is normal.   Abdominal:      General: Bowel sounds are normal. There is distension.      Palpations: Abdomen is soft.      Tenderness: There is no abdominal tenderness.   Musculoskeletal:         General: No deformity.      Right lower leg: No edema.      Left lower leg: No edema.   Skin:     General: Skin is warm and dry.      Findings: No rash.   Neurological:      General: No focal deficit present.      Mental Status: He is alert and oriented to person, place, and time.   Psychiatric:         Mood and Affect: Mood normal.         Behavior: Behavior normal. Behavior is cooperative.         Fluids:  In: 160 [P.O.:160]  Out: -     LABS:  Recent Labs     12/03/23  0049   SODIUM 136   POTASSIUM 4.5   CHLORIDE 98   CO2 23   GLUCOSE 115*   BUN 61*   CREATININE 10.58*   CALCIUM 8.5         IMAGING:   All imaging reviewed from admission to present day    IMPRESSION:  # ESRD on outpt PD   -Etiology 2/2 FSGS  - s/p permcath on 11/10   # R/O PD cath associated peritonitis  - CX negative  # c. Diff.  -symptoms improved with initiation of oral vanc  -guidelines vague on scenarios such as this  # S/P AVR/ Ascending aneurysm repair  # CKD-MBD  -phos elevated  # Anemia of CKD: at goal  # BL pleural effusions/congestion  # Pericardial effusion   # C. Diff colitis  # Abd pain:   -imaging concerning for " colitis  -C.diff positive  # C.diff  # Aflutter     PLAN:  -HD today (TUES) and cont TTS HD schedule for now  -Gentle UF with HD as tolerated  -No PD today, will resume once abd pain/discomfort improves  -oral vanc per primary  -holding APPLE for now but may need to restart if Hgb trends down  -Renal diet  -Encourage protein intake given low albumin  -Phos binder with meals   -Dose all medications as per ESRD

## 2023-12-06 PROBLEM — K65.9 PERITONITIS (HCC): Status: RESOLVED | Noted: 2023-11-30 | Resolved: 2023-12-06

## 2023-12-06 PROBLEM — R11.2 NAUSEA & VOMITING: Status: RESOLVED | Noted: 2023-12-05 | Resolved: 2023-12-06

## 2023-12-06 PROBLEM — R79.89 ELEVATED TROPONIN: Status: RESOLVED | Noted: 2023-05-23 | Resolved: 2023-12-06

## 2023-12-06 LAB
ALBUMIN SERPL BCP-MCNC: 2.4 G/DL (ref 3.2–4.9)
BUN SERPL-MCNC: 38 MG/DL (ref 8–22)
CALCIUM ALBUM COR SERPL-MCNC: 10 MG/DL (ref 8.5–10.5)
CALCIUM SERPL-MCNC: 8.7 MG/DL (ref 8.5–10.5)
CHLORIDE SERPL-SCNC: 96 MMOL/L (ref 96–112)
CO2 SERPL-SCNC: 25 MMOL/L (ref 20–33)
CREAT SERPL-MCNC: 7.55 MG/DL (ref 0.5–1.4)
EKG IMPRESSION: NORMAL
GFR SERPLBLD CREATININE-BSD FMLA CKD-EPI: 8 ML/MIN/1.73 M 2
GLUCOSE SERPL-MCNC: 99 MG/DL (ref 65–99)
INR PPP: 1.94 (ref 0.87–1.13)
MAGNESIUM SERPL-MCNC: 1.9 MG/DL (ref 1.5–2.5)
PHOSPHATE SERPL-MCNC: 4.8 MG/DL (ref 2.5–4.5)
POTASSIUM SERPL-SCNC: 4.4 MMOL/L (ref 3.6–5.5)
PROTHROMBIN TIME: 22.4 SEC (ref 12–14.6)
SODIUM SERPL-SCNC: 133 MMOL/L (ref 135–145)
UFH PPP CHRO-ACNC: 0.33 IU/ML

## 2023-12-06 PROCEDURE — 700102 HCHG RX REV CODE 250 W/ 637 OVERRIDE(OP): Performed by: STUDENT IN AN ORGANIZED HEALTH CARE EDUCATION/TRAINING PROGRAM

## 2023-12-06 PROCEDURE — 99233 SBSQ HOSP IP/OBS HIGH 50: CPT | Performed by: STUDENT IN AN ORGANIZED HEALTH CARE EDUCATION/TRAINING PROGRAM

## 2023-12-06 PROCEDURE — RXMED WILLOW AMBULATORY MEDICATION CHARGE: Performed by: STUDENT IN AN ORGANIZED HEALTH CARE EDUCATION/TRAINING PROGRAM

## 2023-12-06 PROCEDURE — A9270 NON-COVERED ITEM OR SERVICE: HCPCS | Performed by: STUDENT IN AN ORGANIZED HEALTH CARE EDUCATION/TRAINING PROGRAM

## 2023-12-06 PROCEDURE — 700111 HCHG RX REV CODE 636 W/ 250 OVERRIDE (IP): Performed by: STUDENT IN AN ORGANIZED HEALTH CARE EDUCATION/TRAINING PROGRAM

## 2023-12-06 PROCEDURE — 83735 ASSAY OF MAGNESIUM: CPT

## 2023-12-06 PROCEDURE — 93005 ELECTROCARDIOGRAM TRACING: CPT | Performed by: STUDENT IN AN ORGANIZED HEALTH CARE EDUCATION/TRAINING PROGRAM

## 2023-12-06 PROCEDURE — A9270 NON-COVERED ITEM OR SERVICE: HCPCS | Performed by: INTERNAL MEDICINE

## 2023-12-06 PROCEDURE — 36415 COLL VENOUS BLD VENIPUNCTURE: CPT

## 2023-12-06 PROCEDURE — 700102 HCHG RX REV CODE 250 W/ 637 OVERRIDE(OP): Performed by: INTERNAL MEDICINE

## 2023-12-06 PROCEDURE — 770020 HCHG ROOM/CARE - TELE (206)

## 2023-12-06 PROCEDURE — 700101 HCHG RX REV CODE 250: Performed by: INTERNAL MEDICINE

## 2023-12-06 PROCEDURE — 85520 HEPARIN ASSAY: CPT

## 2023-12-06 PROCEDURE — 80069 RENAL FUNCTION PANEL: CPT

## 2023-12-06 PROCEDURE — 700111 HCHG RX REV CODE 636 W/ 250 OVERRIDE (IP): Mod: JZ | Performed by: INTERNAL MEDICINE

## 2023-12-06 PROCEDURE — 85610 PROTHROMBIN TIME: CPT

## 2023-12-06 PROCEDURE — 93010 ELECTROCARDIOGRAM REPORT: CPT | Performed by: INTERNAL MEDICINE

## 2023-12-06 RX ORDER — PSEUDOEPHEDRINE HCL 30 MG
100 TABLET ORAL 2 TIMES DAILY
Qty: 60 CAPSULE | Refills: 2 | Status: SHIPPED | OUTPATIENT
Start: 2023-12-06 | End: 2024-03-14

## 2023-12-06 RX ORDER — WARFARIN SODIUM 2.5 MG/1
2.5 TABLET ORAL DAILY
Status: DISCONTINUED | OUTPATIENT
Start: 2023-12-06 | End: 2023-12-07

## 2023-12-06 RX ORDER — DRONABINOL 5 MG/1
5 CAPSULE ORAL
Qty: 60 CAPSULE | Refills: 0 | Status: SHIPPED | OUTPATIENT
Start: 2023-12-06 | End: 2024-01-25

## 2023-12-06 RX ORDER — KETOROLAC TROMETHAMINE 30 MG/ML
30 INJECTION, SOLUTION INTRAMUSCULAR; INTRAVENOUS EVERY 6 HOURS
Status: DISCONTINUED | OUTPATIENT
Start: 2023-12-06 | End: 2023-12-06

## 2023-12-06 RX ORDER — MORPHINE SULFATE 4 MG/ML
4 INJECTION INTRAVENOUS
Status: DISCONTINUED | OUTPATIENT
Start: 2023-12-06 | End: 2023-12-17

## 2023-12-06 RX ORDER — LANOLIN ALCOHOL/MO/W.PET/CERES
400 CREAM (GRAM) TOPICAL DAILY
Qty: 30 TABLET | Refills: 2 | Status: SHIPPED | OUTPATIENT
Start: 2023-12-07 | End: 2024-03-14

## 2023-12-06 RX ORDER — OXYCODONE HYDROCHLORIDE 10 MG/1
10 TABLET ORAL
Status: DISCONTINUED | OUTPATIENT
Start: 2023-12-06 | End: 2023-12-17

## 2023-12-06 RX ORDER — PROMETHAZINE HYDROCHLORIDE 12.5 MG/1
12.5-25 TABLET ORAL EVERY 4 HOURS PRN
Qty: 30 TABLET | Refills: 0 | Status: SHIPPED | OUTPATIENT
Start: 2023-12-06 | End: 2024-03-14

## 2023-12-06 RX ORDER — SEVELAMER CARBONATE 800 MG/1
1600 TABLET, FILM COATED ORAL
Qty: 270 TABLET | Refills: 1 | Status: SHIPPED | OUTPATIENT
Start: 2023-12-06 | End: 2024-03-14

## 2023-12-06 RX ORDER — VERAPAMIL HYDROCHLORIDE 240 MG/1
240 TABLET, FILM COATED, EXTENDED RELEASE ORAL DAILY
Qty: 30 TABLET | Refills: 3 | Status: SHIPPED | OUTPATIENT
Start: 2023-12-06 | End: 2023-12-29

## 2023-12-06 RX ORDER — VANCOMYCIN HYDROCHLORIDE 125 MG/1
125 CAPSULE ORAL EVERY 6 HOURS
Qty: 12 CAPSULE | Refills: 0 | Status: ACTIVE | OUTPATIENT
Start: 2023-12-07 | End: 2023-12-10

## 2023-12-06 RX ORDER — VERAPAMIL HYDROCHLORIDE 120 MG/1
120 TABLET, FILM COATED ORAL EVERY 6 HOURS
Status: DISCONTINUED | OUTPATIENT
Start: 2023-12-06 | End: 2023-12-16

## 2023-12-06 RX ORDER — IBUPROFEN 800 MG/1
800 TABLET ORAL 3 TIMES DAILY PRN
Status: DISCONTINUED | OUTPATIENT
Start: 2023-12-09 | End: 2023-12-06

## 2023-12-06 RX ORDER — VERAPAMIL HYDROCHLORIDE 240 MG/1
240 TABLET, FILM COATED, EXTENDED RELEASE ORAL 2 TIMES DAILY
Qty: 60 TABLET | Refills: 2 | Status: SHIPPED | OUTPATIENT
Start: 2023-12-06 | End: 2023-12-29

## 2023-12-06 RX ORDER — ACETAMINOPHEN 500 MG
1000 TABLET ORAL EVERY 6 HOURS
Status: DISPENSED | OUTPATIENT
Start: 2023-12-06 | End: 2023-12-11

## 2023-12-06 RX ORDER — POLYETHYLENE GLYCOL 3350 17 G/17G
17 POWDER, FOR SOLUTION ORAL DAILY
Qty: 30 EACH | Refills: 2 | Status: SHIPPED | OUTPATIENT
Start: 2023-12-06 | End: 2024-03-14

## 2023-12-06 RX ORDER — ACETAMINOPHEN 500 MG
1000 TABLET ORAL EVERY 6 HOURS PRN
Status: DISCONTINUED | OUTPATIENT
Start: 2023-12-11 | End: 2023-12-26 | Stop reason: HOSPADM

## 2023-12-06 RX ORDER — OXYCODONE HYDROCHLORIDE 5 MG/1
5 TABLET ORAL
Status: DISCONTINUED | OUTPATIENT
Start: 2023-12-06 | End: 2023-12-17

## 2023-12-06 RX ADMIN — MAGNESIUM HYDROXIDE 30 ML: 1200 LIQUID ORAL at 12:43

## 2023-12-06 RX ADMIN — OMEPRAZOLE 20 MG: 20 CAPSULE, DELAYED RELEASE ORAL at 05:56

## 2023-12-06 RX ADMIN — POLYETHYLENE GLYCOL 3350 1 PACKET: 17 POWDER, FOR SOLUTION ORAL at 12:43

## 2023-12-06 RX ADMIN — ATORVASTATIN CALCIUM 40 MG: 40 TABLET, FILM COATED ORAL at 20:10

## 2023-12-06 RX ADMIN — ACETAMINOPHEN 1000 MG: 500 TABLET, FILM COATED ORAL at 13:59

## 2023-12-06 RX ADMIN — DOCUSATE SODIUM 100 MG: 100 CAPSULE, LIQUID FILLED ORAL at 18:14

## 2023-12-06 RX ADMIN — VANCOMYCIN HYDROCHLORIDE 125 MG: 5 INJECTION, POWDER, LYOPHILIZED, FOR SOLUTION INTRAVENOUS at 05:56

## 2023-12-06 RX ADMIN — SEVELAMER CARBONATE 1600 MG: 800 TABLET, FILM COATED ORAL at 08:26

## 2023-12-06 RX ADMIN — VERAPAMIL HYDROCHLORIDE 120 MG: 120 TABLET ORAL at 18:15

## 2023-12-06 RX ADMIN — VERAPAMIL HYDROCHLORIDE 120 MG: 120 TABLET ORAL at 05:55

## 2023-12-06 RX ADMIN — WARFARIN SODIUM 2.5 MG: 2.5 TABLET ORAL at 18:13

## 2023-12-06 RX ADMIN — Medication 1 APPLICATOR: at 18:13

## 2023-12-06 RX ADMIN — VERAPAMIL HYDROCHLORIDE 120 MG: 120 TABLET ORAL at 12:44

## 2023-12-06 RX ADMIN — HEPARIN SODIUM 16 UNITS/KG/HR: 5000 INJECTION, SOLUTION INTRAVENOUS at 21:43

## 2023-12-06 RX ADMIN — Medication 1 CAPSULE: at 08:26

## 2023-12-06 RX ADMIN — SEVELAMER CARBONATE 1600 MG: 800 TABLET, FILM COATED ORAL at 18:13

## 2023-12-06 RX ADMIN — VANCOMYCIN HYDROCHLORIDE 125 MG: 5 INJECTION, POWDER, LYOPHILIZED, FOR SOLUTION INTRAVENOUS at 18:12

## 2023-12-06 RX ADMIN — DRONABINOL 5 MG: 5 CAPSULE ORAL at 18:14

## 2023-12-06 RX ADMIN — Medication 1 APPLICATOR: at 05:56

## 2023-12-06 RX ADMIN — DRONABINOL 5 MG: 5 CAPSULE ORAL at 12:44

## 2023-12-06 RX ADMIN — ONDANSETRON 4 MG: 2 INJECTION INTRAMUSCULAR; INTRAVENOUS at 08:30

## 2023-12-06 RX ADMIN — ACETAMINOPHEN 1000 MG: 500 TABLET, FILM COATED ORAL at 20:10

## 2023-12-06 RX ADMIN — SEVELAMER CARBONATE 1600 MG: 800 TABLET, FILM COATED ORAL at 12:44

## 2023-12-06 RX ADMIN — VANCOMYCIN HYDROCHLORIDE 125 MG: 5 INJECTION, POWDER, LYOPHILIZED, FOR SOLUTION INTRAVENOUS at 12:43

## 2023-12-06 RX ADMIN — ASPIRIN 81 MG: 81 TABLET, COATED ORAL at 05:56

## 2023-12-06 RX ADMIN — ONDANSETRON 4 MG: 2 INJECTION INTRAMUSCULAR; INTRAVENOUS at 23:30

## 2023-12-06 ASSESSMENT — ENCOUNTER SYMPTOMS
DIARRHEA: 0
CONSTIPATION: 0
VOMITING: 0
ABDOMINAL PAIN: 1
HEADACHES: 0
SHORTNESS OF BREATH: 0
COUGH: 0
MYALGIAS: 0
NAUSEA: 1
PALPITATIONS: 0
FEVER: 0
DIZZINESS: 0
CHILLS: 0

## 2023-12-06 ASSESSMENT — PAIN DESCRIPTION - PAIN TYPE: TYPE: ACUTE PAIN

## 2023-12-06 ASSESSMENT — FIBROSIS 4 INDEX: FIB4 SCORE: 0.94

## 2023-12-06 NOTE — PROGRESS NOTES
[facility] Medicine Daily Progress Note    Date of Service  12/5/2023    Chief Complaint  Gurdeep Guerra is a 58 y.o. male admitted 11/27/2023 with abdominal pain.    Hospital Course  59 yo man with A-fib, ESRD on PD, ascending aortic aneurysm with repair and aortic valve replacement who presented with abdominal pain.  He had a CTAP that showed cecal inflammation concerning for colitis or to focus.  He was admitted on IV's cefepime and Flagyl.  Nephrology was consulted.  Peritoneal fluid was negative for infection.     11/29 - A-fib on telemetry, rate 100-120s  Leukocytosis increasing to 13.7  I discussed with Dr. Mac, peritoneal fluid concerning for peritonitis, will transition to intraperitoneal cefepime tomorrow  Patient says about a week after his heart surgery, he developed abdominal pain with nausea vomiting and diarrhea at home.  His nausea vomiting is doing better but he still has abdominal pain and diarrhea.  C. difficile is pending  TTE showed EF 70%, mechanical aortic valve was not well-visualized.  Patient denies chest pain or shortness of breath     11/30 -A-flutter on telemetry, rate 120s.  Patient did not want to take his verapamil this morning, he is concerned about having hypotension.  He denies shortness of breath or chest pain.  I will transfer to telemetry and change verapamil to short acting with hold parameters, IV diltiazem as needed. Positive for C. difficile and started on oral Vanco, already seeing improvement of abdominal pain, nausea and diarrhea.  WBC has decreased to 10.5     12/1  Still with abdominal cramping, fatigue, dyspnea with exertion, frequent watery stools, tachypnea. Getting HD today and another dose of PD directed abx. Increased verapamil. Still getting dilt pushes. Pharmacy discussed with CT sx team, they do recommend a heparin bridge till therapeutic INR (ESRD, recent cardiac/valve surgery). PPI added at patient request. Poor appetite, requesting to try appetite  stimulant, Marinol started.  Afebrile HR  RR 16-21 SBP 90's-123, SpO2 92-97% on 1-2 L NC. Follow electrolytes closely, chech mag, CMP, phos. Nephrology following. Plan to switch back to PD tomorrow if patient can tolerate. Patient stated he wanted a cardiac diet as opposed to renal diet, switched.      12/2  Examined in his inpatient room, afebrile, pulse  respiratory rate 16-18 systolic blood pressure 104 133 pulse ox 93 to 98% on 1 L nasal cannula.  No leukocytosis stable anemia normal platelet count INR up to 1.72.  He is feeling improved today compared to days prior.  Still with frequent watery bowel movements.  Less myalgias, less overall fatigue and malaise.  Still quite weak.     12/3  He had bad diarrhea again overnight and through the day, slowly decreasing frequency/urgency of stooling. HR . Pretty tired with HD today. Quite weak still. At times refuses PO medications, lab draws 2/2 his frustration and feeling poorly. Explained to him that he would likely feel better if he were compliant with all of his treatment plans. Watch HR trend and adjust medications as needed. Labs stable, nephrology following.     12/4 INR almost therapeutic. He has improving symptoms although still with watery diarrhea. Continue PO vanc. Still feels quite weak. No chest pains, no dyspnea. IF able to maintain PO intake can consider DC once therapeutic INR. No new complaints, diet adjusted at patient preference to regular.     Interval Problem Update  12/05/23  Patient was seen and examined on the telemetry floor.  Continues on continuous cardiac monitoring in atrial flutter heart rate in the 80s to 120s.  INR subtherapeutic this morning at 1.94.  Continues on a heparin drip.  INR goal of 2.0.  Discussed with clinical pharmacist.  Complaining of significant nausea and unable to tolerate oral intake.  Denies any significant diarrhea.  Abdominal x-ray, read showing moderate stool burden.  Starting MiraLAX and docusate  twice daily.  Continues on oral vancomycin and date of 12/9/2023.      I have discussed this patient's plan of care and discharge plan at IDT rounds today with Case Management, Nursing, Nursing leadership, and other members of the IDT team.    Consultants/Specialty  nephrology    Code Status  Full Code    Disposition  The patient is not medically cleared for discharge to home or a post-acute facility.  Anticipate discharge to: home with close outpatient follow-up    I have placed the appropriate orders for post-discharge needs.    Review of Systems  Review of Systems   Constitutional:  Negative for chills and fever.   Respiratory:  Negative for cough and shortness of breath.    Cardiovascular:  Negative for chest pain and palpitations.   Gastrointestinal:  Positive for abdominal pain, diarrhea and nausea. Negative for constipation and vomiting.   Genitourinary:  Negative for dysuria and hematuria.   Musculoskeletal:  Negative for joint pain and myalgias.   Neurological:  Negative for dizziness and headaches.        Physical Exam  Temp:  [36.3 °C (97.3 °F)-36.6 °C (97.9 °F)] 36.3 °C (97.3 °F)  Pulse:  [] 109  Resp:  [17-18] 18  BP: (104-119)/(55-80) 104/71  SpO2:  [88 %-99 %] 92 %    Physical Exam  Vitals and nursing note reviewed.   Constitutional:       General: He is not in acute distress.     Appearance: Normal appearance. He is not ill-appearing.   HENT:      Head: Normocephalic and atraumatic.      Mouth/Throat:      Mouth: Mucous membranes are moist.      Pharynx: Oropharynx is clear. No oropharyngeal exudate.   Eyes:      General: No scleral icterus.        Right eye: No discharge.         Left eye: No discharge.      Conjunctiva/sclera: Conjunctivae normal.   Cardiovascular:      Rate and Rhythm: Normal rate. Rhythm irregular.      Pulses: Normal pulses.      Heart sounds: Normal heart sounds. No murmur heard.  Pulmonary:      Effort: Pulmonary effort is normal. No respiratory distress.      Breath  sounds: Normal breath sounds.   Abdominal:      General: Abdomen is flat. Bowel sounds are normal. There is no distension.      Palpations: Abdomen is soft.      Tenderness: There is abdominal tenderness.   Musculoskeletal:         General: No swelling.      Cervical back: Neck supple. No tenderness.      Right lower leg: No edema.      Left lower leg: No edema.   Skin:     General: Skin is warm and dry.      Coloration: Skin is not pale.   Neurological:      Mental Status: He is alert and oriented to person, place, and time. Mental status is at baseline.      Motor: No weakness.   Psychiatric:         Thought Content: Thought content normal.         Judgment: Judgment normal.         Fluids    Intake/Output Summary (Last 24 hours) at 12/5/2023 1956  Last data filed at 12/5/2023 1508  Gross per 24 hour   Intake 1020 ml   Output 1500 ml   Net -480 ml       Laboratory  Recent Labs     12/03/23  0049 12/05/23  1045   WBC 9.1 9.6   RBC 3.23* 3.22*   HEMOGLOBIN 9.6* 9.8*   HEMATOCRIT 30.9* 31.2*   MCV 95.7 96.9   MCH 29.7 30.4   MCHC 31.1* 31.4*   RDW 51.5* 52.0*   PLATELETCT 376 299   MPV 8.8* 9.1     Recent Labs     12/03/23  0049 12/05/23  1045   SODIUM 136 133*   POTASSIUM 4.5 4.4   CHLORIDE 98 95*   CO2 23 24   GLUCOSE 115* 102*   BUN 61* 61*   CREATININE 10.58* 10.97*   CALCIUM 8.5 8.2*     Recent Labs     12/04/23  0014 12/05/23  0124 12/05/23  1506   INR 1.93* 1.94* 2.06*               Imaging  [id number] VIEW   Final Result      Nonspecific bowel gas pattern with a moderate colonic stool burden.      DX-CHEST-PORTABLE (1 VIEW)   Final Result      1.  Enlarged cardiac silhouette with vascular congestion/edema.   2.  Lower lobe airspace disease could be due to edema, atelectasis or pneumonitis.      EC-ECHOCARDIOGRAM COMPLETE W/O CONT   Final Result      CT-ABDOMEN-PELVIS W/O   Final Result      1.  Fat stranding adjacent to the cecum, concerning for colitis/typhlitis.   2.  Colonic diverticulosis.   3.   Nonobstructing, tiny left renal stone.   4.  Likely partially loculated moderate right pleural effusion.   5.  Small left pleural effusion.   6.  Adjacent right middle lobe and bilateral lower lobe consolidations, likely atelectasis.   7.  Small pericardial effusion.      DX-CHEST-PORTABLE (1 VIEW)   Final Result      1.  Resolved or improved small left pleural effusion with persistent left basilar atelectasis and/or scarring.   2.  New small right pleural effusion with associated atelectasis and/or consolidation.           Assessment/Plan  * C. difficile colitis- (present on admission)  Assessment & Plan  CT showed colitis, C. difficile is positive  Continue on oral vancomycin  Isolation  Leukocytosis improving, trend CBC  Blood cultures have been negative    Nausea & vomiting- (present on admission)  Assessment & Plan  Suspect due to severe constipation.  Start docusate and MiraLAX twice daily  Milk of Magnesia    History of mechanical aortic valve replacement- (present on admission)  Assessment & Plan  Recent aortic valve replacement with X valve.  Was not well-visualized on this visit's TTE    INR goal 2-3 for now.   Continue heparin drip until INR therapeutic    Constipation- (present on admission)  Assessment & Plan  12/05/23  In setting of C. difficile enterocolitis.  Abdominal x-ray showing moderate stool burden per my read.  Starting MiraLAX and docusate twice daily  Suspect cause of patient's nausea.    Peritonitis (HCC)  Assessment & Plan  I discussed with nephrology, receiving intraperitoneal cefepime  Follow culture, so far no growth    Paroxysmal atrial flutter (HCC)- (present on admission)  Assessment & Plan  In A-fib, tachycardic but may be related to underlying infection.  Patient says carvedilol was dropping his blood pressure at home and amiodarone was causing side effects and so cardiology recommended he take for verapamil.  Continue on verapamil with hold parameters  IV diltiazem as needed    Monitor on telemetry  Continue warfarin, INR remains low.  Discussed with pharmacy    12/05/23  Telemetry reading per my read showing atrial flutter.  Continuous cardiac monitoring.  Currently rate controlled in the 80s to 120s.  Continue verapamil 120 mg every 8 hours  INR subtherapeutic at 1.94.  Continue heparin drip until INR therapeutic.  INR goal of 2-3.  Discussed with clinical pharmacist.    ESRD (end stage renal disease) ([facility])- (present on admission)  Assessment & Plan  Nephrology following for dialysis    Dyslipidemia- (present on admission)  Assessment & Plan  Continue atorvastatin    Coronary artery disease due to lipid rich plaque- (present on admission)  Assessment & Plan  Continue with aspirin/warfarin/statin    Elevated troponin- (present on admission)  Assessment & Plan  Chronically elevated suspect secondary to demand and renal dysfunction.  Serial troponins plateaued and down trended  Monitor for symptom changes    Hypertension- (present on admission)  Assessment & Plan  Blood pressure has been stable but patient concerned about hypotension if he takes verapamil.  I changed to short acting with hold parameters         VTE prophylaxis:    therapeutic anticoagulation with weight-based heparin gtt, pharmacy to adjust PRN      I have performed a physical exam and reviewed and updated ROS and Plan today (12/5/2023). In review of yesterday's note (12/4/2023), there are no changes except as documented above.

## 2023-12-06 NOTE — ASSESSMENT & PLAN NOTE
12/05/23  In setting of C. difficile enterocolitis.  Abdominal x-ray showing moderate stool burden per my read.  Starting MiraLAX and docusate twice daily  Suspect cause of patient's nausea.    12/06/23  Patient has been refusing his bowel regimen  After further discussion, he is agreeable to taking his bowel regimen as ordered    12/07/23  Multiple bowel movements.  Appears to be resolved on repeat abdominal x-ray.  Continue bowel regimen

## 2023-12-06 NOTE — PROGRESS NOTES
Marinol for this patient was marked as given at 1710 today. Patient states he did NOT take this medication at that time or since. Patient asking for this medication now for appetite. Per pharmacy, dosage goes up to 20mg BID, so giving this patient 5mg now would be low risk if it is a double dosage. Spoke with Hospitalist UNRULY Burgos, who said it was okay to alverto the previous marinol as not given, and give marinol now.

## 2023-12-06 NOTE — PROGRESS NOTES
Assumed care of patient, he is currently on heparin, at 16 units/kg/hr. He is a/ox4 and is aware of the risks of falling and risk of injury with heparin. Pt was asked to use the call light any chance the need to get up.  He is refusing the bed alarm, he is a moderate fall risk at 13 per the [facility] Fall risk assessment. Charge nurse notified

## 2023-12-06 NOTE — CARE PLAN
"The patient is Stable - Low risk of patient condition declining or worsening    Shift Goals  Clinical Goals: Monitor VS; labs and pain management  Patient Goals: no more labs draws  Family Goals: updates    Progress made toward(s) clinical / shift goals:  /71   Pulse (!) 109   Temp 36.3 °C (97.3 °F) (Temporal)   Resp 18   Ht 1.702 m (5' 7\")   Wt 76 kg (167 lb 8.8 oz)   SpO2 92%           "

## 2023-12-06 NOTE — PROGRESS NOTES
[facility] -  PROGRESS NOTE               Author: Alma Rosa Sommers M.D. Date & Time: 12/6/2023  8:04 AM     HPI:  58 y.o. 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 Abd imaging demonstrated colitis. C. Diff pending and started on abx. He notes abd pain and diarrhea for several weeks. Edema improving with 2.5% dextrose solution. Still has HD permacath in-place. Pain with peritoneal dialysis but no cloudy effluent of fibrin clots. No f/c/s. Normally does [id number] fills all green.     DAILY NEPHROLOGY SUMMARY:  11/28: consult done  11/29: ongoing abd pain, seen on HD-tolerating procedure well, PD fluid concerning for peritonitis  11/30:c.diff positive, started on oral vanc, new onset aflutter-transferred to [facility], wbc improving, Peritoneal cultures remain negative, abd pain improving  12/1: PD culture remains negative, seen on HD-tolerating procedure, abd pain improving, less diarrhea    12/2: HD yesterday 2.5 liter UF, still with SOB decreased abdoimnal pain  12/3: PD last night 1.5 UF, pt still with SOB  12/4: No events, tolerated HD yest with 2.5L UF, BP stable, tachycardic this am, stable on RA, reports SOB has resolved, still with crampy abd discomfort, reports diarrhea is starting to become more formed  12/5: No events, no new labs, BP stable, tachycardic, stable on RA this am, still with crampy abd pain, diarrhea improving, still with nausea/anorexia, denies any CP/LE edema, reports mild SOB  12/6: No events, tolerated HD yest with 1L UF, BP stable, remains tachycardic, reports nausea is improved and having more firm stools, denies any CP/SOB, reports poor appetite    REVIEW OF SYSTEMS:    10 point ROS  "reviewed and is as per HPI/daily summary or otherwise negative    PMH/PSH/SH/FH:   Reviewed and unchanged since admission note    CURRENT MEDICATIONS:   Reviewed from admission to present day    VS:  /78   Pulse (!) 116   Temp 36.6 °C (97.9 °F) (Temporal)   Resp 18   Ht 1.702 m (5' 7\")   Wt 77.1 kg (169 lb 15.6 oz)   SpO2 90%   BMI 26.62 kg/m²     Physical Exam  Vitals and nursing note reviewed.   Constitutional:       General: He is not in acute distress.     Appearance: Normal appearance.   HENT:      Head: Normocephalic and atraumatic.   Eyes:      General: No scleral icterus.     Extraocular Movements: Extraocular movements intact.   Cardiovascular:      Rate and Rhythm: Normal rate and regular rhythm.      Comments: +R chest PC  Pulmonary:      Effort: Pulmonary effort is normal.   Abdominal:      General: Bowel sounds are normal. There is distension.      Palpations: Abdomen is soft.      Tenderness: There is no abdominal tenderness.   Musculoskeletal:         General: No deformity.      Right lower leg: No edema.      Left lower leg: No edema.   Skin:     General: Skin is warm and dry.      Findings: No rash.   Neurological:      General: No focal deficit present.      Mental Status: He is alert and oriented to person, place, and time.   Psychiatric:         Mood and Affect: Mood normal.         Behavior: Behavior normal. Behavior is cooperative.         Fluids:  In: 1580 [P.O.:1080; Dialysis:500]  Out: 1500     LABS:  Recent Labs     12/05/23  1045 12/06/23  0458   SODIUM 133* 133*   POTASSIUM 4.4 4.4   CHLORIDE 95* 96   CO2 24 25   GLUCOSE 102* 99   BUN 61* 38*   CREATININE 10.97* 7.55*   CALCIUM 8.2* 8.7         IMAGING:   All imaging reviewed from admission to present day    IMPRESSION:  # ESRD on outpt PD   -Etiology 2/2 FSGS  - s/p permcath on 11/10   # R/O PD cath associated peritonitis  - CX negative  # c. Diff.  -symptoms improved with initiation of oral vanc  -guidelines vague on " scenarios such as this  # S/P AVR/ Ascending aneurysm repair  # CKD-MBD  -phos elevated  # Anemia of CKD: at goal  # BL pleural effusions/congestion  # Pericardial effusion   # C. Diff colitis  # Abd pain:   -imaging concerning for colitis  -C.diff positive  # C.diff  # Aflutter     PLAN:  -No HD today (WED), cont TTS HD schedule for now  -Gentle UF with HD as tolerated  -No PD today, will resume once abd pain/discomfort improves, can re-eval as outpt  -oral vanc per primary  -holding APPLE for now but may need to restart if Hgb trends down  -Renal diet  -Encourage protein intake given low albumin  - Recommend nutrition consult for Nepro/protein supplements as low albumin will affect pt's ability to resume PD   -Phos binder with meals   -Dose all medications as per ESRD    **Pt will need outpt HD chair at [facility] until abd pain improves enough that he can resume PD

## 2023-12-06 NOTE — DISCHARGE PLANNING
Case Management Discharge Planning    Admission Date: 11/27/2023  GMLOS: 5.4  ALOS: 9    6-Clicks ADL Score: 21  6-Clicks Mobility Score: 19      Anticipated Discharge Dispo: Discharge Disposition: Discharged to home/self care (01)  Discharge Address: Home ([address] [place] [address])  Discharge Contact Phone Number: S/O Donna [phone]    DME Needed: No    Action(s) Taken: Pt need HD chair set up. CM RN called dialysis coordinator supervisor to start process, no answer, VM. Contact for HD setup: Dhiraj [phone].     Enrrique grady 11/8/23    Escalations Completed: Leadership

## 2023-12-06 NOTE — DISCHARGE PLANNING
HTH/SCP TCN chart review completed. Collaborated with JOSE Aldana.   Current discharge considerations are for Best with Resumption of [facility] and close outpatient f/u.    TCN will continue to follow and collaborate with discharge planning team as additional post acute needs arise. Thank you.     Completed:  Choice obtained: HH (Resumption of [facility]) on 11/28/23.    SCP with [facility] PCP.  Patient request to set up his own Follow up PCP appointment.  He states that he has a cardiac f/u appt. On 12/12/23.

## 2023-12-06 NOTE — PROGRESS NOTES
Inpatient Anticoagulation Service Note for 12/5/2023    Reason for Anticoagulation: Atrial Fibrillation, On-X Aortic/Mitral Valve Replacement   [id number] VASc Score: 2  HAS-BLED Score: 3    Hemoglobin Value: (!) 9.8  Hematocrit Value: (!) 31.2  Lab Platelet Value: 299  Target INR: 2.0 to 3.0    INR from last 7 days       Date/Time INR Value    12/05/23 1506 2.06    12/05/23 0124 1.94    12/04/23 0014 1.93    12/03/23 0049 1.72    12/02/23 0001 1.7    12/01/23 1539 1.52    12/01/23 0332 1.44    11/30/23 0643 1.58    11/29/23 0507 1.64          Dose from last 7 days       Date/Time Dose (mg)    12/05/23 1627 1.25    12/04/23 0928 2.5    12/03/23 0929 2.5    12/02/23 1321 2.5    12/01/23 1345 2.5    11/30/23 1232 2.5    11/29/23 0923 1.25          Average Dose (mg): Home dose recently decreased to 1.25 mg daily  Significant Interactions: Antibiotics, Antiplatelet Medications (Aspirin 81 mg daily, PO vancomycin 125 mg Q6H)  Bridge Therapy: Yes  Bridge Therapy Start Date: 12/01/23  Days of Overlap Therapy: 4   INR Value Greater than 2 Prior to Discontinuation of Parenteral Anticoagulation: Not Applicable   Reversal Agent Administered: Not Applicable    Comments: INR slightly subtherapeutic this morning at 1.94, then 2.06 on repeat labs this afternoon. H/H low, but appears stable. No overt bleeding noted. Patient remains on heparin bridge, can likely discontinue tomorrow if INR remains therapeutic. DDIs as noted above. History of labile INR with recent dose decrease due to supratherapeutic INR. Plan to give warfarin 1.25 mg tonight with repeat INR in AM.     Plan: Warfarin 1.25 mg, repeat INR in AM  Education Material Provided?: No (Home medication)    Pharmacist suggested discharge dosing: Consider warfarin 1.25 mg Tuesday/Thursday, 2.5 mg all other days, pending further INR trend. Recommend repeat INR within 48-72 hours of discharge.      Kena Leonard, EstefanyD

## 2023-12-06 NOTE — PROGRESS NOTES
Inpatient Anticoagulation Service Note for 12/6/2023    Reason for Anticoagulation: Atrial Fibrillation, On-X Aortic/Mitral Valve Replacement   [id number] VASc Score: 2  HAS-BLED Score: 3    Hemoglobin Value: (!) 9.8  Hematocrit Value: (!) 31.2  Lab Platelet Value: 299  Target INR: 2.0 to 3.0    INR from last 7 days       Date/Time INR Value    12/06/23 0458 1.94    12/05/23 1506 2.06    12/05/23 0124 1.94    12/04/23 0014 1.93    12/03/23 0049 1.72    12/02/23 0001 1.7    12/01/23 1539 1.52    12/01/23 0332 1.44    11/30/23 0643 1.58          Dose from last 7 days       Date/Time Dose (mg)    12/06/23 1441 2.5    12/05/23 1627 1.25    12/04/23 0928 2.5    12/03/23 0929 2.5    12/02/23 1321 2.5    12/01/23 1345 2.5    11/30/23 1232 2.5          Average Dose (mg): Home dose recently decreased to 1.25 mg daily  Significant Interactions: Antibiotics, Antiplatelet Medications (Aspirin 81 mg daily, PO vancomycin 125 mg Q6H)  Bridge Therapy: Yes  Bridge Therapy Start Date: 12/01/23  Days of Overlap Therapy: 5   INR Value Greater than 2 Prior to Discontinuation of Parenteral Anticoagulation: Not Applicable  Reversal Agent Administered: Not Applicable    Comments: INR slightly subtherapeutic again today at 1.94. History of labile INR with recent dose decrease due to supratherapeutic INR. No new CBC, but has been stable. No overt bleeding noted. Patient remains on heparin bridge while INR is subtherapeutic. DDIs as noted above, no change. As INR has been fairly stagnant over last 2 days, will increase dose back to warfarin 2.5 mg. Repeat INR in AM to determine further dose adjustments as appropriate.     Plan: Warfarin 2.5 mg, repeat INR in AM.   Education Material Provided?: No (Home medication)    Pharmacist suggested discharge dosing: Consider 2.5 mg daily, pending INR trend. Recommend close follow up with repeat INR within 48-72 hours of discharge.       Kena Leonard, EstefanyD

## 2023-12-06 NOTE — HOSPITAL COURSE
57 yo man with A-fib, ESRD on PD, ascending aortic aneurysm with repair and aortic valve replacement who presented with abdominal pain.  He had a CTAP that showed cecal inflammation concerning for colitis or to focus.  He was admitted on IV's cefepime and Flagyl.  Nephrology was consulted.  Peritoneal fluid was negative for infection.     11/29 - A-fib on telemetry, rate 100-120s  Leukocytosis increasing to 13.7  I discussed with Dr. Mac, peritoneal fluid concerning for peritonitis, will transition to intraperitoneal cefepime tomorrow  Patient says about a week after his heart surgery, he developed abdominal pain with nausea vomiting and diarrhea at home.  His nausea vomiting is doing better but he still has abdominal pain and diarrhea.  C. difficile is pending  TTE showed EF 70%, mechanical aortic valve was not well-visualized.  Patient denies chest pain or shortness of breath     11/30 -A-flutter on telemetry, rate 120s.  Patient did not want to take his verapamil this morning, he is concerned about having hypotension.  He denies shortness of breath or chest pain.  I will transfer to telemetry and change verapamil to short acting with hold parameters, IV diltiazem as needed. Positive for C. difficile and started on oral Vanco, already seeing improvement of abdominal pain, nausea and diarrhea.  WBC has decreased to 10.5     12/1  Still with abdominal cramping, fatigue, dyspnea with exertion, frequent watery stools, tachypnea. Getting HD today and another dose of PD directed abx. Increased verapamil. Still getting dilt pushes. Pharmacy discussed with CT sx team, they do recommend a heparin bridge till therapeutic INR (ESRD, recent cardiac/valve surgery). PPI added at patient request. Poor appetite, requesting to try appetite stimulant, Marinol started.  Afebrile HR  RR 16-21 SBP 90's-123, SpO2 92-97% on 1-2 L NC. Follow electrolytes closely, chech mag, CMP, phos. Nephrology following. Plan to switch back  to PD tomorrow if patient can tolerate. Patient stated he wanted a cardiac diet as opposed to renal diet, switched.      12/2  Examined in his inpatient room, afebrile, pulse  respiratory rate 16-18 systolic blood pressure 104 133 pulse ox 93 to 98% on 1 L nasal cannula.  No leukocytosis stable anemia normal platelet count INR up to 1.72.  He is feeling improved today compared to days prior.  Still with frequent watery bowel movements.  Less myalgias, less overall fatigue and malaise.  Still quite weak.     12/3  He had bad diarrhea again overnight and through the day, slowly decreasing frequency/urgency of stooling. HR . Pretty tired with HD today. Quite weak still. At times refuses PO medications, lab draws 2/2 his frustration and feeling poorly. Explained to him that he would likely feel better if he were compliant with all of his treatment plans. Watch HR trend and adjust medications as needed. Labs stable, nephrology following.     12/4 INR almost therapeutic. He has improving symptoms although still with watery diarrhea. Continue PO vanc. Still feels quite weak. No chest pains, no dyspnea. IF able to maintain PO intake can consider DC once therapeutic INR. No new complaints, diet adjusted at patient preference to regular.

## 2023-12-06 NOTE — CARE PLAN
Problem: Pain - Standard  Goal: Alleviation of pain or a reduction in pain to the patient’s comfort goal  Outcome: Progressing     Problem: Knowledge Deficit - Standard  Goal: Patient and family/care givers will demonstrate understanding of plan of care, disease process/condition, diagnostic tests and medications  Outcome: Progressing     Problem: Fall Risk  Goal: Patient will remain free from falls  Outcome: Progressing     Problem: Respiratory  Goal: Patient will achieve/maintain optimum respiratory ventilation and gas exchange  Outcome: Progressing     Problem: Fluid Volume  Goal: Fluid volume balance will be maintained  Outcome: Progressing     Problem: Gastrointestinal Irritability  Goal: Diarrhea will be absent or improved  Outcome: Progressing     Problem: Infection - Standard  Goal: Patient will remain free from infection  Outcome: Progressing   The patient is Stable - Low risk of patient condition declining or worsening    Shift Goals  Clinical Goals: control heart rate  Patient Goals: no more labs draws  Family Goals: updates    Progress made toward(s) clinical / shift goals:  progressing    Patient is not progressing towards the following goals:

## 2023-12-07 ENCOUNTER — APPOINTMENT (OUTPATIENT)
Dept: RADIOLOGY | Facility: MEDICAL CENTER | Age: 58
DRG: 981 | End: 2023-12-07
Attending: STUDENT IN AN ORGANIZED HEALTH CARE EDUCATION/TRAINING PROGRAM
Payer: COMMERCIAL

## 2023-12-07 LAB
INR PPP: 1.82 (ref 0.87–1.13)
PROTHROMBIN TIME: 21.3 SEC (ref 12–14.6)
UFH PPP CHRO-ACNC: 0.23 IU/ML
UFH PPP CHRO-ACNC: 0.36 IU/ML
UFH PPP CHRO-ACNC: 0.51 IU/ML

## 2023-12-07 PROCEDURE — 85610 PROTHROMBIN TIME: CPT

## 2023-12-07 PROCEDURE — 90935 HEMODIALYSIS ONE EVALUATION: CPT

## 2023-12-07 PROCEDURE — 700111 HCHG RX REV CODE 636 W/ 250 OVERRIDE (IP): Mod: JZ | Performed by: INTERNAL MEDICINE

## 2023-12-07 PROCEDURE — 770001 HCHG ROOM/CARE - MED/SURG/GYN PRIV*

## 2023-12-07 PROCEDURE — 700102 HCHG RX REV CODE 250 W/ 637 OVERRIDE(OP): Performed by: STUDENT IN AN ORGANIZED HEALTH CARE EDUCATION/TRAINING PROGRAM

## 2023-12-07 PROCEDURE — 74018 RADEX ABDOMEN 1 VIEW: CPT

## 2023-12-07 PROCEDURE — 700111 HCHG RX REV CODE 636 W/ 250 OVERRIDE (IP): Performed by: STUDENT IN AN ORGANIZED HEALTH CARE EDUCATION/TRAINING PROGRAM

## 2023-12-07 PROCEDURE — A9270 NON-COVERED ITEM OR SERVICE: HCPCS | Performed by: STUDENT IN AN ORGANIZED HEALTH CARE EDUCATION/TRAINING PROGRAM

## 2023-12-07 PROCEDURE — 99233 SBSQ HOSP IP/OBS HIGH 50: CPT | Performed by: STUDENT IN AN ORGANIZED HEALTH CARE EDUCATION/TRAINING PROGRAM

## 2023-12-07 PROCEDURE — 85520 HEPARIN ASSAY: CPT | Mod: 91

## 2023-12-07 PROCEDURE — 700111 HCHG RX REV CODE 636 W/ 250 OVERRIDE (IP): Mod: JZ | Performed by: STUDENT IN AN ORGANIZED HEALTH CARE EDUCATION/TRAINING PROGRAM

## 2023-12-07 PROCEDURE — 700111 HCHG RX REV CODE 636 W/ 250 OVERRIDE (IP)

## 2023-12-07 PROCEDURE — 700101 HCHG RX REV CODE 250: Performed by: INTERNAL MEDICINE

## 2023-12-07 PROCEDURE — 36415 COLL VENOUS BLD VENIPUNCTURE: CPT

## 2023-12-07 RX ORDER — WARFARIN SODIUM 3 MG/1
3 TABLET ORAL DAILY
Status: DISCONTINUED | OUTPATIENT
Start: 2023-12-08 | End: 2023-12-11

## 2023-12-07 RX ORDER — HEPARIN SODIUM 1000 [USP'U]/ML
INJECTION, SOLUTION INTRAVENOUS; SUBCUTANEOUS
Status: COMPLETED
Start: 2023-12-07 | End: 2023-12-07

## 2023-12-07 RX ORDER — WARFARIN SODIUM 4 MG/1
4 TABLET ORAL
Status: COMPLETED | OUTPATIENT
Start: 2023-12-07 | End: 2023-12-07

## 2023-12-07 RX ADMIN — Medication 1 APPLICATOR: at 06:48

## 2023-12-07 RX ADMIN — HEPARIN SODIUM 18 UNITS/KG/HR: 5000 INJECTION, SOLUTION INTRAVENOUS at 08:04

## 2023-12-07 RX ADMIN — VERAPAMIL HYDROCHLORIDE 120 MG: 120 TABLET ORAL at 12:19

## 2023-12-07 RX ADMIN — VERAPAMIL HYDROCHLORIDE 120 MG: 120 TABLET ORAL at 00:14

## 2023-12-07 RX ADMIN — Medication 1 APPLICATOR: at 17:07

## 2023-12-07 RX ADMIN — ONDANSETRON 4 MG: 2 INJECTION INTRAMUSCULAR; INTRAVENOUS at 05:48

## 2023-12-07 RX ADMIN — DRONABINOL 5 MG: 5 CAPSULE ORAL at 12:19

## 2023-12-07 RX ADMIN — ATORVASTATIN CALCIUM 40 MG: 40 TABLET, FILM COATED ORAL at 20:56

## 2023-12-07 RX ADMIN — VERAPAMIL HYDROCHLORIDE 120 MG: 120 TABLET ORAL at 17:07

## 2023-12-07 RX ADMIN — VANCOMYCIN HYDROCHLORIDE 125 MG: 5 INJECTION, POWDER, LYOPHILIZED, FOR SOLUTION INTRAVENOUS at 12:19

## 2023-12-07 RX ADMIN — MORPHINE SULFATE 4 MG: 4 INJECTION, SOLUTION INTRAMUSCULAR; INTRAVENOUS at 05:53

## 2023-12-07 RX ADMIN — DOCUSATE SODIUM 100 MG: 100 CAPSULE, LIQUID FILLED ORAL at 17:07

## 2023-12-07 RX ADMIN — DRONABINOL 5 MG: 5 CAPSULE ORAL at 17:07

## 2023-12-07 RX ADMIN — HEPARIN SODIUM 3100 UNITS: 1000 INJECTION, SOLUTION INTRAVENOUS; SUBCUTANEOUS at 08:03

## 2023-12-07 RX ADMIN — VANCOMYCIN HYDROCHLORIDE 125 MG: 5 INJECTION, POWDER, LYOPHILIZED, FOR SOLUTION INTRAVENOUS at 00:14

## 2023-12-07 RX ADMIN — VANCOMYCIN HYDROCHLORIDE 125 MG: 5 INJECTION, POWDER, LYOPHILIZED, FOR SOLUTION INTRAVENOUS at 23:52

## 2023-12-07 RX ADMIN — HEPARIN SODIUM 3700 UNITS: 1000 INJECTION, SOLUTION INTRAVENOUS; SUBCUTANEOUS at 11:47

## 2023-12-07 RX ADMIN — HEPARIN SODIUM 2000 UNITS: 1000 INJECTION, SOLUTION INTRAVENOUS; SUBCUTANEOUS at 08:39

## 2023-12-07 RX ADMIN — WARFARIN SODIUM 4 MG: 4 TABLET ORAL at 17:07

## 2023-12-07 RX ADMIN — VERAPAMIL HYDROCHLORIDE 120 MG: 120 TABLET ORAL at 23:52

## 2023-12-07 RX ADMIN — HEPARIN SODIUM 18 UNITS/KG/HR: 5000 INJECTION, SOLUTION INTRAVENOUS at 17:11

## 2023-12-07 RX ADMIN — VANCOMYCIN HYDROCHLORIDE 125 MG: 5 INJECTION, POWDER, LYOPHILIZED, FOR SOLUTION INTRAVENOUS at 17:07

## 2023-12-07 ASSESSMENT — ENCOUNTER SYMPTOMS
NAUSEA: 1
CONSTIPATION: 0
CHILLS: 0
DIARRHEA: 0
DIZZINESS: 0
PALPITATIONS: 0
HEADACHES: 0
VOMITING: 1
COUGH: 0
FEVER: 0
ABDOMINAL PAIN: 1
MYALGIAS: 0
SHORTNESS OF BREATH: 0

## 2023-12-07 ASSESSMENT — FIBROSIS 4 INDEX: FIB4 SCORE: 0.94

## 2023-12-07 ASSESSMENT — PAIN DESCRIPTION - PAIN TYPE
TYPE: ACUTE PAIN
TYPE: ACUTE PAIN

## 2023-12-07 NOTE — DIETARY
"Nutrition Update:    Day 9 of admit.  Gurdeep Guerra is a 58 y.o. male with admitting DX of Typhlitis [K52.9].  Patient being followed to optimize nutrition.    Current Diet: Regular diet, Nepro shakes TID.  PO intake variable, 25-75%.      I met with patient this afternoon.  He reports that his appetite is decreased and he is \"eating like crap\".  Reviewed preferences and menu items.  He reports he is having nausea at times which also impacts his appetite. He likes Nepro shakes. Encouraged PO intake and modified menu based on patients preferences.     Miralax BID, MOM PRN, Anti-emetics PRN.   Last BM today - small.    Patient does not appear malnourish or cachetic and he reports he has really only been eating poorly since admit.       Problem: Nutritional:  Goal: Achieve adequate nutritional intake  Description: Patient will consume >50% of meals  Outcome: Not met - progressing slower than expected.       RD will continue to monitor.   "

## 2023-12-07 NOTE — PROGRESS NOTES
Inpatient Anticoagulation Service Note for 12/7/2023      Reason for Anticoagulation: Atrial Fibrillation, On-X Aortic/Mitral Valve Replacement   [id number] VASc Score: 2  HAS-BLED Score: 3    Hemoglobin Value: (!) 9.8  Hematocrit Value: (!) 31.2  Lab Platelet Value: 299  Target INR: 2.0 to 3.0    INR from last 7 days       Date/Time INR Value    12/07/23 0647 1.82    12/06/23 0458 1.94    12/05/23 1506 2.06    12/05/23 0124 1.94    12/04/23 0014 1.93    12/03/23 0049 1.72    12/02/23 0001 1.7    12/01/23 1539 1.52    12/01/23 0332 1.44          Dose from last 7 days       Date/Time Dose (mg)    12/07/23 1346 4    12/06/23 1441 2.5    12/05/23 1627 1.25    12/04/23 0928 2.5    12/03/23 0929 2.5    12/02/23 1321 2.5    12/01/23 1345 2.5          Average Dose (mg):  (Home dose recently decreased to 1.25 mg daily)  Significant Interactions: Antibiotics, Antiplatelet Medications (Aspirin 81 mg daily, PO vancomycin 125 mg Q6H)  Bridge Therapy: Yes  Bridge Therapy Start Date: 12/01/23  Days of Overlap Therapy: 5  INR Value Greater than 2 Prior to Discontinuation of Parenteral Anticoagulation: Not Applicable    Reversal Agent Administered: Not Applicable    Comments: INR down to 1.82 which likely is due to the lower warfarin 1.25 mg dose two days ago. Pt has been flirting with being therapeutic on warfarin 2.5 mg daily of late, but cannot seem to stay in goal INR range with that dosing. Will increase warfarin today to 4 mg and then start warfarin 3 mg daily tomorrow. Pt had been supratherapeutic as an outpt on warfarin 2.5 mg daily, however at this point in time it would appear he needs a higher dose. For the pt's On-X Aortic Valve that was placed on 11/03/2023, his INR goal is 2-3, with plan to switch to INR goal fo 1.5 - 2.0 on 02/03/2024.     Education Material Provided?: No (Home medication)    Pharmacist suggested discharge dosing: TBD, warfarin 3 mg daily for now. Pt will need a follow up INR within 2-3 days of  discharge.     Hector Sorensen, PharmD

## 2023-12-07 NOTE — PROGRESS NOTES
Pt refusing bed alarm. Pt educated about importance and for being a high fall risk. KAUSHIK Ibarra notified.

## 2023-12-07 NOTE — PROGRESS NOTES
[facility] Service Progress note:     Treatment ordered by Dr. Sommers for 3 hours HD started at 0838 and ended at 1138    BP stable throughout tx, HR tachycardic, pt asymptomatic. Pt able to rest during HD. Pt c/o feeling nauseous pre and post tx. Report given to PCN.     see e-flow sheets for details  Net UF removed 1000 mL    CVC dressing clean, dry, and intact. CVC capped and Heparin locked post HD, no s/s of infection noted.

## 2023-12-07 NOTE — CARE PLAN
The patient is Stable - Low risk of patient condition declining or worsening    Shift Goals  Clinical Goals: Monitor vitals, take medications  Patient Goals: Rest, be left alone  Family Goals: FARIDA    Progress made toward(s) clinical / shift goals:      Problem: Pain - Standard  Goal: Alleviation of pain or a reduction in pain to the patient’s comfort goal  Description: Target End Date:  Prior to discharge or change in level of care    Document on Vitals flowsheet    1.  Document pain using the appropriate pain scale per order or unit policy  2.  Educate and implement non-pharmacologic comfort measures (i.e. relaxation, distraction, massage, cold/heat therapy, etc.)  3.  Pain management medications as ordered  4.  Reassess pain after pain med administration per policy  5.  If opiods administered assess patient's response to pain medication is appropriate per POSS sedation scale  6.  Follow pain management plan developed in collaboration with patient and interdisciplinary team (including palliative care or pain specialists if applicable)  Outcome: Progressing     Problem: Knowledge Deficit - Standard  Goal: Patient and family/care givers will demonstrate understanding of plan of care, disease process/condition, diagnostic tests and medications  Description: Target End Date:  1-3 days or as soon as patient condition allows    Document in Patient Education    1.  Patient and family/caregiver oriented to unit, equipment, visitation policy and means for communicating concern  2.  Complete/review Learning Assessment  3.  Assess knowledge level of disease process/condition, treatment plan, diagnostic tests and medications  4.  Explain disease process/condition, treatment plan, diagnostic tests and medications  Outcome: Progressing     Problem: Fall Risk  Goal: Patient will remain free from falls  Description: Target End Date:  Prior to discharge or change in level of care    Document interventions on the Velasquez Hosea Fall  Risk Assessment    1.  Assess for fall risk factors  2.  Implement fall precautions  Outcome: Progressing     Problem: Respiratory  Goal: Patient will achieve/maintain optimum respiratory ventilation and gas exchange  Description: Target End Date:  Prior to discharge or change in level of care    Document on Assessment flowsheet    1.  Assess and monitor rate, rhythm, depth and effort of respiration  2.  Breath sounds assessed qshift and/or as needed  3.  Assess O2 saturation, administer/titrate oxygen as ordered  4.  Position patient for maximum ventilatory efficiency  5.  Turn, cough, and deep breath with splinting to improve effectiveness  6.  Collaborate with RT to administer medication/treatments per order  7.  Encourage use of incentive spirometer and encourage patient to cough after use and utilize splinting techniques if applicable  8.  Airway suctioning  9.  Monitor sputum production for changes in color, consistency and frequency  10. Perform frequent oral hygiene  11. Alternate physical activity with rest periods  Outcome: Progressing     Problem: Discharge Barriers/Planning  Goal: Patient's continuum of care needs are met  Description: Target End Date:  Prior to discharge or change in level of care    1.  Identify potential discharge barriers on admission and throughout hospitalization  2.  Collaborate with Case Management, , Clinical Educators, Navigators and others on the transitional care team to meet discharge needs  3.  Involve patient/family/caregivers in setting and prioritizing goals for hospitalization and discharge  4.  Ensure Flu vaccinations are addressed  5.  Inquire if patient is interested in the Meds to Bed program  6.  Ensure patient and family/caregiver are able to demonstrate use of equipment as prescribed  7.  Ensure patient and family/caregiver can verbalize understanding of patient education  8.  Explain discharge instructions and medication reconciliation to patient  and family/caregiver  Outcome: Progressing     Problem: Hemodynamics  Goal: Patient's hemodynamics, fluid balance and neurologic status will be stable or improve  Description: Target End Date:  Prior to discharge or change in level of care    Document on Assessment and I/O flowsheet templates    1.  Monitor vital signs, pulse oximetry and cardiac monitor per provider order and/or policy  2.  Maintain blood pressure per provider order  3.  Hemodynamic monitoring per provider order  4.  Manage IV fluids and IV infusions  5.  Monitor intake and output  6.  Daily weights per unit policy or provider order  7.  Assess peripheral pulses and capillary refill  8.  Assess color and body temperature  9.  Position patient for maximum circulation/cardiac output  10. Monitor for signs/symptoms of excessive bleeding  11. Assess mental status, restlessness and changes in level of consciousness  12. Monitor temperature and report fever or hypothermia to provider immediately. Consideration of targeted temperature management.  Outcome: Progressing     Problem: Fluid Volume  Goal: Fluid volume balance will be maintained  Description: Target End Date:  Prior to discharge or change in level of care    Document on I/O flowsheet    1.  Monitor intake and output as ordered  2.  Promote oral intake as appropriate  3.  Report inadequate intake or output to physician  4.  Administer IV therapy as ordered  5.  Weights per provider order  6.  Assess for signs and symptoms of bleeding  7.  Monitor for signs of fluid overload (respiratory changes, edema, weight gain, increased abdominal girth)  8.  Monitor of signs for inadequate fluid volume (poor skin turgor, dry mucous membranes)  9.  Instruct patient on adherence to fluid restrictions  Outcome: Progressing     Problem: Gastrointestinal Irritability  Goal: Diarrhea will be absent or improved  Description: Target End Date:  Prior to discharge or change in level of care    1.  Assess for  abdominal discomfort, pain, cramping, frequency, urgency, loose or liquid stools, and hyperactive bowel sensations.  2.  Evaluate pattern of defecation  3.  Administer antidiarrheal agents per order  4. Culture stool, per order  5.  Inquire about food intolerances, medications, change in eating pattern and current stressors  6.  Assess for fecal impaction  7.  Assess hydration status  8.  Assess condition of perianal skin  9.  Loss of bowel elimination control can lead of feelings of decreased self esteem.  Examine the emotional impact of illness, hospitalization and/or accidents.  Outcome: Progressing     Problem: Self Care  Goal: Patient will have the ability to perform ADLs independently or with assistance (bathe, groom, dress, toilet and feed)  Description: Target End Date:  Prior to discharge or change in level of care    Document on ADL flowsheet    1.  Assess the capability and level of deficiency to perform ADLs  2.  Encourage family/care giver involvement  3.  Provide assistive devices  4.  Consider PT/OT evaluations  5.  Maintain support, give positive feedback, encourage self-care allowing extra time and verbal cuing as needed  6.  Avoid doing something for patients they can do themselves, but provide assistance as needed  7.  Assist in anticipating/planning individual needs  8.  Collaborate with Case Management and  to meet discharge needs  Outcome: Progressing     Problem: Infection - Standard  Goal: Patient will remain free from infection  Description: Target End Date:  Prior to discharge or change in level of care    1.  Utilize Standard Precautions at all times to reduce the risk of transmission of microorganisms from both recognized and  unrecognized sources of infection  2.  Infection prevention handouts provided (general/device/diagnosis specific) and documented in Patient Education  3.  Educate patient and family/caregiver on isolation precautions if applicable  Outcome:  Progressing     Problem: Wound/ / Incision Healing  Goal: Patient's wound/surgical incision will decrease in size and heals properly  Description: Target End Date:  Prior to discharge or change in level of care    Document on LDA    1.  Assess and document surgical incision/wound  2.  Provide incision/wound care per policy and/or provider orders  3.  Manage surgical drains per policy if applicable  4.  Encourage adequate nutrition to promote wound healing  5.  Collaborate with Clinical Dietician  Outcome: Progressing     Problem: Skin Integrity  Goal: Skin integrity is maintained or improved  Description: Target End Date:  Prior to discharge or change in level of care    Document interventions on Skin Risk/Hira flowsheet groups and corresponding LDA    1.  Assess and monitor skin integrity, appearance and/or temperature  2.  Assess risk factors for impaired skin integrity and/or pressures ulcers  3.  Implement precautions to protect skin integrity in collaboration with interdisciplinary team  4.  Implement pressure ulcer prevention protocol if at risk for skin breakdown  5.  Confirm wound care consult if at risk for skin breakdown  6.  Ensure patient use of pressure relieving devices  (Low air loss bed, waffle overlay, heel protectors, ROHO cushion, etc)  Outcome: Progressing

## 2023-12-07 NOTE — DISCHARGE PLANNING
HTH/SCP TCN chart review completed. Collaborated with JOSE Aldana.   Current discharge considerations are for Best with Resumption of [facility] and close outpatient f/u when medically cleared.   Per collaboration in rounds, patient will need community HD chair.  TCN will continue to follow and collaborate with discharge planning team as additional post acute needs arise. Thank you.     Completed:  Choice obtained: HH (Resumption of [facility]) on 11/28/23.    SCP with [facility] PCP.  Patient request to set up his own Follow up PCP appointment.  He states that he has a cardiac f/u appt. On 12/12/23.

## 2023-12-07 NOTE — DISCHARGE PLANNING
Case Management Discharge Planning    Admission Date: 11/27/2023  GMLOS: 5.4  ALOS: 10    6-Clicks ADL Score: 21  6-Clicks Mobility Score: 19      Anticipated Discharge Dispo: Discharge Disposition: Discharged to home/self care (01)  Discharge Address: Home ([address] [place] [address])  Discharge Contact Phone Number: S/O Donna [phone]    DME Needed: No    Action(s) Taken: JOSE RN called Dhiraj about Dialysis chair updated. Per Dhiraj chair set up is being started today as no one was available yesterday to set pt up for chair.     Escalations Completed: None    Medically Clear: Yes

## 2023-12-07 NOTE — PROGRESS NOTES
Monitor Summary  Rhythm: A. Flutter/ a Fib, Junctional Tach  Rate:   Ectopy: rare PVC  .- / .05 / .-

## 2023-12-07 NOTE — CARE PLAN
The patient is Watcher - Medium risk of patient condition declining or worsening    Shift Goals  Clinical Goals: Monitor BM's, VS, and safety  Patient Goals: Rest  Family Goals: updates    Progress made toward(s) clinical / shift goals:    Problem: Pain - Standard  Goal: Alleviation of pain or a reduction in pain to the patient’s comfort goal  Outcome: Progressing  Note: Patient denies pain.     Problem: Knowledge Deficit - Standard  Goal: Patient and family/care givers will demonstrate understanding of plan of care, disease process/condition, diagnostic tests and medications  Outcome: Progressing  Note: Patient board updated.       Patient is not progressing towards the following goals:

## 2023-12-07 NOTE — DISCHARGE PLANNING
HTH/SCP TCN chart review completed. Collaborated with JOSE Aldana. Discussed current discharge considerations noted to home with resumption of [facility] and close outpatient follow up when medically cleared. Per collaboration in rounds, patient will need community HD chair and appreciate CM following for this need. TCN will continue to follow and collaborate with discharge planning team as additional post acute needs arise. Thank you.      Completed:  Choice obtained: HH (Resumption of [facility]) on 11/28/23.    SCP with [facility] PCP.  Patient request to set up his own Follow up PCP appointment.  He states that he has a cardiac f/u appt. On 12/12/23.   How Severe Are Your Spot(S)?: moderate What Type Of Note Output Would You Prefer (Optional)?: Standard Output What Is The Reason For Today's Visit?: Full Body Skin Examination What Is The Reason For Today's Visit? (Being Monitored For X): concerning skin lesions on an annual basis

## 2023-12-07 NOTE — PROGRESS NOTES
Bedside report given by Manisha KINCAID, assumed pt care and updated on overnight events. Pt sitting up in bed awake. On RA. Refused most morning medications today. Bed in low and locked position, call light on and within reach. Bed alarm NOT on, pt refusing. Pt educated on importance. Will continue to monitor.

## 2023-12-07 NOTE — PROGRESS NOTES
Bedside report received. Patient A/Ox 4. Oxygen requirements 2L. Telemetry monitoring Aflutter/Afib. No complaints of pain currently and on a heparin gtt.     POC discussed with patient. Patient refusing bed alarm. Patient educated and aware of risks of injury. Patient verbalizes understanding. Call light and belongings within reach. Bed locked and lowest position. Charge RN notified.

## 2023-12-07 NOTE — PROGRESS NOTES
[facility] Medicine Daily Progress Note    Date of Service  12/6/2023    Chief Complaint  Gurdeep Guerra is a 58 y.o. male admitted 11/27/2023 with abdominal pain.    Hospital Course  57 yo man with A-fib, ESRD on PD, ascending aortic aneurysm with repair and aortic valve replacement who presented with abdominal pain.  He had a CTAP that showed cecal inflammation concerning for colitis or to focus.  He was admitted on IV's cefepime and Flagyl.  Nephrology was consulted.  Peritoneal fluid was negative for infection.     11/29 - A-fib on telemetry, rate 100-120s  Leukocytosis increasing to 13.7  I discussed with Dr. Mac, peritoneal fluid concerning for peritonitis, will transition to intraperitoneal cefepime tomorrow  Patient says about a week after his heart surgery, he developed abdominal pain with nausea vomiting and diarrhea at home.  His nausea vomiting is doing better but he still has abdominal pain and diarrhea.  C. difficile is pending  TTE showed EF 70%, mechanical aortic valve was not well-visualized.  Patient denies chest pain or shortness of breath     11/30 -A-flutter on telemetry, rate 120s.  Patient did not want to take his verapamil this morning, he is concerned about having hypotension.  He denies shortness of breath or chest pain.  I will transfer to telemetry and change verapamil to short acting with hold parameters, IV diltiazem as needed. Positive for C. difficile and started on oral Vanco, already seeing improvement of abdominal pain, nausea and diarrhea.  WBC has decreased to 10.5     12/1  Still with abdominal cramping, fatigue, dyspnea with exertion, frequent watery stools, tachypnea. Getting HD today and another dose of PD directed abx. Increased verapamil. Still getting dilt pushes. Pharmacy discussed with CT sx team, they do recommend a heparin bridge till therapeutic INR (ESRD, recent cardiac/valve surgery). PPI added at patient request. Poor appetite, requesting to try appetite  stimulant, Marinol started.  Afebrile HR  RR 16-21 SBP 90's-123, SpO2 92-97% on 1-2 L NC. Follow electrolytes closely, chech mag, CMP, phos. Nephrology following. Plan to switch back to PD tomorrow if patient can tolerate. Patient stated he wanted a cardiac diet as opposed to renal diet, switched.      12/2  Examined in his inpatient room, afebrile, pulse  respiratory rate 16-18 systolic blood pressure 104 133 pulse ox 93 to 98% on 1 L nasal cannula.  No leukocytosis stable anemia normal platelet count INR up to 1.72.  He is feeling improved today compared to days prior.  Still with frequent watery bowel movements.  Less myalgias, less overall fatigue and malaise.  Still quite weak.     12/3  He had bad diarrhea again overnight and through the day, slowly decreasing frequency/urgency of stooling. HR . Pretty tired with HD today. Quite weak still. At times refuses PO medications, lab draws 2/2 his frustration and feeling poorly. Explained to him that he would likely feel better if he were compliant with all of his treatment plans. Watch HR trend and adjust medications as needed. Labs stable, nephrology following.     12/4 INR almost therapeutic. He has improving symptoms although still with watery diarrhea. Continue PO vanc. Still feels quite weak. No chest pains, no dyspnea. IF able to maintain PO intake can consider DC once therapeutic INR. No new complaints, diet adjusted at patient preference to regular.     Interval Problem Update  12/05/23  Patient was seen and examined on the telemetry floor.  Continues on continuous cardiac monitoring in atrial flutter heart rate in the 80s to 120s.  INR subtherapeutic this morning at 1.94.  Continues on a heparin drip.  INR goal of 2.0.  Discussed with clinical pharmacist.  Complaining of significant nausea and unable to tolerate oral intake.  Denies any significant diarrhea.  Abdominal x-ray, read showing moderate stool burden.  Starting MiraLAX and docusate  twice daily.  Continues on oral vancomycin and date of 12/9/2023.    12/06/23  Patient was seen and examined on the telemetry floor.  Continues on continuous cardiac monitoring with heart rate in the 100s to 120s.  Continues to be in atrial flutter.  Continues to have nausea.  Patient has been declining bowel regimen.  I discussed the concern about severe constipation contributing to his nausea that is persistent.  Patient is agreeable to taking his bowel regimen.  INR subtherapeutic 1.94.  Discussed with Dr. Sommers of nephrology, recommending obtaining a community dialysis chair and also continuing nutritional supplementation.  I discussed with case management.  Continuing verapamil 120 mg every 6 hours.  Patient had QuantiFERON checked 1 month ago that was negative.  Scheduled Tylenol with oxycodone and IV morphine as needed for breakthrough.    I have discussed this patient's plan of care and discharge plan at IDT rounds today with Case Management, Nursing, Nursing leadership, and other members of the IDT team.    Consultants/Specialty  nephrology    Code Status  Full Code    Disposition  The patient is not medically cleared for discharge to home or a post-acute facility.  Anticipate discharge to: home with close outpatient follow-up    I have placed the appropriate orders for post-discharge needs.    Review of Systems  Review of Systems   Constitutional:  Negative for chills and fever.   Respiratory:  Negative for cough and shortness of breath.    Cardiovascular:  Negative for chest pain and palpitations.   Gastrointestinal:  Positive for abdominal pain and nausea. Negative for constipation, diarrhea and vomiting.   Genitourinary:  Negative for dysuria and hematuria.   Musculoskeletal:  Negative for joint pain and myalgias.   Neurological:  Negative for dizziness and headaches.        Physical Exam  Temp:  [36.4 °C (97.5 °F)-36.6 °C (97.9 °F)] 36.5 °C (97.7 °F)  Pulse:  [] 80  Resp:  [16-18] 16  BP:  ()/(65-88) 109/74  SpO2:  [90 %-95 %] 95 %    Physical Exam  Vitals and nursing note reviewed.   Constitutional:       General: He is not in acute distress.     Appearance: Normal appearance. He is not ill-appearing.   HENT:      Head: Normocephalic and atraumatic.      Mouth/Throat:      Mouth: Mucous membranes are moist.      Pharynx: Oropharynx is clear. No oropharyngeal exudate.   Eyes:      General: No scleral icterus.        Right eye: No discharge.         Left eye: No discharge.      Conjunctiva/sclera: Conjunctivae normal.   Cardiovascular:      Rate and Rhythm: Tachycardia present. Rhythm irregular.      Pulses: Normal pulses.      Heart sounds: Normal heart sounds. No murmur heard.  Pulmonary:      Effort: Pulmonary effort is normal. No respiratory distress.      Breath sounds: Normal breath sounds.   Abdominal:      General: Abdomen is flat. Bowel sounds are normal. There is no distension.      Palpations: Abdomen is soft.      Tenderness: There is abdominal tenderness. There is no guarding.   Musculoskeletal:         General: No swelling.      Cervical back: Neck supple. No tenderness.      Right lower leg: No edema.      Left lower leg: No edema.   Skin:     General: Skin is warm and dry.      Coloration: Skin is not pale.   Neurological:      Mental Status: He is alert and oriented to person, place, and time. Mental status is at baseline.      Motor: No weakness.   Psychiatric:         Thought Content: Thought content normal.         Judgment: Judgment normal.         Fluids    Intake/Output Summary (Last 24 hours) at 12/6/2023 1633  Last data filed at 12/6/2023 1000  Gross per 24 hour   Intake 1020 ml   Output no documentation   Net 1020 ml       Laboratory  Recent Labs     12/05/23  1045   WBC 9.6   RBC 3.22*   HEMOGLOBIN 9.8*   HEMATOCRIT 31.2*   MCV 96.9   MCH 30.4   MCHC 31.4*   RDW 52.0*   PLATELETCT 299   MPV 9.1     Recent Labs     12/05/23  1045 12/06/23  0458   SODIUM 133* 133*    POTASSIUM 4.4 4.4   CHLORIDE 95* 96   CO2 24 25   GLUCOSE 102* 99   BUN 61* 38*   CREATININE 10.97* 7.55*   CALCIUM 8.2* 8.7     Recent Labs     12/05/23  0124 12/05/23  1506 12/06/23  0458   INR 1.94* 2.06* 1.94*               Imaging  [id number] VIEW   Final Result      Nonspecific bowel gas pattern with a moderate colonic stool burden.      DX-CHEST-PORTABLE (1 VIEW)   Final Result      1.  Enlarged cardiac silhouette with vascular congestion/edema.   2.  Lower lobe airspace disease could be due to edema, atelectasis or pneumonitis.      EC-ECHOCARDIOGRAM COMPLETE W/O CONT   Final Result      CT-ABDOMEN-PELVIS W/O   Final Result      1.  Fat stranding adjacent to the cecum, concerning for colitis/typhlitis.   2.  Colonic diverticulosis.   3.  Nonobstructing, tiny left renal stone.   4.  Likely partially loculated moderate right pleural effusion.   5.  Small left pleural effusion.   6.  Adjacent right middle lobe and bilateral lower lobe consolidations, likely atelectasis.   7.  Small pericardial effusion.      DX-CHEST-PORTABLE (1 VIEW)   Final Result      1.  Resolved or improved small left pleural effusion with persistent left basilar atelectasis and/or scarring.   2.  New small right pleural effusion with associated atelectasis and/or consolidation.           Assessment/Plan  * C. difficile colitis- (present on admission)  Assessment & Plan  CT showed colitis, C. difficile is positive  Continue on oral vancomycin  Isolation  Leukocytosis improving, trend CBC  Blood cultures have been negative    12/06/23  Continue oral vancomycin for total 10-day course    History of mechanical aortic valve replacement- (present on admission)  Assessment & Plan  Recent aortic valve replacement with X valve.  Was not well-visualized on this visit's TTE    INR goal 2-3 for now.   Continue heparin drip until INR therapeutic    12/06/23  INR subtherapeutic at 1.94 today.    Constipation- (present on admission)  Assessment &  Plan  12/05/23  In setting of C. difficile enterocolitis.  Abdominal x-ray showing moderate stool burden per my read.  Starting MiraLAX and docusate twice daily  Suspect cause of patient's nausea.    12/06/23  Patient has been refusing his bowel regimen  After further discussion, he is agreeable to taking his bowel regimen as ordered    Paroxysmal atrial flutter (HCC)- (present on admission)  Assessment & Plan  In A-fib, tachycardic but may be related to underlying infection.  Patient says carvedilol was dropping his blood pressure at home and amiodarone was causing side effects and so cardiology recommended he take for verapamil.  Continue on verapamil with hold parameters  IV diltiazem as needed   Monitor on telemetry  Continue warfarin, INR remains low.  Discussed with pharmacy    12/05/23  Telemetry reading per my read showing atrial flutter.  Continuous cardiac monitoring.  Currently rate controlled in the 80s to 120s.  Continue verapamil 120 mg every 8 hours  INR subtherapeutic at 1.94.  Continue heparin drip until INR therapeutic.  INR goal of 2-3.  Discussed with clinical pharmacist.    ESRD (end stage renal disease) (HCC)- (present on admission)  Assessment & Plan  Nephrology following for dialysis    Dyslipidemia- (present on admission)  Assessment & Plan  Continue atorvastatin    Coronary artery disease due to lipid rich plaque- (present on admission)  Assessment & Plan  Continue with aspirin/warfarin/statin    Hypertension- (present on admission)  Assessment & Plan  Blood pressure has been stable but patient concerned about hypotension if he takes verapamil.  I changed to short acting with hold parameters         VTE prophylaxis:    therapeutic anticoagulation with weight-based heparin gtt, pharmacy to adjust PRN      I have performed a physical exam and reviewed and updated ROS and Plan today (12/6/2023). In review of yesterday's note (12/5/2023), there are no changes except as documented above.

## 2023-12-07 NOTE — PROGRESS NOTES
[facility] -  PROGRESS NOTE               Author: Alma Rosa Sommers M.D. Date & Time: 12/7/2023  7:53 AM     HPI:  58 y.o. 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 Abd imaging demonstrated colitis. C. Diff pending and started on abx. He notes abd pain and diarrhea for several weeks. Edema improving with 2.5% dextrose solution. Still has HD permacath in-place. Pain with peritoneal dialysis but no cloudy effluent of fibrin clots. No f/c/s. Normally does [id number] fills all green.     DAILY NEPHROLOGY SUMMARY:  11/28: consult done  11/29: ongoing abd pain, seen on HD-tolerating procedure well, PD fluid concerning for peritonitis  11/30:c.diff positive, started on oral vanc, new onset aflutter-transferred to [facility], wbc improving, Peritoneal cultures remain negative, abd pain improving  12/1: PD culture remains negative, seen on HD-tolerating procedure, abd pain improving, less diarrhea    12/2: HD yesterday 2.5 liter UF, still with SOB decreased abdoimnal pain  12/3: PD last night 1.5 UF, pt still with SOB  12/4: No events, tolerated HD yest with 2.5L UF, BP stable, tachycardic this am, stable on RA, reports SOB has resolved, still with crampy abd discomfort, reports diarrhea is starting to become more formed  12/5: No events, no new labs, BP stable, tachycardic, stable on RA this am, still with crampy abd pain, diarrhea improving, still with nausea/anorexia, denies any CP/LE edema, reports mild SOB  12/6: No events, tolerated HD yest with 1L UF, BP stable, remains tachycardic, reports nausea is improved and having more firm stools, denies any CP/SOB, reports poor appetite  12/7: No events, BP stable, tolerated HD  "this am with 1L UF, still doesn't feel well and feels very depressed and frustrated, +abd pain and n/v this am and unable to take PO    REVIEW OF SYSTEMS:    10 point ROS reviewed and is as per HPI/daily summary or otherwise negative    PMH/PSH/SH/FH:   Reviewed and unchanged since admission note    CURRENT MEDICATIONS:   Reviewed from admission to present day    VS:  /71   Pulse 98   Temp 36.5 °C (97.7 °F) (Temporal)   Resp 18   Ht 1.702 m (5' 7\")   Wt 77.1 kg (169 lb 15.6 oz)   SpO2 91%   BMI 26.62 kg/m²     Physical Exam  Vitals and nursing note reviewed.   Constitutional:       General: He is not in acute distress.     Appearance: Normal appearance.   HENT:      Head: Normocephalic and atraumatic.   Eyes:      General: No scleral icterus.     Extraocular Movements: Extraocular movements intact.   Cardiovascular:      Rate and Rhythm: Normal rate and regular rhythm.      Comments: +R chest PC  Pulmonary:      Effort: Pulmonary effort is normal.   Abdominal:      General: Bowel sounds are normal. There is distension.      Palpations: Abdomen is soft.      Tenderness: There is no abdominal tenderness.   Musculoskeletal:         General: No deformity.      Right lower leg: No edema.      Left lower leg: No edema.   Skin:     General: Skin is warm and dry.      Findings: No rash.   Neurological:      General: No focal deficit present.      Mental Status: He is alert and oriented to person, place, and time.   Psychiatric:         Mood and Affect: Mood normal.         Behavior: Behavior normal. Behavior is cooperative.         Fluids:  In: 220 [P.O.:220]  Out: -     LABS:  Recent Labs     12/05/23  1045 12/06/23  0458   SODIUM 133* 133*   POTASSIUM 4.4 4.4   CHLORIDE 95* 96   CO2 24 25   GLUCOSE 102* 99   BUN 61* 38*   CREATININE 10.97* 7.55*   CALCIUM 8.2* 8.7         IMAGING:   All imaging reviewed from admission to present day    IMPRESSION:  # ESRD on outpt PD   -Etiology 2/2 FSGS  - s/p permcath on " 11/10   # R/O PD cath associated peritonitis  - CX negative  # c. Diff.  -symptoms improved with initiation of oral vanc  -guidelines vague on scenarios such as this  # S/P AVR/ Ascending aneurysm repair  # CKD-MBD  -phos elevated  # Anemia of CKD: at goal  # BL pleural effusions/congestion  # Pericardial effusion   # C. Diff colitis  # Abd pain:   -imaging concerning for colitis  -C.diff positive  # C.diff  # Aflutter     PLAN:  -HD today ([facility]URS), cont TTS HD schedule for now  -Gentle UF with HD as tolerated  -Hold PD for now, will resume once abd pain/discomfort improves, can re-eval as outpt  -oral vanc per primary  -holding APPLE for now but may need to restart if Hgb trends down  -Renal diet  -Encourage protein intake given low albumin  - Recommend nutrition consult for Nepro/protein supplements as low albumin will affect pt's ability to resume PD   -Phos binder with meals   -Dose all medications as per ESRD    **Pt will need outpt HD chair at [facility] for the next 30 days until abd pain improves enough that he can resume PD

## 2023-12-08 LAB
ANION GAP SERPL CALC-SCNC: 11 MMOL/L (ref 7–16)
BUN SERPL-MCNC: 40 MG/DL (ref 8–22)
CALCIUM SERPL-MCNC: 8.3 MG/DL (ref 8.5–10.5)
CHLORIDE SERPL-SCNC: 96 MMOL/L (ref 96–112)
CO2 SERPL-SCNC: 26 MMOL/L (ref 20–33)
CREAT SERPL-MCNC: 7.66 MG/DL (ref 0.5–1.4)
ERYTHROCYTE [DISTWIDTH] IN BLOOD BY AUTOMATED COUNT: 52.2 FL (ref 35.9–50)
GFR SERPLBLD CREATININE-BSD FMLA CKD-EPI: 8 ML/MIN/1.73 M 2
GLUCOSE SERPL-MCNC: 95 MG/DL (ref 65–99)
HCT VFR BLD AUTO: 30.1 % (ref 42–52)
HGB BLD-MCNC: 9.3 G/DL (ref 14–18)
INR PPP: 2.17 (ref 0.87–1.13)
MCH RBC QN AUTO: 29.6 PG (ref 27–33)
MCHC RBC AUTO-ENTMCNC: 30.9 G/DL (ref 32.3–36.5)
MCV RBC AUTO: 95.9 FL (ref 81.4–97.8)
PLATELET # BLD AUTO: 270 K/UL (ref 164–446)
PMV BLD AUTO: 8.5 FL (ref 9–12.9)
POTASSIUM SERPL-SCNC: 4.7 MMOL/L (ref 3.6–5.5)
PROTHROMBIN TIME: 24.5 SEC (ref 12–14.6)
RBC # BLD AUTO: 3.14 M/UL (ref 4.7–6.1)
SODIUM SERPL-SCNC: 133 MMOL/L (ref 135–145)
UFH PPP CHRO-ACNC: 0.24 IU/ML
UFH PPP CHRO-ACNC: 0.41 IU/ML
UFH PPP CHRO-ACNC: 0.57 IU/ML
WBC # BLD AUTO: 8.3 K/UL (ref 4.8–10.8)

## 2023-12-08 PROCEDURE — 700111 HCHG RX REV CODE 636 W/ 250 OVERRIDE (IP): Performed by: INTERNAL MEDICINE

## 2023-12-08 PROCEDURE — 700101 HCHG RX REV CODE 250: Performed by: INTERNAL MEDICINE

## 2023-12-08 PROCEDURE — 36415 COLL VENOUS BLD VENIPUNCTURE: CPT

## 2023-12-08 PROCEDURE — A9270 NON-COVERED ITEM OR SERVICE: HCPCS | Performed by: STUDENT IN AN ORGANIZED HEALTH CARE EDUCATION/TRAINING PROGRAM

## 2023-12-08 PROCEDURE — 700102 HCHG RX REV CODE 250 W/ 637 OVERRIDE(OP): Performed by: STUDENT IN AN ORGANIZED HEALTH CARE EDUCATION/TRAINING PROGRAM

## 2023-12-08 PROCEDURE — 85027 COMPLETE CBC AUTOMATED: CPT

## 2023-12-08 PROCEDURE — 85520 HEPARIN ASSAY: CPT | Mod: 91

## 2023-12-08 PROCEDURE — A9270 NON-COVERED ITEM OR SERVICE: HCPCS | Performed by: FAMILY MEDICINE

## 2023-12-08 PROCEDURE — 700102 HCHG RX REV CODE 250 W/ 637 OVERRIDE(OP): Performed by: FAMILY MEDICINE

## 2023-12-08 PROCEDURE — 99232 SBSQ HOSP IP/OBS MODERATE 35: CPT | Performed by: FAMILY MEDICINE

## 2023-12-08 PROCEDURE — 700111 HCHG RX REV CODE 636 W/ 250 OVERRIDE (IP): Performed by: STUDENT IN AN ORGANIZED HEALTH CARE EDUCATION/TRAINING PROGRAM

## 2023-12-08 PROCEDURE — 770001 HCHG ROOM/CARE - MED/SURG/GYN PRIV*

## 2023-12-08 PROCEDURE — 85610 PROTHROMBIN TIME: CPT

## 2023-12-08 PROCEDURE — 80048 BASIC METABOLIC PNL TOTAL CA: CPT

## 2023-12-08 RX ADMIN — VERAPAMIL HYDROCHLORIDE 120 MG: 120 TABLET ORAL at 12:02

## 2023-12-08 RX ADMIN — DRONABINOL 5 MG: 5 CAPSULE ORAL at 10:45

## 2023-12-08 RX ADMIN — VANCOMYCIN HYDROCHLORIDE 125 MG: 5 INJECTION, POWDER, LYOPHILIZED, FOR SOLUTION INTRAVENOUS at 17:52

## 2023-12-08 RX ADMIN — VANCOMYCIN HYDROCHLORIDE 125 MG: 5 INJECTION, POWDER, LYOPHILIZED, FOR SOLUTION INTRAVENOUS at 23:59

## 2023-12-08 RX ADMIN — WARFARIN SODIUM 3 MG: 3 TABLET ORAL at 17:52

## 2023-12-08 RX ADMIN — ATORVASTATIN CALCIUM 40 MG: 40 TABLET, FILM COATED ORAL at 20:02

## 2023-12-08 RX ADMIN — Medication 1 APPLICATOR: at 17:52

## 2023-12-08 RX ADMIN — VERAPAMIL HYDROCHLORIDE 120 MG: 120 TABLET ORAL at 17:56

## 2023-12-08 RX ADMIN — HEPARIN SODIUM 20 UNITS/KG/HR: 5000 INJECTION, SOLUTION INTRAVENOUS at 09:39

## 2023-12-08 RX ADMIN — ASPIRIN 81 MG: 81 TABLET, COATED ORAL at 05:12

## 2023-12-08 RX ADMIN — OMEPRAZOLE 20 MG: 20 CAPSULE, DELAYED RELEASE ORAL at 05:13

## 2023-12-08 RX ADMIN — Medication 1 CAPSULE: at 08:59

## 2023-12-08 RX ADMIN — DOCUSATE SODIUM 100 MG: 100 CAPSULE, LIQUID FILLED ORAL at 05:13

## 2023-12-08 RX ADMIN — VANCOMYCIN HYDROCHLORIDE 125 MG: 5 INJECTION, POWDER, LYOPHILIZED, FOR SOLUTION INTRAVENOUS at 05:12

## 2023-12-08 RX ADMIN — HEPARIN SODIUM 3100 UNITS: 1000 INJECTION, SOLUTION INTRAVENOUS; SUBCUTANEOUS at 07:59

## 2023-12-08 RX ADMIN — ONDANSETRON 4 MG: 4 TABLET, ORALLY DISINTEGRATING ORAL at 06:02

## 2023-12-08 RX ADMIN — Medication 400 MG: at 05:12

## 2023-12-08 RX ADMIN — VANCOMYCIN HYDROCHLORIDE 125 MG: 5 INJECTION, POWDER, LYOPHILIZED, FOR SOLUTION INTRAVENOUS at 12:01

## 2023-12-08 RX ADMIN — TAMSULOSIN HYDROCHLORIDE 0.4 MG: 0.4 CAPSULE ORAL at 05:13

## 2023-12-08 RX ADMIN — DRONABINOL 5 MG: 5 CAPSULE ORAL at 17:53

## 2023-12-08 ASSESSMENT — ENCOUNTER SYMPTOMS
WEAKNESS: 1
DIAPHORESIS: 0
BACK PAIN: 0
DIARRHEA: 1
FEVER: 0
HEADACHES: 0
MYALGIAS: 1
PALPITATIONS: 0
HEARTBURN: 0
WHEEZING: 0
CHILLS: 0
COUGH: 0
DIZZINESS: 0
BLOOD IN STOOL: 0
ABDOMINAL PAIN: 1
SHORTNESS OF BREATH: 0
NERVOUS/ANXIOUS: 1
VOMITING: 0
NECK PAIN: 0
SORE THROAT: 0
BLURRED VISION: 0
NAUSEA: 0
FLANK PAIN: 0
SPEECH CHANGE: 0
SENSORY CHANGE: 0
FOCAL WEAKNESS: 0

## 2023-12-08 ASSESSMENT — PATIENT HEALTH QUESTIONNAIRE - PHQ9
SUM OF ALL RESPONSES TO PHQ9 QUESTIONS 1 AND 2: 0
2. FEELING DOWN, DEPRESSED, IRRITABLE, OR HOPELESS: NOT AT ALL
1. LITTLE INTEREST OR PLEASURE IN DOING THINGS: NOT AT ALL

## 2023-12-08 ASSESSMENT — PAIN DESCRIPTION - PAIN TYPE
TYPE: ACUTE PAIN
TYPE: ACUTE PAIN

## 2023-12-08 ASSESSMENT — FIBROSIS 4 INDEX: FIB4 SCORE: 0.94

## 2023-12-08 NOTE — DISCHARGE PLANNING
"HTH/SCP TCN chart review completed. Current discharge considerations noted to home with resumption of [facility] and outpatient HD. Note per SW from today @3:17PM, \"LMSW spoke with Dialysis Coordinator, Pt has OP HD chair at [facility] in [place] on Tuesday, Thursday, and Saturday at 11am. His first appt is 12/12\". TCN will continue to follow and collaborate with discharge planning team as additional post acute needs arise. Thank you.      Completed:  Choice obtained: HH (Resumption of [facility]) on 11/28/23.    SCP with [facility] PCP.  Patient request to set up his own Follow up PCP appointment.  He states that he has a cardiac f/u appt. On 12/12/23  "

## 2023-12-08 NOTE — PROGRESS NOTES
[facility] Medicine Daily Progress Note    Date of Service  12/7/2023    Chief Complaint  Gurdeep Guerra is a 58 y.o. male admitted 11/27/2023 with abdominal pain.    Hospital Course  59 yo man with A-fib, ESRD on PD, ascending aortic aneurysm with repair and aortic valve replacement who presented with abdominal pain.  He had a CTAP that showed cecal inflammation concerning for colitis or to focus.  He was admitted on IV's cefepime and Flagyl.  Nephrology was consulted.  Peritoneal fluid was negative for infection.     11/29 - A-fib on telemetry, rate 100-120s  Leukocytosis increasing to 13.7  I discussed with Dr. Mac, peritoneal fluid concerning for peritonitis, will transition to intraperitoneal cefepime tomorrow  Patient says about a week after his heart surgery, he developed abdominal pain with nausea vomiting and diarrhea at home.  His nausea vomiting is doing better but he still has abdominal pain and diarrhea.  C. difficile is pending  TTE showed EF 70%, mechanical aortic valve was not well-visualized.  Patient denies chest pain or shortness of breath     11/30 -A-flutter on telemetry, rate 120s.  Patient did not want to take his verapamil this morning, he is concerned about having hypotension.  He denies shortness of breath or chest pain.  I will transfer to telemetry and change verapamil to short acting with hold parameters, IV diltiazem as needed. Positive for C. difficile and started on oral Vanco, already seeing improvement of abdominal pain, nausea and diarrhea.  WBC has decreased to 10.5     12/1  Still with abdominal cramping, fatigue, dyspnea with exertion, frequent watery stools, tachypnea. Getting HD today and another dose of PD directed abx. Increased verapamil. Still getting dilt pushes. Pharmacy discussed with CT sx team, they do recommend a heparin bridge till therapeutic INR (ESRD, recent cardiac/valve surgery). PPI added at patient request. Poor appetite, requesting to try appetite  stimulant, Marinol started.  Afebrile HR  RR 16-21 SBP 90's-123, SpO2 92-97% on 1-2 L NC. Follow electrolytes closely, chech mag, CMP, phos. Nephrology following. Plan to switch back to PD tomorrow if patient can tolerate. Patient stated he wanted a cardiac diet as opposed to renal diet, switched.      12/2  Examined in his inpatient room, afebrile, pulse  respiratory rate 16-18 systolic blood pressure 104 133 pulse ox 93 to 98% on 1 L nasal cannula.  No leukocytosis stable anemia normal platelet count INR up to 1.72.  He is feeling improved today compared to days prior.  Still with frequent watery bowel movements.  Less myalgias, less overall fatigue and malaise.  Still quite weak.     12/3  He had bad diarrhea again overnight and through the day, slowly decreasing frequency/urgency of stooling. HR . Pretty tired with HD today. Quite weak still. At times refuses PO medications, lab draws 2/2 his frustration and feeling poorly. Explained to him that he would likely feel better if he were compliant with all of his treatment plans. Watch HR trend and adjust medications as needed. Labs stable, nephrology following.     12/4 INR almost therapeutic. He has improving symptoms although still with watery diarrhea. Continue PO vanc. Still feels quite weak. No chest pains, no dyspnea. IF able to maintain PO intake can consider DC once therapeutic INR. No new complaints, diet adjusted at patient preference to regular.     Interval Problem Update  12/05/23  Patient was seen and examined on the telemetry floor.  Continues on continuous cardiac monitoring in atrial flutter heart rate in the 80s to 120s.  INR subtherapeutic this morning at 1.94.  Continues on a heparin drip.  INR goal of 2.0.  Discussed with clinical pharmacist.  Complaining of significant nausea and unable to tolerate oral intake.  Denies any significant diarrhea.  Abdominal x-ray, read showing moderate stool burden.  Starting MiraLAX and docusate  twice daily.  Continues on oral vancomycin and date of 12/9/2023.    12/06/23  Patient was seen and examined on the telemetry floor.  Continues on continuous cardiac monitoring with heart rate in the 100s to 120s.  Continues to be in atrial flutter.  Continues to have nausea.  Patient has been declining bowel regimen.  I discussed the concern about severe constipation contributing to his nausea that is persistent.  Patient is agreeable to taking his bowel regimen.  INR subtherapeutic 1.94.  Discussed with Dr. Sommers of nephrology, recommending obtaining a community dialysis chair and also continuing nutritional supplementation.  I discussed with case management.  Continuing verapamil 120 mg every 6 hours.  Patient had QuantiFERON checked 1 month ago that was negative.  Scheduled Tylenol with oxycodone and IV morphine as needed for breakthrough.    12/07/23  Patient was seen and examined on the telemetry floor.  His medical status.  Heart rate improved into the 90s to 100s.  States that he still does not feel very good still very nauseated and vomiting this morning.  He had multiple bowel movements yesterday.  Abdominal x-ray per my review repeated today shows improvement of his constipation.  He is refusing additional bowel regimen.  Continues to be on 2 LPM oxygen via nasal cannula.  He is undergoing dialysis today in his room.  Continues on verapamil 125 mg every 6 hours.  Awaiting acceptance to dialysis chair.  Continues on a heparin drip.  INR still subtherapeutic at 1.82.  Discussed with Dr. Sommers of nephrology.    I have discussed this patient's plan of care and discharge plan at IDT rounds today with Case Management, Nursing, Nursing leadership, and other members of the IDT team.    Consultants/Specialty  nephrology    Code Status  Full Code    Disposition  The patient is not medically cleared for discharge to home or a post-acute facility.  Anticipate discharge to: home with close outpatient  follow-up    I have placed the appropriate orders for post-discharge needs.    Review of Systems  Review of Systems   Constitutional:  Negative for chills and fever.   Respiratory:  Negative for cough and shortness of breath.    Cardiovascular:  Negative for chest pain and palpitations.   Gastrointestinal:  Positive for abdominal pain, nausea and vomiting. Negative for constipation and diarrhea.   Genitourinary:  Negative for dysuria and hematuria.   Musculoskeletal:  Negative for joint pain and myalgias.   Neurological:  Negative for dizziness and headaches.        Physical Exam  Temp:  [36.4 °C (97.6 °F)-36.5 °C (97.7 °F)] 36.4 °C (97.6 °F)  Pulse:  [] 103  Resp:  [16-18] 18  BP: (105-142)/(61-81) 134/67  SpO2:  [91 %-95 %] 94 %    Physical Exam  Vitals and nursing note reviewed.   Constitutional:       General: He is not in acute distress.     Appearance: Normal appearance. He is ill-appearing.   HENT:      Head: Normocephalic and atraumatic.      Mouth/Throat:      Mouth: Mucous membranes are moist.      Pharynx: Oropharynx is clear. No oropharyngeal exudate.   Eyes:      General: No scleral icterus.        Right eye: No discharge.         Left eye: No discharge.      Conjunctiva/sclera: Conjunctivae normal.   Cardiovascular:      Rate and Rhythm: Tachycardia present. Rhythm irregular.      Pulses: Normal pulses.      Heart sounds: Normal heart sounds. No murmur heard.  Pulmonary:      Effort: Pulmonary effort is normal. No respiratory distress.      Breath sounds: Normal breath sounds.   Abdominal:      General: Abdomen is flat. Bowel sounds are normal. There is distension.      Palpations: Abdomen is soft.      Tenderness: There is no abdominal tenderness. There is no guarding.   Musculoskeletal:         General: No swelling.      Cervical back: Neck supple. No tenderness.      Right lower leg: No edema.      Left lower leg: No edema.   Skin:     General: Skin is warm and dry.      Coloration: Skin is  pale.   Neurological:      Mental Status: He is alert and oriented to person, place, and time. Mental status is at baseline.      Motor: No weakness.   Psychiatric:         Thought Content: Thought content normal.         Judgment: Judgment normal.         Fluids    Intake/Output Summary (Last 24 hours) at 12/7/2023 2030  Last data filed at 12/7/2023 1138  Gross per 24 hour   Intake 500 ml   Output 1500 ml   Net -1000 ml       Laboratory  Recent Labs     12/05/23  1045   WBC 9.6   RBC 3.22*   HEMOGLOBIN 9.8*   HEMATOCRIT 31.2*   MCV 96.9   MCH 30.4   MCHC 31.4*   RDW 52.0*   PLATELETCT 299   MPV 9.1     Recent Labs     12/05/23  1045 12/06/23  0458   SODIUM 133* 133*   POTASSIUM 4.4 4.4   CHLORIDE 95* 96   CO2 24 25   GLUCOSE 102* 99   BUN 61* 38*   CREATININE 10.97* 7.55*   CALCIUM 8.2* 8.7     Recent Labs     12/05/23  1506 12/06/23  0458 12/07/23  0647   INR 2.06* 1.94* 1.82*               Imaging  [id number] VIEW   Final Result      1.  Benign bowel gas pattern.      2.  Peritoneal dialysis catheter coiled in the pelvis.      [id number] VIEW   Final Result      Nonspecific bowel gas pattern with a moderate colonic stool burden.      DX-CHEST-PORTABLE (1 VIEW)   Final Result      1.  Enlarged cardiac silhouette with vascular congestion/edema.   2.  Lower lobe airspace disease could be due to edema, atelectasis or pneumonitis.      EC-ECHOCARDIOGRAM COMPLETE W/O CONT   Final Result      CT-ABDOMEN-PELVIS W/O   Final Result      1.  Fat stranding adjacent to the cecum, concerning for colitis/typhlitis.   2.  Colonic diverticulosis.   3.  Nonobstructing, tiny left renal stone.   4.  Likely partially loculated moderate right pleural effusion.   5.  Small left pleural effusion.   6.  Adjacent right middle lobe and bilateral lower lobe consolidations, likely atelectasis.   7.  Small pericardial effusion.      DX-CHEST-PORTABLE (1 VIEW)   Final Result      1.  Resolved or improved small left pleural effusion with  persistent left basilar atelectasis and/or scarring.   2.  New small right pleural effusion with associated atelectasis and/or consolidation.           Assessment/Plan  * C. difficile colitis- (present on admission)  Assessment & Plan  CT showed colitis, C. difficile is positive  Continue on oral vancomycin  Isolation  Leukocytosis improving, trend CBC  Blood cultures have been negative    12/06/23  Continue oral vancomycin for total 10-day course    History of mechanical aortic valve replacement- (present on admission)  Assessment & Plan  Recent aortic valve replacement with X valve.  Was not well-visualized on this visit's TTE    INR goal 2-3 for now.   Continue heparin drip until INR therapeutic    12/06/23  INR subtherapeutic at 1.94 today.    12/07/23  INR 1.82 today.  Continuing heparin drip.    Constipation- (present on admission)  Assessment & Plan  12/05/23  In setting of C. difficile enterocolitis.  Abdominal x-ray showing moderate stool burden per my read.  Starting MiraLAX and docusate twice daily  Suspect cause of patient's nausea.    12/06/23  Patient has been refusing his bowel regimen  After further discussion, he is agreeable to taking his bowel regimen as ordered    12/07/23  Multiple bowel movements.  Appears to be resolved on repeat abdominal x-ray.  Continue bowel regimen    Paroxysmal atrial flutter (HCC)- (present on admission)  Assessment & Plan  In A-fib, tachycardic but may be related to underlying infection.  Patient says carvedilol was dropping his blood pressure at home and amiodarone was causing side effects and so cardiology recommended he take for verapamil.  Continue on verapamil with hold parameters  IV diltiazem as needed   Monitor on telemetry  Continue warfarin, INR remains low.  Discussed with pharmacy    12/05/23  Telemetry reading per my read showing atrial flutter.  Continuous cardiac monitoring.  Currently rate controlled in the 80s to 120s.  Continue verapamil 120 mg every  8 hours  INR subtherapeutic at 1.94.  Continue heparin drip until INR therapeutic.  INR goal of 2-3.  Discussed with clinical pharmacist.    12/07/23  Continues on a heparin drip.  INR still subtherapeutic at 1.82.  Discussed with clinical pharmacist. Increasing warfarin dose.    ESRD (end stage renal disease) ([facility])- (present on admission)  Assessment & Plan  Nephrology following for dialysis    Dyslipidemia- (present on admission)  Assessment & Plan  Continue atorvastatin    Coronary artery disease due to lipid rich plaque- (present on admission)  Assessment & Plan  Continue with aspirin/warfarin/statin    Hypertension- (present on admission)  Assessment & Plan  Blood pressure has been stable but patient concerned about hypotension if he takes verapamil.  I changed to short acting with hold parameters         VTE prophylaxis:    therapeutic anticoagulation with weight-based heparin gtt, pharmacy to adjust PRN      I have performed a physical exam and reviewed and updated ROS and Plan today (12/7/2023). In review of yesterday's note (12/6/2023), there are no changes except as documented above.

## 2023-12-08 NOTE — PROGRESS NOTES
PT arrived to [facility].  AAOx4.  Up SB with FWW.  Having c/o nausea, medicated per MAR. Heparin infusing.  POC discussed and all questions answered.  Call light within reach.  Will continue to monitor

## 2023-12-08 NOTE — CARE PLAN
The patient is Stable - Low risk of patient condition declining or worsening    Shift Goals  Clinical Goals: safety, Peritoneal Dialysis  Patient Goals: sleep and comfort  Family Goals: marisa    Progress made toward(s) clinical / shift goals:    Axo4. Able to make needs known. VSS WNL. Pt PD cath at lower right abdomen. And a permacath. Due medications given as ordered. On heparin drip. Able to ambulate with self. Placed call light within reach and encouraged pt to increase oral fluid intake.       Problem: Fall Risk  Goal: Patient will remain free from falls  Outcome: Progressing     Problem: Respiratory  Goal: Patient will achieve/maintain optimum respiratory ventilation and gas exchange  Outcome: Progressing     Problem: Self Care  Goal: Patient will have the ability to perform ADLs independently or with assistance (bathe, groom, dress, toilet and feed)  Outcome: Progressing     Problem: Skin Integrity  Goal: Skin integrity is maintained or improved  Outcome: Progressing

## 2023-12-08 NOTE — DISCHARGE PLANNING
Case Management Discharge Planning    Admission Date: 11/27/2023  GMLOS: 5.4  ALOS: 11    6-Clicks ADL Score: 21  6-Clicks Mobility Score: 19      Anticipated Discharge Dispo: Discharge Disposition: Discharged to home/self care (01)  Discharge Address: Home ([address] [place] [id number])  Discharge Contact Phone Number: S/O Donna [phone]    DME Needed: No    Action(s) Taken: LMSW spoke with Dialysis Coordinator, Pt has OP HD chair at [facility] in [place] on Tuesday, Thursday, and Saturday at 11am. His first appt is 12/12.    Escalations Completed: None    Medically Clear: No      Barriers to Discharge: Medical clearance    Is the patient up for discharge tomorrow: No

## 2023-12-08 NOTE — PROGRESS NOTES
Hospital Medicine Daily Progress Note    Date of Service  12/8/2023    Chief Complaint  Gurdeep Guerra is a 58 y.o. male admitted 11/27/2023 with diarrhea    Hospital Course  Admitted with symptoms of abdominal pain, nausea, vomiting and diarrhea, CT scan showed evidence of possible colitis, he was started on empiric coverage with IV cefepime and Flagyl.  Patient recently underwent mechanical AVR, aortic aneurysm repair, discharged on 11/17/2023.  He also has known history of end-stage renal disease on peritoneal dialysis.  Workup showed he was positive for C. difficile colitis, he was started on oral vancomycin.  There was also concern for possible peritonitis, he was given intraperitoneal IV cefepime.  Nephrology was consulted on the case, he underwent dialysis through his temporary dialysis catheter which was placed on the previous admission.  Also with known history of paroxysmal atrial flutter, and with deep recent mechanical AVR, he was on Coumadin for anticoagulation.  He required to be placed on heparin drip to bridge Coumadin    Interval Problem Update  C diff - still with some diarrhea  HTN - sbp   Aflutter - rate     I have discussed this patient's plan of care and discharge plan at IDT rounds today with Case Management, Nursing, Nursing leadership, and other members of the IDT team.    Consultants/Specialty  nephrology    Code Status  Full Code    Disposition  The patient is not medically cleared for discharge to home or a post-acute facility.  Anticipate discharge to: skilled nursing facility    I have placed the appropriate orders for post-discharge needs.    Review of Systems  Review of Systems   Constitutional:  Positive for malaise/fatigue. Negative for chills, diaphoresis and fever.   HENT:  Negative for congestion, hearing loss and sore throat.    Eyes:  Negative for blurred vision.   Respiratory:  Negative for cough, shortness of breath and wheezing.    Cardiovascular:  Negative  for chest pain, palpitations and leg swelling.   Gastrointestinal:  Positive for abdominal pain and diarrhea. Negative for blood in stool, heartburn, melena, nausea and vomiting.   Genitourinary:  Negative for dysuria, flank pain and hematuria.   Musculoskeletal:  Positive for myalgias. Negative for back pain, joint pain and neck pain.   Skin:  Negative for rash.   Neurological:  Positive for weakness. Negative for dizziness, sensory change, speech change, focal weakness and headaches.   Psychiatric/Behavioral:  The patient is nervous/anxious.         Physical Exam  Temp:  [36.4 °C (97.6 °F)-36.8 °C (98.3 °F)] 36.8 °C (98.3 °F)  Pulse:  [] 85  Resp:  [16-18] 16  BP: ()/(67-85) 132/85  SpO2:  [91 %-94 %] 94 %    Physical Exam  Vitals and nursing note reviewed.   HENT:      Head: Normocephalic and atraumatic.      Nose: No congestion.      Mouth/Throat:      Mouth: Mucous membranes are moist.   Eyes:      Extraocular Movements: Extraocular movements intact.      Conjunctiva/sclera: Conjunctivae normal.   Cardiovascular:      Rate and Rhythm: Normal rate. Rhythm irregular.   Pulmonary:      Effort: Pulmonary effort is normal.      Breath sounds: Normal breath sounds.   Abdominal:      General: There is no distension.      Tenderness: There is abdominal tenderness. There is no guarding or rebound.      Comments: PD catheter   Musculoskeletal:      Cervical back: No tenderness.      Right lower leg: No edema.      Left lower leg: No edema.   Skin:     General: Skin is warm and dry.   Neurological:      General: No focal deficit present.      Mental Status: He is alert and oriented to person, place, and time.      Cranial Nerves: No cranial nerve deficit.         Fluids    Intake/Output Summary (Last 24 hours) at 12/8/2023 1352  Last data filed at 12/8/2023 0321  Gross per 24 hour   Intake 800 ml   Output --   Net 800 ml       Laboratory  Recent Labs     12/08/23  1129   WBC 8.3   RBC 3.14*   HEMOGLOBIN 9.3*    HEMATOCRIT 30.1*   MCV 95.9   MCH 29.6   MCHC 30.9*   RDW 52.2*   PLATELETCT 270   MPV 8.5*     Recent Labs     12/06/23  0458 12/08/23  1129   SODIUM 133* 133*   POTASSIUM 4.4 4.7   CHLORIDE 96 96   CO2 25 26   GLUCOSE 99 95   BUN 38* 40*   CREATININE 7.55* 7.66*   CALCIUM 8.7 8.3*     Recent Labs     12/06/23  0458 12/07/23  0647 12/08/23  0624   INR 1.94* 1.82* 2.17*               Imaging  [id number] VIEW   Final Result      1.  Benign bowel gas pattern.      2.  Peritoneal dialysis catheter coiled in the pelvis.      [id number] VIEW   Final Result      Nonspecific bowel gas pattern with a moderate colonic stool burden.      DX-CHEST-PORTABLE (1 VIEW)   Final Result      1.  Enlarged cardiac silhouette with vascular congestion/edema.   2.  Lower lobe airspace disease could be due to edema, atelectasis or pneumonitis.      EC-ECHOCARDIOGRAM COMPLETE W/O CONT   Final Result      CT-ABDOMEN-PELVIS W/O   Final Result      1.  Fat stranding adjacent to the cecum, concerning for colitis/typhlitis.   2.  Colonic diverticulosis.   3.  Nonobstructing, tiny left renal stone.   4.  Likely partially loculated moderate right pleural effusion.   5.  Small left pleural effusion.   6.  Adjacent right middle lobe and bilateral lower lobe consolidations, likely atelectasis.   7.  Small pericardial effusion.      DX-CHEST-PORTABLE (1 VIEW)   Final Result      1.  Resolved or improved small left pleural effusion with persistent left basilar atelectasis and/or scarring.   2.  New small right pleural effusion with associated atelectasis and/or consolidation.           Assessment/Plan  * C. difficile colitis- (present on admission)  Assessment & Plan  Oral vancomycin    History of mechanical aortic valve replacement- (present on admission)  Assessment & Plan  Heparin drip, Coumadin    Hypomagnesemia- (present on admission)  Assessment & Plan  Oral Mg    Paroxysmal atrial flutter (HCC)- (present on admission)  Assessment &  Plan  Verapamil  Heparin drip, Coumadin    ESRD (end stage renal disease) (HCC)- (present on admission)  Assessment & Plan  HD per Nephrology    Coronary artery disease due to lipid rich plaque- (present on admission)  Assessment & Plan  Aspirin, Lipitor    Hypertension- (present on admission)  Assessment & Plan  Verapamil with parameters     Dyslipidemia- (present on admission)  Assessment & Plan  Lipitor         VTE prophylaxis:    therapeutic anticoagulation with coumadin dosing per pharmacy, adjust PRN      I have performed a physical exam and reviewed and updated ROS and Plan today (12/8/2023). In review of yesterday's note (12/7/2023), there are no changes except as documented above.

## 2023-12-08 NOTE — PROGRESS NOTES
Inpatient Anticoagulation Service Note for 12/8/2023    Reason for Anticoagulation: Atrial Fibrillation, On-X Aortic/Mitral Valve Replacement   [id number] VASc Score: 2  HAS-BLED Score: 3    Hemoglobin Value: (!) 9.3  Hematocrit Value: (!) 30.1  Lab Platelet Value: 270  Target INR: 2.0 to 3.0    INR from last 7 days       Date/Time INR Value    12/08/23 0624 2.17    12/07/23 0647 1.82    12/06/23 0458 1.94    12/05/23 1506 2.06    12/05/23 0124 1.94    12/04/23 0014 1.93    12/03/23 0049 1.72    12/02/23 0001 1.7    12/01/23 1539 1.52     Dose from last 7 days       Date/Time Dose (mg)    12/08/23 1250 3    12/07/23 1346 4    12/06/23 1441 2.5    12/05/23 1627 1.25    12/04/23 0928 2.5    12/03/23 0929 2.5    12/02/23 1321 2.5    12/01/23 1345 2.5     Average Dose (mg): 2.5 (Home dose recently reduced to 1.25 mg PO daily)    Significant Interactions: Antibiotics, Antiplatelet Medications    Bridge Therapy: Yes  Bridge Therapy Start Date: 12/01/23  Days of Overlap Therapy: 5   INR Value Greater than 2 Prior to Discontinuation of Parenteral Anticoagulation: Yes    Reversal Agent Administered: Not Applicable    Comments: INR back within therapeutic range today following increased warfarin dose yesterday. Provider evaluating if heparin bridge can be discontinued. No change in warfarin drug interactions since last evaluation and patient continues to tolerate oral diet. Patient's warfarin dose recently reduced PTA but current average daily dose ~2.5 mg based on the past week of dosing. Will continue current plan and continue to monitor INR trends to guide further dose adjustments.    Plan: Warfarin 3 mg PO today. INR in AM.    Education Material Provided?: No (Established warfarin patient)    Pharmacist suggested discharge dosing: Warfarin 2.5-3 mg PO daily with follow-up INR within 48-72 hours of discharge.      Pharmacy will continue to follow.     Mehnaz Salas, PharmD, BCCCP

## 2023-12-08 NOTE — CARE PLAN
The patient is Watcher - Medium risk of patient condition declining or worsening    Shift Goals  Clinical Goals: Medication administration, safety  Patient Goals: Space and sleep  Family Goals: FARIDA    Progress made toward(s) clinical / shift goals:    Problem: Pain - Standard  Goal: Alleviation of pain or a reduction in pain to the patient’s comfort goal  Outcome: Progressing  Note: Using numeric pain scale. Patient refusing to be medicated per MAR.     Problem: Knowledge Deficit - Standard  Goal: Patient and family/care givers will demonstrate understanding of plan of care, disease process/condition, diagnostic tests and medications  Outcome: Progressing  Note: Patient board updated.       Patient is not progressing towards the following goals:

## 2023-12-08 NOTE — PROGRESS NOTES
[facility] -  PROGRESS NOTE               Author: TEX Pineda Date & Time: 12/8/2023  1:45 PM     HPI:  58 y.o. 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 Abd imaging demonstrated colitis. C. Diff pending and started on abx. He notes abd pain and diarrhea for several weeks. Edema improving with 2.5% dextrose solution. Still has HD permacath in-place. Pain with peritoneal dialysis but no cloudy effluent of fibrin clots. No f/c/s. Normally does [id number] fills all green.     DAILY NEPHROLOGY SUMMARY:  11/28: consult done  11/29: ongoing abd pain, seen on HD-tolerating procedure well, PD fluid concerning for peritonitis  11/30:c.diff positive, started on oral vanc, new onset aflutter-transferred to [facility], wbc improving, Peritoneal cultures remain negative, abd pain improving  12/1: PD culture remains negative, seen on HD-tolerating procedure, abd pain improving, less diarrhea    12/2: HD yesterday 2.5 liter UF, still with SOB decreased abdoimnal pain  12/3: PD last night 1.5 UF, pt still with SOB  12/4: No events, tolerated HD yest with 2.5L UF, BP stable, tachycardic this am, stable on RA, reports SOB has resolved, still with crampy abd discomfort, reports diarrhea is starting to become more formed  12/5: No events, no new labs, BP stable, tachycardic, stable on RA this am, still with crampy abd pain, diarrhea improving, still with nausea/anorexia, denies any CP/LE edema, reports mild SOB  12/6: No events, tolerated HD yest with 1L UF, BP stable, remains tachycardic, reports nausea is improved and having more firm stools, denies any CP/SOB, reports poor appetite  12/7: No events, BP stable, tolerated HD  "this am with 1L UF, still doesn't feel well and feels very depressed and frustrated, +abd pain and n/v this am and unable to take PO  12/8: patient is doing well, resting in bed. Plan next iHD tomorrow    REVIEW OF SYSTEMS:    10 point ROS reviewed and is as per HPI/daily summary or otherwise negative    PMH/PSH/SH/FH:   Reviewed and unchanged since admission note    CURRENT MEDICATIONS:   Reviewed from admission to present day    VS:  /85   Pulse 85   Temp 36.8 °C (98.3 °F) (Temporal)   Resp 16   Ht 1.702 m (5' 7\")   Wt 78.2 kg (172 lb 6.4 oz)   SpO2 94%   BMI 27.00 kg/m²     Physical Exam  Vitals and nursing note reviewed.   Constitutional:       General: He is not in acute distress.     Appearance: Normal appearance.   HENT:      Head: Normocephalic and atraumatic.   Eyes:      General: No scleral icterus.     Extraocular Movements: Extraocular movements intact.   Cardiovascular:      Rate and Rhythm: Normal rate and regular rhythm.      Comments: +R chest PC  Pulmonary:      Effort: Pulmonary effort is normal.   Abdominal:      General: Bowel sounds are normal. There is distension.      Palpations: Abdomen is soft.      Tenderness: There is no abdominal tenderness.   Musculoskeletal:         General: No deformity.      Right lower leg: No edema.      Left lower leg: No edema.   Skin:     General: Skin is warm and dry.      Findings: No rash.   Neurological:      General: No focal deficit present.      Mental Status: He is alert and oriented to person, place, and time.   Psychiatric:         Mood and Affect: Mood normal.         Behavior: Behavior normal. Behavior is cooperative.         Fluids:  In: 1300 [P.O.:800; Dialysis:500]  Out: 1500     LABS:  Recent Labs     12/06/23  0458 12/08/23  1129   SODIUM 133* 133*   POTASSIUM 4.4 4.7   CHLORIDE 96 96   CO2 25 26   GLUCOSE 99 95   BUN 38* 40*   CREATININE 7.55* 7.66*   CALCIUM 8.7 8.3*         IMAGING:   All imaging reviewed from admission to present " day    IMPRESSION:  # ESRD on outpt PD   -Etiology 2/2 FSGS  - s/p permcath on 11/10   # R/O PD cath associated peritonitis  - CX negative  # c. Diff.  -symptoms improved with initiation of oral vanc  -guidelines vague on scenarios such as this  # S/P AVR/ Ascending aneurysm repair  # CKD-MBD  -phos elevated  # Anemia of CKD: at goal  # BL pleural effusions/congestion  # Pericardial effusion   # C. Diff colitis  # Abd pain:   -imaging concerning for colitis  -C.diff positive  # C.diff  # Aflutter     PLAN:  -HD tomorrow (SAT), cont TTS HD schedule for now  -Gentle UF with HD as tolerated  -Hold PD for now, will resume once abd pain/discomfort improves, can re-eval as outpt  -oral vanc per primary  -holding APPLE for now but may need to restart if Hgb trends down  -Renal diet  -Encourage protein intake given low albumin  - Recommend nutrition consult for Nepro/protein supplements as low albumin will affect pt's ability to resume PD   -Phos binder with meals   -Dose all medications as per ESRD    **Pt will need outpt HD chair at [facility] for the next 30 days until abd pain improves enough that he can resume PD

## 2023-12-08 NOTE — DIETARY
Nutrition Update:    Day 11 of admit.  Gurdeep Guerra is a 58 y.o. male with admitting DX of Typhlitis [K52.9].  Patient being followed to optimize nutrition.    Current Diet: Regular; TID Nepro. PO intake per ADLs continues to be 0-<25%, though only 2 meals documented in 2 days since RD last update. RD visited pt at bedside during breakfast. Pt states appetite continues to be very poor and unchanged from last RD follow-up. He reports drinking nearly all the Nepro supplements TID and would like to continue these. Pt is agreeable to BID snacks between meals; more opportunities for PO intake may improve overall daily PO intake.    Problem: Nutritional:  Goal: Achieve adequate nutritional intake  Description: Patient will consume >50-75% of meals/snacks/supplements  Outcome: Progressing slowly    RD continues to follow.

## 2023-12-08 NOTE — PROGRESS NOTES
Bedside report received. Patient A/Ox 4. Oxygen requirements 2L. Complains of pain 4/10 but refuses to be medicated per MAR. POC discussed with patient. Pt verbalizes understanding. Call light and belongings within reach. Bed locked and lowest position.    Bed alarm NOT on. Patient educated on risks as he is a high fall risk. Charge RNGabi, notified.

## 2023-12-08 NOTE — DISCHARGE PLANNING
Outpatient Dialysis Note    Patient has been placed and confirmed at:    [facility]    [address] [place] [address]    Ph: [phone]    Schedule: Tues, Thurs, Sat   Time: 11:15 AM    Patient to start Tues, 12/12 at 11:00 AM.    JOSE Roberto and nephrologist Dr. Sommers notified of placement confirmation.   Provided patient dialysis schedule welcome letter.     Xitlaly Reyes   Dialysis Coordinator / [facility]   Ph: [phone]

## 2023-12-09 LAB
ANION GAP SERPL CALC-SCNC: 10 MMOL/L (ref 7–16)
BUN SERPL-MCNC: 26 MG/DL (ref 8–22)
CALCIUM SERPL-MCNC: 8.7 MG/DL (ref 8.5–10.5)
CHLORIDE SERPL-SCNC: 97 MMOL/L (ref 96–112)
CO2 SERPL-SCNC: 27 MMOL/L (ref 20–33)
CREAT SERPL-MCNC: 5.28 MG/DL (ref 0.5–1.4)
ERYTHROCYTE [DISTWIDTH] IN BLOOD BY AUTOMATED COUNT: 52.3 FL (ref 35.9–50)
GFR SERPLBLD CREATININE-BSD FMLA CKD-EPI: 12 ML/MIN/1.73 M 2
GLUCOSE SERPL-MCNC: 86 MG/DL (ref 65–99)
HCT VFR BLD AUTO: 30 % (ref 42–52)
HGB BLD-MCNC: 9.7 G/DL (ref 14–18)
INR PPP: 2.14 (ref 0.87–1.13)
MAGNESIUM SERPL-MCNC: 1.9 MG/DL (ref 1.5–2.5)
MCH RBC QN AUTO: 30.5 PG (ref 27–33)
MCHC RBC AUTO-ENTMCNC: 32.3 G/DL (ref 32.3–36.5)
MCV RBC AUTO: 94.3 FL (ref 81.4–97.8)
PLATELET # BLD AUTO: 255 K/UL (ref 164–446)
PMV BLD AUTO: 8.6 FL (ref 9–12.9)
POTASSIUM SERPL-SCNC: 4.2 MMOL/L (ref 3.6–5.5)
PROTHROMBIN TIME: 24.3 SEC (ref 12–14.6)
RBC # BLD AUTO: 3.18 M/UL (ref 4.7–6.1)
SODIUM SERPL-SCNC: 134 MMOL/L (ref 135–145)
WBC # BLD AUTO: 7.9 K/UL (ref 4.8–10.8)

## 2023-12-09 PROCEDURE — 770001 HCHG ROOM/CARE - MED/SURG/GYN PRIV*

## 2023-12-09 PROCEDURE — 85610 PROTHROMBIN TIME: CPT

## 2023-12-09 PROCEDURE — 700111 HCHG RX REV CODE 636 W/ 250 OVERRIDE (IP): Mod: JZ | Performed by: INTERNAL MEDICINE

## 2023-12-09 PROCEDURE — 700111 HCHG RX REV CODE 636 W/ 250 OVERRIDE (IP)

## 2023-12-09 PROCEDURE — 90935 HEMODIALYSIS ONE EVALUATION: CPT

## 2023-12-09 PROCEDURE — 85027 COMPLETE CBC AUTOMATED: CPT

## 2023-12-09 PROCEDURE — 700102 HCHG RX REV CODE 250 W/ 637 OVERRIDE(OP): Performed by: FAMILY MEDICINE

## 2023-12-09 PROCEDURE — A9270 NON-COVERED ITEM OR SERVICE: HCPCS | Performed by: STUDENT IN AN ORGANIZED HEALTH CARE EDUCATION/TRAINING PROGRAM

## 2023-12-09 PROCEDURE — 700102 HCHG RX REV CODE 250 W/ 637 OVERRIDE(OP): Performed by: STUDENT IN AN ORGANIZED HEALTH CARE EDUCATION/TRAINING PROGRAM

## 2023-12-09 PROCEDURE — 80048 BASIC METABOLIC PNL TOTAL CA: CPT

## 2023-12-09 PROCEDURE — 36415 COLL VENOUS BLD VENIPUNCTURE: CPT

## 2023-12-09 PROCEDURE — A9270 NON-COVERED ITEM OR SERVICE: HCPCS | Performed by: FAMILY MEDICINE

## 2023-12-09 PROCEDURE — 99232 SBSQ HOSP IP/OBS MODERATE 35: CPT | Performed by: FAMILY MEDICINE

## 2023-12-09 PROCEDURE — 700111 HCHG RX REV CODE 636 W/ 250 OVERRIDE (IP): Performed by: STUDENT IN AN ORGANIZED HEALTH CARE EDUCATION/TRAINING PROGRAM

## 2023-12-09 PROCEDURE — 83735 ASSAY OF MAGNESIUM: CPT

## 2023-12-09 PROCEDURE — 700111 HCHG RX REV CODE 636 W/ 250 OVERRIDE (IP): Mod: JZ | Performed by: FAMILY MEDICINE

## 2023-12-09 RX ORDER — MAGNESIUM SULFATE HEPTAHYDRATE 40 MG/ML
2 INJECTION, SOLUTION INTRAVENOUS ONCE
Status: COMPLETED | OUTPATIENT
Start: 2023-12-09 | End: 2023-12-09

## 2023-12-09 RX ORDER — HEPARIN SODIUM 1000 [USP'U]/ML
INJECTION, SOLUTION INTRAVENOUS; SUBCUTANEOUS
Status: COMPLETED
Start: 2023-12-09 | End: 2023-12-09

## 2023-12-09 RX ORDER — SIMETHICONE 125 MG
125 TABLET,CHEWABLE ORAL 3 TIMES DAILY PRN
Status: DISCONTINUED | OUTPATIENT
Start: 2023-12-09 | End: 2023-12-26 | Stop reason: HOSPADM

## 2023-12-09 RX ORDER — POLYETHYLENE GLYCOL 3350 17 G/17G
1 POWDER, FOR SOLUTION ORAL DAILY
Status: DISCONTINUED | OUTPATIENT
Start: 2023-12-10 | End: 2023-12-16

## 2023-12-09 RX ADMIN — TAMSULOSIN HYDROCHLORIDE 0.4 MG: 0.4 CAPSULE ORAL at 07:28

## 2023-12-09 RX ADMIN — HEPARIN SODIUM 20 UNITS/KG/HR: 5000 INJECTION, SOLUTION INTRAVENOUS at 01:14

## 2023-12-09 RX ADMIN — VERAPAMIL HYDROCHLORIDE 120 MG: 120 TABLET ORAL at 00:00

## 2023-12-09 RX ADMIN — ASPIRIN 81 MG: 81 TABLET, COATED ORAL at 07:28

## 2023-12-09 RX ADMIN — ONDANSETRON 4 MG: 2 INJECTION INTRAMUSCULAR; INTRAVENOUS at 07:47

## 2023-12-09 RX ADMIN — Medication 400 MG: at 07:28

## 2023-12-09 RX ADMIN — WARFARIN SODIUM 3 MG: 3 TABLET ORAL at 16:49

## 2023-12-09 RX ADMIN — ATORVASTATIN CALCIUM 40 MG: 40 TABLET, FILM COATED ORAL at 20:12

## 2023-12-09 RX ADMIN — MAGNESIUM SULFATE HEPTAHYDRATE 2 G: 2 INJECTION, SOLUTION INTRAVENOUS at 14:16

## 2023-12-09 RX ADMIN — Medication 1 CAPSULE: at 07:28

## 2023-12-09 RX ADMIN — VERAPAMIL HYDROCHLORIDE 120 MG: 120 TABLET ORAL at 12:01

## 2023-12-09 RX ADMIN — OMEPRAZOLE 20 MG: 20 CAPSULE, DELAYED RELEASE ORAL at 08:00

## 2023-12-09 RX ADMIN — ONDANSETRON 4 MG: 2 INJECTION INTRAMUSCULAR; INTRAVENOUS at 16:49

## 2023-12-09 RX ADMIN — DRONABINOL 5 MG: 5 CAPSULE ORAL at 10:25

## 2023-12-09 RX ADMIN — DRONABINOL 5 MG: 5 CAPSULE ORAL at 16:49

## 2023-12-09 RX ADMIN — HEPARIN SODIUM 2000 UNITS: 1000 INJECTION, SOLUTION INTRAVENOUS; SUBCUTANEOUS at 08:30

## 2023-12-09 RX ADMIN — HEPARIN SODIUM 3700 UNITS: 1000 INJECTION, SOLUTION INTRAVENOUS; SUBCUTANEOUS at 11:35

## 2023-12-09 RX ADMIN — ONDANSETRON 4 MG: 2 INJECTION INTRAMUSCULAR; INTRAVENOUS at 01:14

## 2023-12-09 ASSESSMENT — ENCOUNTER SYMPTOMS
NAUSEA: 0
PALPITATIONS: 0
BACK PAIN: 0
ABDOMINAL PAIN: 1
SHORTNESS OF BREATH: 0
DIAPHORESIS: 0
BLURRED VISION: 0
FEVER: 0
HEARTBURN: 0
VOMITING: 0
WHEEZING: 0
MYALGIAS: 1
WEAKNESS: 1
FOCAL WEAKNESS: 0
CHILLS: 0
FLANK PAIN: 0
SORE THROAT: 0
SPEECH CHANGE: 0
NECK PAIN: 0
DIZZINESS: 0
COUGH: 0
HEADACHES: 0
BLOOD IN STOOL: 0
SENSORY CHANGE: 0
DIARRHEA: 0
NERVOUS/ANXIOUS: 1

## 2023-12-09 ASSESSMENT — PAIN DESCRIPTION - PAIN TYPE
TYPE: ACUTE PAIN
TYPE: ACUTE PAIN

## 2023-12-09 ASSESSMENT — COGNITIVE AND FUNCTIONAL STATUS - GENERAL
MOBILITY SCORE: 23
CLIMB 3 TO 5 STEPS WITH RAILING: A LITTLE
DAILY ACTIVITIY SCORE: 24
SUGGESTED CMS G CODE MODIFIER DAILY ACTIVITY: CH
SUGGESTED CMS G CODE MODIFIER MOBILITY: CI

## 2023-12-09 ASSESSMENT — PATIENT HEALTH QUESTIONNAIRE - PHQ9
1. LITTLE INTEREST OR PLEASURE IN DOING THINGS: NOT AT ALL
2. FEELING DOWN, DEPRESSED, IRRITABLE, OR HOPELESS: NOT AT ALL
SUM OF ALL RESPONSES TO PHQ9 QUESTIONS 1 AND 2: 0

## 2023-12-09 NOTE — PROGRESS NOTES
[facility] Nursing Notes     HD today x 3hrs per Dr RADHAMES Sommers  Initiated at 0830 and ended 1130     Pre HD assessment     Received patient alert and oriented.   No Sob. No chest pain  Lungs Clear. Vital Signs stable  No complains pre HD.     Edema - No edema     Access: -Right chest catheter, Tunneled     No s/s of infection: no redness, no tenderness, no pus, no oozing of blood, warm to touch.     Intra HD    No issues during HD  Vital signs stable  No complaints   Able to rest during treatment        Post HD     UF goal  achieved: 2500mLs - 500 mLs (200mls prime + 300mls rinseback)       Target Net UF : 2000mls     Completed HD tolerated well  Post vital signs stable  Nil complaints  Dressing: dry and intact  Heparin lock 3,700units A1.8mls and V1.9mls     Documented by  OSIEL HALEY RN    Report given to PCN Adrianne Whitaker RN

## 2023-12-09 NOTE — PROGRESS NOTES
Inpatient Anticoagulation Service Note for 12/9/2023      Reason for Anticoagulation: Atrial Fibrillation, On-X Aortic/Mitral Valve Replacement   [id number] VASc Score: 2  HAS-BLED Score: 3    Hemoglobin Value: (!) 9.7  Hematocrit Value: (!) 30  Lab Platelet Value: 255  Target INR: 2.0 to 3.0    INR from last 7 days       Date/Time INR Value    12/09/23 1214 2.14    12/08/23 0624 2.17    12/07/23 0647 1.82    12/06/23 0458 1.94    12/05/23 1506 2.06    12/05/23 0124 1.94    12/04/23 0014 1.93    12/03/23 0049 1.72          Dose from last 7 days       Date/Time Dose (mg)    12/09/23 1331 3    12/08/23 1250 3    12/07/23 1346 4    12/06/23 1441 2.5    12/05/23 1627 1.25    12/04/23 0928 2.5    12/03/23 0929 2.5          Average Dose (mg): 3  Significant Interactions: Antibiotics, Antiplatelet Medications  Bridge Therapy: No  Bridge Therapy Start Date: 12/01/23  Days of Overlap Therapy: 6   INR Value Greater than 2 Prior to Discontinuation of Parenteral Anticoagulation: Yes     Reversal Agent Administered: Not Applicable  Comments: Patient's INR is therapeutic today for second day in a row. The heparin bridge is now discontinued after two consecutive theraputic INR's base don conversation with physician. We will continue with 3mg daily and daily INR to assess need for dosing adjustments at this time.      Education Material Provided?: No    Pharmacist suggested discharge dosing: Warfarin at 2.5-3mg daily. Patient will need follow up INR within 2-3 days of discharge.      Janina Wong, EstefanyD

## 2023-12-09 NOTE — PROGRESS NOTES
[facility] -  PROGRESS NOTE               Author: TEX Pineda Date & Time: 12/9/2023  11:23 AM     HPI:  58 y.o. 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 Abd imaging demonstrated colitis. C. Diff pending and started on abx. He notes abd pain and diarrhea for several weeks. Edema improving with 2.5% dextrose solution. Still has HD permacath in-place. Pain with peritoneal dialysis but no cloudy effluent of fibrin clots. No f/c/s. Normally does [id number] fills all green.     DAILY NEPHROLOGY SUMMARY:  11/28: consult done  11/29: ongoing abd pain, seen on HD-tolerating procedure well, PD fluid concerning for peritonitis  11/30:c.diff positive, started on oral vanc, new onset aflutter-transferred to [facility], wbc improving, Peritoneal cultures remain negative, abd pain improving  12/1: PD culture remains negative, seen on HD-tolerating procedure, abd pain improving, less diarrhea    12/2: HD yesterday 2.5 liter UF, still with SOB decreased abdoimnal pain  12/3: PD last night 1.5 UF, pt still with SOB  12/4: No events, tolerated HD yest with 2.5L UF, BP stable, tachycardic this am, stable on RA, reports SOB has resolved, still with crampy abd discomfort, reports diarrhea is starting to become more formed  12/5: No events, no new labs, BP stable, tachycardic, stable on RA this am, still with crampy abd pain, diarrhea improving, still with nausea/anorexia, denies any CP/LE edema, reports mild SOB  12/6: No events, tolerated HD yest with 1L UF, BP stable, remains tachycardic, reports nausea is improved and having more firm stools, denies any CP/SOB, reports poor appetite  12/7: No events, BP stable, tolerated HD  "this am with 1L UF, still doesn't feel well and feels very depressed and frustrated, +abd pain and n/v this am and unable to take PO  12/8: patient is doing well, resting in bed. Plan next iHD tomorrow  12/09: patient is seen during HD, refused labs this am. Does report some abdominal discomfort this am    REVIEW OF SYSTEMS:    10 point ROS reviewed and is as per HPI/daily summary or otherwise negative    PMH/PSH/SH/FH:   Reviewed and unchanged since admission note    CURRENT MEDICATIONS:   Reviewed from admission to present day    VS:  /81   Pulse (!) 117 Comment: Nurse aware   Temp 37.1 °C (98.8 °F) (Temporal)   Resp 19   Ht 1.702 m (5' 7\")   Wt 78.2 kg (172 lb 6.4 oz)   SpO2 92%   BMI 27.00 kg/m²     Physical Exam  Vitals and nursing note reviewed.   Constitutional:       General: He is not in acute distress.     Appearance: Normal appearance.   HENT:      Head: Normocephalic and atraumatic.   Eyes:      General: No scleral icterus.     Extraocular Movements: Extraocular movements intact.   Cardiovascular:      Rate and Rhythm: Normal rate and regular rhythm.      Comments: +R chest PC  Pulmonary:      Effort: Pulmonary effort is normal.   Abdominal:      General: Bowel sounds are normal. There is distension.      Palpations: Abdomen is soft.      Tenderness: There is no abdominal tenderness.   Musculoskeletal:         General: No deformity.      Right lower leg: No edema.      Left lower leg: No edema.   Skin:     General: Skin is warm and dry.      Findings: No rash.   Neurological:      General: No focal deficit present.      Mental Status: He is alert and oriented to person, place, and time.   Psychiatric:         Mood and Affect: Mood normal.         Behavior: Behavior normal. Behavior is cooperative.         Fluids:  In: 600 [P.O.:600]  Out: -     LABS:  Recent Labs     12/08/23  1129   SODIUM 133*   POTASSIUM 4.7   CHLORIDE 96   CO2 26   GLUCOSE 95   BUN 40*   CREATININE 7.66*   CALCIUM 8.3* "         IMAGING:   All imaging reviewed from admission to present day    IMPRESSION:  # ESRD on outpt PD   -Etiology 2/2 FSGS  - s/p permcath on 11/10   # R/O PD cath associated peritonitis  - CX negative  # c. Diff.  -symptoms improved with initiation of oral vanc  -guidelines vague on scenarios such as this  # S/P AVR/ Ascending aneurysm repair  # CKD-MBD  -phos elevated  # Anemia of CKD: at goal  # BL pleural effusions/congestion  # Pericardial effusion   # C. Diff colitis  # Abd pain:   -imaging concerning for colitis  -C.diff positive  # C.diff  # Aflutter     PLAN:  -HD today (SAT), cont TTS HD schedule for now  -Gentle UF with HD as tolerated  -Hold PD for now, will resume once abd pain/discomfort improves, can re-eval as outpt  -oral vanc per primary  -holding APPLE for now but may need to restart if Hgb trends down  -Renal diet  -Encourage protein intake given low albumin  - Recommend nutrition consult for Nepro/protein supplements as low albumin will affect pt's ability to resume PD   -Phos binder with meals   -Dose all medications as per ESRD    **Pt will need outpt HD chair at [facility] for the next 30 days until abd pain improves enough that he can resume PD

## 2023-12-09 NOTE — PROGRESS NOTES
Patient had a lot of cramping and diarrhea this morning and asked that he not take some of his morning medication (the stool softeners) and that the other ones be rescheduled for when his stomach is feeling better.

## 2023-12-09 NOTE — CARE PLAN
The patient is Stable - Low risk of patient condition declining or worsening    Shift Goals  Clinical Goals: monitor heparin drip and XA labs  Patient Goals: sleep  Family Goals: marisa    Progress made toward(s) clinical / shift goals:  Patient alert and oriented, resting comfortably in bed.  All meds given per Mar and all belongings within reach.  1800 XA result was .41 which makes the second therapeutic lab value.  No bolus and no adjustment to the rate.  Next draw will be 1800 12/9.      Patient is not progressing towards the following goals:

## 2023-12-09 NOTE — PROGRESS NOTES
Hospital Medicine Daily Progress Note    Date of Service  12/9/2023    Chief Complaint  Gurdeep Guerra is a 58 y.o. male admitted 11/27/2023 with diarrhea    Hospital Course  Admitted with symptoms of abdominal pain, nausea, vomiting and diarrhea, CT scan showed evidence of colitis, he was started on empiric coverage with IV Cefepime and Flagyl.  Patient recently underwent mechanical AVR, aortic aneurysm repair, discharged on 11/17/2023.  He also has known history of end-stage renal disease on peritoneal dialysis.  Workup showed he was positive for C. difficile colitis, he was started on oral vancomycin.  There was also concern for possible peritonitis, he was given intraperitoneal IV cefepime.  Nephrology was consulted on the case, he underwent dialysis through his temporary dialysis catheter which was placed on the previous admission.  Also with known history of paroxysmal atrial flutter, and with recent mechanical AVR, he was on Coumadin for anticoagulation.  He required to be placed on heparin drip to bridge Coumadin.    Interval Problem Update  C diff - loose stools  HTN - sbp   Aflutter - rate   ESRD - discussed case with Nephrology    I have discussed this patient's plan of care and discharge plan at IDT rounds today with Case Management, Nursing, Nursing leadership, and other members of the IDT team.    Consultants/Specialty  nephrology    Code Status  Full Code    Disposition  The patient is not medically cleared for discharge to home or a post-acute facility.  Anticipate discharge to: home with close outpatient follow-up    I have placed the appropriate orders for post-discharge needs.    Review of Systems  Review of Systems   Constitutional:  Positive for malaise/fatigue. Negative for chills, diaphoresis and fever.   HENT:  Negative for congestion, hearing loss and sore throat.    Eyes:  Negative for blurred vision.   Respiratory:  Negative for cough, shortness of breath and wheezing.     Cardiovascular:  Negative for chest pain, palpitations and leg swelling.   Gastrointestinal:  Positive for abdominal pain. Negative for blood in stool, diarrhea, heartburn, melena, nausea and vomiting.   Genitourinary:  Negative for dysuria, flank pain and hematuria.   Musculoskeletal:  Positive for myalgias. Negative for back pain, joint pain and neck pain.   Skin:  Negative for rash.   Neurological:  Positive for weakness. Negative for dizziness, sensory change, speech change, focal weakness and headaches.   Psychiatric/Behavioral:  The patient is nervous/anxious.         Physical Exam  Temp:  [36.7 °C (98 °F)-37.1 °C (98.8 °F)] 36.7 °C (98 °F)  Pulse:  [] 117  Resp:  [16-20] 20  BP: ()/(58-81) 129/68  SpO2:  [92 %-98 %] 98 %    Physical Exam  Vitals and nursing note reviewed.   HENT:      Head: Normocephalic and atraumatic.      Nose: No congestion.      Mouth/Throat:      Mouth: Mucous membranes are moist.   Eyes:      Extraocular Movements: Extraocular movements intact.      Conjunctiva/sclera: Conjunctivae normal.   Cardiovascular:      Rate and Rhythm: Normal rate. Rhythm irregular.   Pulmonary:      Effort: Pulmonary effort is normal.      Breath sounds: Normal breath sounds.   Abdominal:      General: There is no distension.      Tenderness: There is abdominal tenderness. There is no guarding or rebound.      Comments: PD catheter   Musculoskeletal:      Cervical back: No tenderness.      Right lower leg: No edema.      Left lower leg: No edema.   Skin:     General: Skin is warm and dry.   Neurological:      General: No focal deficit present.      Mental Status: He is alert and oriented to person, place, and time.      Cranial Nerves: No cranial nerve deficit.         Fluids    Intake/Output Summary (Last 24 hours) at 12/9/2023 1330  Last data filed at 12/8/2023 1950  Gross per 24 hour   Intake 360 ml   Output --   Net 360 ml         Laboratory  Recent Labs     12/08/23  1129 12/09/23  1214    WBC 8.3 7.9   RBC 3.14* 3.18*   HEMOGLOBIN 9.3* 9.7*   HEMATOCRIT 30.1* 30.0*   MCV 95.9 94.3   MCH 29.6 30.5   MCHC 30.9* 32.3   RDW 52.2* 52.3*   PLATELETCT 270 255   MPV 8.5* 8.6*       Recent Labs     12/08/23  1129 12/09/23  1214   SODIUM 133* 134*   POTASSIUM 4.7 4.2   CHLORIDE 96 97   CO2 26 27   GLUCOSE 95 86   BUN 40* 26*   CREATININE 7.66* 5.28*   CALCIUM 8.3* 8.7       Recent Labs     12/07/23  0647 12/08/23  0624 12/09/23  1214   INR 1.82* 2.17* 2.14*                 Imaging  [id number] VIEW   Final Result      1.  Benign bowel gas pattern.      2.  Peritoneal dialysis catheter coiled in the pelvis.      [id number] VIEW   Final Result      Nonspecific bowel gas pattern with a moderate colonic stool burden.      DX-CHEST-PORTABLE (1 VIEW)   Final Result      1.  Enlarged cardiac silhouette with vascular congestion/edema.   2.  Lower lobe airspace disease could be due to edema, atelectasis or pneumonitis.      EC-ECHOCARDIOGRAM COMPLETE W/O CONT   Final Result      CT-ABDOMEN-PELVIS W/O   Final Result      1.  Fat stranding adjacent to the cecum, concerning for colitis/typhlitis.   2.  Colonic diverticulosis.   3.  Nonobstructing, tiny left renal stone.   4.  Likely partially loculated moderate right pleural effusion.   5.  Small left pleural effusion.   6.  Adjacent right middle lobe and bilateral lower lobe consolidations, likely atelectasis.   7.  Small pericardial effusion.      DX-CHEST-PORTABLE (1 VIEW)   Final Result      1.  Resolved or improved small left pleural effusion with persistent left basilar atelectasis and/or scarring.   2.  New small right pleural effusion with associated atelectasis and/or consolidation.           Assessment/Plan  * C. difficile colitis- (present on admission)  Assessment & Plan  Oral vancomycin - finished course    History of mechanical aortic valve replacement- (present on admission)  Assessment & Plan  Heparin drip, Coumadin    Hypomagnesemia- (present on  admission)  Assessment & Plan  IV Mg 2 g   Follow level    Paroxysmal atrial flutter (HCC)- (present on admission)  Assessment & Plan  Verapamil  Heparin drip, Coumadin    ESRD (end stage renal disease) (HCC)- (present on admission)  Assessment & Plan  HD per Nephrology    Coronary artery disease due to lipid rich plaque- (present on admission)  Assessment & Plan  Aspirin, Lipitor    Hypertension- (present on admission)  Assessment & Plan  Verapamil with parameters     Dyslipidemia- (present on admission)  Assessment & Plan  Lipitor         VTE prophylaxis:    therapeutic anticoagulation with coumadin dosing per pharmacy, adjust PRN      I have performed a physical exam and reviewed and updated ROS and Plan today (12/9/2023). In review of yesterday's note (12/8/2023), there are no changes except as documented above.

## 2023-12-09 NOTE — CARE PLAN
The patient is Stable - Low risk of patient condition declining or worsening    Shift Goals  Clinical Goals: monitor heparin drip and XA labs  Patient Goals: sleep  Family Goals: marisa    Progress made toward(s) clinical / shift goals:    Axo4.  Able to make needs known. S/P hemodialysis 2L was pulled. Afebrile. Denies pain. Able to ambulate with standby assist and no assistive device. With permacath at upper right chest and PD cath at lower right abdomen. Remained free from injury. On heparin drip with warfarin bridging. Needs met at this time.     Problem: Pain - Standard  Goal: Alleviation of pain or a reduction in pain to the patient’s comfort goal  Outcome: Progressing     Problem: Knowledge Deficit - Standard  Goal: Patient and family/care givers will demonstrate understanding of plan of care, disease process/condition, diagnostic tests and medications  Outcome: Progressing     Problem: Fall Risk  Goal: Patient will remain free from falls  Outcome: Progressing     Problem: Respiratory  Goal: Patient will achieve/maintain optimum respiratory ventilation and gas exchange  Outcome: Progressing     Problem: Hemodynamics  Goal: Patient's hemodynamics, fluid balance and neurologic status will be stable or improve  Outcome: Progressing     Problem: Self Care  Goal: Patient will have the ability to perform ADLs independently or with assistance (bathe, groom, dress, toilet and feed)  Outcome: Progressing

## 2023-12-10 ENCOUNTER — APPOINTMENT (OUTPATIENT)
Dept: RADIOLOGY | Facility: MEDICAL CENTER | Age: 58
DRG: 981 | End: 2023-12-10
Attending: FAMILY MEDICINE
Payer: COMMERCIAL

## 2023-12-10 LAB
ANION GAP SERPL CALC-SCNC: 11 MMOL/L (ref 7–16)
BUN SERPL-MCNC: 43 MG/DL (ref 8–22)
CALCIUM SERPL-MCNC: 8.7 MG/DL (ref 8.5–10.5)
CHLORIDE SERPL-SCNC: 96 MMOL/L (ref 96–112)
CO2 SERPL-SCNC: 27 MMOL/L (ref 20–33)
CREAT SERPL-MCNC: 7.66 MG/DL (ref 0.5–1.4)
ERYTHROCYTE [DISTWIDTH] IN BLOOD BY AUTOMATED COUNT: 53.1 FL (ref 35.9–50)
GFR SERPLBLD CREATININE-BSD FMLA CKD-EPI: 8 ML/MIN/1.73 M 2
GLUCOSE SERPL-MCNC: 82 MG/DL (ref 65–99)
HCT VFR BLD AUTO: 29.3 % (ref 42–52)
HGB BLD-MCNC: 9.2 G/DL (ref 14–18)
INR PPP: 2.18 (ref 0.87–1.13)
MAGNESIUM SERPL-MCNC: 2.5 MG/DL (ref 1.5–2.5)
MCH RBC QN AUTO: 30.5 PG (ref 27–33)
MCHC RBC AUTO-ENTMCNC: 31.4 G/DL (ref 32.3–36.5)
MCV RBC AUTO: 97 FL (ref 81.4–97.8)
PLATELET # BLD AUTO: 263 K/UL (ref 164–446)
PMV BLD AUTO: 8.7 FL (ref 9–12.9)
POTASSIUM SERPL-SCNC: 5 MMOL/L (ref 3.6–5.5)
PROTHROMBIN TIME: 24.6 SEC (ref 12–14.6)
RBC # BLD AUTO: 3.02 M/UL (ref 4.7–6.1)
SODIUM SERPL-SCNC: 134 MMOL/L (ref 135–145)
WBC # BLD AUTO: 8.5 K/UL (ref 4.8–10.8)

## 2023-12-10 PROCEDURE — A9270 NON-COVERED ITEM OR SERVICE: HCPCS | Performed by: STUDENT IN AN ORGANIZED HEALTH CARE EDUCATION/TRAINING PROGRAM

## 2023-12-10 PROCEDURE — 71250 CT THORAX DX C-: CPT

## 2023-12-10 PROCEDURE — 700102 HCHG RX REV CODE 250 W/ 637 OVERRIDE(OP): Performed by: STUDENT IN AN ORGANIZED HEALTH CARE EDUCATION/TRAINING PROGRAM

## 2023-12-10 PROCEDURE — 700102 HCHG RX REV CODE 250 W/ 637 OVERRIDE(OP): Performed by: INTERNAL MEDICINE

## 2023-12-10 PROCEDURE — 770001 HCHG ROOM/CARE - MED/SURG/GYN PRIV*

## 2023-12-10 PROCEDURE — 700111 HCHG RX REV CODE 636 W/ 250 OVERRIDE (IP): Mod: JZ | Performed by: INTERNAL MEDICINE

## 2023-12-10 PROCEDURE — 74176 CT ABD & PELVIS W/O CONTRAST: CPT

## 2023-12-10 PROCEDURE — 99232 SBSQ HOSP IP/OBS MODERATE 35: CPT | Performed by: FAMILY MEDICINE

## 2023-12-10 PROCEDURE — A9270 NON-COVERED ITEM OR SERVICE: HCPCS | Performed by: INTERNAL MEDICINE

## 2023-12-10 PROCEDURE — 85610 PROTHROMBIN TIME: CPT

## 2023-12-10 PROCEDURE — 700102 HCHG RX REV CODE 250 W/ 637 OVERRIDE(OP): Performed by: FAMILY MEDICINE

## 2023-12-10 PROCEDURE — A9270 NON-COVERED ITEM OR SERVICE: HCPCS | Performed by: FAMILY MEDICINE

## 2023-12-10 PROCEDURE — 36415 COLL VENOUS BLD VENIPUNCTURE: CPT

## 2023-12-10 PROCEDURE — 85027 COMPLETE CBC AUTOMATED: CPT

## 2023-12-10 PROCEDURE — 83735 ASSAY OF MAGNESIUM: CPT

## 2023-12-10 PROCEDURE — 80048 BASIC METABOLIC PNL TOTAL CA: CPT

## 2023-12-10 RX ADMIN — WARFARIN SODIUM 3 MG: 3 TABLET ORAL at 17:12

## 2023-12-10 RX ADMIN — DRONABINOL 5 MG: 5 CAPSULE ORAL at 12:55

## 2023-12-10 RX ADMIN — VERAPAMIL HYDROCHLORIDE 120 MG: 120 TABLET ORAL at 12:56

## 2023-12-10 RX ADMIN — Medication 1 CAPSULE: at 07:18

## 2023-12-10 RX ADMIN — DRONABINOL 5 MG: 5 CAPSULE ORAL at 16:46

## 2023-12-10 RX ADMIN — VERAPAMIL HYDROCHLORIDE 120 MG: 120 TABLET ORAL at 05:20

## 2023-12-10 RX ADMIN — SEVELAMER CARBONATE 1600 MG: 800 TABLET, FILM COATED ORAL at 07:19

## 2023-12-10 RX ADMIN — ACETAMINOPHEN 1000 MG: 500 TABLET, FILM COATED ORAL at 07:18

## 2023-12-10 RX ADMIN — OMEPRAZOLE 20 MG: 20 CAPSULE, DELAYED RELEASE ORAL at 05:20

## 2023-12-10 RX ADMIN — ATORVASTATIN CALCIUM 40 MG: 40 TABLET, FILM COATED ORAL at 21:35

## 2023-12-10 RX ADMIN — OXYCODONE 5 MG: 5 TABLET ORAL at 06:20

## 2023-12-10 RX ADMIN — Medication 1 APPLICATOR: at 17:12

## 2023-12-10 RX ADMIN — ONDANSETRON 4 MG: 2 INJECTION INTRAMUSCULAR; INTRAVENOUS at 13:36

## 2023-12-10 RX ADMIN — ONDANSETRON 4 MG: 2 INJECTION INTRAMUSCULAR; INTRAVENOUS at 05:27

## 2023-12-10 RX ADMIN — DOCUSATE SODIUM 100 MG: 100 CAPSULE, LIQUID FILLED ORAL at 16:47

## 2023-12-10 ASSESSMENT — ENCOUNTER SYMPTOMS
ABDOMINAL PAIN: 1
SORE THROAT: 0
SENSORY CHANGE: 0
DIARRHEA: 0
HEARTBURN: 0
PALPITATIONS: 0
NERVOUS/ANXIOUS: 1
NAUSEA: 1
VOMITING: 0
WHEEZING: 0
MYALGIAS: 1
CHILLS: 0
FLANK PAIN: 0
NECK PAIN: 0
BACK PAIN: 0
WEAKNESS: 1
FEVER: 0
BLOOD IN STOOL: 0
HEADACHES: 0
DIAPHORESIS: 0
COUGH: 0
SHORTNESS OF BREATH: 0
SPEECH CHANGE: 0
DIZZINESS: 0
FOCAL WEAKNESS: 0
BLURRED VISION: 0

## 2023-12-10 ASSESSMENT — PAIN DESCRIPTION - PAIN TYPE
TYPE: ACUTE PAIN
TYPE: ACUTE PAIN

## 2023-12-10 NOTE — CARE PLAN
The patient is Stable - Low risk of patient condition declining or worsening    Shift Goals  Clinical Goals: safety  Patient Goals: sleep  Family Goals: discharge    Progress made toward(s) clinical / shift goals:    Problem: Pain - Standard  Goal: Alleviation of pain or a reduction in pain to the patient’s comfort goal  Outcome: Progressing     Problem: Knowledge Deficit - Standard  Goal: Patient and family/care givers will demonstrate understanding of plan of care, disease process/condition, diagnostic tests and medications  Outcome: Progressing       Patient is not progressing towards the following goals:

## 2023-12-10 NOTE — PROGRESS NOTES
Inpatient Anticoagulation Service Note for 12/10/2023      Reason for Anticoagulation: Atrial Fibrillation, On-X Aortic/Mitral Valve Replacement   [id number] VASc Score: 2  HAS-BLED Score: 3    Hemoglobin Value: (!) 9.2  Hematocrit Value: (!) 29.3  Lab Platelet Value: 263  Target INR: 2.0 to 3.0    INR from last 7 days       Date/Time INR Value    12/10/23 1019 2.18    12/09/23 1214 2.14    12/08/23 0624 2.17    12/07/23 0647 1.82    12/06/23 0458 1.94    12/05/23 1506 2.06    12/05/23 0124 1.94    12/04/23 0014 1.93          Dose from last 7 days       Date/Time Dose (mg)    12/10/23 1144 3    12/09/23 1331 3    12/08/23 1250 3    12/07/23 1346 4    12/06/23 1441 2.5    12/05/23 1627 1.25    12/04/23 0928 2.5          Average Dose (mg): 3  Significant Interactions: Antibiotics, Antiplatelet Medications  Bridge Therapy: No  Bridge Therapy Start Date: 12/01/23  Days of Overlap Therapy: 6   INR Value Greater than 2 Prior to Discontinuation of Parenteral Anticoagulation: Yes     Reversal Agent Administered: Not Applicable  A/P: INR therapeutic now for 3 days in a row, and stable off heparin bridge. Patient will remain on 3mg daily with INR check scheduled M/W/F. Please modify INR more frequently if patient's INR fluctuates more than expected on current schdule.      Education Material Provided?: No    Pharmacist suggested discharge dosing: Likely able to continue 3mg daily with INR check 2-3 days after discharge.      Janina Wong, EstefanyD

## 2023-12-10 NOTE — PROGRESS NOTES
Hospital Medicine Daily Progress Note    Date of Service  12/10/2023    Chief Complaint  Gurdeep Guerra is a 58 y.o. male admitted 11/27/2023 with diarrhea    Hospital Course  Admitted with symptoms of abdominal pain, nausea, vomiting and diarrhea, CT scan showed evidence of colitis, he was started on empiric coverage with IV Cefepime and Flagyl.  Patient recently underwent mechanical AVR, aortic aneurysm repair, discharged on 11/17/2023.  He also has known history of end-stage renal disease on peritoneal dialysis.  Workup showed he was positive for C. difficile colitis, he was started on oral vancomycin.  There was also concern for possible peritonitis, he was given intraperitoneal IV cefepime.  Nephrology was consulted on the case, he underwent dialysis through his temporary dialysis catheter which was placed on the previous admission.  Also with known history of paroxysmal atrial flutter, and with recent mechanical AVR, he was on Coumadin for anticoagulation.  He required to be placed on heparin drip to bridge Coumadin.    Interval Problem Update  C diff - abdominal cramping and pain  seems worse since he finished the course of oral Vancomycin  HTN - sbp   Aflutter - rate     Updates given and plan of care discussed with patient's significant other who was at bedside.    I have discussed this patient's plan of care and discharge plan at IDT rounds today with Case Management, Nursing, Nursing leadership, and other members of the IDT team.    Consultants/Specialty  nephrology    Code Status  Full Code    Disposition  The patient is not medically cleared for discharge to home or a post-acute facility.  Anticipate discharge to: home with organized home healthcare and close outpatient follow-up    I have placed the appropriate orders for post-discharge needs.    Review of Systems  Review of Systems   Constitutional:  Positive for malaise/fatigue. Negative for chills, diaphoresis and fever.   HENT:   Negative for congestion, hearing loss and sore throat.    Eyes:  Negative for blurred vision.   Respiratory:  Negative for cough, shortness of breath and wheezing.    Cardiovascular:  Negative for chest pain, palpitations and leg swelling.   Gastrointestinal:  Positive for abdominal pain and nausea. Negative for blood in stool, diarrhea, heartburn, melena and vomiting.   Genitourinary:  Negative for dysuria, flank pain and hematuria.   Musculoskeletal:  Positive for myalgias. Negative for back pain, joint pain and neck pain.   Skin:  Negative for rash.   Neurological:  Positive for weakness. Negative for dizziness, sensory change, speech change, focal weakness and headaches.   Psychiatric/Behavioral:  The patient is nervous/anxious.         Physical Exam  Temp:  [36.4 °C (97.5 °F)-36.9 °C (98.4 °F)] 36.8 °C (98.2 °F)  Pulse:  [] 62  Resp:  [19-20] 19  BP: ()/(52-79) 125/65  SpO2:  [92 %-93 %] 92 %    Physical Exam  Vitals and nursing note reviewed.   HENT:      Head: Normocephalic and atraumatic.      Nose: No congestion.      Mouth/Throat:      Mouth: Mucous membranes are moist.   Eyes:      Extraocular Movements: Extraocular movements intact.      Conjunctiva/sclera: Conjunctivae normal.   Cardiovascular:      Rate and Rhythm: Normal rate. Rhythm irregular.   Pulmonary:      Effort: Pulmonary effort is normal.      Breath sounds: Normal breath sounds.   Abdominal:      General: There is no distension.      Tenderness: There is abdominal tenderness. There is no guarding or rebound.      Comments: PD catheter   Musculoskeletal:      Cervical back: No tenderness.      Right lower leg: No edema.      Left lower leg: No edema.   Skin:     General: Skin is warm and dry.   Neurological:      General: No focal deficit present.      Mental Status: He is alert and oriented to person, place, and time.      Cranial Nerves: No cranial nerve deficit.         Fluids    Intake/Output Summary (Last 24 hours) at  12/10/2023 1328  Last data filed at 12/9/2023 2021  Gross per 24 hour   Intake 120 ml   Output --   Net 120 ml         Laboratory  Recent Labs     12/08/23  1129 12/09/23  1214 12/10/23  1019   WBC 8.3 7.9 8.5   RBC 3.14* 3.18* 3.02*   HEMOGLOBIN 9.3* 9.7* 9.2*   HEMATOCRIT 30.1* 30.0* 29.3*   MCV 95.9 94.3 97.0   MCH 29.6 30.5 30.5   MCHC 30.9* 32.3 31.4*   RDW 52.2* 52.3* 53.1*   PLATELETCT 270 255 263   MPV 8.5* 8.6* 8.7*       Recent Labs     12/08/23  1129 12/09/23  1214 12/10/23  1019   SODIUM 133* 134* 134*   POTASSIUM 4.7 4.2 5.0   CHLORIDE 96 97 96   CO2 26 27 27   GLUCOSE 95 86 82   BUN 40* 26* 43*   CREATININE 7.66* 5.28* 7.66*   CALCIUM 8.3* 8.7 8.7       Recent Labs     12/08/23  0624 12/09/23  1214 12/10/23  1019   INR 2.17* 2.14* 2.18*                 Imaging  [id number] VIEW   Final Result      1.  Benign bowel gas pattern.      2.  Peritoneal dialysis catheter coiled in the pelvis.      [id number] VIEW   Final Result      Nonspecific bowel gas pattern with a moderate colonic stool burden.      DX-CHEST-PORTABLE (1 VIEW)   Final Result      1.  Enlarged cardiac silhouette with vascular congestion/edema.   2.  Lower lobe airspace disease could be due to edema, atelectasis or pneumonitis.      EC-ECHOCARDIOGRAM COMPLETE W/O CONT   Final Result      CT-ABDOMEN-PELVIS W/O   Final Result      1.  Fat stranding adjacent to the cecum, concerning for colitis/typhlitis.   2.  Colonic diverticulosis.   3.  Nonobstructing, tiny left renal stone.   4.  Likely partially loculated moderate right pleural effusion.   5.  Small left pleural effusion.   6.  Adjacent right middle lobe and bilateral lower lobe consolidations, likely atelectasis.   7.  Small pericardial effusion.      DX-CHEST-PORTABLE (1 VIEW)   Final Result      1.  Resolved or improved small left pleural effusion with persistent left basilar atelectasis and/or scarring.   2.  New small right pleural effusion with associated atelectasis and/or  consolidation.      CT-ABDOMEN-PELVIS W/O    (Results Pending)        Assessment/Plan  * C. difficile colitis- (present on admission)  Assessment & Plan  Oral vancomycin - finished course  Check CT abd    History of mechanical aortic valve replacement- (present on admission)  Assessment & Plan  Coumadin    Hypomagnesemia- (present on admission)  Assessment & Plan  IV Mg given   Follow level    Paroxysmal atrial flutter (HCC)- (present on admission)  Assessment & Plan  Verapamil  Coumadin    ESRD (end stage renal disease) (HCC)- (present on admission)  Assessment & Plan  HD per Nephrology    Coronary artery disease due to lipid rich plaque- (present on admission)  Assessment & Plan  Aspirin, Lipitor    Hypertension- (present on admission)  Assessment & Plan  Verapamil with parameters     Dyslipidemia- (present on admission)  Assessment & Plan  Lipitor         VTE prophylaxis:    therapeutic anticoagulation with coumadin dosing per pharmacy, adjust PRN      I have performed a physical exam and reviewed and updated ROS and Plan today (12/10/2023). In review of yesterday's note (12/9/2023), there are no changes except as documented above.

## 2023-12-10 NOTE — DISCHARGE PLANNING
Case Management Discharge Planning    Admission Date: 11/27/2023  GMLOS: 5.4  ALOS: 13    6-Clicks ADL Score: 24  6-Clicks Mobility Score: 23      Anticipated Discharge Dispo: Discharge Disposition: Discharged to home/self care (01)  Discharge Address: Home ([address] [place] [address])  Discharge Contact Phone Number: S/O Donna [phone]    DME Needed: No    Action(s) Taken: Updated Provider/Nurse on Discharge Plan    Escalations Completed: None    Medically Clear: Yes    Next Steps: see below    Barriers to Discharge: None    Is the patient up for discharge today: Yes    Is transport arranged for discharge disposition: Yes      Pt is already accepted at [facility].

## 2023-12-10 NOTE — CARE PLAN
The patient is Stable - Low risk of patient condition declining or worsening    Shift Goals  Clinical Goals: safety  Patient Goals: sleep  Family Goals: discharge    Progress made toward(s) clinical / shift goals:  Patient alert and oriented, resting comfortably in bed.  All meds given per Mar and all belongings within reach.  Patient states cramping in stomach is better tonight than last night and earlier in the day.      However, to add to this, patient now expresses a lot of frustration over waking up with his stomach still bothering him.  He states he feels like he isn't getting any better. I reminded him of our prior conversation earlier in the evening when he said it was better.  He agreed that it was then, but now it is not.  Patient refused pain medication.  Instead, requested zofran and the heating pad I gave him yesterday.     Patient is not progressing towards the following goals:

## 2023-12-10 NOTE — PROGRESS NOTES
[facility] -  PROGRESS NOTE               Author: TEX Pineda Date & Time: 12/10/2023  12:43 PM     HPI:  58 y.o. 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 Abd imaging demonstrated colitis. C. Diff pending and started on abx. He notes abd pain and diarrhea for several weeks. Edema improving with 2.5% dextrose solution. Still has HD permacath in-place. Pain with peritoneal dialysis but no cloudy effluent of fibrin clots. No f/c/s. Normally does [id number] fills all green.     DAILY NEPHROLOGY SUMMARY:  11/28: consult done  11/29: ongoing abd pain, seen on HD-tolerating procedure well, PD fluid concerning for peritonitis  11/30:c.diff positive, started on oral vanc, new onset aflutter-transferred to [facility], wbc improving, Peritoneal cultures remain negative, abd pain improving  12/1: PD culture remains negative, seen on HD-tolerating procedure, abd pain improving, less diarrhea    12/2: HD yesterday 2.5 liter UF, still with SOB decreased abdoimnal pain  12/3: PD last night 1.5 UF, pt still with SOB  12/4: No events, tolerated HD yest with 2.5L UF, BP stable, tachycardic this am, stable on RA, reports SOB has resolved, still with crampy abd discomfort, reports diarrhea is starting to become more formed  12/5: No events, no new labs, BP stable, tachycardic, stable on RA this am, still with crampy abd pain, diarrhea improving, still with nausea/anorexia, denies any CP/LE edema, reports mild SOB  12/6: No events, tolerated HD yest with 1L UF, BP stable, remains tachycardic, reports nausea is improved and having more firm stools, denies any CP/SOB, reports poor appetite  12/7: No events, BP stable, tolerated HD  "this am with 1L UF, still doesn't feel well and feels very depressed and frustrated, +abd pain and n/v this am and unable to take PO  12/8: patient is doing well, resting in bed. Plan next iHD tomorrow  12/09: patient is seen during HD, refused labs this am. Does report some abdominal discomfort this am  12/10: patient had iHD yesterday, UF 2000 ml. Abdominal issues ongoing    REVIEW OF SYSTEMS:    10 point ROS reviewed and is as per HPI/daily summary or otherwise negative    PMH/PSH/SH/FH:   Reviewed and unchanged since admission note    CURRENT MEDICATIONS:   Reviewed from admission to present day    VS:  /60   Pulse 60   Temp 36.8 °C (98.2 °F) (Temporal)   Resp 19   Ht 1.702 m (5' 7\")   Wt 78.2 kg (172 lb 6.4 oz)   SpO2 92%   BMI 27.00 kg/m²     Physical Exam  Vitals and nursing note reviewed.   Constitutional:       General: He is not in acute distress.     Appearance: Normal appearance.   HENT:      Head: Normocephalic and atraumatic.   Eyes:      General: No scleral icterus.     Extraocular Movements: Extraocular movements intact.   Cardiovascular:      Rate and Rhythm: Normal rate and regular rhythm.      Comments: +R chest PC  Pulmonary:      Effort: Pulmonary effort is normal.   Abdominal:      General: Bowel sounds are normal. There is distension.      Palpations: Abdomen is soft.      Tenderness: There is no abdominal tenderness.   Musculoskeletal:         General: No deformity.      Right lower leg: No edema.      Left lower leg: No edema.   Skin:     General: Skin is warm and dry.      Findings: No rash.   Neurological:      General: No focal deficit present.      Mental Status: He is alert and oriented to person, place, and time.   Psychiatric:         Mood and Affect: Mood normal.         Behavior: Behavior normal. Behavior is cooperative.         Fluids:  In: 120 [P.O.:120]  Out: -     LABS:  Recent Labs     12/08/23  1129 12/09/23  1214 12/10/23  1019   SODIUM 133* 134* 134*   POTASSIUM " 4.7 4.2 5.0   CHLORIDE 96 97 96   CO2 26 27 27   GLUCOSE 95 86 82   BUN 40* 26* 43*   CREATININE 7.66* 5.28* 7.66*   CALCIUM 8.3* 8.7 8.7         IMAGING:   All imaging reviewed from admission to present day    IMPRESSION:  # ESRD on outpt PD   -Etiology 2/2 FSGS  - s/p permcath on 11/10   # R/O PD cath associated peritonitis  - CX negative  # c. Diff.  -symptoms improved with initiation of oral vanc  -guidelines vague on scenarios such as this  # S/P AVR/ Ascending aneurysm repair  # CKD-MBD  -phos elevated  # Anemia of CKD: at goal  # BL pleural effusions/congestion  # Pericardial effusion   # C. Diff colitis  # Abd pain:   -imaging concerning for colitis  -C.diff positive  # C.diff  # Aflutter     PLAN:  - cont TTS HD schedule, no indication for urgent HD today  - Gentle UF with HD as tolerated  - Hold PD for now, will resume once abd pain/discomfort improves, can re-eval as outpt  - oral vanc per primary  - holding APPLE for now but may need to restart if Hgb trends down  - Renal diet  - Encourage protein intake given low albumin  - Recommend nutrition consult for Nepro/protein supplements as low albumin will affect pt's ability to resume PD   - Phos binder with meals   - Dose all medications as per ESRD    **Pt will need outpt HD chair at [facility] for the next 30 days until abd pain improves enough that he can resume PD

## 2023-12-10 NOTE — PROGRESS NOTES
Patient continued to be upset that he was in pain.  Reminded patient that I asked him if he needed anything for pain and he stated he remembered that he only wanted the zofran.  Patient continued to tell me he didn't want anything for pain because the time he took morphine, it made him throw up.  I told him he had other options for pain and reminded him that he had just had zofran to help with nausea.  I encouraged him to take the small 5mg Oxy he has available to see if that helps.  He relented and did take it.  I helped talk him through clearing his head, focusing only on his breathing.

## 2023-12-11 LAB
ANION GAP SERPL CALC-SCNC: 11 MMOL/L (ref 7–16)
APTT PPP: 42.1 SEC (ref 24.7–36)
BUN SERPL-MCNC: 56 MG/DL (ref 8–22)
CALCIUM SERPL-MCNC: 8.6 MG/DL (ref 8.5–10.5)
CHLORIDE SERPL-SCNC: 94 MMOL/L (ref 96–112)
CO2 SERPL-SCNC: 26 MMOL/L (ref 20–33)
CREAT SERPL-MCNC: 8.71 MG/DL (ref 0.5–1.4)
ERYTHROCYTE [DISTWIDTH] IN BLOOD BY AUTOMATED COUNT: 53 FL (ref 35.9–50)
GFR SERPLBLD CREATININE-BSD FMLA CKD-EPI: 7 ML/MIN/1.73 M 2
GLUCOSE SERPL-MCNC: 87 MG/DL (ref 65–99)
HCT VFR BLD AUTO: 28.3 % (ref 42–52)
HGB BLD-MCNC: 8.8 G/DL (ref 14–18)
INR PPP: 2.08 (ref 0.87–1.13)
INR PPP: 2.33 (ref 0.87–1.13)
MAGNESIUM SERPL-MCNC: 2.5 MG/DL (ref 1.5–2.5)
MCH RBC QN AUTO: 29.7 PG (ref 27–33)
MCHC RBC AUTO-ENTMCNC: 31.1 G/DL (ref 32.3–36.5)
MCV RBC AUTO: 95.6 FL (ref 81.4–97.8)
PLATELET # BLD AUTO: 246 K/UL (ref 164–446)
PMV BLD AUTO: 8.8 FL (ref 9–12.9)
POTASSIUM SERPL-SCNC: 5.1 MMOL/L (ref 3.6–5.5)
PROTHROMBIN TIME: 23.7 SEC (ref 12–14.6)
PROTHROMBIN TIME: 25.9 SEC (ref 12–14.6)
RBC # BLD AUTO: 2.96 M/UL (ref 4.7–6.1)
SODIUM SERPL-SCNC: 131 MMOL/L (ref 135–145)
UFH PPP CHRO-ACNC: <0.1 IU/ML
WBC # BLD AUTO: 8.6 K/UL (ref 4.8–10.8)

## 2023-12-11 PROCEDURE — A9270 NON-COVERED ITEM OR SERVICE: HCPCS | Performed by: FAMILY MEDICINE

## 2023-12-11 PROCEDURE — 700102 HCHG RX REV CODE 250 W/ 637 OVERRIDE(OP): Performed by: FAMILY MEDICINE

## 2023-12-11 PROCEDURE — 99254 IP/OBS CNSLTJ NEW/EST MOD 60: CPT | Performed by: INTERNAL MEDICINE

## 2023-12-11 PROCEDURE — A9270 NON-COVERED ITEM OR SERVICE: HCPCS | Performed by: STUDENT IN AN ORGANIZED HEALTH CARE EDUCATION/TRAINING PROGRAM

## 2023-12-11 PROCEDURE — 85027 COMPLETE CBC AUTOMATED: CPT

## 2023-12-11 PROCEDURE — 85730 THROMBOPLASTIN TIME PARTIAL: CPT

## 2023-12-11 PROCEDURE — 700111 HCHG RX REV CODE 636 W/ 250 OVERRIDE (IP): Mod: JZ | Performed by: INTERNAL MEDICINE

## 2023-12-11 PROCEDURE — 85610 PROTHROMBIN TIME: CPT

## 2023-12-11 PROCEDURE — 80048 BASIC METABOLIC PNL TOTAL CA: CPT

## 2023-12-11 PROCEDURE — 99232 SBSQ HOSP IP/OBS MODERATE 35: CPT | Performed by: FAMILY MEDICINE

## 2023-12-11 PROCEDURE — 700102 HCHG RX REV CODE 250 W/ 637 OVERRIDE(OP): Performed by: STUDENT IN AN ORGANIZED HEALTH CARE EDUCATION/TRAINING PROGRAM

## 2023-12-11 PROCEDURE — 770001 HCHG ROOM/CARE - MED/SURG/GYN PRIV*

## 2023-12-11 PROCEDURE — 36415 COLL VENOUS BLD VENIPUNCTURE: CPT

## 2023-12-11 PROCEDURE — 85520 HEPARIN ASSAY: CPT | Mod: 91

## 2023-12-11 PROCEDURE — 700111 HCHG RX REV CODE 636 W/ 250 OVERRIDE (IP): Performed by: FAMILY MEDICINE

## 2023-12-11 PROCEDURE — 700111 HCHG RX REV CODE 636 W/ 250 OVERRIDE (IP): Mod: JZ

## 2023-12-11 PROCEDURE — 83735 ASSAY OF MAGNESIUM: CPT

## 2023-12-11 RX ORDER — HEPARIN SODIUM 1000 [USP'U]/ML
40 INJECTION, SOLUTION INTRAVENOUS; SUBCUTANEOUS PRN
Status: DISCONTINUED | OUTPATIENT
Start: 2023-12-11 | End: 2023-12-26 | Stop reason: HOSPADM

## 2023-12-11 RX ORDER — FUROSEMIDE 10 MG/ML
40 INJECTION INTRAMUSCULAR; INTRAVENOUS ONCE
Status: COMPLETED | OUTPATIENT
Start: 2023-12-11 | End: 2023-12-11

## 2023-12-11 RX ORDER — HEPARIN SODIUM 5000 [USP'U]/100ML
0-30 INJECTION, SOLUTION INTRAVENOUS CONTINUOUS
Status: DISCONTINUED | OUTPATIENT
Start: 2023-12-11 | End: 2023-12-26 | Stop reason: HOSPADM

## 2023-12-11 RX ADMIN — FUROSEMIDE 40 MG: 10 INJECTION, SOLUTION INTRAVENOUS at 01:56

## 2023-12-11 RX ADMIN — SIMETHICONE 125 MG: 125 TABLET, CHEWABLE ORAL at 00:36

## 2023-12-11 RX ADMIN — OMEPRAZOLE 20 MG: 20 CAPSULE, DELAYED RELEASE ORAL at 06:00

## 2023-12-11 RX ADMIN — VERAPAMIL HYDROCHLORIDE 120 MG: 120 TABLET ORAL at 00:33

## 2023-12-11 RX ADMIN — Medication 1 CAPSULE: at 07:21

## 2023-12-11 RX ADMIN — OXYCODONE 2.5 MG: 5 TABLET ORAL at 01:20

## 2023-12-11 RX ADMIN — Medication 1 APPLICATOR: at 06:00

## 2023-12-11 RX ADMIN — ONDANSETRON 4 MG: 2 INJECTION INTRAMUSCULAR; INTRAVENOUS at 06:07

## 2023-12-11 RX ADMIN — DRONABINOL 5 MG: 5 CAPSULE ORAL at 16:29

## 2023-12-11 RX ADMIN — DRONABINOL 5 MG: 5 CAPSULE ORAL at 11:56

## 2023-12-11 RX ADMIN — ASPIRIN 81 MG: 81 TABLET, COATED ORAL at 06:00

## 2023-12-11 RX ADMIN — VERAPAMIL HYDROCHLORIDE 120 MG: 120 TABLET ORAL at 11:56

## 2023-12-11 RX ADMIN — TAMSULOSIN HYDROCHLORIDE 0.4 MG: 0.4 CAPSULE ORAL at 06:01

## 2023-12-11 RX ADMIN — ACETAMINOPHEN 1000 MG: 500 TABLET, FILM COATED ORAL at 07:21

## 2023-12-11 RX ADMIN — ONDANSETRON 4 MG: 2 INJECTION INTRAMUSCULAR; INTRAVENOUS at 12:37

## 2023-12-11 RX ADMIN — HEPARIN SODIUM 18 UNITS/KG/HR: 5000 INJECTION, SOLUTION INTRAVENOUS at 17:01

## 2023-12-11 RX ADMIN — ATORVASTATIN CALCIUM 40 MG: 40 TABLET, FILM COATED ORAL at 21:18

## 2023-12-11 RX ADMIN — VERAPAMIL HYDROCHLORIDE 120 MG: 120 TABLET ORAL at 16:31

## 2023-12-11 ASSESSMENT — ENCOUNTER SYMPTOMS
SENSORY CHANGE: 0
DEPRESSION: 0
NAUSEA: 0
SORE THROAT: 0
FLANK PAIN: 0
SHORTNESS OF BREATH: 0
MYALGIAS: 1
PALPITATIONS: 0
NAUSEA: 1
SHORTNESS OF BREATH: 1
COUGH: 0
VOMITING: 0
DIARRHEA: 0
FOCAL WEAKNESS: 0
BACK PAIN: 0
HEADACHES: 0
NECK PAIN: 0
FEVER: 0
WEAKNESS: 1
SPEECH CHANGE: 0
DIAPHORESIS: 0
BLOOD IN STOOL: 0
DIZZINESS: 0
SPUTUM PRODUCTION: 0
BLURRED VISION: 0
COUGH: 1
WHEEZING: 0
HEARTBURN: 0
CHILLS: 0
NERVOUS/ANXIOUS: 1
ABDOMINAL PAIN: 1

## 2023-12-11 ASSESSMENT — PAIN DESCRIPTION - PAIN TYPE
TYPE: ACUTE PAIN
TYPE: ACUTE PAIN

## 2023-12-11 NOTE — PROGRESS NOTES
Pt has an active telemetry order, notified on call UNRULY Sorensen, who gave telephone order OK to discontinue telemetry order.

## 2023-12-11 NOTE — CONSULTS
Pulmonary Consultation    Date of consult: 12/11/2023    Referring Physician  Federico Jacobsen M.D.    Reason for Consultation  Loculated pleural effusion    History of Presenting Illness  58 y.o. male who presented 11/27/2023 with abdominal pain, nausea, vomiting, diarrhea.  He was found to have C. difficile colitis.  He had been discharged on 11/17/2023 after mechanical aortic valve replacement and aortic aneurysm repair.  He has a history of ESRD on PD.  Patient reports that during his hospitalization, he has had increasing shortness of breath.  He had a CT scan of his abdomen yesterday due to ongoing abdominal pain and a loculated effusion on the right side of his chest was found.  I reviewed the chest CT with IR and we both felt that the effusion would not drain with simple thoracenteses and that he needed to undergo a chest tube.  Patient has a history of a left-sided pleural effusion which was drained on 11/11/2023.  It was bloody in appearance but no labs were sent.    Code Status  Full Code    Review of Systems   Constitutional:  Positive for malaise/fatigue. Negative for chills and fever.   Respiratory:  Positive for cough and shortness of breath. Negative for sputum production.    Cardiovascular:  Negative for chest pain.   Gastrointestinal:  Positive for abdominal pain. Negative for nausea and vomiting.   Neurological:  Negative for dizziness and headaches.   Psychiatric/Behavioral:  Negative for depression and suicidal ideas.        Past Medical History   has a past medical history of Anesthesia, Aortic stenosis, Chronic gout of multiple sites, Dialysis patient ([facility]), Dyspnea on exertion (05/23/2023), Elevated troponin (05/23/2023), Heart burn, Heart murmur, High cholesterol, Hypertension (2009), NSTEMI (non-ST elevated myocardial infarction) ([facility]) (05/23/2023), Pain in the chest (05/23/2023), Paroxysmal A-fib ([facility]) (11/11/2023), PONV (postoperative nausea and vomiting), Renal disorder (2007), Sleep  apnea (2000), and Snoring (1990).    He has no past medical history of Acute nasopharyngitis, Anginal syndrome (HCC), Asthma, Blood clotting disorder (HCC), Bowel habit changes, Breath shortness, Bronchitis, Cancer (HCC), Carcinoma in situ of respiratory system, Cataract, Congestive heart failure (HCC), Continuous ambulatory peritoneal dialysis status (HCC), COPD (chronic obstructive pulmonary disease) (HCC), Coughing blood, Dental disorder, Diabetes (HCC), Disorder of thyroid, Emphysema of lung (HCC), Glaucoma, Gynecological disorder, Hemorrhagic disorder (HCC), Hepatitis A, Hepatitis B, Hepatitis C, Hiatus hernia syndrome, Indigestion, Infectious disease, Jaundice, Pacemaker, Pneumonia, Pregnant, Psychiatric problem, Rheumatic fever, Seizure (HCC), Stroke (HCC), Tuberculosis, Urinary bladder disorder, or Urinary incontinence.    Surgical History   has a past surgical history that includes other (2007); umbilical hernia repair (2012); shoulder arthroscopy; hip arthroscopy (Bilateral, 2002); tonsillectomy (N/A, 1987); hand surgery (Right, 1985); cath placement capd (N/A, 5/15/2023); pr inj lumbar/sacral,w/ imaging (Left, 8/24/2023); cath placement capd (Right, 9/6/2023); aortic valve replacement (11/3/2023); aortic ascending dissection (11/3/2023); echocardiogram, transesophageal, intraoperative (11/3/2023); restorate hemostas (11/3/2023); and sternotomy (11/3/2023).    Family History  family history includes Hyperlipidemia in his mother; Hypertension in his father.    Social History   reports that he has never smoked. He has never used smokeless tobacco. He reports that he does not currently use drugs. He reports that he does not drink alcohol.    Medications  Home Medications       Reviewed by Rhonda Stevenson (Pharmacy Tech) on 11/27/23 at 1911  Med List Status: Complete     Medication Last Dose Status   aspirin 81 MG EC tablet 11/27/2023 Active   atorvastatin (LIPITOR) 40 MG Tab 11/26/2023 Active   omeprazole  (PRILOSEC) 20 MG delayed-release capsule 11/27/2023 Active   tamsulosin (FLOMAX) 0.4 MG capsule 11/23/2023 Active   traMADol (ULTRAM) 50 MG Tab 11/26/2023 Active   warfarin (COUMADIN) 2.5 MG Tab 11/23/2023 Active   warfarin (COUMADIN) 2.5 MG Tab 11/26/2023 Active                  Current Facility-Administered Medications   Medication Dose Route Frequency Provider Last Rate Last Admin    [START ON 12/12/2023] epoetin gabbie (Epogen/Procrit) injection 6,000 Units  6,000 Units Intravenous TUE+THU+SAT Zac Daily D.O.        polyethylene glycol/lytes (Miralax) Packet 1 Packet  1 Packet Oral DAILY Federico Jacobsen M.D.        simethicone (Mylicon) chewable tablet 125 mg  125 mg Oral TID PRN Federico Jacobsen M.D.   125 mg at 12/11/23 0036    Nozin nasal  swab  1 Applicator Each Nostril BID Federico Jacobsen M.D.        warfarin (Coumadin) tablet 3 mg  3 mg Oral DAILY AT 1800 Sage Wallace M.D.   3 mg at 12/10/23 1712    verapamil (Isoptin) tablet 120 mg  120 mg Oral Q6HRS Federico Jacobsen M.D.   120 mg at 12/11/23 1156    Pharmacy Consult Request ...Pain Management Review 1 Each  1 Each Other PHARMACY TO DOSE Sage Wallace M.D.        acetaminophen (Tylenol) tablet 1,000 mg  1,000 mg Oral Q6HRS Sage Wallace M.D.   1,000 mg at 12/11/23 0721    Followed by    acetaminophen (Tylenol) tablet 1,000 mg  1,000 mg Oral Q6HRS PRN Sage Wallace M.D.        oxyCODONE immediate-release (Roxicodone) tablet 5 mg  5 mg Oral Q3HRS PRN Sage Wallace M.D.   2.5 mg at 12/11/23 0120    Or    oxyCODONE immediate release (Roxicodone) tablet 10 mg  10 mg Oral Q3HRS PRN Sage Wallace M.D.        Or    morphine 4 MG/ML injection 4 mg  4 mg Intravenous Q3HRS PRN Sage Wallace M.D.   4 mg at 12/07/23 0553    docusate sodium (Colace) capsule 100 mg  100 mg Oral BID Sage Wallace M.D.   100 mg at 12/10/23 1647    magnesium hydroxide (Milk Of Magnesia) suspension 30 mL  30 mL Oral QDAY PRN Sage Wallace M.D.   30 mL at 12/06/23 1243     Dextromethorphan Polistirex ER (Delsym) 30 MG/5ML suspension 60 mg  60 mg Oral BID W/MEALS PRN Richy Hand M.D.        heparin injection 2,000 Units  2,000 Units Intravenous ACUTE DIALYSIS PRN Justo Ríos M.D.   2,000 Units at 12/09/23 0830    lactobacillus rhamnosus (Culturelle) capsule 1 Capsule  1 Capsule Oral QDAY with Breakfast Richy Hand M.D.   1 Capsule at 12/11/23 0721    menthol (Halls) lozenge 1 Lozenge  1 Lozenge Oral Q2HRS PRN Richy Hand M.D.   1 Lozenge at 12/04/23 2257    benzocaine-menthol (Cepacol) lozenge 1 Lozenge  1 Lozenge Mouth/Throat Q2HRS PRN Richy Hand M.D.   1 Lozenge at 12/02/23 1956    sevelamer carbonate (Renvela) tablet 1,600 mg  1,600 mg Oral TID WITH MEALS Justice Mac M.D.   1,600 mg at 12/10/23 0719    omeprazole (PriLOSEC) capsule 20 mg  20 mg Oral DAILY Richy Hand M.D.   20 mg at 12/11/23 0600    dronabinol (Marinol) capsule 5 mg  5 mg Oral BEFORE LUNCH AND DINNER Richy Hand M.D.   5 mg at 12/11/23 1156    Nozin nasal  swab  1 Applicator Each Nostril BID Tanya Benitez M.D.   1 Applicator at 12/11/23 0600    ondansetron (Zofran) syringe/vial injection 4 mg  4 mg Intravenous Q6HRS PRN Tanya Benitez M.D.   4 mg at 12/11/23 1237    MD Alert...Warfarin per Pharmacy   Other PHARMACY TO DOSE Tanya Benitez M.D.        heparin injection 3,700 Units  3,700 Units Intracatheter PRN Justice Mac M.D.   3,700 Units at 12/09/23 1135    gentamicin (Garamycin) 0.1 % cream   Topical ACUTE DIALYSIS PRN Justice Mac M.D.   Given at 12/02/23 1705    aspirin EC tablet 81 mg  81 mg Oral DAILY Iglesia Auguste M.D.   81 mg at 12/11/23 0600    atorvastatin (Lipitor) tablet 40 mg  40 mg Oral Nightly Iglesia Auguste M.D.   40 mg at 12/10/23 2135    tamsulosin (Flomax) capsule 0.4 mg  0.4 mg Oral DAILY Iglesia Auguste M.D.   0.4 mg at 12/11/23 0601    ondansetron (Zofran ODT) dispertab 4 mg  4 mg Oral Q4HRS PRN Iglesia Auguste M.D.   4 mg at 12/08/23  0602    promethazine (Phenergan) tablet 12.5-25 mg  12.5-25 mg Oral Q4HRS FROILAN Auguste M.D.        promethazine (Phenergan) suppository 12.5-25 mg  12.5-25 mg Rectal Q4HRS PRROSA Auguste M.D.        prochlorperazine (Compazine) injection 5-10 mg  5-10 mg Intravenous Q4HRS FROILAN Auguste M.D.           Allergies  Allergies   Allergen Reactions    Allopurinol Itching    Hydralazine Hcl Unspecified     Pt states he had a reaction similar to lupus       Vital Signs last 24 hours  Temp:  [36.8 °C (98.3 °F)-37.3 °C (99.2 °F)] 36.9 °C (98.4 °F)  Pulse:  [] 108  Resp:  [16-24] 16  BP: ()/(56-82) 139/82  SpO2:  [91 %-97 %] 94 %    Physical Exam  Vitals and nursing note reviewed.   Constitutional:       Appearance: Normal appearance.   HENT:      Head: Normocephalic.   Eyes:      Conjunctiva/sclera: Conjunctivae normal.   Cardiovascular:      Rate and Rhythm: Normal rate and regular rhythm.   Pulmonary:      Effort: Pulmonary effort is normal.      Comments: Decreased breath sounds on left right base  Abdominal:      Palpations: Abdomen is soft.   Skin:     General: Skin is warm and dry.   Neurological:      General: No focal deficit present.      Mental Status: He is alert and oriented to person, place, and time.   Psychiatric:         Mood and Affect: Mood normal.         Behavior: Behavior normal.       Fluids    Intake/Output Summary (Last 24 hours) at 12/11/2023 1340  Last data filed at 12/10/2023 1506  Gross per 24 hour   Intake 240 ml   Output --   Net 240 ml       Laboratory  Recent Results (from the past 48 hour(s))   Prothrombin Time    Collection Time: 12/10/23 10:19 AM   Result Value Ref Range    PT 24.6 (H) 12.0 - 14.6 sec    INR 2.18 (H) 0.87 - 1.13   MAGNESIUM    Collection Time: 12/10/23 10:19 AM   Result Value Ref Range    Magnesium 2.5 1.5 - 2.5 mg/dL   CBC WITHOUT DIFFERENTIAL    Collection Time: 12/10/23 10:19 AM   Result Value Ref Range    WBC 8.5 4.8 - 10.8 K/uL    RBC 3.02  (L) 4.70 - 6.10 M/uL    Hemoglobin 9.2 (L) 14.0 - 18.0 g/dL    Hematocrit 29.3 (L) 42.0 - 52.0 %    MCV 97.0 81.4 - 97.8 fL    MCH 30.5 27.0 - 33.0 pg    MCHC 31.4 (L) 32.3 - 36.5 g/dL    RDW 53.1 (H) 35.9 - 50.0 fL    Platelet Count 263 164 - 446 K/uL    MPV 8.7 (L) 9.0 - 12.9 fL   Basic Metabolic Panel    Collection Time: 12/10/23 10:19 AM   Result Value Ref Range    Sodium 134 (L) 135 - 145 mmol/L    Potassium 5.0 3.6 - 5.5 mmol/L    Chloride 96 96 - 112 mmol/L    Co2 27 20 - 33 mmol/L    Glucose 82 65 - 99 mg/dL    Bun 43 (H) 8 - 22 mg/dL    Creatinine 7.66 (HH) 0.50 - 1.40 mg/dL    Calcium 8.7 8.5 - 10.5 mg/dL    Anion Gap 11.0 7.0 - 16.0   ESTIMATED GFR    Collection Time: 12/10/23 10:19 AM   Result Value Ref Range    GFR (CKD-EPI) 8 (A) >60 mL/min/1.73 m 2   Prothrombin Time    Collection Time: 12/11/23  5:01 AM   Result Value Ref Range    PT 23.7 (H) 12.0 - 14.6 sec    INR 2.08 (H) 0.87 - 1.13   MAGNESIUM    Collection Time: 12/11/23  5:01 AM   Result Value Ref Range    Magnesium 2.5 1.5 - 2.5 mg/dL   CBC WITHOUT DIFFERENTIAL    Collection Time: 12/11/23  5:01 AM   Result Value Ref Range    WBC 8.6 4.8 - 10.8 K/uL    RBC 2.96 (L) 4.70 - 6.10 M/uL    Hemoglobin 8.8 (L) 14.0 - 18.0 g/dL    Hematocrit 28.3 (L) 42.0 - 52.0 %    MCV 95.6 81.4 - 97.8 fL    MCH 29.7 27.0 - 33.0 pg    MCHC 31.1 (L) 32.3 - 36.5 g/dL    RDW 53.0 (H) 35.9 - 50.0 fL    Platelet Count 246 164 - 446 K/uL    MPV 8.8 (L) 9.0 - 12.9 fL   Basic Metabolic Panel    Collection Time: 12/11/23  5:01 AM   Result Value Ref Range    Sodium 131 (L) 135 - 145 mmol/L    Potassium 5.1 3.6 - 5.5 mmol/L    Chloride 94 (L) 96 - 112 mmol/L    Co2 26 20 - 33 mmol/L    Glucose 87 65 - 99 mg/dL    Bun 56 (H) 8 - 22 mg/dL    Creatinine 8.71 (HH) 0.50 - 1.40 mg/dL    Calcium 8.6 8.5 - 10.5 mg/dL    Anion Gap 11.0 7.0 - 16.0   ESTIMATED GFR    Collection Time: 12/11/23  5:01 AM   Result Value Ref Range    GFR (CKD-EPI) 7 (A) >60 mL/min/1.73 m 2        Imaging  CT-CHEST (THORAX) W/O   Final Result      1.  Large loculated right pleural effusion, worse compared to the October 2023 CT study. Associated atelectasis, with subtotal collapse of the right lower lobe.   2.  Minimal groundglass opacities in the right lung, infectious/inflammatory. No consolidation.   3.  Small left pleural effusion and associated atelectasis.   4.  Mild right hilar lymphadenopathy, statistically reactive rather than neoplastic.   5.  Cardiomegaly.   6.  Abdomen is detailed separately.      CT-ABDOMEN-PELVIS W/O   Final Result      1.  No new inflammatory process in the abdomen or pelvis.   2.  Minimal fat stranding adjacent to the cecum, nonspecific.   3.  Percutaneous catheter with tip in the pelvis. Small volume of pelvic free fluid.   4.  Colonic diverticulosis.   5.  Partially visualized moderate to large right pleural effusion, which may be loculated. It has increased in size since prior CT study.   6.  Small left pleural effusion.   7.  Cardiomegaly.         [id number] VIEW   Final Result      1.  Benign bowel gas pattern.      2.  Peritoneal dialysis catheter coiled in the pelvis.      [id number] VIEW   Final Result      Nonspecific bowel gas pattern with a moderate colonic stool burden.      DX-CHEST-PORTABLE (1 VIEW)   Final Result      1.  Enlarged cardiac silhouette with vascular congestion/edema.   2.  Lower lobe airspace disease could be due to edema, atelectasis or pneumonitis.      EC-ECHOCARDIOGRAM COMPLETE W/O CONT   Final Result      CT-ABDOMEN-PELVIS W/O   Final Result      1.  Fat stranding adjacent to the cecum, concerning for colitis/typhlitis.   2.  Colonic diverticulosis.   3.  Nonobstructing, tiny left renal stone.   4.  Likely partially loculated moderate right pleural effusion.   5.  Small left pleural effusion.   6.  Adjacent right middle lobe and bilateral lower lobe consolidations, likely atelectasis.   7.  Small pericardial effusion.       DX-CHEST-PORTABLE (1 VIEW)   Final Result      1.  Resolved or improved small left pleural effusion with persistent left basilar atelectasis and/or scarring.   2.  New small right pleural effusion with associated atelectasis and/or consolidation.      IR-CONSULT AND TREAT    (Results Pending)       Assessment/Plan  # Acute hypoxemic respiratory failure  # Loculated right-sided pleural effusion, etiology unclear, but it has developed in the hospital    Titrate O2 to maintain sats 88-92%  Plan for IR chest tube  Hold patient's Coumadin and bridged with a heparin drip  Follow-up fluid studies, I have ordered these    Gisela Heller DO   Pulmonary and Critical Care

## 2023-12-11 NOTE — PROGRESS NOTES
Pt refused 120mg verapamil. States he does not want to take it because it may drop his blood pressure too low to be dialyzed. Informed pt he is tachycardic ()and his dialysis days are Tues, Thurs and Sat and he is not scheduled for today. Pt replied due to his large pleural effusion he may be dialysed today. States he read his CT report on his MyChart.

## 2023-12-11 NOTE — PROGRESS NOTES
[facility] -  PROGRESS NOTE               Author: Zac Daily D.O. Date & Time: 12/11/2023  9:50 AM     HPI:  58 y.o. 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 Abd imaging demonstrated colitis. C. Diff pending and started on abx. He notes abd pain and diarrhea for several weeks. Edema improving with 2.5% dextrose solution. Still has HD permacath in-place. Pain with peritoneal dialysis but no cloudy effluent of fibrin clots. No f/c/s. Normally does [id number] fills all green.     DAILY NEPHROLOGY SUMMARY:  11/28: consult done  11/29: ongoing abd pain, seen on HD-tolerating procedure well, PD fluid concerning for peritonitis  11/30:c.diff positive, started on oral vanc, new onset aflutter-transferred to [facility], wbc improving, Peritoneal cultures remain negative, abd pain improving  12/1: PD culture remains negative, seen on HD-tolerating procedure, abd pain improving, less diarrhea    12/2: HD yesterday 2.5 liter UF, still with SOB decreased abdoimnal pain  12/3: PD last night 1.5 UF, pt still with SOB  12/4: No events, tolerated HD yest with 2.5L UF, BP stable, tachycardic this am, stable on RA, reports SOB has resolved, still with crampy abd discomfort, reports diarrhea is starting to become more formed  12/5: No events, no new labs, BP stable, tachycardic, stable on RA this am, still with crampy abd pain, diarrhea improving, still with nausea/anorexia, denies any CP/LE edema, reports mild SOB  12/6: No events, tolerated HD yest with 1L UF, BP stable, remains tachycardic, reports nausea is improved and having more firm stools, denies any CP/SOB, reports poor appetite  12/7: No events, BP stable, tolerated HD this am  "with 1L UF, still doesn't feel well and feels very depressed and frustrated, +abd pain and n/v this am and unable to take PO  12/8: patient is doing well, resting in bed. Plan next iHD tomorrow  12/09: patient is seen during HD, refused labs this am. Does report some abdominal discomfort this am  12/10: patient had iHD yesterday, UF 2000 ml. Abdominal issues ongoing  12/11: pt had CT thorax done yesterday, showing loculated effusion. Pt feels SOB. Diarrhea continues but is better overall.     REVIEW OF SYSTEMS:    10 point ROS reviewed and is as per HPI/daily summary or otherwise negative    PMH/PSH/SH/FH:   Reviewed and unchanged since admission note    CURRENT MEDICATIONS:   Reviewed from admission to present day    VS:  /70   Pulse (!) 110   Temp 36.9 °C (98.4 °F) (Temporal)   Resp 16   Ht 1.702 m (5' 7\")   Wt 78.2 kg (172 lb 6.4 oz)   SpO2 94%   BMI 27.00 kg/m²     Physical Exam  Vitals and nursing note reviewed.   Constitutional:       General: He is not in acute distress.     Appearance: Normal appearance.   HENT:      Head: Normocephalic and atraumatic.   Eyes:      General: No scleral icterus.     Extraocular Movements: Extraocular movements intact.   Cardiovascular:      Rate and Rhythm: Normal rate and regular rhythm.      Comments: +R chest PC  Pulmonary:      Effort: Pulmonary effort is normal.   Abdominal:      General: Bowel sounds are normal. There is distension.      Palpations: Abdomen is soft.      Tenderness: There is no abdominal tenderness.   Musculoskeletal:         General: No deformity.      Right lower leg: No edema.      Left lower leg: No edema.   Skin:     General: Skin is warm and dry.      Findings: No rash.   Neurological:      General: No focal deficit present.      Mental Status: He is alert and oriented to person, place, and time.   Psychiatric:         Mood and Affect: Mood normal.         Behavior: Behavior normal. Behavior is cooperative.         Fluids:  In: 240 " [P.O.:240]  Out: -     LABS:  Recent Labs     12/09/23  1214 12/10/23  1019 12/11/23  0501   SODIUM 134* 134* 131*   POTASSIUM 4.2 5.0 5.1   CHLORIDE 97 96 94*   CO2 27 27 26   GLUCOSE 86 82 87   BUN 26* 43* 56*   CREATININE 5.28* 7.66* 8.71*   CALCIUM 8.7 8.7 8.6         IMAGING:   All imaging reviewed from admission to present day    IMPRESSION:  # ESRD on outpt PD   -Etiology 2/2 FSGS  - s/p permcath on 11/10   # R/O PD cath associated peritonitis  - CX negative  # c. Diff.  -symptoms improved with initiation of oral vanc  -PD guidelines vague on scenarios such as this,  but pt on HD currently  # S/P AVR/ Ascending aneurysm repair  # CKD-MBD  -phos elevated  # Anemia of CKD, below goal hgb 10-11     Resumed EPO 6000U IV w HD on 12/12  # BL pleural effusions/congestion     Large R loculated effusion on CT thorax 12/11  # Pericardial effusion   # C. Diff colitis  -C.diff positive  # C.diff  # Aflutter     PLAN:    - pt feels SOB, but likely due to large loculated effusion  - do not think extra HD would help/reach loculated effusion  - agree with primary team plan for pulm consultation, appreciate recs  - cont TTS HD schedule, will remove UF with HD as tolerated  - resume EPO as hgb has been trending down since admission  - Hold PD for now, will resume once abd pain/discomfort improves, can re-eval as outpt  - cont c diff tx per primary team  - Renal diet, Encourage protein intake given low albumin  - Phos binder with meals   - Dose all medications as per ESRD    **Pt will need outpt HD chair at [facility] for the next 30 days until abd pain improves enough that he can resume PD

## 2023-12-11 NOTE — PROGRESS NOTES
Hospital Medicine Daily Progress Note    Date of Service  12/11/2023    Chief Complaint  Gurdeep Guerra is a 58 y.o. male admitted 11/27/2023 with diarrhea    Hospital Course  Admitted with symptoms of abdominal pain, nausea, vomiting and diarrhea, CT scan showed evidence of colitis, he was started on empiric coverage with IV Cefepime and Flagyl.  Patient recently underwent mechanical AVR, aortic aneurysm repair, discharged on 11/17/2023.  He also has known history of end-stage renal disease on peritoneal dialysis.  Workup showed he was positive for C. difficile colitis, he was started on oral vancomycin.  There was also concern for possible peritonitis, he was given intraperitoneal IV cefepime.  Nephrology was consulted on the case, he underwent dialysis through his temporary dialysis catheter which was placed on the previous admission.  Also with known history of paroxysmal atrial flutter, and with recent mechanical AVR, he was on Coumadin for anticoagulation.  He required to be placed on heparin drip to bridge Coumadin.    Interval Problem Update  C diff - finished course of vancomycin  HTN - sbp   Aflutter - rate   Pleural effusion - noted on CT    I have discussed this patient's plan of care and discharge plan at IDT rounds today with Case Management, Nursing, Nursing leadership, and other members of the IDT team.    Consultants/Specialty  nephrology and pulmonary    Code Status  Full Code    Disposition  The patient is not medically cleared for discharge to home or a post-acute facility.  Anticipate discharge to: home with close outpatient follow-up    I have placed the appropriate orders for post-discharge needs.    Review of Systems  Review of Systems   Constitutional:  Positive for malaise/fatigue. Negative for chills, diaphoresis and fever.   HENT:  Negative for congestion, hearing loss and sore throat.    Eyes:  Negative for blurred vision.   Respiratory:  Negative for cough, shortness of  breath and wheezing.    Cardiovascular:  Negative for chest pain, palpitations and leg swelling.   Gastrointestinal:  Positive for abdominal pain and nausea. Negative for blood in stool, diarrhea, heartburn, melena and vomiting.   Genitourinary:  Negative for dysuria, flank pain and hematuria.   Musculoskeletal:  Positive for myalgias. Negative for back pain, joint pain and neck pain.   Skin:  Negative for rash.   Neurological:  Positive for weakness. Negative for dizziness, sensory change, speech change, focal weakness and headaches.   Psychiatric/Behavioral:  The patient is nervous/anxious.         Physical Exam  Temp:  [36.8 °C (98.3 °F)-37.3 °C (99.2 °F)] 36.9 °C (98.4 °F)  Pulse:  [] 108  Resp:  [16-24] 16  BP: ()/(56-82) 139/82  SpO2:  [91 %-97 %] 94 %    Physical Exam  Vitals and nursing note reviewed.   HENT:      Head: Normocephalic and atraumatic.      Nose: No congestion.      Mouth/Throat:      Mouth: Mucous membranes are moist.   Eyes:      Extraocular Movements: Extraocular movements intact.      Conjunctiva/sclera: Conjunctivae normal.   Cardiovascular:      Rate and Rhythm: Normal rate. Rhythm irregular.   Pulmonary:      Effort: Accessory muscle usage present.      Breath sounds: Examination of the right-middle field reveals decreased breath sounds. Examination of the right-lower field reveals decreased breath sounds. Decreased breath sounds present.   Abdominal:      General: There is no distension.      Tenderness: There is abdominal tenderness. There is no guarding or rebound.      Comments: PD catheter   Musculoskeletal:      Cervical back: No tenderness.      Right lower leg: No edema.      Left lower leg: No edema.   Skin:     General: Skin is warm and dry.   Neurological:      General: No focal deficit present.      Mental Status: He is alert and oriented to person, place, and time.      Cranial Nerves: No cranial nerve deficit.         Fluids    Intake/Output Summary (Last 24  hours) at 12/11/2023 1213  Last data filed at 12/10/2023 1506  Gross per 24 hour   Intake 240 ml   Output --   Net 240 ml         Laboratory  Recent Labs     12/09/23  1214 12/10/23  1019 12/11/23  0501   WBC 7.9 8.5 8.6   RBC 3.18* 3.02* 2.96*   HEMOGLOBIN 9.7* 9.2* 8.8*   HEMATOCRIT 30.0* 29.3* 28.3*   MCV 94.3 97.0 95.6   MCH 30.5 30.5 29.7   MCHC 32.3 31.4* 31.1*   RDW 52.3* 53.1* 53.0*   PLATELETCT 255 263 246   MPV 8.6* 8.7* 8.8*       Recent Labs     12/09/23  1214 12/10/23  1019 12/11/23  0501   SODIUM 134* 134* 131*   POTASSIUM 4.2 5.0 5.1   CHLORIDE 97 96 94*   CO2 27 27 26   GLUCOSE 86 82 87   BUN 26* 43* 56*   CREATININE 5.28* 7.66* 8.71*   CALCIUM 8.7 8.7 8.6       Recent Labs     12/09/23  1214 12/10/23  1019 12/11/23  0501   INR 2.14* 2.18* 2.08*                 Imaging  CT-CHEST (THORAX) W/O   Final Result      1.  Large loculated right pleural effusion, worse compared to the October 2023 CT study. Associated atelectasis, with subtotal collapse of the right lower lobe.   2.  Minimal groundglass opacities in the right lung, infectious/inflammatory. No consolidation.   3.  Small left pleural effusion and associated atelectasis.   4.  Mild right hilar lymphadenopathy, statistically reactive rather than neoplastic.   5.  Cardiomegaly.   6.  Abdomen is detailed separately.      CT-ABDOMEN-PELVIS W/O   Final Result      1.  No new inflammatory process in the abdomen or pelvis.   2.  Minimal fat stranding adjacent to the cecum, nonspecific.   3.  Percutaneous catheter with tip in the pelvis. Small volume of pelvic free fluid.   4.  Colonic diverticulosis.   5.  Partially visualized moderate to large right pleural effusion, which may be loculated. It has increased in size since prior CT study.   6.  Small left pleural effusion.   7.  Cardiomegaly.         [id number] VIEW   Final Result      1.  Benign bowel gas pattern.      2.  Peritoneal dialysis catheter coiled in the pelvis.      [id number] VIEW    Final Result      Nonspecific bowel gas pattern with a moderate colonic stool burden.      DX-CHEST-PORTABLE (1 VIEW)   Final Result      1.  Enlarged cardiac silhouette with vascular congestion/edema.   2.  Lower lobe airspace disease could be due to edema, atelectasis or pneumonitis.      EC-ECHOCARDIOGRAM COMPLETE W/O CONT   Final Result      CT-ABDOMEN-PELVIS W/O   Final Result      1.  Fat stranding adjacent to the cecum, concerning for colitis/typhlitis.   2.  Colonic diverticulosis.   3.  Nonobstructing, tiny left renal stone.   4.  Likely partially loculated moderate right pleural effusion.   5.  Small left pleural effusion.   6.  Adjacent right middle lobe and bilateral lower lobe consolidations, likely atelectasis.   7.  Small pericardial effusion.      DX-CHEST-PORTABLE (1 VIEW)   Final Result      1.  Resolved or improved small left pleural effusion with persistent left basilar atelectasis and/or scarring.   2.  New small right pleural effusion with associated atelectasis and/or consolidation.           Assessment/Plan  * C. difficile colitis- (present on admission)  Assessment & Plan  Oral vancomycin - finished course    Loculated pleural effusion- (present on admission)  Assessment & Plan  Consult Pulmonary medicine    History of mechanical aortic valve replacement- (present on admission)  Assessment & Plan  Coumadin    Hypomagnesemia- (present on admission)  Assessment & Plan  IV Mg given   Follow level    Paroxysmal atrial flutter (HCC)- (present on admission)  Assessment & Plan  Verapamil  Coumadin    ESRD (end stage renal disease) (HCC)- (present on admission)  Assessment & Plan  HD per Nephrology    Coronary artery disease due to lipid rich plaque- (present on admission)  Assessment & Plan  Aspirin, Lipitor    Hypertension- (present on admission)  Assessment & Plan  Verapamil with parameters     Dyslipidemia- (present on admission)  Assessment & Plan  Lipitor         VTE prophylaxis:     therapeutic anticoagulation with coumadin dosing per pharmacy, adjust PRN      I have performed a physical exam and reviewed and updated ROS and Plan today (12/11/2023). In review of yesterday's note (12/10/2023), there are no changes except as documented above.

## 2023-12-11 NOTE — PROGRESS NOTES
Pt c/o increased shortness of breath, tachypneic, RR 22-24, Tachycardic , SpO2 87% on 2L/NC. Increased supplemental oxygen to 5L/min, placed pt in high fowlers position. Notified onc all hospitalist Edu who ordered 40 mg lasix IV push. Lasix administered as ordered.

## 2023-12-11 NOTE — CARE PLAN
The patient is Watcher - Medium risk of patient condition declining or worsening    Shift Goals  Clinical Goals: safety  Patient Goals: sleep  Family Goals: discharge    Progress made toward(s) clinical / shift goals:  yes    Problem: Fall Risk  Goal: Patient will remain free from falls  Description: Target End Date:  Prior to discharge or change in level of care    Document interventions on the [facility] Fall Risk Assessment    1.  Assess for fall risk factors  2.  Implement fall precautions  Outcome: Progressing     Patient is not progressing towards the following goals:      Problem: Respiratory  Goal: Patient will achieve/maintain optimum respiratory ventilation and gas exchange  Description: Target End Date:  Prior to discharge or change in level of care    Document on Assessment flowsheet    1.  Assess and monitor rate, rhythm, depth and effort of respiration  2.  Breath sounds assessed qshift and/or as needed  3.  Assess O2 saturation, administer/titrate oxygen as ordered  4.  Position patient for maximum ventilatory efficiency  5.  Turn, cough, and deep breath with splinting to improve effectiveness  6.  Collaborate with RT to administer medication/treatments per order  7.  Encourage use of incentive spirometer and encourage patient to cough after use and utilize splinting techniques if applicable  8.  Airway suctioning  9.  Monitor sputum production for changes in color, consistency and frequency  10. Perform frequent oral hygiene  11. Alternate physical activity with rest periods  Outcome: Not Progressing  Flowsheets (Taken 12/11/2023 1713)  O2 Delivery Device: Silicone Nasal Cannula

## 2023-12-11 NOTE — PROGRESS NOTES
Inpatient Anticoagulation Service Note for 12/11/2023      Reason for Anticoagulation: Atrial Fibrillation, On-X Aortic/Mitral Valve Replacement   [id number] VASc Score: 2  HAS-BLED Score: 3    Hemoglobin Value: (!) 8.8  Hematocrit Value: (!) 28.3  Lab Platelet Value: 246  Target INR: 2.0 to 3.0    INR from last 7 days       Date/Time INR Value    12/11/23 0501 2.08    12/10/23 1019 2.18    12/09/23 1214 2.14    12/08/23 0624 2.17    12/07/23 0647 1.82    12/06/23 0458 1.94    12/05/23 1506 2.06    12/05/23 0124 1.94          Dose from last 7 days       Date/Time Dose (mg)    12/11/23 1100 3    12/10/23 1144 3    12/09/23 1331 3    12/08/23 1250 3    12/07/23 1346 4    12/06/23 1441 2.5    12/05/23 1627 1.25          Average Dose (mg): 3  Significant Interactions: Antibiotics, Antiplatelet Medications  Bridge Therapy: No  Bridge Therapy Start Date: 12/01/23  Days of Overlap Therapy: 6   INR Value Greater than 2 Prior to Discontinuation of Parenteral Anticoagulation: Yes     Reversal Agent Administered: Not Applicable  A/P: INR therapeutic now for 4 days in a row, and stable off heparin bridge. Patient will remain on 3mg daily with INR check scheduled M/W/F. Please modify INR more frequently if patient's INR fluctuates more than expected on current schdule.      Education Material Provided?: No    Pharmacist suggested discharge dosing: 3mg daily with INR 2-3 days post discharge.      Janina Wong, EstefanyD

## 2023-12-11 NOTE — ASSESSMENT & PLAN NOTE
Status post chest tube on 12/14/23  Patient needed tPA  Chest tube was removed  Repeat chest x-ray shows stable pleural effusion  Continue dialysis  No signs of bacterial infection or empyema

## 2023-12-12 LAB
ANION GAP SERPL CALC-SCNC: 10 MMOL/L (ref 7–16)
ANION GAP SERPL CALC-SCNC: 13 MMOL/L (ref 7–16)
BUN SERPL-MCNC: 39 MG/DL (ref 8–22)
BUN SERPL-MCNC: 65 MG/DL (ref 8–22)
CALCIUM SERPL-MCNC: 8.3 MG/DL (ref 8.5–10.5)
CALCIUM SERPL-MCNC: 8.7 MG/DL (ref 8.5–10.5)
CHLORIDE SERPL-SCNC: 95 MMOL/L (ref 96–112)
CHLORIDE SERPL-SCNC: 97 MMOL/L (ref 96–112)
CO2 SERPL-SCNC: 26 MMOL/L (ref 20–33)
CO2 SERPL-SCNC: 29 MMOL/L (ref 20–33)
CREAT SERPL-MCNC: 11.11 MG/DL (ref 0.5–1.4)
CREAT SERPL-MCNC: 6.94 MG/DL (ref 0.5–1.4)
ERYTHROCYTE [DISTWIDTH] IN BLOOD BY AUTOMATED COUNT: 53.1 FL (ref 35.9–50)
GFR SERPLBLD CREATININE-BSD FMLA CKD-EPI: 5 ML/MIN/1.73 M 2
GFR SERPLBLD CREATININE-BSD FMLA CKD-EPI: 9 ML/MIN/1.73 M 2
GLUCOSE SERPL-MCNC: 76 MG/DL (ref 65–99)
GLUCOSE SERPL-MCNC: 99 MG/DL (ref 65–99)
HCT VFR BLD AUTO: 28.3 % (ref 42–52)
HGB BLD-MCNC: 8.6 G/DL (ref 14–18)
INR PPP: 2.4 (ref 0.87–1.13)
MCH RBC QN AUTO: 29.6 PG (ref 27–33)
MCHC RBC AUTO-ENTMCNC: 30.4 G/DL (ref 32.3–36.5)
MCV RBC AUTO: 97.3 FL (ref 81.4–97.8)
PLATELET # BLD AUTO: 242 K/UL (ref 164–446)
PMV BLD AUTO: 8.8 FL (ref 9–12.9)
POTASSIUM SERPL-SCNC: 5 MMOL/L (ref 3.6–5.5)
POTASSIUM SERPL-SCNC: 5.8 MMOL/L (ref 3.6–5.5)
PROTHROMBIN TIME: 26.5 SEC (ref 12–14.6)
RBC # BLD AUTO: 2.91 M/UL (ref 4.7–6.1)
SODIUM SERPL-SCNC: 134 MMOL/L (ref 135–145)
SODIUM SERPL-SCNC: 136 MMOL/L (ref 135–145)
UFH PPP CHRO-ACNC: 0.11 IU/ML
UFH PPP CHRO-ACNC: 0.45 IU/ML
UFH PPP CHRO-ACNC: 0.55 IU/ML
WBC # BLD AUTO: 7.2 K/UL (ref 4.8–10.8)

## 2023-12-12 PROCEDURE — 770001 HCHG ROOM/CARE - MED/SURG/GYN PRIV*

## 2023-12-12 PROCEDURE — 700102 HCHG RX REV CODE 250 W/ 637 OVERRIDE(OP): Performed by: STUDENT IN AN ORGANIZED HEALTH CARE EDUCATION/TRAINING PROGRAM

## 2023-12-12 PROCEDURE — 700111 HCHG RX REV CODE 636 W/ 250 OVERRIDE (IP): Mod: JZ | Performed by: INTERNAL MEDICINE

## 2023-12-12 PROCEDURE — 700102 HCHG RX REV CODE 250 W/ 637 OVERRIDE(OP): Performed by: FAMILY MEDICINE

## 2023-12-12 PROCEDURE — 85610 PROTHROMBIN TIME: CPT

## 2023-12-12 PROCEDURE — A9270 NON-COVERED ITEM OR SERVICE: HCPCS | Performed by: HOSPITALIST

## 2023-12-12 PROCEDURE — A9270 NON-COVERED ITEM OR SERVICE: HCPCS | Performed by: STUDENT IN AN ORGANIZED HEALTH CARE EDUCATION/TRAINING PROGRAM

## 2023-12-12 PROCEDURE — 700102 HCHG RX REV CODE 250 W/ 637 OVERRIDE(OP): Performed by: HOSPITALIST

## 2023-12-12 PROCEDURE — 700111 HCHG RX REV CODE 636 W/ 250 OVERRIDE (IP): Performed by: FAMILY MEDICINE

## 2023-12-12 PROCEDURE — 90935 HEMODIALYSIS ONE EVALUATION: CPT

## 2023-12-12 PROCEDURE — 80048 BASIC METABOLIC PNL TOTAL CA: CPT

## 2023-12-12 PROCEDURE — 36415 COLL VENOUS BLD VENIPUNCTURE: CPT

## 2023-12-12 PROCEDURE — A9270 NON-COVERED ITEM OR SERVICE: HCPCS | Performed by: FAMILY MEDICINE

## 2023-12-12 PROCEDURE — 85520 HEPARIN ASSAY: CPT

## 2023-12-12 PROCEDURE — 99232 SBSQ HOSP IP/OBS MODERATE 35: CPT | Performed by: HOSPITALIST

## 2023-12-12 RX ORDER — PHYTONADIONE 5 MG/1
5 TABLET ORAL ONCE
Status: COMPLETED | OUTPATIENT
Start: 2023-12-12 | End: 2023-12-12

## 2023-12-12 RX ORDER — BENZONATATE 100 MG/1
100 CAPSULE ORAL 3 TIMES DAILY PRN
Status: DISCONTINUED | OUTPATIENT
Start: 2023-12-12 | End: 2023-12-26 | Stop reason: HOSPADM

## 2023-12-12 RX ADMIN — VERAPAMIL HYDROCHLORIDE 120 MG: 120 TABLET ORAL at 23:38

## 2023-12-12 RX ADMIN — Medication 1 APPLICATOR: at 16:20

## 2023-12-12 RX ADMIN — VERAPAMIL HYDROCHLORIDE 120 MG: 120 TABLET ORAL at 12:07

## 2023-12-12 RX ADMIN — HEPARIN SODIUM 22 UNITS/KG/HR: 5000 INJECTION, SOLUTION INTRAVENOUS at 23:37

## 2023-12-12 RX ADMIN — Medication 1 APPLICATOR: at 06:41

## 2023-12-12 RX ADMIN — TAMSULOSIN HYDROCHLORIDE 0.4 MG: 0.4 CAPSULE ORAL at 05:07

## 2023-12-12 RX ADMIN — DOCUSATE SODIUM 100 MG: 100 CAPSULE, LIQUID FILLED ORAL at 16:20

## 2023-12-12 RX ADMIN — HEPARIN SODIUM 3100 UNITS: 1000 INJECTION, SOLUTION INTRAVENOUS; SUBCUTANEOUS at 01:21

## 2023-12-12 RX ADMIN — ASPIRIN 81 MG: 81 TABLET, COATED ORAL at 05:07

## 2023-12-12 RX ADMIN — OXYCODONE 5 MG: 5 TABLET ORAL at 23:45

## 2023-12-12 RX ADMIN — Medication 1 CAPSULE: at 07:11

## 2023-12-12 RX ADMIN — HEPARIN SODIUM 3700 UNITS: 1000 INJECTION, SOLUTION INTRAVENOUS; SUBCUTANEOUS at 11:00

## 2023-12-12 RX ADMIN — DRONABINOL 5 MG: 5 CAPSULE ORAL at 16:20

## 2023-12-12 RX ADMIN — ATORVASTATIN CALCIUM 40 MG: 40 TABLET, FILM COATED ORAL at 21:07

## 2023-12-12 RX ADMIN — EPOETIN ALFA 6000 UNITS: 3000 SOLUTION INTRAVENOUS; SUBCUTANEOUS at 10:10

## 2023-12-12 RX ADMIN — OMEPRAZOLE 20 MG: 20 CAPSULE, DELAYED RELEASE ORAL at 05:03

## 2023-12-12 RX ADMIN — PHYTONADIONE 5 MG: 5 TABLET ORAL at 16:21

## 2023-12-12 RX ADMIN — HEPARIN SODIUM 22 UNITS/KG/HR: 5000 INJECTION, SOLUTION INTRAVENOUS at 08:54

## 2023-12-12 RX ADMIN — Medication 1 APPLICATOR: at 06:40

## 2023-12-12 RX ADMIN — DRONABINOL 5 MG: 5 CAPSULE ORAL at 12:07

## 2023-12-12 ASSESSMENT — ENCOUNTER SYMPTOMS
VOMITING: 0
FLANK PAIN: 0
WEAKNESS: 1
DIAPHORESIS: 0
BACK PAIN: 0
PALPITATIONS: 0
BLURRED VISION: 0
ABDOMINAL PAIN: 0
DIZZINESS: 0
NAUSEA: 0
MYALGIAS: 1
SPUTUM PRODUCTION: 1
FOCAL WEAKNESS: 0
WHEEZING: 0
HEARTBURN: 0
SENSORY CHANGE: 0
BLOOD IN STOOL: 0
CHILLS: 0
SPEECH CHANGE: 0
FEVER: 0
SHORTNESS OF BREATH: 0
COUGH: 1
DIARRHEA: 0
NECK PAIN: 0
NERVOUS/ANXIOUS: 0
SORE THROAT: 0
HEADACHES: 0

## 2023-12-12 ASSESSMENT — PAIN DESCRIPTION - PAIN TYPE
TYPE: ACUTE PAIN
TYPE: ACUTE PAIN

## 2023-12-12 NOTE — PROGRESS NOTES
Hemodialysis done today, started @ 0913 and ended @ 1214 with net UF= 2000 ml. Report given to CODI alatorre. See PATH flow sheet for details

## 2023-12-12 NOTE — PROGRESS NOTES
Hospital Medicine Daily Progress Note    Date of Service  12/12/2023    Chief Complaint  Gurdeep Guerra is a 58 y.o. male admitted 11/27/2023 with diarrhea    Hospital Course  Admitted with symptoms of abdominal pain, nausea, vomiting and diarrhea, CT scan showed evidence of colitis, he was started on empiric coverage with IV Cefepime and Flagyl.  Patient recently underwent mechanical AVR, aortic aneurysm repair, discharged on 11/17/2023.  He also has known history of end-stage renal disease on peritoneal dialysis.  Workup showed he was positive for C. difficile colitis, he was started on oral vancomycin.  There was also concern for possible peritonitis, he was given intraperitoneal IV cefepime.  Nephrology was consulted on the case, he underwent dialysis through his temporary dialysis catheter which was placed on the previous admission.  Also with known history of paroxysmal atrial flutter, and with recent mechanical AVR, he was on Coumadin for anticoagulation.  He required to be placed on heparin drip to bridge Coumadin.    Interval Problem Update  C diff - finished course of vancomycin    Patient had dialysis today    Remains on IV heparin --->pending IR drainage/CT placement    Patient states he is coughing up yellow sputum        I have discussed this patient's plan of care and discharge plan at IDT rounds today with Case Management, Nursing, Nursing leadership, and other members of the IDT team.    Consultants/Specialty  nephrology and pulmonary    Code Status  Full Code    Disposition  The patient is not medically cleared for discharge to home or a post-acute facility.  Anticipate discharge to: home with close outpatient follow-up    I have placed the appropriate orders for post-discharge needs.    Review of Systems  Review of Systems   Constitutional:  Positive for malaise/fatigue. Negative for chills, diaphoresis and fever.   HENT:  Negative for congestion, hearing loss and sore throat.    Eyes:   Negative for blurred vision.   Respiratory:  Positive for cough and sputum production. Negative for shortness of breath and wheezing.    Cardiovascular:  Negative for chest pain, palpitations and leg swelling.   Gastrointestinal:  Negative for abdominal pain, blood in stool, diarrhea, heartburn, melena, nausea and vomiting.   Genitourinary:  Negative for dysuria, flank pain and hematuria.   Musculoskeletal:  Positive for myalgias. Negative for back pain, joint pain and neck pain.   Skin:  Negative for rash.   Neurological:  Positive for weakness. Negative for dizziness, sensory change, speech change, focal weakness and headaches.   Psychiatric/Behavioral:  The patient is not nervous/anxious.         Physical Exam  Temp:  [36.2 °C (97.2 °F)-36.7 °C (98 °F)] 36.7 °C (98 °F)  Pulse:  [] 102  Resp:  [16] 16  BP: ()/(57-82) 137/78  SpO2:  [93 %-96 %] 96 %    Physical Exam  Vitals and nursing note reviewed.   HENT:      Head: Normocephalic and atraumatic.      Nose: No congestion.      Mouth/Throat:      Mouth: Mucous membranes are moist.   Eyes:      Extraocular Movements: Extraocular movements intact.      Conjunctiva/sclera: Conjunctivae normal.   Cardiovascular:      Rate and Rhythm: Normal rate. Rhythm irregular.   Pulmonary:      Effort: No accessory muscle usage.      Breath sounds: Rhonchi present.   Abdominal:      General: There is no distension.      Tenderness: There is abdominal tenderness. There is no guarding or rebound.      Comments: PD catheter   Musculoskeletal:      Cervical back: No tenderness.      Right lower leg: No edema.      Left lower leg: No edema.   Skin:     General: Skin is warm and dry.   Neurological:      General: No focal deficit present.      Mental Status: He is alert and oriented to person, place, and time.      Cranial Nerves: No cranial nerve deficit.         Fluids    Intake/Output Summary (Last 24 hours) at 12/12/2023 1155  Last data filed at 12/12/2023 0500  Gross  per 24 hour   Intake 690 ml   Output --   Net 690 ml         Laboratory  Recent Labs     12/10/23  1019 12/11/23  0501 12/11/23  2357   WBC 8.5 8.6 7.2   RBC 3.02* 2.96* 2.91*   HEMOGLOBIN 9.2* 8.8* 8.6*   HEMATOCRIT 29.3* 28.3* 28.3*   MCV 97.0 95.6 97.3   MCH 30.5 29.7 29.6   MCHC 31.4* 31.1* 30.4*   RDW 53.1* 53.0* 53.1*   PLATELETCT 263 246 242   MPV 8.7* 8.8* 8.8*       Recent Labs     12/10/23  1019 12/11/23  0501 12/11/23  2357   SODIUM 134* 131* 134*   POTASSIUM 5.0 5.1 5.8*   CHLORIDE 96 94* 95*   CO2 27 26 26   GLUCOSE 82 87 99   BUN 43* 56* 65*   CREATININE 7.66* 8.71* 11.11*   CALCIUM 8.7 8.6 8.3*       Recent Labs     12/10/23  1019 12/11/23  0501 12/11/23  1606   APTT  --   --  42.1*   INR 2.18* 2.08* 2.33*                 Imaging  CT-CHEST (THORAX) W/O   Final Result      1.  Large loculated right pleural effusion, worse compared to the October 2023 CT study. Associated atelectasis, with subtotal collapse of the right lower lobe.   2.  Minimal groundglass opacities in the right lung, infectious/inflammatory. No consolidation.   3.  Small left pleural effusion and associated atelectasis.   4.  Mild right hilar lymphadenopathy, statistically reactive rather than neoplastic.   5.  Cardiomegaly.   6.  Abdomen is detailed separately.      CT-ABDOMEN-PELVIS W/O   Final Result      1.  No new inflammatory process in the abdomen or pelvis.   2.  Minimal fat stranding adjacent to the cecum, nonspecific.   3.  Percutaneous catheter with tip in the pelvis. Small volume of pelvic free fluid.   4.  Colonic diverticulosis.   5.  Partially visualized moderate to large right pleural effusion, which may be loculated. It has increased in size since prior CT study.   6.  Small left pleural effusion.   7.  Cardiomegaly.         [id number] VIEW   Final Result      1.  Benign bowel gas pattern.      2.  Peritoneal dialysis catheter coiled in the pelvis.      [id number] VIEW   Final Result      Nonspecific bowel gas  pattern with a moderate colonic stool burden.      DX-CHEST-PORTABLE (1 VIEW)   Final Result      1.  Enlarged cardiac silhouette with vascular congestion/edema.   2.  Lower lobe airspace disease could be due to edema, atelectasis or pneumonitis.      EC-ECHOCARDIOGRAM COMPLETE W/O CONT   Final Result      CT-ABDOMEN-PELVIS W/O   Final Result      1.  Fat stranding adjacent to the cecum, concerning for colitis/typhlitis.   2.  Colonic diverticulosis.   3.  Nonobstructing, tiny left renal stone.   4.  Likely partially loculated moderate right pleural effusion.   5.  Small left pleural effusion.   6.  Adjacent right middle lobe and bilateral lower lobe consolidations, likely atelectasis.   7.  Small pericardial effusion.      DX-CHEST-PORTABLE (1 VIEW)   Final Result      1.  Resolved or improved small left pleural effusion with persistent left basilar atelectasis and/or scarring.   2.  New small right pleural effusion with associated atelectasis and/or consolidation.      CT-CHEST TUBE-EMPYEMA RIGHT    (Results Pending)        Assessment/Plan  * C. difficile colitis- (present on admission)  Assessment & Plan  Oral vancomycin - finished course    History of mechanical aortic valve replacement- (present on admission)  Assessment & Plan  Coumadin currently on hold while we await procedure    Hypomagnesemia- (present on admission)  Assessment & Plan  Replaced  Follow level    Loculated pleural effusion- (present on admission)  Assessment & Plan  Plan for IR drainage versus CT placement    Paroxysmal atrial flutter (HCC)- (present on admission)  Assessment & Plan  Verapamil  Coumadin    ESRD (end stage renal disease) (HCC)- (present on admission)  Assessment & Plan  HD per Nephrology    Dyslipidemia- (present on admission)  Assessment & Plan  Lipitor    Coronary artery disease due to lipid rich plaque- (present on admission)  Assessment & Plan  Aspirin, Lipitor    Hypertension- (present on admission)  Assessment &  Plan  Verapamil with parameters          VTE prophylaxis:   SCDs/TEDs      I have performed a physical exam and reviewed and updated ROS and Plan today (12/12/2023). In review of yesterday's note (12/11/2023), there are no changes except as documented above.

## 2023-12-12 NOTE — DISCHARGE PLANNING
Case Management Discharge Planning    Admission Date: 11/27/2023  GMLOS: 5.4  ALOS: 15    6-Clicks ADL Score: 24  6-Clicks Mobility Score: 23      Anticipated Discharge Dispo: Discharge Disposition: Discharged to home/self care (01)  Discharge Address: Home ([address] [place] [address])  Discharge Contact Phone Number: S/O Donna [phone]    DME Needed: No    Action(s) Taken: Updated Provider/Nurse on Discharge Plan    Escalations Completed: None    Medically Clear: No    Next Steps: waiting on possible chest tube placement.    Barriers to Discharge: Medical clearance and Dialysis    Is the patient up for discharge tomorrow: No      Not medically cleared yet for discharge.

## 2023-12-12 NOTE — DISCHARGE PLANNING
HTH/SCP TCN chart review completed. Collaborated with JOSE Roberto. Current discharge considerations are home with Sonam RICH. Noted patient ambulating 2 x 20 feet with no AD or assistance. TCN will continue to follow and collaborate with discharge planning team as additional post acute needs arise. Thank you.    Completed:  Choice obtained: HILARIO (Resumption of Sonam RICH) on 11/28/23.    SCP with [facility] PCP.  Patient request to set up his own Follow up PCP appointment.

## 2023-12-12 NOTE — CARE PLAN
The patient is Watcher - Medium risk of patient condition declining or worsening    Shift Goals  Clinical Goals: Maintain oxygen saturation > 90% throughout the shift  Patient Goals: Rest, Sleep  Family Goals: FARIDA    Progress made toward(s) clinical / shift goals:  Maintained on oxygen 3.5 lpm, exertional dyspnea complained, visible work of breathing and snoring noted. No desaturation observed. On Heparin drip with ant-Xa moniroting, no signs of bleeding observed. NPO post midnight for IR procedure, compliant and verbalized understanding.    Patient is not progressing towards the following goals:    Problem: Respiratory  Goal: Patient will achieve/maintain optimum respiratory ventilation and gas exchange  Description: Target End Date:  Prior to discharge or change in level of care    Document on Assessment flowsheet    1.  Assess and monitor rate, rhythm, depth and effort of respiration  2.  Breath sounds assessed qshift and/or as needed  3.  Assess O2 saturation, administer/titrate oxygen as ordered  4.  Turn, cough, and deep breath with splinting to improve effectiveness  5.  Collaborate with RT to administer medication/treatments per order  6  Encourage use of incentive spirometer and encourage patient to cough after use and utilize splinting techniques if applicable  7.  Monitor sputum production for changes in color, consistency and frequency  8. Perform frequent oral hygiene  10. Alternate physical activity with rest periods  Outcome: Not Progressing     Problem: Discharge Barriers/Planning  Goal: Patient's continuum of care needs are met  Description: Target End Date:  Prior to discharge or change in level of care    1.  Identify potential discharge barriers on admission and throughout hospitalization  2.  Collaborate with Case Management, , Clinical Educators, Navigators and others on the transitional care team to meet discharge needs  3.  Involve patient/family/caregivers in setting and  prioritizing goals for hospitalization and discharge  4.  Ensure Flu vaccinations are addressed  5.  Inquire if patient is interested in the Meds to Bed program  6.  Ensure patient and family/caregiver are able to demonstrate use of equipment as prescribed  7.  Ensure patient and family/caregiver can verbalize understanding of patient education  8.  Explain discharge instructions and medication reconciliation to patient and family/caregiver  Outcome: Not Progressing

## 2023-12-12 NOTE — CARE PLAN
The patient is Stable - Low risk of patient condition declining or worsening    Shift Goals  Clinical Goals: Maintain oxygen saturation > 90% throughout the shift  Patient Goals: Rest, Sleep  Family Goals: FARIDA    Progress made toward(s) clinical / shift goals:    Problem: Pain - Standard  Goal: Alleviation of pain or a reduction in pain to the patient’s comfort goal  Outcome: Progressing     Problem: Knowledge Deficit - Standard  Goal: Patient and family/care givers will demonstrate understanding of plan of care, disease process/condition, diagnostic tests and medications  Outcome: Progressing       Patient is not progressing towards the following goals:

## 2023-12-13 PROBLEM — Z98.890 S/P ASCENDING AORTIC ANEURYSM REPAIR: Status: ACTIVE | Noted: 2023-12-13

## 2023-12-13 PROBLEM — Z86.79 S/P ASCENDING AORTIC ANEURYSM REPAIR: Status: ACTIVE | Noted: 2023-12-13

## 2023-12-13 LAB
ANION GAP SERPL CALC-SCNC: 13 MMOL/L (ref 7–16)
BUN SERPL-MCNC: 48 MG/DL (ref 8–22)
CALCIUM SERPL-MCNC: 8.2 MG/DL (ref 8.5–10.5)
CHLORIDE SERPL-SCNC: 99 MMOL/L (ref 96–112)
CO2 SERPL-SCNC: 24 MMOL/L (ref 20–33)
CREAT SERPL-MCNC: 8 MG/DL (ref 0.5–1.4)
ERYTHROCYTE [DISTWIDTH] IN BLOOD BY AUTOMATED COUNT: 52.1 FL (ref 35.9–50)
GFR SERPLBLD CREATININE-BSD FMLA CKD-EPI: 7 ML/MIN/1.73 M 2
GLUCOSE SERPL-MCNC: 80 MG/DL (ref 65–99)
HCT VFR BLD AUTO: 28.5 % (ref 42–52)
HGB BLD-MCNC: 9 G/DL (ref 14–18)
INR PPP: 2.21 (ref 0.87–1.13)
MCH RBC QN AUTO: 30.4 PG (ref 27–33)
MCHC RBC AUTO-ENTMCNC: 31.6 G/DL (ref 32.3–36.5)
MCV RBC AUTO: 96.3 FL (ref 81.4–97.8)
PLATELET # BLD AUTO: 262 K/UL (ref 164–446)
PMV BLD AUTO: 8.8 FL (ref 9–12.9)
POTASSIUM SERPL-SCNC: 5.1 MMOL/L (ref 3.6–5.5)
PROTHROMBIN TIME: 24.9 SEC (ref 12–14.6)
RBC # BLD AUTO: 2.96 M/UL (ref 4.7–6.1)
SODIUM SERPL-SCNC: 136 MMOL/L (ref 135–145)
UFH PPP CHRO-ACNC: 0.52 IU/ML
WBC # BLD AUTO: 6.5 K/UL (ref 4.8–10.8)

## 2023-12-13 PROCEDURE — 99232 SBSQ HOSP IP/OBS MODERATE 35: CPT | Performed by: HOSPITALIST

## 2023-12-13 PROCEDURE — 700102 HCHG RX REV CODE 250 W/ 637 OVERRIDE(OP): Performed by: STUDENT IN AN ORGANIZED HEALTH CARE EDUCATION/TRAINING PROGRAM

## 2023-12-13 PROCEDURE — 700102 HCHG RX REV CODE 250 W/ 637 OVERRIDE(OP): Performed by: FAMILY MEDICINE

## 2023-12-13 PROCEDURE — 36415 COLL VENOUS BLD VENIPUNCTURE: CPT

## 2023-12-13 PROCEDURE — A9270 NON-COVERED ITEM OR SERVICE: HCPCS | Performed by: STUDENT IN AN ORGANIZED HEALTH CARE EDUCATION/TRAINING PROGRAM

## 2023-12-13 PROCEDURE — 700102 HCHG RX REV CODE 250 W/ 637 OVERRIDE(OP): Performed by: HOSPITALIST

## 2023-12-13 PROCEDURE — A9270 NON-COVERED ITEM OR SERVICE: HCPCS | Performed by: FAMILY MEDICINE

## 2023-12-13 PROCEDURE — 80048 BASIC METABOLIC PNL TOTAL CA: CPT

## 2023-12-13 PROCEDURE — 85520 HEPARIN ASSAY: CPT

## 2023-12-13 PROCEDURE — 700111 HCHG RX REV CODE 636 W/ 250 OVERRIDE (IP): Mod: JZ | Performed by: INTERNAL MEDICINE

## 2023-12-13 PROCEDURE — A9270 NON-COVERED ITEM OR SERVICE: HCPCS | Performed by: INTERNAL MEDICINE

## 2023-12-13 PROCEDURE — 85610 PROTHROMBIN TIME: CPT

## 2023-12-13 PROCEDURE — 700102 HCHG RX REV CODE 250 W/ 637 OVERRIDE(OP): Performed by: INTERNAL MEDICINE

## 2023-12-13 PROCEDURE — 770001 HCHG ROOM/CARE - MED/SURG/GYN PRIV*

## 2023-12-13 PROCEDURE — 700111 HCHG RX REV CODE 636 W/ 250 OVERRIDE (IP): Performed by: FAMILY MEDICINE

## 2023-12-13 PROCEDURE — 85027 COMPLETE CBC AUTOMATED: CPT

## 2023-12-13 PROCEDURE — A9270 NON-COVERED ITEM OR SERVICE: HCPCS | Performed by: HOSPITALIST

## 2023-12-13 RX ORDER — PHYTONADIONE 5 MG/1
5 TABLET ORAL ONCE
Status: COMPLETED | OUTPATIENT
Start: 2023-12-13 | End: 2023-12-13

## 2023-12-13 RX ADMIN — Medication 1 APPLICATOR: at 18:14

## 2023-12-13 RX ADMIN — PHYTONADIONE 5 MG: 5 TABLET ORAL at 08:56

## 2023-12-13 RX ADMIN — OMEPRAZOLE 20 MG: 20 CAPSULE, DELAYED RELEASE ORAL at 04:58

## 2023-12-13 RX ADMIN — ATORVASTATIN CALCIUM 40 MG: 40 TABLET, FILM COATED ORAL at 21:09

## 2023-12-13 RX ADMIN — ONDANSETRON 4 MG: 2 INJECTION INTRAMUSCULAR; INTRAVENOUS at 04:57

## 2023-12-13 RX ADMIN — DRONABINOL 5 MG: 5 CAPSULE ORAL at 11:29

## 2023-12-13 RX ADMIN — DRONABINOL 5 MG: 5 CAPSULE ORAL at 18:02

## 2023-12-13 RX ADMIN — Medication 1 APPLICATOR: at 05:33

## 2023-12-13 RX ADMIN — TAMSULOSIN HYDROCHLORIDE 0.4 MG: 0.4 CAPSULE ORAL at 04:58

## 2023-12-13 RX ADMIN — VERAPAMIL HYDROCHLORIDE 120 MG: 120 TABLET ORAL at 11:34

## 2023-12-13 RX ADMIN — HEPARIN SODIUM 22 UNITS/KG/HR: 5000 INJECTION, SOLUTION INTRAVENOUS at 14:11

## 2023-12-13 RX ADMIN — VERAPAMIL HYDROCHLORIDE 120 MG: 120 TABLET ORAL at 18:02

## 2023-12-13 RX ADMIN — ASPIRIN 81 MG: 81 TABLET, COATED ORAL at 04:58

## 2023-12-13 ASSESSMENT — ENCOUNTER SYMPTOMS
FEVER: 0
SPUTUM PRODUCTION: 1
SHORTNESS OF BREATH: 1
ABDOMINAL PAIN: 0
DIZZINESS: 0
FOCAL WEAKNESS: 0
NECK PAIN: 0
HEADACHES: 0
DIARRHEA: 0
SENSORY CHANGE: 0
DIAPHORESIS: 0
BLURRED VISION: 0
HEARTBURN: 0
NAUSEA: 0
CHILLS: 0
BACK PAIN: 0
NERVOUS/ANXIOUS: 0
SORE THROAT: 0
BLOOD IN STOOL: 0
FLANK PAIN: 0
PALPITATIONS: 0
WHEEZING: 0
VOMITING: 0
WEAKNESS: 1
MYALGIAS: 1
COUGH: 1
SPEECH CHANGE: 0

## 2023-12-13 ASSESSMENT — PAIN DESCRIPTION - PAIN TYPE: TYPE: ACUTE PAIN

## 2023-12-13 NOTE — PROGRESS NOTES
Hospital Medicine Daily Progress Note    Date of Service  12/13/2023    Chief Complaint  Gurdeep Guerra is a 58 y.o. male admitted 11/27/2023 with diarrhea    Hospital Course  Admitted with symptoms of abdominal pain, nausea, vomiting and diarrhea, CT scan showed evidence of colitis, he was started on empiric coverage with IV Cefepime and Flagyl.  Patient recently underwent mechanical AVR, aortic aneurysm repair, discharged on 11/17/2023.  He also has known history of end-stage renal disease on peritoneal dialysis.  Workup showed he was positive for C. difficile colitis, he was started on oral vancomycin.  There was also concern for possible peritonitis, he was given intraperitoneal IV cefepime.  Nephrology was consulted on the case, he underwent dialysis through his temporary dialysis catheter which was placed on the previous admission.  Also with known history of paroxysmal atrial flutter, and with recent mechanical AVR, he was on Coumadin for anticoagulation.  He required to be placed on heparin drip to bridge Coumadin.    Interval Problem Update  C diff - finished course of vancomycin    Patient had dialysis yesterday    Given vitamin K yesterday as well as vitamin K today.  INR slowly drifting down.  Will keep the patient n.p.o. for possible procedure tomorrow( 12/14)    Remains on IV heparin --->pending IR drainage/CT placement      Patient still complaining of shortness of breath intermittently        I have discussed this patient's plan of care and discharge plan at IDT rounds today with Case Management, Nursing, Nursing leadership, and other members of the IDT team.    Consultants/Specialty  nephrology and pulmonary    Code Status  Full Code    Disposition  The patient is not medically cleared for discharge to home or a post-acute facility.  Anticipate discharge to: home with close outpatient follow-up    I have placed the appropriate orders for post-discharge needs.    Review of Systems  Review of  Systems   Constitutional:  Positive for malaise/fatigue. Negative for chills, diaphoresis and fever.   HENT:  Negative for congestion, hearing loss and sore throat.    Eyes:  Negative for blurred vision.   Respiratory:  Positive for cough, sputum production and shortness of breath. Negative for wheezing.    Cardiovascular:  Negative for chest pain, palpitations and leg swelling.   Gastrointestinal:  Negative for abdominal pain, blood in stool, diarrhea, heartburn, melena, nausea and vomiting.   Genitourinary:  Negative for dysuria, flank pain and hematuria.   Musculoskeletal:  Positive for myalgias. Negative for back pain, joint pain and neck pain.   Skin:  Negative for rash.   Neurological:  Positive for weakness. Negative for dizziness, sensory change, speech change, focal weakness and headaches.   Psychiatric/Behavioral:  The patient is not nervous/anxious.         Physical Exam  Temp:  [36.1 °C (97 °F)-36.8 °C (98.2 °F)] 36.1 °C (97 °F)  Pulse:  [76-91] 76  Resp:  [16-18] 18  BP: (100-117)/(62-75) 117/62  SpO2:  [94 %-96 %] 95 %    Physical Exam  Vitals and nursing note reviewed.   HENT:      Head: Normocephalic and atraumatic.      Nose: No congestion.      Mouth/Throat:      Mouth: Mucous membranes are moist.   Eyes:      Extraocular Movements: Extraocular movements intact.      Conjunctiva/sclera: Conjunctivae normal.   Cardiovascular:      Rate and Rhythm: Normal rate. Rhythm irregular.   Pulmonary:      Effort: No accessory muscle usage.      Breath sounds: Rhonchi present.   Abdominal:      General: There is no distension.      Tenderness: There is abdominal tenderness. There is no guarding or rebound.      Comments: PD catheter   Musculoskeletal:      Cervical back: No tenderness.      Right lower leg: No edema.      Left lower leg: No edema.   Skin:     General: Skin is warm and dry.   Neurological:      General: No focal deficit present.      Mental Status: He is alert and oriented to person, place, and  time.      Cranial Nerves: No cranial nerve deficit.         Fluids    Intake/Output Summary (Last 24 hours) at 12/13/2023 1256  Last data filed at 12/12/2023 1603  Gross per 24 hour   Intake 240 ml   Output --   Net 240 ml         Laboratory  Recent Labs     12/11/23  0501 12/11/23  2357 12/13/23  0137   WBC 8.6 7.2 6.5   RBC 2.96* 2.91* 2.96*   HEMOGLOBIN 8.8* 8.6* 9.0*   HEMATOCRIT 28.3* 28.3* 28.5*   MCV 95.6 97.3 96.3   MCH 29.7 29.6 30.4   MCHC 31.1* 30.4* 31.6*   RDW 53.0* 53.1* 52.1*   PLATELETCT 246 242 262   MPV 8.8* 8.8* 8.8*       Recent Labs     12/11/23  2357 12/12/23  1328 12/13/23  0137   SODIUM 134* 136 136   POTASSIUM 5.8* 5.0 5.1   CHLORIDE 95* 97 99   CO2 26 29 24   GLUCOSE 99 76 80   BUN 65* 39* 48*   CREATININE 11.11* 6.94* 8.00*   CALCIUM 8.3* 8.7 8.2*       Recent Labs     12/11/23  1606 12/12/23  1328 12/13/23  0137   APTT 42.1*  --   --    INR 2.33* 2.40* 2.21*                 Imaging  CT-CHEST (THORAX) W/O   Final Result      1.  Large loculated right pleural effusion, worse compared to the October 2023 CT study. Associated atelectasis, with subtotal collapse of the right lower lobe.   2.  Minimal groundglass opacities in the right lung, infectious/inflammatory. No consolidation.   3.  Small left pleural effusion and associated atelectasis.   4.  Mild right hilar lymphadenopathy, statistically reactive rather than neoplastic.   5.  Cardiomegaly.   6.  Abdomen is detailed separately.      CT-ABDOMEN-PELVIS W/O   Final Result      1.  No new inflammatory process in the abdomen or pelvis.   2.  Minimal fat stranding adjacent to the cecum, nonspecific.   3.  Percutaneous catheter with tip in the pelvis. Small volume of pelvic free fluid.   4.  Colonic diverticulosis.   5.  Partially visualized moderate to large right pleural effusion, which may be loculated. It has increased in size since prior CT study.   6.  Small left pleural effusion.   7.  Cardiomegaly.         TG-[id number]-5 VIEW   Final  Result      1.  Benign bowel gas pattern.      2.  Peritoneal dialysis catheter coiled in the pelvis.      VE-ZHRYALE-5 VIEW   Final Result      Nonspecific bowel gas pattern with a moderate colonic stool burden.      DX-CHEST-PORTABLE (1 VIEW)   Final Result      1.  Enlarged cardiac silhouette with vascular congestion/edema.   2.  Lower lobe airspace disease could be due to edema, atelectasis or pneumonitis.      EC-ECHOCARDIOGRAM COMPLETE W/O CONT   Final Result      CT-ABDOMEN-PELVIS W/O   Final Result      1.  Fat stranding adjacent to the cecum, concerning for colitis/typhlitis.   2.  Colonic diverticulosis.   3.  Nonobstructing, tiny left renal stone.   4.  Likely partially loculated moderate right pleural effusion.   5.  Small left pleural effusion.   6.  Adjacent right middle lobe and bilateral lower lobe consolidations, likely atelectasis.   7.  Small pericardial effusion.      DX-CHEST-PORTABLE (1 VIEW)   Final Result      1.  Resolved or improved small left pleural effusion with persistent left basilar atelectasis and/or scarring.   2.  New small right pleural effusion with associated atelectasis and/or consolidation.      CT-CHEST TUBE-EMPYEMA RIGHT    (Results Pending)        Assessment/Plan  * C. difficile colitis- (present on admission)  Assessment & Plan  Oral vancomycin - finished course    History of mechanical aortic valve replacement- (present on admission)  Assessment & Plan  Coumadin currently on hold while we await procedure    Hypomagnesemia- (present on admission)  Assessment & Plan  Replaced  Follow level    Loculated pleural effusion- (present on admission)  Assessment & Plan  Plan for IR drainage versus CT placement    Paroxysmal atrial flutter (HCC)- (present on admission)  Assessment & Plan  Verapamil  Coumadin    ESRD (end stage renal disease) (HCC)- (present on admission)  Assessment & Plan  HD per Nephrology    Dyslipidemia- (present on admission)  Assessment &  Plan  Lipitor    Coronary artery disease due to lipid rich plaque- (present on admission)  Assessment & Plan  Aspirin, Lipitor    Hypertension- (present on admission)  Assessment & Plan  Verapamil with parameters          VTE prophylaxis:   SCDs/TEDs      I have performed a physical exam and reviewed and updated ROS and Plan today (12/13/2023). In review of yesterday's note (12/12/2023), there are no changes except as documented above.

## 2023-12-13 NOTE — PROGRESS NOTES
[facility] -  PROGRESS NOTE               Author: Zac Daily D.O. Date & Time: 12/13/2023  2:41 PM     HPI:  58 y.o. 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 Abd imaging demonstrated colitis. C. Diff pending and started on abx. He notes abd pain and diarrhea for several weeks. Edema improving with 2.5% dextrose solution. Still has HD permacath in-place. Pain with peritoneal dialysis but no cloudy effluent of fibrin clots. No f/c/s. Normally does [id number] fills all green.     DAILY NEPHROLOGY SUMMARY:  11/28: consult done  11/29: ongoing abd pain, seen on HD-tolerating procedure well, PD fluid concerning for peritonitis  11/30:c.diff positive, started on oral vanc, new onset aflutter-transferred to [facility], wbc improving, Peritoneal cultures remain negative, abd pain improving  12/1: PD culture remains negative, seen on HD-tolerating procedure, abd pain improving, less diarrhea    12/2: HD yesterday 2.5 liter UF, still with SOB decreased abdoimnal pain  12/3: PD last night 1.5 UF, pt still with SOB  12/4: No events, tolerated HD yest with 2.5L UF, BP stable, tachycardic this am, stable on RA, reports SOB has resolved, still with crampy abd discomfort, reports diarrhea is starting to become more formed  12/5: No events, no new labs, BP stable, tachycardic, stable on RA this am, still with crampy abd pain, diarrhea improving, still with nausea/anorexia, denies any CP/LE edema, reports mild SOB  12/6: No events, tolerated HD yest with 1L UF, BP stable, remains tachycardic, reports nausea is improved and having more firm stools, denies any CP/SOB, reports poor appetite  12/7: No events, BP stable, tolerated HD this am  "with 1L UF, still doesn't feel well and feels very depressed and frustrated, +abd pain and n/v this am and unable to take PO  12/8: patient is doing well, resting in bed. Plan next iHD tomorrow  12/09: patient is seen during HD, refused labs this am. Does report some abdominal discomfort this am  12/10: patient had iHD yesterday, UF 2000 ml. Abdominal issues ongoing  12/11: pt had CT thorax done yesterday, showing loculated effusion. Pt feels SOB. Diarrhea continues but is better overall.   12/12: pt reports no more diarrhea, only 2-3 loose stools. Seen on HD and tolerating. Pulm recommending chest tube for loculated effusion.   12/13: pt awaiting improvement in INR before chest tube placement. Pt frustrated at prolonged hospitalization.    REVIEW OF SYSTEMS:    10 point ROS reviewed and is as per HPI/daily summary or otherwise negative    PMH/PSH/SH/FH:   Reviewed and unchanged since admission note    CURRENT MEDICATIONS:   Reviewed from admission to present day    VS:  /62   Pulse 76   Temp 36.1 °C (97 °F) (Temporal)   Resp 18   Ht 1.702 m (5' 7\")   Wt 78.2 kg (172 lb 6.4 oz)   SpO2 95%   BMI 27.00 kg/m²     Physical Exam  Vitals and nursing note reviewed.   Constitutional:       General: He is not in acute distress.     Appearance: Normal appearance.   HENT:      Head: Normocephalic and atraumatic.   Eyes:      General: No scleral icterus.     Extraocular Movements: Extraocular movements intact.   Cardiovascular:      Rate and Rhythm: Normal rate and regular rhythm.      Comments: +R chest PC  Pulmonary:      Effort: Pulmonary effort is normal.   Abdominal:      General: Bowel sounds are normal. There is distension.      Palpations: Abdomen is soft.      Tenderness: There is no abdominal tenderness.   Musculoskeletal:         General: No deformity.      Right lower leg: No edema.      Left lower leg: No edema.   Skin:     General: Skin is warm and dry.      Findings: No rash.   Neurological:      " General: No focal deficit present.      Mental Status: He is alert and oriented to person, place, and time.   Psychiatric:         Mood and Affect: Mood normal.         Behavior: Behavior normal. Behavior is cooperative.         Fluids:  In: 980 [P.O.:480; Dialysis:500]  Out: 2500     LABS:  Recent Labs     12/11/23  2357 12/12/23  1328 12/13/23  0137   SODIUM 134* 136 136   POTASSIUM 5.8* 5.0 5.1   CHLORIDE 95* 97 99   CO2 26 29 24   GLUCOSE 99 76 80   BUN 65* 39* 48*   CREATININE 11.11* 6.94* 8.00*   CALCIUM 8.3* 8.7 8.2*         IMAGING:   All imaging reviewed from admission to present day    IMPRESSION:  # ESRD on outpt PD   -Etiology 2/2 FSGS  - s/p permcath on 11/10, and on HD currently  # R/O PD cath associated peritonitis  - CX negative  # c. Diff.  -symptoms improved with initiation of oral vanc  -PD guidelines vague on scenarios such as this,  but pt on HD currently  # S/P AVR/ Ascending aneurysm repair  # CKD-MBD  -phos elevated  # Anemia of CKD, below goal hgb 10-11     Resumed EPO 6000U IV w HD on 12/12  # BL pleural effusions/congestion     Large R loculated effusion on CT thorax 12/11     Pulm recommending chest tube placement  # Pericardial effusion   # C. Diff colitis  -C.diff positive  # C.diff  # Aflutter     PLAN:    - await chest tube placement  - cont TTS HD schedule, will remove UF with HD as tolerated  - cont EPO 6000U IV w HD  - Hold PD for now, will resume once abd pain/discomfort improves, can re-eval as outpt  - cont c diff tx per primary team  - Renal diet, Encourage protein intake given low albumin  - Phos binder with meals   - Dose all medications as per ESRD    **Pt will need outpt HD chair at [facility] for the next 30 days until abd pain improves enough that he can resume PD

## 2023-12-13 NOTE — DIETARY
Nutrition Update:    Day 15 of admit.  Gurdeep Guerra is a 58 y.o. male with admitting DX of Typhlitis [K52.9].  Patient being followed to optimize nutrition.    Current Diet: NPO    Pt currently NPO for procedure (IR chest tube placement). Prior to NPO, pt's PO was improving with intake of 25-50% at breakfast and lunch on 12/11. Pt ate 50-75% of his dinner on 12/11.     Problem: Nutritional:  Goal: Achieve adequate nutritional intake  Description: Patient will consume >50-75% of meals  Outcome: prior to NPO for procedure, PO was progressing     RD following.

## 2023-12-13 NOTE — CARE PLAN
0200- Patient had complains of shortness of breath. SPO2 at 86% on oxygen 4 l. Patient advised to take deep breaths, oxygen Increased to 5 liters. SPO2 now above 92%.     The patient is Stable - Low risk of patient condition declining or worsening    Shift Goals  Clinical Goals: Maintain oxygen saturation > 90% throughout the shift  Patient Goals: Rest, Sleep  Family Goals: FARIDA    Progress made toward(s) clinical / shift goals:  Patient is alert and oriented. All needs met at this point. Heparin drip infusing. On oxygen 5 liters via nasal canula. Call light within reach.    Patient is not progressing towards the following goals:

## 2023-12-14 ENCOUNTER — APPOINTMENT (OUTPATIENT)
Dept: RADIOLOGY | Facility: MEDICAL CENTER | Age: 58
DRG: 981 | End: 2023-12-14
Attending: RADIOLOGY
Payer: COMMERCIAL

## 2023-12-14 ENCOUNTER — APPOINTMENT (OUTPATIENT)
Dept: RADIOLOGY | Facility: MEDICAL CENTER | Age: 58
DRG: 981 | End: 2023-12-14
Attending: FAMILY MEDICINE
Payer: COMMERCIAL

## 2023-12-14 LAB
ANION GAP SERPL CALC-SCNC: 13 MMOL/L (ref 7–16)
BUN SERPL-MCNC: 60 MG/DL (ref 8–22)
CALCIUM SERPL-MCNC: 8.2 MG/DL (ref 8.5–10.5)
CHLORIDE SERPL-SCNC: 98 MMOL/L (ref 96–112)
CO2 SERPL-SCNC: 24 MMOL/L (ref 20–33)
CREAT SERPL-MCNC: 9.7 MG/DL (ref 0.5–1.4)
ERYTHROCYTE [DISTWIDTH] IN BLOOD BY AUTOMATED COUNT: 52.3 FL (ref 35.9–50)
GFR SERPLBLD CREATININE-BSD FMLA CKD-EPI: 6 ML/MIN/1.73 M 2
GLUCOSE SERPL-MCNC: 98 MG/DL (ref 65–99)
GRAM STN SPEC: NORMAL
HCT VFR BLD AUTO: 26.7 % (ref 42–52)
HGB BLD-MCNC: 8.4 G/DL (ref 14–18)
INR PPP: 1.55 (ref 0.87–1.13)
MCH RBC QN AUTO: 29.8 PG (ref 27–33)
MCHC RBC AUTO-ENTMCNC: 31.5 G/DL (ref 32.3–36.5)
MCV RBC AUTO: 94.7 FL (ref 81.4–97.8)
PLATELET # BLD AUTO: 245 K/UL (ref 164–446)
PMV BLD AUTO: 8.5 FL (ref 9–12.9)
POTASSIUM SERPL-SCNC: 4.9 MMOL/L (ref 3.6–5.5)
PROTHROMBIN TIME: 18.8 SEC (ref 12–14.6)
RBC # BLD AUTO: 2.82 M/UL (ref 4.7–6.1)
SIGNIFICANT IND 70042: NORMAL
SITE SITE: NORMAL
SODIUM SERPL-SCNC: 135 MMOL/L (ref 135–145)
SOURCE SOURCE: NORMAL
UFH PPP CHRO-ACNC: 0.41 IU/ML
WBC # BLD AUTO: 6 K/UL (ref 4.8–10.8)

## 2023-12-14 PROCEDURE — A9270 NON-COVERED ITEM OR SERVICE: HCPCS | Performed by: STUDENT IN AN ORGANIZED HEALTH CARE EDUCATION/TRAINING PROGRAM

## 2023-12-14 PROCEDURE — 700111 HCHG RX REV CODE 636 W/ 250 OVERRIDE (IP)

## 2023-12-14 PROCEDURE — 32557 INSERT CATH PLEURA W/ IMAGE: CPT | Mod: RT

## 2023-12-14 PROCEDURE — A9270 NON-COVERED ITEM OR SERVICE: HCPCS | Performed by: FAMILY MEDICINE

## 2023-12-14 PROCEDURE — 36415 COLL VENOUS BLD VENIPUNCTURE: CPT

## 2023-12-14 PROCEDURE — 87070 CULTURE OTHR SPECIMN AEROBIC: CPT

## 2023-12-14 PROCEDURE — 700102 HCHG RX REV CODE 250 W/ 637 OVERRIDE(OP): Performed by: STUDENT IN AN ORGANIZED HEALTH CARE EDUCATION/TRAINING PROGRAM

## 2023-12-14 PROCEDURE — 700111 HCHG RX REV CODE 636 W/ 250 OVERRIDE (IP): Mod: JZ | Performed by: INTERNAL MEDICINE

## 2023-12-14 PROCEDURE — 87102 FUNGUS ISOLATION CULTURE: CPT

## 2023-12-14 PROCEDURE — 90935 HEMODIALYSIS ONE EVALUATION: CPT

## 2023-12-14 PROCEDURE — 302220 CHEST TUBE TRAY: Performed by: HOSPITALIST

## 2023-12-14 PROCEDURE — 87116 MYCOBACTERIA CULTURE: CPT

## 2023-12-14 PROCEDURE — 700111 HCHG RX REV CODE 636 W/ 250 OVERRIDE (IP): Performed by: FAMILY MEDICINE

## 2023-12-14 PROCEDURE — 87205 SMEAR GRAM STAIN: CPT | Mod: 91

## 2023-12-14 PROCEDURE — 770001 HCHG ROOM/CARE - MED/SURG/GYN PRIV*

## 2023-12-14 PROCEDURE — 80048 BASIC METABOLIC PNL TOTAL CA: CPT

## 2023-12-14 PROCEDURE — 700101 HCHG RX REV CODE 250: Mod: JZ | Performed by: INTERNAL MEDICINE

## 2023-12-14 PROCEDURE — 0W9930Z DRAINAGE OF RIGHT PLEURAL CAVITY WITH DRAINAGE DEVICE, PERCUTANEOUS APPROACH: ICD-10-PCS | Performed by: RADIOLOGY

## 2023-12-14 PROCEDURE — 700102 HCHG RX REV CODE 250 W/ 637 OVERRIDE(OP): Performed by: FAMILY MEDICINE

## 2023-12-14 PROCEDURE — 87015 SPECIMEN INFECT AGNT CONCNTJ: CPT

## 2023-12-14 PROCEDURE — 88112 CYTOPATH CELL ENHANCE TECH: CPT

## 2023-12-14 PROCEDURE — 32561 LYSE CHEST FIBRIN INIT DAY: CPT | Performed by: INTERNAL MEDICINE

## 2023-12-14 PROCEDURE — 99232 SBSQ HOSP IP/OBS MODERATE 35: CPT | Performed by: HOSPITALIST

## 2023-12-14 PROCEDURE — 71045 X-RAY EXAM CHEST 1 VIEW: CPT

## 2023-12-14 PROCEDURE — 700105 HCHG RX REV CODE 258: Performed by: INTERNAL MEDICINE

## 2023-12-14 PROCEDURE — 85520 HEPARIN ASSAY: CPT

## 2023-12-14 PROCEDURE — 87206 SMEAR FLUORESCENT/ACID STAI: CPT

## 2023-12-14 PROCEDURE — 88305 TISSUE EXAM BY PATHOLOGIST: CPT

## 2023-12-14 PROCEDURE — 89051 BODY FLUID CELL COUNT: CPT

## 2023-12-14 PROCEDURE — 99232 SBSQ HOSP IP/OBS MODERATE 35: CPT | Mod: 25 | Performed by: INTERNAL MEDICINE

## 2023-12-14 PROCEDURE — 85027 COMPLETE CBC AUTOMATED: CPT

## 2023-12-14 PROCEDURE — 85610 PROTHROMBIN TIME: CPT

## 2023-12-14 PROCEDURE — 82945 GLUCOSE OTHER FLUID: CPT

## 2023-12-14 RX ORDER — MIDAZOLAM HYDROCHLORIDE 1 MG/ML
INJECTION INTRAMUSCULAR; INTRAVENOUS
Status: COMPLETED
Start: 2023-12-14 | End: 2023-12-14

## 2023-12-14 RX ORDER — LIDOCAINE HYDROCHLORIDE 10 MG/ML
INJECTION, SOLUTION INFILTRATION; PERINEURAL
Status: DISPENSED
Start: 2023-12-14 | End: 2023-12-14

## 2023-12-14 RX ORDER — MIDAZOLAM HYDROCHLORIDE 1 MG/ML
.5-2 INJECTION INTRAMUSCULAR; INTRAVENOUS PRN
Status: ACTIVE | OUTPATIENT
Start: 2023-12-14 | End: 2023-12-14

## 2023-12-14 RX ORDER — SODIUM CHLORIDE 9 MG/ML
500 INJECTION, SOLUTION INTRAVENOUS
Status: ACTIVE | OUTPATIENT
Start: 2023-12-14 | End: 2023-12-14

## 2023-12-14 RX ORDER — ONDANSETRON 2 MG/ML
4 INJECTION INTRAMUSCULAR; INTRAVENOUS EVERY 6 HOURS PRN
Status: ACTIVE | OUTPATIENT
Start: 2023-12-14 | End: 2023-12-14

## 2023-12-14 RX ADMIN — HEPARIN SODIUM 3700 UNITS: 1000 INJECTION, SOLUTION INTRAVENOUS; SUBCUTANEOUS at 08:45

## 2023-12-14 RX ADMIN — MIDAZOLAM HYDROCHLORIDE 1 MG: 1 INJECTION, SOLUTION INTRAMUSCULAR; INTRAVENOUS at 10:41

## 2023-12-14 RX ADMIN — ASPIRIN 81 MG: 81 TABLET, COATED ORAL at 05:27

## 2023-12-14 RX ADMIN — FENTANYL CITRATE 50 MCG: 50 INJECTION, SOLUTION INTRAMUSCULAR; INTRAVENOUS at 10:41

## 2023-12-14 RX ADMIN — VERAPAMIL HYDROCHLORIDE 120 MG: 120 TABLET ORAL at 17:39

## 2023-12-14 RX ADMIN — ATORVASTATIN CALCIUM 40 MG: 40 TABLET, FILM COATED ORAL at 20:56

## 2023-12-14 RX ADMIN — VERAPAMIL HYDROCHLORIDE 120 MG: 120 TABLET ORAL at 05:27

## 2023-12-14 RX ADMIN — TAMSULOSIN HYDROCHLORIDE 0.4 MG: 0.4 CAPSULE ORAL at 05:27

## 2023-12-14 RX ADMIN — ALTEPLASE 10 MG: KIT at 13:00

## 2023-12-14 RX ADMIN — OMEPRAZOLE 20 MG: 20 CAPSULE, DELAYED RELEASE ORAL at 05:27

## 2023-12-14 RX ADMIN — VERAPAMIL HYDROCHLORIDE 120 MG: 120 TABLET ORAL at 11:58

## 2023-12-14 RX ADMIN — Medication 1 CAPSULE: at 08:29

## 2023-12-14 RX ADMIN — ONDANSETRON 4 MG: 2 INJECTION INTRAMUSCULAR; INTRAVENOUS at 13:43

## 2023-12-14 RX ADMIN — Medication 1 APPLICATOR: at 07:30

## 2023-12-14 RX ADMIN — MIDAZOLAM HYDROCHLORIDE 1 MG: 1 INJECTION INTRAMUSCULAR; INTRAVENOUS at 10:41

## 2023-12-14 RX ADMIN — OXYCODONE 5 MG: 5 TABLET ORAL at 13:45

## 2023-12-14 RX ADMIN — DRONABINOL 5 MG: 5 CAPSULE ORAL at 11:58

## 2023-12-14 RX ADMIN — EPOETIN ALFA 6000 UNITS: 3000 SOLUTION INTRAVENOUS; SUBCUTANEOUS at 07:15

## 2023-12-14 RX ADMIN — DRONABINOL 5 MG: 5 CAPSULE ORAL at 17:38

## 2023-12-14 RX ADMIN — DORNASE ALFA 5 MG: 1 SOLUTION RESPIRATORY (INHALATION) at 13:00

## 2023-12-14 RX ADMIN — HEPARIN SODIUM 22 UNITS/KG/HR: 5000 INJECTION, SOLUTION INTRAVENOUS at 22:21

## 2023-12-14 RX ADMIN — HEPARIN SODIUM 22 UNITS/KG/HR: 5000 INJECTION, SOLUTION INTRAVENOUS at 05:26

## 2023-12-14 ASSESSMENT — ENCOUNTER SYMPTOMS
BACK PAIN: 0
FEVER: 0
DIARRHEA: 0
WEAKNESS: 1
WHEEZING: 0
DIZZINESS: 0
SHORTNESS OF BREATH: 1
COUGH: 1
MYALGIAS: 1
NECK PAIN: 0
VOMITING: 0
SPEECH CHANGE: 0
PALPITATIONS: 0
FLANK PAIN: 0
NAUSEA: 0
SENSORY CHANGE: 0
FOCAL WEAKNESS: 0
CHILLS: 0
SPUTUM PRODUCTION: 0
ABDOMINAL PAIN: 0
SORE THROAT: 0
HEARTBURN: 0
HEADACHES: 0
NERVOUS/ANXIOUS: 0
DIAPHORESIS: 0
BLURRED VISION: 0
BLOOD IN STOOL: 0

## 2023-12-14 ASSESSMENT — PAIN DESCRIPTION - PAIN TYPE: TYPE: ACUTE PAIN

## 2023-12-14 NOTE — DIETARY
Nutrition Update:    Day 17 of admit.  Gurdeep Guerra is a 58 y.o. male with admitting DX of Typhlitis [K52.9].  Patient being followed to optimize nutrition.    Current Diet: NPO, pt on/off diet since 12/11. Difficult to assess intake.  Per ADL, pt ate 50-75% of lunch yesterday and 25-50% of breakfast and lunch on 12/12.   Per MD notes, pt awaiting improvement in INR before chest tube placement.     Problem: Nutritional:  Goal: Achieve adequate nutritional intake  Description: Patient will consume >50% of meals  Outcome: Not Met.  Barriers: awaiting procedure, currently NPO.       Plan/rec: Resume Diet as medically feasible. RD to monitor for adequate intake.

## 2023-12-14 NOTE — PROGRESS NOTES
IR RN note    Patient underwent a right chest tube placement by Dr. Ritchie.  Procedure site was verified by MD using imaging guidance. Consent was signed.  IR demarcus Faulkner and Shwetha assisted. Patient was placed in a right side up position.  Vitals were taken every 5 minutes and remained stable during procedure (see doc flow sheet for results).  CO2 waveform capnography was monitored throughout procedure, see chart.  Right back  access site.   A suture, vaseline gauze and tegaderm dressing was placed over surgical site. Report called to Sammy KINCAID. Pt transported by zaheer with RN to S6, with monitor .   Reviewed sedation orders with MD BATEMAN procedure ended at 1047      Sift   Flexima Regular   12F X25 cm  REF # [id number]  LOT # [id number]  Exp. 05-    20 ml serosanguinous for fluid culture

## 2023-12-14 NOTE — PROGRESS NOTES
Hospital Medicine Daily Progress Note    Date of Service  12/14/2023    Chief Complaint  Gurdeep Guerra is a 58 y.o. male admitted 11/27/2023 with diarrhea    Hospital Course  Admitted with symptoms of abdominal pain, nausea, vomiting and diarrhea, CT scan showed evidence of colitis, he was started on empiric coverage with IV Cefepime and Flagyl.  Patient recently underwent mechanical AVR, aortic aneurysm repair, discharged on 11/17/2023.  He also has known history of end-stage renal disease on peritoneal dialysis.  Workup showed he was positive for C. difficile colitis, he was started on oral vancomycin.  There was also concern for possible peritonitis, he was given intraperitoneal IV cefepime.  Nephrology was consulted on the case, he underwent dialysis through his temporary dialysis catheter which was placed on the previous admission.  Also with known history of paroxysmal atrial flutter, and with recent mechanical AVR, he was on Coumadin for anticoagulation.  He required to be placed on heparin drip to bridge Coumadin.    Interval Problem Update      Patient had dialysis today    He said that his shortness of breath is a little bit better.    His INR is less than 1.8 today so his plan for him to get  chest tube today    he will continue on IV heparin until we can determine that he does not require any other invasive procedures        I have discussed this patient's plan of care and discharge plan at IDT rounds today with Case Management, Nursing, Nursing leadership, and other members of the IDT team.    Consultants/Specialty  nephrology and pulmonary    Code Status  Full Code    Disposition  The patient is not medically cleared for discharge to home or a post-acute facility.  Anticipate discharge to: home with close outpatient follow-up    I have placed the appropriate orders for post-discharge needs.    Review of Systems  Review of Systems   Constitutional:  Positive for malaise/fatigue. Negative for  chills, diaphoresis and fever.   HENT:  Negative for congestion, hearing loss and sore throat.    Eyes:  Negative for blurred vision.   Respiratory:  Positive for cough and shortness of breath. Negative for sputum production and wheezing.    Cardiovascular:  Negative for chest pain, palpitations and leg swelling.   Gastrointestinal:  Negative for abdominal pain, blood in stool, diarrhea, heartburn, melena, nausea and vomiting.   Genitourinary:  Negative for dysuria, flank pain and hematuria.   Musculoskeletal:  Positive for myalgias. Negative for back pain, joint pain and neck pain.   Skin:  Negative for rash.   Neurological:  Positive for weakness. Negative for dizziness, sensory change, speech change, focal weakness and headaches.   Psychiatric/Behavioral:  The patient is not nervous/anxious.         Physical Exam  Temp:  [36.3 °C (97.4 °F)-36.6 °C (97.9 °F)] 36.6 °C (97.9 °F)  Pulse:  [] 100  Resp:  [18-30] 18  BP: ()/(57-96) 96/57  SpO2:  [92 %-100 %] 95 %    Physical Exam  Vitals and nursing note reviewed.   HENT:      Head: Normocephalic and atraumatic.      Nose: No congestion.      Mouth/Throat:      Mouth: Mucous membranes are moist.   Eyes:      Extraocular Movements: Extraocular movements intact.      Conjunctiva/sclera: Conjunctivae normal.   Cardiovascular:      Rate and Rhythm: Normal rate. Rhythm irregular.   Pulmonary:      Effort: No accessory muscle usage.      Breath sounds: Rhonchi present.   Abdominal:      General: There is no distension.      Tenderness: There is abdominal tenderness. There is no guarding or rebound.      Comments: PD catheter   Musculoskeletal:      Cervical back: No tenderness.      Right lower leg: No edema.      Left lower leg: No edema.   Skin:     General: Skin is warm and dry.   Neurological:      General: No focal deficit present.      Mental Status: He is alert and oriented to person, place, and time.      Cranial Nerves: No cranial nerve deficit.          Fluids    Intake/Output Summary (Last 24 hours) at 12/14/2023 1306  Last data filed at 12/14/2023 1130  Gross per 24 hour   Intake 1000 ml   Output 2051 ml   Net -1051 ml         Laboratory  Recent Labs     12/11/23  2357 12/13/23  0137 12/14/23  0336   WBC 7.2 6.5 6.0   RBC 2.91* 2.96* 2.82*   HEMOGLOBIN 8.6* 9.0* 8.4*   HEMATOCRIT 28.3* 28.5* 26.7*   MCV 97.3 96.3 94.7   MCH 29.6 30.4 29.8   MCHC 30.4* 31.6* 31.5*   RDW 53.1* 52.1* 52.3*   PLATELETCT 242 262 245   MPV 8.8* 8.8* 8.5*       Recent Labs     12/12/23  1328 12/13/23  0137 12/14/23  0336   SODIUM 136 136 135   POTASSIUM 5.0 5.1 4.9   CHLORIDE 97 99 98   CO2 29 24 24   GLUCOSE 76 80 98   BUN 39* 48* 60*   CREATININE 6.94* 8.00* 9.70*   CALCIUM 8.7 8.2* 8.2*       Recent Labs     12/11/23  1606 12/12/23  1328 12/13/23  0137 12/14/23  0336   APTT 42.1*  --   --   --    INR 2.33* 2.40* 2.21* 1.55*                 Imaging  IR-CHEST TUBE-EMPYEMA RIGHT   Final Result      Successful image guided RIGHT chest tube placement.      Plan: Low wall suction. Follow-up radiograph is pending.      CT-CHEST (THORAX) W/O   Final Result      1.  Large loculated right pleural effusion, worse compared to the October 2023 CT study. Associated atelectasis, with subtotal collapse of the right lower lobe.   2.  Minimal groundglass opacities in the right lung, infectious/inflammatory. No consolidation.   3.  Small left pleural effusion and associated atelectasis.   4.  Mild right hilar lymphadenopathy, statistically reactive rather than neoplastic.   5.  Cardiomegaly.   6.  Abdomen is detailed separately.      CT-ABDOMEN-PELVIS W/O   Final Result      1.  No new inflammatory process in the abdomen or pelvis.   2.  Minimal fat stranding adjacent to the cecum, nonspecific.   3.  Percutaneous catheter with tip in the pelvis. Small volume of pelvic free fluid.   4.  Colonic diverticulosis.   5.  Partially visualized moderate to large right pleural effusion, which may be  loculated. It has increased in size since prior CT study.   6.  Small left pleural effusion.   7.  Cardiomegaly.         PB-TAXZOOM-3 VIEW   Final Result      1.  Benign bowel gas pattern.      2.  Peritoneal dialysis catheter coiled in the pelvis.      [id number] VIEW   Final Result      Nonspecific bowel gas pattern with a moderate colonic stool burden.      DX-CHEST-PORTABLE (1 VIEW)   Final Result      1.  Enlarged cardiac silhouette with vascular congestion/edema.   2.  Lower lobe airspace disease could be due to edema, atelectasis or pneumonitis.      EC-ECHOCARDIOGRAM COMPLETE W/O CONT   Final Result      CT-ABDOMEN-PELVIS W/O   Final Result      1.  Fat stranding adjacent to the cecum, concerning for colitis/typhlitis.   2.  Colonic diverticulosis.   3.  Nonobstructing, tiny left renal stone.   4.  Likely partially loculated moderate right pleural effusion.   5.  Small left pleural effusion.   6.  Adjacent right middle lobe and bilateral lower lobe consolidations, likely atelectasis.   7.  Small pericardial effusion.      DX-CHEST-PORTABLE (1 VIEW)   Final Result      1.  Resolved or improved small left pleural effusion with persistent left basilar atelectasis and/or scarring.   2.  New small right pleural effusion with associated atelectasis and/or consolidation.      DX-CHEST-PORTABLE (1 VIEW)    (Results Pending)        Assessment/Plan  * C. difficile colitis- (present on admission)  Assessment & Plan  Oral vancomycin - finished course    History of mechanical aortic valve replacement- (present on admission)  Assessment & Plan  Coumadin on hold    Hypomagnesemia- (present on admission)  Assessment & Plan  Replaced  Follow level    Loculated pleural effusion- (present on admission)  Assessment & Plan  Status post chest tube on 12/14/23    Paroxysmal atrial flutter (HCC)- (present on admission)  Assessment & Plan  Verapamil  Coumadin    ESRD (end stage renal disease) (HCC)- (present on  admission)  Assessment & Plan  HD per Nephrology    Dyslipidemia- (present on admission)  Assessment & Plan  Lipitor    Coronary artery disease due to lipid rich plaque- (present on admission)  Assessment & Plan  Aspirin, Lipitor    Hypertension- (present on admission)  Assessment & Plan  Verapamil with parameters          VTE prophylaxis:   SCDs/TEDs      I have performed a physical exam and reviewed and updated ROS and Plan today (12/14/2023). In review of yesterday's note (12/13/2023), there are no changes except as documented above.

## 2023-12-14 NOTE — PROGRESS NOTES
[facility] -  PROGRESS NOTE               Author: Zac Daily D.O. Date & Time: 12/14/2023  1:48 PM     HPI:  58 y.o. 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 Abd imaging demonstrated colitis. C. Diff pending and started on abx. He notes abd pain and diarrhea for several weeks. Edema improving with 2.5% dextrose solution. Still has HD permacath in-place. Pain with peritoneal dialysis but no cloudy effluent of fibrin clots. No f/c/s. Normally does [id number] fills all green.     DAILY NEPHROLOGY SUMMARY:  11/28: consult done  11/29: ongoing abd pain, seen on HD-tolerating procedure well, PD fluid concerning for peritonitis  11/30:c.diff positive, started on oral vanc, new onset aflutter-transferred to [facility], wbc improving, Peritoneal cultures remain negative, abd pain improving  12/1: PD culture remains negative, seen on HD-tolerating procedure, abd pain improving, less diarrhea    12/2: HD yesterday 2.5 liter UF, still with SOB decreased abdoimnal pain  12/3: PD last night 1.5 UF, pt still with SOB  12/4: No events, tolerated HD yest with 2.5L UF, BP stable, tachycardic this am, stable on RA, reports SOB has resolved, still with crampy abd discomfort, reports diarrhea is starting to become more formed  12/5: No events, no new labs, BP stable, tachycardic, stable on RA this am, still with crampy abd pain, diarrhea improving, still with nausea/anorexia, denies any CP/LE edema, reports mild SOB  12/6: No events, tolerated HD yest with 1L UF, BP stable, remains tachycardic, reports nausea is improved and having more firm stools, denies any CP/SOB, reports poor appetite  12/7: No events, BP stable, tolerated HD this am  "with 1L UF, still doesn't feel well and feels very depressed and frustrated, +abd pain and n/v this am and unable to take PO  12/8: patient is doing well, resting in bed. Plan next iHD tomorrow  12/09: patient is seen during HD, refused labs this am. Does report some abdominal discomfort this am  12/10: patient had iHD yesterday, UF 2000 ml. Abdominal issues ongoing  12/11: pt had CT thorax done yesterday, showing loculated effusion. Pt feels SOB. Diarrhea continues but is better overall.   12/12: pt reports no more diarrhea, only 2-3 loose stools. Seen on HD and tolerating. Pulm recommending chest tube for loculated effusion.   12/13: pt awaiting improvement in INR before chest tube placement. Pt frustrated at prolonged hospitalization.  12/14: Pt had HD this AM and chest tube placement after. Pt seen in Room    REVIEW OF SYSTEMS:    10 point ROS reviewed and is as per HPI/daily summary or otherwise negative    PMH/PSH/SH/FH:   Reviewed and unchanged since admission note    CURRENT MEDICATIONS:   Reviewed from admission to present day    VS:  /67   Pulse 97   Temp 36.7 °C (98.1 °F) (Temporal)   Resp 18   Ht 1.702 m (5' 7\")   Wt 78.2 kg (172 lb 6.4 oz)   SpO2 95%   BMI 27.00 kg/m²     Physical Exam  Vitals and nursing note reviewed.   Constitutional:       General: He is not in acute distress.     Appearance: Normal appearance.   HENT:      Head: Normocephalic and atraumatic.   Eyes:      General: No scleral icterus.     Extraocular Movements: Extraocular movements intact.   Cardiovascular:      Rate and Rhythm: Normal rate and regular rhythm.      Comments: +R chest PC  Pulmonary:      Effort: Pulmonary effort is normal.   Abdominal:      General: Bowel sounds are normal.      Palpations: Abdomen is soft.      Tenderness: There is no abdominal tenderness.   Musculoskeletal:         General: No deformity.      Right lower leg: No edema.      Left lower leg: No edema.   Skin:     General: Skin is warm " and dry.      Findings: No rash.   Neurological:      General: No focal deficit present.      Mental Status: He is alert and oriented to person, place, and time.   Psychiatric:         Mood and Affect: Mood normal.         Behavior: Behavior normal. Behavior is cooperative.         Fluids:  In: 1000 [P.O.:650; I.V.:350]  Out: 1     LABS:  Recent Labs     12/12/23  1328 12/13/23  0137 12/14/23  0336   SODIUM 136 136 135   POTASSIUM 5.0 5.1 4.9   CHLORIDE 97 99 98   CO2 29 24 24   GLUCOSE 76 80 98   BUN 39* 48* 60*   CREATININE 6.94* 8.00* 9.70*   CALCIUM 8.7 8.2* 8.2*         IMAGING:   All imaging reviewed from admission to present day    IMPRESSION:  # ESRD on outpt PD   -Etiology 2/2 FSGS  - s/p permcath on 11/10, and on HD currently  # R/O PD cath associated peritonitis  - CX negative  # c. Diff.  -symptoms improved with initiation of oral vanc  -PD guidelines vague on scenarios such as this,  but pt on HD currently  # S/P AVR/ Ascending aneurysm repair  # CKD-MBD  -phos elevated  # Anemia of CKD, below goal hgb 10-11     Resumed EPO 6000U IV w HD on 12/12  # BL pleural effusions/congestion     Large R loculated effusion on CT thorax 12/11     S/p chest tube placement on 12/14  # Pericardial effusion   # C. Diff colitis  -C.diff positive  # C.diff  # Aflutter     PLAN:    - pt s/p chest tube placement today  - cont TTS HD schedule, will remove UF with HD as tolerated  - cont EPO 6000U IV w HD  - Hold PD for now,  can re-eval as outpt  - cont c diff tx per primary team  - Renal diet, Encourage protein intake given low albumin  - Phos binder with meals   - Dose all medications as per ESRD    **Pt will need outpt HD chair at [facility] for the next 30 days until abd pain improves enough that he can resume PD

## 2023-12-14 NOTE — DISCHARGE PLANNING
HTH/SCP TCN chart review completed. Collaborated with JOSE Roberto. Current discharge considerations are for Home with Resumption of [facility] and close outpatient f/u when medically cleared.  Per chart review, [facility] has accepted.  TCN will continue to follow and collaborate with discharge planning team as additional post acute needs arise. Thank you.    Completed:  Choice obtained: HILARIO (Resumption of [facility]) on 11/28/23.    SCP with [facility] P[facility].  Patient request to set up his own Follow up PCP appointment.

## 2023-12-14 NOTE — OR SURGEON
Immediate Post- Operative Note        Findings: R pleural effusion      Procedure(s): R chest tube US/Fluoro guided      Estimated Blood Loss: Less than 5 ml        Complications: None            12/14/2023     10:51 AM     Sly Ritchie M.D.

## 2023-12-14 NOTE — CARE PLAN
The patient is Stable - Low risk of patient condition declining or worsening    Shift Goals  Clinical Goals: Monitor spo2, heparin infusion  Patient Goals: sleep  Family Goals: FARIDA    Progress made toward(s) clinical / shift goals:  Patient alert and oriented. All needs met at this time. On oxygen 5 liters. Heparin infusion ongoing. Call light witin reach. Calls appropriately.    Patient is not progressing towards the following goals:

## 2023-12-14 NOTE — PROGRESS NOTES
HD treatment today per routine order using tunneled CVC.Hpatient on heparin gtt.heparin bolus and maintenance held with treatment.Patient's bp turned soft in the last hour of treatment and tachycardic.Net UF removed 1500 ml.CVC dressing changed and locked with heparin.Report given to primary Rn.

## 2023-12-15 ENCOUNTER — APPOINTMENT (OUTPATIENT)
Dept: RADIOLOGY | Facility: MEDICAL CENTER | Age: 58
DRG: 981 | End: 2023-12-15
Attending: INTERNAL MEDICINE
Payer: COMMERCIAL

## 2023-12-15 ENCOUNTER — APPOINTMENT (OUTPATIENT)
Dept: RADIOLOGY | Facility: MEDICAL CENTER | Age: 58
DRG: 981 | End: 2023-12-15
Attending: HOSPITALIST
Payer: COMMERCIAL

## 2023-12-15 LAB
APPEARANCE FLD: NORMAL
BODY FLD TYPE: NORMAL
BODY FLD TYPE: NORMAL
COLOR FLD: NORMAL
CYTOLOGY REG CYTOL: NORMAL
FUNGUS SPEC FUNGUS STN: NORMAL
GLUCOSE FLD-MCNC: 69 MG/DL
NEUTROPHILS NFR FLD: 0 %
NUC CELL # FLD: 181 CELLS/UL
RBC # FLD: NORMAL CELLS/UL
SIGNIFICANT IND 70042: NORMAL
SITE SITE: NORMAL
SOURCE SOURCE: NORMAL
UFH PPP CHRO-ACNC: 0.36 IU/ML

## 2023-12-15 PROCEDURE — 99232 SBSQ HOSP IP/OBS MODERATE 35: CPT | Performed by: INTERNAL MEDICINE

## 2023-12-15 PROCEDURE — A9270 NON-COVERED ITEM OR SERVICE: HCPCS | Performed by: STUDENT IN AN ORGANIZED HEALTH CARE EDUCATION/TRAINING PROGRAM

## 2023-12-15 PROCEDURE — 99232 SBSQ HOSP IP/OBS MODERATE 35: CPT | Performed by: HOSPITALIST

## 2023-12-15 PROCEDURE — 700102 HCHG RX REV CODE 250 W/ 637 OVERRIDE(OP): Performed by: STUDENT IN AN ORGANIZED HEALTH CARE EDUCATION/TRAINING PROGRAM

## 2023-12-15 PROCEDURE — 700111 HCHG RX REV CODE 636 W/ 250 OVERRIDE (IP): Mod: JZ | Performed by: INTERNAL MEDICINE

## 2023-12-15 PROCEDURE — 700102 HCHG RX REV CODE 250 W/ 637 OVERRIDE(OP): Performed by: FAMILY MEDICINE

## 2023-12-15 PROCEDURE — A9270 NON-COVERED ITEM OR SERVICE: HCPCS | Performed by: FAMILY MEDICINE

## 2023-12-15 PROCEDURE — 85520 HEPARIN ASSAY: CPT

## 2023-12-15 PROCEDURE — 71045 X-RAY EXAM CHEST 1 VIEW: CPT

## 2023-12-15 PROCEDURE — 71250 CT THORAX DX C-: CPT

## 2023-12-15 PROCEDURE — 770001 HCHG ROOM/CARE - MED/SURG/GYN PRIV*

## 2023-12-15 PROCEDURE — 700111 HCHG RX REV CODE 636 W/ 250 OVERRIDE (IP): Performed by: FAMILY MEDICINE

## 2023-12-15 PROCEDURE — 36415 COLL VENOUS BLD VENIPUNCTURE: CPT

## 2023-12-15 RX ADMIN — ATORVASTATIN CALCIUM 40 MG: 40 TABLET, FILM COATED ORAL at 21:09

## 2023-12-15 RX ADMIN — ASPIRIN 81 MG: 81 TABLET, COATED ORAL at 05:52

## 2023-12-15 RX ADMIN — ONDANSETRON 4 MG: 2 INJECTION INTRAMUSCULAR; INTRAVENOUS at 09:20

## 2023-12-15 RX ADMIN — OXYCODONE 5 MG: 5 TABLET ORAL at 21:16

## 2023-12-15 RX ADMIN — Medication 1 CAPSULE: at 09:19

## 2023-12-15 RX ADMIN — HEPARIN SODIUM 22 UNITS/KG/HR: 5000 INJECTION, SOLUTION INTRAVENOUS at 12:51

## 2023-12-15 RX ADMIN — TAMSULOSIN HYDROCHLORIDE 0.4 MG: 0.4 CAPSULE ORAL at 05:52

## 2023-12-15 RX ADMIN — DRONABINOL 5 MG: 5 CAPSULE ORAL at 17:29

## 2023-12-15 RX ADMIN — DRONABINOL 5 MG: 5 CAPSULE ORAL at 12:25

## 2023-12-15 RX ADMIN — DOCUSATE SODIUM 100 MG: 100 CAPSULE, LIQUID FILLED ORAL at 17:30

## 2023-12-15 RX ADMIN — OMEPRAZOLE 20 MG: 20 CAPSULE, DELAYED RELEASE ORAL at 05:52

## 2023-12-15 ASSESSMENT — ENCOUNTER SYMPTOMS
WEAKNESS: 1
SPUTUM PRODUCTION: 1
WHEEZING: 0
NECK PAIN: 0
BLURRED VISION: 0
COUGH: 1
NAUSEA: 0
FLANK PAIN: 0
FOCAL WEAKNESS: 0
SHORTNESS OF BREATH: 0
BACK PAIN: 0
FEVER: 0
DIARRHEA: 0
PALPITATIONS: 0
DIZZINESS: 0
HEARTBURN: 0
SPEECH CHANGE: 0
DIAPHORESIS: 0
SORE THROAT: 0
HEADACHES: 0
SHORTNESS OF BREATH: 1
ABDOMINAL PAIN: 0
MYALGIAS: 1
BLOOD IN STOOL: 0
SENSORY CHANGE: 0
VOMITING: 0
NERVOUS/ANXIOUS: 0
SPUTUM PRODUCTION: 0
CHILLS: 0

## 2023-12-15 ASSESSMENT — PAIN DESCRIPTION - PAIN TYPE: TYPE: ACUTE PAIN

## 2023-12-15 NOTE — PROGRESS NOTES
Hospital Medicine Daily Progress Note    Date of Service  12/15/2023    Chief Complaint  Gurdeep Guerra is a 58 y.o. male admitted 11/27/2023 with diarrhea    Hospital Course  Admitted with symptoms of abdominal pain, nausea, vomiting and diarrhea, CT scan showed evidence of colitis, he was started on empiric coverage with IV Cefepime and Flagyl.  Patient recently underwent mechanical AVR, aortic aneurysm repair, discharged on 11/17/2023.  He also has known history of end-stage renal disease on peritoneal dialysis.  Workup showed he was positive for C. difficile colitis, he was started on oral vancomycin.  There was also concern for possible peritonitis, he was given intraperitoneal IV cefepime.  Nephrology was consulted on the case, he underwent dialysis through his temporary dialysis catheter which was placed on the previous admission.  Also with known history of paroxysmal atrial flutter, and with recent mechanical AVR, he was on Coumadin for anticoagulation.  He required to be placed on heparin drip to bridge Coumadin.    Interval Problem Update    I reviewed today's x-rays and compared to yesterday's x-ray chest tube in place there is stated to be slight clearing but there is still significant opacity on the right lung.    Patient's feels like his breathing is little bit better    Continue on IV heparin for his history of [id number] IV heparin until we have been sure that all invasive procedures have been completed      I have discussed this patient's plan of care and discharge plan at IDT rounds today with Case Management, Nursing, Nursing leadership, and other members of the IDT team.    Consultants/Specialty  nephrology and pulmonary    Code Status  Full Code    Disposition  The patient is not medically cleared for discharge to home or a post-acute facility.  Anticipate discharge to: home with close outpatient follow-up    I have placed the appropriate orders for post-discharge needs.    Review of  Systems  Review of Systems   Constitutional:  Positive for malaise/fatigue. Negative for chills, diaphoresis and fever.   HENT:  Negative for congestion, hearing loss and sore throat.    Eyes:  Negative for blurred vision.   Respiratory:  Positive for cough and shortness of breath. Negative for sputum production and wheezing.    Cardiovascular:  Negative for chest pain, palpitations and leg swelling.   Gastrointestinal:  Negative for abdominal pain, blood in stool, diarrhea, heartburn, melena, nausea and vomiting.   Genitourinary:  Negative for dysuria, flank pain and hematuria.   Musculoskeletal:  Positive for myalgias. Negative for back pain, joint pain and neck pain.   Skin:  Negative for rash.   Neurological:  Positive for weakness. Negative for dizziness, sensory change, speech change, focal weakness and headaches.   Psychiatric/Behavioral:  The patient is not nervous/anxious.         Physical Exam  Temp:  [36.4 °C (97.6 °F)-37 °C (98.6 °F)] 36.9 °C (98.4 °F)  Pulse:  [] 80  Resp:  [18-30] 18  BP: ()/(52-96) 109/76  SpO2:  [91 %-100 %] 98 %    Physical Exam  Vitals and nursing note reviewed.   HENT:      Head: Normocephalic and atraumatic.      Nose: No congestion.      Mouth/Throat:      Mouth: Mucous membranes are moist.   Eyes:      Extraocular Movements: Extraocular movements intact.      Conjunctiva/sclera: Conjunctivae normal.   Cardiovascular:      Rate and Rhythm: Normal rate. Rhythm irregular.   Pulmonary:      Effort: No accessory muscle usage.      Breath sounds: Rhonchi present.      Comments: Decreased breath sounds at the bases    Chest tube present  Abdominal:      General: There is no distension.      Tenderness: There is no abdominal tenderness. There is no guarding or rebound.      Comments: PD catheter   Musculoskeletal:      Cervical back: No tenderness.      Right lower leg: No edema.      Left lower leg: No edema.   Skin:     General: Skin is warm and dry.   Neurological:       General: No focal deficit present.      Mental Status: He is alert and oriented to person, place, and time.      Cranial Nerves: No cranial nerve deficit.         Fluids    Intake/Output Summary (Last 24 hours) at 12/15/2023 0955  Last data filed at 12/15/2023 0646  Gross per 24 hour   Intake 370 ml   Output 2060 ml   Net -1690 ml         Laboratory  Recent Labs     12/13/23  0137 12/14/23  0336   WBC 6.5 6.0   RBC 2.96* 2.82*   HEMOGLOBIN 9.0* 8.4*   HEMATOCRIT 28.5* 26.7*   MCV 96.3 94.7   MCH 30.4 29.8   MCHC 31.6* 31.5*   RDW 52.1* 52.3*   PLATELETCT 262 245   MPV 8.8* 8.5*       Recent Labs     12/12/23  1328 12/13/23  0137 12/14/23  0336   SODIUM 136 136 135   POTASSIUM 5.0 5.1 4.9   CHLORIDE 97 99 98   CO2 29 24 24   GLUCOSE 76 80 98   BUN 39* 48* 60*   CREATININE 6.94* 8.00* 9.70*   CALCIUM 8.7 8.2* 8.2*       Recent Labs     12/12/23  1328 12/13/23  0137 12/14/23  0336   INR 2.40* 2.21* 1.55*                 Imaging  DX-CHEST-PORTABLE (1 VIEW)   Final Result      1.  Redemonstrated right hydropneumothorax with decreased air component, otherwise stable in size.   2.  Bilateral basilar atelectasis and/or consolidation with mild interstitial edema.   3.  Stable enlargement of the cardiomediastinal silhouette.      DX-CHEST-PORTABLE (1 VIEW)   Final Result      Placement of a right pigtail chest tube with decreased right pleural effusion.      Hazy opacities which could be related to hypoinflation or vascular congestion. Correlate clinically for infection.      IR-CHEST TUBE-EMPYEMA RIGHT   Final Result      Successful image guided RIGHT chest tube placement.      Plan: Low wall suction. Follow-up radiograph is pending.      CT-CHEST (THORAX) W/O   Final Result      1.  Large loculated right pleural effusion, worse compared to the October 2023 CT study. Associated atelectasis, with subtotal collapse of the right lower lobe.   2.  Minimal groundglass opacities in the right lung, infectious/inflammatory. No  consolidation.   3.  Small left pleural effusion and associated atelectasis.   4.  Mild right hilar lymphadenopathy, statistically reactive rather than neoplastic.   5.  Cardiomegaly.   6.  Abdomen is detailed separately.      CT-ABDOMEN-PELVIS W/O   Final Result      1.  No new inflammatory process in the abdomen or pelvis.   2.  Minimal fat stranding adjacent to the cecum, nonspecific.   3.  Percutaneous catheter with tip in the pelvis. Small volume of pelvic free fluid.   4.  Colonic diverticulosis.   5.  Partially visualized moderate to large right pleural effusion, which may be loculated. It has increased in size since prior CT study.   6.  Small left pleural effusion.   7.  Cardiomegaly.         TD-AXEXUCA-2 VIEW   Final Result      1.  Benign bowel gas pattern.      2.  Peritoneal dialysis catheter coiled in the pelvis.      [id number] VIEW   Final Result      Nonspecific bowel gas pattern with a moderate colonic stool burden.      DX-CHEST-PORTABLE (1 VIEW)   Final Result      1.  Enlarged cardiac silhouette with vascular congestion/edema.   2.  Lower lobe airspace disease could be due to edema, atelectasis or pneumonitis.      EC-ECHOCARDIOGRAM COMPLETE W/O CONT   Final Result      CT-ABDOMEN-PELVIS W/O   Final Result      1.  Fat stranding adjacent to the cecum, concerning for colitis/typhlitis.   2.  Colonic diverticulosis.   3.  Nonobstructing, tiny left renal stone.   4.  Likely partially loculated moderate right pleural effusion.   5.  Small left pleural effusion.   6.  Adjacent right middle lobe and bilateral lower lobe consolidations, likely atelectasis.   7.  Small pericardial effusion.      DX-CHEST-PORTABLE (1 VIEW)   Final Result      1.  Resolved or improved small left pleural effusion with persistent left basilar atelectasis and/or scarring.   2.  New small right pleural effusion with associated atelectasis and/or consolidation.           Assessment/Plan  * C. difficile colitis- (present on  admission)  Assessment & Plan  Oral vancomycin - finished course    History of mechanical aortic valve replacement- (present on admission)  Assessment & Plan  Coumadin on hold    Hypomagnesemia- (present on admission)  Assessment & Plan  Replaced  Follow level    Loculated pleural effusion- (present on admission)  Assessment & Plan  Status post chest tube on 12/14/23    Daily x-rays    Continue to monitor chest tube output    Pulmonary MD follow-up    Paroxysmal atrial flutter (HCC)- (present on admission)  Assessment & Plan  Verapamil  Coumadin on hold    ESRD (end stage renal disease) (HCC)- (present on admission)  Assessment & Plan  HD per Nephrology    Dyslipidemia- (present on admission)  Assessment & Plan  Lipitor    Coronary artery disease due to lipid rich plaque- (present on admission)  Assessment & Plan  Aspirin, Lipitor    Hypertension- (present on admission)  Assessment & Plan  Verapamil with parameters          VTE prophylaxis:   SCDs/TEDs      I have performed a physical exam and reviewed and updated ROS and Plan today (12/15/2023). In review of yesterday's note (12/14/2023), there are no changes except as documented above.

## 2023-12-15 NOTE — PROCEDURES
Procedure Date: 12/14/2023    Procedure: Intrapleual Fibrinolytics (38103) Dose 1    Physician: Gisela Heller DO     Anesthesia Type: N/A    Indication: complicated parapneumonic pleural effusion    Time Out: Patient was identified with two patient identifiers and site was confirmed.    Pre-Op Dx: complicated parapneumonic pleural effusion, right    Post-Op Dx: same    Medications:   Alteplase 5mg  Dornase Alpha 5mg     Description:    5mg of Alteplase and 5mg of Dornase were prepared by pharmacy in normal saline and maintained chilled until administration.  These solutions were mixed at bedside and the total fluid instillation was 60cc.  Patient was positioned in left lateral decubitous position.  Chest tube connections were prepped and cleaned. Alteplase/Dornase solution was drawn up in the appropriate syringe and medications were then injected into the chest tube, avoiding the introduction of air. Chest tube connection to pleurvac was then reconnected. Chest tube was left clamped/closed. Patient was instructed to lie in lateral position x 1 hour (30 minutes on the right and 30 minutes on the left).  Chest tube will be placed back on suction in 1 hour.    Complications: none    F/U: routine chest tube care    Gisela Heller DO   Pulmonary and Critical Care

## 2023-12-15 NOTE — PROGRESS NOTES
Pulmonary Progress Note    Date of admission  11/27/2023    Reason for Consultation  Loculated pleural effusion     History of Presenting Illness  58 y.o. male who presented 11/27/2023 with abdominal pain, nausea, vomiting, diarrhea.  He was found to have C. difficile colitis.  He had been discharged on 11/17/2023 after mechanical aortic valve replacement and aortic aneurysm repair.  He has a history of ESRD on PD.  Patient reports that during his hospitalization, he has had increasing shortness of breath.  He had a CT scan of his abdomen yesterday due to ongoing abdominal pain and a loculated effusion on the right side of his chest was found.  I reviewed the chest CT with IR and we both felt that the effusion would not drain with simple thoracenteses and that he needed to undergo a chest tube.  Patient has a history of a left-sided pleural effusion which was drained on 11/11/2023.  It was bloody in appearance but no labs were sent.    Hospital Course  12/14/2023 - Chest tube placed today  12/15/2023 - 1360 out yesterday, given 1/2 dose lytics because the fluid was slightly bloody. CXR showing hydropneumonthorax.     Vital Signs for last 24 hours   Temp:  [36.4 °C (97.6 °F)-37 °C (98.6 °F)] 36.9 °C (98.4 °F)  Pulse:  [] 80  Resp:  [18-30] 18  BP: ()/(52-96) 109/76  SpO2:  [91 %-99 %] 98 %    Hemodynamic parameters for last 24 hours       Respiratory Information for the last 24 hours     Physical Exam  Vitals and nursing note reviewed.   Constitutional:       Appearance: Normal appearance.   HENT:      Head: Normocephalic.   Eyes:      Conjunctiva/sclera: Conjunctivae normal.   Cardiovascular:      Rate and Rhythm: Normal rate and regular rhythm.   Pulmonary:      Effort: Pulmonary effort is normal.      Comments: Decreased breath sounds right base   Skin:     General: Skin is warm and dry.   Neurological:      General: No focal deficit present.      Mental Status: He is alert and oriented to person,  place, and time.   Psychiatric:         Mood and Affect: Mood normal.         Behavior: Behavior normal.       Medications  Current Facility-Administered Medications   Medication Dose Route Frequency Provider Last Rate Last Admin    alteplase (Activase) 10 mg in NS 30 mL INTRAPLEURAL syringe  10 mg Intrapleural DAILY CRISTHIAN LawsonOYoly   10 mg at 12/14/23 1300    And    dornase alpha (Pulmozyme) 5 mg in NS 30 mL INTRAPLEURAL syringe  5 mg Intrapleural DAILY CRISTHIAN LawsonOYoly   5 mg at 12/14/23 1300    benzonatate (Tessalon) capsule 100 mg  100 mg Oral TID PRN Sathish Wayne M.D.        epoetin gabbie (Epogen/Procrit) injection 6,000 Units  6,000 Units Intravenous TUE+THU+SAT Zac Daily D.O.   6,000 Units at 12/14/23 0715    heparin infusion 25,000 units in 500 mL 0.45% NACL  0-30 Units/kg/hr Intravenous Continuous Federico Jacobsen M.D. 34.4 mL/hr at 12/15/23 0915 22 Units/kg/hr at 12/15/23 0915    heparin injection 3,100 Units  40 Units/kg Intravenous PRN Federico Jacobsen M.D.   3,100 Units at 12/12/23 0121    polyethylene glycol/lytes (Miralax) Packet 1 Packet  1 Packet Oral DAILY Federico Jacobsen M.D.        simethicone (Mylicon) chewable tablet 125 mg  125 mg Oral TID PRN Federico Jacobsen M.D.   125 mg at 12/11/23 0036    Nozin nasal  swab  1 Applicator Each Nostril BID Federico Jacobsen M.D.   1 Applicator at 12/13/23 0533    verapamil (Isoptin) tablet 120 mg  120 mg Oral Q6HRS Federico Jacobsen M.D.   120 mg at 12/14/23 1739    Pharmacy Consult Request ...Pain Management Review 1 Each  1 Each Other PHARMACY TO DOSE Sage Wallace M.D.        acetaminophen (Tylenol) tablet 1,000 mg  1,000 mg Oral Q6HRS PRN Sage Wallace M.D.        oxyCODONE immediate-release (Roxicodone) tablet 5 mg  5 mg Oral Q3HRS PRN Sage Wallace M.D.   5 mg at 12/14/23 1345    Or    oxyCODONE immediate release (Roxicodone) tablet 10 mg  10 mg Oral Q3HRS PRN Sage Wallace M.D.        Or    morphine 4 MG/ML injection  4 mg  4 mg Intravenous Q3HRS PRN Sage Wallace M.D.   4 mg at 12/07/23 0553    docusate sodium (Colace) capsule 100 mg  100 mg Oral BID Sage Wallace M.D.   100 mg at 12/12/23 1620    magnesium hydroxide (Milk Of Magnesia) suspension 30 mL  30 mL Oral QDAY PRN Sage Wallace M.D.   30 mL at 12/06/23 1243    Dextromethorphan Polistirex ER (Delsym) 30 MG/5ML suspension 60 mg  60 mg Oral BID W/MEALS PRN Richy Hand M.D.        heparin injection 2,000 Units  2,000 Units Intravenous ACUTE DIALYSIS PRN Justo Ríos M.D.   2,000 Units at 12/09/23 0830    lactobacillus rhamnosus (Culturelle) capsule 1 Capsule  1 Capsule Oral QDAY with Breakfast Richy Hand M.D.   1 Capsule at 12/15/23 0919    menthol (Halls) lozenge 1 Lozenge  1 Lozenge Oral Q2HRS PRN Richy Hand M.D.   1 Lozenge at 12/04/23 2257    benzocaine-menthol (Cepacol) lozenge 1 Lozenge  1 Lozenge Mouth/Throat Q2HRS PRN Richy Hand M.D.   1 Lozenge at 12/02/23 1956    sevelamer carbonate (Renvela) tablet 1,600 mg  1,600 mg Oral TID WITH MEALS Justice Mca M.D.   1,600 mg at 12/10/23 0719    omeprazole (PriLOSEC) capsule 20 mg  20 mg Oral DAILY Richy Hand M.D.   20 mg at 12/15/23 0552    dronabinol (Marinol) capsule 5 mg  5 mg Oral BEFORE LUNCH AND DINNER Richy Hand M.D.   5 mg at 12/14/23 1738    ondansetron (Zofran) syringe/vial injection 4 mg  4 mg Intravenous Q6HRS PRN Tanya Benitez M.D.   4 mg at 12/15/23 0920    [Held by provider] MD Alert...Warfarin per Pharmacy   Other PHARMACY TO DOSE Tanya Benitez M.D.        heparin injection 3,700 Units  3,700 Units Intracatheter PRN Justice Mac M.D.   3,700 Units at 12/14/23 0845    gentamicin (Garamycin) 0.1 % cream   Topical ACUTE DIALYSIS PRN Justice Mac M.D.   Given at 12/02/23 1705    aspirin EC tablet 81 mg  81 mg Oral DAILY Iglesia Auguste M.D.   81 mg at 12/15/23 0552    atorvastatin (Lipitor) tablet 40 mg  40 mg Oral Nightly Iglesia Auguste M.D.   40 mg at 12/14/23  2056    tamsulosin (Flomax) capsule 0.4 mg  0.4 mg Oral DAILY Iglesia Auguste M.D.   0.4 mg at 12/15/23 0552    ondansetron (Zofran ODT) dispertab 4 mg  4 mg Oral Q4HRS PRROSA Auguste M.D.   4 mg at 12/08/23 0602    promethazine (Phenergan) tablet 12.5-25 mg  12.5-25 mg Oral Q4HRS PRN Iglesia uAguste M.D.        promethazine (Phenergan) suppository 12.5-25 mg  12.5-25 mg Rectal Q4HRS PRROSA Auguste M.D.        prochlorperazine (Compazine) injection 5-10 mg  5-10 mg Intravenous Q4HRS PRROSA Auguste M.D.           Fluids    Intake/Output Summary (Last 24 hours) at 12/15/2023 1023  Last data filed at 12/15/2023 0646  Gross per 24 hour   Intake 370 ml   Output 2060 ml   Net -1690 ml       Laboratory          Recent Labs     12/12/23  1328 12/13/23  0137 12/14/23  0336   SODIUM 136 136 135   POTASSIUM 5.0 5.1 4.9   CHLORIDE 97 99 98   CO2 29 24 24   BUN 39* 48* 60*   CREATININE 6.94* 8.00* 9.70*   CALCIUM 8.7 8.2* 8.2*       Recent Labs     12/12/23  1328 12/13/23  0137 12/14/23  0336   GLUCOSE 76 80 98       Recent Labs     12/13/23  0137 12/14/23  0336   WBC 6.5 6.0       Recent Labs     12/12/23  1328 12/13/23  0137 12/14/23  0336   RBC  --  2.96* 2.82*   HEMOGLOBIN  --  9.0* 8.4*   HEMATOCRIT  --  28.5* 26.7*   PLATELETCT  --  262 245   PROTHROMBTM 26.5* 24.9* 18.8*   INR 2.40* 2.21* 1.55*     Imaging  X-Ray:  I have personally reviewed the images and compared with prior images.    Assessment/Plan  # Acute hypoxemic respiratory failure  # Loculated right-sided pleural effusion, suspect that this is due to PD which became increasingly more complicated, possibly hemorhorax     Titrate O2 to maintain sats 88-92%  Hold lytics   Follow-up cytology   Repeat CT chest   Patient on HD at this time      Gisela Heller, DO   Pulmonary and Critical Care

## 2023-12-15 NOTE — PROGRESS NOTES
Pulmonary Progress Note    Date of admission  11/27/2023    Reason for Consultation  Loculated pleural effusion     History of Presenting Illness  58 y.o. male who presented 11/27/2023 with abdominal pain, nausea, vomiting, diarrhea.  He was found to have C. difficile colitis.  He had been discharged on 11/17/2023 after mechanical aortic valve replacement and aortic aneurysm repair.  He has a history of ESRD on PD.  Patient reports that during his hospitalization, he has had increasing shortness of breath.  He had a CT scan of his abdomen yesterday due to ongoing abdominal pain and a loculated effusion on the right side of his chest was found.  I reviewed the chest CT with IR and we both felt that the effusion would not drain with simple thoracenteses and that he needed to undergo a chest tube.  Patient has a history of a left-sided pleural effusion which was drained on 11/11/2023.  It was bloody in appearance but no labs were sent.    Hospital Course  12/14/2023 - Chest tube placed today    Vital Signs for last 24 hours   Temp:  [36.3 °C (97.4 °F)-37 °C (98.6 °F)] 37 °C (98.6 °F)  Pulse:  [] 67  Resp:  [18-30] 18  BP: ()/(52-96) 90/73  SpO2:  [91 %-100 %] 91 %    Hemodynamic parameters for last 24 hours       Respiratory Information for the last 24 hours     Physical Exam  Vitals and nursing note reviewed.   Constitutional:       Appearance: Normal appearance.   HENT:      Head: Normocephalic.   Eyes:      Conjunctiva/sclera: Conjunctivae normal.   Cardiovascular:      Rate and Rhythm: Normal rate and regular rhythm.   Pulmonary:      Effort: Pulmonary effort is normal.      Comments: Decreased breath sounds right base   Skin:     General: Skin is warm and dry.   Neurological:      General: No focal deficit present.      Mental Status: He is alert and oriented to person, place, and time.   Psychiatric:         Mood and Affect: Mood normal.         Behavior: Behavior normal.       Medications  Current  Facility-Administered Medications   Medication Dose Route Frequency Provider Last Rate Last Admin    alteplase (Activase) 10 mg in NS 30 mL INTRAPLEURAL syringe  10 mg Intrapleural DAILY Gisela Heller D.O.   10 mg at 12/14/23 1300    And    dornase alpha (Pulmozyme) 5 mg in NS 30 mL INTRAPLEURAL syringe  5 mg Intrapleural DAILY CRISTHIAN LawsonOYoly   5 mg at 12/14/23 1300    benzonatate (Tessalon) capsule 100 mg  100 mg Oral TID PRN Sathish Wayne M.D.        epoetin gabbie (Epogen/Procrit) injection 6,000 Units  6,000 Units Intravenous TUE+THU+SAT Zac Daily D.O.   6,000 Units at 12/14/23 0715    heparin infusion 25,000 units in 500 mL 0.45% NACL  0-30 Units/kg/hr Intravenous Continuous Federico Jacobsen M.D. 34.4 mL/hr at 12/14/23 1141 22 Units/kg/hr at 12/14/23 1141    heparin injection 3,100 Units  40 Units/kg Intravenous PRN Federico Jacobsen M.D.   3,100 Units at 12/12/23 0121    polyethylene glycol/lytes (Miralax) Packet 1 Packet  1 Packet Oral DAILY Federico Jacobsen M.D.        simethicone (Mylicon) chewable tablet 125 mg  125 mg Oral TID PRN Federico Jacobsen M.D.   125 mg at 12/11/23 0036    Nozin nasal  swab  1 Applicator Each Nostril BID Federico Jacobsen M.D.   1 Applicator at 12/13/23 0533    verapamil (Isoptin) tablet 120 mg  120 mg Oral Q6HRS Federico Jacobsen M.D.   120 mg at 12/14/23 1739    Pharmacy Consult Request ...Pain Management Review 1 Each  1 Each Other PHARMACY TO DOSE Sage Wallace M.D.        acetaminophen (Tylenol) tablet 1,000 mg  1,000 mg Oral Q6HRS PRN Sage Wallace M.D.        oxyCODONE immediate-release (Roxicodone) tablet 5 mg  5 mg Oral Q3HRS PRN Sage Wallace M.D.   5 mg at 12/14/23 1345    Or    oxyCODONE immediate release (Roxicodone) tablet 10 mg  10 mg Oral Q3HRS PRN Sage Wallace M.D.        Or    morphine 4 MG/ML injection 4 mg  4 mg Intravenous Q3HRS PRN Sgae Wallace M.D.   4 mg at 12/07/23 0553    docusate sodium (Colace) capsule 100 mg  100 mg Oral  BID Sage Wallace M.D.   100 mg at 12/12/23 1620    magnesium hydroxide (Milk Of Magnesia) suspension 30 mL  30 mL Oral QDAY PRN Sage Wallace M.D.   30 mL at 12/06/23 1243    Dextromethorphan Polistirex ER (Delsym) 30 MG/5ML suspension 60 mg  60 mg Oral BID W/MEALS PRN Richy Hand M.D.        heparin injection 2,000 Units  2,000 Units Intravenous ACUTE DIALYSIS PRN Justo Ríos M.D.   2,000 Units at 12/09/23 0830    lactobacillus rhamnosus (Culturelle) capsule 1 Capsule  1 Capsule Oral QDAY with Breakfast Richy Hand M.D.   1 Capsule at 12/14/23 0829    menthol (Halls) lozenge 1 Lozenge  1 Lozenge Oral Q2HRS PRN Richy Hand M.D.   1 Lozenge at 12/04/23 2257    benzocaine-menthol (Cepacol) lozenge 1 Lozenge  1 Lozenge Mouth/Throat Q2HRS PRN Richy Hand M.D.   1 Lozenge at 12/02/23 1956    sevelamer carbonate (Renvela) tablet 1,600 mg  1,600 mg Oral TID WITH MEALS Justice Mac M.D.   1,600 mg at 12/10/23 0719    omeprazole (PriLOSEC) capsule 20 mg  20 mg Oral DAILY Richy Hand M.D.   20 mg at 12/14/23 0527    dronabinol (Marinol) capsule 5 mg  5 mg Oral BEFORE LUNCH AND DINNER Richy Hand M.D.   5 mg at 12/14/23 1738    ondansetron (Zofran) syringe/vial injection 4 mg  4 mg Intravenous Q6HRS PRN Tanya Benitez M.D.   4 mg at 12/14/23 1343    [Held by provider] MD Alert...Warfarin per Pharmacy   Other PHARMACY TO DOSE Tanya Benitez M.D.        heparin injection 3,700 Units  3,700 Units Intracatheter PRN Justice Mac M.D.   3,700 Units at 12/14/23 0845    gentamicin (Garamycin) 0.1 % cream   Topical ACUTE DIALYSIS PRN Justice Mac M.D.   Given at 12/02/23 1705    aspirin EC tablet 81 mg  81 mg Oral DAILY Iglesia Auguste M.D.   81 mg at 12/14/23 0527    atorvastatin (Lipitor) tablet 40 mg  40 mg Oral Nightly Iglesia Auguste M.D.   40 mg at 12/14/23 2056    tamsulosin (Flomax) capsule 0.4 mg  0.4 mg Oral DAILY Iglesia Auguste M.D.   0.4 mg at 12/14/23 0527    ondansetron (Zofran  ODT) dispertab 4 mg  4 mg Oral Q4HRS FROILAN Auguste M.D.   4 mg at 12/08/23 0602    promethazine (Phenergan) tablet 12.5-25 mg  12.5-25 mg Oral Q4HRS FROILAN Auguste M.D.        promethazine (Phenergan) suppository 12.5-25 mg  12.5-25 mg Rectal Q4HRS FROILAN Auguste M.D.        prochlorperazine (Compazine) injection 5-10 mg  5-10 mg Intravenous Q4HRS FROILAN Auguste M.D.           Fluids    Intake/Output Summary (Last 24 hours) at 12/14/2023 2203  Last data filed at 12/14/2023 2000  Gross per 24 hour   Intake 250 ml   Output 3200 ml   Net -2950 ml       Laboratory          Recent Labs     12/12/23  1328 12/13/23 0137 12/14/23  0336   SODIUM 136 136 135   POTASSIUM 5.0 5.1 4.9   CHLORIDE 97 99 98   CO2 29 24 24   BUN 39* 48* 60*   CREATININE 6.94* 8.00* 9.70*   CALCIUM 8.7 8.2* 8.2*     Recent Labs     12/12/23  1328 12/13/23 0137 12/14/23  0336   GLUCOSE 76 80 98     Recent Labs     12/11/23 2357 12/13/23 0137 12/14/23  0336   WBC 7.2 6.5 6.0     Recent Labs     12/11/23 2357 12/12/23 1328 12/13/23  0137 12/14/23  0336   RBC 2.91*  --  2.96* 2.82*   HEMOGLOBIN 8.6*  --  9.0* 8.4*   HEMATOCRIT 28.3*  --  28.5* 26.7*   PLATELETCT 242  --  262 245   PROTHROMBTM  --  26.5* 24.9* 18.8*   INR  --  2.40* 2.21* 1.55*       Imaging  X-Ray:  I have personally reviewed the images and compared with prior images.    Assessment/Plan  # Acute hypoxemic respiratory failure  # Loculated right-sided pleural effusion, etiology unclear, but it has developed in the hospital     Titrate O2 to maintain sats 88-92%  Start lytics today  Follow-up fluid studies, I have ordered these     Gisela Heller,    Pulmonary and Critical Care

## 2023-12-15 NOTE — PROGRESS NOTES
Radiology Progress Note   Author: TEX Marx Date & Time created: 12/15/2023  11:22 AM   Date of admission  11/27/2023  Note to reader: this note follows the APSO format rather than the historical SOAP format. Assessment and plan located at the top of the note for ease of use.    Chief Complaint  58 y.o. male admitted 11/27/2023 with   Chief Complaint   Patient presents with    Sent by MD     Patient reports open heart surgery 11/3. Patient reports spoke to surgeon today and told to come to ER. Patient reports also a dialysis patient and did his dialysis last night at home.      Weakness     Patient reports increased weakness over the last three weeks.     Shortness of Breath     X 3 weeks.          HPI  58-year-old male past medical history significant for ESRD on peritoneal dialysis and paroxysmal atrial flutter admitted 11/27/23 for abdominal pain, nausea, vomiting, diarrhea.  CT and lab findings of C. difficile colitis.  He recently underwent a mechanical aortic valve replacement and aortic aneurysm repair and was discharged on 11/17/2023.  During this hospitalization, patient became increasingly short of breath.  CT thorax showed large loculated right pleural effusion.  Patient underwent a Right chest tube placement with IR Dr Ritchie on 12/14/23.  He was then started on lytic therapy through pulmonology.    Interval History:  12/15/23 -  Right Chest tube with 1360 mL serosanguineous output in the last 24 hours.  WBC normal.  Chest x-ray this morning shows hydropneumothorax.  No air leak detected.    Assessment/Plan     Principal Problem:    C. difficile colitis  Active Problems:    Hypertension    Coronary artery disease due to lipid rich plaque    Dyslipidemia    ESRD (end stage renal disease) (HCC)    Paroxysmal atrial flutter (HCC)    Loculated pleural effusion    Hypomagnesemia    History of mechanical aortic valve replacement      Plan IR  -Lytic therapy on hold by pulmonology for  increased bloody output  -Chest tube to suction -20 cm  -Pulmonary hygiene  -Repeat CT ordered by pulmonology    - IR does not round during the weekends, if any questions regarding drain management please call IR Film room [phone] to talk to the on call IR MD for assistance             Review of Systems  Physical Exam   Review of Systems   Constitutional:  Positive for malaise/fatigue. Negative for chills and fever.   Respiratory:  Positive for cough and sputum production. Negative for shortness of breath.    Cardiovascular:  Negative for chest pain and palpitations.   Gastrointestinal:  Negative for nausea and vomiting.   Neurological:  Positive for weakness. Negative for headaches.      Vitals:    12/15/23 0714   BP: 109/76   Pulse: 80   Resp: 18   Temp: 36.9 °C (98.4 °F)   SpO2: 98%        Physical Exam  Cardiovascular:      Rate and Rhythm: Normal rate.      Pulses: Normal pulses.   Pulmonary:      Effort: Pulmonary effort is normal. No respiratory distress.      Comments: Right chest tube  Abdominal:      Palpations: Abdomen is soft.   Skin:     General: Skin is warm and dry.   Neurological:      General: No focal deficit present.      Mental Status: He is alert and oriented to person, place, and time.   Psychiatric:         Mood and Affect: Mood normal.         Behavior: Behavior normal.             Labs    Recent Labs     12/13/23 0137 12/14/23  0336   WBC 6.5 6.0   RBC 2.96* 2.82*   HEMOGLOBIN 9.0* 8.4*   HEMATOCRIT 28.5* 26.7*   MCV 96.3 94.7   MCH 30.4 29.8   MCHC 31.6* 31.5*   RDW 52.1* 52.3*   PLATELETCT 262 245   MPV 8.8* 8.5*     Recent Labs     12/12/23  1328 12/13/23  0137 12/14/23  0336   SODIUM 136 136 135   POTASSIUM 5.0 5.1 4.9   CHLORIDE 97 99 98   CO2 29 24 24   GLUCOSE 76 80 98   BUN 39* 48* 60*   CREATININE 6.94* 8.00* 9.70*   CALCIUM 8.7 8.2* 8.2*     Recent Labs     12/12/23  1328 12/13/23  0137 12/14/23  0336   CREATININE 6.94* 8.00* 9.70*     DX-CHEST-PORTABLE (1 VIEW)   Final Result       1.  Redemonstrated right hydropneumothorax with decreased air component, otherwise stable in size.   2.  Bilateral basilar atelectasis and/or consolidation with mild interstitial edema.   3.  Stable enlargement of the cardiomediastinal silhouette.      DX-CHEST-PORTABLE (1 VIEW)   Final Result      Placement of a right pigtail chest tube with decreased right pleural effusion.      Hazy opacities which could be related to hypoinflation or vascular congestion. Correlate clinically for infection.      IR-CHEST TUBE-EMPYEMA RIGHT   Final Result      Successful image guided RIGHT chest tube placement.      Plan: Low wall suction. Follow-up radiograph is pending.      CT-CHEST (THORAX) W/O   Final Result      1.  Large loculated right pleural effusion, worse compared to the October 2023 CT study. Associated atelectasis, with subtotal collapse of the right lower lobe.   2.  Minimal groundglass opacities in the right lung, infectious/inflammatory. No consolidation.   3.  Small left pleural effusion and associated atelectasis.   4.  Mild right hilar lymphadenopathy, statistically reactive rather than neoplastic.   5.  Cardiomegaly.   6.  Abdomen is detailed separately.      CT-ABDOMEN-PELVIS W/O   Final Result      1.  No new inflammatory process in the abdomen or pelvis.   2.  Minimal fat stranding adjacent to the cecum, nonspecific.   3.  Percutaneous catheter with tip in the pelvis. Small volume of pelvic free fluid.   4.  Colonic diverticulosis.   5.  Partially visualized moderate to large right pleural effusion, which may be loculated. It has increased in size since prior CT study.   6.  Small left pleural effusion.   7.  Cardiomegaly.         [id number] VIEW   Final Result      1.  Benign bowel gas pattern.      2.  Peritoneal dialysis catheter coiled in the pelvis.      [id number] VIEW   Final Result      Nonspecific bowel gas pattern with a moderate colonic stool burden.      DX-CHEST-PORTABLE (1 VIEW)   Final  "Result      1.  Enlarged cardiac silhouette with vascular congestion/edema.   2.  Lower lobe airspace disease could be due to edema, atelectasis or pneumonitis.      EC-ECHOCARDIOGRAM COMPLETE W/O CONT   Final Result      CT-ABDOMEN-PELVIS W/O   Final Result      1.  Fat stranding adjacent to the cecum, concerning for colitis/typhlitis.   2.  Colonic diverticulosis.   3.  Nonobstructing, tiny left renal stone.   4.  Likely partially loculated moderate right pleural effusion.   5.  Small left pleural effusion.   6.  Adjacent right middle lobe and bilateral lower lobe consolidations, likely atelectasis.   7.  Small pericardial effusion.      DX-CHEST-PORTABLE (1 VIEW)   Final Result      1.  Resolved or improved small left pleural effusion with persistent left basilar atelectasis and/or scarring.   2.  New small right pleural effusion with associated atelectasis and/or consolidation.      CT-CHEST (THORAX) W/O    (Results Pending)       INR   Date Value Ref Range Status   12/14/2023 1.55 (H) 0.87 - 1.13 Final     Comment:     INR - Non-therapeutic Reference Range: 0.87-1.13  INR - Therapeutic Reference Range: 2.0-4.0       No results found for: \"POCINR\"     Intake/Output Summary (Last 24 hours) at 12/15/2023 1122  Last data filed at 12/15/2023 1113  Gross per 24 hour   Intake 610 ml   Output 2060 ml   Net -1450 ml      Labs not explicitly included in this progress note were reviewed by the author. Radiology/imaging not explicitly included in this progress note was reviewed by the author.     I have performed a physical exam and reviewed and updated ROS and Plan today (12/15/2023).     45 minutes in directly providing and coordinating care and extensive data review.  No time overlap and excludes procedures.  "

## 2023-12-15 NOTE — PROGRESS NOTES
[facility] -  PROGRESS NOTE               Author: Zac Daily D.O. Date & Time: 12/15/2023  11:30 AM     HPI:  58 y.o. 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 Abd imaging demonstrated colitis. C. Diff pending and started on abx. He notes abd pain and diarrhea for several weeks. Edema improving with 2.5% dextrose solution. Still has HD permacath in-place. Pain with peritoneal dialysis but no cloudy effluent of fibrin clots. No f/c/s. Normally does [id number] fills all green.     DAILY NEPHROLOGY SUMMARY:  11/28: consult done  11/29: ongoing abd pain, seen on HD-tolerating procedure well, PD fluid concerning for peritonitis  11/30:c.diff positive, started on oral vanc, new onset aflutter-transferred to [facility] [place], wbc improving, Peritoneal cultures remain negative, abd pain improving  12/1: PD culture remains negative, seen on HD-tolerating procedure, abd pain improving, less diarrhea    12/2: HD yesterday 2.5 liter UF, still with SOB decreased abdoimnal pain  12/3: PD last night 1.5 UF, pt still with SOB  12/4: No events, tolerated HD yest with 2.5L UF, BP stable, tachycardic this am, stable on RA, reports SOB has resolved, still with crampy abd discomfort, reports diarrhea is starting to become more formed  12/5: No events, no new labs, BP stable, tachycardic, stable on RA this am, still with crampy abd pain, diarrhea improving, still with nausea/anorexia, denies any CP/LE edema, reports mild SOB  12/6: No events, tolerated HD yest with 1L UF, BP stable, remains tachycardic, reports nausea is improved and having more firm stools, denies any CP/SOB, reports poor appetite  12/7: No events, BP stable, tolerated HD this am  "with 1L UF, still doesn't feel well and feels very depressed and frustrated, +abd pain and n/v this am and unable to take PO  12/8: patient is doing well, resting in bed. Plan next iHD tomorrow  12/09: patient is seen during HD, refused labs this am. Does report some abdominal discomfort this am  12/10: patient had iHD yesterday, UF 2000 ml. Abdominal issues ongoing  12/11: pt had CT thorax done yesterday, showing loculated effusion. Pt feels SOB. Diarrhea continues but is better overall.   12/12: pt reports no more diarrhea, only 2-3 loose stools. Seen on HD and tolerating. Pulm recommending chest tube for loculated effusion.   12/13: pt awaiting improvement in INR before chest tube placement. Pt frustrated at prolonged hospitalization.  12/14: Pt had HD this AM and chest tube placement after. Pt seen in Room  12/15: Pt seen in room. Reports breathing is better now chest tube is in.     REVIEW OF SYSTEMS:    10 point ROS reviewed and is as per HPI/daily summary or otherwise negative    PMH/PSH/SH/FH:   Reviewed and unchanged since admission note    CURRENT MEDICATIONS:   Reviewed from admission to present day    VS:  /76   Pulse 80   Temp 36.9 °C (98.4 °F) (Temporal)   Resp 18   Ht 1.702 m (5' 7\")   Wt 78.2 kg (172 lb 6.4 oz)   SpO2 98%   BMI 27.00 kg/m²     Physical Exam  Vitals and nursing note reviewed.   Constitutional:       General: He is not in acute distress.     Appearance: Normal appearance.   HENT:      Head: Normocephalic and atraumatic.   Eyes:      General: No scleral icterus.     Extraocular Movements: Extraocular movements intact.   Cardiovascular:      Rate and Rhythm: Normal rate and regular rhythm.      Comments: +R chest PC  Pulmonary:      Effort: Pulmonary effort is normal.   Abdominal:      General: Bowel sounds are normal.      Palpations: Abdomen is soft.      Tenderness: There is no abdominal tenderness.   Musculoskeletal:         General: No deformity.      Right lower leg: " No edema.      Left lower leg: No edema.   Skin:     General: Skin is warm and dry.      Findings: No rash.   Neurological:      General: No focal deficit present.      Mental Status: He is alert and oriented to person, place, and time.   Psychiatric:         Mood and Affect: Mood normal.         Behavior: Behavior normal. Behavior is cooperative.       R chest tube in place (12/14/23)    Fluids:  In: 370 [P.O.:370]  Out: 3560     LABS:  Recent Labs     12/12/23  1328 12/13/23  0137 12/14/23  0336   SODIUM 136 136 135   POTASSIUM 5.0 5.1 4.9   CHLORIDE 97 99 98   CO2 29 24 24   GLUCOSE 76 80 98   BUN 39* 48* 60*   CREATININE 6.94* 8.00* 9.70*   CALCIUM 8.7 8.2* 8.2*         IMAGING:   All imaging reviewed from admission to present day    IMPRESSION:  # ESRD on outpt PD   -Etiology 2/2 FSGS  - s/p permcath on 11/10, and on HD currently  # R/O PD cath associated peritonitis  - CX negative  # c. Diff.  -symptoms improved with initiation of oral vanc  -PD guidelines vague on scenarios such as this,  but pt on HD currently  # S/P AVR/ Ascending aneurysm repair  # CKD-MBD  -phos elevated  # Anemia of CKD, below goal hgb 10-11     Resumed EPO 6000U IV w HD on 12/12  # BL pleural effusions/congestion     Large R loculated effusion on CT thorax 12/11     S/p chest tube placement on 12/14  # Pericardial effusion   # C. Diff colitis  -C.diff positive  # C.diff  # Aflutter     PLAN:    - cont TTS HD schedule, will remove UF with HD as tolerated  - cont EPO 6000U IV w HD  - Hold PD for now,  can re-eval as outpt  - cont c diff tx per primary team  - Renal diet, Encourage protein intake given low albumin  - Phos binder with meals   - Dose all medications as per ESRD    **Pt will need outpt HD chair at [facility] for the next 30 days until abd pain improves enough that he can resume PD

## 2023-12-15 NOTE — DISCHARGE PLANNING
HTH/SCP TCN chart review completed. Current discharge considerations are home with [facility].  Noted per kardex patient ambulated to bathroom with no AD or assist. TCN will continue to follow and collaborate with discharge planning team as additional post acute needs arise. Thank you.    Completed:  Choice obtained: [facility] ([facility] of [facility]) on 11/28/23.    [facility] has accepted  SCP with [facility] P[facility].  Patient request to set up his own Follow up PCP appointment.

## 2023-12-15 NOTE — CARE PLAN
The patient is Stable - Low risk of patient condition declining or worsening    Shift Goals  Clinical Goals: npo  Patient Goals: sleep  Family Goals: FARIDA    Progress made toward(s) clinical / shift goals:  Chest tube intact to LWIS, NAD, Safety intact.    Patient is not progressing towards the following goals:      Problem: Pain - Standard  Goal: Alleviation of pain or a reduction in pain to the patient’s comfort goal  Outcome: Progressing     Problem: Knowledge Deficit - Standard  Goal: Patient and family/care givers will demonstrate understanding of plan of care, disease process/condition, diagnostic tests and medications  Outcome: Progressing     Problem: Fall Risk  Goal: Patient will remain free from falls  Outcome: Progressing

## 2023-12-16 ENCOUNTER — APPOINTMENT (OUTPATIENT)
Dept: RADIOLOGY | Facility: MEDICAL CENTER | Age: 58
DRG: 981 | End: 2023-12-16
Attending: HOSPITALIST
Payer: COMMERCIAL

## 2023-12-16 PROBLEM — I48.91 A-FIB (HCC): Status: ACTIVE | Noted: 2023-12-16

## 2023-12-16 LAB
ANION GAP SERPL CALC-SCNC: 13 MMOL/L (ref 7–16)
BUN SERPL-MCNC: 50 MG/DL (ref 8–22)
C DIFF DNA SPEC QL NAA+PROBE: NORMAL
C DIFF TOX A+B STL QL IA: POSITIVE
C DIFF TOX GENS STL QL NAA+PROBE: NORMAL
CALCIUM SERPL-MCNC: 8.5 MG/DL (ref 8.5–10.5)
CHLORIDE SERPL-SCNC: 98 MMOL/L (ref 96–112)
CO2 SERPL-SCNC: 23 MMOL/L (ref 20–33)
CREAT SERPL-MCNC: 8.93 MG/DL (ref 0.5–1.4)
ERYTHROCYTE [DISTWIDTH] IN BLOOD BY AUTOMATED COUNT: 50.3 FL (ref 35.9–50)
GFR SERPLBLD CREATININE-BSD FMLA CKD-EPI: 6 ML/MIN/1.73 M 2
GLUCOSE SERPL-MCNC: 107 MG/DL (ref 65–99)
HCT VFR BLD AUTO: 33.2 % (ref 42–52)
HGB BLD-MCNC: 10.4 G/DL (ref 14–18)
INR PPP: 1.42 (ref 0.87–1.13)
MCH RBC QN AUTO: 29.1 PG (ref 27–33)
MCHC RBC AUTO-ENTMCNC: 31.3 G/DL (ref 32.3–36.5)
MCV RBC AUTO: 92.7 FL (ref 81.4–97.8)
PLATELET # BLD AUTO: 297 K/UL (ref 164–446)
PMV BLD AUTO: 8.6 FL (ref 9–12.9)
POTASSIUM SERPL-SCNC: 4.5 MMOL/L (ref 3.6–5.5)
PROTHROMBIN TIME: 17.5 SEC (ref 12–14.6)
RBC # BLD AUTO: 3.58 M/UL (ref 4.7–6.1)
RHODAMINE-AURAMINE STN SPEC: NORMAL
SIGNIFICANT IND 70042: NORMAL
SITE SITE: NORMAL
SODIUM SERPL-SCNC: 134 MMOL/L (ref 135–145)
SOURCE SOURCE: NORMAL
TSH SERPL DL<=0.005 MIU/L-ACNC: 2.35 UIU/ML (ref 0.38–5.33)
UFH PPP CHRO-ACNC: 0.37 IU/ML
WBC # BLD AUTO: 8.6 K/UL (ref 4.8–10.8)

## 2023-12-16 PROCEDURE — 84443 ASSAY THYROID STIM HORMONE: CPT

## 2023-12-16 PROCEDURE — 80048 BASIC METABOLIC PNL TOTAL CA: CPT

## 2023-12-16 PROCEDURE — 87324 CLOSTRIDIUM AG IA: CPT

## 2023-12-16 PROCEDURE — 770020 HCHG ROOM/CARE - TELE (206)

## 2023-12-16 PROCEDURE — 36415 COLL VENOUS BLD VENIPUNCTURE: CPT

## 2023-12-16 PROCEDURE — 700102 HCHG RX REV CODE 250 W/ 637 OVERRIDE(OP): Performed by: STUDENT IN AN ORGANIZED HEALTH CARE EDUCATION/TRAINING PROGRAM

## 2023-12-16 PROCEDURE — A9270 NON-COVERED ITEM OR SERVICE: HCPCS | Performed by: STUDENT IN AN ORGANIZED HEALTH CARE EDUCATION/TRAINING PROGRAM

## 2023-12-16 PROCEDURE — 85027 COMPLETE CBC AUTOMATED: CPT

## 2023-12-16 PROCEDURE — 85610 PROTHROMBIN TIME: CPT

## 2023-12-16 PROCEDURE — 700111 HCHG RX REV CODE 636 W/ 250 OVERRIDE (IP): Mod: JZ | Performed by: INTERNAL MEDICINE

## 2023-12-16 PROCEDURE — 85520 HEPARIN ASSAY: CPT

## 2023-12-16 PROCEDURE — A9270 NON-COVERED ITEM OR SERVICE: HCPCS | Performed by: HOSPITALIST

## 2023-12-16 PROCEDURE — 99233 SBSQ HOSP IP/OBS HIGH 50: CPT | Performed by: HOSPITALIST

## 2023-12-16 PROCEDURE — 700111 HCHG RX REV CODE 636 W/ 250 OVERRIDE (IP): Performed by: HOSPITALIST

## 2023-12-16 PROCEDURE — 700105 HCHG RX REV CODE 258: Performed by: HOSPITALIST

## 2023-12-16 PROCEDURE — 700102 HCHG RX REV CODE 250 W/ 637 OVERRIDE(OP): Performed by: HOSPITALIST

## 2023-12-16 PROCEDURE — 87493 C DIFF AMPLIFIED PROBE: CPT

## 2023-12-16 PROCEDURE — 700111 HCHG RX REV CODE 636 W/ 250 OVERRIDE (IP): Performed by: FAMILY MEDICINE

## 2023-12-16 PROCEDURE — 71045 X-RAY EXAM CHEST 1 VIEW: CPT

## 2023-12-16 PROCEDURE — 93005 ELECTROCARDIOGRAM TRACING: CPT | Performed by: HOSPITALIST

## 2023-12-16 RX ORDER — SODIUM CHLORIDE 9 MG/ML
250 INJECTION, SOLUTION INTRAVENOUS ONCE
Status: COMPLETED | OUTPATIENT
Start: 2023-12-16 | End: 2023-12-16

## 2023-12-16 RX ORDER — SODIUM CHLORIDE 9 MG/ML
500 INJECTION, SOLUTION INTRAVENOUS ONCE
Status: COMPLETED | OUTPATIENT
Start: 2023-12-16 | End: 2023-12-16

## 2023-12-16 RX ORDER — LABETALOL HYDROCHLORIDE 5 MG/ML
10 INJECTION, SOLUTION INTRAVENOUS ONCE
Status: COMPLETED | OUTPATIENT
Start: 2023-12-16 | End: 2023-12-16

## 2023-12-16 RX ORDER — VERAPAMIL HYDROCHLORIDE 80 MG/1
80 TABLET ORAL EVERY 8 HOURS
Status: DISCONTINUED | OUTPATIENT
Start: 2023-12-16 | End: 2023-12-21

## 2023-12-16 RX ORDER — VERAPAMIL HYDROCHLORIDE 2.5 MG/ML
10 INJECTION, SOLUTION INTRAVENOUS ONCE
Status: DISCONTINUED | OUTPATIENT
Start: 2023-12-16 | End: 2023-12-16

## 2023-12-16 RX ORDER — VERAPAMIL HYDROCHLORIDE 120 MG/1
120 TABLET, FILM COATED ORAL TWICE DAILY
Status: DISCONTINUED | OUTPATIENT
Start: 2023-12-16 | End: 2023-12-16

## 2023-12-16 RX ORDER — MIDODRINE HYDROCHLORIDE 5 MG/1
5 TABLET ORAL ONCE
Status: COMPLETED | OUTPATIENT
Start: 2023-12-16 | End: 2023-12-16

## 2023-12-16 RX ORDER — HEPARIN SODIUM 1000 [USP'U]/ML
INJECTION, SOLUTION INTRAVENOUS; SUBCUTANEOUS
Status: DISPENSED
Start: 2023-12-16 | End: 2023-12-16

## 2023-12-16 RX ADMIN — SODIUM CHLORIDE 500 ML: 9 INJECTION, SOLUTION INTRAVENOUS at 14:35

## 2023-12-16 RX ADMIN — MIDODRINE HYDROCHLORIDE 5 MG: 5 TABLET ORAL at 16:46

## 2023-12-16 RX ADMIN — SODIUM CHLORIDE 250 ML: 9 INJECTION, SOLUTION INTRAVENOUS at 16:50

## 2023-12-16 RX ADMIN — VERAPAMIL HYDROCHLORIDE 120 MG: 120 TABLET ORAL at 08:24

## 2023-12-16 RX ADMIN — OMEPRAZOLE 20 MG: 20 CAPSULE, DELAYED RELEASE ORAL at 08:16

## 2023-12-16 RX ADMIN — SODIUM CHLORIDE 250 ML: 9 INJECTION, SOLUTION INTRAVENOUS at 13:29

## 2023-12-16 RX ADMIN — TAMSULOSIN HYDROCHLORIDE 0.4 MG: 0.4 CAPSULE ORAL at 08:17

## 2023-12-16 RX ADMIN — VERAPAMIL HYDROCHLORIDE 80 MG: 120 TABLET ORAL at 22:31

## 2023-12-16 RX ADMIN — ONDANSETRON 4 MG: 2 INJECTION INTRAMUSCULAR; INTRAVENOUS at 09:52

## 2023-12-16 RX ADMIN — Medication 1 CAPSULE: at 08:17

## 2023-12-16 RX ADMIN — ONDANSETRON 4 MG: 2 INJECTION INTRAMUSCULAR; INTRAVENOUS at 03:26

## 2023-12-16 RX ADMIN — DRONABINOL 5 MG: 5 CAPSULE ORAL at 11:55

## 2023-12-16 RX ADMIN — ATORVASTATIN CALCIUM 40 MG: 40 TABLET, FILM COATED ORAL at 22:31

## 2023-12-16 RX ADMIN — HEPARIN SODIUM 22 UNITS/KG/HR: 5000 INJECTION, SOLUTION INTRAVENOUS at 03:52

## 2023-12-16 RX ADMIN — LABETALOL HYDROCHLORIDE 10 MG: 5 INJECTION, SOLUTION INTRAVENOUS at 11:55

## 2023-12-16 RX ADMIN — HEPARIN SODIUM 22 UNITS/KG/HR: 5000 INJECTION, SOLUTION INTRAVENOUS at 19:56

## 2023-12-16 RX ADMIN — DRONABINOL 5 MG: 5 CAPSULE ORAL at 18:24

## 2023-12-16 RX ADMIN — ASPIRIN 81 MG: 81 TABLET, COATED ORAL at 08:17

## 2023-12-16 ASSESSMENT — ENCOUNTER SYMPTOMS
CHILLS: 0
DIAPHORESIS: 0
BLURRED VISION: 0
MYALGIAS: 1
NECK PAIN: 0
WHEEZING: 0
NERVOUS/ANXIOUS: 0
BLOOD IN STOOL: 0
DIZZINESS: 0
SHORTNESS OF BREATH: 1
NAUSEA: 0
PALPITATIONS: 1
VOMITING: 0
SENSORY CHANGE: 0
FEVER: 0
HEADACHES: 0
COUGH: 1
FOCAL WEAKNESS: 0
HEARTBURN: 0
FLANK PAIN: 0
SORE THROAT: 0
SPEECH CHANGE: 0
ABDOMINAL PAIN: 0
BACK PAIN: 0
WEAKNESS: 1
SPUTUM PRODUCTION: 0
DIARRHEA: 1

## 2023-12-16 ASSESSMENT — PAIN DESCRIPTION - PAIN TYPE
TYPE: ACUTE PAIN
TYPE: ACUTE PAIN

## 2023-12-16 NOTE — PROGRESS NOTES
[facility] -  PROGRESS NOTE               Author: Zac Daily D.O. Date & Time: 12/16/2023  10:16 AM     HPI:  58 y.o. 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 Abd imaging demonstrated colitis. C. Diff pending and started on abx. He notes abd pain and diarrhea for several weeks. Edema improving with 2.5% dextrose solution. Still has HD permacath in-place. Pain with peritoneal dialysis but no cloudy effluent of fibrin clots. No f/c/s. Normally does [id number] fills all green.     DAILY NEPHROLOGY SUMMARY:  11/28: consult done  11/29: ongoing abd pain, seen on HD-tolerating procedure well, PD fluid concerning for peritonitis  11/30:c.diff positive, started on oral vanc, new onset aflutter-transferred to [facility], wbc improving, Peritoneal cultures remain negative, abd pain improving  12/1: PD culture remains negative, seen on HD-tolerating procedure, abd pain improving, less diarrhea    12/2: HD yesterday 2.5 liter UF, still with SOB decreased abdoimnal pain  12/3: PD last night 1.5 UF, pt still with SOB  12/4: No events, tolerated HD yest with 2.5L UF, BP stable, tachycardic this am, stable on RA, reports SOB has resolved, still with crampy abd discomfort, reports diarrhea is starting to become more formed  12/5: No events, no new labs, BP stable, tachycardic, stable on RA this am, still with crampy abd pain, diarrhea improving, still with nausea/anorexia, denies any CP/LE edema, reports mild SOB  12/6: No events, tolerated HD yest with 1L UF, BP stable, remains tachycardic, reports nausea is improved and having more firm stools, denies any CP/SOB, reports poor appetite  12/7: No events, BP stable, tolerated HD this am  "with 1L UF, still doesn't feel well and feels very depressed and frustrated, +abd pain and n/v this am and unable to take PO  12/8: patient is doing well, resting in bed. Plan next iHD tomorrow  12/09: patient is seen during HD, refused labs this am. Does report some abdominal discomfort this am  12/10: patient had iHD yesterday, UF 2000 ml. Abdominal issues ongoing  12/11: pt had CT thorax done yesterday, showing loculated effusion. Pt feels SOB. Diarrhea continues but is better overall.   12/12: pt reports no more diarrhea, only 2-3 loose stools. Seen on HD and tolerating. Pulm recommending chest tube for loculated effusion.   12/13: pt awaiting improvement in INR before chest tube placement. Pt frustrated at prolonged hospitalization.  12/14: Pt had HD this AM and chest tube placement after. Pt seen in Room  12/15: Pt seen in room. Reports breathing is better now chest tube is in.   12/16: chest tube drainage slowing down. Pt breathing better. However, pt tachycardic in 130s this AM. He reports return of diarrhea and some abdominal pain. dialysis on hold for now    REVIEW OF SYSTEMS:    +diarrhea, +abd pain  10 point ROS reviewed and is as per HPI/daily summary or otherwise negative    PMH/PSH/SH/FH:   Reviewed and unchanged since admission note    CURRENT MEDICATIONS:   Reviewed from admission to present day    VS:  /72   Pulse (!) 131   Temp 36.8 °C (98.3 °F) (Temporal)   Resp 16   Ht 1.702 m (5' 7\")   Wt 78.2 kg (172 lb 6.4 oz)   SpO2 96%   BMI 27.00 kg/m²     Physical Exam  Vitals and nursing note reviewed.   Constitutional:       General: He is not in acute distress.     Appearance: Normal appearance.   HENT:      Head: Normocephalic and atraumatic.   Eyes:      General: No scleral icterus.     Extraocular Movements: Extraocular movements intact.   Cardiovascular:      Rate and Rhythm: Regular rhythm. Tachycardia present.      Comments: +R chest PC  Pulmonary:      Effort: Pulmonary effort is " normal.   Abdominal:      General: Bowel sounds are normal.      Palpations: Abdomen is soft.      Tenderness: There is no abdominal tenderness.   Musculoskeletal:         General: No deformity.      Right lower leg: No edema.      Left lower leg: No edema.   Skin:     General: Skin is warm and dry.      Findings: No rash.   Neurological:      General: No focal deficit present.      Mental Status: He is alert and oriented to person, place, and time.   Psychiatric:         Mood and Affect: Mood normal.         Behavior: Behavior normal. Behavior is cooperative.       R chest tube in place (12/14/23)    Fluids:  In: 1850 [P.O.:1510; I.V.:340]  Out: 1220     LABS:  Recent Labs     12/14/23  0336 12/16/23  0802   SODIUM 135 134*   POTASSIUM 4.9 4.5   CHLORIDE 98 98   CO2 24 23   GLUCOSE 98 107*   BUN 60* 50*   CREATININE 9.70* 8.93*   CALCIUM 8.2* 8.5         IMAGING:   All imaging reviewed from admission to present day    IMPRESSION:  # ESRD on outpt PD   -Etiology 2/2 FSGS  - s/p permcath on 11/10, and on HD currently  # R/O PD cath associated peritonitis  - CX negative  # c. Diff.  -symptoms improved with initiation of oral vanc  -PD guidelines vague on scenarios such as this,  but pt on HD currently  # S/P AVR/ Ascending aneurysm repair  # CKD-MBD  -phos elevated  # Anemia of CKD, below goal hgb 10-11     Resumed EPO 6000U IV w HD on 12/12  # BL pleural effusions/congestion     Large R loculated effusion on CT thorax 12/11     S/p chest tube placement on 12/14  # Pericardial effusion   # C. Diff colitis  -C.diff positive  # C.diff  # Aflutter     PLAN:    - pt with tachycardia, and return of diarrhea and some abd pain  - labs show no electrolyte abnormalities from today  - will hold HD this AM and re-evaluate for HD later this afternoon  - if tachycardia or other significant issues persist, may delay HD until tomorrow as no emergent indications exist  - cont EPO 6000U IV w HD  - Cont holding PD for now (last on  12/3),  can re-eval as outpt  - consider flushing PD catheter once pt is more stable  - c diff tx/mgmt per primary team  - Renal diet, Encourage protein intake given low albumin  - Phos binder with meals   - Dose all medications as per ESRD    **Pt will need outpt HD chair at [facility] for the next 30 days until abd pain improves enough that he can resume PD

## 2023-12-16 NOTE — CARE PLAN
The patient is Stable - Low risk of patient condition declining or worsening    Shift Goals  Clinical Goals: Patient pain will <3/10 through out this shift.  Patient Goals: Rest  Family Goals: FARIDA    Progress made toward(s) clinical / shift goals:  Patient complaints of pain, PRN medication given. Patient able to rest. Call light in reach.    Patient is not progressing towards the following goals:      Problem: Respiratory  Goal: Patient will achieve/maintain optimum respiratory ventilation and gas exchange  Description: Target End Date:  Prior to discharge or change in level of care    Document on Assessment flowsheet    1.  Assess and monitor rate, rhythm, depth and effort of respiration  2.  Breath sounds assessed qshift and/or as needed  3.  Assess O2 saturation, administer/titrate oxygen as ordered  4.  Position patient for maximum ventilatory efficiency  5.  Turn, cough, and deep breath with splinting to improve effectiveness  6.  Collaborate with RT to administer medication/treatments per order  7.  Encourage use of incentive spirometer and encourage patient to cough after use and utilize splinting techniques if applicable  8.  Airway suctioning  9.  Monitor sputum production for changes in color, consistency and frequency  10. Perform frequent oral hygiene  11. Alternate physical activity with rest periods  Outcome: Not Progressing

## 2023-12-16 NOTE — PROGRESS NOTES
Hospital Medicine Daily Progress Note    Date of Service  12/16/2023    Chief Complaint  Gurdeep Guerra is a 58 y.o. male admitted 11/27/2023 with diarrhea    Hospital Course  Admitted with symptoms of abdominal pain, nausea, vomiting and diarrhea, CT scan showed evidence of colitis, he was started on empiric coverage with IV Cefepime and Flagyl.  Patient recently underwent mechanical AVR, aortic aneurysm repair, discharged on 11/17/2023.  He also has known history of end-stage renal disease on peritoneal dialysis.  Workup showed he was positive for C. difficile colitis, he was started on oral vancomycin.  There was also concern for possible peritonitis, he was given intraperitoneal IV cefepime.  Nephrology was consulted on the case, he underwent dialysis through his temporary dialysis catheter which was placed on the previous admission.  Also with known history of paroxysmal atrial flutter, and with recent mechanical AVR, he was on Coumadin for anticoagulation.  He required to be placed on heparin drip to bridge Coumadin.    Interval Problem Update    Remains in A-fib    And has been tachycardic overnight persistentl===> 130's    he has been not been taking his oral verapamil for concerns of hypotension however he has had not had any significant hypotension that is concerning.    patient was counseled on the necessity of taking his verapamil for heart rate control that we will stabilize his blood pressure.  His dialysis session today has been put on hold due to his tachycardia    Patient having foul-smelling loose stools    I have restarted test for C. Difficile    Patient given labetalol 10 mg IV push x 1 patient's blood pressure dropped to 75 systolic.  He was not complaining of any dizziness but he was laying in the bed.  He was given a saline bolus of 250 cc x 1      Continue on IV heparin for his history of I-rix-eqitdipi IV heparin until we have been sure that all invasive procedures have been  completed      I have discussed this patient's plan of care and discharge plan at IDT rounds today with Case Management, Nursing, Nursing leadership, and other members of the IDT team.    Consultants/Specialty  nephrology and pulmonary    Code Status  Full Code    Disposition  The patient is not medically cleared for discharge to home or a post-acute facility.  Anticipate discharge to: home with close outpatient follow-up    I have placed the appropriate orders for post-discharge needs.    Review of Systems  Review of Systems   Constitutional:  Positive for malaise/fatigue. Negative for chills, diaphoresis and fever.   HENT:  Negative for congestion, hearing loss and sore throat.    Eyes:  Negative for blurred vision.   Respiratory:  Positive for cough and shortness of breath. Negative for sputum production and wheezing.    Cardiovascular:  Positive for palpitations. Negative for chest pain and leg swelling.   Gastrointestinal:  Positive for diarrhea. Negative for abdominal pain, blood in stool, heartburn, melena, nausea and vomiting.   Genitourinary:  Negative for dysuria, flank pain and hematuria.   Musculoskeletal:  Positive for myalgias. Negative for back pain, joint pain and neck pain.   Skin:  Negative for rash.   Neurological:  Positive for weakness. Negative for dizziness, sensory change, speech change, focal weakness and headaches.   Psychiatric/Behavioral:  The patient is not nervous/anxious.         Physical Exam  Temp:  [36.8 °C (98.3 °F)-37.3 °C (99.2 °F)] 36.8 °C (98.3 °F)  Pulse:  [] 131  Resp:  [16-20] 16  BP: ()/(55-82) 113/72  SpO2:  [94 %-100 %] 96 %    Physical Exam  Vitals and nursing note reviewed.   HENT:      Head: Normocephalic and atraumatic.      Nose: No congestion.      Mouth/Throat:      Mouth: Mucous membranes are moist.   Eyes:      Extraocular Movements: Extraocular movements intact.      Conjunctiva/sclera: Conjunctivae normal.   Cardiovascular:      Rate and Rhythm:  Tachycardia present. Rhythm irregular.   Pulmonary:      Effort: No accessory muscle usage.      Breath sounds: Rhonchi present.      Comments: Decreased breath sounds at the bases    Chest tube present  Abdominal:      General: There is no distension.      Tenderness: There is no abdominal tenderness. There is no guarding or rebound.      Comments: PD catheter   Musculoskeletal:      Cervical back: No tenderness.      Right lower leg: No edema.      Left lower leg: No edema.   Skin:     General: Skin is warm and dry.   Neurological:      General: No focal deficit present.      Mental Status: He is alert and oriented to person, place, and time.      Cranial Nerves: No cranial nerve deficit.         Fluids    Intake/Output Summary (Last 24 hours) at 12/16/2023 1135  Last data filed at 12/16/2023 0600  Gross per 24 hour   Intake 1610 ml   Output 1220 ml   Net 390 ml         Laboratory  Recent Labs     12/14/23  0336 12/16/23  0802   WBC 6.0 8.6   RBC 2.82* 3.58*   HEMOGLOBIN 8.4* 10.4*   HEMATOCRIT 26.7* 33.2*   MCV 94.7 92.7   MCH 29.8 29.1   MCHC 31.5* 31.3*   RDW 52.3* 50.3*   PLATELETCT 245 297   MPV 8.5* 8.6*       Recent Labs     12/14/23  0336 12/16/23  0802   SODIUM 135 134*   POTASSIUM 4.9 4.5   CHLORIDE 98 98   CO2 24 23   GLUCOSE 98 107*   BUN 60* 50*   CREATININE 9.70* 8.93*   CALCIUM 8.2* 8.5       Recent Labs     12/14/23  0336 12/16/23  0802   INR 1.55* 1.42*                 Imaging  DX-CHEST-PORTABLE (1 VIEW)   Final Result      Stable examination.      CT-CHEST (THORAX) W/O   Final Result      1.  Interval decrease in size of large loculated right pleural effusion following following placement of locking loop chest tube.      2.  Smaller loculated pleural effusions posterior superiorly in the right hemithorax there are thorax.      3.  Small left pleural effusion with elevation of left hemidiaphragm.      4.  Dependent partial collapse of the lower lobes.      5.  Patchy airspace opacities in the  right lower lobe, lingula, and to a lesser extent right middle lobe suspicious for pneumonitis or parenchymal scarring      6.  Cirrhosis and splenomegaly.      DX-CHEST-PORTABLE (1 VIEW)   Final Result      1.  Redemonstrated right hydropneumothorax with decreased air component, otherwise stable in size.   2.  Bilateral basilar atelectasis and/or consolidation with mild interstitial edema.   3.  Stable enlargement of the cardiomediastinal silhouette.      DX-CHEST-PORTABLE (1 VIEW)   Final Result      Placement of a right pigtail chest tube with decreased right pleural effusion.      Hazy opacities which could be related to hypoinflation or vascular congestion. Correlate clinically for infection.      IR-CHEST TUBE-EMPYEMA RIGHT   Final Result      Successful image guided RIGHT chest tube placement.      Plan: Low wall suction. Follow-up radiograph is pending.      CT-CHEST (THORAX) W/O   Final Result      1.  Large loculated right pleural effusion, worse compared to the October 2023 CT study. Associated atelectasis, with subtotal collapse of the right lower lobe.   2.  Minimal groundglass opacities in the right lung, infectious/inflammatory. No consolidation.   3.  Small left pleural effusion and associated atelectasis.   4.  Mild right hilar lymphadenopathy, statistically reactive rather than neoplastic.   5.  Cardiomegaly.   6.  Abdomen is detailed separately.      CT-ABDOMEN-PELVIS W/O   Final Result      1.  No new inflammatory process in the abdomen or pelvis.   2.  Minimal fat stranding adjacent to the cecum, nonspecific.   3.  Percutaneous catheter with tip in the pelvis. Small volume of pelvic free fluid.   4.  Colonic diverticulosis.   5.  Partially visualized moderate to large right pleural effusion, which may be loculated. It has increased in size since prior CT study.   6.  Small left pleural effusion.   7.  Cardiomegaly.         EI-IGSSOXO-1 VIEW   Final Result      1.  Benign bowel gas pattern.       2.  Peritoneal dialysis catheter coiled in the pelvis.      [id number]-4 VIEW   Final Result      Nonspecific bowel gas pattern with a moderate colonic stool burden.      DX-CHEST-PORTABLE (1 VIEW)   Final Result      1.  Enlarged cardiac silhouette with vascular congestion/edema.   2.  Lower lobe airspace disease could be due to edema, atelectasis or pneumonitis.      EC-ECHOCARDIOGRAM COMPLETE W/O CONT   Final Result      CT-ABDOMEN-PELVIS W/O   Final Result      1.  Fat stranding adjacent to the cecum, concerning for colitis/typhlitis.   2.  Colonic diverticulosis.   3.  Nonobstructing, tiny left renal stone.   4.  Likely partially loculated moderate right pleural effusion.   5.  Small left pleural effusion.   6.  Adjacent right middle lobe and bilateral lower lobe consolidations, likely atelectasis.   7.  Small pericardial effusion.      DX-CHEST-PORTABLE (1 VIEW)   Final Result      1.  Resolved or improved small left pleural effusion with persistent left basilar atelectasis and/or scarring.   2.  New small right pleural effusion with associated atelectasis and/or consolidation.           Assessment/Plan  * C. difficile colitis- (present on admission)  Assessment & Plan  Oral vancomycin - finished course    Stool has become foul-smelling and loose again so we have rechecked stool for C. difficile on 12/16/2023    History of mechanical aortic valve replacement- (present on admission)  Assessment & Plan  Coumadin on hold    Hypomagnesemia- (present on admission)  Assessment & Plan  Replaced  Follow level    Loculated pleural effusion- (present on admission)  Assessment & Plan  Status post chest tube on 12/14/23    Daily x-rays    Continue to monitor chest tube output    Pulmonary MD follow-up    Paroxysmal atrial flutter (HCC)- (present on admission)  Assessment & Plan    Tachycardic on 12/16/2023 ordered IV labbetol as he has been refusing his oral verapamil.    Patient counseled about the necessity of taking  his oral verapamil  Verapamil  Coumadin on hold    ESRD (end stage renal disease) (HCC)- (present on admission)  Assessment & Plan  HD per Nephrology    Dyslipidemia- (present on admission)  Assessment & Plan  Lipitor    Coronary artery disease due to lipid rich plaque- (present on admission)  Assessment & Plan  Aspirin, Lipitor    Hypertension- (present on admission)  Assessment & Plan  Verapamil with parameters          VTE prophylaxis:   SCDs/TEDs      I have performed a physical exam and reviewed and updated ROS and Plan today (12/16/2023). In review of yesterday's note (12/15/2023), there are no changes except as documented above.

## 2023-12-16 NOTE — PROGRESS NOTES
Patient wants to move his medication to 08:00 am including blood extraction, inform lab. Patient also refused vital signs check.

## 2023-12-16 NOTE — PROGRESS NOTES
[facility] Progress Note     Arrived at bedside; patient awake and alert oriented x 4; pt verbalized not feeling well this AM due to diarrhea and N/V; Heart Rate sustaining at 120-130's started yesterday; BP at 113/72; denies chest pain, Denies SOB; medicated by primary RN. Referred to Dr. Zac Daily nephrologist; okay to hold treatment for now until heart rate stabilized; Informed patient and primary RN Haydee Wayne.

## 2023-12-16 NOTE — CARE PLAN
The patient is Stable - Low risk of patient condition declining or worsening    Shift Goals  Clinical Goals: safety  Patient Goals: Rest  Family Goals: FARIDA    Progress made toward(s) clinical / shift goals:  NAD, VSS, Chest Tube intact, Safety intact.     Patient is not progressing towards the following goals:      Problem: Pain - Standard  Goal: Alleviation of pain or a reduction in pain to the patient’s comfort goal  Outcome: Progressing     Problem: Knowledge Deficit - Standard  Goal: Patient and family/care givers will demonstrate understanding of plan of care, disease process/condition, diagnostic tests and medications  Outcome: Progressing     Problem: Fall Risk  Goal: Patient will remain free from falls  Outcome: Progressing     Problem: Hemodynamics  Goal: Patient's hemodynamics, fluid balance and neurologic status will be stable or improve  Outcome: Progressing

## 2023-12-17 ENCOUNTER — APPOINTMENT (OUTPATIENT)
Dept: RADIOLOGY | Facility: MEDICAL CENTER | Age: 58
DRG: 981 | End: 2023-12-17
Attending: HOSPITALIST
Payer: COMMERCIAL

## 2023-12-17 ENCOUNTER — APPOINTMENT (OUTPATIENT)
Dept: RADIOLOGY | Facility: MEDICAL CENTER | Age: 58
DRG: 981 | End: 2023-12-17
Payer: COMMERCIAL

## 2023-12-17 LAB
ANION GAP SERPL CALC-SCNC: 13 MMOL/L (ref 7–16)
BACTERIA FLD AEROBE CULT: NORMAL
BUN SERPL-MCNC: 59 MG/DL (ref 8–22)
CALCIUM SERPL-MCNC: 8.1 MG/DL (ref 8.5–10.5)
CHLORIDE SERPL-SCNC: 98 MMOL/L (ref 96–112)
CO2 SERPL-SCNC: 22 MMOL/L (ref 20–33)
CREAT SERPL-MCNC: 10.33 MG/DL (ref 0.5–1.4)
EKG IMPRESSION: NORMAL
EKG IMPRESSION: NORMAL
ERYTHROCYTE [DISTWIDTH] IN BLOOD BY AUTOMATED COUNT: 50.5 FL (ref 35.9–50)
GFR SERPLBLD CREATININE-BSD FMLA CKD-EPI: 5 ML/MIN/1.73 M 2
GLUCOSE SERPL-MCNC: 101 MG/DL (ref 65–99)
GRAM STN SPEC: NORMAL
HCT VFR BLD AUTO: 30.7 % (ref 42–52)
HGB BLD-MCNC: 9.5 G/DL (ref 14–18)
INR PPP: 1.4 (ref 0.87–1.13)
MCH RBC QN AUTO: 29.1 PG (ref 27–33)
MCHC RBC AUTO-ENTMCNC: 30.9 G/DL (ref 32.3–36.5)
MCV RBC AUTO: 94.2 FL (ref 81.4–97.8)
PLATELET # BLD AUTO: 298 K/UL (ref 164–446)
PMV BLD AUTO: 8.7 FL (ref 9–12.9)
POTASSIUM SERPL-SCNC: 5 MMOL/L (ref 3.6–5.5)
PROTHROMBIN TIME: 17.3 SEC (ref 12–14.6)
RBC # BLD AUTO: 3.26 M/UL (ref 4.7–6.1)
SIGNIFICANT IND 70042: NORMAL
SITE SITE: NORMAL
SODIUM SERPL-SCNC: 133 MMOL/L (ref 135–145)
SOURCE SOURCE: NORMAL
TROPONIN T SERPL-MCNC: 496 NG/L (ref 6–19)
WBC # BLD AUTO: 8.9 K/UL (ref 4.8–10.8)

## 2023-12-17 PROCEDURE — 770020 HCHG ROOM/CARE - TELE (206)

## 2023-12-17 PROCEDURE — 82728 ASSAY OF FERRITIN: CPT

## 2023-12-17 PROCEDURE — 84100 ASSAY OF PHOSPHORUS: CPT

## 2023-12-17 PROCEDURE — 93010 ELECTROCARDIOGRAM REPORT: CPT | Performed by: INTERNAL MEDICINE

## 2023-12-17 PROCEDURE — 90935 HEMODIALYSIS ONE EVALUATION: CPT

## 2023-12-17 PROCEDURE — 84484 ASSAY OF TROPONIN QUANT: CPT

## 2023-12-17 PROCEDURE — 85520 HEPARIN ASSAY: CPT

## 2023-12-17 PROCEDURE — 700111 HCHG RX REV CODE 636 W/ 250 OVERRIDE (IP): Performed by: INTERNAL MEDICINE

## 2023-12-17 PROCEDURE — 93005 ELECTROCARDIOGRAM TRACING: CPT | Performed by: HOSPITALIST

## 2023-12-17 PROCEDURE — 700102 HCHG RX REV CODE 250 W/ 637 OVERRIDE(OP)

## 2023-12-17 PROCEDURE — 80048 BASIC METABOLIC PNL TOTAL CA: CPT

## 2023-12-17 PROCEDURE — 85610 PROTHROMBIN TIME: CPT

## 2023-12-17 PROCEDURE — 700102 HCHG RX REV CODE 250 W/ 637 OVERRIDE(OP): Performed by: STUDENT IN AN ORGANIZED HEALTH CARE EDUCATION/TRAINING PROGRAM

## 2023-12-17 PROCEDURE — 700111 HCHG RX REV CODE 636 W/ 250 OVERRIDE (IP): Performed by: FAMILY MEDICINE

## 2023-12-17 PROCEDURE — 85027 COMPLETE CBC AUTOMATED: CPT

## 2023-12-17 PROCEDURE — 83540 ASSAY OF IRON: CPT

## 2023-12-17 PROCEDURE — 700111 HCHG RX REV CODE 636 W/ 250 OVERRIDE (IP)

## 2023-12-17 PROCEDURE — 700102 HCHG RX REV CODE 250 W/ 637 OVERRIDE(OP): Performed by: FAMILY MEDICINE

## 2023-12-17 PROCEDURE — A9270 NON-COVERED ITEM OR SERVICE: HCPCS | Performed by: HOSPITALIST

## 2023-12-17 PROCEDURE — 700102 HCHG RX REV CODE 250 W/ 637 OVERRIDE(OP): Performed by: HOSPITALIST

## 2023-12-17 PROCEDURE — 700111 HCHG RX REV CODE 636 W/ 250 OVERRIDE (IP): Mod: JZ | Performed by: INTERNAL MEDICINE

## 2023-12-17 PROCEDURE — 71045 X-RAY EXAM CHEST 1 VIEW: CPT

## 2023-12-17 PROCEDURE — A9270 NON-COVERED ITEM OR SERVICE: HCPCS | Performed by: FAMILY MEDICINE

## 2023-12-17 PROCEDURE — 99233 SBSQ HOSP IP/OBS HIGH 50: CPT | Performed by: HOSPITALIST

## 2023-12-17 PROCEDURE — 700101 HCHG RX REV CODE 250

## 2023-12-17 PROCEDURE — 99232 SBSQ HOSP IP/OBS MODERATE 35: CPT | Performed by: INTERNAL MEDICINE

## 2023-12-17 PROCEDURE — A9270 NON-COVERED ITEM OR SERVICE: HCPCS | Performed by: STUDENT IN AN ORGANIZED HEALTH CARE EDUCATION/TRAINING PROGRAM

## 2023-12-17 PROCEDURE — 83735 ASSAY OF MAGNESIUM: CPT

## 2023-12-17 PROCEDURE — 83550 IRON BINDING TEST: CPT

## 2023-12-17 PROCEDURE — 36415 COLL VENOUS BLD VENIPUNCTURE: CPT

## 2023-12-17 PROCEDURE — A9270 NON-COVERED ITEM OR SERVICE: HCPCS

## 2023-12-17 RX ORDER — SODIUM CHLORIDE, SODIUM LACTATE, POTASSIUM CHLORIDE, AND CALCIUM CHLORIDE .6; .31; .03; .02 G/100ML; G/100ML; G/100ML; G/100ML
500 INJECTION, SOLUTION INTRAVENOUS ONCE
Status: ACTIVE | OUTPATIENT
Start: 2023-12-17 | End: 2023-12-18

## 2023-12-17 RX ORDER — MORPHINE SULFATE 4 MG/ML
4 INJECTION INTRAVENOUS
Status: DISCONTINUED | OUTPATIENT
Start: 2023-12-17 | End: 2023-12-17

## 2023-12-17 RX ORDER — OXYCODONE HYDROCHLORIDE 10 MG/1
10 TABLET ORAL
Status: DISCONTINUED | OUTPATIENT
Start: 2023-12-17 | End: 2023-12-26 | Stop reason: HOSPADM

## 2023-12-17 RX ORDER — OXYCODONE HYDROCHLORIDE 5 MG/1
2.5 TABLET ORAL
Status: DISCONTINUED | OUTPATIENT
Start: 2023-12-17 | End: 2023-12-26 | Stop reason: HOSPADM

## 2023-12-17 RX ADMIN — Medication 1 CAPSULE: at 07:23

## 2023-12-17 RX ADMIN — ONDANSETRON 4 MG: 2 INJECTION INTRAMUSCULAR; INTRAVENOUS at 21:44

## 2023-12-17 RX ADMIN — VANCOMYCIN HYDROCHLORIDE 125 MG: 5 INJECTION, POWDER, LYOPHILIZED, FOR SOLUTION INTRAVENOUS at 01:17

## 2023-12-17 RX ADMIN — SIMETHICONE 125 MG: 125 TABLET, CHEWABLE ORAL at 09:04

## 2023-12-17 RX ADMIN — OXYCODONE 2.5 MG: 5 TABLET ORAL at 10:28

## 2023-12-17 RX ADMIN — VERAPAMIL HYDROCHLORIDE 80 MG: 120 TABLET ORAL at 06:00

## 2023-12-17 RX ADMIN — VERAPAMIL HYDROCHLORIDE 80 MG: 120 TABLET ORAL at 21:45

## 2023-12-17 RX ADMIN — HEPARIN SODIUM 22 UNITS/KG/HR: 5000 INJECTION, SOLUTION INTRAVENOUS at 10:29

## 2023-12-17 RX ADMIN — VANCOMYCIN HYDROCHLORIDE 125 MG: 5 INJECTION, POWDER, LYOPHILIZED, FOR SOLUTION INTRAVENOUS at 15:20

## 2023-12-17 RX ADMIN — TAMSULOSIN HYDROCHLORIDE 0.4 MG: 0.4 CAPSULE ORAL at 06:00

## 2023-12-17 RX ADMIN — VANCOMYCIN HYDROCHLORIDE 125 MG: 5 INJECTION, POWDER, LYOPHILIZED, FOR SOLUTION INTRAVENOUS at 07:22

## 2023-12-17 RX ADMIN — ASPIRIN 81 MG: 81 TABLET, COATED ORAL at 06:00

## 2023-12-17 RX ADMIN — OMEPRAZOLE 20 MG: 20 CAPSULE, DELAYED RELEASE ORAL at 06:00

## 2023-12-17 RX ADMIN — EPOETIN ALFA 6000 UNITS: 10000 SOLUTION INTRAVENOUS; SUBCUTANEOUS at 13:31

## 2023-12-17 RX ADMIN — VANCOMYCIN HYDROCHLORIDE 125 MG: 5 INJECTION, POWDER, LYOPHILIZED, FOR SOLUTION INTRAVENOUS at 21:45

## 2023-12-17 RX ADMIN — LIDOCAINE HYDROCHLORIDE 30 ML: 20 SOLUTION ORAL; TOPICAL at 01:09

## 2023-12-17 RX ADMIN — HEPARIN SODIUM 3700 UNITS: 1000 INJECTION, SOLUTION INTRAVENOUS; SUBCUTANEOUS at 14:45

## 2023-12-17 RX ADMIN — ATORVASTATIN CALCIUM 40 MG: 40 TABLET, FILM COATED ORAL at 21:44

## 2023-12-17 ASSESSMENT — ENCOUNTER SYMPTOMS
DIARRHEA: 1
PALPITATIONS: 0
VOMITING: 0
SORE THROAT: 0
NAUSEA: 0
FEVER: 0
SHORTNESS OF BREATH: 1
DIZZINESS: 0
SPEECH CHANGE: 0
NECK PAIN: 0
WHEEZING: 0
HEARTBURN: 0
BACK PAIN: 0
BLURRED VISION: 0
SPUTUM PRODUCTION: 0
NERVOUS/ANXIOUS: 0
CHILLS: 0
DIAPHORESIS: 0
BLOOD IN STOOL: 0
FOCAL WEAKNESS: 0
FLANK PAIN: 0
MYALGIAS: 0
WEAKNESS: 1
COUGH: 1
SENSORY CHANGE: 0
HEADACHES: 0
ABDOMINAL PAIN: 1

## 2023-12-17 ASSESSMENT — FIBROSIS 4 INDEX: FIB4 SCORE: 0.94

## 2023-12-17 ASSESSMENT — PAIN DESCRIPTION - PAIN TYPE
TYPE: ACUTE PAIN

## 2023-12-17 NOTE — PROGRESS NOTES
"At 0000 the patient had a troponin blood draw scheduled. He refused the draw saying \"I need a break. I have been poked too much\". Patient educated about the importance of lab tests. The draw was rescheduled for 0300.  "

## 2023-12-17 NOTE — PROGRESS NOTES
Upon transport ACT noted pleuravac was not filled with water.     RN aware, Will continue to monitor.

## 2023-12-17 NOTE — DISCHARGE PLANNING
Received choice form @: [id number]  Agency/Facility name: [facility]  Sent referral per choice form @:  No referral sent. Pending orders.

## 2023-12-17 NOTE — PROGRESS NOTES
Monitor Summary  Rhythm: ST/Aflutter/Afib  Rate: 113-129  Ectopy: (r) pvc  Measurements: 0.14/0.08/0.30          12/17/23 0112    Pt converted to Afib/Aflutter. NOC provider notified.

## 2023-12-17 NOTE — PROGRESS NOTES
Pulmonary Progress Note    Date of admission  11/27/2023    Reason for Consultation  Loculated pleural effusion     History of Presenting Illness  58 y.o. male who presented 11/27/2023 with abdominal pain, nausea, vomiting, diarrhea.  He was found to have C. difficile colitis.  He had been discharged on 11/17/2023 after mechanical aortic valve replacement and aortic aneurysm repair.  He has a history of ESRD on PD.  Patient reports that during his hospitalization, he has had increasing shortness of breath.  He had a CT scan of his abdomen yesterday due to ongoing abdominal pain and a loculated effusion on the right side of his chest was found.  I reviewed the chest CT with IR and we both felt that the effusion would not drain with simple thoracenteses and that he needed to undergo a chest tube.  Patient has a history of a left-sided pleural effusion which was drained on 11/11/2023.  It was bloody in appearance but no labs were sent.    Hospital Course  12/14/2023 - Chest tube placed today  12/15/2023 - 1360 out yesterday, given 1/2 dose lytics because the fluid was slightly bloody. CXR showing hydropneumonthorax.  12/17/2023-patient was moved to telemetry yesterday due to A-fib with RVR.  He is receiving HD today.  CT scan showed small pneumothorax, I do not believe that this is an ex vacuo pneumothorax.  He does have residual loculated effusion at the right base as well as a small left-sided pleural effusion.    Vital Signs for last 24 hours   Temp:  [36.5 °C (97.7 °F)-36.7 °C (98.1 °F)] 36.6 °C (97.9 °F)  Pulse:  [] 99  Resp:  [16-20] 17  BP: ()/(53-81) 104/72  SpO2:  [92 %-98 %] 94 %    Hemodynamic parameters for last 24 hours       Respiratory Information for the last 24 hours     Physical Exam  Vitals and nursing note reviewed.   Constitutional:       Appearance: Normal appearance.   HENT:      Head: Normocephalic.   Eyes:      Conjunctiva/sclera: Conjunctivae normal.   Cardiovascular:      Rate and  Rhythm: Normal rate and regular rhythm.   Pulmonary:      Effort: Pulmonary effort is normal.      Comments: Decreased breath sounds right base   Skin:     General: Skin is warm and dry.   Neurological:      General: No focal deficit present.      Mental Status: He is alert and oriented to person, place, and time.   Psychiatric:         Mood and Affect: Mood normal.         Behavior: Behavior normal.       Medications  Current Facility-Administered Medications   Medication Dose Route Frequency Provider Last Rate Last Admin    LR (Bolus) infusion 500 mL  500 mL Intravenous Once FRANKLIN BluePYolyRYolyNYoly        oxyCODONE immediate-release (Roxicodone) tablet 2.5 mg  2.5 mg Oral Q3HRS PRN Sujit Galvan M.D.   2.5 mg at 12/17/23 1028    Or    oxyCODONE immediate release (Roxicodone) tablet 10 mg  10 mg Oral Q3HRS PRN Sujit Galvan M.D.        alteplase (Activase) 10 mg in NS 30 mL INTRAPLEURAL syringe  10 mg Intrapleural Once Gisela Heller D.O.        And    dornase alpha (Pulmozyme) 5 mg in NS 30 mL INTRAPLEURAL syringe  5 mg Intrapleural Once Gisela Heller D.O.        Pharmacy Consult Request - C. difficile consult  1 Each Other PHARMACY TO DOSE Sathish Wayne M.D.        verapamil (Isoptin) tablet 80 mg  80 mg Oral Q8HRS Sathish Wayne M.D.   80 mg at 12/17/23 0600    vancomycin 50 mg/mL oral soln 125 mg  125 mg Oral Q6HR ANIBAL Blue.P.R.N.   125 mg at 12/17/23 1520    Followed by    [START ON 12/27/2023] vancomycin 50 mg/mL oral soln 125 mg  125 mg Oral Q12HR Major Alaniz A.P.R.N.        Followed by    [START ON 1/3/2024] vancomycin 50 mg/mL oral soln 125 mg  125 mg Oral Q24HR Major Alaniz A.P.R.N.        Followed by    [START ON 1/10/2024] vancomycin 50 mg/mL oral soln 125 mg  125 mg Oral Q48HRS Major Alaniz A.P.R.N.        Followed by    [START ON 1/18/2024] vancomycin 50 mg/mL oral soln 125 mg  125 mg Oral Q72HRS CARISSA Blue.        benzonatate (Tessalon)  capsule 100 mg  100 mg Oral TID PRN Sathish Wayne M.D.        epoetin gabbie (Epogen/Procrit) injection 6,000 Units  6,000 Units Intravenous TUE+THU+SAT Zac Daily D.O.   6,000 Units at 12/14/23 0715    heparin infusion 25,000 units in 500 mL 0.45% NACL  0-30 Units/kg/hr Intravenous Continuous Federico Jacobsen M.D. 34.4 mL/hr at 12/17/23 1029 22 Units/kg/hr at 12/17/23 1029    heparin injection 3,100 Units  40 Units/kg Intravenous PRN Federico Jacobsen M.D.   3,100 Units at 12/12/23 0121    simethicone (Mylicon) chewable tablet 125 mg  125 mg Oral TID PRN Federico Jacobsen M.D.   125 mg at 12/17/23 0904    Pharmacy Consult Request ...Pain Management Review 1 Each  1 Each Other PHARMACY TO DOSE Sage Wallace M.D.        acetaminophen (Tylenol) tablet 1,000 mg  1,000 mg Oral Q6HRS PRN Sage Wallace M.D.        Dextromethorphan Polistirex ER (Delsym) 30 MG/5ML suspension 60 mg  60 mg Oral BID W/MEALS PRN Richy Hand M.D.        heparin injection 2,000 Units  2,000 Units Intravenous ACUTE DIALYSIS PRN Justo Ríos M.D.   2,000 Units at 12/09/23 0830    lactobacillus rhamnosus (Culturelle) capsule 1 Capsule  1 Capsule Oral QDAY with Breakfast Richy Hand M.D.   1 Capsule at 12/17/23 0723    menthol (Halls) lozenge 1 Lozenge  1 Lozenge Oral Q2HRS PRN Richy Hand M.D.   1 Lozenge at 12/04/23 2257    benzocaine-menthol (Cepacol) lozenge 1 Lozenge  1 Lozenge Mouth/Throat Q2HRS PRN Richy Hand M.D.   1 Lozenge at 12/02/23 1956    sevelamer carbonate (Renvela) tablet 1,600 mg  1,600 mg Oral TID WITH MEALS Justice Mac M.D.   1,600 mg at 12/10/23 0719    dronabinol (Marinol) capsule 5 mg  5 mg Oral BEFORE LUNCH AND DINNER Richy Hand M.D.   5 mg at 12/16/23 1824    ondansetron (Zofran) syringe/vial injection 4 mg  4 mg Intravenous Q6HRS PRN Tanya Benitez M.D.   4 mg at 12/16/23 0952    [Held by provider] MD Alert...Warfarin per Pharmacy   Other PHARMACY TO DOSE Tanya Benitez M.D.         heparin injection 3,700 Units  3,700 Units Intracatheter PRN Justice Mac M.D.   3,700 Units at 12/17/23 1445    aspirin EC tablet 81 mg  81 mg Oral DAILY Iglesia Auguste M.D.   81 mg at 12/17/23 0600    atorvastatin (Lipitor) tablet 40 mg  40 mg Oral Nightly Iglesia Auguste M.D.   40 mg at 12/16/23 2231    tamsulosin (Flomax) capsule 0.4 mg  0.4 mg Oral DAILY Iglesia Auguste M.D.   0.4 mg at 12/17/23 0600    ondansetron (Zofran ODT) dispertab 4 mg  4 mg Oral Q4HRS PRN Iglesia Auguste M.D.   4 mg at 12/08/23 0602    promethazine (Phenergan) tablet 12.5-25 mg  12.5-25 mg Oral Q4HRS FROILAN Auguste M.D.        promethazine (Phenergan) suppository 12.5-25 mg  12.5-25 mg Rectal Q4HRS PRN Iglesia Auguste M.D.        prochlorperazine (Compazine) injection 5-10 mg  5-10 mg Intravenous Q4HRS PRROSA Auguste M.D.           Fluids    Intake/Output Summary (Last 24 hours) at 12/17/2023 1547  Last data filed at 12/17/2023 1506  Gross per 24 hour   Intake 770 ml   Output 1730 ml   Net -960 ml       Laboratory          Recent Labs     12/16/23  0802 12/17/23  0633   SODIUM 134* 133*   POTASSIUM 4.5 5.0   CHLORIDE 98 98   CO2 23 22   BUN 50* 59*   CREATININE 8.93* 10.33*   CALCIUM 8.5 8.1*       Recent Labs     12/16/23  0802 12/17/23  0633   GLUCOSE 107* 101*       Recent Labs     12/16/23  0802 12/17/23  0633   WBC 8.6 8.9       Recent Labs     12/16/23  0802 12/17/23  0633   RBC 3.58* 3.26*   HEMOGLOBIN 10.4* 9.5*   HEMATOCRIT 33.2* 30.7*   PLATELETCT 297 298   PROTHROMBTM 17.5* 17.3*   INR 1.42* 1.40*     Imaging  X-Ray:  I have personally reviewed the images and compared with prior images.    Assessment/Plan  # Acute hypoxemic respiratory failure  # Loculated right-sided pleural effusion, suspect that this is due to PD which became increasingly more complicated, possibly hemorhorax     Titrate O2 to maintain sats 88-92%  Restart lytic enzyme therapy today, dose today  Follow-up cytology   Continue with aggressive  MIKAEL Heller, DO   Pulmonary and Critical Care

## 2023-12-17 NOTE — CARE PLAN
The patient is Watcher - Medium risk of patient condition declining or worsening    Shift Goals  Clinical Goals: pain control, decrease in HR  Patient Goals: pain control  Family Goals: FARIDA    Progress made toward(s) clinical / shift goals:  patient refusing oral pain meds for abdominal pain. Patient having loose stools. He has been hypotensive and tachycardic. He required total of 1000 ml NS bolus over 12 hours. Patient given verapamil 120 mg oral x 1 and labetalol 10 mg IV once.  Problem: Pain - Standard  Goal: Alleviation of pain or a reduction in pain to the patient’s comfort goal  Outcome: Progressing     Problem: Knowledge Deficit - Standard  Goal: Patient and family/care givers will demonstrate understanding of plan of care, disease process/condition, diagnostic tests and medications  Outcome: Progressing     Problem: Fluid Volume  Goal: Fluid volume balance will be maintained  Outcome: Progressing

## 2023-12-17 NOTE — PROGRESS NOTES
HD treatment ordered by Dr Daily started at 1140 and ended at 1440 with net UF of 1 liter as tolerated. Pt went potty prior to HD. Right IJ CVC was patent with good BFR x 2 entire treatment. Pt has been tachycardic pre, intra and post treatment. Primary RN was aware. Dressing CDI. See flow sheet for details.

## 2023-12-17 NOTE — CARE PLAN
The patient is Stable - Low risk of patient condition declining or worsening    Shift Goals  Clinical Goals: decrease in HR  Patient Goals: test result, rest  Family Goals: FARIDA    Progress made toward(s) clinical / shift goals:        Patient is not progressing towards the following goals:      Problem: Knowledge Deficit - Standard  Goal: Patient and family/care givers will demonstrate understanding of plan of care, disease process/condition, diagnostic tests and medications  Outcome: Not Progressing     Problem: Gastrointestinal Irritability  Goal: Diarrhea will be absent or improved  Outcome: Not Progressing

## 2023-12-17 NOTE — PROGRESS NOTES
Hospital Medicine Daily Progress Note    Date of Service  12/17/2023    Chief Complaint  Gurdeep Guerra is a 58 y.o. male admitted 11/27/2023 with diarrhea    Hospital Course  Admitted with symptoms of abdominal pain, nausea, vomiting and diarrhea, CT scan showed evidence of colitis, he was started on empiric coverage with IV Cefepime and Flagyl.  Patient recently underwent mechanical AVR, aortic aneurysm repair, discharged on 11/17/2023.  He also has known history of end-stage renal disease on peritoneal dialysis.  Workup showed he was positive for C. difficile colitis, he was started on oral vancomycin.  There was also concern for possible peritonitis, he was given intraperitoneal IV cefepime.  Nephrology was consulted on the case, he underwent dialysis through his temporary dialysis catheter which was placed on the previous admission.  Also with known history of paroxysmal atrial flutter, and with recent mechanical AVR, he was on Coumadin for anticoagulation.  He required to be placed on heparin drip to bridge Coumadin.    Interval Problem Update    Remains in A-fib    And has been tachycardic overnight persistentl===> 130's    he has been not been taking his oral verapamil for concerns of hypotension however he has had not had any significant hypotension that is concerning.    patient was counseled on the necessity of taking his verapamil for heart rate control that we will stabilize his blood pressure.  His dialysis session today has been put on hold due to his tachycardia    Patient having foul-smelling loose stools    I have restarted test for C. Difficile    Patient given labetalol 10 mg IV push x 1 patient's blood pressure dropped to 75 systolic.  He was not complaining of any dizziness but he was laying in the bed.  He was given a saline bolus of 250 cc x 1    12/17 patient is new to me today, patient is resting in bed, he was complaining of lower abdominal cramps patient denies shortness of breath no  palpitation no chest pain, patient is on heparin drip, chest tube in place, draining 80 cc in the last 24 hours, continue having diarrhea, patient is tolerating oral vancomycin, discussed with pharmacist stopped PPI for now, will consider discussing with ID, heart rate better controlled continue verapamil 3 times daily, discussed with pulmonology patient received tPA today through his chest tube, will continue monitoring.  Chest x-ray in a.m.      Patient is on IV heparin continue monitoring for side effects include but not limited to bleeding, thrombocytopenia.      I have discussed this patient's plan of care and discharge plan at IDT rounds today with Case Management, Nursing, Nursing leadership, and other members of the IDT team.    Consultants/Specialty  nephrology and pulmonary    Code Status  Full Code    Disposition  The patient is not medically cleared for discharge to home or a post-acute facility.      I have placed the appropriate orders for post-discharge needs.    Review of Systems  Review of Systems   Constitutional:  Positive for malaise/fatigue. Negative for chills, diaphoresis and fever.   HENT:  Negative for congestion, hearing loss and sore throat.    Eyes:  Negative for blurred vision.   Respiratory:  Positive for cough and shortness of breath. Negative for sputum production and wheezing.    Cardiovascular:  Negative for chest pain, palpitations and leg swelling.   Gastrointestinal:  Positive for abdominal pain and diarrhea. Negative for blood in stool, heartburn, melena, nausea and vomiting.   Genitourinary:  Negative for dysuria, flank pain and hematuria.   Musculoskeletal:  Negative for back pain, joint pain, myalgias and neck pain.   Skin:  Negative for rash.   Neurological:  Positive for weakness. Negative for dizziness, sensory change, speech change, focal weakness and headaches.   Psychiatric/Behavioral:  The patient is not nervous/anxious.         Physical Exam  Temp:  [36.5 °C (97.7  °F)-36.7 °C (98.1 °F)] 36.5 °C (97.7 °F)  Pulse:  [] 92  Resp:  [16-20] 16  BP: ()/(53-81) 98/64  SpO2:  [92 %-98 %] 98 %    Physical Exam  Vitals and nursing note reviewed.   Constitutional:       Appearance: He is ill-appearing.   HENT:      Head: Normocephalic and atraumatic.      Nose: No congestion.      Mouth/Throat:      Mouth: Mucous membranes are moist.   Eyes:      General: No scleral icterus.        Right eye: No discharge.         Left eye: No discharge.   Cardiovascular:      Rate and Rhythm: Normal rate. Rhythm irregular.   Pulmonary:      Effort: Pulmonary effort is normal. No accessory muscle usage or respiratory distress.      Breath sounds: Rhonchi present.      Comments: Decreased breath sounds at the bases    Chest tube present  Abdominal:      General: There is no distension.      Tenderness: There is no abdominal tenderness. There is no guarding or rebound.      Comments: PD catheter   Musculoskeletal:      Cervical back: Normal range of motion and neck supple. No rigidity or tenderness.      Right lower leg: No edema.      Left lower leg: No edema.   Skin:     General: Skin is warm and dry.   Neurological:      General: No focal deficit present.      Mental Status: He is alert and oriented to person, place, and time.      Cranial Nerves: No cranial nerve deficit.         Fluids    Intake/Output Summary (Last 24 hours) at 12/17/2023 1509  Last data filed at 12/17/2023 1506  Gross per 24 hour   Intake 770 ml   Output 1730 ml   Net -960 ml         Laboratory  Recent Labs     12/16/23  0802 12/17/23  0633   WBC 8.6 8.9   RBC 3.58* 3.26*   HEMOGLOBIN 10.4* 9.5*   HEMATOCRIT 33.2* 30.7*   MCV 92.7 94.2   MCH 29.1 29.1   MCHC 31.3* 30.9*   RDW 50.3* 50.5*   PLATELETCT 297 298   MPV 8.6* 8.7*       Recent Labs     12/16/23  0802 12/17/23  0633   SODIUM 134* 133*   POTASSIUM 4.5 5.0   CHLORIDE 98 98   CO2 23 22   GLUCOSE 107* 101*   BUN 50* 59*   CREATININE 8.93* 10.33*   CALCIUM 8.5 8.1*        Recent Labs     12/16/23  0802 12/17/23  0633   INR 1.42* 1.40*                 Imaging  DX-CHEST-PORTABLE (1 VIEW)   Final Result      Unchanged bilateral pulmonary opacities and suspected bilateral pleural effusions, right worse than left.         DX-CHEST-PORTABLE (1 VIEW)   Final Result      Stable examination.      CT-CHEST (THORAX) W/O   Final Result      1.  Interval decrease in size of large loculated right pleural effusion following following placement of locking loop chest tube.      2.  Smaller loculated pleural effusions posterior superiorly in the right hemithorax there are thorax.      3.  Small left pleural effusion with elevation of left hemidiaphragm.      4.  Dependent partial collapse of the lower lobes.      5.  Patchy airspace opacities in the right lower lobe, lingula, and to a lesser extent right middle lobe suspicious for pneumonitis or parenchymal scarring      6.  Cirrhosis and splenomegaly.      DX-CHEST-PORTABLE (1 VIEW)   Final Result      1.  Redemonstrated right hydropneumothorax with decreased air component, otherwise stable in size.   2.  Bilateral basilar atelectasis and/or consolidation with mild interstitial edema.   3.  Stable enlargement of the cardiomediastinal silhouette.      DX-CHEST-PORTABLE (1 VIEW)   Final Result      Placement of a right pigtail chest tube with decreased right pleural effusion.      Hazy opacities which could be related to hypoinflation or vascular congestion. Correlate clinically for infection.      IR-CHEST TUBE-EMPYEMA RIGHT   Final Result      Successful image guided RIGHT chest tube placement.      Plan: Low wall suction. Follow-up radiograph is pending.      CT-CHEST (THORAX) W/O   Final Result      1.  Large loculated right pleural effusion, worse compared to the October 2023 CT study. Associated atelectasis, with subtotal collapse of the right lower lobe.   2.  Minimal groundglass opacities in the right lung, infectious/inflammatory. No  consolidation.   3.  Small left pleural effusion and associated atelectasis.   4.  Mild right hilar lymphadenopathy, statistically reactive rather than neoplastic.   5.  Cardiomegaly.   6.  Abdomen is detailed separately.      CT-ABDOMEN-PELVIS W/O   Final Result      1.  No new inflammatory process in the abdomen or pelvis.   2.  Minimal fat stranding adjacent to the cecum, nonspecific.   3.  Percutaneous catheter with tip in the pelvis. Small volume of pelvic free fluid.   4.  Colonic diverticulosis.   5.  Partially visualized moderate to large right pleural effusion, which may be loculated. It has increased in size since prior CT study.   6.  Small left pleural effusion.   7.  Cardiomegaly.         [id number] VIEW   Final Result      1.  Benign bowel gas pattern.      2.  Peritoneal dialysis catheter coiled in the pelvis.      [id number] VIEW   Final Result      Nonspecific bowel gas pattern with a moderate colonic stool burden.      DX-CHEST-PORTABLE (1 VIEW)   Final Result      1.  Enlarged cardiac silhouette with vascular congestion/edema.   2.  Lower lobe airspace disease could be due to edema, atelectasis or pneumonitis.      EC-ECHOCARDIOGRAM COMPLETE W/O CONT   Final Result      CT-ABDOMEN-PELVIS W/O   Final Result      1.  Fat stranding adjacent to the cecum, concerning for colitis/typhlitis.   2.  Colonic diverticulosis.   3.  Nonobstructing, tiny left renal stone.   4.  Likely partially loculated moderate right pleural effusion.   5.  Small left pleural effusion.   6.  Adjacent right middle lobe and bilateral lower lobe consolidations, likely atelectasis.   7.  Small pericardial effusion.      DX-CHEST-PORTABLE (1 VIEW)   Final Result      1.  Resolved or improved small left pleural effusion with persistent left basilar atelectasis and/or scarring.   2.  New small right pleural effusion with associated atelectasis and/or consolidation.           Assessment/Plan  * C. difficile colitis- (present on  admission)  Assessment & Plan  Oral vancomycin - finished course    Stool has become foul-smelling and loose again so we have rechecked stool for C. difficile on 12/16/2023    Continue vancomycin, consider ID consult    A-fib (HCC)- (present on admission)  Assessment & Plan  Continue telemetry monitoring  Continue heparin drip  Close monitoring for side effects include but not limited to bleeding, thrombocytopenia    History of mechanical aortic valve replacement- (present on admission)  Assessment & Plan  Coumadin on hold    Hypomagnesemia- (present on admission)  Assessment & Plan  Replaced  Follow level    Loculated pleural effusion- (present on admission)  Assessment & Plan  Status post chest tube on 12/14/23    Daily x-rays    Continue to monitor chest tube output    Pulmonary MD follow-up    Repeated chest x-ray today my reading bilateral pleural effusion right larger than left.  Chest tube is in place discussed with pulmonology.  Cultures negative so far from pleural effusion    Paroxysmal atrial flutter (HCC)- (present on admission)  Assessment & Plan    Tachycardic on 12/16/2023 ordered IV labbetol as he has been refusing his oral verapamil.    Patient counseled about the necessity of taking his oral verapamil  Verapamil  Coumadin on hold    ESRD (end stage renal disease) (HCC)- (present on admission)  Assessment & Plan  HD per Nephrology  On dialysis    Dyslipidemia- (present on admission)  Assessment & Plan  Lipitor    Coronary artery disease due to lipid rich plaque- (present on admission)  Assessment & Plan  Aspirin, Lipitor  Troponin elevated but no chest pain, patient is end-stage renal disease, patient is on heparin for A-fib with RVR and holding his oral anticoagulation.  Troponin chronically elevated    Hypertension- (present on admission)  Assessment & Plan  Verapamil with parameters          VTE prophylaxis: Heparin drip    I have performed a physical exam and reviewed and updated ROS and Plan  today (12/17/2023). In review of yesterday's note (12/16/2023), there are no changes except as documented above.      Total time of 56 minutes spent prepping to see patient (e.g. reviewing  tests/imaging results, notes from consultants, bedside nurse, night shift ) obtaining and/or reviewing separately obtained history. Performing a medically appropriate examination and evaluation.  Counseling and educating the patient.  Ordering medications, tests, or procedures.  Referring and communicating with other health care professionals.  Documenting clinical information in EPIC.  Independently interpreting results and communicating results to patient.  Care coordination.

## 2023-12-17 NOTE — PROGRESS NOTES
Bedside report received and patient care assumed. Pt is resting in bed, A&O4, with no complaints of pain, and is on 5L. Tele box on. All fall precautions are in place, belongings at bedside table.  Pt was updated on POC, no questions or concerns. Pt educated on use of call light for assistance.

## 2023-12-17 NOTE — PROGRESS NOTES
NOC CROSSCOVER      Notified by bedside RN of patient HR sustaining 130's but otherwise asymptomatic. Patient also reporting abdominal pain likely secondary to ongoing colitis. Patient has been having fairly frequent foul smelling BM's through the day and night and during my shift his C-Diff toxin tested positive once again for which I reinitiated treatment. I suspect he could be volume down due to GI losses, So I ordered a onetime bolus to see if patient HR improved. He was also in pain but is refusing oral pain medicine but was amenable to trying a GI cocktail for which I ordered.     Patient is refusing a second IV access which may be necessary as he is running continuous heparin through his lone IV. I spoke to the patient at bedside and educated him that C-Diff infections can become very serious and have been know to lead to sepsis and death, thus secondary access would allow for administration of additional fluids and meds. Patient visibly frustrated and I was told to not speak of it any further. Will continue to monitor patient overnight for clinical improvement.

## 2023-12-17 NOTE — PROGRESS NOTES
4 Eyes Skin Assessment Completed by CODI Dia and KITTY Mohr.    Head WDL  Ears WDL  Nose WDL  Mouth WDL  Neck WDL  Breast/Chest Scar and Incision  Shoulder Blades WDL  Spine Bruising  (R) Arm/Elbow/Hand Bruising  (L) Arm/Elbow/Hand Bruising  Abdomen Scar and Incision  Groin WDL  Scrotum/Coccyx/Buttocks Redness and Blanching  (R) Leg WDL  (L) Leg WDL  (R) Heel/Foot/Toe WDL  (L) Heel/Foot/Toe WDL          Devices In Places Blood Pressure Cuff, Pulse Ox, and Nasal Cannula  Chest tube, dialysis catheter, peritoneal dialysis catheter    Interventions In Place Gray Ear Foams and Pillows    Possible Skin Injury No    Pictures Uploaded Into Epic N/A  Wound Consult Placed N/A  RN Wound Prevention Protocol Ordered No

## 2023-12-18 ENCOUNTER — APPOINTMENT (OUTPATIENT)
Dept: RADIOLOGY | Facility: MEDICAL CENTER | Age: 58
DRG: 981 | End: 2023-12-18
Attending: HOSPITALIST
Payer: COMMERCIAL

## 2023-12-18 LAB
ANION GAP SERPL CALC-SCNC: 10 MMOL/L (ref 7–16)
BUN SERPL-MCNC: 28 MG/DL (ref 8–22)
CALCIUM SERPL-MCNC: 7.9 MG/DL (ref 8.5–10.5)
CHLORIDE SERPL-SCNC: 100 MMOL/L (ref 96–112)
CO2 SERPL-SCNC: 25 MMOL/L (ref 20–33)
CREAT SERPL-MCNC: 6.6 MG/DL (ref 0.5–1.4)
ERYTHROCYTE [DISTWIDTH] IN BLOOD BY AUTOMATED COUNT: 50.6 FL (ref 35.9–50)
FERRITIN SERPL-MCNC: 908 NG/ML (ref 22–322)
GFR SERPLBLD CREATININE-BSD FMLA CKD-EPI: 9 ML/MIN/1.73 M 2
GLUCOSE SERPL-MCNC: 86 MG/DL (ref 65–99)
HCT VFR BLD AUTO: 28.5 % (ref 42–52)
HGB BLD-MCNC: 9 G/DL (ref 14–18)
INR PPP: 1.49 (ref 0.87–1.13)
IRON SATN MFR SERPL: 14 % (ref 15–55)
IRON SERPL-MCNC: 17 UG/DL (ref 50–180)
MAGNESIUM SERPL-MCNC: 1.7 MG/DL (ref 1.5–2.5)
MCH RBC QN AUTO: 29.6 PG (ref 27–33)
MCHC RBC AUTO-ENTMCNC: 31.6 G/DL (ref 32.3–36.5)
MCV RBC AUTO: 93.8 FL (ref 81.4–97.8)
PHOSPHATE SERPL-MCNC: 4.2 MG/DL (ref 2.5–4.5)
PLATELET # BLD AUTO: 325 K/UL (ref 164–446)
PMV BLD AUTO: 8.9 FL (ref 9–12.9)
POTASSIUM SERPL-SCNC: 4.4 MMOL/L (ref 3.6–5.5)
PROTHROMBIN TIME: 18.2 SEC (ref 12–14.6)
RBC # BLD AUTO: 3.04 M/UL (ref 4.7–6.1)
SODIUM SERPL-SCNC: 135 MMOL/L (ref 135–145)
TIBC SERPL-MCNC: 123 UG/DL (ref 250–450)
UFH PPP CHRO-ACNC: 0.42 IU/ML
UIBC SERPL-MCNC: 106 UG/DL (ref 110–370)
WBC # BLD AUTO: 8 K/UL (ref 4.8–10.8)

## 2023-12-18 PROCEDURE — C1729 CATH, DRAINAGE: HCPCS | Performed by: HOSPITALIST

## 2023-12-18 PROCEDURE — 700102 HCHG RX REV CODE 250 W/ 637 OVERRIDE(OP): Performed by: FAMILY MEDICINE

## 2023-12-18 PROCEDURE — 700102 HCHG RX REV CODE 250 W/ 637 OVERRIDE(OP): Performed by: HOSPITALIST

## 2023-12-18 PROCEDURE — 700102 HCHG RX REV CODE 250 W/ 637 OVERRIDE(OP): Performed by: STUDENT IN AN ORGANIZED HEALTH CARE EDUCATION/TRAINING PROGRAM

## 2023-12-18 PROCEDURE — 90935 HEMODIALYSIS ONE EVALUATION: CPT

## 2023-12-18 PROCEDURE — 99223 1ST HOSP IP/OBS HIGH 75: CPT | Performed by: INTERNAL MEDICINE

## 2023-12-18 PROCEDURE — 90945 DIALYSIS ONE EVALUATION: CPT

## 2023-12-18 PROCEDURE — A9270 NON-COVERED ITEM OR SERVICE: HCPCS | Performed by: FAMILY MEDICINE

## 2023-12-18 PROCEDURE — A9270 NON-COVERED ITEM OR SERVICE: HCPCS | Performed by: STUDENT IN AN ORGANIZED HEALTH CARE EDUCATION/TRAINING PROGRAM

## 2023-12-18 PROCEDURE — 700111 HCHG RX REV CODE 636 W/ 250 OVERRIDE (IP): Performed by: FAMILY MEDICINE

## 2023-12-18 PROCEDURE — 99253 IP/OBS CNSLTJ NEW/EST LOW 45: CPT | Performed by: INTERNAL MEDICINE

## 2023-12-18 PROCEDURE — A9270 NON-COVERED ITEM OR SERVICE: HCPCS | Performed by: INTERNAL MEDICINE

## 2023-12-18 PROCEDURE — 700101 HCHG RX REV CODE 250

## 2023-12-18 PROCEDURE — 770020 HCHG ROOM/CARE - TELE (206)

## 2023-12-18 PROCEDURE — A9270 NON-COVERED ITEM OR SERVICE: HCPCS | Performed by: HOSPITALIST

## 2023-12-18 PROCEDURE — 700102 HCHG RX REV CODE 250 W/ 637 OVERRIDE(OP): Performed by: INTERNAL MEDICINE

## 2023-12-18 PROCEDURE — 99233 SBSQ HOSP IP/OBS HIGH 50: CPT | Performed by: HOSPITALIST

## 2023-12-18 PROCEDURE — 71045 X-RAY EXAM CHEST 1 VIEW: CPT

## 2023-12-18 PROCEDURE — 700111 HCHG RX REV CODE 636 W/ 250 OVERRIDE (IP)

## 2023-12-18 PROCEDURE — 99233 SBSQ HOSP IP/OBS HIGH 50: CPT | Performed by: INTERNAL MEDICINE

## 2023-12-18 RX ORDER — GENTAMICIN SULFATE 1 MG/G
CREAM TOPICAL
Status: DISCONTINUED | OUTPATIENT
Start: 2023-12-18 | End: 2023-12-26 | Stop reason: HOSPADM

## 2023-12-18 RX ORDER — SODIUM FERRIC GLUCONATE COMPLEX IN SUCROSE 12.5 MG/ML
125 INJECTION INTRAVENOUS
Status: DISCONTINUED | OUTPATIENT
Start: 2023-12-19 | End: 2023-12-26 | Stop reason: HOSPADM

## 2023-12-18 RX ORDER — AMIODARONE HYDROCHLORIDE 200 MG/1
200 TABLET ORAL TWICE DAILY
Status: DISCONTINUED | OUTPATIENT
Start: 2023-12-18 | End: 2023-12-18

## 2023-12-18 RX ADMIN — VANCOMYCIN HYDROCHLORIDE 125 MG: 5 INJECTION, POWDER, LYOPHILIZED, FOR SOLUTION INTRAVENOUS at 02:05

## 2023-12-18 RX ADMIN — SIMETHICONE 125 MG: 125 TABLET, CHEWABLE ORAL at 17:59

## 2023-12-18 RX ADMIN — ASPIRIN 81 MG: 81 TABLET, COATED ORAL at 06:31

## 2023-12-18 RX ADMIN — VANCOMYCIN HYDROCHLORIDE 125 MG: 5 INJECTION, POWDER, LYOPHILIZED, FOR SOLUTION INTRAVENOUS at 12:28

## 2023-12-18 RX ADMIN — SIMETHICONE 125 MG: 125 TABLET, CHEWABLE ORAL at 10:38

## 2023-12-18 RX ADMIN — HEPARIN SODIUM 22 UNITS/KG/HR: 5000 INJECTION, SOLUTION INTRAVENOUS at 03:18

## 2023-12-18 RX ADMIN — TAMSULOSIN HYDROCHLORIDE 0.4 MG: 0.4 CAPSULE ORAL at 06:31

## 2023-12-18 RX ADMIN — VANCOMYCIN HYDROCHLORIDE 125 MG: 5 INJECTION, POWDER, LYOPHILIZED, FOR SOLUTION INTRAVENOUS at 06:31

## 2023-12-18 RX ADMIN — OXYCODONE 2.5 MG: 5 TABLET ORAL at 23:19

## 2023-12-18 RX ADMIN — VERAPAMIL HYDROCHLORIDE 80 MG: 120 TABLET ORAL at 17:22

## 2023-12-18 RX ADMIN — HEPARIN SODIUM 3700 UNITS: 1000 INJECTION, SOLUTION INTRAVENOUS; SUBCUTANEOUS at 18:25

## 2023-12-18 RX ADMIN — OXYCODONE 2.5 MG: 5 TABLET ORAL at 08:32

## 2023-12-18 RX ADMIN — Medication 1 CAPSULE: at 08:31

## 2023-12-18 RX ADMIN — ATORVASTATIN CALCIUM 40 MG: 40 TABLET, FILM COATED ORAL at 19:59

## 2023-12-18 RX ADMIN — GENTAMICIN SULFATE: 1 CREAM TOPICAL at 14:55

## 2023-12-18 RX ADMIN — DRONABINOL 5 MG: 5 CAPSULE ORAL at 17:22

## 2023-12-18 RX ADMIN — VERAPAMIL HYDROCHLORIDE 80 MG: 120 TABLET ORAL at 06:31

## 2023-12-18 RX ADMIN — DRONABINOL 5 MG: 5 CAPSULE ORAL at 12:28

## 2023-12-18 RX ADMIN — HEPARIN SODIUM 22 UNITS/KG/HR: 5000 INJECTION, SOLUTION INTRAVENOUS at 19:45

## 2023-12-18 RX ADMIN — VANCOMYCIN HYDROCHLORIDE 125 MG: 5 INJECTION, POWDER, LYOPHILIZED, FOR SOLUTION INTRAVENOUS at 18:00

## 2023-12-18 RX ADMIN — ACETAMINOPHEN 1000 MG: 500 TABLET, FILM COATED ORAL at 06:49

## 2023-12-18 ASSESSMENT — ENCOUNTER SYMPTOMS
NERVOUS/ANXIOUS: 0
FEVER: 0
HEADACHES: 0
NECK PAIN: 0
CHILLS: 0
NAUSEA: 0
DIAPHORESIS: 0
HEARTBURN: 0
SPEECH CHANGE: 0
DIARRHEA: 1
SHORTNESS OF BREATH: 0
PALPITATIONS: 0
DIZZINESS: 0
SORE THROAT: 0
BLOOD IN STOOL: 0
ABDOMINAL PAIN: 1
BLURRED VISION: 0
WHEEZING: 0
BACK PAIN: 0
VOMITING: 0
SENSORY CHANGE: 0
FOCAL WEAKNESS: 0
COUGH: 1
FLANK PAIN: 0
SHORTNESS OF BREATH: 1
WEAKNESS: 1
SPUTUM PRODUCTION: 1
MYALGIAS: 0
SPUTUM PRODUCTION: 0

## 2023-12-18 ASSESSMENT — FIBROSIS 4 INDEX: FIB4 SCORE: .8615384615384615385

## 2023-12-18 ASSESSMENT — PAIN DESCRIPTION - PAIN TYPE: TYPE: ACUTE PAIN

## 2023-12-18 NOTE — PROGRESS NOTES
Hospital Medicine Daily Progress Note    Date of Service  12/18/2023    Chief Complaint  Gurdeep Guerra is a 58 y.o. male admitted 11/27/2023 with diarrhea    Hospital Course  Admitted with symptoms of abdominal pain, nausea, vomiting and diarrhea, CT scan showed evidence of colitis, he was started on empiric coverage with IV Cefepime and Flagyl.  Patient recently underwent mechanical AVR, aortic aneurysm repair, discharged on 11/17/2023.  He also has known history of end-stage renal disease on peritoneal dialysis.  Workup showed he was positive for C. difficile colitis, he was started on oral vancomycin.  There was also concern for possible peritonitis, he was given intraperitoneal IV cefepime.  Nephrology was consulted on the case, he underwent dialysis through his temporary dialysis catheter which was placed on the previous admission.  Also with known history of paroxysmal atrial flutter, and with recent mechanical AVR, he was on Coumadin for anticoagulation.  He required to be placed on heparin drip to bridge Coumadin.    Interval Problem Update    Remains in A-fib    And has been tachycardic overnight persistentl===> 130's    he has been not been taking his oral verapamil for concerns of hypotension however he has had not had any significant hypotension that is concerning.    patient was counseled on the necessity of taking his verapamil for heart rate control that we will stabilize his blood pressure.  His dialysis session today has been put on hold due to his tachycardia    Patient having foul-smelling loose stools    I have restarted test for C. Difficile    Patient given labetalol 10 mg IV push x 1 patient's blood pressure dropped to 75 systolic.  He was not complaining of any dizziness but he was laying in the bed.  He was given a saline bolus of 250 cc x 1    12/17 patient is new to me today, patient is resting in bed, he was complaining of lower abdominal cramps patient denies shortness of breath no  palpitation no chest pain, patient is on heparin drip, chest tube in place, draining 80 cc in the last 24 hours, continue having diarrhea, patient is tolerating oral vancomycin, discussed with pharmacist stopped PPI for now, will consider discussing with ID, heart rate better controlled continue verapamil 3 times daily, discussed with pulmonology patient received tPA today through his chest tube, will continue monitoring.  Chest x-ray in a.m.  12/18 patient in bed, still having diarrhea, abdominal cramps, had a very long discussion with patient regarding plan of care, discussed with nephro, cardio and ID. Continue close monitoring. Patient on iv heparin, monitoring for side effects, continue telemetry, patient starting on amiodarone monitoring for side effects patient did not tolerate medication in the past. Chest tube output 1340, s/p atp on 12/17, per pulm remove ct when output is less than 50 cc.       Patient is on IV heparin continue monitoring for side effects include but not limited to bleeding, thrombocytopenia.      I have discussed this patient's plan of care and discharge plan at IDT rounds today with Case Management, Nursing, Nursing leadership, and other members of the IDT team.    Consultants/Specialty  nephrology and pulmonary  Cardio  EP.     Code Status  Full Code    Disposition  The patient is not medically cleared for discharge to home or a post-acute facility.      I have placed the appropriate orders for post-discharge needs.    Review of Systems  Review of Systems   Constitutional:  Positive for malaise/fatigue. Negative for chills, diaphoresis and fever.   HENT:  Negative for congestion, hearing loss and sore throat.    Eyes:  Negative for blurred vision.   Respiratory:  Positive for cough and shortness of breath. Negative for sputum production and wheezing.    Cardiovascular:  Negative for chest pain, palpitations and leg swelling.   Gastrointestinal:  Positive for abdominal pain and  diarrhea. Negative for blood in stool, heartburn, melena, nausea and vomiting.   Genitourinary:  Negative for dysuria, flank pain and hematuria.   Musculoskeletal:  Negative for back pain, joint pain, myalgias and neck pain.   Skin:  Negative for rash.   Neurological:  Positive for weakness. Negative for dizziness, sensory change, speech change, focal weakness and headaches.   Psychiatric/Behavioral:  The patient is not nervous/anxious.         Physical Exam  Temp:  [36.6 °C (97.9 °F)-37.2 °C (99 °F)] 37 °C (98.6 °F)  Pulse:  [] 130  Resp:  [16-19] 19  BP: (101-119)/(71-83) 117/83  SpO2:  [94 %-97 %] 97 %    Physical Exam  Vitals and nursing note reviewed.   Constitutional:       Appearance: He is ill-appearing.   HENT:      Head: Normocephalic and atraumatic.      Nose: No congestion.      Mouth/Throat:      Mouth: Mucous membranes are moist.   Eyes:      General: No scleral icterus.        Right eye: No discharge.         Left eye: No discharge.   Cardiovascular:      Rate and Rhythm: Normal rate. Rhythm irregular.   Pulmonary:      Effort: Pulmonary effort is normal. No accessory muscle usage or respiratory distress.      Breath sounds: Rhonchi present.      Comments: Decreased breath sounds at the bases    Chest tube present  Abdominal:      General: There is no distension.      Tenderness: There is no abdominal tenderness. There is no guarding or rebound.      Comments: PD catheter   Musculoskeletal:      Cervical back: Normal range of motion and neck supple. No rigidity or tenderness.      Right lower leg: No edema.      Left lower leg: No edema.   Skin:     General: Skin is warm and dry.   Neurological:      General: No focal deficit present.      Mental Status: He is alert and oriented to person, place, and time.      Cranial Nerves: No cranial nerve deficit.         Fluids    Intake/Output Summary (Last 24 hours) at 12/18/2023 1526  Last data filed at 12/18/2023 1445  Gross per 24 hour   Intake 1000  ml   Output 2340 ml   Net -1340 ml         Laboratory  Recent Labs     12/16/23  0802 12/17/23  0633 12/17/23  2345   WBC 8.6 8.9 8.0   RBC 3.58* 3.26* 3.04*   HEMOGLOBIN 10.4* 9.5* 9.0*   HEMATOCRIT 33.2* 30.7* 28.5*   MCV 92.7 94.2 93.8   MCH 29.1 29.1 29.6   MCHC 31.3* 30.9* 31.6*   RDW 50.3* 50.5* 50.6*   PLATELETCT 297 298 325   MPV 8.6* 8.7* 8.9*       Recent Labs     12/16/23  0802 12/17/23  0633 12/17/23  2345   SODIUM 134* 133* 135   POTASSIUM 4.5 5.0 4.4   CHLORIDE 98 98 100   CO2 23 22 25   GLUCOSE 107* 101* 86   BUN 50* 59* 28*   CREATININE 8.93* 10.33* 6.60*   CALCIUM 8.5 8.1* 7.9*       Recent Labs     12/16/23  0802 12/17/23  0633 12/17/23  2345   INR 1.42* 1.40* 1.49*                 Imaging  DX-CHEST-PORTABLE (1 VIEW)   Final Result         1.  Pulmonary edema and/or infiltrates, stable.   2.  Small layering bilateral pleural effusions, stable on the right and decreased on the left since prior study.   3.  New right lung base pneumothorax with small pigtail thoracostomy tube in place.   4.  Cardiomegaly   5.  Atherosclerosis      DX-CHEST-PORTABLE (1 VIEW)   Final Result         1.  Pulmonary edema and/or infiltrates.   2.  Small layering bilateral pleural effusions   3.  Cardiomegaly   4.  Atherosclerosis      DX-CHEST-PORTABLE (1 VIEW)   Final Result      Unchanged bilateral pulmonary opacities and suspected bilateral pleural effusions, right worse than left.         DX-CHEST-PORTABLE (1 VIEW)   Final Result      Stable examination.      CT-CHEST (THORAX) W/O   Final Result      1.  Interval decrease in size of large loculated right pleural effusion following following placement of locking loop chest tube.      2.  Smaller loculated pleural effusions posterior superiorly in the right hemithorax there are thorax.      3.  Small left pleural effusion with elevation of left hemidiaphragm.      4.  Dependent partial collapse of the lower lobes.      5.  Patchy airspace opacities in the right lower  lobe, lingula, and to a lesser extent right middle lobe suspicious for pneumonitis or parenchymal scarring      6.  Cirrhosis and splenomegaly.      DX-CHEST-PORTABLE (1 VIEW)   Final Result      1.  Redemonstrated right hydropneumothorax with decreased air component, otherwise stable in size.   2.  Bilateral basilar atelectasis and/or consolidation with mild interstitial edema.   3.  Stable enlargement of the cardiomediastinal silhouette.      DX-CHEST-PORTABLE (1 VIEW)   Final Result      Placement of a right pigtail chest tube with decreased right pleural effusion.      Hazy opacities which could be related to hypoinflation or vascular congestion. Correlate clinically for infection.      IR-CHEST TUBE-EMPYEMA RIGHT   Final Result      Successful image guided RIGHT chest tube placement.      Plan: Low wall suction. Follow-up radiograph is pending.      CT-CHEST (THORAX) W/O   Final Result      1.  Large loculated right pleural effusion, worse compared to the October 2023 CT study. Associated atelectasis, with subtotal collapse of the right lower lobe.   2.  Minimal groundglass opacities in the right lung, infectious/inflammatory. No consolidation.   3.  Small left pleural effusion and associated atelectasis.   4.  Mild right hilar lymphadenopathy, statistically reactive rather than neoplastic.   5.  Cardiomegaly.   6.  Abdomen is detailed separately.      CT-ABDOMEN-PELVIS W/O   Final Result      1.  No new inflammatory process in the abdomen or pelvis.   2.  Minimal fat stranding adjacent to the cecum, nonspecific.   3.  Percutaneous catheter with tip in the pelvis. Small volume of pelvic free fluid.   4.  Colonic diverticulosis.   5.  Partially visualized moderate to large right pleural effusion, which may be loculated. It has increased in size since prior CT study.   6.  Small left pleural effusion.   7.  Cardiomegaly.         [id number] VIEW   Final Result      1.  Benign bowel gas pattern.      2.   Peritoneal dialysis catheter coiled in the pelvis.      [id number] VIEW   Final Result      Nonspecific bowel gas pattern with a moderate colonic stool burden.      DX-CHEST-PORTABLE (1 VIEW)   Final Result      1.  Enlarged cardiac silhouette with vascular congestion/edema.   2.  Lower lobe airspace disease could be due to edema, atelectasis or pneumonitis.      EC-ECHOCARDIOGRAM COMPLETE W/O CONT   Final Result      CT-ABDOMEN-PELVIS W/O   Final Result      1.  Fat stranding adjacent to the cecum, concerning for colitis/typhlitis.   2.  Colonic diverticulosis.   3.  Nonobstructing, tiny left renal stone.   4.  Likely partially loculated moderate right pleural effusion.   5.  Small left pleural effusion.   6.  Adjacent right middle lobe and bilateral lower lobe consolidations, likely atelectasis.   7.  Small pericardial effusion.      DX-CHEST-PORTABLE (1 VIEW)   Final Result      1.  Resolved or improved small left pleural effusion with persistent left basilar atelectasis and/or scarring.   2.  New small right pleural effusion with associated atelectasis and/or consolidation.      CL-EP ABLATION ATRIAL FLUTTER    (Results Pending)   EC-CARMELITA W/O CONT    (Results Pending)        Assessment/Plan  * C. difficile colitis- (present on admission)  Assessment & Plan  Oral vancomycin - finished course    Stool has become foul-smelling and loose again so we have rechecked stool for C. difficile on 12/16/2023    Continue vancomycin, consider ID consult    I have asked ID to see patient in am.     A-fib (HCC)- (present on admission)  Assessment & Plan  Continue telemetry monitoring  Continue heparin drip  Close monitoring for side effects include but not limited to bleeding, thrombocytopenia    I have discussed with cardiologist Dr Orourke, EP also consulted recommending possible ablation this week.   EP recommended to start amiodarone.   Continue tele  Continue heparin drip for now.     History of mechanical aortic valve  replacement- (present on admission)  Assessment & Plan  Coumadin on hold    Hypomagnesemia- (present on admission)  Assessment & Plan  Replaced  Follow level    Loculated pleural effusion- (present on admission)  Assessment & Plan  Status post chest tube on 12/14/23    Daily x-rays    Continue to monitor chest tube output    Pulmonary MD follow-up    Repeated chest x-ray today my reading bilateral pleural effusion right larger than left.  Chest tube is in place discussed with pulmonology.  Cultures negative so far from pleural effusion    Paroxysmal atrial flutter (HCC)- (present on admission)  Assessment & Plan    Tachycardic on 12/16/2023 ordered IV labbetol as he has been refusing his oral verapamil.    Patient counseled about the necessity of taking his oral verapamil  Verapamil  Coumadin on hold  Cardio and EP consulted.     ESRD (end stage renal disease) (HCC)- (present on admission)  Assessment & Plan  HD per Nephrology  On dialysis  Discussed with nephro  Dialysis today    Dyslipidemia- (present on admission)  Assessment & Plan  Lipitor    Coronary artery disease due to lipid rich plaque- (present on admission)  Assessment & Plan  Aspirin, Lipitor  Troponin elevated but no chest pain, patient is end-stage renal disease, patient is on heparin for A-fib with RVR and holding his oral anticoagulation.  Troponin chronically elevated    Hypertension- (present on admission)  Assessment & Plan  Verapamil with parameters          VTE prophylaxis: Heparin drip    I have performed a physical exam and reviewed and updated ROS and Plan today (12/18/2023). In review of yesterday's note (12/17/2023), there are no changes except as documented above.    Total time of 55 minutes spent prepping to see patient (e.g. reviewing  tests/imaging results, notes from consultants, bedside nurse, night shift ) obtaining and/or reviewing separately obtained history. Performing a medically appropriate examination and evaluation.   Counseling and educating the patient.  Ordering medications, tests, or procedures.  Referring and communicating with other health care professionals.  Documenting clinical information in EPIC.  Independently interpreting results and communicating results to patient.  Care coordination.

## 2023-12-18 NOTE — PROGRESS NOTES
Messaged Nephrology Wes as patient has iron ordered and only one IV access. Pt declines secondary IV and heparin gtt should have minimal interruptions. Also clarified if epoetin should be given by RN or during dialysis. Pend response if iron can be given during dialysis or if RN could use HD cath

## 2023-12-18 NOTE — PROGRESS NOTES
[facility] -  PROGRESS NOTE               Author: Kodak Harvey M.D. Date & Time: 12/18/2023  10:15 AM     HPI:  58 y.o. 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 Abd imaging demonstrated colitis. C. Diff pending and started on abx. He notes abd pain and diarrhea for several weeks. Edema improving with 2.5% dextrose solution. Still has HD permacath in-place. Pain with peritoneal dialysis but no cloudy effluent of fibrin clots. No f/c/s. Normally does [id number] fills all green.     DAILY NEPHROLOGY SUMMARY:  11/28: consult done  11/29: ongoing abd pain, seen on HD-tolerating procedure well, PD fluid concerning for peritonitis  11/30:c.diff positive, started on oral vanc, new onset aflutter-transferred to [facility], wbc improving, Peritoneal cultures remain negative, abd pain improving  12/1: PD culture remains negative, seen on HD-tolerating procedure, abd pain improving, less diarrhea    12/2: HD yesterday 2.5 liter UF, still with SOB decreased abdoimnal pain  12/3: PD last night 1.5 UF, pt still with SOB  12/4: No events, tolerated HD yest with 2.5L UF, BP stable, tachycardic this am, stable on RA, reports SOB has resolved, still with crampy abd discomfort, reports diarrhea is starting to become more formed  12/5: No events, no new labs, BP stable, tachycardic, stable on RA this am, still with crampy abd pain, diarrhea improving, still with nausea/anorexia, denies any CP/LE edema, reports mild SOB  12/6: No events, tolerated HD yest with 1L UF, BP stable, remains tachycardic, reports nausea is improved and having more firm stools, denies any CP/SOB, reports poor appetite  12/7: No events, BP stable, tolerated HD  "this am with 1L UF, still doesn't feel well and feels very depressed and frustrated, +abd pain and n/v this am and unable to take PO  12/8: patient is doing well, resting in bed. Plan next iHD tomorrow  12/09: patient is seen during HD, refused labs this am. Does report some abdominal discomfort this am  12/10: patient had iHD yesterday, UF 2000 ml. Abdominal issues ongoing  12/11: pt had CT thorax done yesterday, showing loculated effusion. Pt feels SOB. Diarrhea continues but is better overall.   12/12: pt reports no more diarrhea, only 2-3 loose stools. Seen on HD and tolerating. Pulm recommending chest tube for loculated effusion.   12/13: pt awaiting improvement in INR before chest tube placement. Pt frustrated at prolonged hospitalization.  12/14: Pt had HD this AM and chest tube placement after. Pt seen in Room  12/15: Pt seen in room. Reports breathing is better now chest tube is in.   12/16: chest tube drainage slowing down. Pt breathing better. However, pt tachycardic in 130s this AM. He reports return of diarrhea and some abdominal pain. dialysis on hold for now  12/17: dialysis held yesterday due to tachycardia and later hypotension. HR and BP within normal ranges this AM. Pt positive for C Diff again, with continued diarrhea.   12/18: HD yesterday net UF 1L, pt reports SOB thinks he may have fluids in  his lungs and wants to do additional HD today to remove fluids,  denies further D, continue to have abdominal cramps    REVIEW OF SYSTEMS:    +diarrhea, +abd pain  10 point ROS reviewed and is as per HPI/daily summary or otherwise negative    PMH/PSH/SH/FH:   Reviewed and unchanged since admission note    CURRENT MEDICATIONS:   Reviewed from admission to present day    VS:  /83   Pulse 82   Temp 36.8 °C (98.2 °F) (Temporal)   Resp 18   Ht 1.702 m (5' 7\")   Wt 77.2 kg (170 lb 3.1 oz)   SpO2 94%   BMI 26.66 kg/m²     Physical Exam  Vitals and nursing note reviewed.   Constitutional:       " General: He is not in acute distress.     Appearance: Normal appearance.   HENT:      Head: Normocephalic and atraumatic.   Eyes:      General: No scleral icterus.     Extraocular Movements: Extraocular movements intact.   Cardiovascular:      Rate and Rhythm: Regular rhythm.      Comments: +R chest PC  Pulmonary:      Effort: Pulmonary effort is normal.   Abdominal:      General: Bowel sounds are normal.      Palpations: Abdomen is soft.      Tenderness: There is no abdominal tenderness.   Musculoskeletal:         General: No deformity.      Right lower leg: No edema.      Left lower leg: No edema.   Skin:     General: Skin is warm and dry.      Findings: No rash.   Neurological:      General: No focal deficit present.      Mental Status: He is alert and oriented to person, place, and time.   Psychiatric:         Mood and Affect: Mood normal.         Behavior: Behavior normal. Behavior is cooperative.       R chest tube in place (12/14/23)    Fluids:  In: 620 [P.O.:120; Dialysis:500]  Out: 2840     LABS:  Recent Labs     12/16/23  0802 12/17/23  0633 12/17/23  2345   SODIUM 134* 133* 135   POTASSIUM 4.5 5.0 4.4   CHLORIDE 98 98 100   CO2 23 22 25   GLUCOSE 107* 101* 86   BUN 50* 59* 28*   CREATININE 8.93* 10.33* 6.60*   CALCIUM 8.5 8.1* 7.9*       IMAGING:   All imaging reviewed from admission to present day    IMPRESSION:  # ESRD on outpt PD   -Etiology 2/2 FSGS  - s/p permcath on 11/10, and on HD currently  # R/O PD cath associated peritonitis  - CX negative  # c. Diff.  -symptoms improved with initiation of oral vanc  -PD guidelines vague on scenarios such as this,  but pt on HD currently  # S/P mechanical AVR/ Ascending aneurysm repair 11/2023   # CKD-MBD  - P elevated  # Anemia of CKD, below goal hgb 10-11, low iron stores 12/17     Resumed EPO 6000U IV w HD on 12/12  # BL pleural effusions/congestion     Large R loculated effusion on CT thorax 12/11     S/p chest tube placement on 12/14  # Pericardial  effusion   # C. Diff colitis  -C.diff positive  # Aflutter per primary svc     PLAN:  - Additional HD today for volume removal   - HD TTS schedule and PRN   - Cont EPO 6000U IV w HD  - Cont holding PD for now (last on 12/3),  can re-eval as outpt  - PD catheter flushes Q weekly, flush today   - C. diff tx/mgmt per primary team, diarrhea/pain and +toxin returned on 12/16  - Renal diet, no dietary pr restrictions   - Phos binder with meals   - Dose all medications as per ESRD  - Arrange OP HD chair at [facility] prior to OR  that he can resume PD, re challenge of PD once stable in OP setting   - IV iron loading   Discussed the above with primary svc

## 2023-12-18 NOTE — PROGRESS NOTES
Spoke to MD lin about GI cocktail for patient abdominal pain. Per MD lin ok for GI cocktail x 1 daily PRN

## 2023-12-18 NOTE — CONSULTS
Cardiology Initial Consultation    Date of Service  12/18/2023    Referring Physician  OMAYRA Piedra.*    Reason for Consultation  Atrial flutter.    History of Presenting Illness  Gurdeep Guerra is a 58 y.o. male with a past medical history of atrial flutter, surgical aortic valve replacement with 23 mm On-X mechanical valve and ascending aortic aneurysm repair 30 mm Hemashield tube graft by Osmel Leavitt 11/03/23), chronic anticoagulation therapy (warfarin), hypertension and hyperlipidemia, lifelong nonsmoker, no family history of coronary artery disease, who presented 11/27/2023 with diarrhea due to clostridium difficile, He has been tachycardic with his atrial flutter.     Review of Systems  Review of Systems    Past Medical History   has a past medical history of Anesthesia, Aortic stenosis, Chronic gout of multiple sites, Dialysis patient ([facility]), Dyspnea on exertion (05/23/2023), Elevated troponin (05/23/2023), Heart burn, Heart murmur, High cholesterol, Hypertension (2009), NSTEMI (non-ST elevated myocardial infarction) ([facility]) (05/23/2023), Pain in the chest (05/23/2023), Paroxysmal A-fib ([facility]) (11/11/2023), PONV (postoperative nausea and vomiting), Renal disorder (2007), Sleep apnea (2000), and Snoring (1990).    He has no past medical history of Acute nasopharyngitis, Anginal syndrome ([facility]), Asthma, Blood clotting disorder ([facility]), Bowel habit changes, Breath shortness, Bronchitis, Cancer ([facility]), Carcinoma in situ of respiratory system, Cataract, Congestive heart failure ([facility]), Continuous ambulatory peritoneal dialysis status ([facility]), COPD (chronic obstructive pulmonary disease) ([facility]), Coughing blood, Dental disorder, Diabetes ([facility]), Disorder of thyroid, Emphysema of lung ([facility]), Glaucoma, Gynecological disorder, Hemorrhagic disorder ([facility]), Hepatitis A, Hepatitis B, Hepatitis C, Hiatus hernia syndrome, Indigestion, Infectious disease, Jaundice, Pacemaker, Pneumonia, Pregnant, Psychiatric  problem, Rheumatic fever, Seizure (HCC), Stroke (HCC), Tuberculosis, Urinary bladder disorder, or Urinary incontinence.    Surgical History   has a past surgical history that includes other (2007); umbilical hernia repair (2012); shoulder arthroscopy; hip arthroscopy (Bilateral, 2002); tonsillectomy (N/A, 1987); hand surgery (Right, 1985); cath placement capd (N/A, 5/15/2023); pr inj lumbar/sacral,w/ imaging (Left, 8/24/2023); cath placement capd (Right, 9/6/2023); aortic valve replacement (11/3/2023); aortic ascending dissection (11/3/2023); echocardiogram, transesophageal, intraoperative (11/3/2023); restorate hemostas (11/3/2023); and sternotomy (11/3/2023).    Family History  family history includes Hyperlipidemia in his mother; Hypertension in his father.    Social History   reports that he has never smoked. He has never used smokeless tobacco. He reports that he does not currently use drugs. He reports that he does not drink alcohol.    Medications  Prior to Admission Medications   Prescriptions Last Dose Informant Patient Reported? Taking?   aspirin 81 MG EC tablet 11/27/2023 at 0800 Patient Yes No   Sig: Take 81 mg by mouth every day.   atorvastatin (LIPITOR) 40 MG Tab 11/26/2023 at 1930 Patient No No   Sig: Take 1 Tablet by mouth every evening.   omeprazole (PRILOSEC) 20 MG delayed-release capsule 11/27/2023 at 0800 Patient No No   Sig: Take 1 Capsule by mouth 2 times a day.   tamsulosin (FLOMAX) 0.4 MG capsule 11/23/2023 at 0900 Patient Yes No   Sig: Take 0.4 mg by mouth every day.   traMADol (ULTRAM) 50 MG Tab 11/26/2023 at 2200 Patient No No   Sig: Take 1 Tablet by mouth every 6 hours as needed for Moderate Pain or Severe Pain for up to 14 days.   warfarin (COUMADIN) 2.5 MG Tab 11/23/2023 at 1800 Patient No No   Sig: Take 1 Tablet by mouth every day at 6 PM.   warfarin (COUMADIN) 2.5 MG Tab 11/26/2023 at 1800 Patient Yes No   Sig: Take 1.25 mg by mouth Once. .5 tab = 1.25 mg      Facility-Administered  "Medications: None       Allergies  Allergies   Allergen Reactions    Allopurinol Itching    Hydralazine Hcl Unspecified     Pt states he had a reaction similar to lupus       Vital signs in last 24 hours  Temp:  [36.5 °C (97.7 °F)-37.2 °C (99 °F)] 36.8 °C (98.2 °F)  Pulse:  [] 82  Resp:  [16-18] 18  BP: ()/(64-83) 117/83  SpO2:  [94 %-98 %] 94 %    Physical Exam  Physical Exam    Lab Review  Lab Results   Component Value Date/Time    WBC 8.0 12/17/2023 11:45 PM    RBC 3.04 (L) 12/17/2023 11:45 PM    HEMOGLOBIN 9.0 (L) 12/17/2023 11:45 PM    HEMATOCRIT 28.5 (L) 12/17/2023 11:45 PM    MCV 93.8 12/17/2023 11:45 PM    MCH 29.6 12/17/2023 11:45 PM    MCHC 31.6 (L) 12/17/2023 11:45 PM    MPV 8.9 (L) 12/17/2023 11:45 PM      Lab Results   Component Value Date/Time    SODIUM 135 12/17/2023 11:45 PM    POTASSIUM 4.4 12/17/2023 11:45 PM    CHLORIDE 100 12/17/2023 11:45 PM    CO2 25 12/17/2023 11:45 PM    GLUCOSE 86 12/17/2023 11:45 PM    BUN 28 (H) 12/17/2023 11:45 PM    CREATININE 6.60 (HH) 12/17/2023 11:45 PM    BUNCREATRAT 17 05/09/2022 09:49 AM    GLOMRATE 7 (L) 04/02/2023 11:54 AM      Lab Results   Component Value Date/Time    ASTSGOT 15 12/05/2023 10:45 AM    ALTSGPT 9 12/05/2023 10:45 AM     Lab Results   Component Value Date/Time    CHOLSTRLTOT 171 05/23/2023 11:08 AM     (H) 05/23/2023 11:08 AM    HDL 40 05/23/2023 11:08 AM    TRIGLYCERIDE 56 05/23/2023 11:08 AM    TROPONINT 496 (H) 12/17/2023 06:33 AM       No results for input(s): \"NTPROBNP\" in the last 72 hours.    Cardiac Imaging and Procedures Review  CARDIAC STUDIES/PROCEDURES:    CARDIAC CATHETERIZATION CONCLUSIONS by Dontrell Mccall (05/25/23)  1.  Type II MI due to ESRD with superimposed hypertensive emergency  2. Obstructive one-vessel coronary artery disease involving total occlusions of the terminal circumflex and first obtuse marginal-supplying a small area of myocardium  3.  Diffuse, nonobstructive coronary atherosclerosis in " other segments  4.  At least moderate aortic stenosis with invasive mean gradient of 33 mmHg and moderate aortic insufficiency by noninvasive evaluation  5.  Mild elevation LVEDP at 17 mmHg  (study result reviewed)     ECHOCARDIOGRAM CONCLUSIONS (11/28/23)  Hyperdynamic left ventricular systolic function with tachycardia.  The left ventricular ejection fraction is visually estimated to be 70%.  Moderate concentric left ventricular hypertrophy.  Known mechanical aortic valve not well visualized probably due to   acuostical shadowing but functioning normally with normal transvalvular   gradients.  The right ventricle is normal in size and systolic function.  Normal inferior vena cava size and inspiratory collapse.  No pericardial effusion.  Compared to the images of the prior study, 11/15/2023 there has been   resolution of the pericardial effusion.   (study result reviewed)     EKG performed on (12/17/23) was reviewed: EKG personally interpreted shows atrial flutter.   EKG performed on (11/12/23) was reviewed: EKG personally interpreted shows atrial flutter.   EKG performed on (11/09/23) was reviewed: EKG personally interpreted shows sinus rhythm.     Assessment/Plan  Atrial flutter: He is a 58 y.o. male with a past medical history of atrial flutter, surgical aortic valve replacement with 23 mm On-X mechanical valve and ascending aortic aneurysm repair 30 mm Hemashield tube graft by Osmel Leavitt 11/03/23), chronic anticoagulation therapy (warfarin), hypertension and hyperlipidemia, lifelong nonsmoker, no family history of coronary artery disease, who  has been tachycardic with his atrial flutter. He has difficulties with medication for rate or rhythm management. We will refer to electrophysiologisy service.     Thank you for allowing me to participate in the care of this patient.    I will continue to follow this patient    Please contact me with any questions.    Charly Orourke M.D.   Cardiologist, [facility]  [phone]

## 2023-12-18 NOTE — CARE PLAN
The patient is Stable - Low risk of patient condition declining or worsening    Shift Goals  Clinical Goals: pain and nausea control, education, stable vitals  Patient Goals: rest, pain and nausea relief  Family Goals: NA    Progress made toward(s) clinical / shift goals:      Problem: Pain - Standard  Goal: Alleviation of pain or a reduction in pain to the patient’s comfort goal  Outcome: Progressing     Problem: Knowledge Deficit - Standard  Goal: Patient and family/care givers will demonstrate understanding of plan of care, disease process/condition, diagnostic tests and medications  Outcome: Progressing     Problem: Fall Risk  Goal: Patient will remain free from falls  Outcome: Progressing     Problem: Respiratory  Goal: Patient will achieve/maintain optimum respiratory ventilation and gas exchange  Outcome: Progressing     Problem: Skin Integrity  Goal: Skin integrity is maintained or improved  Outcome: Progressing       Patient is not progressing towards the following goals:  Despite medication, patient is still experiencing nausea    Problem: Gastrointestinal Irritability  Goal: Diarrhea will be absent or improved  Outcome: Not Progressing

## 2023-12-18 NOTE — PROGRESS NOTES
Bedside report received at 1900, and assumed care of patient. Patient is resting in bed, complains of pain in abdomen, declines medication at this time for pain. A&O x4, on 5L NC. Chest tube in place at 260 mL. Educated patient on plan of care. Tele monitoring in place. Educated on fall risk, all fall precautions in place. Call device within reach, bed locked and in lowest position, denied other needs at this time.

## 2023-12-18 NOTE — PROGRESS NOTES
Radiology Progress Note   Author: TEX Marx Date & Time created: 12/18/2023  1:22 PM   Date of admission  11/27/2023  Note to reader: this note follows the APSO format rather than the historical SOAP format. Assessment and plan located at the top of the note for ease of use.    Chief Complaint  58 y.o. male admitted 11/27/2023 with   Chief Complaint   Patient presents with    Sent by MD     Patient reports open heart surgery 11/3. Patient reports spoke to surgeon today and told to come to ER. Patient reports also a dialysis patient and did his dialysis last night at home.      Weakness     Patient reports increased weakness over the last three weeks.     Shortness of Breath     X 3 weeks.          HPI  58-year-old male past medical history significant for ESRD on peritoneal dialysis and paroxysmal atrial flutter admitted 11/27/23 for abdominal pain, nausea, vomiting, diarrhea.  CT and lab findings of C. difficile colitis.  He recently underwent a mechanical aortic valve replacement and aortic aneurysm repair and was discharged on 11/17/2023.  During this hospitalization, patient became increasingly short of breath.  CT thorax showed large loculated right pleural effusion.  Patient underwent a Right chest tube placement with IR Dr Ritchie on 12/14/23.  He was then started on lytic therapy through pulmonology.    Interval History:  12/15/23 -  Right Chest tube with 1360 mL serosanguineous output in the last 24 hours.  WBC normal.  Chest x-ray this morning shows hydropneumothorax.  No air leak detected.    12/18/23 - Right Chest tube with 1340 mL serosanguineous output in the last 24 hours status post lytic therapy.  WBC normal.  Chest x-ray this morning shows new right lung base pneumothorax. No air leak detected.  Now being treated for C. Difficile.  Currently getting dialysis in room.    Assessment/Plan     Principal Problem:    C. difficile colitis  Active Problems:    Hypertension    Coronary  artery disease due to lipid rich plaque    Dyslipidemia    ESRD (end stage renal disease) (HCC)    Paroxysmal atrial flutter (HCC)    Loculated pleural effusion    Hypomagnesemia    History of mechanical aortic valve replacement    A-fib (HCC)      Plan IR  - Chest tube to suction -20 cm  - Pulmonary hygiene  - Per pulmonology, okay to remove chest tube when output less than 50 mL x 2 days                Review of Systems  Physical Exam   Review of Systems   Constitutional:  Positive for malaise/fatigue. Negative for chills and fever.   Respiratory:  Positive for cough and sputum production. Negative for shortness of breath.    Cardiovascular:  Negative for chest pain and palpitations.   Gastrointestinal:  Positive for abdominal pain and diarrhea. Negative for nausea and vomiting.   Neurological:  Positive for weakness. Negative for headaches.      Vitals:    12/18/23 1219   BP: 117/83   Pulse: (!) 130   Resp: 19   Temp: 37 °C (98.6 °F)   SpO2: 97%        Physical Exam  Cardiovascular:      Rate and Rhythm: Tachycardia present. Rhythm irregular.      Pulses: Normal pulses.   Pulmonary:      Effort: Pulmonary effort is normal. No respiratory distress.      Comments: Right chest tube  Abdominal:      Palpations: Abdomen is soft.   Skin:     General: Skin is warm and dry.   Neurological:      General: No focal deficit present.      Mental Status: He is alert and oriented to person, place, and time.   Psychiatric:         Mood and Affect: Mood normal.         Behavior: Behavior normal.             Labs    Recent Labs     12/16/23  0802 12/17/23  0633 12/17/23  2345   WBC 8.6 8.9 8.0   RBC 3.58* 3.26* 3.04*   HEMOGLOBIN 10.4* 9.5* 9.0*   HEMATOCRIT 33.2* 30.7* 28.5*   MCV 92.7 94.2 93.8   MCH 29.1 29.1 29.6   MCHC 31.3* 30.9* 31.6*   RDW 50.3* 50.5* 50.6*   PLATELETCT 297 298 325   MPV 8.6* 8.7* 8.9*       Recent Labs     12/16/23  0802 12/17/23  0633 12/17/23  2345   SODIUM 134* 133* 135   POTASSIUM 4.5 5.0 4.4    CHLORIDE 98 98 100   CO2 23 22 25   GLUCOSE 107* 101* 86   BUN 50* 59* 28*   CREATININE 8.93* 10.33* 6.60*   CALCIUM 8.5 8.1* 7.9*       Recent Labs     12/16/23  0802 12/17/23  0633 12/17/23  2345   CREATININE 8.93* 10.33* 6.60*       DX-CHEST-PORTABLE (1 VIEW)   Final Result         1.  Pulmonary edema and/or infiltrates, stable.   2.  Small layering bilateral pleural effusions, stable on the right and decreased on the left since prior study.   3.  New right lung base pneumothorax with small pigtail thoracostomy tube in place.   4.  Cardiomegaly   5.  Atherosclerosis      DX-CHEST-PORTABLE (1 VIEW)   Final Result         1.  Pulmonary edema and/or infiltrates.   2.  Small layering bilateral pleural effusions   3.  Cardiomegaly   4.  Atherosclerosis      DX-CHEST-PORTABLE (1 VIEW)   Final Result      Unchanged bilateral pulmonary opacities and suspected bilateral pleural effusions, right worse than left.         DX-CHEST-PORTABLE (1 VIEW)   Final Result      Stable examination.      CT-CHEST (THORAX) W/O   Final Result      1.  Interval decrease in size of large loculated right pleural effusion following following placement of locking loop chest tube.      2.  Smaller loculated pleural effusions posterior superiorly in the right hemithorax there are thorax.      3.  Small left pleural effusion with elevation of left hemidiaphragm.      4.  Dependent partial collapse of the lower lobes.      5.  Patchy airspace opacities in the right lower lobe, lingula, and to a lesser extent right middle lobe suspicious for pneumonitis or parenchymal scarring      6.  Cirrhosis and splenomegaly.      DX-CHEST-PORTABLE (1 VIEW)   Final Result      1.  Redemonstrated right hydropneumothorax with decreased air component, otherwise stable in size.   2.  Bilateral basilar atelectasis and/or consolidation with mild interstitial edema.   3.  Stable enlargement of the cardiomediastinal silhouette.      DX-CHEST-PORTABLE (1 VIEW)   Final  Result      Placement of a right pigtail chest tube with decreased right pleural effusion.      Hazy opacities which could be related to hypoinflation or vascular congestion. Correlate clinically for infection.      IR-CHEST TUBE-EMPYEMA RIGHT   Final Result      Successful image guided RIGHT chest tube placement.      Plan: Low wall suction. Follow-up radiograph is pending.      CT-CHEST (THORAX) W/O   Final Result      1.  Large loculated right pleural effusion, worse compared to the October 2023 CT study. Associated atelectasis, with subtotal collapse of the right lower lobe.   2.  Minimal groundglass opacities in the right lung, infectious/inflammatory. No consolidation.   3.  Small left pleural effusion and associated atelectasis.   4.  Mild right hilar lymphadenopathy, statistically reactive rather than neoplastic.   5.  Cardiomegaly.   6.  Abdomen is detailed separately.      CT-ABDOMEN-PELVIS W/O   Final Result      1.  No new inflammatory process in the abdomen or pelvis.   2.  Minimal fat stranding adjacent to the cecum, nonspecific.   3.  Percutaneous catheter with tip in the pelvis. Small volume of pelvic free fluid.   4.  Colonic diverticulosis.   5.  Partially visualized moderate to large right pleural effusion, which may be loculated. It has increased in size since prior CT study.   6.  Small left pleural effusion.   7.  Cardiomegaly.         [id number] VIEW   Final Result      1.  Benign bowel gas pattern.      2.  Peritoneal dialysis catheter coiled in the pelvis.      [id number] VIEW   Final Result      Nonspecific bowel gas pattern with a moderate colonic stool burden.      DX-CHEST-PORTABLE (1 VIEW)   Final Result      1.  Enlarged cardiac silhouette with vascular congestion/edema.   2.  Lower lobe airspace disease could be due to edema, atelectasis or pneumonitis.      EC-ECHOCARDIOGRAM COMPLETE W/O CONT   Final Result      CT-ABDOMEN-PELVIS W/O   Final Result      1.  Fat stranding adjacent  "to the cecum, concerning for colitis/typhlitis.   2.  Colonic diverticulosis.   3.  Nonobstructing, tiny left renal stone.   4.  Likely partially loculated moderate right pleural effusion.   5.  Small left pleural effusion.   6.  Adjacent right middle lobe and bilateral lower lobe consolidations, likely atelectasis.   7.  Small pericardial effusion.      DX-CHEST-PORTABLE (1 VIEW)   Final Result      1.  Resolved or improved small left pleural effusion with persistent left basilar atelectasis and/or scarring.   2.  New small right pleural effusion with associated atelectasis and/or consolidation.      CL-EP ABLATION ATRIAL FLUTTER    (Results Pending)   EC-CARMELITA W/O CONT    (Results Pending)       INR   Date Value Ref Range Status   12/17/2023 1.49 (H) 0.87 - 1.13 Final     Comment:     INR - Non-therapeutic Reference Range: 0.87-1.13  INR - Therapeutic Reference Range: 2.0-4.0       No results found for: \"POCINR\"     Intake/Output Summary (Last 24 hours) at 12/15/2023 1122  Last data filed at 12/15/2023 1113  Gross per 24 hour   Intake 610 ml   Output 2060 ml   Net -1450 ml      Labs not explicitly included in this progress note were reviewed by the author. Radiology/imaging not explicitly included in this progress note was reviewed by the author.     I have performed a physical exam and reviewed and updated ROS and Plan today (12/18/2023).     30 minutes in directly providing and coordinating care and extensive data review.  No time overlap and excludes procedures.  "

## 2023-12-18 NOTE — PROGRESS NOTES
[facility] Progress Notes     CAPD PD catheter flush ordered per Dr. Delgadillo-nephrologist.  Patient resting calmly, no complaints, no signs of distress  Pt with R sided chest tube in place, baseline tachycardic HR 120s, denies chest pain/SOB, saturating well on 4L/NC  Procedure explained, verbalized understanding.  Patient RLQ PD catheter patent with old PD dressings in place-removed.  RLQ PD dressings removed, exit site care with antiseptic solution and applied Gentamicin antibiotic cream around exit site, exit site good, no signs of infection.  PD catheter aseptically accessed and filled with 1000 mL of warmed 1.5% PD solution  Patient tolerated filling and draining well, no issues, denies complaints  PD eflluent clear yellow, with small specks of fibrin noted.   PD catheter clamped and aseptically deaccessed and capped with sterile mini cap and secured with adhesive    CAPD drain volume: 1000 mL (clear, yellow, small  fibrin)     Report given to primary care nurse.

## 2023-12-18 NOTE — PROGRESS NOTES
Called dietary regarding the Nepro drink. Patient ate 0% of breakfast and was requesting the nepro supplement. Dietary to review and send up

## 2023-12-18 NOTE — PROGRESS NOTES
"Pulmonary Progress Note    Date of Admission: 11/27/2023   Reason for consult: Loculated pleural effusion     Chief Complaint:  Chief Complaint   Patient presents with    Sent by MD     Patient reports open heart surgery 11/3. Patient reports spoke to surgeon today and told to come to ER. Patient reports also a dialysis patient and did his dialysis last night at home.      Weakness     Patient reports increased weakness over the last three weeks.     Shortness of Breath     X 3 weeks.      HPI:   From Dr. Heller's note: \"58 y.o. male who presented 11/27/2023 with abdominal pain, nausea, vomiting, diarrhea.  He was found to have C. difficile colitis.  He had been discharged on 11/17/2023 after mechanical aortic valve replacement and aortic aneurysm repair.  He has a history of ESRD on PD.  Patient reports that during his hospitalization, he has had increasing shortness of breath.  He had a CT scan of his abdomen yesterday due to ongoing abdominal pain and a loculated effusion on the right side of his chest was found.  I reviewed the chest CT with IR and we both felt that the effusion would not drain with simple thoracenteses and that he needed to undergo a chest tube.  Patient has a history of a left-sided pleural effusion which was drained on 11/11/2023.  It was bloody in appearance but no labs were sent.     Hospital Course  12/14/2023 - Chest tube placed today  12/15/2023 - 1360 out yesterday, given 1/2 dose lytics because the fluid was slightly bloody. CXR showing hydropneumonthorax.  12/17/2023-patient was moved to telemetry yesterday due to A-fib with RVR.  He is receiving HD today.  CT scan showed small pneumothorax, I do not believe that this is an ex vacuo pneumothorax.  He does have residual loculated effusion at the right base as well as a small left-sided pleural effusion.\"    Patient has not been seen by pulmonary at [facility] in the past.  he has no prior PFTs    24h events: on 4 lpm with saturations " 94-98%. On a heparin drip. No new complaints. Still with significant chest tube output.   Imaging: reviewed   Notable lab trends: reviewed   ABG: n/a  Tmax: afebrile  ProCal: n/a  Antibiotics: Vancomycin  Steroids: none  Cultures: n/a  I/O: Total 1.34L out in past 24h; 3.75L total     Scheduled Medications   Medication Dose Frequency    alteplase (Activase) 10 mg in NS 30 mL INTRAPLEURAL syringe  10 mg Once    And    dornase alpha (Pulmozyme) 5 mg in NS 30 mL INTRAPLEURAL syringe  5 mg Once    Pharmacy  1 Each PHARMACY TO DOSE    verapamil  80 mg Q8HRS    vancomycin 50 mg/mL  125 mg Q6HR    Followed by    [START ON 12/27/2023] vancomycin 50 mg/mL  125 mg Q12HR    Followed by    [START ON 1/3/2024] vancomycin 50 mg/mL  125 mg Q24HR    Followed by    [START ON 1/10/2024] vancomycin 50 mg/mL  125 mg Q48HRS    Followed by    [START ON 1/18/2024] vancomycin 50 mg/mL  125 mg Q72HRS    epoetin  6,000 Units TUE+THU+SAT    Pharmacy Consult Request  1 Each PHARMACY TO DOSE    lactobacillus rhamnosus  1 Capsule QDAY with Breakfast    sevelamer carbonate  1,600 mg TID WITH MEALS    dronabinol  5 mg BEFORE LUNCH AND DINNER    [Held by provider] MD Alert...Warfarin per Pharmacy   PHARMACY TO DOSE    aspirin  81 mg DAILY    atorvastatin  40 mg Nightly    tamsulosin  0.4 mg DAILY       No current facility-administered medications on file prior to encounter.     Current Outpatient Medications on File Prior to Encounter   Medication Sig Dispense Refill    omeprazole (PRILOSEC) 20 MG delayed-release capsule Take 1 Capsule by mouth 2 times a day. 30 Capsule 2    warfarin (COUMADIN) 2.5 MG Tab Take 1 Tablet by mouth every day at 6 PM. 30 Tablet 3    tamsulosin (FLOMAX) 0.4 MG capsule Take 0.4 mg by mouth every day.      atorvastatin (LIPITOR) 40 MG Tab Take 1 Tablet by mouth every evening. 100 Tablet 3    aspirin 81 MG EC tablet Take 81 mg by mouth every day.         Allergies: Allopurinol and Hydralazine hcl      ROS: A 12 point ROS  "was performed on intake and during my interview. ROS negative unless specifically noted in HPI.     Vitals:  /83   Pulse 82   Temp 36.8 °C (98.2 °F) (Temporal)   Resp 18   Ht 1.702 m (5' 7\")   Wt 77.2 kg (170 lb 3.1 oz)   SpO2 94%     Physical Exam:  Physical Exam  Vitals reviewed.   Constitutional:       General: He is not in acute distress.     Appearance: He is normal weight. He is ill-appearing.   Eyes:      General:         Right eye: No discharge.         Left eye: No discharge.      Conjunctiva/sclera: Conjunctivae normal.      Pupils: Pupils are equal, round, and reactive to light.   Cardiovascular:      Rate and Rhythm: Normal rate.      Pulses: Normal pulses.      Comments: Mechanical aortic valve sound  Pulmonary:      Effort: Pulmonary effort is normal. No respiratory distress.      Breath sounds: No wheezing or rales.          Comments: Right sided chest tube  Musculoskeletal:      Right lower leg: No edema.      Left lower leg: No edema.   Skin:     General: Skin is warm.      Capillary Refill: Capillary refill takes less than 2 seconds.      Coloration: Skin is not jaundiced.   Neurological:      General: No focal deficit present.      Mental Status: He is alert.   Psychiatric:         Behavior: Behavior normal.       Laboratory Data: I personally reviewed labs including historical lab trends.     Immunization History   Administered Date(s) Administered    Covid-19 Mrna (Spikevax) Moderna 12+ Years 10/01/2023    Hepatitis B Vaccine, CpG Adjuvanted (Heplisav-B) 06/21/2023, 08/18/2023    INFLUENZA TIV (IM) 03/17/2020    Influenza Vaccine Quad Inj (Pf) 09/29/2020, 11/20/2021, 10/27/2022, 09/16/2023    MODERNA BIVALENT BOOSTER SARS-COV-2 VACCINE (6+) 10/27/2022    MODERNA SARS-COV-2 VACCINE (12+) 03/18/2021, 04/15/2021, 04/15/2022    Pneumococcal Conjugate Vaccine (PCV20) 08/11/2023    Pneumococcal Conjugate Vaccine (Prevnar/PCV-13) 06/26/2020    Zoster Vaccine Recombinant (RZV) (SHINGRIX) " 04/04/2020, 06/13/2020       PFTs as reviewed by me personally show: none available    Imaging as reviewed by me personally show:  Bilateral effusions. Cardiomegaly. Tubes and lines in appropriate position.       Assessment/Plan:    Pulmonary problem list:  #Acute hypoxic respiratory failure secondary to fluid overload and right sided pleural effusion 2/2 heart failure  # Loculated right-sided pleural effusion, suspect that this is due to PD which became increasingly more complicated, possibly hemorhorax     -CXR reviewed  -IS when able  -Mobilize when able  -Target O2 sat 88-92%  -monitor output: CT to waterseal  -will remove when <50 cc daily for 2 straight days     Total consult time: 60 minutes which included time spent on chart review, personally reviewing pertinent images and labs, time spent counseling and educating the patient and/or family members, and coordinating care with the healthcare team to include consultants.   __________  Poncho Claudio, DO  Pulmonary and Critical Care Medicine  [facility]    Please note that this dictation was created using voice recognition software. The accuracy of the dictation is limited to the abilities of the software. I have made every reasonable attempt to correct obvious errors, but I expect that there are errors of grammar and possibly content that I did not discover before finalizing the note.

## 2023-12-18 NOTE — DISCHARGE PLANNING
HTH/SCP TCN chart review completed. Collaborated with JOSE Beckett.  Current discharge considerations are for Resumption of [facility] and possible supplemental O2 when medically cleared.  Patient seen at bedside and choice obtained for DME ([employer]) per collaboration with JOSE, faxed to [facility] and given to CM.  Patient on RA at her baseline and is currently on 4 1/2 L/min O2.  TCN will continue to follow and collaborate with discharge planning team as additional post acute needs arise. Thank you.    Completed:  Choice obtained: DME ([employer]) on 12/17/23.  HH (Resumption of [facility]) on 11/28/23.    [facility]  has accepted  [facility]P with [facility].  Patient request to set up his own Follow up PCP appointment.

## 2023-12-18 NOTE — CARE PLAN
The patient is Stable - Low risk of patient condition declining or worsening    Shift Goals  Clinical Goals: Pain Control and nausea control  Patient Goals: Pain management  Family Goals: NA    Progress made toward(s) clinical / shift goals:      Problem: Pain - Standard  Goal: Alleviation of pain or a reduction in pain to the patient’s comfort goal of 3 after PO pain medication administration.   Outcome: Progressing  Note: Medicating pt with PRN's as needed for abdominal and back discomfort. Re assessing pain within 2 hours after PO. Pt verbalizes a pain of 3 being tolerable and reported it after having taken the PO pain med. Encouraging repositioning for comfort also as well as heat.      Problem: Gastrointestinal Irritability  Goal: Diarrhea will be absent or improved by end of shift.   Outcome: Progressing  Note: Patient with Cdiff. States he had one episode of diarrhea/incontinence over night. Monitoring stools, thus this shift patient has had O. Encouraging PO intake as it has been O. Collaborating with dietary to switch supplements to morning as pt prefers this. Continuing PO vanco to treat cause of diarrhea        Patient is not progressing towards the following goals:

## 2023-12-18 NOTE — PROGRESS NOTES
NOC CROSSCOVER        Notified by bedside RN of significantly increased CT output, about 1400 in 3 hours and increased work of breathing. O note patient received lytic therapy earlier in the afternoon through his CT for a loculated effusion. I examined patient at bedside and he was tachypneic in the low to mid 20's with maybe some mild increased work of breathing. The patients CT output was mostly sanguinous.     Patient had a CXR scheduled in AM but I opted to check one early to ensure there was no other acute process occurring. I also am having the patients AM labs drawn early to evaluate his CBC and coags.     After reviewing his CXR the patients effusion looks significantly improved following the lytic therapy and I imagine this is just that effusion finally effectively draining rather than anything acute occurring. In concern to his tachpnea and work of breathing patient is also reporting abdominal pain secondary to his C-Diff colitis. He has been reluctant to try narcotic pain medicine for fear of becoming addicted to them. I provided bedside education about his disease course and encouraged him to take advantage of the currently ordered pain regimen for which he was more amenable to.       Major TOLLIVER

## 2023-12-18 NOTE — PROGRESS NOTES
Nephryology Wes replied and stated Iron and epoetin could be changed to be given with dialysis.     Reached out to UNRULY Nowak to discuss patient refusing Amio and stating he wants to try a different medication.  Cardio will come talk with patient about meds and planned procedure.

## 2023-12-19 ENCOUNTER — APPOINTMENT (OUTPATIENT)
Dept: RADIOLOGY | Facility: MEDICAL CENTER | Age: 58
DRG: 981 | End: 2023-12-19
Attending: HOSPITALIST
Payer: COMMERCIAL

## 2023-12-19 LAB
ALBUMIN SERPL BCP-MCNC: 2 G/DL (ref 3.2–4.9)
BUN SERPL-MCNC: 21 MG/DL (ref 8–22)
CALCIUM ALBUM COR SERPL-MCNC: 9.7 MG/DL (ref 8.5–10.5)
CALCIUM SERPL-MCNC: 8.1 MG/DL (ref 8.5–10.5)
CHLORIDE SERPL-SCNC: 101 MMOL/L (ref 96–112)
CO2 SERPL-SCNC: 26 MMOL/L (ref 20–33)
CREAT SERPL-MCNC: 5.38 MG/DL (ref 0.5–1.4)
ERYTHROCYTE [DISTWIDTH] IN BLOOD BY AUTOMATED COUNT: 50.2 FL (ref 35.9–50)
GFR SERPLBLD CREATININE-BSD FMLA CKD-EPI: 12 ML/MIN/1.73 M 2
GLUCOSE SERPL-MCNC: 100 MG/DL (ref 65–99)
HCT VFR BLD AUTO: 29.4 % (ref 42–52)
HGB BLD-MCNC: 9.3 G/DL (ref 14–18)
INR PPP: 1.47 (ref 0.87–1.13)
MAGNESIUM SERPL-MCNC: 1.9 MG/DL (ref 1.5–2.5)
MCH RBC QN AUTO: 29.9 PG (ref 27–33)
MCHC RBC AUTO-ENTMCNC: 31.6 G/DL (ref 32.3–36.5)
MCV RBC AUTO: 94.5 FL (ref 81.4–97.8)
PHOSPHATE SERPL-MCNC: 3.8 MG/DL (ref 2.5–4.5)
PLATELET # BLD AUTO: 311 K/UL (ref 164–446)
PMV BLD AUTO: 8.7 FL (ref 9–12.9)
POTASSIUM SERPL-SCNC: 4.5 MMOL/L (ref 3.6–5.5)
PROTHROMBIN TIME: 18 SEC (ref 12–14.6)
RBC # BLD AUTO: 3.11 M/UL (ref 4.7–6.1)
SODIUM SERPL-SCNC: 136 MMOL/L (ref 135–145)
UFH PPP CHRO-ACNC: 0.47 IU/ML
WBC # BLD AUTO: 6.9 K/UL (ref 4.8–10.8)

## 2023-12-19 PROCEDURE — 99232 SBSQ HOSP IP/OBS MODERATE 35: CPT | Performed by: INTERNAL MEDICINE

## 2023-12-19 PROCEDURE — 700102 HCHG RX REV CODE 250 W/ 637 OVERRIDE(OP): Performed by: STUDENT IN AN ORGANIZED HEALTH CARE EDUCATION/TRAINING PROGRAM

## 2023-12-19 PROCEDURE — 700111 HCHG RX REV CODE 636 W/ 250 OVERRIDE (IP): Performed by: FAMILY MEDICINE

## 2023-12-19 PROCEDURE — A9270 NON-COVERED ITEM OR SERVICE: HCPCS | Performed by: INTERNAL MEDICINE

## 2023-12-19 PROCEDURE — 770020 HCHG ROOM/CARE - TELE (206)

## 2023-12-19 PROCEDURE — A9270 NON-COVERED ITEM OR SERVICE: HCPCS | Performed by: HOSPITALIST

## 2023-12-19 PROCEDURE — 700102 HCHG RX REV CODE 250 W/ 637 OVERRIDE(OP): Performed by: INTERNAL MEDICINE

## 2023-12-19 PROCEDURE — 700101 HCHG RX REV CODE 250

## 2023-12-19 PROCEDURE — 80069 RENAL FUNCTION PANEL: CPT

## 2023-12-19 PROCEDURE — 85027 COMPLETE CBC AUTOMATED: CPT

## 2023-12-19 PROCEDURE — A9270 NON-COVERED ITEM OR SERVICE: HCPCS | Performed by: FAMILY MEDICINE

## 2023-12-19 PROCEDURE — A9270 NON-COVERED ITEM OR SERVICE: HCPCS | Performed by: STUDENT IN AN ORGANIZED HEALTH CARE EDUCATION/TRAINING PROGRAM

## 2023-12-19 PROCEDURE — 85520 HEPARIN ASSAY: CPT

## 2023-12-19 PROCEDURE — 99233 SBSQ HOSP IP/OBS HIGH 50: CPT | Performed by: INTERNAL MEDICINE

## 2023-12-19 PROCEDURE — 83735 ASSAY OF MAGNESIUM: CPT

## 2023-12-19 PROCEDURE — 71045 X-RAY EXAM CHEST 1 VIEW: CPT

## 2023-12-19 PROCEDURE — 85610 PROTHROMBIN TIME: CPT

## 2023-12-19 PROCEDURE — 700102 HCHG RX REV CODE 250 W/ 637 OVERRIDE(OP): Performed by: FAMILY MEDICINE

## 2023-12-19 PROCEDURE — 36415 COLL VENOUS BLD VENIPUNCTURE: CPT

## 2023-12-19 PROCEDURE — 700102 HCHG RX REV CODE 250 W/ 637 OVERRIDE(OP): Performed by: HOSPITALIST

## 2023-12-19 PROCEDURE — 700111 HCHG RX REV CODE 636 W/ 250 OVERRIDE (IP)

## 2023-12-19 RX ORDER — WARFARIN SODIUM 2.5 MG/1
2.5 TABLET ORAL DAILY
Status: DISCONTINUED | OUTPATIENT
Start: 2023-12-19 | End: 2023-12-20

## 2023-12-19 RX ADMIN — ATORVASTATIN CALCIUM 40 MG: 40 TABLET, FILM COATED ORAL at 21:07

## 2023-12-19 RX ADMIN — DRONABINOL 5 MG: 5 CAPSULE ORAL at 18:08

## 2023-12-19 RX ADMIN — HEPARIN SODIUM 22 UNITS/KG/HR: 5000 INJECTION, SOLUTION INTRAVENOUS at 11:58

## 2023-12-19 RX ADMIN — VANCOMYCIN HYDROCHLORIDE 125 MG: 5 INJECTION, POWDER, LYOPHILIZED, FOR SOLUTION INTRAVENOUS at 06:13

## 2023-12-19 RX ADMIN — VANCOMYCIN HYDROCHLORIDE 125 MG: 5 INJECTION, POWDER, LYOPHILIZED, FOR SOLUTION INTRAVENOUS at 18:08

## 2023-12-19 RX ADMIN — VANCOMYCIN HYDROCHLORIDE 125 MG: 5 INJECTION, POWDER, LYOPHILIZED, FOR SOLUTION INTRAVENOUS at 00:25

## 2023-12-19 RX ADMIN — VANCOMYCIN HYDROCHLORIDE 125 MG: 5 INJECTION, POWDER, LYOPHILIZED, FOR SOLUTION INTRAVENOUS at 13:21

## 2023-12-19 RX ADMIN — WARFARIN SODIUM 2.5 MG: 2.5 TABLET ORAL at 18:08

## 2023-12-19 RX ADMIN — VERAPAMIL HYDROCHLORIDE 80 MG: 120 TABLET ORAL at 06:11

## 2023-12-19 RX ADMIN — SIMETHICONE 125 MG: 125 TABLET, CHEWABLE ORAL at 08:51

## 2023-12-19 RX ADMIN — VERAPAMIL HYDROCHLORIDE 80 MG: 120 TABLET ORAL at 13:21

## 2023-12-19 RX ADMIN — SEVELAMER CARBONATE 1600 MG: 800 TABLET, FILM COATED ORAL at 08:45

## 2023-12-19 RX ADMIN — VANCOMYCIN HYDROCHLORIDE 125 MG: 5 INJECTION, POWDER, LYOPHILIZED, FOR SOLUTION INTRAVENOUS at 23:46

## 2023-12-19 RX ADMIN — ASPIRIN 81 MG: 81 TABLET, COATED ORAL at 06:11

## 2023-12-19 RX ADMIN — HEPARIN SODIUM 22 UNITS/KG/HR: 5000 INJECTION, SOLUTION INTRAVENOUS at 23:46

## 2023-12-19 ASSESSMENT — ENCOUNTER SYMPTOMS
COUGH: 0
BACK PAIN: 0
COUGH: 1
FATIGUE: 0
NERVOUS/ANXIOUS: 0
TROUBLE SWALLOWING: 0
SPEECH CHANGE: 0
WEAKNESS: 0
SHORTNESS OF BREATH: 1
HEADACHES: 0
ABDOMINAL PAIN: 0
SHORTNESS OF BREATH: 0
NAUSEA: 0
SPEECH DIFFICULTY: 0
FEVER: 0
DIZZINESS: 0
DIAPHORESIS: 0
BLOOD IN STOOL: 0
PALPITATIONS: 0
HEARTBURN: 0
LIGHT-HEADEDNESS: 0
WEAKNESS: 1
NUMBNESS: 0
WHEEZING: 0
MYALGIAS: 0
BLURRED VISION: 0
CHILLS: 0
FLANK PAIN: 0
SENSORY CHANGE: 0
VOMITING: 0
NECK PAIN: 0
DIARRHEA: 1
FOCAL WEAKNESS: 0
SPUTUM PRODUCTION: 0
SORE THROAT: 0
ABDOMINAL PAIN: 1

## 2023-12-19 ASSESSMENT — COGNITIVE AND FUNCTIONAL STATUS - GENERAL
DAILY ACTIVITIY SCORE: 24
MOBILITY SCORE: 24
SUGGESTED CMS G CODE MODIFIER DAILY ACTIVITY: CH
SUGGESTED CMS G CODE MODIFIER MOBILITY: CH

## 2023-12-19 ASSESSMENT — FIBROSIS 4 INDEX: FIB4 SCORE: .8615384615384615385

## 2023-12-19 NOTE — CONSULTS
EP Consult Note    DOS: 12/18/2023    Consulting physician: Charly Orourke MD    Chief complaint/Reason for consult: AFL    HPI:  Pt is a 59 yo M. He has history of mechanical AVR and aneurysm repair in early part of November. History of ESRD. On PD at home, dialyzed HD via R upper extremity permacath here. Admitted now for recurrent colitis due to C diff. There was concern for ?peritonitis as well. Covered with broad spectrum antibiotics. R sided loculated effusion was also found. Underwent IR placed R sided CT. Coumadin was stopped but he has been bridged with IV heparin gtt.  Inpatient course complicated by what appears to be typical flutter. Difficult to rate control. Currently no acute complaints on interview.    ROS (+ highlighted in red):  Constitutional: Fevers/chills/fatigue/weightloss  HEENT: Blurry vision/eye pain/sore throat/hearing loss  Respiratory: Shortness of breath/cough  Cardiovascular: Chest pain/palpitations/edema/orthopnea/syncope  GI: Nausea/vomitting/diarrhea  MSK: Arthralgias/myagias/muscle weakness  Skin: Rash/sores  Neurological: Numbness/tremors/vertigo  Endocrine: Excessive thirst/polyuria/cold intolerance/heat intolerance  Psych: Depression/anxiety    Past Medical History:   Diagnosis Date    Anesthesia     Hiccups for over 24 hours after a previous sugery.    Aortic stenosis     Chronic gout of multiple sites     Dialysis patient ([facility])     peritoneal daily    Dyspnea on exertion 05/23/2023    Elevated troponin 05/23/2023    Heart burn     1990    Heart murmur     High cholesterol     All always    Hypertension 2009    NSTEMI (non-ST elevated myocardial infarction) ([facility]) 05/23/2023    no stents placed    Pain in the chest 05/23/2023    Paroxysmal A-fib ([facility]) 11/11/2023    PONV (postoperative nausea and vomiting)     Renal disorder 2007    Stage IV    Sleep apnea 2000    Snoring 1990       Past Surgical History:   Procedure Laterality Date    AORTIC VALVE REPLACEMENT  11/3/2023     Procedure: AORTIC VALVE REPLACEMENT, ASCENDING AORTIC ANEURYSM REPAIR, TRANSESOPHAGEAL ECHOCARDIOGRAM;  Surgeon: Osmel Blanca M.D.;  Location: SURGERY [facility];  Service: Cardiothoracic    AORTIC ASCENDING DISSECTION  11/3/2023    Procedure: REPAIR, ANEURYSM OR DISSECTION, AORTA, ASCENDING;  Surgeon: Osmel Blanca M.D.;  Location: SURGERY [facility];  Service: Cardiothoracic    ECHOCARDIOGRAM, TRANSESOPHAGEAL, INTRAOPERATIVE  11/3/2023    Procedure: ECHOCARDIOGRAM, TRANSESOPHAGEAL, INTRAOPERATIVE;  Surgeon: Osmel Blanca M.D.;  Location: SURGERY [facility];  Service: Cardiothoracic    RESTORATE HEMOSTAS  11/3/2023    Procedure: RESTORATION OF HEMOSTATIS;  Surgeon: Osmel Blanca M.D.;  Location: SURGERY [facility];  Service: Cardiothoracic    STERNOTOMY  11/3/2023    Procedure: MEDIASTINAL EXPLORATION;  Surgeon: Osmel Blanca M.D.;  Location: SURGERY [facility];  Service: Cardiothoracic    CATH PLACEMENT CAPD Right 9/6/2023    Procedure: LAPAROSCOPIC INSERTION OF PERITONEAL DIALYSIS CATHETER;  Surgeon: Dontrell Sales M.D.;  Location: SURGERY [facility];  Service: Vascular    CA INJ LUMBAR/SACRAL,W/ IMAGING Left 8/24/2023    Procedure: LUMBAR EPIDURAL STEROID INJECTION, LEFT L5-S1 INTERLAMINAR UNDER FLUOROSCOPY;  Surgeon: Benito Herrera D.O.;  Location: SURGERY [facility];  Service: Orthopedics    CATH PLACEMENT CAPD N/A 5/15/2023    Procedure: LAPAROSCOPIC PLACEMENT OF PERITONEAL DIALYSIS CATHERTER;  Surgeon: Shikha Alexander M.D.;  Location: SURGERY SAME DAY [facility];  Service: General    UMBILICAL HERNIA REPAIR  2012    4 separate surgeries between [id number]    OTHER  2007    Kidney biopsy, can't recall laterality,    HIP ARTHROSCOPY Bilateral 2002    TONSILLECTOMY N/A 1987    HAND SURGERY Right 1985    Hand and wrist    SHOULDER ARTHROSCOPY      Can't recall date       Social History     Socioeconomic History    Marital status:      Spouse name: Not on file    Number of children: Not  on file    Years of education: Not on file    Highest education level: Master's degree (e.g., MA, MS, Kenn, MEd, MSW, ELIECER)   Occupational History    Not on file   Tobacco Use    Smoking status: Never    Smokeless tobacco: Never   Vaping Use    Vaping Use: Never used   Substance and Sexual Activity    Alcohol use: Never    Drug use: Not Currently    Sexual activity: Yes     Partners: Female   Other Topics Concern    Not on file   Social History Narrative    Not on file     Social Determinants of Health     Financial Resource Strain: Low Risk  (1/4/2023)    Overall Financial Resource Strain (CARDIA)     Difficulty of Paying Living Expenses: Not hard at all   Food Insecurity: No Food Insecurity (1/4/2023)    Hunger Vital Sign     Worried About Running Out of Food in the Last Year: Never true     Ran Out of Food in the Last Year: Never true   Transportation Needs: No Transportation Needs (1/4/2023)    PRAPARE - Transportation     Lack of Transportation (Medical): No     Lack of Transportation (Non-Medical): No   Physical Activity: Sufficiently Active (1/4/2023)    Exercise Vital Sign     Days of Exercise per Week: 3 days     Minutes of Exercise per Session: 60 min   Stress: Stress Concern Present (1/4/2023)    [employer] [place] [employer] - Occupational Stress Questionnaire     Feeling of Stress : To some extent   Social Connections: Moderately Isolated (1/4/2023)    Social Connection and Isolation Panel [NHANES]     Frequency of Communication with Friends and Family: Once a week     Frequency of Social Gatherings with Friends and Family: More than three times a week     Attends [employer]al Services: Never     Active Member of Clubs or Organizations: No     Attends Club or Organization Meetings: Never     Marital Status: Living with partner   Intimate Partner Violence: Not on file   Housing Stability: Low Risk  (1/4/2023)    Housing Stability Vital Sign     Unable to Pay for Housing in the Last Year: No      Number of Places Lived in the Last Year: 1     Unstable Housing in the Last Year: No       Family History   Problem Relation Age of Onset    Hyperlipidemia Mother     Hypertension Father        Allergies   Allergen Reactions    Allopurinol Itching    Hydralazine Hcl Unspecified     Pt states he had a reaction similar to lupus       Current Facility-Administered Medications   Medication Dose Route Frequency Provider Last Rate Last Admin    GI Cocktail (hyoscyamine-lidocaine-Maalox) oral susp cup 30 mL  30 mL Oral QDAY PRN Sujit Galvan M.D.        [START ON 12/19/2023] ferric gluconate complex (Dialysis Use Only) injection 125 mg  125 mg Intravenous TUE+THU+SAT Kodak Harvey M.D.        [START ON 12/19/2023] epoetin gabbie (Epogen/Procrit) injection 6,000 Units  6,000 Units Intravenous TUE+THU+SAT Kodak Harvey M.D.        gentamicin (Garamycin) 0.1 % cream   Topical ACUTE DIALYSIS PRN Kodak Harvey M.D.   Given at 12/18/23 1455    oxyCODONE immediate-release (Roxicodone) tablet 2.5 mg  2.5 mg Oral Q3HRS PRN Sujit Galvan M.D.   2.5 mg at 12/18/23 0832    Or    oxyCODONE immediate release (Roxicodone) tablet 10 mg  10 mg Oral Q3HRS PRN Sujit Galvan M.D.        alteplase (Activase) 10 mg in NS 30 mL INTRAPLEURAL syringe  10 mg Intrapleural Once Gisela Heller D.O.        And    dornase alpha (Pulmozyme) 5 mg in NS 30 mL INTRAPLEURAL syringe  5 mg Intrapleural Once Gisela Heller D.O.        Pharmacy Consult Request - C. difficile consult  1 Each Other PHARMACY TO DOSE Sathish Wayne M.D.        verapamil (Isoptin) tablet 80 mg  80 mg Oral Q8HRS Sathish Wayne M.D.   80 mg at 12/18/23 0631    vancomycin 50 mg/mL oral soln 125 mg  125 mg Oral Q6HR Major Alaniz A.P.R.NYoly   125 mg at 12/18/23 1228    Followed by    [START ON 12/27/2023] vancomycin 50 mg/mL oral soln 125 mg  125 mg Oral Q12HR TEX Blue        Followed by    [START ON 1/3/2024]  vancomycin 50 mg/mL oral soln 125 mg  125 mg Oral Q24HR FRANKLIN BluePYolyRTREV.        Followed by    [START ON 1/10/2024] vancomycin 50 mg/mL oral soln 125 mg  125 mg Oral Q48HRS FRANKLIN BlueP.RTREV.        Followed by    [START ON 1/18/2024] vancomycin 50 mg/mL oral soln 125 mg  125 mg Oral Q72HRS FRANKLIN BluePYolyRSAAD        benzonatate (Tessalon) capsule 100 mg  100 mg Oral TID PRN Sathish Wayne M.D.        epoetin gabbie (Epogen/Procrit) injection 6,000 Units  6,000 Units Intravenous TUE+THU+SAT Zac Daily D.O.   6,000 Units at 12/14/23 0715    heparin infusion 25,000 units in 500 mL 0.45% NACL  0-30 Units/kg/hr Intravenous Continuous Federico Jacobsen M.D. 34.4 mL/hr at 12/18/23 0728 22 Units/kg/hr at 12/18/23 0728    heparin injection 3,100 Units  40 Units/kg Intravenous PRN Federico Jacobsen M.D.   3,100 Units at 12/12/23 0121    simethicone (Mylicon) chewable tablet 125 mg  125 mg Oral TID PRN Federico Jacobsen M.D.   125 mg at 12/18/23 1038    Pharmacy Consult Request ...Pain Management Review 1 Each  1 Each Other PHARMACY TO DOSE Sage Wallace M.D.        acetaminophen (Tylenol) tablet 1,000 mg  1,000 mg Oral Q6HRS PRN Sage Wallace M.D.   1,000 mg at 12/18/23 0649    Dextromethorphan Polistirex ER (Delsym) 30 MG/5ML suspension 60 mg  60 mg Oral BID W/MEALS PRN Richy Hand M.D.        heparin injection 2,000 Units  2,000 Units Intravenous ACUTE DIALYSIS PRN Justo Ríos M.D.   2,000 Units at 12/09/23 0830    lactobacillus rhamnosus (Culturelle) capsule 1 Capsule  1 Capsule Oral QDAY with Breakfast Richy Hand M.D.   1 Capsule at 12/18/23 0831    menthol (Halls) lozenge 1 Lozenge  1 Lozenge Oral Q2HRS PRN Richy Hand M.D.   1 Lozenge at 12/04/23 2257    benzocaine-menthol (Cepacol) lozenge 1 Lozenge  1 Lozenge Mouth/Throat Q2HRS PRN Richy Hand M.D.   1 Lozenge at 12/02/23 1956    sevelamer carbonate (Renvela) tablet 1,600 mg  1,600 mg Oral TID WITH MEALS Justice Mac,  M.D.   1,600 mg at 12/10/23 0719    dronabinol (Marinol) capsule 5 mg  5 mg Oral BEFORE LUNCH AND DINNER Richy Hand M.D.   5 mg at 12/18/23 1228    ondansetron (Zofran) syringe/vial injection 4 mg  4 mg Intravenous Q6HRS PRN Tanya Benitez M.D.   4 mg at 12/17/23 2144    [Held by provider] MD Alert...Warfarin per Pharmacy   Other PHARMACY TO DOSE Tanya Benitez M.D.        heparin injection 3,700 Units  3,700 Units Intracatheter PRN Justice Mac M.D.   3,700 Units at 12/17/23 1445    aspirin EC tablet 81 mg  81 mg Oral DAILY Iglesia Auguste M.D.   81 mg at 12/18/23 0631    atorvastatin (Lipitor) tablet 40 mg  40 mg Oral Nightly Iglesia Auguste M.D.   40 mg at 12/17/23 2144    tamsulosin (Flomax) capsule 0.4 mg  0.4 mg Oral DAILY Iglesia Auguste M.D.   0.4 mg at 12/18/23 0631    ondansetron (Zofran ODT) dispertab 4 mg  4 mg Oral Q4HRS PRN Iglesia Auguste M.D.   4 mg at 12/08/23 0602    promethazine (Phenergan) tablet 12.5-25 mg  12.5-25 mg Oral Q4HRS PRN Iglesia Auguste M.D.        promethazine (Phenergan) suppository 12.5-25 mg  12.5-25 mg Rectal Q4HRS PRN Iglesia Auguste M.D.        prochlorperazine (Compazine) injection 5-10 mg  5-10 mg Intravenous Q4HRS PRROSA Auguste M.D.           Physical Exam:  Vitals:    12/18/23 0631 12/18/23 0832 12/18/23 0900 12/18/23 1219   BP: 119/82  117/83 117/83   Pulse:   82 (!) 130   Resp:  16 18 19   Temp:   36.8 °C (98.2 °F) 37 °C (98.6 °F)   TempSrc:   Temporal Temporal   SpO2: 97%  94% 97%   Weight:       Height:         General appearance: NAD, conversant   Eyes: anicteric sclerae, moist conjunctivae; no lid-lag; PERRLA  HENT: Atraumatic; oropharynx clear with moist mucous membranes and no mucosal ulcerations; normal hard and soft palate  Neck: Trachea midline; FROM, supple, no thyromegaly or lymphadenopathy  Lungs: Decreased BS on R base  CV: RRR, crisp mechanical clinic, no JVD   Abdomen: Soft, non-tender; no masses or HSM  Extremities: No peripheral edema or  extremity lymphadenopathy  Skin: Normal temperature, turgor and texture; no rash, ulcers or subcutaneous nodules  Psych: Appropriate affect, alert and oriented to person, place and time    Data:  Labs reviewed    Prior echo/stress results reviewed:  Hyperdynamic left ventricular systolic function with tachycardia.  The left ventricular ejection fraction is visually estimated to be 70%.  Moderate concentric left ventricular hypertrophy.  Known mechanical aortic valve not well visualized probably due to   acuostical shadowing but functioning normally with normal transvalvular   gradients.  The right ventricle is normal in size and systolic function.  Normal inferior vena cava size and inspiratory collapse.  No pericardial effusion.  Compared to the images of the prior study, 11/15/2023 there has been   resolution of the pericardial effusion.     CXR interpreted by me:  + CT + R sided effusion    EKG interpreted by me:   2:1 AFL    Impression/Plan:  1) Atrial flutter with RVR  2) C diff colitis  3) R sided pleural effuion   4) S/p R sided chest tube  5) ESRD  6) S/p recent aortic root repair/AVR    -I think flutter will be difficult to rate control  -I think looks typical (CTI dependent) on 12 lead  -Would be amenable to ablation  -I think not unreasonable  -Risks/benefits discussed and he is agreeable  -Will plan on Wednesday  -His INR would be > 2 at that time we can stop the heparin, if not we can probably still do on heparin    Marissa Velasquez MD

## 2023-12-19 NOTE — PROGRESS NOTES
Handoff report received from day shift nurse. Pt care assumed. Pt is currently resting in bed. POC discussed with Pt and Pt verbalizes no questions at this time. Pt is AAOx4, on 4L NC on Tele monitoring, and VSS. Call light and belongings within reach, bed in lowest and locked position, and Pt educated on use of call light. Will continue to monitor.

## 2023-12-19 NOTE — PROGRESS NOTES
Chest tube output of only 30 ml serosanguineous during shift, no signs of respiratory distress, pain, or discomfort. Dressing intact, no crepitus notes. Major Alaniz made aware. No further orders at this time

## 2023-12-19 NOTE — PROGRESS NOTES
Radiology Progress Note   Author: TEX Marx Date & Time created: 12/19/2023  12:45 PM   Date of admission  11/27/2023  Note to reader: this note follows the APSO format rather than the historical SOAP format. Assessment and plan located at the top of the note for ease of use.    Chief Complaint  58 y.o. male admitted 11/27/2023 with   Chief Complaint   Patient presents with    Sent by MD     Patient reports open heart surgery 11/3. Patient reports spoke to surgeon today and told to come to ER. Patient reports also a dialysis patient and did his dialysis last night at home.      Weakness     Patient reports increased weakness over the last three weeks.     Shortness of Breath     X 3 weeks.          HPI  58-year-old male past medical history significant for ESRD on peritoneal dialysis and paroxysmal atrial flutter admitted 11/27/23 for abdominal pain, nausea, vomiting, diarrhea.  CT and lab findings of C. difficile colitis.  He recently underwent a mechanical aortic valve replacement and aortic aneurysm repair and was discharged on 11/17/2023.  During this hospitalization, patient became increasingly short of breath.  CT thorax showed large loculated right pleural effusion.  Patient underwent a Right chest tube placement with IR Dr Ritchie on 12/14/23.  He was then started on lytic therapy through pulmonology.    Interval History:  12/15/23 -  Right Chest tube with 1360 mL serosanguineous output in the last 24 hours.  WBC normal.  Chest x-ray this morning shows hydropneumothorax.  No air leak detected.    12/18/23 - Right Chest tube with 1340 mL serosanguineous output in the last 24 hours status post lytic therapy.  WBC normal.  Chest x-ray this morning shows new right lung base pneumothorax. No air leak detected.  Now being treated for C. Difficile.  Currently getting dialysis in room.    12/19/23 - Right Chest tube with 450 mL serous output in the last 24 hours.  WBC normal.  Chest x-ray this  morning stable. No air leak detected. Placed to water seal by pulmonologist earlier today. Cardiologist plans for ablation tomorrow.       Assessment/Plan     Principal Problem:    C. difficile colitis  Active Problems:    Hypertension    Coronary artery disease due to lipid rich plaque    Dyslipidemia    ESRD (end stage renal disease) ([facility])    Paroxysmal atrial flutter ([facility])    Loculated pleural effusion    Hypomagnesemia    History of mechanical aortic valve replacement    A-fib ([facility])      Plan IR  - Chest tube to water seal  - Pulmonary hygiene  - Per pulmonology, okay to remove chest tube when output less than 50 mL x 2 days                Review of Systems  Physical Exam   Review of Systems   Constitutional:  Positive for malaise/fatigue. Negative for chills and fever.   Respiratory:  Negative for cough, sputum production and shortness of breath.    Cardiovascular:  Negative for chest pain and palpitations.   Gastrointestinal:  Positive for abdominal pain and diarrhea. Negative for nausea and vomiting.   Neurological:  Positive for weakness. Negative for headaches.      Vitals:    12/19/23 1152   BP: 105/71   Pulse: (!) 131   Resp: 18   Temp: 37.1 °C (98.8 °F)   SpO2: 95%        Physical Exam  Cardiovascular:      Rate and Rhythm: Tachycardia present.      Pulses: Normal pulses.   Pulmonary:      Effort: Pulmonary effort is normal. No respiratory distress.      Comments: Right chest tube  NC in place  Abdominal:      Palpations: Abdomen is soft.   Skin:     General: Skin is warm and dry.   Neurological:      General: No focal deficit present.      Mental Status: He is alert and oriented to person, place, and time.   Psychiatric:         Mood and Affect: Mood normal.         Behavior: Behavior normal.             Labs    Recent Labs     12/17/23  0633 12/17/23  2345 12/19/23  0620   WBC 8.9 8.0 6.9   RBC 3.26* 3.04* 3.11*   HEMOGLOBIN 9.5* 9.0* 9.3*   HEMATOCRIT 30.7* 28.5* 29.4*   MCV 94.2 93.8 94.5   MCH 29.1  29.6 29.9   MCHC 30.9* 31.6* 31.6*   RDW 50.5* 50.6* 50.2*   PLATELETCT 298 325 311   MPV 8.7* 8.9* 8.7*       Recent Labs     12/17/23  0633 12/17/23  2345 12/19/23  0620   SODIUM 133* 135 136   POTASSIUM 5.0 4.4 4.5   CHLORIDE 98 100 101   CO2 22 25 26   GLUCOSE 101* 86 100*   BUN 59* 28* 21   CREATININE 10.33* 6.60* 5.38*   CALCIUM 8.1* 7.9* 8.1*       Recent Labs     12/17/23  0633 12/17/23  2345 12/19/23  0620   ALBUMIN  --   --  2.0*   CREATININE 10.33* 6.60* 5.38*       DX-CHEST-PORTABLE (1 VIEW)   Final Result         1.  Pulmonary edema and/or infiltrates, stable.   2.  Small layering bilateral pleural effusions, stable prior study   3.  Stable right lung base pneumothorax with small pigtail thoracostomy tube in place.   4.  Cardiomegaly   5.  Atherosclerosis      DX-CHEST-PORTABLE (1 VIEW)   Final Result         1.  Pulmonary edema and/or infiltrates, stable.   2.  Small layering bilateral pleural effusions, stable on the right and decreased on the left since prior study.   3.  New right lung base pneumothorax with small pigtail thoracostomy tube in place.   4.  Cardiomegaly   5.  Atherosclerosis      DX-CHEST-PORTABLE (1 VIEW)   Final Result         1.  Pulmonary edema and/or infiltrates.   2.  Small layering bilateral pleural effusions   3.  Cardiomegaly   4.  Atherosclerosis      DX-CHEST-PORTABLE (1 VIEW)   Final Result      Unchanged bilateral pulmonary opacities and suspected bilateral pleural effusions, right worse than left.         DX-CHEST-PORTABLE (1 VIEW)   Final Result      Stable examination.      CT-CHEST (THORAX) W/O   Final Result      1.  Interval decrease in size of large loculated right pleural effusion following following placement of locking loop chest tube.      2.  Smaller loculated pleural effusions posterior superiorly in the right hemithorax there are thorax.      3.  Small left pleural effusion with elevation of left hemidiaphragm.      4.  Dependent partial collapse of the  lower lobes.      5.  Patchy airspace opacities in the right lower lobe, lingula, and to a lesser extent right middle lobe suspicious for pneumonitis or parenchymal scarring      6.  Cirrhosis and splenomegaly.      DX-CHEST-PORTABLE (1 VIEW)   Final Result      1.  Redemonstrated right hydropneumothorax with decreased air component, otherwise stable in size.   2.  Bilateral basilar atelectasis and/or consolidation with mild interstitial edema.   3.  Stable enlargement of the cardiomediastinal silhouette.      DX-CHEST-PORTABLE (1 VIEW)   Final Result      Placement of a right pigtail chest tube with decreased right pleural effusion.      Hazy opacities which could be related to hypoinflation or vascular congestion. Correlate clinically for infection.      IR-CHEST TUBE-EMPYEMA RIGHT   Final Result      Successful image guided RIGHT chest tube placement.      Plan: Low wall suction. Follow-up radiograph is pending.      CT-CHEST (THORAX) W/O   Final Result      1.  Large loculated right pleural effusion, worse compared to the October 2023 CT study. Associated atelectasis, with subtotal collapse of the right lower lobe.   2.  Minimal groundglass opacities in the right lung, infectious/inflammatory. No consolidation.   3.  Small left pleural effusion and associated atelectasis.   4.  Mild right hilar lymphadenopathy, statistically reactive rather than neoplastic.   5.  Cardiomegaly.   6.  Abdomen is detailed separately.      CT-ABDOMEN-PELVIS W/O   Final Result      1.  No new inflammatory process in the abdomen or pelvis.   2.  Minimal fat stranding adjacent to the cecum, nonspecific.   3.  Percutaneous catheter with tip in the pelvis. Small volume of pelvic free fluid.   4.  Colonic diverticulosis.   5.  Partially visualized moderate to large right pleural effusion, which may be loculated. It has increased in size since prior CT study.   6.  Small left pleural effusion.   7.  Cardiomegaly.         [id number] VIEW  "  Final Result      1.  Benign bowel gas pattern.      2.  Peritoneal dialysis catheter coiled in the pelvis.      [id number] VIEW   Final Result      Nonspecific bowel gas pattern with a moderate colonic stool burden.      DX-CHEST-PORTABLE (1 VIEW)   Final Result      1.  Enlarged cardiac silhouette with vascular congestion/edema.   2.  Lower lobe airspace disease could be due to edema, atelectasis or pneumonitis.      EC-ECHOCARDIOGRAM COMPLETE W/O CONT   Final Result      CT-ABDOMEN-PELVIS W/O   Final Result      1.  Fat stranding adjacent to the cecum, concerning for colitis/typhlitis.   2.  Colonic diverticulosis.   3.  Nonobstructing, tiny left renal stone.   4.  Likely partially loculated moderate right pleural effusion.   5.  Small left pleural effusion.   6.  Adjacent right middle lobe and bilateral lower lobe consolidations, likely atelectasis.   7.  Small pericardial effusion.      DX-CHEST-PORTABLE (1 VIEW)   Final Result      1.  Resolved or improved small left pleural effusion with persistent left basilar atelectasis and/or scarring.   2.  New small right pleural effusion with associated atelectasis and/or consolidation.      CL-EP ABLATION ATRIAL FLUTTER    (Results Pending)   EC-CARMELITA W/O CONT    (Results Pending)       INR   Date Value Ref Range Status   12/19/2023 1.47 (H) 0.87 - 1.13 Final     Comment:     INR - Non-therapeutic Reference Range: 0.87-1.13  INR - Therapeutic Reference Range: 2.0-4.0       No results found for: \"POCINR\"     Intake/Output Summary (Last 24 hours) at 12/15/2023 1122  Last data filed at 12/15/2023 1113  Gross per 24 hour   Intake 610 ml   Output 2060 ml   Net -1450 ml      Labs not explicitly included in this progress note were reviewed by the author. Radiology/imaging not explicitly included in this progress note was reviewed by the author.     I have performed a physical exam and reviewed and updated ROS and Plan today (12/19/2023).     30 minutes in directly " providing and coordinating care and extensive data review.  No time overlap and excludes procedures.

## 2023-12-19 NOTE — PROGRESS NOTES
[facility] -  PROGRESS NOTE               Author: Kodak Harvey M.D. Date & Time: 12/19/2023  7:36 AM     HPI:  58 y.o. 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 Abd imaging demonstrated colitis. C. Diff pending and started on abx. He notes abd pain and diarrhea for several weeks. Edema improving with 2.5% dextrose solution. Still has HD permacath in-place. Pain with peritoneal dialysis but no cloudy effluent of fibrin clots. No f/c/s. Normally does [id number] fills all green.     DAILY NEPHROLOGY SUMMARY:  11/28: consult done  11/29: ongoing abd pain, seen on HD-tolerating procedure well, PD fluid concerning for peritonitis  11/30:c.diff positive, started on oral vanc, new onset aflutter-transferred to [facility], wbc improving, Peritoneal cultures remain negative, abd pain improving  12/1: PD culture remains negative, seen on HD-tolerating procedure, abd pain improving, less diarrhea    12/2: HD yesterday 2.5 liter UF, still with SOB decreased abdoimnal pain  12/3: PD last night 1.5 UF, pt still with SOB  12/4: No events, tolerated HD yest with 2.5L UF, BP stable, tachycardic this am, stable on RA, reports SOB has resolved, still with crampy abd discomfort, reports diarrhea is starting to become more formed  12/5: No events, no new labs, BP stable, tachycardic, stable on RA this am, still with crampy abd pain, diarrhea improving, still with nausea/anorexia, denies any CP/LE edema, reports mild SOB  12/6: No events, tolerated HD yest with 1L UF, BP stable, remains tachycardic, reports nausea is improved and having more firm stools, denies any CP/SOB, reports poor appetite  12/7: No events, BP stable, tolerated HD  "this am with 1L UF, still doesn't feel well and feels very depressed and frustrated, +abd pain and n/v this am and unable to take PO  12/8: patient is doing well, resting in bed. Plan next iHD tomorrow  12/09: patient is seen during HD, refused labs this am. Does report some abdominal discomfort this am  12/10: patient had iHD yesterday, UF 2000 ml. Abdominal issues ongoing  12/11: pt had CT thorax done yesterday, showing loculated effusion. Pt feels SOB. Diarrhea continues but is better overall.   12/12: pt reports no more diarrhea, only 2-3 loose stools. Seen on HD and tolerating. Pulm recommending chest tube for loculated effusion.   12/13: pt awaiting improvement in INR before chest tube placement. Pt frustrated at prolonged hospitalization.  12/14: Pt had HD this AM and chest tube placement after. Pt seen in Room  12/15: Pt seen in room. Reports breathing is better now chest tube is in.   12/16: chest tube drainage slowing down. Pt breathing better. However, pt tachycardic in 130s this AM. He reports return of diarrhea and some abdominal pain. dialysis on hold for now  12/17: dialysis held yesterday due to tachycardia and later hypotension. HR and BP within normal ranges this AM. Pt positive for C Diff again, with continued diarrhea.   12/18: HD yesterday net UF 1L, pt reports SOB thinks he may have fluids in  his lungs and wants to do additional HD today to remove fluids,  denies further D, continue to have abdominal cramps  12/19: HD yesterday net UF 1.5L, remains tachycardic / aflutter ablation planned 12/20, states he feels tired. No d/ abd cramping     REVIEW OF SYSTEMS:    +diarrhea, +abd pain  10 point ROS reviewed and is as per HPI/daily summary or otherwise negative    PMH/PSH/SH/FH:   Reviewed and unchanged since admission note    CURRENT MEDICATIONS:   Reviewed from admission to present day    VS:  BP 99/63   Pulse (!) 130   Temp 37 °C (98.6 °F) (Temporal)   Resp 18   Ht 1.702 m (5' 7\")   Wt " 71.1 kg (156 lb 12 oz)   SpO2 95%   BMI 24.55 kg/m²     Physical Exam  Vitals and nursing note reviewed.   Constitutional:       General: He is not in acute distress.     Appearance: Normal appearance.   HENT:      Head: Normocephalic and atraumatic.   Eyes:      General: No scleral icterus.     Extraocular Movements: Extraocular movements intact.   Cardiovascular:      Rate and Rhythm: Regular rhythm.      Comments: +R chest PC  Pulmonary:      Effort: Pulmonary effort is normal.   Abdominal:      General: Bowel sounds are normal.      Palpations: Abdomen is soft.      Tenderness: There is no abdominal tenderness.   Musculoskeletal:         General: No deformity.      Right lower leg: No edema.      Left lower leg: No edema.   Skin:     General: Skin is warm and dry.      Findings: No rash.   Neurological:      General: No focal deficit present.      Mental Status: He is alert and oriented to person, place, and time.   Psychiatric:         Mood and Affect: Mood normal.         Behavior: Behavior normal. Behavior is cooperative.       R chest tube in place (12/14/23)    Fluids:  In: 1740 [P.O.:240; Dialysis:1500]  Out: 3650     LABS:  Recent Labs     12/17/23  0633 12/17/23  2345 12/19/23  0620   SODIUM 133* 135 136   POTASSIUM 5.0 4.4 4.5   CHLORIDE 98 100 101   CO2 22 25 26   GLUCOSE 101* 86 100*   BUN 59* 28* 21   CREATININE 10.33* 6.60* 5.38*   CALCIUM 8.1* 7.9* 8.1*       IMAGING:   All imaging reviewed from admission to present day    IMPRESSION:  # ESRD on outpt PD   -Etiology 2/2 FSGS  - s/p permcath on 11/10, and on HD currently  # R/O PD cath associated peritonitis  - CX negative  # c. Diff.  -symptoms improved with initiation of oral vanc  -PD guidelines vague on scenarios such as this,  but pt on HD currently  # S/P mechanical AVR/ Ascending aneurysm repair 11/2023   # CKD-MBD  - P elevated  # Anemia of CKD, below goal hgb 10-11, low iron stores 12/17     Resumed EPO 6000U IV w HD on 12/12  # BL  pleural effusions/congestion     Large R loculated effusion on CT thorax 12/11     S/p chest tube placement on 12/14  # Pericardial effusion   # C. Diff colitis  -C.diff positive  # Aflutter per primary svc     PLAN:  - Hold off HD today given unstable hemodynamics   - HD TTS schedule and PRN   - Cont EPO 6000U IV w HD  - Cont holding PD for now (last on 12/3),  can re-eval as outpt  - PD catheter flushes Q weekly, last flush 12/18    - C. diff tx/mgmt per primary team, diarrhea/pain and +toxin returned on 12/16  - Renal diet, no dietary pr restrictions   - Phos binder with meals   - Dose all medications as per ESRD  - Arrange OP HD chair at [facility] prior to [place]  that he can resume PD, re challenge of PD once stable in OP setting   - IV iron loading   Discussed the above with primary svc

## 2023-12-19 NOTE — PROGRESS NOTES
Bed in lowest position, bed alarm refused, pt educated on fall risk and verbalized understanding, call light within reach, will continue to monitor.

## 2023-12-19 NOTE — PROGRESS NOTES
HD treatment ordered by Dr Harvey started at 1520 and ended at 1820 with net uf of 1.5 Liter as bp tolerated. Right IJ CVC was patent with good BFR x 2 entire treatment. Dressing CDI. Pt has been tachycardic pre, intra and post treatment. Cardiologist went to see pt while getting HD. Gave report to omar Cortez RN. See flow sheet for details.

## 2023-12-19 NOTE — DOCUMENTATION QUERY
[facility]                                                                       Query Response Note      PATIENT:               LORIE KAYE  ACCT #:                  [id number]  MRN:                     [id number]  :                      1965  ADMIT DATE:       2023 3:25 PM  DISCH DATE:          RESPONDING  PROVIDER #:        [id number]           QUERY TEXT:    Heart Failure is documented in the  Pulmonary Progress Note. Please document the type and acuity:      The patient's Clinical Indicators include:   Pulmonary Progress Note:  Acute hypoxic respiratory failure secondary to fluid overload and right sided pleural effusion 2/2 heart failure  23 Echo: EF 70%, hyperdynamic left ventricular systolic function with tachycardia.     NT-proBNP 73587,  NT-proBNP 95876  Risk Factors: htn, fluid overload, history of CHF, atrial fib, ESRD   Treatment: IV Lasix    Thank you,  Alfonso Crawford RN, BSN, CCDS  Clinical   Connect via AgentPair  Options provided:   -- Acute on Chronic Diastolic heart failure   -- Chronic Diastolic heart failure   -- Findings of no clinical significance   -- Other explanation, please specify   -- Unable to determine      Query created by: Alfonso Crawford on 2023 8:52 AM    RESPONSE TEXT:    Chronic Diastolic heart failure          Electronically signed by:  MEENA DON MD 2023 3:31 PM

## 2023-12-19 NOTE — CARE PLAN
Problem: Pain - Standard  Goal: Alleviation of pain or a reduction in pain to the patient’s comfort goal  Outcome: Progressing     Problem: Knowledge Deficit - Standard  Goal: Patient and family/care givers will demonstrate understanding of plan of care, disease process/condition, diagnostic tests and medications  Outcome: Progressing     Problem: Fall Risk  Goal: Patient will remain free from falls  Outcome: Progressing     Problem: Respiratory  Goal: Patient will achieve/maintain optimum respiratory ventilation and gas exchange  Outcome: Progressing     Problem: Discharge Barriers/Planning  Goal: Patient's continuum of care needs are met  Outcome: Progressing     Problem: Hemodynamics  Goal: Patient's hemodynamics, fluid balance and neurologic status will be stable or improve  Outcome: Progressing     Problem: Fluid Volume  Goal: Fluid volume balance will be maintained  Outcome: Progressing     Problem: Gastrointestinal Irritability  Goal: Diarrhea will be absent or improved  Outcome: Progressing     Problem: Self Care  Goal: Patient will have the ability to perform ADLs independently or with assistance (bathe, groom, dress, toilet and feed)  Outcome: Progressing     Problem: Infection - Standard  Goal: Patient will remain free from infection  Outcome: Progressing     Problem: Wound/ / Incision Healing  Goal: Patient's wound/surgical incision will decrease in size and heals properly  Outcome: Progressing     Problem: Skin Integrity  Goal: Skin integrity is maintained or improved  Outcome: Progressing   The patient is Watcher - Medium risk of patient condition declining or worsening    Shift Goals  Clinical Goals: VSS, safety  Patient Goals: to go home  Family Goals: FARIDA    Progress made toward(s) clinical / shift goals: VSS, safety    Patient is not progressing towards the following goals:

## 2023-12-19 NOTE — PROGRESS NOTES
"Pulmonary Progress Note    Date of Admission: 11/27/2023   Reason for consult: Loculated pleural effusion     Chief Complaint:  Chief Complaint   Patient presents with    Sent by MD     Patient reports open heart surgery 11/3. Patient reports spoke to surgeon today and told to come to ER. Patient reports also a dialysis patient and did his dialysis last night at home.      Weakness     Patient reports increased weakness over the last three weeks.     Shortness of Breath     X 3 weeks.      Patient has not been seen by pulmonary at [facility] in the past.  he has no prior PFTs    HPI:   From Dr. Heller's note: \"58 y.o. male who presented 11/27/2023 with abdominal pain, nausea, vomiting, diarrhea.  He was found to have C. difficile colitis.  He had been discharged on 11/17/2023 after mechanical aortic valve replacement and aortic aneurysm repair.  He has a history of ESRD on PD.  Patient reports that during his hospitalization, he has had increasing shortness of breath.  He had a CT scan of his abdomen yesterday due to ongoing abdominal pain and a loculated effusion on the right side of his chest was found.  I reviewed the chest CT with IR and we both felt that the effusion would not drain with simple thoracenteses and that he needed to undergo a chest tube.  Patient has a history of a left-sided pleural effusion which was drained on 11/11/2023.  It was bloody in appearance but no labs were sent.     Hospital Course  12/14/2023 - Chest tube placed today  12/15/2023 - 1360 out yesterday, given 1/2 dose lytics because the fluid was slightly bloody. CXR showing hydropneumonthorax.  12/17/2023-patient was moved to telemetry yesterday due to A-fib with RVR.  He is receiving HD today.  CT scan showed small pneumothorax, I do not believe that this is an ex vacuo pneumothorax.  He does have residual loculated effusion at the right base as well as a small left-sided pleural effusion.\"    12/18: on 4 lpm with saturations " 94-98%. On a heparin drip. No new complaints. Still with significant chest tube output.     24h events: No significant 24h events. No new ROS complaints.   Imaging: reviewed; effusion improved   Notable lab trends: reviewed   ABG: n/a  Tmax: afebrile  ProCal: n/a  Antibiotics: Vancomycin  Steroids: none  Cultures: C dif from 12/16 positive   I/O: Total 420 L out in past 24h; 4.17L total     Scheduled Medications   Medication Dose Frequency    ferric gluconate complex  125 mg TUE+THU+SAT    epoetin  6,000 Units TUE+THU+SAT    alteplase (Activase) 10 mg in NS 30 mL INTRAPLEURAL syringe  10 mg Once    And    dornase alpha (Pulmozyme) 5 mg in NS 30 mL INTRAPLEURAL syringe  5 mg Once    Pharmacy  1 Each PHARMACY TO DOSE    verapamil  80 mg Q8HRS    vancomycin 50 mg/mL  125 mg Q6HR    Followed by    [START ON 12/27/2023] vancomycin 50 mg/mL  125 mg Q12HR    Followed by    [START ON 1/3/2024] vancomycin 50 mg/mL  125 mg Q24HR    Followed by    [START ON 1/10/2024] vancomycin 50 mg/mL  125 mg Q48HRS    Followed by    [START ON 1/18/2024] vancomycin 50 mg/mL  125 mg Q72HRS    Pharmacy Consult Request  1 Each PHARMACY TO DOSE    lactobacillus rhamnosus  1 Capsule QDAY with Breakfast    sevelamer carbonate  1,600 mg TID WITH MEALS    dronabinol  5 mg BEFORE LUNCH AND DINNER    [Held by provider] MD Alert...Warfarin per Pharmacy   PHARMACY TO DOSE    aspirin  81 mg DAILY    atorvastatin  40 mg Nightly    [Held by provider] tamsulosin  0.4 mg DAILY       No current facility-administered medications on file prior to encounter.     Current Outpatient Medications on File Prior to Encounter   Medication Sig Dispense Refill    omeprazole (PRILOSEC) 20 MG delayed-release capsule Take 1 Capsule by mouth 2 times a day. 30 Capsule 2    warfarin (COUMADIN) 2.5 MG Tab Take 1 Tablet by mouth every day at 6 PM. 30 Tablet 3    tamsulosin (FLOMAX) 0.4 MG capsule Take 0.4 mg by mouth every day.      atorvastatin (LIPITOR) 40 MG Tab Take 1  "Tablet by mouth every evening. 100 Tablet 3    aspirin 81 MG EC tablet Take 81 mg by mouth every day.         Allergies: Allopurinol and Hydralazine hcl      ROS: A 12 point ROS was performed on intake and during my interview. ROS negative unless specifically noted in HPI.     Vitals:  BP 99/63   Pulse (!) 130   Temp 37 °C (98.6 °F) (Temporal)   Resp 18   Ht 1.702 m (5' 7\")   Wt 71.1 kg (156 lb 12 oz)   SpO2 95%     Physical Exam:  Physical Exam  Vitals reviewed.   Constitutional:       General: He is not in acute distress.     Appearance: He is normal weight. He is ill-appearing.   Eyes:      General:         Right eye: No discharge.         Left eye: No discharge.      Conjunctiva/sclera: Conjunctivae normal.      Pupils: Pupils are equal, round, and reactive to light.   Cardiovascular:      Rate and Rhythm: Normal rate.      Pulses: Normal pulses.      Comments: Mechanical aortic valve sound  Pulmonary:      Effort: Pulmonary effort is normal. No respiratory distress.      Breath sounds: No wheezing or rales.          Comments: Right sided chest tube  Musculoskeletal:      Right lower leg: No edema.      Left lower leg: No edema.   Skin:     General: Skin is warm.      Capillary Refill: Capillary refill takes less than 2 seconds.      Coloration: Skin is not jaundiced.   Neurological:      General: No focal deficit present.      Mental Status: He is alert.   Psychiatric:         Behavior: Behavior normal.       Laboratory Data: I personally reviewed labs including historical lab trends.     Immunization History   Administered Date(s) Administered    Covid-19 Mrna (Spikevax) Moderna 12+ Years 10/01/2023    Hepatitis B Vaccine, CpG Adjuvanted (Heplisav-B) 06/21/2023, 08/18/2023    INFLUENZA TIV (IM) 03/17/2020    Influenza Vaccine Quad Inj (Pf) 09/29/2020, 11/20/2021, 10/27/2022, 09/16/2023    MODERNA BIVALENT BOOSTER SARS-COV-2 VACCINE (6+) 10/27/2022    MODERNA SARS-COV-2 VACCINE (12+) 03/18/2021, " 04/15/2021, 04/15/2022    Pneumococcal Conjugate Vaccine (PCV20) 08/11/2023    Pneumococcal Conjugate Vaccine (Prevnar/PCV-13) 06/26/2020    Zoster Vaccine Recombinant (RZV) (SHINGRIX) 04/04/2020, 06/13/2020       PFTs as reviewed by me personally show: none available    Imaging as reviewed by me personally show:  Bilateral effusions; right improved. Cardiomegaly. Tubes and lines in appropriate position.       Assessment/Plan:    Pulmonary problem list:  #Acute hypoxic respiratory failure secondary to fluid overload and right sided pleural effusion 2/2 heart failure  # Loculated right-sided pleural effusion, suspect that this is due to PD which became increasingly more complicated, possibly hemorhorax     -CXR reviewed - improving   -encourage IS   -Mobilize with PT/OT  -Target O2 sat 88-92%  -monitor output: CT to waterseal  -will remove when <50 cc daily for 2 straight days - 420 in past 24h. Likely requires aggressive continued dialysis.     Total consult time: 45 minutes which included time spent on chart review, personally reviewing pertinent images and labs, time spent counseling and educating the patient and/or family members, and coordinating care with the healthcare team to include consultants.   __________  Poncho Claudio, DO  Pulmonary and Critical Care Medicine  [facility]    Please note that this dictation was created using voice recognition software. The accuracy of the dictation is limited to the abilities of the software. I have made every reasonable attempt to correct obvious errors, but I expect that there are errors of grammar and possibly content that I did not discover before finalizing the note.

## 2023-12-19 NOTE — PROGRESS NOTES
Hospital Medicine Daily Progress Note    Date of Service  12/19/2023    Chief Complaint  Gurdeep Guerra is a 58 y.o. male admitted 11/27/2023 with diarrhea    Hospital Course  Admitted with symptoms of abdominal pain, nausea, vomiting and diarrhea, CT scan showed evidence of colitis, he was started on empiric coverage with IV Cefepime and Flagyl.  Patient recently underwent mechanical AVR, aortic aneurysm repair, discharged on 11/17/2023.  He also has known history of end-stage renal disease on peritoneal dialysis.  Workup showed he was positive for C. difficile colitis, he was started on oral vancomycin.  There was also concern for possible peritonitis, he was given intraperitoneal IV cefepime.  Nephrology was consulted on the case, he underwent dialysis through his temporary dialysis catheter which was placed on the previous admission.  Also with known history of paroxysmal atrial flutter, and with recent mechanical AVR, he was on Coumadin for anticoagulation.  He required to be placed on heparin drip to bridge Coumadin.    Interval Problem Update    Remains in A-fib    And has been tachycardic overnight persistentl===> 130's    he has been not been taking his oral verapamil for concerns of hypotension however he has had not had any significant hypotension that is concerning.    patient was counseled on the necessity of taking his verapamil for heart rate control that we will stabilize his blood pressure.  His dialysis session today has been put on hold due to his tachycardia    Patient having foul-smelling loose stools    I have restarted test for C. Difficile    Patient given labetalol 10 mg IV push x 1 patient's blood pressure dropped to 75 systolic.  He was not complaining of any dizziness but he was laying in the bed.  He was given a saline bolus of 250 cc x 1    12/17 patient is new to me today, patient is resting in bed, he was complaining of lower abdominal cramps patient denies shortness of breath no  palpitation no chest pain, patient is on heparin drip, chest tube in place, draining 80 cc in the last 24 hours, continue having diarrhea, patient is tolerating oral vancomycin, discussed with pharmacist stopped PPI for now, will consider discussing with ID, heart rate better controlled continue verapamil 3 times daily, discussed with pulmonology patient received tPA today through his chest tube, will continue monitoring.  Chest x-ray in a.m.  12/18 patient in bed, still having diarrhea, abdominal cramps, had a very long discussion with patient regarding plan of care, discussed with nephro, cardio and ID. Continue close monitoring. Patient on iv heparin, monitoring for side effects, continue telemetry, patient starting on amiodarone monitoring for side effects patient did not tolerate medication in the past. Chest tube output 1340, s/p atp on 12/17, per pulm remove ct when output is less than 50 cc.   12/19: Patient seen and examined, afebrile, no nausea or vomiting HR still not controlled in the 120-130   Cardiology following and plan is for ablation tomorrow.  Regarding his chest tube pulmonology following appreciate rec. If less then 50 CC in 2 days pulmonology will consider removing it   Regarding his ESRD nephrology following cont on dialysis     Patient is on IV heparin continue monitoring for side effects include but not limited to bleeding, thrombocytopenia.      I have discussed this patient's plan of care and discharge plan at IDT rounds today with Case Management, Nursing, Nursing leadership, and other members of the IDT team.    Consultants/Specialty  nephrology and pulmonary  Cardio  EP.     Code Status  Full Code    Disposition  The patient is not medically cleared for discharge to home or a post-acute facility.      I have placed the appropriate orders for post-discharge needs.    Review of Systems  Review of Systems   Constitutional:  Positive for malaise/fatigue. Negative for chills, diaphoresis and  fever.   HENT:  Negative for congestion, hearing loss and sore throat.    Eyes:  Negative for blurred vision.   Respiratory:  Positive for cough and shortness of breath. Negative for sputum production and wheezing.    Cardiovascular:  Negative for chest pain, palpitations and leg swelling.   Gastrointestinal:  Positive for abdominal pain and diarrhea. Negative for blood in stool, heartburn, melena, nausea and vomiting.   Genitourinary:  Negative for dysuria, flank pain and hematuria.   Musculoskeletal:  Negative for back pain, joint pain, myalgias and neck pain.   Skin:  Negative for rash.   Neurological:  Positive for weakness. Negative for dizziness, sensory change, speech change, focal weakness and headaches.   Psychiatric/Behavioral:  The patient is not nervous/anxious.         Physical Exam  Temp:  [36.7 °C (98.1 °F)-37.3 °C (99.1 °F)] 37.1 °C (98.8 °F)  Pulse:  [124-136] 131  Resp:  [16-18] 18  BP: ()/(56-72) 105/71  SpO2:  [92 %-100 %] 95 %    Physical Exam  Vitals and nursing note reviewed.   Constitutional:       Appearance: He is ill-appearing.   HENT:      Head: Normocephalic and atraumatic.      Nose: No congestion.      Mouth/Throat:      Mouth: Mucous membranes are moist.   Eyes:      General: No scleral icterus.        Right eye: No discharge.         Left eye: No discharge.   Cardiovascular:      Rate and Rhythm: Normal rate. Rhythm irregular.   Pulmonary:      Effort: Pulmonary effort is normal. No accessory muscle usage or respiratory distress.      Breath sounds: Rhonchi present.      Comments: Decreased breath sounds at the bases    Chest tube present  Abdominal:      General: There is no distension.      Tenderness: There is no abdominal tenderness. There is no guarding or rebound.      Comments: PD catheter   Musculoskeletal:      Cervical back: Normal range of motion and neck supple. No rigidity or tenderness.      Right lower leg: No edema.      Left lower leg: No edema.   Skin:      General: Skin is warm and dry.   Neurological:      General: No focal deficit present.      Mental Status: He is alert and oriented to person, place, and time.      Cranial Nerves: No cranial nerve deficit.         Fluids    Intake/Output Summary (Last 24 hours) at 12/19/2023 1519  Last data filed at 12/19/2023 0430  Gross per 24 hour   Intake 740 ml   Output 2650 ml   Net -1910 ml         Laboratory  Recent Labs     12/17/23  0633 12/17/23  2345 12/19/23  0620   WBC 8.9 8.0 6.9   RBC 3.26* 3.04* 3.11*   HEMOGLOBIN 9.5* 9.0* 9.3*   HEMATOCRIT 30.7* 28.5* 29.4*   MCV 94.2 93.8 94.5   MCH 29.1 29.6 29.9   MCHC 30.9* 31.6* 31.6*   RDW 50.5* 50.6* 50.2*   PLATELETCT 298 325 311   MPV 8.7* 8.9* 8.7*       Recent Labs     12/17/23  0633 12/17/23  2345 12/19/23  0620   SODIUM 133* 135 136   POTASSIUM 5.0 4.4 4.5   CHLORIDE 98 100 101   CO2 22 25 26   GLUCOSE 101* 86 100*   BUN 59* 28* 21   CREATININE 10.33* 6.60* 5.38*   CALCIUM 8.1* 7.9* 8.1*       Recent Labs     12/17/23  0633 12/17/23  2345 12/19/23  0620   INR 1.40* 1.49* 1.47*                 Imaging  DX-CHEST-PORTABLE (1 VIEW)   Final Result         1.  Pulmonary edema and/or infiltrates, stable.   2.  Small layering bilateral pleural effusions, stable prior study   3.  Stable right lung base pneumothorax with small pigtail thoracostomy tube in place.   4.  Cardiomegaly   5.  Atherosclerosis      DX-CHEST-PORTABLE (1 VIEW)   Final Result         1.  Pulmonary edema and/or infiltrates, stable.   2.  Small layering bilateral pleural effusions, stable on the right and decreased on the left since prior study.   3.  New right lung base pneumothorax with small pigtail thoracostomy tube in place.   4.  Cardiomegaly   5.  Atherosclerosis      DX-CHEST-PORTABLE (1 VIEW)   Final Result         1.  Pulmonary edema and/or infiltrates.   2.  Small layering bilateral pleural effusions   3.  Cardiomegaly   4.  Atherosclerosis      DX-CHEST-PORTABLE (1 VIEW)   Final Result       Unchanged bilateral pulmonary opacities and suspected bilateral pleural effusions, right worse than left.         DX-CHEST-PORTABLE (1 VIEW)   Final Result      Stable examination.      CT-CHEST (THORAX) W/O   Final Result      1.  Interval decrease in size of large loculated right pleural effusion following following placement of locking loop chest tube.      2.  Smaller loculated pleural effusions posterior superiorly in the right hemithorax there are thorax.      3.  Small left pleural effusion with elevation of left hemidiaphragm.      4.  Dependent partial collapse of the lower lobes.      5.  Patchy airspace opacities in the right lower lobe, lingula, and to a lesser extent right middle lobe suspicious for pneumonitis or parenchymal scarring      6.  Cirrhosis and splenomegaly.      DX-CHEST-PORTABLE (1 VIEW)   Final Result      1.  Redemonstrated right hydropneumothorax with decreased air component, otherwise stable in size.   2.  Bilateral basilar atelectasis and/or consolidation with mild interstitial edema.   3.  Stable enlargement of the cardiomediastinal silhouette.      DX-CHEST-PORTABLE (1 VIEW)   Final Result      Placement of a right pigtail chest tube with decreased right pleural effusion.      Hazy opacities which could be related to hypoinflation or vascular congestion. Correlate clinically for infection.      IR-CHEST TUBE-EMPYEMA RIGHT   Final Result      Successful image guided RIGHT chest tube placement.      Plan: Low wall suction. Follow-up radiograph is pending.      CT-CHEST (THORAX) W/O   Final Result      1.  Large loculated right pleural effusion, worse compared to the October 2023 CT study. Associated atelectasis, with subtotal collapse of the right lower lobe.   2.  Minimal groundglass opacities in the right lung, infectious/inflammatory. No consolidation.   3.  Small left pleural effusion and associated atelectasis.   4.  Mild right hilar lymphadenopathy, statistically reactive  rather than neoplastic.   5.  Cardiomegaly.   6.  Abdomen is detailed separately.      CT-ABDOMEN-PELVIS W/O   Final Result      1.  No new inflammatory process in the abdomen or pelvis.   2.  Minimal fat stranding adjacent to the cecum, nonspecific.   3.  Percutaneous catheter with tip in the pelvis. Small volume of pelvic free fluid.   4.  Colonic diverticulosis.   5.  Partially visualized moderate to large right pleural effusion, which may be loculated. It has increased in size since prior CT study.   6.  Small left pleural effusion.   7.  Cardiomegaly.         [id number] VIEW   Final Result      1.  Benign bowel gas pattern.      2.  Peritoneal dialysis catheter coiled in the pelvis.      [id number] VIEW   Final Result      Nonspecific bowel gas pattern with a moderate colonic stool burden.      DX-CHEST-PORTABLE (1 VIEW)   Final Result      1.  Enlarged cardiac silhouette with vascular congestion/edema.   2.  Lower lobe airspace disease could be due to edema, atelectasis or pneumonitis.      EC-ECHOCARDIOGRAM COMPLETE W/O CONT   Final Result      CT-ABDOMEN-PELVIS W/O   Final Result      1.  Fat stranding adjacent to the cecum, concerning for colitis/typhlitis.   2.  Colonic diverticulosis.   3.  Nonobstructing, tiny left renal stone.   4.  Likely partially loculated moderate right pleural effusion.   5.  Small left pleural effusion.   6.  Adjacent right middle lobe and bilateral lower lobe consolidations, likely atelectasis.   7.  Small pericardial effusion.      DX-CHEST-PORTABLE (1 VIEW)   Final Result      1.  Resolved or improved small left pleural effusion with persistent left basilar atelectasis and/or scarring.   2.  New small right pleural effusion with associated atelectasis and/or consolidation.      CL-EP ABLATION ATRIAL FLUTTER    (Results Pending)   EC-CARMELITA W/O CONT    (Results Pending)        Assessment/Plan  * C. difficile colitis- (present on admission)  Assessment & Plan  Oral vancomycin -  finished course    Stool has become foul-smelling and loose again so we have rechecked stool for C. difficile on 12/16/2023    Continue vancomycin, consider ID consult    I have asked ID to see patient in am.     A-fib (HCC)- (present on admission)  Assessment & Plan  Continue telemetry monitoring  Continue heparin drip  Close monitoring for side effects include but not limited to bleeding, thrombocytopenia    I have discussed with cardiologist Dr Orourke, EP also consulted recommending possible ablation this week.   EP recommended to start amiodarone.   Continue tele  Continue heparin drip for now.     History of mechanical aortic valve replacement- (present on admission)  Assessment & Plan  Coumadin on hold    Hypomagnesemia- (present on admission)  Assessment & Plan  Replaced  Follow level    Loculated pleural effusion- (present on admission)  Assessment & Plan  Status post chest tube on 12/14/23    Daily x-rays    Continue to monitor chest tube output    Pulmonary MD follow-up    Repeated chest x-ray today my reading bilateral pleural effusion right larger than left.  Chest tube is in place discussed with pulmonology.  Cultures negative so far from pleural effusion    Paroxysmal atrial flutter (HCC)- (present on admission)  Assessment & Plan    Tachycardic on 12/16/2023 ordered IV labbetol as he has been refusing his oral verapamil.    Patient counseled about the necessity of taking his oral verapamil  Verapamil  Coumadin on hold  Cardio and EP consulted.     ESRD (end stage renal disease) (HCC)- (present on admission)  Assessment & Plan  HD per Nephrology  On dialysis  Discussed with nephro  Dialysis today    Dyslipidemia- (present on admission)  Assessment & Plan  Lipitor    Coronary artery disease due to lipid rich plaque- (present on admission)  Assessment & Plan  Aspirin, Lipitor  Troponin elevated but no chest pain, patient is end-stage renal disease, patient is on heparin for A-fib with RVR and holding his  oral anticoagulation.  Troponin chronically elevated    Hypertension- (present on admission)  Assessment & Plan  Verapamil with parameters          VTE prophylaxis: Heparin drip  Greater than 51 minutes spent prepping to see patient (e.g. review of tests) obtaining and/or reviewing separately obtained history. Performing a medically appropriate examination and/ evaluation.  Counseling and educating the patient/family/caregiver.  Ordering medications, tests, or procedures.  Referring and communicating with other health care professionals.  Documenting clinical information in EPIC.  Independently interpreting results and communicating results to patient/family/caregiver.  Care coordination.    I have performed a physical exam and reviewed and updated ROS and Plan today (12/19/2023). In review of yesterday's note (12/18/2023), there are no changes except as documented above.

## 2023-12-20 ENCOUNTER — APPOINTMENT (OUTPATIENT)
Dept: RADIOLOGY | Facility: MEDICAL CENTER | Age: 58
DRG: 981 | End: 2023-12-20
Attending: HOSPITALIST
Payer: COMMERCIAL

## 2023-12-20 ENCOUNTER — APPOINTMENT (OUTPATIENT)
Dept: CARDIOLOGY | Facility: MEDICAL CENTER | Age: 58
DRG: 981 | End: 2023-12-20
Attending: NURSE PRACTITIONER
Payer: COMMERCIAL

## 2023-12-20 ENCOUNTER — ANESTHESIA (OUTPATIENT)
Dept: CARDIOLOGY | Facility: MEDICAL CENTER | Age: 58
DRG: 981 | End: 2023-12-20
Payer: COMMERCIAL

## 2023-12-20 ENCOUNTER — ANESTHESIA EVENT (OUTPATIENT)
Dept: CARDIOLOGY | Facility: MEDICAL CENTER | Age: 58
DRG: 981 | End: 2023-12-20
Payer: COMMERCIAL

## 2023-12-20 LAB
ALBUMIN SERPL BCP-MCNC: 2.3 G/DL (ref 3.2–4.9)
BUN SERPL-MCNC: 35 MG/DL (ref 8–22)
CALCIUM ALBUM COR SERPL-MCNC: 9.9 MG/DL (ref 8.5–10.5)
CALCIUM SERPL-MCNC: 8.5 MG/DL (ref 8.5–10.5)
CHLORIDE SERPL-SCNC: 99 MMOL/L (ref 96–112)
CO2 SERPL-SCNC: 26 MMOL/L (ref 20–33)
CREAT SERPL-MCNC: 7.78 MG/DL (ref 0.5–1.4)
EKG IMPRESSION: NORMAL
GFR SERPLBLD CREATININE-BSD FMLA CKD-EPI: 7 ML/MIN/1.73 M 2
GLUCOSE SERPL-MCNC: 123 MG/DL (ref 65–99)
LV EJECT FRACT  99904: 35
PHOSPHATE SERPL-MCNC: 4.9 MG/DL (ref 2.5–4.5)
POTASSIUM SERPL-SCNC: 5.3 MMOL/L (ref 3.6–5.5)
SODIUM SERPL-SCNC: 135 MMOL/L (ref 135–145)

## 2023-12-20 PROCEDURE — 02583ZZ DESTRUCTION OF CONDUCTION MECHANISM, PERCUTANEOUS APPROACH: ICD-10-PCS | Performed by: INTERNAL MEDICINE

## 2023-12-20 PROCEDURE — 160002 HCHG RECOVERY MINUTES (STAT)

## 2023-12-20 PROCEDURE — 93662 INTRACARDIAC ECG (ICE): CPT | Mod: 26 | Performed by: INTERNAL MEDICINE

## 2023-12-20 PROCEDURE — 700101 HCHG RX REV CODE 250

## 2023-12-20 PROCEDURE — 700111 HCHG RX REV CODE 636 W/ 250 OVERRIDE (IP)

## 2023-12-20 PROCEDURE — 99233 SBSQ HOSP IP/OBS HIGH 50: CPT | Performed by: INTERNAL MEDICINE

## 2023-12-20 PROCEDURE — 99255 IP/OBS CONSLTJ NEW/EST HI 80: CPT | Performed by: INTERNAL MEDICINE

## 2023-12-20 PROCEDURE — 93005 ELECTROCARDIOGRAM TRACING: CPT | Performed by: INTERNAL MEDICINE

## 2023-12-20 PROCEDURE — 93010 ELECTROCARDIOGRAM REPORT: CPT | Performed by: INTERNAL MEDICINE

## 2023-12-20 PROCEDURE — A9270 NON-COVERED ITEM OR SERVICE: HCPCS | Performed by: STUDENT IN AN ORGANIZED HEALTH CARE EDUCATION/TRAINING PROGRAM

## 2023-12-20 PROCEDURE — 700101 HCHG RX REV CODE 250: Performed by: ANESTHESIOLOGY

## 2023-12-20 PROCEDURE — A9270 NON-COVERED ITEM OR SERVICE: HCPCS | Performed by: INTERNAL MEDICINE

## 2023-12-20 PROCEDURE — 93653 COMPRE EP EVAL TX SVT: CPT | Performed by: INTERNAL MEDICINE

## 2023-12-20 PROCEDURE — 700102 HCHG RX REV CODE 250 W/ 637 OVERRIDE(OP): Performed by: STUDENT IN AN ORGANIZED HEALTH CARE EDUCATION/TRAINING PROGRAM

## 2023-12-20 PROCEDURE — 700102 HCHG RX REV CODE 250 W/ 637 OVERRIDE(OP): Performed by: ANESTHESIOLOGY

## 2023-12-20 PROCEDURE — A9270 NON-COVERED ITEM OR SERVICE: HCPCS | Performed by: FAMILY MEDICINE

## 2023-12-20 PROCEDURE — 99232 SBSQ HOSP IP/OBS MODERATE 35: CPT | Performed by: INTERNAL MEDICINE

## 2023-12-20 PROCEDURE — 36415 COLL VENOUS BLD VENIPUNCTURE: CPT

## 2023-12-20 PROCEDURE — 700102 HCHG RX REV CODE 250 W/ 637 OVERRIDE(OP): Performed by: INTERNAL MEDICINE

## 2023-12-20 PROCEDURE — 700102 HCHG RX REV CODE 250 W/ 637 OVERRIDE(OP): Performed by: HOSPITALIST

## 2023-12-20 PROCEDURE — A9270 NON-COVERED ITEM OR SERVICE: HCPCS | Performed by: HOSPITALIST

## 2023-12-20 PROCEDURE — 700111 HCHG RX REV CODE 636 W/ 250 OVERRIDE (IP): Performed by: FAMILY MEDICINE

## 2023-12-20 PROCEDURE — 700105 HCHG RX REV CODE 258: Performed by: ANESTHESIOLOGY

## 2023-12-20 PROCEDURE — 700111 HCHG RX REV CODE 636 W/ 250 OVERRIDE (IP): Mod: JZ | Performed by: INTERNAL MEDICINE

## 2023-12-20 PROCEDURE — 93325 DOPPLER ECHO COLOR FLOW MAPG: CPT

## 2023-12-20 PROCEDURE — 700102 HCHG RX REV CODE 250 W/ 637 OVERRIDE(OP): Performed by: FAMILY MEDICINE

## 2023-12-20 PROCEDURE — 160035 HCHG PACU - 1ST 60 MINS PHASE I

## 2023-12-20 PROCEDURE — 71045 X-RAY EXAM CHEST 1 VIEW: CPT

## 2023-12-20 PROCEDURE — 700111 HCHG RX REV CODE 636 W/ 250 OVERRIDE (IP): Mod: JZ | Performed by: ANESTHESIOLOGY

## 2023-12-20 PROCEDURE — 4A0234Z MEASUREMENT OF CARDIAC ELECTRICAL ACTIVITY, PERCUTANEOUS APPROACH: ICD-10-PCS | Performed by: INTERNAL MEDICINE

## 2023-12-20 PROCEDURE — 80069 RENAL FUNCTION PANEL: CPT

## 2023-12-20 PROCEDURE — A9270 NON-COVERED ITEM OR SERVICE: HCPCS | Performed by: ANESTHESIOLOGY

## 2023-12-20 PROCEDURE — 93662 INTRACARDIAC ECG (ICE): CPT

## 2023-12-20 PROCEDURE — 770020 HCHG ROOM/CARE - TELE (206)

## 2023-12-20 PROCEDURE — 02K83ZZ MAP CONDUCTION MECHANISM, PERCUTANEOUS APPROACH: ICD-10-PCS | Performed by: INTERNAL MEDICINE

## 2023-12-20 RX ORDER — HYDROMORPHONE HYDROCHLORIDE 1 MG/ML
0.1 INJECTION, SOLUTION INTRAMUSCULAR; INTRAVENOUS; SUBCUTANEOUS
Status: DISCONTINUED | OUTPATIENT
Start: 2023-12-20 | End: 2023-12-20 | Stop reason: HOSPADM

## 2023-12-20 RX ORDER — SODIUM CHLORIDE, SODIUM LACTATE, POTASSIUM CHLORIDE, CALCIUM CHLORIDE 600; 310; 30; 20 MG/100ML; MG/100ML; MG/100ML; MG/100ML
INJECTION, SOLUTION INTRAVENOUS CONTINUOUS
Status: DISCONTINUED | OUTPATIENT
Start: 2023-12-20 | End: 2023-12-20 | Stop reason: HOSPADM

## 2023-12-20 RX ORDER — DEXAMETHASONE SODIUM PHOSPHATE 4 MG/ML
INJECTION, SOLUTION INTRA-ARTICULAR; INTRALESIONAL; INTRAMUSCULAR; INTRAVENOUS; SOFT TISSUE PRN
Status: DISCONTINUED | OUTPATIENT
Start: 2023-12-20 | End: 2023-12-20 | Stop reason: SURG

## 2023-12-20 RX ORDER — PHENYLEPHRINE HYDROCHLORIDE 10 MG/ML
INJECTION, SOLUTION INTRAMUSCULAR; INTRAVENOUS; SUBCUTANEOUS PRN
Status: DISCONTINUED | OUTPATIENT
Start: 2023-12-20 | End: 2023-12-20 | Stop reason: SURG

## 2023-12-20 RX ORDER — HYDROMORPHONE HYDROCHLORIDE 1 MG/ML
0.4 INJECTION, SOLUTION INTRAMUSCULAR; INTRAVENOUS; SUBCUTANEOUS
Status: DISCONTINUED | OUTPATIENT
Start: 2023-12-20 | End: 2023-12-20 | Stop reason: HOSPADM

## 2023-12-20 RX ORDER — DIPHENHYDRAMINE HYDROCHLORIDE 50 MG/ML
12.5 INJECTION INTRAMUSCULAR; INTRAVENOUS
Status: DISCONTINUED | OUTPATIENT
Start: 2023-12-20 | End: 2023-12-20 | Stop reason: HOSPADM

## 2023-12-20 RX ORDER — ONDANSETRON 2 MG/ML
4 INJECTION INTRAMUSCULAR; INTRAVENOUS
Status: DISCONTINUED | OUTPATIENT
Start: 2023-12-20 | End: 2023-12-20 | Stop reason: HOSPADM

## 2023-12-20 RX ORDER — LABETALOL HYDROCHLORIDE 5 MG/ML
5 INJECTION, SOLUTION INTRAVENOUS
Status: DISCONTINUED | OUTPATIENT
Start: 2023-12-20 | End: 2023-12-20 | Stop reason: HOSPADM

## 2023-12-20 RX ORDER — ONDANSETRON 2 MG/ML
4 INJECTION INTRAMUSCULAR; INTRAVENOUS EVERY 6 HOURS PRN
Status: DISCONTINUED | OUTPATIENT
Start: 2023-12-20 | End: 2023-12-20

## 2023-12-20 RX ORDER — ACETAMINOPHEN 325 MG/1
650 TABLET ORAL EVERY 4 HOURS PRN
Status: DISCONTINUED | OUTPATIENT
Start: 2023-12-20 | End: 2023-12-20

## 2023-12-20 RX ORDER — HYDROMORPHONE HYDROCHLORIDE 1 MG/ML
0.2 INJECTION, SOLUTION INTRAMUSCULAR; INTRAVENOUS; SUBCUTANEOUS
Status: DISCONTINUED | OUTPATIENT
Start: 2023-12-20 | End: 2023-12-20 | Stop reason: HOSPADM

## 2023-12-20 RX ORDER — HEPARIN SODIUM 200 [USP'U]/100ML
INJECTION, SOLUTION INTRAVENOUS
Status: COMPLETED
Start: 2023-12-20 | End: 2023-12-20

## 2023-12-20 RX ORDER — SODIUM CHLORIDE 9 MG/ML
INJECTION, SOLUTION INTRAVENOUS
Status: DISCONTINUED | OUTPATIENT
Start: 2023-12-20 | End: 2023-12-20 | Stop reason: SURG

## 2023-12-20 RX ORDER — LIDOCAINE HYDROCHLORIDE 20 MG/ML
INJECTION, SOLUTION INFILTRATION; PERINEURAL
Status: COMPLETED
Start: 2023-12-20 | End: 2023-12-20

## 2023-12-20 RX ORDER — OXYCODONE HCL 5 MG/5 ML
5 SOLUTION, ORAL ORAL
Status: DISCONTINUED | OUTPATIENT
Start: 2023-12-20 | End: 2023-12-20 | Stop reason: HOSPADM

## 2023-12-20 RX ORDER — SCOLOPAMINE TRANSDERMAL SYSTEM 1 MG/1
PATCH, EXTENDED RELEASE TRANSDERMAL PRN
Status: DISCONTINUED | OUTPATIENT
Start: 2023-12-20 | End: 2023-12-20 | Stop reason: SURG

## 2023-12-20 RX ORDER — OXYCODONE HCL 5 MG/5 ML
10 SOLUTION, ORAL ORAL
Status: DISCONTINUED | OUTPATIENT
Start: 2023-12-20 | End: 2023-12-20 | Stop reason: HOSPADM

## 2023-12-20 RX ORDER — HALOPERIDOL 5 MG/ML
1 INJECTION INTRAMUSCULAR
Status: DISCONTINUED | OUTPATIENT
Start: 2023-12-20 | End: 2023-12-20 | Stop reason: HOSPADM

## 2023-12-20 RX ORDER — SCOLOPAMINE TRANSDERMAL SYSTEM 1 MG/1
1 PATCH, EXTENDED RELEASE TRANSDERMAL ONCE
Status: DISPENSED | OUTPATIENT
Start: 2023-12-20 | End: 2023-12-21

## 2023-12-20 RX ORDER — MEPERIDINE HYDROCHLORIDE 25 MG/ML
12.5 INJECTION INTRAMUSCULAR; INTRAVENOUS; SUBCUTANEOUS
Status: DISCONTINUED | OUTPATIENT
Start: 2023-12-20 | End: 2023-12-20 | Stop reason: HOSPADM

## 2023-12-20 RX ORDER — LIDOCAINE HYDROCHLORIDE 20 MG/ML
INJECTION, SOLUTION EPIDURAL; INFILTRATION; INTRACAUDAL; PERINEURAL PRN
Status: DISCONTINUED | OUTPATIENT
Start: 2023-12-20 | End: 2023-12-20 | Stop reason: SURG

## 2023-12-20 RX ORDER — ONDANSETRON 2 MG/ML
INJECTION INTRAMUSCULAR; INTRAVENOUS PRN
Status: DISCONTINUED | OUTPATIENT
Start: 2023-12-20 | End: 2023-12-20 | Stop reason: SURG

## 2023-12-20 RX ORDER — BUPIVACAINE HYDROCHLORIDE 5 MG/ML
INJECTION, SOLUTION EPIDURAL; INTRACAUDAL
Status: COMPLETED
Start: 2023-12-20 | End: 2023-12-20

## 2023-12-20 RX ORDER — WARFARIN SODIUM 2 MG/1
2 TABLET ORAL DAILY
Status: DISCONTINUED | OUTPATIENT
Start: 2023-12-20 | End: 2023-12-22

## 2023-12-20 RX ADMIN — DEXAMETHASONE SODIUM PHOSPHATE 8 MG: 4 INJECTION INTRA-ARTICULAR; INTRALESIONAL; INTRAMUSCULAR; INTRAVENOUS; SOFT TISSUE at 12:39

## 2023-12-20 RX ADMIN — BUPIVACAINE HYDROCHLORIDE: 5 INJECTION, SOLUTION EPIDURAL; INTRACAUDAL at 12:44

## 2023-12-20 RX ADMIN — ROCURONIUM BROMIDE 50 MG: 10 INJECTION, SOLUTION INTRAVENOUS at 12:39

## 2023-12-20 RX ADMIN — ONDANSETRON 4 MG: 2 INJECTION INTRAMUSCULAR; INTRAVENOUS at 13:13

## 2023-12-20 RX ADMIN — SUGAMMADEX 200 MG: 100 INJECTION, SOLUTION INTRAVENOUS at 13:19

## 2023-12-20 RX ADMIN — ASPIRIN 81 MG: 81 TABLET, COATED ORAL at 06:12

## 2023-12-20 RX ADMIN — FENTANYL CITRATE 50 MCG: 50 INJECTION, SOLUTION INTRAMUSCULAR; INTRAVENOUS at 13:12

## 2023-12-20 RX ADMIN — LIDOCAINE HYDROCHLORIDE: 20 INJECTION, SOLUTION INFILTRATION; PERINEURAL at 12:44

## 2023-12-20 RX ADMIN — OXYCODONE 2.5 MG: 5 TABLET ORAL at 04:01

## 2023-12-20 RX ADMIN — SODIUM CHLORIDE: 9 INJECTION, SOLUTION INTRAVENOUS at 12:29

## 2023-12-20 RX ADMIN — PHENYLEPHRINE HYDROCHLORIDE 400 MCG: 10 INJECTION INTRAVENOUS at 12:47

## 2023-12-20 RX ADMIN — PHENYLEPHRINE HYDROCHLORIDE 400 MCG: 10 INJECTION INTRAVENOUS at 12:53

## 2023-12-20 RX ADMIN — VANCOMYCIN HYDROCHLORIDE 125 MG: 5 INJECTION, POWDER, LYOPHILIZED, FOR SOLUTION INTRAVENOUS at 16:21

## 2023-12-20 RX ADMIN — HEPARIN SODIUM 22 UNITS/KG/HR: 5000 INJECTION, SOLUTION INTRAVENOUS at 16:20

## 2023-12-20 RX ADMIN — VANCOMYCIN HYDROCHLORIDE 125 MG: 5 INJECTION, POWDER, LYOPHILIZED, FOR SOLUTION INTRAVENOUS at 22:30

## 2023-12-20 RX ADMIN — PROPOFOL 200 MG: 10 INJECTION, EMULSION INTRAVENOUS at 12:39

## 2023-12-20 RX ADMIN — HEPARIN SODIUM 2000 UNITS: 200 INJECTION, SOLUTION INTRAVENOUS at 12:30

## 2023-12-20 RX ADMIN — SIMETHICONE 125 MG: 125 TABLET, CHEWABLE ORAL at 18:14

## 2023-12-20 RX ADMIN — PHENYLEPHRINE HYDROCHLORIDE 200 MCG: 10 INJECTION INTRAVENOUS at 12:42

## 2023-12-20 RX ADMIN — LIDOCAINE HYDROCHLORIDE 100 MG: 20 INJECTION, SOLUTION EPIDURAL; INFILTRATION; INTRACAUDAL at 12:39

## 2023-12-20 RX ADMIN — DRONABINOL 5 MG: 5 CAPSULE ORAL at 18:14

## 2023-12-20 RX ADMIN — ATORVASTATIN CALCIUM 40 MG: 40 TABLET, FILM COATED ORAL at 20:13

## 2023-12-20 RX ADMIN — VANCOMYCIN HYDROCHLORIDE 125 MG: 5 INJECTION, POWDER, LYOPHILIZED, FOR SOLUTION INTRAVENOUS at 06:12

## 2023-12-20 RX ADMIN — WARFARIN SODIUM 2 MG: 2 TABLET ORAL at 18:40

## 2023-12-20 RX ADMIN — FENTANYL CITRATE 50 MCG: 50 INJECTION, SOLUTION INTRAMUSCULAR; INTRAVENOUS at 13:20

## 2023-12-20 RX ADMIN — ONDANSETRON 4 MG: 2 INJECTION INTRAMUSCULAR; INTRAVENOUS at 04:01

## 2023-12-20 RX ADMIN — SCOPOLAMINE 1 PATCH: 1.5 PATCH, EXTENDED RELEASE TRANSDERMAL at 12:50

## 2023-12-20 RX ADMIN — VERAPAMIL HYDROCHLORIDE 80 MG: 120 TABLET ORAL at 16:20

## 2023-12-20 RX ADMIN — VERAPAMIL HYDROCHLORIDE 80 MG: 120 TABLET ORAL at 06:12

## 2023-12-20 RX ADMIN — Medication 1 CAPSULE: at 08:17

## 2023-12-20 RX ADMIN — PHENYLEPHRINE HYDROCHLORIDE 400 MCG: 10 INJECTION INTRAVENOUS at 12:56

## 2023-12-20 RX ADMIN — PHENYLEPHRINE HYDROCHLORIDE 400 MCG: 10 INJECTION INTRAVENOUS at 13:01

## 2023-12-20 ASSESSMENT — ENCOUNTER SYMPTOMS
SHORTNESS OF BREATH: 0
NECK PAIN: 0
PALPITATIONS: 0
SPEECH DIFFICULTY: 0
SORE THROAT: 0
SENSORY CHANGE: 0
NUMBNESS: 0
NAUSEA: 1
ABDOMINAL PAIN: 1
NERVOUS/ANXIOUS: 0
EYES NEGATIVE: 1
COUGH: 0
SHORTNESS OF BREATH: 1
FATIGUE: 0
DIZZINESS: 0
FOCAL WEAKNESS: 0
BLOOD IN STOOL: 0
SPUTUM PRODUCTION: 0
VOMITING: 0
DIAPHORESIS: 0
WEAKNESS: 1
WEAKNESS: 0
CHILLS: 0
LIGHT-HEADEDNESS: 0
SPEECH CHANGE: 0
FEVER: 0
NAUSEA: 0
BLURRED VISION: 0
MYALGIAS: 0
COUGH: 1
TROUBLE SWALLOWING: 0
DIARRHEA: 1
FLANK PAIN: 0
BACK PAIN: 0
WHEEZING: 0
HEADACHES: 0
HEARTBURN: 0

## 2023-12-20 ASSESSMENT — FIBROSIS 4 INDEX: FIB4 SCORE: 0.9

## 2023-12-20 ASSESSMENT — PAIN DESCRIPTION - PAIN TYPE: TYPE: ACUTE PAIN

## 2023-12-20 NOTE — OP REPORT
[facility] EP PROCEDURE NOTE    Procedure(s) Performed:   Supraventricular tachycardia ablation (atrial flutter ablation) electrophysiology Study  Intra-cardiac echocardiography    Indication(s):  Atrial flutter    : Marissa Velasquez M.D.    Assistant(s): None    Anesthesia: General anesthesia    Specimen(s) Removed: None    Estimated Blood Loss:  20cc    Complications:  None    Description of Procedure:    After informed written consent, the patient was brought to the EP lab in the fasting, non-sedated state. The patient was prepped and draped in the usual sterile fashion. Femoral venous access was obtained using the modified Seldinger technique. In the right femoral vein, 3 sheaths (7,8,8 Fr) were inserted over 0.035” guidewires. A deflectable decapolar catheter was advanced to the CS position. Baseline rhythm atrial flutter, flutter  msec, 2:1 AV conduction. ICE catheter was inserted into the RA and used to visualize the cavotricuspid isthmus and alverto on our electroanatomic map during ablation using CartoSound. ICE was used to monitor for contact and  effusion during the case. One 8Fr saline irrigated ablation catheter with 3.5mm distal electrode and location sensor for the CARTO system (Biosense [employer] Thermocool ST SF) was inserted into an 8.5 Fr sheath (RAMP) for electroanatomical 3D mapping and radiofrequency ablation at 40W power, with termination of the flutter. At the end of the procedure, the catheter and sheaths were removed, and hemostasis was achieved by manual compression and Vascade MVP. Following recovery from anesthesia, the patient was transferred back to monitored bed in good condition.    Baseline Rhythm:   Atrial flutter  msec    Intervals:   msec   HV 60 msec  AVBCL 330 msec.     Arrhythmias:  Typical AFL as above    Mapping:  Electroanatomical 3D (CARTO FAM) map of CS and right atrium around the cavotricuspid isthmus was created during CS  pacing.    Ablation:  Radiofrequency energy was applied using 3.5 mmsaline irrigated catheter, power 40 W, total 13 RF applications, RF time 400 seconds to create a cavotricuspid isthmus line between the tricuspid annulus and Eustachian ridge/inferior vena cava to produce bidirectional isthmus conduction block.    Post Ablation Testin. No inducible atrial flutter with CS pacing post ablation.  2. Trans-isthmus conduction time pacing from proximal  msec.  3. Trans-isthmus conduction time pacing from lateral to the isthmus line 170 msec.    Fluoroscopy Time: 1.3 minutes    Impressions/Plan:   1. Inducible typical right atrial flutter.  2. Successful catheter mediated ablation of right atrial flutter.  3. Transfer back to monitored bed overnight and resume heparin in 2 hours, continue coumadin bridge

## 2023-12-20 NOTE — CARE PLAN
Problem: Pain - Standard  Goal: Alleviation of pain or a reduction in pain to the patient’s comfort goal  Outcome: Progressing     Problem: Knowledge Deficit - Standard  Goal: Patient and family/care givers will demonstrate understanding of plan of care, disease process/condition, diagnostic tests and medications  Outcome: Progressing     Problem: Fall Risk  Goal: Patient will remain free from falls  Outcome: Progressing     Problem: Respiratory  Goal: Patient will achieve/maintain optimum respiratory ventilation and gas exchange  Outcome: Progressing     Problem: Discharge Barriers/Planning  Goal: Patient's continuum of care needs are met  Outcome: Progressing     Problem: Hemodynamics  Goal: Patient's hemodynamics, fluid balance and neurologic status will be stable or improve  Outcome: Progressing     Problem: Fluid Volume  Goal: Fluid volume balance will be maintained  Outcome: Progressing     Problem: Gastrointestinal Irritability  Goal: Diarrhea will be absent or improved  Outcome: Progressing     Problem: Self Care  Goal: Patient will have the ability to perform ADLs independently or with assistance (bathe, groom, dress, toilet and feed)  Outcome: Progressing     Problem: Infection - Standard  Goal: Patient will remain free from infection  Outcome: Progressing     Problem: Wound/ / Incision Healing  Goal: Patient's wound/surgical incision will decrease in size and heals properly  Outcome: Progressing     Problem: Skin Integrity  Goal: Skin integrity is maintained or improved  Outcome: Progressing   The patient is Stable - Low risk of patient condition declining or worsening    Shift Goals  Clinical Goals: VSS, safety  Patient Goals: to go home  Family Goals: FARIDA    Progress made toward(s) clinical / shift goals:  VSS, safety    Patient is not progressing towards the following goals:

## 2023-12-20 NOTE — PROGRESS NOTES
Cardiology Follow Up Progress Note    Date of Service  12/20/2023    Attending Physician  Rosangela Chamberlain M.D.    Attending Physician  Rosangela Chamberlain M.D.     Chief Complaint/reason for consult   AFL     HPI  Gurdeep Guerra is a 58 y.o. male admitted 11/27/2023 with a history of mechanical AVR and aneurysm repair, ESRD on dialysis, admitted for recurrent colitis, noted to have loculated effusion S/P chest tube.  Coumadin stopped seocndary to procedures and he has been bridged with Heparin gtt.  EP asked to see for his atrial flutter.     Interim Events  Remains in Aflutter, rates in the 130s.    Reports ongoing GI symptoms.   Labs reviewed, WBC 6.9, H/H stable. SCr 5.38. K 4.5.  HD MWF.   VS stable.  Afebrile.     Review of Systems  Review of Systems   Constitutional:  Negative for chills, fatigue and fever.   HENT:  Negative for nosebleeds, tinnitus and trouble swallowing.    Eyes: Negative.    Respiratory:  Negative for shortness of breath and wheezing.    Cardiovascular:  Negative for chest pain, palpitations and leg swelling.   Gastrointestinal:  Positive for diarrhea and nausea.   Neurological:  Negative for dizziness, syncope, speech difficulty, weakness, light-headedness and numbness.       Vital signs in last 24 hours  Temp:  [37 °C (98.6 °F)-37.5 °C (99.5 °F)] 37 °C (98.6 °F)  Pulse:  [] 130  Resp:  [16-20] 16  BP: ()/(60-81) 108/77  SpO2:  [91 %-97 %] 97 %    Physical Exam  Physical Exam  Vitals and nursing note reviewed.   Constitutional:       Appearance: Normal appearance.   HENT:      Head: Normocephalic and atraumatic.      Nose: No congestion.   Eyes:      Conjunctiva/sclera: Conjunctivae normal.      Pupils: Pupils are equal, round, and reactive to light.   Cardiovascular:      Rate and Rhythm: Tachycardia present. Rhythm irregular.      Pulses: Normal pulses.      Heart sounds: Normal heart sounds. No murmur heard.     No friction rub. No gallop.   Pulmonary:      Effort: Pulmonary effort  "is normal.      Breath sounds: Normal breath sounds.   Chest:      Comments: Chest tube to right.   Abdominal:      General: Bowel sounds are normal.   Musculoskeletal:         General: Normal range of motion.      Cervical back: Normal range of motion.   Skin:     General: Skin is warm and dry.   Neurological:      Mental Status: He is alert and oriented to person, place, and time.   Psychiatric:         Mood and Affect: Mood normal.         Behavior: Behavior normal.         Thought Content: Thought content normal.         Judgment: Judgment normal.         Lab Review  Lab Results   Component Value Date/Time    WBC 6.9 12/19/2023 06:20 AM    RBC 3.11 (L) 12/19/2023 06:20 AM    HEMOGLOBIN 9.3 (L) 12/19/2023 06:20 AM    HEMATOCRIT 29.4 (L) 12/19/2023 06:20 AM    MCV 94.5 12/19/2023 06:20 AM    MCH 29.9 12/19/2023 06:20 AM    MCHC 31.6 (L) 12/19/2023 06:20 AM    MPV 8.7 (L) 12/19/2023 06:20 AM      Lab Results   Component Value Date/Time    SODIUM 136 12/19/2023 06:20 AM    POTASSIUM 4.5 12/19/2023 06:20 AM    CHLORIDE 101 12/19/2023 06:20 AM    CO2 26 12/19/2023 06:20 AM    GLUCOSE 100 (H) 12/19/2023 06:20 AM    BUN 21 12/19/2023 06:20 AM    CREATININE 5.38 (HH) 12/19/2023 06:20 AM    BUNCREATRAT 17 05/09/2022 09:49 AM    GLOMRATE 7 (L) 04/02/2023 11:54 AM      Lab Results   Component Value Date/Time    ASTSGOT 15 12/05/2023 10:45 AM    ALTSGPT 9 12/05/2023 10:45 AM     Lab Results   Component Value Date/Time    CHOLSTRLTOT 171 05/23/2023 11:08 AM     (H) 05/23/2023 11:08 AM    HDL 40 05/23/2023 11:08 AM    TRIGLYCERIDE 56 05/23/2023 11:08 AM    TROPONINT 496 (H) 12/17/2023 06:33 AM       No results for input(s): \"NTPROBNP\" in the last 72 hours.    Cardiac Imaging and Procedures Review  Echocardiogram:  11/28/23  CONCLUSIONS  Hyperdynamic left ventricular systolic function with tachycardia.  The left ventricular ejection fraction is visually estimated to be 70%.  Moderate concentric left ventricular " hypertrophy.  Known mechanical aortic valve not well visualized probably due to   acuostical shadowing but functioning normally with normal transvalvular   gradients.  The right ventricle is normal in size and systolic function.  Normal inferior vena cava size and inspiratory collapse.  No pericardial effusion.  Compared to the images of the prior study, 11/15/2023 there has been   resolution of the pericardial effusion.     Imaging    Assessment/Plan  Typical Atrial Flutter with RVR  Right sided pleural effusion S/P chest tube  CDiff Colitis  ESRD  S/P AVR/root     - Planned for typical atrial flutter ablation with Dr. Velasquez / anesthesia today.  I have discussed with Gurdeep this Am and he remains wishing to proceed. Please continue NPO.  - Heparin bridge held this AM about 0830.  Anticipate resuming without bolus after hemostasis achieved (approximately 4 hours post procedure).   - Warfarin resumed last night, INR goal with On-X valve is 2-3 for first 3 months post AVR (11/3/2023-02/3/2024), then 1.5-2.   - Chest tube management per pulmonary.    - ESRD, on HD per nephrology.  Does PD at home.    - Cdiff, being treated with oral grace per IM.        Please contact me with any questions.    CARISSA Del Toro.   [facility]  [phone]

## 2023-12-20 NOTE — DISCHARGE PLANNING
Outpatient Dialysis Note     Received phone call from outpatient HD clinic, samantha Moreno pts schedule was changed due to LLOS.     Patient placed at:     [facility] [address] [place] [address]     Ph: [phone]     New Schedule: Mon, Wed, Fri  Time: 6:00 AM     Patient to start Mon, 12/25 at 5:45 AM     Placed PC to [facility] to relay schedule update.       Xitlaly Reyes   Dialysis Coordinator / [facility]   Ph: [phone]

## 2023-12-20 NOTE — CONSULTS
[facility] INFECTIOUS DISEASES INPATIENT CONSULT NOTE     Date of Service: 12/20/2023    Consult Requested By: Rosangela Chamberlain M.D.    Reason for Consultation: C. difficile diarrhea    Chief Complaint: Diarrhea    History of Present Illness:     Gurdeep Guerra is a 58 y.o. male admitted 11/27/2023.  Extensive review of documentation from multiple specialties performed in generating this HPI.  Patient with ESRD on PD (on kidney transplant list at [facility]), hypertension, hyperlipidemia, history of moderate to severe aortic stenosis and regurgitation with ascending aortic aneurysm, status post aortic valve replacement and ascending aortic aneurysm repair on 11/3/2023, readmitted 11/28 with abdominal pain and diarrhea, found to have C. difficile colitis, received oral vancomycin for 13 days.  He was also found to have a right-sided loculated pleural effusion, underwent right-sided chest tube placement on on 12/14 (fluid was bloody, only 181 red cells, 80,000 red cells, no polys, glucose 69, cultures negative).    On 12/17, he was noted to have foul-smelling loose stools once again and C. difficile was retested and returned positive, restarted on oral vancomycin.  ID consulted.  No leukocytosis    Review of Systems:  All other systems reviewed and are negative expect as noted in HPI    Past Medical History:   Diagnosis Date    Anesthesia     Hiccups for over 24 hours after a previous sugery.    Aortic stenosis     Chronic gout of multiple sites     Dialysis patient (Formerly [facility])     peritoneal daily    Dyspnea on exertion 05/23/2023    Elevated troponin 05/23/2023    Heart burn     1990    Heart murmur     High cholesterol     All always    Hypertension 2009    NSTEMI (non-ST elevated myocardial infarction) (Formerly [facility]) 05/23/2023    no stents placed    Pain in the chest 05/23/2023    Paroxysmal A-fib (Formerly [facility]) 11/11/2023    PONV (postoperative nausea and vomiting)     Renal disorder 2007    Stage IV    Sleep apnea 2000    Snoring 1990        Past Surgical History:   Procedure Laterality Date    AORTIC VALVE REPLACEMENT  11/3/2023    Procedure: AORTIC VALVE REPLACEMENT, ASCENDING AORTIC ANEURYSM REPAIR, TRANSESOPHAGEAL ECHOCARDIOGRAM;  Surgeon: Osmel Blanca M.D.;  Location: SURGERY [facility];  Service: Cardiothoracic    AORTIC ASCENDING DISSECTION  11/3/2023    Procedure: REPAIR, ANEURYSM OR DISSECTION, AORTA, ASCENDING;  Surgeon: Osmel Blanca M.D.;  Location: SURGERY [facility];  Service: Cardiothoracic    ECHOCARDIOGRAM, TRANSESOPHAGEAL, INTRAOPERATIVE  11/3/2023    Procedure: ECHOCARDIOGRAM, TRANSESOPHAGEAL, INTRAOPERATIVE;  Surgeon: Osmel Blanca M.D.;  Location: SURGERY [facility];  Service: Cardiothoracic    RESTORATE HEMOSTAS  11/3/2023    Procedure: RESTORATION OF HEMOSTATIS;  Surgeon: Osmel Blanca M.D.;  Location: SURGERY [facility];  Service: Cardiothoracic    STERNOTOMY  11/3/2023    Procedure: MEDIASTINAL EXPLORATION;  Surgeon: Osmel Blanca M.D.;  Location: SURGERY [facility];  Service: Cardiothoracic    CATH PLACEMENT CAPD Right 9/6/2023    Procedure: LAPAROSCOPIC INSERTION OF PERITONEAL DIALYSIS CATHETER;  Surgeon: Dontrell Sales M.D.;  Location: SURGERY [facility];  Service: Vascular    AK INJ LUMBAR/SACRAL,W/ IMAGING Left 8/24/2023    Procedure: LUMBAR EPIDURAL STEROID INJECTION, LEFT L5-S1 INTERLAMINAR UNDER FLUOROSCOPY;  Surgeon: Benito Herrera D.O.;  Location: SURGERY [facility];  Service: Orthopedics    CATH PLACEMENT CAPD N/A 5/15/2023    Procedure: LAPAROSCOPIC PLACEMENT OF PERITONEAL DIALYSIS CATHERTER;  Surgeon: Shikha Alexander M.D.;  Location: SURGERY SAME DAY [facility];  Service: General    UMBILICAL HERNIA REPAIR  2012    4 separate surgeries between [id number]    OTHER  2007    Kidney biopsy, can't recall laterality,    HIP ARTHROSCOPY Bilateral 2002    TONSILLECTOMY N/A 1987    HAND SURGERY Right 1985    Hand and wrist    SHOULDER ARTHROSCOPY      Can't recall date       Family History   Problem  Relation Age of Onset    Hyperlipidemia Mother     Hypertension Father        Social History     Socioeconomic History    Marital status:      Spouse name: Not on file    Number of children: Not on file    Years of education: Not on file    Highest education level: Master's degree (e.g., MA, MS, Kenn, MEd, MSW, ELIECER)   Occupational History    Not on file   Tobacco Use    Smoking status: Never    Smokeless tobacco: Never   Vaping Use    Vaping Use: Never used   Substance and Sexual Activity    Alcohol use: Never    Drug use: Not Currently    Sexual activity: Yes     Partners: Female   Other Topics Concern    Not on file   Social History Narrative    Not on file     Social Determinants of Health     Financial Resource Strain: Low Risk  (1/4/2023)    Overall Financial Resource Strain (CARDIA)     Difficulty of Paying Living Expenses: Not hard at all   Food Insecurity: No Food Insecurity (1/4/2023)    Hunger Vital Sign     Worried About Running Out of Food in the Last Year: Never true     Ran Out of Food in the Last Year: Never true   Transportation Needs: No Transportation Needs (1/4/2023)    PRAPARE - Transportation     Lack of Transportation (Medical): No     Lack of Transportation (Non-Medical): No   Physical Activity: Sufficiently Active (1/4/2023)    Exercise Vital Sign     Days of Exercise per Week: 3 days     Minutes of Exercise per Session: 60 min   Stress: Stress Concern Present (1/4/2023)    [employer] [place] [employer] - Occupational Stress Questionnaire     Feeling of Stress : To some extent   Social Connections: Moderately Isolated (1/4/2023)    Social Connection and Isolation Panel [NHANES]     Frequency of Communication with Friends and Family: Once a week     Frequency of Social Gatherings with Friends and Family: More than three times a week     Attends Synagogue Services: Never     Active Member of Clubs or Organizations: No     Attends Club or Organization Meetings: Never      Marital Status: Living with partner   Intimate Partner Violence: Not on file   Housing Stability: Low Risk  (1/4/2023)    Housing Stability Vital Sign     Unable to Pay for Housing in the Last Year: No     Number of Places Lived in the Last Year: 1     Unstable Housing in the Last Year: No       Allergies   Allergen Reactions    Allopurinol Itching    Hydralazine Hcl Unspecified     Pt states he had a reaction similar to lupus       Medications:    Current Facility-Administered Medications:     warfarin (Coumadin) tablet 2.5 mg, 2.5 mg, Oral, DAILY AT 1800, Rosangela Chamberlain M.D., 2.5 mg at 12/19/23 1808    GI Cocktail (hyoscyamine-lidocaine-Maalox) oral susp cup 30 mL, 30 mL, Oral, QDAY PRN, Sujit Galvan M.D.    ferric gluconate complex (Dialysis Use Only) injection 125 mg, 125 mg, Intravenous, TUE+THU+SAT, Kodak Harvey M.D.    epoetin gabbie (Epogen/Procrit) injection 6,000 Units, 6,000 Units, Intravenous, TUE+THU+SAT, Kodak Harvey M.D.    gentamicin (Garamycin) 0.1 % cream, , Topical, ACUTE DIALYSIS PRN, Kodak Harvey M.D., Given at 12/18/23 1455    oxyCODONE immediate-release (Roxicodone) tablet 2.5 mg, 2.5 mg, Oral, Q3HRS PRN, 2.5 mg at 12/20/23 0401 **OR** oxyCODONE immediate release (Roxicodone) tablet 10 mg, 10 mg, Oral, Q3HRS PRN **OR** [DISCONTINUED] morphine 4 MG/ML injection 4 mg, 4 mg, Intravenous, Q3HRS PRN, Sujit Galvan M.D.    alteplase (Activase) 10 mg in NS 30 mL INTRAPLEURAL syringe, 10 mg, Intrapleural, Once **AND** dornase alpha (Pulmozyme) 5 mg in NS 30 mL INTRAPLEURAL syringe, 5 mg, Intrapleural, Once, Gisela Heller D.O.    [CANCELED] Special Contact Isolation, , , CONTINUOUS **AND** [COMPLETED] C Diff by PCR rflx Toxin, , , Once **AND** Pharmacy Consult Request - C. difficile consult, 1 Each, Other, PHARMACY TO DOSE, Sathish Wayne M.D.    verapamil (Isoptin) tablet 80 mg, 80 mg, Oral, Q8HRS, Sathish Wayne M.D., 80 mg at 12/20/23 0612    vancomycin 50  mg/mL oral soln 125 mg, 125 mg, Oral, Q6HR, 125 mg at 23 0612 **FOLLOWED BY** [START ON 2023] vancomycin 50 mg/mL oral soln 125 mg, 125 mg, Oral, Q12HR **FOLLOWED BY** [START ON 1/3/2024] vancomycin 50 mg/mL oral soln 125 mg, 125 mg, Oral, Q24HR **FOLLOWED BY** [START ON 1/10/2024] vancomycin 50 mg/mL oral soln 125 mg, 125 mg, Oral, Q48HRS **FOLLOWED BY** [START ON 2024] vancomycin 50 mg/mL oral soln 125 mg, 125 mg, Oral, Q72HRS, TEX Blue    benzonatate (Tessalon) capsule 100 mg, 100 mg, Oral, TID PRN, Sathish Wayne M.D.    heparin infusion 25,000 units in 500 mL 0.45% NACL, 0-30 Units/kg/hr, Intravenous, Continuous, Federico Jacobsen M.D., Stopped. (Insulin or Heparin) at 23 0840    heparin injection 3,100 Units, 40 Units/kg, Intravenous, PRN, Federico Jacobsen M.D., 3,100 Units at 23 0121    simethicone (Mylicon) chewable tablet 125 mg, 125 mg, Oral, TID PRN, Federico Jacobsen M.D., 125 mg at 23 0851    Pharmacy Consult Request ...Pain Management Review 1 Each, 1 Each, Other, PHARMACY TO DOSE, Sage Wallace M.D.    [] acetaminophen (Tylenol) tablet 1,000 mg, 1,000 mg, Oral, Q6HRS, 1,000 mg at 23 0721 **FOLLOWED BY** acetaminophen (Tylenol) tablet 1,000 mg, 1,000 mg, Oral, Q6HRS PRN, Sage Wallace M.D., 1,000 mg at 23 0649    Dextromethorphan Polistirex ER (Delsym) 30 MG/5ML suspension 60 mg, 60 mg, Oral, BID W/MEALS PRN, Richy Hand M.D.    heparin injection 2,000 Units, 2,000 Units, Intravenous, ACUTE DIALYSIS PRN, Justo Ríos M.D., 2,000 Units at 23 0830    lactobacillus rhamnosus (Culturelle) capsule 1 Capsule, 1 Capsule, Oral, QDAY with Breakfast, Richy Hand M.D., 1 Capsule at 23 0817    menthol (Halls) lozenge 1 Lozenge, 1 Lozenge, Oral, Q2HRS PRN, Richy Hand M.D., 1 Lozenge at 23 2257    benzocaine-menthol (Cepacol) lozenge 1 Lozenge, 1 Lozenge, Mouth/Throat, Q2HRS PRN, Richy Hand M.D., 1  "Lozenge at 23 1956    sevelamer carbonate (Renvela) tablet 1,600 mg, 1,600 mg, Oral, TID WITH MEALS, Justice Mac M.D., 1,600 mg at 23 0845    dronabinol (Marinol) capsule 5 mg, 5 mg, Oral, BEFORE LUNCH AND DINNER, Richy Hand M.D., 5 mg at 23 1808    ondansetron (Zofran) syringe/vial injection 4 mg, 4 mg, Intravenous, Q6HRS PRN, Tanya Benitez M.D., 4 mg at 23 0401    MD Alert...Warfarin per Pharmacy, , Other, PHARMACY TO DOSE, Tanya Benitez M.D.    heparin injection 3,700 Units, 3,700 Units, Intracatheter, PRN, Justice Mac M.D., 3,700 Units at 23 1825    aspirin EC tablet 81 mg, 81 mg, Oral, DAILY, Iglesia Auguste M.D., 81 mg at 23 0612    atorvastatin (Lipitor) tablet 40 mg, 40 mg, Oral, Nightly, Iglesia Auguste M.D., 40 mg at 23 2107    [Held by provider] tamsulosin (Flomax) capsule 0.4 mg, 0.4 mg, Oral, DAILY, Iglesia Auguste M.D., 0.4 mg at 23 0631    ondansetron (Zofran ODT) dispertab 4 mg, 4 mg, Oral, Q4HRS PRN, Iglesia Auguste M.D., 4 mg at 23 0602    promethazine (Phenergan) tablet 12.5-25 mg, 12.5-25 mg, Oral, Q4HRS PRN, Iglesia Auguste M.D.    promethazine (Phenergan) suppository 12.5-25 mg, 12.5-25 mg, Rectal, Q4HRS PRN, Iglesia Auguste M.D.    prochlorperazine (Compazine) injection 5-10 mg, 5-10 mg, Intravenous, Q4HRS PRN, Iglesia Auguste M.D.    Physical Exam:   Vital Signs: /77   Pulse (!) 130   Temp 37 °C (98.6 °F) (Temporal)   Resp 16   Ht 1.702 m (5' 7\")   Wt 70.9 kg (156 lb 4.9 oz)   SpO2 97%   BMI 24.48 kg/m²   Temp  Av.8 °C (98.2 °F)  Min: 35.3 °C (95.5 °F)  Max: 37.6 °C (99.7 °F)  Vital signs reviewed  Constitutional: Patient is oriented to person, place, and time. Appears well-developed and well-nourished. No distress  Head: Atraumatic, normocephalic  Eyes: Conjunctivae normal, EOM intact  Mouth/Throat: Lips without lesions  Cardiovascular: No pedal edema.  Pulmonary/Chest: No respiratory distress. Unlabored " "respiratory effort.  Right-sided chest tube in place  Abdominal: Soft, non tender  Musculoskeletal: No joint tenderness, swelling, erythema, or restriction of motion noted.  Neurological: Alert and oriented to person, place, and time. No gross cranial nerve deficit. No focal neural deficit noted  Skin: Skin is warm and dry. No rashes or embolic phenomena noted on exposed skin  Psychiatric: Normal mood and affect. Behavior is normal.     LABS:  Recent Labs     12/17/23 2345 12/19/23  0620   WBC 8.0 6.9      Recent Labs     12/17/23 2345 12/19/23  0620   HEMOGLOBIN 9.0* 9.3*   HEMATOCRIT 28.5* 29.4*   MCV 93.8 94.5   MCH 29.6 29.9   PLATELETCT 325 311       Recent Labs     12/17/23 2345 12/19/23 0620   SODIUM 135 136   POTASSIUM 4.4 4.5   CHLORIDE 100 101   CO2 25 26   CREATININE 6.60* 5.38*        Recent Labs     12/19/23 0620   ALBUMIN 2.0*        MICRO:  No results found for: \"BLOODCULTU\", \"BLDCULT\", \"BCHOLD\"     Latest pertinent labs were reviewed    IMAGING STUDIES:  Last CT scan 12/10 with minimal fat stranding adjacent to the cecum    Hospital Course/Assessment:   Gurdeep Guerra is a pleasant 58 y.o. male patient with ESRD on PD (on kidney transplant list at [facility]), hypertension, hyperlipidemia, history of moderate to severe aortic stenosis and regurgitation with ascending aortic aneurysm, status post aortic valve replacement and ascending aortic aneurysm repair on 11/3/2023, readmitted 11/28 with abdominal pain and diarrhea, found to have C. difficile colitis, received oral vancomycin for 13 days.  He was also found to have a right-sided loculated pleural effusion, underwent right-sided chest tube placement on on 12/14 (fluid was bloody, only 181 red cells, 80,000 red cells, no polys, glucose 69, cultures negative).    On 12/17, he was noted to have foul-smelling loose stools once again and C. difficile was retested and returned positive, restarted on oral vancomycin.  ID consulted.  No " leukocytosis    Pertinent Diagnoses:  Recurrent C. difficile colitis  Right-sided loculated pleural effusion  Recent aortic valve replacement and ascending aortic aneurysm repair  ESRD on PD    Plan:   -Continue p.o. vancomycin 125 mg every 6 hours for now, follow stool output and abdominal exam closely  -If no improvement tomorrow, will consider the addition of IV Flagyl    Disposition: Unable to determine at this time  Need for PICC line: Unable to determine at this time    Plan was discussed with the primary team, Dr. Chamberlain.  ID will follow.  This illness poses a threat to life.    Corby Campbell M.D.    Please note that this dictation was created using voice recognition software. I have worked with technical experts from [facility] to optimize the interface.  I have made every reasonable attempt to correct obvious errors, but there may be errors of grammar and possibly content that I did not discover before finalizing the note.

## 2023-12-20 NOTE — ANESTHESIA PROCEDURE NOTES
CARMELITA    Date/Time: 12/20/2023 12:45 PM    Performed by: Danie Bland M.D.  Authorized by: Danie Bland M.D.    Start Time:12/20/2023 12:45 PM  Preanesthetic Checklist: patient identified, IV checked, site marked, risks and benefits discussed, surgical consent, monitors and equipment checked, pre-op evaluation and timeout performed    Indication for CARMELITA: diagnostic   Patient Location: OR  Intubated: Yes  Bite Block: No  Heart Visualized: Yes  Insertion: atraumatic    **See FULL CARMELITA report in patient's chart via CV Syn[employer]**

## 2023-12-20 NOTE — ANESTHESIA PROCEDURE NOTES
Airway    Date/Time: 12/20/2023 12:40 PM    Performed by: Danie Bland M.D.  Authorized by: Danie Bland M.D.    Location:  OR  Urgency:  Elective  Difficult Airway: No    Indications for Airway Management:  Anesthesia      Spontaneous Ventilation: absent    Sedation Level:  Deep  Preoxygenated: Yes    Patient Position:  Sniffing  Final Airway Type:  Endotracheal airway  Final Endotracheal Airway:  ETT  Cuffed: Yes    Technique Used for Successful ETT Placement:  Direct laryngoscopy  Devices/Methods Used in Placement:  Intubating stylet    Insertion Site:  Oral  Blade Type:  Donna  Laryngoscope Blade/Videolaryngoscope Blade Size:  4  ETT Size (mm):  7.5  Measured from:  Teeth  ETT to Teeth (cm):  23  Placement Verified by: auscultation and capnometry    Cormack-Lehane Classification:  Grade I - full view of glottis  Number of Attempts at Approach:  1

## 2023-12-20 NOTE — ANESTHESIA PREPROCEDURE EVALUATION
Anesthesia Start Date/Time: 12/20/23 1229    Scheduled providers: Marissa Velasquez M.D.; Danie Bland M.D.    Procedure: CL-EP ABLATION ATRIAL FLUTTER    Diagnosis:       Typhlitis [K52.9]      A-fib (HCC) [I48.91]      Typhlitis [K52.9]      A-fib (HCC) [I48.91]    Indications:       See Assoicated Dx      To be scheduled by EP , Chante MEDRANO for wednesday    [facility]            Relevant Problems   CARDIAC   (positive) Coronary artery disease due to lipid rich plaque   (positive) Hypertension   (positive) Paroxysmal atrial flutter (HCC)         (positive) ESRD (end stage renal disease) (HCC)   (positive) FSGS (focal segmental glomerulosclerosis)      Other   (positive) C. difficile colitis   (positive) Chronic gout of multiple sites   (positive) Dyslipidemia   (positive) History of mechanical aortic valve replacement   (positive) Loculated pleural effusion   (positive) S/P ascending aortic aneurysm repair       Physical Exam    Airway   Mallampati: II  TM distance: >3 FB  Neck ROM: full       Cardiovascular - normal exam  Rhythm: regular  Rate: abnormal  (-) murmur     Dental - normal exam           Pulmonary   (+) decreased breath sounds     Abdominal    Neurological - normal exam                   Anesthesia Plan    ASA 3   ASA physical status 3 criteria: ESRD undergoing regularly scheduled dialysis    Plan - general       Airway plan will be ETT  CARMELITA Planned        Induction: intravenous    Postoperative Plan: Postoperative administration of opioids is intended.    Pertinent diagnostic labs and testing reviewed    Informed Consent:    Anesthetic plan and risks discussed with patient.    Use of blood products discussed with: patient whom.

## 2023-12-20 NOTE — CARE PLAN
The patient is Stable - Low risk of patient condition declining or worsening    Shift Goals  Clinical Goals: VSS, safety  Patient Goals: to go home  Family Goals: FARIDA    Progress made toward(s) clinical / shift goals:    Problem: Pain - Standard  Goal: Alleviation of pain or a reduction in pain to the patient’s comfort goal  Outcome: Progressing     Problem: Knowledge Deficit - Standard  Goal: Patient and family/care givers will demonstrate understanding of plan of care, disease process/condition, diagnostic tests and medications  Outcome: Progressing     Problem: Fall Risk  Goal: Patient will remain free from falls  Outcome: Progressing

## 2023-12-20 NOTE — DISCHARGE PLANNING
Case Management Discharge Planning    Admission Date: 11/27/2023  GMLOS: 5.4  ALOS: 23    6-Clicks ADL Score: 24  6-Clicks Mobility Score: 24      Anticipated Discharge Dispo: Discharge Disposition: D/T to home under A care in anticipation of covered skilled care (06)  Discharge Address: Home ([address] [place] [id number])  Discharge Contact Phone Number: S/O Donna [phone]    DME Needed: No (might need home 02.)    Action(s) Taken: Updated Provider/Nurse on Discharge Plan    Escalations Completed: None    Medically Clear: No    Next Steps: Cardiac ablation planned for today. [facility] accepted when medically cleared.    Barriers to Discharge: Medical clearance, Dialysis, and Pending Procedures    Is the patient up for discharge tomorrow: No

## 2023-12-20 NOTE — PROGRESS NOTES
Inpatient Anticoagulation Service Note for 12/19/2023      Reason for Anticoagulation: Atrial Fibrillation, On-X Aortic/Mitral Valve Replacement   [id number] VASc Score: 2  HAS-BLED Score: 3    Hemoglobin Value: (!) 9.3  Hematocrit Value: (!) 29.4  Lab Platelet Value: 311  Target INR: 2.0 to 3.0    INR from last 7 days       Date/Time INR Value    12/19/23 0620 1.47    12/17/23 2345 1.49    12/17/23 0633 1.4    12/16/23 0802 1.42    12/14/23 0336 1.55    12/13/23 0137 2.21          Dose from last 7 days       Date/Time Dose (mg)    12/19/23 1714 2.5          Average Dose (mg): 0  Significant Interactions: Antiplatelet Medications  Bridge Therapy: Yes  Bridge Therapy Start Date: 12/19/23  Days of Overlap Therapy: 0   INR Value Greater than 2 Prior to Discontinuation of Parenteral Anticoagulation: Not Applicable   Reversal Agent Administered: Vitamin K By Mouth (5 mg on 12/12 and 12/13)     Assessment:  58-year-old male with PMH significant for Afib, ESRD on HD, and recent AVR (On-X Aortic Valve) on warfarin PTA. Follows with [facility]. Prior to admission, patient was on warfarin 1.25 mg PO daily (INR was sub-therapeutic at admission).   Patient's warfarin was initially resumed and INR was therapeutic with average of 2.5-3 mg/day. Warfarin has been on hold since 12/11 and reversed with PO Vitamin K 5 mg on 12/12 & 12/13 for IR. Bridging with heparin gtt.   Cleared to resume warfarin tonight. Last Xa level was therapeutic on 12/17 (patient refused Xa draw on 12/18 and 12/19 per RN).   H/H and PLTs have been stable  INR is sub-therapeutic this morning (INR 1.47) as expected since warfarin has been on hold.   Chest tubes in place   NPO at midnight for afib ablation tomorrow.   Plan on resuming warfarin at 2.5 mg tonight and continuing to monitor closely.   Patient agreed to only one blood draw per day; Heparin Anti-Xa level ordered w/ all other AM labs.     Plan:  2.5 mg   Education Material  Provided?: No    Pharmacist suggested discharge dosing: TBNEVILLE Kaminski, PharmD

## 2023-12-20 NOTE — PROGRESS NOTES
Hospital Medicine Daily Progress Note    Date of Service  12/20/2023    Chief Complaint  Gurdeep Guerra is a 58 y.o. male admitted 11/27/2023 with diarrhea    Hospital Course  Admitted with symptoms of abdominal pain, nausea, vomiting and diarrhea, CT scan showed evidence of colitis, he was started on empiric coverage with IV Cefepime and Flagyl.  Patient recently underwent mechanical AVR, aortic aneurysm repair, discharged on 11/17/2023.  He also has known history of end-stage renal disease on peritoneal dialysis.  Workup showed he was positive for C. difficile colitis, he was started on oral vancomycin.  There was also concern for possible peritonitis, he was given intraperitoneal IV cefepime.  Nephrology was consulted on the case, he underwent dialysis through his temporary dialysis catheter which was placed on the previous admission.  Also with known history of paroxysmal atrial flutter, and with recent mechanical AVR, he was on Coumadin for anticoagulation.  He required to be placed on heparin drip to bridge Coumadin.    Interval Problem Update    Remains in A-fib    And has been tachycardic overnight persistentl===> 130's    he has been not been taking his oral verapamil for concerns of hypotension however he has had not had any significant hypotension that is concerning.    patient was counseled on the necessity of taking his verapamil for heart rate control that we will stabilize his blood pressure.  His dialysis session today has been put on hold due to his tachycardia    Patient having foul-smelling loose stools    I have restarted test for C. Difficile    Patient given labetalol 10 mg IV push x 1 patient's blood pressure dropped to 75 systolic.  He was not complaining of any dizziness but he was laying in the bed.  He was given a saline bolus of 250 cc x 1    12/17 patient is new to me today, patient is resting in bed, he was complaining of lower abdominal cramps patient denies shortness of breath no  palpitation no chest pain, patient is on heparin drip, chest tube in place, draining 80 cc in the last 24 hours, continue having diarrhea, patient is tolerating oral vancomycin, discussed with pharmacist stopped PPI for now, will consider discussing with ID, heart rate better controlled continue verapamil 3 times daily, discussed with pulmonology patient received tPA today through his chest tube, will continue monitoring.  Chest x-ray in a.m.  12/18 patient in bed, still having diarrhea, abdominal cramps, had a very long discussion with patient regarding plan of care, discussed with nephro, cardio and ID. Continue close monitoring. Patient on iv heparin, monitoring for side effects, continue telemetry, patient starting on amiodarone monitoring for side effects patient did not tolerate medication in the past. Chest tube output 1340, s/p atp on 12/17, per pulm remove ct when output is less than 50 cc.   12/19: Patient seen and examined, afebrile, no nausea or vomiting HR still not controlled in the 120-130   Cardiology following and plan is for ablation tomorrow.  Regarding his chest tube pulmonology following appreciate rec. If less then 50 CC in 2 days pulmonology will consider removing it   Regarding his ESRD nephrology following cont on dialysis    Patient is on IV heparin continue monitoring for side effects include but not limited to bleeding, thrombocytopenia.  12/20: Patient seen and examined, afebrile, having diarrhea this AM. I have consulted infection disease for his C.diff  and case discussed appreciate rec.  Cardiology following case discussed with cardiology going for ablation today for his A.fibb with RVR   Also patient has chest tube in place and pulmonology following case discussed  appreciate rec.   I have discussed this patient's plan of care and discharge plan at IDT rounds today with Case Management, Nursing, Nursing leadership, and other members of the IDT  team.    Consultants/Specialty  nephrology and pulmonary  Cardio  EP.     Code Status  Full Code    Disposition  The patient is not medically cleared for discharge to home or a post-acute facility.      I have placed the appropriate orders for post-discharge needs.    Review of Systems  Review of Systems   Constitutional:  Positive for malaise/fatigue. Negative for chills, diaphoresis and fever.   HENT:  Negative for congestion, hearing loss and sore throat.    Eyes:  Negative for blurred vision.   Respiratory:  Positive for cough and shortness of breath. Negative for sputum production and wheezing.    Cardiovascular:  Negative for chest pain, palpitations and leg swelling.   Gastrointestinal:  Positive for abdominal pain and diarrhea. Negative for blood in stool, heartburn, melena, nausea and vomiting.   Genitourinary:  Negative for dysuria, flank pain and hematuria.   Musculoskeletal:  Negative for back pain, joint pain, myalgias and neck pain.   Skin:  Negative for rash.   Neurological:  Positive for weakness. Negative for dizziness, sensory change, speech change, focal weakness and headaches.   Psychiatric/Behavioral:  The patient is not nervous/anxious.         Physical Exam  Temp:  [36.6 °C (97.9 °F)-37.5 °C (99.5 °F)] 36.6 °C (97.9 °F)  Pulse:  [] 103  Resp:  [14-24] 17  BP: ()/(60-81) 117/78  SpO2:  [91 %-100 %] 92 %    Physical Exam  Vitals and nursing note reviewed.   Constitutional:       Appearance: He is ill-appearing.   HENT:      Head: Normocephalic and atraumatic.      Nose: No congestion.      Mouth/Throat:      Mouth: Mucous membranes are moist.   Eyes:      General: No scleral icterus.        Right eye: No discharge.         Left eye: No discharge.   Cardiovascular:      Rate and Rhythm: Normal rate. Rhythm irregular.   Pulmonary:      Effort: Pulmonary effort is normal. No accessory muscle usage or respiratory distress.      Breath sounds: Rhonchi present.      Comments: Decreased  breath sounds at the bases    Chest tube present  Abdominal:      General: There is no distension.      Tenderness: There is no abdominal tenderness. There is no guarding or rebound.      Comments: PD catheter   Musculoskeletal:      Cervical back: Normal range of motion and neck supple. No rigidity or tenderness.      Right lower leg: No edema.      Left lower leg: No edema.   Skin:     General: Skin is warm and dry.   Neurological:      General: No focal deficit present.      Mental Status: He is alert and oriented to person, place, and time.      Cranial Nerves: No cranial nerve deficit.         Fluids    Intake/Output Summary (Last 24 hours) at 12/20/2023 1533  Last data filed at 12/20/2023 1350  Gross per 24 hour   Intake 520 ml   Output 35 ml   Net 485 ml         Laboratory  Recent Labs     12/17/23  2345 12/19/23  0620   WBC 8.0 6.9   RBC 3.04* 3.11*   HEMOGLOBIN 9.0* 9.3*   HEMATOCRIT 28.5* 29.4*   MCV 93.8 94.5   MCH 29.6 29.9   MCHC 31.6* 31.6*   RDW 50.6* 50.2*   PLATELETCT 325 311   MPV 8.9* 8.7*       Recent Labs     12/17/23  2345 12/19/23  0620   SODIUM 135 136   POTASSIUM 4.4 4.5   CHLORIDE 100 101   CO2 25 26   GLUCOSE 86 100*   BUN 28* 21   CREATININE 6.60* 5.38*   CALCIUM 7.9* 8.1*       Recent Labs     12/17/23  2345 12/19/23  0620   INR 1.49* 1.47*                 Imaging  EC-CARMELITA W/O CONT   Final Result      DX-CHEST-PORTABLE (1 VIEW)   Final Result         1.  Pulmonary edema and/or infiltrates, stable.   2.  Small layering bilateral pleural effusions, increased on the right compared to prior study   3.  Cardiomegaly   4.  Atherosclerosis      DX-CHEST-PORTABLE (1 VIEW)   Final Result         1.  Pulmonary edema and/or infiltrates, stable.   2.  Small layering bilateral pleural effusions, stable prior study   3.  Stable right lung base pneumothorax with small pigtail thoracostomy tube in place.   4.  Cardiomegaly   5.  Atherosclerosis      DX-CHEST-PORTABLE (1 VIEW)   Final Result         1.   Pulmonary edema and/or infiltrates, stable.   2.  Small layering bilateral pleural effusions, stable on the right and decreased on the left since prior study.   3.  New right lung base pneumothorax with small pigtail thoracostomy tube in place.   4.  Cardiomegaly   5.  Atherosclerosis      DX-CHEST-PORTABLE (1 VIEW)   Final Result         1.  Pulmonary edema and/or infiltrates.   2.  Small layering bilateral pleural effusions   3.  Cardiomegaly   4.  Atherosclerosis      DX-CHEST-PORTABLE (1 VIEW)   Final Result      Unchanged bilateral pulmonary opacities and suspected bilateral pleural effusions, right worse than left.         DX-CHEST-PORTABLE (1 VIEW)   Final Result      Stable examination.      CT-CHEST (THORAX) W/O   Final Result      1.  Interval decrease in size of large loculated right pleural effusion following following placement of locking loop chest tube.      2.  Smaller loculated pleural effusions posterior superiorly in the right hemithorax there are thorax.      3.  Small left pleural effusion with elevation of left hemidiaphragm.      4.  Dependent partial collapse of the lower lobes.      5.  Patchy airspace opacities in the right lower lobe, lingula, and to a lesser extent right middle lobe suspicious for pneumonitis or parenchymal scarring      6.  Cirrhosis and splenomegaly.      DX-CHEST-PORTABLE (1 VIEW)   Final Result      1.  Redemonstrated right hydropneumothorax with decreased air component, otherwise stable in size.   2.  Bilateral basilar atelectasis and/or consolidation with mild interstitial edema.   3.  Stable enlargement of the cardiomediastinal silhouette.      DX-CHEST-PORTABLE (1 VIEW)   Final Result      Placement of a right pigtail chest tube with decreased right pleural effusion.      Hazy opacities which could be related to hypoinflation or vascular congestion. Correlate clinically for infection.      IR-CHEST TUBE-EMPYEMA RIGHT   Final Result      Successful image guided  RIGHT chest tube placement.      Plan: Low wall suction. Follow-up radiograph is pending.      CT-CHEST (THORAX) W/O   Final Result      1.  Large loculated right pleural effusion, worse compared to the October 2023 CT study. Associated atelectasis, with subtotal collapse of the right lower lobe.   2.  Minimal groundglass opacities in the right lung, infectious/inflammatory. No consolidation.   3.  Small left pleural effusion and associated atelectasis.   4.  Mild right hilar lymphadenopathy, statistically reactive rather than neoplastic.   5.  Cardiomegaly.   6.  Abdomen is detailed separately.      CT-ABDOMEN-PELVIS W/O   Final Result      1.  No new inflammatory process in the abdomen or pelvis.   2.  Minimal fat stranding adjacent to the cecum, nonspecific.   3.  Percutaneous catheter with tip in the pelvis. Small volume of pelvic free fluid.   4.  Colonic diverticulosis.   5.  Partially visualized moderate to large right pleural effusion, which may be loculated. It has increased in size since prior CT study.   6.  Small left pleural effusion.   7.  Cardiomegaly.         [id number] VIEW   Final Result      1.  Benign bowel gas pattern.      2.  Peritoneal dialysis catheter coiled in the pelvis.      [id number] VIEW   Final Result      Nonspecific bowel gas pattern with a moderate colonic stool burden.      DX-CHEST-PORTABLE (1 VIEW)   Final Result      1.  Enlarged cardiac silhouette with vascular congestion/edema.   2.  Lower lobe airspace disease could be due to edema, atelectasis or pneumonitis.      EC-ECHOCARDIOGRAM COMPLETE W/O CONT   Final Result      CT-ABDOMEN-PELVIS W/O   Final Result      1.  Fat stranding adjacent to the cecum, concerning for colitis/typhlitis.   2.  Colonic diverticulosis.   3.  Nonobstructing, tiny left renal stone.   4.  Likely partially loculated moderate right pleural effusion.   5.  Small left pleural effusion.   6.  Adjacent right middle lobe and bilateral lower lobe  consolidations, likely atelectasis.   7.  Small pericardial effusion.      DX-CHEST-PORTABLE (1 VIEW)   Final Result      1.  Resolved or improved small left pleural effusion with persistent left basilar atelectasis and/or scarring.   2.  New small right pleural effusion with associated atelectasis and/or consolidation.      CL-EP ABLATION ATRIAL FLUTTER    (Results Pending)        Assessment/Plan  * C. difficile colitis- (present on admission)  Assessment & Plan  Oral vancomycin - finished course    Stool has become foul-smelling and loose again so we have rechecked stool for C. difficile on 12/16/2023    Continue vancomycin, consider ID consult    I have asked ID to see patient in am.     A-fib (HCC)- (present on admission)  Assessment & Plan  Continue telemetry monitoring  Continue heparin drip  Close monitoring for side effects include but not limited to bleeding, thrombocytopenia    I have discussed with cardiologist Dr Orourke, EP also consulted recommending possible ablation this week.   EP recommended to start amiodarone.   Continue tele  Continue heparin drip for now.     History of mechanical aortic valve replacement- (present on admission)  Assessment & Plan  Coumadin on hold    Hypomagnesemia- (present on admission)  Assessment & Plan  Replaced  Follow level    Loculated pleural effusion- (present on admission)  Assessment & Plan  Status post chest tube on 12/14/23    Daily x-rays    Continue to monitor chest tube output    Pulmonary MD follow-up    Repeated chest x-ray today my reading bilateral pleural effusion right larger than left.  Chest tube is in place discussed with pulmonology.  Cultures negative so far from pleural effusion    Paroxysmal atrial flutter (HCC)- (present on admission)  Assessment & Plan    Tachycardic on 12/16/2023 ordered IV labbetol as he has been refusing his oral verapamil.    Patient counseled about the necessity of taking his oral verapamil  Verapamil  Coumadin on  hold  Cardio and EP consulted.     ESRD (end stage renal disease) (HCC)- (present on admission)  Assessment & Plan  HD per Nephrology  On dialysis  Discussed with nephro  Dialysis today    Dyslipidemia- (present on admission)  Assessment & Plan  Lipitor    Coronary artery disease due to lipid rich plaque- (present on admission)  Assessment & Plan  Aspirin, Lipitor  Troponin elevated but no chest pain, patient is end-stage renal disease, patient is on heparin for A-fib with RVR and holding his oral anticoagulation.  Troponin chronically elevated    Hypertension- (present on admission)  Assessment & Plan  Verapamil with parameters          VTE prophylaxis: Heparin drip      I have performed a physical exam and reviewed and updated ROS and Plan today (12/20/2023). In review of yesterday's note (12/19/2023), there are no changes except as documented above.

## 2023-12-20 NOTE — PROGRESS NOTES
Patient back from recovery. Post op vitals. Strict bed rest two hours. Will restart heparin at 3:30. No pain at this time. Groin site soft.

## 2023-12-20 NOTE — DISCHARGE PLANNING
HTH/SCP TCN chart review completed. Current discharge considerations are home with home health and DME O2 if needed at time of discharge.  Noted patient undergoing cardiac ablation today.  TCN will continue to follow and collaborate with discharge planning team as additional post acute needs arise. Thank you.    Completed:  Choice obtained: DME (O2 [facility]) on 12/17/23.  HH (Resumption of [facility]) on 11/28/23.    [facility] accepted  [facility] with [facility].  Patient request to set up his own Follow up PCP appointment.

## 2023-12-20 NOTE — CARE PLAN
Problem: Pain - Standard  Goal: Alleviation of pain or a reduction in pain to the patient’s comfort goal  Outcome: Progressing     Problem: Knowledge Deficit - Standard  Goal: Patient and family/care givers will demonstrate understanding of plan of care, disease process/condition, diagnostic tests and medications  Outcome: Progressing     Problem: Fall Risk  Goal: Patient will remain free from falls  Outcome: Progressing     Problem: Respiratory  Goal: Patient will achieve/maintain optimum respiratory ventilation and gas exchange  Outcome: Progressing     Problem: Skin Integrity  Goal: Skin integrity is maintained or improved  Outcome: Progressing   The patient is Watcher - Medium risk of patient condition declining or worsening    Shift Goals  Clinical Goals: ablation, monitor labs, monitor chest tube  Patient Goals: go home  Family Goals: FARIDA    Progress made toward(s) clinical / shift goals:  yes    Patient is not progressing towards the following goals:

## 2023-12-20 NOTE — PROGRESS NOTES
[facility] -  PROGRESS NOTE               Author: Kodak Harvey M.D. Date & Time: 12/20/2023  1:02 PM     HPI:  58 y.o. 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 Abd imaging demonstrated colitis. C. Diff pending and started on abx. He notes abd pain and diarrhea for several weeks. Edema improving with 2.5% dextrose solution. Still has HD permacath in-place. Pain with peritoneal dialysis but no cloudy effluent of fibrin clots. No f/c/s. Normally does [id number] fills all green.     DAILY NEPHROLOGY SUMMARY:  11/28: consult done  11/29: ongoing abd pain, seen on HD-tolerating procedure well, PD fluid concerning for peritonitis  11/30:c.diff positive, started on oral vanc, new onset aflutter-transferred to [facility], wbc improving, Peritoneal cultures remain negative, abd pain improving  12/1: PD culture remains negative, seen on HD-tolerating procedure, abd pain improving, less diarrhea    12/2: HD yesterday 2.5 liter UF, still with SOB decreased abdoimnal pain  12/3: PD last night 1.5 UF, pt still with SOB  12/4: No events, tolerated HD yest with 2.5L UF, BP stable, tachycardic this am, stable on RA, reports SOB has resolved, still with crampy abd discomfort, reports diarrhea is starting to become more formed  12/5: No events, no new labs, BP stable, tachycardic, stable on RA this am, still with crampy abd pain, diarrhea improving, still with nausea/anorexia, denies any CP/LE edema, reports mild SOB  12/6: No events, tolerated HD yest with 1L UF, BP stable, remains tachycardic, reports nausea is improved and having more firm stools, denies any CP/SOB, reports poor appetite  12/7: No events, BP stable, tolerated HD  this am with 1L UF, still doesn't feel well and feels very depressed and frustrated, +abd pain and n/v this am and unable to take PO  12/8: patient is doing well, resting in bed. Plan next iHD tomorrow  12/09: patient is seen during HD, refused labs this am. Does report some abdominal discomfort this am  12/10: patient had iHD yesterday, UF 2000 ml. Abdominal issues ongoing  12/11: pt had CT thorax done yesterday, showing loculated effusion. Pt feels SOB. Diarrhea continues but is better overall.   12/12: pt reports no more diarrhea, only 2-3 loose stools. Seen on HD and tolerating. Pulm recommending chest tube for loculated effusion.   12/13: pt awaiting improvement in INR before chest tube placement. Pt frustrated at prolonged hospitalization.  12/14: Pt had HD this AM and chest tube placement after. Pt seen in Room  12/15: Pt seen in room. Reports breathing is better now chest tube is in.   12/16: chest tube drainage slowing down. Pt breathing better. However, pt tachycardic in 130s this AM. He reports return of diarrhea and some abdominal pain. dialysis on hold for now  12/17: dialysis held yesterday due to tachycardia and later hypotension. HR and BP within normal ranges this AM. Pt positive for C Diff again, with continued diarrhea.   12/18: HD yesterday net UF 1L, pt reports SOB thinks he may have fluids in  his lungs and wants to do additional HD today to remove fluids,  denies further D, continue to have abdominal cramps  12/19: HD yesterday net UF 1.5L, remains tachycardic / aflutter ablation planned 12/20, states he feels tired. No d/ abd cramping   12/20:no labs today, pt reports no c/o,plan cardiac ablation today     REVIEW OF SYSTEMS:    +diarrhea, +abd pain  10 point ROS reviewed and is as per HPI/daily summary or otherwise negative    PMH/PSH/SH/FH:   Reviewed and unchanged since admission note    CURRENT MEDICATIONS:   Reviewed from admission to present day    VS:  /77   Pulse (!) 130   Temp  "37 °C (98.6 °F) (Temporal)   Resp 16   Ht 1.702 m (5' 7\")   Wt 70.9 kg (156 lb 4.9 oz)   SpO2 97%   BMI 24.48 kg/m²     Physical Exam  Vitals and nursing note reviewed.   Constitutional:       General: He is not in acute distress.     Appearance: Normal appearance.   HENT:      Head: Normocephalic and atraumatic.   Eyes:      General: No scleral icterus.     Extraocular Movements: Extraocular movements intact.   Cardiovascular:      Rate and Rhythm: Regular rhythm.      Comments: +R chest PC  Pulmonary:      Effort: Pulmonary effort is normal.   Abdominal:      General: Bowel sounds are normal.      Palpations: Abdomen is soft.      Tenderness: There is no abdominal tenderness.   Musculoskeletal:         General: No deformity.      Right lower leg: No edema.      Left lower leg: No edema.   Skin:     General: Skin is warm and dry.      Findings: No rash.   Neurological:      General: No focal deficit present.      Mental Status: He is alert and oriented to person, place, and time.   Psychiatric:         Mood and Affect: Mood normal.         Behavior: Behavior normal. Behavior is cooperative.       R chest tube in place (12/14/23)    Fluids:  In: 120 [P.O.:120]  Out: 10     LABS:  Recent Labs     12/17/23  2345 12/19/23  0620   SODIUM 135 136   POTASSIUM 4.4 4.5   CHLORIDE 100 101   CO2 25 26   GLUCOSE 86 100*   BUN 28* 21   CREATININE 6.60* 5.38*   CALCIUM 7.9* 8.1*       IMAGING:   All imaging reviewed from admission to present day    IMPRESSION:  # ESRD on outpt PD   -Etiology 2/2 FSGS  - s/p permcath on 11/10, and on HD currently  # R/O PD cath associated peritonitis  - CX negative  # c. Diff.  -symptoms improved with initiation of oral vanc  -PD guidelines vague on scenarios such as this,  but pt on HD currently  # S/P mechanical AVR/ Ascending aneurysm repair 11/2023   # CKD-MBD  - P elevated  # Anemia of CKD, below goal hgb 10-11, low iron stores 12/17     Resumed EPO 6000U IV w HD on 12/12  # BL pleural " effusions/congestion     Large R loculated effusion on CT thorax 12/11     S/p chest tube placement on 12/14  # Pericardial effusion   # C. Diff colitis  -C.diff positive  # Aflutter per primary svc     PLAN:  - Labs ordered hold HD today unless increase O2 needs or abnormal labs post procedure   - HD TTS schedule and PRN   - Cont EPO 6000U IV w HD  - Cont holding PD for now (last on 12/3),  can re-eval as outpt  - PD catheter flushes Q weekly, last flushed 12/18    - C. diff tx/mgmt per primary team, diarrhea/pain and +toxin returned on 12/16  - Renal diet, no dietary pr restrictions   - Phos binder with meals   - Dose all medications as per ESRD  - Arrange OP HD chair at [facility] prior to [place]  that he can resume PD, re challenge of PD once stable in OP setting   - IV iron loading

## 2023-12-20 NOTE — ANESTHESIA POSTPROCEDURE EVALUATION
Patient: Gurdeep Guerra    Procedure Summary       Date: 12/20/23 Room / Location: [facility] Imaging - Cath Lab [facility]    Anesthesia Start: 1229 Anesthesia Stop: 1330    Procedure: CL-EP ABLATION ATRIAL FLUTTER Diagnosis:       Typhlitis      A-fib (HCC)      Typhlitis      A-fib (HCC)      (See Assoicated Dx)      (To be scheduled by EP , Chante MEDRANO for wednesday)    Scheduled Providers: Marissa Velasquez M.D.; Danie Bland M.D. Responsible Provider: Danie Bland M.D.    Anesthesia Type: general ASA Status: 3            Final Anesthesia Type: general  Last vitals  BP   Blood Pressure: 117/78    Temp   36.6 °C (97.9 °F)    Pulse   (!) 103   Resp   17    SpO2   92 %      Anesthesia Post Evaluation    Patient location during evaluation: PACU  Patient participation: complete - patient participated  Level of consciousness: awake and alert    Airway patency: patent  Anesthetic complications: no  Cardiovascular status: hemodynamically stable  Respiratory status: acceptable  Hydration status: euvolemic    PONV: none          No notable events documented.     Nurse Pain Score: 0 (NPRS)

## 2023-12-20 NOTE — PROGRESS NOTES
Patient to floor with RN on monitor in stable condition. VSS. Surgical dressings clean dry intact. Aox4 and on 2 l O2. No belongings. No further needs.

## 2023-12-20 NOTE — DISCHARGE PLANNING
"HTH/SCP TCN chart review completed. Current discharge considerations are home with home health resumption and DME O2 if needed at time of discharge.  Noted patient is currently on 5L, and patient remains ambulatory without AD and continues to have 24 6 clicks. Additionally, per cardiac note patient for \"typical atrial flutter ablation tomorrow.\"  TCN will continue to follow and collaborate with discharge planning team as additional post acute needs arise. Thank you.    Completed:  Choice obtained: DME (O2 [employer]) on 12/17/23.  HH (Resumption of [facility]) on 11/28/23.    [facility] has accepted  SCP with [facility] PCP.  Patient request to set up his own Follow up PCP appointment.     "

## 2023-12-20 NOTE — PROGRESS NOTES
Cardiology Follow Up Progress Note    Date of Service  12/19/2023    Attending Physician  Rosangela Chamberlain M.D.    Chief Complaint/reason for consult   AFL    HPI  Gurdeep Guerra is a 58 y.o. male admitted 11/27/2023 with a history of mechanical AVR and aneurysm repair, ESRD on dialysis, admitted for recurrent colitis, noted to have loculated effusion S/P chest tube.  Coumadin stopped seocndary to procedures and he has been bridged with Heparin gtt.  EP asked to see for his atrial flutter.     Interim Events  Remains in atrial flutter today, rate difficult to control.    Reports no symptoms secondary to.       Review of Systems  Review of Systems   Constitutional:  Negative for chills, fatigue and fever.   HENT:  Negative for trouble swallowing.    Respiratory:  Negative for cough, shortness of breath and wheezing.    Cardiovascular:  Negative for chest pain, palpitations and leg swelling.   Gastrointestinal:  Negative for abdominal pain.   Neurological:  Negative for dizziness, syncope, speech difficulty, weakness, light-headedness and numbness.   All other systems reviewed and are negative.      Vital signs in last 24 hours  Temp:  [36.8 °C (98.2 °F)-37.3 °C (99.1 °F)] 37.1 °C (98.8 °F)  Pulse:  [] 68  Resp:  [16-18] 17  BP: ()/(56-71) 104/71  SpO2:  [95 %-100 %] 95 %    Physical Exam  Physical Exam  Vitals and nursing note reviewed.   Constitutional:       Appearance: Normal appearance.   HENT:      Head: Normocephalic and atraumatic.   Eyes:      Extraocular Movements: Extraocular movements intact.      Conjunctiva/sclera: Conjunctivae normal.      Pupils: Pupils are equal, round, and reactive to light.   Cardiovascular:      Rate and Rhythm: Tachycardia present. Rhythm irregular.   Pulmonary:      Effort: Pulmonary effort is normal.   Musculoskeletal:         General: Normal range of motion.      Cervical back: Normal range of motion.   Skin:     General: Skin is warm and dry.   Neurological:       "Mental Status: He is alert and oriented to person, place, and time.   Psychiatric:         Mood and Affect: Mood normal.         Behavior: Behavior normal.         Thought Content: Thought content normal.         Judgment: Judgment normal.         Lab Review  Lab Results   Component Value Date/Time    WBC 6.9 12/19/2023 06:20 AM    RBC 3.11 (L) 12/19/2023 06:20 AM    HEMOGLOBIN 9.3 (L) 12/19/2023 06:20 AM    HEMATOCRIT 29.4 (L) 12/19/2023 06:20 AM    MCV 94.5 12/19/2023 06:20 AM    MCH 29.9 12/19/2023 06:20 AM    MCHC 31.6 (L) 12/19/2023 06:20 AM    MPV 8.7 (L) 12/19/2023 06:20 AM      Lab Results   Component Value Date/Time    SODIUM 136 12/19/2023 06:20 AM    POTASSIUM 4.5 12/19/2023 06:20 AM    CHLORIDE 101 12/19/2023 06:20 AM    CO2 26 12/19/2023 06:20 AM    GLUCOSE 100 (H) 12/19/2023 06:20 AM    BUN 21 12/19/2023 06:20 AM    CREATININE 5.38 (HH) 12/19/2023 06:20 AM    BUNCREATRAT 17 05/09/2022 09:49 AM    GLOMRATE 7 (L) 04/02/2023 11:54 AM      Lab Results   Component Value Date/Time    ASTSGOT 15 12/05/2023 10:45 AM    ALTSGPT 9 12/05/2023 10:45 AM     Lab Results   Component Value Date/Time    CHOLSTRLTOT 171 05/23/2023 11:08 AM     (H) 05/23/2023 11:08 AM    HDL 40 05/23/2023 11:08 AM    TRIGLYCERIDE 56 05/23/2023 11:08 AM    TROPONINT 496 (H) 12/17/2023 06:33 AM       No results for input(s): \"NTPROBNP\" in the last 72 hours.    Cardiac Imaging and Procedures Review  Echocardiogram:  11/28/23  CONCLUSIONS  Hyperdynamic left ventricular systolic function with tachycardia.  The left ventricular ejection fraction is visually estimated to be 70%.  Moderate concentric left ventricular hypertrophy.  Known mechanical aortic valve not well visualized probably due to   acuostical shadowing but functioning normally with normal transvalvular   gradients.  The right ventricle is normal in size and systolic function.  Normal inferior vena cava size and inspiratory collapse.  No pericardial effusion.  Compared to " the images of the prior study, 11/15/2023 there has been   resolution of the pericardial effusion.        Imaging    Assessment/Plan  Typical Atrial Flutter with RVR  Right sided pleural effusion S/P chest tube  CDiff Colitis  ESRD  S/P AVR/root    - Planned for typical atrial flutter ablation with Dr. Velasquez tomorrow/anesthesia.  I have discussed with Gurdeep this Am and he remains wishing to proceed. NPO at MN.   - Ok from EP standpoint to resume warfarin.  Will need to maintain heparin bridge until INR therapeutic.  Will need to hold Heparin gtt 4 hours prior to ablation (procedure scheduled for early afternoon, EP will touch base with team tomorrow re holding time) and can resume post when hemostasis achieved without bolus.    CARISSA Del Toro.   [facility]  [phone]

## 2023-12-20 NOTE — PROGRESS NOTES
Handoff report received from day shift nurse. Pt care assumed. Pt is currently resting in bed. POC discussed with Pt and Pt verbalizes no questions at this time. Pt is AAOx4, on 4 L NC on Tele monitoring, and VSS. Call light and belongings within reach, bed in lowest and locked position, and Pt educated on use of call light. Will continue to monitor.

## 2023-12-20 NOTE — ANESTHESIA TIME REPORT
Anesthesia Start and Stop Event Times       Date Time Event    12/20/2023 1130 Ready for Procedure     1229 Anesthesia Start     1330 Anesthesia Stop          Responsible Staff  12/20/23      Name Role Begin End    Danie Bland M.D. Anesth 1229 1330          Overtime Reason:  no overtime (within assigned shift)    Comments:

## 2023-12-20 NOTE — PROGRESS NOTES
Assumed care of PT A&O 4. Pt resting in bed with no signs of labored breathing. On 4L. Tele monitor in place, cardiac rhythm being monitored. Call light within reach, bed in lowest position, upper bed rails up. Pt was updated on plan of care for the day.

## 2023-12-20 NOTE — PROGRESS NOTES
"Pulmonary Progress Note    Date of Admission: 11/27/2023   Reason for consult: Loculated pleural effusion     Chief Complaint:  Chief Complaint   Patient presents with    Sent by MD     Patient reports open heart surgery 11/3. Patient reports spoke to surgeon today and told to come to ER. Patient reports also a dialysis patient and did his dialysis last night at home.      Weakness     Patient reports increased weakness over the last three weeks.     Shortness of Breath     X 3 weeks.      Patient has not been seen by pulmonary at [facility] in the past.  he has no prior PFTs    HPI:   From Dr. Heller's note: \"58 y.o. male who presented 11/27/2023 with abdominal pain, nausea, vomiting, diarrhea.  He was found to have C. difficile colitis.  He had been discharged on 11/17/2023 after mechanical aortic valve replacement and aortic aneurysm repair.  He has a history of ESRD on PD.  Patient reports that during his hospitalization, he has had increasing shortness of breath.  He had a CT scan of his abdomen yesterday due to ongoing abdominal pain and a loculated effusion on the right side of his chest was found.  I reviewed the chest CT with IR and we both felt that the effusion would not drain with simple thoracenteses and that he needed to undergo a chest tube.  Patient has a history of a left-sided pleural effusion which was drained on 11/11/2023.  It was bloody in appearance but no labs were sent.     Hospital Course  12/14/2023 - Chest tube placed today  12/15/2023 - 1360 out yesterday, given 1/2 dose lytics because the fluid was slightly bloody. CXR showing hydropneumonthorax.  12/17/2023-patient was moved to telemetry yesterday due to A-fib with RVR.  He is receiving HD today.  CT scan showed small pneumothorax, I do not believe that this is an ex vacuo pneumothorax.  He does have residual loculated effusion at the right base as well as a small left-sided pleural effusion.\"  12/18: on 4 lpm with saturations 94-98%. " On a heparin drip. No new complaints. Still with significant chest tube output.   12/19: No significant 24h events. No new ROS complaints.     24h events: No significant events. Off the floor and unable to be examined today.   Imaging: reviewed  Notable lab trends: reviewed   ABG: n/a  Tmax: afebrile  ProCal: n/a  Antibiotics: Vancomycin  Steroids: none  Cultures: C dif from 12/16 positive   I/O: Total 40 L out in past 24h; 4.57L total     Scheduled Medications   Medication Dose Frequency    warfarin  2.5 mg DAILY AT 1800    ferric gluconate complex  125 mg TUE+THU+SAT    epoetin  6,000 Units TUE+THU+SAT    alteplase (Activase) 10 mg in NS 30 mL INTRAPLEURAL syringe  10 mg Once    And    dornase alpha (Pulmozyme) 5 mg in NS 30 mL INTRAPLEURAL syringe  5 mg Once    Pharmacy  1 Each PHARMACY TO DOSE    verapamil  80 mg Q8HRS    vancomycin 50 mg/mL  125 mg Q6HR    Followed by    [START ON 12/27/2023] vancomycin 50 mg/mL  125 mg Q12HR    Followed by    [START ON 1/3/2024] vancomycin 50 mg/mL  125 mg Q24HR    Followed by    [START ON 1/10/2024] vancomycin 50 mg/mL  125 mg Q48HRS    Followed by    [START ON 1/18/2024] vancomycin 50 mg/mL  125 mg Q72HRS    Pharmacy Consult Request  1 Each PHARMACY TO DOSE    lactobacillus rhamnosus  1 Capsule QDAY with Breakfast    sevelamer carbonate  1,600 mg TID WITH MEALS    dronabinol  5 mg BEFORE LUNCH AND DINNER    MD Alert...Warfarin per Pharmacy   PHARMACY TO DOSE    aspirin  81 mg DAILY    atorvastatin  40 mg Nightly    [Held by provider] tamsulosin  0.4 mg DAILY       No current facility-administered medications on file prior to encounter.     Current Outpatient Medications on File Prior to Encounter   Medication Sig Dispense Refill    omeprazole (PRILOSEC) 20 MG delayed-release capsule Take 1 Capsule by mouth 2 times a day. 30 Capsule 2    warfarin (COUMADIN) 2.5 MG Tab Take 1 Tablet by mouth every day at 6 PM. 30 Tablet 3    tamsulosin (FLOMAX) 0.4 MG capsule Take 0.4 mg by  "mouth every day.      atorvastatin (LIPITOR) 40 MG Tab Take 1 Tablet by mouth every evening. 100 Tablet 3    aspirin 81 MG EC tablet Take 81 mg by mouth every day.         Allergies: Allopurinol and Hydralazine hcl      ROS: A 12 point ROS was performed on intake and during my interview. ROS negative unless specifically noted in HPI.     Vitals:  /77   Pulse (!) 130   Temp 37 °C (98.6 °F) (Temporal)   Resp 16   Ht 1.702 m (5' 7\")   Wt 70.9 kg (156 lb 4.9 oz)   SpO2 97%     Physical Exam: off the floor     Laboratory Data: I personally reviewed labs including historical lab trends.       Immunization History   Administered Date(s) Administered    Covid-19 Mrna (Spikevax) Moderna 12+ Years 10/01/2023    Hepatitis B Vaccine, CpG Adjuvanted (Heplisav-B) 06/21/2023, 08/18/2023    INFLUENZA TIV (IM) 03/17/2020    Influenza Vaccine Quad Inj (Pf) 09/29/2020, 11/20/2021, 10/27/2022, 09/16/2023    MODERNA BIVALENT BOOSTER SARS-COV-2 VACCINE (6+) 10/27/2022    MODERNA SARS-COV-2 VACCINE (12+) 03/18/2021, 04/15/2021, 04/15/2022    Pneumococcal Conjugate Vaccine (PCV20) 08/11/2023    Pneumococcal Conjugate Vaccine (Prevnar/PCV-13) 06/26/2020    Zoster Vaccine Recombinant (RZV) (SHINGRIX) 04/04/2020, 06/13/2020       PFTs as reviewed by me personally show: none available    Imaging as reviewed by me personally show:  Bilateral effusions; right slightly worse. Cardiomegaly. Tubes and lines in appropriate position.         Assessment/Plan:    Pulmonary problem list:  #Acute hypoxic respiratory failure secondary to fluid overload and right sided pleural effusion 2/2 heart failure  # Loculated right-sided pleural effusion, suspect that this is due to PD which became increasingly more complicated, possibly hemorhorax     -CXR reviewed - vacillating; suspect recurring fluid from nephrotic syndrome   -encourage IS   -Mobilize with PT/OT  -Target O2 sat 88-92%  -monitor output: CT to waterseal  -will remove when <50 cc daily " for 2 straight days - 40 in past 24h. Likely requires aggressive continued dialysis.     Total consult time: 40 minutes which included time spent on chart review, personally reviewing pertinent images and labs, time spent counseling and educating the patient and/or family members, and coordinating care with the healthcare team to include consultants.   __________  Poncho Claudio, DO  Pulmonary and Critical Care Medicine  [facility]    Please note that this dictation was created using voice recognition software. The accuracy of the dictation is limited to the abilities of the software. I have made every reasonable attempt to correct obvious errors, but I expect that there are errors of grammar and possibly content that I did not discover before finalizing the note.

## 2023-12-20 NOTE — PROGRESS NOTES
"Pt refusing Xa to be drawn throughout entire shift. Pt was educated on indication for Xa draws while pt is on heparin drip, and stated \"I only permit one poke a day.\" Will schedule Xa to be drawn with morning labs.   "

## 2023-12-21 ENCOUNTER — APPOINTMENT (OUTPATIENT)
Dept: RADIOLOGY | Facility: MEDICAL CENTER | Age: 58
DRG: 981 | End: 2023-12-21
Attending: HOSPITALIST
Payer: COMMERCIAL

## 2023-12-21 PROBLEM — K74.60 LIVER CIRRHOSIS (HCC): Chronic | Status: ACTIVE | Noted: 2023-12-21

## 2023-12-21 PROBLEM — R16.1 SPLENOMEGALY: Status: ACTIVE | Noted: 2023-12-21

## 2023-12-21 LAB
ERYTHROCYTE [DISTWIDTH] IN BLOOD BY AUTOMATED COUNT: 52.1 FL (ref 35.9–50)
HCT VFR BLD AUTO: 28.1 % (ref 42–52)
HGB BLD-MCNC: 8.4 G/DL (ref 14–18)
INR PPP: 1.37 (ref 0.87–1.13)
MCH RBC QN AUTO: 29 PG (ref 27–33)
MCHC RBC AUTO-ENTMCNC: 29.9 G/DL (ref 32.3–36.5)
MCV RBC AUTO: 96.9 FL (ref 81.4–97.8)
PLATELET # BLD AUTO: 324 K/UL (ref 164–446)
PMV BLD AUTO: 8.8 FL (ref 9–12.9)
PROTHROMBIN TIME: 17 SEC (ref 12–14.6)
RBC # BLD AUTO: 2.9 M/UL (ref 4.7–6.1)
UFH PPP CHRO-ACNC: 0.28 IU/ML
UFH PPP CHRO-ACNC: 0.8 IU/ML
UFH PPP CHRO-ACNC: >1.1 IU/ML
WBC # BLD AUTO: 7.1 K/UL (ref 4.8–10.8)

## 2023-12-21 PROCEDURE — A9270 NON-COVERED ITEM OR SERVICE: HCPCS | Performed by: NURSE PRACTITIONER

## 2023-12-21 PROCEDURE — 700101 HCHG RX REV CODE 250

## 2023-12-21 PROCEDURE — 99233 SBSQ HOSP IP/OBS HIGH 50: CPT | Performed by: INTERNAL MEDICINE

## 2023-12-21 PROCEDURE — A9270 NON-COVERED ITEM OR SERVICE: HCPCS | Performed by: HOSPITALIST

## 2023-12-21 PROCEDURE — 700102 HCHG RX REV CODE 250 W/ 637 OVERRIDE(OP): Performed by: HOSPITALIST

## 2023-12-21 PROCEDURE — 36415 COLL VENOUS BLD VENIPUNCTURE: CPT

## 2023-12-21 PROCEDURE — 770020 HCHG ROOM/CARE - TELE (206)

## 2023-12-21 PROCEDURE — A9270 NON-COVERED ITEM OR SERVICE: HCPCS | Performed by: STUDENT IN AN ORGANIZED HEALTH CARE EDUCATION/TRAINING PROGRAM

## 2023-12-21 PROCEDURE — 700102 HCHG RX REV CODE 250 W/ 637 OVERRIDE(OP): Performed by: FAMILY MEDICINE

## 2023-12-21 PROCEDURE — 700102 HCHG RX REV CODE 250 W/ 637 OVERRIDE(OP): Performed by: STUDENT IN AN ORGANIZED HEALTH CARE EDUCATION/TRAINING PROGRAM

## 2023-12-21 PROCEDURE — 90935 HEMODIALYSIS ONE EVALUATION: CPT

## 2023-12-21 PROCEDURE — 700102 HCHG RX REV CODE 250 W/ 637 OVERRIDE(OP): Performed by: NURSE PRACTITIONER

## 2023-12-21 PROCEDURE — A9270 NON-COVERED ITEM OR SERVICE: HCPCS | Performed by: FAMILY MEDICINE

## 2023-12-21 PROCEDURE — A9270 NON-COVERED ITEM OR SERVICE: HCPCS | Performed by: INTERNAL MEDICINE

## 2023-12-21 PROCEDURE — 700111 HCHG RX REV CODE 636 W/ 250 OVERRIDE (IP): Performed by: FAMILY MEDICINE

## 2023-12-21 PROCEDURE — 71045 X-RAY EXAM CHEST 1 VIEW: CPT

## 2023-12-21 PROCEDURE — 93005 ELECTROCARDIOGRAM TRACING: CPT | Performed by: NURSE PRACTITIONER

## 2023-12-21 PROCEDURE — 85520 HEPARIN ASSAY: CPT

## 2023-12-21 PROCEDURE — 85610 PROTHROMBIN TIME: CPT

## 2023-12-21 PROCEDURE — 700111 HCHG RX REV CODE 636 W/ 250 OVERRIDE (IP)

## 2023-12-21 PROCEDURE — 700111 HCHG RX REV CODE 636 W/ 250 OVERRIDE (IP): Mod: JZ | Performed by: INTERNAL MEDICINE

## 2023-12-21 PROCEDURE — 700102 HCHG RX REV CODE 250 W/ 637 OVERRIDE(OP): Performed by: INTERNAL MEDICINE

## 2023-12-21 PROCEDURE — 85027 COMPLETE CBC AUTOMATED: CPT

## 2023-12-21 RX ORDER — VERAPAMIL HYDROCHLORIDE 80 MG/1
80 TABLET ORAL EVERY 6 HOURS
Status: DISCONTINUED | OUTPATIENT
Start: 2023-12-21 | End: 2023-12-26 | Stop reason: HOSPADM

## 2023-12-21 RX ORDER — METRONIDAZOLE 500 MG/100ML
500 INJECTION, SOLUTION INTRAVENOUS EVERY 8 HOURS
Status: DISCONTINUED | OUTPATIENT
Start: 2023-12-21 | End: 2023-12-21

## 2023-12-21 RX ORDER — METRONIDAZOLE 500 MG/1
500 TABLET ORAL EVERY 8 HOURS
Status: DISCONTINUED | OUTPATIENT
Start: 2023-12-21 | End: 2023-12-23

## 2023-12-21 RX ORDER — METOPROLOL TARTRATE 1 MG/ML
2.5 INJECTION, SOLUTION INTRAVENOUS EVERY 4 HOURS PRN
Status: DISCONTINUED | OUTPATIENT
Start: 2023-12-21 | End: 2023-12-26 | Stop reason: HOSPADM

## 2023-12-21 RX ADMIN — DRONABINOL 5 MG: 5 CAPSULE ORAL at 12:19

## 2023-12-21 RX ADMIN — HEPARIN SODIUM 3700 UNITS: 1000 INJECTION, SOLUTION INTRAVENOUS; SUBCUTANEOUS at 12:22

## 2023-12-21 RX ADMIN — VANCOMYCIN HYDROCHLORIDE 125 MG: 5 INJECTION, POWDER, LYOPHILIZED, FOR SOLUTION INTRAVENOUS at 12:19

## 2023-12-21 RX ADMIN — METRONIDAZOLE 500 MG: 500 TABLET ORAL at 20:31

## 2023-12-21 RX ADMIN — DRONABINOL 5 MG: 5 CAPSULE ORAL at 18:24

## 2023-12-21 RX ADMIN — METRONIDAZOLE 500 MG: 500 TABLET ORAL at 15:36

## 2023-12-21 RX ADMIN — VANCOMYCIN HYDROCHLORIDE 125 MG: 5 INJECTION, POWDER, LYOPHILIZED, FOR SOLUTION INTRAVENOUS at 04:00

## 2023-12-21 RX ADMIN — WARFARIN SODIUM 2 MG: 2 TABLET ORAL at 18:24

## 2023-12-21 RX ADMIN — SODIUM FERRIC GLUCONATE COMPLEX IN SUCROSE 125 MG: 12.5 INJECTION INTRAVENOUS at 10:51

## 2023-12-21 RX ADMIN — Medication 1 CAPSULE: at 09:29

## 2023-12-21 RX ADMIN — HEPARIN SODIUM 22 UNITS/KG/HR: 5000 INJECTION, SOLUTION INTRAVENOUS at 00:39

## 2023-12-21 RX ADMIN — EPOETIN ALFA 6000 UNITS: 3000 SOLUTION INTRAVENOUS; SUBCUTANEOUS at 09:32

## 2023-12-21 RX ADMIN — HEPARIN SODIUM 24 UNITS/KG/HR: 5000 INJECTION, SOLUTION INTRAVENOUS at 04:06

## 2023-12-21 RX ADMIN — SIMETHICONE 125 MG: 125 TABLET, CHEWABLE ORAL at 09:48

## 2023-12-21 RX ADMIN — SIMETHICONE 125 MG: 125 TABLET, CHEWABLE ORAL at 21:05

## 2023-12-21 RX ADMIN — ATORVASTATIN CALCIUM 40 MG: 40 TABLET, FILM COATED ORAL at 20:31

## 2023-12-21 RX ADMIN — VERAPAMIL HYDROCHLORIDE 80 MG: 80 TABLET ORAL at 18:23

## 2023-12-21 RX ADMIN — VERAPAMIL HYDROCHLORIDE 80 MG: 120 TABLET ORAL at 09:29

## 2023-12-21 RX ADMIN — ASPIRIN 81 MG: 81 TABLET, COATED ORAL at 09:30

## 2023-12-21 RX ADMIN — OXYCODONE 2.5 MG: 5 TABLET ORAL at 22:46

## 2023-12-21 RX ADMIN — VANCOMYCIN HYDROCHLORIDE 125 MG: 5 INJECTION, POWDER, LYOPHILIZED, FOR SOLUTION INTRAVENOUS at 18:22

## 2023-12-21 RX ADMIN — HEPARIN SODIUM 3100 UNITS: 1000 INJECTION, SOLUTION INTRAVENOUS; SUBCUTANEOUS at 04:06

## 2023-12-21 RX ADMIN — HEPARIN SODIUM 21 UNITS/KG/HR: 5000 INJECTION, SOLUTION INTRAVENOUS at 13:57

## 2023-12-21 RX ADMIN — BENZOCAINE AND MENTHOL 1 LOZENGE: 15; 3.6 LOZENGE ORAL at 03:03

## 2023-12-21 ASSESSMENT — ENCOUNTER SYMPTOMS
FOCAL WEAKNESS: 0
TROUBLE SWALLOWING: 0
DIARRHEA: 1
DIZZINESS: 0
CHILLS: 0
HEARTBURN: 0
SHORTNESS OF BREATH: 0
FLANK PAIN: 0
NAUSEA: 0
NAUSEA: 1
SHORTNESS OF BREATH: 1
FEVER: 0
WEAKNESS: 0
NERVOUS/ANXIOUS: 0
WEAKNESS: 1
NECK PAIN: 0
BLOOD IN STOOL: 0
VOMITING: 0
BACK PAIN: 0
ABDOMINAL PAIN: 1
SPUTUM PRODUCTION: 0
EYES NEGATIVE: 1
BLURRED VISION: 0
SPEECH DIFFICULTY: 0
SORE THROAT: 0
PALPITATIONS: 0
MYALGIAS: 0
COUGH: 1
SPEECH CHANGE: 0
WHEEZING: 0
NUMBNESS: 0
COUGH: 0
LIGHT-HEADEDNESS: 0
HEADACHES: 0
FATIGUE: 0
DIAPHORESIS: 0
SENSORY CHANGE: 0

## 2023-12-21 ASSESSMENT — COGNITIVE AND FUNCTIONAL STATUS - GENERAL
MOBILITY SCORE: 20
SUGGESTED CMS G CODE MODIFIER DAILY ACTIVITY: CK
DAILY ACTIVITIY SCORE: 19
WALKING IN HOSPITAL ROOM: A LITTLE
TOILETING: A LITTLE
STANDING UP FROM CHAIR USING ARMS: A LITTLE
SUGGESTED CMS G CODE MODIFIER MOBILITY: CJ
DRESSING REGULAR UPPER BODY CLOTHING: A LITTLE
HELP NEEDED FOR BATHING: A LITTLE
PERSONAL GROOMING: A LITTLE
MOVING FROM LYING ON BACK TO SITTING ON SIDE OF FLAT BED: A LITTLE
DRESSING REGULAR LOWER BODY CLOTHING: A LITTLE
CLIMB 3 TO 5 STEPS WITH RAILING: A LITTLE

## 2023-12-21 ASSESSMENT — FIBROSIS 4 INDEX: FIB4 SCORE: 0.86

## 2023-12-21 ASSESSMENT — PAIN DESCRIPTION - PAIN TYPE: TYPE: ACUTE PAIN

## 2023-12-21 NOTE — PROGRESS NOTES
[facility] Service Progress note:     Treatment ordered by Dr. Harvey for 3 hours HD started at 0917 and ended at 1217    Pt A&Ox4, VSS pre, intra and post tx. No s/s of discomfort noted. Pt tolerated tx with no issues. Report given to PCN.   see e-flow sheets for details  Net UF removed 1000 mL    CVC dressing clean, dry, and intact. CVC capped and Heparin locked post HD, no s/s of infection noted.

## 2023-12-21 NOTE — PROGRESS NOTES
[facility] -  PROGRESS NOTE               Author: Kodak Harvey M.D. Date & Time: 12/21/2023  11:29 AM     HPI:  58 y.o. 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 Abd imaging demonstrated colitis. C. Diff pending and started on abx. He notes abd pain and diarrhea for several weeks. Edema improving with 2.5% dextrose solution. Still has HD permacath in-place. Pain with peritoneal dialysis but no cloudy effluent of fibrin clots. No f/c/s. Normally does [id number] fills all green.     DAILY NEPHROLOGY SUMMARY:  11/28: consult done  11/29: ongoing abd pain, seen on HD-tolerating procedure well, PD fluid concerning for peritonitis  11/30:c.diff positive, started on oral vanc, new onset aflutter-transferred to [facility], wbc improving, Peritoneal cultures remain negative, abd pain improving  12/1: PD culture remains negative, seen on HD-tolerating procedure, abd pain improving, less diarrhea    12/2: HD yesterday 2.5 liter UF, still with SOB decreased abdoimnal pain  12/3: PD last night 1.5 UF, pt still with SOB  12/4: No events, tolerated HD yest with 2.5L UF, BP stable, tachycardic this am, stable on RA, reports SOB has resolved, still with crampy abd discomfort, reports diarrhea is starting to become more formed  12/5: No events, no new labs, BP stable, tachycardic, stable on RA this am, still with crampy abd pain, diarrhea improving, still with nausea/anorexia, denies any CP/LE edema, reports mild SOB  12/6: No events, tolerated HD yest with 1L UF, BP stable, remains tachycardic, reports nausea is improved and having more firm stools, denies any CP/SOB, reports poor appetite  12/7: No events, BP stable, tolerated HD  this am with 1L UF, still doesn't feel well and feels very depressed and frustrated, +abd pain and n/v this am and unable to take PO  12/8: patient is doing well, resting in bed. Plan next iHD tomorrow  12/09: patient is seen during HD, refused labs this am. Does report some abdominal discomfort this am  12/10: patient had iHD yesterday, UF 2000 ml. Abdominal issues ongoing  12/11: pt had CT thorax done yesterday, showing loculated effusion. Pt feels SOB. Diarrhea continues but is better overall.   12/12: pt reports no more diarrhea, only 2-3 loose stools. Seen on HD and tolerating. Pulm recommending chest tube for loculated effusion.   12/13: pt awaiting improvement in INR before chest tube placement. Pt frustrated at prolonged hospitalization.  12/14: Pt had HD this AM and chest tube placement after. Pt seen in Room  12/15: Pt seen in room. Reports breathing is better now chest tube is in.   12/16: chest tube drainage slowing down. Pt breathing better. However, pt tachycardic in 130s this AM. He reports return of diarrhea and some abdominal pain. dialysis on hold for now  12/17: dialysis held yesterday due to tachycardia and later hypotension. HR and BP within normal ranges this AM. Pt positive for C Diff again, with continued diarrhea.   12/18: HD yesterday net UF 1L, pt reports SOB thinks he may have fluids in  his lungs and wants to do additional HD today to remove fluids,  denies further D, continue to have abdominal cramps  12/19: HD yesterday net UF 1.5L, remains tachycardic / aflutter ablation planned 12/20, states he feels tired. No d/ abd cramping   12/20:no labs today, pt reports no c/o,plan cardiac ablation today   12/21:Aflutter ablation 12/20, VSS, seen on HD, plan to remove chest tube today, CXR 12/21 reviewed stable pulmonary edema and infiltrates      REVIEW OF SYSTEMS:    +diarrhea, +abd pain  10 point ROS reviewed and is as per HPI/daily summary or otherwise negative    PMH/PSH/SH/FH:   Reviewed  "and unchanged since admission note    CURRENT MEDICATIONS:   Reviewed from admission to present day    VS:  /56   Pulse 86   Temp 36.5 °C (97.7 °F) (Temporal)   Resp 20   Ht 1.702 m (5' 7\")   Wt 74.3 kg (163 lb 12.8 oz)   SpO2 92%   BMI 25.66 kg/m²     Physical Exam  Vitals and nursing note reviewed.   Constitutional:       General: He is not in acute distress.     Appearance: Normal appearance.   HENT:      Head: Normocephalic and atraumatic.   Eyes:      General: No scleral icterus.     Extraocular Movements: Extraocular movements intact.   Cardiovascular:      Rate and Rhythm: Regular rhythm.      Comments: +R chest PC  Pulmonary:      Effort: Pulmonary effort is normal.   Abdominal:      General: Bowel sounds are normal.      Palpations: Abdomen is soft.      Tenderness: There is no abdominal tenderness.   Musculoskeletal:         General: No deformity.      Right lower leg: No edema.      Left lower leg: No edema.   Skin:     General: Skin is warm and dry.      Findings: No rash.   Neurological:      General: No focal deficit present.      Mental Status: He is alert and oriented to person, place, and time.   Psychiatric:         Mood and Affect: Mood normal.         Behavior: Behavior normal. Behavior is cooperative.       R chest tube in place (12/14/23)    Fluids:  In: 400 [I.V.:400]  Out: 25     LABS:  Recent Labs     12/19/23  0620 12/20/23  1537   SODIUM 136 135   POTASSIUM 4.5 5.3   CHLORIDE 101 99   CO2 26 26   GLUCOSE 100* 123*   BUN 21 35*   CREATININE 5.38* 7.78*   CALCIUM 8.1* 8.5       IMAGING:   All imaging reviewed from admission to present day    IMPRESSION:  # ESRD on outpt PD   -Etiology 2/2 FSGS  - s/p permcath on 11/10, and on HD currently  # R/O PD cath associated peritonitis  - CX negative  # c. Diff.  -symptoms improved with initiation of oral vanc  -PD guidelines vague on scenarios such as this,  but pt on HD currently  # S/P mechanical AVR/ Ascending aneurysm repair 11/2023 "   # CKD-MBD  - P elevated  # Anemia of CKD, below goal hgb 10-11, low iron stores 12/17     Resumed EPO 6000U IV w HD on 12/12  # BL pleural effusions/congestion     Large R loculated effusion on CT thorax 12/11     S/p chest tube placement on 12/14  # Pericardial effusion   # C. Diff colitis  -C.diff positive  # Aflutter per primary svc     PLAN:  - HD today and again tomorrow   - Will switch to HD MWF schedule and PRN to continue with OP dialysis schedule   - Cont EPO 6000U IV w HD  - Cont holding PD for now (last on 12/3),  can re-eval as outpt  - PD catheter flushes Q weekly, last flushed 12/18    - C. diff tx/mgmt per primary team, diarrhea/pain and +toxin returned on 12/16  - Renal diet, no dietary pr restrictions   - Phos binder with meals   - Dose all medications as per ESRD  - Arrange OP HD chair at [facility] prior to [place]  that he can resume PD, re challenge of PD once stable in OP setting   - IV iron loading

## 2023-12-21 NOTE — PROGRESS NOTES
Was notified at 1201 by Glio tech that patient had converted into Afib at 0900. -120, asymptomatic. Notified Rosangela Chamberlain MD and cardiologist UNRULY Alvarenga.     Upon chest tube removal site, assessed accumulated sanguineous drainage in transparent dressing. Removed dressing, site coagulated. New dry gauze and transparent dressing applied.

## 2023-12-21 NOTE — PROGRESS NOTES
Radiology Progress Note   Author: TEX Marx Date & Time created: 12/21/2023  10:12 AM   Date of admission  11/27/2023  Note to reader: this note follows the APSO format rather than the historical SOAP format. Assessment and plan located at the top of the note for ease of use.    Chief Complaint  58 y.o. male admitted 11/27/2023 with   Chief Complaint   Patient presents with    Sent by MD     Patient reports open heart surgery 11/3. Patient reports spoke to surgeon today and told to come to ER. Patient reports also a dialysis patient and did his dialysis last night at home.      Weakness     Patient reports increased weakness over the last three weeks.     Shortness of Breath     X 3 weeks.          HPI  58-year-old male past medical history significant for ESRD on peritoneal dialysis and paroxysmal atrial flutter admitted 11/27/23 for abdominal pain, nausea, vomiting, diarrhea.  CT and lab findings of C. difficile colitis.  He recently underwent a mechanical aortic valve replacement and aortic aneurysm repair and was discharged on 11/17/2023.  During this hospitalization, patient became increasingly short of breath.  CT thorax showed large loculated right pleural effusion.  Patient underwent a Right chest tube placement with IR Dr Ritchie on 12/14/23.  He was then started on lytic therapy through pulmonology.    Interval History:  12/15/23 -  Right Chest tube with 1360 mL serosanguineous output in the last 24 hours.  WBC normal.  Chest x-ray this morning shows hydropneumothorax.  No air leak detected.    12/18/23 - Right Chest tube with 1340 mL serosanguineous output in the last 24 hours status post lytic therapy.  WBC normal.  Chest x-ray this morning shows new right lung base pneumothorax. No air leak detected.  Now being treated for C. Difficile.  Currently getting dialysis in room.    12/19/23 - Right Chest tube with 450 mL serous output in the last 24 hours.  WBC normal.  Chest x-ray this  morning stable. No air leak detected. Placed to water seal by pulmonologist earlier today. Cardiologist plans for ablation tomorrow.     12/20/23 - Right Chest tube with 10 mL serous output in the last 24 hours.  WBC normal.  Chest x-ray this morning with slightly increased Right effusion. No air leak detected.CT to water seal. Ablation pending.    12/21/23 - Stable CXR. No output overnight. Interval removal of Right chest tube by pulmonologist.      Assessment/Plan     Principal Problem:    C. difficile colitis  Active Problems:    Hypertension    Coronary artery disease due to lipid rich plaque    Dyslipidemia    ESRD (end stage renal disease) (HCC)    Paroxysmal atrial flutter (HCC)    Loculated pleural effusion    Hypomagnesemia    History of mechanical aortic valve replacement    A-fib (HCC)    Liver cirrhosis (HCC)    Splenomegaly      Plan IR  - Chest tube removed by pulmonologist.   - IR signing off                Review of Systems  Physical Exam   Review of Systems   Constitutional:  Positive for malaise/fatigue. Negative for chills and fever.   Respiratory:  Negative for cough, sputum production and shortness of breath.    Cardiovascular:  Negative for chest pain and palpitations.   Gastrointestinal:  Positive for abdominal pain and diarrhea. Negative for nausea and vomiting.   Neurological:  Positive for weakness. Negative for headaches.      Vitals:    12/21/23 0911   BP: 125/76   Pulse: (!) 106   Resp: 17   Temp: 36.6 °C (97.9 °F)   SpO2: 96%        Physical Exam  Cardiovascular:      Rate and Rhythm: Normal rate.      Pulses: Normal pulses.   Pulmonary:      Effort: Pulmonary effort is normal. No respiratory distress.      Comments: NC in place  Abdominal:      Palpations: Abdomen is soft.   Skin:     General: Skin is warm and dry.   Neurological:      General: No focal deficit present.      Mental Status: He is alert and oriented to person, place, and time.   Psychiatric:         Mood and Affect: Mood  normal.         Behavior: Behavior normal.             Labs    Recent Labs     12/19/23  0620 12/21/23  0209   WBC 6.9 7.1   RBC 3.11* 2.90*   HEMOGLOBIN 9.3* 8.4*   HEMATOCRIT 29.4* 28.1*   MCV 94.5 96.9   MCH 29.9 29.0   MCHC 31.6* 29.9*   RDW 50.2* 52.1*   PLATELETCT 311 324   MPV 8.7* 8.8*       Recent Labs     12/19/23  0620 12/20/23  1537   SODIUM 136 135   POTASSIUM 4.5 5.3   CHLORIDE 101 99   CO2 26 26   GLUCOSE 100* 123*   BUN 21 35*   CREATININE 5.38* 7.78*   CALCIUM 8.1* 8.5       Recent Labs     12/19/23  0620 12/20/23  1537   ALBUMIN 2.0* 2.3*   CREATININE 5.38* 7.78*       DX-CHEST-PORTABLE (1 VIEW)   Final Result         1.  Pulmonary edema and/or infiltrates, stable.   2.  Small layering bilateral pleural effusions, right greater than left, stable since prior study   3.  Cardiomegaly   4.  Atherosclerosis      EC-CARMELITA W/O CONT   Final Result      DX-CHEST-PORTABLE (1 VIEW)   Final Result         1.  Pulmonary edema and/or infiltrates, stable.   2.  Small layering bilateral pleural effusions, increased on the right compared to prior study   3.  Cardiomegaly   4.  Atherosclerosis      DX-CHEST-PORTABLE (1 VIEW)   Final Result         1.  Pulmonary edema and/or infiltrates, stable.   2.  Small layering bilateral pleural effusions, stable prior study   3.  Stable right lung base pneumothorax with small pigtail thoracostomy tube in place.   4.  Cardiomegaly   5.  Atherosclerosis      DX-CHEST-PORTABLE (1 VIEW)   Final Result         1.  Pulmonary edema and/or infiltrates, stable.   2.  Small layering bilateral pleural effusions, stable on the right and decreased on the left since prior study.   3.  New right lung base pneumothorax with small pigtail thoracostomy tube in place.   4.  Cardiomegaly   5.  Atherosclerosis      DX-CHEST-PORTABLE (1 VIEW)   Final Result         1.  Pulmonary edema and/or infiltrates.   2.  Small layering bilateral pleural effusions   3.  Cardiomegaly   4.  Atherosclerosis       DX-CHEST-PORTABLE (1 VIEW)   Final Result      Unchanged bilateral pulmonary opacities and suspected bilateral pleural effusions, right worse than left.         DX-CHEST-PORTABLE (1 VIEW)   Final Result      Stable examination.      CT-CHEST (THORAX) W/O   Final Result      1.  Interval decrease in size of large loculated right pleural effusion following following placement of locking loop chest tube.      2.  Smaller loculated pleural effusions posterior superiorly in the right hemithorax there are thorax.      3.  Small left pleural effusion with elevation of left hemidiaphragm.      4.  Dependent partial collapse of the lower lobes.      5.  Patchy airspace opacities in the right lower lobe, lingula, and to a lesser extent right middle lobe suspicious for pneumonitis or parenchymal scarring      6.  Cirrhosis and splenomegaly.      DX-CHEST-PORTABLE (1 VIEW)   Final Result      1.  Redemonstrated right hydropneumothorax with decreased air component, otherwise stable in size.   2.  Bilateral basilar atelectasis and/or consolidation with mild interstitial edema.   3.  Stable enlargement of the cardiomediastinal silhouette.      DX-CHEST-PORTABLE (1 VIEW)   Final Result      Placement of a right pigtail chest tube with decreased right pleural effusion.      Hazy opacities which could be related to hypoinflation or vascular congestion. Correlate clinically for infection.      IR-CHEST TUBE-EMPYEMA RIGHT   Final Result      Successful image guided RIGHT chest tube placement.      Plan: Low wall suction. Follow-up radiograph is pending.      CT-CHEST (THORAX) W/O   Final Result      1.  Large loculated right pleural effusion, worse compared to the October 2023 CT study. Associated atelectasis, with subtotal collapse of the right lower lobe.   2.  Minimal groundglass opacities in the right lung, infectious/inflammatory. No consolidation.   3.  Small left pleural effusion and associated atelectasis.   4.  Mild right  hilar lymphadenopathy, statistically reactive rather than neoplastic.   5.  Cardiomegaly.   6.  Abdomen is detailed separately.      CT-ABDOMEN-PELVIS W/O   Final Result      1.  No new inflammatory process in the abdomen or pelvis.   2.  Minimal fat stranding adjacent to the cecum, nonspecific.   3.  Percutaneous catheter with tip in the pelvis. Small volume of pelvic free fluid.   4.  Colonic diverticulosis.   5.  Partially visualized moderate to large right pleural effusion, which may be loculated. It has increased in size since prior CT study.   6.  Small left pleural effusion.   7.  Cardiomegaly.         [id number] VIEW   Final Result      1.  Benign bowel gas pattern.      2.  Peritoneal dialysis catheter coiled in the pelvis.      [id number] VIEW   Final Result      Nonspecific bowel gas pattern with a moderate colonic stool burden.      DX-CHEST-PORTABLE (1 VIEW)   Final Result      1.  Enlarged cardiac silhouette with vascular congestion/edema.   2.  Lower lobe airspace disease could be due to edema, atelectasis or pneumonitis.      EC-ECHOCARDIOGRAM COMPLETE W/O CONT   Final Result      CT-ABDOMEN-PELVIS W/O   Final Result      1.  Fat stranding adjacent to the cecum, concerning for colitis/typhlitis.   2.  Colonic diverticulosis.   3.  Nonobstructing, tiny left renal stone.   4.  Likely partially loculated moderate right pleural effusion.   5.  Small left pleural effusion.   6.  Adjacent right middle lobe and bilateral lower lobe consolidations, likely atelectasis.   7.  Small pericardial effusion.      DX-CHEST-PORTABLE (1 VIEW)   Final Result      1.  Resolved or improved small left pleural effusion with persistent left basilar atelectasis and/or scarring.   2.  New small right pleural effusion with associated atelectasis and/or consolidation.      CL-EP ABLATION ATRIAL FLUTTER    (Results Pending)       INR   Date Value Ref Range Status   12/21/2023 1.37 (H) 0.87 - 1.13 Final     Comment:     INR -  "Non-therapeutic Reference Range: 0.87-1.13  INR - Therapeutic Reference Range: 2.0-4.0       No results found for: \"POCINR\"     Intake/Output Summary (Last 24 hours) at 12/15/2023 1122  Last data filed at 12/15/2023 1113  Gross per 24 hour   Intake 610 ml   Output 2060 ml   Net -1450 ml      Labs not explicitly included in this progress note were reviewed by the author. Radiology/imaging not explicitly included in this progress note was reviewed by the author.     I have performed a physical exam and reviewed and updated ROS and Plan today (12/21/2023).     30 minutes in directly providing and coordinating care and extensive data review.  No time overlap and excludes procedures.  "

## 2023-12-21 NOTE — PROGRESS NOTES
Monitor Summary  Rhythm: Aflut to SR post ablation   Rate: SR97, Aflut 130  Ectopy: pvc  0 / 0.09 / 0

## 2023-12-21 NOTE — PROGRESS NOTES
"Pulmonary Progress Note    Date of Admission: 11/27/2023   Reason for consult: Loculated pleural effusion     Chief Complaint:  Chief Complaint   Patient presents with    Sent by MD     Patient reports open heart surgery 11/3. Patient reports spoke to surgeon today and told to come to ER. Patient reports also a dialysis patient and did his dialysis last night at home.      Weakness     Patient reports increased weakness over the last three weeks.     Shortness of Breath     X 3 weeks.      Patient has not been seen by pulmonary at [facility] in the past.  he has no prior PFTs    HPI:   From Dr. Heller's note: \"58 y.o. male who presented 11/27/2023 with abdominal pain, nausea, vomiting, diarrhea.  He was found to have C. difficile colitis.  He had been discharged on 11/17/2023 after mechanical aortic valve replacement and aortic aneurysm repair.  He has a history of ESRD on PD.  Patient reports that during his hospitalization, he has had increasing shortness of breath.  He had a CT scan of his abdomen yesterday due to ongoing abdominal pain and a loculated effusion on the right side of his chest was found.  I reviewed the chest CT with IR and we both felt that the effusion would not drain with simple thoracenteses and that he needed to undergo a chest tube.  Patient has a history of a left-sided pleural effusion which was drained on 11/11/2023.  It was bloody in appearance but no labs were sent.     Hospital Course  12/14/2023 - Chest tube placed today  12/15/2023 - 1360 out yesterday, given 1/2 dose lytics because the fluid was slightly bloody. CXR showing hydropneumonthorax.  12/17/2023-patient was moved to telemetry yesterday due to A-fib with RVR.  He is receiving HD today.  CT scan showed small pneumothorax, I do not believe that this is an ex vacuo pneumothorax.  He does have residual loculated effusion at the right base as well as a small left-sided pleural effusion.\"  12/18: on 4 lpm with saturations 94-98%. " On a heparin drip. No new complaints. Still with significant chest tube output.   12/19: No significant 24h events. No new ROS complaints.   12/20: No significant events. Off the floor and unable to be examined today. 40 cc chest tube output.     24h events: Low chest tube output again. Getting dialysis. States he is aware of his cirrhosis diagnosis. States he was a  and used many substances. Denies ETOH abuse.   Imaging: reviewed  Notable lab trends: reviewed   ABG: n/a  Tmax: afebrile  ProCal: n/a  Antibiotics: Vancomycin  Steroids: none  Cultures: C dif from 12/16 positive   I/O: Total 25 L out in past 24h; 4.79L total     Scheduled Medications   Medication Dose Frequency    scopolamine  1 Patch Once    warfarin  2 mg DAILY AT 1800    ferric gluconate complex  125 mg TUE+THU+SAT    epoetin  6,000 Units TUE+THU+SAT    Pharmacy  1 Each PHARMACY TO DOSE    verapamil  80 mg Q8HRS    vancomycin 50 mg/mL  125 mg Q6HR    Followed by    [START ON 12/27/2023] vancomycin 50 mg/mL  125 mg Q12HR    Followed by    [START ON 1/3/2024] vancomycin 50 mg/mL  125 mg Q24HR    Followed by    [START ON 1/10/2024] vancomycin 50 mg/mL  125 mg Q48HRS    Followed by    [START ON 1/18/2024] vancomycin 50 mg/mL  125 mg Q72HRS    Pharmacy Consult Request  1 Each PHARMACY TO DOSE    lactobacillus rhamnosus  1 Capsule QDAY with Breakfast    sevelamer carbonate  1,600 mg TID WITH MEALS    dronabinol  5 mg BEFORE LUNCH AND DINNER    MD Alert...Warfarin per Pharmacy   PHARMACY TO DOSE    aspirin  81 mg DAILY    atorvastatin  40 mg Nightly    [Held by provider] tamsulosin  0.4 mg DAILY       No current facility-administered medications on file prior to encounter.     Current Outpatient Medications on File Prior to Encounter   Medication Sig Dispense Refill    omeprazole (PRILOSEC) 20 MG delayed-release capsule Take 1 Capsule by mouth 2 times a day. 30 Capsule 2    warfarin (COUMADIN) 2.5 MG Tab Take 1 Tablet by mouth every day at 6  "PM. 30 Tablet 3    tamsulosin (FLOMAX) 0.4 MG capsule Take 0.4 mg by mouth every day.      atorvastatin (LIPITOR) 40 MG Tab Take 1 Tablet by mouth every evening. 100 Tablet 3    aspirin 81 MG EC tablet Take 81 mg by mouth every day.         Allergies: Allopurinol and Hydralazine hcl      ROS: A 12 point ROS was performed on intake and during my interview. ROS negative unless specifically noted in HPI.     Vitals:  /78   Pulse 95   Temp 36.6 °C (97.9 °F) (Temporal)   Resp 19   Ht 1.702 m (5' 7\")   Wt 74.3 kg (163 lb 12.8 oz)   SpO2 96%     Physical Exam:   Physical Exam  Vitals reviewed. Exam conducted with a chaperone present.   Constitutional:       General: He is not in acute distress.     Appearance: He is normal weight. He is ill-appearing. He is not toxic-appearing or diaphoretic.   HENT:      Mouth/Throat:      Pharynx: Oropharynx is clear.   Eyes:      General:         Right eye: No discharge.         Left eye: No discharge.      Extraocular Movements: Extraocular movements intact.      Conjunctiva/sclera: Conjunctivae normal.   Cardiovascular:      Rate and Rhythm: Normal rate.      Pulses: Normal pulses.   Pulmonary:      Effort: Pulmonary effort is normal. No respiratory distress.      Breath sounds: Normal breath sounds.   Skin:     General: Skin is warm.      Capillary Refill: Capillary refill takes less than 2 seconds.      Coloration: Skin is not jaundiced.   Neurological:      General: No focal deficit present.      Mental Status: He is alert and oriented to person, place, and time.   Psychiatric:         Behavior: Behavior normal.         Laboratory Data: I personally reviewed labs including historical lab trends.       Immunization History   Administered Date(s) Administered    Covid-19 Mrna (Spikevax) Moderna 12+ Years 10/01/2023    Hepatitis B Vaccine, CpG Adjuvanted (Heplisav-B) 06/21/2023, 08/18/2023    INFLUENZA TIV (IM) 03/17/2020    Influenza Vaccine Quad Inj (Pf) 09/29/2020, " 11/20/2021, 10/27/2022, 09/16/2023    MODERNA BIVALENT BOOSTER SARS-COV-2 VACCINE (6+) 10/27/2022    MODERNA SARS-COV-2 VACCINE (12+) 03/18/2021, 04/15/2021, 04/15/2022    Pneumococcal Conjugate Vaccine (PCV20) 08/11/2023    Pneumococcal Conjugate Vaccine (Prevnar/PCV-13) 06/26/2020    Zoster Vaccine Recombinant (RZV) (SHINGRIX) 04/04/2020, 06/13/2020       PFTs as reviewed by me personally show: none available    Imaging as reviewed by me personally show:  Bilateral effusions; right slightly worse. Cardiomegaly. Tubes and lines in appropriate position.         Assessment/Plan:    Pulmonary problem list:  #Acute hypoxic respiratory failure secondary to fluid overload and right sided pleural effusion 2/2 heart failure  # Loculated right-sided pleural effusion, suspect that this is due to PD which became increasingly more complicated, possibly hemorhorax     -CXR reviewed - vacillating; suspect recurring fluid from nephrotic syndrome and cirrhosis with possible portal HTN  -encourage IS   -Mobilize with PT/OT  -Target O2 sat 88-92%  -monitor output: CT to waterseal  -removed chest tube today. <100 cc total output in past 48h.     I fully anticipate that he will continue to have recurring bilateral effusions. This is related to his underlying cardiac, renal, and cirrhotic causes. He will need aggressive fluid management.     We will sign off. Please do not hesitate to contact us if we can be of any further assistance. I am most easily reached via [employer] secure messaging application.    Total consult time: 50 minutes which included time spent on chart review, personally reviewing pertinent images and labs, time spent counseling and educating the patient and/or family members, and coordinating care with the healthcare team to include consultants.   __________  Poncho Claudio, DO  Pulmonary and Critical Care Medicine  [facility]    Please note that this dictation was created using voice recognition software. The  accuracy of the dictation is limited to the abilities of the software. I have made every reasonable attempt to correct obvious errors, but I expect that there are errors of grammar and possibly content that I did not discover before finalizing the note.

## 2023-12-21 NOTE — DISCHARGE PLANNING
HTH/SCP TCN chart review completed. Current discharge considerations are home with home health and DME O2 if needed at time of discharge.  Noted patient underwent cardiac ablation 12/20, and mobilizing to commode.  Previously independent prior to procedure, and will monitor chart for progression of mobility that will impact discharge considerations.  Patient currently on 3L O2, does not have supplemental O2 at baseline. TCN will continue to follow and collaborate with discharge planning team as additional post acute needs arise. Thank you.     Completed:  Choice obtained: DME (O2 [employer]) on 12/17/23.  HH (Resumption of [facility]) on 11/28/23.    [facility] has accepted  SCP with [facility].  Patient request to set up his own Follow up PCP appointment.

## 2023-12-21 NOTE — PROGRESS NOTES
"Cardiology Follow Up Progress Note    Date of Service  12/21/2023    Attending Physician  Rosangela Chamberlain M.D.     Chief Complaint/reason for consult   AFL     HPI  Gurdeep Guerra is a 58 y.o. male admitted 11/27/2023 with a history of mechanical AVR and aneurysm repair, ESRD on dialysis, admitted for recurrent colitis, noted to have loculated effusion S/P chest tube.  Coumadin stopped seocndary to procedures and he has been bridged with Heparin gtt.  EP asked to see for his atrial flutter.     Interim Events  SR/ST overnight.   No complaints of chest pain or SOB this AM.  Reports feeling \"good\"  Labs reviewed.   Currently undergoing HD.   CT to water seal. CXR this am with small effusions, stable from yesterday.     Review of Systems  Review of Systems   Constitutional:  Negative for chills, fatigue and fever.   HENT:  Negative for nosebleeds, tinnitus and trouble swallowing.    Eyes: Negative.    Respiratory:  Negative for shortness of breath and wheezing.    Cardiovascular:  Negative for chest pain, palpitations and leg swelling.   Gastrointestinal:  Positive for diarrhea and nausea.   Neurological:  Negative for dizziness, syncope, speech difficulty, weakness, light-headedness and numbness.       Vital signs in last 24 hours  Temp:  [36.5 °C (97.7 °F)-37 °C (98.6 °F)] 36.5 °C (97.7 °F)  Pulse:  [] 86  Resp:  [14-24] 20  BP: ()/(56-82) 104/56  SpO2:  [92 %-100 %] 92 %    Physical Exam  Physical Exam  Vitals and nursing note reviewed.   Constitutional:       Appearance: Normal appearance.   HENT:      Head: Normocephalic and atraumatic.      Nose: No congestion.   Eyes:      Conjunctiva/sclera: Conjunctivae normal.      Pupils: Pupils are equal, round, and reactive to light.   Cardiovascular:      Rate and Rhythm: Tachycardia present. Rhythm irregular.      Pulses: Normal pulses.      Heart sounds: Normal heart sounds. No murmur heard.     No friction rub. No gallop.   Pulmonary:      Effort: Pulmonary " "effort is normal.      Breath sounds: Normal breath sounds.   Chest:      Comments: Chest tube to right.   Abdominal:      General: Bowel sounds are normal.   Musculoskeletal:         General: Normal range of motion.      Cervical back: Normal range of motion.   Skin:     General: Skin is warm and dry.   Neurological:      Mental Status: He is alert and oriented to person, place, and time.   Psychiatric:         Mood and Affect: Mood normal.         Behavior: Behavior normal.         Thought Content: Thought content normal.         Judgment: Judgment normal.         Lab Review  Lab Results   Component Value Date/Time    WBC 7.1 12/21/2023 02:09 AM    RBC 2.90 (L) 12/21/2023 02:09 AM    HEMOGLOBIN 8.4 (L) 12/21/2023 02:09 AM    HEMATOCRIT 28.1 (L) 12/21/2023 02:09 AM    MCV 96.9 12/21/2023 02:09 AM    MCH 29.0 12/21/2023 02:09 AM    MCHC 29.9 (L) 12/21/2023 02:09 AM    MPV 8.8 (L) 12/21/2023 02:09 AM      Lab Results   Component Value Date/Time    SODIUM 135 12/20/2023 03:37 PM    POTASSIUM 5.3 12/20/2023 03:37 PM    CHLORIDE 99 12/20/2023 03:37 PM    CO2 26 12/20/2023 03:37 PM    GLUCOSE 123 (H) 12/20/2023 03:37 PM    BUN 35 (H) 12/20/2023 03:37 PM    CREATININE 7.78 (HH) 12/20/2023 03:37 PM    BUNCREATRAT 17 05/09/2022 09:49 AM    GLOMRATE 7 (L) 04/02/2023 11:54 AM      Lab Results   Component Value Date/Time    ASTSGOT 15 12/05/2023 10:45 AM    ALTSGPT 9 12/05/2023 10:45 AM     Lab Results   Component Value Date/Time    CHOLSTRLTOT 171 05/23/2023 11:08 AM     (H) 05/23/2023 11:08 AM    HDL 40 05/23/2023 11:08 AM    TRIGLYCERIDE 56 05/23/2023 11:08 AM    TROPONINT 496 (H) 12/17/2023 06:33 AM       No results for input(s): \"NTPROBNP\" in the last 72 hours.    Cardiac Imaging and Procedures Review  Echocardiogram:  11/28/23  CONCLUSIONS  Hyperdynamic left ventricular systolic function with tachycardia.  The left ventricular ejection fraction is visually estimated to be 70%.  Moderate concentric left ventricular " hypertrophy.  Known mechanical aortic valve not well visualized probably due to   acuostical shadowing but functioning normally with normal transvalvular   gradients.  The right ventricle is normal in size and systolic function.  Normal inferior vena cava size and inspiratory collapse.  No pericardial effusion.  Compared to the images of the prior study, 11/15/2023 there has been   resolution of the pericardial effusion.     EKG 12/20/23: Sinus rhythm    Imaging  CXR 12/21/23  1.  Pulmonary edema and/or infiltrates, stable.  2.  Small layering bilateral pleural effusions, right greater than left, stable since prior study  3.  Cardiomegaly  4.  Atherosclerosis    Assessment/Plan  Typical Atrial Flutter with RVR S/P ablation  Right sided pleural effusion S/P chest tube  CDiff Colitis  ESRD  S/P AVR/root     -S/P ablation of typical Right atrial flutter with Dr Velasquez 12/20/23.  - He is maintaining sinus rhythm/ST low 100s overnight and this am.   - Right femoral access site is C/D/I, no evidence of hematoma or significant ecchymosis.   - Discussed post ablation instructions with Dr Guerra and he verbalizes understanding.   - Continue Warfarin and Heparin Bridge until INR >/= 2.0      EP will sign off.  Follow up is arranged in EP clinic.  Please call with questions.     Please contact me with any questions.    CARISSA Del Toro.   [facility]  [phone]

## 2023-12-21 NOTE — PROGRESS NOTES
Infectious Disease Progress Note    Author: Corby Campbell M.D. Date & Time of service: 2023  10:21 AM    Chief Complaint:  Follow-up for C. difficile    Interval History:   patient remains afebrile, white count 7.1, total vancomycin, only 2 documented soft/watery bowel movements on , cultures as below, creatinine 7.78, albumin 2.3.  Patient still with significant abdominal cramping    Labs Reviewed and Medications Reviewed.    Review of Systems:  Review of Systems   Gastrointestinal:  Positive for abdominal pain and diarrhea.       Hemodynamics:  Temp (24hrs), Av.7 °C (98 °F), Min:36.5 °C (97.7 °F), Max:37 °C (98.6 °F)  Temperature: 36.6 °C (97.9 °F), Monitored Temp: 36.5 °C (97.7 °F)  Pulse  Av.8  Min: 52  Max: 141   Blood Pressure: 125/76       Physical Exam:  Physical Exam  Vitals and nursing note reviewed.   Constitutional:       General: He is not in acute distress.     Appearance: He is not ill-appearing.   HENT:      Nose: No congestion.   Eyes:      General:         Right eye: No discharge.   Cardiovascular:      Rate and Rhythm: Normal rate.      Heart sounds: No murmur heard.  Pulmonary:      Effort: Pulmonary effort is normal. No respiratory distress.   Abdominal:      General: There is no distension.   Skin:     Findings: No erythema or rash.   Neurological:      General: No focal deficit present.      Mental Status: He is alert and oriented to person, place, and time.         Meds:    Current Facility-Administered Medications:     benzocaine-menthol    scopolamine    warfarin    GI Cocktail (hyoscyamine-lidocaine-Maalox)    ferric gluconate complex    epoetin    gentamicin    oxyCODONE immediate-release **OR** oxyCODONE immediate-release **OR** [DISCONTINUED] morphine injection    [CANCELED] Special Contact Isolation **AND** [COMPLETED] C Diff by PCR rflx Toxin **AND** Pharmacy    verapamil    vancomycin 50 mg/mL **FOLLOWED BY** [START ON 2023] vancomycin 50 mg/mL  **FOLLOWED BY** [START ON 1/3/2024] vancomycin 50 mg/mL **FOLLOWED BY** [START ON 1/10/2024] vancomycin 50 mg/mL **FOLLOWED BY** [START ON 2024] vancomycin 50 mg/mL    benzonatate    heparin    heparin    simethicone    Pharmacy Consult Request    [] acetaminophen **FOLLOWED BY** acetaminophen    Dextromethorphan Polistirex ER    heparin    lactobacillus rhamnosus    menthol    sevelamer carbonate    dronabinol    ondansetron    MD Alert...Warfarin per Pharmacy    heparin    aspirin    atorvastatin    [Held by provider] tamsulosin    ondansetron    promethazine    promethazine    prochlorperazine    Labs:  Recent Labs     23  0620 23  0209   WBC 6.9 7.1   RBC 3.11* 2.90*   HEMOGLOBIN 9.3* 8.4*   HEMATOCRIT 29.4* 28.1*   MCV 94.5 96.9   MCH 29.9 29.0   RDW 50.2* 52.1*   PLATELETCT 311 324   MPV 8.7* 8.8*     Recent Labs     23  0620 23  1537   SODIUM 136 135   POTASSIUM 4.5 5.3   CHLORIDE 101 99   CO2 26 26   GLUCOSE 100* 123*   BUN 21 35*     Recent Labs     23  0620 23  1537   ALBUMIN 2.0* 2.3*   CREATININE 5.38* 7.78*       Imaging:  DX-CHEST-PORTABLE (1 VIEW)    Result Date: 2023 5:56 AM HISTORY/REASON FOR EXAM: Shortness of Breath TECHNIQUE/EXAM DESCRIPTION:  Single AP view of the chest. COMPARISON: Yesterday FINDINGS: Position of medical devices appears stable. Cardiomegaly is observed.  Postsurgical changes of sternotomy are noted. Atherosclerotic calcification of the aorta is noted.  The central  pulmonary vasculature appears prominent and indistinct. Bilateral lung volumes are diminished.  Diffuse scattered hazy pulmonary parenchymal opacities are seen. Small bilateral effusions are seen, right greater than left. The bony structures appear age-appropriate.     1.  Pulmonary edema and/or infiltrates, stable. 2.  Small layering bilateral pleural effusions, right greater than left, stable since prior study 3.  Cardiomegaly 4.   Atherosclerosis    EC-CARMELITA W/O CONT    Result Date: 2023  Transesophageal Echo Report Echocardiography Laboratory CONCLUSIONS LV:  Moderately reduced left ventricular systolic function. EF visually estimated at 35%.  Inferior and septal wall hypokinesis.  Of note, exam taken when pt was in rapid flutter. RV:  Mildly dilated right ventricle. Reduced right ventricular systolic function. RA:  Enlarged right atrium. LA:  Not fully visualized.  RADHA:  Normal left atrial appendage. No thrombus detected in the left atrial appendage. IA Septum:  Inter-atrial septal aneurysm.  No evidence of PFO, ASD nor shunt.  IV Septum:  Normal Interventricular Septum. MV:  Structurally normal mitral valve without significant stenosis or regurgitation. AV:  Mechanical aortic valve appreciated.  Both leaflets moving.  No significant para-valvular regurgitation appreciated.  No evidence of aortic valve stenosis with a mean PG of 5 mmHg.  TV:  Moderate tricuspid valve regurgitation.  PV:  Structurally normal pulmonic valve without significant stenosis or regurgitation. Pericardium:  No pericardial effusion. Aorta:  Proximal ascending aorta measured at 2.4 cm (Previously placed graft in place).  Grade 3 descending aorta.  Arch appears wnl's to the extent visualized.  LORIE KAYE Exam Date:          2023                     12:32 Exam Location:      Inpatient Priority:            Routine Ordering Physician:        NURIA JACKMAN Referring Physician:       [id number] Reyes Sonographer:               Ruth BENNETT Age:    58     Gender:    [place]ip[place]                           e MRN:    [id number] :    1965 BSA:           Ht (in):           Wt (lb): Report Type:      Complete Indications:     Atrial Fibrillation ICD Codes:       427.31 CPT Codes:       41761 BP:          /          HR: Technical Quality: MEASUREMENTS  (Male / Female) Normal Values 2D ECHO Estimated LV Ejection Fraction    35 %                   * Indicates values subject to auto-interpretation LV EF:  35    % Medications Medications determined by anesthesiologist. Limitations None. Complications None. Proc. Components The probe was inserted and manipulated by Dr. Bland. Epiq probe #epic [id number]  was used for this procedure. 2D, color Doppler, spectral Doppler were used as part of the evaluation as clinically indicated. FINDINGS Left Ventricle Moderately reduced left ventricular systolic function. EF visually estimated at 35%.  Inferior and septal wall hypokinesis.  Of note, exam taken when pt was in rapid flutter. Right Ventricle Mildly dilated right ventricle. Reduced right ventricular systolic function. Right Atrium Enlarged right atrium. Left Atrium Not fully visualized. LA Appendage Normal left atrial appendage. No thrombus detected in the left atrial appendage. IA Septum Inter-atrial septal aneurysm.  No evidence of PFO, ASD nor shunt. IV Septum Normal Interventricular Septum. Mitral Valve Structurally normal mitral valve without significant stenosis or regurgitation. Aortic Valve Mechanical aortic valve appreciated.  Both leaflets moving.  No significant para-valvular regurgitation appreciated.  No evidence of aortic valve stenosis with a mean PG of 5 mmHg. Tricuspid Valve Moderate tricuspid valve regurgitation. Pulmonic Valve Structurally normal pulmonic valve without significant stenosis or regurgitation. Pericardium No pericardial effusion. Aorta Proximal ascending aorta measured at 2.4 cm (Previously placed graft in place).  Grade 3 descending aorta.  Arch appears wnl's to the extent visualized. Danie Bland MD (Electronically Signed) Final Date:     20 December 2023                 15:02    DX-CHEST-PORTABLE (1 VIEW)    Result Date: 12/20/2023 12/20/2023 5:58 AM HISTORY/REASON FOR EXAM: Shortness of Breath TECHNIQUE/EXAM DESCRIPTION:  Single AP view of the chest. COMPARISON: Yesterday FINDINGS: Position of medical devices appears  stable. Cardiomegaly is observed.  Postsurgical changes of sternotomy are noted. Atherosclerotic calcification of the aorta is noted.  The central  pulmonary vasculature appears prominent and indistinct. Bilateral lung volumes are diminished.  Diffuse scattered hazy pulmonary parenchymal opacities are seen. Small bilateral pleural effusions are seen. There is small pneumothorax at the right lung base. The bony structures appear age-appropriate.     1.  Pulmonary edema and/or infiltrates, stable. 2.  Small layering bilateral pleural effusions, increased on the right compared to prior study 3.  Cardiomegaly 4.  Atherosclerosis    DX-CHEST-PORTABLE (1 VIEW)    Result Date: 12/19/2023 12/19/2023 5:55 AM HISTORY/REASON FOR EXAM: Shortness of Breath TECHNIQUE/EXAM DESCRIPTION:  Single AP view of the chest. COMPARISON: Yesterday FINDINGS: Position of medical devices appears stable. Cardiomegaly is observed.  Postsurgical changes of sternotomy are noted. Atherosclerotic calcification of the aorta is noted.  The central  pulmonary vasculature appears prominent and indistinct. Bilateral lung volumes are diminished.  Diffuse scattered hazy pulmonary parenchymal opacities are seen. Small bilateral pleural effusions are seen. There is small pneumothorax at the right lung base. The bony structures appear age-appropriate.     1.  Pulmonary edema and/or infiltrates, stable. 2.  Small layering bilateral pleural effusions, stable prior study 3.  Stable right lung base pneumothorax with small pigtail thoracostomy tube in place. 4.  Cardiomegaly 5.  Atherosclerosis    DX-CHEST-PORTABLE (1 VIEW)    Result Date: 12/18/2023 12/18/2023 5:55 AM HISTORY/REASON FOR EXAM: Shortness of Breath TECHNIQUE/EXAM DESCRIPTION:  Single AP view of the chest. COMPARISON: Yesterday FINDINGS: Position of medical devices appears stable. Cardiomegaly is observed.  Postsurgical changes of sternotomy are noted. Atherosclerotic calcification of the aorta  is noted.  The central  pulmonary vasculature appears prominent and indistinct. Bilateral lung volumes are diminished.  Diffuse scattered hazy pulmonary parenchymal opacities are seen. Small bilateral pleural effusions are seen. There is small pneumothorax at the right lung base. The bony structures appear age-appropriate.     1.  Pulmonary edema and/or infiltrates, stable. 2.  Small layering bilateral pleural effusions, stable on the right and decreased on the left since prior study. 3.  New right lung base pneumothorax with small pigtail thoracostomy tube in place. 4.  Cardiomegaly 5.  Atherosclerosis    DX-CHEST-PORTABLE (1 VIEW)    Result Date: 12/17/2023 12/17/2023 10:42 PM HISTORY/REASON FOR EXAM: Shortness of Breath TECHNIQUE/EXAM DESCRIPTION:  Single AP view of the chest. COMPARISON: Today at 645 FINDINGS: Position of medical devices appears stable. Cardiomegaly is observed.  Postsurgical changes of sternotomy are noted. Atherosclerotic calcification of the aorta is noted.  The central  pulmonary vasculature appears prominent and indistinct. Bilateral lung volumes are diminished.  Diffuse scattered hazy pulmonary parenchymal opacities are seen. Small bilateral pleural effusions are seen. The bony structures appear age-appropriate.     1.  Pulmonary edema and/or infiltrates. 2.  Small layering bilateral pleural effusions 3.  Cardiomegaly 4.  Atherosclerosis    DX-CHEST-PORTABLE (1 VIEW)    Result Date: 12/17/2023 12/17/2023 7:00 AM HISTORY/REASON FOR EXAM:  Shortness of Breath. TECHNIQUE/EXAM DESCRIPTION AND NUMBER OF VIEWS: Single portable view of the chest. COMPARISON:  12/16/2023 FINDINGS: LUNGS: Unchanged bilateral pulmonary opacities and suspected bilateral pleural effusions, right greater than left. PNEUMOTHORAX: None. LINES AND TUBES: Stable lines and tubes. MEDIASTINUM: Stable cardiac silhouette. MUSCULOSKELETAL STRUCTURES: Unchanged.     Unchanged bilateral pulmonary opacities and suspected  bilateral pleural effusions, right worse than left.     DX-CHEST-PORTABLE (1 VIEW)    Result Date: 12/16/2023 12/16/2023 7:28 AM HISTORY/REASON FOR EXAM: Shortness of breath TECHNIQUE/EXAM DESCRIPTION AND NUMBER OF VIEWS: Single portable view of the chest. COMPARISON: 12/15/2023 FINDINGS: There is bibasilar atelectasis. There is interstitial prominence. There is right greater than left pleural effusion. The heart is enlarged. Postsurgical changes are again seen. There are multiple supportive devices again seen, stable.     Stable examination.    CT-CHEST (THORAX) W/O    Result Date: 12/15/2023  12/15/2023 1:04 PM HISTORY/REASON FOR EXAM:  hypdropneumothorax. TECHNIQUE/EXAM DESCRIPTION: CT scan of the chest without contrast. Noncontrast helical scanning of the chest was obtained from the lung apices through the adrenal glands. Low dose optimization technique was utilized for this CT exam including automated exposure control and adjustment of the mA and/or kV according to patient size. COMPARISON: Chest CT 12/10/2023 FINDINGS: Tunneled right IJ hemodialysis catheter in place with its distal tip near the cavoatrial junction. Lungs: There are wedge-shaped airspace opacities in the lower lobes and right middle lobe and lingula. There is curvilinear collapse of the lower lobes. Mediastinum/Vonda: No significant adenopathy. Pleura: There is a locking loop chest tube positioned in a large loculated right sided pleural effusion. This effusion has decreased in size since previous exam status post locking loop tube placement. There are additional smaller loculated effusions noted in the right lung apex posteriorly. There is also a small left-sided pleural effusion. There is elevation of left hemidiaphragm Cardiac: Heart normal in size without pericardial effusion. There is  coronary artery calcification. Vascular: Unremarkable. Soft tissues: Unremarkable. Bones: No acute or destructive process. There is a micronodular  appearance of the liver. The spleen is enlarged.     1.  Interval decrease in size of large loculated right pleural effusion following following placement of locking loop chest tube. 2.  Smaller loculated pleural effusions posterior superiorly in the right hemithorax there are thorax. 3.  Small left pleural effusion with elevation of left hemidiaphragm. 4.  Dependent partial collapse of the lower lobes. 5.  Patchy airspace opacities in the right lower lobe, lingula, and to a lesser extent right middle lobe suspicious for pneumonitis or parenchymal scarring 6.  Cirrhosis and splenomegaly.    DX-CHEST-PORTABLE (1 VIEW)    Result Date: 12/15/2023  12/15/2023 8:12 AM HISTORY/REASON FOR EXAM:  Shortness of Breath. TECHNIQUE/EXAM DESCRIPTION AND NUMBER OF VIEWS: Single portable view of the chest. COMPARISON: 12/14/2023 FINDINGS: There is stable enlargement of the cardiomediastinal silhouette. Redemonstrated right hydropneumothorax with decreased air component, otherwise stable in size. There is bilateral basilar atelectasis and/or consolidation with mild interstitial edema. Right small bore thoracostomy tube is grossly unchanged in position. Right-sided tunneled hemodialysis catheter is redemonstrated.     1.  Redemonstrated right hydropneumothorax with decreased air component, otherwise stable in size. 2.  Bilateral basilar atelectasis and/or consolidation with mild interstitial edema. 3.  Stable enlargement of the cardiomediastinal silhouette.    DX-CHEST-PORTABLE (1 VIEW)    Result Date: 12/14/2023 12/14/2023 12:26 PM HISTORY/REASON FOR EXAM:  R chest tube. TECHNIQUE/EXAM DESCRIPTION AND NUMBER OF VIEWS: Single portable view of the chest. COMPARISON: 12/2/2023 FINDINGS: Cardiomediastinal silhouette is stable. Stable right-sided dialysis catheter. There are median sternotomy wires. Placement of a right pigtail chest tube in the right base with decreased pleural effusion. The right lung base is likely not expanded.  Small left pleural effusion. Mild interstitial prominence could be related to hypoinflation versus vascular congestion. No acute osseous abnormality.     Placement of a right pigtail chest tube with decreased right pleural effusion. Hazy opacities which could be related to hypoinflation or vascular congestion. Correlate clinically for infection.    IR-CHEST TUBE-EMPYEMA RIGHT    Result Date: 12/14/2023  HISTORY/REASON FOR EXAM:  58 old man with moderate to large loculated RIGHT pleural effusion collection, recent C. Difficile infection, concern for empyema. TECHNIQUE/EXAM DESCRIPTION AND NUMBER OF VIEWS: RIGHT chest tube tube placement with image guidance. COMPARISON:  CT from 12/10/2023 CONTRAST: None FLUOROSCOPIC IMAGES: 3 fluoroscopic images obtained. FLUOROSCOPY TIME: 0.3 minutes FLUOROSCOPY DOSE(DAP): 0.55294 mGy-cm^2 MEDICATIONS: Moderate sedation was provided. Pulse oximetry and continuous cardiac monitoring by the nurse was performed throughout the exam. Intraservice time was 15 minutes. PROCEDURE: The risks, benefits, goals and objectives and alternatives were discussed. Risks were specified as including but not limited to bleeding, infection, damage to vessels or nerves, pain and discomfort as well as the possibility of incomplete resolution of underlying disease. Issues related to catheter care were discussed. The patient's questions were answered. Informed oral and written consent were obtained. The patient was placed on the angiography table. The skin was prepped and draped in the usual sterile manner. A timeout was performed. Local anesthetic result was achieved with administration of 1% lidocaine. A(n) 18-gauge access needle was advanced into  the RIGHT pleural space using imaging guidance. Serosanguineous fluid was aspirated and sent for laboratory evaluation, 20 mL. Access was secured with a wire and the tract was sequentially dilated to accommodate a(n) 12 [place] locking loop drain. This was put in  place and formed. The catheter was attached to a Pleur-evac and attached to low wall suction and secured to the skin with 2-0 nylon suture and a aerostatic dressing. The patient tolerated the procedure well with no evidence of complication. The skin was cleaned and a dressing was applied. FINDINGS: RIGHT loculated pleural effusion. Appropriate RIGHT chest tube position     Successful image guided RIGHT chest tube placement. Plan: Low wall suction. Follow-up radiograph is pending.    CT-CHEST (THORAX) W/O    Result Date: 12/10/2023  12/10/2023 7:36 PM HISTORY/REASON FOR EXAM:  pleural effusion. TECHNIQUE/EXAM DESCRIPTION: CT scan of the chest without contrast. Noncontrast helical scanning of the chest was obtained from the lung apices through the adrenal glands. Low dose optimization technique was utilized for this CT exam including automated exposure control and adjustment of the mA and/or kV according to patient size. COMPARISON: 10/3/2023. FINDINGS: Lungs: Large loculated right pleural effusion, worse compared to the prior CT study. Locules extending to the right lung apex. Associated atelectasis, with subtotal collapse of the right lower lobe. Small left pleural effusion and associated atelectasis.  Minimal groundglass opacities in the right lung, infectious/inflammatory. No consolidation. Mediastinum: Unremarkable thyroid. No mediastinal mass. Nodes: Interval enlargement of right hilar nodes, which measure up to 1.2 cm short axis. Heart: Enlarged. No pericardial effusion. Coronary calcifications. Aorta and great vessels: Repaired ascending aorta. Replaced aortic valve. Atherosclerosis. Musculoskeletal structures: No acute fracture or destructive lesion. Median sternotomy. Spondylosis. Upper abdomen: Reported separately. Right central catheter tip is in the superior cavoatrial junction.     1.  Large loculated right pleural effusion, worse compared to the October 2023 CT study. Associated atelectasis, with  subtotal collapse of the right lower lobe. 2.  Minimal groundglass opacities in the right lung, infectious/inflammatory. No consolidation. 3.  Small left pleural effusion and associated atelectasis. 4.  Mild right hilar lymphadenopathy, statistically reactive rather than neoplastic. 5.  Cardiomegaly. 6.  Abdomen is detailed separately.    CT-ABDOMEN-PELVIS W/O    Result Date: 12/10/2023  12/10/2023 1:19 PM HISTORY/REASON FOR EXAM:  Abdominal pain, nausea, vomiting, diarrhea. TECHNIQUE/EXAM DESCRIPTION: CT scan of the abdomen and pelvis without contrast. Noncontrast helical scanning was obtained from the diaphragmatic domes through the pubic symphysis. Low dose optimization technique was utilized for this CT exam including automated exposure control and adjustment of the mA and/or kV according to patient size. COMPARISON: 11/27/2023. FINDINGS: Lower chest: Partially visualized moderate to large right pleural effusion, which may be loculated. Small left pleural effusion. Atelectasis in the lower lungs. Cardiomegaly. Replaced aortic valve. Coronary calcifications. Liver: No mass. No intrahepatic biliary dilatation. Gallbladder: No wall thickening. No calcified gallstones. Common bile duct: Nondilated. Pancreas: Unremarkable. Spleen: No mass. Adrenals: No mass. Kidneys: No suspicious mass lesions. Unchanged peripherally calcified cyst in the lower pole of the left kidney. Punctate nonobstructing left renal stone versus renal vascular calcification. No other renal stones. No hydronephrosis. Stomach, small bowel, colon: No bowel wall thickening or obstruction. No appendicitis. Colonic diverticulosis. Minimal fat stranding adjacent to the cecum, nonspecific. Peritoneal cavity: Pelvic drain. Small volume pelvic free fluid. No organized abdominopelvic fluid collections are seen on this noncontrast study. Lymph nodes: No enlarged nodes by size criteria. Aorta: No aneurysm. Pelvic organs: Unremarkable. Musculoskeletal  structures: No acute fracture or destructive lesion.     1.  No new inflammatory process in the abdomen or pelvis. 2.  Minimal fat stranding adjacent to the cecum, nonspecific. 3.  Percutaneous catheter with tip in the pelvis. Small volume of pelvic free fluid. 4.  Colonic diverticulosis. 5.  Partially visualized moderate to large right pleural effusion, which may be loculated. It has increased in size since prior CT study. 6.  Small left pleural effusion. 7.  Cardiomegaly.     [id number] VIEW    Result Date: 12/7/2023 12/7/2023 10:48 AM HISTORY/REASON FOR EXAM:  Vomiting. TECHNIQUE/EXAM DESCRIPTION AND NUMBER OF VIEWS:  1 view(s) of the abdomen. COMPARISON: [facility] 12/5/2023 FINDINGS: Peritoneal dialysis catheter coiled in the pelvis. Bowel gas pattern is unremarkable. There is no evidence of free air or bowel obstruction     1.  Benign bowel gas pattern. 2.  Peritoneal dialysis catheter coiled in the pelvis.    [id number] VIEW    Result Date: 12/5/2023 12/5/2023 5:27 PM HISTORY/REASON FOR EXAM:  Nausea. TECHNIQUE/EXAM DESCRIPTION AND NUMBER OF VIEWS:  3 view(s) of the abdomen. COMPARISON: 11/11/2023 FINDINGS: A nonspecific bowel gas pattern is present with a moderate colonic stool burden. There are no abnormal abdominal calcifications. The visualized portions of the lung bases and cardiomediastinal silhouette are unremarkable. Imaged osseous structures are unremarkable. Peritoneal dialysis catheter is in place.     Nonspecific bowel gas pattern with a moderate colonic stool burden.    DX-CHEST-PORTABLE (1 VIEW)    Result Date: 12/2/2023 12/2/2023 10:57 AM HISTORY/REASON FOR EXAM:  Shortness of Breath TECHNIQUE/EXAM DESCRIPTION AND NUMBER OF VIEWS: Single portable view of the chest. COMPARISON: 11/27/2023 FINDINGS: There are median sternotomy wires. Right vascular catheter is unchanged. The mediastinal and cardiac silhouette is enlarged but unchanged. The central vessels are engorged and ill-defined. Right  greater than left lower lobe airspace disease again seen without change. Moderate right pleural effusion. There is no visible pneumothorax. There is degenerative change in the spine and shoulders.     1.  Enlarged cardiac silhouette with vascular congestion/edema. 2.  Lower lobe airspace disease could be due to edema, atelectasis or pneumonitis.    EC-ECHOCARDIOGRAM COMPLETE W/O CONT    Result Date: 2023  Transthoracic Echo Report Echocardiography Laboratory CONCLUSIONS Hyperdynamic left ventricular systolic function with tachycardia. The left ventricular ejection fraction is visually estimated to be 70%. Moderate concentric left ventricular hypertrophy. Known mechanical aortic valve not well visualized probably due to acuostical shadowing but functioning normally with normal transvalvular gradients. The right ventricle is normal in size and systolic function. Normal inferior vena cava size and inspiratory collapse. No pericardial effusion. Compared to the images of the prior study, 11/15/2023 there has been resolution of the pericardial effusion. LORIE KAYE Exam Date:         2023                    09:46 Exam Location:     Inpatient Priority:          Routine Ordering Physician:        ARCHANA COMBS Referring Physician:       [id number] Og Sonographer:               Samir Yancey [facility],                            T Age:    58     Gender:    M MRN:    [id number] :    1965 BSA:    1.88   Ht (in):    67     Wt (lb):    169 Exam Type:     Complete Indications:     Shortness of breath ICD Codes:       R06.02 CPT Codes:       97276 BP:   124    /   79     HR:   76 Technical Quality:       Fair MEASUREMENTS  (Male / Female) Normal Values 2D ECHO LV Diastolic Diameter PLAX        3.8 cm                4.2 - 5.9 / 3.9 - 5.3 cm LV Systolic Diameter PLAX         2.3 cm                2.1 - 4.0 cm IVS Diastolic Thickness           1.4 cm                LVPW Diastolic Thickness          1.4 cm                 LVOT Diameter                     1.7 cm                Aortic Root Diameter              3.7 cm                Estimated LV Ejection Fraction    70 %                  LV Ejection Fraction MOD 4C       67.9 %                LV Ejection Fraction MOD 2C       47.4 %                LV Ejection Fraction 4C AL        67.8 %                LV Ejection Fraction 2C AL        46.5 %                LA Volume Index                   30.6 cm3/m2           16 - 28 cm3/m2 DOPPLER AV Peak Velocity                  1.6 m/s               AV Peak Gradient                  10 mmHg               AV Mean Gradient                  6 mmHg                LVOT Peak Velocity                0.56 m/s              AV Area Cont Eq vti               1.1 cm2               Mitral E Point Velocity           0.98 m/s              MV Pressure Half Time             39 ms                 MV Area PHT                       5.6 cm2               MV Deceleration Time              134 ms                TR Peak Velocity                  220 cm/s              PV Peak Velocity                  0.91 m/s              PV Peak Gradient                  3.3 mmHg              RVOT Peak Velocity                0.48 m/s              * Indicates values subject to auto-interpretation LV EF:  70    % FINDINGS Left Ventricle Normal left ventricular chamber size. Moderate concentric left ventricular hypertrophy. Hyperdynamic left ventricular systolic function with tachycardia.  The left ventricular ejection fraction is visually estimated to be 70%. Diastolic function is difficult to assess with arrhythmia. Right Ventricle The right ventricle is normal in size and systolic function. Right Atrium The right atrium is normal in size. Normal inferior vena cava size and inspiratory collapse. Left Atrium The left atrium is normal in size. Left atrial volume index is  29 mL/sq m. Mitral Valve Structurally normal mitral valve without stenosis or regurgitation. Aortic  Valve Known mechanical aortic valve not well visualized probably due to acuostical shadowing but functioning normally with normal transvalvular gradients. Trace aortic insufficiency. Tricuspid Valve Structurally normal tricuspid valve without stenosis. Trace tricuspid regurgitation. Estimated right ventricular systolic pressure is at least 25 mmHg. Pulmonic Valve Structurally normal pulmonic valve without stenosis or regurgitation. Pericardium No pericardial effusion. Aorta Normal aortic root for body surface area. The ascending aorta diameter is 2.8 cm. Justo Amaya M.D. (Electronically Signed) Final Date:     28 November 2023                 13:12    CT-ABDOMEN-PELVIS W/O    Result Date: 11/27/2023 11/27/2023 5:06 PM HISTORY/REASON FOR EXAM:  llq pain. TECHNIQUE/EXAM DESCRIPTION: CT scan of the abdomen and pelvis without contrast. Noncontrast helical scanning was obtained from the diaphragmatic domes through the pubic symphysis. Low dose optimization technique was utilized for this CT exam including automated exposure control and adjustment of the mA and/or kV according to patient size. COMPARISON: None. FINDINGS: Lower Chest: Likely partially loculated moderate right pleural effusion. Small left pleural effusion. Adjacent right middle lobe and bilateral lower lobe consolidations, likely atelectasis. Prosthetic aortic valve. Coronary artery calcifications. Median sternotomy wires. Small pericardial effusion. Liver: Normal. Spleen: Unremarkable. Pancreas: Unremarkable. Gallbladder: No calcified stones. Biliary: Nondilated. Adrenal glands: Normal. Kidneys: No hydronephrosis. Too small to characterize bilateral renal hypodensities. Nonobstructing, tiny left renal stone. Bowel: No obstruction. Fat stranding adjacent to the cecum. Colonic diverticulosis. Normal appendix. Lymph nodes: No adenopathy. Vasculature: Unremarkable. Peritoneum: No ascites. Peritoneal catheter with tip coiled in the pelvis. Pelvis: No  adenopathy or free fluid. Musculoskeletal: No acute or destructive process.     1.  Fat stranding adjacent to the cecum, concerning for colitis/typhlitis. 2.  Colonic diverticulosis. 3.  Nonobstructing, tiny left renal stone. 4.  Likely partially loculated moderate right pleural effusion. 5.  Small left pleural effusion. 6.  Adjacent right middle lobe and bilateral lower lobe consolidations, likely atelectasis. 7.  Small pericardial effusion.    DX-CHEST-PORTABLE (1 VIEW)    Result Date: 11/27/2023 11/27/2023 3:43 PM HISTORY/REASON FOR EXAM:  Chest Pain. TECHNIQUE/EXAM DESCRIPTION AND NUMBER OF VIEWS: Single portable view of the chest. COMPARISON: 11/13/2023 FINDINGS: A stable dual lumen right-sided central venous catheter. Cardiac silhouette is enlarged. Aortic calcified atherosclerotic plaque. Postsurgical changes status post CABG. There is a new small right pleural effusion with compressive atelectasis and/or consolidation. Mild left basilar atelectasis is again noted with Rubley resolved or improved small left pleural effusion. Bones are unchanged.     1.  Resolved or improved small left pleural effusion with persistent left basilar atelectasis and/or scarring. 2.  New small right pleural effusion with associated atelectasis and/or consolidation.      Micro:  Results       Procedure Component Value Units Date/Time    Fungal Culture [[id number]] Collected: 12/14/23 1051    Order Status: Completed Specimen: Body Fluid Updated: 12/18/23 1619     Significant Indicator NEG     Source BF     Site Pleural Fluid     Culture Result No fungal growth to date.     Fungal Smear Results No fungal elements seen.    AFB Culture [[id number]] Collected: 12/14/23 1051    Order Status: Completed Specimen: Body Fluid Updated: 12/18/23 1619     Significant Indicator NEG     Source BF     Site Pleural Fluid     Culture Result Culture in progress.     AFB Smear Results No acid fast bacilli seen.    FLUID CULTURE W/GRAM STAIN [[id number]]  Collected: 12/14/23 1051    Order Status: Completed Specimen: Body Fluid Updated: 12/17/23 1354     Significant Indicator NEG     Source BF     Site Pleural Fluid     Culture Result No growth at 72 hours.     Gram Stain Result Few WBCs.  No organisms seen.      C Diff Toxin [[id number]]  (Abnormal) Collected: 12/16/23 1129    Order Status: Completed Updated: 12/16/23 2236     C.Diff Toxin A&B Positive     Comment: TOXIN POSITIVE  Toxin detected by EIA; C. difficile detected by PCR.  If clinically correlated, treatment indicated per guidelines.  Test of cure is not recommended.         Narrative:      Special Contact Isolation  Collected By: [id number] JAI GARCIAJANDRA  Does this patient have risk factors for C-diff?->Yes  Has patient taken stool softeners or laxatives in the last 5  days?->No    C Diff by PCR rflx Toxin [[id number]] Collected: 12/16/23 1129    Order Status: Completed Specimen: Stool Updated: 12/16/23 2235     C Diff by PCR See Toxin     027-NAP1-BI Presumptive See Toxin     Comment: Presumptive 027/NAP1/BI target DNA sequences are DETECTED.       Narrative:      Special Contact Isolation  Collected By: [id number] JAI GARCIAJANDRA  Does this patient have risk factors for C-diff?->Yes  Has patient taken stool softeners or laxatives in the last 5  days?->No    Fungal Smear [[id number]] Collected: 12/14/23 1051    Order Status: Completed Specimen: Body Fluid Updated: 12/16/23 1810     Significant Indicator NEG     Source BF     Site Pleural Fluid     Fungal Smear Results No fungal elements seen.    Acid Fast Stain [[id number]] Collected: 12/14/23 1051    Order Status: Completed Specimen: Body Fluid Updated: 12/16/23 1810     Significant Indicator NEG     Source BF     Site Pleural Fluid     AFB Smear Results No acid fast bacilli seen.    AFB Culture [[id number]]     Order Status: No result Specimen: Body Fluid from Pleural Fluid     Fungal Culture [[id number]]     Order Status: No result Specimen: Body Fluid from  Pleural Fluid     GRAM STAIN [[id number]] Collected: 12/14/23 1051    Order Status: Completed Specimen: Body Fluid Updated: 12/14/23 2036     Significant Indicator .     Source BF     Site Pleural Fluid     Gram Stain Result Few WBCs.  No organisms seen.      FLUID CULTURE W/GRAM STAIN [[id number]]     Order Status: No result Specimen: Body Fluid from Pleural Fluid             Assessment:  Gurdeep Guerra is a pleasant 58 y.o. male patient with ESRD on PD (on kidney transplant list at [facility]), hypertension, hyperlipidemia, history of moderate to severe aortic stenosis and regurgitation with ascending aortic aneurysm, status post aortic valve replacement and ascending aortic aneurysm repair on 11/3/2023, readmitted 11/28 with abdominal pain and diarrhea, found to have C. difficile colitis, received oral vancomycin for 13 days.  He was also found to have a right-sided loculated pleural effusion, underwent right-sided chest tube placement on on 12/14 (fluid was bloody, only 181 red cells, 80,000 red cells, no polys, glucose 69, cultures negative).     On 12/17, he was noted to have foul-smelling loose stools once again and C. difficile was retested and returned positive, restarted on oral vancomycin.  ID consulted.  No leukocytosis     Pertinent Diagnoses:  Recurrent C. difficile colitis  Right-sided loculated pleural effusion  Recent aortic valve replacement and ascending aortic aneurysm repair  ESRD on PD    Plan:  -Given ongoing abdominal cramping and diarrhea, will add IV Flagyl 500 mg every 8 hours for about 24 to 48 hours and monitor symptomatology.  Continue p.o. vancomycin 125 mg every 6 hours as well    Disposition: Unable to determine at this time  Need for PICC line: Unable to determine at this time     Plan was discussed with the primary team, Dr. Chamberlain.  ID will follow.  This illness poses a threat to life.

## 2023-12-21 NOTE — PROGRESS NOTES
Hospital Medicine Daily Progress Note    Date of Service  12/21/2023    Chief Complaint  Gurdeep Guerra is a 58 y.o. male admitted 11/27/2023 with diarrhea    Hospital Course  Admitted with symptoms of abdominal pain, nausea, vomiting and diarrhea, CT scan showed evidence of colitis, he was started on empiric coverage with IV Cefepime and Flagyl.  Patient recently underwent mechanical AVR, aortic aneurysm repair, discharged on 11/17/2023.  He also has known history of end-stage renal disease on peritoneal dialysis.  Workup showed he was positive for C. difficile colitis, he was started on oral vancomycin.  There was also concern for possible peritonitis, he was given intraperitoneal IV cefepime.  Nephrology was consulted on the case, he underwent dialysis through his temporary dialysis catheter which was placed on the previous admission.  Also with known history of paroxysmal atrial flutter, and with recent mechanical AVR, he was on Coumadin for anticoagulation.  He required to be placed on heparin drip to bridge Coumadin.    Interval Problem Update    Remains in A-fib    And has been tachycardic overnight persistentl===> 130's    he has been not been taking his oral verapamil for concerns of hypotension however he has had not had any significant hypotension that is concerning.    patient was counseled on the necessity of taking his verapamil for heart rate control that we will stabilize his blood pressure.  His dialysis session today has been put on hold due to his tachycardia    Patient having foul-smelling loose stools    I have restarted test for C. Difficile    Patient given labetalol 10 mg IV push x 1 patient's blood pressure dropped to 75 systolic.  He was not complaining of any dizziness but he was laying in the bed.  He was given a saline bolus of 250 cc x 1    12/17 patient is new to me today, patient is resting in bed, he was complaining of lower abdominal cramps patient denies shortness of breath no  palpitation no chest pain, patient is on heparin drip, chest tube in place, draining 80 cc in the last 24 hours, continue having diarrhea, patient is tolerating oral vancomycin, discussed with pharmacist stopped PPI for now, will consider discussing with ID, heart rate better controlled continue verapamil 3 times daily, discussed with pulmonology patient received tPA today through his chest tube, will continue monitoring.  Chest x-ray in a.m.  12/18 patient in bed, still having diarrhea, abdominal cramps, had a very long discussion with patient regarding plan of care, discussed with nephro, cardio and ID. Continue close monitoring. Patient on iv heparin, monitoring for side effects, continue telemetry, patient starting on amiodarone monitoring for side effects patient did not tolerate medication in the past. Chest tube output 1340, s/p atp on 12/17, per pulm remove ct when output is less than 50 cc.   12/19: Patient seen and examined, afebrile, no nausea or vomiting HR still not controlled in the 120-130   Cardiology following and plan is for ablation tomorrow.  Regarding his chest tube pulmonology following appreciate rec. If less then 50 CC in 2 days pulmonology will consider removing it   Regarding his ESRD nephrology following cont on dialysis    Patient is on IV heparin continue monitoring for side effects include but not limited to bleeding, thrombocytopenia.  12/20: Patient seen and examined, afebrile, having diarrhea this AM. I have consulted infection disease for his C.diff  and case discussed appreciate rec.  Cardiology following case discussed with cardiology going for ablation today for his A.fibb with RVR   Also patient has chest tube in place and pulmonology following case discussed  appreciate rec.   12/21: Patient seen and examined, afebrile, still with diarrhea due to his C.dif. case discussed with ID will add IV flagyl for now but patient is refusing IV so will switch to PO flagyl   Pulmonology  following and plan to remove chest tube today   Had ablation yesterday, cardiology following and case discussed   Cont on heparin for bridging and warfarin   I have discussed this patient's plan of care and discharge plan at IDT rounds today with Case Management, Nursing, Nursing leadership, and other members of the IDT team.    Consultants/Specialty  nephrology and pulmonary  Cardio  EP.     Code Status  Full Code    Disposition  The patient is not medically cleared for discharge to home or a post-acute facility.      I have placed the appropriate orders for post-discharge needs.    Review of Systems  Review of Systems   Constitutional:  Positive for malaise/fatigue. Negative for chills, diaphoresis and fever.   HENT:  Negative for congestion, hearing loss and sore throat.    Eyes:  Negative for blurred vision.   Respiratory:  Positive for cough and shortness of breath. Negative for sputum production and wheezing.    Cardiovascular:  Negative for chest pain, palpitations and leg swelling.   Gastrointestinal:  Positive for abdominal pain and diarrhea. Negative for blood in stool, heartburn, melena, nausea and vomiting.   Genitourinary:  Negative for dysuria, flank pain and hematuria.   Musculoskeletal:  Negative for back pain, joint pain, myalgias and neck pain.   Skin:  Negative for rash.   Neurological:  Positive for weakness. Negative for dizziness, sensory change, speech change, focal weakness and headaches.   Psychiatric/Behavioral:  The patient is not nervous/anxious.         Physical Exam  Temp:  [36.5 °C (97.7 °F)-36.8 °C (98.2 °F)] 36.8 °C (98.2 °F)  Pulse:  [] 90  Resp:  [16-20] 17  BP: ()/(56-82) 105/58  SpO2:  [92 %-96 %] 94 %    Physical Exam  Vitals and nursing note reviewed.   Constitutional:       Appearance: He is ill-appearing.   HENT:      Head: Normocephalic and atraumatic.      Nose: No congestion.      Mouth/Throat:      Mouth: Mucous membranes are moist.   Eyes:      General: No  scleral icterus.        Right eye: No discharge.         Left eye: No discharge.   Cardiovascular:      Rate and Rhythm: Normal rate. Rhythm irregular.   Pulmonary:      Effort: Pulmonary effort is normal. No accessory muscle usage or respiratory distress.      Breath sounds: Rhonchi present.      Comments: Decreased breath sounds at the bases    Chest tube present  Abdominal:      General: There is no distension.      Tenderness: There is no abdominal tenderness. There is no guarding or rebound.      Comments: PD catheter   Musculoskeletal:      Cervical back: Normal range of motion and neck supple. No rigidity or tenderness.      Right lower leg: No edema.      Left lower leg: No edema.   Skin:     General: Skin is warm and dry.   Neurological:      General: No focal deficit present.      Mental Status: He is alert and oriented to person, place, and time.      Cranial Nerves: No cranial nerve deficit.         Fluids    Intake/Output Summary (Last 24 hours) at 12/21/2023 1444  Last data filed at 12/21/2023 1222  Gross per 24 hour   Intake 500 ml   Output 1500 ml   Net -1000 ml         Laboratory  Recent Labs     12/19/23  0620 12/21/23  0209   WBC 6.9 7.1   RBC 3.11* 2.90*   HEMOGLOBIN 9.3* 8.4*   HEMATOCRIT 29.4* 28.1*   MCV 94.5 96.9   MCH 29.9 29.0   MCHC 31.6* 29.9*   RDW 50.2* 52.1*   PLATELETCT 311 324   MPV 8.7* 8.8*       Recent Labs     12/19/23  0620 12/20/23  1537   SODIUM 136 135   POTASSIUM 4.5 5.3   CHLORIDE 101 99   CO2 26 26   GLUCOSE 100* 123*   BUN 21 35*   CREATININE 5.38* 7.78*   CALCIUM 8.1* 8.5       Recent Labs     12/19/23  0620 12/21/23  0209   INR 1.47* 1.37*                 Imaging  DX-CHEST-PORTABLE (1 VIEW)   Final Result         1.  Pulmonary edema and/or infiltrates, stable.   2.  Small layering bilateral pleural effusions, right greater than left, stable since prior study   3.  Cardiomegaly   4.  Atherosclerosis      EC-CARMELITA W/O CONT   Final Result      DX-CHEST-PORTABLE (1 VIEW)    Final Result         1.  Pulmonary edema and/or infiltrates, stable.   2.  Small layering bilateral pleural effusions, increased on the right compared to prior study   3.  Cardiomegaly   4.  Atherosclerosis      DX-CHEST-PORTABLE (1 VIEW)   Final Result         1.  Pulmonary edema and/or infiltrates, stable.   2.  Small layering bilateral pleural effusions, stable prior study   3.  Stable right lung base pneumothorax with small pigtail thoracostomy tube in place.   4.  Cardiomegaly   5.  Atherosclerosis      DX-CHEST-PORTABLE (1 VIEW)   Final Result         1.  Pulmonary edema and/or infiltrates, stable.   2.  Small layering bilateral pleural effusions, stable on the right and decreased on the left since prior study.   3.  New right lung base pneumothorax with small pigtail thoracostomy tube in place.   4.  Cardiomegaly   5.  Atherosclerosis      DX-CHEST-PORTABLE (1 VIEW)   Final Result         1.  Pulmonary edema and/or infiltrates.   2.  Small layering bilateral pleural effusions   3.  Cardiomegaly   4.  Atherosclerosis      DX-CHEST-PORTABLE (1 VIEW)   Final Result      Unchanged bilateral pulmonary opacities and suspected bilateral pleural effusions, right worse than left.         DX-CHEST-PORTABLE (1 VIEW)   Final Result      Stable examination.      CT-CHEST (THORAX) W/O   Final Result      1.  Interval decrease in size of large loculated right pleural effusion following following placement of locking loop chest tube.      2.  Smaller loculated pleural effusions posterior superiorly in the right hemithorax there are thorax.      3.  Small left pleural effusion with elevation of left hemidiaphragm.      4.  Dependent partial collapse of the lower lobes.      5.  Patchy airspace opacities in the right lower lobe, lingula, and to a lesser extent right middle lobe suspicious for pneumonitis or parenchymal scarring      6.  Cirrhosis and splenomegaly.      DX-CHEST-PORTABLE (1 VIEW)   Final Result      1.   Redemonstrated right hydropneumothorax with decreased air component, otherwise stable in size.   2.  Bilateral basilar atelectasis and/or consolidation with mild interstitial edema.   3.  Stable enlargement of the cardiomediastinal silhouette.      DX-CHEST-PORTABLE (1 VIEW)   Final Result      Placement of a right pigtail chest tube with decreased right pleural effusion.      Hazy opacities which could be related to hypoinflation or vascular congestion. Correlate clinically for infection.      IR-CHEST TUBE-EMPYEMA RIGHT   Final Result      Successful image guided RIGHT chest tube placement.      Plan: Low wall suction. Follow-up radiograph is pending.      CT-CHEST (THORAX) W/O   Final Result      1.  Large loculated right pleural effusion, worse compared to the October 2023 CT study. Associated atelectasis, with subtotal collapse of the right lower lobe.   2.  Minimal groundglass opacities in the right lung, infectious/inflammatory. No consolidation.   3.  Small left pleural effusion and associated atelectasis.   4.  Mild right hilar lymphadenopathy, statistically reactive rather than neoplastic.   5.  Cardiomegaly.   6.  Abdomen is detailed separately.      CT-ABDOMEN-PELVIS W/O   Final Result      1.  No new inflammatory process in the abdomen or pelvis.   2.  Minimal fat stranding adjacent to the cecum, nonspecific.   3.  Percutaneous catheter with tip in the pelvis. Small volume of pelvic free fluid.   4.  Colonic diverticulosis.   5.  Partially visualized moderate to large right pleural effusion, which may be loculated. It has increased in size since prior CT study.   6.  Small left pleural effusion.   7.  Cardiomegaly.         [id number] VIEW   Final Result      1.  Benign bowel gas pattern.      2.  Peritoneal dialysis catheter coiled in the pelvis.      NL-GYNAAYE-5 VIEW   Final Result      Nonspecific bowel gas pattern with a moderate colonic stool burden.      DX-CHEST-PORTABLE (1 VIEW)   Final Result       1.  Enlarged cardiac silhouette with vascular congestion/edema.   2.  Lower lobe airspace disease could be due to edema, atelectasis or pneumonitis.      EC-ECHOCARDIOGRAM COMPLETE W/O CONT   Final Result      CT-ABDOMEN-PELVIS W/O   Final Result      1.  Fat stranding adjacent to the cecum, concerning for colitis/typhlitis.   2.  Colonic diverticulosis.   3.  Nonobstructing, tiny left renal stone.   4.  Likely partially loculated moderate right pleural effusion.   5.  Small left pleural effusion.   6.  Adjacent right middle lobe and bilateral lower lobe consolidations, likely atelectasis.   7.  Small pericardial effusion.      DX-CHEST-PORTABLE (1 VIEW)   Final Result      1.  Resolved or improved small left pleural effusion with persistent left basilar atelectasis and/or scarring.   2.  New small right pleural effusion with associated atelectasis and/or consolidation.      CL-EP ABLATION ATRIAL FLUTTER    (Results Pending)        Assessment/Plan  * C. difficile colitis- (present on admission)  Assessment & Plan  Oral vancomycin - finished course    Stool has become foul-smelling and loose again so we have rechecked stool for C. difficile on 12/16/2023    Continue vancomycin, consider ID consult    I have asked ID to see patient in am.     A-fib (HCC)- (present on admission)  Assessment & Plan  Continue telemetry monitoring  Continue heparin drip  Close monitoring for side effects include but not limited to bleeding, thrombocytopenia    I have discussed with cardiologist Dr Orourke, EP also consulted recommending possible ablation this week.   EP recommended to start amiodarone.   Continue tele  Continue heparin drip for now.     History of mechanical aortic valve replacement- (present on admission)  Assessment & Plan  Coumadin on hold    Hypomagnesemia- (present on admission)  Assessment & Plan  Replaced  Follow level    Loculated pleural effusion- (present on admission)  Assessment & Plan  Status post chest  tube on 12/14/23    Daily x-rays    Continue to monitor chest tube output    Pulmonary MD follow-up    Repeated chest x-ray today my reading bilateral pleural effusion right larger than left.  Chest tube is in place discussed with pulmonology.  Cultures negative so far from pleural effusion    Paroxysmal atrial flutter (HCC)- (present on admission)  Assessment & Plan    Tachycardic on 12/16/2023 ordered IV labbetol as he has been refusing his oral verapamil.    Patient counseled about the necessity of taking his oral verapamil  Verapamil  Coumadin on hold  Cardio and EP consulted.     ESRD (end stage renal disease) (HCC)- (present on admission)  Assessment & Plan  HD per Nephrology  On dialysis  Discussed with nephro  Dialysis today    Dyslipidemia- (present on admission)  Assessment & Plan  Lipitor    Coronary artery disease due to lipid rich plaque- (present on admission)  Assessment & Plan  Aspirin, Lipitor  Troponin elevated but no chest pain, patient is end-stage renal disease, patient is on heparin for A-fib with RVR and holding his oral anticoagulation.  Troponin chronically elevated    Hypertension- (present on admission)  Assessment & Plan  Verapamil with parameters          VTE prophylaxis: Heparin drip    Greater than 51 minutes spent prepping to see patient (e.g. review of tests) obtaining and/or reviewing separately obtained history. Performing a medically appropriate examination and/ evaluation.  Counseling and educating the patient/family/caregiver.  Ordering medications, tests, or procedures.  Referring and communicating with other health care professionals.  Documenting clinical information in EPIC.  Independently interpreting results and communicating results to patient/family/caregiver.  Care coordination.      I have performed a physical exam and reviewed and updated ROS and Plan today (12/21/2023). In review of yesterday's note (12/20/2023), there are no changes except as documented  above.

## 2023-12-21 NOTE — CARE PLAN
Problem: Knowledge Deficit - Standard  Goal: Patient and family/care givers will demonstrate understanding of plan of care, disease process/condition, diagnostic tests and medications  Outcome: Progressing  Patient verbalizes understanding of medication indications but continues to need reinforcement on medication compliance.    The patient is Stable - Low risk of patient condition declining or worsening    Shift Goals  Clinical Goals: hemodialysis  Patient Goals: rest  Family Goals: FARIDA

## 2023-12-21 NOTE — PROGRESS NOTES
Radiology Progress Note   Author: TEX Maxr Date & Time created: 12/20/2023  6:21 PM   Date of admission  11/27/2023  Note to reader: this note follows the APSO format rather than the historical SOAP format. Assessment and plan located at the top of the note for ease of use.    Chief Complaint  58 y.o. male admitted 11/27/2023 with   Chief Complaint   Patient presents with    Sent by MD     Patient reports open heart surgery 11/3. Patient reports spoke to surgeon today and told to come to ER. Patient reports also a dialysis patient and did his dialysis last night at home.      Weakness     Patient reports increased weakness over the last three weeks.     Shortness of Breath     X 3 weeks.          HPI  58-year-old male past medical history significant for ESRD on peritoneal dialysis and paroxysmal atrial flutter admitted 11/27/23 for abdominal pain, nausea, vomiting, diarrhea.  CT and lab findings of C. difficile colitis.  He recently underwent a mechanical aortic valve replacement and aortic aneurysm repair and was discharged on 11/17/2023.  During this hospitalization, patient became increasingly short of breath.  CT thorax showed large loculated right pleural effusion.  Patient underwent a Right chest tube placement with IR Dr Ritchie on 12/14/23.  He was then started on lytic therapy through pulmonology.    Interval History:  12/15/23 -  Right Chest tube with 1360 mL serosanguineous output in the last 24 hours.  WBC normal.  Chest x-ray this morning shows hydropneumothorax.  No air leak detected.    12/18/23 - Right Chest tube with 1340 mL serosanguineous output in the last 24 hours status post lytic therapy.  WBC normal.  Chest x-ray this morning shows new right lung base pneumothorax. No air leak detected.  Now being treated for C. Difficile.  Currently getting dialysis in room.    12/19/23 - Right Chest tube with 450 mL serous output in the last 24 hours.  WBC normal.  Chest x-ray this  morning stable. No air leak detected. Placed to water seal by pulmonologist earlier today. Cardiologist plans for ablation tomorrow.     12/20/23 - Right Chest tube with 10 mL serous output in the last 24 hours.  WBC normal.  Chest x-ray this morning with slightly increased Right effusion. No air leak detected.CT to water seal. Ablation pending.      Assessment/Plan     Principal Problem:    C. difficile colitis  Active Problems:    Hypertension    Coronary artery disease due to lipid rich plaque    Dyslipidemia    ESRD (end stage renal disease) (HCC)    Paroxysmal atrial flutter (HCC)    Loculated pleural effusion    Hypomagnesemia    History of mechanical aortic valve replacement    A-fib (HCC)      Plan IR  - Chest tube to water seal  - Pulmonary hygiene  - Per pulmonology, okay to remove chest tube when output less than 50 mL x 2 days  - Cardiac ablation pending                Review of Systems  Physical Exam   Review of Systems   Constitutional:  Positive for malaise/fatigue. Negative for chills and fever.   Respiratory:  Negative for cough, sputum production and shortness of breath.    Cardiovascular:  Negative for chest pain and palpitations.   Gastrointestinal:  Positive for abdominal pain and diarrhea. Negative for nausea and vomiting.   Neurological:  Positive for weakness. Negative for headaches.      Vitals:    12/20/23 1700   BP: 112/74   Pulse: 96   Resp:    Temp:    SpO2: 94%        Physical Exam  Cardiovascular:      Rate and Rhythm: Normal rate.      Pulses: Normal pulses.   Pulmonary:      Effort: Pulmonary effort is normal. No respiratory distress.      Comments: Right chest tube  NC in place  Abdominal:      Palpations: Abdomen is soft.   Skin:     General: Skin is warm and dry.   Neurological:      General: No focal deficit present.      Mental Status: He is alert and oriented to person, place, and time.   Psychiatric:         Mood and Affect: Mood normal.         Behavior: Behavior normal.              Labs    Recent Labs     12/17/23 2345 12/19/23  0620   WBC 8.0 6.9   RBC 3.04* 3.11*   HEMOGLOBIN 9.0* 9.3*   HEMATOCRIT 28.5* 29.4*   MCV 93.8 94.5   MCH 29.6 29.9   MCHC 31.6* 31.6*   RDW 50.6* 50.2*   PLATELETCT 325 311   MPV 8.9* 8.7*       Recent Labs     12/17/23 2345 12/19/23  0620 12/20/23  1537   SODIUM 135 136 135   POTASSIUM 4.4 4.5 5.3   CHLORIDE 100 101 99   CO2 25 26 26   GLUCOSE 86 100* 123*   BUN 28* 21 35*   CREATININE 6.60* 5.38* 7.78*   CALCIUM 7.9* 8.1* 8.5       Recent Labs     12/17/23 2345 12/19/23  0620 12/20/23  1537   ALBUMIN  --  2.0* 2.3*   CREATININE 6.60* 5.38* 7.78*       EC-CARMELITA W/O CONT   Final Result      DX-CHEST-PORTABLE (1 VIEW)   Final Result         1.  Pulmonary edema and/or infiltrates, stable.   2.  Small layering bilateral pleural effusions, increased on the right compared to prior study   3.  Cardiomegaly   4.  Atherosclerosis      DX-CHEST-PORTABLE (1 VIEW)   Final Result         1.  Pulmonary edema and/or infiltrates, stable.   2.  Small layering bilateral pleural effusions, stable prior study   3.  Stable right lung base pneumothorax with small pigtail thoracostomy tube in place.   4.  Cardiomegaly   5.  Atherosclerosis      DX-CHEST-PORTABLE (1 VIEW)   Final Result         1.  Pulmonary edema and/or infiltrates, stable.   2.  Small layering bilateral pleural effusions, stable on the right and decreased on the left since prior study.   3.  New right lung base pneumothorax with small pigtail thoracostomy tube in place.   4.  Cardiomegaly   5.  Atherosclerosis      DX-CHEST-PORTABLE (1 VIEW)   Final Result         1.  Pulmonary edema and/or infiltrates.   2.  Small layering bilateral pleural effusions   3.  Cardiomegaly   4.  Atherosclerosis      DX-CHEST-PORTABLE (1 VIEW)   Final Result      Unchanged bilateral pulmonary opacities and suspected bilateral pleural effusions, right worse than left.         DX-CHEST-PORTABLE (1 VIEW)   Final Result      Stable  examination.      CT-CHEST (THORAX) W/O   Final Result      1.  Interval decrease in size of large loculated right pleural effusion following following placement of locking loop chest tube.      2.  Smaller loculated pleural effusions posterior superiorly in the right hemithorax there are thorax.      3.  Small left pleural effusion with elevation of left hemidiaphragm.      4.  Dependent partial collapse of the lower lobes.      5.  Patchy airspace opacities in the right lower lobe, lingula, and to a lesser extent right middle lobe suspicious for pneumonitis or parenchymal scarring      6.  Cirrhosis and splenomegaly.      DX-CHEST-PORTABLE (1 VIEW)   Final Result      1.  Redemonstrated right hydropneumothorax with decreased air component, otherwise stable in size.   2.  Bilateral basilar atelectasis and/or consolidation with mild interstitial edema.   3.  Stable enlargement of the cardiomediastinal silhouette.      DX-CHEST-PORTABLE (1 VIEW)   Final Result      Placement of a right pigtail chest tube with decreased right pleural effusion.      Hazy opacities which could be related to hypoinflation or vascular congestion. Correlate clinically for infection.      IR-CHEST TUBE-EMPYEMA RIGHT   Final Result      Successful image guided RIGHT chest tube placement.      Plan: Low wall suction. Follow-up radiograph is pending.      CT-CHEST (THORAX) W/O   Final Result      1.  Large loculated right pleural effusion, worse compared to the October 2023 CT study. Associated atelectasis, with subtotal collapse of the right lower lobe.   2.  Minimal groundglass opacities in the right lung, infectious/inflammatory. No consolidation.   3.  Small left pleural effusion and associated atelectasis.   4.  Mild right hilar lymphadenopathy, statistically reactive rather than neoplastic.   5.  Cardiomegaly.   6.  Abdomen is detailed separately.      CT-ABDOMEN-PELVIS W/O   Final Result      1.  No new inflammatory process in the  "abdomen or pelvis.   2.  Minimal fat stranding adjacent to the cecum, nonspecific.   3.  Percutaneous catheter with tip in the pelvis. Small volume of pelvic free fluid.   4.  Colonic diverticulosis.   5.  Partially visualized moderate to large right pleural effusion, which may be loculated. It has increased in size since prior CT study.   6.  Small left pleural effusion.   7.  Cardiomegaly.         [id number] VIEW   Final Result      1.  Benign bowel gas pattern.      2.  Peritoneal dialysis catheter coiled in the pelvis.      [id number] VIEW   Final Result      Nonspecific bowel gas pattern with a moderate colonic stool burden.      DX-CHEST-PORTABLE (1 VIEW)   Final Result      1.  Enlarged cardiac silhouette with vascular congestion/edema.   2.  Lower lobe airspace disease could be due to edema, atelectasis or pneumonitis.      EC-ECHOCARDIOGRAM COMPLETE W/O CONT   Final Result      CT-ABDOMEN-PELVIS W/O   Final Result      1.  Fat stranding adjacent to the cecum, concerning for colitis/typhlitis.   2.  Colonic diverticulosis.   3.  Nonobstructing, tiny left renal stone.   4.  Likely partially loculated moderate right pleural effusion.   5.  Small left pleural effusion.   6.  Adjacent right middle lobe and bilateral lower lobe consolidations, likely atelectasis.   7.  Small pericardial effusion.      DX-CHEST-PORTABLE (1 VIEW)   Final Result      1.  Resolved or improved small left pleural effusion with persistent left basilar atelectasis and/or scarring.   2.  New small right pleural effusion with associated atelectasis and/or consolidation.      CL-EP ABLATION ATRIAL FLUTTER    (Results Pending)       INR   Date Value Ref Range Status   12/19/2023 1.47 (H) 0.87 - 1.13 Final     Comment:     INR - Non-therapeutic Reference Range: 0.87-1.13  INR - Therapeutic Reference Range: 2.0-4.0       No results found for: \"POCINR\"     Intake/Output Summary (Last 24 hours) at 12/15/2023 1122  Last data filed at " 12/15/2023 1113  Gross per 24 hour   Intake 610 ml   Output 2060 ml   Net -1450 ml      Labs not explicitly included in this progress note were reviewed by the author. Radiology/imaging not explicitly included in this progress note was reviewed by the author.     I have performed a physical exam and reviewed and updated ROS and Plan today (12/20/2023).     30 minutes in directly providing and coordinating care and extensive data review.  No time overlap and excludes procedures.

## 2023-12-21 NOTE — DIETARY
Nutrition Update:    Day 24 of admit.  Gurdeep Guerra is a 58 y.o. male with admitting DX of Typhlitis and A-fib.     Pt is on cardiac diet with Nepro shake and chobani smoothie daily. Pt reports dislike of foods he is getting such as day old bread, etc. Discussed food preferences and discussed cardiac diet restriction with pt as well. Weight is slightly down r/t dialysis. No pressure-related wounds, trace edema noted. Last BM: 12/20    Problem: Nutritional:  Goal: Achieve adequate nutritional intake  Description: Patient will consume >50% of meals  Outcome: Progressing    Agree with cardiac diet, monitor phosphorus trend. Continue current supplements and smoothies.    RD following.

## 2023-12-21 NOTE — PROGRESS NOTES
Inpatient Anticoagulation Service Note for 12/20/2023      Reason for Anticoagulation: Atrial Fibrillation, On-X Aortic/Mitral Valve Replacement   [id number] VASc Score: 2  HAS-BLED Score: 3    Hemoglobin Value: (!) 9.3  Hematocrit Value: (!) 29.4  Lab Platelet Value: 311  Target INR: 2.0 to 3.0    INR from last 7 days       Date/Time INR Value    12/19/23 0620 1.47    12/17/23 2345 1.49    12/17/23 0633 1.4    12/16/23 0802 1.42    12/14/23 0336 1.55          Dose from last 7 days       Date/Time Dose (mg)    12/20/23 1758 2    12/19/23 1714 2.5          Average Dose (mg): 0  Significant Interactions: Antiplatelet Medications  Bridge Therapy: Yes  Bridge Therapy Start Date: 12/19/23  Days of Overlap Therapy: 1   INR Value Greater than 2 Prior to Discontinuation of Parenteral Anticoagulation: Not Applicable    Reversal Agent Administered: Vitamin K By Mouth (5 mg on 12/12 and 12/13)      Assessment:  58-year-old male with PMH significant for Afib, ESRD on HD, and recent AVR (On-X Aortic Valve) on warfarin PTA. [facility]. Prior to admission, patient was on warfarin 1.25 mg PO daily (INR was sub-therapeutic at admission).   Patient's warfarin was initially resumed and INR was therapeutic with average of 2.5-3 mg/day. Warfarin was held from 12/11 and reversed with PO Vitamin K 5 mg on 12/12 & 12/13 for IR. Bridging with heparin gtt.   Cleared to resume warfarin yesterday. Last Xa level was therapeutic on 12/19 PM. Due for repeat on 12/20 PM.   H/H and PLTs have been stable, last CBC on 12/19.   Chest tubes in place   Patient was NPO at midnight through this afternoon for Afib ablation. Cardiac diet resumed post ablation.   INR never drawn this morning. Ordered a repeat INR STAT this afternoon but has not been collected yet either. Patient has no signs or symptoms of bleeding per discussion with RN and has tolerated warfarin for a few weeks inpatient prior to warfarin being held  for IR chest tube placement.   At this point, I do not expect to see much effect from warfarin dose last night since warfarin takes a few days to have effect on INR. Therefore, will defer INR draw at this point. Will dose warfarin at 2 mg tonight and re-assess INR with AM labs for further dosing.   Continue bridging with heparin.     Plan:  2 mg   Education Material Provided?: No    Pharmacist suggested discharge dosing: TBD pending INR trend over the next few days.      Maryam Kaminski, PharmD

## 2023-12-21 NOTE — PROGRESS NOTES
Handoff report received from day shift nurse. Pt care assumed. Pt is currently resting in bed. POC discussed with Pt and Pt verbalizes no questions at this time. Pt is AAOx4, on 4 L NC, on Tele monitoring, and VSS. Call light and belongings within reach, bed in lowest and locked position, and Pt educated on use of call light. Will continue to monitor.

## 2023-12-22 ENCOUNTER — APPOINTMENT (OUTPATIENT)
Dept: RADIOLOGY | Facility: MEDICAL CENTER | Age: 58
DRG: 981 | End: 2023-12-22
Attending: HOSPITALIST
Payer: COMMERCIAL

## 2023-12-22 LAB
ALBUMIN SERPL BCP-MCNC: 2 G/DL (ref 3.2–4.9)
ANION GAP SERPL CALC-SCNC: 8 MMOL/L (ref 7–16)
BUN SERPL-MCNC: 32 MG/DL (ref 8–22)
CALCIUM ALBUM COR SERPL-MCNC: 9.5 MG/DL (ref 8.5–10.5)
CALCIUM SERPL-MCNC: 7.9 MG/DL (ref 8.5–10.5)
CHLORIDE SERPL-SCNC: 98 MMOL/L (ref 96–112)
CO2 SERPL-SCNC: 29 MMOL/L (ref 20–33)
CREAT SERPL-MCNC: 6.02 MG/DL (ref 0.5–1.4)
EKG IMPRESSION: NORMAL
ERYTHROCYTE [DISTWIDTH] IN BLOOD BY AUTOMATED COUNT: 50.1 FL (ref 35.9–50)
GFR SERPLBLD CREATININE-BSD FMLA CKD-EPI: 10 ML/MIN/1.73 M 2
GLUCOSE SERPL-MCNC: 91 MG/DL (ref 65–99)
HCT VFR BLD AUTO: 25.8 % (ref 42–52)
HGB BLD-MCNC: 8 G/DL (ref 14–18)
INR PPP: 1.43 (ref 0.87–1.13)
MCH RBC QN AUTO: 29 PG (ref 27–33)
MCHC RBC AUTO-ENTMCNC: 31 G/DL (ref 32.3–36.5)
MCV RBC AUTO: 93.5 FL (ref 81.4–97.8)
PHOSPHATE SERPL-MCNC: 3.9 MG/DL (ref 2.5–4.5)
PLATELET # BLD AUTO: 322 K/UL (ref 164–446)
PMV BLD AUTO: 8.7 FL (ref 9–12.9)
POTASSIUM SERPL-SCNC: 4.3 MMOL/L (ref 3.6–5.5)
PROTHROMBIN TIME: 17.6 SEC (ref 12–14.6)
RBC # BLD AUTO: 2.76 M/UL (ref 4.7–6.1)
SODIUM SERPL-SCNC: 135 MMOL/L (ref 135–145)
UFH PPP CHRO-ACNC: 0.71 IU/ML
UFH PPP CHRO-ACNC: 0.73 IU/ML
WBC # BLD AUTO: 8.5 K/UL (ref 4.8–10.8)

## 2023-12-22 PROCEDURE — 700102 HCHG RX REV CODE 250 W/ 637 OVERRIDE(OP): Performed by: FAMILY MEDICINE

## 2023-12-22 PROCEDURE — 770020 HCHG ROOM/CARE - TELE (206)

## 2023-12-22 PROCEDURE — 85610 PROTHROMBIN TIME: CPT

## 2023-12-22 PROCEDURE — 700102 HCHG RX REV CODE 250 W/ 637 OVERRIDE(OP): Performed by: STUDENT IN AN ORGANIZED HEALTH CARE EDUCATION/TRAINING PROGRAM

## 2023-12-22 PROCEDURE — A9270 NON-COVERED ITEM OR SERVICE: HCPCS | Performed by: INTERNAL MEDICINE

## 2023-12-22 PROCEDURE — 80069 RENAL FUNCTION PANEL: CPT

## 2023-12-22 PROCEDURE — A9270 NON-COVERED ITEM OR SERVICE: HCPCS | Performed by: FAMILY MEDICINE

## 2023-12-22 PROCEDURE — 700111 HCHG RX REV CODE 636 W/ 250 OVERRIDE (IP): Mod: JZ | Performed by: INTERNAL MEDICINE

## 2023-12-22 PROCEDURE — 700101 HCHG RX REV CODE 250

## 2023-12-22 PROCEDURE — A9270 NON-COVERED ITEM OR SERVICE: HCPCS | Performed by: HOSPITALIST

## 2023-12-22 PROCEDURE — 85520 HEPARIN ASSAY: CPT

## 2023-12-22 PROCEDURE — 90935 HEMODIALYSIS ONE EVALUATION: CPT

## 2023-12-22 PROCEDURE — 71045 X-RAY EXAM CHEST 1 VIEW: CPT

## 2023-12-22 PROCEDURE — 99233 SBSQ HOSP IP/OBS HIGH 50: CPT | Performed by: INTERNAL MEDICINE

## 2023-12-22 PROCEDURE — 93010 ELECTROCARDIOGRAM REPORT: CPT | Performed by: INTERNAL MEDICINE

## 2023-12-22 PROCEDURE — 36415 COLL VENOUS BLD VENIPUNCTURE: CPT

## 2023-12-22 PROCEDURE — A9270 NON-COVERED ITEM OR SERVICE: HCPCS | Performed by: STUDENT IN AN ORGANIZED HEALTH CARE EDUCATION/TRAINING PROGRAM

## 2023-12-22 PROCEDURE — 700102 HCHG RX REV CODE 250 W/ 637 OVERRIDE(OP): Performed by: NURSE PRACTITIONER

## 2023-12-22 PROCEDURE — A9270 NON-COVERED ITEM OR SERVICE: HCPCS | Performed by: NURSE PRACTITIONER

## 2023-12-22 PROCEDURE — 700102 HCHG RX REV CODE 250 W/ 637 OVERRIDE(OP): Performed by: INTERNAL MEDICINE

## 2023-12-22 PROCEDURE — 700111 HCHG RX REV CODE 636 W/ 250 OVERRIDE (IP)

## 2023-12-22 PROCEDURE — 700102 HCHG RX REV CODE 250 W/ 637 OVERRIDE(OP): Performed by: HOSPITALIST

## 2023-12-22 PROCEDURE — 700111 HCHG RX REV CODE 636 W/ 250 OVERRIDE (IP): Performed by: FAMILY MEDICINE

## 2023-12-22 PROCEDURE — 85027 COMPLETE CBC AUTOMATED: CPT

## 2023-12-22 RX ORDER — WARFARIN SODIUM 4 MG/1
4 TABLET ORAL
Status: COMPLETED | OUTPATIENT
Start: 2023-12-22 | End: 2023-12-22

## 2023-12-22 RX ADMIN — Medication 1 CAPSULE: at 08:02

## 2023-12-22 RX ADMIN — VERAPAMIL HYDROCHLORIDE 80 MG: 80 TABLET ORAL at 00:39

## 2023-12-22 RX ADMIN — VANCOMYCIN HYDROCHLORIDE 125 MG: 5 INJECTION, POWDER, LYOPHILIZED, FOR SOLUTION INTRAVENOUS at 19:30

## 2023-12-22 RX ADMIN — VANCOMYCIN HYDROCHLORIDE 125 MG: 5 INJECTION, POWDER, LYOPHILIZED, FOR SOLUTION INTRAVENOUS at 08:03

## 2023-12-22 RX ADMIN — HEPARIN SODIUM 17 UNITS/KG/HR: 5000 INJECTION, SOLUTION INTRAVENOUS at 06:27

## 2023-12-22 RX ADMIN — VERAPAMIL HYDROCHLORIDE 80 MG: 80 TABLET ORAL at 18:00

## 2023-12-22 RX ADMIN — VANCOMYCIN HYDROCHLORIDE 125 MG: 5 INJECTION, POWDER, LYOPHILIZED, FOR SOLUTION INTRAVENOUS at 13:04

## 2023-12-22 RX ADMIN — WARFARIN SODIUM 4 MG: 2 TABLET ORAL at 17:16

## 2023-12-22 RX ADMIN — ATORVASTATIN CALCIUM 40 MG: 40 TABLET, FILM COATED ORAL at 22:47

## 2023-12-22 RX ADMIN — DRONABINOL 5 MG: 5 CAPSULE ORAL at 10:37

## 2023-12-22 RX ADMIN — OXYCODONE 2.5 MG: 5 TABLET ORAL at 19:57

## 2023-12-22 RX ADMIN — HEPARIN SODIUM 3700 UNITS: 1000 INJECTION, SOLUTION INTRAVENOUS; SUBCUTANEOUS at 11:35

## 2023-12-22 RX ADMIN — HEPARIN SODIUM 15 UNITS/KG/HR: 5000 INJECTION, SOLUTION INTRAVENOUS at 23:13

## 2023-12-22 RX ADMIN — METRONIDAZOLE 500 MG: 500 TABLET ORAL at 22:47

## 2023-12-22 RX ADMIN — ONDANSETRON 4 MG: 2 INJECTION INTRAMUSCULAR; INTRAVENOUS at 19:29

## 2023-12-22 RX ADMIN — SIMETHICONE 125 MG: 125 TABLET, CHEWABLE ORAL at 19:59

## 2023-12-22 RX ADMIN — VANCOMYCIN HYDROCHLORIDE 125 MG: 5 INJECTION, POWDER, LYOPHILIZED, FOR SOLUTION INTRAVENOUS at 00:39

## 2023-12-22 RX ADMIN — ASPIRIN 81 MG: 81 TABLET, COATED ORAL at 04:32

## 2023-12-22 RX ADMIN — VANCOMYCIN HYDROCHLORIDE 125 MG: 5 INJECTION, POWDER, LYOPHILIZED, FOR SOLUTION INTRAVENOUS at 04:32

## 2023-12-22 RX ADMIN — SIMETHICONE 125 MG: 125 TABLET, CHEWABLE ORAL at 13:16

## 2023-12-22 RX ADMIN — DRONABINOL 5 MG: 5 CAPSULE ORAL at 17:17

## 2023-12-22 RX ADMIN — METRONIDAZOLE 500 MG: 500 TABLET ORAL at 13:05

## 2023-12-22 RX ADMIN — METRONIDAZOLE 500 MG: 500 TABLET ORAL at 04:32

## 2023-12-22 ASSESSMENT — ENCOUNTER SYMPTOMS
COUGH: 1
MYALGIAS: 0
SENSORY CHANGE: 0
PALPITATIONS: 0
SPUTUM PRODUCTION: 0
CHILLS: 0
BACK PAIN: 0
HEARTBURN: 0
WHEEZING: 0
DIAPHORESIS: 0
SPEECH CHANGE: 0
DIZZINESS: 0
SHORTNESS OF BREATH: 1
FEVER: 0
DIARRHEA: 1
NERVOUS/ANXIOUS: 0
FOCAL WEAKNESS: 0
NECK PAIN: 0
WEAKNESS: 1
SORE THROAT: 0
ABDOMINAL PAIN: 1
HEADACHES: 0
VOMITING: 0
BLOOD IN STOOL: 0
NAUSEA: 0
FLANK PAIN: 0
BLURRED VISION: 0

## 2023-12-22 ASSESSMENT — FIBROSIS 4 INDEX: FIB4 SCORE: .8695652173913043478

## 2023-12-22 NOTE — CARE PLAN
Problem: Wound/ / Incision Healing  Goal: Patient's wound/surgical incision will decrease in size and heals properly  Outcome: Progressing  Patient's right chest surgical incision clean and intact.    The patient is Stable - Low risk of patient condition declining or worsening    Shift Goals  Clinical Goals: VSS, CHECK LAB VALUES  Patient Goals: REST  Family Goals: FARIDA

## 2023-12-22 NOTE — DISCHARGE PLANNING
HTH/SCP TCN chart review completed. Current discharge considerations are anticipated for home with [facility] and DME (O2) if needed at time of discharge. As prior, noted patient underwent cardiac ablation 12/20, and mobilizing to commode. Previously independent prior to procedure, and will monitor chart for progression of mobility that will impact discharge considerations. Patient remains on 3L O2, does not have supplemental O2 at baseline. TCN will continue to follow and collaborate with discharge planning team as additional post acute needs arise. Thank you.     Completed:  Choice obtained: DME (O2 [employer]) on 12/17/23.  HH (Resumption of [facility]) on 11/28/23.    [facility] has accepted  SCP with [facility].  Patient request to set up his own Follow up PCP appointmen

## 2023-12-22 NOTE — CARE PLAN
The patient is Stable - Low risk of patient condition declining or worsening    Shift Goals  Clinical Goals: VSS, CHECK LAB VALUES  Patient Goals: REST  Family Goals: FARIDA    Progress made toward(s) clinical / shift goals:      Problem: Pain - Standard  Goal: Alleviation of pain or a reduction in pain to the patient’s comfort goal  Description: Target End Date:  Prior to discharge or change in level of care    Document on Vitals flowsheet    1.  Document pain using the appropriate pain scale per order or unit policy  2.  Educate and implement non-pharmacologic comfort measures (i.e. relaxation, distraction, massage, cold/heat therapy, etc.)  3.  Pain management medications as ordered  4.  Reassess pain after pain med administration per policy  5.  If opiods administered assess patient's response to pain medication is appropriate per POSS sedation scale  6.  Follow pain management plan developed in collaboration with patient and interdisciplinary team (including palliative care or pain specialists if applicable)  Outcome: Progressing     Problem: Knowledge Deficit - Standard  Goal: Patient and family/care givers will demonstrate understanding of plan of care, disease process/condition, diagnostic tests and medications  Description: Target End Date:  1-3 days or as soon as patient condition allows    Document in Patient Education    1.  Patient and family/caregiver oriented to unit, equipment, visitation policy and means for communicating concern  2.  Complete/review Learning Assessment  3.  Assess knowledge level of disease process/condition, treatment plan, diagnostic tests and medications  4.  Explain disease process/condition, treatment plan, diagnostic tests and medications  Outcome: Progressing     Problem: Hemodynamics  Goal: Patient's hemodynamics, fluid balance and neurologic status will be stable or improve  Description: Target End Date:  Prior to discharge or change in level of care    Document on Assessment  and I/O flowsheet templates    1.  Monitor vital signs, pulse oximetry and cardiac monitor per provider order and/or policy  2.  Maintain blood pressure per provider order  3.  Hemodynamic monitoring per provider order  4.  Manage IV fluids and IV infusions  5.  Monitor intake and output  6.  Daily weights per unit policy or provider order  7.  Assess peripheral pulses and capillary refill  8.  Assess color and body temperature  9.  Position patient for maximum circulation/cardiac output  10. Monitor for signs/symptoms of excessive bleeding  11. Assess mental status, restlessness and changes in level of consciousness  12. Monitor temperature and report fever or hypothermia to provider immediately. Consideration of targeted temperature management.  Outcome: Progressing     Problem: Self Care  Goal: Patient will have the ability to perform ADLs independently or with assistance (bathe, groom, dress, toilet and feed)  Description: Target End Date:  Prior to discharge or change in level of care    Document on ADL flowsheet    1.  Assess the capability and level of deficiency to perform ADLs  2.  Encourage family/care giver involvement  3.  Provide assistive devices  4.  Consider PT/OT evaluations  5.  Maintain support, give positive feedback, encourage self-care allowing extra time and verbal cuing as needed  6.  Avoid doing something for patients they can do themselves, but provide assistance as needed  7.  Assist in anticipating/planning individual needs  8.  Collaborate with Case Management and  to meet discharge needs  Outcome: Progressing     Problem: Infection - Standard  Goal: Patient will remain free from infection  Description: Target End Date:  Prior to discharge or change in level of care    1.  Utilize Standard Precautions at all times to reduce the risk of transmission of microorganisms from both recognized and  unrecognized sources of infection  2.  Infection prevention handouts provided  (general/device/diagnosis specific) and documented in Patient Education  3.  Educate patient and family/caregiver on isolation precautions if applicable  Outcome: Progressing     Problem: Skin Integrity  Goal: Skin integrity is maintained or improved  Description: Target End Date:  Prior to discharge or change in level of care    Document interventions on Skin Risk/Hira flowsheet groups and corresponding LDA    1.  Assess and monitor skin integrity, appearance and/or temperature  2.  Assess risk factors for impaired skin integrity and/or pressures ulcers  3.  Implement precautions to protect skin integrity in collaboration with interdisciplinary team  4.  Implement pressure ulcer prevention protocol if at risk for skin breakdown  5.  Confirm wound care consult if at risk for skin breakdown  6.  Ensure patient use of pressure relieving devices  (Low air loss bed, waffle overlay, heel protectors, ROHO cushion, etc)  Outcome: Progressing       Patient is not progressing towards the following goals:

## 2023-12-22 NOTE — PROGRESS NOTES
[facility] -  PROGRESS NOTE               Author: Kodak Harvey M.D. Date & Time: 12/22/2023  10:47 AM     HPI:  58 y.o. 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 Abd imaging demonstrated colitis. C. Diff pending and started on abx. He notes abd pain and diarrhea for several weeks. Edema improving with 2.5% dextrose solution. Still has HD permacath in-place. Pain with peritoneal dialysis but no cloudy effluent of fibrin clots. No f/c/s. Normally does [id number] fills all green.     DAILY NEPHROLOGY SUMMARY:  11/28: consult done  11/29: ongoing abd pain, seen on HD-tolerating procedure well, PD fluid concerning for peritonitis  11/30:c.diff positive, started on oral vanc, new onset aflutter-transferred to [facility], wbc improving, Peritoneal cultures remain negative, abd pain improving  12/1: PD culture remains negative, seen on HD-tolerating procedure, abd pain improving, less diarrhea    12/2: HD yesterday 2.5 liter UF, still with SOB decreased abdoimnal pain  12/3: PD last night 1.5 UF, pt still with SOB  12/4: No events, tolerated HD yest with 2.5L UF, BP stable, tachycardic this am, stable on RA, reports SOB has resolved, still with crampy abd discomfort, reports diarrhea is starting to become more formed  12/5: No events, no new labs, BP stable, tachycardic, stable on RA this am, still with crampy abd pain, diarrhea improving, still with nausea/anorexia, denies any CP/LE edema, reports mild SOB  12/6: No events, tolerated HD yest with 1L UF, BP stable, remains tachycardic, reports nausea is improved and having more firm stools, denies any CP/SOB, reports poor appetite  12/7: No events, BP stable, tolerated HD  this am with 1L UF, still doesn't feel well and feels very depressed and frustrated, +abd pain and n/v this am and unable to take PO  12/8: patient is doing well, resting in bed. Plan next iHD tomorrow  12/09: patient is seen during HD, refused labs this am. Does report some abdominal discomfort this am  12/10: patient had iHD yesterday, UF 2000 ml. Abdominal issues ongoing  12/11: pt had CT thorax done yesterday, showing loculated effusion. Pt feels SOB. Diarrhea continues but is better overall.   12/12: pt reports no more diarrhea, only 2-3 loose stools. Seen on HD and tolerating. Pulm recommending chest tube for loculated effusion.   12/13: pt awaiting improvement in INR before chest tube placement. Pt frustrated at prolonged hospitalization.  12/14: Pt had HD this AM and chest tube placement after. Pt seen in Room  12/15: Pt seen in room. Reports breathing is better now chest tube is in.   12/16: chest tube drainage slowing down. Pt breathing better. However, pt tachycardic in 130s this AM. He reports return of diarrhea and some abdominal pain. dialysis on hold for now  12/17: dialysis held yesterday due to tachycardia and later hypotension. HR and BP within normal ranges this AM. Pt positive for C Diff again, with continued diarrhea.   12/18: HD yesterday net UF 1L, pt reports SOB thinks he may have fluids in  his lungs and wants to do additional HD today to remove fluids,  denies further D, continue to have abdominal cramps  12/19: HD yesterday net UF 1.5L, remains tachycardic / aflutter ablation planned 12/20, states he feels tired. No d/ abd cramping   12/20:no labs today, pt reports no c/o,plan cardiac ablation today   12/21:Aflutter ablation 12/20, VSS, seen on HD, plan to remove chest tube today, CXR 12/21 reviewed stable pulmonary edema and infiltrates  12/22: HD today net UF 1L, pt seen start  of HD, denies any diarrhea, abd cramping, chest tube is out     REVIEW OF SYSTEMS:    +diarrhea, +abd pain  10  "point ROS reviewed and is as per HPI/daily summary or otherwise negative    PMH/PSH/SH/FH:   Reviewed and unchanged since admission note    CURRENT MEDICATIONS:   Reviewed from admission to present day    VS:  BP (!) 83/57   Pulse 82   Temp 36.6 °C (97.9 °F) (Temporal)   Resp 19   Ht 1.702 m (5' 7\")   Wt 75.4 kg (166 lb 3.6 oz)   SpO2 98%   BMI 26.03 kg/m²     Physical Exam  Vitals and nursing note reviewed.   Constitutional:       General: He is not in acute distress.     Appearance: Normal appearance.   HENT:      Head: Normocephalic and atraumatic.   Eyes:      General: No scleral icterus.     Extraocular Movements: Extraocular movements intact.   Cardiovascular:      Rate and Rhythm: Regular rhythm.      Comments: +R chest PC  Pulmonary:      Effort: Pulmonary effort is normal.   Abdominal:      General: Bowel sounds are normal.      Palpations: Abdomen is soft.      Tenderness: There is no abdominal tenderness.   Musculoskeletal:         General: No deformity.      Right lower leg: No edema.      Left lower leg: No edema.   Skin:     General: Skin is warm and dry.      Findings: No rash.   Neurological:      General: No focal deficit present.      Mental Status: He is alert and oriented to person, place, and time.   Psychiatric:         Mood and Affect: Mood normal.         Behavior: Behavior normal. Behavior is cooperative.       R chest tube in place (12/14/23)    Fluids:  In: 1200 [P.O.:700; Dialysis:500]  Out: 1500     LABS:  Recent Labs     12/20/23  1537 12/22/23  0401   SODIUM 135 135   POTASSIUM 5.3 4.3   CHLORIDE 99 98   CO2 26 29   GLUCOSE 123* 91   BUN 35* 32*   CREATININE 7.78* 6.02*   CALCIUM 8.5 7.9*       IMAGING:   All imaging reviewed from admission to present day  CXR 12/22   1.  Cardiomegaly.   2.  Estimated moderate bilateral pleural effusions with interval increase in left pleural effusion.   3.  Underlying bibasilar atelectasis or consolidation.     IMPRESSION:  # ESRD on outpt PD   " -Etiology 2/2 FSGS  - s/p permcath on 11/10, and on HD currently  # R/O PD cath associated peritonitis  - CX negative  # c. Diff.  -symptoms improved with initiation of oral vanc  -PD guidelines vague on scenarios such as this,  but pt on HD currently  # S/P mechanical AVR/ Ascending aneurysm repair 11/2023   # CKD-MBD  - P elevated  # Anemia of CKD, below goal hgb 10-11, low iron stores 12/17     Resumed EPO 6000U IV w HD on 12/12  # BL pleural effusions/congestion     Large R loculated effusion on CT thorax 12/11     S/p chest tube placement on 12/14  # Pericardial effusion   # C. Diff colitis  -C.diff positive  # Aflutter s/p ablation      PLAN:  - HD today  - Switching  to HD MWF schedule and PRN to continue with OP dialysis schedule   - Cont EPO 6000U IV w HD  - Cont holding PD for now (last on 12/3),  can re-eval as outpt  - PD catheter flushes Q weekly, last flushed 12/18    - C. diff tx/mgmt per primary team, diarrhea/pain and +toxin returned on 12/16  - Renal diet, no dietary pr restrictions   - Phos binder with meals   - Dose all medications as per ESRD  - OP HD chair has been arranged at [facility], re challenge of PD once stable in OP setting   - IV iron loading

## 2023-12-22 NOTE — PROGRESS NOTES
[facility] Nursing Notes     HD today x 2.5hrs per Dr JORDAN Harvey   Initiated at 0855 and ended 1125     Pre HD assessment     Received patient alert and oriented.   No Sob. No chest pain  Lungs Clear. Vital Signs stable  No complains pre HD.     Edema - no edema      Access: - Right PC, tunneled     No s/s of infection: no redness, no tenderness, no pus, no oozing of blood, warm to touch.     Intra HD    No issues during HD  Vital signs stable  No complaints  Able to rest and eat during treatment        Post HD     UF goal  achieved: 2500mLs - 500 mLs (200mls prime + 300mls rinseback)       Target Net UF :      Completed HD tolerated well  Post vital signs stable  Nil complaints  Dressing: dry and intact  Heparin lock 3,700units A1.8mls and V1.9mls instilled     Documented by  OSIEL HALEY RN    Report given to PCN Mike Summers RN

## 2023-12-22 NOTE — PROGRESS NOTES
Inpatient Anticoagulation Service Note for 12/21/2023      Reason for Anticoagulation: Atrial Fibrillation, On-X Aortic/Mitral Valve Replacement   [id number] VASc Score: 2  HAS-BLED Score: 3    Hemoglobin Value: (!) 8.4  Hematocrit Value: (!) 28.1  Lab Platelet Value: 324  Target INR: 2.0 to 3.0    INR from last 7 days       Date/Time INR Value    12/21/23 0209 1.37    12/19/23 0620 1.47    12/17/23 2345 1.49    12/17/23 0633 1.4    12/16/23 0802 1.42          Dose from last 7 days       Date/Time Dose (mg)    12/21/23 1607 2    12/20/23 1758 2.5    12/19/23 1714 2.5          Average Dose (mg): 0  Significant Interactions: Antiplatelet Medications, Flagyl  Bridge Therapy: Yes  Bridge Therapy Start Date: 12/19/23  Days of Overlap Therapy: 2   INR Value Greater than 2 Prior to Discontinuation of Parenteral Anticoagulation: Not Applicable     Reversal Agent Administered: Vitamin K By Mouth  Assessment:  58-year-old male with PMH significant for Afib, ESRD on HD, and recent AVR (On-X Aortic Valve) on warfarin PTA. Follows with [facility] outpatient. Prior to admission, patient was on warfarin 1.25 mg PO daily (INR was sub-therapeutic at admission).   Patient's warfarin was initially resumed and INR was therapeutic with average of 2.5-3 mg/day. Warfarin was held from 12/11 and reversed with PO Vitamin K 5 mg on 12/12 & 12/13 for IR. Bridging with heparin gtt.   Cleared to resume warfarin 12/19  H/H and PLTs have been stable. Heparin anti-xa supratherapeutic this morning and adjusted per nursing protocol. No signs/symptoms of bleeding  Chest tubes in place   12/21: INR remains subtherapeutic as expected after multiple held doses. New drug interaction with metronidazole (increases warfarin effects). Recently doses of 2.5-3 mg daily were required to keep INR therapeutic however due to new drug interaction will continue current reduced dose of 2 mg instead of increasing dose due to risk of bleeding and  patient is on full anticoagulation with heparin bridge. Assess INR daily    Plan:  Warfarin 2 mg  Education Material Provided?: No    Pharmacist suggested discharge dosing: TBD pending INR trends and duration of drug interaction     Angela Rashid, PharmD

## 2023-12-22 NOTE — PROGRESS NOTES
Hospital Medicine Daily Progress Note    Date of Service  12/22/2023    Chief Complaint  Gurdeep Guerra is a 58 y.o. male admitted 11/27/2023 with diarrhea    Hospital Course  Admitted with symptoms of abdominal pain, nausea, vomiting and diarrhea, CT scan showed evidence of colitis, he was started on empiric coverage with IV Cefepime and Flagyl.  Patient recently underwent mechanical AVR, aortic aneurysm repair, discharged on 11/17/2023.  He also has known history of end-stage renal disease on peritoneal dialysis.  Workup showed he was positive for C. difficile colitis, he was started on oral vancomycin.  There was also concern for possible peritonitis, he was given intraperitoneal IV cefepime.  Nephrology was consulted on the case, he underwent dialysis through his temporary dialysis catheter which was placed on the previous admission.  Also with known history of paroxysmal atrial flutter, and with recent mechanical AVR, he was on Coumadin for anticoagulation.  He required to be placed on heparin drip to bridge Coumadin.    Interval Problem Update    Remains in A-fib    And has been tachycardic overnight persistentl===> 130's    he has been not been taking his oral verapamil for concerns of hypotension however he has had not had any significant hypotension that is concerning.    patient was counseled on the necessity of taking his verapamil for heart rate control that we will stabilize his blood pressure.  His dialysis session today has been put on hold due to his tachycardia    Patient having foul-smelling loose stools    I have restarted test for C. Difficile    Patient given labetalol 10 mg IV push x 1 patient's blood pressure dropped to 75 systolic.  He was not complaining of any dizziness but he was laying in the bed.  He was given a saline bolus of 250 cc x 1    12/17 patient is new to me today, patient is resting in bed, he was complaining of lower abdominal cramps patient denies shortness of breath no  palpitation no chest pain, patient is on heparin drip, chest tube in place, draining 80 cc in the last 24 hours, continue having diarrhea, patient is tolerating oral vancomycin, discussed with pharmacist stopped PPI for now, will consider discussing with ID, heart rate better controlled continue verapamil 3 times daily, discussed with pulmonology patient received tPA today through his chest tube, will continue monitoring.  Chest x-ray in a.m.  12/18 patient in bed, still having diarrhea, abdominal cramps, had a very long discussion with patient regarding plan of care, discussed with nephro, cardio and ID. Continue close monitoring. Patient on iv heparin, monitoring for side effects, continue telemetry, patient starting on amiodarone monitoring for side effects patient did not tolerate medication in the past. Chest tube output 1340, s/p atp on 12/17, per pulm remove ct when output is less than 50 cc.   12/19: Patient seen and examined, afebrile, no nausea or vomiting HR still not controlled in the 120-130   Cardiology following and plan is for ablation tomorrow.  Regarding his chest tube pulmonology following appreciate rec. If less then 50 CC in 2 days pulmonology will consider removing it   Regarding his ESRD nephrology following cont on dialysis    Patient is on IV heparin continue monitoring for side effects include but not limited to bleeding, thrombocytopenia.  12/20: Patient seen and examined, afebrile, having diarrhea this AM. I have consulted infection disease for his C.diff  and case discussed appreciate rec.  Cardiology following case discussed with cardiology going for ablation today for his A.fibb with RVR   Also patient has chest tube in place and pulmonology following case discussed  appreciate rec.   12/21: Patient seen and examined, afebrile, still with diarrhea due to his C.dif. case discussed with ID will add IV flagyl for now but patient is refusing IV so will switch to PO flagyl   Pulmonology  following and plan to remove chest tube today   Had ablation yesterday, cardiology following and case discussed   Cont on heparin for bridging and warfarin   12/22: Patient seen and examined diarrhea improved, discussed with ID will cont on flagyl for another 48 hrs and will need a long taper of po vanco   Cont on warfarin with heparin bridge   I have discussed this patient's plan of care and discharge plan at IDT rounds today with Case Management, Nursing, Nursing leadership, and other members of the IDT team.    Consultants/Specialty  nephrology and pulmonary  Cardio  EP.     Code Status  Full Code    Disposition  The patient is not medically cleared for discharge to home or a post-acute facility.      I have placed the appropriate orders for post-discharge needs.    Review of Systems  Review of Systems   Constitutional:  Positive for malaise/fatigue. Negative for chills, diaphoresis and fever.   HENT:  Negative for congestion, hearing loss and sore throat.    Eyes:  Negative for blurred vision.   Respiratory:  Positive for cough and shortness of breath. Negative for sputum production and wheezing.    Cardiovascular:  Negative for chest pain, palpitations and leg swelling.   Gastrointestinal:  Positive for abdominal pain and diarrhea. Negative for blood in stool, heartburn, melena, nausea and vomiting.   Genitourinary:  Negative for dysuria, flank pain and hematuria.   Musculoskeletal:  Negative for back pain, joint pain, myalgias and neck pain.   Skin:  Negative for rash.   Neurological:  Positive for weakness. Negative for dizziness, sensory change, speech change, focal weakness and headaches.   Psychiatric/Behavioral:  The patient is not nervous/anxious.         Physical Exam  Temp:  [36.6 °C (97.9 °F)-36.7 °C (98.1 °F)] 36.7 °C (98 °F)  Pulse:  [75-99] 90  Resp:  [17-20] 20  BP: ()/(53-77) 90/53  SpO2:  [93 %-98 %] 98 %    Physical Exam  Vitals and nursing note reviewed.   Constitutional:        Appearance: He is ill-appearing.   HENT:      Head: Normocephalic and atraumatic.      Nose: No congestion.      Mouth/Throat:      Mouth: Mucous membranes are moist.   Eyes:      General: No scleral icterus.        Right eye: No discharge.         Left eye: No discharge.   Cardiovascular:      Rate and Rhythm: Normal rate. Rhythm irregular.   Pulmonary:      Effort: Pulmonary effort is normal. No accessory muscle usage or respiratory distress.      Breath sounds: Rhonchi present.      Comments: Decreased breath sounds at the bases    Chest tube present  Abdominal:      General: There is no distension.      Tenderness: There is no abdominal tenderness. There is no guarding or rebound.      Comments: PD catheter   Musculoskeletal:      Cervical back: Normal range of motion and neck supple. No rigidity or tenderness.      Right lower leg: No edema.      Left lower leg: No edema.   Skin:     General: Skin is warm and dry.   Neurological:      General: No focal deficit present.      Mental Status: He is alert and oriented to person, place, and time.      Cranial Nerves: No cranial nerve deficit.         Fluids    Intake/Output Summary (Last 24 hours) at 12/22/2023 1352  Last data filed at 12/22/2023 1300  Gross per 24 hour   Intake 1680 ml   Output 2500 ml   Net -820 ml       Laboratory  Recent Labs     12/21/23  0209 12/22/23  0401   WBC 7.1 8.5   RBC 2.90* 2.76*   HEMOGLOBIN 8.4* 8.0*   HEMATOCRIT 28.1* 25.8*   MCV 96.9 93.5   MCH 29.0 29.0   MCHC 29.9* 31.0*   RDW 52.1* 50.1*   PLATELETCT 324 322   MPV 8.8* 8.7*     Recent Labs     12/20/23  1537 12/22/23  0401   SODIUM 135 135   POTASSIUM 5.3 4.3   CHLORIDE 99 98   CO2 26 29   GLUCOSE 123* 91   BUN 35* 32*   CREATININE 7.78* 6.02*   CALCIUM 8.5 7.9*     Recent Labs     12/21/23  0209 12/22/23  0401   INR 1.37* 1.43*               Imaging  DX-CHEST-PORTABLE (1 VIEW)   Final Result      1.  Cardiomegaly.   2.  Estimated moderate bilateral pleural effusions with  interval increase in left pleural effusion.   3.  Underlying bibasilar atelectasis or consolidation.      DX-CHEST-PORTABLE (1 VIEW)   Final Result         1.  Pulmonary edema and/or infiltrates, stable.   2.  Small layering bilateral pleural effusions, right greater than left, stable since prior study   3.  Cardiomegaly   4.  Atherosclerosis      EC-CARMELITA W/O CONT   Final Result      DX-CHEST-PORTABLE (1 VIEW)   Final Result         1.  Pulmonary edema and/or infiltrates, stable.   2.  Small layering bilateral pleural effusions, increased on the right compared to prior study   3.  Cardiomegaly   4.  Atherosclerosis      DX-CHEST-PORTABLE (1 VIEW)   Final Result         1.  Pulmonary edema and/or infiltrates, stable.   2.  Small layering bilateral pleural effusions, stable prior study   3.  Stable right lung base pneumothorax with small pigtail thoracostomy tube in place.   4.  Cardiomegaly   5.  Atherosclerosis      DX-CHEST-PORTABLE (1 VIEW)   Final Result         1.  Pulmonary edema and/or infiltrates, stable.   2.  Small layering bilateral pleural effusions, stable on the right and decreased on the left since prior study.   3.  New right lung base pneumothorax with small pigtail thoracostomy tube in place.   4.  Cardiomegaly   5.  Atherosclerosis      DX-CHEST-PORTABLE (1 VIEW)   Final Result         1.  Pulmonary edema and/or infiltrates.   2.  Small layering bilateral pleural effusions   3.  Cardiomegaly   4.  Atherosclerosis      DX-CHEST-PORTABLE (1 VIEW)   Final Result      Unchanged bilateral pulmonary opacities and suspected bilateral pleural effusions, right worse than left.         DX-CHEST-PORTABLE (1 VIEW)   Final Result      Stable examination.      CT-CHEST (THORAX) W/O   Final Result      1.  Interval decrease in size of large loculated right pleural effusion following following placement of locking loop chest tube.      2.  Smaller loculated pleural effusions posterior superiorly in the right  hemithorax there are thorax.      3.  Small left pleural effusion with elevation of left hemidiaphragm.      4.  Dependent partial collapse of the lower lobes.      5.  Patchy airspace opacities in the right lower lobe, lingula, and to a lesser extent right middle lobe suspicious for pneumonitis or parenchymal scarring      6.  Cirrhosis and splenomegaly.      DX-CHEST-PORTABLE (1 VIEW)   Final Result      1.  Redemonstrated right hydropneumothorax with decreased air component, otherwise stable in size.   2.  Bilateral basilar atelectasis and/or consolidation with mild interstitial edema.   3.  Stable enlargement of the cardiomediastinal silhouette.      DX-CHEST-PORTABLE (1 VIEW)   Final Result      Placement of a right pigtail chest tube with decreased right pleural effusion.      Hazy opacities which could be related to hypoinflation or vascular congestion. Correlate clinically for infection.      IR-CHEST TUBE-EMPYEMA RIGHT   Final Result      Successful image guided RIGHT chest tube placement.      Plan: Low wall suction. Follow-up radiograph is pending.      CT-CHEST (THORAX) W/O   Final Result      1.  Large loculated right pleural effusion, worse compared to the October 2023 CT study. Associated atelectasis, with subtotal collapse of the right lower lobe.   2.  Minimal groundglass opacities in the right lung, infectious/inflammatory. No consolidation.   3.  Small left pleural effusion and associated atelectasis.   4.  Mild right hilar lymphadenopathy, statistically reactive rather than neoplastic.   5.  Cardiomegaly.   6.  Abdomen is detailed separately.      CT-ABDOMEN-PELVIS W/O   Final Result      1.  No new inflammatory process in the abdomen or pelvis.   2.  Minimal fat stranding adjacent to the cecum, nonspecific.   3.  Percutaneous catheter with tip in the pelvis. Small volume of pelvic free fluid.   4.  Colonic diverticulosis.   5.  Partially visualized moderate to large right pleural effusion, which  may be loculated. It has increased in size since prior CT study.   6.  Small left pleural effusion.   7.  Cardiomegaly.         [id number] VIEW   Final Result      1.  Benign bowel gas pattern.      2.  Peritoneal dialysis catheter coiled in the pelvis.      [id number] VIEW   Final Result      Nonspecific bowel gas pattern with a moderate colonic stool burden.      DX-CHEST-PORTABLE (1 VIEW)   Final Result      1.  Enlarged cardiac silhouette with vascular congestion/edema.   2.  Lower lobe airspace disease could be due to edema, atelectasis or pneumonitis.      EC-ECHOCARDIOGRAM COMPLETE W/O CONT   Final Result      CT-ABDOMEN-PELVIS W/O   Final Result      1.  Fat stranding adjacent to the cecum, concerning for colitis/typhlitis.   2.  Colonic diverticulosis.   3.  Nonobstructing, tiny left renal stone.   4.  Likely partially loculated moderate right pleural effusion.   5.  Small left pleural effusion.   6.  Adjacent right middle lobe and bilateral lower lobe consolidations, likely atelectasis.   7.  Small pericardial effusion.      DX-CHEST-PORTABLE (1 VIEW)   Final Result      1.  Resolved or improved small left pleural effusion with persistent left basilar atelectasis and/or scarring.   2.  New small right pleural effusion with associated atelectasis and/or consolidation.      CL-EP ABLATION ATRIAL FLUTTER    (Results Pending)        Assessment/Plan  * C. difficile colitis- (present on admission)  Assessment & Plan  Oral vancomycin - finished course    Stool has become foul-smelling and loose again so we have rechecked stool for C. difficile on 12/16/2023    Continue vancomycin, consider ID consult    I have asked ID to see patient in am.     A-fib (HCC)- (present on admission)  Assessment & Plan  Continue telemetry monitoring  Continue heparin drip  Close monitoring for side effects include but not limited to bleeding, thrombocytopenia    I have discussed with cardiologist Dr Orourke, EP also consulted  recommending possible ablation this week.   EP recommended to start amiodarone.   Continue tele  Continue heparin drip for now.     History of mechanical aortic valve replacement- (present on admission)  Assessment & Plan  Coumadin on hold    Hypomagnesemia- (present on admission)  Assessment & Plan  Replaced  Follow level    Loculated pleural effusion- (present on admission)  Assessment & Plan  Status post chest tube on 12/14/23    Daily x-rays    Continue to monitor chest tube output    Pulmonary MD follow-up    Repeated chest x-ray today my reading bilateral pleural effusion right larger than left.  Chest tube is in place discussed with pulmonology.  Cultures negative so far from pleural effusion    Paroxysmal atrial flutter (HCC)- (present on admission)  Assessment & Plan    Tachycardic on 12/16/2023 ordered IV labbetol as he has been refusing his oral verapamil.    Patient counseled about the necessity of taking his oral verapamil  Verapamil  Coumadin on hold  Cardio and EP consulted.     ESRD (end stage renal disease) (HCC)- (present on admission)  Assessment & Plan  HD per Nephrology  On dialysis  Discussed with nephro  Dialysis today    Dyslipidemia- (present on admission)  Assessment & Plan  Lipitor    Coronary artery disease due to lipid rich plaque- (present on admission)  Assessment & Plan  Aspirin, Lipitor  Troponin elevated but no chest pain, patient is end-stage renal disease, patient is on heparin for A-fib with RVR and holding his oral anticoagulation.  Troponin chronically elevated    Hypertension- (present on admission)  Assessment & Plan  Verapamil with parameters          VTE prophylaxis: Heparin drip    Greater than 51 minutes spent prepping to see patient (e.g. review of tests) obtaining and/or reviewing separately obtained history. Performing a medically appropriate examination and/ evaluation.  Counseling and educating the patient/family/caregiver.  Ordering medications, tests, or  procedures.  Referring and communicating with other health care professionals.  Documenting clinical information in EPIC.  Independently interpreting results and communicating results to patient/family/caregiver.  Care coordination.        I have performed a physical exam and reviewed and updated ROS and Plan today (12/22/2023). In review of yesterday's note (12/21/2023), there are no changes except as documented above.

## 2023-12-22 NOTE — PROGRESS NOTES
Brief ID note:    -Diarrhea resolved.  Okay to continue IV Flagyl for 48 hours and then finish with a prolonged oral vancomycin taper given this is a C. difficile recurrence:    125 mg orally 4 times daily for 14 days, then  125 mg orally 2 times daily for 7 days, then  125 mg orally once daily for 7 days, then  125 mg orally every 2 to 3 days for 8 weeks    Discussed with Dr. Chamberlain.  ID will sign off.  Please call with questions.

## 2023-12-23 ENCOUNTER — APPOINTMENT (OUTPATIENT)
Dept: RADIOLOGY | Facility: MEDICAL CENTER | Age: 58
DRG: 981 | End: 2023-12-23
Attending: HOSPITALIST
Payer: COMMERCIAL

## 2023-12-23 LAB
INR PPP: 1.33 (ref 0.87–1.13)
PROTHROMBIN TIME: 16.7 SEC (ref 12–14.6)
UFH PPP CHRO-ACNC: 0.2 IU/ML
UFH PPP CHRO-ACNC: 0.39 IU/ML
UFH PPP CHRO-ACNC: 0.48 IU/ML

## 2023-12-23 PROCEDURE — A9270 NON-COVERED ITEM OR SERVICE: HCPCS | Performed by: FAMILY MEDICINE

## 2023-12-23 PROCEDURE — 36415 COLL VENOUS BLD VENIPUNCTURE: CPT

## 2023-12-23 PROCEDURE — A9270 NON-COVERED ITEM OR SERVICE: HCPCS | Performed by: STUDENT IN AN ORGANIZED HEALTH CARE EDUCATION/TRAINING PROGRAM

## 2023-12-23 PROCEDURE — 700111 HCHG RX REV CODE 636 W/ 250 OVERRIDE (IP): Performed by: INTERNAL MEDICINE

## 2023-12-23 PROCEDURE — 99232 SBSQ HOSP IP/OBS MODERATE 35: CPT | Performed by: INTERNAL MEDICINE

## 2023-12-23 PROCEDURE — A9270 NON-COVERED ITEM OR SERVICE: HCPCS | Performed by: NURSE PRACTITIONER

## 2023-12-23 PROCEDURE — 700102 HCHG RX REV CODE 250 W/ 637 OVERRIDE(OP): Performed by: STUDENT IN AN ORGANIZED HEALTH CARE EDUCATION/TRAINING PROGRAM

## 2023-12-23 PROCEDURE — 700102 HCHG RX REV CODE 250 W/ 637 OVERRIDE(OP): Performed by: INTERNAL MEDICINE

## 2023-12-23 PROCEDURE — 71045 X-RAY EXAM CHEST 1 VIEW: CPT

## 2023-12-23 PROCEDURE — 85520 HEPARIN ASSAY: CPT | Mod: 91

## 2023-12-23 PROCEDURE — 700111 HCHG RX REV CODE 636 W/ 250 OVERRIDE (IP): Performed by: FAMILY MEDICINE

## 2023-12-23 PROCEDURE — 700111 HCHG RX REV CODE 636 W/ 250 OVERRIDE (IP)

## 2023-12-23 PROCEDURE — 700102 HCHG RX REV CODE 250 W/ 637 OVERRIDE(OP): Performed by: NURSE PRACTITIONER

## 2023-12-23 PROCEDURE — 85610 PROTHROMBIN TIME: CPT

## 2023-12-23 PROCEDURE — 770020 HCHG ROOM/CARE - TELE (206)

## 2023-12-23 PROCEDURE — A9270 NON-COVERED ITEM OR SERVICE: HCPCS | Performed by: INTERNAL MEDICINE

## 2023-12-23 PROCEDURE — 700102 HCHG RX REV CODE 250 W/ 637 OVERRIDE(OP): Performed by: FAMILY MEDICINE

## 2023-12-23 PROCEDURE — 700101 HCHG RX REV CODE 250

## 2023-12-23 RX ORDER — WARFARIN SODIUM 4 MG/1
4 TABLET ORAL
Status: COMPLETED | OUTPATIENT
Start: 2023-12-23 | End: 2023-12-23

## 2023-12-23 RX ORDER — METRONIDAZOLE 500 MG/100ML
500 INJECTION, SOLUTION INTRAVENOUS EVERY 8 HOURS
Status: COMPLETED | OUTPATIENT
Start: 2023-12-23 | End: 2023-12-25

## 2023-12-23 RX ADMIN — Medication 1 CAPSULE: at 08:07

## 2023-12-23 RX ADMIN — VERAPAMIL HYDROCHLORIDE 80 MG: 80 TABLET ORAL at 17:34

## 2023-12-23 RX ADMIN — HEPARIN SODIUM 3100 UNITS: 1000 INJECTION, SOLUTION INTRAVENOUS; SUBCUTANEOUS at 04:32

## 2023-12-23 RX ADMIN — ATORVASTATIN CALCIUM 40 MG: 40 TABLET, FILM COATED ORAL at 20:30

## 2023-12-23 RX ADMIN — HEPARIN SODIUM 17 UNITS/KG/HR: 5000 INJECTION, SOLUTION INTRAVENOUS at 19:24

## 2023-12-23 RX ADMIN — VANCOMYCIN HYDROCHLORIDE 125 MG: 5 INJECTION, POWDER, LYOPHILIZED, FOR SOLUTION INTRAVENOUS at 12:07

## 2023-12-23 RX ADMIN — VERAPAMIL HYDROCHLORIDE 80 MG: 80 TABLET ORAL at 12:06

## 2023-12-23 RX ADMIN — VANCOMYCIN HYDROCHLORIDE 125 MG: 5 INJECTION, POWDER, LYOPHILIZED, FOR SOLUTION INTRAVENOUS at 06:25

## 2023-12-23 RX ADMIN — SIMETHICONE 125 MG: 125 TABLET, CHEWABLE ORAL at 08:09

## 2023-12-23 RX ADMIN — VANCOMYCIN HYDROCHLORIDE 125 MG: 5 INJECTION, POWDER, LYOPHILIZED, FOR SOLUTION INTRAVENOUS at 17:42

## 2023-12-23 RX ADMIN — VERAPAMIL HYDROCHLORIDE 80 MG: 80 TABLET ORAL at 06:00

## 2023-12-23 RX ADMIN — WARFARIN SODIUM 4 MG: 4 TABLET ORAL at 17:35

## 2023-12-23 RX ADMIN — ASPIRIN 81 MG: 81 TABLET, COATED ORAL at 04:39

## 2023-12-23 RX ADMIN — METRONIDAZOLE 500 MG: 500 INJECTION, SOLUTION INTRAVENOUS at 21:23

## 2023-12-23 RX ADMIN — HEPARIN SODIUM 17 UNITS/KG/HR: 5000 INJECTION, SOLUTION INTRAVENOUS at 04:36

## 2023-12-23 RX ADMIN — METRONIDAZOLE 500 MG: 500 INJECTION, SOLUTION INTRAVENOUS at 12:12

## 2023-12-23 RX ADMIN — DRONABINOL 5 MG: 5 CAPSULE ORAL at 12:07

## 2023-12-23 RX ADMIN — VANCOMYCIN HYDROCHLORIDE 125 MG: 5 INJECTION, POWDER, LYOPHILIZED, FOR SOLUTION INTRAVENOUS at 01:08

## 2023-12-23 RX ADMIN — METRONIDAZOLE 500 MG: 500 TABLET ORAL at 04:39

## 2023-12-23 RX ADMIN — DRONABINOL 5 MG: 5 CAPSULE ORAL at 17:35

## 2023-12-23 ASSESSMENT — ENCOUNTER SYMPTOMS
CHILLS: 0
HEARTBURN: 0
DIARRHEA: 1
NERVOUS/ANXIOUS: 0
HEADACHES: 0
FEVER: 0
FLANK PAIN: 0
PALPITATIONS: 0
WHEEZING: 0
NECK PAIN: 0
COUGH: 1
FOCAL WEAKNESS: 0
SPEECH CHANGE: 0
BACK PAIN: 0
DIZZINESS: 0
BLURRED VISION: 0
NAUSEA: 0
SPUTUM PRODUCTION: 0
DIAPHORESIS: 0
ABDOMINAL PAIN: 1
SHORTNESS OF BREATH: 1
VOMITING: 0
WEAKNESS: 1
BLOOD IN STOOL: 0
SORE THROAT: 0
SENSORY CHANGE: 0
MYALGIAS: 0

## 2023-12-23 ASSESSMENT — FIBROSIS 4 INDEX: FIB4 SCORE: .8695652173913043478

## 2023-12-23 NOTE — DISCHARGE PLANNING
Case Management Discharge Planning    Admission Date: 11/27/2023  GMLOS: 9  ALOS: 26    6-Clicks ADL Score: 19  6-Clicks Mobility Score: 20      Anticipated Discharge Dispo: Discharge Disposition: D/T to home under A care in anticipation of covered skilled care (06)  Discharge Address: Home ([address] [place] [id number])  Discharge Contact Phone Number: S/O Donna [phone]    DME Needed: No    Action(s) Taken: Discussed pt during IDT rounds. Per attending, pt is refusing treatment and INR is low. Pt is not medically cleared. [facility] has accepted.    Escalations Completed: None    Medically Clear: No    Next Steps: F/U with medical team to discuss discharge needs and barriers.    Barriers to Discharge: Medical clearance and Refusing treatment(s)    Is the patient up for discharge tomorrow: No

## 2023-12-23 NOTE — CARE PLAN
The patient is Stable - Low risk of patient condition declining or worsening    Shift Goals  Clinical Goals: heparin gtt, VS monitoring  Patient Goals: rest  Family Goals: FARIDA    Progress made toward(s) clinical / shift goals:    Problem: Knowledge Deficit - Standard  Goal: Patient and family/care givers will demonstrate understanding of plan of care, disease process/condition, diagnostic tests and medications  Outcome: Progressing     Problem: Hemodynamics  Goal: Patient's hemodynamics, fluid balance and neurologic status will be stable or improve  Outcome: Progressing       Patient is not progressing towards the following goals:

## 2023-12-23 NOTE — PROGRESS NOTES
Hospital Medicine Daily Progress Note    Date of Service  12/23/2023    Chief Complaint  Gurdeep Guerra is a 58 y.o. male admitted 11/27/2023 with diarrhea    Hospital Course  Admitted with symptoms of abdominal pain, nausea, vomiting and diarrhea, CT scan showed evidence of colitis, he was started on empiric coverage with IV Cefepime and Flagyl.  Patient recently underwent mechanical AVR, aortic aneurysm repair, discharged on 11/17/2023.  He also has known history of end-stage renal disease on peritoneal dialysis.  Workup showed he was positive for C. difficile colitis, he was started on oral vancomycin.  There was also concern for possible peritonitis, he was given intraperitoneal IV cefepime.  Nephrology was consulted on the case, he underwent dialysis through his temporary dialysis catheter which was placed on the previous admission.  Also with known history of paroxysmal atrial flutter, and with recent mechanical AVR, he was on Coumadin for anticoagulation.  He required to be placed on heparin drip to bridge Coumadin.    Interval Problem Update    Remains in A-fib    And has been tachycardic overnight persistentl===> 130's    he has been not been taking his oral verapamil for concerns of hypotension however he has had not had any significant hypotension that is concerning.    patient was counseled on the necessity of taking his verapamil for heart rate control that we will stabilize his blood pressure.  His dialysis session today has been put on hold due to his tachycardia    Patient having foul-smelling loose stools    I have restarted test for C. Difficile    Patient given labetalol 10 mg IV push x 1 patient's blood pressure dropped to 75 systolic.  He was not complaining of any dizziness but he was laying in the bed.  He was given a saline bolus of 250 cc x 1    12/17 patient is new to me today, patient is resting in bed, he was complaining of lower abdominal cramps patient denies shortness of breath no  palpitation no chest pain, patient is on heparin drip, chest tube in place, draining 80 cc in the last 24 hours, continue having diarrhea, patient is tolerating oral vancomycin, discussed with pharmacist stopped PPI for now, will consider discussing with ID, heart rate better controlled continue verapamil 3 times daily, discussed with pulmonology patient received tPA today through his chest tube, will continue monitoring.  Chest x-ray in a.m.  12/18 patient in bed, still having diarrhea, abdominal cramps, had a very long discussion with patient regarding plan of care, discussed with nephro, cardio and ID. Continue close monitoring. Patient on iv heparin, monitoring for side effects, continue telemetry, patient starting on amiodarone monitoring for side effects patient did not tolerate medication in the past. Chest tube output 1340, s/p atp on 12/17, per pulm remove ct when output is less than 50 cc.   12/19: Patient seen and examined, afebrile, no nausea or vomiting HR still not controlled in the 120-130   Cardiology following and plan is for ablation tomorrow.  Regarding his chest tube pulmonology following appreciate rec. If less then 50 CC in 2 days pulmonology will consider removing it   Regarding his ESRD nephrology following cont on dialysis    Patient is on IV heparin continue monitoring for side effects include but not limited to bleeding, thrombocytopenia.  12/20: Patient seen and examined, afebrile, having diarrhea this AM. I have consulted infection disease for his C.diff  and case discussed appreciate rec.  Cardiology following case discussed with cardiology going for ablation today for his A.fibb with RVR   Also patient has chest tube in place and pulmonology following case discussed  appreciate rec.   12/21: Patient seen and examined, afebrile, still with diarrhea due to his C.dif. case discussed with ID will add IV flagyl for now but patient is refusing IV so will switch to PO flagyl   Pulmonology  following and plan to remove chest tube today   Had ablation yesterday, cardiology following and case discussed   Cont on heparin for bridging and warfarin   12/22: Patient seen and examined diarrhea improved, discussed with ID will cont on flagyl for another 48 hrs and will need a long taper of po vanco   Cont on warfarin with heparin bridge   12/23; Patient seen and examined, he ius refusing lab draws for his heparin drip protocol, also patient refusing IV flagyl for his C.diff discussed with patient today that in order for us to treat him will need to have to check those labs and will need to treat him with IV flagyl.  Patient upset about labs draws.    Addendum: I was notified by nurse that patient is now agreeable to take his IV flagyl in combination with his Po vanco  Will need 48 hours total of IV flagyl continued by prolonged course of PO vanco please see ID notes   Cont on warfarin INR 1.33     I have discussed this patient's plan of care and discharge plan at IDT rounds today with Case Management, Nursing, Nursing leadership, and other members of the IDT team.    Consultants/Specialty  nephrology and pulmonary  Cardio  EP.     Code Status  Full Code    Disposition  Medically Cleared  I have placed the appropriate orders for post-discharge needs.    Review of Systems  Review of Systems   Constitutional:  Positive for malaise/fatigue. Negative for chills, diaphoresis and fever.   HENT:  Negative for congestion, hearing loss and sore throat.    Eyes:  Negative for blurred vision.   Respiratory:  Positive for cough and shortness of breath. Negative for sputum production and wheezing.    Cardiovascular:  Negative for chest pain, palpitations and leg swelling.   Gastrointestinal:  Positive for abdominal pain and diarrhea. Negative for blood in stool, heartburn, melena, nausea and vomiting.   Genitourinary:  Negative for dysuria, flank pain and hematuria.   Musculoskeletal:  Negative for back pain, joint pain,  myalgias and neck pain.   Skin:  Negative for rash.   Neurological:  Positive for weakness. Negative for dizziness, sensory change, speech change, focal weakness and headaches.   Psychiatric/Behavioral:  The patient is not nervous/anxious.         Physical Exam  Temp:  [36.6 °C (97.9 °F)-37.1 °C (98.8 °F)] 36.7 °C (98.1 °F)  Pulse:  [] 68  Resp:  [14-24] 18  BP: ()/(53-75) 102/64  SpO2:  [93 %-100 %] 96 %    Physical Exam  Vitals and nursing note reviewed.   Constitutional:       Appearance: He is ill-appearing.   HENT:      Head: Normocephalic and atraumatic.      Nose: No congestion.      Mouth/Throat:      Mouth: Mucous membranes are moist.   Eyes:      General: No scleral icterus.        Right eye: No discharge.         Left eye: No discharge.   Cardiovascular:      Rate and Rhythm: Normal rate. Rhythm irregular.   Pulmonary:      Effort: Pulmonary effort is normal. No accessory muscle usage or respiratory distress.      Breath sounds: Rhonchi present.      Comments: Decreased breath sounds at the bases    Chest tube present  Abdominal:      General: There is no distension.      Tenderness: There is no abdominal tenderness. There is no guarding or rebound.      Comments: PD catheter   Musculoskeletal:      Cervical back: Normal range of motion and neck supple. No rigidity or tenderness.      Right lower leg: No edema.      Left lower leg: No edema.   Skin:     General: Skin is warm and dry.   Neurological:      General: No focal deficit present.      Mental Status: He is alert and oriented to person, place, and time.      Cranial Nerves: No cranial nerve deficit.         Fluids    Intake/Output Summary (Last 24 hours) at 12/23/2023 1415  Last data filed at 12/23/2023 0842  Gross per 24 hour   Intake 560 ml   Output 550 ml   Net 10 ml         Laboratory  Recent Labs     12/21/23  0209 12/22/23  0401   WBC 7.1 8.5   RBC 2.90* 2.76*   HEMOGLOBIN 8.4* 8.0*   HEMATOCRIT 28.1* 25.8*   MCV 96.9 93.5   MCH  29.0 29.0   MCHC 29.9* 31.0*   RDW 52.1* 50.1*   PLATELETCT 324 322   MPV 8.8* 8.7*       Recent Labs     12/20/23  1537 12/22/23  0401   SODIUM 135 135   POTASSIUM 5.3 4.3   CHLORIDE 99 98   CO2 26 29   GLUCOSE 123* 91   BUN 35* 32*   CREATININE 7.78* 6.02*   CALCIUM 8.5 7.9*       Recent Labs     12/21/23  0209 12/22/23  0401 12/23/23  0012   INR 1.37* 1.43* 1.33*                 Imaging  DX-CHEST-PORTABLE (1 VIEW)   Final Result      Stable small bilateral pleural effusions and cardiomegaly.      DX-CHEST-PORTABLE (1 VIEW)   Final Result      1.  Cardiomegaly.   2.  Estimated moderate bilateral pleural effusions with interval increase in left pleural effusion.   3.  Underlying bibasilar atelectasis or consolidation.      DX-CHEST-PORTABLE (1 VIEW)   Final Result         1.  Pulmonary edema and/or infiltrates, stable.   2.  Small layering bilateral pleural effusions, right greater than left, stable since prior study   3.  Cardiomegaly   4.  Atherosclerosis      EC-CARMELITA W/O CONT   Final Result      DX-CHEST-PORTABLE (1 VIEW)   Final Result         1.  Pulmonary edema and/or infiltrates, stable.   2.  Small layering bilateral pleural effusions, increased on the right compared to prior study   3.  Cardiomegaly   4.  Atherosclerosis      DX-CHEST-PORTABLE (1 VIEW)   Final Result         1.  Pulmonary edema and/or infiltrates, stable.   2.  Small layering bilateral pleural effusions, stable prior study   3.  Stable right lung base pneumothorax with small pigtail thoracostomy tube in place.   4.  Cardiomegaly   5.  Atherosclerosis      DX-CHEST-PORTABLE (1 VIEW)   Final Result         1.  Pulmonary edema and/or infiltrates, stable.   2.  Small layering bilateral pleural effusions, stable on the right and decreased on the left since prior study.   3.  New right lung base pneumothorax with small pigtail thoracostomy tube in place.   4.  Cardiomegaly   5.  Atherosclerosis      DX-CHEST-PORTABLE (1 VIEW)   Final Result          1.  Pulmonary edema and/or infiltrates.   2.  Small layering bilateral pleural effusions   3.  Cardiomegaly   4.  Atherosclerosis      DX-CHEST-PORTABLE (1 VIEW)   Final Result      Unchanged bilateral pulmonary opacities and suspected bilateral pleural effusions, right worse than left.         DX-CHEST-PORTABLE (1 VIEW)   Final Result      Stable examination.      CT-CHEST (THORAX) W/O   Final Result      1.  Interval decrease in size of large loculated right pleural effusion following following placement of locking loop chest tube.      2.  Smaller loculated pleural effusions posterior superiorly in the right hemithorax there are thorax.      3.  Small left pleural effusion with elevation of left hemidiaphragm.      4.  Dependent partial collapse of the lower lobes.      5.  Patchy airspace opacities in the right lower lobe, lingula, and to a lesser extent right middle lobe suspicious for pneumonitis or parenchymal scarring      6.  Cirrhosis and splenomegaly.      DX-CHEST-PORTABLE (1 VIEW)   Final Result      1.  Redemonstrated right hydropneumothorax with decreased air component, otherwise stable in size.   2.  Bilateral basilar atelectasis and/or consolidation with mild interstitial edema.   3.  Stable enlargement of the cardiomediastinal silhouette.      DX-CHEST-PORTABLE (1 VIEW)   Final Result      Placement of a right pigtail chest tube with decreased right pleural effusion.      Hazy opacities which could be related to hypoinflation or vascular congestion. Correlate clinically for infection.      IR-CHEST TUBE-EMPYEMA RIGHT   Final Result      Successful image guided RIGHT chest tube placement.      Plan: Low wall suction. Follow-up radiograph is pending.      CT-CHEST (THORAX) W/O   Final Result      1.  Large loculated right pleural effusion, worse compared to the October 2023 CT study. Associated atelectasis, with subtotal collapse of the right lower lobe.   2.  Minimal groundglass opacities in the  right lung, infectious/inflammatory. No consolidation.   3.  Small left pleural effusion and associated atelectasis.   4.  Mild right hilar lymphadenopathy, statistically reactive rather than neoplastic.   5.  Cardiomegaly.   6.  Abdomen is detailed separately.      CT-ABDOMEN-PELVIS W/O   Final Result      1.  No new inflammatory process in the abdomen or pelvis.   2.  Minimal fat stranding adjacent to the cecum, nonspecific.   3.  Percutaneous catheter with tip in the pelvis. Small volume of pelvic free fluid.   4.  Colonic diverticulosis.   5.  Partially visualized moderate to large right pleural effusion, which may be loculated. It has increased in size since prior CT study.   6.  Small left pleural effusion.   7.  Cardiomegaly.         [id number] VIEW   Final Result      1.  Benign bowel gas pattern.      2.  Peritoneal dialysis catheter coiled in the pelvis.      [id number] VIEW   Final Result      Nonspecific bowel gas pattern with a moderate colonic stool burden.      DX-CHEST-PORTABLE (1 VIEW)   Final Result      1.  Enlarged cardiac silhouette with vascular congestion/edema.   2.  Lower lobe airspace disease could be due to edema, atelectasis or pneumonitis.      EC-ECHOCARDIOGRAM COMPLETE W/O CONT   Final Result      CT-ABDOMEN-PELVIS W/O   Final Result      1.  Fat stranding adjacent to the cecum, concerning for colitis/typhlitis.   2.  Colonic diverticulosis.   3.  Nonobstructing, tiny left renal stone.   4.  Likely partially loculated moderate right pleural effusion.   5.  Small left pleural effusion.   6.  Adjacent right middle lobe and bilateral lower lobe consolidations, likely atelectasis.   7.  Small pericardial effusion.      DX-CHEST-PORTABLE (1 VIEW)   Final Result      1.  Resolved or improved small left pleural effusion with persistent left basilar atelectasis and/or scarring.   2.  New small right pleural effusion with associated atelectasis and/or consolidation.      CL-EP ABLATION  ATRIAL FLUTTER    (Results Pending)        Assessment/Plan  * C. difficile colitis- (present on admission)  Assessment & Plan  Oral vancomycin - finished course    Stool has become foul-smelling and loose again so we have rechecked stool for C. difficile on 12/16/2023    Continue vancomycin, consider ID consult    I have asked ID to see patient in am.     A-fib (HCC)- (present on admission)  Assessment & Plan  Continue telemetry monitoring  Continue heparin drip  Close monitoring for side effects include but not limited to bleeding, thrombocytopenia    I have discussed with cardiologist Dr Orourke, EP also consulted recommending possible ablation this week.   EP recommended to start amiodarone.   Continue tele  Continue heparin drip for now.     History of mechanical aortic valve replacement- (present on admission)  Assessment & Plan  Coumadin on hold    Hypomagnesemia- (present on admission)  Assessment & Plan  Replaced  Follow level    Loculated pleural effusion- (present on admission)  Assessment & Plan  Status post chest tube on 12/14/23    Daily x-rays    Continue to monitor chest tube output    Pulmonary MD follow-up    Repeated chest x-ray today my reading bilateral pleural effusion right larger than left.  Chest tube is in place discussed with pulmonology.  Cultures negative so far from pleural effusion    Paroxysmal atrial flutter (HCC)- (present on admission)  Assessment & Plan    Tachycardic on 12/16/2023 ordered IV labbetol as he has been refusing his oral verapamil.    Patient counseled about the necessity of taking his oral verapamil  Verapamil  Coumadin on hold  Cardio and EP consulted.     ESRD (end stage renal disease) (HCC)- (present on admission)  Assessment & Plan  HD per Nephrology  On dialysis  Discussed with nephro  Dialysis today    Dyslipidemia- (present on admission)  Assessment & Plan  Lipitor    Coronary artery disease due to lipid rich plaque- (present on admission)  Assessment &  Plan  Aspirin, Lipitor  Troponin elevated but no chest pain, patient is end-stage renal disease, patient is on heparin for A-fib with RVR and holding his oral anticoagulation.  Troponin chronically elevated    Hypertension- (present on admission)  Assessment & Plan  Verapamil with parameters          VTE prophylaxis: Heparin drip      I have performed a physical exam and reviewed and updated ROS and Plan today (12/23/2023). In review of yesterday's note (12/22/2023), there are no changes except as documented above.

## 2023-12-23 NOTE — PROGRESS NOTES
Patient allegedly complain of near syncope episode. He is alert and oriented, VSS, denies falling.   Patient strongly refuses bed alarm and aware of the consequences. We will keep monitoring.

## 2023-12-23 NOTE — PROGRESS NOTES
Inpatient Anticoagulation Service Note for 12/22/2023      Reason for Anticoagulation: Atrial Fibrillation, On-X Aortic/Mitral Valve Replacement   [id number] VASc Score: 2  HAS-BLED Score: 3    Hemoglobin Value: (!) 8  Hematocrit Value: (!) 25.8  Lab Platelet Value: 322  Target INR: 2.0 to 3.0    INR from last 7 days       Date/Time INR Value    12/22/23 0401 1.43    12/21/23 0209 1.37    12/19/23 0620 1.47    12/17/23 2345 1.49    12/17/23 0633 1.4    12/16/23 0802 1.42          Dose from last 7 days       Date/Time Dose (mg)    12/21/23 1607 2    12/20/23 1758 2.5    12/19/23 1714 2.5          Average Dose (mg): 0  Significant Interactions: Antiplatelet Medications, Flagyl  Bridge Therapy: Yes  Bridge Therapy Start Date: 12/19/23  Days of Overlap Therapy: 3  INR Value Greater than 2 Prior to Discontinuation of Parenteral Anticoagulation: Not Applicable    Reversal Agent Administered: Vitamin K By Mouth  Assessment:  58-year-old male with PMH significant for Afib, ESRD on HD, and recent AVR (On-X Aortic Valve) on warfarin PTA. Follows with [facility]. Prior to admission, patient was on warfarin 1.25 mg PO daily (INR was sub-therapeutic at admission).   Patient's warfarin was initially resumed and INR was therapeutic with average of 2.5-3 mg/day. Warfarin was held from 12/11 and reversed with PO Vitamin K 5 mg on 12/12 & 12/13 for IR. Bridging with heparin gtt.   Cleared to resume warfarin 12/19  H/H and PLTs have been stable. Heparin anti-xa supratherapeutic this morning and adjusted per nursing protocol. No signs/symptoms of bleeding  Chest tubes in place   12/22: INR remains subtherapeutic with minimal trend up. DDI with Flagyl will be a total of 4 days and have not seen an effect from it yet. Therefore to help expedite discharge will give a booster dose of 4 mg x1 then resume 3 mg daily. Per previous dosing a booster dose increased INR by ~0.4 points in a day.     Plan:  Warfarin 4 mg  x1 on 12/22  Education Material Provided?: No    Pharmacist suggested discharge dosing: Consider resuming at 3 mg daily with INR follow up within 48 hours of discharge     Angela Rashid, PharmD

## 2023-12-23 NOTE — PROGRESS NOTES
[facility] -  PROGRESS NOTE               Author: Kodak Harvey M.D. Date & Time: 12/23/2023  8:07 AM     HPI:  58 y.o. 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 Abd imaging demonstrated colitis. C. Diff pending and started on abx. He notes abd pain and diarrhea for several weeks. Edema improving with 2.5% dextrose solution. Still has HD permacath in-place. Pain with peritoneal dialysis but no cloudy effluent of fibrin clots. No f/c/s. Normally does [id number] fills all green.     DAILY NEPHROLOGY SUMMARY:  11/28: consult done  11/29: ongoing abd pain, seen on HD-tolerating procedure well, PD fluid concerning for peritonitis  11/30:c.diff positive, started on oral vanc, new onset aflutter-transferred to [facility], wbc improving, Peritoneal cultures remain negative, abd pain improving  12/1: PD culture remains negative, seen on HD-tolerating procedure, abd pain improving, less diarrhea    12/2: HD yesterday 2.5 liter UF, still with SOB decreased abdoimnal pain  12/3: PD last night 1.5 UF, pt still with SOB  12/4: No events, tolerated HD yest with 2.5L UF, BP stable, tachycardic this am, stable on RA, reports SOB has resolved, still with crampy abd discomfort, reports diarrhea is starting to become more formed  12/5: No events, no new labs, BP stable, tachycardic, stable on RA this am, still with crampy abd pain, diarrhea improving, still with nausea/anorexia, denies any CP/LE edema, reports mild SOB  12/6: No events, tolerated HD yest with 1L UF, BP stable, remains tachycardic, reports nausea is improved and having more firm stools, denies any CP/SOB, reports poor appetite  12/7: No events, BP stable, tolerated HD  this am with 1L UF, still doesn't feel well and feels very depressed and frustrated, +abd pain and n/v this am and unable to take PO  12/8: patient is doing well, resting in bed. Plan next iHD tomorrow  12/09: patient is seen during HD, refused labs this am. Does report some abdominal discomfort this am  12/10: patient had iHD yesterday, UF 2000 ml. Abdominal issues ongoing  12/11: pt had CT thorax done yesterday, showing loculated effusion. Pt feels SOB. Diarrhea continues but is better overall.   12/12: pt reports no more diarrhea, only 2-3 loose stools. Seen on HD and tolerating. Pulm recommending chest tube for loculated effusion.   12/13: pt awaiting improvement in INR before chest tube placement. Pt frustrated at prolonged hospitalization.  12/14: Pt had HD this AM and chest tube placement after. Pt seen in Room  12/15: Pt seen in room. Reports breathing is better now chest tube is in.   12/16: chest tube drainage slowing down. Pt breathing better. However, pt tachycardic in 130s this AM. He reports return of diarrhea and some abdominal pain. dialysis on hold for now  12/17: dialysis held yesterday due to tachycardia and later hypotension. HR and BP within normal ranges this AM. Pt positive for C Diff again, with continued diarrhea.   12/18: HD yesterday net UF 1L, pt reports SOB thinks he may have fluids in  his lungs and wants to do additional HD today to remove fluids,  denies further D, continue to have abdominal cramps  12/19: HD yesterday net UF 1.5L, remains tachycardic / aflutter ablation planned 12/20, states he feels tired. No d/ abd cramping   12/20:no labs today, pt reports no c/o,plan cardiac ablation today   12/21:Aflutter ablation 12/20, VSS, seen on HD, plan to remove chest tube today, CXR 12/21 reviewed stable pulmonary edema and infiltrates  12/22: HD today net UF 1L, pt seen start  of HD, denies any diarrhea, abd cramping, chest tube is out   12/23:HD yesterday,net UF 2L, frustrated due to  "ongoing diarrhea       REVIEW OF SYSTEMS:    +diarrhea, +abd pain  10 point ROS reviewed and is as per HPI/daily summary or otherwise negative    PMH/PSH/SH/FH:   Reviewed and unchanged since admission note    CURRENT MEDICATIONS:   Reviewed from admission to present day    VS:  /75   Pulse 97   Temp 37.1 °C (98.8 °F) (Temporal)   Resp (!) 24   Ht 1.702 m (5' 7\")   Wt 74.9 kg (165 lb 2 oz)   SpO2 95%   BMI 25.86 kg/m²     Physical Exam  Vitals and nursing note reviewed.   Constitutional:       General: He is not in acute distress.     Appearance: Normal appearance.   HENT:      Head: Normocephalic and atraumatic.   Eyes:      General: No scleral icterus.     Extraocular Movements: Extraocular movements intact.   Cardiovascular:      Rate and Rhythm: Regular rhythm.   Pulmonary:      Effort: Pulmonary effort is normal.   Abdominal:      General: Bowel sounds are normal.      Palpations: Abdomen is soft.      Tenderness: There is no abdominal tenderness.   Musculoskeletal:         General: No deformity.      Right lower leg: No edema.      Left lower leg: No edema.   Skin:     General: Skin is warm and dry.      Findings: No rash.   Neurological:      General: No focal deficit present.      Mental Status: He is alert and oriented to person, place, and time.   Psychiatric:         Mood and Affect: Mood normal.         Behavior: Behavior normal. Behavior is cooperative.       R chest tube in place (12/14/23)    Fluids:  In: 1900 [P.O.:1400; Dialysis:500]  Out: 3050     LABS:  Recent Labs     12/20/23  1537 12/22/23  0401   SODIUM 135 135   POTASSIUM 5.3 4.3   CHLORIDE 99 98   CO2 26 29   GLUCOSE 123* 91   BUN 35* 32*   CREATININE 7.78* 6.02*   CALCIUM 8.5 7.9*       IMAGING:   All imaging reviewed from admission to present day  CXR 12/22   1.  Cardiomegaly.   2.  Estimated moderate bilateral pleural effusions with interval increase in left pleural effusion.   3.  Underlying bibasilar atelectasis or " consolidation.     IMPRESSION:  # ESRD on outpt PD   -Etiology 2/2 FSGS  - s/p permcath on 11/10, and on HD currently  # R/O PD cath associated peritonitis  - CX negative  # c. Diff.  -symptoms improved with initiation of oral vanc  -PD guidelines vague on scenarios such as this,  but pt on HD currently  # S/P mechanical AVR/ Ascending aneurysm repair 11/2023   # CKD-MBD  - P elevated  # Anemia of CKD, below goal hgb 10-11, low iron stores 12/17     Resumed EPO 6000U IV w HD on 12/12  # BL pleural effusions/congestion     Large R loculated effusion on CT thorax 12/11     S/p chest tube placement on 12/14  # Pericardial effusion   # C. Diff colitis  -C.diff positive  # Aflutter s/p ablation      PLAN:  - No HD today  - Switching  to HD MWF schedule and PRN to continue with OP dialysis schedule   - Cont EPO 6000U IV w HD  - Cont holding PD for now (last on 12/3),  can re-eval as outpt  - PD catheter flushes Q weekly, last flushed 12/18    - C. diff tx/mgmt per primary team, diarrhea/pain and +toxin returned on 12/16  - Renal diet, no dietary pr restrictions   - Phos binder with meals   - Dose all medications as per ESRD  - OP HD chair has been arranged at [facility], re challenge of PD once stable in OP setting   - IV iron loading

## 2023-12-23 NOTE — PROGRESS NOTES
Group Topic:  Education    Date: 4/9/2021  Start Time: 12:30 PM  End Time:  2:00 PM  Facilitators: Humaira Bonilla LPC    Focus: Social Media Habits  Number in attendance: 9    Group members participated in an educational activity that targeted social media norms and pressures. We watched part of the documentary, \"The Social Dilemma,\" to facilitate a conversation highlighting our social media habits, as well as the dangers of complying to online temptations. Pts engaged in a discussion following the documentary to share their experiences and opinions about social media.  Pt appeared engaged and attentive throughout this time as he was willing to contribute toward our group discussion. He met all expectations.    Method: Group  Attendance: Present  Participation: Active  Patient Response: Appropriate feedback, Attentive and Interested in topic  Mood: Normal  Affect: Type: Euthymic (normal mood)   Range: Full (normal)   Congruency: Congruent   Stability: Stable  Behavior/Socialization: Appropriate to group and Engaged  Thought Process: Focused and Linear  Task Performance: Follows directions  Patient Evaluation: Independent - full participation   Telemetry Shift Summary    Rhythm SR/AFib  HR Range 86-90  Ectopy PVC, PAC  Measurements .15/.07/.37        Normal Values  Rhythm SR  HR Range    Measurements 0.12-0.20 / 0.06-0.10  / 0.30-0.52

## 2023-12-23 NOTE — CARE PLAN
The patient is Stable - Low risk of patient condition declining or worsening    Shift Goals  Clinical Goals: anticoag management  Patient Goals: feel better  Family Goals: FARIDA    Progress made toward(s) clinical / shift goals:    Problem: Skin Integrity  Goal: Skin integrity is maintained or improved  Outcome: Progressing  Patient's skin integrity has maintained intact

## 2023-12-24 ENCOUNTER — APPOINTMENT (OUTPATIENT)
Dept: RADIOLOGY | Facility: MEDICAL CENTER | Age: 58
DRG: 981 | End: 2023-12-24
Attending: HOSPITALIST
Payer: COMMERCIAL

## 2023-12-24 LAB
ANION GAP SERPL CALC-SCNC: 10 MMOL/L (ref 7–16)
BUN SERPL-MCNC: 36 MG/DL (ref 8–22)
CALCIUM SERPL-MCNC: 7.8 MG/DL (ref 8.5–10.5)
CHLORIDE SERPL-SCNC: 101 MMOL/L (ref 96–112)
CO2 SERPL-SCNC: 24 MMOL/L (ref 20–33)
CREAT SERPL-MCNC: 7.18 MG/DL (ref 0.5–1.4)
ERYTHROCYTE [DISTWIDTH] IN BLOOD BY AUTOMATED COUNT: 52.2 FL (ref 35.9–50)
GFR SERPLBLD CREATININE-BSD FMLA CKD-EPI: 8 ML/MIN/1.73 M 2
GLUCOSE SERPL-MCNC: 110 MG/DL (ref 65–99)
HCT VFR BLD AUTO: 27.4 % (ref 42–52)
HGB BLD-MCNC: 8.3 G/DL (ref 14–18)
INR PPP: 1.71 (ref 0.87–1.13)
MCH RBC QN AUTO: 29.1 PG (ref 27–33)
MCHC RBC AUTO-ENTMCNC: 30.3 G/DL (ref 32.3–36.5)
MCV RBC AUTO: 96.1 FL (ref 81.4–97.8)
PLATELET # BLD AUTO: 303 K/UL (ref 164–446)
PMV BLD AUTO: 8.7 FL (ref 9–12.9)
POTASSIUM SERPL-SCNC: 4.7 MMOL/L (ref 3.6–5.5)
PROTHROMBIN TIME: 20.3 SEC (ref 12–14.6)
RBC # BLD AUTO: 2.85 M/UL (ref 4.7–6.1)
SODIUM SERPL-SCNC: 135 MMOL/L (ref 135–145)
UFH PPP CHRO-ACNC: 0.28 IU/ML
WBC # BLD AUTO: 5.3 K/UL (ref 4.8–10.8)

## 2023-12-24 PROCEDURE — 700111 HCHG RX REV CODE 636 W/ 250 OVERRIDE (IP): Performed by: INTERNAL MEDICINE

## 2023-12-24 PROCEDURE — A9270 NON-COVERED ITEM OR SERVICE: HCPCS | Performed by: HOSPITALIST

## 2023-12-24 PROCEDURE — A9270 NON-COVERED ITEM OR SERVICE: HCPCS | Performed by: INTERNAL MEDICINE

## 2023-12-24 PROCEDURE — A9270 NON-COVERED ITEM OR SERVICE: HCPCS | Performed by: STUDENT IN AN ORGANIZED HEALTH CARE EDUCATION/TRAINING PROGRAM

## 2023-12-24 PROCEDURE — 700102 HCHG RX REV CODE 250 W/ 637 OVERRIDE(OP): Performed by: STUDENT IN AN ORGANIZED HEALTH CARE EDUCATION/TRAINING PROGRAM

## 2023-12-24 PROCEDURE — A9270 NON-COVERED ITEM OR SERVICE: HCPCS | Performed by: FAMILY MEDICINE

## 2023-12-24 PROCEDURE — 700102 HCHG RX REV CODE 250 W/ 637 OVERRIDE(OP): Performed by: FAMILY MEDICINE

## 2023-12-24 PROCEDURE — 99233 SBSQ HOSP IP/OBS HIGH 50: CPT | Performed by: INTERNAL MEDICINE

## 2023-12-24 PROCEDURE — 700102 HCHG RX REV CODE 250 W/ 637 OVERRIDE(OP): Performed by: HOSPITALIST

## 2023-12-24 PROCEDURE — 85520 HEPARIN ASSAY: CPT

## 2023-12-24 PROCEDURE — 36415 COLL VENOUS BLD VENIPUNCTURE: CPT

## 2023-12-24 PROCEDURE — 700111 HCHG RX REV CODE 636 W/ 250 OVERRIDE (IP): Mod: JZ | Performed by: INTERNAL MEDICINE

## 2023-12-24 PROCEDURE — 71045 X-RAY EXAM CHEST 1 VIEW: CPT

## 2023-12-24 PROCEDURE — 700111 HCHG RX REV CODE 636 W/ 250 OVERRIDE (IP): Performed by: FAMILY MEDICINE

## 2023-12-24 PROCEDURE — 700102 HCHG RX REV CODE 250 W/ 637 OVERRIDE(OP): Performed by: NURSE PRACTITIONER

## 2023-12-24 PROCEDURE — 700111 HCHG RX REV CODE 636 W/ 250 OVERRIDE (IP)

## 2023-12-24 PROCEDURE — 700102 HCHG RX REV CODE 250 W/ 637 OVERRIDE(OP): Performed by: INTERNAL MEDICINE

## 2023-12-24 PROCEDURE — 80048 BASIC METABOLIC PNL TOTAL CA: CPT

## 2023-12-24 PROCEDURE — A9270 NON-COVERED ITEM OR SERVICE: HCPCS | Performed by: NURSE PRACTITIONER

## 2023-12-24 PROCEDURE — 700101 HCHG RX REV CODE 250

## 2023-12-24 PROCEDURE — 85027 COMPLETE CBC AUTOMATED: CPT

## 2023-12-24 PROCEDURE — 85610 PROTHROMBIN TIME: CPT

## 2023-12-24 PROCEDURE — 770020 HCHG ROOM/CARE - TELE (206)

## 2023-12-24 RX ORDER — WARFARIN SODIUM 3 MG/1
3 TABLET ORAL DAILY
Status: DISCONTINUED | OUTPATIENT
Start: 2023-12-24 | End: 2023-12-25

## 2023-12-24 RX ADMIN — VANCOMYCIN HYDROCHLORIDE 125 MG: 5 INJECTION, POWDER, LYOPHILIZED, FOR SOLUTION INTRAVENOUS at 19:38

## 2023-12-24 RX ADMIN — HEPARIN SODIUM 19 UNITS/KG/HR: 5000 INJECTION, SOLUTION INTRAVENOUS at 12:38

## 2023-12-24 RX ADMIN — SIMETHICONE 125 MG: 125 TABLET, CHEWABLE ORAL at 16:38

## 2023-12-24 RX ADMIN — ONDANSETRON 4 MG: 2 INJECTION INTRAMUSCULAR; INTRAVENOUS at 10:09

## 2023-12-24 RX ADMIN — VANCOMYCIN HYDROCHLORIDE 125 MG: 5 INJECTION, POWDER, LYOPHILIZED, FOR SOLUTION INTRAVENOUS at 01:34

## 2023-12-24 RX ADMIN — Medication 1 CAPSULE: at 07:40

## 2023-12-24 RX ADMIN — METRONIDAZOLE 500 MG: 500 INJECTION, SOLUTION INTRAVENOUS at 14:01

## 2023-12-24 RX ADMIN — ASPIRIN 81 MG: 81 TABLET, COATED ORAL at 07:51

## 2023-12-24 RX ADMIN — VANCOMYCIN HYDROCHLORIDE 125 MG: 5 INJECTION, POWDER, LYOPHILIZED, FOR SOLUTION INTRAVENOUS at 14:02

## 2023-12-24 RX ADMIN — OXYCODONE 2.5 MG: 5 TABLET ORAL at 16:38

## 2023-12-24 RX ADMIN — VANCOMYCIN HYDROCHLORIDE 125 MG: 5 INJECTION, POWDER, LYOPHILIZED, FOR SOLUTION INTRAVENOUS at 07:40

## 2023-12-24 RX ADMIN — WARFARIN SODIUM 3 MG: 3 TABLET ORAL at 16:38

## 2023-12-24 RX ADMIN — VERAPAMIL HYDROCHLORIDE 80 MG: 80 TABLET ORAL at 16:37

## 2023-12-24 RX ADMIN — METRONIDAZOLE 500 MG: 500 INJECTION, SOLUTION INTRAVENOUS at 05:24

## 2023-12-24 RX ADMIN — DRONABINOL 5 MG: 5 CAPSULE ORAL at 16:37

## 2023-12-24 RX ADMIN — SIMETHICONE 125 MG: 125 TABLET, CHEWABLE ORAL at 08:25

## 2023-12-24 RX ADMIN — ATORVASTATIN CALCIUM 40 MG: 40 TABLET, FILM COATED ORAL at 19:37

## 2023-12-24 RX ADMIN — VERAPAMIL HYDROCHLORIDE 80 MG: 80 TABLET ORAL at 11:44

## 2023-12-24 RX ADMIN — VERAPAMIL HYDROCHLORIDE 80 MG: 80 TABLET ORAL at 07:51

## 2023-12-24 RX ADMIN — METRONIDAZOLE 500 MG: 500 INJECTION, SOLUTION INTRAVENOUS at 21:56

## 2023-12-24 RX ADMIN — DRONABINOL 5 MG: 5 CAPSULE ORAL at 11:45

## 2023-12-24 ASSESSMENT — ENCOUNTER SYMPTOMS
FLANK PAIN: 0
BLOOD IN STOOL: 0
CHILLS: 0
SHORTNESS OF BREATH: 0
FOCAL WEAKNESS: 0
ABDOMINAL PAIN: 0
SPEECH CHANGE: 0
HEARTBURN: 0
FEVER: 0
PALPITATIONS: 0
WHEEZING: 0
DIARRHEA: 0
DIAPHORESIS: 0
NERVOUS/ANXIOUS: 0
SORE THROAT: 0
COUGH: 1
VOMITING: 0
DIZZINESS: 0
WEAKNESS: 1
BACK PAIN: 0
MYALGIAS: 0
NAUSEA: 0
SENSORY CHANGE: 0
BLURRED VISION: 0
NECK PAIN: 0
HEADACHES: 0
SPUTUM PRODUCTION: 0

## 2023-12-24 ASSESSMENT — FIBROSIS 4 INDEX: FIB4 SCORE: 0.92

## 2023-12-24 ASSESSMENT — PAIN DESCRIPTION - PAIN TYPE
TYPE: ACUTE PAIN
TYPE: ACUTE PAIN

## 2023-12-24 NOTE — PROGRESS NOTES
"ACT Found patient in the bathroom with heparin drip set ripped in half.    ACT disconnected broken tubing to prevent further leaking from pts arm.    Patient stated \"you are not poking me again\" and that he \"didn't want to shit his pants.\"    Pt educated on fall precautions, and on infection precautions for Ivs.    RN aware.      "

## 2023-12-24 NOTE — PROGRESS NOTES
Hospital Medicine Daily Progress Note    Date of Service  12/24/2023    Chief Complaint  Gurdeep Guerra is a 58 y.o. male admitted 11/27/2023 with diarrhea    Hospital Course:    58-year-old male with history of end-stage kidney disease on dialysis, A-fib with mechanical valve replacement and  who was admitted 11/27 with symptoms of abdominal pain, nausea, vomiting and diarrhea, CT scan showed evidence of colitis, he was started on empiric coverage with IV Cefepime and Flagyl.  Patient recently underwent mechanical AVR, aortic aneurysm repair, discharged on 11/17/2023.  He also has known history of end-stage renal disease on peritoneal dialysis.  Workup showed he was positive for C. difficile colitis, he was started on oral vancomycin.  There was also concern for possible peritonitis, he was given intraperitoneal IV cefepime.  ID was consulted for recurrent C. difficile and planning for long tapering vancomycin. 125 mg orally 4 times daily for 14 days, then  125 mg orally 2 times daily for 7 days, then 125 mg orally once daily for 7 days, then 125 mg orally every 2 to 3 days for 8 weeks    Nephrology was consulted on the case, he underwent dialysis through his temporary dialysis catheter which was placed on the previous admission.        Also with known history of paroxysmal atrial flutter, and with recent mechanical AVR, patient underwent ablation by Dr. Velasquez on 12/20.  Patient has been stable with sinus rhythm, to continue warfarin and verapamin.    12/17 patient is new to me today, patient is resting in bed, he was complaining of lower abdominal cramps patient denies shortness of breath no palpitation no chest pain, patient is on heparin drip, chest tube in place, draining 80 cc in the last 24 hours, continue having diarrhea, patient is tolerating oral vancomycin, discussed with pharmacist stopped PPI for now, will consider discussing with ID, heart rate better controlled continue verapamil 3 times daily,  discussed with pulmonology patient received tPA today through his chest tube, will continue monitoring.  Chest x-ray in a.m.  12/18 patient in bed, still having diarrhea, abdominal cramps, had a very long discussion with patient regarding plan of care, discussed with nephro, cardio and ID. Continue close monitoring. Patient on iv heparin, monitoring for side effects, continue telemetry, patient starting on amiodarone monitoring for side effects patient did not tolerate medication in the past. Chest tube output 1340, s/p atp on 12/17, per pulm remove ct when output is less than 50 cc.   12/19: Patient seen and examined, afebrile, no nausea or vomiting HR still not controlled in the 120-130   Cardiology following and plan is for ablation tomorrow.  Regarding his chest tube pulmonology following appreciate rec. If less then 50 CC in 2 days pulmonology will consider removing it   Regarding his ESRD nephrology following cont on dialysis    Patient is on IV heparin continue monitoring for side effects include but not limited to bleeding, thrombocytopenia.  12/20: Patient seen and examined, afebrile, having diarrhea this AM. I have consulted infection disease for his C.diff  and case discussed appreciate rec.  Cardiology following case discussed with cardiology going for ablation today for his A.fibb with RVR   Also patient has chest tube in place and pulmonology following case discussed  appreciate rec.   12/21: Patient seen and examined, afebrile, still with diarrhea due to his C.dif. case discussed with ID will add IV flagyl for now but patient is refusing IV so will switch to PO flagyl   Pulmonology following and plan to remove chest tube today   Had ablation yesterday, cardiology following and case discussed   Cont on heparin for bridging and warfarin   12/22: Patient seen and examined diarrhea improved, discussed with ID will cont on flagyl for another 48 hrs and will need a long taper of po vanco   Cont on warfarin  with heparin bridge   12/23; Patient seen and examined, he ius refusing lab draws for his heparin drip protocol, also patient refusing IV flagyl for his C.diff discussed with patient today that in order for us to treat him will need to have to check those labs and will need to treat him with IV flagyl.  Patient upset about labs draws    Interval Problem Update  -Evaluated examined the patient at bedside, denied any new symptoms, no fever or chills, patient stated the stool is solid today.  No leukocytosis.  -Patient has been refusing labs for monitoring heparin, discussed the risk of hypercoagulation at the same time risk of hypocoagulation, patient understood, INR today 1.7, continue warfarin per pharmacy.  And heparin bridging.  -Continue vancomycin long tapering by ID, close monitoring and consider GI consult for stool transplant if needed.  -The plan of care was discussed in detail with the patient and his wife, answered all their questions, discussed the importance of medication adherence, discussed the risk/benefit from all medications, they understood and agreed.        I have discussed this patient's plan of care and discharge plan at IDT rounds today with Case Management, Nursing, Nursing leadership, and other members of the IDT team.    Consultants/Specialty  nephrology and pulmonary  Cardio  EP.     Code Status  Full Code    Disposition  The patient is not medically cleared for discharge to home or a post-acute facility.  Anticipate discharge to: home with organized home healthcare and close outpatient follow-up    I have placed the appropriate orders for post-discharge needs.    Review of Systems  Review of Systems   Constitutional:  Positive for malaise/fatigue. Negative for chills, diaphoresis and fever.   HENT:  Negative for congestion, hearing loss and sore throat.    Eyes:  Negative for blurred vision.   Respiratory:  Positive for cough. Negative for sputum production, shortness of breath and  wheezing.    Cardiovascular:  Negative for chest pain, palpitations and leg swelling.   Gastrointestinal:  Negative for abdominal pain, blood in stool, diarrhea, heartburn, melena, nausea and vomiting.   Genitourinary:  Negative for dysuria, flank pain and hematuria.   Musculoskeletal:  Negative for back pain, joint pain, myalgias and neck pain.   Skin:  Negative for rash.   Neurological:  Positive for weakness. Negative for dizziness, sensory change, speech change, focal weakness and headaches.   Psychiatric/Behavioral:  The patient is not nervous/anxious.         Physical Exam  Temp:  [36.6 °C (97.9 °F)-37.1 °C (98.8 °F)] 36.6 °C (97.9 °F)  Pulse:  [67-93] 82  Resp:  [16-20] 16  BP: ()/(52-70) 110/69  SpO2:  [92 %-99 %] 96 %    Physical Exam  Vitals and nursing note reviewed.   Constitutional:       Appearance: He is not ill-appearing.   HENT:      Head: Normocephalic and atraumatic.      Nose: No congestion.      Mouth/Throat:      Mouth: Mucous membranes are moist.   Eyes:      General: No scleral icterus.        Right eye: No discharge.         Left eye: No discharge.   Cardiovascular:      Rate and Rhythm: Normal rate. Rhythm irregular.   Pulmonary:      Effort: Pulmonary effort is normal. No accessory muscle usage or respiratory distress.      Breath sounds: Rhonchi present. No wheezing.      Comments: Decreased breath sounds at the bases    Chest tube present  Abdominal:      General: There is no distension.      Tenderness: There is no abdominal tenderness. There is no guarding or rebound.      Comments: PD catheter   Musculoskeletal:      Cervical back: Normal range of motion and neck supple. No rigidity or tenderness.      Right lower leg: No edema.      Left lower leg: No edema.   Skin:     General: Skin is warm and dry.   Neurological:      General: No focal deficit present.      Mental Status: He is alert and oriented to person, place, and time.      Cranial Nerves: No cranial nerve deficit.          Fluids    Intake/Output Summary (Last 24 hours) at 12/24/2023 1554  Last data filed at 12/24/2023 0100  Gross per 24 hour   Intake 260 ml   Output 575 ml   Net -315 ml       Laboratory  Recent Labs     12/22/23  0401 12/24/23  0608   WBC 8.5 5.3   RBC 2.76* 2.85*   HEMOGLOBIN 8.0* 8.3*   HEMATOCRIT 25.8* 27.4*   MCV 93.5 96.1   MCH 29.0 29.1   MCHC 31.0* 30.3*   RDW 50.1* 52.2*   PLATELETCT 322 303   MPV 8.7* 8.7*     Recent Labs     12/22/23  0401 12/24/23  0608   SODIUM 135 135   POTASSIUM 4.3 4.7   CHLORIDE 98 101   CO2 29 24   GLUCOSE 91 110*   BUN 32* 36*   CREATININE 6.02* 7.18*   CALCIUM 7.9* 7.8*     Recent Labs     12/22/23  0401 12/23/23  0012 12/24/23  0608   INR 1.43* 1.33* 1.71*               Imaging  DX-CHEST-PORTABLE (1 VIEW)   Final Result      Stable small to moderate bilateral pleural effusions and cardiomegaly.      DX-CHEST-PORTABLE (1 VIEW)   Final Result      Stable small bilateral pleural effusions and cardiomegaly.      DX-CHEST-PORTABLE (1 VIEW)   Final Result      1.  Cardiomegaly.   2.  Estimated moderate bilateral pleural effusions with interval increase in left pleural effusion.   3.  Underlying bibasilar atelectasis or consolidation.      DX-CHEST-PORTABLE (1 VIEW)   Final Result         1.  Pulmonary edema and/or infiltrates, stable.   2.  Small layering bilateral pleural effusions, right greater than left, stable since prior study   3.  Cardiomegaly   4.  Atherosclerosis      EC-CARMELITA W/O CONT   Final Result      DX-CHEST-PORTABLE (1 VIEW)   Final Result         1.  Pulmonary edema and/or infiltrates, stable.   2.  Small layering bilateral pleural effusions, increased on the right compared to prior study   3.  Cardiomegaly   4.  Atherosclerosis      DX-CHEST-PORTABLE (1 VIEW)   Final Result         1.  Pulmonary edema and/or infiltrates, stable.   2.  Small layering bilateral pleural effusions, stable prior study   3.  Stable right lung base pneumothorax with small pigtail  thoracostomy tube in place.   4.  Cardiomegaly   5.  Atherosclerosis      DX-CHEST-PORTABLE (1 VIEW)   Final Result         1.  Pulmonary edema and/or infiltrates, stable.   2.  Small layering bilateral pleural effusions, stable on the right and decreased on the left since prior study.   3.  New right lung base pneumothorax with small pigtail thoracostomy tube in place.   4.  Cardiomegaly   5.  Atherosclerosis      DX-CHEST-PORTABLE (1 VIEW)   Final Result         1.  Pulmonary edema and/or infiltrates.   2.  Small layering bilateral pleural effusions   3.  Cardiomegaly   4.  Atherosclerosis      DX-CHEST-PORTABLE (1 VIEW)   Final Result      Unchanged bilateral pulmonary opacities and suspected bilateral pleural effusions, right worse than left.         DX-CHEST-PORTABLE (1 VIEW)   Final Result      Stable examination.      CT-CHEST (THORAX) W/O   Final Result      1.  Interval decrease in size of large loculated right pleural effusion following following placement of locking loop chest tube.      2.  Smaller loculated pleural effusions posterior superiorly in the right hemithorax there are thorax.      3.  Small left pleural effusion with elevation of left hemidiaphragm.      4.  Dependent partial collapse of the lower lobes.      5.  Patchy airspace opacities in the right lower lobe, lingula, and to a lesser extent right middle lobe suspicious for pneumonitis or parenchymal scarring      6.  Cirrhosis and splenomegaly.      DX-CHEST-PORTABLE (1 VIEW)   Final Result      1.  Redemonstrated right hydropneumothorax with decreased air component, otherwise stable in size.   2.  Bilateral basilar atelectasis and/or consolidation with mild interstitial edema.   3.  Stable enlargement of the cardiomediastinal silhouette.      DX-CHEST-PORTABLE (1 VIEW)   Final Result      Placement of a right pigtail chest tube with decreased right pleural effusion.      Hazy opacities which could be related to hypoinflation or vascular  congestion. Correlate clinically for infection.      IR-CHEST TUBE-EMPYEMA RIGHT   Final Result      Successful image guided RIGHT chest tube placement.      Plan: Low wall suction. Follow-up radiograph is pending.      CT-CHEST (THORAX) W/O   Final Result      1.  Large loculated right pleural effusion, worse compared to the October 2023 CT study. Associated atelectasis, with subtotal collapse of the right lower lobe.   2.  Minimal groundglass opacities in the right lung, infectious/inflammatory. No consolidation.   3.  Small left pleural effusion and associated atelectasis.   4.  Mild right hilar lymphadenopathy, statistically reactive rather than neoplastic.   5.  Cardiomegaly.   6.  Abdomen is detailed separately.      CT-ABDOMEN-PELVIS W/O   Final Result      1.  No new inflammatory process in the abdomen or pelvis.   2.  Minimal fat stranding adjacent to the cecum, nonspecific.   3.  Percutaneous catheter with tip in the pelvis. Small volume of pelvic free fluid.   4.  Colonic diverticulosis.   5.  Partially visualized moderate to large right pleural effusion, which may be loculated. It has increased in size since prior CT study.   6.  Small left pleural effusion.   7.  Cardiomegaly.         [id number] VIEW   Final Result      1.  Benign bowel gas pattern.      2.  Peritoneal dialysis catheter coiled in the pelvis.      [id number] VIEW   Final Result      Nonspecific bowel gas pattern with a moderate colonic stool burden.      DX-CHEST-PORTABLE (1 VIEW)   Final Result      1.  Enlarged cardiac silhouette with vascular congestion/edema.   2.  Lower lobe airspace disease could be due to edema, atelectasis or pneumonitis.      EC-ECHOCARDIOGRAM COMPLETE W/O CONT   Final Result      CT-ABDOMEN-PELVIS W/O   Final Result      1.  Fat stranding adjacent to the cecum, concerning for colitis/typhlitis.   2.  Colonic diverticulosis.   3.  Nonobstructing, tiny left renal stone.   4.  Likely partially loculated  moderate right pleural effusion.   5.  Small left pleural effusion.   6.  Adjacent right middle lobe and bilateral lower lobe consolidations, likely atelectasis.   7.  Small pericardial effusion.      DX-CHEST-PORTABLE (1 VIEW)   Final Result      1.  Resolved or improved small left pleural effusion with persistent left basilar atelectasis and/or scarring.   2.  New small right pleural effusion with associated atelectasis and/or consolidation.      CL-EP ABLATION ATRIAL FLUTTER    (Results Pending)        Assessment/Plan  * C. difficile colitis- (present on admission)  Assessment & Plan  Second recurrence   Treated with vancomycin during the first infection  Recheck stool for C. difficile on 12/16 and was positive  ID was consulted and planning for long tapering  Close monitoring and consider GI consult for stool transplant if needed  Improving, no leukocytosis, no fever and improving on his diarrhea    ESRD (end stage renal disease) (HCC)- (present on admission)  Assessment & Plan  Chronic kidney disease on dialysis   Received IV iron  Dialysis per nephrology, continue hemodialysis at this time and reevaluate the patient for PT as outpatient    A-fib (HCC)- (present on admission)  Assessment & Plan  Patient underwent ablation on 12/20  Patient has been sinus  Continue warfarin and verapamil     History of mechanical aortic valve replacement- (present on admission)  Assessment & Plan  Continue Coumadin and bridging with heparin  Follow-up with cardiology as outpatient    Hypomagnesemia- (present on admission)  Assessment & Plan  Replaced  Follow level    Loculated pleural effusion- (present on admission)  Assessment & Plan  Status post chest tube on 12/14/23    Daily x-rays    Continue to monitor chest tube output    Pulmonary MD follow-up    Repeated chest x-ray today my reading bilateral pleural effusion right larger than left.  Chest tube is in place discussed with pulmonology.  Cultures negative so far from  pleural effusion  Chest tube was removed  Stable    Paroxysmal atrial flutter (HCC)- (present on admission)  Assessment & Plan  Tachycardic on 12/16/2023 ordered IV labbetol as he has been refusing his oral verapamil.  Patient counseled about the necessity of taking his oral verapamil  Verapamil, patient's been refusing some doses.  Warfarin and heparin drip  Patient stable after ablation with sinus    Dyslipidemia- (present on admission)  Assessment & Plan  Lipitor    Coronary artery disease due to lipid rich plaque- (present on admission)  Assessment & Plan  Aspirin, Lipitor  Troponin elevated but no chest pain, patient is end-stage renal disease, patient is on heparin for A-fib with RVR and holding his oral anticoagulation.  Troponin chronically elevated    Hypertension- (present on admission)  Assessment & Plan  Verapamil with parameters          VTE prophylaxis: Heparin drip      I have performed a physical exam and reviewed and updated ROS and Plan today (12/24/2023). In review of yesterday's note (12/23/2023), there are no changes except as documented above.      Greater than 51 minutes spent prepping to see patient (e.g. review of tests) obtaining and/or reviewing separately obtained history. Performing a medically appropriate examination and/ evaluation.  Counseling and educating the patient/family/caregiver.  Ordering medications, tests, or procedures.  Referring and communicating with other health care professionals.  Documenting clinical information in EPIC.  Independently interpreting results and communicating results to patient/family/caregiver.  Care coordination

## 2023-12-24 NOTE — PROGRESS NOTES
Inpatient Anticoagulation Service Note for 12/24/2023      Reason for Anticoagulation: Atrial Fibrillation, On-X Aortic/Mitral Valve Replacement   [id number] VASc Score: 2  HAS-BLED Score: 3    Hemoglobin Value: (!) 8.3  Hematocrit Value: (!) 27.4  Lab Platelet Value: 303  Target INR: 2.0 to 3.0    INR from last 7 days       Date/Time INR Value    12/24/23 0608 1.71    12/23/23 0012 1.33    12/22/23 0401 1.43    12/21/23 0209 1.37    12/19/23 0620 1.47    12/17/23 2345 1.49          Dose from last 7 days       Date/Time Dose (mg)    12/24/23 1434 3    12/23/23 1630 4    12/22/23 0426 2    12/21/23 1607 2    12/20/23 1758 2.5    12/19/23 1714 2.5          Average Dose (mg): PTA 1.25 mg daily  Significant Interactions: Antiplatelet Medications, Flagyl  Bridge Therapy: Yes  Bridge Therapy Start Date: 12/19/23  Days of Overlap Therapy: 5  INR Value Greater than 2 Prior to Discontinuation of Parenteral Anticoagulation: Not Applicable     Reversal Agent Administered: Vitamin K By Mouth (12/13)  Assessment:  58-year-old male with PMH significant for Afib, ESRD on HD, and recent AVR (On-X Aortic Valve) on warfarin PTA. Follows with [facility] outpatient. Prior to admission, patient was on warfarin 1.25 mg PO daily however INR was sub-therapeutic at admission.   Patient's warfarin was initially resumed and INR was therapeutic with average of 2.5-3 mg/day. Warfarin was held from 12/11 and reversed with PO Vitamin K 5 mg on 12/12 & 12/13 for IR. Bridging with heparin gtt.   H/H and PLTs have been stable. No signs/symptoms of bleeding  Heparin anti-xa has been labile and patient has refused some anti-xa labs therefore unable to appropriately trend levels. Furthermore patient refused heparin bolus w/ anti-xa of 0.28 12/24 AM. MD aware. Heparin adjusted per nursing protocol.   12/24: INR remains subtherapeutic however is now trending up. Anticipate INR to continue to trend up and expect it to be close to  therapeutic range tomorrow (12/25). As the patient received two booster doses of 4 mg each on 12/22 and 12/23 will continue with 3 mg daily.     Plan:  warfarin 3 mg daily   Education Material Provided?: No    Pharmacist suggested discharge dosing: Consider resuming at 3 mg daily with INR follow up within 48 hours of discharge       Angela Rashid, EstefanyD

## 2023-12-24 NOTE — PROGRESS NOTES
Telemetry Shift Summary     Rhythm: SR  HR: 85-94  Ectopy: frequent PAC    Measurements: .17/.05/.33              Normal Values  Rhythm: SR  HR:   Measurements: 0.12-0.20/0.08-0.10/0.30-0.52    Resident

## 2023-12-24 NOTE — PROGRESS NOTES
[facility] -  PROGRESS NOTE               Author: Kodak Harvey M.D. Date & Time: 12/24/2023  7:56 AM     HPI:  58 y.o. 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 Abd imaging demonstrated colitis. C. Diff pending and started on abx. He notes abd pain and diarrhea for several weeks. Edema improving with 2.5% dextrose solution. Still has HD permacath in-place. Pain with peritoneal dialysis but no cloudy effluent of fibrin clots. No f/c/s. Normally does [id number] fills all green.     DAILY NEPHROLOGY SUMMARY:  11/28: consult done  11/29: ongoing abd pain, seen on HD-tolerating procedure well, PD fluid concerning for peritonitis  11/30:c.diff positive, started on oral vanc, new onset aflutter-transferred to [facility], wbc improving, Peritoneal cultures remain negative, abd pain improving  12/1: PD culture remains negative, seen on HD-tolerating procedure, abd pain improving, less diarrhea    12/2: HD yesterday 2.5 liter UF, still with SOB decreased abdoimnal pain  12/3: PD last night 1.5 UF, pt still with SOB  12/4: No events, tolerated HD yest with 2.5L UF, BP stable, tachycardic this am, stable on RA, reports SOB has resolved, still with crampy abd discomfort, reports diarrhea is starting to become more formed  12/5: No events, no new labs, BP stable, tachycardic, stable on RA this am, still with crampy abd pain, diarrhea improving, still with nausea/anorexia, denies any CP/LE edema, reports mild SOB  12/6: No events, tolerated HD yest with 1L UF, BP stable, remains tachycardic, reports nausea is improved and having more firm stools, denies any CP/SOB, reports poor appetite  12/7: No events, BP stable, tolerated HD  this am with 1L UF, still doesn't feel well and feels very depressed and frustrated, +abd pain and n/v this am and unable to take PO  12/8: patient is doing well, resting in bed. Plan next iHD tomorrow  12/09: patient is seen during HD, refused labs this am. Does report some abdominal discomfort this am  12/10: patient had iHD yesterday, UF 2000 ml. Abdominal issues ongoing  12/11: pt had CT thorax done yesterday, showing loculated effusion. Pt feels SOB. Diarrhea continues but is better overall.   12/12: pt reports no more diarrhea, only 2-3 loose stools. Seen on HD and tolerating. Pulm recommending chest tube for loculated effusion.   12/13: pt awaiting improvement in INR before chest tube placement. Pt frustrated at prolonged hospitalization.  12/14: Pt had HD this AM and chest tube placement after. Pt seen in Room  12/15: Pt seen in room. Reports breathing is better now chest tube is in.   12/16: chest tube drainage slowing down. Pt breathing better. However, pt tachycardic in 130s this AM. He reports return of diarrhea and some abdominal pain. dialysis on hold for now  12/17: dialysis held yesterday due to tachycardia and later hypotension. HR and BP within normal ranges this AM. Pt positive for C Diff again, with continued diarrhea.   12/18: HD yesterday net UF 1L, pt reports SOB thinks he may have fluids in  his lungs and wants to do additional HD today to remove fluids,  denies further D, continue to have abdominal cramps  12/19: HD yesterday net UF 1.5L, remains tachycardic / aflutter ablation planned 12/20, states he feels tired. No d/ abd cramping   12/20:no labs today, pt reports no c/o,plan cardiac ablation today   12/21:Aflutter ablation 12/20, VSS, seen on HD, plan to remove chest tube today, CXR 12/21 reviewed stable pulmonary edema and infiltrates  12/22: HD today net UF 1L, pt seen start  of HD, denies any diarrhea, abd cramping, chest tube is out   12/23:HD yesterday,net UF 2L, frustrated due to  "ongoing diarrhea   12/24: Ongoing GI symptoms, no other c/o VSS, denies SOB     REVIEW OF SYSTEMS:    +diarrhea, +abd pain  10 point ROS reviewed and is as per HPI/daily summary or otherwise negative    PMH/PSH/SH/FH:   Reviewed and unchanged since admission note    CURRENT MEDICATIONS:   Reviewed from admission to present day    VS:  /70   Pulse 93   Temp 36.9 °C (98.4 °F) (Temporal)   Resp 16   Ht 1.702 m (5' 7\")   Wt 74.9 kg (165 lb 2 oz)   SpO2 92%   BMI 25.86 kg/m²     Physical Exam  Vitals and nursing note reviewed.   Constitutional:       General: He is not in acute distress.     Appearance: Normal appearance.   HENT:      Head: Normocephalic and atraumatic.   Eyes:      General: No scleral icterus.     Extraocular Movements: Extraocular movements intact.   Cardiovascular:      Rate and Rhythm: Regular rhythm.   Pulmonary:      Effort: Pulmonary effort is normal.   Abdominal:      General: Bowel sounds are normal.      Palpations: Abdomen is soft.      Tenderness: There is no abdominal tenderness.   Musculoskeletal:         General: No deformity.      Right lower leg: No edema.      Left lower leg: No edema.   Skin:     General: Skin is warm and dry.      Findings: No rash.   Neurological:      General: No focal deficit present.      Mental Status: He is alert and oriented to person, place, and time.   Psychiatric:         Mood and Affect: Mood normal.         Behavior: Behavior normal. Behavior is cooperative.       R chest tube in place (12/14/23)    Fluids:  In: 720 [P.O.:720]  Out: 575     LABS:  Recent Labs     12/22/23  0401 12/24/23  0608   SODIUM 135 135   POTASSIUM 4.3 4.7   CHLORIDE 98 101   CO2 29 24   GLUCOSE 91 110*   BUN 32* 36*   CREATININE 6.02* 7.18*   CALCIUM 7.9* 7.8*       IMAGING:   All imaging reviewed from admission to present day  CXR 12/22   1.  Cardiomegaly.   2.  Estimated moderate bilateral pleural effusions with interval increase in left pleural effusion.   3.  " Underlying bibasilar atelectasis or consolidation.     IMPRESSION:  # ESRD on outpt PD   -Etiology 2/2 FSGS  - s/p permcath on 11/10, and on HD currently  # R/O PD cath associated peritonitis  - CX negative  # c. Diff.  -symptoms improved with initiation of oral vanc  -PD guidelines vague on scenarios such as this,  but pt on HD currently  # S/P mechanical AVR/ Ascending aneurysm repair 11/2023   # CKD-MBD  - P elevated  # Anemia of CKD, below goal hgb 10-11, low iron stores 12/17     Resumed EPO 6000U IV w HD on 12/12  # BL pleural effusions/congestion     Large R loculated effusion on CT thorax 12/11     S/p chest tube placement on 12/14  # Pericardial effusion   # C. Diff colitis  -C.diff positive  # Aflutter s/p ablation      PLAN:  - No HD today  - Continue HD QMWF schedule and PRN   - Cont EPO 6000U IV w HD  - Cont holding PD for now (last on 12/3),  can re-eval as outpt  - PD catheter flushes Q weekly, last flushed 12/18    - C. diff tx/mgmt per primary team, diarrhea/pain and +toxin returned on 12/16  - Renal diet, no dietary pr restrictions   - Phos binder with meals   - Dose all medications as per ESRD  - OP HD chair has been arranged at [facility], re challenge of PD once stable in OP setting   - IV iron loading

## 2023-12-24 NOTE — PROGRESS NOTES
Inpatient Anticoagulation Service Note for 12/23/2023      Reason for Anticoagulation: Atrial Fibrillation, On-X Aortic/Mitral Valve Replacement   [id number] VASc Score: 2  HAS-BLED Score: 3    Hemoglobin Value: (!) 8  Hematocrit Value: (!) 25.8  Lab Platelet Value: 322  Target INR: 2.0 to 3.0    INR from last 7 days       Date/Time INR Value    12/23/23 0012 1.33    12/22/23 0401 1.43    12/21/23 0209 1.37    12/19/23 0620 1.47    12/17/23 2345 1.49    12/17/23 0633 1.4          Dose from last 7 days       Date/Time Dose (mg)    12/23/23 1630 2    12/22/23 1716 4    12/21/23 1607 2    12/20/23 1758 2.5    12/19/23 1714 2.5          Average Dose (mg): 0  Significant Interactions: Antiplatelet Medications, Flagyl  Bridge Therapy: Yes  Bridge Therapy Start Date: 12/19/23  Days of Overlap Therapy: 4   INR Value Greater than 2 Prior to Discontinuation of Parenteral Anticoagulation: Not Applicable     Reversal Agent Administered: Vitamin K By Mouth  Assessment:  58-year-old male with PMH significant for Afib, ESRD on HD, and recent AVR (On-X Aortic Valve) on warfarin PTA. Follows with [facility] outpatient. Prior to admission, patient was on warfarin 1.25 mg PO daily (INR was sub-therapeutic at admission).   Patient's warfarin was initially resumed and INR was therapeutic with average of 2.5-3 mg/day. Warfarin was held from 12/11 and reversed with PO Vitamin K 5 mg on 12/12 & 12/13 for IR. Bridging with heparin gtt.   H/H and PLTs have been stable. No signs/symptoms of bleeding  Heparin anti-xa has been labile and patient has refused some anti-xa labs so unable to see trend. Anti-xa is now within therapeutic range. Heparin adjusted per nursing protocol.   Chest tubes in place   12/23: INR remains subtherapeutic and decreased despite booster dose given yesterday. Full effects of booster dose not seen yet however expected some increase. In order to help expedite discharge will give another booster dose.      Plan:  warfarin 4 mg x1 on 12/23  Education Material Provided?: No    Pharmacist suggested discharge dosing: Consider resuming at 3 mg daily with INR follow up within 48 hours of discharge      Angela Rashid, EstefanyD

## 2023-12-24 NOTE — CARE PLAN
The patient is Stable - Low risk of patient condition declining or worsening    Shift Goals  Clinical Goals: IV heparin maintenance, IV abx, pt safety  Patient Goals: rest  Family Goals: FARIDA    Progress made toward(s) clinical / shift goals:    Problem: Pain - Standard  Goal: Alleviation of pain or a reduction in pain to the patient’s comfort goal  Description: Target End Date:  Prior to discharge or change in level of care    Document on Vitals flowsheet    1.  Document pain using the appropriate pain scale per order or unit policy  2.  Educate and implement non-pharmacologic comfort measures (i.e. relaxation, distraction, massage, cold/heat therapy, etc.)  3.  Pain management medications as ordered  4.  Reassess pain after pain med administration per policy  5.  If opiods administered assess patient's response to pain medication is appropriate per POSS sedation scale  6.  Follow pain management plan developed in collaboration with patient and interdisciplinary team (including palliative care or pain specialists if applicable)  Outcome: Progressing     Problem: Knowledge Deficit - Standard  Goal: Patient and family/care givers will demonstrate understanding of plan of care, disease process/condition, diagnostic tests and medications  Description: Target End Date:  1-3 days or as soon as patient condition allows    Document in Patient Education    1.  Patient and family/caregiver oriented to unit, equipment, visitation policy and means for communicating concern  2.  Complete/review Learning Assessment  3.  Assess knowledge level of disease process/condition, treatment plan, diagnostic tests and medications  4.  Explain disease process/condition, treatment plan, diagnostic tests and medications  Outcome: Progressing     Problem: Fall Risk  Goal: Patient will remain free from falls  Description: Target End Date:  Prior to discharge or change in level of care    Document interventions on the [facility] Fall Risk  Assessment    1.  Assess for fall risk factors  2.  Implement fall precautions  Outcome: Progressing     Problem: Hemodynamics  Goal: Patient's hemodynamics, fluid balance and neurologic status will be stable or improve  Description: Target End Date:  Prior to discharge or change in level of care    Document on Assessment and I/O flowsheet templates    1.  Monitor vital signs, pulse oximetry and cardiac monitor per provider order and/or policy  2.  Maintain blood pressure per provider order  3.  Hemodynamic monitoring per provider order  4.  Manage IV fluids and IV infusions  5.  Monitor intake and output  6.  Daily weights per unit policy or provider order  7.  Assess peripheral pulses and capillary refill  8.  Assess color and body temperature  9.  Position patient for maximum circulation/cardiac output  10. Monitor for signs/symptoms of excessive bleeding  11. Assess mental status, restlessness and changes in level of consciousness  12. Monitor temperature and report fever or hypothermia to provider immediately. Consideration of targeted temperature management.  Outcome: Progressing     Problem: Self Care  Goal: Patient will have the ability to perform ADLs independently or with assistance (bathe, groom, dress, toilet and feed)  Description: Target End Date:  Prior to discharge or change in level of care    Document on ADL flowsheet    1.  Assess the capability and level of deficiency to perform ADLs  2.  Encourage family/care giver involvement  3.  Provide assistive devices  4.  Consider PT/OT evaluations  5.  Maintain support, give positive feedback, encourage self-care allowing extra time and verbal cuing as needed  6.  Avoid doing something for patients they can do themselves, but provide assistance as needed  7.  Assist in anticipating/planning individual needs  8.  Collaborate with Case Management and  to meet discharge needs  Outcome: Progressing     Problem: Infection - Standard  Goal:  Patient will remain free from infection  Description: Target End Date:  Prior to discharge or change in level of care    1.  Utilize Standard Precautions at all times to reduce the risk of transmission of microorganisms from both recognized and  unrecognized sources of infection  2.  Infection prevention handouts provided (general/device/diagnosis specific) and documented in Patient Education  3.  Educate patient and family/caregiver on isolation precautions if applicable  Outcome: Progressing       Patient is not progressing towards the following goals:

## 2023-12-24 NOTE — PROGRESS NOTES
Patient refusing bed alarm. Pt A&Ox4, VSS. Patient educated on environmental safety. Patient demonstrates understanding of his decision to refuse bed alarm. Plan of care ongoing.

## 2023-12-24 NOTE — DISCHARGE PLANNING
HTH/SCP TCN chart review completed. Collaborated with JOSE Cruz.  Current discharge considerations are for [facility] and DME (O2) if needed at time of discharge.  Per chart review, [facility] has accepted.  Patient is on RA at baseline and is currently on 2 L/min O2.  TCN will continue to follow and collaborate with discharge planning team as additional post acute needs arise. Thank you.    Completed:   Choice obtained: DME (O2 [employer]) on 12/17/23.  HH (Resumption of [facility]) on 11/28/23.    [facility] has accepted  SCP with [facility].  Patient request to set up his own Follow up PCP appointment.

## 2023-12-25 ENCOUNTER — APPOINTMENT (OUTPATIENT)
Dept: RADIOLOGY | Facility: MEDICAL CENTER | Age: 58
DRG: 981 | End: 2023-12-25
Attending: INTERNAL MEDICINE
Payer: COMMERCIAL

## 2023-12-25 ENCOUNTER — APPOINTMENT (OUTPATIENT)
Dept: RADIOLOGY | Facility: MEDICAL CENTER | Age: 58
DRG: 981 | End: 2023-12-25
Attending: HOSPITALIST
Payer: COMMERCIAL

## 2023-12-25 PROBLEM — I50.20 SYSTOLIC HEART FAILURE (HCC): Status: ACTIVE | Noted: 2023-12-25

## 2023-12-25 LAB
ALBUMIN SERPL BCP-MCNC: 2.1 G/DL (ref 3.2–4.9)
ALBUMIN/GLOB SERPL: 0.8 G/DL
ALP SERPL-CCNC: 54 U/L (ref 30–99)
ALT SERPL-CCNC: 21 U/L (ref 2–50)
ANION GAP SERPL CALC-SCNC: 9 MMOL/L (ref 7–16)
APPEARANCE UR: CLEAR
AST SERPL-CCNC: 25 U/L (ref 12–45)
BASOPHILS # BLD AUTO: 0.5 % (ref 0–1.8)
BASOPHILS # BLD: 0.03 K/UL (ref 0–0.12)
BILIRUB SERPL-MCNC: 0.2 MG/DL (ref 0.1–1.5)
BILIRUB UR QL STRIP.AUTO: NEGATIVE
BUN SERPL-MCNC: 42 MG/DL (ref 8–22)
CALCIUM ALBUM COR SERPL-MCNC: 9.5 MG/DL (ref 8.5–10.5)
CALCIUM SERPL-MCNC: 8 MG/DL (ref 8.5–10.5)
CHLORIDE SERPL-SCNC: 101 MMOL/L (ref 96–112)
CO2 SERPL-SCNC: 23 MMOL/L (ref 20–33)
COLOR UR: YELLOW
CREAT SERPL-MCNC: 8.22 MG/DL (ref 0.5–1.4)
EOSINOPHIL # BLD AUTO: 0.13 K/UL (ref 0–0.51)
EOSINOPHIL NFR BLD: 2.3 % (ref 0–6.9)
EPI CELLS #/AREA URNS HPF: ABNORMAL /HPF
ERYTHROCYTE [DISTWIDTH] IN BLOOD BY AUTOMATED COUNT: 52.4 FL (ref 35.9–50)
GFR SERPLBLD CREATININE-BSD FMLA CKD-EPI: 7 ML/MIN/1.73 M 2
GLOBULIN SER CALC-MCNC: 2.5 G/DL (ref 1.9–3.5)
GLUCOSE SERPL-MCNC: 80 MG/DL (ref 65–99)
GLUCOSE UR STRIP.AUTO-MCNC: NEGATIVE MG/DL
HCT VFR BLD AUTO: 28.1 % (ref 42–52)
HGB BLD-MCNC: 8.5 G/DL (ref 14–18)
HYALINE CASTS #/AREA URNS LPF: ABNORMAL /LPF
IMM GRANULOCYTES # BLD AUTO: 0.08 K/UL (ref 0–0.11)
IMM GRANULOCYTES NFR BLD AUTO: 1.4 % (ref 0–0.9)
INR PPP: 1.95 (ref 0.87–1.13)
KETONES UR STRIP.AUTO-MCNC: NEGATIVE MG/DL
LEUKOCYTE ESTERASE UR QL STRIP.AUTO: NEGATIVE
LYMPHOCYTES # BLD AUTO: 0.64 K/UL (ref 1–4.8)
LYMPHOCYTES NFR BLD: 11.1 % (ref 22–41)
MAGNESIUM SERPL-MCNC: 1.6 MG/DL (ref 1.5–2.5)
MCH RBC QN AUTO: 28.9 PG (ref 27–33)
MCHC RBC AUTO-ENTMCNC: 30.2 G/DL (ref 32.3–36.5)
MCV RBC AUTO: 95.6 FL (ref 81.4–97.8)
MICRO URNS: ABNORMAL
MONOCYTES # BLD AUTO: 0.41 K/UL (ref 0–0.85)
MONOCYTES NFR BLD AUTO: 7.1 % (ref 0–13.4)
NEUTROPHILS # BLD AUTO: 4.48 K/UL (ref 1.82–7.42)
NEUTROPHILS NFR BLD: 77.6 % (ref 44–72)
NITRITE UR QL STRIP.AUTO: NEGATIVE
NRBC # BLD AUTO: 0 K/UL
NRBC BLD-RTO: 0 /100 WBC (ref 0–0.2)
PH UR STRIP.AUTO: 8 [PH] (ref 5–8)
PLATELET # BLD AUTO: 265 K/UL (ref 164–446)
PMV BLD AUTO: 8.7 FL (ref 9–12.9)
POTASSIUM SERPL-SCNC: 5.1 MMOL/L (ref 3.6–5.5)
PROCALCITONIN SERPL-MCNC: 1.09 NG/ML
PROT SERPL-MCNC: 4.6 G/DL (ref 6–8.2)
PROT UR QL STRIP: 100 MG/DL
PROTHROMBIN TIME: 22.5 SEC (ref 12–14.6)
RBC # BLD AUTO: 2.94 M/UL (ref 4.7–6.1)
RBC # URNS HPF: ABNORMAL /HPF
RBC UR QL AUTO: NEGATIVE
SODIUM SERPL-SCNC: 133 MMOL/L (ref 135–145)
SP GR UR STRIP.AUTO: 1.01
UFH PPP CHRO-ACNC: 0.64 IU/ML
UROBILINOGEN UR STRIP.AUTO-MCNC: 0.2 MG/DL
WBC # BLD AUTO: 5.8 K/UL (ref 4.8–10.8)
WBC #/AREA URNS HPF: ABNORMAL /HPF

## 2023-12-25 PROCEDURE — 71045 X-RAY EXAM CHEST 1 VIEW: CPT

## 2023-12-25 PROCEDURE — 85025 COMPLETE CBC W/AUTO DIFF WBC: CPT

## 2023-12-25 PROCEDURE — A9270 NON-COVERED ITEM OR SERVICE: HCPCS | Performed by: INTERNAL MEDICINE

## 2023-12-25 PROCEDURE — 700102 HCHG RX REV CODE 250 W/ 637 OVERRIDE(OP): Performed by: NURSE PRACTITIONER

## 2023-12-25 PROCEDURE — 700111 HCHG RX REV CODE 636 W/ 250 OVERRIDE (IP)

## 2023-12-25 PROCEDURE — 80053 COMPREHEN METABOLIC PANEL: CPT

## 2023-12-25 PROCEDURE — 85610 PROTHROMBIN TIME: CPT

## 2023-12-25 PROCEDURE — 700102 HCHG RX REV CODE 250 W/ 637 OVERRIDE(OP): Performed by: INTERNAL MEDICINE

## 2023-12-25 PROCEDURE — 85520 HEPARIN ASSAY: CPT

## 2023-12-25 PROCEDURE — 700111 HCHG RX REV CODE 636 W/ 250 OVERRIDE (IP): Mod: JZ | Performed by: INTERNAL MEDICINE

## 2023-12-25 PROCEDURE — 700111 HCHG RX REV CODE 636 W/ 250 OVERRIDE (IP): Performed by: FAMILY MEDICINE

## 2023-12-25 PROCEDURE — 700111 HCHG RX REV CODE 636 W/ 250 OVERRIDE (IP): Performed by: INTERNAL MEDICINE

## 2023-12-25 PROCEDURE — 99232 SBSQ HOSP IP/OBS MODERATE 35: CPT | Performed by: INTERNAL MEDICINE

## 2023-12-25 PROCEDURE — 700102 HCHG RX REV CODE 250 W/ 637 OVERRIDE(OP): Performed by: STUDENT IN AN ORGANIZED HEALTH CARE EDUCATION/TRAINING PROGRAM

## 2023-12-25 PROCEDURE — A9270 NON-COVERED ITEM OR SERVICE: HCPCS | Performed by: STUDENT IN AN ORGANIZED HEALTH CARE EDUCATION/TRAINING PROGRAM

## 2023-12-25 PROCEDURE — 81001 URINALYSIS AUTO W/SCOPE: CPT

## 2023-12-25 PROCEDURE — 700101 HCHG RX REV CODE 250

## 2023-12-25 PROCEDURE — A9270 NON-COVERED ITEM OR SERVICE: HCPCS | Performed by: NURSE PRACTITIONER

## 2023-12-25 PROCEDURE — 90935 HEMODIALYSIS ONE EVALUATION: CPT

## 2023-12-25 PROCEDURE — 83735 ASSAY OF MAGNESIUM: CPT

## 2023-12-25 PROCEDURE — 770020 HCHG ROOM/CARE - TELE (206)

## 2023-12-25 PROCEDURE — 84145 PROCALCITONIN (PCT): CPT

## 2023-12-25 RX ORDER — WARFARIN SODIUM 3 MG/1
3 TABLET ORAL DAILY
Status: DISCONTINUED | OUTPATIENT
Start: 2023-12-26 | End: 2023-12-26

## 2023-12-25 RX ORDER — WARFARIN SODIUM 4 MG/1
4 TABLET ORAL
Status: COMPLETED | OUTPATIENT
Start: 2023-12-25 | End: 2023-12-25

## 2023-12-25 RX ADMIN — VANCOMYCIN HYDROCHLORIDE 125 MG: 5 INJECTION, POWDER, LYOPHILIZED, FOR SOLUTION INTRAVENOUS at 00:08

## 2023-12-25 RX ADMIN — VERAPAMIL HYDROCHLORIDE 80 MG: 80 TABLET ORAL at 00:06

## 2023-12-25 RX ADMIN — HEPARIN SODIUM 19 UNITS/KG/HR: 5000 INJECTION, SOLUTION INTRAVENOUS at 05:50

## 2023-12-25 RX ADMIN — HEPARIN SODIUM 19 UNITS/KG/HR: 5000 INJECTION, SOLUTION INTRAVENOUS at 22:51

## 2023-12-25 RX ADMIN — ASPIRIN 81 MG: 81 TABLET, COATED ORAL at 06:37

## 2023-12-25 RX ADMIN — METRONIDAZOLE 500 MG: 500 INJECTION, SOLUTION INTRAVENOUS at 05:53

## 2023-12-25 RX ADMIN — DRONABINOL 5 MG: 5 CAPSULE ORAL at 17:14

## 2023-12-25 RX ADMIN — WARFARIN SODIUM 4 MG: 4 TABLET ORAL at 17:14

## 2023-12-25 RX ADMIN — DRONABINOL 5 MG: 5 CAPSULE ORAL at 11:34

## 2023-12-25 RX ADMIN — SEVELAMER CARBONATE 1600 MG: 800 TABLET, FILM COATED ORAL at 07:34

## 2023-12-25 RX ADMIN — VERAPAMIL HYDROCHLORIDE 80 MG: 80 TABLET ORAL at 06:37

## 2023-12-25 RX ADMIN — HEPARIN SODIUM 3700 UNITS: 1000 INJECTION, SOLUTION INTRAVENOUS; SUBCUTANEOUS at 12:30

## 2023-12-25 RX ADMIN — EPOETIN ALFA 6000 UNITS: 3000 SOLUTION INTRAVENOUS; SUBCUTANEOUS at 10:27

## 2023-12-25 RX ADMIN — VERAPAMIL HYDROCHLORIDE 80 MG: 80 TABLET ORAL at 17:14

## 2023-12-25 RX ADMIN — ATORVASTATIN CALCIUM 40 MG: 40 TABLET, FILM COATED ORAL at 20:13

## 2023-12-25 RX ADMIN — TAMSULOSIN HYDROCHLORIDE 0.4 MG: 0.4 CAPSULE ORAL at 06:37

## 2023-12-25 RX ADMIN — Medication 1 CAPSULE: at 07:34

## 2023-12-25 RX ADMIN — ONDANSETRON 4 MG: 2 INJECTION INTRAMUSCULAR; INTRAVENOUS at 05:43

## 2023-12-25 RX ADMIN — VERAPAMIL HYDROCHLORIDE 80 MG: 80 TABLET ORAL at 11:34

## 2023-12-25 RX ADMIN — VANCOMYCIN HYDROCHLORIDE 125 MG: 5 INJECTION, POWDER, LYOPHILIZED, FOR SOLUTION INTRAVENOUS at 07:34

## 2023-12-25 RX ADMIN — VANCOMYCIN HYDROCHLORIDE 125 MG: 5 INJECTION, POWDER, LYOPHILIZED, FOR SOLUTION INTRAVENOUS at 17:14

## 2023-12-25 ASSESSMENT — ENCOUNTER SYMPTOMS
COUGH: 1
SHORTNESS OF BREATH: 0
NERVOUS/ANXIOUS: 0
FOCAL WEAKNESS: 0
SENSORY CHANGE: 0
MYALGIAS: 0
DIZZINESS: 0
NAUSEA: 0
DIARRHEA: 0
WHEEZING: 0
FEVER: 0
SORE THROAT: 0
WEAKNESS: 1
FLANK PAIN: 0
DIAPHORESIS: 0
BACK PAIN: 0
HEARTBURN: 0
BLURRED VISION: 0
NECK PAIN: 0
ABDOMINAL PAIN: 0
HEADACHES: 0
CHILLS: 0
SPUTUM PRODUCTION: 0
VOMITING: 0
PALPITATIONS: 0
SPEECH CHANGE: 0
BLOOD IN STOOL: 0

## 2023-12-25 ASSESSMENT — PAIN DESCRIPTION - PAIN TYPE
TYPE: ACUTE PAIN
TYPE: ACUTE PAIN

## 2023-12-25 ASSESSMENT — FIBROSIS 4 INDEX: FIB4 SCORE: 0.92

## 2023-12-25 NOTE — CARE PLAN
The patient is Stable - Low risk of patient condition declining or worsening    Shift Goals  Clinical Goals: IV Heparin maintenance, IV Abx, safety  Patient Goals: Switch to PO anticoagulant  Family Goals: FARIDA    Progress made toward(s) clinical / shift goals:    Problem: Pain - Standard  Goal: Alleviation of pain or a reduction in pain to the patient’s comfort goal  Description: Target End Date:  Prior to discharge or change in level of care    Document on Vitals flowsheet    1.  Document pain using the appropriate pain scale per order or unit policy  2.  Educate and implement non-pharmacologic comfort measures (i.e. relaxation, distraction, massage, cold/heat therapy, etc.)  3.  Pain management medications as ordered  4.  Reassess pain after pain med administration per policy  5.  If opiods administered assess patient's response to pain medication is appropriate per POSS sedation scale  6.  Follow pain management plan developed in collaboration with patient and interdisciplinary team (including palliative care or pain specialists if applicable)  Outcome: Progressing     Problem: Knowledge Deficit - Standard  Goal: Patient and family/care givers will demonstrate understanding of plan of care, disease process/condition, diagnostic tests and medications  Description: Target End Date:  1-3 days or as soon as patient condition allows    Document in Patient Education    1.  Patient and family/caregiver oriented to unit, equipment, visitation policy and means for communicating concern  2.  Complete/review Learning Assessment  3.  Assess knowledge level of disease process/condition, treatment plan, diagnostic tests and medications  4.  Explain disease process/condition, treatment plan, diagnostic tests and medications  Outcome: Progressing     Problem: Fall Risk  Goal: Patient will remain free from falls  Description: Target End Date:  Prior to discharge or change in level of care    Document interventions on the [facility] Fall Risk Assessment    1.  Assess for fall risk factors  2.  Implement fall precautions  Outcome: Progressing     Problem: Respiratory  Goal: Patient will achieve/maintain optimum respiratory ventilation and gas exchange  Description: Target End Date:  Prior to discharge or change in level of care    Document on Assessment flowsheet    1.  Assess and monitor rate, rhythm, depth and effort of respiration  2.  Breath sounds assessed qshift and/or as needed  3.  Assess O2 saturation, administer/titrate oxygen as ordered  4.  Position patient for maximum ventilatory efficiency  5.  Turn, cough, and deep breath with splinting to improve effectiveness  6.  Collaborate with RT to administer medication/treatments per order  7.  Encourage use of incentive spirometer and encourage patient to cough after use and utilize splinting techniques if applicable  8.  Airway suctioning  9.  Monitor sputum production for changes in color, consistency and frequency  10. Perform frequent oral hygiene  11. Alternate physical activity with rest periods  Outcome: Progressing     Problem: Hemodynamics  Goal: Patient's hemodynamics, fluid balance and neurologic status will be stable or improve  Description: Target End Date:  Prior to discharge or change in level of care    Document on Assessment and I/O flowsheet templates    1.  Monitor vital signs, pulse oximetry and cardiac monitor per provider order and/or policy  2.  Maintain blood pressure per provider order  3.  Hemodynamic monitoring per provider order  4.  Manage IV fluids and IV infusions  5.  Monitor intake and output  6.  Daily weights per unit policy or provider order  7.  Assess peripheral pulses and capillary refill  8.  Assess color and body temperature  9.  Position patient for maximum circulation/cardiac output  10. Monitor for signs/symptoms of excessive bleeding  11. Assess mental status, restlessness and changes in level of consciousness  12. Monitor temperature and  report fever or hypothermia to provider immediately. Consideration of targeted temperature management.  Outcome: Progressing     Problem: Gastrointestinal Irritability  Goal: Diarrhea will be absent or improved  Description: Target End Date:  Prior to discharge or change in level of care    1.  Assess for abdominal discomfort, pain, cramping, frequency, urgency, loose or liquid stools, and hyperactive bowel sensations.  2.  Evaluate pattern of defecation  3.  Administer antidiarrheal agents per order  4. Culture stool, per order  5.  Inquire about food intolerances, medications, change in eating pattern and current stressors  6.  Assess for fecal impaction  7.  Assess hydration status  8.  Assess condition of perianal skin  9.  Loss of bowel elimination control can lead of feelings of decreased self esteem.  Examine the emotional impact of illness, hospitalization and/or accidents.  Outcome: Progressing       Patient is not progressing towards the following goals:

## 2023-12-25 NOTE — ASSESSMENT & PLAN NOTE
Echo showed ejection fraction 35% with wall motion abnormalities  Cardiology on board and patient received ablation  Continue aspirin with atorvastatin and continue dialysis  Patient not qualified for ACE inhibitor's or spironolactone due to advanced kidney failure  With daily and I's and O's

## 2023-12-25 NOTE — PROGRESS NOTES
Telemetry Shift Summary     Rhythm: SR  HR: 80-94  Ectopy: occasional PVC    Measurements: .16/.09/.35    .          Normal Values  Rhythm: SR  HR:   Measurements: 0.12-0.20/0.08-0.10/0.30-0.52

## 2023-12-25 NOTE — CARE PLAN
Problem: Pain - Standard  Goal: Alleviation of pain or a reduction in pain to the patient’s comfort goal  Outcome: Progressing     Problem: Knowledge Deficit - Standard  Goal: Patient and family/care givers will demonstrate understanding of plan of care, disease process/condition, diagnostic tests and medications  Outcome: Progressing     Problem: Fall Risk  Goal: Patient will remain free from falls  Outcome: Progressing     Problem: Respiratory  Goal: Patient will achieve/maintain optimum respiratory ventilation and gas exchange  Outcome: Progressing     Problem: Discharge Barriers/Planning  Goal: Patient's continuum of care needs are met  Outcome: Progressing     Problem: Hemodynamics  Goal: Patient's hemodynamics, fluid balance and neurologic status will be stable or improve  Outcome: Progressing     Problem: Fluid Volume  Goal: Fluid volume balance will be maintained  Outcome: Progressing     Problem: Gastrointestinal Irritability  Goal: Diarrhea will be absent or improved  Outcome: Progressing     Problem: Self Care  Goal: Patient will have the ability to perform ADLs independently or with assistance (bathe, groom, dress, toilet and feed)  Outcome: Progressing     Problem: Infection - Standard  Goal: Patient will remain free from infection  Outcome: Progressing     Problem: Wound/ / Incision Healing  Goal: Patient's wound/surgical incision will decrease in size and heals properly  Outcome: Progressing     Problem: Skin Integrity  Goal: Skin integrity is maintained or improved  Outcome: Progressing   The patient is Watcher - Medium risk of patient condition declining or worsening    Shift Goals  Clinical Goals: IV Heparin maintenance, IV Abx, safety  Patient Goals: Switch to PO anticoagulant  Family Goals: FARIDA    Progress made toward(s) clinical / shift goals:      Patient is not progressing towards the following goals:

## 2023-12-25 NOTE — CARE PLAN
The patient is Stable - Low risk of patient condition declining or worsening    Shift Goals  Clinical Goals: IV Heparin maintenance, IV Abx, safety  Patient Goals: Switch to PO anticoagulant  Family Goals: FARIDA    Progress made toward(s) clinical / shift goals:    Problem: Pain - Standard  Goal: Alleviation of pain or a reduction in pain to the patient’s comfort goal  Outcome: Progressing     Problem: Knowledge Deficit - Standard  Goal: Patient and family/care givers will demonstrate understanding of plan of care, disease process/condition, diagnostic tests and medications  Outcome: Progressing     Problem: Fall Risk  Goal: Patient will remain free from falls  Outcome: Progressing     Problem: Respiratory  Goal: Patient will achieve/maintain optimum respiratory ventilation and gas exchange  Outcome: Progressing       Patient is not progressing towards the following goals:

## 2023-12-25 NOTE — PROGRESS NOTES
[facility] -  PROGRESS NOTE               Author: TEX Caban Date & Time: 12/25/2023  8:39 AM     HPI:  58 y.o. 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 Abd imaging demonstrated colitis. C. Diff pending and started on abx. He notes abd pain and diarrhea for several weeks. Edema improving with 2.5% dextrose solution. Still has HD permacath in-place. Pain with peritoneal dialysis but no cloudy effluent of fibrin clots. No f/c/s. Normally does [id number] fills all green.     DAILY NEPHROLOGY SUMMARY:  11/28: consult done  11/29: ongoing abd pain, seen on HD-tolerating procedure well, PD fluid concerning for peritonitis  11/30:c.diff positive, started on oral vanc, new onset aflutter-transferred to [facility], wbc improving, Peritoneal cultures remain negative, abd pain improving  12/1: PD culture remains negative, seen on HD-tolerating procedure, abd pain improving, less diarrhea    12/2: HD yesterday 2.5 liter UF, still with SOB decreased abdoimnal pain  12/3: PD last night 1.5 UF, pt still with SOB  12/4: No events, tolerated HD yest with 2.5L UF, BP stable, tachycardic this am, stable on RA, reports SOB has resolved, still with crampy abd discomfort, reports diarrhea is starting to become more formed  12/5: No events, no new labs, BP stable, tachycardic, stable on RA this am, still with crampy abd pain, diarrhea improving, still with nausea/anorexia, denies any CP/LE edema, reports mild SOB  12/6: No events, tolerated HD yest with 1L UF, BP stable, remains tachycardic, reports nausea is improved and having more firm stools, denies any CP/SOB, reports poor appetite  12/7: No events, BP stable, tolerated HD  this am with 1L UF, still doesn't feel well and feels very depressed and frustrated, +abd pain and n/v this am and unable to take PO  12/8: patient is doing well, resting in bed. Plan next iHD tomorrow  12/09: patient is seen during HD, refused labs this am. Does report some abdominal discomfort this am  12/10: patient had iHD yesterday, UF 2000 ml. Abdominal issues ongoing  12/11: pt had CT thorax done yesterday, showing loculated effusion. Pt feels SOB. Diarrhea continues but is better overall.   12/12: pt reports no more diarrhea, only 2-3 loose stools. Seen on HD and tolerating. Pulm recommending chest tube for loculated effusion.   12/13: pt awaiting improvement in INR before chest tube placement. Pt frustrated at prolonged hospitalization.  12/14: Pt had HD this AM and chest tube placement after. Pt seen in Room  12/15: Pt seen in room. Reports breathing is better now chest tube is in.   12/16: chest tube drainage slowing down. Pt breathing better. However, pt tachycardic in 130s this AM. He reports return of diarrhea and some abdominal pain. dialysis on hold for now  12/17: dialysis held yesterday due to tachycardia and later hypotension. HR and BP within normal ranges this AM. Pt positive for C Diff again, with continued diarrhea.   12/18: HD yesterday net UF 1L, pt reports SOB thinks he may have fluids in  his lungs and wants to do additional HD today to remove fluids,  denies further D, continue to have abdominal cramps  12/19: HD yesterday net UF 1.5L, remains tachycardic / aflutter ablation planned 12/20, states he feels tired. No d/ abd cramping   12/20:no labs today, pt reports no c/o,plan cardiac ablation today   12/21:Aflutter ablation 12/20, VSS, seen on HD, plan to remove chest tube today, CXR 12/21 reviewed stable pulmonary edema and infiltrates  12/22: HD today net UF 1L, pt seen start  of HD, denies any diarrhea, abd cramping, chest tube is out   12/23:HD yesterday,net UF 2L, frustrated due to  "ongoing diarrhea   12/24: Ongoing GI symptoms, no other c/o VSS, denies SOB   12/25: lying in bed, feels nausea, some vomiting, edema improved, for iHD today    REVIEW OF SYSTEMS:    +diarrhea, +abd pain  10 point ROS reviewed and is as per HPI/daily summary or otherwise negative    PMH/PSH/SH/FH:   Reviewed and unchanged since admission note    CURRENT MEDICATIONS:   Reviewed from admission to present day    VS:  /68   Pulse 78   Temp 36.7 °C (98.1 °F) (Temporal)   Resp 18   Ht 1.702 m (5' 7\")   Wt 77.3 kg (170 lb 6.7 oz)   SpO2 90%   BMI 26.69 kg/m²     Physical Exam  Vitals and nursing note reviewed.   Constitutional:       General: He is not in acute distress.     Appearance: Normal appearance.   HENT:      Head: Normocephalic and atraumatic.   Eyes:      General: No scleral icterus.     Extraocular Movements: Extraocular movements intact.   Cardiovascular:      Rate and Rhythm: Regular rhythm.   Pulmonary:      Effort: Pulmonary effort is normal.   Abdominal:      General: Bowel sounds are normal.      Palpations: Abdomen is soft.      Tenderness: There is no abdominal tenderness.   Musculoskeletal:         General: No deformity.      Right lower leg: No edema.      Left lower leg: No edema.   Skin:     General: Skin is warm and dry.      Findings: No rash.   Neurological:      General: No focal deficit present.      Mental Status: He is alert and oriented to person, place, and time.   Psychiatric:         Mood and Affect: Mood normal.         Behavior: Behavior normal. Behavior is cooperative.       R chest tube in place (12/14/23)    Fluids:  In: 496.6   Out: 425     LABS:  Recent Labs     12/24/23  0608 12/25/23  0404   SODIUM 135 133*   POTASSIUM 4.7 5.1   CHLORIDE 101 101   CO2 24 23   GLUCOSE 110* 80   BUN 36* 42*   CREATININE 7.18* 8.22*   CALCIUM 7.8* 8.0*         IMAGING:   All imaging reviewed from admission to present day  CXR 12/22   1.  Cardiomegaly.   2.  Estimated moderate bilateral " pleural effusions with interval increase in left pleural effusion.   3.  Underlying bibasilar atelectasis or consolidation.     IMPRESSION:  # ESRD on outpt PD   -Etiology 2/2 FSGS  - s/p permcath on 11/10, and on HD currently  # R/O PD cath associated peritonitis  - CX negative  # c. Diff.  -symptoms improved with initiation of oral vanc  -PD guidelines vague on scenarios such as this,  but pt on HD currently  # S/P mechanical AVR/ Ascending aneurysm repair 11/2023   # CKD-MBD  - P elevated  # Anemia of CKD, below goal hgb 10-11, low iron stores 12/17     Resumed EPO 6000U IV w HD on 12/12  # BL pleural effusions/congestion     Large R loculated effusion on CT thorax 12/11     S/p chest tube placement on 12/14  # Pericardial effusion   # C. Diff colitis  -C.diff positive  # Aflutter s/p ablation      PLAN:  -  HD today (MON)  - Continue HD QMWF schedule and PRN   - Cont EPO 6000U IV w HD  - Cont holding PD for now (last on 12/3),  can re-eval as outpt  - PD catheter flushes Q weekly, last flushed 12/18    - C. diff tx/mgmt per primary team, diarrhea/pain and +toxin returned on 12/16  - Renal diet, no dietary pr restrictions   - Phos binder with meals   - Dose all medications as per ESRD  - OP HD chair has been arranged at [facility], re challenge of PD once stable in OP setting   - IV iron loading

## 2023-12-25 NOTE — PROGRESS NOTES
HD treatment today per routine order using tunneled CVC,run better reversed since red port has some resistance.Treatment tolerated well.Net UF removed 2000 ml.Epoetin given.CVC dressing changed and locked with heparin.Report given to primary Rn.

## 2023-12-25 NOTE — PROGRESS NOTES
Inpatient Anticoagulation Service Note for 12/25/2023      Reason for Anticoagulation: Atrial Fibrillation, On-X Aortic/Mitral Valve Replacement   [id number] VASc Score: 2  HAS-BLED Score: 3    Hemoglobin Value: (!) 8.5  Hematocrit Value: (!) 28.1  Lab Platelet Value: 265  Target INR: 2.0 to 3.0    INR from last 7 days       Date/Time INR Value    12/25/23 0404 1.95    12/24/23 0608 1.71    12/23/23 0012 1.33    12/22/23 0401 1.43    12/21/23 0209 1.37    12/19/23 0620 1.47          Dose from last 7 days       Date/Time Dose (mg)    12/25/23 0936 4    12/24/23 1434 3    12/23/23 1630 4    12/22/23 0426 4    12/21/23 1607 2    12/20/23 1758 2.5    12/19/23 1714 2.5          Average Dose (mg): 3  Significant Interactions: Antiplatelet Medications  Bridge Therapy: Yes  Bridge Therapy Start Date: 12/19/23  Days of Overlap Therapy: 6   INR Value Greater than 2 Prior to Discontinuation of Parenteral Anticoagulation: Not Applicable   Reversal Agent Administered: Vitamin K By Mouth (5 mg on 12/12 and 12/13) - no effect on INR at this point     Assessment:  58-year-old male with PMH significant for Afib, ESRD on HD, and recent AVR (On-X Aortic Valve) on warfarin PTA. Follows with [facility] outpatient. Prior to admission, patient was on warfarin 1.25 mg PO daily (INR was sub-therapeutic at admission).   Patient's warfarin was initially resumed and INR was therapeutic with average of 2.5-3 mg/day. Warfarin was held from 12/11 and reversed with PO Vitamin K 5 mg on 12/12 & 12/13 for IR. Cleared to resume warfarin 12/19. Chest tubes removed 12/19.   H/H and PLTs remain stable.   Continued on a regular diet since 12/23.  Significant DDI noted with Flagyl (Flagyl may increase the serum concentration of warfarin). Flagyl course completed this morning @0600 per ID recommendations.   Continued bridging with heparin. Heparin Xa level was therapeutic this morning. Note: patient is refusing labs draws at times and  refused heparin bolus yesterday for sub-therapeutic level. Therefore, labs are not collected appropriately per protocol recommendations due to refusal (should have gotten a level 6 hours post rate increase yesterday but refused). MD aware.   INR is borderline therapeutic with an appropriate trend up this morning from 1.7>1.95. Likely seeing effects from the 4 mg doses given on 12/22 & 12/23. Dose was slightly reduced yesterday 2' large INR jump. Since last dose of flagyl was this morning and more appropriate INR trend noted, will give another bolus dose of 4 mg today to help with getting INR to goal/keeping at goal.     Plan:  4 mg   Education Material Provided?: No    Pharmacist suggested discharge dosing: Warfarin 4 mg PO x1 on 12/25 then reduce to 3 mg daily on 12/26. Recommend close INR follow up within 48 hours of discharge.      Maryam Kaminski, PharmD  [id number]

## 2023-12-25 NOTE — PROGRESS NOTES
Hospital Medicine Daily Progress Note    Date of Service  12/25/2023    Chief Complaint  Gurdeep Guerra is a 58 y.o. male admitted 11/27/2023 with diarrhea    Hospital Course:    58-year-old male with history of end-stage kidney disease on dialysis, A-fib with mechanical valve replacement and  who was admitted 11/27 with symptoms of abdominal pain, nausea, vomiting and diarrhea, CT scan showed evidence of colitis, he was started on empiric coverage with IV Cefepime and Flagyl.  Patient recently underwent mechanical AVR, aortic aneurysm repair, discharged on 11/17/2023.  He also has known history of end-stage renal disease on peritoneal dialysis.  Workup showed he was positive for C. difficile colitis, he was started on oral vancomycin.  There was also concern for possible peritonitis, he was given intraperitoneal IV cefepime.  ID was consulted for recurrent C. difficile and planning for long tapering vancomycin. 125 mg orally 4 times daily for 14 days, then  125 mg orally 2 times daily for 7 days, then 125 mg orally once daily for 7 days, then 125 mg orally every 2 to 3 days for 8 weeks.  His diarrhea was resolved and no leukocytosis.    Nephrology was consulted on the case, he underwent dialysis through his temporary dialysis catheter which was placed on the previous admission.      patient was found to have loculated pleural effusion,  Patient needed a chest tube on 12/14, pulmonary was consulted, patient was improved and received tPA, chest tube was removed and repeat chest x-ray showed stable with no recurrent pleural effusion.    Also with known history of paroxysmal atrial flutter, and with recent mechanical AVR, patient underwent ablation by Dr. Velasquez on 12/20.  Echo showed ejection fraction 35% with inferior and septal wall hypokinesis, patient has a pleural effusion and being on dialysis, patient has been stable with sinus rhythm after the ablation, to continue warfarin and verapamin.  Patient is not  qualified for spironolactone or ACE inhibitor's due to advanced and end-stage kidney disease, patient is on aspirin, and atorvastatin.      Interval Problem Update  -Evaluated examined the patient at bedside, no fever or chills and no diarrhea, no events last night.  -Improving on his INR 1.9, continue heparin bridging  -Received dialysis today  -Chest x-ray reviewed personally, stable pleural effusion, continue dialysis, patient is on 2 L nasal cannula.      I have discussed this patient's plan of care and discharge plan at IDT rounds today with Case Management, Nursing, Nursing leadership, and other members of the IDT team.    Consultants/Specialty  nephrology and pulmonary  Cardio  EP.     Code Status  Full Code    Disposition  The patient is not medically cleared for discharge to home or a post-acute facility.  Anticipate discharge to: home with close outpatient follow-up    I have placed the appropriate orders for post-discharge needs.    Review of Systems  Review of Systems   Constitutional:  Positive for malaise/fatigue. Negative for chills, diaphoresis and fever.   HENT:  Negative for congestion, hearing loss and sore throat.    Eyes:  Negative for blurred vision.   Respiratory:  Positive for cough. Negative for sputum production, shortness of breath and wheezing.    Cardiovascular:  Negative for chest pain, palpitations and leg swelling.   Gastrointestinal:  Negative for abdominal pain, blood in stool, diarrhea, heartburn, melena, nausea and vomiting.   Genitourinary:  Negative for dysuria, flank pain and hematuria.   Musculoskeletal:  Negative for back pain, joint pain, myalgias and neck pain.   Skin:  Negative for rash.   Neurological:  Positive for weakness. Negative for dizziness, sensory change, speech change, focal weakness and headaches.   Psychiatric/Behavioral:  The patient is not nervous/anxious.         Physical Exam  Temp:  [36.6 °C (97.9 °F)-37.1 °C (98.8 °F)] 36.6 °C (97.9 °F)  Pulse:   [] 95  Resp:  [18] 18  BP: ()/(52-74) 106/56  SpO2:  [84 %-96 %] 93 %    Physical Exam  Vitals and nursing note reviewed.   Constitutional:       Appearance: He is not ill-appearing.   HENT:      Head: Normocephalic and atraumatic.      Nose: No congestion.      Mouth/Throat:      Mouth: Mucous membranes are moist.   Eyes:      General: No scleral icterus.        Right eye: No discharge.         Left eye: No discharge.   Cardiovascular:      Rate and Rhythm: Normal rate. Rhythm irregular.   Pulmonary:      Effort: Pulmonary effort is normal. No accessory muscle usage or respiratory distress.      Breath sounds: No wheezing or rhonchi.      Comments: Decreased breath sounds at the bases    Chest tube present  Abdominal:      General: There is no distension.      Tenderness: There is no abdominal tenderness. There is no guarding or rebound.      Comments: PD catheter   Musculoskeletal:      Cervical back: Normal range of motion and neck supple. No rigidity or tenderness.      Right lower leg: No edema.      Left lower leg: No edema.   Skin:     General: Skin is warm and dry.   Neurological:      General: No focal deficit present.      Mental Status: He is alert and oriented to person, place, and time.      Cranial Nerves: No cranial nerve deficit.         Fluids    Intake/Output Summary (Last 24 hours) at 12/25/2023 1340  Last data filed at 12/25/2023 1318  Gross per 24 hour   Intake 526.59 ml   Output 2425 ml   Net -1898.41 ml       Laboratory  Recent Labs     12/24/23  0608 12/25/23  0404   WBC 5.3 5.8   RBC 2.85* 2.94*   HEMOGLOBIN 8.3* 8.5*   HEMATOCRIT 27.4* 28.1*   MCV 96.1 95.6   MCH 29.1 28.9   MCHC 30.3* 30.2*   RDW 52.2* 52.4*   PLATELETCT 303 265   MPV 8.7* 8.7*     Recent Labs     12/24/23  0608 12/25/23  0404   SODIUM 135 133*   POTASSIUM 4.7 5.1   CHLORIDE 101 101   CO2 24 23   GLUCOSE 110* 80   BUN 36* 42*   CREATININE 7.18* 8.22*   CALCIUM 7.8* 8.0*     Recent Labs     12/23/23  0012  12/24/23  0608 12/25/23  0404   INR 1.33* 1.71* 1.95*               Imaging  DX-CHEST-LIMITED (1 VIEW)   Final Result      Stable small to moderate bilateral pleural effusions and cardiomegaly.      .      DX-CHEST-PORTABLE (1 VIEW)   Final Result      Stable small to moderate bilateral pleural effusions and cardiomegaly.      DX-CHEST-PORTABLE (1 VIEW)   Final Result      Stable small bilateral pleural effusions and cardiomegaly.      DX-CHEST-PORTABLE (1 VIEW)   Final Result      1.  Cardiomegaly.   2.  Estimated moderate bilateral pleural effusions with interval increase in left pleural effusion.   3.  Underlying bibasilar atelectasis or consolidation.      DX-CHEST-PORTABLE (1 VIEW)   Final Result         1.  Pulmonary edema and/or infiltrates, stable.   2.  Small layering bilateral pleural effusions, right greater than left, stable since prior study   3.  Cardiomegaly   4.  Atherosclerosis      EC-CARMELITA W/O CONT   Final Result      DX-CHEST-PORTABLE (1 VIEW)   Final Result         1.  Pulmonary edema and/or infiltrates, stable.   2.  Small layering bilateral pleural effusions, increased on the right compared to prior study   3.  Cardiomegaly   4.  Atherosclerosis      DX-CHEST-PORTABLE (1 VIEW)   Final Result         1.  Pulmonary edema and/or infiltrates, stable.   2.  Small layering bilateral pleural effusions, stable prior study   3.  Stable right lung base pneumothorax with small pigtail thoracostomy tube in place.   4.  Cardiomegaly   5.  Atherosclerosis      DX-CHEST-PORTABLE (1 VIEW)   Final Result         1.  Pulmonary edema and/or infiltrates, stable.   2.  Small layering bilateral pleural effusions, stable on the right and decreased on the left since prior study.   3.  New right lung base pneumothorax with small pigtail thoracostomy tube in place.   4.  Cardiomegaly   5.  Atherosclerosis      DX-CHEST-PORTABLE (1 VIEW)   Final Result         1.  Pulmonary edema and/or infiltrates.   2.  Small layering  bilateral pleural effusions   3.  Cardiomegaly   4.  Atherosclerosis      DX-CHEST-PORTABLE (1 VIEW)   Final Result      Unchanged bilateral pulmonary opacities and suspected bilateral pleural effusions, right worse than left.         DX-CHEST-PORTABLE (1 VIEW)   Final Result      Stable examination.      CT-CHEST (THORAX) W/O   Final Result      1.  Interval decrease in size of large loculated right pleural effusion following following placement of locking loop chest tube.      2.  Smaller loculated pleural effusions posterior superiorly in the right hemithorax there are thorax.      3.  Small left pleural effusion with elevation of left hemidiaphragm.      4.  Dependent partial collapse of the lower lobes.      5.  Patchy airspace opacities in the right lower lobe, lingula, and to a lesser extent right middle lobe suspicious for pneumonitis or parenchymal scarring      6.  Cirrhosis and splenomegaly.      DX-CHEST-PORTABLE (1 VIEW)   Final Result      1.  Redemonstrated right hydropneumothorax with decreased air component, otherwise stable in size.   2.  Bilateral basilar atelectasis and/or consolidation with mild interstitial edema.   3.  Stable enlargement of the cardiomediastinal silhouette.      DX-CHEST-PORTABLE (1 VIEW)   Final Result      Placement of a right pigtail chest tube with decreased right pleural effusion.      Hazy opacities which could be related to hypoinflation or vascular congestion. Correlate clinically for infection.      IR-CHEST TUBE-EMPYEMA RIGHT   Final Result      Successful image guided RIGHT chest tube placement.      Plan: Low wall suction. Follow-up radiograph is pending.      CT-CHEST (THORAX) W/O   Final Result      1.  Large loculated right pleural effusion, worse compared to the October 2023 CT study. Associated atelectasis, with subtotal collapse of the right lower lobe.   2.  Minimal groundglass opacities in the right lung, infectious/inflammatory. No consolidation.   3.   Small left pleural effusion and associated atelectasis.   4.  Mild right hilar lymphadenopathy, statistically reactive rather than neoplastic.   5.  Cardiomegaly.   6.  Abdomen is detailed separately.      CT-ABDOMEN-PELVIS W/O   Final Result      1.  No new inflammatory process in the abdomen or pelvis.   2.  Minimal fat stranding adjacent to the cecum, nonspecific.   3.  Percutaneous catheter with tip in the pelvis. Small volume of pelvic free fluid.   4.  Colonic diverticulosis.   5.  Partially visualized moderate to large right pleural effusion, which may be loculated. It has increased in size since prior CT study.   6.  Small left pleural effusion.   7.  Cardiomegaly.         [id number] VIEW   Final Result      1.  Benign bowel gas pattern.      2.  Peritoneal dialysis catheter coiled in the pelvis.      [id number] VIEW   Final Result      Nonspecific bowel gas pattern with a moderate colonic stool burden.      DX-CHEST-PORTABLE (1 VIEW)   Final Result      1.  Enlarged cardiac silhouette with vascular congestion/edema.   2.  Lower lobe airspace disease could be due to edema, atelectasis or pneumonitis.      EC-ECHOCARDIOGRAM COMPLETE W/O CONT   Final Result      CT-ABDOMEN-PELVIS W/O   Final Result      1.  Fat stranding adjacent to the cecum, concerning for colitis/typhlitis.   2.  Colonic diverticulosis.   3.  Nonobstructing, tiny left renal stone.   4.  Likely partially loculated moderate right pleural effusion.   5.  Small left pleural effusion.   6.  Adjacent right middle lobe and bilateral lower lobe consolidations, likely atelectasis.   7.  Small pericardial effusion.      DX-CHEST-PORTABLE (1 VIEW)   Final Result      1.  Resolved or improved small left pleural effusion with persistent left basilar atelectasis and/or scarring.   2.  New small right pleural effusion with associated atelectasis and/or consolidation.      CL-EP ABLATION ATRIAL FLUTTER    (Results Pending)        Assessment/Plan  * C.  difficile colitis- (present on admission)  Assessment & Plan  Second recurrence   Treated with vancomycin during the first infection  Recheck stool for C. difficile on 12/16 and was positive  ID was consulted and planning for long tapering  Close monitoring and consider GI consult for stool transplant if needed  Improving, no leukocytosis, no fever and improving on his diarrhea    Systolic heart failure (HCC)  Assessment & Plan  Echo showed ejection fraction 35% with wall motion abnormalities  Cardiology on board and patient received ablation  Continue aspirin with atorvastatin and continue dialysis  Patient not qualified for ACE inhibitor's or spironolactone due to advanced kidney failure  With daily and I's and O's    ESRD (end stage renal disease) (HCC)- (present on admission)  Assessment & Plan  Chronic kidney disease on dialysis   Received IV iron  Dialysis per nephrology, continue hemodialysis at this time and reevaluate the patient for PT as outpatient    Liver cirrhosis (HCC)  Assessment & Plan  Stable   Follow-up with PCP and GI as outpatient      A-fib (HCC)- (present on admission)  Assessment & Plan  Patient underwent ablation on 12/20  Patient has been sinus  Continue warfarin and verapamil     History of mechanical aortic valve replacement- (present on admission)  Assessment & Plan  Continue Coumadin and bridging with heparin  Follow-up with cardiology as outpatient    Hypomagnesemia- (present on admission)  Assessment & Plan  Replaced  Follow level    Loculated pleural effusion- (present on admission)  Assessment & Plan  Status post chest tube on 12/14/23  Patient needed tPA  Chest tube was removed  Repeat chest x-ray shows stable pleural effusion  Continue dialysis  No signs of bacterial infection or empyema    Paroxysmal atrial flutter (HCC)- (present on admission)  Assessment & Plan  Tachycardic on 12/16/2023 ordered IV labbetol as he has been refusing his oral verapamil.  Patient counseled about  the necessity of taking his oral verapamil  Verapamil, patient's been refusing some doses.  Warfarin and heparin drip  Patient stable after ablation with sinus    Dyslipidemia- (present on admission)  Assessment & Plan  Lipitor    Coronary artery disease due to lipid rich plaque- (present on admission)  Assessment & Plan  Aspirin, Lipitor  Troponin elevated but no chest pain, patient is end-stage renal disease, patient is on heparin for A-fib with RVR and holding his oral anticoagulation.  Troponin chronically elevated    Hypertension- (present on admission)  Assessment & Plan  Verapamil with parameters          VTE prophylaxis: Heparin drip      I have performed a physical exam and reviewed and updated ROS and Plan today (12/25/2023). In review of yesterday's note (12/24/2023), there are no changes except as documented above.      Greater than 51 minutes spent prepping to see patient (e.g. review of tests) obtaining and/or reviewing separately obtained history. Performing a medically appropriate examination and/ evaluation.  Counseling and educating the patient/family/caregiver.  Ordering medications, tests, or procedures.  Referring and communicating with other health care professionals.  Documenting clinical information in EPIC.  Independently interpreting results and communicating results to patient/family/caregiver.  Care coordination

## 2023-12-26 ENCOUNTER — PHARMACY VISIT (OUTPATIENT)
Dept: PHARMACY | Facility: MEDICAL CENTER | Age: 58
End: 2023-12-26
Payer: COMMERCIAL

## 2023-12-26 ENCOUNTER — APPOINTMENT (OUTPATIENT)
Dept: RADIOLOGY | Facility: MEDICAL CENTER | Age: 58
DRG: 981 | End: 2023-12-26
Attending: HOSPITALIST
Payer: COMMERCIAL

## 2023-12-26 VITALS
HEART RATE: 93 BPM | WEIGHT: 160.94 LBS | DIASTOLIC BLOOD PRESSURE: 56 MMHG | RESPIRATION RATE: 18 BRPM | OXYGEN SATURATION: 96 % | TEMPERATURE: 98 F | HEIGHT: 67 IN | SYSTOLIC BLOOD PRESSURE: 98 MMHG | BODY MASS INDEX: 25.26 KG/M2

## 2023-12-26 LAB
INR PPP: 2.63 (ref 0.87–1.13)
PROTHROMBIN TIME: 28.5 SEC (ref 12–14.6)
UFH PPP CHRO-ACNC: 0.65 IU/ML

## 2023-12-26 PROCEDURE — 700111 HCHG RX REV CODE 636 W/ 250 OVERRIDE (IP)

## 2023-12-26 PROCEDURE — A9270 NON-COVERED ITEM OR SERVICE: HCPCS | Performed by: STUDENT IN AN ORGANIZED HEALTH CARE EDUCATION/TRAINING PROGRAM

## 2023-12-26 PROCEDURE — 99239 HOSP IP/OBS DSCHRG MGMT >30: CPT | Performed by: INTERNAL MEDICINE

## 2023-12-26 PROCEDURE — A9270 NON-COVERED ITEM OR SERVICE: HCPCS | Performed by: NURSE PRACTITIONER

## 2023-12-26 PROCEDURE — 700101 HCHG RX REV CODE 250

## 2023-12-26 PROCEDURE — A9270 NON-COVERED ITEM OR SERVICE: HCPCS | Performed by: FAMILY MEDICINE

## 2023-12-26 PROCEDURE — 85610 PROTHROMBIN TIME: CPT

## 2023-12-26 PROCEDURE — 700102 HCHG RX REV CODE 250 W/ 637 OVERRIDE(OP): Performed by: HOSPITALIST

## 2023-12-26 PROCEDURE — 700102 HCHG RX REV CODE 250 W/ 637 OVERRIDE(OP): Performed by: NURSE PRACTITIONER

## 2023-12-26 PROCEDURE — 71045 X-RAY EXAM CHEST 1 VIEW: CPT

## 2023-12-26 PROCEDURE — 700102 HCHG RX REV CODE 250 W/ 637 OVERRIDE(OP): Performed by: FAMILY MEDICINE

## 2023-12-26 PROCEDURE — 85520 HEPARIN ASSAY: CPT

## 2023-12-26 PROCEDURE — A9270 NON-COVERED ITEM OR SERVICE: HCPCS | Performed by: HOSPITALIST

## 2023-12-26 PROCEDURE — RXMED WILLOW AMBULATORY MEDICATION CHARGE: Performed by: INTERNAL MEDICINE

## 2023-12-26 PROCEDURE — 700102 HCHG RX REV CODE 250 W/ 637 OVERRIDE(OP): Performed by: STUDENT IN AN ORGANIZED HEALTH CARE EDUCATION/TRAINING PROGRAM

## 2023-12-26 RX ORDER — WARFARIN SODIUM 3 MG/1
1.5 TABLET ORAL DAILY
Status: DISCONTINUED | OUTPATIENT
Start: 2023-12-26 | End: 2023-12-26 | Stop reason: HOSPADM

## 2023-12-26 RX ORDER — VANCOMYCIN HYDROCHLORIDE 125 MG/1
125 CAPSULE ORAL EVERY 6 HOURS
Qty: 31 CAPSULE | Refills: 0 | Status: ACTIVE | OUTPATIENT
Start: 2023-12-26 | End: 2023-12-27

## 2023-12-26 RX ORDER — SIMETHICONE 125 MG
125 TABLET,CHEWABLE ORAL 3 TIMES DAILY PRN
Qty: 120 TABLET | Refills: 1 | Status: SHIPPED | OUTPATIENT
Start: 2023-12-26 | End: 2024-03-14

## 2023-12-26 RX ORDER — ONDANSETRON HYDROCHLORIDE 8 MG/1
8 TABLET, FILM COATED ORAL EVERY 8 HOURS PRN
Qty: 15 TABLET | Refills: 0 | Status: SHIPPED | OUTPATIENT
Start: 2023-12-26 | End: 2024-03-14

## 2023-12-26 RX ORDER — WARFARIN SODIUM 2 MG/1
2 TABLET ORAL DAILY
Qty: 30 TABLET | Refills: 1 | Status: ACTIVE | OUTPATIENT
Start: 2023-12-26 | End: 2024-01-16

## 2023-12-26 RX ADMIN — TAMSULOSIN HYDROCHLORIDE 0.4 MG: 0.4 CAPSULE ORAL at 05:43

## 2023-12-26 RX ADMIN — VANCOMYCIN HYDROCHLORIDE 125 MG: 5 INJECTION, POWDER, LYOPHILIZED, FOR SOLUTION INTRAVENOUS at 00:57

## 2023-12-26 RX ADMIN — Medication 1 CAPSULE: at 08:13

## 2023-12-26 RX ADMIN — DRONABINOL 5 MG: 5 CAPSULE ORAL at 12:12

## 2023-12-26 RX ADMIN — VANCOMYCIN HYDROCHLORIDE 125 MG: 5 INJECTION, POWDER, LYOPHILIZED, FOR SOLUTION INTRAVENOUS at 12:13

## 2023-12-26 RX ADMIN — SIMETHICONE 125 MG: 125 TABLET, CHEWABLE ORAL at 00:55

## 2023-12-26 RX ADMIN — OXYCODONE 2.5 MG: 5 TABLET ORAL at 00:55

## 2023-12-26 RX ADMIN — VANCOMYCIN HYDROCHLORIDE 125 MG: 5 INJECTION, POWDER, LYOPHILIZED, FOR SOLUTION INTRAVENOUS at 08:13

## 2023-12-26 RX ADMIN — ASPIRIN 81 MG: 81 TABLET, COATED ORAL at 05:43

## 2023-12-26 RX ADMIN — VERAPAMIL HYDROCHLORIDE 80 MG: 80 TABLET ORAL at 05:43

## 2023-12-26 ASSESSMENT — FIBROSIS 4 INDEX: FIB4 SCORE: 1.15

## 2023-12-26 ASSESSMENT — PAIN DESCRIPTION - PAIN TYPE: TYPE: ACUTE PAIN

## 2023-12-26 NOTE — DISCHARGE INSTRUCTIONS
Discharge Date  12/26/23        FOLLOW UP ITEMS POST DISCHARGE  Follow-up with cardiologist for A-fib and valve disease  Follow-up with anticoagulation for warfarin dosing and INR  Follow-up with nephrologist for dialysis   Follow-up with PCP for GI referral for stool transplant due to recurrent C. difficile        DISCHARGE DIAGNOSES  Principal Problem:    C. difficile colitis (POA: Yes)  Active Problems:    ESRD (end stage renal disease) (HCC) (POA: Yes)    Systolic heart failure (HCC) (POA: Unknown)    Hypertension (POA: Yes)    Coronary artery disease due to lipid rich plaque (POA: Yes)    Dyslipidemia (POA: Yes)    Paroxysmal atrial flutter (HCC) (POA: Yes)    Loculated pleural effusion (POA: Yes)    Hypomagnesemia (POA: Yes)    History of mechanical aortic valve replacement (POA: Yes)    A-fib (HCC) (POA: Yes)    Liver cirrhosis (HCC) (Chronic) (POA: Unknown)    Splenomegaly (POA: Unknown)  Resolved Problems:    Elevated troponin (POA: Yes)    Peritonitis (HCC) (POA: Yes)    Nausea & vomiting (POA: Yes)        FOLLOW UP         Future Appointments   Date Time Provider Department Center   1/10/2024 12:45 PM TEX Del Toro [facility] None   3/8/2024  9:00 AM Michael J Bloch, M.D. [facility] None   3/20/2024  2:40 PM Marissa Velasquez M.D. [facility] None      [id number]  [facility] [address]  [phone]

## 2023-12-26 NOTE — DISCHARGE INSTR - CASE MGT
Begin Outpt Hemodialysis tomorrow Wednesday 12/27 at 0545 at [facility]  [address] [place] [address]. Phone   Dialysis Schedule is M-W-F at 0600    [facility] [address] [facility] [address]  phone  will contact you to schedule home visit within 24-48 hrs of Discharge.

## 2023-12-26 NOTE — DISCHARGE PLANNING
Case Management Discharge Planning    Admission Date: 11/27/2023  GMLOS: 9  ALOS: 29    6-Clicks ADL Score: 19  6-Clicks Mobility Score: 20      Anticipated Discharge Dispo: Discharge Disposition: D/T to home under A care in anticipation of covered skilled care (06)  Discharge Address: Home ([address] [place] [address])  Discharge Contact Phone Number: S/O Donna [phone]    DME Needed: No    Action(s) Taken: Updated Provider/Nurse on Discharge Plan    Escalations Completed: None    Medically Clear: Yes    Next Steps: Pt has been cleared for discharge. [facility] has accepted. Pt to begin HD tomorrow at 0545  at [facility] in [facility] and will continue on M-W-F schedule at 0600.Pt aware of plan and agrees.    Barriers to Discharge: None    Is the patient up for discharge tomorrow: No

## 2023-12-26 NOTE — PROGRESS NOTES
[facility] -  PROGRESS NOTE               Author: Justo Ríos M.D. Date & Time: 12/26/2023  9:06 AM     HPI:  58 y.o. 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 Abd imaging demonstrated colitis. C. Diff pending and started on abx. He notes abd pain and diarrhea for several weeks. Edema improving with 2.5% dextrose solution. Still has HD permacath in-place. Pain with peritoneal dialysis but no cloudy effluent of fibrin clots. No f/c/s. Normally does [id number] fills all green.     DAILY NEPHROLOGY SUMMARY:  11/28: consult done  11/29: ongoing abd pain, seen on HD-tolerating procedure well, PD fluid concerning for peritonitis  11/30:c.diff positive, started on oral vanc, new onset aflutter-transferred to [facility], wbc improving, Peritoneal cultures remain negative, abd pain improving  12/1: PD culture remains negative, seen on HD-tolerating procedure, abd pain improving, less diarrhea    12/2: HD yesterday 2.5 liter UF, still with SOB decreased abdoimnal pain  12/3: PD last night 1.5 UF, pt still with SOB  12/4: No events, tolerated HD yest with 2.5L UF, BP stable, tachycardic this am, stable on RA, reports SOB has resolved, still with crampy abd discomfort, reports diarrhea is starting to become more formed  12/5: No events, no new labs, BP stable, tachycardic, stable on RA this am, still with crampy abd pain, diarrhea improving, still with nausea/anorexia, denies any CP/LE edema, reports mild SOB  12/6: No events, tolerated HD yest with 1L UF, BP stable, remains tachycardic, reports nausea is improved and having more firm stools, denies any CP/SOB, reports poor appetite  12/7: No events, BP stable, tolerated HD  this am with 1L UF, still doesn't feel well and feels very depressed and frustrated, +abd pain and n/v this am and unable to take PO  12/8: patient is doing well, resting in bed. Plan next iHD tomorrow  12/09: patient is seen during HD, refused labs this am. Does report some abdominal discomfort this am  12/10: patient had iHD yesterday, UF 2000 ml. Abdominal issues ongoing  12/11: pt had CT thorax done yesterday, showing loculated effusion. Pt feels SOB. Diarrhea continues but is better overall.   12/12: pt reports no more diarrhea, only 2-3 loose stools. Seen on HD and tolerating. Pulm recommending chest tube for loculated effusion.   12/13: pt awaiting improvement in INR before chest tube placement. Pt frustrated at prolonged hospitalization.  12/14: Pt had HD this AM and chest tube placement after. Pt seen in Room  12/15: Pt seen in room. Reports breathing is better now chest tube is in.   12/16: chest tube drainage slowing down. Pt breathing better. However, pt tachycardic in 130s this AM. He reports return of diarrhea and some abdominal pain. dialysis on hold for now  12/17: dialysis held yesterday due to tachycardia and later hypotension. HR and BP within normal ranges this AM. Pt positive for C Diff again, with continued diarrhea.   12/18: HD yesterday net UF 1L, pt reports SOB thinks he may have fluids in  his lungs and wants to do additional HD today to remove fluids,  denies further D, continue to have abdominal cramps  12/19: HD yesterday net UF 1.5L, remains tachycardic / aflutter ablation planned 12/20, states he feels tired. No d/ abd cramping   12/20:no labs today, pt reports no c/o,plan cardiac ablation today   12/21:Aflutter ablation 12/20, VSS, seen on HD, plan to remove chest tube today, CXR 12/21 reviewed stable pulmonary edema and infiltrates  12/22: HD today net UF 1L, pt seen start  of HD, denies any diarrhea, abd cramping, chest tube is out   12/23:HD yesterday,net UF 2L, frustrated due to  "ongoing diarrhea   12/24: Ongoing GI symptoms, no other c/o VSS, denies SOB   12/25: lying in bed, feels nausea, some vomiting, edema improved, for iHD today    REVIEW OF SYSTEMS:    +diarrhea, +abd pain  10 point ROS reviewed and is as per HPI/daily summary or otherwise negative    PMH/PSH/SH/FH:   Reviewed and unchanged since admission note    CURRENT MEDICATIONS:   Reviewed from admission to present day    VS:  BP 98/56   Pulse 93   Temp 36.7 °C (98 °F) (Temporal)   Resp 18   Ht 1.702 m (5' 7\")   Wt 73 kg (160 lb 15 oz)   SpO2 96%   BMI 25.21 kg/m²     Physical Exam  Vitals and nursing note reviewed.   Constitutional:       General: He is not in acute distress.     Appearance: Normal appearance.   HENT:      Head: Normocephalic and atraumatic.   Eyes:      General: No scleral icterus.     Extraocular Movements: Extraocular movements intact.   Cardiovascular:      Rate and Rhythm: Regular rhythm.   Pulmonary:      Effort: Pulmonary effort is normal.   Abdominal:      General: Bowel sounds are normal.      Palpations: Abdomen is soft.      Tenderness: There is no abdominal tenderness.   Musculoskeletal:         General: No deformity.      Right lower leg: No edema.      Left lower leg: No edema.   Skin:     General: Skin is warm and dry.      Findings: No rash.   Neurological:      General: No focal deficit present.      Mental Status: He is alert and oriented to person, place, and time.   Psychiatric:         Mood and Affect: Mood normal.         Behavior: Behavior normal. Behavior is cooperative.       R chest tube in place (12/14/23)    Fluids:  In: 30 [I.V.:30]  Out: 2650     LABS:  Recent Labs     12/24/23  0608 12/25/23  0404   SODIUM 135 133*   POTASSIUM 4.7 5.1   CHLORIDE 101 101   CO2 24 23   GLUCOSE 110* 80   BUN 36* 42*   CREATININE 7.18* 8.22*   CALCIUM 7.8* 8.0*         IMAGING:   All imaging reviewed from admission to present day  CXR 12/22   1.  Cardiomegaly.   2.  Estimated moderate bilateral " pleural effusions with interval increase in left pleural effusion.   3.  Underlying bibasilar atelectasis or consolidation.     IMPRESSION:  # ESRD on outpt PD   -Etiology 2/2 FSGS  - s/p permcath on 11/10, and on HD currently  # R/O PD cath associated peritonitis  - CX negative  # c. Diff.  -symptoms improved with initiation of oral vanc  -PD guidelines vague on scenarios such as this,  but pt on HD currently  # S/P mechanical AVR/ Ascending aneurysm repair 11/2023   # CKD-MBD  - P elevated  # Anemia of CKD, below goal hgb 10-11, low iron stores 12/17     Resumed EPO 6000U IV w HD on 12/12  # BL pleural effusions/congestion     S/p chest tube placement on 12/14     Recent CXR pleural effusions with dminished size  # Pericardial effusion   # C. Diff colitis  -C.diff positive  # Aflutter s/p ablation      PLAN:  -  HD next in am WEDS, none needed Tuesday  - Continue HD QMWF schedule and PRN   - Cont EPO 6000U IV w HD  - Cont holding PD for now (last on 12/3),  can re-eval as outpt  - PD catheter flushes Q weekly, last flushed 12/18    - C. diff tx/mgmt per primary team, diarrhea/pain and +toxin returned on 12/16  - Renal diet, no dietary pr restrictions   - Phos binder with meals   - Dose all medications as per ESRD  - OP HD chair has been arranged at [facility], re challenge of PD once stable in OP setting   - IV iron loading

## 2023-12-26 NOTE — PROGRESS NOTES
Telemetry Shift Summary     Rhythm: SR  HR: 82-85  Ectopy: rare PVC    Measurements: .16/.07/.34              Normal Values  Rhythm: SR  HR:   Measurements: 0.12-0.20/0.08-0.10/0.30-0.52

## 2023-12-26 NOTE — CARE PLAN
The patient is Stable - Low risk of patient condition declining or worsening    Shift Goals  Clinical Goals: Monitor coag labs, IV heparin, pt safety  Patient Goals: rest  Family Goals: FARIDA    Progress made toward(s) clinical / shift goals:    Problem: Gastrointestinal Irritability  Goal: Diarrhea will be absent or improved  Description: Target End Date:  Prior to discharge or change in level of care    1.  Assess for abdominal discomfort, pain, cramping, frequency, urgency, loose or liquid stools, and hyperactive bowel sensations.  2.  Evaluate pattern of defecation  3.  Administer antidiarrheal agents per order  4. Culture stool, per order  5.  Inquire about food intolerances, medications, change in eating pattern and current stressors  6.  Assess for fecal impaction  7.  Assess hydration status  8.  Assess condition of perianal skin  9.  Loss of bowel elimination control can lead of feelings of decreased self esteem.  Examine the emotional impact of illness, hospitalization and/or accidents.  Outcome: Progressing     Problem: Hemodynamics  Goal: Patient's hemodynamics, fluid balance and neurologic status will be stable or improve  Description: Target End Date:  Prior to discharge or change in level of care    Document on Assessment and I/O flowsheet templates    1.  Monitor vital signs, pulse oximetry and cardiac monitor per provider order and/or policy  2.  Maintain blood pressure per provider order  3.  Hemodynamic monitoring per provider order  4.  Manage IV fluids and IV infusions  5.  Monitor intake and output  6.  Daily weights per unit policy or provider order  7.  Assess peripheral pulses and capillary refill  8.  Assess color and body temperature  9.  Position patient for maximum circulation/cardiac output  10. Monitor for signs/symptoms of excessive bleeding  11. Assess mental status, restlessness and changes in level of consciousness  12. Monitor temperature and report fever or hypothermia to provider  immediately. Consideration of targeted temperature management.  Outcome: Progressing     Problem: Respiratory  Goal: Patient will achieve/maintain optimum respiratory ventilation and gas exchange  Description: Target End Date:  Prior to discharge or change in level of care    Document on Assessment flowsheet    1.  Assess and monitor rate, rhythm, depth and effort of respiration  2.  Breath sounds assessed qshift and/or as needed  3.  Assess O2 saturation, administer/titrate oxygen as ordered  4.  Position patient for maximum ventilatory efficiency  5.  Turn, cough, and deep breath with splinting to improve effectiveness  6.  Collaborate with RT to administer medication/treatments per order  7.  Encourage use of incentive spirometer and encourage patient to cough after use and utilize splinting techniques if applicable  8.  Airway suctioning  9.  Monitor sputum production for changes in color, consistency and frequency  10. Perform frequent oral hygiene  11. Alternate physical activity with rest periods  Outcome: Progressing     Problem: Fall Risk  Goal: Patient will remain free from falls  Description: Target End Date:  Prior to discharge or change in level of care    Document interventions on the [facility] Fall Risk Assessment    1.  Assess for fall risk factors  2.  Implement fall precautions  Outcome: Progressing     Problem: Knowledge Deficit - Standard  Goal: Patient and family/care givers will demonstrate understanding of plan of care, disease process/condition, diagnostic tests and medications  Description: Target End Date:  1-3 days or as soon as patient condition allows    Document in Patient Education    1.  Patient and family/caregiver oriented to unit, equipment, visitation policy and means for communicating concern  2.  Complete/review Learning Assessment  3.  Assess knowledge level of disease process/condition, treatment plan, diagnostic tests and medications  4.  Explain disease process/condition,  treatment plan, diagnostic tests and medications  Outcome: Progressing     Problem: Pain - Standard  Goal: Alleviation of pain or a reduction in pain to the patient’s comfort goal  Description: Target End Date:  Prior to discharge or change in level of care    Document on Vitals flowsheet    1.  Document pain using the appropriate pain scale per order or unit policy  2.  Educate and implement non-pharmacologic comfort measures (i.e. relaxation, distraction, massage, cold/heat therapy, etc.)  3.  Pain management medications as ordered  4.  Reassess pain after pain med administration per policy  5.  If opiods administered assess patient's response to pain medication is appropriate per POSS sedation scale  6.  Follow pain management plan developed in collaboration with patient and interdisciplinary team (including palliative care or pain specialists if applicable)  Outcome: Progressing       Patient is not progressing towards the following goals:

## 2023-12-26 NOTE — DISCHARGE SUMMARY
Discharge Summary    CHIEF COMPLAINT ON ADMISSION  Chief Complaint   Patient presents with    Sent by MD     Patient reports open heart surgery 11/3. Patient reports spoke to surgeon today and told to come to ER. Patient reports also a dialysis patient and did his dialysis last night at home.      Weakness     Patient reports increased weakness over the last three weeks.     Shortness of Breath     X 3 weeks.        Reason for Admission  Post Op Complication  C. difficile infection  Pleural effusion and needed chest tube    Admission Date  11/27/2023    CODE STATUS  Full Code    HPI & HOSPITAL COURSE    58-year-old male with history of end-stage kidney disease on dialysis, A-fib with mechanical valve replacement and  who was admitted 11/27 with symptoms of abdominal pain, nausea, vomiting and diarrhea, CT scan showed evidence of colitis, he was started on empiric coverage with IV Cefepime and Flagyl.  Patient recently underwent mechanical AVR, aortic aneurysm repair, discharged on 11/17/2023.  He also has known history of end-stage renal disease on peritoneal dialysis.  Workup showed he was positive for C. difficile colitis, he was started on oral vancomycin.  There was also concern for possible peritonitis, he was given intraperitoneal IV cefepime.  ID was consulted for recurrent C. difficile and planning for long tapering vancomycin. 125 mg orally 4 times daily for 14 days, then  125 mg orally 2 times daily for 7 days, then 125 mg orally once daily for 7 days, then 125 mg orally every 2 to 3 days for 8 weeks.  His diarrhea was resolved and no leukocytosis.  GI referral was placed for possible stool transplant as outpatient.     Nephrology was consulted on the case, he underwent dialysis through his temporary dialysis catheter which was placed on the previous admission.  Patient has an appointment with dialysis on 12/27, to follow-up with nephrologist for possible peritoneal dialysis.    patient was found to have  loculated pleural effusion,  Patient needed a chest tube on 12/14, pulmonary was consulted, patient was improved and received tPA, chest tube was removed and repeat chest x-ray showed stable with no recurrent pleural effusion.     Also with known history of paroxysmal atrial flutter, and with recent mechanical AVR, patient underwent ablation by Dr. Velasquez on 12/20.  Echo showed ejection fraction 35% with inferior and septal wall hypokinesis, patient has a pleural effusion and being on dialysis, patient has been stable with sinus rhythm after the ablation, to continue warfarin and verapamin.  Patient is not qualified for spironolactone or ACE inhibitor's due to advanced and end-stage kidney disease, patient is on aspirin, and atorvastatin.  Patient is to follow-up with cardiologist as outpatient.  Patient has been on warfarin and needed bridging with heparin during the hospitalization, his INR 2.6 on the day of discharge, after discussion with the pharmacist, to continue his home dose 2 mg daily and follow-up closely with anticoagulation clinic, encouraged the patient to check his INR on Thursday 12/28 and close monitoring with anticoagulation clinic, patient understood and agreed, discussed the risk of bleeding and the risk of stroke, patient understood.    On the day of discharge the patient was alert oriented x 4, denied any symptoms, patient has formed stool, no leukocytosis, no fever, INR at therapeutic level, long discussion was done with the patient about the plan of care, discussed the importance of following closely with cardiologist, nephrologist and PCP, follow-up closely with with INR for warfarin, patient understood and agreed.    Patient was evaluated by PT and OT and recommended home health.    Therefore, he is discharged in good and stable condition to home with organized home healthcare and close outpatient follow-up.    The patient met 2-midnight criteria for an inpatient stay at the time of  discharge.    Discharge Date  12/26/23      FOLLOW UP ITEMS POST DISCHARGE  Follow-up with cardiologist for A-fib and valve disease  Follow-up with anticoagulation for warfarin dosing and INR  Follow-up with nephrologist for dialysis   Follow-up with PCP for GI referral for stool transplant due to recurrent C. difficile      DISCHARGE DIAGNOSES  Principal Problem:    C. difficile colitis (POA: Yes)  Active Problems:    ESRD (end stage renal disease) (HCC) (POA: Yes)    Systolic heart failure (HCC) (POA: Unknown)    Hypertension (POA: Yes)    Coronary artery disease due to lipid rich plaque (POA: Yes)    Dyslipidemia (POA: Yes)    Paroxysmal atrial flutter (HCC) (POA: Yes)    Loculated pleural effusion (POA: Yes)    Hypomagnesemia (POA: Yes)    History of mechanical aortic valve replacement (POA: Yes)    A-fib (HCC) (POA: Yes)    Liver cirrhosis (HCC) (Chronic) (POA: Unknown)    Splenomegaly (POA: Unknown)  Resolved Problems:    Elevated troponin (POA: Yes)    Peritonitis (HCC) (POA: Yes)    Nausea & vomiting (POA: Yes)      FOLLOW UP  Future Appointments   Date Time Provider Department Center   1/10/2024 12:45 PM TEX Del Toro None   3/8/2024  9:00 AM Michael J Bloch, M.D. [facility] None   3/20/2024  2:40 PM EMILIA Szymanski None     [facility] - [address]  [facility] [address]  [phone]        Drew T. Blumberg, D.O.  [facility] [facility] [address]  [facility] [address] [phone]    Follow up in 3 day(s)      Marissa Velasquez M.D.  [address]  [address]  [facility] [address] [phone]    Follow up in 4 week(s)      [facility] - [address] [address] [phone]  Follow up in 1 month(s)        MEDICATIONS ON DISCHARGE     Medication List        START taking these medications        Instructions   docusate sodium 100 MG Caps  Commonly known as: Colace   Take 100 mg by mouth 2 times a day.  Dose: 100 mg     dronabinol 5  MG Caps  Commonly known as: Marinol   Take 1 Capsule by mouth 2 times daily, before breakfast and dinner for 30 days.  Dose: 5 mg     magnesium oxide 400 (240 Mg) MG Tabs   Take 1 Tablet by mouth every day.  Dose: 400 mg     ondansetron 8 MG Tabs  Commonly known as: Zofran   Take 1 Tablet by mouth every 8 hours as needed for Nausea/Vomiting.  Dose: 8 mg     PEG 3350 Pack   Take 1 Packet by mouth every day.  Dose: 17 g     promethazine 12.5 MG tablet  Commonly known as: Phenergan   Take 1-2 Tablets by mouth every four hours as needed for Nausea/Vomiting (if no relief from ondansetron or if ondansetron is not ordered).  Dose: 12.5-25 mg     sevelamer carbonate 800 MG Tabs tablet  Commonly known as: Renvela   Take 2 Tablets by mouth 3 times a day with meals.  Dose: 1,600 mg     simethicone 125 MG chewable tablet  Commonly known as: Mylicon   Chew 1 Tablet 3 times a day as needed for Flatulence.  Dose: 125 mg     * vancomycin 125 MG capsule  Commonly known as: Vancocin   Take 1 Capsule by mouth every 6 hours for 1 day. Then take 1 cap every 12 hours for 7 days then 1 cap once daily for 7 days then 1 cap every 48 hours for 6 days then 1 cap every 72 hours for 8 days  Dose: 125 mg     * vancomycin 50 mg/mL 50 mg/mL Soln  Start taking on: December 27, 2023   Take 2.5 mL by mouth every 12 hours for 7 days.  Dose: 125 mg     * vancomycin 50 mg/mL 50 mg/mL Soln  Start taking on: January 3, 2024   Take 2.5 mL by mouth every 24 hours for 7 days.  Dose: 125 mg     * vancomycin 50 mg/mL 50 mg/mL Soln  Start taking on: January 10, 2024   Take 2.5 mL by mouth every 48 hours for 6 days.  Dose: 125 mg     * vancomycin 50 mg/mL 50 mg/mL Soln  Start taking on: January 18, 2024   Take 2.5 mL by mouth every 72 hours for 8 days.  Dose: 125 mg           * This list has 5 medication(s) that are the same as other medications prescribed for you. Read the directions carefully, and ask your doctor or other care provider to review them with you.                 CHANGE how you take these medications        Instructions   * verapamil  MG Tbcr  What changed: You were already taking a medication with the same name, and this prescription was added. Make sure you understand how and when to take each.  Commonly known as: Calan SR   Take 1 Tablet by mouth every day.  Dose: 240 mg     * verapamil  MG Tbcr  What changed:   how much to take  when to take this  additional instructions  Commonly known as: Calan SR   Take 1 Tablet by mouth 2 times a day.  Dose: 240 mg     warfarin 2 MG Tabs  What changed:   medication strength  how much to take  Another medication with the same name was removed. Continue taking this medication, and follow the directions you see here.  Commonly known as: Coumadin   Take 1 Tablet by mouth every day at 6 PM.  Dose: 2 mg           * This list has 2 medication(s) that are the same as other medications prescribed for you. Read the directions carefully, and ask your doctor or other care provider to review them with you.                CONTINUE taking these medications        Instructions   aspirin 81 MG EC tablet   Take 81 mg by mouth every day.  Dose: 81 mg     atorvastatin 40 MG Tabs  Commonly known as: Lipitor   Take 1 Tablet by mouth every evening.  Dose: 40 mg     omeprazole 20 MG delayed-release capsule  Commonly known as: PriLOSEC   Take 1 Capsule by mouth 2 times a day.  Dose: 20 mg     tamsulosin 0.4 MG capsule  Commonly known as: Flomax   Take 0.4 mg by mouth every day.  Dose: 0.4 mg            STOP taking these medications      traMADol 50 MG Tabs  Commonly known as: Ultram            ASK your doctor about these medications        Instructions   * vancomycin 125 MG capsule  Commonly known as: Vancocin  Ask about: Should I take this medication?   Take 1 Capsule by mouth every 6 hours for 3 days.  Dose: 125 mg           * This list has 1 medication(s) that are the same as other medications prescribed for you. Read the  directions carefully, and ask your doctor or other care provider to review them with you.                  Allergies  Allergies   Allergen Reactions    Allopurinol Itching    Hydralazine Hcl Unspecified     Pt states he had a reaction similar to lupus       DIET  Orders Placed This Encounter   Procedures    Diet Order Diet: Regular     Standing Status:   Standing     Number of Occurrences:   1     Order Specific Question:   Diet:     Answer:   Regular [1]       ACTIVITY  As tolerated.  Weight bearing as tolerated    CONSULTATIONS  Cardiologist, ID    PROCEDURES  Ablation, chest tube    LABORATORY  Lab Results   Component Value Date    SODIUM 133 (L) 12/25/2023    POTASSIUM 5.1 12/25/2023    CHLORIDE 101 12/25/2023    CO2 23 12/25/2023    GLUCOSE 80 12/25/2023    BUN 42 (H) 12/25/2023    CREATININE 8.22 (HH) 12/25/2023    GLOMRATE 7 (L) 04/02/2023        Lab Results   Component Value Date    WBC 5.8 12/25/2023    HEMOGLOBIN 8.5 (L) 12/25/2023    HEMATOCRIT 28.1 (L) 12/25/2023    PLATELETCT 265 12/25/2023        Total time of the discharge process exceeds 35 minutes.   No indicators present

## 2023-12-26 NOTE — DISCHARGE PLANNING
HTH/SCP TCN chart review completed. Collaborated with CM. Current discharge considerations are home with [facility].  Noted DME O2 choice was obtained by prior TCN, however per CM, no need for DME O2, is on RA.  This is confirmed via chart.  Per kardex patient ambulated on 2/25, 2 x 15 feet with no AD or assistance.  CM reports discharge today.  TCN will continue to follow and collaborate with discharge planning team as additional post acute needs arise. Thank you.    Completed:   Choice obtained: DME ([facility]2 [employer]) on 12/17/23.  HH (Resumption of [facility]) on 11/28/23.    [facility] has accepted  SCP with [facility].  Patient request to set up his own Follow up PCP appointment.

## 2023-12-26 NOTE — PROGRESS NOTES
Discharge orders acknowledged, Pt aware and compliant with new orders, D/C teaching completed, Pt able to verbalize needs and complaint with discharge orders. Personal belongings in pt possession. Medication delivered to pt prior to discharge, pt will also pick  up medication at personal pharmacy. Pt aware. PIV removed.  Pt escorted off unit via wheelchair with assistance from CNA.

## 2023-12-27 ENCOUNTER — TELEPHONE (OUTPATIENT)
Dept: VASCULAR LAB | Facility: MEDICAL CENTER | Age: 58
End: 2023-12-27
Payer: COMMERCIAL

## 2023-12-27 NOTE — TELEPHONE ENCOUNTER
Pt was discharged yesterday and needs f/u INR via [facility].    Called pt to discuss - no answer, left VM asking if [facility] will be visiting him before the end of the week    Called Son[facility] Ha RN Clinical Manager - no answer, left VM asking to confirm if they will be visiting pt before the end of the week    Veronica Bedolla, PharmD

## 2023-12-27 NOTE — TELEPHONE ENCOUNTER
Caller: Gurdeep Guerra     Topic/issue: Returning Veronica Watsone call, please call     Callback Number: [phone]    Thank You   Mikki CALERO

## 2023-12-28 ENCOUNTER — TELEPHONE (OUTPATIENT)
Dept: VASCULAR LAB | Facility: MEDICAL CENTER | Age: 58
End: 2023-12-28
Payer: COMMERCIAL

## 2023-12-28 ENCOUNTER — ANTICOAGULATION MONITORING (OUTPATIENT)
Dept: VASCULAR LAB | Facility: MEDICAL CENTER | Age: 58
End: 2023-12-28
Payer: COMMERCIAL

## 2023-12-28 ENCOUNTER — PATIENT MESSAGE (OUTPATIENT)
Dept: VASCULAR LAB | Facility: MEDICAL CENTER | Age: 58
End: 2023-12-28
Payer: COMMERCIAL

## 2023-12-28 DIAGNOSIS — Z95.2 S/P AVR: ICD-10-CM

## 2023-12-28 DIAGNOSIS — Z95.2 HISTORY OF MECHANICAL AORTIC VALVE REPLACEMENT: ICD-10-CM

## 2023-12-28 LAB — INR PPP: 3.3 (ref 2–3.5)

## 2023-12-28 NOTE — TELEPHONE ENCOUNTER
Caller: Alison Masters Home Health Nurse    Topic/issue: INR Reading    INR - 3.3    Callback Number:[phone] - Alison     Thank you,     Ciarra MORROW

## 2023-12-28 NOTE — PROGRESS NOTES
S/w pt - he states [facility] will be visiting today.  Sent orders for [facility] to check INR today.  Veronica Bedolla, EstefanyD

## 2023-12-29 PROBLEM — R53.81 PHYSICAL DECONDITIONING: Status: ACTIVE | Noted: 2023-12-29

## 2023-12-29 PROBLEM — R10.84 GENERALIZED ABDOMINAL PAIN: Status: ACTIVE | Noted: 2023-12-29

## 2023-12-29 NOTE — PROGRESS NOTES
OP Anticoagulation Service Note    Date: 12/28/2023    Anticoagulation Summary  As of 12/28/2023      INR goal:  2.0-3.0   TTR:  --   INR used for dosing:  3.30 (12/28/2023)   Warfarin maintenance plan:  1.25 mg (2.5 mg x 0.5) every day   Weekly warfarin total:  8.75 mg   Plan last modified:  Benito Bales, PharmD (11/24/2023)   Next INR check:  1/2/2024   Target end date:      Indications    History of mechanical aortic valve replacement [Z95.2]                 Anticoagulation Episode Summary       INR check location:      Preferred lab:      Send INR reminders to:      Comments:  Sonam  [phone];  On-X AVR 11/3/23 - INR goal 2-3, then 1.5-2.0 on 2/3/24          Anticoagulation Care Providers       Provider Role Specialty Phone number    [facility]   [phone]          Anticoagulation Patient Findings        Patient's preferred phone number:  [phone]      Inpatient Anticoagulation Service Note for 12/25/2023        Reason for Anticoagulation: Atrial Fibrillation, On-X Aortic/Mitral Valve Replacement   [id number] VASc Score: 2  HAS-BLED Score: 3     Hemoglobin Value: (!) 8.5  Hematocrit Value: (!) 28.1  Lab Platelet Value: 265  Target INR: 2.0 to 3.0     INR from last 7 days         Date/Time INR Value     12/25/23 0404 1.95     12/24/23 0608 1.71     12/23/23 0012 1.33     12/22/23 0401 1.43     12/21/23 0209 1.37     12/19/23 0620 1.47             Dose from last 7 days         Date/Time Dose (mg)     12/25/23 0936 4     12/24/23 1434 3     12/23/23 1630 4     12/22/23 0426 4     12/21/23 1607 2     12/20/23 1758 2.5     12/19/23 1714 2.5          HPI:   The reason for today's call is to prevent morbidity and mortality from a blood clot and/or stroke and to reduce the risk of bleeding while on a anticoagulant.     PCP:  Drew T. Blumberg, D.O.  [address] [facility] [address]  [place] [address]    Assessment:     INR goal for this PT: 2.0-3.0  INR   Date Value Ref Range Status    12/28/2023 3.30 2 - 3.5 Final       Lab Results   Component Value Date/Time    BUN 42 (H) 12/25/2023 04:04 AM    CREATININE 8.22 (HH) 12/25/2023 04:04 AM    BUNCREATRAT 17 05/09/2022 09:49 AM    GLOMRATE 7 (L) 04/02/2023 11:54 AM     Lab Results   Component Value Date/Time    HEMOGLOBIN 8.5 (L) 12/25/2023 04:04 AM    HEMATOCRIT 28.1 (L) 12/25/2023 04:04 AM    PLATELETCT 265 12/25/2023 04:04 AM    ALKPHOSPHAT 54 12/25/2023 04:04 AM    ASTSGOT 25 12/25/2023 04:04 AM    ALTSGPT 21 12/25/2023 04:04 AM          Current Outpatient Medications:     simethicone, 125 mg, Oral, TID PRN    vancomycin 50 mg/mL, 125 mg, Oral, Q12HRS    [START ON 1/3/2024] vancomycin 50 mg/mL, 125 mg, Oral, Q24HRS    [START ON 1/10/2024] vancomycin 50 mg/mL, 125 mg, Oral, Q48HRS    [START ON 1/18/2024] vancomycin 50 mg/mL, 125 mg, Oral, Q72HRS    ondansetron, 8 mg, Oral, Q8HRS PRN    warfarin, 2 mg, Oral, DAILY AT 1800    verapamil ER, 240 mg, Oral, DAILY    verapamil ER, 240 mg, Oral, BID    docusate sodium, 100 mg, Oral, BID    dronabinol, 5 mg, Oral, BID AC    magnesium oxide, 400 mg, Oral, DAILY    sevelamer carbonate, 1,600 mg, Oral, TID WITH MEALS    promethazine, 12.5-25 mg, Oral, Q4HRS PRN    polyethylene glycol/lytes, 17 g, Oral, DAILY    omeprazole, 20 mg, Oral, BID    tamsulosin, 0.4 mg, Oral, DAILY    atorvastatin, 40 mg, Oral, Nightly    aspirin, 81 mg, Oral, DAILY      Plan:     Decrease weekly warfarin dose as noted above.       Follow-up:     On date seen above    Additional information discussed with patient:     Asked patient to please call the anticoagulation clinic if they have any signs/symptoms of bleeding and/or thrombosis or any changes to diet or medications.      National recommendations regarding anticoagulation therapy:     The CHEST guidelines recommends frequent INR monitoring at regular intervals (a few days up to a max of 12 weeks) to ensure patients are on the proper dose of warfarin, and patients are not having  any complications from therapy.  INRs can dramatically change over a short time period due to diet, medications, and medical conditions.       [facility]  Phone: [phone]  Fax: [phone]  On call: [phone]  General scheduling/information [phone]  For emergencies please dial 911  Please do not use [facility] for urgent matters, call the phone numbers listed above.    This note was created using voice recognition software (Dragon). The accuracy of the dictation is limited by the abilities of the software. I have reviewed the note prior to signing, however some errors in grammar and context are still possible. If you have any questions related to this note please do not hesitate to contact our office.

## 2024-01-02 ENCOUNTER — TELEPHONE (OUTPATIENT)
Dept: VASCULAR LAB | Facility: MEDICAL CENTER | Age: 59
End: 2024-01-02
Payer: COMMERCIAL

## 2024-01-02 ENCOUNTER — ANTICOAGULATION MONITORING (OUTPATIENT)
Dept: VASCULAR LAB | Facility: MEDICAL CENTER | Age: 59
End: 2024-01-02
Payer: COMMERCIAL

## 2024-01-02 DIAGNOSIS — Z95.2 S/P AVR (AORTIC VALVE REPLACEMENT): ICD-10-CM

## 2024-01-02 LAB — INR PPP: 2.2 (ref 2–3.5)

## 2024-01-02 NOTE — PROGRESS NOTES
Anticoagulation Summary  As of 2024      INR goal:  2.0-3.0   TTR:  72.7 % (5 d)   INR used for dosin.20 (2024)   Warfarin maintenance plan:  2.5 mg (2.5 mg x 1) every day   Weekly warfarin total:  17.5 mg   Plan last modified:  Benito Bales, PharmD (2023)   Next INR check:  2024   Target end date:      Indications    S/P AVR (aortic valve replacement) [Z95.2]                 Anticoagulation Episode Summary       INR check location:      Preferred lab:      Send INR reminders to:      Comments:  Son[facility] [phone];  On-X AVR 11/3/23 - INR goal 2-3, then 1.5-2.0 on 2/3/24          Anticoagulation Care Providers       Provider Role Specialty Phone number    [facility]   [phone]          Anticoagulation Patient Findings      INR is therapeutic    Called and left VM for patient.    Requested patient to contact the clinic for any s/sx of unusual bleeding, bruising, clotting or any changes to diet or medications. Unless patient reports any changes that would warrant an adjustment to the plan:  Warfarin Plan: Continue regimen as listed above.    Next INR in 2 day(s) via [facility].    Veronica Bedolla, PharmD

## 2024-01-02 NOTE — TELEPHONE ENCOUNTER
INR Reading    Caller: [facility]    Topic/issue: The nurse called in pt INR please advise.     Value: 2.2     Callback Number: Pt Phone; [phone] (home)      Thank you,     Mariano JENSEN

## 2024-01-04 ENCOUNTER — TELEPHONE (OUTPATIENT)
Dept: SCHEDULING | Facility: IMAGING CENTER | Age: 59
End: 2024-01-04
Payer: COMMERCIAL

## 2024-01-04 ENCOUNTER — ANTICOAGULATION MONITORING (OUTPATIENT)
Dept: MEDICAL GROUP | Facility: PHYSICIAN GROUP | Age: 59
End: 2024-01-04
Payer: COMMERCIAL

## 2024-01-04 DIAGNOSIS — Z95.2 S/P AVR (AORTIC VALVE REPLACEMENT): ICD-10-CM

## 2024-01-04 LAB — INR PPP: 2 (ref 2–3.5)

## 2024-01-04 NOTE — TELEPHONE ENCOUNTER
24 Hour Monitor / ABPM / Standing Order / INR / Other:     PT Name: Marifer Masters [facility]     PT MRN: [id number]    Best Call Back Number: [phone] (home)       What does the PT need to schedule?: N/A    Any special instructions: Needs Tues or Thur for next order    INR Result: 2.0

## 2024-01-04 NOTE — PROGRESS NOTES
Anticoagulation Summary  As of 2024      INR goal:  2.0-3.0   TTR:  80.5 % (1 wk)   INR used for dosin.00 (2024)   Warfarin maintenance plan:  2.5 mg (2.5 mg x 1) every day   Weekly warfarin total:  17.5 mg   Plan last modified:  Benito Bales, PharmD (2023)   Next INR check:  2024   Target end date:  Indefinite    Indications    S/P AVR (aortic valve replacement) [Z95.2]                 Anticoagulation Episode Summary       INR check location:      Preferred lab:      Send INR reminders to:      Comments:  [facility] [phone];  On-X AVR 11/3/23 - INR goal 2-3, then 1.5-2.0 on 2/3/24          Anticoagulation Care Providers       Provider Role Specialty Phone number    [facility]   [phone]          Anticoagulation Patient Findings      INR is therapeutic    Called and left VM for patient.    Requested patient to contact the clinic for any s/sx of unusual bleeding, bruising, clotting or any changes to diet or medications. Unless patient reports any changes that would warrant an adjustment to the plan:  Warfarin Plan: Continue regimen as listed above.    Next INR in 5 day(s) via [facility].    Veronica Bedolla, PharmD

## 2024-01-05 ENCOUNTER — TELEPHONE (OUTPATIENT)
Dept: CARDIOLOGY | Facility: MEDICAL CENTER | Age: 59
End: 2024-01-05
Payer: COMMERCIAL

## 2024-01-05 DIAGNOSIS — R10.9 ABDOMINAL PAIN, UNSPECIFIED ABDOMINAL LOCATION: ICD-10-CM

## 2024-01-05 DIAGNOSIS — I35.0 AORTIC VALVE STENOSIS, ETIOLOGY OF CARDIAC VALVE DISEASE UNSPECIFIED: ICD-10-CM

## 2024-01-05 DIAGNOSIS — A49.8 CLOSTRIDIUM DIFFICILE INFECTION: ICD-10-CM

## 2024-01-05 NOTE — TELEPHONE ENCOUNTER
[facility]      Caller: Gurdeep Guerra     Topic/issue: Patient states that he is in a  lot of pain, he can't walk he can't function he has bad stomach cramps, he is dizzy, he is throwing up.  This has all been since his open heart surgery Nov 3. He says something is very wrong with him, he states his BP is very high as well.  Please call     Callback Number: [phone]      Thank You   Mikki CALERO

## 2024-01-05 NOTE — TELEPHONE ENCOUNTER
Phone Number Called: [phone]    Call outcome: Spoke to patient regarding message below.    Message: Spoke to patient about symptoms and recent BP. Patient reports that BP is 120/80. Patient reports that when he wakes up it is in the 150-160's. Patient reports that when it is high he does not feel well. Patient reports that since procedure he has been having abdominal issues. Patient reports that he is unsteady, throwing up daily, very fatigued, and activities are severely limited. Patient denies any diarrhea at this time. Patient reports severe abdominal cramping as 8/10. Patient reports some chest pain but states he also has pain all over. Patient reports that he stopped taking the Vancomycin because of the severe stomach pain. Patient denies bloody bowel movements at this time.  Patient is frustrated as he has gone to ER recently and was very frustrated as no further interventions were done. This RN advised to patient that she would reach out to BE for any recommendations as well as send information over to CT surgery. Advised that BE is out of office until 1/9/24 but once this RN heard back from him she would reach out. Patient is scheduled for follow up in office on 1/10/24. Stat GI referral placed at this time. All questions answered at this time. Advised to call back with any further questions or concerns.       To BE: any recommendations for patient? He has had a complicated recovery. Has f/u in office on 1/10 with JOHANA, thank you!     To Gisselle: see above for patient, thank you!

## 2024-01-08 LAB
FUNGUS SPEC CULT: NORMAL
FUNGUS SPEC FUNGUS STN: NORMAL
SIGNIFICANT IND 70042: NORMAL
SITE SITE: NORMAL
SOURCE SOURCE: NORMAL

## 2024-01-08 NOTE — TELEPHONE ENCOUNTER
Malena, Appreciate the update. The best our team can offer this patient is a surgical f/u from heart surgery as he was hospitalized when he was supposed to be seen. I'll call and offer him one.

## 2024-01-08 NOTE — TELEPHONE ENCOUNTER
Dontrell Sharma M.D.  You51 minutes ago (11:32 AM)     Get echo, ECG and clinic visit.    Thx  BE     Phone Number Called: [phone]    Call outcome: Left detailed message for patient. Informed to call back with any additional questions.    Message: Left message with patient about BE recommendations. Advised that RN placed order for echocardiogram and provided number to schedule. Advised to call back with further questions on message. Patient has f/u 1/10/24 with JOHANA.

## 2024-01-08 NOTE — DISCHARGE PLANNING
From: Rosa M Wells  To: Dr. Xavier Church  Sent: 1/8/2024 1:40 PM EST  Subject: Upcoming Trip    I’m feeling much better and I think the flu is finally behind me. I forgot to mention when I was in that I will be leaving in February for 3 weeks in [place]. I would like a few Xanax to help me with the travel. Thanks so much, Rosa M   HTH/SCP TCN chart review completed. Current discharge considerations are home with home health. Noted patient ambulated 25 feet yesterday and was up to BR today with no AD or assist.      TCN will continue to follow and collaborate with discharge planning team as additional post acute needs arise. Thank you.     Completed:  Choice obtained: [facility] (Resumption of Son[facility] ) on 11/28/23.    SCP with [facility].  Patient request to set up his own Follow up PCP appointment.  He states that he has a cardiac f/u appt. On 12/12/23.

## 2024-01-09 ENCOUNTER — ANTICOAGULATION MONITORING (OUTPATIENT)
Dept: MEDICAL GROUP | Facility: PHYSICIAN GROUP | Age: 59
End: 2024-01-09
Payer: COMMERCIAL

## 2024-01-09 ENCOUNTER — TELEPHONE (OUTPATIENT)
Dept: VASCULAR LAB | Facility: MEDICAL CENTER | Age: 59
End: 2024-01-09

## 2024-01-09 DIAGNOSIS — Z95.2 S/P AVR (AORTIC VALVE REPLACEMENT): ICD-10-CM

## 2024-01-09 LAB — INR PPP: 1.4 (ref 2–3.5)

## 2024-01-09 NOTE — PROGRESS NOTES
Anticoagulation Summary  As of 2024      INR goal:  2.0-3.0   TTR:  47.0 % (1.7 wk)   INR used for dosin.40 (2024)   Warfarin maintenance plan:  5 mg (2.5 mg x 2) every Tue; 2.5 mg (2.5 mg x 1) all other days   Weekly warfarin total:  20 mg   Plan last modified:  Veronica Bedolla, PharmD (2024)   Next INR check:  2024   Target end date:  Indefinite    Indications    S/P AVR (aortic valve replacement) [Z95.2]                 Anticoagulation Episode Summary       INR check location:      Preferred lab:      Send INR reminders to:      Comments:  [facility] [phone];  On-X AVR 11/3/23 - INR goal 2-3, then 1.5-2.0 on 2/3/24          Anticoagulation Care Providers       Provider Role Specialty Phone number    [facility]   [phone]          Anticoagulation Patient Findings  Patient Findings       Negatives:  Signs/symptoms of thrombosis, Signs/symptoms of bleeding, Laboratory test error suspected, Change in health, Change in alcohol use, Change in activity, Upcoming invasive procedure, Emergency department visit, Upcoming dental procedure, Missed doses, Extra doses, Change in medications, Change in diet/appetite, Hospital admission, Bruising, Other complaints            INR is subtherapeutic    Called and spoke to patient.    Warfarin Plan: Increase to 5mg Tues, 2.5mg AOD    Next INR in 2 day(s) via [facility].    Veronica Bedolla, PharmD     Cartilage Graft Text: The defect edges were debeveled with a #15 scalpel blade.  Given the location of the defect, shape of the defect, the fact the defect involved a full thickness cartilage defect a cartilage graft was deemed most appropriate.  An appropriate donor site was identified, cleansed, and anesthetized. The cartilage graft was then harvested and transferred to the recipient site, oriented appropriately and then sutured into place.  The secondary defect was then repaired using a primary closure.

## 2024-01-09 NOTE — TELEPHONE ENCOUNTER
INR Reading  Called in by Bettye Masters [facility]    1/9/24  INR: 1.4    Call back - [phone]    Thank you,   aSra LEE

## 2024-01-10 ENCOUNTER — OFFICE VISIT (OUTPATIENT)
Dept: CARDIOLOGY | Facility: MEDICAL CENTER | Age: 59
End: 2024-01-10
Attending: NURSE PRACTITIONER
Payer: COMMERCIAL

## 2024-01-10 VITALS
BODY MASS INDEX: 23.39 KG/M2 | RESPIRATION RATE: 16 BRPM | SYSTOLIC BLOOD PRESSURE: 122 MMHG | DIASTOLIC BLOOD PRESSURE: 84 MMHG | WEIGHT: 149 LBS | HEART RATE: 95 BPM | OXYGEN SATURATION: 97 % | HEIGHT: 67 IN

## 2024-01-10 DIAGNOSIS — Z98.890 STATUS POST CARDIAC SURGERY: ICD-10-CM

## 2024-01-10 DIAGNOSIS — Z95.2 S/P AVR (AORTIC VALVE REPLACEMENT): ICD-10-CM

## 2024-01-10 DIAGNOSIS — R11.2 NAUSEA AND VOMITING, UNSPECIFIED VOMITING TYPE: ICD-10-CM

## 2024-01-10 DIAGNOSIS — Z98.890 H/O CARDIAC RADIOFREQUENCY ABLATION: ICD-10-CM

## 2024-01-10 DIAGNOSIS — I48.0 PAROXYSMAL ATRIAL FIBRILLATION (HCC): ICD-10-CM

## 2024-01-10 DIAGNOSIS — I42.9 CARDIOMYOPATHY, UNSPECIFIED TYPE (HCC): ICD-10-CM

## 2024-01-10 DIAGNOSIS — I48.3 TYPICAL ATRIAL FLUTTER (HCC): ICD-10-CM

## 2024-01-10 PROCEDURE — 99214 OFFICE O/P EST MOD 30 MIN: CPT | Performed by: NURSE PRACTITIONER

## 2024-01-10 PROCEDURE — 93005 ELECTROCARDIOGRAM TRACING: CPT | Performed by: NURSE PRACTITIONER

## 2024-01-10 PROCEDURE — 3079F DIAST BP 80-89 MM HG: CPT | Performed by: NURSE PRACTITIONER

## 2024-01-10 PROCEDURE — 3074F SYST BP LT 130 MM HG: CPT | Performed by: NURSE PRACTITIONER

## 2024-01-10 PROCEDURE — 99213 OFFICE O/P EST LOW 20 MIN: CPT | Performed by: NURSE PRACTITIONER

## 2024-01-10 ASSESSMENT — ENCOUNTER SYMPTOMS
PND: 0
LOSS OF CONSCIOUSNESS: 0
HEARTBURN: 0
FEVER: 0
HEMOPTYSIS: 0
FOCAL WEAKNESS: 0
DIZZINESS: 0
CHILLS: 0
DIARRHEA: 0
WHEEZING: 0
COUGH: 0
WEIGHT LOSS: 0
SHORTNESS OF BREATH: 0
TREMORS: 0
SPUTUM PRODUCTION: 0
SPEECH CHANGE: 0
HEADACHES: 0
VOMITING: 1
PALPITATIONS: 0
TINGLING: 0
NAUSEA: 1
ORTHOPNEA: 0
SENSORY CHANGE: 0

## 2024-01-10 ASSESSMENT — FIBROSIS 4 INDEX: FIB4 SCORE: 1.15

## 2024-01-10 NOTE — PROGRESS NOTES
Chief Complaint   Patient presents with    Atrial Fibrillation       Subjective     Gurdeep Guerra is a 58 y.o. male who presents today for follow-up after typical atrial flutter ablation by Dr. Velasquez 12/20/2023.    He was noted to have short episodes of A-fib post procedurally day 1, question at that time if mediated by dialysis treatments.    He is followed by Dr. Sharma for cardiology.  Additional medical history significant for  hypertension, hyperlipidemia, end-stage renal disease on dialysis.      S/P mechanical AVR with aneurysm repair 11/3/2023 with Dr. Orellana. Post operative course complicated by post operative bleeding requiring mediastinal reexploration.  He was readmitted 11/27 with atrial flutter with RVR, recurrent colitis and C. difficile.  He also had a large loculated effusion which required chest tube placement.    Today in follow up he reports that he continues to feel weak.  He reports ongoing nausea and vomiting and generalized gastic discomfort.  Denies fevers or chills.  Saw PCP about a week ago, states symptoms not worsening since then. continued wt loss and weakness.  He  does not report shortness of breath, PND, orthopnea or chest pain.  Still going to hemodialysis.     Past Medical History:   Diagnosis Date    Anesthesia     Hiccups for over 24 hours after a previous sugery.    Aortic stenosis     Chronic gout of multiple sites     Dialysis patient (Former[facility])     peritoneal daily    Dyspnea on exertion 05/23/2023    Dyspnea on exertion 05/23/2023    Elevated troponin 05/23/2023    Heart burn     1990    Heart murmur     High cholesterol     All always    Hypertension 2009    Liver cirrhosis ([facility]) 12/21/2023    NSTEMI (non-ST elevated myocardial infarction) ([facility]) 05/23/2023    no stents placed    Pain in the chest 05/23/2023    Pain in the chest 05/23/2023    Paroxysmal A-fib ([facility]) 11/11/2023    PONV (postoperative nausea and vomiting)     Renal disorder 2007    Stage IV    Sleep apnea 2000     Snoring 1990    Splenomegaly 12/21/2023     Past Surgical History:   Procedure Laterality Date    AORTIC VALVE REPLACEMENT  11/3/2023    Procedure: AORTIC VALVE REPLACEMENT, ASCENDING AORTIC ANEURYSM REPAIR, TRANSESOPHAGEAL ECHOCARDIOGRAM;  Surgeon: Osmel Blanca M.D.;  Location: SURGERY [facility];  Service: Cardiothoracic    AORTIC ASCENDING DISSECTION  11/3/2023    Procedure: REPAIR, ANEURYSM OR DISSECTION, AORTA, ASCENDING;  Surgeon: Osmel Blanca M.D.;  Location: SURGERY [facility];  Service: Cardiothoracic    ECHOCARDIOGRAM, TRANSESOPHAGEAL, INTRAOPERATIVE  11/3/2023    Procedure: ECHOCARDIOGRAM, TRANSESOPHAGEAL, INTRAOPERATIVE;  Surgeon: Osmel Blanca M.D.;  Location: SURGERY [facility];  Service: Cardiothoracic    RESTORATE HEMOSTAS  11/3/2023    Procedure: RESTORATION OF HEMOSTATIS;  Surgeon: Osmel Blanca M.D.;  Location: SURGERY [facility];  Service: Cardiothoracic    STERNOTOMY  11/3/2023    Procedure: MEDIASTINAL EXPLORATION;  Surgeon: Osmel Blanca M.D.;  Location: SURGERY [facility];  Service: Cardiothoracic    CATH PLACEMENT CAPD Right 9/6/2023    Procedure: LAPAROSCOPIC INSERTION OF PERITONEAL DIALYSIS CATHETER;  Surgeon: Dontrell Sales M.D.;  Location: SURGERY [facility];  Service: Vascular    NM INJ LUMBAR/SACRAL,W/ IMAGING Left 8/24/2023    Procedure: LUMBAR EPIDURAL STEROID INJECTION, LEFT L5-S1 INTERLAMINAR UNDER FLUOROSCOPY;  Surgeon: Benito Herrera D.O.;  Location: SURGERY [facility];  Service: Orthopedics    CATH PLACEMENT CAPD N/A 5/15/2023    Procedure: LAPAROSCOPIC PLACEMENT OF PERITONEAL DIALYSIS CATHERTER;  Surgeon: Shikha Alexander M.D.;  Location: SURGERY [facility];  Service: General    UMBILICAL HERNIA REPAIR  2012    4 separate surgeries between [id number]    OTHER  2007    Kidney biopsy, can't recall laterality,    HIP ARTHROSCOPY Bilateral 2002    TONSILLECTOMY N/A 1987    HAND SURGERY Right 1985    Hand and wrist    SHOULDER ARTHROSCOPY      Can't  recall date     Family History   Problem Relation Age of Onset    Hyperlipidemia Mother     Hypertension Father      Social History     Socioeconomic History    Marital status:      Spouse name: Not on file    Number of children: Not on file    Years of education: Not on file    Highest education level: Master's degree (e.g., MA, MS, Kenn, MEd, MSW, ELIECER)   Occupational History    Not on file   Tobacco Use    Smoking status: Never    Smokeless tobacco: Never   Vaping Use    Vaping Use: Never used   Substance and Sexual Activity    Alcohol use: Never    Drug use: Not Currently    Sexual activity: Yes     Partners: Female   Other Topics Concern    Not on file   Social History Narrative    Not on file     Social Determinants of Health     Financial Resource Strain: Low Risk  (1/4/2023)    Overall Financial Resource Strain (CARDIA)     Difficulty of Paying Living Expenses: Not hard at all   Food Insecurity: No Food Insecurity (1/4/2023)    Hunger Vital Sign     Worried About Running Out of Food in the Last Year: Never true     Ran Out of Food in the Last Year: Never true   Transportation Needs: No Transportation Needs (1/4/2023)    PRAPARE - Transportation     Lack of Transportation (Medical): No     Lack of Transportation (Non-Medical): No   Physical Activity: Sufficiently Active (1/4/2023)    Exercise Vital Sign     Days of Exercise per Week: 3 days     Minutes of Exercise per Session: 60 min   Stress: Stress Concern Present (1/4/2023)    [employer] [place] [employer] - Occupational Stress Questionnaire     Feeling of Stress : To some extent   Social Connections: Moderately Isolated (1/4/2023)    Social Connection and Isolation Panel [NHANES]     Frequency of Communication with Friends and Family: Once a week     Frequency of Social Gatherings with Friends and Family: More than three times a week     Attends Sikhism Services: Never     Active Member of Clubs or Organizations: No     Attends Club or  Organization Meetings: Never     Marital Status: Living with partner   Intimate Partner Violence: Not on file   Housing Stability: Low Risk  (1/4/2023)    Housing Stability Vital Sign     Unable to Pay for Housing in the Last Year: No     Number of Places Lived in the Last Year: 1     Unstable Housing in the Last Year: No     Allergies   Allergen Reactions    Allopurinol Itching    Hydralazine Hcl Unspecified     Pt states he had a reaction similar to lupus     Outpatient Encounter Medications as of 1/10/2024   Medication Sig Dispense Refill    verapamil (ISOPTIN) 80 MG Tab Take 1 Tablet by mouth 3 times a day for 90 days. 90 Tablet 2    simethicone (MYLICON) 125 MG chewable tablet Chew 1 Tablet 3 times a day as needed for Flatulence. 120 Tablet 1    Vancomycin HCl (VANCOMYCIN 50 MG/ML) 50 mg/mL Solution Take 2.5 mL by mouth every 24 hours for 7 days. 17.5 mL 0    Vancomycin HCl (VANCOMYCIN 50 MG/ML) 50 mg/mL Solution Take 2.5 mL by mouth every 48 hours for 6 days. 7.5 mL 0    [START ON 1/18/2024] Vancomycin HCl (VANCOMYCIN 50 MG/ML) 50 mg/mL Solution Take 2.5 mL by mouth every 72 hours for 8 days. 5 mL 0    ondansetron (ZOFRAN) 8 MG Tab Take 1 Tablet by mouth every 8 hours as needed for Nausea/Vomiting. 15 Tablet 0    warfarin (COUMADIN) 2 MG Tab Take 1 Tablet by mouth every day at 6 PM. 30 Tablet 1    docusate sodium 100 MG Cap Take 100 mg by mouth 2 times a day. 60 Capsule 2    dronabinol (MARINOL) 5 MG Cap Take 1 Capsule by mouth 2 times daily, before breakfast and dinner for 30 days. 60 Capsule 0    magnesium oxide 400 (240 Mg) MG Tab Take 1 Tablet by mouth every day. 30 Tablet 2    sevelamer carbonate (RENVELA) 800 MG Tab tablet Take 2 Tablets by mouth 3 times a day with meals. 270 Tablet 1    promethazine (PHENERGAN) 12.5 MG tablet Take 1-2 Tablets by mouth every four hours as needed for Nausea/Vomiting (if no relief from ondansetron or if ondansetron is not ordered). 30 Tablet 0    polyethylene glycol/lytes  "(MIRALAX) Pack Take 1 Packet by mouth every day. 30 Each 2    omeprazole (PRILOSEC) 20 MG delayed-release capsule Take 1 Capsule by mouth 2 times a day. 30 Capsule 2    tamsulosin (FLOMAX) 0.4 MG capsule Take 0.4 mg by mouth every day.      atorvastatin (LIPITOR) 40 MG Tab Take 1 Tablet by mouth every evening. 100 Tablet 3    aspirin 81 MG EC tablet Take 81 mg by mouth every day.       No facility-administered encounter medications on file as of 1/10/2024.     Review of Systems   Constitutional:  Positive for malaise/fatigue. Negative for chills, fever and weight loss.   HENT:  Negative for congestion and tinnitus.    Respiratory:  Negative for cough, hemoptysis, sputum production, shortness of breath and wheezing.    Cardiovascular:  Negative for chest pain, palpitations, orthopnea, leg swelling and PND.   Gastrointestinal:  Positive for nausea and vomiting. Negative for diarrhea and heartburn.   Neurological:  Negative for dizziness, tingling, tremors, sensory change, speech change, focal weakness, loss of consciousness and headaches.   All other systems reviewed and are negative.             Objective     /84 (BP Location: Left arm, Patient Position: Sitting, BP Cuff Size: Adult)   Pulse 95   Resp 16   Ht 1.702 m (5' 7\")   Wt 67.6 kg (149 lb)   SpO2 97%   BMI 23.34 kg/m²     Physical Exam  Constitutional:       Appearance: He is underweight.   HENT:      Head: Normocephalic and atraumatic.      Mouth/Throat:      Mouth: Mucous membranes are moist.      Pharynx: Oropharynx is clear.   Eyes:      Extraocular Movements: Extraocular movements intact.      Conjunctiva/sclera: Conjunctivae normal.      Pupils: Pupils are equal, round, and reactive to light.   Neck:      Vascular: No JVD.   Cardiovascular:      Rate and Rhythm: Normal rate and regular rhythm.      Pulses: Normal pulses.      Heart sounds: Normal heart sounds. No murmur heard.     No friction rub. No gallop.      Comments: [facility] click "   Pulmonary:      Effort: Pulmonary effort is normal.      Breath sounds: Normal breath sounds.   Musculoskeletal:         General: Normal range of motion.      Cervical back: Normal range of motion and neck supple.      Right lower leg: No edema.      Left lower leg: No edema.   Skin:     General: Skin is warm and dry.      Capillary Refill: Capillary refill takes 2 to 3 seconds.   Neurological:      Mental Status: He is alert and oriented to person, place, and time.   Psychiatric:         Mood and Affect: Mood normal.         Behavior: Behavior normal.         Thought Content: Thought content normal.         Judgment: Judgment normal.          Assessment & Plan     1. Typical atrial flutter (HCC)  EKG      2. H/O cardiac radiofrequency ablation 12/20/23        3. Paroxysmal atrial fibrillation (HCC)        4. S/P AVR (aortic valve replacement)        5. Status post cardiac surgery        6. Cardiomyopathy, unspecified type (HCC)        7. Nausea and vomiting, unspecified vomiting type  LIPASE    AMYLASE    Comp Metabolic Panel          Medical Decision Making: Today's Assessment/Status/Plan:   Atrial Flutter  Paroxysmal Afib  - S/P ablation of typical atrial flutter by Dr. Velasquez 12/20/23.  Post procedurally he was noted to have short course of Afib with RVR, self resolved after a couple of hours.   - He is in sinus today.  He reports mostly normal pulse rate readings at home.   - We reviewed risk for post op Afib and ongoing monitoring.  He will continue to monitor his BP and heart rate closely at home.  If indicated can do a zio patch to access for subclinical arrhythmia.   - He will continue verapamil at current dose   - He is anticoagulated with warfarin.  Goal 2-3 for first 3 months post on-x AVR, then 1.5-2.     3. S/P AVR and aneurysm repair:  - Complex course of recovery with multiple lengthy hospitalizations and procedures.   - Echo as ordered per Dr Sharma.  He will get [facility]  [facility].   - Follow up with CTS team and structural heart team following echo.    4. cardiomyopathy  - Volume management with dialysis.    - He is intolerant to betablocker. No ACE/ARB ARNI secondary to ERSD.   - Scheduled for follow up echo.  EF was depressed on CARMELITA with flutter ablation.  Previously was preserved, suspect secondary to arrhtyhmia.     5. Nausea and  Vomiting  - Unclear etiology.  Check amylase and lipase with CMP.   - Discussed worsening symptoms needs to be seen in ER  - Has GI appt scheduled for 1/23.       RT EP clinic in 3 months, sooner if clinical condition changes.  Follow up with structural heart team post echo and CTS (per notes CTS will be contacting Gurdeep to schedule)    PLEASE NOTE: This Note was created using voice recognition Software. I have made every reasonable attempt to correct obvious errors, but I expect that there are errors of grammar and possibly content that I did not discover before finalizing the note

## 2024-01-11 ENCOUNTER — ANTICOAGULATION MONITORING (OUTPATIENT)
Dept: VASCULAR LAB | Facility: MEDICAL CENTER | Age: 59
End: 2024-01-11
Payer: COMMERCIAL

## 2024-01-11 ENCOUNTER — TELEPHONE (OUTPATIENT)
Dept: SCHEDULING | Facility: IMAGING CENTER | Age: 59
End: 2024-01-11

## 2024-01-11 DIAGNOSIS — Z95.2 S/P AVR (AORTIC VALVE REPLACEMENT): ICD-10-CM

## 2024-01-11 LAB — INR PPP: 1.7 (ref 2–3.5)

## 2024-01-11 NOTE — PROGRESS NOTES
Anticoagulation Summary  As of 2024      INR goal:  2.0-3.0   TTR:  40.3 % (2 wk)   INR used for dosin.70 (2024)   Warfarin maintenance plan:  5 mg (2.5 mg x 2) every Sun, Thu; 2.5 mg (2.5 mg x 1) all other days   Weekly warfarin total:  22.5 mg   Plan last modified:  Larry Buenrostro PharmD (2024)   Next INR check:  2024   Target end date:  Indefinite    Indications    S/P AVR (aortic valve replacement) [Z95.2]                 Anticoagulation Episode Summary       INR check location:      Preferred lab:      Send INR reminders to:      Comments:  Sonam RICH [phone];  On-X AVR 11/3/23 - INR goal 2-3, then 1.5-2.0 on 2/3/24          Anticoagulation Care Providers       Provider Role Specialty Phone number    [facility]   [phone]            Refer to Anticoagulation Patient Findings for HPI  Patient Findings       Negatives:  Signs/symptoms of thrombosis, Signs/symptoms of bleeding, Laboratory test error suspected, Change in health, Change in alcohol use, Change in activity, Upcoming invasive procedure, Emergency department visit, Upcoming dental procedure, Missed doses, Extra doses, Change in medications, Change in diet/appetite, Hospital admission, Bruising, Other complaints            Spoke with pt.  INR is SUB therapeutic.     Pt verifies warfarin weekly dosing.     Will have pt begin newly increased regimen of 5 mg Sun/Thurs and 2.5 mg ROW.    Repeat INR in 4 days via Sonam RICH.     Larry Buenrostro, PharmD, BCACP

## 2024-01-11 NOTE — TELEPHONE ENCOUNTER
24 Hour Monitor / ABPM / Standing Order / INR / Other:     PT Name: Marifer Masters [facility]    PT MRN: [id number]    Best Call Back Number: [phone] (home)       What does the PT need to schedule?: N/A    Any special instructions: Will take it again on Tuesday unless they hear otherwise    INR Result: 1.7

## 2024-01-16 ENCOUNTER — TELEPHONE (OUTPATIENT)
Dept: VASCULAR LAB | Facility: MEDICAL CENTER | Age: 59
End: 2024-01-16
Payer: COMMERCIAL

## 2024-01-16 ENCOUNTER — ANTICOAGULATION MONITORING (OUTPATIENT)
Dept: CARDIOLOGY | Facility: MEDICAL CENTER | Age: 59
End: 2024-01-16
Payer: COMMERCIAL

## 2024-01-16 DIAGNOSIS — Z95.2 HISTORY OF MECHANICAL AORTIC VALVE REPLACEMENT: ICD-10-CM

## 2024-01-16 DIAGNOSIS — Z95.2 S/P AVR (AORTIC VALVE REPLACEMENT): ICD-10-CM

## 2024-01-16 PROBLEM — I71.40 AAA (ABDOMINAL AORTIC ANEURYSM) (HCC): Status: RESOLVED | Noted: 2023-11-11 | Resolved: 2024-01-16

## 2024-01-16 PROBLEM — I71.21 ANEURYSM OF ASCENDING AORTA WITHOUT RUPTURE (HCC): Status: ACTIVE | Noted: 2024-01-16

## 2024-01-16 LAB — INR PPP: 1.4 (ref 2–3.5)

## 2024-01-16 RX ORDER — WARFARIN SODIUM 2.5 MG/1
2.5-5 TABLET ORAL DAILY
Qty: 180 TABLET | Refills: 1 | Status: SHIPPED | OUTPATIENT
Start: 2024-01-16

## 2024-01-16 NOTE — TELEPHONE ENCOUNTER
INR Reading     Caller: Sonam [facility]     Topic/issue: The nurse called in pt INR please advise.      Value: 1.4     Callback Number: Pt Phone; [phone] (home)       Thank you,      Mariano JENSEN

## 2024-01-16 NOTE — TELEPHONE ENCOUNTER
Caller:  Gurdeep    Medication Name and Dosage:    warfarin (COUMADIN) 2.5 MG Tab [[id number]]     Medication amount left: 1    Preferred Pharmacy:   [facility] [place]    Other questions (Topic): N/A    Callback Number (Will only call for issues): [phone]    Thank you,   Sara LEE

## 2024-01-16 NOTE — TELEPHONE ENCOUNTER
Caller:  Bettye Masters [facility]    Topic/issue: Is requesting to do the INR repeat on Tuesday if possible,      Callback Number: [phone]    Thank you,   Sara LEE

## 2024-01-16 NOTE — PROGRESS NOTES
OP Anticoagulation Service Note    Date: 2024    Anticoagulation Summary  As of 2024      INR goal:  2.0-3.0   TTR:  29.7 % (2.7 wk)   INR used for dosin.40 (2024)   Warfarin maintenance plan:  2.5 mg (2.5 mg x 1) every Mon, Wed, Fri; 5 mg (2.5 mg x 2) all other days   Weekly warfarin total:  27.5 mg   Plan last modified:  Benito Bales, PharmD (2024)   Next INR check:  2024   Target end date:  Indefinite    Indications    S/P AVR (aortic valve replacement) [Z95.2]                 Anticoagulation Episode Summary       INR check location:      Preferred lab:      Send INR reminders to:      Comments:  Sonam  [phone];  On-X AVR 11/3/23 - INR goal 2-3, then 1.5-2.0 on 2/3/24          Anticoagulation Care Providers       Provider Role Specialty Phone number    [facility]   [phone]          Anticoagulation Patient Findings        Patient's preferred phone number:  [phone]        HPI:   The reason for today's call is to prevent morbidity and mortality from a blood clot and/or stroke and to reduce the risk of bleeding while on a anticoagulant.     PCP:  Drew T. Blumberg, D.O.  [address] [place] [address] [address]  [facility] [id number]    Assessment:     INR goal for this PT: 2.0-3.0  INR   Date Value Ref Range Status   2024 1.40 (A) 2 - 3.5 Final       Lab Results   Component Value Date/Time    BUN 42 (H) 2023 04:04 AM    CREATININE 8.22 (HH) 2023 04:04 AM    BUNCREATRAT 17 2022 09:49 AM    GLOMRATE 7 (L) 2023 11:54 AM     Lab Results   Component Value Date/Time    HEMOGLOBIN 8.5 (L) 2023 04:04 AM    HEMATOCRIT 28.1 (L) 2023 04:04 AM    PLATELETCT 265 2023 04:04 AM    ALKPHOSPHAT 54 2023 04:04 AM    ASTSGOT 25 2023 04:04 AM    ALTSGPT 21 2023 04:04 AM          Current Outpatient Medications:     warfarin, 2.5-5 mg, Oral, DAILY    verapamil, 80 mg, Oral, TID    simethicone, 125 mg, Oral, TID PRN     vancomycin 50 mg/mL, 125 mg, Oral, Q48HRS    [START ON 1/18/2024] vancomycin 50 mg/mL, 125 mg, Oral, Q72HRS    ondansetron, 8 mg, Oral, Q8HRS PRN    docusate sodium, 100 mg, Oral, BID    dronabinol, 5 mg, Oral, BID AC    magnesium oxide, 400 mg, Oral, DAILY    sevelamer carbonate, 1,600 mg, Oral, TID WITH MEALS    promethazine, 12.5-25 mg, Oral, Q4HRS PRN    polyethylene glycol/lytes, 17 g, Oral, DAILY    omeprazole, 20 mg, Oral, BID    tamsulosin, 0.4 mg, Oral, DAILY    atorvastatin, 40 mg, Oral, Nightly    aspirin, 81 mg, Oral, DAILY      Plan:     7.5 mg today, 5 mg tomorrow, then resume increased weekly warfarin dose    Follow-up:     On date seen above    Additional information discussed with patient:     Asked patient to please call the anticoagulation clinic if they have any signs/symptoms of bleeding and/or thrombosis or any changes to diet or medications.      National recommendations regarding anticoagulation therapy:     The CHEST guidelines recommends frequent INR monitoring at regular intervals (a few days up to a max of 12 weeks) to ensure patients are on the proper dose of warfarin, and patients are not having any complications from therapy.  INRs can dramatically change over a short time period due to diet, medications, and medical conditions.       [facility]  Phone: [phone]  Fax: [phone]  On call: [phone]  General scheduling/information [phone]  For emergencies please dial [phone]  Please do not use citibuddies for urgent matters, call the phone numbers listed above.    This note was created using voice recognition software (Dragon). The accuracy of the dictation is limited by the abilities of the software. I have reviewed the note prior to signing, however some errors in grammar and context are still possible. If you have any questions related to this note please do not hesitate to contact our office.

## 2024-01-16 NOTE — TELEPHONE ENCOUNTER
Called Bettye @ [facility] back to confirm we can check INR on Tuesday 1/23 instead of 1/22 - refaxed orders w/ new INR date  Veronica Bedolla, EstefanyD

## 2024-01-23 ENCOUNTER — ANTICOAGULATION MONITORING (OUTPATIENT)
Dept: MEDICAL GROUP | Facility: PHYSICIAN GROUP | Age: 59
End: 2024-01-23
Payer: COMMERCIAL

## 2024-01-23 ENCOUNTER — TELEPHONE (OUTPATIENT)
Dept: VASCULAR LAB | Facility: MEDICAL CENTER | Age: 59
End: 2024-01-23
Payer: COMMERCIAL

## 2024-01-23 DIAGNOSIS — Z95.2 S/P AVR (AORTIC VALVE REPLACEMENT): ICD-10-CM

## 2024-01-23 LAB — INR PPP: 1.7 (ref 2–3.5)

## 2024-01-23 NOTE — TELEPHONE ENCOUNTER
INR READING    Caller: Gurdeep Guerra    Topic/issue: INR READING    Please review the following information    TUES 1/23  VALUE: 1.7    Please be advised.    Thank you,  Bridger RICCI    Callback Number: [phone] (home)

## 2024-01-23 NOTE — PROGRESS NOTES
Anticoagulation Summary  As of 2024      INR goal:  2.0-3.0   TTR:  21.7 % (3.7 wk)   INR used for dosin.70 (2024)   Warfarin maintenance plan:  2.5 mg (2.5 mg x 1) every Mon, Fri; 5 mg (2.5 mg x 2) all other days   Weekly warfarin total:  30 mg   Plan last modified:  Veronica Bedolla, EstefanyD (2024)   Next INR check:  2024   Target end date:  Indefinite    Indications    S/P AVR (aortic valve replacement) [Z95.2]                 Anticoagulation Episode Summary       INR check location:      Preferred lab:      Send INR reminders to:      Comments:  On-X AVR 11/3/23 - INR goal 2-3, then 1.5-2.0 on 2/3/24          Anticoagulation Care Providers       Provider Role Specialty Phone number    [facility]   [phone]          Anticoagulation Patient Findings  Patient Findings       Negatives:  Signs/symptoms of thrombosis, Signs/symptoms of bleeding, Laboratory test error suspected, Change in health, Change in alcohol use, Change in activity, Upcoming invasive procedure, Emergency department visit, Upcoming dental procedure, Missed doses, Extra doses, Change in medications, Change in diet/appetite, Hospital admission, Bruising, Other complaints            INR is subtherapeutic    Called and spoke to patient.    Warfarin Plan: Increase to 2.5mg MF, 5mg AOD - of note, INR goal will change to 1.5-2.0 after 2/3/24    Next INR in 1 week in [place] for POCT as Sonam  has ended services.    Veronica Bedolla, EstefanyD

## 2024-01-24 ENCOUNTER — APPOINTMENT (OUTPATIENT)
Dept: CARDIOLOGY | Facility: MEDICAL CENTER | Age: 59
End: 2024-01-24
Attending: INTERNAL MEDICINE
Payer: COMMERCIAL

## 2024-01-24 LAB — EKG IMPRESSION: NORMAL

## 2024-01-28 LAB
MYCOBACTERIUM SPEC CULT: NORMAL
RHODAMINE-AURAMINE STN SPEC: NORMAL
SIGNIFICANT IND 70042: NORMAL
SITE SITE: NORMAL
SOURCE SOURCE: NORMAL

## 2024-01-29 ENCOUNTER — TELEPHONE (OUTPATIENT)
Dept: CARDIOLOGY | Facility: MEDICAL CENTER | Age: 59
End: 2024-01-29

## 2024-01-29 NOTE — TELEPHONE ENCOUNTER
JOHANA            Caller: Mariam with [facility]    Topic/issue: They have the patient scheduled for a X-ray tomorrow at 8 AM but they don't have an order attached to it and they were asking for it to be faxed over to their office.  Fax# [phone]      Callback Number: see below      Thank you    -Jose Guadalupe MONTIEL

## 2024-01-31 ENCOUNTER — DOCUMENTATION (OUTPATIENT)
Dept: CARDIOLOGY | Facility: PHYSICIAN GROUP | Age: 59
End: 2024-01-31
Payer: COMMERCIAL

## 2024-01-31 ENCOUNTER — TELEPHONE (OUTPATIENT)
Dept: VASCULAR LAB | Facility: MEDICAL CENTER | Age: 59
End: 2024-01-31

## 2024-01-31 NOTE — TELEPHONE ENCOUNTER
[facility] Scheduling Request    PT Name: Gurdeep Guerra     PT MRN: [id number]     Best Call Back Number: [phone]     Any special instructions: Needs to reschedule todays appointment.     Thank you,     Ciarra MORROW.

## 2024-01-31 NOTE — PROGRESS NOTES
Rescheduled anticoagulation clinic f/u to 2-14-24 @ 7841 per patient request.  Kota Hayden, PharmD, BCACP

## 2024-01-31 NOTE — DIETARY
Nutrition Update:    Day 21 of admit.  Gurdeep Guerra is a 58 y.o. male with admitting DX of Typhlitis [K52.9]  A-fib (HCC) [I48.91].  Patient being followed to optimize nutrition.    Current Diet: Renal, Nepro ONS qd.    Since last RD note on 12/14, pt's PO intake had been trending positively as recorded in ADLs w/ recorded intake of 50-75% x 2 meals and % x1 meal. However, last 2 meals of lunch and dinner yesterday were 0% consumed, likely related to abdominal pain that started yesterday, continuing on today. GI cocktail ordered this a.m.     Expect PO will improve w/ resolve of abdominal pain.    Problem: Nutritional:  Goal: Achieve adequate nutritional intake  Description: Patient will consume >50% of meals  Outcome: progressing      RD following   no

## 2024-02-14 ENCOUNTER — TELEPHONE (OUTPATIENT)
Dept: VASCULAR LAB | Facility: MEDICAL CENTER | Age: 59
End: 2024-02-14

## 2024-02-14 ENCOUNTER — APPOINTMENT (OUTPATIENT)
Dept: CARDIOLOGY | Facility: PHYSICIAN GROUP | Age: 59
End: 2024-02-14
Payer: COMMERCIAL

## 2024-02-15 ENCOUNTER — TELEPHONE (OUTPATIENT)
Dept: VASCULAR LAB | Facility: MEDICAL CENTER | Age: 59
End: 2024-02-15
Payer: COMMERCIAL

## 2024-02-15 ENCOUNTER — APPOINTMENT (OUTPATIENT)
Dept: CARDIOLOGY | Facility: MEDICAL CENTER | Age: 59
End: 2024-02-15
Attending: INTERNAL MEDICINE
Payer: COMMERCIAL

## 2024-02-15 NOTE — TELEPHONE ENCOUNTER
Attempted to call pt to schedule f/u in [facility] - no answer.    Will try back at a later time.    Larry Buenrostro, EstefanyD, BCACP

## 2024-02-15 NOTE — TELEPHONE ENCOUNTER
[facility] Scheduling Request  PT Name: Gurdeep Guerra    PT MRN: [id number]    Best Call Back Number: [phone]    Thank you   Mikki CALERO

## 2024-02-21 ENCOUNTER — ANTICOAGULATION VISIT (OUTPATIENT)
Dept: CARDIOLOGY | Facility: PHYSICIAN GROUP | Age: 59
End: 2024-02-21
Payer: COMMERCIAL

## 2024-02-21 ENCOUNTER — TELEPHONE (OUTPATIENT)
Dept: CARDIOLOGY | Facility: MEDICAL CENTER | Age: 59
End: 2024-02-21

## 2024-02-21 DIAGNOSIS — Z95.2 S/P AVR (AORTIC VALVE REPLACEMENT): ICD-10-CM

## 2024-02-21 LAB
INR PPP: 1.6 (ref 2–3.5)
INR PPP: 1.6 (ref 2–3.5)

## 2024-02-21 PROCEDURE — 85610 PROTHROMBIN TIME: CPT | Performed by: NURSE PRACTITIONER

## 2024-02-21 PROCEDURE — 93793 ANTICOAG MGMT PT WARFARIN: CPT | Performed by: NURSE PRACTITIONER

## 2024-02-21 NOTE — Clinical Note
Dr. Velasquez,  This pt has hx of On-X AVR on 11/3/23. He also has Afib.  As far as INR goal, he's eligible for 1.5-2 for his On-X AVR now that he's > 3 months post-op. But given his hx of Afib, I wanted to check w/ you - do you want him to keep a higher INR goal of 2-3?  Let me know your thoughts.  Thank you, Larry

## 2024-02-21 NOTE — PROGRESS NOTES
OP Anticoagulation Service Note    Date: 2024    Anticoagulation Summary  As of 2024      INR goal:  1.5-2.0   TTR:  10.2% (1.8 mo)   Prior goal:  2.0-3.0   INR used for dosin.60 (2024)   Warfarin maintenance plan:  2.5 mg (2.5 mg x 1) every Mon, Fri; 5 mg (2.5 mg x 2) all other days   Weekly warfarin total:  30 mg   Plan last modified:  Veronica Bedolla, PharmD (2024)   Next INR check:  3/20/2024   Target end date:  Indefinite    Indications    S/P AVR (aortic valve replacement) [Z95.2]                 Anticoagulation Episode Summary       INR check location:      Preferred lab:      Send INR reminders to:      Comments:  On-X AVR 11/3/23 - INR goal 2-3, then 1.5-2.0 on 2/3/24          Anticoagulation Care Providers       Provider Role Specialty Phone number    [facility]   [phone]          Anticoagulation Patient Findings  Patient Findings       Positives:  Upcoming invasive procedure (Chest catheter removal procedure (date tbd) - if pt to hold warfarin, he will need to stop x 5 days prior. No bridge therapy indicated.)    Negatives:  Signs/symptoms of thrombosis, Signs/symptoms of bleeding, Laboratory test error suspected, Change in health, Change in alcohol use, Change in activity, Emergency department visit, Upcoming dental procedure, Missed doses, Extra doses, Change in medications, Change in diet/appetite, Hospital admission, Bruising, Other complaints            HPI:   Gurdeep Guerra is in the Anticoagulation Clinic today for an INR check on their anticoagulation therapy.     The reason for today's visit is to prevent morbidity and mortality from a blood clot and/or stroke and to reduce the risk of bleeding while on a anticoagulant.     PCP:  Drew T. Blumberg, D.O.  [facility] [facility] [address]  [facility] [id number] [phone]    3 vitals included with today's appt-unless patient declined:  (BP, HR, weight, ht, RR)   There were no vitals filed for this  visit.    Verified current warfarin dosing schedule.    Medications reconciled: Yes    Assessment:   INR is therapeutic - Pt now > 3 months post op (On-X AVR). Will clarify INR goal w/ cardiology.    Plan:  Instructed pt to continue on with current regimen.    Pt instructed to hold warfarin x 5 days prior to procedure at the discretion of operating provider. Pt to call and notify [facility] once procedure scheduled.    Follow up:  Follow up appointment in 4 week(s) in clinic.       Other info:  Pt educated to contact our clinic with any changes in medications or s/s of bleeding or thrombosis.  Education was provided today regarding tips to reduce their bleed risk and dietary constraints while on an anticoagulant.    National Recommendations:  The CHEST guidelines recommends frequent INR monitoring at regular intervals (a few days up to a max of 12 weeks) to ensure patients are on the proper dose of warfarin, and patients are not having any complications from therapy.  INRs can dramatically change over a short time period due to diet, medications, and medical conditions.     Larry Buenrostro, PharmD, BCACP    [facility]  Phone [phone] fax [phone]

## 2024-02-21 NOTE — TELEPHONE ENCOUNTER
Last OV: 01/10/2024  Proposed Surgery: Catheter Removal   Surgery Date: 02/22/2024  Requesting Office Name: [facility] [place]   Fax Number: [phone]  Preference of Location (default is surgery center unless specified by Cardiologist or JADEN)  Prior Clearance Addressed: No      Anticoags/Antiplatelets: Warfarin and Aspirin  Anticoags/Antiplatelet managed by Cardiology? No Provider to advise.    Outstanding Cardiac Imaging : Yes  Echo.   Clearance to provider to review  Stent, Cardiac Devices, or Catheterization: Yes  Within the last 6 months. Forward to provider to review.  Ablation, TAVR/Valve (including open heart), Cardioversion: Yes  Date: 12/20/2023  Completed within the last 6 months. Forward to provider for review  Recent Cardiac Hospitalization: Yes  Date:  11/27/2023            When: Hospitalized in the last 3 months. Forward to provider to review.   History (cardiac history):   Past Medical History:   Diagnosis Date    Anesthesia     Hiccups for over 24 hours after a previous sugery.    Aortic stenosis     Chronic gout of multiple sites     Dialysis patient ([facility]ly [facility])     peritoneal daily    Dyspnea on exertion 05/23/2023    Dyspnea on exertion 05/23/2023    Elevated troponin 05/23/2023    Heart burn     1990    Heart murmur     High cholesterol     All always    Hypertension 2009    Liver cirrhosis (HCC) 12/21/2023    NSTEMI (non-ST elevated myocardial infarction) (Former[facility]) 05/23/2023    no stents placed    Pain in the chest 05/23/2023    Pain in the chest 05/23/2023    Paroxysmal A-fib (HCC) 11/11/2023    PONV (postoperative nausea and vomiting)     Renal disorder 2007    Stage IV    Sleep apnea 2000    Snoring 1990    Splenomegaly 12/21/2023             Surgical Clearance Letter Sent: No Provider to advise.   **Scan clearance request letter into [facility].**

## 2024-02-21 NOTE — LETTER
PROCEDURE/SURGERY CLEARANCE FORM      Encounter Date: 2/21/2024    Patient: Gurdeep Guerra  YOB: 1965    CARDIOLOGIST:  TEX Del Toro    REFERRING DOCTOR:  No ref. provider found    The following procedure/surgery: Catheter Removal                                             Additional comments: May proceed, hospital setting.      Warfarin hold/bridge per coumadin clinic.      Dana      Electronically signed         MD Signature   TEX Del Toro

## 2024-03-01 ENCOUNTER — TELEPHONE (OUTPATIENT)
Dept: VASCULAR LAB | Facility: MEDICAL CENTER | Age: 59
End: 2024-03-01
Payer: COMMERCIAL

## 2024-03-01 NOTE — TELEPHONE ENCOUNTER
Called patient regarding NP appointment with Dr Bloch. Called to confirm if this will be first time patient is Vascular Med provider and review if any recent testing completed or hospital admissions. No answer, left voicemail to call back.    Correct appointment type? Yes  Referral attached to appointment? Yes  New patient packet sent via [employer]?  Yes    Lo Lazo, Med Ass't  [facility]  Ph. [phone]  Fx. [phone]

## 2024-03-08 ENCOUNTER — APPOINTMENT (OUTPATIENT)
Dept: VASCULAR LAB | Facility: MEDICAL CENTER | Age: 59
End: 2024-03-08
Attending: INTERNAL MEDICINE
Payer: COMMERCIAL

## 2024-03-14 ENCOUNTER — TELEPHONE (OUTPATIENT)
Dept: CARDIOLOGY | Facility: MEDICAL CENTER | Age: 59
End: 2024-03-14

## 2024-03-14 ENCOUNTER — OFFICE VISIT (OUTPATIENT)
Dept: CARDIOLOGY | Facility: MEDICAL CENTER | Age: 59
End: 2024-03-14
Attending: INTERNAL MEDICINE
Payer: COMMERCIAL

## 2024-03-14 VITALS
OXYGEN SATURATION: 98 % | RESPIRATION RATE: 16 BRPM | HEART RATE: 88 BPM | WEIGHT: 162 LBS | SYSTOLIC BLOOD PRESSURE: 120 MMHG | HEIGHT: 67 IN | BODY MASS INDEX: 25.43 KG/M2 | DIASTOLIC BLOOD PRESSURE: 68 MMHG

## 2024-03-14 DIAGNOSIS — I48.91 ATRIAL FIBRILLATION, UNSPECIFIED TYPE (HCC): ICD-10-CM

## 2024-03-14 DIAGNOSIS — Z95.2 S/P AVR (AORTIC VALVE REPLACEMENT): ICD-10-CM

## 2024-03-14 DIAGNOSIS — Z98.890 H/O CARDIAC RADIOFREQUENCY ABLATION: ICD-10-CM

## 2024-03-14 DIAGNOSIS — I25.83 CORONARY ARTERY DISEASE DUE TO LIPID RICH PLAQUE: ICD-10-CM

## 2024-03-14 DIAGNOSIS — I25.10 CORONARY ARTERY DISEASE DUE TO LIPID RICH PLAQUE: ICD-10-CM

## 2024-03-14 DIAGNOSIS — I48.92 PAROXYSMAL ATRIAL FLUTTER (HCC): ICD-10-CM

## 2024-03-14 DIAGNOSIS — N18.6 ESRD (END STAGE RENAL DISEASE) (HCC): ICD-10-CM

## 2024-03-14 DIAGNOSIS — Z86.79 S/P ASCENDING AORTIC ANEURYSM REPAIR: ICD-10-CM

## 2024-03-14 DIAGNOSIS — Z98.890 S/P ASCENDING AORTIC ANEURYSM REPAIR: ICD-10-CM

## 2024-03-14 PROBLEM — I71.21 ANEURYSM OF ASCENDING AORTA WITHOUT RUPTURE (HCC): Status: RESOLVED | Noted: 2024-01-16 | Resolved: 2024-03-14

## 2024-03-14 PROBLEM — N18.5 STAGE 5 CHRONIC KIDNEY DISEASE NOT ON CHRONIC DIALYSIS (HCC): Status: RESOLVED | Noted: 2020-06-26 | Resolved: 2024-03-14

## 2024-03-14 PROBLEM — K74.60 LIVER CIRRHOSIS (HCC): Chronic | Status: RESOLVED | Noted: 2023-12-21 | Resolved: 2024-03-14

## 2024-03-14 PROBLEM — I50.20 SYSTOLIC HEART FAILURE (HCC): Status: RESOLVED | Noted: 2023-12-25 | Resolved: 2024-03-14

## 2024-03-14 PROCEDURE — 99214 OFFICE O/P EST MOD 30 MIN: CPT | Performed by: INTERNAL MEDICINE

## 2024-03-14 PROCEDURE — 99212 OFFICE O/P EST SF 10 MIN: CPT | Performed by: INTERNAL MEDICINE

## 2024-03-14 PROCEDURE — 3074F SYST BP LT 130 MM HG: CPT | Performed by: INTERNAL MEDICINE

## 2024-03-14 PROCEDURE — 3078F DIAST BP <80 MM HG: CPT | Performed by: INTERNAL MEDICINE

## 2024-03-14 RX ORDER — AMOXICILLIN 500 MG/1
2000 CAPSULE ORAL PRN
Qty: 12 CAPSULE | Refills: 3 | Status: SHIPPED | OUTPATIENT
Start: 2024-03-14

## 2024-03-14 RX ORDER — CARVEDILOL 3.12 MG/1
3.12 TABLET ORAL 2 TIMES DAILY WITH MEALS
Qty: 200 TABLET | Refills: 3 | Status: SHIPPED | OUTPATIENT
Start: 2024-03-14 | End: 2024-03-15 | Stop reason: SDUPTHER

## 2024-03-14 RX ORDER — CARVEDILOL 3.12 MG/1
3.12 TABLET ORAL 2 TIMES DAILY WITH MEALS
COMMUNITY
End: 2024-03-14 | Stop reason: SDUPTHER

## 2024-03-14 ASSESSMENT — FIBROSIS 4 INDEX: FIB4 SCORE: 1.17

## 2024-03-14 NOTE — PROGRESS NOTES
"CARDIOLOGY OUTPATIENT FOLLOWUP    PCP: Drew T. Blumberg, D.O.    1. S/P AVR (aortic valve replacement)    2. S/P ascending aortic aneurysm repair    3. Paroxysmal atrial flutter (HCC)    4. H/O cardiac radiofrequency ablation 12/20/23    5. ESRD (end stage renal disease) (HCC)    6. Coronary artery disease due to lipid rich plaque    7. Atrial fibrillation, unspecified type (HCC)        Gurdeep Guerra is recovering still from cardiac surgery.  Doing well from a heart standpoint but with persistent cough as well as due for reassessment of lipids.  He will update a chest x-ray and lipid panel.  If there is persistent pleural effusion then I will arrange visit with pulmonary.  We will target an LDL level of less than 70.    Follow up: 3 months    History: Gurdeep Guerra is a 59 y.o. male with history of end-stage renal disease (PD), recent flutter ablation with Dr. Velasquez presenting for follow-up of mechanical aortic valve  (23 On-X), root repair (30 Hemashield) November 2023 along with small vessel obstructive coronary artery disease limited to the obtuse marginal.    He had a difficult postoperative course with recurrent pleural effusions-complex, need for emergent reoperation, atrial arrhythmia subsequent ablation.  Temporary use of hemodialysis with tunneled catheter placement, now back on PD.    Has stopped lifting weights, lost a lot of upper extremity mass.    Coughing a lot.      Physical Exam:  /68 (BP Location: Left arm, Patient Position: Sitting, BP Cuff Size: Adult)   Pulse 88   Resp 16   Ht 1.702 m (5' 7\")   Wt 73.5 kg (162 lb)   SpO2 98%   BMI 25.37 kg/m²   GEN: NAD  CARDIAC: Regular, Systolic murmur, mechanical S2  VASCULATURE: Normal carotid upstroke  RESP: Clear to auscultation bilaterally  ABD: Soft, non-tender, non-distended  EXT: No edema  NEURO: No focal deficit       ECG interpreted by me shows postoperative atrial flutter, no fibrillation, now in sinus rhythm on most recent " "ECG    () Today's E/M visit is associated with medical care services that serve as the continuing focal point for all needed health care services and/or with medical care services that  are part of ongoing care related to a patient's single, serious condition, or a complex condition: This includes  furnishing services to patients on an ongoing basis that result in care that is personalized  to the patient. The services result in a comprehensive, longitudinal, and continuous  relationship with the patient and involve delivery of team-based care that is accessible, coordinated with other practitioners and providers, and integrated with the broader health  care landscape.     The ASCVD Risk score (Sean CAT, et al., 2019) failed to calculate.    Studies  Lab Results   Component Value Date/Time    CHOLSTRLTOT 171 05/23/2023 11:08 AM     (H) 05/23/2023 11:08 AM    HDL 40 05/23/2023 11:08 AM    TRIGLYCERIDE 56 05/23/2023 11:08 AM       Lab Results   Component Value Date/Time    SODIUM 133 (L) 12/25/2023 04:04 AM    POTASSIUM 5.1 12/25/2023 04:04 AM    CHLORIDE 101 12/25/2023 04:04 AM    CO2 23 12/25/2023 04:04 AM    GLUCOSE 80 12/25/2023 04:04 AM    BUN 42 (H) 12/25/2023 04:04 AM    CREATININE 8.22 (HH) 12/25/2023 04:04 AM    BUNCREATRAT 17 05/09/2022 09:49 AM    GLOMRATE 7 (L) 04/02/2023 11:54 AM     Lab Results   Component Value Date/Time    ALKPHOSPHAT 54 12/25/2023 04:04 AM    ASTSGOT 25 12/25/2023 04:04 AM    ALTSGPT 21 12/25/2023 04:04 AM    TBILIRUBIN 0.2 12/25/2023 04:04 AM      No results found for: \"HGB\"     Past Medical History:   Diagnosis Date    Anesthesia     Hiccups for over 24 hours after a previous sugery.    Aortic stenosis     Chronic gout of multiple sites     Dialysis patient (HCC)     peritoneal daily    Dyspnea on exertion 05/23/2023    Dyspnea on exertion 05/23/2023    Elevated troponin 05/23/2023    Heart burn     1990    Heart murmur     High cholesterol     All always    " Hypertension 2009    Liver cirrhosis (HCC) 12/21/2023    NSTEMI (non-ST elevated myocardial infarction) (HCC) 05/23/2023    no stents placed    Pain in the chest 05/23/2023    Pain in the chest 05/23/2023    Paroxysmal A-fib (HCC) 11/11/2023    PONV (postoperative nausea and vomiting)     Renal disorder 2007    Stage IV    Sleep apnea 2000    Snoring 1990    Splenomegaly 12/21/2023     Allergies   Allergen Reactions    Allopurinol Itching    Hydralazine Hcl Unspecified     Pt states he had a reaction similar to lupus     Outpatient Encounter Medications as of 3/14/2024   Medication Sig Dispense Refill    warfarin (COUMADIN) 2.5 MG Tab Take 1-2 Tablets by mouth every day. As directed by [facility] 180 Tablet 1    traZODone (DESYREL) 50 MG Tab Take 1 Tablet by mouth every evening. 30 Tablet 3    verapamil (ISOPTIN) 80 MG Tab Take 1 Tablet by mouth 3 times a day for 90 days. 90 Tablet 2    simethicone (MYLICON) 125 MG chewable tablet Chew 1 Tablet 3 times a day as needed for Flatulence. 120 Tablet 1    ondansetron (ZOFRAN) 8 MG Tab Take 1 Tablet by mouth every 8 hours as needed for Nausea/Vomiting. 15 Tablet 0    docusate sodium 100 MG Cap Take 100 mg by mouth 2 times a day. 60 Capsule 2    magnesium oxide 400 (240 Mg) MG Tab Take 1 Tablet by mouth every day. 30 Tablet 2    sevelamer carbonate (RENVELA) 800 MG Tab tablet Take 2 Tablets by mouth 3 times a day with meals. 270 Tablet 1    promethazine (PHENERGAN) 12.5 MG tablet Take 1-2 Tablets by mouth every four hours as needed for Nausea/Vomiting (if no relief from ondansetron or if ondansetron is not ordered). 30 Tablet 0    polyethylene glycol/lytes (MIRALAX) Pack Take 1 Packet by mouth every day. 30 Each 2    omeprazole (PRILOSEC) 20 MG delayed-release capsule Take 1 Capsule by mouth 2 times a day. 30 Capsule 2    tamsulosin (FLOMAX) 0.4 MG capsule Take 0.4 mg by mouth every day.      atorvastatin (LIPITOR) 40 MG Tab Take 1 Tablet by mouth every  evening. 100 Tablet 3    aspirin 81 MG EC tablet Take 81 mg by mouth every day.       No facility-administered encounter medications on file as of 3/14/2024.     Social History     Socioeconomic History    Marital status:      Spouse name: Not on file    Number of children: Not on file    Years of education: Not on file    Highest education level: Master's degree (e.g., MA, MS, Kenn, MEd, MSW, ELIECER)   Occupational History    Not on file   Tobacco Use    Smoking status: Never    Smokeless tobacco: Never   Vaping Use    Vaping Use: Never used   Substance and Sexual Activity    Alcohol use: Never    Drug use: Not Currently    Sexual activity: Yes     Partners: Female   Other Topics Concern    Not on file   Social History Narrative    Not on file     Social Determinants of Health     Financial Resource Strain: Low Risk  (1/4/2023)    Overall Financial Resource Strain (CARDIA)     Difficulty of Paying Living Expenses: Not hard at all   Food Insecurity: No Food Insecurity (1/4/2023)    Hunger Vital Sign     Worried About Running Out of Food in the Last Year: Never true     Ran Out of Food in the Last Year: Never true   Transportation Needs: No Transportation Needs (1/4/2023)    PRAPARE - Transportation     Lack of Transportation (Medical): No     Lack of Transportation (Non-Medical): No   Physical Activity: Sufficiently Active (1/4/2023)    Exercise Vital Sign     Days of Exercise per Week: 3 days     Minutes of Exercise per Session: 60 min   Stress: Stress Concern Present (1/4/2023)    [employer] [place] [employer] - Occupational Stress Questionnaire     Feeling of Stress : To some extent   Social Connections: Moderately Isolated (1/4/2023)    Social Connection and Isolation Panel [NHANES]     Frequency of Communication with Friends and Family: Once a week     Frequency of Social Gatherings with Friends and Family: More than three times a week     Attends [employer] Services: Never     Active Member of  Clubs or Organizations: No     Attends Club or Organization Meetings: Never     Marital Status: Living with partner   Intimate Partner Violence: Not on file   Housing Stability: Low Risk  (1/4/2023)    Housing Stability Vital Sign     Unable to Pay for Housing in the Last Year: No     Number of Places Lived in the Last Year: 1     Unstable Housing in the Last Year: No         ROS:   10 point review systems is otherwise negative except as per the HPI    No chief complaint on file.

## 2024-03-14 NOTE — TELEPHONE ENCOUNTER
BE          Caller: Yovani with [facility] in [place]    Topic/issue: Their office was calling because they wanted to clarify the directions of the patient carvedilol (COREG) 3.125 MG Tab medication and they were asking for a call back    Callback Number: [phone]      Thank you    -Jose Guadalupe MONTIEL

## 2024-03-14 NOTE — TELEPHONE ENCOUNTER
"To BE: Patient's carvedilol that was ordered today has the sig, \"Take 1 Tablet by mouth 2 times a day with meals. Takes 3 times Daily.\" Do you want them to take it 2 or 3 times per day? Not stated in office note. Thank you!  "

## 2024-03-15 RX ORDER — CARVEDILOL 3.12 MG/1
3.12 TABLET ORAL 2 TIMES DAILY WITH MEALS
Qty: 200 TABLET | Refills: 3 | Status: SHIPPED | OUTPATIENT
Start: 2024-03-15

## 2024-03-15 NOTE — TELEPHONE ENCOUNTER
Dontrell Sharma M.D.  You56 minutes ago (1:03 PM)     Take two times daily only.    Thx  BE     Prescription updated and pharmacy notified via phone.

## 2024-03-27 ENCOUNTER — TELEPHONE (OUTPATIENT)
Dept: VASCULAR LAB | Facility: MEDICAL CENTER | Age: 59
End: 2024-03-27

## 2024-03-27 ENCOUNTER — APPOINTMENT (OUTPATIENT)
Dept: CARDIOLOGY | Facility: PHYSICIAN GROUP | Age: 59
End: 2024-03-27
Payer: COMMERCIAL

## 2024-03-27 NOTE — TELEPHONE ENCOUNTER
[facility] Scheduling Request  PT Name: Gurdeep Guerra     PT MRN: [id number]    Best Call Back Number: [phone]    Any special instructions: Reschedule today's appt     Thank You   Mikki CALERO

## 2024-04-02 ENCOUNTER — TELEPHONE (OUTPATIENT)
Dept: CARDIOLOGY | Facility: MEDICAL CENTER | Age: 59
End: 2024-04-02
Payer: COMMERCIAL

## 2024-04-03 ENCOUNTER — APPOINTMENT (OUTPATIENT)
Dept: CARDIOLOGY | Facility: PHYSICIAN GROUP | Age: 59
End: 2024-04-03
Payer: COMMERCIAL

## 2024-04-03 ENCOUNTER — PATIENT MESSAGE (OUTPATIENT)
Dept: CARDIOLOGY | Facility: MEDICAL CENTER | Age: 59
End: 2024-04-03

## 2024-04-03 DIAGNOSIS — J90 PLEURAL EFFUSION: ICD-10-CM

## 2024-04-30 PROBLEM — Z99.2 PERITONEAL DIALYSIS STATUS (HCC): Status: ACTIVE | Noted: 2024-04-30

## 2024-05-20 ENCOUNTER — TELEPHONE (OUTPATIENT)
Dept: VASCULAR LAB | Facility: MEDICAL CENTER | Age: 59
End: 2024-05-20
Payer: COMMERCIAL

## 2024-05-20 NOTE — TELEPHONE ENCOUNTER
Called and left message to change 06/11 Appt to VIRTUAL (facetime/Zoom/mychart).  If patient is unable to do so, we can find a day in May/June to reschedule.     Please confirm with patient to change to virtual and contact Lo Lazo, Med Ass't.    Lo Lazo, Med Ass't  [facility]  Ph. [phone]  Fx. [phone]

## 2024-05-28 ENCOUNTER — TELEPHONE (OUTPATIENT)
Dept: VASCULAR LAB | Facility: MEDICAL CENTER | Age: 59
End: 2024-05-28
Payer: COMMERCIAL

## 2024-05-28 NOTE — TELEPHONE ENCOUNTER
Called and left message to change 06/11 Appt to VIRTUAL (facetime/Zoom/mychart).  If patient is unable to do so, we can find a day in May/June to reschedule.      Please confirm with patient to change to virtual and contact Lo Lazo, Med Ass't.     Lo Lazo, Med Ass't  [facility]  Ph. [phone]  Fx. [phone]

## 2024-05-29 PROBLEM — M54.16 LUMBAR RADICULOPATHY: Status: ACTIVE | Noted: 2024-05-29

## 2024-06-04 DIAGNOSIS — Z79.01 CHRONIC ANTICOAGULATION: ICD-10-CM

## 2024-06-11 ENCOUNTER — ANTICOAGULATION MONITORING (OUTPATIENT)
Dept: VASCULAR LAB | Facility: MEDICAL CENTER | Age: 59
End: 2024-06-11

## 2024-06-11 ENCOUNTER — DOCUMENTATION (OUTPATIENT)
Dept: VASCULAR LAB | Facility: MEDICAL CENTER | Age: 59
End: 2024-06-11

## 2024-06-11 ENCOUNTER — TELEMEDICINE (OUTPATIENT)
Dept: VASCULAR LAB | Facility: MEDICAL CENTER | Age: 59
End: 2024-06-11
Attending: INTERNAL MEDICINE
Payer: COMMERCIAL

## 2024-06-11 DIAGNOSIS — Z95.2 S/P AVR (AORTIC VALVE REPLACEMENT): ICD-10-CM

## 2024-06-11 DIAGNOSIS — Z95.2 HISTORY OF MECHANICAL AORTIC VALVE REPLACEMENT: ICD-10-CM

## 2024-06-11 DIAGNOSIS — N18.6 ESRD (END STAGE RENAL DISEASE) (HCC): ICD-10-CM

## 2024-06-11 DIAGNOSIS — Z98.890 S/P ASCENDING AORTIC ANEURYSM REPAIR: ICD-10-CM

## 2024-06-11 DIAGNOSIS — E78.5 DYSLIPIDEMIA: ICD-10-CM

## 2024-06-11 DIAGNOSIS — Z86.79 S/P ASCENDING AORTIC ANEURYSM REPAIR: ICD-10-CM

## 2024-06-11 PROCEDURE — 99204 OFFICE O/P NEW MOD 45 MIN: CPT | Mod: 95 | Performed by: INTERNAL MEDICINE

## 2024-06-11 NOTE — PROGRESS NOTES
VASCULAR MEDICINE CLINIC - INITIAL VISIT  06/11/24     Gurdeep Guerra is a 59 y.o. male  who has been referred for vascular medicine evaluation and management   Referring provider: Osmel Blanca M.D.     This visit was conducted via Zoom video call using secure and encrypted video conferencing technology to reduce spread of and/or potential exposures to COVID.  The patient was in a private location (home) in the state of [place].    The patient's identity was confirmed and verbal consent was obtained for this virtual visit.     Subjective      HPI:   Referred for e/m of aortic aneurysm s/p repair and valvular heart dz s/p SAVR  Had surgery late 2023  Multiple complications but doing better now  No cv symptoms currently  Mostly limited by back pain.   No angina  Had cor dz on cath, but no intervention  No known recurrence of afib  No palpitations  On warfarin and asa  No gi or gu bleeding  Does have large bruise on abd after a fall this weekend  Overdue for INR  On atorvastatin  No recent lipid panel  Has been on PD for over a year   Sees nephrology regularly  Remains on carvedilol  BP variable from systolic 100-160  No smoking    Patient Active Problem List    Diagnosis Date Noted    Lumbar radiculopathy 05/29/2024    Peritoneal dialysis status (HCC) 04/30/2024    H/O cardiac radiofrequency ablation 12/20/23 01/10/2024    Physical deconditioning 12/29/2023    Generalized abdominal pain 12/29/2023    Splenomegaly 12/21/2023    A-fib (HCC) 12/16/2023    S/P ascending aortic aneurysm repair 12/13/2023    Nausea and vomiting 12/05/2023    C. difficile colitis 11/27/2023    S/P AVR (aortic valve replacement) 11/20/2023    Hypomagnesemia 11/17/2023    Pericardial effusion 11/16/2023    Mitral valve mass 11/16/2023    Anxiety 11/14/2023    Paroxysmal atrial flutter (HCC) 11/11/2023    GIB (gastrointestinal bleeding) 11/11/2023    Insomnia 11/11/2023    Loculated pleural effusion 11/11/2023    Shock (HCC) 11/06/2023     ESRD (end stage renal disease) (HCC) 11/03/2023    Status post cardiac surgery 11/03/2023    Dyslipidemia 09/21/2023    Coronary artery disease due to lipid rich plaque 05/26/2023    Hyperphosphatemia 05/24/2023    Pain in the chest 05/23/2023    Dyspnea on exertion 05/23/2023    Chronic metabolic acidosis 05/23/2023    Skin lesions 03/03/2023    Lump of skin of right upper extremity 03/03/2023    Chronic gout of multiple sites 06/09/2022    Sialolithiasis 06/09/2022    Heart murmur 04/28/2022    FSGS (focal segmental glomerulosclerosis) 10/11/2021    Aortic stenosis 07/01/2021    Hypertension 06/26/2020    Mixed hyperlipidemia 06/26/2020    Gastroesophageal reflux disease 06/26/2020      Past Surgical History:   Procedure Laterality Date    VA NJX AA&/STRD TFRML EPI LUMBAR/SACRAL 1 LEVEL Bilateral 6/6/2024    Procedure: Bilateral L5 Transforaminal Epidural Steroid Injection under Fluoroscopy;  Surgeon: Benito Herrera D.O.;  Location: SURGERY [facility];  Service: Pain Management    AORTIC VALVE REPLACEMENT  11/3/2023    Procedure: AORTIC VALVE REPLACEMENT, ASCENDING AORTIC ANEURYSM REPAIR, TRANSESOPHAGEAL ECHOCARDIOGRAM;  Surgeon: Osmel Blanca M.D.;  Location: SURGERY [facility];  Service: Cardiothoracic    AORTIC ASCENDING DISSECTION  11/3/2023    Procedure: REPAIR, ANEURYSM OR DISSECTION, AORTA, ASCENDING;  Surgeon: Osmel Blanca M.D.;  Location: SURGERY [facility];  Service: Cardiothoracic    ECHOCARDIOGRAM, TRANSESOPHAGEAL, INTRAOPERATIVE  11/3/2023    Procedure: ECHOCARDIOGRAM, TRANSESOPHAGEAL, INTRAOPERATIVE;  Surgeon: Osmel Blanca M.D.;  Location: SURGERY [facility];  Service: Cardiothoracic    RESTORATE HEMOSTAS  11/3/2023    Procedure: RESTORATION OF HEMOSTATIS;  Surgeon: Osmel Blanca M.D.;  Location: SURGERY [facility];  Service: Cardiothoracic    STERNOTOMY  11/3/2023    Procedure: MEDIASTINAL EXPLORATION;  Surgeon: Osmel Blanca M.D.;  Location: SURGERY [facility];  Service:  Cardiothoracic    CATH PLACEMENT CAPD Right 9/6/2023    Procedure: LAPAROSCOPIC INSERTION OF PERITONEAL DIALYSIS CATHETER;  Surgeon: Dontrell Sales M.D.;  Location: SURGERY [facility];  Service: Vascular    KY INJ LUMBAR/SACRAL,W/ IMAGING Left 8/24/2023    Procedure: LUMBAR EPIDURAL STEROID INJECTION, LEFT L5-S1 INTERLAMINAR UNDER FLUOROSCOPY;  Surgeon: Benito Herrera D.O.;  Location: SURGERY [facility];  Service: Orthopedics    CATH PLACEMENT CAPD N/A 5/15/2023    Procedure: LAPAROSCOPIC PLACEMENT OF PERITONEAL DIALYSIS CATHERTER;  Surgeon: Shikha Alexander M.D.;  Location: SURGERY SAME DAY [facility];  Service: General    UMBILICAL HERNIA REPAIR  2012    4 separate surgeries between [id number]    OTHER  2007    Kidney biopsy, can't recall laterality,    HIP ARTHROSCOPY Bilateral 2002    TONSILLECTOMY N/A 1987    HAND SURGERY Right 1985    Hand and wrist    SHOULDER ARTHROSCOPY      Can't recall date      Family History   Problem Relation Age of Onset    Hyperlipidemia Mother     Heart Disease Mother     Hypertension Father       Current Outpatient Medications on File Prior to Visit   Medication Sig Dispense Refill    gabapentin (NEURONTIN) 100 MG Cap Take 1 Capsule by mouth 3 times a day. 90 Capsule 2    carvedilol (COREG) 3.125 MG Tab Take 3 Tablets by mouth 2 times a day with meals for 180 days. 540 Tablet 1    esomeprazole (NEXIUM) 20 MG capsule Take 2 Capsules by mouth every morning before breakfast. 180 Capsule 1    warfarin (COUMADIN) 2.5 MG Tab Take 2 Tablets by mouth every day. As directed by [facility] 180 Tablet 1    atorvastatin (LIPITOR) 40 MG Tab Take 1 Tablet by mouth every evening. 100 Tablet 3    aspirin 81 MG EC tablet Take 81 mg by mouth every day.       No current facility-administered medications on file prior to visit.      ALLERGIES  Allopurinol and Hydralazine hcl    Social History     Tobacco Use    Smoking status: Never    Smokeless tobacco: Never   Vaping Use    Vaping  "status: Never Used   Substance Use Topics    Alcohol use: Never    Drug use: Never     DIET AND EXERCISE:  Weight Change: stable  Diet: med style  Exercise: limited        Objective    There were no vitals filed for this visit.   BP Readings from Last 4 Encounters:   06/06/24 (!) 155/84   05/29/24 (!) 160/92   04/30/24 (!) 140/80   03/14/24 120/68      There is no height or weight on file to calculate BMI.   Wt Readings from Last 4 Encounters:   06/06/24 83.9 kg (185 lb)   05/29/24 83.9 kg (185 lb)   04/30/24 79.8 kg (176 lb)   03/14/24 73.5 kg (162 lb)      Physical Exam  Constitutional:       General: He is not in acute distress.     Appearance: He is not diaphoretic.   HENT:      Head: Normocephalic and atraumatic.   Eyes:      General: No scleral icterus.     Conjunctiva/sclera: Conjunctivae normal.   Pulmonary:      Effort: Pulmonary effort is normal. No respiratory distress.   Skin:     Coloration: Skin is not pale.   Neurological:      Mental Status: He is alert and oriented to person, place, and time.      Cranial Nerves: No cranial nerve deficit.   Psychiatric:         Mood and Affect: Mood and affect normal.         Behavior: Behavior normal.          DATA REVIEW    Lab Results   Component Value Date/Time    CHOLSTRLTOT 171 05/23/2023 11:08 AM     (H) 05/23/2023 11:08 AM    HDL 40 05/23/2023 11:08 AM    TRIGLYCERIDE 56 05/23/2023 11:08 AM       Lab Results   Component Value Date/Time     (H) 05/23/2023 11:08 AM     (H) 11/12/2020 07:44 AM     (H) 06/30/2020 02:17 PM    LDL see below 03/17/2020 10:00 AM     No results found for: \"LIPOPROTA\"  No results found for: \"APOB\"  Lab Results   Component Value Date/Time    SODIUM 133 (L) 12/25/2023 04:04 AM    POTASSIUM 5.1 12/25/2023 04:04 AM    CHLORIDE 101 12/25/2023 04:04 AM    CO2 23 12/25/2023 04:04 AM    GLUCOSE 80 12/25/2023 04:04 AM    BUN 42 (H) 12/25/2023 04:04 AM    CREATININE 8.22 (HH) 12/25/2023 04:04 AM    BUNCREATRAT 17 " "05/09/2022 09:49 AM    GLOMRATE 7 (L) 04/02/2023 11:54 AM     Lab Results   Component Value Date/Time    ALKPHOSPHAT 54 12/25/2023 04:04 AM    ASTSGOT 25 12/25/2023 04:04 AM    ALTSGPT 21 12/25/2023 04:04 AM    TBILIRUBIN 0.2 12/25/2023 04:04 AM       Lab Results   Component Value Date/Time    HBA1C 5.0 10/31/2023 09:25 AM       No results found for: \"MICROALBCALC\", \"MALBCRT\", \"MALBEXCR\", \"IHBIQW85\", \"MICROALBUR\", \"MICRALB\", \"UMICROALBUM\", \"MICROALBTIM\"      Cardiovascular Imaging:    Echo march 2024  Normal EF  Well seated mech avr        Medical Decision Making:  Today's Assessment / Status / Plan:     1. Dyslipidemia  Lipid Profile    LIPOPROTEIN A    APOLIPOPROTEIN B    Comp Metabolic Panel      2. ESRD (end stage renal disease) (HCC)  CBC WITHOUT DIFFERENTIAL      3. S/P AVR (aortic valve replacement)        4. History of mechanical aortic valve replacement        5. S/P ascending aortic aneurysm repair           Etiology of Established CVD if Present:     Valvular heart dz s/p mechanical SAVR with on-x valve 2023 - valve functioning well on echo. Normal EF.   - defer further f/u including surveillance imaging to [facility]    2. Ascending aortic aneurysm s/p ascending repair 2023 - doing well post operatively after long recovery. Appears stable on echo  - will forgo f/u CTA given ESRD  - medical management and lifestyle mod per below  - recommended screening echo of all siblings every 5 years or so (no kids) - especially his twin  -using SDH we decided to forgo genetic testing    3. Post op afib - no known recurrence. Defer management of rhythm and rate and decisions about anticoag to [facility]    LIPID MANAGEMENT  Qualifies for Statin Therapy Based on 2018 ACC/AHA Guidelines: yes  10-yr ASCVD risk score: The 10-year ASCVD risk score (Sean CAT, et al., 2019) is: 11%, 7.5 - <20% \"intermediate risk\"   Major ASCVD events: None    High-risk conditions: CKD (egfr 15-59)  Risk-enhancers: subclinical CAD seen on " cath  Currently on Statin: No  Tx goals:   LDL-C <100 mg/dl (perhaps <70)  apoB <90 mg/dl (perhaps <70)  At goal? unknown  Plan:   - continue atorva 40  - recheck lipid panel, apoB, lp(a)    BLOOD PRESSURE MANAGEMENT  BP Goal ACC/AHA (2017) goal <130/80  Home BP at goal:  variable  Office BP at goal:  unknown  Contributing factors: ESRD on PD   Plan:   - continue home BP monitoring, and call if consistently >130/80  - continue carvedilol     GLYCEMIC MANAGEMENT Normal  - recheck fasting glucose    ANTITHROMBOTIC THERAPY:   Patient with On-X SAVR  - continue indefinite low dose asa  - continue indefinite warfarin - will clarify with EP whether goal should be 1.5-2 (no afib) or 2-3 (recurrent afib)  - f/u in anticoag clinic - will msg team to make appt    LIFESTYLE INTERVENTIONS:    Tobacco Use:  reports that he has never smoked. He has never used smokeless tobacco.   - continued complete avoidance of all tobacco products     Physical Activity: limited by back - recommendations per neurosurg    Weight Management and/or Nutrition: Mediterranean      OTHER:     # ESRD with FSGS on PD - defer management to nephrology    # Pleural effusions post op - defer further management to cards, pulm, nephrology    # DJD Back - signfiicantly limits ability to exercise. defer to neurosurg    Studies to Be Obtained: per cards  Labs to Be Obtained: as noted above    Follow up in: 3 months, virtual by patient request    Michael J Bloch, M.D.   [facility]  [facility]  [phone]    Cc: D Blumberg

## 2024-06-11 NOTE — PROGRESS NOTES
Case d/w EP. No known recurrence afib/flutter  Will continue with INR goal 1.5-2 pending further recs or known recurrence.    Michael Bloch, MD  [facility]

## 2024-06-14 ENCOUNTER — DOCUMENTATION (OUTPATIENT)
Dept: MEDICAL GROUP | Facility: PHYSICIAN GROUP | Age: 59
End: 2024-06-14
Payer: COMMERCIAL

## 2024-06-14 NOTE — PROGRESS NOTES
Patient did not show for their appointment.      I left a voice message for them to call us back to reschedule.    Also sent [employer] message     [facility]  Phone [phone] fax [phone]

## 2024-06-18 ENCOUNTER — TELEPHONE (OUTPATIENT)
Dept: VASCULAR LAB | Facility: MEDICAL CENTER | Age: 59
End: 2024-06-18
Payer: COMMERCIAL

## 2024-06-18 NOTE — TELEPHONE ENCOUNTER
Pt is overdue for an INR    S/w pt. They will get INR checked on 06/19/24    3rd call    Discharge criteria:  3 calls (including at least once to emergency contacts)? Yes  1 letter (with the third call)? No   Over 3 months (last seen 02/21/24, if no response by 05/21/24 and after 3 calls + 1 letter, will d/c from clinic)? Yes    Veronica Bedolla, PharmD

## 2024-06-20 ENCOUNTER — TELEPHONE (OUTPATIENT)
Dept: VASCULAR LAB | Facility: MEDICAL CENTER | Age: 59
End: 2024-06-20
Payer: COMMERCIAL

## 2024-06-20 NOTE — TELEPHONE ENCOUNTER
Pt is overdue for an INR     Pt no showed INR f/u yesterday 6/19  R/s for 6/26     4th call     Discharge criteria:  3 calls (including at least once to emergency contacts)? Yes  1 letter (with the third call)? No   Over 3 months (last seen 02/21/24, if no response by 05/21/24 and after 3 calls + 1 letter, will d/c from clinic)? Yes     Veronica Bedolla, PharmD

## 2024-06-27 ENCOUNTER — TELEPHONE (OUTPATIENT)
Dept: VASCULAR LAB | Facility: MEDICAL CENTER | Age: 59
End: 2024-06-27
Payer: COMMERCIAL

## 2024-06-27 NOTE — TELEPHONE ENCOUNTER
Pt is overdue for an INR     Pt no showed INR f/u on 6/19 & 6/26     5th call    R/s f/u for 7/3.     Discharge criteria:  3 calls (including at least once to emergency contacts)? Yes  1 letter (with the third call)? No   Over 3 months (last seen 02/21/24, if no response by 05/21/24 and after 3 calls + 1 letter, will d/c from clinic)? Yes    Larry Buenrostro, EstefanyD, BCACP

## 2024-07-03 ENCOUNTER — APPOINTMENT (OUTPATIENT)
Dept: CARDIOLOGY | Facility: PHYSICIAN GROUP | Age: 59
End: 2024-07-03
Payer: COMMERCIAL

## 2024-07-08 ENCOUNTER — TELEPHONE (OUTPATIENT)
Dept: VASCULAR LAB | Facility: MEDICAL CENTER | Age: 59
End: 2024-07-08
Payer: COMMERCIAL

## 2024-07-09 ENCOUNTER — APPOINTMENT (OUTPATIENT)
Dept: SLEEP MEDICINE | Facility: MEDICAL CENTER | Age: 59
End: 2024-07-09
Attending: INTERNAL MEDICINE
Payer: COMMERCIAL

## 2024-07-09 ENCOUNTER — TELEPHONE (OUTPATIENT)
Dept: VASCULAR LAB | Facility: MEDICAL CENTER | Age: 59
End: 2024-07-09

## 2024-07-10 ENCOUNTER — DOCUMENTATION (OUTPATIENT)
Dept: MEDICAL GROUP | Facility: PHYSICIAN GROUP | Age: 59
End: 2024-07-10

## 2024-07-10 PROBLEM — G47.33 OBSTRUCTIVE SLEEP APNEA SYNDROME: Status: ACTIVE | Noted: 2024-07-10

## 2024-07-12 ENCOUNTER — PATIENT MESSAGE (OUTPATIENT)
Dept: CARDIOLOGY | Facility: MEDICAL CENTER | Age: 59
End: 2024-07-12
Payer: COMMERCIAL

## 2024-07-13 ENCOUNTER — PATIENT MESSAGE (OUTPATIENT)
Dept: CARDIOLOGY | Facility: MEDICAL CENTER | Age: 59
End: 2024-07-13
Payer: COMMERCIAL

## 2024-07-15 ENCOUNTER — PATIENT MESSAGE (OUTPATIENT)
Dept: CARDIOLOGY | Facility: MEDICAL CENTER | Age: 59
End: 2024-07-15
Payer: COMMERCIAL

## 2024-07-15 DIAGNOSIS — R00.1 SINUS BRADYCARDIA: ICD-10-CM

## 2024-07-17 ENCOUNTER — ANTICOAGULATION VISIT (OUTPATIENT)
Dept: CARDIOLOGY | Facility: PHYSICIAN GROUP | Age: 59
End: 2024-07-17
Payer: COMMERCIAL

## 2024-07-17 ENCOUNTER — TELEPHONE (OUTPATIENT)
Dept: CARDIOLOGY | Facility: MEDICAL CENTER | Age: 59
End: 2024-07-17

## 2024-07-17 VITALS — WEIGHT: 183 LBS | HEIGHT: 67 IN | BODY MASS INDEX: 28.72 KG/M2 | RESPIRATION RATE: 14 BRPM

## 2024-07-17 DIAGNOSIS — Z95.2 S/P AVR (AORTIC VALVE REPLACEMENT): ICD-10-CM

## 2024-07-17 DIAGNOSIS — I48.91 ATRIAL FIBRILLATION, UNSPECIFIED TYPE (HCC): ICD-10-CM

## 2024-07-17 LAB — INR PPP: 1.5 (ref 2–3.5)

## 2024-07-17 PROCEDURE — 85610 PROTHROMBIN TIME: CPT | Performed by: INTERNAL MEDICINE

## 2024-07-17 PROCEDURE — 93793 ANTICOAG MGMT PT WARFARIN: CPT | Performed by: INTERNAL MEDICINE

## 2024-07-17 ASSESSMENT — FIBROSIS 4 INDEX: FIB4 SCORE: 1.17

## 2024-07-18 ENCOUNTER — TELEPHONE (OUTPATIENT)
Dept: CARDIOLOGY | Facility: MEDICAL CENTER | Age: 59
End: 2024-07-18
Payer: COMMERCIAL

## 2024-07-21 DIAGNOSIS — Z95.2 HISTORY OF MECHANICAL AORTIC VALVE REPLACEMENT: ICD-10-CM

## 2024-07-22 RX ORDER — WARFARIN SODIUM 2.5 MG/1
TABLET ORAL
Qty: 180 TABLET | Refills: 0 | Status: SHIPPED | OUTPATIENT
Start: 2024-07-22

## 2024-08-01 ENCOUNTER — NON-PROVIDER VISIT (OUTPATIENT)
Dept: CARDIOLOGY | Facility: MEDICAL CENTER | Age: 59
End: 2024-08-01
Attending: INTERNAL MEDICINE
Payer: COMMERCIAL

## 2024-08-01 DIAGNOSIS — R00.1 SINUS BRADYCARDIA: ICD-10-CM

## 2024-08-07 ENCOUNTER — APPOINTMENT (OUTPATIENT)
Dept: CARDIOLOGY | Facility: PHYSICIAN GROUP | Age: 59
End: 2024-08-07
Payer: COMMERCIAL

## 2024-08-22 ENCOUNTER — TELEPHONE (OUTPATIENT)
Dept: CARDIOLOGY | Facility: MEDICAL CENTER | Age: 59
End: 2024-08-22
Payer: COMMERCIAL

## 2024-09-10 ENCOUNTER — TELEPHONE (OUTPATIENT)
Dept: VASCULAR LAB | Facility: MEDICAL CENTER | Age: 59
End: 2024-09-10
Payer: COMMERCIAL

## 2024-09-10 DIAGNOSIS — Z95.2 S/P AVR (AORTIC VALVE REPLACEMENT): ICD-10-CM

## 2024-09-10 DIAGNOSIS — Z95.2 HISTORY OF MECHANICAL AORTIC VALVE REPLACEMENT: ICD-10-CM

## 2024-09-10 DIAGNOSIS — E78.5 DYSLIPIDEMIA: ICD-10-CM

## 2024-09-10 DIAGNOSIS — Z98.890 S/P ASCENDING AORTIC ANEURYSM REPAIR: ICD-10-CM

## 2024-09-10 DIAGNOSIS — Z79.01 CHRONIC ANTICOAGULATION: ICD-10-CM

## 2024-09-10 DIAGNOSIS — N18.6 ESRD (END STAGE RENAL DISEASE) (HCC): ICD-10-CM

## 2024-09-10 DIAGNOSIS — Z86.79 S/P ASCENDING AORTIC ANEURYSM REPAIR: ICD-10-CM

## 2024-09-10 DIAGNOSIS — Z95.2 S/P AVR: ICD-10-CM

## 2024-09-10 NOTE — TELEPHONE ENCOUNTER
Established patient  Chart prep for upcoming appointment.    Any pending/incomplete orders from last visit? Yes Labs  Was patient called and reminded to complete pending orders? Yes LVM  Were any records requested?  No    Referral up to date? Yes renewal ordered, sent to provider to co-sign, AND new referral attached to upcoming appointment.    Referral attached to appointment (renewals and New patients only)? Yes renewal ordered and sent to provider to co-sign   Virtual appointment? Yes Remind patient they need to be in the state of [place] for this appointment    Lo Lazo, Med Ass't  [facility]  Ph. [phone]  Fx. [phone]

## 2024-09-23 DIAGNOSIS — Z79.01 CHRONIC ANTICOAGULATION: ICD-10-CM

## 2024-09-30 ENCOUNTER — TELEPHONE (OUTPATIENT)
Dept: HEALTH INFORMATION MANAGEMENT | Facility: OTHER | Age: 59
End: 2024-09-30
Payer: COMMERCIAL

## 2024-10-25 DIAGNOSIS — Z95.2 HISTORY OF MECHANICAL AORTIC VALVE REPLACEMENT: ICD-10-CM

## 2024-10-28 RX ORDER — WARFARIN SODIUM 2.5 MG/1
2.5-5 TABLET ORAL DAILY
Qty: 60 TABLET | Refills: 0 | Status: SHIPPED | OUTPATIENT
Start: 2024-10-28

## 2024-10-30 ENCOUNTER — APPOINTMENT (OUTPATIENT)
Dept: MEDICAL GROUP | Facility: PHYSICIAN GROUP | Age: 59
End: 2024-10-30
Payer: COMMERCIAL

## 2024-11-01 PROBLEM — M47.26 OSTEOARTHRITIS OF SPINE WITH RADICULOPATHY, LUMBAR REGION: Status: ACTIVE | Noted: 2024-11-01

## 2024-11-17 NOTE — PROGRESS NOTES
Monitor Summary  Rhythm: aflutter  Rate:   Ectopy: pvc's  .0 / .06 / .0               Statement Selected

## 2024-11-21 ENCOUNTER — TELEPHONE (OUTPATIENT)
Dept: VASCULAR LAB | Facility: MEDICAL CENTER | Age: 59
End: 2024-11-21
Payer: COMMERCIAL

## 2024-11-21 NOTE — TELEPHONE ENCOUNTER
Called and left message to reschedule appt on 12/6 with Dr Bloch.     Or we have openings on 12/13/24     Please forward call to Lo Lazo, Med Ass't     Lo Lazo, Med Ass't  [facility]  Ph. [phone]. [phone]

## 2024-12-13 ENCOUNTER — TELEMEDICINE (OUTPATIENT)
Dept: VASCULAR LAB | Facility: MEDICAL CENTER | Age: 59
End: 2024-12-13
Attending: INTERNAL MEDICINE
Payer: COMMERCIAL

## 2024-12-13 DIAGNOSIS — N18.6 ESRD (END STAGE RENAL DISEASE) (HCC): ICD-10-CM

## 2024-12-13 DIAGNOSIS — Z86.79 S/P ASCENDING AORTIC ANEURYSM REPAIR: ICD-10-CM

## 2024-12-13 DIAGNOSIS — E78.2 MIXED HYPERLIPIDEMIA: ICD-10-CM

## 2024-12-13 DIAGNOSIS — Z98.890 S/P ASCENDING AORTIC ANEURYSM REPAIR: ICD-10-CM

## 2024-12-13 DIAGNOSIS — I49.9 CARDIAC ARRHYTHMIA, UNSPECIFIED CARDIAC ARRHYTHMIA TYPE: ICD-10-CM

## 2024-12-13 DIAGNOSIS — E78.5 DYSLIPIDEMIA: ICD-10-CM

## 2024-12-13 DIAGNOSIS — Z95.2 HISTORY OF MECHANICAL AORTIC VALVE REPLACEMENT: ICD-10-CM

## 2024-12-13 PROCEDURE — G2211 COMPLEX E/M VISIT ADD ON: HCPCS | Mod: 95 | Performed by: INTERNAL MEDICINE

## 2024-12-13 PROCEDURE — 99214 OFFICE O/P EST MOD 30 MIN: CPT | Mod: 95 | Performed by: INTERNAL MEDICINE

## 2024-12-13 RX ORDER — ROSUVASTATIN CALCIUM 20 MG/1
20 TABLET, COATED ORAL EVERY EVENING
Qty: 30 TABLET | Refills: 11 | Status: SHIPPED | OUTPATIENT
Start: 2024-12-13

## 2024-12-13 RX ORDER — CARVEDILOL 3.12 MG/1
6.25 TABLET ORAL 2 TIMES DAILY WITH MEALS
Qty: 60 TABLET | Refills: 11 | Status: SHIPPED | OUTPATIENT
Start: 2024-12-13

## 2024-12-13 RX ORDER — CALCITRIOL 0.25 UG/1
0.25 CAPSULE, LIQUID FILLED ORAL DAILY
Qty: 30 CAPSULE | Refills: 11
Start: 2024-12-13

## 2024-12-13 NOTE — PROGRESS NOTES
VASCULAR MEDICINE CLINIC - Follow up VISIT  12/13/24    Gurdeep Guerra is a 59 y.o. male  who has been referred for vascular medicine evaluation and management   Referring provider: Osmel Blanca M.D.     This visit was conducted via [employer] video call using secure and encrypted video conferencing technology to reduce spread of and/or potential exposures to COVID.  The patient was in a private location (home) in the [facility].    The patient's identity was confirmed and verbal consent was obtained for this virtual visit.     Subjective      HPI:   Here for f/u of aortic aneurysm s/p repair and valvular heart dz s/p SAVR  Had surgery late 2023  He feels that from a cardiovascular standpoint he is getting stronger but he still has quite a bit of fatigue particularly in the morning  He also has a lot of pain in his back and other joints  He thinks some of it may be due to atorvastatin which he holds intermittently  He continues to have palpitations and is told by his nephrologist that his heart rate is irregular  He did have a rhythm monitor as per below  He has not seen cardiology in some time  No TIA or stroke symptoms  He does have lightheadedness 2-3 times a week which necessitates lying down with his feet up  Denies syncope  He does say his blood pressures are anywhere from 130s to 140s to occasionally 180s and sometimes low enough that he can get a reading on his monitor  He does not know what his heart rate has been running  He continues on carvedilol and describes good adherence  Remains on warfarin and aspirin  No bleeding  He is overdue for follow-up in anticoagulation clinic  No recent lipid panel -he did not get the blood work I ordered  Has been on PD for over a year   Sees nephrology regularly.  Still awaiting transplant  No smoking      Social History     Tobacco Use    Smoking status: Never    Smokeless tobacco: Never   Vaping Use    Vaping status: Never Used   Substance Use Topics     "Alcohol use: Never    Drug use: Never     DIET AND EXERCISE:  Weight Change: Fluctuates  Diet: Common adult  Exercise: limited        Objective    There were no vitals filed for this visit.   BP Readings from Last 4 Encounters:   11/01/24 136/72   08/23/24 (!) 154/92   07/10/24 122/78   06/06/24 (!) 155/84      There is no height or weight on file to calculate BMI.   Wt Readings from Last 4 Encounters:   11/01/24 83 kg (183 lb)   08/23/24 83.9 kg (185 lb)   07/17/24 83 kg (183 lb)   07/10/24 83 kg (183 lb)      Physical Exam  Constitutional:       General: He is not in acute distress.     Appearance: He is not diaphoretic.   HENT:      Head: Normocephalic and atraumatic.   Eyes:      General: No scleral icterus.     Conjunctiva/sclera: Conjunctivae normal.   Pulmonary:      Effort: Pulmonary effort is normal. No respiratory distress.   Skin:     Coloration: Skin is not pale.   Neurological:      Mental Status: He is alert and oriented to person, place, and time.      Cranial Nerves: No cranial nerve deficit.   Psychiatric:         Mood and Affect: Mood and affect normal.         Behavior: Behavior normal.          DATA REVIEW    Lab Results   Component Value Date/Time    CHOLSTRLTOT 171 05/23/2023 11:08 AM     (H) 05/23/2023 11:08 AM    HDL 40 05/23/2023 11:08 AM    TRIGLYCERIDE 56 05/23/2023 11:08 AM       Lab Results   Component Value Date/Time     (H) 05/23/2023 11:08 AM     (H) 11/12/2020 07:44 AM     (H) 06/30/2020 02:17 PM    LDL see below 03/17/2020 10:00 AM     No results found for: \"LIPOPROTA\"  No results found for: \"APOB\"  Lab Results   Component Value Date/Time    SODIUM 133 (L) 12/25/2023 04:04 AM    POTASSIUM 5.1 12/25/2023 04:04 AM    CHLORIDE 101 12/25/2023 04:04 AM    CO2 23 12/25/2023 04:04 AM    GLUCOSE 80 12/25/2023 04:04 AM    BUN 42 (H) 12/25/2023 04:04 AM    CREATININE 8.22 (HH) 12/25/2023 04:04 AM    BUNCREATRAT 17 05/09/2022 09:49 AM    GLOMRATE 7 (L) 04/02/2023 " "11:54 AM     Lab Results   Component Value Date/Time    ALKPHOSPHAT 54 12/25/2023 04:04 AM    ASTSGOT 25 12/25/2023 04:04 AM    ALTSGPT 21 12/25/2023 04:04 AM    TBILIRUBIN 0.2 12/25/2023 04:04 AM       Lab Results   Component Value Date/Time    HBA1C 5.0 10/31/2023 09:25 AM       No results found for: \"MICROALBCALC\", \"MALBCRT\", \"MALBEXCR\", \"[id number]\", \"MICROALBUR\", \"MICRALB\", \"UMICROALBUM\", \"MICROALBTIM\"      Cardiovascular Imaging:    Echo march 2024  Normal EF  Well seated mech avr    Zio patch August 2024  Minimum heart rate 52 and maximum 187 with average heart rate 75  Multiple runs of V. Tach  Rare SVT  Ventricular bigeminy and trigeminy occasionally present  No A-fib        Medical Decision Making:  Today's Assessment / Status / Plan:     1. Cardiac arrhythmia, unspecified cardiac arrhythmia type  carvedilol (COREG) 3.125 MG Tab      2. Mixed hyperlipidemia  rosuvastatin (CRESTOR) 20 MG Tab      3. ESRD (end stage renal disease) (HCC)  calcitRIOL (ROCALTROL) 0.25 MCG Cap      4. History of mechanical aortic valve replacement        5. S/P ascending aortic aneurysm repair        6. Dyslipidemia           Etiology of Established CVD if Present:     Valvular heart dz s/p mechanical SAVR with on-x valve 2023 - valve functioning well on echo. Normal EF.   - defer further f/u including surveillance imaging to cards    2. Ascending aortic aneurysm s/p ascending repair 2023 - doing well post operatively after long recovery. Appears stable on echo  - will forgo f/u CTA given ESRD  - medical management and lifestyle mod per below  -Previously recommended screening echo of all siblings every 5 years or so (no kids) - especially his twin  -using SDH we previously decided to forgo genetic testing    3.  Rhythm abnormality-had postoperative atrial fibrillation with no known recurrence.  Currently with palpitations and irregular heartbeat and long-term monitor with V. tach and SVT.  He does have occasional lightheadedness. " " Difficult to know if this is the cause of his symptoms.  Defer management of rhythm and rate and decisions about anticoag to cards.  I recommended he make a follow-up appointment with them and I will also message his cardiologist    LIPID MANAGEMENT  Qualifies for Statin Therapy Based on 2018 ACC/AHA Guidelines: yes  10-yr ASCVD risk score: The 10-year ASCVD risk score (Sean DK, et al., 2019) is: 8.8%, 7.5 - <20% \"intermediate risk\"   Major ASCVD events: None    High-risk conditions: CKD (egfr 15-59)  Risk-enhancers: subclinical CAD seen on cath  Currently on Statin: Yes but with intermittent adherence  Tx goals:   LDL-C <100 mg/dl (perhaps <70)  apoB <90 mg/dl (perhaps <70)  At goal? Unknown -he did not get his blood work done  He feels atorvastatin worsen his joint pain, but he has no angelique myalgias  Plan:   -Change Torbert to rosuvastatin 20 mg daily and take every day  -Stop and call every day if considerable myalgias  - recheck lipid panel, apoB, lp(a) prior to next visit per previous    BLOOD PRESSURE MANAGEMENT  BP Goal ACC/AHA (2017) goal <130/80  Home BP at goal:  variable  Office BP at goal:  unknown  Contributing factors: ESRD on PD   Plan:   - continue home BP monitoring, and call if consistently >130/80  - continue carvedilol pending follow-up with cardiology    GLYCEMIC MANAGEMENT Normal  - recheck fasting glucose    ANTITHROMBOTIC THERAPY:   Patient with On-X SAVR  - continue indefinite low dose asa  - continue indefinite warfarin -will maintain INR 2.0-3.0 pending follow-up with cardiology but since he has no recurrent atrial fibrillation hopefully we can decrease his INR range if cardiology in agreement  - f/u in anticoag clinic-he is overdue.- will msg team to make appt    LIFESTYLE INTERVENTIONS:    Tobacco Use:  reports that he has never smoked. He has never used smokeless tobacco.   - continued complete avoidance of all tobacco products     Physical Activity: limited by back - recommendations " per neurosurg    Weight Management and/or Nutrition: Mediterranean      OTHER:     # ESRD with FSGS on PD - defer management to nephrology.  I did let him know that we will be doing transplants here in [place] in the future.  I have encouraged him to discuss this with his nephrologist    # Pleural effusions post op - defer further management to cards, pulm, nephrology    # DJD Back - signfiicantly limits ability to exercise. defer to neurosurg    Studies to Be Obtained: per cards  Labs to Be Obtained: as noted above    Follow up in: 2 months, virtual by patient request    Time:  34 min - - chart review/prep, review of other providers' records, imaging/lab review, face-to-face time for history/examination, ordering, prescribing,  review of results/meds/ treatment plan with patient/family/caregiver, documentation in EMR, care coordination (as needed)      Michael J Bloch, M.D.   [facility]  [facility]  [phone]    Cc: D Blumberg; JUAN Sharma

## 2024-12-16 ENCOUNTER — TELEPHONE (OUTPATIENT)
Dept: CARDIOLOGY | Facility: MEDICAL CENTER | Age: 59
End: 2024-12-16
Payer: COMMERCIAL

## 2024-12-18 ENCOUNTER — APPOINTMENT (OUTPATIENT)
Dept: CARDIOLOGY | Facility: PHYSICIAN GROUP | Age: 59
End: 2024-12-18
Payer: COMMERCIAL

## 2024-12-18 ENCOUNTER — ANTICOAGULATION VISIT (OUTPATIENT)
Dept: MEDICAL GROUP | Facility: PHYSICIAN GROUP | Age: 59
End: 2024-12-18
Payer: COMMERCIAL

## 2024-12-18 DIAGNOSIS — I48.91 ATRIAL FIBRILLATION, UNSPECIFIED TYPE (HCC): ICD-10-CM

## 2024-12-18 DIAGNOSIS — Z95.2 HISTORY OF MECHANICAL AORTIC VALVE REPLACEMENT: ICD-10-CM

## 2024-12-18 LAB — INR PPP: 1.5 (ref 2–3.5)

## 2024-12-18 PROCEDURE — 93793 ANTICOAG MGMT PT WARFARIN: CPT | Performed by: NURSE PRACTITIONER

## 2024-12-18 PROCEDURE — 85610 PROTHROMBIN TIME: CPT | Performed by: NURSE PRACTITIONER

## 2024-12-18 RX ORDER — WARFARIN SODIUM 2.5 MG/1
2.5-5 TABLET ORAL DAILY
Qty: 200 TABLET | Refills: 1 | Status: SHIPPED | OUTPATIENT
Start: 2024-12-18

## 2024-12-18 NOTE — PROGRESS NOTES
OP Anticoagulation Service Note    Date: 2024    Anticoagulation Summary  As of 2024      INR goal:  1.5-2.0   TTR:  100.0% (4.8 mo)   INR used for dosin.50 (2024)   Warfarin maintenance plan:  5 mg (2.5 mg x 2) every day   Weekly warfarin total:  35 mg   Plan last modified:  Larry Buenrostro, PharmD (2024)   Next INR check:  3/12/2025   Target end date:  Indefinite    Indications    History of mechanical aortic valve replacement [Z95.2]  A-fib (HCC) [I48.91]                 Anticoagulation Episode Summary       INR check location:  --    Preferred lab:  --    Send INR reminders to:  --    Comments:  --          Anticoagulation Patient Findings  Patient Findings       Positives:  Change in medications (Started calcitriol, carvedilol, and rosuvastatin. DC'd atorvastatin)    Negatives:  Signs/symptoms of thrombosis, Signs/symptoms of bleeding, Laboratory test error suspected, Change in health, Change in alcohol use, Change in activity, Upcoming invasive procedure, Emergency department visit, Upcoming dental procedure, Missed doses, Extra doses, Change in diet/appetite, Hospital admission, Bruising, Other complaints            HPI:   Gurdeep Guerra is in the Anticoagulation Clinic today for an INR check on their anticoagulation therapy.     The reason for today's visit is to prevent morbidity and mortality from a blood clot and/or stroke and to reduce the risk of bleeding while on an anticoagulant.     PCP:  Drew T. Blumberg, D.O.  [address]  [place] [id number] [phone]    3 vitals included with today's appt-unless patient declined:  (BP, HR, weight, ht, RR)   There were no vitals filed for this visit.    Verified current warfarin dosing schedule - has been taking 5 mg daily.    Medications reconciled: Yes  Pt is on ASA 81 mg once daily as antiplatelet therapy for AVR and must be reviewed again on next cards f/u visit.    Assessment:   INR is  therapeutic    Plan:  Instructed pt to continue on with current regimen.    Follow up:  Follow up appointment in 12 week(s).       Other info:  Pt educated to contact our clinic with any changes in medications or s/s of bleeding or thrombosis.  Education was provided today regarding tips to reduce their bleed risk and dietary constraints while on an anticoagulant.    National Recommendations:  The CHEST guidelines recommends frequent INR monitoring at regular intervals (a few days up to a max of 12 weeks) to ensure patients are on the proper dose of warfarin, and patients are not having any complications from therapy.  INRs can dramatically change over a short time period due to diet, medications, and medical conditions.     Larry Buenrostro, PharmD, BCACP    [facility]  Phone: [phone], Fax: [phone]

## 2024-12-23 DIAGNOSIS — Z79.01 CHRONIC ANTICOAGULATION: ICD-10-CM

## 2025-01-22 ENCOUNTER — DOCUMENTATION (OUTPATIENT)
Facility: MEDICAL CENTER | Age: 60
End: 2025-01-22
Payer: COMMERCIAL

## 2025-01-23 ENCOUNTER — TELEPHONE (OUTPATIENT)
Facility: MEDICAL CENTER | Age: 60
End: 2025-01-23
Payer: COMMERCIAL

## 2025-01-24 NOTE — TELEPHONE ENCOUNTER
"[facility]  Kidney Transplant Program- Referral Review    Patient Information  Patient Name  Gurdeep Guerra   Date of Birth  1965   MRN  [id number]   [place] Citizenship Yes   BMI  Estimated body mass index is 28.66 kg/m² as calculated from the following:    Height as of 11/1/24: 1.702 m (5' 7\").    Weight as of 11/1/24: 83 kg (183 lb).     Referral Information  Dialysis   Yes   Unit Name  [facility]   Modality   PD      Did patient provide social security number?  Yes    Is an  needed: No     Ambulatory assistance needed: No     What hospital or medical center is most care received?  [facility]    Have you had any recent hospitalizations? No    Hospital Name:   When:    Specialty Providers  Do you have a PCP? Yes   Name: Drew Blumberg  Do you have a cardiologist or heart doctor? Yes   Name: Dr. Barry & Dr. Bloch  Do you have a urologist? No    Name:  Do you have any other specialty doctors? No    Names(s):  Have you had a colonoscopy? Yes   When: 2022   Where: a Gastro facility in [place], could not remember the facility name     Provider information will be added to care team and most recent notes and testing will be requested.     Transplant Information:  Evaluated or declined at any other transplant programs? Yes  If so, which center: [facility]- listed  Potential living donors? Yes Brother was worked up, but is not a direct match, would need to do paired donation  Previously received a transplant? No   Organ: N/A  Where:  When:    Records will be requested and sent to RN coordinator for further review and plan. Patient encouraged to contact us with any questions or medical updates in the interm.     At this time, does [facility] have verbal consent from you, Gurdeep Guerra, to request records from external facilities for your consult/evaluation? Yes  If patient stated no, we advised that the patient is required to obtain their records and bring them in with them to " their first appointment.       Electronically Signed by   Shelbie Tarango  1/23/2025 4:48 PM

## 2025-02-05 ENCOUNTER — DOCUMENTATION (OUTPATIENT)
Facility: MEDICAL CENTER | Age: 60
End: 2025-02-05
Payer: COMMERCIAL

## 2025-02-05 NOTE — PROGRESS NOTES
"Transplant Coordinator     Kidney Transplant Referral Review    Date of Review: 2025    Patient Information  Patient Name Gurdeep Guerra   MRN [id number]    1965   Age 59 y.o.   Sex Male    Race White [7]    Ethnicity  Not , /a, or Bulgarian origin [1]    Preferred Language  English   Is an  Required?  No   BMI  Estimated body mass index is 28.66 kg/m² as calculated from the following:    Height as of 24: 1.702 m (5' 7\").    Weight as of 24: 83 kg (183 lb).      Referral Information   Referral Date 24   Referring Nephrologist  Dr. RAY Bernal   Dialysis Facility [facility]   Dialysis Modality  PD   Dialysis Schedule Daily   Primary Cause of ESRD Glomerulonephritis , FSGS   Chronic Dialysis Start Date  23   Primary Care Provider (PCP) Drew T. Blumberg, D.O.     Transplant Hx and Status Overview  Prior Transplant(s): No   Currently Listed or Actively Being Evaluated at Another Transplant Program? Yes   If Applicable, List the Transplant Center(s): [facility], [facility]  [facility]   Referred 2024   Marked as Active 2024   Reason: Pending Additional Tests   [facility]   Referred 10/23/2020  Evaluation 2021  Committee 2021  Center Waitlisted 10/11/2021   Marked as Inactive 2023   Reason: Candidate Workup Incomplete   Previously Declined by Any Transplant Center(s)? No  Potential Living Donors? Yes   If Applicable, List Who and/or Relationship to Patient (if available): Brother    Blood Type  A     PMHx  Past Medical History:   Diagnosis Date    Anesthesia     Hiccups for over 24 hours after a previous sugery.    Aortic stenosis     Chronic gout of multiple sites     Dialysis patient (HCC)     peritoneal daily    Dyspnea on exertion 2023    Dyspnea on exertion 2023    Elevated troponin 2023    Heart burn         Heart murmur     High cholesterol     All always    Hypertension 2009    Liver cirrhosis (HCC) 2023    " NSTEMI (non-ST elevated myocardial infarction) ([facility]) 05/23/2023    no stents placed    Pain in the chest 05/23/2023    Pain in the chest 05/23/2023    Paroxysmal A-fib ([facility]) 11/11/2023    Peritoneal dialysis status ([facility]) 04/30/2024    PONV (postoperative nausea and vomiting)     Renal disorder 2007    Stage IV    Sleep apnea 2000    Snoring 1990    Splenomegaly 12/21/2023       Problem List  Patient Active Problem List   Diagnosis    Hypertension    Mixed hyperlipidemia    Gastroesophageal reflux disease    Heart murmur    Chronic gout of multiple sites    Sialolithiasis    Aortic stenosis    FSGS (focal segmental glomerulosclerosis)    Skin lesions    Lump of skin of right upper extremity    Pain in the chest    Dyspnea on exertion    Chronic metabolic acidosis    Hyperphosphatemia    Coronary artery disease due to lipid rich plaque    Dyslipidemia    ESRD (end stage renal disease) ([facility])    Status post cardiac surgery    Shock ([facility])    Paroxysmal atrial flutter ([facility])    GIB (gastrointestinal bleeding)    Insomnia    Loculated pleural effusion    Anxiety    Pericardial effusion    Mitral valve mass    Hypomagnesemia    History of mechanical aortic valve replacement    C. difficile colitis    Nausea and vomiting    S/P ascending aortic aneurysm repair    A-fib ([facility])    Splenomegaly    Physical deconditioning    Generalized abdominal pain    H/O cardiac radiofrequency ablation 12/20/23    Peritoneal dialysis status ([facility])    Lumbar radiculopathy    Obstructive sleep apnea syndrome    Osteoarthritis of spine with radiculopathy, lumbar region        Surgical Hx  Past Surgical History:   Procedure Laterality Date    GA NJX AA&/STRD TFRML EPI LUMBAR/SACRAL 1 LEVEL Bilateral 6/6/2024    Procedure: Bilateral L5 Transforaminal Epidural Steroid Injection under Fluoroscopy;  Surgeon: Benito Herrera D.O.;  Location: [facility]ERY [facility];  Service: Pain Management    AORTIC VALVE REPLACEMENT  11/3/2023    Procedure: AORTIC VALVE  REPLACEMENT, ASCENDING AORTIC ANEURYSM REPAIR, TRANSESOPHAGEAL ECHOCARDIOGRAM;  Surgeon: Osmel Blanca M.D.;  Location: SURGERY [facility];  Service: Cardiothoracic    AORTIC ASCENDING DISSECTION  11/3/2023    Procedure: REPAIR, ANEURYSM OR DISSECTION, AORTA, ASCENDING;  Surgeon: Osmel Blanca M.D.;  Location: SURGERY [facility];  Service: Cardiothoracic    ECHOCARDIOGRAM, TRANSESOPHAGEAL, INTRAOPERATIVE  11/3/2023    Procedure: ECHOCARDIOGRAM, TRANSESOPHAGEAL, INTRAOPERATIVE;  Surgeon: Osmel Blanca M.D.;  Location: SURGERY [facility];  Service: Cardiothoracic    RESTORATE HEMOSTAS  11/3/2023    Procedure: RESTORATION OF HEMOSTATIS;  Surgeon: Osmel Blanca M.D.;  Location: SURGERY [facility];  Service: Cardiothoracic    STERNOTOMY  11/3/2023    Procedure: MEDIASTINAL EXPLORATION;  Surgeon: Osmel Blanca M.D.;  Location: SURGERY [facility];  Service: Cardiothoracic    CATH PLACEMENT CAPD Right 9/6/2023    Procedure: LAPAROSCOPIC INSERTION OF PERITONEAL DIALYSIS CATHETER;  Surgeon: Dontrell Sales M.D.;  Location: SURGERY [facility];  Service: Vascular    WV INJ LUMBAR/SACRAL,W/ IMAGING Left 8/24/2023    Procedure: LUMBAR EPIDURAL STEROID INJECTION, LEFT L5-S1 INTERLAMINAR UNDER FLUOROSCOPY;  Surgeon: Benito Herrera D.O.;  Location: SURGERY [facility];  Service: Orthopedics    CATH PLACEMENT CAPD N/A 5/15/2023    Procedure: LAPAROSCOPIC PLACEMENT OF PERITONEAL DIALYSIS CATHERTER;  Surgeon: Shikha Alexander M.D.;  Location: SURGERY SAME DAY [facility];  Service: General    UMBILICAL HERNIA REPAIR  2012    4 separate surgeries between 1821-1695    OTHER  2007    Kidney biopsy, can't recall laterality,    HIP ARTHROSCOPY Bilateral 2002    TONSILLECTOMY N/A 1987    HAND SURGERY Right 1985    Hand and wrist    SHOULDER ARTHROSCOPY      Can't recall date        Social Hx  If a history of smoking is present, document pack-year history (number of packs per day × number of years smoked; 1 pack = 20 cigarettes):  N/A  Social History     Socioeconomic History    Marital status:     Highest education level: Master's degree (e.g., MA, MS, Kenn, MEd, MSW, ELIECER)   Tobacco Use    Smoking status: Never    Smokeless tobacco: Never   Vaping Use    Vaping status: Never Used   Substance and Sexual Activity    Alcohol use: Never    Drug use: Never    Sexual activity: Yes     Partners: Female     Birth control/protection: Pill     Social Drivers of Health     Financial Resource Strain: Low Risk  (5/29/2024)    Overall Financial Resource Strain (CARDIA)     Difficulty of Paying Living Expenses: Not hard at all   Food Insecurity: No Food Insecurity (5/29/2024)    Hunger Vital Sign     Worried About Running Out of Food in the Last Year: Never true     Ran Out of Food in the Last Year: Never true   Transportation Needs: No Transportation Needs (5/29/2024)    PRAPARE - Transportation     Lack of Transportation (Medical): No     Lack of Transportation (Non-Medical): No   Physical Activity: Insufficiently Active (5/29/2024)    Exercise Vital Sign     Days of Exercise per Week: 7 days     Minutes of Exercise per Session: 20 min   Stress: No Stress Concern Present (5/29/2024)    [employer] - Occupational Stress Questionnaire     Feeling of Stress : Not at all   Social Connections: Socially Isolated (5/29/2024)    Social Connection and Isolation Panel [NHANES]     Frequency of Communication with Friends and Family: Once a week     Frequency of Social Gatherings with Friends and Family: Once a week     Attends Yarsanism Services: Never     Active Member of Clubs or Organizations: No     Attends Club or Organization Meetings: Never     Marital Status:    Intimate Partner Violence: Not At Risk (6/6/2024)    Humiliation, Afraid, Rape, and Kick questionnaire     Fear of Current or Ex-Partner: No     Emotionally Abused: No     Physically Abused: No     Sexually Abused: No   Housing Stability: Unknown  (5/29/2024)    Housing Stability Vital Sign     Unable to Pay for Housing in the Last Year: No     Unstable Housing in the Last Year: No       Family Hx  Family History   Problem Relation Age of Onset    Hyperlipidemia Mother     Heart Disease Mother     Hypertension Father          Allergies  Allergies   Allergen Reactions    Allopurinol Itching    Hydralazine Hcl Unspecified     Pt states he had a reaction similar to lupus        Medications    Current Outpatient Medications:     warfarin (COUMADIN) 2.5 MG Tab, Take 1-2 Tablets by mouth every day. Take as directed by antico clinic., Disp: 200 Tablet, Rfl: 1    carvedilol (COREG) 3.125 MG Tab, Take 2 Tablets by mouth 2 times a day with meals., Disp: 60 Tablet, Rfl: 11    rosuvastatin (CRESTOR) 20 MG Tab, Take 1 Tablet by mouth every evening., Disp: 30 Tablet, Rfl: 11    calcitRIOL (ROCALTROL) 0.25 MCG Cap, Take 1 Capsule by mouth every day., Disp: 30 Capsule, Rfl: 11    esomeprazole (NEXIUM) 20 MG capsule, Take 2 Capsules by mouth every morning before breakfast., Disp: 180 Capsule, Rfl: 1    gabapentin (NEURONTIN) 100 MG Cap, Take 1 Capsule by mouth 3 times a day., Disp: 90 Capsule, Rfl: 1    aspirin 81 MG EC tablet, Take 81 mg by mouth every day., Disp: , Rfl:      CHART REVIEW    Patient Care Team                Drew T. Blumberg, D.O. PCP - General, Family Medicine [phone]     [address] [place] [address] [address] [facility] [id number]       Additional Care Provider(s)  Cardiologist: Yes, Name: Dr. Barry & Dr. Bloch  Endocrinologist: No    Additional History  Cardiac History: Yes, Details: Ablation-12/23/23, AVR/Assending aortic dissection aneurysm repair-11/03/23, NSTEMI-05/23/23, Paroxymal A-Fib   Bleeding or Clotting Disorders: None Noted During Initial Chart Review  History of Stroke or TIAs: None Noted During Initial Chart Review  Anticoagulants: Yes, Details: Warfarin  Recent Infections or Illnesses: None Noted During Initial Chart Review  Current Dialysis Access  (if applicable): PD Catheter     Sensitizing Events   Hx of Blood Transfusion(s): Yes, list date(s) if available: 11/03/23   Pregnancies (if applicable): N/A     Recent Hospitalization(s)  11/27/23-12/26/23-Admit  Peritinitis-Intraperitoneal IV Cefepime  C-Diff-ID Consult for recurrent C-Diff  Pleural Effusion  Ablation  11/03/23-11/17/23-Admit  AVR, Aortic Aneurysm Repair  05/23/23-05/27/23-Admit  NSTEMI    Previous Diagnostic Studies  Test/Procedure Study Date Study Location Key Findings   CXR 04/02/24     EKG 07/10/24 [facility]    Cardiac Stress Test 05/26/21 [facility]    Echocardiogram 03/07/24 [facility] EF 60-65%   CT-A/P 12/37/24     CT-Chest 12/15/23     Colonoscopy 05/25/21 [facility] Results in Epic-Repeat in 10 Years   Mammogram      M: PSA 07/14/20  0.99   Renal Bx 12/23/22     Abdominal U/S 01/26/21     U/S-Carotid 10/03/23       Special Considerations for Evaluation  Current Ambulatory Status: Independent    Plan  The patient has been referred for a pre-transplant evaluation for kidney transplantation. Preliminary chart review findings were discussed with the transplant team, and a final review of the referral information will be completed. If appropriate, the patient will be scheduled for an appointment at the [facility] [place].    Tracie German R.N.  Transplant Coordinator  [facility]

## 2025-02-10 ENCOUNTER — TELEPHONE (OUTPATIENT)
Facility: MEDICAL CENTER | Age: 60
End: 2025-02-10
Payer: COMMERCIAL

## 2025-02-10 ENCOUNTER — TELEPHONE (OUTPATIENT)
Dept: VASCULAR LAB | Facility: MEDICAL CENTER | Age: 60
End: 2025-02-10
Payer: COMMERCIAL

## 2025-02-10 NOTE — TELEPHONE ENCOUNTER
[facility] approved for Transplant Eval., no Medicare education as pt is not on dialysis.    FC moving pt forward for scheduling.

## 2025-02-10 NOTE — TELEPHONE ENCOUNTER
Established patient  Chart prep for upcoming appointment.    Any pending/incomplete orders from last visit? Yes Labs  Was patient called and reminded to complete pending orders? Yes  Were any records requested?  No    Referral up to date? Yes  Referral attached to appointment (renewals and New patients only)? N/A (established with up-to-date referral)  Virtual appointment? Yes Remind patient they need to be in the state of [place] for this appointment    Lo Lazo, Med Ass't  [facility]  Ph. [phone]  Fx. [phone]

## 2025-02-12 ENCOUNTER — TELEPHONE (OUTPATIENT)
Facility: MEDICAL CENTER | Age: 60
End: 2025-02-12
Payer: COMMERCIAL

## 2025-02-12 RX ORDER — SEVELAMER CARBONATE 800 MG/1
3200 TABLET, FILM COATED ORAL 4 TIMES DAILY
COMMUNITY
Start: 2024-11-27

## 2025-02-12 NOTE — PROGRESS NOTES
PHARMACY PRE-TRANSPLANT EVALUATION    Gurdeep Guerra (MRN: [id number])  February 13, 2025     Gurdeep Guerra is a 59 y.o. male undergoing evaluation for transplant.      They were seen in clinic today and a pre-transplant medication education was completed. Patient {WAS/WAS NOT:9033} accompanied by family during the education *** and family {IS/ IS NOT:55962} involved in managing the patients care. The patient {DID/DID NOT:95544} require  services. Post-transplant medications were reviewed, including financial impact and importance of medication compliance. Discussed that a full benefits investigation will be complete at the time of transplant and financial assistance will be investigated if needed. The patient/family had the opportunity to ask questions and endorsed understanding of counseling. They were instructed to contact the transplant team if any additional questions should arise. Will reinforce concepts following transplant.    Allergies   Allergen Reactions    Allopurinol Itching    Hydralazine Hcl Unspecified     Pt states he had a reaction similar to lupus        Past Medical History:   Diagnosis Date    Anesthesia     Hiccups for over 24 hours after a previous sugery.    Aortic stenosis     Chronic gout of multiple sites     Dialysis patient ([facility])     peritoneal daily    Dyspnea on exertion 05/23/2023    Dyspnea on exertion 05/23/2023    Elevated troponin 05/23/2023    Heart burn     1990    Heart murmur     High cholesterol     All always    Hypertension 2009    Liver cirrhosis ([facility]) 12/21/2023    NSTEMI (non-ST elevated myocardial infarction) ([facility]) 05/23/2023    no stents placed    Pain in the chest 05/23/2023    Pain in the chest 05/23/2023    Paroxysmal A-fib ([facility]) 11/11/2023    Peritoneal dialysis status ([facility]) 04/30/2024    PONV (postoperative nausea and vomiting)     Renal disorder 2007    Stage IV    Sleep apnea 2000    Snoring 1990    Splenomegaly 12/21/2023       Social History      Socioeconomic History    Marital status:      Spouse name: Not on file    Number of children: Not on file    Years of education: Not on file    Highest education level: Master's degree (e.g., MA, MS, Kenn, MEd, MSW, ELIECER)   Occupational History    Not on file   Tobacco Use    Smoking status: Never    Smokeless tobacco: Never   Vaping Use    Vaping status: Never Used   Substance and Sexual Activity    Alcohol use: Never    Drug use: Never    Sexual activity: Yes     Partners: Female     Birth control/protection: Pill   Other Topics Concern    Not on file   Social History Narrative    Not on file     Social Drivers of Health     Financial Resource Strain: Low Risk  (5/29/2024)    Overall Financial Resource Strain (CARDIA)     Difficulty of Paying Living Expenses: Not hard at all   Food Insecurity: No Food Insecurity (5/29/2024)    Hunger Vital Sign     Worried About Running Out of Food in the Last Year: Never true     Ran Out of Food in the Last Year: Never true   Transportation Needs: No Transportation Needs (5/29/2024)    PRAPARE - Transportation     Lack of Transportation (Medical): No     Lack of Transportation (Non-Medical): No   Physical Activity: Insufficiently Active (5/29/2024)    Exercise Vital Sign     Days of Exercise per Week: 7 days     Minutes of Exercise per Session: 20 min   Stress: No Stress Concern Present (5/29/2024)    [place] [employer] - Occupational Stress Questionnaire     Feeling of Stress : Not at all   Social Connections: Socially Isolated (5/29/2024)    Social Connection and Isolation Panel [NHANES]     Frequency of Communication with Friends and Family: Once a week     Frequency of Social Gatherings with Friends and Family: Once a week     Attends Gnosticist Services: Never     Active Member of Clubs or Organizations: No     Attends Club or Organization Meetings: Never     Marital Status:    Intimate Partner Violence: Not At Risk (6/6/2024)     Humiliation, Afraid, Rape, and Kick questionnaire     Fear of Current or Ex-Partner: No     Emotionally Abused: No     Physically Abused: No     Sexually Abused: No   Housing Stability: Unknown (5/29/2024)    Housing Stability Vital Sign     Unable to Pay for Housing in the Last Year: No     Number of Places Lived in the Last Year: Not on file     Unstable Housing in the Last Year: No        Immunization History   Administered Date(s) Administered    COVID-19, mRNA, LNP-S, PF, yanick-sucrose, 30 mcg/0.3 mL 10/06/2024    Covid-19 Mrna (Spikevax) Moderna 12+ Years 09/30/2023, 10/01/2023    Hepatitis B Vaccine, CpG Adjuvanted (Heplisav-B) 06/20/2023, 06/21/2023, 08/17/2023, 08/18/2023    INFLUENZA TIV (IM) 03/17/2020    Influenza Vaccine Quad Inj (Pf) 09/28/2020, 09/29/2020, 11/19/2021, 11/20/2021, 10/26/2022, 10/27/2022, 09/15/2023, 09/16/2023    Influenza, recombinant, trivalent, PF 10/06/2024    MODERNA BIVALENT BOOSTER SARS-COV-2 VACCINE (6+) 10/26/2022, 10/27/2022    MODERNA SARS-COV-2 VACCINE (12+) 03/18/2021, 04/15/2021, 04/14/2022, 04/15/2022    Pneumococcal Conjugate Vaccine (PCV20) 08/10/2023, 08/11/2023    Pneumococcal Conjugate Vaccine (Prevnar/PCV-13) 06/25/2020, 06/26/2020    Zoster Vaccine Recombinant (RZV) (SHINGRIX) 04/03/2020, 04/04/2020, 06/12/2020, 06/13/2020       Current Outpatient Medications   Medication Sig Refill Last Dispense    aspirin 81 MG EC tablet Take 81 mg by mouth every day.  Unknown (patient-reported)    calcitRIOL (ROCALTROL) 0.25 MCG Cap Take 1 Capsule by mouth every day. 11 Unknown (no pharmacy)    carvedilol (COREG) 3.125 MG Tab Take 2 Tablets by mouth 2 times a day with meals. 11 Unknown (outside pharmacy)    esomeprazole (NEXIUM) 20 MG capsule Take 2 Capsules by mouth every morning before breakfast. 1 Unknown (outside pharmacy)    gabapentin (NEURONTIN) 100 MG Cap Take 1 Capsule by mouth 3 times a day. 1 Unknown (outside pharmacy)    rosuvastatin (CRESTOR) 20 MG Tab Take 1 Tablet  by mouth every evening. 11 Unknown (outside pharmacy)    sevelamer carbonate (RENVELA) 800 MG Tab tablet Take 3,200 mg by mouth 4 times a day.  Unknown (patient-reported)    warfarin (COUMADIN) 2.5 MG Tab Take 1-2 Tablets by mouth every day. Take as directed by anticoag clinic. 1 Unknown (outside pharmacy)        Assessment:  Use of herbal products or dietary supplements or over-the-counter medications:   {Meds Being Taken, Transplant:00517}  Use of marijuana or CBD products:   {Meds Being Taken, Transplant:26465}  Discussed avoidance post-transplant due to concerns for potential interactions with immunosuppressant medications and increased risk of infection   Review of social history:   {Hx of smokin}  {Hx of Alcohol:45129}  ***  Past immunosuppression:   {Meds Being Taken, Transplant:10703}  History of transplant:   {NONE:14586}  History of splenectomy:   {NONE:36372}  If yes, are post-splenectomy vaccines up to date? {YES/NO:}  Current CDC recommendations are for the patient to receive the meningococcal ACWY series (2 doses,  by 8 weeks with a booster every 5 years), meningococcal B series (2 or 3 doses, depending on brand, with booster 1 year after primary series and every 2-3 years after) and Hib (if never received, needs one time dose). Pneumococcal vaccine(s) are also indicated in post-splenectomy patients, and PCV13 should be  from ACWY by at least 4 weeks. In no history of pneumococcal vaccine, Prevnar-20 may be given.  Consider above vaccinations for patients who have undergone splenic embolization and have lost 50% or more of their splenic mass.  Issues with drug absorption and/or swallowing large tablets/capsules:   {GI Effecting Absorption:46897}  Barriers to medication adherence:   {Med adherence barriers:61746}  Reports missing approximately ***% of doses on a weekly basis with current medication adherence techniques  Currently {DOES/ DOES NOT:58033} use a pill  "box  Medications {WERE/WERE NOT:24960} reconciled with the patient during today's visit  Recommended the following to increase medication adherence before and after transplantation:   {Adherence Tools:51297::\"Provided information about pill organizers, Med Minder, phone alarms, calendars, and/or Transplant Medication Plans  \",\"Discussed pharmacy home delivery options\"}  Review of past infections:   MRSA - {Infection Hx (yes/no):20811::\"None\"}  VRE - {Infection Hx (yes/no):62322::\"None\"}  Other multidrug resistant infection - {Infection Hx (yes/no):15224::\"None\"}  Hepatitis B - None (cAb -, sAb +, sAg - as of 11/3/2023)  Hepatitis C - None (Ab - as of 11/3/2023)  TB - None  CMV viremia - {Infection Hx (yes/no):56592::\"None\"}  BK viremia/viuria - {Infection Hx (yes/no):57154::\"None\"}  HIV - {Infection Hx (yes/no):68373::\"None\"}  UTIs - {Infection Hx (yes/no):81327::\"None\"}  Other significant or recurrent infections - {Infection Hx (yes/no):31002::\"None\"}  Vaccinations:   Immunization history was reviewed and {WAS/WAS NOT:9033} updated during today's visit  The importance of vaccinations prior to and after transplant was discussed with the patient/family  Current or history of malignancy of any organ system, treated or untreated, within the past 5 years:   {Infection Hx (yes/no):32060::\"None\"}  Controlled substance use:   {NONE:41995}  Anticoagulation:   {NONE:98761}  Drug-drug interactions likely to affect the post-transplant regimen:   {NONE:24581}  Allergies updated and may require the following changes to transplant protocol medications:   {NONE:61008}  Past participation in clinical studies:   {NONE:27604}  {Contraception Needs:78043}  Patient was educated on an understands that some immunosuppressive medications commonly used in transplant are NOT recommended in pregnancy, including mycophenolate. Mycophenolate use during pregnancy is associated with increased risks of first trimester pregnancy loss and " congenital malformations (for both male and female partners taking mycophenolate). The patient has been counseled on these risks and understands that effective contraceptive use is recommended while on therapy and for a minimum of 6 to 12 weeks following discontinuation of mycophenolate. If the patient plans to have children, they understand they must discuss these plans with their transplant team in advance to consider alternative medications. If the patient or a partner of the patient becomes pregnancy unexpectedly, they understand to notify the transplant team immediately to consider alternative medications and report the pregnancy to the Mycophenolate Pregnancy Registry. ***  Other issues identified:   {Barriers to Transplant:33936}  Patient was provided with a medication education, reviewing the common medications used in kidney transplant. This education included medication indications, dosing, and side effects. Patient will receive a full medication education prior to discharge following transplant. All questions regarding medications {WERE/WERE NOT:07362} answered at this time.    Plan:  1. {Barrier to Transplant and Plan:46351}  2. Recommend patient be up to date on all vaccines prior to transplant.  Tdap booster (x 1 dose)  3. {Transplant Immunosupression Plan:33230}  4. {Additional Transplant Plans:48170}    This evaluation is valid for a minimum of 12 months and this patient will not be re-evaluated by pharmacy unless consulted by another member of the multidisciplinary team.     Merle Stapleton, PharmD   Solid Organ Transplant Pharmacist  2/13/2025

## 2025-02-13 ENCOUNTER — APPOINTMENT (OUTPATIENT)
Facility: MEDICAL CENTER | Age: 60
End: 2025-02-13
Attending: STUDENT IN AN ORGANIZED HEALTH CARE EDUCATION/TRAINING PROGRAM
Payer: COMMERCIAL

## 2025-02-13 ENCOUNTER — APPOINTMENT (OUTPATIENT)
Facility: MEDICAL CENTER | Age: 60
End: 2025-02-13
Payer: COMMERCIAL

## 2025-02-13 DIAGNOSIS — Z23 NEED FOR VACCINATION: ICD-10-CM

## 2025-02-13 DIAGNOSIS — Z01.818 PRE-TRANSPLANT EVALUATION FOR KIDNEY TRANSPLANT: ICD-10-CM

## 2025-02-17 NOTE — PROGRESS NOTES
PHARMACY PRE-TRANSPLANT EVALUATION    Gurdeep Guerra (MRN: [id number])  February 20, 2025     Gurdeep Guerra is a 59 y.o. male undergoing evaluation for transplant.      They were seen in clinic today and a pre-transplant medication education was completed. Patient was not accompanied by family during the education and family is involved in managing the patients care. The patient did not require  services. Post-transplant medications were reviewed, including financial impact and importance of medication compliance. Discussed that a full benefits investigation will be complete at the time of transplant and financial assistance will be investigated if needed. The patient/family had the opportunity to ask questions and endorsed understanding of counseling. They were instructed to contact the transplant team if any additional questions should arise. Will reinforce concepts following transplant.    Allergies   Allergen Reactions    Allopurinol Itching    Hydralazine Hcl Unspecified     Pt states he had a reaction similar to lupus        Past Medical History:   Diagnosis Date    Anesthesia     Hiccups for over 24 hours after a previous sugery.    Aortic stenosis     Chronic gout of multiple sites     Dialysis patient ([facility])     peritoneal daily    Dyspnea on exertion 05/23/2023    Dyspnea on exertion 05/23/2023    Elevated troponin 05/23/2023    Heart burn     1990    Heart murmur     High cholesterol     All always    Hypertension 2009    Liver cirrhosis ([facility]) 12/21/2023    NSTEMI (non-ST elevated myocardial infarction) ([facility]) 05/23/2023    no stents placed    Pain in the chest 05/23/2023    Pain in the chest 05/23/2023    Paroxysmal A-fib ([facility]) 11/11/2023    Peritoneal dialysis status ([facility]) 04/30/2024    PONV (postoperative nausea and vomiting)     Renal disorder 2007    Stage IV    Sleep apnea 2000    Snoring 1990    Splenomegaly 12/21/2023       Social History     Socioeconomic History    Marital  status:      Spouse name: Not on file    Number of children: Not on file    Years of education: Not on file    Highest education level: Master's degree (e.g., MA, MS, Kenn, MEd, MSW, ELIECER)   Occupational History    Not on file   Tobacco Use    Smoking status: Never    Smokeless tobacco: Never   Vaping Use    Vaping status: Never Used   Substance and Sexual Activity    Alcohol use: Never    Drug use: Never    Sexual activity: Yes     Partners: Female     Birth control/protection: Pill   Other Topics Concern    Not on file   Social History Narrative    Not on file     Social Drivers of Health     Financial Resource Strain: Low Risk  (5/29/2024)    Overall Financial Resource Strain (CARDIA)     Difficulty of Paying Living Expenses: Not hard at all   Food Insecurity: No Food Insecurity (5/29/2024)    Hunger Vital Sign     Worried About Running Out of Food in the Last Year: Never true     Ran Out of Food in the Last Year: Never true   Transportation Needs: No Transportation Needs (5/29/2024)    PRAPARE - Transportation     Lack of Transportation (Medical): No     Lack of Transportation (Non-Medical): No   Physical Activity: Insufficiently Active (5/29/2024)    Exercise Vital Sign     Days of Exercise per Week: 7 days     Minutes of Exercise per Session: 20 min   Stress: No Stress Concern Present (5/29/2024)    Citizen of [place] [employer] Occupational [employer] - Occupational Stress Questionnaire     Feeling of Stress : Not at all   Social Connections: Socially Isolated (5/29/2024)    Social Connection and Isolation Panel [NHANES]     Frequency of Communication with Friends and Family: Once a week     Frequency of Social Gatherings with Friends and Family: Once a week     Attends Mormon Services: Never     Active Member of Clubs or Organizations: No     Attends Club or Organization Meetings: Never     Marital Status:    Intimate Partner Violence: Not At Risk (6/6/2024)    Humiliation, Afraid, Rape, and Kick  questionnaire     Fear of Current or Ex-Partner: No     Emotionally Abused: No     Physically Abused: No     Sexually Abused: No   Housing Stability: Unknown (5/29/2024)    Housing Stability Vital Sign     Unable to Pay for Housing in the Last Year: No     Number of Places Lived in the Last Year: Not on file     Unstable Housing in the Last Year: No        Immunization History   Administered Date(s) Administered    COVID-19, mRNA, LNP-S, PF, yanick-sucrose, 30 mcg/0.3 mL 10/06/2024    Covid-19 Mrna (Spikevax) Moderna 12+ Years 09/30/2023, 10/01/2023    Hepatitis B Vaccine, CpG Adjuvanted (Heplisav-B) 06/20/2023, 06/21/2023, 08/17/2023, 08/18/2023    INFLUENZA TIV (IM) 03/17/2020    Influenza Vaccine Quad Inj (Pf) 09/28/2020, 09/29/2020, 11/19/2021, 11/20/2021, 10/26/2022, 10/27/2022, 09/15/2023, 09/16/2023    Influenza, recombinant, trivalent, PF 10/06/2024    MODERNA BIVALENT BOOSTER SARS-COV-2 VACCINE (6+) 10/26/2022, 10/27/2022    MODERNA SARS-COV-2 VACCINE (12+) 03/18/2021, 04/15/2021, 04/14/2022, 04/15/2022    Pneumococcal Conjugate Vaccine (PCV20) 08/10/2023, 08/11/2023    Pneumococcal Conjugate Vaccine (Prevnar/PCV-13) 06/25/2020, 06/26/2020    Zoster Vaccine Recombinant (RZV) (SHINGRIX) 04/03/2020, 04/04/2020, 06/12/2020, 06/13/2020       Current Outpatient Medications   Medication Sig Refill Last Dispense    aspirin 81 MG EC tablet Take 81 mg by mouth every day.  Unknown (patient-reported)    calcitRIOL (ROCALTROL) 0.25 MCG Cap Take 1 Capsule by mouth every day. 11 Unknown (no pharmacy)    carvedilol (COREG) 3.125 MG Tab Take 2 Tablets by mouth 2 times a day with meals. 11 Unknown (outside pharmacy)    esomeprazole (NEXIUM) 20 MG capsule Take 2 Capsules by mouth every morning before breakfast. 1 Unknown (outside pharmacy)    gabapentin (NEURONTIN) 100 MG Cap Take 1 Capsule by mouth 3 times a day. 1 Unknown (outside pharmacy)    rosuvastatin (CRESTOR) 20 MG Tab Take 1 Tablet by mouth every evening. 11 Unknown  (outside pharmacy)    sevelamer carbonate (RENVELA) 800 MG Tab tablet Take 3,200 mg by mouth 4 times a day.  Unknown (patient-reported)    warfarin (COUMADIN) 2.5 MG Tab Take 1-2 Tablets by mouth every day. Take as directed by anticoag clinic. 1 Unknown (outside pharmacy)        Assessment:  Use of herbal products or dietary supplements or over-the-counter medications:   Reports use of probiotic, which will need to be discontinued following transplant  Use of marijuana or CBD products:   None  Discussed avoidance post-transplant due to concerns for potential interactions with immunosuppressant medications and increased risk of infection   Review of social history:   Non-smoker without history of smoking in the past  Reports rare use socially in the past, none currently  Hx of anabolic steroids x 30 years and he was a    Past immunosuppression:   Reports previous use of mycophenolate when patient was being worked up for lupus, reports about 3 months of use without issue. Therapy discontinued when lupus was ruled out and hydralazine induced nephritis was suspected.   History of transplant:   None  History of splenectomy:   None  Issues with drug absorption and/or swallowing large tablets/capsules:   None  Barriers to medication adherence:   Patient reports using his own system for taking medications and reports frequent missed doses for some medications.  Endorses understanding of important of adherence to transplant-related medications.  Reports missing approximately 20-30% of doses on a weekly basis with current medication adherence techniques  Currently does not use a pill box  Medications were reconciled with the patient during today's visit  Recommended the following to increase medication adherence before and after transplantation:   Provided information about pill organizers, Med Minder, phone alarms, calendars, and/or Transplant Medication Plans   and Discussed pharmacy home delivery  options  Review of past infections:   MRSA - None  VRE - None  Other multidrug resistant infection - None  Hepatitis B - None (cAb -, sAb +, sAg - as of 11/3/2023)  Hepatitis C - None (Ab - as of 11/3/2023)  TB - None  CMV viremia - None  BK viremia/viuria - None  HIV - None  UTIs - None  Other significant or recurrent infections - Remote history of SSTI in hand from injury and an infected wisdom tooth that may have caused OM of mandible, both as a teenager. Was on antibiotics for several months for OM.  Vaccinations:   Immunization history was reviewed and was updated during today's visit  The importance of vaccinations prior to and after transplant was discussed with the patient/family  Current or history of malignancy of any organ system, treated or untreated, within the past 5 years:   None  Controlled substance use:   None  Anticoagulation:   Patient on chronic anticoagulation therapy (warfarin) for history of mechanical AVR. Managed by [facility] at [facility]. INR goal 1.5-2.0. Confirmed next follow up appointment scheduled for 3/12/2025.  Drug-drug interactions likely to affect the post-transplant regimen:   None  Allergies updated and may require the following changes to transplant protocol medications:   None  Past participation in clinical studies:   None  Patient is a cis-gender male or transgender female (MTF) and is NOT of child-bearing potential.   Patient was educated on an understands that some immunosuppressive medications commonly used in transplant are NOT recommended in pregnancy, including mycophenolate. Mycophenolate use during pregnancy is associated with increased risks of first trimester pregnancy loss and congenital malformations (for both male and female partners taking mycophenolate). The patient has been counseled on these risks and understands that effective contraceptive use is recommended while on therapy and for a minimum of 6 to 12 weeks following discontinuation of mycophenolate.  If the patient plans to have children, they understand they must discuss these plans with their transplant team in advance to consider alternative medications. If the patient or a partner of the patient becomes pregnancy unexpectedly, they understand to notify the transplant team immediately to consider alternative medications and report the pregnancy to the Mycophenolate Pregnancy Registry.   Other issues identified:   None  Patient was provided with a medication education, reviewing the common medications used in kidney transplant. This education included medication indications, dosing, and side effects. Patient will receive a full medication education prior to discharge following transplant. All questions regarding medications were answered at this time.    Plan:  1. No pharmacological contraindications to transplant identified. However, risk/benefit of surgery will be discussed with multidisciplinary team including anticoagulation planning with transplant surgeon. Patient on chronic anticoagulation (warfarin) and is likely to need bridging therapy if warfarin held or reversed for transplant surgery.   2. Recommend patient be up to date on all vaccines prior to transplant.  Tdap booster (x 1 dose)  3. I anticipate this patient will receive per protocol immunosuppression post-transplant.   4. Plan will be reviewed and additional changes will be made prior to transplant as needed    This evaluation is valid for a minimum of 12 months and this patient will not be re-evaluated by pharmacy unless consulted by another member of the multidisciplinary team.     Merle Stapleton, PharmD   Solid Organ Transplant Pharmacist  2/20/2025

## 2025-02-18 ENCOUNTER — APPOINTMENT (OUTPATIENT)
Dept: VASCULAR LAB | Facility: MEDICAL CENTER | Age: 60
End: 2025-02-18
Payer: COMMERCIAL

## 2025-02-18 ENCOUNTER — TELEPHONE (OUTPATIENT)
Dept: VASCULAR LAB | Facility: MEDICAL CENTER | Age: 60
End: 2025-02-18
Payer: COMMERCIAL

## 2025-02-18 NOTE — TELEPHONE ENCOUNTER
Called and left message to reschedule patient CANCELLED appt with Vascular Medicine.  Please schedule next available with Dr Michael Bloch.    Lo Lazo, Med Ass't  [facility]  Ph. [phone]  Fx. [phone]

## 2025-02-20 ENCOUNTER — OFFICE VISIT (OUTPATIENT)
Facility: MEDICAL CENTER | Age: 60
End: 2025-02-20
Payer: COMMERCIAL

## 2025-02-20 ENCOUNTER — NON-PROVIDER VISIT (OUTPATIENT)
Facility: MEDICAL CENTER | Age: 60
End: 2025-02-20
Payer: COMMERCIAL

## 2025-02-20 VITALS
HEIGHT: 67 IN | WEIGHT: 185.4 LBS | SYSTOLIC BLOOD PRESSURE: 118 MMHG | BODY MASS INDEX: 29.1 KG/M2 | DIASTOLIC BLOOD PRESSURE: 60 MMHG | HEART RATE: 39 BPM | OXYGEN SATURATION: 98 % | RESPIRATION RATE: 18 BRPM

## 2025-02-20 VITALS
RESPIRATION RATE: 18 BRPM | SYSTOLIC BLOOD PRESSURE: 118 MMHG | OXYGEN SATURATION: 98 % | HEIGHT: 67 IN | DIASTOLIC BLOOD PRESSURE: 60 MMHG | HEART RATE: 39 BPM | BODY MASS INDEX: 29.1 KG/M2 | WEIGHT: 185.41 LBS

## 2025-02-20 DIAGNOSIS — Z01.818 PRE-TRANSPLANT EVALUATION FOR KIDNEY TRANSPLANT: ICD-10-CM

## 2025-02-20 DIAGNOSIS — Z95.2 HISTORY OF MECHANICAL AORTIC VALVE REPLACEMENT: ICD-10-CM

## 2025-02-20 DIAGNOSIS — Z23 NEED FOR VACCINATION: ICD-10-CM

## 2025-02-20 DIAGNOSIS — I48.91 ATRIAL FIBRILLATION, UNSPECIFIED TYPE (HCC): ICD-10-CM

## 2025-02-20 DIAGNOSIS — I15.0 RENOVASCULAR HYPERTENSION: ICD-10-CM

## 2025-02-20 DIAGNOSIS — N18.6 ESRD (END STAGE RENAL DISEASE) (HCC): ICD-10-CM

## 2025-02-20 DIAGNOSIS — G47.33 OBSTRUCTIVE SLEEP APNEA SYNDROME: ICD-10-CM

## 2025-02-20 DIAGNOSIS — N05.1 FSGS (FOCAL SEGMENTAL GLOMERULOSCLEROSIS): ICD-10-CM

## 2025-02-20 DIAGNOSIS — M47.26 OSTEOARTHRITIS OF SPINE WITH RADICULOPATHY, LUMBAR REGION: ICD-10-CM

## 2025-02-20 DIAGNOSIS — I51.9 CARDIAC DISEASE: ICD-10-CM

## 2025-02-20 DIAGNOSIS — I71.9 AORTIC ANEURYSM, UNSPECIFIED PORTION OF AORTA, UNSPECIFIED WHETHER RUPTURED (HCC): ICD-10-CM

## 2025-02-20 PROCEDURE — 99211 OFF/OP EST MAY X REQ PHY/QHP: CPT | Performed by: STUDENT IN AN ORGANIZED HEALTH CARE EDUCATION/TRAINING PROGRAM

## 2025-02-20 PROCEDURE — 99211 OFF/OP EST MAY X REQ PHY/QHP: CPT | Mod: MCR

## 2025-02-20 RX ORDER — LACTOBACILLUS ACIDOPHILUS 500MM CELL
1 CAPSULE ORAL DAILY
COMMUNITY

## 2025-02-20 ASSESSMENT — ENCOUNTER SYMPTOMS
CHILLS: 0
NEUROLOGICAL NEGATIVE: 1
HEADACHES: 0
NAUSEA: 0
PSYCHIATRIC NEGATIVE: 1
DIZZINESS: 0
CARDIOVASCULAR NEGATIVE: 1
DEPRESSION: 0
FEVER: 0
DOUBLE VISION: 0
EYES NEGATIVE: 1
RESPIRATORY NEGATIVE: 1
VOMITING: 0
PALPITATIONS: 0
GASTROINTESTINAL NEGATIVE: 1
BLURRED VISION: 0
COUGH: 0
MYALGIAS: 0
MUSCULOSKELETAL NEGATIVE: 1
DIARRHEA: 0
CONSTITUTIONAL NEGATIVE: 1
NECK PAIN: 0
HEMOPTYSIS: 0

## 2025-02-20 ASSESSMENT — FIBROSIS 4 INDEX
FIB4 SCORE: 1.17
FIB4 SCORE: 1.17

## 2025-02-20 NOTE — LETTER
[facility]   [address] , [facility] [address]  [place] [address]  Phone: [phone]  Fax:                Gurdeep Guerra  1965    Encounter Date: 2025    Dakota Haynes M.D.          PROGRESS NOTE:  Kidney Transplant Clinic Note - Recipient Evaluation Form    2025     Referring Provider: Gene Bernal M.D.     Primary Care Provider: Drew T. Blumberg, D.O.    Reason for Consultation: End-Stage Kidney Disease/Chronic Kidney Disease.    History of Present Illness:  Gurdeep Guerra is a 59 y.o. male who presents today for kidney transplant evaluation. He is accompanied by himself.   I personally saw and examined this patient. My history and physical is based on my own examination and interaction with the patient and those present, on available medical records, as well as on the reports and observations of other members of the team.       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    HENT: Negative.     Eyes: Negative.    Respiratory: Negative.     Cardiovascular: Negative.    Gastrointestinal: Negative.    Genitourinary: Negative.    Musculoskeletal: Negative.    Skin: Negative.    Neurological: Negative.    Endo/Heme/Allergies: Negative.    Psychiatric/Behavioral: Negative.         Past Medical History:  Past Medical History:    Diagnosis Date    Anesthesia     Hiccups for over 24 hours after a previous sugery.    Aortic stenosis     Chronic gout of multiple sites     Dialysis patient ([facility])     peritoneal daily    Dyspnea on exertion 05/23/2023    Dyspnea on exertion 05/23/2023    Elevated troponin 05/23/2023    Heart burn     1990    Heart murmur     High cholesterol     All always    Hypertension 2009    Liver cirrhosis ([facility]) 12/21/2023    NSTEMI (non-ST elevated myocardial infarction) ([facility]) 05/23/2023    no stents placed    Pain in the chest 05/23/2023    Pain in the chest 05/23/2023    Paroxysmal A-fib ([facility]) 11/11/2023    Peritoneal dialysis status ([facility]) 04/30/2024    PONV (postoperative nausea and vomiting)     Renal disorder 2007    Stage IV    Sleep apnea 2000    Snoring 1990    Splenomegaly 12/21/2023       Past Surgical History:  Past Surgical History:   Procedure Laterality Date    NH NJX AA&/STRD TFRML EPI LUMBAR/SACRAL 1 LEVEL Bilateral 6/6/2024    Procedure: Bilateral L5 Transforaminal Epidural Steroid Injection under Fluoroscopy;  Surgeon: Benito Herrera D.O.;  Location: SURGERY [facility];  Service: Pain Management    AORTIC VALVE REPLACEMENT  11/3/2023    Procedure: AORTIC VALVE REPLACEMENT, ASCENDING AORTIC ANEURYSM REPAIR, TRANSESOPHAGEAL ECHOCARDIOGRAM;  Surgeon: Osmel Blanca M.D.;  Location: SURGERY [facility];  Service: Cardiothoracic    AORTIC ASCENDING DISSECTION  11/3/2023    Procedure: REPAIR, ANEURYSM OR DISSECTION, AORTA, ASCENDING;  Surgeon: Osmel Blanca M.D.;  Location: SURGERY [facility];  Service: Cardiothoracic    ECHOCARDIOGRAM, TRANSESOPHAGEAL, INTRAOPERATIVE  11/3/2023    Procedure: ECHOCARDIOGRAM, TRANSESOPHAGEAL, INTRAOPERATIVE;  Surgeon: Osmel Blanca M.D.;  Location: SURGERY [facility];  Service: Cardiothoracic    RESTORATE HEMOSTAS  11/3/2023    Procedure: RESTORATION OF HEMOSTATIS;  Surgeon: Osmel Blanca M.D.;  Location: SURGERY [facility];  Service: Cardiothoracic    STERNOTOMY   11/3/2023    Procedure: MEDIASTINAL EXPLORATION;  Surgeon: Osmel Blanca M.D.;  Location: SURGERY [facility];  Service: Cardiothoracic    CATH PLACEMENT CAPD Right 9/6/2023    Procedure: LAPAROSCOPIC INSERTION OF PERITONEAL DIALYSIS CATHETER;  Surgeon: Dontrell Sales M.D.;  Location: SURGERY [facility];  Service: Vascular    TX INJ LUMBAR/SACRAL,W/ IMAGING Left 8/24/2023    Procedure: LUMBAR EPIDURAL STEROID INJECTION, LEFT L5-S1 INTERLAMINAR UNDER FLUOROSCOPY;  Surgeon: Benito Herrera D.O.;  Location: SURGERY [facility];  Service: Orthopedics    CATH PLACEMENT CAPD N/A 5/15/2023    Procedure: LAPAROSCOPIC PLACEMENT OF PERITONEAL DIALYSIS CATHERTER;  Surgeon: Shikha Alexander M.D.;  Location: SURGERY SAME DAY [facility];  Service: General    UMBILICAL HERNIA REPAIR  2012    4 separate surgeries between [id number]    OTHER  2007    Kidney biopsy, can't recall laterality,    HIP ARTHROSCOPY Bilateral 2002    TONSILLECTOMY N/A 1987    HAND SURGERY Right 1985    Hand and wrist    SHOULDER ARTHROSCOPY      Can't recall date       Current Outpatient Medications   Medication Sig Dispense Refill    sevelamer carbonate (RENVELA) 800 MG Tab tablet Take 3,200 mg by mouth 4 times a day. Patient reports taking 1-2 with meals depending on size of meal      warfarin (COUMADIN) 2.5 MG Tab Take 1-2 Tablets by mouth every day. Take as directed by anticoag clinic. 200 Tablet 1    carvedilol (COREG) 3.125 MG Tab Take 2 Tablets by mouth 2 times a day with meals. 60 Tablet 11    rosuvastatin (CRESTOR) 20 MG Tab Take 1 Tablet by mouth every evening. 30 Tablet 11    calcitRIOL (ROCALTROL) 0.25 MCG Cap Take 1 Capsule by mouth every day. 30 Capsule 11    esomeprazole (NEXIUM) 20 MG capsule Take 2 Capsules by mouth every morning before breakfast. 180 Capsule 1    gabapentin (NEURONTIN) 100 MG Cap Take 1 Capsule by mouth 3 times a day. (Patient taking differently: Take 200 mg by mouth at bedtime.) 90 Capsule 1    aspirin 81 MG EC tablet  Take 81 mg by mouth every day.       No current facility-administered medications for this visit.       Allergies:   Allergies   Allergen Reactions    Allopurinol Itching    Hydralazine Hcl Unspecified     Pt states he had a reaction similar to lupus       Social History:   Social History     Socioeconomic History    Marital status:      Spouse name: Not on file    Number of children: Not on file    Years of education: Not on file    Highest education level: Master's degree (e.g., MA, MS, Kenn, MEd, MSW, ELIECER)   Occupational History    Not on file   Tobacco Use    Smoking status: Never    Smokeless tobacco: Never   Vaping Use    Vaping status: Never Used   Substance and Sexual Activity    Alcohol use: Never    Drug use: Never    Sexual activity: Yes     Partners: Female     Birth control/protection: Pill   Other Topics Concern    Not on file   Social History Narrative    Not on file     Social Drivers of Health     Financial Resource Strain: Low Risk  (5/29/2024)    Overall Financial Resource Strain (CARDIA)     Difficulty of Paying Living Expenses: Not hard at all   Food Insecurity: No Food Insecurity (5/29/2024)    Hunger Vital Sign     Worried About Running Out of Food in the Last Year: Never true     Ran Out of Food in the Last Year: Never true   Transportation Needs: No Transportation Needs (5/29/2024)    PRAPARE - Transportation     Lack of Transportation (Medical): No     Lack of Transportation (Non-Medical): No   Physical Activity: Insufficiently Active (5/29/2024)    Exercise Vital Sign     Days of Exercise per Week: 7 days     Minutes of Exercise per Session: 20 min   Stress: No Stress Concern Present (5/29/2024)    [employer] [place] [employer] - Occupational Stress Questionnaire     Feeling of Stress : Not at all   Social Connections: Socially Isolated (5/29/2024)    Social Connection and Isolation Panel [NHANES]     Frequency of Communication with Friends and Family: Once a week      "Frequency of Social Gatherings with Friends and Family: Once a week     Attends Hindu Services: Never     Active Member of Clubs or Organizations: No     Attends Club or Organization Meetings: Never     Marital Status:    Intimate Partner Violence: Not At Risk (6/6/2024)    Humiliation, Afraid, Rape, and Kick questionnaire     Fear of Current or Ex-Partner: No     Emotionally Abused: No     Physically Abused: No     Sexually Abused: No   Housing Stability: Unknown (5/29/2024)    Housing Stability Vital Sign     Unable to Pay for Housing in the Last Year: No     Number of Places Lived in the Last Year: Not on file     Unstable Housing in the Last Year: No       Family History:  Family History   Problem Relation Age of Onset    Hyperlipidemia Mother     Heart Disease Mother     Hypertension Father        Physical Examination:  /60 (BP Location: Left arm, Patient Position: Sitting, BP Cuff Size: Adult)   Pulse (!) 39   Resp 18   Ht 1.702 m (5' 7\")   Wt 84.1 kg (185 lb 6.4 oz)   SpO2 98%    Body mass index is 29.04 kg/m².  Physical Exam  Constitutional:       General: He is not in acute distress.     Appearance: Normal appearance. He is normal weight. He is not ill-appearing, toxic-appearing or diaphoretic.   HENT:      Nose: Nose normal. No congestion or rhinorrhea.      Mouth/Throat:      Pharynx: Oropharynx is clear.   Eyes:      General: No scleral icterus.        Right eye: No discharge.         Left eye: No discharge.      Extraocular Movements: Extraocular movements intact.      Conjunctiva/sclera: Conjunctivae normal.   Neck:      Vascular: No carotid bruit.   Cardiovascular:      Rate and Rhythm: Normal rate and regular rhythm.      Pulses: Normal pulses.      Heart sounds: Murmur heard.      No friction rub. No gallop.      Comments: Systolic and diastolic murmurs  Palpable DP and PT pulses  Pulmonary:      Effort: Pulmonary effort is normal. No respiratory distress.      Breath sounds: " Normal breath sounds. No wheezing, rhonchi or rales.   Chest:      Chest wall: No tenderness.   Abdominal:      General: Abdomen is flat. There is no distension.      Palpations: Abdomen is soft. There is no mass.      Tenderness: There is no abdominal tenderness. There is no right CVA tenderness, left CVA tenderness, guarding or rebound.      Hernia: No hernia is present.   Musculoskeletal:         General: No swelling, tenderness, deformity or signs of injury. Normal range of motion.      Cervical back: Normal range of motion and neck supple. No rigidity or tenderness.      Right lower leg: No edema.      Left lower leg: No edema.   Lymphadenopathy:      Cervical: No cervical adenopathy.   Skin:     General: Skin is warm.      Coloration: Skin is not jaundiced or pale.      Findings: No bruising, erythema, lesion or rash.   Neurological:      General: No focal deficit present.      Mental Status: He is alert and oriented to person, place, and time.      Sensory: No sensory deficit.      Motor: No weakness.      Coordination: Coordination normal.      Gait: Gait normal.   Psychiatric:         Mood and Affect: Mood normal.         Behavior: Behavior normal.         Thought Content: Thought content normal.         Judgment: Judgment normal.         Labs:    CMP:   Lab Results   Component Value Date/Time    SODIUM 133 (L) 12/25/2023 04:04 AM    POTASSIUM 5.1 12/25/2023 04:04 AM    CHLORIDE 101 12/25/2023 04:04 AM    CO2 23 12/25/2023 04:04 AM    ANION 9.0 12/25/2023 04:04 AM    GLUCOSE 80 12/25/2023 04:04 AM    BUN 42 (H) 12/25/2023 04:04 AM    CREATININE 8.22 (HH) 12/25/2023 04:04 AM    CALCIUM 8.0 (L) 12/25/2023 04:04 AM    ASTSGOT 25 12/25/2023 04:04 AM    ALTSGPT 21 12/25/2023 04:04 AM    TBILIRUBIN 0.2 12/25/2023 04:04 AM    ALBUMIN 2.1 (L) 12/25/2023 04:04 AM    TOTPROTEIN 4.6 (L) 12/25/2023 04:04 AM    GLOBULIN 2.5 12/25/2023 04:04 AM    AGRATIO 0.8 12/25/2023 04:04 AM       PT/INR:   Lab Results   Component  Value Date/Time    PROTHROMBTM 28.5 (H) 2023 05:57 AM    INR 1.50 (A) 2024 12:00 AM       CBC:   Lab Results   Component Value Date/Time    WBC 5.8 2023 04:04 AM    RBC 2.94 (L) 2023 04:04 AM    HEMOGLOBIN 8.5 (L) 2023 04:04 AM    MCV 95.6 2023 04:04 AM    MCH 28.9 2023 04:04 AM    MCHC 30.2 (L) 2023 04:04 AM    RDW 52.4 (H) 2023 04:04 AM    MPV 8.7 (L) 2023 04:04 AM       Hgb A1C:   Lab Results   Component Value Date/Time    HBA1C 5.0 10/31/2023 0925    AVGLUC 97 10/31/2023 0925       Lipids:   Lab Results   Component Value Date/Time    CHOLSTRLTOT 171 2023 11:08 AM    TRIGLYCERIDE 56 2023 11:08 AM    HDL 40 2023 11:08 AM     (H) 2023 11:08 AM       Assessment: Gurdeep Guerra is a 59 y.o. male who  presents today for kidney transplant evaluation. Based on the above findings I believe kidney transplantation should be considered as a treatment option for this patient’s end stage renal disease. We should proceed with our protocol evaluation for kidney transplantation.     Plan:  We have discussed with the patient at length the risks and benefits of transplantation. Patient is aware that transplantation is a process that requires a lifelong commitment and that it is a personal choice to proceed with it rather than to remain with alternative treatments such as dialysis. Patient understands that the two major benefits of transplantation include prolonged survival and improved quality of life. I have mentioned to the patient that organs from live donors in general do better than those of  donors. We will attempt to place the kidney on the right side but if that is not possible we may need to go on the contralateral side.     Patient is aware that immunosuppression medications need to be taken in order to preserve the kidney for as long as the organ is functioning. Patient is aware that immunosuppression is required in  addition to baseline medications and in addition to other medications such as antimicrobials. The immunosuppression levels will probably be decreased with time. Patient is aware that immunosuppression levels are usually at their highest during the first 6 months, which is coincident with the period of the greatest incidence of rejection and complications. Patient is aware as well that immunosuppression side effects include but are not limited to the onset of worsening of diabetes, allograft damage, increased incidence of all types of infections, increased incidence of all types of malignancies but most prominently of the skin and hematological system, neurological illness and other pathologies. I have instructed the patient to stay away from children or adults after they receive live/attenuated pathogen vaccinations and to check with us prior to receiving any type of vaccination.     Patient is aware that the benefits of transplantation may be diminished or totally canceled by potential complications associated with the surgery itself and/or with the transplant process. Patient is aware that the treatment of complications may require, among other interventions, the need for reoperations, the need for placement of internal and/or external catheters and/or drains, the need for radiological interventional procedures, the need for transfusion of blood products, or observation. Patient is aware that such potential complications include but are not limited to:  Bleeding  Infections with bacteria, viruses, fungi, and parasites  Leaks of urine  Ureteral and vascular strictures  Thrombosis of the organ associated with twisting of the blood vessels, rejection, occlusion from events such as intimal flaps and unintentional mispositioning of sutures during the surgery, low blood pressure, or other events, both known and/or unknown  Cardiac events  Injury to blood vessels associated with the transplant surgery that may lead to  attempts to revascularize an extremity  Injury to nerves, either temporary or long-lasting  Fluid collections requiring external and/or internal drainage  Delayed function of the kidney, requiring dialysis for a period ranging from days to months, or in some cases nonfunction of the kidney    Patient may agree or refuse to receive transfusion of recommended blood products if necessary. Patient is aware that such transfusion of blood products, as well as the transplant organ itself, may be associated with transmission of infections (such as hepatitis B, hepatitis C, HIF, COVID, or West Nile virus), malignancies, and/or other pathologies.    Patient is aware that the incidence of malignancies is nonfunctioning kidneys is higher than in those with normal function. Patient is also aware that immunosuppression will make this incidence even higher. Patient is encouraged to have screening imaging exams on a yearly or biyearly basis in order to detect such tumors at an early stage.     Patient is aware that it is possible to have children after transplantation (for those in childbearing age). We have instructed the patient to wait until there is stable kidney function and to notify us in advance so we can discontinue teratogenic medications, including but not limited to mycophenolic compounds.     Patient is aware that the average survival of transplanted kidneys is approximately 13 years and that a second kidney transplant may be required, especially in younger patients.     Furthermore, patient is aware of the following facts:  The underlying factors that led to failure of the native kidneys will also affect the transplanted kidney.  Patient is instructed to ambulate frequently, not to lie immobile for prolonged periods, and not to cross legs for prolonged periods in order to prevent the formulation of deep venous thromboses (a life-threatening condition).   Anticoagulation associated with atrial fibrillation: I have  explained to the patient that we will likely reverse the anticoagulation prior to performing the transplant in order to decrease the incidence of bleeding complication. Once hemostasis is achieved, we will re-institute the anticoagulation. Patient is aware that this is associated with an increased incidence of potential complications, such as bleedings, transfusions of blood products, and reoperations. I have explained to the patient that although the risk of transfusions, bleeding, and reoperations is significant, there is also a risk of potential strokes associated with reversal of the anticoagulation.  Recurrence of FSGS: Patient understands that in instances of primary FSGS the incidence of recurrence according to some authors may be as high as 100%, potentially leading to allograft (transplanted organ) failure.    Specific Issues:  As previously stated, we should proceed with our routine protocol transplant evaluation. In addition, based upon my evaluation, I believe the following should be addressed prior to transplantation:  Labs and studies, as per transplant protocol, ordered at the time of evaluation  Cardiac clearance  CT of abdomen and pelvis with NO IV contrast    I have answered all of the patient’s questions, as well as all questions of those present with the patient. Patient understands we can offer no guarantees and agrees to proceed. Once all of this is completed, the patient should be presented to the Multidisciplinary Transplant Selection Committee for a decision on whether to proceed with listing and transplantation.     Data:  I have reviewed/recommended the following tests:  CBC. Hematocrit  CMP. Creatinine. GFR  CMV  EBV  ABO blood type  CT scan of abdomen (requested)  CT scan of pelvis (requested)  Cardiac Stress Test (requested)  Cardiac echocardiogram (requested)  Panel Specific Antigen testing (requested)  Hepatitis A testing (requested)  Hepatitis B testing (requested)  Hepatitis C  testing (requested)  I communicated my evaluation with referring Nephrologist (via letter).  I discussed case and testing with our transplant Coordinator    Treatment Plan:  Patient with end stage kidney disease  Referred for kidney transplantation based on GFR less than 20 as per referring physician’s testing.  GFR less than 20 qualifies patient for kidney transplantation.  I recommend kidney transplantation surgery as treatment based on my encounter with the patient and available testing reviewed. Benefits (prolonged survival and enhanced quality of life) and risks (major surgical intervention that will require at least 3 days of hospitalization and placement of vascular and urinary catheters, increased incidence of infections and malignancies associated with immunosuppression, infections, urine leaks, bleeding, among many more) have been discussed by me as well as by other team members.  Patient is aware that immunosuppression drug treatment will be required for life or for as long as the transplant is functioning. Immunosuppression management will require intensive monitoring of levels and side effects, initially on a daily level as an inpatient and at least weekly as an outpatient.  Will proceed with our protocol evaluation.  Patient will be re-evaluated on a yearly basis while on the waiting list. Findings identified at the time of such re-evaluation may potentially lead to removal from the waiting list.   Patient will be presented to our selection committee for final approval.    Risk Factors:  Risk factors include:  Diabetes  Hypertension  Peripheral Vascular Disease    EMILIA Alexander M.D.  [address] [facility] [address]  Via Fax: [phone]    Drew T. Blumberg, D.O.  [address]  [facility] [address]  Via In Basket

## 2025-02-20 NOTE — PROGRESS NOTES
"Gurdeep Guerra has completed the \"Introduction to Kidney Transplant\" education presentation.    The presentation, led by the Transplant Coordinator, provided an overview of the kidney transplant process, including:    Evaluation steps  Consent requirements  Key educational components:  The transplant evaluation timeline  Criteria for listing and maintaining eligibility  Risks and benefits of transplant surgery  Post-transplant care, including immunosuppressive medications and follow-up requirements    Tracie German R.N.  02/20/25  Transplant Coordinator  [facility] [place]  "

## 2025-02-20 NOTE — PROGRESS NOTES
"Kidney Transplant RN Evaluation    Date of Evaluation: 2025     Patient Name: Gurdeep Guerra  MRN: [id number]  : 1965  Age: 59 y.o.    Referring Nephrologist: Dr. Gene Bernal   Dialysis Facility: [facility]  Dialysis Modality: ICHD  Primary Cause of ESRD (if applicable):  Glomerulonephritis, FSGS  Chronic Dialysis Start Date:  2023    The patient completed the “Introduction to Kidney Transplant” education session and has agreed to proceed with evaluation.    Vitals     Ht Readings from Last 1 Encounters:   25 1.702 m (5' 7\")      Wt Readings from Last 1 Encounters:   25 84.1 kg (185 lb 6.5 oz)      There is no height or weight on file to calculate BMI.     BP Readings from Last 1 Encounters:   25 118/60      Pulse Readings from Last 1 Encounters:   25 (!) 39     Clinical Information     Allergies   Allergen Reactions    Allopurinol Itching    Hydralazine Hcl Unspecified     Pt states he had a reaction similar to lupus     Current Outpatient Medications:     sevelamer carbonate, 3,200 mg, Oral, 4X/DAY    warfarin, 2.5-5 mg, Oral, DAILY    carvedilol, 6.25 mg, Oral, BID WITH MEALS    rosuvastatin, 20 mg, Oral, Q EVENING    calcitRIOL, 0.25 mcg, Oral, DAILY    esomeprazole, 40 mg, Oral, QAM AC    gabapentin, 100 mg, Oral, TID (Patient taking differently: 200 mg, Oral, EVERY BEDTIME)    aspirin, 81 mg, Oral, DAILY     Social History     Tobacco Use    Smoking status: Never    Smokeless tobacco: Never   Vaping Use    Vaping status: Never Used   Substance Use Topics    Alcohol use: Never    Drug use: Never   If a history of smoking is present, document pack-year history (# packs/day × years smoked): N/A    Family History   Problem Relation Age of Onset    Hyperlipidemia Mother     Heart Disease Mother     Hypertension Father        Past Surgical History:   Procedure Laterality Date    AR NJX AA&/STRD TFRML EPI LUMBAR/SACRAL 1 LEVEL Bilateral 2024    " Procedure: Bilateral L5 Transforaminal Epidural Steroid Injection under Fluoroscopy;  Surgeon: Benito Herrera D.O.;  Location: SURGERY [facility];  Service: Pain Management    AORTIC VALVE REPLACEMENT  11/3/2023    Procedure: AORTIC VALVE REPLACEMENT, ASCENDING AORTIC ANEURYSM REPAIR, TRANSESOPHAGEAL ECHOCARDIOGRAM;  Surgeon: Osmel Blanca M.D.;  Location: SURGERY [facility];  Service: Cardiothoracic    AORTIC ASCENDING DISSECTION  11/3/2023    Procedure: REPAIR, ANEURYSM OR DISSECTION, AORTA, ASCENDING;  Surgeon: Osmel Blanca M.D.;  Location: SURGERY [facility];  Service: Cardiothoracic    ECHOCARDIOGRAM, TRANSESOPHAGEAL, INTRAOPERATIVE  11/3/2023    Procedure: ECHOCARDIOGRAM, TRANSESOPHAGEAL, INTRAOPERATIVE;  Surgeon: Osmel Blanca M.D.;  Location: SURGERY [facility];  Service: Cardiothoracic    RESTORATE HEMOSTAS  11/3/2023    Procedure: RESTORATION OF HEMOSTATIS;  Surgeon: Osmel Blanca M.D.;  Location: SURGERY [facility];  Service: Cardiothoracic    STERNOTOMY  11/3/2023    Procedure: MEDIASTINAL EXPLORATION;  Surgeon: Osmel Blanca M.D.;  Location: SURGERY [facility];  Service: Cardiothoracic    CATH PLACEMENT CAPD Right 9/6/2023    Procedure: LAPAROSCOPIC INSERTION OF PERITONEAL DIALYSIS CATHETER;  Surgeon: Dontrell Slaes M.D.;  Location: SURGERY [facility];  Service: Vascular    SC INJ LUMBAR/SACRAL,W/ IMAGING Left 8/24/2023    Procedure: LUMBAR EPIDURAL STEROID INJECTION, LEFT L5-S1 INTERLAMINAR UNDER FLUOROSCOPY;  Surgeon: Benito Herrera D.O.;  Location: SURGERY [facility];  Service: Orthopedics    CATH PLACEMENT CAPD N/A 5/15/2023    Procedure: LAPAROSCOPIC PLACEMENT OF PERITONEAL DIALYSIS CATHERTER;  Surgeon: Shikha Alexander M.D.;  Location: SURGERY SAME DAY [facility];  Service: General    UMBILICAL HERNIA REPAIR  2012    4 separate surgeries between [id number]    OTHER  2007    Kidney biopsy, can't recall laterality,    HIP ARTHROSCOPY Bilateral 2002    TONSILLECTOMY N/A 1987    HAND  SURGERY Right 1985    Hand and wrist    SHOULDER ARTHROSCOPY      Can't recall date      Active Ambulatory Problems     Diagnosis Date Noted    Hypertension 06/26/2020    Mixed hyperlipidemia 06/26/2020    Gastroesophageal reflux disease 06/26/2020    Heart murmur 04/28/2022    Chronic gout of multiple sites 06/09/2022    Sialolithiasis 06/09/2022    Aortic stenosis 07/01/2021    FSGS (focal segmental glomerulosclerosis) 10/11/2021    Skin lesions 03/03/2023    Lump of skin of right upper extremity 03/03/2023    Pain in the chest 05/23/2023    Dyspnea on exertion 05/23/2023    Chronic metabolic acidosis 05/23/2023    Hyperphosphatemia 05/24/2023    Coronary artery disease due to lipid rich plaque 05/26/2023    Dyslipidemia 09/21/2023    ESRD (end stage renal disease) ([facility]) 11/03/2023    Status post cardiac surgery 11/03/2023    Shock ([facility]) 11/06/2023    Paroxysmal atrial flutter ([facility]) 11/11/2023    GIB (gastrointestinal bleeding) 11/11/2023    Insomnia 11/11/2023    Loculated pleural effusion 11/11/2023    Anxiety 11/14/2023    Pericardial effusion 11/16/2023    Mitral valve mass 11/16/2023    Hypomagnesemia 11/17/2023    History of mechanical aortic valve replacement 11/20/2023    C. difficile colitis 11/27/2023    Nausea and vomiting 12/05/2023    S/P ascending aortic aneurysm repair 12/13/2023    A-fib ([facility]) 12/16/2023    Splenomegaly 12/21/2023    Physical deconditioning 12/29/2023    Generalized abdominal pain 12/29/2023    H/O cardiac radiofrequency ablation 12/20/23 01/10/2024    Peritoneal dialysis status ([facility]) 04/30/2024    Lumbar radiculopathy 05/29/2024    Obstructive sleep apnea syndrome 07/10/2024    Osteoarthritis of spine with radiculopathy, lumbar region 11/01/2024     Resolved Ambulatory Problems     Diagnosis Date Noted    Stage 5 chronic kidney disease not on chronic dialysis ([facility]) 06/26/2020    CKD (chronic kidney disease) stage 4, GFR 15-29 ml/min ([facility]) 06/09/2022    Elevated troponin 05/23/2023     NSTEMI (non-ST elevated myocardial infarction) ([facility]) 2023    On mechanically assisted ventilation ([facility]) 2023    AAA (abdominal aortic aneurysm) ([facility]) 2023    Peritonitis ([facility]) 2023    Liver cirrhosis ([facility]) 2023    Systolic heart failure ([facility]) 2023    Aneurysm of ascending aorta without rupture ([facility]) 2024     Past Medical History:   Diagnosis Date    Anesthesia     Dialysis patient ([facility])     Heart burn     High cholesterol     Paroxysmal A-fib ([facility]) 2023    PONV (postoperative nausea and vomiting)     Renal disorder     Sleep apnea     Snoring      Relevant Medical History     Cardiac & Vascular History:  History of Heart Disease: YES-See Below  Ablation-23  AVR/Assending aortic dissection aneurysm repair-23  NSTEMI-23  Paroxymal A-Fib   History of Stroke/TIA: NO  Peripheral Vascular Disease (Symptomatic): NO    Bleeding & Clotting History:  Hx of Bleeding/Clotting Disorder: NO  Currently on Anticoagulation Therapy: YES-Warfarin    Diabetes & Metabolic History:  Diabetes Status: NO   Age at Diabetes Onset: N/A  If Type 1, date started on Insulin Therapy: N/A    Neurological & Cognitive:  Cognitive Status Concerns: NO    Dialysis Access History:  Current Dialysis Access: PD Catheter  Exhausted Vascular Access: NO  Exhausted Peritoneal Dialysis Access: NO    Infections & Malignancies:  Recent Infections: NO  History of Malignancy: NO    Urine Output: YES    Sensitizing Events     History of Blood Transfusions: YES-2023  Prior Transplants: NO  Pregnancy History (if applicable): N/A  Previous Pan[facility] [place] Infusion: NO    Transplant Candidate Registration (TCR) Source Data for [facility]     [facility]  Organ[facility] Kidney     Candidate Information confirmed with Patient:  Name: Gurdeep Guerra  Sex: Male   : 1965  State of Permanent Residence: [place]  Permanent ZIP Code: [id number]   Race: White [7]   Ethnicity: Not ,  /a, or Turkmen origin [1]   Highest Education Level: Graduate School   Functional Status: 90, Able to carry on normal activity; minor signs or symptoms of disease (ECOG equivalent 0)  Working for Income: YES    Nursing Transplant Education     Barriers to Learning: None    Topics Discussed    Evaluation Process & Considerations:  Overview of evaluation requirements, risks, benefits, and alternative treatments.  Explanation of inclusion & exclusion criteria; written copy provided.  Discussion of factors that may affect eligibility.  Transplant Process & Team Roles:  Explanation of team members’ roles before and after transplant.  Expectations & commitments for patient and family.  Waitlist & UNOS Policy:  Explanation of UNOS Multiple Listings policy, active/inactive status, and transfer options.  Patient may transfer care without losing accumulated wait time.  Donor Options & Risk Considerations:     Overview of living &  donor options.  Explanation of PHS increased risk donor considerations & consent requirements.  Post-Transplant Care & Follow-up:  Pre- and post-transplant testing requirements, including mandatory HIV testing.  Discussion of clinic visits, medication adherence, lab monitoring, and lifestyle changes.  [facility] [place] Considerations:  Patient informed that [facility] is a newly established transplant center and not yet Medicare-certified.  SRTR outcome data is unavailable due to the center’s new status.  Patient Rights & Decision-Making:  Patient understands they may discontinue evaluation or refuse transplant at any time.  Provided transplant team contact information for questions.    Nursing Plan & Next Steps     Consent for Kidney Transplant Evaluation has been signed.  Patient reviewed and acknowledged  donor acceptance criteria.  Case will be presented to the Transplant Selection Committee once all required diagnostic testing is completed.    Required Testing for  Initial Evaluation      Imaging Studies:   CT Abdomen/Pelvis-CD of images needed from [facility]      Next Steps    All outside records should be faxed to [facility] [place] at [phone].  The required testing for transplant evaluation has been reviewed with the patient.   Patient understands additional tests may be required based on results.      Tracie German R.N.  Transplant Coordinator  [facility] [place]

## 2025-02-20 NOTE — PROGRESS NOTES
Kidney Transplant Clinic Note - Recipient Evaluation Form    2025     Referring Provider: Gene Bernal M.D.     Primary Care Provider: Drew T. Blumberg, D.O.    Reason for Consultation: End-Stage Kidney Disease/Chronic Kidney Disease.    History of Present Illness:  Gurdeep Guerra is a 59 y.o. male who presents today for kidney transplant evaluation. He is accompanied by himself.   I personally saw and examined this patient. My history and physical is based on my own examination and interaction with the patient and those present, on available medical records, as well as on the reports and observations of other members of the team.       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    HENT: Negative.     Eyes: Negative.    Respiratory: Negative.     Cardiovascular: Negative.    Gastrointestinal: Negative.    Genitourinary: Negative.    Musculoskeletal: Negative.    Skin: Negative.    Neurological: Negative.    Endo/Heme/Allergies: Negative.    Psychiatric/Behavioral: Negative.         Past Medical History:  Past Medical History:   Diagnosis Date    Anesthesia     Hiccups for over 24 hours after a previous sugery.    Aortic stenosis     Chronic gout of multiple sites     Dialysis patient (HCC)     peritoneal daily    Dyspnea on exertion 2023     Dyspnea on exertion 05/23/2023    Elevated troponin 05/23/2023    Heart burn     1990    Heart murmur     High cholesterol     All always    Hypertension 2009    Liver cirrhosis (HCC) 12/21/2023    NSTEMI (non-ST elevated myocardial infarction) (HCC) 05/23/2023    no stents placed    Pain in the chest 05/23/2023    Pain in the chest 05/23/2023    Paroxysmal A-fib (HCC) 11/11/2023    Peritoneal dialysis status (HCC) 04/30/2024    PONV (postoperative nausea and vomiting)     Renal disorder 2007    Stage IV    Sleep apnea 2000    Snoring 1990    Splenomegaly 12/21/2023       Past Surgical History:  Past Surgical History:   Procedure Laterality Date    VT NJX AA&/STRD TFRML EPI LUMBAR/SACRAL 1 LEVEL Bilateral 6/6/2024    Procedure: Bilateral L5 Transforaminal Epidural Steroid Injection under Fluoroscopy;  Surgeon: Benito Herrera D.O.;  Location: [facility];  Service: Pain Management    AORTIC VALVE REPLACEMENT  11/3/2023    Procedure: AORTIC VALVE REPLACEMENT, ASCENDING AORTIC ANEURYSM REPAIR, TRANSESOPHAGEAL ECHOCARDIOGRAM;  Surgeon: Osmel Blanca M.D.;  Location: [facility];  Service: Cardiothoracic    AORTIC ASCENDING DISSECTION  11/3/2023    Procedure: REPAIR, ANEURYSM OR DISSECTION, AORTA, ASCENDING;  Surgeon: Osmel Blanca M.D.;  Location: SURGERY [facility];  Service: Cardiothoracic    ECHOCARDIOGRAM, TRANSESOPHAGEAL, INTRAOPERATIVE  11/3/2023    Procedure: ECHOCARDIOGRAM, TRANSESOPHAGEAL, INTRAOPERATIVE;  Surgeon: Osmel Blanca M.D.;  Location: SURGERY [facility];  Service: Cardiothoracic    RESTORATE HEMOSTAS  11/3/2023    Procedure: RESTORATION OF HEMOSTATIS;  Surgeon: Osmel Blanca M.D.;  Location: SURGERY [facility];  Service: Cardiothoracic    STERNOTOMY  11/3/2023    Procedure: MEDIASTINAL EXPLORATION;  Surgeon: Osmel Blanca M.D.;  Location: [facility];  Service: Cardiothoracic    CATH PLACEMENT CAPD Right 9/6/2023    Procedure: LAPAROSCOPIC INSERTION OF PERITONEAL  DIALYSIS CATHETER;  Surgeon: Dontrell Sales M.D.;  Location: SUR[facility];  Service: Vascular    NE INJ LUMBAR/SACRAL,W/ IMAGING Left 8/24/2023    Procedure: LUMBAR EPIDURAL STEROID INJECTION, LEFT L5-S1 INTERLAMINAR UNDER FLUOROSCOPY;  Surgeon: Benito Herrera D.O.;  Location: [facility];  Service: Orthopedics    CATH PLACEMENT CAPD N/A 5/15/2023    Procedure: LAPAROSCOPIC PLACEMENT OF PERITONEAL DIALYSIS CATHERTER;  Surgeon: Shikha Alexander M.D.;  Location: SURGERY [facility] DAY [facility];  Service: General    UMBILICAL HERNIA REPAIR  2012    4 separate surgeries between [id number]    OTHER  2007    Kidney biopsy, can't recall laterality,    HIP ARTHROSCOPY Bilateral 2002    TONSILLECTOMY N/A 1987    HAND SURGERY Right 1985    Hand and wrist    SHOULDER ARTHROSCOPY      Can't recall date       Current Outpatient Medications   Medication Sig Dispense Refill    sevelamer carbonate (RENVELA) 800 MG Tab tablet Take 3,200 mg by mouth 4 times a day. Patient reports taking 1-2 with meals depending on size of meal      warfarin (COUMADIN) 2.5 MG Tab Take 1-2 Tablets by mouth every day. Take as directed by anticoag clinic. 200 Tablet 1    carvedilol (COREG) 3.125 MG Tab Take 2 Tablets by mouth 2 times a day with meals. 60 Tablet 11    rosuvastatin (CRESTOR) 20 MG Tab Take 1 Tablet by mouth every evening. 30 Tablet 11    calcitRIOL (ROCALTROL) 0.25 MCG Cap Take 1 Capsule by mouth every day. 30 Capsule 11    esomeprazole (NEXIUM) 20 MG capsule Take 2 Capsules by mouth every morning before breakfast. 180 Capsule 1    gabapentin (NEURONTIN) 100 MG Cap Take 1 Capsule by mouth 3 times a day. (Patient taking differently: Take 200 mg by mouth at bedtime.) 90 Capsule 1    aspirin 81 MG EC tablet Take 81 mg by mouth every day.       No current facility-administered medications for this visit.       Allergies:   Allergies   Allergen Reactions    Allopurinol Itching    Hydralazine Hcl Unspecified     Pt states he had a  reaction similar to lupus       Social History:   Social History     Socioeconomic History    Marital status:      Spouse name: Not on file    Number of children: Not on file    Years of education: Not on file    Highest education level: Master's degree (e.g., MA, MS, Kenn, MEd, MSW, ELIECER)   Occupational History    Not on file   Tobacco Use    Smoking status: Never    Smokeless tobacco: Never   Vaping Use    Vaping status: Never Used   Substance and Sexual Activity    Alcohol use: Never    Drug use: Never    Sexual activity: Yes     Partners: Female     Birth control/protection: Pill   Other Topics Concern    Not on file   Social History Narrative    Not on file     Social Drivers of Health     Financial Resource Strain: Low Risk  (5/29/2024)    Overall Financial Resource Strain (CARDIA)     Difficulty of Paying Living Expenses: Not hard at all   Food Insecurity: No Food Insecurity (5/29/2024)    Hunger Vital Sign     Worried About Running Out of Food in the Last Year: Never true     Ran Out of Food in the Last Year: Never true   Transportation Needs: No Transportation Needs (5/29/2024)    PRAPARE - Transportation     Lack of Transportation (Medical): No     Lack of Transportation (Non-Medical): No   Physical Activity: Insufficiently Active (5/29/2024)    Exercise Vital Sign     Days of Exercise per Week: 7 days     Minutes of Exercise per Session: 20 min   Stress: No Stress Concern Present (5/29/2024)    [employer] - Occupational Stress Questionnaire     Feeling of Stress : Not at all   Social Connections: Socially Isolated (5/29/2024)    Social Connection and Isolation Panel [NHANES]     Frequency of Communication with Friends and Family: Once a week     Frequency of Social Gatherings with Friends and Family: Once a week     Attends [employer] Services: Never     Active Member of Clubs or Organizations: No     Attends Club or Organization Meetings: Never     Marital Status:  "   Intimate Partner Violence: Not At Risk (6/6/2024)    Humiliation, Afraid, Rape, and Kick questionnaire     Fear of Current or Ex-Partner: No     Emotionally Abused: No     Physically Abused: No     Sexually Abused: No   Housing Stability: Unknown (5/29/2024)    Housing Stability Vital Sign     Unable to Pay for Housing in the Last Year: No     Number of Places Lived in the Last Year: Not on file     Unstable Housing in the Last Year: No       Family History:  Family History   Problem Relation Age of Onset    Hyperlipidemia Mother     Heart Disease Mother     Hypertension Father        Physical Examination:  /60 (BP Location: Left arm, Patient Position: Sitting, BP Cuff Size: Adult)   Pulse (!) 39   Resp 18   Ht 1.702 m (5' 7\")   Wt 84.1 kg (185 lb 6.4 oz)   SpO2 98%    Body mass index is 29.04 kg/m².  Physical Exam  Constitutional:       General: He is not in acute distress.     Appearance: Normal appearance. He is normal weight. He is not ill-appearing, toxic-appearing or diaphoretic.   HENT:      Nose: Nose normal. No congestion or rhinorrhea.      Mouth/Throat:      Pharynx: Oropharynx is clear.   Eyes:      General: No scleral icterus.        Right eye: No discharge.         Left eye: No discharge.      Extraocular Movements: Extraocular movements intact.      Conjunctiva/sclera: Conjunctivae normal.   Neck:      Vascular: No carotid bruit.   Cardiovascular:      Rate and Rhythm: Normal rate and regular rhythm.      Pulses: Normal pulses.      Heart sounds: Murmur heard.      No friction rub. No gallop.      Comments: Systolic and diastolic murmurs  Palpable DP and PT pulses  Pulmonary:      Effort: Pulmonary effort is normal. No respiratory distress.      Breath sounds: Normal breath sounds. No wheezing, rhonchi or rales.   Chest:      Chest wall: No tenderness.   Abdominal:      General: Abdomen is flat. There is no distension.      Palpations: Abdomen is soft. There is no mass.      " Tenderness: There is no abdominal tenderness. There is no right CVA tenderness, left CVA tenderness, guarding or rebound.      Hernia: No hernia is present.   Musculoskeletal:         General: No swelling, tenderness, deformity or signs of injury. Normal range of motion.      Cervical back: Normal range of motion and neck supple. No rigidity or tenderness.      Right lower leg: No edema.      Left lower leg: No edema.   Lymphadenopathy:      Cervical: No cervical adenopathy.   Skin:     General: Skin is warm.      Coloration: Skin is not jaundiced or pale.      Findings: No bruising, erythema, lesion or rash.   Neurological:      General: No focal deficit present.      Mental Status: He is alert and oriented to person, place, and time.      Sensory: No sensory deficit.      Motor: No weakness.      Coordination: Coordination normal.      Gait: Gait normal.   Psychiatric:         Mood and Affect: Mood normal.         Behavior: Behavior normal.         Thought Content: Thought content normal.         Judgment: Judgment normal.         Labs:    CMP:   Lab Results   Component Value Date/Time    SODIUM 133 (L) 12/25/2023 04:04 AM    POTASSIUM 5.1 12/25/2023 04:04 AM    CHLORIDE 101 12/25/2023 04:04 AM    CO2 23 12/25/2023 04:04 AM    ANION 9.0 12/25/2023 04:04 AM    GLUCOSE 80 12/25/2023 04:04 AM    BUN 42 (H) 12/25/2023 04:04 AM    CREATININE 8.22 (HH) 12/25/2023 04:04 AM    CALCIUM 8.0 (L) 12/25/2023 04:04 AM    ASTSGOT 25 12/25/2023 04:04 AM    ALTSGPT 21 12/25/2023 04:04 AM    TBILIRUBIN 0.2 12/25/2023 04:04 AM    ALBUMIN 2.1 (L) 12/25/2023 04:04 AM    TOTPROTEIN 4.6 (L) 12/25/2023 04:04 AM    GLOBULIN 2.5 12/25/2023 04:04 AM    AGRATIO 0.8 12/25/2023 04:04 AM       PT/INR:   Lab Results   Component Value Date/Time    PROTHROMBTM 28.5 (H) 12/26/2023 05:57 AM    INR 1.50 (A) 12/18/2024 12:00 AM       CBC:   Lab Results   Component Value Date/Time    WBC 5.8 12/25/2023 04:04 AM    RBC 2.94 (L) 12/25/2023 04:04 AM     HEMOGLOBIN 8.5 (L) 2023 04:04 AM    MCV 95.6 2023 04:04 AM    MCH 28.9 2023 04:04 AM    MCHC 30.2 (L) 2023 04:04 AM    RDW 52.4 (H) 2023 04:04 AM    MPV 8.7 (L) 2023 04:04 AM       Hgb A1C:   Lab Results   Component Value Date/Time    HBA1C 5.0 10/31/2023 0925    AVGLUC 97 10/31/2023 0925       Lipids:   Lab Results   Component Value Date/Time    CHOLSTRLTOT 171 2023 11:08 AM    TRIGLYCERIDE 56 2023 11:08 AM    HDL 40 2023 11:08 AM     (H) 2023 11:08 AM       Assessment: Gurdeep Guerra is a 59 y.o. male who  presents today for kidney transplant evaluation. Based on the above findings I believe kidney transplantation should be considered as a treatment option for this patient’s end stage renal disease. We should proceed with our protocol evaluation for kidney transplantation.     Plan:  We have discussed with the patient at length the risks and benefits of transplantation. Patient is aware that transplantation is a process that requires a lifelong commitment and that it is a personal choice to proceed with it rather than to remain with alternative treatments such as dialysis. Patient understands that the two major benefits of transplantation include prolonged survival and improved quality of life. I have mentioned to the patient that organs from live donors in general do better than those of  donors. We will attempt to place the kidney on the right side but if that is not possible we may need to go on the contralateral side.     Patient is aware that immunosuppression medications need to be taken in order to preserve the kidney for as long as the organ is functioning. Patient is aware that immunosuppression is required in addition to baseline medications and in addition to other medications such as antimicrobials. The immunosuppression levels will probably be decreased with time. Patient is aware that immunosuppression levels are usually  at their highest during the first 6 months, which is coincident with the period of the greatest incidence of rejection and complications. Patient is aware as well that immunosuppression side effects include but are not limited to the onset of worsening of diabetes, allograft damage, increased incidence of all types of infections, increased incidence of all types of malignancies but most prominently of the skin and hematological system, neurological illness and other pathologies. I have instructed the patient to stay away from children or adults after they receive live/attenuated pathogen vaccinations and to check with us prior to receiving any type of vaccination.     Patient is aware that the benefits of transplantation may be diminished or totally canceled by potential complications associated with the surgery itself and/or with the transplant process. Patient is aware that the treatment of complications may require, among other interventions, the need for reoperations, the need for placement of internal and/or external catheters and/or drains, the need for radiological interventional procedures, the need for transfusion of blood products, or observation. Patient is aware that such potential complications include but are not limited to:  Bleeding  Infections with bacteria, viruses, fungi, and parasites  Leaks of urine  Ureteral and vascular strictures  Thrombosis of the organ associated with twisting of the blood vessels, rejection, occlusion from events such as intimal flaps and unintentional mispositioning of sutures during the surgery, low blood pressure, or other events, both known and/or unknown  Cardiac events  Injury to blood vessels associated with the transplant surgery that may lead to attempts to revascularize an extremity  Injury to nerves, either temporary or long-lasting  Fluid collections requiring external and/or internal drainage  Delayed function of the kidney, requiring dialysis for a period  ranging from days to months, or in some cases nonfunction of the kidney    Patient may agree or refuse to receive transfusion of recommended blood products if necessary. Patient is aware that such transfusion of blood products, as well as the transplant organ itself, may be associated with transmission of infections (such as hepatitis B, hepatitis C, HIF, COVID, or West Nile virus), malignancies, and/or other pathologies.    Patient is aware that the incidence of malignancies is nonfunctioning kidneys is higher than in those with normal function. Patient is also aware that immunosuppression will make this incidence even higher. Patient is encouraged to have screening imaging exams on a yearly or biyearly basis in order to detect such tumors at an early stage.     Patient is aware that it is possible to have children after transplantation (for those in childbearing age). We have instructed the patient to wait until there is stable kidney function and to notify us in advance so we can discontinue teratogenic medications, including but not limited to mycophenolic compounds.     Patient is aware that the average survival of transplanted kidneys is approximately 13 years and that a second kidney transplant may be required, especially in younger patients.     Furthermore, patient is aware of the following facts:  The underlying factors that led to failure of the native kidneys will also affect the transplanted kidney.  Patient is instructed to ambulate frequently, not to lie immobile for prolonged periods, and not to cross legs for prolonged periods in order to prevent the formulation of deep venous thromboses (a life-threatening condition).   Anticoagulation associated with atrial fibrillation: I have explained to the patient that we will likely reverse the anticoagulation prior to performing the transplant in order to decrease the incidence of bleeding complication. Once hemostasis is achieved, we will re-institute the  anticoagulation. Patient is aware that this is associated with an increased incidence of potential complications, such as bleedings, transfusions of blood products, and reoperations. I have explained to the patient that although the risk of transfusions, bleeding, and reoperations is significant, there is also a risk of potential strokes associated with reversal of the anticoagulation.  Recurrence of FSGS: Patient understands that in instances of primary FSGS the incidence of recurrence according to some authors may be as high as 100%, potentially leading to allograft (transplanted organ) failure.    Specific Issues:  As previously stated, we should proceed with our routine protocol transplant evaluation. In addition, based upon my evaluation, I believe the following should be addressed prior to transplantation:  Labs and studies, as per transplant protocol, ordered at the time of evaluation  Cardiac clearance  CT of abdomen and pelvis with NO IV contrast    I have answered all of the patient’s questions, as well as all questions of those present with the patient. Patient understands we can offer no guarantees and agrees to proceed. Once all of this is completed, the patient should be presented to the Multidisciplinary Transplant Selection Committee for a decision on whether to proceed with listing and transplantation.     Data:  I have reviewed/recommended the following tests:  CBC. Hematocrit  CMP. Creatinine. GFR  CMV  EBV  ABO blood type  CT scan of abdomen (requested)  CT scan of pelvis (requested)  Cardiac Stress Test (requested)  Cardiac echocardiogram (requested)  Panel Specific Antigen testing (requested)  Hepatitis A testing (requested)  Hepatitis B testing (requested)  Hepatitis C testing (requested)  I communicated my evaluation with referring Nephrologist (via letter).  I discussed case and testing with our transplant Coordinator    Treatment Plan:  Patient with end stage kidney disease  Referred for  kidney transplantation based on GFR less than 20 as per referring physician’s testing.  GFR less than 20 qualifies patient for kidney transplantation.  I recommend kidney transplantation surgery as treatment based on my encounter with the patient and available testing reviewed. Benefits (prolonged survival and enhanced quality of life) and risks (major surgical intervention that will require at least 3 days of hospitalization and placement of vascular and urinary catheters, increased incidence of infections and malignancies associated with immunosuppression, infections, urine leaks, bleeding, among many more) have been discussed by me as well as by other team members.  Patient is aware that immunosuppression drug treatment will be required for life or for as long as the transplant is functioning. Immunosuppression management will require intensive monitoring of levels and side effects, initially on a daily level as an inpatient and at least weekly as an outpatient.  Will proceed with our protocol evaluation.  Patient will be re-evaluated on a yearly basis while on the waiting list. Findings identified at the time of such re-evaluation may potentially lead to removal from the waiting list.   Patient will be presented to our selection committee for final approval.    Risk Factors:  Risk factors include:  Diabetes  Hypertension  Peripheral Vascular Disease    Dakota Haynes M.D.

## 2025-02-20 NOTE — PROGRESS NOTES
Recipient Psychosocial Assessment    Patient: Gurdeep Guerra  : 1965   Organ: Kidney  Identifying information:  Gurdeep is a 59 year old  male.  He presents for his transplant evaluation alone.  Patient has been on PD at [facility] since .  He has been seeing a nephrologist for 15 years.  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  He said that he was a  for 30 years.  Patient is listed for transplant with [facility] and he has four years of wait time.  He was listed before he started dialysis.  Patient has a twin brother and he may be willing to donate; however, he lives in the [place].    Housing/ Living arrangements: 1 Story Apartment/Condo with his girlfriend, Becka in [place], [place].  Social support/Family history:  Patient's primary support system is his girlfriend, Becka.  He said he relocated to the area about five years ago.  Highest level of education: university  Patient said that he has a Master's Degree in Pol[employer] and Coding and two Bachelor's Degrees, one in Biochemistry and one in Psychology.  Financial resources/Employment history:  Patient   Working: Yes   If yes, place of employment:  Patient is a  at [employer] in [place], [place].  Partner: Becka works for [employer] in [place], [place].    Insurance/Medication coverage:   Primary [employer]  HTH PLAN  Secondary:  Patient wasn't aware that he could apply for Medicare.  He will call our  for more information.     Language: English   Substance use: Not currently.  He said that he used anabolic steroids for 30 years and he was a .     Legal difficulties: No  Adherence: Patient said that he performs all of his PD treatments.  His said that his phosphorus goes up and down but his other labs are good.  History of illness and understanding of transplant: Patient has seen a nephrologist for 15 years.  He was hoping to get a transplant  before he had to start dialysis but he ended up starting PD in 2023.  Mental status/Psychiatric history:  Denies  History of mental health symptoms: Denies  Coping style, skills, and adaptation to   illness: Patient said he bets on football but other than that he is a couch potato right now and he loves to watch sports.  He is mostly focused on work.  Other current stressors: Patient said that he gets tired of seeing doctors but other than that everything is ok.     Post transplant care arrangements:   Transportation to/from [facility]: Becka will drive patient to and from appointments after transplant.  Caregiver information: Patient's girlfriend, Becka will be his primary support person.    Assessment:  No known psychosocial obstacles to transplant. Interviewed as part of the transplant evaluation process.    Plan:   to provide supportive services as needed. No f/u indicated at this time.    : WILDER Gramajo

## 2025-02-20 NOTE — PROGRESS NOTES
Transplant evaluation note - Kidney transplant    2/20/2025    Referring Provider: Gene Bernal M.D.    Primary Care Provider: Drew T. Blumberg, D.O.  Primary nephrologist  Gene Bernal M.D.    Reason for Consultation: Evaluation for kidney transplant recipient candidacy                                             History of Present Illness:  Gurdeep Guerra is a 59 y.o. male who presents today for kidney transplant evaluation. . 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 He still makes ~ 300-500mL of urine per day.     I personally saw and examined this patient. My history and physical is based on my own examination and interaction with the patient and those present, on available medical records, as well as on the reports and observations of other members of the team.      Cardiovascular    - functional capacity good  - CAD  yes, hx of NSTEMI 2023   - PVD  no known  - Cardiac arrhythmia  atrial fibrillation post AVR/aortic repair s/p AF ablation   - Valvular heart disease  s/p mechanical AVR, on warfarin  5 mg/d    Respiratory    - smoking/vaping denies  - asthma/COPD           Renal disease    - nephrolithiasis/stone denies  - on renal replacement therapy/dialysis  Yes:PD  - Dialysis access PD catheter   - exhausted access for dialysis No   - prior kidney transplant  No       GI/Hepatology  - gastroparesis  No   - chronic diarrhea  No   - significant liver disease/cirrhosis No     Hematologic/oncologic    - Prothrombotic No   - Bleeding No   - prior blood transfusion YES, DATE(S) 11/2023  - cancer  No   - any hx of skin cancer including melanoma : No    Endocrine/Diabetes   - not diabetic    Infectious    - recurrent UTI  No   - past serious infection required hospitalization yes, C difficile infection post discharge after AVR/Aortic repair    Neurological     intact    Mental health/psychiatric    - Past substance use  No  - active substance use No  - marijuana smoking/smoking/vaping No  - depression/bipolar No        Age Sex appropriate cancer screening  - Colorectal cancer screening CRCscreen: Colonoscopy 5/2021 , repeat in 10 yrs  - PSA level , 2020 , need update    Does patient have potential living donor for kidney transplant ?     Yes  Fraternal twin brother, 64 y/o , Male , lives in        Caretaker  girlfriend 53 y/o  2.   Girlfriend's son  18 y/o       Review of Systems   Constitutional:  Negative for chills and fever.   HENT:  Negative for hearing loss and tinnitus.    Eyes:  Negative for blurred vision and double vision.   Respiratory:  Negative for cough and hemoptysis.    Cardiovascular:  Negative for chest pain and palpitations.   Gastrointestinal:  Negative for diarrhea, nausea and vomiting.   Genitourinary:  Negative for dysuria and urgency.   Musculoskeletal:  Negative for myalgias and neck pain.   Skin:  Negative for itching and rash.   Neurological:  Negative for dizziness and headaches.   Psychiatric/Behavioral:  Negative for depression and suicidal ideas.        Past Medical History:  Past Medical History:   Diagnosis Date    Anesthesia     Hiccups for over 24 hours after a previous sugery.    Aortic stenosis     Chronic gout of multiple sites     Dialysis patient ([facility])     peritoneal daily    Dyspnea on exertion 05/23/2023    Dyspnea on exertion 05/23/2023    Elevated troponin 05/23/2023    Heart burn     1990    Heart murmur     High cholesterol     All always    Hypertension 2009    Liver cirrhosis ([facility]) 12/21/2023    NSTEMI (non-ST elevated myocardial infarction) ([facility]) 05/23/2023    no stents placed    Pain in the chest 05/23/2023    Pain in the chest 05/23/2023    Paroxysmal A-fib ([facility]) 11/11/2023    Peritoneal dialysis status ([facility]ly [facility]) 04/30/2024    PONV (postoperative nausea and vomiting)     Renal disorder 2007    Stage IV    Sleep apnea 2000     Snoring 1990    Splenomegaly 12/21/2023       Past Surgical History:  Past Surgical History:   Procedure Laterality Date    MI NJX AA&/STRD TFRML EPI LUMBAR/SACRAL 1 LEVEL Bilateral 6/6/2024    Procedure: Bilateral L5 Transforaminal Epidural Steroid Injection under Fluoroscopy;  Surgeon: Benito Herrera D.O.;  Location: SURGERY [facility];  Service: Pain Management    AORTIC VALVE REPLACEMENT  11/3/2023    Procedure: AORTIC VALVE REPLACEMENT, ASCENDING AORTIC ANEURYSM REPAIR, TRANSESOPHAGEAL ECHOCARDIOGRAM;  Surgeon: Osmel Blanca M.D.;  Location: SURGERY [facility];  Service: Cardiothoracic    AORTIC ASCENDING DISSECTION  11/3/2023    Procedure: REPAIR, ANEURYSM OR DISSECTION, AORTA, ASCENDING;  Surgeon: Osmel Blanca M.D.;  Location: SURGERY [facility];  Service: Cardiothoracic    ECHOCARDIOGRAM, TRANSESOPHAGEAL, INTRAOPERATIVE  11/3/2023    Procedure: ECHOCARDIOGRAM, TRANSESOPHAGEAL, INTRAOPERATIVE;  Surgeon: Osmel Blanca M.D.;  Location: SURGERY [facility];  Service: Cardiothoracic    RESTORATE HEMOSTAS  11/3/2023    Procedure: RESTORATION OF HEMOSTATIS;  Surgeon: Osmel Blanca M.D.;  Location: SURGERY [facility];  Service: Cardiothoracic    STERNOTOMY  11/3/2023    Procedure: MEDIASTINAL EXPLORATION;  Surgeon: Osmel Blanca M.D.;  Location: [facility];  Service: Cardiothoracic    CATH PLACEMENT CAPD Right 9/6/2023    Procedure: LAPAROSCOPIC INSERTION OF PERITONEAL DIALYSIS CATHETER;  Surgeon: Dontrell Sales M.D.;  Location: SURGERY [facility];  Service: Vascular    MI INJ LUMBAR/SACRAL,W/ IMAGING Left 8/24/2023    Procedure: LUMBAR EPIDURAL STEROID INJECTION, LEFT L5-S1 INTERLAMINAR UNDER FLUOROSCOPY;  Surgeon: Benito Herrera D.O.;  Location: SURGERY [facility];  Service: Orthopedics    CATH PLACEMENT CAPD N/A 5/15/2023    Procedure: LAPAROSCOPIC PLACEMENT OF PERITONEAL DIALYSIS CATHERTER;  Surgeon: Shikha Alexander M.D.;  Location: SURGERY SAME DAY [facility];  Service: General     UMBILICAL HERNIA REPAIR  2012    4 separate surgeries between [id number]    OTHER  2007    Kidney biopsy, can't recall laterality,    HIP ARTHROSCOPY Bilateral 2002    TONSILLECTOMY N/A 1987    HAND SURGERY Right 1985    Hand and wrist    SHOULDER ARTHROSCOPY      Can't recall date       Current Outpatient Medications   Medication Sig Dispense Refill    sevelamer carbonate (RENVELA) 800 MG Tab tablet Take 3,200 mg by mouth 4 times a day. Patient reports taking 1-2 with meals depending on size of meal      warfarin (COUMADIN) 2.5 MG Tab Take 1-2 Tablets by mouth every day. Take as directed by anticoag clinic. 200 Tablet 1    carvedilol (COREG) 3.125 MG Tab Take 2 Tablets by mouth 2 times a day with meals. 60 Tablet 11    rosuvastatin (CRESTOR) 20 MG Tab Take 1 Tablet by mouth every evening. 30 Tablet 11    calcitRIOL (ROCALTROL) 0.25 MCG Cap Take 1 Capsule by mouth every day. 30 Capsule 11    esomeprazole (NEXIUM) 20 MG capsule Take 2 Capsules by mouth every morning before breakfast. 180 Capsule 1    gabapentin (NEURONTIN) 100 MG Cap Take 1 Capsule by mouth 3 times a day. (Patient taking differently: Take 200 mg by mouth at bedtime.) 90 Capsule 1    aspirin 81 MG EC tablet Take 81 mg by mouth every day.       No current facility-administered medications for this visit.       Allergies:   Allergies   Allergen Reactions    Allopurinol Itching    Hydralazine Hcl Unspecified     Pt states he had a reaction similar to lupus       Social History:   Social History     Socioeconomic History    Marital status:      Spouse name: Not on file    Number of children: Not on file    Years of education: Not on file    Highest education level: Master's degree (e.g., MA, MS, Kenn, MEd, MSW, ELIECER)   Occupational History    Not on file   Tobacco Use    Smoking status: Never    Smokeless tobacco: Never   Vaping Use    Vaping status: Never Used   Substance and Sexual Activity    Alcohol use: Never    Drug use: Never    Sexual  activity: Yes     Partners: Female     Birth control/protection: Pill   Other Topics Concern    Not on file   Social History Narrative    Not on file     Social Drivers of Health     Financial Resource Strain: Low Risk  (5/29/2024)    Overall Financial Resource Strain (CARDIA)     Difficulty of Paying Living Expenses: Not hard at all   Food Insecurity: No Food Insecurity (5/29/2024)    Hunger Vital Sign     Worried About Running Out of Food in the Last Year: Never true     Ran Out of Food in the Last Year: Never true   Transportation Needs: No Transportation Needs (5/29/2024)    PRAPARE - Transportation     Lack of Transportation (Medical): No     Lack of Transportation (Non-Medical): No   Physical Activity: Insufficiently Active (5/29/2024)    Exercise Vital Sign     Days of Exercise per Week: 7 days     Minutes of Exercise per Session: 20 min   Stress: No Stress Concern Present (5/29/2024)    [employer] [place] [employer] - Occupational Stress Questionnaire     Feeling of Stress : Not at all   Social Connections: Socially Isolated (5/29/2024)    Social Connection and Isolation Panel [NHANES]     Frequency of Communication with Friends and Family: Once a week     Frequency of Social Gatherings with Friends and Family: Once a week     Attends Buddhism Services: Never     Active Member of Clubs or Organizations: No     Attends Club or Organization Meetings: Never     Marital Status:    Intimate Partner Violence: Not At Risk (6/6/2024)    Humiliation, Afraid, Rape, and Kick questionnaire     Fear of Current or Ex-Partner: No     Emotionally Abused: No     Physically Abused: No     Sexually Abused: No   Housing Stability: Unknown (5/29/2024)    Housing Stability Vital Sign     Unable to Pay for Housing in the Last Year: No     Number of Places Lived in the Last Year: Not on file     Unstable Housing in the Last Year: No       Family History:  Family History   Problem Relation Age of Onset     "Hyperlipidemia Mother     Heart Disease Mother     Hypertension Father        Physical Examination:  /60 (BP Location: Left arm, Patient Position: Sitting, BP Cuff Size: Adult)   Pulse (!) 39   Resp 18   Ht 1.702 m (5' 7\")   Wt 84.1 kg (185 lb 6.5 oz)   SpO2 98%   Body mass index is 29.04 kg/m².  Physical Exam  Vitals reviewed.   Constitutional:       Appearance: Normal appearance.   HENT:      Head: Normocephalic and atraumatic.      Mouth/Throat:      Mouth: Mucous membranes are moist.      Pharynx: Oropharynx is clear.   Eyes:      Conjunctiva/sclera: Conjunctivae normal.      Pupils: Pupils are equal, round, and reactive to light.   Cardiovascular:      Rate and Rhythm: Normal rate and regular rhythm.      Heart sounds: No murmur heard.  Pulmonary:      Effort: No respiratory distress.      Breath sounds: Normal breath sounds. No wheezing or rales.   Abdominal:      General: There is no distension.      Palpations: Abdomen is soft.      Tenderness: There is no abdominal tenderness. There is no guarding.   Musculoskeletal:         General: No swelling or tenderness.   Skin:     Coloration: Skin is not jaundiced or pale.   Neurological:      General: No focal deficit present.      Mental Status: He is alert and oriented to person, place, and time.   Psychiatric:         Mood and Affect: Mood normal.         Thought Content: Thought content normal.         Labs:    CMP:   Lab Results   Component Value Date/Time    SODIUM 133 (L) 12/25/2023 04:04 AM    POTASSIUM 5.1 12/25/2023 04:04 AM    CHLORIDE 101 12/25/2023 04:04 AM    CO2 23 12/25/2023 04:04 AM    ANION 9.0 12/25/2023 04:04 AM    GLUCOSE 80 12/25/2023 04:04 AM    BUN 42 (H) 12/25/2023 04:04 AM    CREATININE 8.22 (HH) 12/25/2023 04:04 AM    CALCIUM 8.0 (L) 12/25/2023 04:04 AM    ASTSGOT 25 12/25/2023 04:04 AM    ALTSGPT 21 12/25/2023 04:04 AM    TBILIRUBIN 0.2 12/25/2023 04:04 AM    ALBUMIN 2.1 (L) 12/25/2023 04:04 AM    TOTPROTEIN 4.6 (L) 12/25/2023 " 04:04 AM    GLOBULIN 2.5 12/25/2023 04:04 AM    AGRATIO 0.8 12/25/2023 04:04 AM       PT/INR:   Lab Results   Component Value Date/Time    PROTHROMBTM 28.5 (H) 12/26/2023 05:57 AM    INR 1.50 (A) 12/18/2024 12:00 AM       CBC:   Lab Results   Component Value Date/Time    WBC 5.8 12/25/2023 04:04 AM    RBC 2.94 (L) 12/25/2023 04:04 AM    HEMOGLOBIN 8.5 (L) 12/25/2023 04:04 AM    MCV 95.6 12/25/2023 04:04 AM    MCH 28.9 12/25/2023 04:04 AM    MCHC 30.2 (L) 12/25/2023 04:04 AM    RDW 52.4 (H) 12/25/2023 04:04 AM    MPV 8.7 (L) 12/25/2023 04:04 AM       Hgb A1C:   Lab Results   Component Value Date/Time    HBA1C 5.0 10/31/2023 0925    AVGLUC 97 10/31/2023 0925       Lipids:   Lab Results   Component Value Date/Time    CHOLSTRLTOT 171 05/23/2023 11:08 AM    TRIGLYCERIDE 56 05/23/2023 11:08 AM    HDL 40 05/23/2023 11:08 AM     (H) 05/23/2023 11:08 AM         Assessment:     Gurdeep Guerra is a 59 y.o. male who  presents today for kidney transplant evaluation.  Patient with secondary FSGS with ESRD on PD.   Patient is a acceptable candidate for kidney transplant from medical standpoint.   He will need a formal cardiac clearance prior to kidney transplant. (including TTE and stress test or LHC)  Patient will be presented to our multidisciplinary kidney transplant selection committee for final approval.      Risk Factors:include:  1. Aortic  dissection with AR s/p repair and mechanical AVR  2.      On anticoagulation warfarin   3.      Atrial fibrillation s/p ablation           We have discussed with the patient at length the risks and benefits of transplantation. Patient is aware that transplantation is a process that requires a lifelong commitment and that it is a personal choice to proceed with it rather than to remain with alternative treatments such as dialysis. Patient understands that the two major benefits of transplantation include prolonged survival and improved quality of life. I have mentioned to the  patient that organs from live donors in general do better than those of  donors. We will attempt to place the kidney on the right side but if that is not possible we may need to go on the contralateral side.     Patient is aware that immunosuppression medications need to be taken in order to preserve the kidney for as long as the organ is functioning. Patient is aware that immunosuppression is required in addition to baseline medications and in addition to other medications such as antimicrobials. The immunosuppression levels will probably be decreased with time. Patient is aware that immunosuppression levels are usually at their highest during the first 6 months, which is coincident with the period of the greatest incidence of rejection and complications. Patient is aware as well that immunosuppression side effects include but are not limited to the onset of worsening of diabetes, allograft damage, increased incidence of all types of infections, increased incidence of all types of malignancies but most prominently of the skin and hematological system, neurological illness and other pathologies. I have instructed the patient to stay away from children or adults after they receive live/attenuated pathogen vaccinations and to check with us prior to receiving any type of vaccination.     Patient is aware that the benefits of transplantation may be diminished or totally canceled by potential complications associated with the surgery itself and/or with the transplant process. Patient is aware that the treatment of complications may require, among other interventions, the need for reoperations, the need for placement of internal and/or external catheters and/or drains, the need for radiological interventional procedures, the need for transfusion of blood products, or observation. Patient is aware that such potential complications include but are not limited to:  Bleeding  Infections with bacteria, viruses, fungi,  and parasites  Leaks of urine  Ureteral and vascular strictures  Thrombosis of the organ associated with twisting of the blood vessels, rejection, occlusion from events such as intimal flaps and unintentional mispositioning of sutures during the surgery, low blood pressure, or other events, both known and/or unknown  Cardiac events  Injury to blood vessels associated with the transplant surgery that may lead to attempts to revascularize an extremity  Injury to nerves, either temporary or long-lasting  Fluid collections requiring external and/or internal drainage  Delayed function of the kidney, requiring dialysis for a period ranging from days to months, or in some cases nonfunction of the kidney    Patient may agree or refuse to receive transfusion of recommended blood products if necessary. Patient is aware that such transfusion of blood products, as well as the transplant organ itself, may be associated with transmission of infections (such as hepatitis B, hepatitis C, HIF, COVID, or West Nile virus), malignancies, and/or other pathologies.    Patient is aware that the incidence of malignancies is nonfunctioning kidneys is higher than in those with normal function. Patient is also aware that immunosuppression will make this incidence even higher. Patient is encouraged to have screening imaging exams on a yearly or biyearly basis in order to detect such tumors at an early stage.     Patient is aware that it is possible to have children after transplantation (for those in childbearing age). We have instructed the patient to wait until there is stable kidney function and to notify us in advance so we can discontinue teratogenic medications, including but not limited to mycophenolic compounds.     Patient is aware that the average survival of transplanted kidneys is approximately 13 years and that a second kidney transplant may be required, especially in younger patients.     I have answered all of the patient's  questions, as well as all questions of those present with the patient. Patient understands we can offer no guarantees and agrees to proceed. Once all of this is completed, the patient should be presented to the Multidisciplinary Transplant Selection Committee for a decision on whether to proceed with listing and transplantation.       Hilda Miller MD  Transplant nephrologist & Medical Director  [facility] [place]    Email address : [email or web address]  Tel : [phone]  FAX : [phone]  Mobile : [phone]         I spent between 60 and 74 minutes of care time with this patient, including direct face-to-face contact with this patient, history taking, examination, researching the pertinent past medical record, counseling regarding the risks and benefits of kidney transplantation and other options for treatment modalities in end stage kidney disease, coordinate further care with primary care providers and/or referring providers.

## 2025-02-20 NOTE — PROGRESS NOTES
"Initial Transplant Nutrition Evaluation     Gurdeep Guerra ([id number]) is a 59 y.o. male who presents for an initial Kidney transplant nutrition evaluation on 02/20/25. Pt presents with self.     Nutrition Assessment     Anthropometrics:    Height: 5'7\" Source: Stated  Weight: 84.1 kg Source: Scale  Suspected Dry Weight: 180-185 lbs  BMI: 29.04 Class: Overweight  Weight Hx:     Wt Readings from Last 10 Encounters:   02/20/25 84.1 kg (185 lb 6.5 oz)   02/20/25 84.1 kg (185 lb 6.4 oz)   11/01/24 83 kg (183 lb)   08/23/24 83.9 kg (185 lb)   07/17/24 83 kg (183 lb)   07/10/24 83 kg (183 lb)   06/06/24 83.9 kg (185 lb)   05/29/24 83.9 kg (185 lb)   04/30/24 79.8 kg (176 lb)   03/14/24 73.5 kg (162 lb)      Nutrition Focused Physical Exam (NFPE):   Muscle mass:  Severe temporal wasting  Subcutaneous fat: Well nourished    Fluid Accumulation: None    Renal Replacement:   Primary Cause of ESRD: Glomerulonephritis , FSGS   Pt currently on Renal Replacement: PD  Date Started: June 2023  Dialysis Facility: [facility]   MD: Gabe    PMHx:   Patient Active Problem List   Diagnosis    Hypertension    Mixed hyperlipidemia    Gastroesophageal reflux disease    Heart murmur    Chronic gout of multiple sites    Sialolithiasis    Aortic stenosis    FSGS (focal segmental glomerulosclerosis)    Skin lesions    Lump of skin of right upper extremity    Pain in the chest    Dyspnea on exertion    Chronic metabolic acidosis    Hyperphosphatemia    Coronary artery disease due to lipid rich plaque    Dyslipidemia    ESRD (end stage renal disease) (HCC)    Status post cardiac surgery    Shock (HCC)    Paroxysmal atrial flutter (HCC)    GIB (gastrointestinal bleeding)    Insomnia    Loculated pleural effusion    Anxiety    Pericardial effusion    Mitral valve mass    Hypomagnesemia    History of mechanical aortic valve replacement    C. difficile colitis    Nausea and vomiting    S/P ascending aortic aneurysm repair    A-fib (HCC)    " Splenomegaly    Physical deconditioning    Generalized abdominal pain    H/O cardiac radiofrequency ablation 12/20/23    Peritoneal dialysis status (HCC)    Lumbar radiculopathy    Obstructive sleep apnea syndrome    Osteoarthritis of spine with radiculopathy, lumbar region      Labs:   Lab Results   Component Value Date/Time    SODIUM 133 (L) 12/25/2023 0404    POTASSIUM 5.1 12/25/2023 0404    CHLORIDE 101 12/25/2023 0404    CO2 23 12/25/2023 0404    BUN 42 (H) 12/25/2023 0404    CREATININE 8.22 (HH) 12/25/2023 0404    CALCIUM 8.0 (L) 12/25/2023 0404      Lab Results   Component Value Date/Time    MAGNESIUM 1.6 12/25/2023 0404    PHOSPHORUS 3.9 12/22/2023 0401    IRON 17 (L) 12/17/2023 2345    ALBUMIN 2.1 (L) 12/25/2023 0404       Latest Reference Range & Units 10/31/23 09:25   Glycohemoglobin 4.0 - 5.6 % 5.0     Vitamins/Supplements: Probiotic   Medications:   Current Outpatient Medications   Medication Sig Refill Last Dispense    aspirin 81 MG EC tablet Take 81 mg by mouth every day.  Unknown (patient-reported)    calcitRIOL (ROCALTROL) 0.25 MCG Cap Take 1 Capsule by mouth every day. 11 Unknown (no pharmacy)    carvedilol (COREG) 3.125 MG Tab Take 2 Tablets by mouth 2 times a day with meals. 11 Unknown (outside pharmacy)    esomeprazole (NEXIUM) 20 MG capsule Take 2 Capsules by mouth every morning before breakfast. 1 Unknown (outside pharmacy)    gabapentin (NEURONTIN) 100 MG Cap Take 1 Capsule by mouth 3 times a day. 1 Unknown (outside pharmacy)    rosuvastatin (CRESTOR) 20 MG Tab Take 1 Tablet by mouth every evening. 11 Unknown (outside pharmacy)    sevelamer carbonate (RENVELA) 800 MG Tab tablet Take 3,200 mg by mouth 4 times a day.  Unknown (patient-reported)    warfarin (COUMADIN) 2.5 MG Tab Take 1-2 Tablets by mouth every day. Take as directed by anticoag clinic. 1 Unknown (outside pharmacy)      Diabetes Management:   No hx of DM    Diet & Social History     Nutrition Notes:   Diet/Restrictions: None     Appetite: Good   Meals: Small frequent meals   B: Chobani shake  L: salad w/ chx, cucumbers  D: Chx, potatoes, corn on the cob  S: Ice, Fruit popsicle, pretzels  F: Water, Strawberry lemonade, grape juice   Supplements: Stopped a couple months ago   Dinning Out Frequency: x3 days week, French food, pizza, BBQ, mexican food   ETOH: No  GI: None    Lifestyle:   Energy Level: Good  Physical activity: Walking   Activity restrictions: None   FRIED Frailty: Assessment not needed; Pt not frail  Does patient smoke? non-smoker    Summary     Handouts Provided/Education Completed:   Kidney Transplant Nutrition Education    Nutritionally Gurdeep Pedro Guerra is a transplant candidate  BMI: 29.04   A1c: 5.0%  Malnutrition Status: Well nourished  FRIED Frailty: Assessment not indicated; Not frail

## 2025-02-21 NOTE — PATIENT INSTRUCTIONS
Teddy Guerra,     It was great to meet with you for your transplant evaluation today!    As we discussed, the following immunizations are recommended prior to transplant:     Tdap booster (x 1 dose)    Please reach out with any questions or issues.     Thank you,   Merle Stapleton, PharmD  2/20/2025

## 2025-02-26 ENCOUNTER — TELEPHONE (OUTPATIENT)
Dept: VASCULAR LAB | Facility: MEDICAL CENTER | Age: 60
End: 2025-02-26
Payer: COMMERCIAL

## 2025-02-26 NOTE — TELEPHONE ENCOUNTER
"Spoke with patient to let him know per Dr. Bloch note \"labs per Cards\". Patient states understanding and does not have any further questions or concerns.           Elina García, Medical Assistant   [facility]   Ph: [phone]  Fx: [phone]    "

## 2025-02-26 NOTE — TELEPHONE ENCOUNTER
MICHAEL BLOCH    Caller: Gurdeep Guerra    Topic/issue: The patient is requesting that any lab orders that are needed for Michael Bloch be emailed to him:    [email or web address]    Callback Number: [phone]     Thank you,  Pedro CONTRERAS

## 2025-03-03 ENCOUNTER — TELEPHONE (OUTPATIENT)
Facility: MEDICAL CENTER | Age: 60
End: 2025-03-03
Payer: COMMERCIAL

## 2025-03-03 NOTE — TELEPHONE ENCOUNTER
FC called and left VM with pt. FC let pt know that NAN with his HTH to get his Transplant labs done and that he can call [phone] to schedule his labs at [address] only. FC left call back number should pt need to call FC back.

## 2025-03-04 ENCOUNTER — TELEPHONE (OUTPATIENT)
Dept: HEALTH INFORMATION MANAGEMENT | Facility: OTHER | Age: 60
End: 2025-03-04
Payer: COMMERCIAL

## 2025-03-12 ENCOUNTER — HOSPITAL ENCOUNTER (OUTPATIENT)
Dept: LAB | Facility: MEDICAL CENTER | Age: 60
End: 2025-03-12
Attending: STUDENT IN AN ORGANIZED HEALTH CARE EDUCATION/TRAINING PROGRAM
Payer: COMMERCIAL

## 2025-03-12 DIAGNOSIS — N18.6 ESRD (END STAGE RENAL DISEASE) (HCC): ICD-10-CM

## 2025-03-12 DIAGNOSIS — Z01.818 PRE-TRANSPLANT EVALUATION FOR KIDNEY TRANSPLANT: ICD-10-CM

## 2025-03-12 LAB
A1 AG RBC QL: NORMAL
A1 AG RBC QL: NORMAL
ABO + RH BLD: NORMAL
ABO + RH BLD: NORMAL
APPEARANCE UR: CLEAR
BACTERIA #/AREA URNS HPF: ABNORMAL /HPF
BASOPHILS # BLD AUTO: 0.5 % (ref 0–1.8)
BASOPHILS # BLD: 0.03 K/UL (ref 0–0.12)
BILIRUB UR QL STRIP.AUTO: NEGATIVE
CASTS URNS QL MICRO: ABNORMAL /LPF (ref 0–2)
COLOR UR: YELLOW
EOSINOPHIL # BLD AUTO: 0.42 K/UL (ref 0–0.51)
EOSINOPHIL NFR BLD: 7.1 % (ref 0–6.9)
EPITHELIAL CELLS 1715: ABNORMAL /HPF (ref 0–5)
ERYTHROCYTE [DISTWIDTH] IN BLOOD BY AUTOMATED COUNT: 49.5 FL (ref 35.9–50)
EST. AVERAGE GLUCOSE BLD GHB EST-MCNC: 111 MG/DL
GLUCOSE UR STRIP.AUTO-MCNC: NEGATIVE MG/DL
HBA1C MFR BLD: 5.5 % (ref 4–5.6)
HCT VFR BLD AUTO: 39.8 % (ref 42–52)
HGB BLD-MCNC: 12.9 G/DL (ref 14–18)
IMM GRANULOCYTES # BLD AUTO: 0.01 K/UL (ref 0–0.11)
IMM GRANULOCYTES NFR BLD AUTO: 0.2 % (ref 0–0.9)
KETONES UR STRIP.AUTO-MCNC: NEGATIVE MG/DL
LEUKOCYTE ESTERASE UR QL STRIP.AUTO: ABNORMAL
LYMPHOCYTES # BLD AUTO: 0.98 K/UL (ref 1–4.8)
LYMPHOCYTES NFR BLD: 16.6 % (ref 22–41)
MCH RBC QN AUTO: 33 PG (ref 27–33)
MCHC RBC AUTO-ENTMCNC: 32.4 G/DL (ref 32.3–36.5)
MCV RBC AUTO: 101.8 FL (ref 81.4–97.8)
MICRO URNS: ABNORMAL
MONOCYTES # BLD AUTO: 0.51 K/UL (ref 0–0.85)
MONOCYTES NFR BLD AUTO: 8.7 % (ref 0–13.4)
NEUTROPHILS # BLD AUTO: 3.94 K/UL (ref 1.82–7.42)
NEUTROPHILS NFR BLD: 66.9 % (ref 44–72)
NITRITE UR QL STRIP.AUTO: NEGATIVE
NRBC # BLD AUTO: 0 K/UL
NRBC BLD-RTO: 0 /100 WBC (ref 0–0.2)
PH UR STRIP.AUTO: 7 [PH] (ref 5–8)
PLATELET # BLD AUTO: 220 K/UL (ref 164–446)
PMV BLD AUTO: 10.2 FL (ref 9–12.9)
PROT UR QL STRIP: 100 MG/DL
RBC # BLD AUTO: 3.91 M/UL (ref 4.7–6.1)
RBC # URNS HPF: ABNORMAL /HPF (ref 0–2)
RBC UR QL AUTO: ABNORMAL
SP GR UR STRIP.AUTO: 1.01
UROBILINOGEN UR STRIP.AUTO-MCNC: 0.2 EU/DL
WBC # BLD AUTO: 5.9 K/UL (ref 4.8–10.8)
WBC #/AREA URNS HPF: ABNORMAL /HPF

## 2025-03-12 PROCEDURE — 83735 ASSAY OF MAGNESIUM: CPT

## 2025-03-12 PROCEDURE — 85025 COMPLETE CBC W/AUTO DIFF WBC: CPT

## 2025-03-12 PROCEDURE — 86900 BLOOD TYPING SEROLOGIC ABO: CPT

## 2025-03-12 PROCEDURE — 86780 TREPONEMA PALLIDUM: CPT

## 2025-03-12 PROCEDURE — 87536 HIV-1 QUANT&REVRSE TRNSCRPJ: CPT

## 2025-03-12 PROCEDURE — 80307 DRUG TEST PRSMV CHEM ANLYZR: CPT

## 2025-03-12 PROCEDURE — 86682 HELMINTH ANTIBODY: CPT

## 2025-03-12 PROCEDURE — 86765 RUBEOLA ANTIBODY: CPT

## 2025-03-12 PROCEDURE — 86480 TB TEST CELL IMMUN MEASURE: CPT

## 2025-03-12 PROCEDURE — 81001 URINALYSIS AUTO W/SCOPE: CPT

## 2025-03-12 PROCEDURE — 87517 HEPATITIS B DNA QUANT: CPT

## 2025-03-12 PROCEDURE — 86708 HEPATITIS A ANTIBODY: CPT

## 2025-03-12 PROCEDURE — 86704 HEP B CORE ANTIBODY TOTAL: CPT

## 2025-03-12 PROCEDURE — 86644 CMV ANTIBODY: CPT

## 2025-03-12 PROCEDURE — 86696 HERPES SIMPLEX TYPE 2 TEST: CPT | Mod: MCR

## 2025-03-12 PROCEDURE — 87389 HIV-1 AG W/HIV-1&-2 AB AG IA: CPT

## 2025-03-12 PROCEDURE — 86905 BLOOD TYPING RBC ANTIGENS: CPT

## 2025-03-12 PROCEDURE — 86694 HERPES SIMPLEX NES ANTBDY: CPT

## 2025-03-12 PROCEDURE — 86695 HERPES SIMPLEX TYPE 1 TEST: CPT | Mod: MCR

## 2025-03-12 PROCEDURE — 87522 HEPATITIS C REVRS TRNSCRPJ: CPT

## 2025-03-12 PROCEDURE — 85610 PROTHROMBIN TIME: CPT

## 2025-03-12 PROCEDURE — 84153 ASSAY OF PSA TOTAL: CPT

## 2025-03-12 PROCEDURE — 83036 HEMOGLOBIN GLYCOSYLATED A1C: CPT

## 2025-03-12 PROCEDURE — 86706 HEP B SURFACE ANTIBODY: CPT

## 2025-03-12 PROCEDURE — 86641 CRYPTOCOCCUS ANTIBODY: CPT

## 2025-03-12 PROCEDURE — 86901 BLOOD TYPING SEROLOGIC RH(D): CPT | Mod: 91

## 2025-03-12 PROCEDURE — 86777 TOXOPLASMA ANTIBODY: CPT

## 2025-03-12 PROCEDURE — 36415 COLL VENOUS BLD VENIPUNCTURE: CPT

## 2025-03-12 PROCEDURE — 86735 MUMPS ANTIBODY: CPT

## 2025-03-12 PROCEDURE — 87340 HEPATITIS B SURFACE AG IA: CPT

## 2025-03-12 PROCEDURE — 80053 COMPREHEN METABOLIC PANEL: CPT

## 2025-03-12 PROCEDURE — 84100 ASSAY OF PHOSPHORUS: CPT

## 2025-03-12 PROCEDURE — 86803 HEPATITIS C AB TEST: CPT

## 2025-03-12 PROCEDURE — 86762 RUBELLA ANTIBODY: CPT

## 2025-03-12 PROCEDURE — 80061 LIPID PANEL: CPT

## 2025-03-12 PROCEDURE — 85730 THROMBOPLASTIN TIME PARTIAL: CPT

## 2025-03-12 PROCEDURE — 86635 COCCIDIOIDES ANTIBODY: CPT | Mod: 91

## 2025-03-12 PROCEDURE — 86787 VARICELLA-ZOSTER ANTIBODY: CPT

## 2025-03-12 PROCEDURE — 86665 EPSTEIN-BARR CAPSID VCA: CPT

## 2025-03-13 ENCOUNTER — DOCUMENTATION (OUTPATIENT)
Facility: MEDICAL CENTER | Age: 60
End: 2025-03-13
Payer: COMMERCIAL

## 2025-03-13 DIAGNOSIS — I48.91 ATRIAL FIBRILLATION, UNSPECIFIED TYPE (HCC): ICD-10-CM

## 2025-03-13 DIAGNOSIS — Z01.818 PRE-TRANSPLANT EVALUATION FOR KIDNEY TRANSPLANT: ICD-10-CM

## 2025-03-13 DIAGNOSIS — I71.9 AORTIC ANEURYSM, UNSPECIFIED PORTION OF AORTA, UNSPECIFIED WHETHER RUPTURED (HCC): ICD-10-CM

## 2025-03-13 DIAGNOSIS — Z95.2 HISTORY OF MECHANICAL AORTIC VALVE REPLACEMENT: ICD-10-CM

## 2025-03-13 LAB
ALBUMIN SERPL BCP-MCNC: 3.7 G/DL (ref 3.2–4.9)
ALBUMIN/GLOB SERPL: 1.3 G/DL
ALP SERPL-CCNC: 64 U/L (ref 30–99)
ALT SERPL-CCNC: 18 U/L (ref 2–50)
ANION GAP SERPL CALC-SCNC: 18 MMOL/L (ref 7–16)
APTT PPP: 36.6 SEC (ref 24.7–36)
AST SERPL-CCNC: 17 U/L (ref 12–45)
BILIRUB SERPL-MCNC: 0.3 MG/DL (ref 0.1–1.5)
BUN SERPL-MCNC: 84 MG/DL (ref 8–22)
CALCIUM ALBUM COR SERPL-MCNC: 9.6 MG/DL (ref 8.5–10.5)
CALCIUM SERPL-MCNC: 9.4 MG/DL (ref 8.5–10.5)
CHLORIDE SERPL-SCNC: 99 MMOL/L (ref 96–112)
CHOLEST SERPL-MCNC: 205 MG/DL (ref 100–199)
CO2 SERPL-SCNC: 21 MMOL/L (ref 20–33)
CREAT SERPL-MCNC: 10.9 MG/DL (ref 0.5–1.4)
GFR SERPLBLD CREATININE-BSD FMLA CKD-EPI: 5 ML/MIN/1.73 M 2
GLOBULIN SER CALC-MCNC: 2.9 G/DL (ref 1.9–3.5)
GLUCOSE SERPL-MCNC: 76 MG/DL (ref 65–99)
HBV CORE AB SERPL QL IA: NONREACTIVE
HBV SURFACE AB SERPL IA-ACNC: 29.2 MIU/ML (ref 0–10)
HBV SURFACE AG SER QL: NORMAL
HCV AB SER QL: NORMAL
HDLC SERPL-MCNC: 33 MG/DL
HIV 1+2 AB+HIV1 P24 AG SERPL QL IA: NORMAL
INR PPP: 1.91 (ref 0.87–1.13)
LDLC SERPL CALC-MCNC: 128 MG/DL
MAGNESIUM SERPL-MCNC: 1.4 MG/DL (ref 1.5–2.5)
PHOSPHATE SERPL-MCNC: 6.4 MG/DL (ref 2.5–4.5)
POTASSIUM SERPL-SCNC: 4.8 MMOL/L (ref 3.6–5.5)
PROT SERPL-MCNC: 6.6 G/DL (ref 6–8.2)
PROTHROMBIN TIME: 22 SEC (ref 12–14.6)
PSA SERPL DL<=0.01 NG/ML-MCNC: 0.84 NG/ML (ref 0–4)
SODIUM SERPL-SCNC: 138 MMOL/L (ref 135–145)
T PALLIDUM AB SER QL IA: NORMAL
TRIGL SERPL-MCNC: 218 MG/DL (ref 0–149)

## 2025-03-13 NOTE — Clinical Note
Member Name: Gurdeep Guerra   Member Number: [id number]   Reference Number: [id number]   Approved Services: Consultation   Approved Service Dates: 03/13/2025 - 03/13/2026   Requesting Provider: Hilda Miller   Requested Provider: Michael J Bloch     Dear Gurdeep Guerra:     The following medical service(s) requested by Hilda Miller have been approved:    Procedure Code Procedure Code Name Requested Quantity Approved Quantity Status   02656 (CPT®) [place] OFFICE/OUTPATIENT NEW MODERATE MDM 45 MINUTES 1 1 Authorized   18118 (CPT®) [place] OFFICE/OUTPATIENT ESTABLISHED MOD MDM 30 MIN 5 5 Authorized       Approved Quantity means the number of visits approved for medication treatments and/or medical services.    The services should be provided by Michael J Bloch no later than 03/13/2026. Please contact the provider listed below with any questions.     Provider Information:  Michael J Bloch  [phone]    Your plan benefit may require a deductible, co-payment or coinsurance for these services. This authorization does not guarantee [employer] will pay the claim for services that you receive. Payment by [employer] for these services is subject to the terms of your Evidence of Coverage or Summary Plan Description, your eligibility at the time of service, and confirmation of benefit coverage.    For any questions or additional information, please contact Customer Service:    [employer]  Customer Service: [phone] or toll free [phone]  TTY users dial: [phone]   Call Center Hours: Mon - Fri 7 AM to 8 PM PST   Office Hours: Mon - Fri 8 AM to 5 PM [facility]   E-mail: [email or web address]   Website: [email or web address]       This information is available for free in other languages. Please contact Customer Service at the phone number above for more information. [employer] complies with applicable Federal civil rights laws and does not discriminate on the basis of race, color,  national origin, age, disability or sex.      Sincerely,     Healthcare Utilization Management Department     Cc: Michael J Bloch Narisorn Atsava-Svate

## 2025-03-13 NOTE — PROGRESS NOTES
Lab Notification:  The lab reported a critical lab result for Gurdeep Guerra (MRN: [id number]).  The critical result was identified as Creatinine of 10.90.    Provider Notification:  Dr. DANDRE Miller was notified of the critical result at 0850.    Orders Received:  [] Yes  [x] No  If Yes, specify: N/A    Actions Taken:   N/A    Electronically Signed:  Tracie German R.N.   03/13/25, 8:51 AM  Transplant Coordinator  [facility] [place]

## 2025-03-14 LAB
AMPHET CTO UR CFM-MCNC: NEGATIVE NG/ML
BARBITURATES CTO UR CFM-MCNC: NEGATIVE NG/ML
BENZODIAZ CTO UR CFM-MCNC: NEGATIVE NG/ML
CANNABINOIDS CTO UR CFM-MCNC: NEGATIVE NG/ML
CMV IGG SERPL IA-ACNC: >10 U/ML
COCAINE CTO UR CFM-MCNC: NEGATIVE NG/ML
CREAT UR-MCNC: 96.5 MG/DL (ref 20–400)
DRUG COMMENT 753798: NORMAL
EBV VCA IGG SER IA-ACNC: >750 U/ML (ref 0–21.9)
GAMMA INTERFERON BACKGROUND BLD IA-ACNC: 0.04 IU/ML
HCV RNA SERPL NAA+PROBE-ACNC: NOT DETECTED IU/ML
HCV RNA SERPL NAA+PROBE-LOG IU: NOT DETECTED LOG IU/ML
HCV RNA SERPL QL NAA+PROBE: NOT DETECTED
HSV1 GG IGG SER-ACNC: 4.59 IV
HSV1+2 IGG SER IA-ACNC: 5.4 IV
HSV2 GG IGG SER-ACNC: 0.08 IV
M TB IFN-G BLD-IMP: NEGATIVE
M TB IFN-G CD4+ BCKGRND COR BLD-ACNC: 0 IU/ML
METHADONE CTO UR CFM-MCNC: NEGATIVE NG/ML
MEV IGG SER-ACNC: 133 AU/ML
MITOGEN IGNF BCKGRD COR BLD-ACNC: 9.96 IU/ML
MUV IGG SER IA-ACNC: 9.6 AU/ML
OPIATES CTO UR CFM-MCNC: NEGATIVE NG/ML
PCP CTO UR CFM-MCNC: NEGATIVE NG/ML
PROPOXYPH CTO UR CFM-MCNC: NEGATIVE NG/ML
QFT TB2 - NIL TBQ2: 0 IU/ML
RUBV AB SER QL: >500 IU/ML
T GONDII IGG SER-ACNC: <3 IU/ML
VZV IGG SER IA-ACNC: 39.9 S/CO

## 2025-03-15 LAB
HAV AB SER QL IA: POSITIVE
HBV DNA SERPL NAA+PROBE-ACNC: NOT DETECTED IU/ML
HBV DNA SERPL NAA+PROBE-LOG IU: NOT DETECTED LOG IU/ML
HBV DNA SERPL QL NAA+PROBE: NOT DETECTED
HIV-1 NAAT (COPIES/ML) L204479A: NOT DETECTED CPY/ML
HIV-1 NAAT (LOG COPIES/ML) L295410: NOT DETECTED LOG CPY/ML
HIV1 RNA SERPL QL NAA+PROBE: NOT DETECTED
STRONGYLOIDES IGG SER IA-ACNC: 0.1 IV

## 2025-03-16 LAB
COCCIDIOIDES IGG SER-ACNC: 0.2 IV
COCCIDIOIDES IGM SERPL-ACNC: 0.1 IV

## 2025-03-19 LAB — C NEOFORM AB TITR SER AGGL: NEGATIVE {TITER}

## 2025-03-24 ENCOUNTER — HOSPITAL ENCOUNTER (EMERGENCY)
Facility: MEDICAL CENTER | Age: 60
End: 2025-03-24
Attending: EMERGENCY MEDICINE
Payer: COMMERCIAL

## 2025-03-24 ENCOUNTER — APPOINTMENT (OUTPATIENT)
Dept: RADIOLOGY | Facility: MEDICAL CENTER | Age: 60
End: 2025-03-24
Attending: EMERGENCY MEDICINE
Payer: COMMERCIAL

## 2025-03-24 VITALS
RESPIRATION RATE: 16 BRPM | WEIGHT: 185 LBS | TEMPERATURE: 97.8 F | OXYGEN SATURATION: 94 % | HEART RATE: 65 BPM | HEIGHT: 67 IN | DIASTOLIC BLOOD PRESSURE: 78 MMHG | SYSTOLIC BLOOD PRESSURE: 178 MMHG | BODY MASS INDEX: 29.03 KG/M2

## 2025-03-24 DIAGNOSIS — K40.90 LEFT INGUINAL HERNIA: ICD-10-CM

## 2025-03-24 DIAGNOSIS — N18.9 CHRONIC RENAL FAILURE, UNSPECIFIED CKD STAGE: ICD-10-CM

## 2025-03-24 LAB
ALBUMIN SERPL BCP-MCNC: 3.5 G/DL (ref 3.2–4.9)
ALBUMIN/GLOB SERPL: 1.3 G/DL
ALP SERPL-CCNC: 66 U/L (ref 30–99)
ALT SERPL-CCNC: 14 U/L (ref 2–50)
ANION GAP SERPL CALC-SCNC: 18 MMOL/L (ref 7–16)
AST SERPL-CCNC: 15 U/L (ref 12–45)
BASOPHILS # BLD AUTO: 0.5 % (ref 0–1.8)
BASOPHILS # BLD: 0.03 K/UL (ref 0–0.12)
BILIRUB SERPL-MCNC: 0.3 MG/DL (ref 0.1–1.5)
BUN SERPL-MCNC: 89 MG/DL (ref 8–22)
CALCIUM ALBUM COR SERPL-MCNC: 9.4 MG/DL (ref 8.5–10.5)
CALCIUM SERPL-MCNC: 9 MG/DL (ref 8.5–10.5)
CHLORIDE SERPL-SCNC: 100 MMOL/L (ref 96–112)
CO2 SERPL-SCNC: 20 MMOL/L (ref 20–33)
CREAT SERPL-MCNC: 11.3 MG/DL (ref 0.5–1.4)
EKG IMPRESSION: NORMAL
EOSINOPHIL # BLD AUTO: 0.32 K/UL (ref 0–0.51)
EOSINOPHIL NFR BLD: 5.3 % (ref 0–6.9)
ERYTHROCYTE [DISTWIDTH] IN BLOOD BY AUTOMATED COUNT: 47.6 FL (ref 35.9–50)
GFR SERPLBLD CREATININE-BSD FMLA CKD-EPI: 5 ML/MIN/1.73 M 2
GLOBULIN SER CALC-MCNC: 2.7 G/DL (ref 1.9–3.5)
GLUCOSE SERPL-MCNC: 110 MG/DL (ref 65–99)
HCT VFR BLD AUTO: 37.4 % (ref 42–52)
HGB BLD-MCNC: 12.3 G/DL (ref 14–18)
IMM GRANULOCYTES # BLD AUTO: 0.02 K/UL (ref 0–0.11)
IMM GRANULOCYTES NFR BLD AUTO: 0.3 % (ref 0–0.9)
LIPASE SERPL-CCNC: 44 U/L (ref 11–82)
LYMPHOCYTES # BLD AUTO: 0.88 K/UL (ref 1–4.8)
LYMPHOCYTES NFR BLD: 14.7 % (ref 22–41)
MCH RBC QN AUTO: 31.9 PG (ref 27–33)
MCHC RBC AUTO-ENTMCNC: 32.9 G/DL (ref 32.3–36.5)
MCV RBC AUTO: 96.9 FL (ref 81.4–97.8)
MONOCYTES # BLD AUTO: 0.42 K/UL (ref 0–0.85)
MONOCYTES NFR BLD AUTO: 7 % (ref 0–13.4)
NEUTROPHILS # BLD AUTO: 4.33 K/UL (ref 1.82–7.42)
NEUTROPHILS NFR BLD: 72.2 % (ref 44–72)
NRBC # BLD AUTO: 0 K/UL
NRBC BLD-RTO: 0 /100 WBC (ref 0–0.2)
PLATELET # BLD AUTO: 210 K/UL (ref 164–446)
PMV BLD AUTO: 9.9 FL (ref 9–12.9)
POTASSIUM SERPL-SCNC: 4.7 MMOL/L (ref 3.6–5.5)
PROT SERPL-MCNC: 6.2 G/DL (ref 6–8.2)
RBC # BLD AUTO: 3.86 M/UL (ref 4.7–6.1)
SODIUM SERPL-SCNC: 138 MMOL/L (ref 135–145)
TROPONIN T SERPL-MCNC: 340 NG/L (ref 6–19)
TROPONIN T SERPL-MCNC: 359 NG/L (ref 6–19)
WBC # BLD AUTO: 6 K/UL (ref 4.8–10.8)

## 2025-03-24 PROCEDURE — 700117 HCHG RX CONTRAST REV CODE 255: Performed by: EMERGENCY MEDICINE

## 2025-03-24 PROCEDURE — 80053 COMPREHEN METABOLIC PANEL: CPT

## 2025-03-24 PROCEDURE — 84484 ASSAY OF TROPONIN QUANT: CPT

## 2025-03-24 PROCEDURE — 99285 EMERGENCY DEPT VISIT HI MDM: CPT

## 2025-03-24 PROCEDURE — 96374 THER/PROPH/DIAG INJ IV PUSH: CPT

## 2025-03-24 PROCEDURE — 93005 ELECTROCARDIOGRAM TRACING: CPT | Mod: TC | Performed by: EMERGENCY MEDICINE

## 2025-03-24 PROCEDURE — 83690 ASSAY OF LIPASE: CPT

## 2025-03-24 PROCEDURE — 700111 HCHG RX REV CODE 636 W/ 250 OVERRIDE (IP): Mod: JZ | Performed by: EMERGENCY MEDICINE

## 2025-03-24 PROCEDURE — 36415 COLL VENOUS BLD VENIPUNCTURE: CPT

## 2025-03-24 PROCEDURE — 96375 TX/PRO/DX INJ NEW DRUG ADDON: CPT

## 2025-03-24 PROCEDURE — 93005 ELECTROCARDIOGRAM TRACING: CPT | Mod: TC

## 2025-03-24 PROCEDURE — 85025 COMPLETE CBC W/AUTO DIFF WBC: CPT

## 2025-03-24 PROCEDURE — 74177 CT ABD & PELVIS W/CONTRAST: CPT

## 2025-03-24 RX ORDER — ONDANSETRON 2 MG/ML
4 INJECTION INTRAMUSCULAR; INTRAVENOUS ONCE
Status: COMPLETED | OUTPATIENT
Start: 2025-03-24 | End: 2025-03-24

## 2025-03-24 RX ORDER — OXYCODONE HYDROCHLORIDE 5 MG/1
5 TABLET ORAL EVERY 4 HOURS PRN
Qty: 16 TABLET | Refills: 0 | Status: SHIPPED | OUTPATIENT
Start: 2025-03-24 | End: 2025-03-29

## 2025-03-24 RX ORDER — MORPHINE SULFATE 4 MG/ML
4 INJECTION INTRAVENOUS ONCE
Status: COMPLETED | OUTPATIENT
Start: 2025-03-24 | End: 2025-03-24

## 2025-03-24 RX ADMIN — MORPHINE SULFATE 4 MG: 4 INJECTION, SOLUTION INTRAMUSCULAR; INTRAVENOUS at 11:06

## 2025-03-24 RX ADMIN — IOHEXOL 100 ML: 350 INJECTION, SOLUTION INTRAVENOUS at 12:15

## 2025-03-24 RX ADMIN — ONDANSETRON 4 MG: 2 INJECTION INTRAMUSCULAR; INTRAVENOUS at 11:05

## 2025-03-24 ASSESSMENT — FIBROSIS 4 INDEX: FIB4 SCORE: 1.06

## 2025-03-24 NOTE — ED TRIAGE NOTES
"Chief Complaint   Patient presents with    Abdominal Pain     Starts in abdomen and radiates up towards chest. Onset was around 0700. Reports pain is worse on LQ and is painful to palpation. Denies N/V. Denies any changes in BM, reports due to dialysis has minimal urine output. Receives [facility] dialysis every day in the evening.      /83   Pulse (!) 58   Temp 36.1 °C (97 °F)   Resp 18   Ht 1.702 m (5' 7\")   Wt 83.9 kg (185 lb)   SpO2 97%   BMI 28.98 kg/m²     Pt assisted to triage by wheelchair. Abdominal protocol ordered. EKG completed.   Aortic valve replacement 11/23, kidney disease.   Placed pt back w/ phlebotomy, educated on NPO status.     "

## 2025-03-24 NOTE — DISCHARGE INSTRUCTIONS
Return to the emergency department for a lump that cannot be reduced or pushed back into, uncontrollable pain, vomiting or any concerns.  Please consider scheduling appointment/contacting your specialist both cardiologist and nephrologist for clearance to undergo outpatient surgery for hernia.    If using pain medication, please use a laxative to avoid constipation, straining can worsen a hernia

## 2025-03-24 NOTE — ED NOTES
Reviewed discharge instructions with patient. Verbalized understanding. Patient leaving ER in stable condition.   Patient given juice and water while he waits in lobby for family to arrive.

## 2025-03-24 NOTE — ED PROVIDER NOTES
"ED Provider Note    CHIEF COMPLAINT  Chief Complaint   Patient presents with    Abdominal Pain     Starts in abdomen and radiates up towards chest. Onset was around 0700. Reports pain is worse on LQ and is painful to palpation. Denies N/V. Denies any changes in BM, reports due to dialysis has minimal urine output. Receives [facility] dialysis every day in the evening.        EXTERNAL RECORDS REVIEWED  Outpatient labs & studies fluid in the left inguinal canal on CT scan abdomen pelvis December 31, 2024    HPI/ROS  LIMITATION TO HISTORY   Select: : None  OUTSIDE HISTORIAN(S):  Friend at bedside for discussion history and symptoms    Gurdeep Guerra is a 60 y.o. male who presents with left inguinal pain, lump.  This started 3 hours ago and gradually is worsened.  He describes the pain currently as severe radiating upward.  Currently no chest pain or difficulty breathing.  No fever or chills.  No trauma.  It also radiates into the left testicle.  No dysuria.  No trauma.  No flank pain.  Patient believes he has inguinal hernia, CT scan performed at [facility] December 31, 2024 showed \"fluid in the left inguinal canal\".  No vomiting.  No sciatica    PAST MEDICAL HISTORY   has a past medical history of Anesthesia, Aortic stenosis, Chronic gout of multiple sites, Dialysis patient ([facility]), Dyspnea on exertion (05/23/2023), Dyspnea on exertion (05/23/2023), Elevated troponin (05/23/2023), Heart burn, Heart murmur, High cholesterol, Hypertension (2009), Liver cirrhosis ([facility]) (12/21/2023), NSTEMI (non-ST elevated myocardial infarction) ([facility]) (05/23/2023), Pain in the chest (05/23/2023), Pain in the chest (05/23/2023), Paroxysmal A-fib ([facility]) (11/11/2023), Peritoneal dialysis status ([facility]) (04/30/2024), PONV (postoperative nausea and vomiting), Renal disorder (2007), Sleep apnea (2000), Snoring (1990), and Splenomegaly (12/21/2023).    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    FAMILY HISTORY  Family History   Problem Relation Age of Onset    Hyperlipidemia Mother     Heart Disease Mother     Hypertension Father        SOCIAL HISTORY  Social History     Tobacco Use    Smoking status: Never    Smokeless tobacco: Never   Vaping Use    Vaping status: Never Used   Substance and Sexual Activity    Alcohol use: Never    Drug use: Never    Sexual activity: Yes     Partners: Female     Birth control/protection: Pill       CURRENT MEDICATIONS  Home Medications       Reviewed by Tiffany Fountain R.N. (Registered Nurse) on 03/24/25 at 1022  Med List Status: Partial     Medication Last Dose Status   acidophilus lactobacillus Cap  Active   aspirin 81 MG EC tablet  Active   calcitRIOL (ROCALTROL) 0.25 MCG Cap  Active   carvedilol (COREG) 3.125 MG Tab  Active   esomeprazole (NEXIUM) 20 MG capsule  Active   gabapentin (NEURONTIN) 100 MG Cap  Active   rosuvastatin (CRESTOR) 20 MG Tab  Active   sevelamer carbonate (RENVELA) 800 MG Tab tablet  Active   warfarin (COUMADIN) 2.5 MG Tab  Active                  Audit from Redirected Encounters    **Home medications have not yet been reviewed for this encounter**         ALLERGIES  Allergies   Allergen Reactions    Allopurinol Itching    Hydralazine Hcl Unspecified     Pt states he had a reaction similar to lupus       PHYSICAL EXAM  VITAL SIGNS: /83   Pulse (!) 58   Temp 36.1 °C (97 °F)   Resp 18   Ht 1.702 m (5' 7\")   Wt 83.9 kg (185 lb)  "  SpO2 97%   BMI 28.98 kg/m²    GI: Abdomen soft and nontender  Skin: No cellulitis, no bruising  Genitourinary: Tender palpable lump left inguinal canal consistent with incarcerated hernia.  No testicular swelling.  Left testicle is tender however it is not retracted, no erythematous change or swelling.  Cardiac: Regular rate, regular rhythm  Respiratory: Clear lung sounds  Musculoskeletal: No flank tenderness      EKG/LABS  Results for orders placed or performed during the hospital encounter of 03/24/25   CBC WITH DIFFERENTIAL    Collection Time: 03/24/25 10:30 AM   Result Value Ref Range    WBC 6.0 4.8 - 10.8 K/uL    RBC 3.86 (L) 4.70 - 6.10 M/uL    Hemoglobin 12.3 (L) 14.0 - 18.0 g/dL    Hematocrit 37.4 (L) 42.0 - 52.0 %    MCV 96.9 81.4 - 97.8 fL    MCH 31.9 27.0 - 33.0 pg    MCHC 32.9 32.3 - 36.5 g/dL    RDW 47.6 35.9 - 50.0 fL    Platelet Count 210 164 - 446 K/uL    MPV 9.9 9.0 - 12.9 fL    Neutrophils-Polys 72.20 (H) 44.00 - 72.00 %    Lymphocytes 14.70 (L) 22.00 - 41.00 %    Monocytes 7.00 0.00 - 13.40 %    Eosinophils 5.30 0.00 - 6.90 %    Basophils 0.50 0.00 - 1.80 %    Immature Granulocytes 0.30 0.00 - 0.90 %    Nucleated RBC 0.00 0.00 - 0.20 /100 WBC    Neutrophils (Absolute) 4.33 1.82 - 7.42 K/uL    Lymphs (Absolute) 0.88 (L) 1.00 - 4.80 K/uL    Monos (Absolute) 0.42 0.00 - 0.85 K/uL    Eos (Absolute) 0.32 0.00 - 0.51 K/uL    Baso (Absolute) 0.03 0.00 - 0.12 K/uL    Immature Granulocytes (abs) 0.02 0.00 - 0.11 K/uL    NRBC (Absolute) 0.00 K/uL   COMP METABOLIC PANEL    Collection Time: 03/24/25 10:30 AM   Result Value Ref Range    Sodium 138 135 - 145 mmol/L    Potassium 4.7 3.6 - 5.5 mmol/L    Chloride 100 96 - 112 mmol/L    Co2 20 20 - 33 mmol/L    Anion Gap 18.0 (H) 7.0 - 16.0    Glucose 110 (H) 65 - 99 mg/dL    Bun 89 (H) 8 - 22 mg/dL    Creatinine 11.30 (HH) 0.50 - 1.40 mg/dL    Calcium 9.0 8.5 - 10.5 mg/dL    Correct Calcium 9.4 8.5 - 10.5 mg/dL    AST(SGOT) 15 12 - 45 U/L    ALT(SGPT) 14 2 - 50  U/L    Alkaline Phosphatase 66 30 - 99 U/L    Total Bilirubin 0.3 0.1 - 1.5 mg/dL    Albumin 3.5 3.2 - 4.9 g/dL    Total Protein 6.2 6.0 - 8.2 g/dL    Globulin 2.7 1.9 - 3.5 g/dL    A-G Ratio 1.3 g/dL   LIPASE    Collection Time: 25 10:30 AM   Result Value Ref Range    Lipase 44 11 - 82 U/L   Troponin    Collection Time: 25 10:30 AM   Result Value Ref Range    Troponin T 359 (H) 6 - 19 ng/L   ESTIMATED GFR    Collection Time: 25 10:30 AM   Result Value Ref Range    GFR (CKD-EPI) 5 (A) >60 mL/min/1.73 m 2   TROPONIN    Collection Time: 25 12:24 PM   Result Value Ref Range    Troponin T 340 (H) 6 - 19 ng/L   EKG    Collection Time: 25  2:22 PM   Result Value Ref Range    Report       [facility] Emergency Dept.    Test Date:  2025  Pt Name:    LORIE KAYE               Department: ER  MRN:        [id number]                      Room:  Gender:     Male                         Technician: [id number]  :        [id number]                   Requested By:ER TRIAGE PROTOCOL  Order #:    [id number]                    Reading MD: PAMELA REILLY MD    Measurements  Intervals                                Axis  Rate:       82                           P:          35  MN:         203                          QRS:        -14  QRSD:       94                           T:          54  QT:         449  QTc:        525    Interpretive Statements  Sinus rhythm  Ventricular bigeminy  Borderline prolonged MN interval  Compared to ECG 01/10/2024 12:36:41  Ventricular premature complex(es) now present    Electronically Signed On 2025 14:22:00 PDT by PAMELA REILLY MD         I have independently interpreted this EKG    RADIOLOGY/PROCEDURES   I have independently interpreted the diagnostic imaging associated with this visit and am waiting the final reading from the radiologist.   My preliminary interpretation is as follows: CT scan abdomen pelvis negative for kidney stone, no  bowel obstruction or free air    Radiologist interpretation:  CT-ABDOMEN-PELVIS WITH   Final Result      1.  No acute inflammation in the abdomen or pelvis.   2.  Increased complexity of a 1.5 cm inferior pole left renal lesion. Recommend nonemergent, outpatient MRI renal protocol for further evaluation.   3.  Small hiatal hernia.   4.  Colonic diverticulosis.   5.  Trace ascites.   6.  Cardiomegaly.   7.  Atherosclerotic changes. Coronary artery calcifications.   8.  Appendicolith without evidence of acute appendicitis.          COURSE & MEDICAL DECISION MAKING    ASSESSMENT, COURSE AND PLAN  Care Narrative: Patient presented with incarcerated painful left inguinal hernia.  He was treated with pain medicine, just prior to CT scan, he felt the hernia slipped back inside.  His CT scan is unremarkable at this time, no evidence of incarcerated hernia, no stones, no bowel obstruction.  Incidental finding of appendicolith without evidence of acute appendicitis.  Patient does not have tenderness in his right lower abdomen.  Patient has requested a referral to his last surgeon Dr. Sales which was provided.  At this time with hernia reduced and pain improved, he does not require emergent surgery.  Patient has a history of both renal failure as well as prior heart problems.  He is encouraged to follow-up with both his nephrologist and cardiologist for clearance to have hernia surgery if possible.  Patient initially felt the pain from his hernia radiate up his abdomen into his chest.  Serial troponins are both elevated but stable, they are consistent with his baseline troponins from 2 years ago, elevated secondary to his chronic renal failure.  Currently he is comfortable, no chest pain.  Patient has requested pain medication, oxycodone has been prescribed.  His dose of morphine today helped him reduce his hernia.  He is plan to use the oxycodone only as needed in case the hernia incarcerates again.  He is aware this can  cause constipation which can cause straining and worsen hernia, he is advised to use laxatives if needed to help prevent constipation    Narcotics Script: In prescribing controlled substances to this patient, I certify that I have obtained and reviewed the medical history of Gurdeep Guerra. I have also made a good ashleigh effort to obtain applicable records from other providers who have treated the patient and records did not demonstrate any increased risk of substance abuse that would prevent me from prescribing controlled substances.     I have conducted a physical exam and documented it. I have reviewed Mr. Guerra’s prescription history as maintained by the [place] Prescription Monitoring Program.     I have assessed the patient’s risk for abuse, dependency, and addiction using the validated Opioid Risk Tool available at https://www.mdcalc.com/alpftw-xscv-zfrx-ort-narcotic-abuse.     Given the above, I believe the benefits of controlled substance therapy outweigh the risks. The reasons for prescribing controlled substances include non-narcotic, oral analgesic alternatives have been inadequate for pain control. Accordingly, I have discussed the risk and benefits, treatment plan, and alternative therapies with the patient.           ADDITIONAL PROBLEMS MANAGED  Chest pain, radiating from his lower abdomen: EKG negative for ischemic change, serial troponins are stable, no evidence of ACS or MI today    DISPOSITION AND DISCUSSIONS    Escalation of care considered, and ultimately not performed:acute inpatient care management, however at this time, the patient is most appropriate for outpatient management      Decision tools and prescription drugs considered including, but not limited to: Antibiotics are not indicated, .  No evidence of acute bacterial infection    FINAL DIAGNOSIS  1. Left inguinal hernia    2. Chronic renal failure, unspecified CKD stage         Electronically signed by: Salty Glaser M.D.,  3/24/2025 11:00 AM

## 2025-03-28 ENCOUNTER — TELEPHONE (OUTPATIENT)
Facility: MEDICAL CENTER | Age: 60
End: 2025-03-28
Payer: COMMERCIAL

## 2025-03-28 NOTE — TELEPHONE ENCOUNTER
Called recipient to discuss process for referring living donors. Explained that [facility] is now able to do intake's with potential living donors. Instructed recipient to have their potential living donors call us at [phone]. Explained that we can NOT contact their potential donor initially, they have to make the first contact. Recipient confirmed understanding. Also explained that some insurances require the recipient to be listed for  donors before their living donor can be worked up, patient confirmed understanding.     Continued to stress the importance of HIPAA and that we will not contact them with any information regarding their potential living donor and vice versa.       Gurdeep reports his twin brother was initially worked up at [facility] many years ago, but was not the same blood type. Then he moved to the [place]. We talked about us maybe not being ready to paired exchange, but will call him back with more information. Also discussed his brother living in the [place] as not a rule out, but just takes more coordination. He confirmed understanding.

## 2025-03-28 NOTE — Clinical Note
Member Name: Gurdeep Guerra   Member Number: [id number]   Reference Number: [id number]   Approved Services: Consultation   Approved Service Dates: 03/24/2025 - 03/24/2026   Requesting Provider: Salty Glaser   Requested Provider: [facility]     Dear Gurdeep Guerra:     The following medical service(s) requested by Salty Glaser have been approved:    Procedure Code Procedure Code Name Requested Quantity Approved Quantity Status   39292 (CPT®) [place] OFFICE/OUTPATIENT NEW MODERATE MDM 45 MINUTES 1 1 Authorized   45397 (CPT®) [place] OFFICE/OUTPATIENT ESTABLISHED MOD MDM 30 MIN 5 5 Authorized       Approved Quantity means the number of visits approved for medication treatments and/or medical services.    The services should be provided by [facility] no later than 03/24/2026. Please contact the provider listed below with any questions.     Provider Information:  [facility]  [phone]    Your plan benefit may require a deductible, co-payment or coinsurance for these services. This authorization does not guarantee [facility] will pay the claim for services that you receive. Payment by [facility] for these services is subject to the terms of your Evidence of Coverage or Summary Plan Description, your eligibility at the time of service, and confirmation of benefit coverage.    For any questions or additional information, please contact Customer Service:    [facility]  Customer Service: [phone] or toll free [phone]  TTY users dial: [phone]   Call Center Hours: Mon - Fri 7 AM to 8 PM PST   Office Hours: Mon - Fri 8 AM to 5 PM [facility]   E-mail: [email or web address]   Website: [email or web address]       This information is available for free in other languages. Please contact Customer Service at the phone number above for more information. [facility] complies with applicable Federal civil rights laws and does not discriminate on the basis of race,  color, national origin, age, disability or sex.      Sincerely,     Healthcare Utilization Management Department     Cc: [facility]   Salty Glaser

## 2025-04-09 ENCOUNTER — APPOINTMENT (OUTPATIENT)
Dept: MEDICAL GROUP | Facility: PHYSICIAN GROUP | Age: 60
End: 2025-04-09
Payer: COMMERCIAL

## 2025-04-10 ENCOUNTER — TELEPHONE (OUTPATIENT)
Dept: VASCULAR LAB | Facility: MEDICAL CENTER | Age: 60
End: 2025-04-10
Payer: COMMERCIAL

## 2025-04-10 NOTE — TELEPHONE ENCOUNTER
[facility] MED    Caller: Tressa    Name and Department:     [facility]  P: [phone]    Topic/Issue: MEDICAL ADVICE    Per Tressa;    There is surgical clearance in the pt media scanned from 3/19. Please advise.    Thank you,  Bridger RICCI    Callback Number or Extension: [phone]

## 2025-04-10 NOTE — TELEPHONE ENCOUNTER
Anticoagulation clinic can only provide instructions for OAC. Patient is overdue for f/u appt, so called and spoke to pt to schedule a f/u appt. Appt scheduled on 04/23/25 and will provide periprocedural anticoagulation instructions then. Of note, pt with On-X AVR with INR goal of 1.5-2.0 therefore likely would not require LMWH bridging given low INR goal range.    Medical clearance to be completed by cardiology. Will route to Dr. Sharma.    Robbin Davis, EstefanyD, BCACP

## 2025-04-14 ENCOUNTER — TELEPHONE (OUTPATIENT)
Dept: CARDIOLOGY | Facility: MEDICAL CENTER | Age: 60
End: 2025-04-14
Payer: COMMERCIAL

## 2025-04-14 NOTE — LETTER
PROCEDURE/SURGERY CLEARANCE FORM      Encounter Date: 4/14/2025    Patient: Gurdeep Guerra  YOB: 1965    CARDIOLOGIST:  Dontrell Sharma M.D.    REFERRING DOCTOR:  No ref. provider found    The following procedure/surgery: Robotic left inguinal hernia repair                                            Additional comments:   I would follow AHA guidelines which suggest bridging if mech AVR and afib.     Patient is on Warfarin and is not managed by our office. [facility] will need to send their own clearance letter.                Electronically Signed     MD Signature             Dontrell Sharma M.D.     PROCEDURE/SURGERY CLEARANCE FORM    Date: 4/15/2025   Patient Name: Gurdeep Guerra    Dear Surgeon or Proceduralist,      Thank you for your request for cardiac stratification of our mutual patient Gurdeep Guerra 1965. We have reviewed their [facility] records; and to the best of our understanding this patient has not had stenting, ablation, watchman, cardiothoracic surgery or hospitalization for cardiovascular reasons in the past 6 months.  Gurdeep Guerra has been seen within the past 15 months and is considered to have non-modifiable cardiac risk for this low-risk procedure/surgery. They may proceed from a cardiovascular standpoint and may hold their antiplatelet/anticoagulation as briefly as possible. Please have patient resume this medication when hemodynamically stable to do so.     Aspirin or Prasugrel   - hold 7 days prior to procedure/surgery, resume when hemodynamically stable      Clopidrogrel or Ticagrelor  - hold 7 days for all neurological procedures, hold 5 days prior to all other procedure/surgery,  resume when hemodynamically stable      Pradaxa/Xarelto/Eliquis/Savesya - hold 1 day prior to procedure for low bleeding risk procedure, 2 days for high bleeding risk procedure, or consider holding 3 days or longer for patients with reduced kidney function (CrCl  <30mL/min) or spinal/cranial surgeries/procedures.      If they have a mechanical heart valve, please coordinate with [facility] ([phone]) the proper management of their anticoagulant in the periprocedural or perioperative period.      Some patients have higher risk for cardiovascular complications or holding medication. If our patient has had prior complications of holding antiplatelet or anticoagulants in the past and we have seen them after these events, we have addressed these concerns with the patient. They are at an unknown degree of increased risk for recurrent complication.  You may hold anticoagulation/antiplatelets for the procedure or surgery if the benefits of the procedure or surgery outweigh this nonmodifiable risk.      If Gurdeep Guerra 1965 has new symptoms of heart failure decompensation, unstable arrythmia, or angina please reach out and we will assess the patient.      If you have other patient-specific concerns, please feel free to reach out to the patient's cardiologist directly at [phone].     Thank you,       [facility]

## 2025-04-15 NOTE — TELEPHONE ENCOUNTER
[facility]    Received anticoagulation clearance from [facility] regarding upcoming robotic L inguinal hernia repair.    Procedure date TBD    Pt is on warfarin for  On-X AVR (INR goal 1.5-2.0) .  Of note, atrial fibrillation listed in problem list was post op afib.    Per current CHEST guidelines, pt is at low risk of VTE given  On-X AVR with lower INR goal . Given pt's hx, will hold warfarin x 5 days prior to procedure and will not bridge w/ Lovenox.        Faxed clearance to [phone]    Will discuss with pt at upcoming appt  on 04/23/25    Robbin Davis, EstefanyD, BCACP

## 2025-04-17 ENCOUNTER — PRE-ADMISSION TESTING (OUTPATIENT)
Dept: ADMISSIONS | Facility: MEDICAL CENTER | Age: 60
End: 2025-04-17
Attending: SURGERY
Payer: COMMERCIAL

## 2025-04-17 RX ORDER — TRAZODONE HYDROCHLORIDE 50 MG/1
50 TABLET ORAL NIGHTLY PRN
COMMUNITY

## 2025-04-17 RX ORDER — ACETAMINOPHEN 500 MG
500-1000 TABLET ORAL EVERY 6 HOURS PRN
COMMUNITY
End: 2025-04-28

## 2025-04-18 NOTE — PREPROCEDURE INSTRUCTIONS
PreAdmit Telephone Appointment: Reviewed the Preparing for your procedure handout with patient over the phone. Patient instructed per pharmacy guidelines regarding taking, holding or contacting provider for instructions on regularly prescribed medications before surgery.  Pt reports that his cardiologist and his nephrologist are both aware of his upcoming surgery 4/21/25.  Pt advised to check with prescribing MD for further instructions on his Aspirin before and after surgery.  Pt reports he has been instructed and has been holding his Warfarin  Confirmed with patient where to check in morning of surgery. Handouts/Brochure/Video emailed to patient.

## 2025-04-18 NOTE — PREADMIT AVS NOTE
Current Medications   Medication Instructions    acetaminophen (TYLENOL) 500 MG Tab As needed medication, may take if needed, including morning of procedure     traZODone (DESYREL) 50 MG Tab Continue as usual    gabapentin (NEURONTIN) 100 MG Cap Continue as usual    acidophilus lactobacillus Cap Stop 7 days before surgery    sevelamer carbonate (RENVELA) 800 MG Tab tablet Continue taking medication as prescribed, including morning of procedure     warfarin (COUMADIN) 2.5 MG Tab Follow instructions from surgeon or specialist.    carvedilol (COREG) 3.125 MG Tab Continue taking medication as prescribed, including morning of procedure     rosuvastatin (CRESTOR) 20 MG Tab Continue as usual    calcitRIOL (ROCALTROL) 0.25 MCG Cap Follow instructions from surgeon or specialist.    esomeprazole (NEXIUM) 20 MG capsule Continue taking medication as prescribed, including morning of procedure     aspirin 81 MG EC tablet Follow instructions from surgeon or specialist.

## 2025-04-21 ENCOUNTER — HOSPITAL ENCOUNTER (OUTPATIENT)
Facility: MEDICAL CENTER | Age: 60
End: 2025-04-21
Attending: SURGERY | Admitting: SURGERY
Payer: COMMERCIAL

## 2025-04-21 ENCOUNTER — ANESTHESIA (OUTPATIENT)
Dept: SURGERY | Facility: MEDICAL CENTER | Age: 60
End: 2025-04-21
Payer: COMMERCIAL

## 2025-04-21 ENCOUNTER — APPOINTMENT (OUTPATIENT)
Facility: MEDICAL CENTER | Age: 60
End: 2025-04-21
Payer: COMMERCIAL

## 2025-04-21 ENCOUNTER — ANESTHESIA EVENT (OUTPATIENT)
Dept: SURGERY | Facility: MEDICAL CENTER | Age: 60
End: 2025-04-21
Payer: COMMERCIAL

## 2025-04-21 VITALS
OXYGEN SATURATION: 92 % | HEART RATE: 64 BPM | SYSTOLIC BLOOD PRESSURE: 156 MMHG | DIASTOLIC BLOOD PRESSURE: 84 MMHG | WEIGHT: 184.08 LBS | BODY MASS INDEX: 28.89 KG/M2 | RESPIRATION RATE: 18 BRPM | TEMPERATURE: 98.1 F | HEIGHT: 67 IN

## 2025-04-21 DIAGNOSIS — G89.18 POSTOPERATIVE PAIN: ICD-10-CM

## 2025-04-21 LAB
INR PPP: 1.1 (ref 0.87–1.13)
POTASSIUM SERPL-SCNC: 4.7 MMOL/L (ref 3.6–5.5)
PROTHROMBIN TIME: 14.6 SEC (ref 12–14.6)

## 2025-04-21 PROCEDURE — 160015 HCHG STAT PREOP MINUTES: Performed by: SURGERY

## 2025-04-21 PROCEDURE — 160028 HCHG SURGERY MINUTES - 1ST 30 MINS LEVEL 3: Performed by: SURGERY

## 2025-04-21 PROCEDURE — 700101 HCHG RX REV CODE 250: Performed by: SURGERY

## 2025-04-21 PROCEDURE — C1781 MESH (IMPLANTABLE): HCPCS | Performed by: SURGERY

## 2025-04-21 PROCEDURE — 36415 COLL VENOUS BLD VENIPUNCTURE: CPT

## 2025-04-21 PROCEDURE — 700101 HCHG RX REV CODE 250: Performed by: STUDENT IN AN ORGANIZED HEALTH CARE EDUCATION/TRAINING PROGRAM

## 2025-04-21 PROCEDURE — C1729 CATH, DRAINAGE: HCPCS | Performed by: SURGERY

## 2025-04-21 PROCEDURE — 160046 HCHG PACU - 1ST 60 MINS PHASE II: Performed by: SURGERY

## 2025-04-21 PROCEDURE — 160025 RECOVERY II MINUTES (STATS): Performed by: SURGERY

## 2025-04-21 PROCEDURE — 84132 ASSAY OF SERUM POTASSIUM: CPT

## 2025-04-21 PROCEDURE — 700111 HCHG RX REV CODE 636 W/ 250 OVERRIDE (IP): Mod: JZ | Performed by: STUDENT IN AN ORGANIZED HEALTH CARE EDUCATION/TRAINING PROGRAM

## 2025-04-21 PROCEDURE — 160009 HCHG ANES TIME/MIN: Performed by: SURGERY

## 2025-04-21 PROCEDURE — 160039 HCHG SURGERY MINUTES - EA ADDL 1 MIN LEVEL 3: Performed by: SURGERY

## 2025-04-21 PROCEDURE — 160002 HCHG RECOVERY MINUTES (STAT): Performed by: SURGERY

## 2025-04-21 PROCEDURE — 160035 HCHG PACU - 1ST 60 MINS PHASE I: Performed by: SURGERY

## 2025-04-21 PROCEDURE — 160048 HCHG OR STATISTICAL LEVEL 1-5: Performed by: SURGERY

## 2025-04-21 PROCEDURE — 700105 HCHG RX REV CODE 258: Performed by: SURGERY

## 2025-04-21 PROCEDURE — 85610 PROTHROMBIN TIME: CPT

## 2025-04-21 DEVICE — MESH PROGRIP LEFT (1/EA): Type: IMPLANTABLE DEVICE | Site: ABDOMEN | Status: FUNCTIONAL

## 2025-04-21 RX ORDER — DEXAMETHASONE SODIUM PHOSPHATE 4 MG/ML
INJECTION, SOLUTION INTRA-ARTICULAR; INTRALESIONAL; INTRAMUSCULAR; INTRAVENOUS; SOFT TISSUE PRN
Status: DISCONTINUED | OUTPATIENT
Start: 2025-04-21 | End: 2025-04-21 | Stop reason: SURG

## 2025-04-21 RX ORDER — SODIUM CHLORIDE, SODIUM LACTATE, POTASSIUM CHLORIDE, CALCIUM CHLORIDE 600; 310; 30; 20 MG/100ML; MG/100ML; MG/100ML; MG/100ML
INJECTION, SOLUTION INTRAVENOUS CONTINUOUS
Status: DISCONTINUED | OUTPATIENT
Start: 2025-04-21 | End: 2025-04-21 | Stop reason: HOSPADM

## 2025-04-21 RX ORDER — ROCURONIUM BROMIDE 10 MG/ML
INJECTION, SOLUTION INTRAVENOUS PRN
Status: DISCONTINUED | OUTPATIENT
Start: 2025-04-21 | End: 2025-04-21 | Stop reason: SURG

## 2025-04-21 RX ORDER — POLYETHYLENE GLYCOL 3350 17 G/17G
17 POWDER, FOR SOLUTION ORAL DAILY
Qty: 20 EACH | Refills: 0 | Status: ON HOLD | OUTPATIENT
Start: 2025-04-21 | End: 2025-04-30

## 2025-04-21 RX ORDER — CEFAZOLIN SODIUM 1 G/3ML
INJECTION, POWDER, FOR SOLUTION INTRAMUSCULAR; INTRAVENOUS PRN
Status: DISCONTINUED | OUTPATIENT
Start: 2025-04-21 | End: 2025-04-21 | Stop reason: SURG

## 2025-04-21 RX ORDER — OXYCODONE HYDROCHLORIDE 5 MG/1
5 TABLET ORAL EVERY 4 HOURS PRN
Qty: 12 TABLET | Refills: 0 | Status: SHIPPED | OUTPATIENT
Start: 2025-04-21 | End: 2025-04-24

## 2025-04-21 RX ORDER — HYDROMORPHONE HYDROCHLORIDE 1 MG/ML
0.1 INJECTION, SOLUTION INTRAMUSCULAR; INTRAVENOUS; SUBCUTANEOUS
Status: DISCONTINUED | OUTPATIENT
Start: 2025-04-21 | End: 2025-04-21 | Stop reason: HOSPADM

## 2025-04-21 RX ORDER — BUPIVACAINE HYDROCHLORIDE AND EPINEPHRINE 5; 5 MG/ML; UG/ML
INJECTION, SOLUTION EPIDURAL; INTRACAUDAL; PERINEURAL
Status: DISCONTINUED | OUTPATIENT
Start: 2025-04-21 | End: 2025-04-21 | Stop reason: HOSPADM

## 2025-04-21 RX ORDER — MIDAZOLAM HYDROCHLORIDE 1 MG/ML
INJECTION INTRAMUSCULAR; INTRAVENOUS PRN
Status: DISCONTINUED | OUTPATIENT
Start: 2025-04-21 | End: 2025-04-21 | Stop reason: SURG

## 2025-04-21 RX ORDER — LIDOCAINE HYDROCHLORIDE 20 MG/ML
INJECTION, SOLUTION EPIDURAL; INFILTRATION; INTRACAUDAL; PERINEURAL PRN
Status: DISCONTINUED | OUTPATIENT
Start: 2025-04-21 | End: 2025-04-21 | Stop reason: SURG

## 2025-04-21 RX ORDER — SODIUM CHLORIDE, SODIUM LACTATE, POTASSIUM CHLORIDE, CALCIUM CHLORIDE 600; 310; 30; 20 MG/100ML; MG/100ML; MG/100ML; MG/100ML
INJECTION, SOLUTION INTRAVENOUS CONTINUOUS
Status: ACTIVE | OUTPATIENT
Start: 2025-04-21 | End: 2025-04-21

## 2025-04-21 RX ORDER — HYDROMORPHONE HYDROCHLORIDE 1 MG/ML
0.4 INJECTION, SOLUTION INTRAMUSCULAR; INTRAVENOUS; SUBCUTANEOUS
Status: DISCONTINUED | OUTPATIENT
Start: 2025-04-21 | End: 2025-04-21 | Stop reason: HOSPADM

## 2025-04-21 RX ORDER — ONDANSETRON 2 MG/ML
INJECTION INTRAMUSCULAR; INTRAVENOUS PRN
Status: DISCONTINUED | OUTPATIENT
Start: 2025-04-21 | End: 2025-04-21 | Stop reason: SURG

## 2025-04-21 RX ORDER — HALOPERIDOL 5 MG/ML
1 INJECTION INTRAMUSCULAR
Status: DISCONTINUED | OUTPATIENT
Start: 2025-04-21 | End: 2025-04-21 | Stop reason: HOSPADM

## 2025-04-21 RX ORDER — IBUPROFEN 600 MG/1
600 TABLET, FILM COATED ORAL EVERY 6 HOURS
Qty: 45 TABLET | Refills: 0 | Status: SHIPPED | OUTPATIENT
Start: 2025-04-21 | End: 2025-04-21

## 2025-04-21 RX ORDER — ACETAMINOPHEN 500 MG
1000 TABLET ORAL ONCE
Status: DISCONTINUED | OUTPATIENT
Start: 2025-04-21 | End: 2025-04-21 | Stop reason: HOSPADM

## 2025-04-21 RX ORDER — ACETAMINOPHEN 325 MG/1
650 TABLET ORAL EVERY 6 HOURS
Qty: 60 TABLET | Refills: 0 | Status: SHIPPED | OUTPATIENT
Start: 2025-04-21 | End: 2025-04-28

## 2025-04-21 RX ORDER — ONDANSETRON 2 MG/ML
4 INJECTION INTRAMUSCULAR; INTRAVENOUS
Status: DISCONTINUED | OUTPATIENT
Start: 2025-04-21 | End: 2025-04-21 | Stop reason: HOSPADM

## 2025-04-21 RX ORDER — MEPERIDINE HYDROCHLORIDE 25 MG/ML
12.5 INJECTION INTRAMUSCULAR; INTRAVENOUS; SUBCUTANEOUS
Status: DISCONTINUED | OUTPATIENT
Start: 2025-04-21 | End: 2025-04-21 | Stop reason: HOSPADM

## 2025-04-21 RX ORDER — OXYCODONE HCL 5 MG/5 ML
5 SOLUTION, ORAL ORAL
Status: DISCONTINUED | OUTPATIENT
Start: 2025-04-21 | End: 2025-04-21 | Stop reason: HOSPADM

## 2025-04-21 RX ORDER — OXYCODONE HCL 5 MG/5 ML
10 SOLUTION, ORAL ORAL
Status: DISCONTINUED | OUTPATIENT
Start: 2025-04-21 | End: 2025-04-21 | Stop reason: HOSPADM

## 2025-04-21 RX ORDER — DIPHENHYDRAMINE HYDROCHLORIDE 50 MG/ML
12.5 INJECTION, SOLUTION INTRAMUSCULAR; INTRAVENOUS
Status: DISCONTINUED | OUTPATIENT
Start: 2025-04-21 | End: 2025-04-21 | Stop reason: HOSPADM

## 2025-04-21 RX ORDER — HYDROMORPHONE HYDROCHLORIDE 1 MG/ML
0.2 INJECTION, SOLUTION INTRAMUSCULAR; INTRAVENOUS; SUBCUTANEOUS
Status: DISCONTINUED | OUTPATIENT
Start: 2025-04-21 | End: 2025-04-21 | Stop reason: HOSPADM

## 2025-04-21 RX ADMIN — DEXAMETHASONE SODIUM PHOSPHATE 4 MG: 4 INJECTION INTRA-ARTICULAR; INTRALESIONAL; INTRAMUSCULAR; INTRAVENOUS; SOFT TISSUE at 15:02

## 2025-04-21 RX ADMIN — LIDOCAINE HYDROCHLORIDE 100 MG: 20 INJECTION, SOLUTION EPIDURAL; INFILTRATION; INTRACAUDAL; PERINEURAL at 15:02

## 2025-04-21 RX ADMIN — SODIUM CHLORIDE, POTASSIUM CHLORIDE, SODIUM LACTATE AND CALCIUM CHLORIDE: 600; 310; 30; 20 INJECTION, SOLUTION INTRAVENOUS at 14:57

## 2025-04-21 RX ADMIN — CEFAZOLIN 2 G: 1 INJECTION, POWDER, FOR SOLUTION INTRAMUSCULAR; INTRAVENOUS at 15:02

## 2025-04-21 RX ADMIN — SUGAMMADEX 200 MG: 100 INJECTION, SOLUTION INTRAVENOUS at 15:45

## 2025-04-21 RX ADMIN — PROPOFOL 200 MG: 10 INJECTION, EMULSION INTRAVENOUS at 15:02

## 2025-04-21 RX ADMIN — FENTANYL CITRATE 100 MCG: 50 INJECTION, SOLUTION INTRAMUSCULAR; INTRAVENOUS at 15:02

## 2025-04-21 RX ADMIN — ROCURONIUM BROMIDE 50 MG: 10 INJECTION INTRAVENOUS at 15:02

## 2025-04-21 RX ADMIN — MIDAZOLAM HYDROCHLORIDE 2 MG: 1 INJECTION, SOLUTION INTRAMUSCULAR; INTRAVENOUS at 14:58

## 2025-04-21 RX ADMIN — ONDANSETRON 4 MG: 2 INJECTION INTRAMUSCULAR; INTRAVENOUS at 15:45

## 2025-04-21 ASSESSMENT — FIBROSIS 4 INDEX: FIB4 SCORE: 1.18

## 2025-04-21 ASSESSMENT — PAIN DESCRIPTION - PAIN TYPE
TYPE: ACUTE PAIN;CHRONIC PAIN
TYPE: SURGICAL PAIN

## 2025-04-21 NOTE — OR NURSING
1546 Pt arrived s/p open left inguinal hernia repair. Pt drowsy, respirations spontaneous. Sealed surgical site to left side lower abdomen.     1601 Pt awake. Pt has no complaints.    1616 No changes. Pt's significant other updated.    1631 No changes.    1646 Pt reports 6/10 tolerable pain to his lower left abdomen. Pt denies nausea.     1658 No changes. Report to stage 2 CODI Carlos.

## 2025-04-21 NOTE — ANESTHESIA POSTPROCEDURE EVALUATION
Patient: Gurdeep Guerra    Procedure Summary       Date: 04/21/25 Room / Location:  OR  / SURGERY [facility]    Anesthesia Start: 1457 Anesthesia Stop: 1549    Procedure: OPEN LEFT INGUINAL HERNIA REPAIR WITH MESH (Left: Abdomen) Diagnosis: (LEFT INGUINAL HERNIA)    Surgeons: Keagan Dinh M.D. Responsible Provider: Emmett Fournier M.D.    Anesthesia Type: general ASA Status: 3            Final Anesthesia Type: general  Last vitals  BP   Blood Pressure: 135/64    Temp   36.4 °C (97.5 °F)    Pulse   60   Resp   16    SpO2   95 %      Anesthesia Post Evaluation    Patient location during evaluation: PACU  Patient participation: complete - patient participated  Level of consciousness: awake and alert    Airway patency: patent  Anesthetic complications: no  Cardiovascular status: hemodynamically stable  Respiratory status: acceptable  Hydration status: euvolemic    PONV: none          There were no known notable events for this encounter.     Nurse Pain Score: 0 (NPRS)

## 2025-04-21 NOTE — ANESTHESIA TIME REPORT
Anesthesia Start and Stop Event Times       Date Time Event    4/21/2025 1455 Ready for Procedure     1457 Anesthesia Start          Responsible Staff  04/21/25      Name Role Begin End    Emmett Fournier M.D. Anesth 1457           Overtime Reason:  no overtime (within assigned shift)    Comments:

## 2025-04-21 NOTE — OP REPORT
Operative Report    Date: 4/21/2025    Surgeon: Keagan Dinh M.D.     Assistant: Ame Fry PA-C    Pre-operative Diagnosis: left Inguinal Hernia    Post-operative Diagnosis: Same     Procedure: Open left Inguinal Hernia Repair with Mesh    ASA Classification: III.    Indications: This is a 60 y.o. male who presented with symptoms of left Inguinal Hernia. Here for repair    The indications for a surgical assistant in this surgery were indicated due to complexity of the procedure. Their role included aiding in incision, retraction, holding devices including camera for laparoscopic procedure, and closure of the wound.      Findings: left pantaloon inguinal hernia    Wound Classification: Class I, I, Clean..    Procedure in detail: The patient was seen and examined in the preoperative holding area.  The risks benefits and alternatives of the procedure were discussed with the patient who wished to proceed with the procedure as described.  The patient was transferred to the operating room placed in supine position and all pressure points were properly padded.  General endotracheal anesthesia was induced and preoperative antibiotics were given per SCIP protocol.  Patient's groin was prepped with ChloraPrep and draped in the normal sterile fashion.  A timeout was performed confirming correct patient, correct procedure, and that all necessary equipment was in the room.      We began the procedure by infusing local anesthetic over the left groin in the area over the hernia.  We sharply incised the skin and used a combination of blunt and electrocautery dissection until we identified the external oblique aponeurosis.  We elevated an area in the aponeurotic tissue sharply incised this and extended this cephalad and caudally using Metzenbaum scissors.  We grasped the edges of the external oblique aponeurosis elevated this and developed the plane between the aponeurosis and the cord contents.  The cord was then  elevated and the pubic tubercle was cleared of digesting tissue.  We then proceeded with investigating the cord contents.    We then proceeded with inspecting the cord contents.  Indirect hernia sac was identified dissected free while taking great care to protect the cord contents. The cord lipoma which was identified was dissected free and reduced back into the intra-abdominal contents.    The 12 x 10 sided progrip mesh was selected and placed into the inguinal canal secured at the pubic tubercle with a interrupted 0 Prolene suture and wrapped around the spermatic cord.  Pexied at the overlap of the mesh with a 0 Prolene suture.  The wound was then copiously irrigated hemostasis was confirmed and the external bleak aponeurosis was closed using a running 3-0 Vicryl suture.  Closed the Susy's fascia using a running 3-0 Vicryl suture.     Skin was then closed with 4-0 Monocryl in a subcuticular fashion and Dermabond was placed over the wounds.    The patient was awakened from general anesthetic, and was taken to the recovery room in stable condition.    Sponge and needle counts were correct at the end of the case.     Specimen: none    EBL: 10mL    Dispo: stable, extubated, to PACU    Keagan Dinh M.D.  [facility]  [phone]

## 2025-04-21 NOTE — ANESTHESIA PROCEDURE NOTES
Airway    Date/Time: 4/21/2025 3:04 PM    Performed by: Emmett Fournier M.D.  Authorized by: Emmett Fournier M.D.    Location:  OR  Urgency:  Elective  Indications for Airway Management:  Anesthesia      Spontaneous Ventilation: absent    Sedation Level:  Deep  Preoxygenated: Yes    Patient Position:  Sniffing  Final Airway Type:  Endotracheal airway  Final Endotracheal Airway:  ETT  Cuffed: Yes    Technique Used for Successful ETT Placement:  Direct laryngoscopy    Insertion Site:  Oral  Blade Type:  Donna  Laryngoscope Blade/Videolaryngoscope Blade Size:  3  ETT Size (mm):  7.5  Measured from:  Teeth  ETT to Teeth (cm):  24  Placement Verified by: auscultation and capnometry    Cormack-Lehane Classification:  Grade IIa - partial view of glottis  Number of Attempts at Approach:  1

## 2025-04-21 NOTE — DISCHARGE INSTRUCTIONS
ACTIVITY: Rest and take it easy for the first 24 hours.  A responsible adult is recommended to remain with you during that time.  It is normal to feel sleepy.  We encourage you to not do anything that requires balance, judgment or coordination.    MILD FLU-LIKE SYMPTOMS ARE NORMAL. YOU MAY EXPERIENCE GENERALIZED MUSCLE ACHES, THROAT IRRITATION, HEADACHE AND/OR SOME NAUSEA.    FOR 24 HOURS DO NOT:  Drive, operate machinery or run household appliances.  Drink beer or alcoholic beverages.   Make important decisions or sign legal documents.    SPECIAL INSTRUCTIONS: Inguinal Discharge Instructions:    ACTIVITIES: Upon discharge from the hospital, the day of surgery it is requested that you do no significant physical activity and limit mental activities, as you have had sedation. The day after surgery, you may resume activities of daily living, but for four weeks, it is recommended that you do no strenuous activities or heavy lifting (greater than 15 pounds).     DRIVING: You may drive whenever you are off pain medications and are able to perform the activities needed to drive, i.e. turning, bending, twisting, etc.     WOUND: It is not unusual for patients to experience swelling and even bruising at the hernia repair site. With inguinal hernias, sometimes the bruising and swelling may extend on to the penis or into the scrotum of male patients. This will resolve over the next few days.     ICE: please use ice on the wound to decrease the swelling for the first 24 hours and then discontinue.     BATHING: The dressing can be removed 48 hours after surgery and the wound can then be wetted in a shower as normal, but avoid submersion in water (tub bath) for at least 2 weeks.    PAIN MEDICATION: You will be given a prescription for pain medication at discharge. Please take these as directed. It is important to remember not to take medications on an empty stomach as this may cause nausea.     BOWEL FUNCTION: After hernia repair,  it is not uncommon for patients to experience constipation. This is due to decreasing activity levels as well as pain medications. You may wish to use a stool softener beginning immediately after surgery, and you may or may not need to use a laxative (Milk of Magnesia, Ex-lax; Senokot, etc.) as well.            CALL IF YOU HAVE: Drainage or fluid from incision that may be foul smelling, increased tenderness or soreness at the wound or the wound edges are no longer together,redness or  swelling at the incision site. Please do not hesitate to call with any other questions.     APPOINTMENT: Contact our office at [phone] for a follow-up appointment in 1 week following your procedure.     If you have any additional questions, please do not hesitate to call the office.      DIET: To avoid nausea, slowly advance diet as tolerated, avoiding spicy or greasy foods for the first day.  Add more substantial food to your diet according to your physician's instructions.   INCREASE FLUIDS AND FIBER TO AVOID CONSTIPATION.    FOLLOW-UP APPOINTMENT:  A follow-up appointment should be arranged with your doctor; call to schedule.    You should CALL YOUR PHYSICIAN if you develop:  Fever greater than 101 degrees F.  Pain not relieved by medication, or persistent nausea or vomiting.  Excessive bleeding (blood soaking through dressing) or unexpected drainage from the wound.  Extreme redness or swelling around the incision site, drainage of pus or foul smelling drainage.  Inability to urinate or empty your bladder within 8 hours.  Problems with breathing or chest pain.    You should call 911 if you develop problems with breathing or chest pain.  If you are unable to contact your doctor or surgical center, you should go to the nearest emergency room or urgent care center.       If any questions arise, call your doctor.  If your doctor is not available, please feel free to call the Surgical Center at [phone].     A registered nurse  may call you a few days after your surgery to see how you are doing after your procedure.    MEDICATIONS: Resume taking daily medication.  Take prescribed pain medication with food.  If no medication is prescribed, you may take non-aspirin pain medication if needed.  PAIN MEDICATION CAN BE VERY CONSTIPATING.  Take a stool softener or laxative such as senokot, pericolace, or milk of magnesia if needed.    No pain medication given in the recovery room    If your physician has prescribed pain medication that includes Acetaminophen (Tylenol), do not take additional Acetaminophen (Tylenol) while taking the prescribed medication.    Discharge Education for patients on MAXINE (Obstructive Sleep Apnea) Protocol    Prior to receiving sedation or anesthesia, we screen all patients for Obstructive Sleep Apnea.  During your screening, you were identified as having suspected, but not confirmed Obstructive Sleep Apnea(MAXINE).    What is Obstructive Sleep Apnea?  Sleep apnea (AP-ne-ah) is a common disorder which involves breathing pauses that occur during sleep.  These can last from 10 seconds to a minute or longer.  Normal breathing resumes often with a loud snort or choking sound.    Sleep apnea occurs in all age groups and both genders but is more common in men and people over 40 years of age.  It has been estimated that as many as 18 million Americans have sleep apnea.  Most people who have sleep apnea don’t know they have it because it only occurs during sleep.  A family member and/or bed partner may first notice the signs of sleep apnea.  Sleep apnea is a chronic (ongoing) condition that disrupts the quality and quantity of your sleep repeatedly throughout the night.  This often results in excessive daytime sleepiness or fatigue during the day.  It may also contribute to high blood pressure, heart problems, and complications following medications used for surgery and procedures.    To establish a definitive diagnosis, further  testing from a specialist would be needed.  We recommend that you follow up with your primary care physician.    We recommend that you should be with an adult observer for at least 24 hours after your sedation/anesthesia.  If you have a CPAP machine, you should wear it during any sleep period (day or night) for the week following your procedure.  We encourage you to sleep on your side or in a sitting position, even with napping.  Lying flat on your back increases the risk of apnea and airway obstruction during your post procedure recovery period.    It is important to prevent over-sedation that could increase your risk for apnea.  Please take all pain medication as directed by your physician.  If you are not getting pain relief, please contact your physician to discuss possible approaches to relieving pain while minimizing medications that can affect your breathing and oxygen levels.

## 2025-04-21 NOTE — ANESTHESIA PREPROCEDURE EVALUATION
Case: [id number] Date/Time: 04/21/25 1445    Procedure: OPEN LEFT INGUINAL HERNIA REPAIR WITH MESH    Pre-op diagnosis: LEFT INGUINAL HERNIA    Location: [facility] OR 01 / SURGERY [facility]    Surgeons: Keagan Dinh M.D.            Relevant Problems   ANESTHESIA   (positive) Obstructive sleep apnea syndrome      PULMONARY   (positive) Dyspnea on exertion      CARDIAC   (positive) A-fib (HCC)   (positive) Aortic stenosis   (positive) Coronary artery disease due to lipid rich plaque   (positive) Dyspnea on exertion   (positive) Heart murmur   (positive) Hypertension   (positive) Paroxysmal atrial flutter (HCC)      GI   (positive) Gastroesophageal reflux disease         (positive) ESRD (end stage renal disease) (HCC)   (positive) FSGS (focal segmental glomerulosclerosis)   (positive) Peritoneal dialysis status (HCC)      Other   (positive) Chronic gout of multiple sites   (positive) Splenomegaly       Physical Exam    Airway   Mallampati: II  TM distance: >3 FB  Neck ROM: full       Cardiovascular - normal exam  Rhythm: regular  Rate: normal     Dental - normal exam           Pulmonary - normal exam     Abdominal    Neurological - normal exam                   Anesthesia Plan    ASA 3   ASA physical status 3 criteria: ESRD undergoing regularly scheduled dialysis    Plan - general       Airway plan will be ETT          Induction: intravenous    Postoperative Plan: Postoperative administration of opioids is intended.    Pertinent diagnostic labs and testing reviewed    Informed Consent:    Anesthetic plan and risks discussed with patient.    Use of blood products discussed with: patient whom consented to blood products.

## 2025-04-22 NOTE — OR NURSING
1701: :Pt arrived to stage 2 via gurney and getting dressed self. LLQ pain but tolerable, denies nausea.     1720: Family at bedside.   Pt ambulatory to restroom and able to void. Gait steady.     1730:Patient education completed, family denies further questions.DC'd to care of family post uneventful stay in PACU 2. IV discontinued.     1751: Pt taken out via wc and placed into care of family.

## 2025-04-23 ENCOUNTER — APPOINTMENT (OUTPATIENT)
Dept: MEDICAL GROUP | Facility: PHYSICIAN GROUP | Age: 60
End: 2025-04-23
Payer: COMMERCIAL

## 2025-04-28 ENCOUNTER — APPOINTMENT (OUTPATIENT)
Dept: RADIOLOGY | Facility: MEDICAL CENTER | Age: 60
DRG: 947 | End: 2025-04-28
Attending: EMERGENCY MEDICINE
Payer: COMMERCIAL

## 2025-04-28 ENCOUNTER — HOSPITAL ENCOUNTER (INPATIENT)
Facility: MEDICAL CENTER | Age: 60
LOS: 2 days | End: 2025-04-30
Attending: EMERGENCY MEDICINE | Admitting: STUDENT IN AN ORGANIZED HEALTH CARE EDUCATION/TRAINING PROGRAM
Payer: COMMERCIAL

## 2025-04-28 DIAGNOSIS — K92.2 GASTROINTESTINAL HEMORRHAGE, UNSPECIFIED GASTROINTESTINAL HEMORRHAGE TYPE: ICD-10-CM

## 2025-04-28 DIAGNOSIS — T80.90XA COMPLICATION OF PERITONEAL DIALYSIS, INITIAL ENCOUNTER: ICD-10-CM

## 2025-04-28 DIAGNOSIS — R10.32 LEFT LOWER QUADRANT ABDOMINAL PAIN: ICD-10-CM

## 2025-04-28 PROBLEM — B02.9 SHINGLES: Status: ACTIVE | Noted: 2025-04-28

## 2025-04-28 PROBLEM — R10.9 ABDOMINAL PAIN: Status: ACTIVE | Noted: 2025-04-28

## 2025-04-28 PROBLEM — Z98.890 S/P HERNIA REPAIR: Status: ACTIVE | Noted: 2025-04-28

## 2025-04-28 PROBLEM — Z87.19 S/P HERNIA REPAIR: Status: ACTIVE | Noted: 2025-04-28

## 2025-04-28 LAB
ALBUMIN SERPL BCP-MCNC: 3.4 G/DL (ref 3.2–4.9)
ALBUMIN/GLOB SERPL: 1.3 G/DL
ALP SERPL-CCNC: 61 U/L (ref 30–99)
ALT SERPL-CCNC: <5 U/L (ref 2–50)
ANION GAP SERPL CALC-SCNC: 16 MMOL/L (ref 7–16)
APPEARANCE FLD: CLEAR
APPEARANCE UR: CLEAR
APTT PPP: 32.6 SEC (ref 24.7–36)
AST SERPL-CCNC: 20 U/L (ref 12–45)
BACTERIA #/AREA URNS HPF: ABNORMAL /HPF
BASOPHILS # BLD AUTO: 0.4 % (ref 0–1.8)
BASOPHILS # BLD: 0.03 K/UL (ref 0–0.12)
BILIRUB SERPL-MCNC: 0.2 MG/DL (ref 0.1–1.5)
BILIRUB UR QL STRIP.AUTO: NEGATIVE
BODY FLD TYPE: NORMAL
BUN SERPL-MCNC: 112 MG/DL (ref 8–22)
CALCIUM ALBUM COR SERPL-MCNC: 9.4 MG/DL (ref 8.5–10.5)
CALCIUM SERPL-MCNC: 8.9 MG/DL (ref 8.5–10.5)
CASTS URNS QL MICRO: ABNORMAL /LPF (ref 0–2)
CHLORIDE SERPL-SCNC: 99 MMOL/L (ref 96–112)
CO2 SERPL-SCNC: 20 MMOL/L (ref 20–33)
COLOR FLD: YELLOW
COLOR UR: YELLOW
CREAT SERPL-MCNC: 11.6 MG/DL (ref 0.5–1.4)
EOSINOPHIL # BLD AUTO: 0.29 K/UL (ref 0–0.51)
EOSINOPHIL NFR BLD: 4.1 % (ref 0–6.9)
EPITHELIAL CELLS 1715: ABNORMAL /HPF (ref 0–5)
ERYTHROCYTE [DISTWIDTH] IN BLOOD BY AUTOMATED COUNT: 47 FL (ref 35.9–50)
GFR SERPLBLD CREATININE-BSD FMLA CKD-EPI: 5 ML/MIN/1.73 M 2
GLOBULIN SER CALC-MCNC: 2.6 G/DL (ref 1.9–3.5)
GLUCOSE SERPL-MCNC: 120 MG/DL (ref 65–99)
GLUCOSE UR STRIP.AUTO-MCNC: 100 MG/DL
GRAM STN SPEC: NORMAL
HCT VFR BLD AUTO: 35.7 % (ref 42–52)
HGB BLD-MCNC: 11.9 G/DL (ref 14–18)
IMM GRANULOCYTES # BLD AUTO: 0.04 K/UL (ref 0–0.11)
IMM GRANULOCYTES NFR BLD AUTO: 0.6 % (ref 0–0.9)
INR PPP: 1.9 (ref 0.87–1.13)
KETONES UR STRIP.AUTO-MCNC: NEGATIVE MG/DL
LEUKOCYTE ESTERASE UR QL STRIP.AUTO: ABNORMAL
LIPASE SERPL-CCNC: 35 U/L (ref 11–82)
LYMPHOCYTES # BLD AUTO: 0.61 K/UL (ref 1–4.8)
LYMPHOCYTES NFR BLD: 8.6 % (ref 22–41)
LYMPHOCYTES NFR FLD: 11 %
MCH RBC QN AUTO: 32.8 PG (ref 27–33)
MCHC RBC AUTO-ENTMCNC: 33.3 G/DL (ref 32.3–36.5)
MCV RBC AUTO: 98.3 FL (ref 81.4–97.8)
MICRO URNS: ABNORMAL
MONOCYTES # BLD AUTO: 0.52 K/UL (ref 0–0.85)
MONOCYTES NFR BLD AUTO: 7.4 % (ref 0–13.4)
MONOS+MACROS NFR FLD MANUAL: 62 %
NEUTROPHILS # BLD AUTO: 5.57 K/UL (ref 1.82–7.42)
NEUTROPHILS NFR BLD: 78.9 % (ref 44–72)
NEUTROPHILS NFR FLD: 27 %
NITRITE UR QL STRIP.AUTO: NEGATIVE
NRBC # BLD AUTO: 0 K/UL
NRBC BLD-RTO: 0 /100 WBC (ref 0–0.2)
NUC CELL # FLD: 95 CELLS/UL
PH UR STRIP.AUTO: 6.5 [PH] (ref 5–8)
PLATELET # BLD AUTO: 186 K/UL (ref 164–446)
PMV BLD AUTO: 10.5 FL (ref 9–12.9)
POTASSIUM SERPL-SCNC: 5.3 MMOL/L (ref 3.6–5.5)
PROT SERPL-MCNC: 6 G/DL (ref 6–8.2)
PROT UR QL STRIP: 100 MG/DL
PROTHROMBIN TIME: 21.9 SEC (ref 12–14.6)
RBC # BLD AUTO: 3.63 M/UL (ref 4.7–6.1)
RBC # FLD: <2000 CELLS/UL
RBC # URNS HPF: ABNORMAL /HPF (ref 0–2)
RBC UR QL AUTO: ABNORMAL
SIGNIFICANT IND 70042: NORMAL
SITE SITE: NORMAL
SODIUM SERPL-SCNC: 135 MMOL/L (ref 135–145)
SOURCE SOURCE: NORMAL
SP GR UR STRIP.AUTO: 1.01
UROBILINOGEN UR STRIP.AUTO-MCNC: 0.2 EU/DL
WBC # BLD AUTO: 7.1 K/UL (ref 4.8–10.8)
WBC #/AREA URNS HPF: ABNORMAL /HPF

## 2025-04-28 PROCEDURE — 74177 CT ABD & PELVIS W/CONTRAST: CPT

## 2025-04-28 PROCEDURE — A9270 NON-COVERED ITEM OR SERVICE: HCPCS | Performed by: STUDENT IN AN ORGANIZED HEALTH CARE EDUCATION/TRAINING PROGRAM

## 2025-04-28 PROCEDURE — 87205 SMEAR GRAM STAIN: CPT | Mod: 91

## 2025-04-28 PROCEDURE — 89051 BODY FLUID CELL COUNT: CPT

## 2025-04-28 PROCEDURE — 700117 HCHG RX CONTRAST REV CODE 255: Performed by: EMERGENCY MEDICINE

## 2025-04-28 PROCEDURE — 83690 ASSAY OF LIPASE: CPT

## 2025-04-28 PROCEDURE — 36415 COLL VENOUS BLD VENIPUNCTURE: CPT

## 2025-04-28 PROCEDURE — 700102 HCHG RX REV CODE 250 W/ 637 OVERRIDE(OP): Performed by: STUDENT IN AN ORGANIZED HEALTH CARE EDUCATION/TRAINING PROGRAM

## 2025-04-28 PROCEDURE — 700111 HCHG RX REV CODE 636 W/ 250 OVERRIDE (IP): Mod: JZ

## 2025-04-28 PROCEDURE — 87015 SPECIMEN INFECT AGNT CONCNTJ: CPT

## 2025-04-28 PROCEDURE — 700101 HCHG RX REV CODE 250: Performed by: EMERGENCY MEDICINE

## 2025-04-28 PROCEDURE — 90945 DIALYSIS ONE EVALUATION: CPT

## 2025-04-28 PROCEDURE — 85730 THROMBOPLASTIN TIME PARTIAL: CPT

## 2025-04-28 PROCEDURE — 770001 HCHG ROOM/CARE - MED/SURG/GYN PRIV*

## 2025-04-28 PROCEDURE — 700111 HCHG RX REV CODE 636 W/ 250 OVERRIDE (IP): Performed by: INTERNAL MEDICINE

## 2025-04-28 PROCEDURE — 96374 THER/PROPH/DIAG INJ IV PUSH: CPT

## 2025-04-28 PROCEDURE — 99285 EMERGENCY DEPT VISIT HI MDM: CPT

## 2025-04-28 PROCEDURE — 700102 HCHG RX REV CODE 250 W/ 637 OVERRIDE(OP): Performed by: INTERNAL MEDICINE

## 2025-04-28 PROCEDURE — 700111 HCHG RX REV CODE 636 W/ 250 OVERRIDE (IP): Mod: JZ | Performed by: EMERGENCY MEDICINE

## 2025-04-28 PROCEDURE — 99223 1ST HOSP IP/OBS HIGH 75: CPT | Performed by: STUDENT IN AN ORGANIZED HEALTH CARE EDUCATION/TRAINING PROGRAM

## 2025-04-28 PROCEDURE — 87102 FUNGUS ISOLATION CULTURE: CPT

## 2025-04-28 PROCEDURE — 96375 TX/PRO/DX INJ NEW DRUG ADDON: CPT

## 2025-04-28 PROCEDURE — 80053 COMPREHEN METABOLIC PANEL: CPT

## 2025-04-28 PROCEDURE — 85025 COMPLETE CBC W/AUTO DIFF WBC: CPT

## 2025-04-28 PROCEDURE — 85610 PROTHROMBIN TIME: CPT

## 2025-04-28 PROCEDURE — 87070 CULTURE OTHR SPECIMN AEROBIC: CPT

## 2025-04-28 PROCEDURE — A9270 NON-COVERED ITEM OR SERVICE: HCPCS | Performed by: INTERNAL MEDICINE

## 2025-04-28 PROCEDURE — 81001 URINALYSIS AUTO W/SCOPE: CPT

## 2025-04-28 PROCEDURE — 3E1M39Z IRRIGATION OF PERITONEAL CAVITY USING DIALYSATE, PERCUTANEOUS APPROACH: ICD-10-PCS | Performed by: INTERNAL MEDICINE

## 2025-04-28 RX ORDER — HYDROMORPHONE HYDROCHLORIDE 1 MG/ML
0.5 INJECTION, SOLUTION INTRAMUSCULAR; INTRAVENOUS; SUBCUTANEOUS ONCE
Status: COMPLETED | OUTPATIENT
Start: 2025-04-28 | End: 2025-04-28

## 2025-04-28 RX ORDER — PROPARACAINE HYDROCHLORIDE 5 MG/ML
1 SOLUTION/ DROPS OPHTHALMIC ONCE
Status: COMPLETED | OUTPATIENT
Start: 2025-04-28 | End: 2025-04-28

## 2025-04-28 RX ORDER — HEPARIN SODIUM 1000 [USP'U]/ML
3000 INJECTION, SOLUTION INTRAVENOUS; SUBCUTANEOUS
Status: DISCONTINUED | OUTPATIENT
Start: 2025-04-28 | End: 2025-04-29

## 2025-04-28 RX ORDER — CALCITRIOL 0.25 UG/1
0.25 CAPSULE, LIQUID FILLED ORAL
Status: DISCONTINUED | OUTPATIENT
Start: 2025-04-29 | End: 2025-04-30 | Stop reason: HOSPADM

## 2025-04-28 RX ORDER — AMOXICILLIN 250 MG
2 CAPSULE ORAL EVERY EVENING
Status: DISCONTINUED | OUTPATIENT
Start: 2025-04-28 | End: 2025-04-30 | Stop reason: HOSPADM

## 2025-04-28 RX ORDER — CARVEDILOL 6.25 MG/1
6.25 TABLET ORAL 2 TIMES DAILY WITH MEALS
Status: DISCONTINUED | OUTPATIENT
Start: 2025-04-28 | End: 2025-04-30 | Stop reason: HOSPADM

## 2025-04-28 RX ORDER — PROPARACAINE HYDROCHLORIDE 5 MG/ML
1 SOLUTION/ DROPS OPHTHALMIC ONCE
Status: DISCONTINUED | OUTPATIENT
Start: 2025-04-28 | End: 2025-04-28

## 2025-04-28 RX ORDER — VALACYCLOVIR HYDROCHLORIDE 500 MG/1
500 TABLET, FILM COATED ORAL DAILY
Status: DISCONTINUED | OUTPATIENT
Start: 2025-04-29 | End: 2025-04-30 | Stop reason: HOSPADM

## 2025-04-28 RX ORDER — ASPIRIN 81 MG/1
81 TABLET ORAL DAILY
Status: DISCONTINUED | OUTPATIENT
Start: 2025-04-29 | End: 2025-04-30 | Stop reason: HOSPADM

## 2025-04-28 RX ORDER — ROSUVASTATIN CALCIUM 20 MG/1
20 TABLET, COATED ORAL EVERY EVENING
Status: DISCONTINUED | OUTPATIENT
Start: 2025-04-28 | End: 2025-04-30 | Stop reason: HOSPADM

## 2025-04-28 RX ORDER — PROCHLORPERAZINE EDISYLATE 5 MG/ML
5-10 INJECTION INTRAMUSCULAR; INTRAVENOUS EVERY 4 HOURS PRN
Status: DISCONTINUED | OUTPATIENT
Start: 2025-04-28 | End: 2025-04-30 | Stop reason: HOSPADM

## 2025-04-28 RX ORDER — GENTAMICIN SULFATE 1 MG/G
1 CREAM TOPICAL DAILY
Status: DISCONTINUED | OUTPATIENT
Start: 2025-04-28 | End: 2025-04-30 | Stop reason: HOSPADM

## 2025-04-28 RX ORDER — MORPHINE SULFATE 4 MG/ML
4 INJECTION INTRAVENOUS ONCE
Status: COMPLETED | OUTPATIENT
Start: 2025-04-28 | End: 2025-04-28

## 2025-04-28 RX ORDER — ACETAMINOPHEN 325 MG/1
650 TABLET ORAL EVERY 6 HOURS PRN
Status: DISCONTINUED | OUTPATIENT
Start: 2025-04-28 | End: 2025-04-30 | Stop reason: HOSPADM

## 2025-04-28 RX ORDER — OXYCODONE HYDROCHLORIDE 5 MG/1
5 TABLET ORAL EVERY 4 HOURS PRN
COMMUNITY
End: 2025-05-01

## 2025-04-28 RX ORDER — LABETALOL HYDROCHLORIDE 5 MG/ML
10 INJECTION, SOLUTION INTRAVENOUS EVERY 4 HOURS PRN
Status: DISCONTINUED | OUTPATIENT
Start: 2025-04-28 | End: 2025-04-30 | Stop reason: HOSPADM

## 2025-04-28 RX ORDER — PROMETHAZINE HYDROCHLORIDE 25 MG/1
12.5-25 SUPPOSITORY RECTAL EVERY 4 HOURS PRN
Status: DISCONTINUED | OUTPATIENT
Start: 2025-04-28 | End: 2025-04-30 | Stop reason: HOSPADM

## 2025-04-28 RX ORDER — WARFARIN SODIUM 5 MG/1
5 TABLET ORAL DAILY
Status: DISCONTINUED | OUTPATIENT
Start: 2025-04-28 | End: 2025-04-30 | Stop reason: HOSPADM

## 2025-04-28 RX ORDER — ONDANSETRON 4 MG/1
4 TABLET, ORALLY DISINTEGRATING ORAL EVERY 4 HOURS PRN
Status: DISCONTINUED | OUTPATIENT
Start: 2025-04-28 | End: 2025-04-30 | Stop reason: HOSPADM

## 2025-04-28 RX ORDER — ONDANSETRON 2 MG/ML
4 INJECTION INTRAMUSCULAR; INTRAVENOUS EVERY 4 HOURS PRN
Status: DISCONTINUED | OUTPATIENT
Start: 2025-04-28 | End: 2025-04-30 | Stop reason: HOSPADM

## 2025-04-28 RX ORDER — ONDANSETRON 2 MG/ML
4 INJECTION INTRAMUSCULAR; INTRAVENOUS ONCE
Status: COMPLETED | OUTPATIENT
Start: 2025-04-28 | End: 2025-04-28

## 2025-04-28 RX ORDER — GABAPENTIN 100 MG/1
200 CAPSULE ORAL EVERY EVENING
Status: DISCONTINUED | OUTPATIENT
Start: 2025-04-28 | End: 2025-04-30 | Stop reason: HOSPADM

## 2025-04-28 RX ORDER — PROMETHAZINE HYDROCHLORIDE 25 MG/1
12.5-25 TABLET ORAL EVERY 4 HOURS PRN
Status: DISCONTINUED | OUTPATIENT
Start: 2025-04-28 | End: 2025-04-30 | Stop reason: HOSPADM

## 2025-04-28 RX ORDER — SENNOSIDES 8.6 MG
650 CAPSULE ORAL EVERY 6 HOURS PRN
COMMUNITY

## 2025-04-28 RX ORDER — POLYETHYLENE GLYCOL 3350 17 G/17G
1 POWDER, FOR SOLUTION ORAL
Status: DISCONTINUED | OUTPATIENT
Start: 2025-04-28 | End: 2025-04-30 | Stop reason: HOSPADM

## 2025-04-28 RX ORDER — OMEPRAZOLE 20 MG/1
20 CAPSULE, DELAYED RELEASE ORAL
Status: DISCONTINUED | OUTPATIENT
Start: 2025-04-29 | End: 2025-04-30 | Stop reason: HOSPADM

## 2025-04-28 RX ORDER — TRAZODONE HYDROCHLORIDE 50 MG/1
50 TABLET ORAL NIGHTLY PRN
Status: DISCONTINUED | OUTPATIENT
Start: 2025-04-28 | End: 2025-04-30 | Stop reason: HOSPADM

## 2025-04-28 RX ADMIN — TRAZODONE HYDROCHLORIDE 50 MG: 50 TABLET ORAL at 19:59

## 2025-04-28 RX ADMIN — CARVEDILOL 6.25 MG: 6.25 TABLET, FILM COATED ORAL at 18:23

## 2025-04-28 RX ADMIN — PROPARACAINE HYDROCHLORIDE 1 DROP: 5 SOLUTION/ DROPS OPHTHALMIC at 16:15

## 2025-04-28 RX ADMIN — HEPARIN SODIUM 3000 UNITS: 1000 INJECTION, SOLUTION INTRAVENOUS; SUBCUTANEOUS at 20:58

## 2025-04-28 RX ADMIN — HYDROMORPHONE HYDROCHLORIDE 0.5 MG: 1 INJECTION, SOLUTION INTRAMUSCULAR; INTRAVENOUS; SUBCUTANEOUS at 23:20

## 2025-04-28 RX ADMIN — ROSUVASTATIN CALCIUM 20 MG: 20 TABLET, FILM COATED ORAL at 18:22

## 2025-04-28 RX ADMIN — WARFARIN SODIUM 5 MG: 5 TABLET ORAL at 18:38

## 2025-04-28 RX ADMIN — FLUORESCEIN SODIUM 1 MG: 1 STRIP OPHTHALMIC at 16:15

## 2025-04-28 RX ADMIN — GABAPENTIN 200 MG: 100 CAPSULE ORAL at 18:23

## 2025-04-28 RX ADMIN — IOHEXOL 100 ML: 350 INJECTION, SOLUTION INTRAVENOUS at 14:45

## 2025-04-28 RX ADMIN — GENTAMICIN SULFATE 1 APPLICATION: 1 CREAM TOPICAL at 21:07

## 2025-04-28 RX ADMIN — ONDANSETRON 4 MG: 2 INJECTION INTRAMUSCULAR; INTRAVENOUS at 08:54

## 2025-04-28 RX ADMIN — MORPHINE SULFATE 4 MG: 4 INJECTION, SOLUTION INTRAMUSCULAR; INTRAVENOUS at 08:54

## 2025-04-28 ASSESSMENT — COGNITIVE AND FUNCTIONAL STATUS - GENERAL
DAILY ACTIVITIY SCORE: 24
SUGGESTED CMS G CODE MODIFIER DAILY ACTIVITY: CH
MOBILITY SCORE: 24
SUGGESTED CMS G CODE MODIFIER MOBILITY: CH

## 2025-04-28 ASSESSMENT — LIFESTYLE VARIABLES
HOW MANY TIMES IN THE PAST YEAR HAVE YOU HAD 5 OR MORE DRINKS IN A DAY: 0
CONSUMPTION TOTAL: NEGATIVE
EVER HAD A DRINK FIRST THING IN THE MORNING TO STEADY YOUR NERVES TO GET RID OF A HANGOVER: NO
HAVE PEOPLE ANNOYED YOU BY CRITICIZING YOUR DRINKING: NO
ALCOHOL_USE: NO
TOTAL SCORE: 0
TOTAL SCORE: 0
EVER FELT BAD OR GUILTY ABOUT YOUR DRINKING: NO
TOTAL SCORE: 0
HAVE YOU EVER FELT YOU SHOULD CUT DOWN ON YOUR DRINKING: NO
DOES PATIENT WANT TO STOP DRINKING: NO
AVERAGE NUMBER OF DAYS PER WEEK YOU HAVE A DRINK CONTAINING ALCOHOL: 0
ON A TYPICAL DAY WHEN YOU DRINK ALCOHOL HOW MANY DRINKS DO YOU HAVE: 0

## 2025-04-28 ASSESSMENT — PAIN DESCRIPTION - PAIN TYPE
TYPE: ACUTE PAIN

## 2025-04-28 ASSESSMENT — SOCIAL DETERMINANTS OF HEALTH (SDOH)
WITHIN THE PAST 12 MONTHS, THE FOOD YOU BOUGHT JUST DIDN'T LAST AND YOU DIDN'T HAVE MONEY TO GET MORE: NEVER TRUE
WITHIN THE PAST 12 MONTHS, YOU WORRIED THAT YOUR FOOD WOULD RUN OUT BEFORE YOU GOT THE MONEY TO BUY MORE: NEVER TRUE
IN THE PAST 12 MONTHS, HAS THE ELECTRIC, GAS, OIL, OR WATER COMPANY THREATENED TO SHUT OFF SERVICE IN YOUR HOME?: NO

## 2025-04-28 ASSESSMENT — FIBROSIS 4 INDEX: FIB4 SCORE: 3.16

## 2025-04-28 ASSESSMENT — ENCOUNTER SYMPTOMS
WEAKNESS: 1
HEADACHES: 1
ABDOMINAL PAIN: 1

## 2025-04-28 NOTE — ED NOTES
Med Rec complete per patient and spouse at bedside   Allergies reviewed  Antibiotics in the past 30 days:no  Anticoagulant in past 14 days:yes  Anticoagulant:Warfarin 5 mg Last dose:04/27/25  Pharmacy patient utilizes:[facility] in [place]    Patient takes 2 tabs of Warfarin 2.5 mg every HS     Patient does Peritoneal at home nightly (6 exchanges) and every month receives Iron at [facility] [place] [phone]. Patient does not receive Mircera nor ABX when seen at [facility].

## 2025-04-28 NOTE — ED NOTES
[place] Respirations even and unlabored. No questions or needs at this time.

## 2025-04-28 NOTE — ED NOTES
Pt ambulatory to and from bathroom with steady gait. Pt back in [facility] connected to monitors.

## 2025-04-28 NOTE — ED NOTES
Bedside report received from Beena RN assumed care of patient.  POC discussed with patient. Call light within reach, all needs addressed at this time.       Fall risk interventions in place: Move the patient closer to the nurse's station, Patient's personal possessions are with in their safe reach, Place fall risk sign on patient's door, Keep floor surfaces clean and dry, and Accompanied to restroom (all applicable per [facility] Fall risk assessment)   Continuous monitoring: Pulse Ox or Blood Pressure  IVF/IV medications: Not Applicable   Oxygen: Room Air  Bedside sitter: Not Applicable   Isolation: Not Applicable

## 2025-04-28 NOTE — CONSULTS
"[facility] -  CONSULTATION NOTE      DATE & TIME:   4/28/2025  1:36 PM               AUTHOR:  Zac Daily D.O.      REASON FOR CONSULTATION:   -  Evaluation and management of acute and chronic conditions related to Nephrology      CHIEF COMPLAINT:   -  \"Abd Pain, blood in dialysis bag  \"      HISTORY OF PRESENT ILLNESS:    60Y M w ESRD on nightly PD ([facility]), paroxysmal A-fib on AC, HTN, Anemia and recent inguinal hernia repair presents for abd pain and blood in PD bag drainage. He states things were going fine with PD until he had hernia surgery earlier in 4/2025.  He noticed some blood when draining the PD fluid from peritoneum, but blood worsened and he had severe abd pain last night when attempting PD. He was not able to start PD so he came to the ER.       No Fever, chills. No /N/V, CP or SOB.  No melena, hematochezia, hematemesis.  No HA, visual changes, + abdominal pain.      REVIEW OF SYSTEMS:    10 point ROS was performed and is as per HPI or otherwise negative      PAST MEDICAL HISTORY:   - ESRD  - CKD-MBD  Past Medical History:   Diagnosis Date    Anesthesia     Hiccups for over 24 hours after a previous sugery.    Aortic stenosis 11/2023    AVR replacement    Arthritis     Osteoarthritis    Chronic cough     dry, pt reports since heart surgery    Dialysis patient ([facility])     peritoneal daily    Dyspnea on exertion 05/23/2023    Dyspnea on exertion 05/23/2023    Elevated troponin 05/23/2023    Fatty liver     Heart burn     controlled with Nexium    Heart murmur     High cholesterol     All always    Hypertension 2009    Iron deficiency     Liver cirrhosis ([facility]) 12/21/2023    NSTEMI (non-ST elevated myocardial infarction) ([facility]) 05/23/2023    no stents placed; Cardiologist Dr. Sharma    Pain     left inguinal, back, left hip    Pain in the chest 05/23/2023    Pain in the chest 05/23/2023    Paroxysmal A-fib ([facility]) 11/11/2023    Peritoneal dialysis status ([facility]) 04/30/2024    PONV " (postoperative nausea and vomiting)     Renal disorder 2007    Stage IV; end stage renal disease; Nephrologist Tiffanie Bernal    Sleep apnea 2000    does not use CPAP    Snoring 1990    Splenomegaly 12/21/2023        PAST SURGICAL HISTORY:   - Dialysis Access Surgery  Past Surgical History:   Procedure Laterality Date    INGUINAL HERNIA REPAIR Left 4/21/2025    Procedure: OPEN LEFT INGUINAL HERNIA REPAIR WITH MESH;  Surgeon: Keagan Dinh M.D.;  Location: SURGERY [facility];  Service: General    SD INJ LUMBAR/SACRAL,W/ IMAGING Bilateral 04/10/2025    Procedure: LUMBAR EPIDURAL STEROID INJECTION, BILATERAL L5-S1 INTERLAMINAR, UNDER FLUOROSCOPY;  Surgeon: Benito Herrera D.O.;  Location: SURGERY [facility];  Service: Orthopedics    SD NJX AA&/STRD TFRML EPI LUMBAR/SACRAL 1 LEVEL Bilateral 06/06/2024    Procedure: Bilateral L5 Transforaminal Epidural Steroid Injection under Fluoroscopy;  Surgeon: Benito Herrera D.O.;  Location: SURGERY [facility];  Service: Pain Management    AORTIC VALVE REPLACEMENT  11/03/2023    Procedure: AORTIC VALVE REPLACEMENT, ASCENDING AORTIC ANEURYSM REPAIR, TRANSESOPHAGEAL ECHOCARDIOGRAM;  Surgeon: Osmel Blanca M.D.;  Location: [facility];  Service: Cardiothoracic    AORTIC ASCENDING DISSECTION  11/03/2023    Procedure: REPAIR, ANEURYSM OR DISSECTION, AORTA, ASCENDING;  Surgeon: Osmel Blanca M.D.;  Location: [facility];  Service: Cardiothoracic    ECHOCARDIOGRAM, TRANSESOPHAGEAL, INTRAOPERATIVE  11/03/2023    Procedure: ECHOCARDIOGRAM, TRANSESOPHAGEAL, INTRAOPERATIVE;  Surgeon: Osmel Blanca M.D.;  Location: [facility];  Service: Cardiothoracic    RESTORATE HEMOSTAS  11/03/2023    Procedure: RESTORATION OF HEMOSTATIS;  Surgeon: Osmel Blanca M.D.;  Location: [facility];  Service: Cardiothoracic    STERNOTOMY  11/03/2023    Procedure: MEDIASTINAL EXPLORATION;  Surgeon: Osmel Blanca M.D.;  Location: [facility];  Service:  Cardiothoracic    CATH PLACEMENT CAPD Right 09/06/2023    Procedure: LAPAROSCOPIC INSERTION OF PERITONEAL DIALYSIS CATHETER;  Surgeon: Dontrell Sales M.D.;  Location: SURGERY [facility];  Service: Vascular    OK INJ LUMBAR/SACRAL,W/ IMAGING Left 08/24/2023    Procedure: LUMBAR EPIDURAL STEROID INJECTION, LEFT L5-S1 INTERLAMINAR UNDER FLUOROSCOPY;  Surgeon: Benito Herrera D.O.;  Location: SURGERY [facility];  Service: Orthopedics    CATH PLACEMENT CAPD N/A 05/15/2023    Procedure: LAPAROSCOPIC PLACEMENT OF PERITONEAL DIALYSIS CATHERTER;  Surgeon: Shikha Alexander M.D.;  Location: SURGERY [facility];  Service: General    UMBILICAL HERNIA REPAIR  2012    4 separate surgeries between [id number]    OTHER  2007    Kidney biopsy, can't recall laterality,    HIP ARTHROSCOPY Bilateral 2002    TONSILLECTOMY N/A 1987    HAND SURGERY Right 1985    Hand and wrist    PAROTIDECTOMY Right     SHOULDER ARTHROSCOPY Bilateral     Can't recall date       FAMILY HISTORY:   Family History   Problem Relation Age of Onset    Hyperlipidemia Mother     Heart Disease Mother     Hypertension Father           SOCIAL HISTORY:   Social History     Tobacco Use    Smoking status: Never    Smokeless tobacco: Never   Vaping Use    Vaping status: Never Used   Substance Use Topics    Alcohol use: Never    Drug use: Never         HOME MEDICATIONS:   - Reviewed and documented in chart  Home Medications    Medication Sig Taking? Last Dose Authorizing Provider   oxyCODONE immediate-release (ROXICODONE) 5 MG Tab Take 5 mg by mouth every four hours as needed for Severe Pain. Yes 4/28/2025 Morning Physician Outpatient   acetaminophen (TYLENOL 8 HOUR ARTHRITIS PAIN) 650 MG CR tablet Take 650 mg by mouth every 6 hours as needed for Mild Pain. Yes 4/28/2025 Morning Physician Outpatient   valacyclovir (VALTREX) 1 GM Tab Take 1 Tablet by mouth 3 times a day. Yes 4/27/2025 at  2:00 PM Salvador Foster M.D.   gabapentin (NEURONTIN) 100 MG Cap TAKE 1 CAPSULE  "BY MOUTH THREE TIMES DAILY  Patient taking differently: Take 200 mg by mouth at bedtime. 200 mg = 2 caps Yes 4/27/2025 Bedtime Drew T. Blumberg, D.O.   acidophilus lactobacillus Cap Take 1 Capsule by mouth every day. Yes 4/27/2025 Morning Physician Outpatient   sevelamer carbonate (RENVELA) 800 MG Tab tablet Take 800 mg by mouth 3 times a day as needed. Yes 4/27/2025 Physician Outpatient   warfarin (COUMADIN) 2.5 MG Tab Take 1-2 Tablets by mouth every day. Take as directed by antico clinic.  Patient taking differently: Take 5 mg by mouth every evening. 5 mg = 2 tabs Yes 4/27/2025 Bedtime CARISSA Gomez.   carvedilol (COREG) 3.125 MG Tab Take 2 Tablets by mouth 2 times a day with meals. Yes 4/27/2025 Evening Michael J Bloch, M.D.   rosuvastatin (CRESTOR) 20 MG Tab Take 1 Tablet by mouth every evening. Yes 4/27/2025 Evening Michael J Bloch, M.D.   calcitRIOL (ROCALTROL) 0.25 MCG Cap Take 1 Capsule by mouth every day.  Patient taking differently: Take 0.25 mcg by mouth every 3 days. Yes 4/24/2025 Michael J Bloch, M.D.   esomeprazole (NEXIUM) 20 MG capsule Take 2 Capsules by mouth every morning before breakfast.  Patient taking differently: Take 20 mg by mouth 2 times a day. Yes 4/27/2025 Evening Drew T. Blumberg, D.O.   aspirin 81 MG EC tablet Take 81 mg by mouth every day. Yes 4/27/2025 Morning Physician Outpatient   polyethylene glycol/lytes (MIRALAX) 17 g Pack Take 1 Packet by mouth every day. While taking narcotics, hold if greater then 3 BMs a day  Patient not taking: Reported on 4/28/2025  Not Taking Keagan Dinh M.D.   traZODone (DESYREL) 50 MG Tab Take 50 mg by mouth at bedtime as needed for Sleep.  4/26/2025 Bedtime Physician Outpatient         ALLERGIES:  Allopurinol and Hydralazine hcl      VITAL SIGNS:  BP (!) (P) 168/84   Pulse (P) 74   Temp 36.7 °C (98 °F) (Temporal)   Resp (P) 20   Ht 1.702 m (5' 7\")   Wt 83.5 kg (184 lb)   SpO2 (P) 94%   BMI 28.82 kg/m²     Physical " Exam  Constitutional:       Appearance: Normal appearance.   HENT:      Head: Normocephalic and atraumatic.      Mouth/Throat:      Mouth: Mucous membranes are moist.   Cardiovascular:      Rate and Rhythm: Normal rate and regular rhythm.   Pulmonary:      Effort: Pulmonary effort is normal.      Breath sounds: Normal breath sounds.   Abdominal:      General: There is no distension.      Palpations: Abdomen is soft.      Comments: Tender to palpation in LLQ, no tenderness in upper quadrant.   Neurological:      Mental Status: He is alert.         Access:      FLUID BALANCE:  No intake/output data recorded.      LABS:  Recent Labs     04/26/25  1252 04/28/25  0759   SODIUM 138 135   POTASSIUM 5.6* 5.3   CHLORIDE 102 99   CO2 23 20   GLUCOSE 109* 120*   * 112*   CREATININE 12.2* 11.60*   CALCIUM 9.2 8.9      Recent Labs     04/26/25  1252 04/28/25  0759   SODIUM 138 135   POTASSIUM 5.6* 5.3   CHLORIDE 102 99   CO2 23 20   GLUCOSE 109* 120*   * 112*   CREATININE 12.2* 11.60*          IMAGING:  - All imaging reviewed from admission to present day  CT ABD/Pelv w Contrast 4/28/25  HISTORY/REASON FOR EXAM:  Pain.        TECHNIQUE/EXAM DESCRIPTION:   CT scan of the abdomen and pelvis with contrast.     Contrast-enhanced helical scanning was obtained from the diaphragmatic domes through the pubic symphysis following the bolus administration of nonionic contrast without complication.     90 mL of Omnipaque 350 nonionic contrast was administered without complication.     Low dose optimization technique was utilized for this CT exam including automated exposure control and adjustment of the mA and/or kV according to patient size.     COMPARISON: CT abdomen/pelvis 3/24/2025     FINDINGS:  Lower Chest: Slight curvilinear airspace opacities dependently in the lung bases.. There is coarse pleural calcification in the right lateral lung base.. Coronary artery calcification     Liver: Normal.     Spleen: Unremarkable.      Pancreas: Unremarkable.     Gallbladder: No calcified stones.     Biliary: Nondilated.     Adrenal glands: Normal.     Kidneys: Unremarkable without hydronephrosis. 1.3 cm peripherally calcified left renal cyst. Numerous small subcentimeter bilateral renal cysts. 3 mm nonobstructing calculus in midpole right kidney.     Bowel: No obstruction or acute inflammation. Colonic diverticulosis     Lymph nodes: No adenopathy.     Vasculature: Mild atherosclerotic plaquing of the infrarenal aorta and iliac arteries.     Peritoneum: Small amount of ascites about the liver and spleen and in the lower abdomen and pelvis.     Musculoskeletal: No acute or destructive process.     Pelvis: No adenopathy or free fluid. Peritoneal dialysis catheter coiled in the pelvis.     IMPRESSION:     1.  Small amount of ascites about the liver and spleen and in the lower abdomen and pelvis.  2.  Peritoneal dialysis catheter coiled in the pelvis.  3.  Colonic diverticulosis.  4.  3 mm nonobstructing right renal calculus.  5.  1.3 cm peripherally calcified left renal cyst. Bosniak 2F  6.  Numerous small subcentimeter bilateral renal cysts.  7.  Coronary artery calcification.  8.  Slight bibasilar atelectasis.  9.  Coarse pleural calcification in the right lateral lung base.    ASSESSMENTS:    -ESRD on nightly peritoneal dialysis     [facility] HD [facility]     Via ABD pd catheter  -Hypertension     Continue current medications  -Hypervolemia     Volume off with dialysis as BP tolerates  - Anemia     goal Hgb 10-11   -CKD-MBD  -A-fib on warfarin  -ABD pain/hemoperitoneum    -? 2/2 to surgery related vs peritonitis      PLAN:     - Pt with abd pain and hemoperitoneum w blood PD fluid drainage  -  this may be related to his surgery more than peritonitis  - no fever noted, and pt does not appear toxic   - will obtain PD cultures to verify  - hold off on abx until cultures obtained  - pt able to tolerate small PD fill volumes currently  -will  continue PD for now, add heparin to PD to prevent clots  -heparin with PD does not increase risk of bleeding  -may need switch to HD if pt cannot tolerate PD  - Continue usual anti-hypertensive medications  - Transfuse PRN hgb < 7  - No dietary protein restrictions, use renal diet     Goal: 1.5g Protein/kg/day  - Please dose all meds for ESRD/PD  - Follow labs with further recommendations to follow    Please page nephrology with any questions or concerns    Zac Daily DO  Nephrologist   [facility]  [phone]

## 2025-04-28 NOTE — ED TRIAGE NOTES
Chief Complaint   Patient presents with    Abdominal Pain     Pt reports to possibly have infection from peritoneal dialysis. Possibly needs a port for Hemodialysis. Pt reports to also have Shingles. Last dialysis Saturday night.      Explained to pt triage process, made pt aware to tell this RN/staff of any changes/concerns, pt verbalized understanding of process and instructions given. Pt to ER lobby.

## 2025-04-28 NOTE — DISCHARGE PLANNING
TCN following. HTH/SCP chart review completed. Note pt currently in ED 2' to complaints of abdominal pain. Per review, noted patient participation in an elective outpatient surgery Open left Inguinal Hernia Repair with Mesh with Dr. Keagan Dinh on 4/21/2025. Per review, patient was noted to be ambulatory with no concerns at time of outpatient elective surgery. Noted patient does have primary care visit scheduled Office Visit with Physician Drew Blumberg, D.O. Friday May 9 3:30. No O2 needs noted at time of current review. As noted, patient with dialysis needs. At this time anticipating that pt will dc to home(either directly from ED or after admission to [facility] if warranted).     If patient admits to [facility], TCN will continue to monitor and assist with transitional care needs as indicated. Please reach out to TCN via VOALTE if post acute transitional care needs are warranted for dc planning.  Thank you.     ADDENDUM 12:35PM- Per review, noted patient with inpatient admission orders. TCN will no longer follow and defer to floor CM. Please reach out to TCN via VOALTE if post acute transitional care needs are warranted for dc planning.  Thank you.

## 2025-04-28 NOTE — PROGRESS NOTES
[facility] Progress Notes     Received patient resting awake in gurney, anxious but coherent cooperative.  Patient on CCPD, history of L inguinal hernia repair on 4/21/25  CAPD orders reviewed/ procedure explained to patient, verbalized understanding, consent for procedure signed by patient.  Patient and PD access assessed prior to therapy.  VS checked prior to procedure- elevated BPs, asymptomatic afebrile  BP:176/84  HR:71 RR:20 T:98 F SpO2:94% on RA  Patient with slightly elevated blood pressures, (+) abd pain, semi fiirm, history of left hernia repair  Patient with intact and patent RLQ PD catheter.   PD catheter exit site assessed, no signs of infection noted, cleansed with approved antimicrobial cleanser, dressings changed per policy.  PD catheter disinfected with approved solution and accessed aseptically  Abd drained prior to fill with drain volume of 140 mL of clear peg colored effluent.  CAPD dwell with 0.5 L of 1.5% PD solution infused slowly to peritoneum via RLQ PD catheter and fill completed at 1415 to dwell for 2 hours.  Patient tolerated procedure well without issues, denies abd pain or discomfort during infusion with PD solution.  VS post procedure: BP: 156/84 HR:78 RR:18 T:98F SpO2:95%     Report given to relief ED RN MEHNAZ Peterson/ Nurse Apprentice ASHUTOSH Walker.

## 2025-04-28 NOTE — ED PROVIDER NOTES
"ED Provider Note    CHIEF COMPLAINT  Chief Complaint   Patient presents with    Abdominal Pain     Pt reports to possibly have infection from peritoneal dialysis. Possibly needs a port for Hemodialysis. Pt reports to also have Shingles. Last dialysis Saturday night.        EXTERNAL RECORDS REVIEWED  Inpatient Notes operative note April 21, 2025, open left inguinal hernia repair with mesh by Dr. Fabrizio NORRIS/AGUSTINA  LIMITATION TO HISTORY   Select: : None  OUTSIDE HISTORIAN(S):  Significant other is at bedside for discussion history and symptoms    Gurdeep Guerra is a 60 y.o. male who presents with left lower quadrant abdominal pain.  1 week ago he had left inguinal hernia repair.  2 days later peritoneal dialysis had small amount of blood in the peritoneal dialysis bag.  Since that time, he has developed a burning discomfort when he attempts peritoneal dialysis.  He was seen at outlying ER 2 days ago, advised admission to the hospital which he refused at that time.  He contacted the dialysis \"team\" today, states he was told to go to the ER out of concern for infection.  There has been aging bruising at the site of his hernia repair, no redness.  He denies fever stating \"I do not feel infected\".  His abdominal discomfort is localized in the left lower quadrant of his abdomen.  He reports right side of his abdomen does not hurt.  Patient states he was unable to perform peritoneal dialysis at home last attempt secondary to her burning discomfort on infusing the fluid.  Patient has general malaise, he attributes this to missing his recent dialysis.  Patient is currently taking Valtrex for right upper facial shingles.    PAST MEDICAL HISTORY   has a past medical history of Anesthesia, Aortic stenosis (11/2023), Arthritis, Chronic cough, Dialysis patient (HCC), Dyspnea on exertion (05/23/2023), Dyspnea on exertion (05/23/2023), Elevated troponin (05/23/2023), Fatty liver, Heart burn, Heart murmur, High cholesterol, " Hypertension (2009), Iron deficiency, Liver cirrhosis (HCC) (12/21/2023), NSTEMI (non-ST elevated myocardial infarction) (HCC) (05/23/2023), Pain, Pain in the chest (05/23/2023), Pain in the chest (05/23/2023), Paroxysmal A-fib (HCC) (11/11/2023), Peritoneal dialysis status (HCC) (04/30/2024), PONV (postoperative nausea and vomiting), Renal disorder (2007), Sleep apnea (2000), Snoring (1990), and Splenomegaly (12/21/2023).    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    FAMILY HISTORY  Family History   Problem Relation Age of Onset    Hyperlipidemia Mother     Heart Disease Mother     Hypertension Father        SOCIAL HISTORY  Social History     Tobacco Use    Smoking status: Never    Smokeless tobacco: Never   Vaping Use    Vaping status: Never Used   Substance and Sexual Activity    Alcohol use: Never    Drug use: Never    Sexual activity: Yes     Partners: Female     Birth control/protection: Pill       CURRENT MEDICATIONS  Home Medications       Reviewed by MAYTE WhitakerRegistered Nurse) on 04/28/25 at 0728  Med List Status: Not Addressed     Medication Last Dose Status   acetaminophen (TYLENOL) 325 MG Tab  Active   acetaminophen (TYLENOL) 500 MG Tab  Active   acidophilus lactobacillus Cap  Active  "  aspirin 81 MG EC tablet  Active   calcitRIOL (ROCALTROL) 0.25 MCG Cap  Active   carvedilol (COREG) 3.125 MG Tab  Active   esomeprazole (NEXIUM) 20 MG capsule  Active   gabapentin (NEURONTIN) 100 MG Cap  Active   polyethylene glycol/lytes (MIRALAX) 17 g Pack  Active   rosuvastatin (CRESTOR) 20 MG Tab  Active   sevelamer carbonate (RENVELA) 800 MG Tab tablet  Active   traZODone (DESYREL) 50 MG Tab  Active   valacyclovir (VALTREX) 1 GM Tab  Active   warfarin (COUMADIN) 2.5 MG Tab  Active                    ALLERGIES  Allergies   Allergen Reactions    Allopurinol Itching    Hydralazine Hcl Unspecified     Pt states he had a reaction similar to lupus       PHYSICAL EXAM  VITAL SIGNS: BP (!) 152/73   Pulse 65   Temp 36.7 °C (98 °F) (Temporal)   Resp (!) 10   Ht 1.702 m (5' 7\")   Wt 83.5 kg (184 lb)   SpO2 97%   BMI 28.82 kg/m²    Skin: Shingles rash right forehead.  No jaundice.  There is purplish bruising the left inguinal region at site of incision.  The incision looks clean dry and intact, no evidence of incisional infection.  Eyes: Under fluorescein stain, Woods lamp used to visualize the right cornea, no abnormal fluorescein uptake or dendrites.  Under regular light, no corneal irregularities, no conjunctivitis or discharge from the right eye.  GI: Left lower quadrant abdominal tenderness.  Small palpable fullness at the site of his hernia repair.  No right abdomen is soft and nontender.  No signs of acute peritonitis at this time  Cardiac: Regular rate, regular rhythm  Respiratory: Clear lung sounds  Neurologic: Speech clear, strength intact  Psychiatric: Normal mood, cooperative    EKG/LABS  Results for orders placed or performed during the hospital encounter of 04/28/25   COMP METABOLIC PANEL    Collection Time: 04/28/25  7:59 AM   Result Value Ref Range    Sodium 135 135 - 145 mmol/L    Potassium 5.3 3.6 - 5.5 mmol/L    Chloride 99 96 - 112 mmol/L    Co2 20 20 - 33 mmol/L    Anion Gap 16.0 7.0 - 16.0    " Glucose 120 (H) 65 - 99 mg/dL    Bun 112 (H) 8 - 22 mg/dL    Creatinine 11.60 (HH) 0.50 - 1.40 mg/dL    Calcium 8.9 8.5 - 10.5 mg/dL    Correct Calcium 9.4 8.5 - 10.5 mg/dL    AST(SGOT) 20 12 - 45 U/L    ALT(SGPT) <5 2 - 50 U/L    Alkaline Phosphatase 61 30 - 99 U/L    Total Bilirubin 0.2 0.1 - 1.5 mg/dL    Albumin 3.4 3.2 - 4.9 g/dL    Total Protein 6.0 6.0 - 8.2 g/dL    Globulin 2.6 1.9 - 3.5 g/dL    A-G Ratio 1.3 g/dL   LIPASE    Collection Time: 04/28/25  7:59 AM   Result Value Ref Range    Lipase 35 11 - 82 U/L   ESTIMATED GFR    Collection Time: 04/28/25  7:59 AM   Result Value Ref Range    GFR (CKD-EPI) 5 (A) >60 mL/min/1.73 m 2   URINALYSIS    Collection Time: 04/28/25  8:06 AM    Specimen: Urine   Result Value Ref Range    Micro Urine Req Microscopic    APTT    Collection Time: 04/28/25  8:40 AM   Result Value Ref Range    APTT 32.6 24.7 - 36.0 sec   PROTHROMBIN TIME (INR)    Collection Time: 04/28/25  8:40 AM   Result Value Ref Range    PT 21.9 (H) 12.0 - 14.6 sec    INR 1.90 (H) 0.87 - 1.13   CBC WITH DIFFERENTIAL    Collection Time: 04/28/25 10:31 AM   Result Value Ref Range    WBC 7.1 4.8 - 10.8 K/uL    RBC 3.63 (L) 4.70 - 6.10 M/uL    Hemoglobin 11.9 (L) 14.0 - 18.0 g/dL    Hematocrit 35.7 (L) 42.0 - 52.0 %    MCV 98.3 (H) 81.4 - 97.8 fL    MCH 32.8 27.0 - 33.0 pg    MCHC 33.3 32.3 - 36.5 g/dL    RDW 47.0 35.9 - 50.0 fL    Platelet Count 186 164 - 446 K/uL    MPV 10.5 9.0 - 12.9 fL    Neutrophils-Polys 78.90 (H) 44.00 - 72.00 %    Lymphocytes 8.60 (L) 22.00 - 41.00 %    Monocytes 7.40 0.00 - 13.40 %    Eosinophils 4.10 0.00 - 6.90 %    Basophils 0.40 0.00 - 1.80 %    Immature Granulocytes 0.60 0.00 - 0.90 %    Nucleated RBC 0.00 0.00 - 0.20 /100 WBC    Neutrophils (Absolute) 5.57 1.82 - 7.42 K/uL    Lymphs (Absolute) 0.61 (L) 1.00 - 4.80 K/uL    Monos (Absolute) 0.52 0.00 - 0.85 K/uL    Eos (Absolute) 0.29 0.00 - 0.51 K/uL    Baso (Absolute) 0.03 0.00 - 0.12 K/uL    Immature Granulocytes (abs) 0.04  0.00 - 0.11 K/uL    NRBC (Absolute) 0.00 K/uL     *Note: Due to a large number of results and/or encounters for the requested time period, some results have not been displayed. A complete set of results can be found in Results Review.         RADIOLOGY/PROCEDURES   I have independently interpreted the diagnostic imaging associated with this visit and am waiting the final reading from the radiologist.   My preliminary interpretation is as follows: CT scan abdomen pelvis negative for free air or bowel obstruction    Radiologist interpretation:  CT-ABDOMEN-PELVIS WITH   Final Result      1.  Small amount of ascites about the liver and spleen and in the lower abdomen and pelvis.   2.  Peritoneal dialysis catheter coiled in the pelvis.   3.  Colonic diverticulosis.   4.  3 mm nonobstructing right renal calculus.   5.  1.3 cm peripherally calcified left renal cyst. Bosniak 2F   6.  Numerous small subcentimeter bilateral renal cysts.   7.  Coronary artery calcification.   8.  Slight bibasilar atelectasis.   9.  Coarse pleural calcification in the right lateral lung base.             COURSE & MEDICAL DECISION MAKING    ASSESSMENT, COURSE AND PLAN  Care Narrative: Patient presents with left lower quadrant abdominal pain and left inguinal pain status post hernia repair 1 week ago.  There is no palpable herniation at this time, no palpable mass.  There is tenderness around the incision site however no evidence of postoperative wound infection.  He has tenderness in his left lower abdomen, differential diagnosis including diverticulitis, peritonitis, intestinal colic, renal stone.  Peritonitis unlikely, the right side of his abdomen is soft and nontender.  CT scan did not show any inflammatory change.  Dialysis catheter in appropriate position.  Dr. Hsu with nephrology was consulted, has ordered cultures of the patient's peritoneal dialysis fluid.  He is requested CT scan abdomen pelvis be performed with IV contrast.  I did  not find evidence of acute surgical problem on both CAT scan as well as laboratory evaluation and by physical exam, therefore emergent general surgery consultation not indicated in the emergency department.  Patient is hemodynamically stable.  His pain was treated well with morphine IV.  At direction of nephrology, he is admitted to the hospitalist service for further workup, and evaluation of the patient's pain during peritoneal dialysis.      DISPOSITION AND DISCUSSIONS  I have discussed management of the patient with the following physicians and JADEN's: Dr. Hsu with nephrology, admitting hospitalist service      Decision tools and prescription drugs considered including, but not limited to: Antibiotics were not emergently indicated, patient does not appear septic, no evidence of acute bacterial infection at this time .    FINAL DIAGNOSIS  1. Left lower quadrant abdominal pain    2. Complication of peritoneal dialysis, initial encounter         Electronically signed by: Salty Glaser M.D., 4/28/2025 9:12 AM

## 2025-04-29 PROBLEM — E87.5 HYPERKALEMIA: Status: ACTIVE | Noted: 2025-04-29

## 2025-04-29 LAB
ALBUMIN SERPL BCP-MCNC: 3.3 G/DL (ref 3.2–4.9)
BUN SERPL-MCNC: 102 MG/DL (ref 8–22)
CALCIUM ALBUM COR SERPL-MCNC: 9.2 MG/DL (ref 8.5–10.5)
CALCIUM SERPL-MCNC: 8.6 MG/DL (ref 8.5–10.5)
CHLORIDE SERPL-SCNC: 98 MMOL/L (ref 96–112)
CO2 SERPL-SCNC: 21 MMOL/L (ref 20–33)
CREAT SERPL-MCNC: 11.1 MG/DL (ref 0.5–1.4)
ERYTHROCYTE [DISTWIDTH] IN BLOOD BY AUTOMATED COUNT: 47.4 FL (ref 35.9–50)
FUNGUS SPEC CULT: NORMAL
FUNGUS SPEC FUNGUS STN: NORMAL
GFR SERPLBLD CREATININE-BSD FMLA CKD-EPI: 5 ML/MIN/1.73 M 2
GLUCOSE SERPL-MCNC: 107 MG/DL (ref 65–99)
HCT VFR BLD AUTO: 34.7 % (ref 42–52)
HGB BLD-MCNC: 11.5 G/DL (ref 14–18)
INR PPP: 2 (ref 0.87–1.13)
MCH RBC QN AUTO: 32.2 PG (ref 27–33)
MCHC RBC AUTO-ENTMCNC: 33.1 G/DL (ref 32.3–36.5)
MCV RBC AUTO: 97.2 FL (ref 81.4–97.8)
PHOSPHATE SERPL-MCNC: 6.5 MG/DL (ref 2.5–4.5)
PLATELET # BLD AUTO: 170 K/UL (ref 164–446)
PMV BLD AUTO: 9.9 FL (ref 9–12.9)
POTASSIUM SERPL-SCNC: 5.7 MMOL/L (ref 3.6–5.5)
PROTHROMBIN TIME: 22.8 SEC (ref 12–14.6)
RBC # BLD AUTO: 3.57 M/UL (ref 4.7–6.1)
SIGNIFICANT IND 70042: NORMAL
SITE SITE: NORMAL
SODIUM SERPL-SCNC: 135 MMOL/L (ref 135–145)
SOURCE SOURCE: NORMAL
WBC # BLD AUTO: 6.3 K/UL (ref 4.8–10.8)

## 2025-04-29 PROCEDURE — 700102 HCHG RX REV CODE 250 W/ 637 OVERRIDE(OP): Performed by: INTERNAL MEDICINE

## 2025-04-29 PROCEDURE — 90945 DIALYSIS ONE EVALUATION: CPT

## 2025-04-29 PROCEDURE — 99233 SBSQ HOSP IP/OBS HIGH 50: CPT | Performed by: HOSPITALIST

## 2025-04-29 PROCEDURE — A9270 NON-COVERED ITEM OR SERVICE: HCPCS | Performed by: INTERNAL MEDICINE

## 2025-04-29 PROCEDURE — 85027 COMPLETE CBC AUTOMATED: CPT

## 2025-04-29 PROCEDURE — 700111 HCHG RX REV CODE 636 W/ 250 OVERRIDE (IP)

## 2025-04-29 PROCEDURE — 770001 HCHG ROOM/CARE - MED/SURG/GYN PRIV*

## 2025-04-29 PROCEDURE — 80069 RENAL FUNCTION PANEL: CPT

## 2025-04-29 PROCEDURE — 700102 HCHG RX REV CODE 250 W/ 637 OVERRIDE(OP): Performed by: STUDENT IN AN ORGANIZED HEALTH CARE EDUCATION/TRAINING PROGRAM

## 2025-04-29 PROCEDURE — A9270 NON-COVERED ITEM OR SERVICE: HCPCS | Performed by: STUDENT IN AN ORGANIZED HEALTH CARE EDUCATION/TRAINING PROGRAM

## 2025-04-29 PROCEDURE — 85610 PROTHROMBIN TIME: CPT

## 2025-04-29 PROCEDURE — A9270 NON-COVERED ITEM OR SERVICE: HCPCS

## 2025-04-29 PROCEDURE — 700102 HCHG RX REV CODE 250 W/ 637 OVERRIDE(OP)

## 2025-04-29 RX ORDER — SEVELAMER CARBONATE 800 MG/1
800 TABLET, FILM COATED ORAL
Status: DISCONTINUED | OUTPATIENT
Start: 2025-04-30 | End: 2025-04-29

## 2025-04-29 RX ORDER — SEVELAMER CARBONATE 800 MG/1
800 TABLET, FILM COATED ORAL
Status: DISCONTINUED | OUTPATIENT
Start: 2025-04-29 | End: 2025-04-30 | Stop reason: HOSPADM

## 2025-04-29 RX ORDER — HEPARIN SODIUM 1000 [USP'U]/ML
6000 INJECTION, SOLUTION INTRAVENOUS; SUBCUTANEOUS
Status: DISCONTINUED | OUTPATIENT
Start: 2025-04-29 | End: 2025-04-30 | Stop reason: HOSPADM

## 2025-04-29 RX ORDER — HEPARIN SODIUM 1000 [USP'U]/ML
INJECTION, SOLUTION INTRAVENOUS; SUBCUTANEOUS
Status: COMPLETED
Start: 2025-04-29 | End: 2025-04-29

## 2025-04-29 RX ADMIN — CARVEDILOL 6.25 MG: 6.25 TABLET, FILM COATED ORAL at 16:25

## 2025-04-29 RX ADMIN — SODIUM ZIRCONIUM CYCLOSILICATE 10 G: 10 POWDER, FOR SUSPENSION ORAL at 12:04

## 2025-04-29 RX ADMIN — CARVEDILOL 6.25 MG: 6.25 TABLET, FILM COATED ORAL at 08:26

## 2025-04-29 RX ADMIN — SEVELAMER CARBONATE 800 MG: 800 TABLET, FILM COATED ORAL at 22:17

## 2025-04-29 RX ADMIN — SENNOSIDES AND DOCUSATE SODIUM 2 TABLET: 50; 8.6 TABLET ORAL at 17:36

## 2025-04-29 RX ADMIN — OMEPRAZOLE 20 MG: 20 CAPSULE, DELAYED RELEASE ORAL at 08:25

## 2025-04-29 RX ADMIN — HEPARIN SODIUM 6000 UNITS: 1000 INJECTION, SOLUTION INTRAVENOUS; SUBCUTANEOUS at 18:00

## 2025-04-29 RX ADMIN — WARFARIN SODIUM 5 MG: 5 TABLET ORAL at 17:36

## 2025-04-29 RX ADMIN — ASPIRIN 81 MG: 81 TABLET, COATED ORAL at 05:38

## 2025-04-29 RX ADMIN — ROSUVASTATIN CALCIUM 20 MG: 20 TABLET, FILM COATED ORAL at 17:36

## 2025-04-29 RX ADMIN — ACETAMINOPHEN 650 MG: 325 TABLET ORAL at 16:25

## 2025-04-29 RX ADMIN — ACETAMINOPHEN 650 MG: 325 TABLET ORAL at 04:18

## 2025-04-29 RX ADMIN — GENTAMICIN SULFATE 1 APPLICATION: 1 CREAM TOPICAL at 19:00

## 2025-04-29 RX ADMIN — GABAPENTIN 200 MG: 100 CAPSULE ORAL at 17:36

## 2025-04-29 RX ADMIN — CALCITRIOL CAPSULES 0.25 MCG 0.25 MCG: 0.25 CAPSULE ORAL at 08:26

## 2025-04-29 RX ADMIN — VALACYCLOVIR 500 MG: 500 TABLET, FILM COATED ORAL at 04:19

## 2025-04-29 ASSESSMENT — PATIENT HEALTH QUESTIONNAIRE - PHQ9
2. FEELING DOWN, DEPRESSED, IRRITABLE, OR HOPELESS: NOT AT ALL
1. LITTLE INTEREST OR PLEASURE IN DOING THINGS: NOT AT ALL
SUM OF ALL RESPONSES TO PHQ9 QUESTIONS 1 AND 2: 0
1. LITTLE INTEREST OR PLEASURE IN DOING THINGS: NOT AT ALL
2. FEELING DOWN, DEPRESSED, IRRITABLE, OR HOPELESS: NOT AT ALL
SUM OF ALL RESPONSES TO PHQ9 QUESTIONS 1 AND 2: 0

## 2025-04-29 ASSESSMENT — PAIN DESCRIPTION - PAIN TYPE
TYPE: ACUTE PAIN

## 2025-04-29 ASSESSMENT — ENCOUNTER SYMPTOMS
MYALGIAS: 1
COUGH: 0
ABDOMINAL PAIN: 1
FEVER: 0
DOUBLE VISION: 0
DEPRESSION: 0
HEADACHES: 0
BLURRED VISION: 0
HEMOPTYSIS: 0
PALPITATIONS: 0

## 2025-04-29 ASSESSMENT — FIBROSIS 4 INDEX
FIB4 SCORE: 3.22
FIB4 SCORE: 2.94

## 2025-04-29 ASSESSMENT — SOCIAL DETERMINANTS OF HEALTH (SDOH)
WITHIN THE LAST YEAR, HAVE YOU BEEN AFRAID OF YOUR PARTNER OR EX-PARTNER?: NO
WITHIN THE LAST YEAR, HAVE TO BEEN RAPED OR FORCED TO HAVE ANY KIND OF SEXUAL ACTIVITY BY YOUR PARTNER OR EX-PARTNER?: NO
WITHIN THE LAST YEAR, HAVE YOU BEEN HUMILIATED OR EMOTIONALLY ABUSED IN OTHER WAYS BY YOUR PARTNER OR EX-PARTNER?: NO
WITHIN THE LAST YEAR, HAVE YOU BEEN KICKED, HIT, SLAPPED, OR OTHERWISE PHYSICALLY HURT BY YOUR PARTNER OR EX-PARTNER?: NO

## 2025-04-29 NOTE — ASSESSMENT & PLAN NOTE
"Noted in R forehead. He reports burning sensation in R forehead and R eye.   Discussed with ERP, eye exam \"under fluorescein stain, Woods lamp used to visualize the right cornea, no abnormal fluorescein uptake or dendrites.  Under regular light, no corneal irregularities, no conjunctivitis or discharge from the right eye. \"  Continue Valtrex  Contact precautions     "

## 2025-04-29 NOTE — PROGRESS NOTES
Assumed pt care with RN. Pt is A&OX 4 and states he is in 2/10 pain declines interventions at this time. Plan of care discussed, no further questions. Personal belongings and call light within reach.

## 2025-04-29 NOTE — PROGRESS NOTES
"Patient to s522   BP (!) 173/91   Pulse 75   Temp 36.6 °C (97.9 °F) (Temporal)   Resp 18   Ht 1.702 m (5' 7\")   Wt 83.5 kg (184 lb)   SpO2 96%   BMI 28.82 kg/m²     "

## 2025-04-29 NOTE — PROGRESS NOTES
[facility] Services Progress Notes     1615 CAPD dwell of 1.5% PD solution 0.5 L completed after 2 hours.  RLQ PD catheter aseptically accessed and connected to drain bag.  PD drain volume of 590 mL- effluent clear, peg with small fibrin clots.  PD effluent sample collected for cultures and cell count per orders.  PD catheter tip capped aseptically with sterile mini cap.  RLQ PD catheter intact patent with CDI dressings in place.  Patient tolerated procedure well with some complaint o f mild pain to lower abd/groin area, declines interventions otherwise, BPs still elevated, asymptomatic.  PD fluid specimen labeled properly and sent to lab.  Report given to primary care nurse RAY Jiménez RN

## 2025-04-29 NOTE — PROGRESS NOTES
"[facility] -  PROGRESS NOTE      DATE & TIME:   4/29/2025  11:38 AM               AUTHOR:  Zac Daily D.O.      REASON FOR CONSULTATION:   -  Evaluation and management of acute and chronic conditions related to Nephrology      CHIEF COMPLAINT:   -  \"Abd Pain, blood in dialysis bag  \"      HISTORY OF PRESENT ILLNESS:    60Y M w ESRD on nightly PD ([facility]), paroxysmal A-fib on AC, HTN, Anemia and recent inguinal hernia repair presents for abd pain and blood in PD bag drainage. He states things were going fine with PD until he had hernia surgery earlier in 4/2025.  He noticed some blood when draining the PD fluid from peritoneum, but blood worsened and he had severe abd pain last night when attempting PD. He was not able to start PD so he came to the ER.     DAILY NEPHROLOGY SUMMARY:  4/28: consult done  4/29: pt was able to tolerate gentler PD with no pain. PD cultures no growth to date. Cell count <100 (97).       REVIEW OF SYSTEMS:    10 point ROS was performed and is as per HPI or otherwise negative      PAST MEDICAL HISTORY:   - ESRD  - CKD-MBD  Past Medical History:   Diagnosis Date    Anesthesia     Hiccups for over 24 hours after a previous sugery.    Aortic stenosis 11/2023    AVR replacement    Arthritis     Osteoarthritis    Chronic cough     dry, pt reports since heart surgery    Dialysis patient (HCC)     peritoneal daily    Dyspnea on exertion 05/23/2023    Dyspnea on exertion 05/23/2023    Elevated troponin 05/23/2023    Fatty liver     Heart burn     controlled with Nexium    Heart murmur     High cholesterol     All always    Hypertension 2009    Iron deficiency     Liver cirrhosis ([facility]) 12/21/2023    NSTEMI (non-ST elevated myocardial infarction) ([facility]) 05/23/2023    no stents placed; Cardiologist Dr. Sharma    Pain     left inguinal, back, left hip    Pain in the chest 05/23/2023    Pain in the chest 05/23/2023    Paroxysmal A-fib (HCC) 11/11/2023    Peritoneal dialysis " status ([facility]) 04/30/2024    PONV (postoperative nausea and vomiting)     Renal disorder 2007    Stage IV; end stage renal disease; Nephrologist Tiffanie Bernal    Sleep apnea 2000    does not use CPAP    Snoring 1990    Splenomegaly 12/21/2023        PAST SURGICAL HISTORY:   - Dialysis Access Surgery  Past Surgical History:   Procedure Laterality Date    INGUINAL HERNIA REPAIR Left 4/21/2025    Procedure: OPEN LEFT INGUINAL HERNIA REPAIR WITH MESH;  Surgeon: Keagan Dinh M.D.;  Location: SURGERY [facility];  Service: General    NC INJ LUMBAR/SACRAL,W/ IMAGING Bilateral 04/10/2025    Procedure: LUMBAR EPIDURAL STEROID INJECTION, BILATERAL L5-S1 INTERLAMINAR, UNDER FLUOROSCOPY;  Surgeon: Benito Herrera D.O.;  Location: SURGERY [facility];  Service: Orthopedics    NC NJX AA&/STRD TFRML EPI LUMBAR/SACRAL 1 LEVEL Bilateral 06/06/2024    Procedure: Bilateral L5 Transforaminal Epidural Steroid Injection under Fluoroscopy;  Surgeon: Benito Herrera D.O.;  Location: SURGERY [facility];  Service: Pain Management    AORTIC VALVE REPLACEMENT  11/03/2023    Procedure: AORTIC VALVE REPLACEMENT, ASCENDING AORTIC ANEURYSM REPAIR, TRANSESOPHAGEAL ECHOCARDIOGRAM;  Surgeon: Osmel Blanca M.D.;  Location: [facility];  Service: Cardiothoracic    AORTIC ASCENDING DISSECTION  11/03/2023    Procedure: REPAIR, ANEURYSM OR DISSECTION, AORTA, ASCENDING;  Surgeon: Osmel Blanca M.D.;  Location: [facility];  Service: Cardiothoracic    ECHOCARDIOGRAM, TRANSESOPHAGEAL, INTRAOPERATIVE  11/03/2023    Procedure: ECHOCARDIOGRAM, TRANSESOPHAGEAL, INTRAOPERATIVE;  Surgeon: Osmel Blanca M.D.;  Location: [facility];  Service: Cardiothoracic    RESTORATE HEMOSTAS  11/03/2023    Procedure: RESTORATION OF HEMOSTATIS;  Surgeon: Osmel Blanca M.D.;  Location: SURGERY [facility];  Service: Cardiothoracic    STERNOTOMY  11/03/2023    Procedure: MEDIASTINAL EXPLORATION;  Surgeon: Osmel Blanca M.D.;  Location:  SURGERY [facility];  Service: Cardiothoracic    CATH PLACEMENT CAPD Right 09/06/2023    Procedure: LAPAROSCOPIC INSERTION OF PERITONEAL DIALYSIS CATHETER;  Surgeon: Dontrell Sales M.D.;  Location: SURGERY [facility];  Service: Vascular    MO INJ LUMBAR/SACRAL,W/ IMAGING Left 08/24/2023    Procedure: LUMBAR EPIDURAL STEROID INJECTION, LEFT L5-S1 INTERLAMINAR UNDER FLUOROSCOPY;  Surgeon: Benito Herrera D.O.;  Location: SURGERY [facility];  Service: Orthopedics    CATH PLACEMENT CAPD N/A 05/15/2023    Procedure: LAPAROSCOPIC PLACEMENT OF PERITONEAL DIALYSIS CATHERTER;  Surgeon: Shikha Alexander M.D.;  Location: SURGERY SAME DAY [facility];  Service: General    UMBILICAL HERNIA REPAIR  2012    4 separate surgeries between [id number]    OTHER  2007    Kidney biopsy, can't recall laterality,    HIP ARTHROSCOPY Bilateral 2002    TONSILLECTOMY N/A 1987    HAND SURGERY Right 1985    Hand and wrist    PAROTIDECTOMY Right     SHOULDER ARTHROSCOPY Bilateral     Can't recall date       FAMILY HISTORY:   Family History   Problem Relation Age of Onset    Hyperlipidemia Mother     Heart Disease Mother     Hypertension Father           SOCIAL HISTORY:   Social History     Tobacco Use    Smoking status: Never    Smokeless tobacco: Never   Vaping Use    Vaping status: Never Used   Substance Use Topics    Alcohol use: Never    Drug use: Never         HOME MEDICATIONS:   - Reviewed and documented in chart  Home Medications    Medication Sig Taking? Last Dose Authorizing Provider   oxyCODONE immediate-release (ROXICODONE) 5 MG Tab Take 5 mg by mouth every four hours as needed for Severe Pain. Yes 4/28/2025 Morning Physician Outpatient   acetaminophen (TYLENOL 8 HOUR ARTHRITIS PAIN) 650 MG CR tablet Take 650 mg by mouth every 6 hours as needed for Mild Pain. Yes 4/28/2025 Morning Physician Outpatient   valacyclovir (VALTREX) 1 GM Tab Take 1 Tablet by mouth 3 times a day. Yes 4/27/2025 at  2:00 PM Salvador Foster M.D.   gabapentin  "(NEURONTIN) 100 MG Cap TAKE 1 CAPSULE BY MOUTH THREE TIMES DAILY  Patient taking differently: Take 200 mg by mouth at bedtime. 200 mg = 2 caps Yes 4/27/2025 Bedtime Drew T. Blumberg, D.O.   acidophilus lactobacillus Cap Take 1 Capsule by mouth every day. Yes 4/27/2025 Morning Physician Outpatient   sevelamer carbonate (RENVELA) 800 MG Tab tablet Take 800 mg by mouth 3 times a day as needed. Yes 4/27/2025 Physician Outpatient   warfarin (COUMADIN) 2.5 MG Tab Take 1-2 Tablets by mouth every day. Take as directed by anticoag clinic.  Patient taking differently: Take 5 mg by mouth every evening. 5 mg = 2 tabs Yes 4/27/2025 Bedtime TEX Gomez   carvedilol (COREG) 3.125 MG Tab Take 2 Tablets by mouth 2 times a day with meals. Yes 4/27/2025 Evening Michael J Bloch, M.D.   rosuvastatin (CRESTOR) 20 MG Tab Take 1 Tablet by mouth every evening. Yes 4/27/2025 Evening Michael J Bloch, M.D.   calcitRIOL (ROCALTROL) 0.25 MCG Cap Take 1 Capsule by mouth every day.  Patient taking differently: Take 0.25 mcg by mouth every 3 days. Yes 4/24/2025 Michael J Bloch, M.D.   esomeprazole (NEXIUM) 20 MG capsule Take 2 Capsules by mouth every morning before breakfast.  Patient taking differently: Take 20 mg by mouth 2 times a day. Yes 4/27/2025 Evening Drew T. Blumberg, D.O.   aspirin 81 MG EC tablet Take 81 mg by mouth every day. Yes 4/27/2025 Morning Physician Outpatient   polyethylene glycol/lytes (MIRALAX) 17 g Pack Take 1 Packet by mouth every day. While taking narcotics, hold if greater then 3 BMs a day  Patient not taking: Reported on 4/28/2025  Not Taking Keagan Dinh M.D.   traZODone (DESYREL) 50 MG Tab Take 50 mg by mouth at bedtime as needed for Sleep.  4/26/2025 Bedtime Physician Outpatient         ALLERGIES:  Allopurinol and Hydralazine hcl      VITAL SIGNS:  BP (!) 150/57   Pulse 74   Temp 36.3 °C (97.4 °F) (Temporal)   Resp 16   Ht 1.702 m (5' 7\")   Wt 83.5 kg (184 lb)   SpO2 97%   BMI 28.82 kg/m² "     Physical Exam  Constitutional:       Appearance: Normal appearance.   HENT:      Head: Normocephalic and atraumatic.      Mouth/Throat:      Mouth: Mucous membranes are moist.   Cardiovascular:      Rate and Rhythm: Normal rate and regular rhythm.   Pulmonary:      Effort: Pulmonary effort is normal.      Breath sounds: Normal breath sounds.   Abdominal:      General: There is no distension.      Palpations: Abdomen is soft.      Comments: Tender to palpation in LLQ, no tenderness in upper quadrant.   Neurological:      Mental Status: He is alert.         Access: PD catheter in place      FLUID BALANCE:  In: 240 [P.O.:240]  Out: 419       LABS:  Recent Labs     04/26/25  1252 04/28/25  0759 04/29/25 0319   SODIUM 138 135 135   POTASSIUM 5.6* 5.3 5.7*   CHLORIDE 102 99 98   CO2 23 20 21   GLUCOSE 109* 120* 107*   * 112* 102*   CREATININE 12.2* 11.60* 11.10*   CALCIUM 9.2 8.9 8.6      Recent Labs     04/26/25  1252 04/28/25  0759 04/29/25  0319   SODIUM 138 135 135   POTASSIUM 5.6* 5.3 5.7*   CHLORIDE 102 99 98   CO2 23 20 21   GLUCOSE 109* 120* 107*   * 112* 102*   CREATININE 12.2* 11.60* 11.10*          IMAGING:  - All imaging reviewed from admission to present day  CT ABD/Pelv w Contrast 4/28/25  HISTORY/REASON FOR EXAM:  Pain.        TECHNIQUE/EXAM DESCRIPTION:   CT scan of the abdomen and pelvis with contrast.     Contrast-enhanced helical scanning was obtained from the diaphragmatic domes through the pubic symphysis following the bolus administration of nonionic contrast without complication.     90 mL of Omnipaque 350 nonionic contrast was administered without complication.     Low dose optimization technique was utilized for this CT exam including automated exposure control and adjustment of the mA and/or kV according to patient size.     COMPARISON: CT abdomen/pelvis 3/24/2025     FINDINGS:  Lower Chest: Slight curvilinear airspace opacities dependently in the lung bases.. There is coarse  pleural calcification in the right lateral lung base.. Coronary artery calcification     Liver: Normal.     Spleen: Unremarkable.     Pancreas: Unremarkable.     Gallbladder: No calcified stones.     Biliary: Nondilated.     Adrenal glands: Normal.     Kidneys: Unremarkable without hydronephrosis. 1.3 cm peripherally calcified left renal cyst. Numerous small subcentimeter bilateral renal cysts. 3 mm nonobstructing calculus in midpole right kidney.     Bowel: No obstruction or acute inflammation. Colonic diverticulosis     Lymph nodes: No adenopathy.     Vasculature: Mild atherosclerotic plaquing of the infrarenal aorta and iliac arteries.     Peritoneum: Small amount of ascites about the liver and spleen and in the lower abdomen and pelvis.     Musculoskeletal: No acute or destructive process.     Pelvis: No adenopathy or free fluid. Peritoneal dialysis catheter coiled in the pelvis.     IMPRESSION:     1.  Small amount of ascites about the liver and spleen and in the lower abdomen and pelvis.  2.  Peritoneal dialysis catheter coiled in the pelvis.  3.  Colonic diverticulosis.  4.  3 mm nonobstructing right renal calculus.  5.  1.3 cm peripherally calcified left renal cyst. Bosniak 2F  6.  Numerous small subcentimeter bilateral renal cysts.  7.  Coronary artery calcification.  8.  Slight bibasilar atelectasis.  9.  Coarse pleural calcification in the right lateral lung base.    ASSESSMENTS:    -ESRD on nightly peritoneal dialysis     [facility]: [facility]     Via ABD pd catheter  -Hypertension     Continue current medications  -Hypervolemia     Volume off with dialysis as BP tolerates  - Anemia     goal Hgb 10-11   -CKD-MBD  -A-fib on warfarin  -ABD pain/hemoperitoneum    -? 2/2 to surgery related vs less likely peritonitis       Cell count <100 on 4/28, cultures pending      PLAN:    - pt tolerated gentle PD yesterday evening  - cell culture not suggestive of infection with cell count <100, and PMNs <50%  (27%)  -no indication for Abx at this time  -will await cultures for another day  -will titrate up PD rx today to see how pt will tolerate  -add lokelma for hyperK mgmt  - Continue usual anti-hypertensive medications  - Transfuse PRN hgb < 7  - No dietary protein restrictions, use renal diet     Goal: 1.5g Protein/kg/day  - Please dose all meds for ESRD/PD    Dispo: cont PD. If cultures remain neg tomorrow, can DC at that time    Please page nephrology with any questions or concerns    Zac Daily DO  Nephrologist   [facility]  [phone]

## 2025-04-29 NOTE — HOSPITAL COURSE
"This is a 60 y.o. male with history of ESRD on PD nightly, Afib on coumadin,  hx of mechanical aortic valve replacement, HTN, anemia and recent inguinal hernia repair, who presented 4/28/2025 with abdominal pain and blood in PD bag drainage.      He underwent Open left Inguinal Hernia Repair with Mesh by Dr. Dinh on 4/21. 2 days after surgery, he was noted small amount of blood in the peritoneal dialysis bag.  Since that time, he has developed a burning discomfort when he attempts peritoneal dialysis which has been progressively worsening.      [facility], advised admission to the hospital which he refused at that time. He contacted the dialysis \"team\" today, states he was told to go to the ER out of concern for infection. His abdominal discomfort is localized in the left lower quadrant of his abdomen which is his surgical site.  During the stay, patient had dialysis fluid was sent for culture, no presented, nephrology continue to follow.  Patient was understanding.    His hospital course was complicated with hyperkalemia and was discharged home on Lokelma 5 mg p.o. daily. Plan of care has been d/w nephrology     Additional, he developed shingles in R forehead since last Thursday. He is currently on Valtrex for R upper facial shingles , will continue for a total of 7 days.  "

## 2025-04-29 NOTE — PROGRESS NOTES
4 Eyes Skin Assessment Completed by Christina RN and CODI Roque.    Head WDL  Ears WDL  Nose WDL  Mouth WDL  Neck WDL  Breast/Chest Scar  Shoulder Blades WDL  Spine WDL  (R) Arm/Elbow/Hand WDL  (L) Arm/Elbow/Hand WDL  Abdomen R side Dialysis port  Groin Incision/hernia repair (tegaderm) 4/21  Scrotum/Coccyx/Buttocks WDL  (R) Leg WDL  (L) Leg WDL  (R) Heel/Foot/Toe WDL  (L) Heel/Foot/Toe WDL          Devices In Places       Interventions In Place Pillows    Possible Skin Injury No    Pictures Uploaded Into Epic Yes  Wound Consult Placed N/A  RN Wound Prevention Protocol Ordered No

## 2025-04-29 NOTE — PROGRESS NOTES
[facility]    24 hour UF: 419 mL. (Total  mL + I drain 193 mL - last fill 0 mL )    Patient is alert and oriented x 4.abdominal pain tolerable at this time, denies constipation and diarrhea. BP slightly elevated at 150/57, HR regular and stable, no SOB and afebrile. PD catheter on Right lower abdominal quadrant, catherter intact, dressing is CDI. No reported concerns and alarms from PCN.   Patient aseptically disconnected from [facility]CP[facility] cycler at 0600. Effluent is clear, yellow and no blood and no fibrin noted.      Report given to PCN Elier KINCAID/ ASHUTOSH Ba RN    See dialysis treatment flow sheet for details.

## 2025-04-29 NOTE — PROGRESS NOTES
Inpatient Anticoagulation Service Note for 4/29/2025      Reason for Anticoagulation: On-X Aortic/Mitral Valve Replacement      Hemoglobin Value: (!) 11.5  Hematocrit Value: (!) 34.7  Lab Platelet Value: 170  Target INR: 1.5 to 2.0     Date/Time INR Value    04/29/25 0319 2     04/28/25 0840 1.9            Date/Time Dose (mg)    04/29/25 0917 5     04/28/25 1915 5           Average Dose (mg): 5  Significant Interactions: Antiplatelet Medications  Bridge Therapy: No   Reversal Agent Administered: Not Applicable    Comments: INR therapeutic today. New interaction with aspirin. Renal diet ordered.     Plan:  Will continue warfarin 5 mg PO daily. Will obtain an INR with AM labs tomorrow. Pharmacy will continue to follow.  Education Material Provided?: No    Pharmacist suggested discharge dosing: TBD based on subsequent INR results. May be able to resume home dose of 5 mg daily. Obtain a follow up INR within 72h of discharge.     Sara Pryor, PharmD, BCPS, BCEMP

## 2025-04-29 NOTE — CARE PLAN
The patient is Stable - Low risk of patient condition declining or worsening    Shift Goals  Clinical Goals: Monitor for pts dialysis for patentcy throughout the night shift  Patient Goals: Rest and comfort  Family Goals: FARIDA    Progress made toward(s) clinical / shift goals:  Pts dialysis was monitored for patency as well as working order and pt comfort during the night shift    Patient is not progressing towards the following goals:

## 2025-04-29 NOTE — H&P
"Hospital Medicine History & Physical Note    Date of Service  4/28/2025    Primary Care Physician  Drew T. Blumberg, D.O.    Consultants  nephrology    Specialist Names: Dr. Daily    Code Status  Full Code    Chief Complaint  Chief Complaint   Patient presents with    Abdominal Pain     Pt reports to possibly have infection from peritoneal dialysis. Possibly needs a port for Hemodialysis. Pt reports to also have Shingles. Last dialysis Saturday night.        History of Presenting Illness  Gurdeep Guerra is a 60 y.o. male with history of ESRD on PD nightly, Afib on coumadin, HTN, anemia and recent inguinal hernia repair, who presented 4/28/2025 with abdominal pain and blood in PD bag drainage. He underwent Open left Inguinal Hernia Repair with Mesh by Dr. Dinh on 4/21. 2 days after surgery, he was noted small amount of blood in the peritoneal dialysis bag.  Since that time, he has developed a burning discomfort when he attempts peritoneal dialysis which has been progressively worsening. He was seen at outlying ER 2 days ago, advised admission to the hospital which he refused at that time. He contacted the dialysis \"team\" today, states he was told to go to the ER out of concern for infection. His abdominal discomfort is localized in the left lower quadrant of his abdomen which is his surgical site. Patient states he was unable to perform peritoneal dialysis at home last night due to significant pain.   Additional, he developed shingles in R forehead since last Thursday. He is currently on Valtrex for R upper facial shingles.     Here in the ER, Bp 169/88, afebrile, on RA.  Labs remarkable for hemoglobin 11.9, creatinine 11.6.  CT abdomen obtained no acute abnormalities.  Case was discussed with nephrology, who recommend holding antibiotics. PD culture obtained.     I discussed the plan of care with patient, family, and ERP .    Review of Systems  Review of Systems   Constitutional:  Positive for malaise/fatigue. "   HENT:          R facial upper forehead facial burning pain   Gastrointestinal:  Positive for abdominal pain.   Neurological:  Positive for weakness and headaches.   All other systems reviewed and are negative.      Past Medical History   has a past medical history of Anesthesia, Aortic stenosis (11/2023), Arthritis, Chronic cough, Dialysis patient ([facility]), Dyspnea on exertion (05/23/2023), Dyspnea on exertion (05/23/2023), Elevated troponin (05/23/2023), Fatty liver, Heart burn, Heart murmur, High cholesterol, Hypertension (2009), Iron deficiency, Liver cirrhosis ([facility]) (12/21/2023), NSTEMI (non-ST elevated myocardial infarction) ([facility]) (05/23/2023), Pain, Pain in the chest (05/23/2023), Pain in the chest (05/23/2023), Paroxysmal A-fib ([facility]) (11/11/2023), Peritoneal dialysis status ([facility]) (04/30/2024), PONV (postoperative nausea and vomiting), Renal disorder (2007), Sleep apnea (2000), Snoring (1990), and Splenomegaly (12/21/2023).    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     Family History  family history includes Heart Disease in his mother; Hyperlipidemia in his mother; Hypertension in his father.   Family history reviewed with patient. There is no family history that is pertinent to the chief complaint.      Social History   reports that he has never smoked. He has never used smokeless tobacco. He reports that he does not drink alcohol and does not use drugs.    Allergies  Allergies   Allergen Reactions    Allopurinol Itching    Hydralazine Hcl Unspecified     Pt states he had a reaction similar to lupus       Medications  Prior to Admission Medications   Prescriptions Last Dose Informant Patient Reported? Taking?   acetaminophen (TYLENOL 8 HOUR ARTHRITIS PAIN) 650 MG CR tablet 4/28/2025 Morning Patient, Significant Other Yes Yes   Sig: Take 650 mg by mouth every 6 hours as needed for Mild Pain.   acidophilus lactobacillus Cap 4/27/2025 Morning Patient Yes Yes   Sig: Take 1 Capsule by mouth every day.   aspirin 81 MG EC tablet 4/27/2025 Morning Patient Yes Yes   Sig: Take 81 mg by mouth every day.   calcitRIOL (ROCALTROL) 0.25 MCG Cap 4/24/2025 Patient No Yes   Sig: Take 1 Capsule by mouth every day.   Patient taking differently: Take 0.25 mcg by mouth every 3 days.   carvedilol (COREG) 3.125 MG Tab 4/27/2025 Evening Patient No Yes   Sig: Take 2 Tablets by mouth 2 times a day with meals.   esomeprazole (NEXIUM) 20 MG capsule 4/27/2025 Evening Patient No Yes   Sig: Take 2 Capsules by mouth every morning before breakfast.   Patient taking differently: Take 20 mg by mouth 2 times a day.   gabapentin (NEURONTIN) 100 MG Cap 4/27/2025 Bedtime Patient No Yes   Sig: TAKE 1 CAPSULE BY MOUTH THREE TIMES DAILY   Patient taking differently: Take 200 mg by mouth at bedtime. 200 mg = 2 caps   oxyCODONE immediate-release (ROXICODONE) 5 MG Tab 4/28/2025 Morning Patient Yes Yes   Sig: Take 5 mg by mouth every four hours as needed for Severe Pain.   polyethylene glycol/lytes (MIRALAX) 17 g Pack Not Taking Patient No No   Sig: Take 1 Packet by mouth every day. While taking narcotics, hold if greater then 3 BMs a day   Patient not taking: Reported on 4/28/2025   rosuvastatin (CRESTOR) 20 MG Tab 4/27/2025 Evening Patient No Yes   Sig:  Take 1 Tablet by mouth every evening.   sevelamer carbonate (RENVELA) 800 MG Tab tablet 4/27/2025 Patient Yes Yes   Sig: Take 800 mg by mouth 3 times a day as needed.   traZODone (DESYREL) 50 MG Tab 4/26/2025 Bedtime Patient Yes No   Sig: Take 50 mg by mouth at bedtime as needed for Sleep.   valacyclovir (VALTREX) 1 GM Tab 4/27/2025 at  2:00 PM Patient No Yes   Sig: Take 1 Tablet by mouth 3 times a day.   warfarin (COUMADIN) 2.5 MG Tab 4/27/2025 Bedtime Patient No Yes   Sig: Take 1-2 Tablets by mouth every day. Take as directed by anticoag clinic.   Patient taking differently: Take 5 mg by mouth every evening. 5 mg = 2 tabs      Facility-Administered Medications: None       Physical Exam  Temp:  [36.7 °C (98 °F)] 36.7 °C (98 °F)  Pulse:  [65-78] 66  Resp:  [10-21] 15  BP: (145-180)/(70-88) 180/80  SpO2:  [91 %-97 %] 93 %  Blood Pressure: (!) 180/80   Temperature: 36.7 °C (98 °F)   Pulse: 66   Respiration: 15   Pulse Oximetry: 93 %       Physical Exam  Vitals and nursing note reviewed.   Constitutional:       Appearance: He is ill-appearing.   HENT:      Head: Normocephalic and atraumatic.      Comments: Shingle rash R forehead     Mouth/Throat:      Pharynx: Oropharynx is clear.   Eyes:      Pupils: Pupils are equal, round, and reactive to light.      Comments: Per ERP: under fluorescein stain, Woods lamp used to visualize the right cornea, no abnormal fluorescein uptake or dendrites.  Under regular light, no corneal irregularities, no conjunctivitis or discharge from the right eye.   Neck:      Vascular: No carotid bruit.   Cardiovascular:      Rate and Rhythm: Normal rate and regular rhythm.   Pulmonary:      Effort: Pulmonary effort is normal.      Breath sounds: Normal breath sounds.   Abdominal:      General: Abdomen is flat. Bowel sounds are normal.      Palpations: Abdomen is soft. There is no mass.      Tenderness: There is abdominal tenderness (Left lower quadrant abdominal tenderness.).      Comments: PD  "catheter in place  Small palpable fullness at the site of his hernia repair.  No right abdomen is soft and nontender.  No signs of acute peritonitis at this time   Musculoskeletal:         General: Normal range of motion.      Cervical back: Neck supple.   Skin:     General: Skin is warm and dry.   Neurological:      General: No focal deficit present.      Mental Status: He is alert and oriented to person, place, and time.   Psychiatric:         Mood and Affect: Mood normal.         Behavior: Behavior normal.         Laboratory:  Recent Labs     04/26/25  1252 04/28/25  1031   WBC 6.7 7.1   RBC 3.60* 3.63*   HEMOGLOBIN 11.7* 11.9*   HEMATOCRIT 35.6* 35.7*   MCV 98.9* 98.3*   MCH 32.5* 32.8   MCHC 32.9* 33.3   RDW 13.1 47.0   PLATELETCT 166 186   MPV 10.0 10.5     Recent Labs     04/26/25  1252 04/28/25  0759   SODIUM 138 135   POTASSIUM 5.6* 5.3   CHLORIDE 102 99   CO2 23 20   GLUCOSE 109* 120*   * 112*   CREATININE 12.2* 11.60*   CALCIUM 9.2 8.9     Recent Labs     04/26/25  1252 04/28/25  0759   ALTSGPT <6* <5   ASTSGOT 21 20   ALKPHOSPHAT 64 61   TBILIRUBIN 0.3 0.2   LIPASE  --  35   GLUCOSE 109* 120*     Recent Labs     04/26/25  1252 04/28/25  0840   APTT  --  32.6   INR 1.62 1.90*     No results for input(s): \"NTPROBNP\" in the last 72 hours.      No results for input(s): \"TROPONINT\" in the last 72 hours.    Imaging:  CT-ABDOMEN-PELVIS WITH   Final Result      1.  Small amount of ascites about the liver and spleen and in the lower abdomen and pelvis.   2.  Peritoneal dialysis catheter coiled in the pelvis.   3.  Colonic diverticulosis.   4.  3 mm nonobstructing right renal calculus.   5.  1.3 cm peripherally calcified left renal cyst. Bosniak 2F   6.  Numerous small subcentimeter bilateral renal cysts.   7.  Coronary artery calcification.   8.  Slight bibasilar atelectasis.   9.  Coarse pleural calcification in the right lateral lung base.             no X-Ray or EKG requiring " "interpretation    Assessment/Plan:  Justification for Admission Status  I anticipate this patient will require at least two midnights for appropriate medical management, necessitating inpatient admission because abd pain and hematoperitoneum with blood in PD fluid requiring culture, nephrology evaluation and labs monitoring     Patient will need a Med/Surg bed on MEDICAL service .  The need is secondary to abdominal pain.    * Abdominal pain- (present on admission)  Assessment & Plan  Reports abdominal pain and hemoperitoneum w blood PD fluid drainage   S/p Open left Inguinal Hernia Repair with Mesh by Dr. Dinh on 4/21.     CT abdomen no acute abnormalities. Afebrile, no leukocytosis, abdomen soft, except tenderness on LLQ (surgical side), less like peritonitis  PD fluid culture  Hold antibiotics now  Nephrology following     Shingles  Assessment & Plan  Noted in R forehead. He reports burning sensation in R forehead and R eye.   Discussed with ERP, eye exam \"under fluorescein stain, Woods lamp used to visualize the right cornea, no abnormal fluorescein uptake or dendrites.  Under regular light, no corneal irregularities, no conjunctivitis or discharge from the right eye. \"  Continue Valtrex  Contact precautions       S/P hernia repair  Assessment & Plan  S/p Open left Inguinal Hernia Repair with Mesh by Dr. Dinh on 4/21.     Peritoneal dialysis status (HCC)- (present on admission)  Assessment & Plan  S/p     A-fib (HCC)- (present on admission)  Assessment & Plan  Rate controlled  Continue coreg and coumadin    History of mechanical aortic valve replacement- (present on admission)  Assessment & Plan  On coumadin, which will continue     ESRD (end stage renal disease) (HCC)- (present on admission)  Assessment & Plan  On PD nightly  Nephrology following    Hypertension- (present on admission)  Assessment & Plan  Continue Coreg  Labetalol as needed  Dialysis per nephrology        VTE prophylaxis: therapeutic " anticoagulation with coumadin

## 2025-04-29 NOTE — ASSESSMENT & PLAN NOTE
Reports abdominal pain and hemoperitoneum w blood PD fluid drainage   S/p Open left Inguinal Hernia Repair with Mesh by Dr. Dinh on 4/21.     CT abdomen no acute abnormalities. Afebrile, no leukocytosis, abdomen soft, except tenderness on LLQ (surgical side), less like peritonitis  PD fluid culture obtained , pending   Hold antibiotics now  Nephrology following

## 2025-04-29 NOTE — PROGRESS NOTES
Inpatient Anticoagulation Service Note for 2025      Reason for Anticoagulation: On-X Aortic/Mitral Valve Replacement              Hemoglobin Value: (!) 11.9  Hematocrit Value: (!) 35.7  Lab Platelet Value: 186  Target INR: 1.5 to 2.0     Date/Time INR Value    25 0840 1.9            Date/Time Dose (mg)    25 1915 5           Significant Interactions: Not Applicable  Bridge Therapy: No     Reversal Agent Administered: Not Applicable  Comments: INR therapeutic, will continue home dosing regiment. repeat INR tomorrow morning. No new interactions, may start antibiotics. Aspirin for valve as well.    Plan:  5mg  Education Material Provided?: No    Pharmacist suggested discharge dosinmg daily. INR within 72 hours of discharge.      Polo Pruett, EstefanyD

## 2025-04-29 NOTE — PROGRESS NOTES
[facility] Services Progress Note     [employer] initiated at 2130 as ordered per  x 8 hours using 1.5% PD solution.      Pt A/O and vss. Pt denies any new complaints. Pt still has abd tenderness and nephrologist aware. Pt tolerated tx initiation with no issues.      Aseptically connected PD cycler to PD catheter.   Exit site cleansed, no s/sx of infection, gentamicin cream applied as ordered, PD dressing changed CDI.        In-service given to [facility] Primary RNShanti with on-call Dialysis RN contact information ([phone]) and troubleshooting of machine alarms.     Initial drain : 193 ml     Please call for any issues with hemodynamic instability/persistent alarms/patient not tolerating therapy.

## 2025-04-29 NOTE — PROGRESS NOTES
"[facility] Medicine Daily Progress Note    Date of Service  4/29/2025    Chief Complaint  Gurdeep Guerra is a 60 y.o. male admitted 4/28/2025 with   Chief Complaint   Patient presents with    Abdominal Pain     Pt reports to possibly have infection from peritoneal dialysis. Possibly needs a port for Hemodialysis. Pt reports to also have Shingles. Last dialysis Saturday night.          Hospital Course  This is a 60 y.o. male with history of ESRD on PD nightly, Afib on coumadin,  hx of mechanical aortic valve replacement, HTN, anemia and recent inguinal hernia repair, who presented 4/28/2025 with abdominal pain and blood in PD bag drainage.     He underwent Open left Inguinal Hernia Repair with Mesh by Dr. Dinh on 4/21. 2 days after surgery, he was noted small amount of blood in the peritoneal dialysis bag.  Since that time, he has developed a burning discomfort when he attempts peritoneal dialysis which has been progressively worsening.     He was seen at outlying ER 2 days ago, advised admission to the hospital which he refused at that time. He contacted the dialysis \"team\" today, states he was told to go to the ER out of concern for infection. His abdominal discomfort is localized in the left lower quadrant of his abdomen which is his surgical site.     Additional, he developed shingles in R forehead since last Thursday. He is currently on Valtrex for R upper facial shingles.     Interval events:  -- Went to the bedside with the nursing staff, examined the patient, vitals reviewed, labs reviewed  --Potassium 5.7, will continue to, renal diet  --Patient tolerated peritoneal dialysis, culture no growth to date, no antibiotics.  If culture remains negative, patient would like to be discharged tomorrow, nephrology following    Will continue Valtrex for shingles    I have discussed this patient's plan of care and discharge plan at IDT rounds today with Case Management, Nursing, Nursing leadership, and other members " of the IDT team.    Consultants/Specialty  nephrology    Code Status  Full Code    Disposition  The patient is not medically cleared for discharge to home or a post-acute facility.  Anticipate discharge to: home with close outpatient follow-up    I have placed the appropriate orders for post-discharge needs.    Review of Systems  Review of Systems   Constitutional:  Positive for malaise/fatigue. Negative for fever.   HENT:  Negative for congestion.    Eyes:  Negative for blurred vision and double vision.   Respiratory:  Negative for cough and hemoptysis.    Cardiovascular:  Positive for chest pain. Negative for palpitations.   Gastrointestinal:  Positive for abdominal pain.   Genitourinary:  Negative for dysuria.   Musculoskeletal:  Positive for myalgias.   Neurological:  Negative for headaches.   Psychiatric/Behavioral:  Negative for depression.         Physical Exam  Temp:  [36.1 °C (97 °F)-36.7 °C (98 °F)] 36.3 °C (97.4 °F)  Pulse:  [66-78] 74  Resp:  [15-21] 16  BP: (141-180)/(57-98) 150/57  SpO2:  [93 %-98 %] 97 %    Physical Exam  Vitals and nursing note reviewed.   Constitutional:       Appearance: Normal appearance.   HENT:      Head: Normocephalic and atraumatic.   Eyes:      Extraocular Movements: Extraocular movements intact.      Pupils: Pupils are equal, round, and reactive to light.   Cardiovascular:      Rate and Rhythm: Normal rate.   Pulmonary:      Breath sounds: Normal breath sounds.   Abdominal:      General: Bowel sounds are normal.      Palpations: Abdomen is soft.   Musculoskeletal:      Cervical back: Neck supple.      Right lower leg: No edema.      Left lower leg: Edema present.   Neurological:      Mental Status: He is oriented to person, place, and time.         Fluids    Intake/Output Summary (Last 24 hours) at 4/29/2025 1402  Last data filed at 4/29/2025 0600  Gross per 24 hour   Intake 240 ml   Output 419 ml   Net -179 ml        Laboratory  Recent Labs     04/28/25  1031 04/29/25  9957  "  WBC 7.1 6.3   RBC 3.63* 3.57*   HEMOGLOBIN 11.9* 11.5*   HEMATOCRIT 35.7* 34.7*   MCV 98.3* 97.2   MCH 32.8 32.2   MCHC 33.3 33.1   RDW 47.0 47.4   PLATELETCT 186 170   MPV 10.5 9.9     Recent Labs     04/28/25  0759 04/29/25  0319   SODIUM 135 135   POTASSIUM 5.3 5.7*   CHLORIDE 99 98   CO2 20 21   GLUCOSE 120* 107*   * 102*   CREATININE 11.60* 11.10*   CALCIUM 8.9 8.6     Recent Labs     04/28/25  0840 04/29/25  0319   APTT 32.6  --    INR 1.90* 2.00*               Imaging  CT-ABDOMEN-PELVIS WITH   Final Result      1.  Small amount of ascites about the liver and spleen and in the lower abdomen and pelvis.   2.  Peritoneal dialysis catheter coiled in the pelvis.   3.  Colonic diverticulosis.   4.  3 mm nonobstructing right renal calculus.   5.  1.3 cm peripherally calcified left renal cyst. Bosniak 2F   6.  Numerous small subcentimeter bilateral renal cysts.   7.  Coronary artery calcification.   8.  Slight bibasilar atelectasis.   9.  Coarse pleural calcification in the right lateral lung base.              Assessment/Plan  * Abdominal pain- (present on admission)  Assessment & Plan  Reports abdominal pain and hemoperitoneum w blood PD fluid drainage   S/p Open left Inguinal Hernia Repair with Mesh by Dr. Dinh on 4/21.     CT abdomen no acute abnormalities. Afebrile, no leukocytosis, abdomen soft, except tenderness on LLQ (surgical side), less like peritonitis  PD fluid culture obtained , pending   Hold antibiotics now  Nephrology following     Hyperkalemia  Assessment & Plan  Renal diet  lokelma    Shingles  Assessment & Plan  Noted in R forehead. He reports burning sensation in R forehead and R eye.   Discussed with ERP, eye exam \"under fluorescein stain, Woods lamp used to visualize the right cornea, no abnormal fluorescein uptake or dendrites.  Under regular light, no corneal irregularities, no conjunctivitis or discharge from the right eye. \"  Continue Valtrex  Contact precautions       S/P hernia " repair  Assessment & Plan  S/p Open left Inguinal Hernia Repair with Mesh by Dr. Dinh on 4/21.     Peritoneal dialysis status (HCC)- (present on admission)  Assessment & Plan  S/p     A-fib (HCC)- (present on admission)  Assessment & Plan  Rate controlled  Continue coreg and coumadin    History of mechanical aortic valve replacement- (present on admission)  Assessment & Plan  On coumadin, which will continue     ESRD (end stage renal disease) (HCC)- (present on admission)  Assessment & Plan  On PD nightly  Nephrology following    Hypertension- (present on admission)  Assessment & Plan  Continue Coreg  Labetalol as needed  Dialysis per nephrology         VTE prophylaxis: oumadin    Total time spent 1 minutes. I spent greater than 50% of the time for patient care, counseling, and coordination on this date, including unit/floor time, and face-to-face time with the patient as per interval events, my own review of patient's imaging and lab analysis and developing my assessment and plan above.

## 2025-04-30 ENCOUNTER — PHARMACY VISIT (OUTPATIENT)
Dept: PHARMACY | Facility: MEDICAL CENTER | Age: 60
End: 2025-04-30
Payer: COMMERCIAL

## 2025-04-30 VITALS
SYSTOLIC BLOOD PRESSURE: 105 MMHG | OXYGEN SATURATION: 96 % | HEIGHT: 67 IN | TEMPERATURE: 96 F | WEIGHT: 189.38 LBS | RESPIRATION RATE: 16 BRPM | HEART RATE: 68 BPM | DIASTOLIC BLOOD PRESSURE: 64 MMHG | BODY MASS INDEX: 29.72 KG/M2

## 2025-04-30 DIAGNOSIS — Z79.01 CHRONIC ANTICOAGULATION: ICD-10-CM

## 2025-04-30 LAB
ALBUMIN SERPL BCP-MCNC: 3.2 G/DL (ref 3.2–4.9)
BACTERIA FLD AEROBE CULT: NORMAL
BUN SERPL-MCNC: 102 MG/DL (ref 8–22)
CALCIUM ALBUM COR SERPL-MCNC: 9.5 MG/DL (ref 8.5–10.5)
CALCIUM SERPL-MCNC: 8.9 MG/DL (ref 8.5–10.5)
CHLORIDE SERPL-SCNC: 97 MMOL/L (ref 96–112)
CO2 SERPL-SCNC: 22 MMOL/L (ref 20–33)
CREAT SERPL-MCNC: 10.5 MG/DL (ref 0.5–1.4)
ERYTHROCYTE [DISTWIDTH] IN BLOOD BY AUTOMATED COUNT: 47.1 FL (ref 35.9–50)
GFR SERPLBLD CREATININE-BSD FMLA CKD-EPI: 5 ML/MIN/1.73 M 2
GLUCOSE SERPL-MCNC: 107 MG/DL (ref 65–99)
GRAM STN SPEC: NORMAL
HCT VFR BLD AUTO: 33.4 % (ref 42–52)
HGB BLD-MCNC: 11 G/DL (ref 14–18)
INR PPP: 2.06 (ref 0.87–1.13)
MCH RBC QN AUTO: 32.1 PG (ref 27–33)
MCHC RBC AUTO-ENTMCNC: 32.9 G/DL (ref 32.3–36.5)
MCV RBC AUTO: 97.4 FL (ref 81.4–97.8)
PHOSPHATE SERPL-MCNC: 6.9 MG/DL (ref 2.5–4.5)
PLATELET # BLD AUTO: 165 K/UL (ref 164–446)
PMV BLD AUTO: 9.9 FL (ref 9–12.9)
POTASSIUM SERPL-SCNC: 5.2 MMOL/L (ref 3.6–5.5)
PROTHROMBIN TIME: 23.3 SEC (ref 12–14.6)
RBC # BLD AUTO: 3.43 M/UL (ref 4.7–6.1)
SIGNIFICANT IND 70042: NORMAL
SITE SITE: NORMAL
SODIUM SERPL-SCNC: 134 MMOL/L (ref 135–145)
SOURCE SOURCE: NORMAL
WBC # BLD AUTO: 6.1 K/UL (ref 4.8–10.8)

## 2025-04-30 PROCEDURE — A9270 NON-COVERED ITEM OR SERVICE: HCPCS | Performed by: STUDENT IN AN ORGANIZED HEALTH CARE EDUCATION/TRAINING PROGRAM

## 2025-04-30 PROCEDURE — 90945 DIALYSIS ONE EVALUATION: CPT

## 2025-04-30 PROCEDURE — 85610 PROTHROMBIN TIME: CPT

## 2025-04-30 PROCEDURE — 80069 RENAL FUNCTION PANEL: CPT

## 2025-04-30 PROCEDURE — 700102 HCHG RX REV CODE 250 W/ 637 OVERRIDE(OP): Performed by: STUDENT IN AN ORGANIZED HEALTH CARE EDUCATION/TRAINING PROGRAM

## 2025-04-30 PROCEDURE — 700102 HCHG RX REV CODE 250 W/ 637 OVERRIDE(OP)

## 2025-04-30 PROCEDURE — 700102 HCHG RX REV CODE 250 W/ 637 OVERRIDE(OP): Performed by: INTERNAL MEDICINE

## 2025-04-30 PROCEDURE — A9270 NON-COVERED ITEM OR SERVICE: HCPCS | Performed by: INTERNAL MEDICINE

## 2025-04-30 PROCEDURE — 99239 HOSP IP/OBS DSCHRG MGMT >30: CPT | Performed by: HOSPITALIST

## 2025-04-30 PROCEDURE — A9270 NON-COVERED ITEM OR SERVICE: HCPCS

## 2025-04-30 PROCEDURE — 85027 COMPLETE CBC AUTOMATED: CPT

## 2025-04-30 RX ORDER — POLYETHYLENE GLYCOL 3350 17 G/17G
17 POWDER, FOR SOLUTION ORAL
Qty: 15 EACH | Refills: 0 | Status: SHIPPED | OUTPATIENT
Start: 2025-04-30 | End: 2025-05-09

## 2025-04-30 RX ORDER — VALACYCLOVIR HYDROCHLORIDE 500 MG/1
500 TABLET, FILM COATED ORAL DAILY
Qty: 5 TABLET | Refills: 0 | Status: ACTIVE | OUTPATIENT
Start: 2025-05-01 | End: 2025-05-06

## 2025-04-30 RX ORDER — AMOXICILLIN 250 MG
2 CAPSULE ORAL EVERY EVENING
Qty: 30 TABLET | Refills: 0 | Status: SHIPPED | OUTPATIENT
Start: 2025-04-30 | End: 2025-05-01

## 2025-04-30 RX ADMIN — VALACYCLOVIR 500 MG: 500 TABLET, FILM COATED ORAL at 04:56

## 2025-04-30 RX ADMIN — SODIUM ZIRCONIUM CYCLOSILICATE 5 G: 5 POWDER, FOR SUSPENSION ORAL at 11:18

## 2025-04-30 RX ADMIN — SEVELAMER CARBONATE 800 MG: 800 TABLET, FILM COATED ORAL at 11:19

## 2025-04-30 RX ADMIN — ASPIRIN 81 MG: 81 TABLET, COATED ORAL at 04:56

## 2025-04-30 RX ADMIN — SEVELAMER CARBONATE 800 MG: 800 TABLET, FILM COATED ORAL at 07:45

## 2025-04-30 RX ADMIN — OMEPRAZOLE 20 MG: 20 CAPSULE, DELAYED RELEASE ORAL at 07:46

## 2025-04-30 NOTE — PROGRESS NOTES
Connected aseptically to [employer] cycler @ 1900 with all 1.5% solution with 500 units per liter heparin per bag. PD cath intact.Dressing changed, no S/S of infection noted. Report and in-service provided to CODI Casper. See FLOW sheet for details

## 2025-04-30 NOTE — CARE PLAN
The patient is Stable - Low risk of patient condition declining or worsening    Shift Goals  Clinical Goals: administer medications as ordered  Patient Goals: rest  Family Goals: not present    Progress made toward(s) clinical / shift goals:    Problem: Pain - Standard  Goal: Alleviation of pain or a reduction in pain to the patient’s comfort goal  Outcome: Progressing  Patient denies any pain this morning.     Problem: Knowledge Deficit - Standard  Goal: Patient and family/care givers will demonstrate understanding of plan of care, disease process/condition, diagnostic tests and medications  Outcome: Progressing  Patient demonstrates an understanding of plan of care, disease process/condition, diagnostic tests and medications.       Patient is not progressing towards the following goals:

## 2025-04-30 NOTE — DISCHARGE SUMMARY
"Discharge Summary    CHIEF COMPLAINT ON ADMISSION  Chief Complaint   Patient presents with    Abdominal Pain     Pt reports to possibly have infection from peritoneal dialysis. Possibly needs a port for Hemodialysis. Pt reports to also have Shingles. Last dialysis Saturday night.        Reason for Admission  Abd Pain/Rash     Admission Date  4/28/2025    CODE STATUS  Full Code    HPI & HOSPITAL COURSE  This is a 60 y.o. male with history of ESRD on PD nightly, Afib on coumadin,  hx of mechanical aortic valve replacement, HTN, anemia and recent inguinal hernia repair, who presented 4/28/2025 with abdominal pain and blood in PD bag drainage.      He underwent Open left Inguinal Hernia Repair with Mesh by Dr. Dinh on 4/21. 2 days after surgery, he was noted small amount of blood in the peritoneal dialysis bag.  Since that time, he has developed a burning discomfort when he attempts peritoneal dialysis which has been progressively worsening.      He was seen at outlying ER 2 days ago, advised admission to the hospital which he refused at that time. He contacted the dialysis \"team\" today, states he was told to go to the ER out of concern for infection. His abdominal discomfort is localized in the left lower quadrant of his abdomen which is his surgical site.  During the stay, patient had dialysis fluid was sent for culture, no presented, nephrology continue to follow.  Patient was understanding.    His hospital course was complicated with hyperkalemia and was discharged home on Lokelma 5 mg p.o. daily. Plan of care has been d/w nephrology     Additional, he developed shingles in R forehead since last Thursday. He is currently on Valtrex for R upper facial shingles , will continue for a total of 7 days.    Therefore, he is discharged in good and stable condition to home with close outpatient follow-up.    The patient met 2-midnight criteria for an inpatient stay at the time of discharge.    Discharge " Date  4/30/2025      FOLLOW UP ITEMS POST DISCHARGE  Drew T. Blumberg, D.O.      DISCHARGE DIAGNOSES  Principal Problem:    Abdominal pain (POA: Yes)  Active Problems:    Hypertension (POA: Yes)    ESRD (end stage renal disease) (HCC) (POA: Yes)    History of mechanical aortic valve replacement (POA: Yes)    A-fib (HCC) (POA: Yes)    Peritoneal dialysis status (HCC) (POA: Yes)    S/P hernia repair (POA: Unknown)    Shingles (POA: Unknown)    Hyperkalemia (POA: Unknown)  Resolved Problems:    * No resolved hospital problems. *      FOLLOW UP  Future Appointments   Date Time Provider Department Center   5/1/2025  9:00 AM Dontrell Sharma M.D. [facility] None   5/9/2025  3:30 PM Drew T. Blumberg, D.O. [facility] None   6/9/2025  2:20 PM Michael J Bloch, M.D. [facility] None     No follow-up provider specified.    MEDICATIONS ON DISCHARGE     Medication List        START taking these medications        Instructions   senna-docusate 8.6-50 MG Tabs  Commonly known as: Pericolace Or Senokot S   Take 2 Tablets by mouth every evening.  Dose: 2 Tablet     sodium zirconium cyclosilicate 5 g packet  Commonly known as: Lokelma   Mix 1 packet in liquid and drink by mouth every day for 30 days.  Dose: 5 g            CHANGE how you take these medications        Instructions   calcitRIOL 0.25 MCG Caps  What changed: when to take this  Commonly known as: Rocaltrol   Take 1 Capsule by mouth every day.  Dose: 0.25 mcg     esomeprazole 20 MG capsule  What changed:   how much to take  when to take this  Commonly known as: NexIUM   Take 2 Capsules by mouth every morning before breakfast.  Dose: 40 mg     gabapentin 100 MG Caps  What changed:   how much to take  when to take this  additional instructions  Commonly known as: Neurontin   TAKE 1 CAPSULE BY MOUTH THREE TIMES DAILY  Dose: 100 mg     polyethylene glycol/lytes 17 g Pack  What changed:   when to take this  reasons to take this  additional instructions  Commonly known as: Miralax   mix 1 Packet in  liquid and drink by mouth 1 time a day as needed (if no bowel movement in last 2 days) for up to 15 days.  Dose: 17 g     valACYclovir 500 MG Tabs  Start taking on: May 1, 2025  What changed:   medication strength  how much to take  when to take this  Commonly known as: Valtrex   Take 1 Tablet by mouth every day for 5 days.  Dose: 500 mg     warfarin 2.5 MG Tabs  What changed:   how much to take  when to take this  additional instructions  Commonly known as: Coumadin   Doctor's comments: This prescription is transmitted by a pharmacist under the authority of a collaborative practice agreement.  Take 1-2 Tablets by mouth every day. Take as directed by anticoag clinic.  Dose: 2.5-5 mg            CONTINUE taking these medications        Instructions   acidophilus lactobacillus Caps   Take 1 Capsule by mouth every day.  Dose: 1 Capsule     aspirin 81 MG EC tablet   Take 81 mg by mouth every day.  Dose: 81 mg     carvedilol 3.125 MG Tabs  Commonly known as: Coreg   Take 2 Tablets by mouth 2 times a day with meals.  Dose: 6.25 mg     oxyCODONE immediate-release 5 MG Tabs  Commonly known as: Roxicodone   Take 5 mg by mouth every four hours as needed for Severe Pain.  Dose: 5 mg     rosuvastatin 20 MG Tabs  Commonly known as: Crestor   Take 1 Tablet by mouth every evening.  Dose: 20 mg     sevelamer carbonate 800 MG Tabs tablet  Commonly known as: Renvela   Take 800 mg by mouth 3 times a day as needed.  Dose: 800 mg     traZODone 50 MG Tabs  Commonly known as: Desyrel   Take 50 mg by mouth at bedtime as needed for Sleep.  Dose: 50 mg     Tylenol 8 Hour Arthritis Pain 650 MG CR tablet  Generic drug: acetaminophen   Take 650 mg by mouth every 6 hours as needed for Mild Pain.  Dose: 650 mg              Allergies  Allergies   Allergen Reactions    Allopurinol Itching    Hydralazine Hcl Unspecified     Pt states he had a reaction similar to lupus       DIET  Orders Placed This Encounter   Procedures    Diet Order Diet: Renal      Standing Status:   Standing     Number of Occurrences:   1     Diet::   Renal [8]       ACTIVITY  As tolerated.  Weight bearing as tolerated    CONSULTATIONS  Nephrology     PROCEDURES  None     LABORATORY  Lab Results   Component Value Date    SODIUM 134 (L) 04/30/2025    POTASSIUM 5.2 04/30/2025    CHLORIDE 97 04/30/2025    CO2 22 04/30/2025    GLUCOSE 107 (H) 04/30/2025     (H) 04/30/2025    CREATININE 10.50 (HH) 04/30/2025    GLOMRATE 7 (L) 04/02/2023        Lab Results   Component Value Date    WBC 6.1 04/30/2025    HEMOGLOBIN 11.0 (L) 04/30/2025    HEMATOCRIT 33.4 (L) 04/30/2025    PLATELETCT 165 04/30/2025        Total time of the discharge process exceeds 39 minutes.

## 2025-04-30 NOTE — PROGRESS NOTES
"[facility] -  PROGRESS NOTE      DATE & TIME:   4/30/2025  2:42 PM               AUTHOR:  Zac Daily D.O.      REASON FOR CONSULTATION:   -  Evaluation and management of acute and chronic conditions related to Nephrology      CHIEF COMPLAINT:   -  \"Abd Pain, blood in dialysis bag  \"      HISTORY OF PRESENT ILLNESS:    60Y M w ESRD on nightly PD ([facility]), paroxysmal A-fib on AC, HTN, Anemia and recent inguinal hernia repair presents for abd pain and blood in PD bag drainage. He states things were going fine with PD until he had hernia surgery earlier in 4/2025.  He noticed some blood when draining the PD fluid from peritoneum, but blood worsened and he had severe abd pain last night when attempting PD. He was not able to start PD so he came to the ER.     DAILY NEPHROLOGY SUMMARY:  4/28: consult done  4/29: pt was able to tolerate gentler PD with no pain. PD cultures no growth to date. Cell count <100 (97).   4/30: pt tolerated PD yesterday. Cultures remain negative today. PD fluid with no fibrin or blood noted.       REVIEW OF SYSTEMS:    10 point ROS was performed and is as per HPI or otherwise negative      PAST MEDICAL HISTORY:   - ESRD  - CKD-MBD  Past Medical History:   Diagnosis Date    Anesthesia     Hiccups for over 24 hours after a previous sugery.    Aortic stenosis 11/2023    AVR replacement    Arthritis     Osteoarthritis    Chronic cough     dry, pt reports since heart surgery    Dialysis patient (HCC)     peritoneal daily    Dyspnea on exertion 05/23/2023    Dyspnea on exertion 05/23/2023    Elevated troponin 05/23/2023    Fatty liver     Heart burn     controlled with Nexium    Heart murmur     High cholesterol     All always    Hypertension 2009    Iron deficiency     Liver cirrhosis (HCC) 12/21/2023    NSTEMI (non-ST elevated myocardial infarction) ([facility]) 05/23/2023    no stents placed; Cardiologist Dr. Sharma    Pain     left inguinal, back, left hip    Pain in the " chest 05/23/2023    Pain in the chest 05/23/2023    Paroxysmal A-fib (HCC) 11/11/2023    Peritoneal dialysis status (HCC) 04/30/2024    PONV (postoperative nausea and vomiting)     Renal disorder 2007    Stage IV; end stage renal disease; Nephrologist Tiffanie Bernal    Sleep apnea 2000    does not use CPAP    Snoring 1990    Splenomegaly 12/21/2023        PAST SURGICAL HISTORY:   - Dialysis Access Surgery  Past Surgical History:   Procedure Laterality Date    INGUINAL HERNIA REPAIR Left 4/21/2025    Procedure: OPEN LEFT INGUINAL HERNIA REPAIR WITH MESH;  Surgeon: Keagan Dinh M.D.;  Location: SURGERY [facility];  Service: General    FL INJ LUMBAR/SACRAL,W/ IMAGING Bilateral 04/10/2025    Procedure: LUMBAR EPIDURAL STEROID INJECTION, BILATERAL L5-S1 INTERLAMINAR, UNDER FLUOROSCOPY;  Surgeon: Benito Herrera D.O.;  Location: [facility];  Service: Orthopedics    FL NJX AA&/STRD TFRML EPI LUMBAR/SACRAL 1 LEVEL Bilateral 06/06/2024    Procedure: Bilateral L5 Transforaminal Epidural Steroid Injection under Fluoroscopy;  Surgeon: Benito Herrera D.O.;  Location: SURGERY [facility];  Service: Pain Management    AORTIC VALVE REPLACEMENT  11/03/2023    Procedure: AORTIC VALVE REPLACEMENT, ASCENDING AORTIC ANEURYSM REPAIR, TRANSESOPHAGEAL ECHOCARDIOGRAM;  Surgeon: Osmel Blanca M.D.;  Location: [facility];  Service: Cardiothoracic    AORTIC ASCENDING DISSECTION  11/03/2023    Procedure: REPAIR, ANEURYSM OR DISSECTION, AORTA, ASCENDING;  Surgeon: Osmel Blanca M.D.;  Location: [facility];  Service: Cardiothoracic    ECHOCARDIOGRAM, TRANSESOPHAGEAL, INTRAOPERATIVE  11/03/2023    Procedure: ECHOCARDIOGRAM, TRANSESOPHAGEAL, INTRAOPERATIVE;  Surgeon: Osmel Blanca M.D.;  Location: SURGERY [facility];  Service: Cardiothoracic    RESTORATE HEMOSTAS  11/03/2023    Procedure: RESTORATION OF HEMOSTATIS;  Surgeon: Omsel Blanca M.D.;  Location: [facility];  Service: Cardiothoracic     STERNOTOMY  11/03/2023    Procedure: MEDIASTINAL EXPLORATION;  Surgeon: Osmel Blanca M.D.;  Location: SURGERY [facility];  Service: Cardiothoracic    CATH PLACEMENT CAPD Right 09/06/2023    Procedure: LAPAROSCOPIC INSERTION OF PERITONEAL DIALYSIS CATHETER;  Surgeon: Dontrell Sales M.D.;  Location: SURGERY [facility];  Service: Vascular    [place] INJ LUMBAR/SACRAL,W/ IMAGING Left 08/24/2023    Procedure: LUMBAR EPIDURAL STEROID INJECTION, LEFT L5-S1 INTERLAMINAR UNDER FLUOROSCOPY;  Surgeon: Benito Herrera D.O.;  Location: SURGERY [facility];  Service: Orthopedics    CATH PLACEMENT CAPD N/A 05/15/2023    Procedure: LAPAROSCOPIC PLACEMENT OF PERITONEAL DIALYSIS CATHERTER;  Surgeon: Shikha Alexander M.D.;  [facility]: SURGERY [facility];  Service: General    UMBILICAL HERNIA REPAIR  2012    4 separate surgeries between [id number]    OTHER  2007    Kidney biopsy, can't recall laterality,    HIP ARTHROSCOPY Bilateral 2002    TONSILLECTOMY N/A 1987    HAND SURGERY Right 1985    Hand and wrist    PAROTIDECTOMY Right     SHOULDER ARTHROSCOPY Bilateral     Can't recall date       FAMILY HISTORY:   Family History   Problem Relation Age of Onset    Hyperlipidemia Mother     Heart Disease Mother     Hypertension Father           SOCIAL HISTORY:   Social History     Tobacco Use    Smoking status: Never    Smokeless tobacco: Never   Vaping Use    Vaping status: Never Used   Substance Use Topics    Alcohol use: Never    Drug use: Never         HOME MEDICATIONS:   - Reviewed and documented in chart  Home Medications    Medication Sig Taking? Last Dose Authorizing Provider   valACYclovir (VALTREX) 500 MG Tab Take 1 Tablet by mouth every day for 5 days. Yes  Elías Shirley M.D.   senna-docusate (PERICOLACE OR SENOKOT S) 8.6-50 MG Tab Take 2 Tablets by mouth every evening. Yes  Elías Shirley M.D.   polyethylene glycol/lytes (MIRALAX) 17 g Pack mix 1 Packet in liquid and drink by mouth 1 time a day as needed (if no bowel  "movement in last 2 days) for up to 15 days. Yes  lEías Shirley M.D.   sodium zirconium cyclosilicate (LOKELMA) 5 g packet Mix 1 packet in liquid and drink by mouth every day for 30 days. Yes  Elías Shirley M.D.   oxyCODONE immediate-release (ROXICODONE) 5 MG Tab Take 5 mg by mouth every four hours as needed for Severe Pain. Yes 4/28/2025 Morning Physician Outpatient   acetaminophen (TYLENOL 8 HOUR ARTHRITIS PAIN) 650 MG CR tablet Take 650 mg by mouth every 6 hours as needed for Mild Pain. Yes 4/28/2025 Morning Physician Outpatient   gabapentin (NEURONTIN) 100 MG Cap TAKE 1 CAPSULE BY MOUTH THREE TIMES DAILY Yes 4/27/2025 Bedtime Drew T. Blumberg, D.O.   acidophilus lactobacillus Cap Take 1 Capsule by mouth every day. Yes 4/27/2025 Morning Physician Outpatient   sevelamer carbonate (RENVELA) 800 MG Tab tablet Take 800 mg by mouth 3 times a day as needed. Yes 4/27/2025 Physician Outpatient   warfarin (COUMADIN) 2.5 MG Tab Take 1-2 Tablets by mouth every day. Take as directed by antico clinic. Yes 4/27/2025 Bedtime CARISSA Gomez.   carvedilol (COREG) 3.125 MG Tab Take 2 Tablets by mouth 2 times a day with meals. Yes 4/27/2025 Evening Michael J Bloch, M.D.   rosuvastatin (CRESTOR) 20 MG Tab Take 1 Tablet by mouth every evening. Yes 4/27/2025 Evening Michael J Bloch, M.D.   calcitRIOL (ROCALTROL) 0.25 MCG Cap Take 1 Capsule by mouth every day. Yes 4/24/2025 Michael J Bloch, M.D.   esomeprazole (NEXIUM) 20 MG capsule Take 2 Capsules by mouth every morning before breakfast. Yes 4/27/2025 Evening Drew T. Blumberg, D.O.   aspirin 81 MG EC tablet Take 81 mg by mouth every day. Yes 4/27/2025 Morning Physician Outpatient   traZODone (DESYREL) 50 MG Tab Take 50 mg by mouth at bedtime as needed for Sleep.  4/26/2025 Bedtime Physician Outpatient         ALLERGIES:  Allopurinol and Hydralazine hcl      VITAL SIGNS:  /64   Pulse 68   Temp (!) 35.6 °C (96 °F) (Temporal)   Resp 16   Ht 1.702 m (5' 7\")   Wt " 85.9 kg (189 lb 6 oz)   SpO2 96%   BMI 29.66 kg/m²     Physical Exam  Constitutional:       Appearance: Normal appearance.   HENT:      Head: Normocephalic and atraumatic.      Mouth/Throat:      Mouth: Mucous membranes are moist.   Cardiovascular:      Rate and Rhythm: Normal rate and regular rhythm.   Pulmonary:      Effort: Pulmonary effort is normal.      Breath sounds: Normal breath sounds.   Abdominal:      General: There is no distension.      Palpations: Abdomen is soft.      Comments: Tender to palpation in LLQ, no tenderness in upper quadrant.   Neurological:      Mental Status: He is alert.         Access: PD catheter in place      FLUID BALANCE:  In: 330 [P.O.:330]  Out: 511       LABS:  Recent Labs     04/28/25 0759 04/29/25 0319 04/30/25  0332   SODIUM 135 135 134*   POTASSIUM 5.3 5.7* 5.2   CHLORIDE 99 98 97   CO2 20 21 22   GLUCOSE 120* 107* 107*   * 102* 102*   CREATININE 11.60* 11.10* 10.50*   CALCIUM 8.9 8.6 8.9      Recent Labs     04/28/25 0759 04/29/25 0319 04/30/25  0332   SODIUM 135 135 134*   POTASSIUM 5.3 5.7* 5.2   CHLORIDE 99 98 97   CO2 20 21 22   GLUCOSE 120* 107* 107*   * 102* 102*   CREATININE 11.60* 11.10* 10.50*          IMAGING:  - All imaging reviewed from admission to present day  CT ABD/Pelv w Contrast 4/28/25  HISTORY/REASON FOR EXAM:  Pain.        TECHNIQUE/EXAM DESCRIPTION:   CT scan of the abdomen and pelvis with contrast.     Contrast-enhanced helical scanning was obtained from the diaphragmatic domes through the pubic symphysis following the bolus administration of nonionic contrast without complication.     90 mL of Omnipaque 350 nonionic contrast was administered without complication.     Low dose optimization technique was utilized for this CT exam including automated exposure control and adjustment of the mA and/or kV according to patient size.     COMPARISON: CT abdomen/pelvis 3/24/2025     FINDINGS:  Lower Chest: Slight curvilinear airspace  opacities dependently in the lung bases.. There is coarse pleural calcification in the right lateral lung base.. Coronary artery calcification     Liver: Normal.     Spleen: Unremarkable.     Pancreas: Unremarkable.     Gallbladder: No calcified stones.     Biliary: Nondilated.     Adrenal glands: Normal.     Kidneys: Unremarkable without hydronephrosis. 1.3 cm peripherally calcified left renal cyst. Numerous small subcentimeter bilateral renal cysts. 3 mm nonobstructing calculus in midpole right kidney.     Bowel: No obstruction or acute inflammation. Colonic diverticulosis     Lymph nodes: No adenopathy.     Vasculature: Mild atherosclerotic plaquing of the infrarenal aorta and iliac arteries.     Peritoneum: Small amount of ascites about the liver and spleen and in the lower abdomen and pelvis.     Musculoskeletal: No acute or destructive process.     Pelvis: No adenopathy or free fluid. Peritoneal dialysis catheter coiled in the pelvis.     IMPRESSION:     1.  Small amount of ascites about the liver and spleen and in the lower abdomen and pelvis.  2.  Peritoneal dialysis catheter coiled in the pelvis.  3.  Colonic diverticulosis.  4.  3 mm nonobstructing right renal calculus.  5.  1.3 cm peripherally calcified left renal cyst. Bosniak 2F  6.  Numerous small subcentimeter bilateral renal cysts.  7.  Coronary artery calcification.  8.  Slight bibasilar atelectasis.  9.  Coarse pleural calcification in the right lateral lung base.    ASSESSMENTS:    -ESRD on nightly peritoneal dialysis     [facility] HD [facility]: [facility]     Via ABD pd catheter  -Hypertension     Continue current medications  -Hypervolemia     Volume off with dialysis as BP tolerates  - Anemia     goal Hgb 10-11   -CKD-MBD  -A-fib on warfarin  -ABD pain/hemoperitoneum    -? 2/2 to surgery related vs less likely peritonitis       Cell count <100 on 4/28, cultures pending      PLAN:    - pt tolerated PD yesterday evening  - cell culture not suggestive of  infection with cell count <100, and PMNs <50% (27%)  -cultures negative since admission  -no indication for Abx   -cont lokelma for hyperK mgmt  - Continue usual anti-hypertensive medications  - Transfuse PRN hgb < 7  - No dietary protein restrictions, use renal diet     Goal: 1.5g Protein/kg/day  - Please dose all meds for ESRD/PD    Dispo: can DC from renal standpoint as pt tolerating PD, no sign of peritonitis    Please page nephrology with any questions or concerns    Zac Daily DO  Nephrologist   [facility]  [phone]

## 2025-04-30 NOTE — PROGRESS NOTES
Assumed care of patient at 1900. Received bedside report from day RN Claudette. Patient A&Ox4, on RA, Reporting a pain level of 0/10. Call light within reach, belongings within reach, fall precautions in place, bed in lowest position. Patient does not have any other needs at this time.     POC was discussed with patient. All questions were answered. Patient verbalized understanding.

## 2025-04-30 NOTE — PROGRESS NOTES
Pt ready for discharge. Orders placed for discharge lounge. IV removed. Wife plans to  pt around 1600.

## 2025-04-30 NOTE — CARE PLAN
The patient is Stable - Low risk of patient condition declining or worsening    Shift Goals  Clinical Goals: peritneal dialysis, preserve skin integrity, free of infections  Patient Goals: rest  Family Goals: marisa    Progress made toward(s) clinical / shift goals:    Problem: Pain - Standard  Goal: Alleviation of pain or a reduction in pain to the patient’s comfort goal  Outcome: Progressing  Note: Patients pain has remained controlled throughout shift through pharmacological and nonpharmacological methods of pain management. Patients pain will continue to be assessed throughout shift and controlled appropriately.      Problem: Knowledge Deficit - Standard  Goal: Patient and family/care givers will demonstrate understanding of plan of care, disease process/condition, diagnostic tests and medications  Outcome: Progressing  Note: Patient has remained involved in their plan of care with all questions and concerns addressed at this time. Patient will continue to be involved in their plan of care throughout hospital stay.      Problem: Physical Regulation  Goal: Signs and symptoms of infection will decrease  Outcome: Progressing  Note: This pt has remained free of infection or had all infections controlled at this time through pharmacological and non-pharmacological modalities and interventions. Pt and family have been educated on the importance of hand hygiene for all visitors, the pt, and healthcare staff. Standard infection prevention protocols will continue to be utilized throughout shift with education assessed and implemented as appropriate.        Patient is not progressing towards the following goals:

## 2025-05-01 ENCOUNTER — TELEMEDICINE (OUTPATIENT)
Dept: CARDIOLOGY | Facility: MEDICAL CENTER | Age: 60
End: 2025-05-01
Attending: INTERNAL MEDICINE
Payer: COMMERCIAL

## 2025-05-01 VITALS — WEIGHT: 190 LBS | HEIGHT: 67 IN | BODY MASS INDEX: 29.82 KG/M2

## 2025-05-01 DIAGNOSIS — Z98.890 H/O CARDIAC RADIOFREQUENCY ABLATION: ICD-10-CM

## 2025-05-01 DIAGNOSIS — Z95.2 HISTORY OF MECHANICAL AORTIC VALVE REPLACEMENT: ICD-10-CM

## 2025-05-01 DIAGNOSIS — I25.83 CORONARY ARTERY DISEASE DUE TO LIPID RICH PLAQUE: ICD-10-CM

## 2025-05-01 DIAGNOSIS — I25.10 CORONARY ARTERY DISEASE DUE TO LIPID RICH PLAQUE: ICD-10-CM

## 2025-05-01 DIAGNOSIS — Z86.79 S/P ASCENDING AORTIC ANEURYSM REPAIR: ICD-10-CM

## 2025-05-01 DIAGNOSIS — I15.0 RENOVASCULAR HYPERTENSION: ICD-10-CM

## 2025-05-01 DIAGNOSIS — Z98.890 S/P ASCENDING AORTIC ANEURYSM REPAIR: ICD-10-CM

## 2025-05-01 DIAGNOSIS — Z99.2 PERITONEAL DIALYSIS STATUS (HCC): ICD-10-CM

## 2025-05-01 DIAGNOSIS — I48.92 PAROXYSMAL ATRIAL FLUTTER (HCC): ICD-10-CM

## 2025-05-01 PROBLEM — I05.8 MITRAL VALVE MASS: Status: RESOLVED | Noted: 2023-11-16 | Resolved: 2025-05-01

## 2025-05-01 LAB
BACTERIA FLD AEROBE CULT: NORMAL
FUNGUS SPEC FUNGUS STN: NORMAL
GRAM STN SPEC: NORMAL
SIGNIFICANT IND 70042: NORMAL
SIGNIFICANT IND 70042: NORMAL
SITE SITE: NORMAL
SITE SITE: NORMAL
SOURCE SOURCE: NORMAL
SOURCE SOURCE: NORMAL

## 2025-05-01 PROCEDURE — 99214 OFFICE O/P EST MOD 30 MIN: CPT | Performed by: INTERNAL MEDICINE

## 2025-05-01 PROCEDURE — 99214 OFFICE O/P EST MOD 30 MIN: CPT | Mod: 95 | Performed by: INTERNAL MEDICINE

## 2025-05-01 PROCEDURE — G2211 COMPLEX E/M VISIT ADD ON: HCPCS | Mod: 95 | Performed by: INTERNAL MEDICINE

## 2025-05-01 RX ORDER — EZETIMIBE 10 MG/1
10 TABLET ORAL DAILY
Qty: 100 TABLET | Refills: 3 | Status: SHIPPED | OUTPATIENT
Start: 2025-05-01

## 2025-05-01 ASSESSMENT — FIBROSIS 4 INDEX: FIB4 SCORE: 3.31

## 2025-05-01 NOTE — PROGRESS NOTES
Cardiology Telemedicine Visit: Established Patient   This encounter was conducted via Teams.   Verbal consent was obtained. Patient's identity was verified.  The patient was located at home  The patient was in the [facility]    Assessment and Plan:   PCP: Drew T. Blumberg, D.O.  The following treatment plan was discussed:     1. S/P ascending aortic aneurysm repair    2. Paroxysmal atrial flutter (HCC)    3. Coronary artery disease due to lipid rich plaque    4. Peritoneal dialysis status ([facility])    5. H/O cardiac radiofrequency ablation 12/20/23    6. Renovascular hypertension    7. History of mechanical aortic valve replacement        Gurdeep Guerra is stable from a cardiovascular standpoint but with unresolved risk hyperlipidemia.  I will have him reinitiate rosuvastatin as well as Zetia.  He will measure a lipid profile in 1 month.  He will also  update an echocardiogram and follow-up of durability of ascending aortic aneurysm repair    Follow up: 4 months    Subjective:     Chief Complaint   Patient presents with    Other     Fv dx: S/P AVR (aortic valve replacement)    Atrial Flutter     History: Gurdeep Guerra is a 60 y.o. male with history of end-stage renal disease (PD), recent flutter ablation with Dr. Velasquez presenting for follow-up of mechanical aortic valve  (23 On-X), root repair (30 Hemashield) November 2023 along with small vessel obstructive coronary artery disease limited to the obtuse marginal.     Gurdeep has been off statins-concerned about hip pains being aggravated.  He does have degenerative joints in these areas as well.  He was recently in the hospital with abdominal discomfort and felt to be volume overloaded treated with escalation of dialysis.  Symptoms are improved.  He did have a scan while he was there which showed severe coronary atherosclerosis.      AGUSTINA   Denies any recent fevers or chills. No nausea or vomiting. No chest pains or shortness of breath. All other systems  "reviewed and negative except as per the HPI       Objective:   Vitals obtained by patient:  Ht 1.702 m (5' 7\")   Wt 86.2 kg (190 lb)   BMI 29.76 kg/m²     Physical Exam:  Constitutional: Alert, no distress, well-groomed.  Skin: No rashes in visible areas.  Eye: Round. Conjunctiva clear, lids normal. No icterus.   ENMT: Lips pink without lesions, good dentition, moist mucous membranes. Phonation normal.  Neck: No masses, no thyromegaly. Moves freely without pain.  CV: Pulse as reported by patient  Respiratory: Unlabored respiratory effort, no cough or audible wheeze  Psych: Alert and oriented x3, normal affect and mood.     The ASCVD Risk score ([place] DK, et al., 2019) failed to calculate.  Allergies   Allergen Reactions    Allopurinol Itching    Hydralazine Hcl Unspecified     Pt states he had a reaction similar to lupus     Current medicines (including changes today)  Current Outpatient Medications   Medication Sig Dispense Refill    valACYclovir (VALTREX) 500 MG Tab Take 1 Tablet by mouth every day for 5 days. 5 Tablet 0    polyethylene glycol/lytes (MIRALAX) 17 g Pack mix 1 Packet in liquid and drink by mouth 1 time a day as needed (if no bowel movement in last 2 days) for up to 15 days. 15 Each 0    acetaminophen (TYLENOL 8 HOUR ARTHRITIS PAIN) 650 MG CR tablet Take 650 mg by mouth every 6 hours as needed for Mild Pain.      traZODone (DESYREL) 50 MG Tab Take 50 mg by mouth at bedtime as needed for Sleep.      gabapentin (NEURONTIN) 100 MG Cap TAKE 1 CAPSULE BY MOUTH THREE TIMES DAILY 90 Capsule 2    acidophilus lactobacillus Cap Take 1 Capsule by mouth every day.      sevelamer carbonate (RENVELA) 800 MG Tab tablet Take 800 mg by mouth 3 times a day as needed.      warfarin (COUMADIN) 2.5 MG Tab Take 1-2 Tablets by mouth every day. Take as directed by anticoag clinic. 200 Tablet 1    carvedilol (COREG) 3.125 MG Tab Take 2 Tablets by mouth 2 times a day with meals. 60 Tablet 11    rosuvastatin (CRESTOR) 20 MG " Tab Take 1 Tablet by mouth every evening. 30 Tablet 11    calcitRIOL (ROCALTROL) 0.25 MCG Cap Take 1 Capsule by mouth every day. 30 Capsule 11    esomeprazole (NEXIUM) 20 MG capsule Take 2 Capsules by mouth every morning before breakfast. 180 Capsule 1    aspirin 81 MG EC tablet Take 81 mg by mouth every day.      senna-docusate (PERICOLACE OR SENOKOT S) 8.6-50 MG Tab Take 2 Tablets by mouth every evening. (Patient not taking: Reported on 5/1/2025) 30 Tablet 0    sodium zirconium cyclosilicate (LOKELMA) 5 g packet Mix 1 packet in liquid and drink by mouth every day for 30 days. (Patient not taking: Reported on 5/1/2025) 30 Each 0    oxyCODONE immediate-release (ROXICODONE) 5 MG Tab Take 5 mg by mouth every four hours as needed for Severe Pain. (Patient not taking: Reported on 5/1/2025)       No current facility-administered medications for this visit.     Patient Active Problem List    Diagnosis Date Noted    Hyperkalemia 04/29/2025    Abdominal pain 04/28/2025    S/P hernia repair 04/28/2025    Shingles 04/28/2025    Postoperative pain 04/21/2025    Osteoarthritis of spine with radiculopathy, lumbar region 11/01/2024    Obstructive sleep apnea syndrome 07/10/2024    Lumbar radiculopathy 05/29/2024    Peritoneal dialysis status (HCC) 04/30/2024    H/O cardiac radiofrequency ablation 12/20/23 01/10/2024    Physical deconditioning 12/29/2023    Generalized abdominal pain 12/29/2023    Splenomegaly 12/21/2023    A-fib (HCC) 12/16/2023    S/P ascending aortic aneurysm repair 12/13/2023    Nausea and vomiting 12/05/2023    C. 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    Anxiety 11/14/2023    Paroxysmal atrial flutter (HCC) 11/11/2023    GIB (gastrointestinal bleeding) 11/11/2023    Insomnia 11/11/2023    Loculated pleural effusion 11/11/2023    Shock (HCC) 11/06/2023    ESRD (end  stage renal disease) ([facility]) 11/03/2023    Status post cardiac surgery 11/03/2023    Dyslipidemia 09/21/2023    Coronary artery disease due to lipid rich plaque 05/26/2023    Hyperphosphatemia 05/24/2023    Pain in the chest 05/23/2023    Dyspnea on exertion 05/23/2023    Chronic metabolic acidosis 05/23/2023    Skin lesions 03/03/2023    Lump of skin of right upper extremity 03/03/2023    Chronic gout of multiple sites 06/09/2022    Sialolithiasis 06/09/2022    Heart murmur 04/28/2022    FSGS (focal segmental glomerulosclerosis) 10/11/2021    Aortic stenosis 07/01/2021    Hypertension 06/26/2020    Mixed hyperlipidemia 06/26/2020    Gastroesophageal reflux disease 06/26/2020     Family History   Problem Relation Age of Onset    Hyperlipidemia Mother     Heart Disease Mother     Hypertension Father      He  has a past medical history of Anesthesia, Aortic stenosis (11/2023), Arthritis, Chronic cough, Dialysis patient ([facility]), Dyspnea on exertion (05/23/2023), Dyspnea on exertion (05/23/2023), Elevated troponin (05/23/2023), Fatty liver, Heart burn, Heart murmur, High cholesterol, Hypertension (2009), Iron deficiency, Liver cirrhosis ([facility]) (12/21/2023), NSTEMI (non-ST elevated myocardial infarction) ([facility]) (05/23/2023), Pain, Pain in the chest (05/23/2023), Pain in the chest (05/23/2023), Paroxysmal A-fib ([facility]) (11/11/2023), Peritoneal dialysis status ([facility]) (04/30/2024), PONV (postoperative nausea and vomiting), Renal disorder (2007), Sleep apnea (2000), Snoring (1990), and Splenomegaly (12/21/2023).    He has no past medical history of Acute nasopharyngitis, Anginal syndrome ([facility]), Asthma, Blood clotting disorder ([facility]), Bowel habit changes, Breath shortness, Bronchitis, Cancer ([facility]), Carcinoma in situ of respiratory system, Cataract, Congestive heart failure ([facility]), COPD (chronic obstructive pulmonary disease) ([facility]), Coughing blood, Dental disorder, Diabetes ([facility]), Disorder of thyroid, Emphysema of lung ([facility]), Glaucoma,  Gynecological disorder, Hemorrhagic disorder (HCC), Hepatitis A, Hepatitis B, Hepatitis C, Hiatus hernia syndrome, Indigestion, Infectious disease, Jaundice, Pacemaker, Pneumonia, Pregnant, Psychiatric problem, Rheumatic fever, Seizure (HCC), Stroke (HCC), Tuberculosis, Urinary bladder disorder, or Urinary incontinence.  He  has a past surgical history that includes other (2007); umbilical hernia repair (2012); shoulder arthroscopy (Bilateral); hip arthroscopy (Bilateral, 2002); tonsillectomy (N/A, 1987); hand surgery (Right, 1985); cath placement capd (N/A, 05/15/2023); pr inj lumbar/sacral,w/ imaging (Left, 08/24/2023); cath placement capd (Right, 09/06/2023); aortic valve replacement (11/03/2023); aortic ascending dissection (11/03/2023); echocardiogram, transesophageal, intraoperative (11/03/2023); restorate hemostas (11/03/2023); sternotomy (11/03/2023); pr njx aa&/strd tfrml epi lumbar/sacral 1 level (Bilateral, 06/06/2024); pr inj lumbar/sacral,w/ imaging (Bilateral, 04/10/2025); parotidectomy (Right); and inguinal hernia repair (Left, 4/21/2025).  Past Medical History:   Diagnosis Date    Anesthesia     Hiccups for over 24 hours after a previous sugery.    Aortic stenosis 11/2023    AVR replacement    Arthritis     Osteoarthritis    Chronic cough     dry, pt reports since heart surgery    Dialysis patient ([facility])     peritoneal daily    Dyspnea on exertion 05/23/2023    Dyspnea on exertion 05/23/2023    Elevated troponin 05/23/2023    Fatty liver     Heart burn     controlled with Nexium    Heart murmur     High cholesterol     All always    Hypertension 2009    Iron deficiency     Liver cirrhosis ([facility]) 12/21/2023    NSTEMI (non-ST elevated myocardial infarction) ([facility]) 05/23/2023    no stents placed; Cardiologist Dr. Sharma    Pain     left inguinal, back, left hip    Pain in the chest 05/23/2023    Pain in the chest 05/23/2023    Paroxysmal A-fib ([facility]) 11/11/2023    Peritoneal dialysis status ([facility]) 04/30/2024     PONV (postoperative nausea and vomiting)     Renal disorder 2007    Stage IV; end stage renal disease; Nephrologist Tiffanie Bernal    Sleep apnea 2000    does not use CPAP    Snoring 1990    Splenomegaly 12/21/2023     Allergies   Allergen Reactions    Allopurinol Itching    Hydralazine Hcl Unspecified     Pt states he had a reaction similar to lupus     Outpatient Encounter Medications as of 5/1/2025   Medication Sig Dispense Refill    valACYclovir (VALTREX) 500 MG Tab Take 1 Tablet by mouth every day for 5 days. 5 Tablet 0    polyethylene glycol/lytes (MIRALAX) 17 g Pack mix 1 Packet in liquid and drink by mouth 1 time a day as needed (if no bowel movement in last 2 days) for up to 15 days. 15 Each 0    acetaminophen (TYLENOL 8 HOUR ARTHRITIS PAIN) 650 MG CR tablet Take 650 mg by mouth every 6 hours as needed for Mild Pain.      traZODone (DESYREL) 50 MG Tab Take 50 mg by mouth at bedtime as needed for Sleep.      gabapentin (NEURONTIN) 100 MG Cap TAKE 1 CAPSULE BY MOUTH THREE TIMES DAILY 90 Capsule 2    acidophilus lactobacillus Cap Take 1 Capsule by mouth every day.      sevelamer carbonate (RENVELA) 800 MG Tab tablet Take 800 mg by mouth 3 times a day as needed.      warfarin (COUMADIN) 2.5 MG Tab Take 1-2 Tablets by mouth every day. Take as directed by anticoag clinic. 200 Tablet 1    carvedilol (COREG) 3.125 MG Tab Take 2 Tablets by mouth 2 times a day with meals. 60 Tablet 11    rosuvastatin (CRESTOR) 20 MG Tab Take 1 Tablet by mouth every evening. 30 Tablet 11    calcitRIOL (ROCALTROL) 0.25 MCG Cap Take 1 Capsule by mouth every day. 30 Capsule 11    esomeprazole (NEXIUM) 20 MG capsule Take 2 Capsules by mouth every morning before breakfast. 180 Capsule 1    aspirin 81 MG EC tablet Take 81 mg by mouth every day.      senna-docusate (PERICOLACE OR SENOKOT S) 8.6-50 MG Tab Take 2 Tablets by mouth every evening. (Patient not taking: Reported on 5/1/2025) 30 Tablet 0    sodium zirconium cyclosilicate (LOKELMA)  5 g packet Mix 1 packet in liquid and drink by mouth every day for 30 days. (Patient not taking: Reported on 5/1/2025) 30 Each 0    oxyCODONE immediate-release (ROXICODONE) 5 MG Tab Take 5 mg by mouth every four hours as needed for Severe Pain. (Patient not taking: Reported on 5/1/2025)       No facility-administered encounter medications on file as of 5/1/2025.     Social History     Socioeconomic History    Marital status:      Spouse name: Not on file    Number of children: Not on file    Years of education: Not on file    Highest education level: Master's degree (e.g., MA, MS, Kenn, MEd, MSW, ELIECER)   Occupational History    Not on file   Tobacco Use    Smoking status: Never    Smokeless tobacco: Never   Vaping Use    Vaping status: Never Used   Substance and Sexual Activity    Alcohol use: Never    Drug use: Never    Sexual activity: Yes     Partners: Female     Birth control/protection: Pill   Other Topics Concern    Not on file   Social History Narrative    Not on file     Social Drivers of Health     Financial Resource Strain: Low Risk  (5/29/2024)    Overall Financial Resource Strain (CARDIA)     Difficulty of Paying Living Expenses: Not hard at all   Food Insecurity: No Food Insecurity (4/28/2025)    Hunger Vital Sign     Worried About Running Out of Food in the Last Year: Never true     Ran Out of Food in the Last Year: Never true   Transportation Needs: No Transportation Needs (5/29/2024)    PRAPARE - Transportation     Lack of Transportation (Medical): No     Lack of Transportation (Non-Medical): No   Physical Activity: Insufficiently Active (5/29/2024)    Exercise Vital Sign     Days of Exercise per Week: 7 days     Minutes of Exercise per Session: 20 min   Stress: No Stress Concern Present (5/29/2024)    [employer] - Occupational Stress Questionnaire     Feeling of Stress : Not at all   Social Connections: Socially Isolated (5/29/2024)    Social Connection and  Isolation Panel [NHANES]     Frequency of Communication with Friends and Family: Once a week     Frequency of Social Gatherings with Friends and Family: Once a week     Attends Jainism Services: Never     Active Member of Clubs or Organizations: No     Attends Club or Organization Meetings: Never     Marital Status:    Intimate Partner Violence: Not At Risk (4/29/2025)    Humiliation, Afraid, Rape, and Kick questionnaire     Fear of Current or Ex-Partner: No     Emotionally Abused: No     Physically Abused: No     Sexually Abused: No   Housing Stability: Low Risk  (4/28/2025)    Housing Stability Vital Sign     Unable to Pay for Housing in the Last Year: No     Number of Times Moved in the Last Year: 0     Homeless in the Last Year: No       Studies  Lab Results   Component Value Date/Time    CHOLSTRLTOT 205 (H) 03/12/2025 03:35 PM     (H) 03/12/2025 03:35 PM    HDL 33 (A) 03/12/2025 03:35 PM    TRIGLYCERIDE 218 (H) 03/12/2025 03:35 PM       Lab Results   Component Value Date/Time    SODIUM 134 (L) 04/30/2025 03:32 AM    POTASSIUM 5.2 04/30/2025 03:32 AM    CHLORIDE 97 04/30/2025 03:32 AM    CO2 22 04/30/2025 03:32 AM    GLUCOSE 107 (H) 04/30/2025 03:32 AM     (H) 04/30/2025 03:32 AM    CREATININE 10.50 (HH) 04/30/2025 03:32 AM    BUNCREATRAT 17 05/09/2022 09:49 AM    GLOMRATE 7 (L) 04/02/2023 11:54 AM      Lab Results   Component Value Date/Time    PROTHROMBTM 23.3 (H) 04/30/2025 03:32 AM    INR 2.06 (H) 04/30/2025 03:32 AM      Lab Results   Component Value Date/Time    WBC 6.1 04/30/2025 03:32 AM    RBC 3.43 (L) 04/30/2025 03:32 AM    HEMOGLOBIN 11.0 (L) 04/30/2025 03:32 AM    HEMATOCRIT 33.4 (L) 04/30/2025 03:32 AM    MCV 97.4 04/30/2025 03:32 AM    MCH 32.1 04/30/2025 03:32 AM    MCHC 32.9 04/30/2025 03:32 AM    MPV 9.9 04/30/2025 03:32 AM    NEUTSPOLYS 78.90 (H) 04/28/2025 10:31 AM    LYMPHOCYTES 8.60 (L) 04/28/2025 10:31 AM    MONOCYTES 7.40 04/28/2025 10:31 AM    EOSINOPHILS 4.10  04/28/2025 10:31 AM    EOSINOPHILS 1 11/28/2023 02:30 PM    BASOPHILS 0.40 04/28/2025 10:31 AM        () Today's E/M visit is associated with medical care services that serve as the continuing focal point for all needed health care services and/or with medical care services that  are part of ongoing care related to a patient's single, serious condition, or a complex condition: This includes  furnishing services to patients on an ongoing basis that result in care that is personalized  to the patient. The services result in a comprehensive, longitudinal, and continuous  relationship with the patient and involve delivery of team-based care that is accessible, coordinated with other practitioners and providers, and integrated with the broader health  care landscape.

## 2025-05-02 ENCOUNTER — TELEPHONE (OUTPATIENT)
Dept: HEALTH INFORMATION MANAGEMENT | Facility: OTHER | Age: 60
End: 2025-05-02
Payer: COMMERCIAL

## 2025-05-08 PROBLEM — K40.90 LEFT INGUINAL HERNIA: Status: ACTIVE | Noted: 2025-05-08

## 2025-05-09 PROBLEM — G47.39 SLEEP APNEA-LIKE BEHAVIOR: Status: ACTIVE | Noted: 2025-05-09

## 2025-05-12 ENCOUNTER — TELEPHONE (OUTPATIENT)
Dept: CARDIOLOGY | Facility: MEDICAL CENTER | Age: 60
End: 2025-05-12
Payer: COMMERCIAL

## 2025-05-12 ENCOUNTER — TELEPHONE (OUTPATIENT)
Facility: MEDICAL CENTER | Age: 60
End: 2025-05-12
Payer: COMMERCIAL

## 2025-05-12 NOTE — TELEPHONE ENCOUNTER
Returned patient phone call. Informed him that RTI had received medical records from [facility], including documentation of his hernia repair in April. Informed patient that next steps include cardiology clearance from Dr. Sharma/Dr. Bloch. Patient aware Dr. Sharma ordered a repeat echocardiogram. Verbalized understanding of plan. Direct contact information provided, all patient questions answered at this time.     Tracie German R.N.   Transplant Coordinator  [facility] [place]

## 2025-05-12 NOTE — TELEPHONE ENCOUNTER
BE    Caller: Gurdeep Guerra    Topic/issue: MEDICAL ADVICE    Gurdeep is requesting that we mail out any and all pending labs and imaging orders. Please advise.    Thanks,  Star    Callback Number: [phone] (home)

## 2025-05-13 NOTE — TELEPHONE ENCOUNTER
Phone Number Called: [phone]     Call outcome: Left detailed message for patient. Informed to call back with any additional questions.    Message: Called to inform patient BE's orders for echo, CMP, and lipid panel printed and mailed to pt's address on file. Left call back number for questions.

## 2025-05-13 NOTE — TELEPHONE ENCOUNTER
BE's orders for echo, CMP, and lipid panel printed and mailed to pt's address on file. Letter with instructions included.

## 2025-05-21 ENCOUNTER — ANTICOAGULATION VISIT (OUTPATIENT)
Dept: CARDIOLOGY | Facility: PHYSICIAN GROUP | Age: 60
End: 2025-05-21
Payer: COMMERCIAL

## 2025-05-21 DIAGNOSIS — I48.91 ATRIAL FIBRILLATION, UNSPECIFIED TYPE (HCC): ICD-10-CM

## 2025-05-21 DIAGNOSIS — Z95.2 HISTORY OF MECHANICAL AORTIC VALVE REPLACEMENT: Primary | ICD-10-CM

## 2025-05-21 LAB — INR PPP: 2.2 (ref 2–3.5)

## 2025-05-21 NOTE — PROGRESS NOTES
Anticoagulation Summary  As of 2025      INR goal:  1.5-2.0   TTR:  91.5% (9.2 mo)   INR used for dosin.20 (2025)   Warfarin maintenance plan:  5 mg (2.5 mg x 2) every day   Weekly warfarin total:  35 mg   Plan last modified:  Larry Buenrostro, PharmD (2024)   Next INR check:  2025   Target end date:  Indefinite    Indications    History of mechanical aortic valve replacement [Z95.2]  A-fib (HCC) [I48.91]                 Anticoagulation Episode Summary       INR check location:  --    Preferred lab:  --    Send INR reminders to:  --    Comments:  --          Anticoagulation Patient Findings  Patient Findings       Positives:  Change in medications, Hospital admission    Negatives:  Signs/symptoms of thrombosis, Signs/symptoms of bleeding, Laboratory test error suspected, Change in health, Change in alcohol use, Change in activity, Upcoming invasive procedure, Emergency department visit, Upcoming dental procedure, Missed doses, Extra doses, Change in diet/appetite, Bruising, Other complaints                  HPI:   Gurdeep Guerra seen in clinic today, on anticoagulation therapy with warfarin due to history of mechanical AVR (On-X), and possible AF.    Patient's previous INR was therapeutic at 2.06 on 25, at which time patient was instructed to continue with current warfarin regimen.  He returns to clinic today to recheck INR to ensure it is therapeutic and thus preventing possible clotting and/or bleeding/bruising complications.      Does patient have any changes to current medical/health status since last appt (Y/N):  hospitalized recently d/t severe HA and fatigue.  Does patient have any signs/symptoms of bleeding and/or thrombosis since the last appt (Y/N):  NO  Does patient have any interval changes to diet or medications since last appt (Y/N):  Five day course of prednisone, started three days ago.  Are there any complications or cost restrictions with current therapy (Y/N):  NO      Does patient have [facility] PCP? Yes, Drew T. Blumberg, D.O. (If not, please document discussion that patient must be seen at [facility])       Vitals:      There were no vitals filed for this visit.     Asssessment:      INR is supratherapeutic  Reason(s) for out of range INR today: lack of consistent f/u, has not been seen since 12/2024    Patient taking ASA 81mg d/t hx of AVR.    Medication reconciliation completed today.    Plan:  Pt is to decrease dose to 2.5mg X 1, then continue with current warfarin dosing regimen.    Still some confusion as to appropriate INR range, will discuss with cardiologist if keep at 1.5-2.0 or update to 2.0-3.0 d/t post-operative AF following AVR in 2023.    Patient also interested in home monitor.  Submitted electronic application through [employer] today.     Follow up:  Because warfarin is a high risk medication and current CHEST guidelines recommend regular monitoring intervals (few days up to 12 weeks), will have patient return to clinic in 4 weeks to recheck INR.    Kota Hayden, PharmD, BCACP

## 2025-05-21 NOTE — Clinical Note
Hi Dr Sharma, I met with this mutual patient today for anticoagulation follow up.   We have his INR goal range set at 1.5-2.0 having been 3 months post-OnX valve replacement, but it look like late last year there was some discussion of changing to 2.0-3.0 d/t post-op A-fib.   I see that he had zio done last year, based on those results do you feel we should change INR to 2.0-3.0 or keep at 1.5-2.0. Thanks ahead of time for your help! Kota Hayden, PharmD, BCACP

## 2025-06-02 DIAGNOSIS — I49.9 CARDIAC ARRHYTHMIA, UNSPECIFIED CARDIAC ARRHYTHMIA TYPE: ICD-10-CM

## 2025-06-02 DIAGNOSIS — I15.0 RENOVASCULAR HYPERTENSION: Primary | ICD-10-CM

## 2025-06-03 RX ORDER — CARVEDILOL 3.12 MG/1
3.12 TABLET ORAL 2 TIMES DAILY WITH MEALS
Qty: 180 TABLET | Refills: 3 | Status: ON HOLD | OUTPATIENT
Start: 2025-06-03 | End: 2025-06-13

## 2025-06-03 NOTE — TELEPHONE ENCOUNTER
Is the patient due for a refill? Yes    Was the patient seen the last 15 months? Yes    Date of last office visit: 3/14/24    Does the patient have an upcoming appointment?  No   If yes, When?     Provider to refill:BE    Does the patient have custodial Plus and need 100-day supply? (This applies to ALL medications) Patient does not have SCP

## 2025-06-04 PROBLEM — R25.1 TREMOR: Status: ACTIVE | Noted: 2025-06-04

## 2025-06-04 PROBLEM — Z02.9 ADMINISTRATIVE ENCOUNTER: Status: ACTIVE | Noted: 2025-06-04

## 2025-06-07 ENCOUNTER — APPOINTMENT (OUTPATIENT)
Dept: RADIOLOGY | Facility: MEDICAL CENTER | Age: 60
End: 2025-06-07
Payer: COMMERCIAL

## 2025-06-07 ENCOUNTER — HOSPITAL ENCOUNTER (INPATIENT)
Facility: MEDICAL CENTER | Age: 60
LOS: 7 days | DRG: 674 | End: 2025-06-14
Attending: HOSPITALIST | Admitting: STUDENT IN AN ORGANIZED HEALTH CARE EDUCATION/TRAINING PROGRAM
Payer: COMMERCIAL

## 2025-06-07 DIAGNOSIS — M1A.09X0 CHRONIC GOUT OF MULTIPLE SITES, UNSPECIFIED CAUSE: ICD-10-CM

## 2025-06-07 DIAGNOSIS — I35.0 NONRHEUMATIC AORTIC VALVE STENOSIS: ICD-10-CM

## 2025-06-07 DIAGNOSIS — R56.9 SEIZURES (HCC): Primary | ICD-10-CM

## 2025-06-07 PROBLEM — E78.5 HYPERLIPIDEMIA: Status: ACTIVE | Noted: 2020-06-26

## 2025-06-07 PROCEDURE — 770020 HCHG ROOM/CARE - TELE (206)

## 2025-06-07 PROCEDURE — 99222 1ST HOSP IP/OBS MODERATE 55: CPT | Performed by: STUDENT IN AN ORGANIZED HEALTH CARE EDUCATION/TRAINING PROGRAM

## 2025-06-07 PROCEDURE — 700102 HCHG RX REV CODE 250 W/ 637 OVERRIDE(OP): Performed by: STUDENT IN AN ORGANIZED HEALTH CARE EDUCATION/TRAINING PROGRAM

## 2025-06-07 PROCEDURE — 93005 ELECTROCARDIOGRAM TRACING: CPT | Mod: TC | Performed by: STUDENT IN AN ORGANIZED HEALTH CARE EDUCATION/TRAINING PROGRAM

## 2025-06-07 PROCEDURE — A9270 NON-COVERED ITEM OR SERVICE: HCPCS | Performed by: STUDENT IN AN ORGANIZED HEALTH CARE EDUCATION/TRAINING PROGRAM

## 2025-06-07 PROCEDURE — 3E1M39Z IRRIGATION OF PERITONEAL CAVITY USING DIALYSATE, PERCUTANEOUS APPROACH: ICD-10-PCS | Performed by: INTERNAL MEDICINE

## 2025-06-07 RX ORDER — MAGNESIUM OXIDE 400 MG/1
200 TABLET ORAL DAILY
COMMUNITY

## 2025-06-07 RX ORDER — CARVEDILOL 3.12 MG/1
3.12 TABLET ORAL 2 TIMES DAILY WITH MEALS
Status: DISCONTINUED | OUTPATIENT
Start: 2025-06-08 | End: 2025-06-11

## 2025-06-07 RX ORDER — ACETAMINOPHEN 325 MG/1
650 TABLET ORAL EVERY 6 HOURS PRN
Status: DISCONTINUED | OUTPATIENT
Start: 2025-06-07 | End: 2025-06-14 | Stop reason: HOSPADM

## 2025-06-07 RX ORDER — ASPIRIN 81 MG/1
81 TABLET ORAL DAILY
Status: DISCONTINUED | OUTPATIENT
Start: 2025-06-08 | End: 2025-06-14 | Stop reason: HOSPADM

## 2025-06-07 RX ORDER — TRAZODONE HYDROCHLORIDE 100 MG/1
100 TABLET ORAL NIGHTLY
Status: DISCONTINUED | OUTPATIENT
Start: 2025-06-07 | End: 2025-06-11

## 2025-06-07 RX ORDER — PROCHLORPERAZINE EDISYLATE 5 MG/ML
5-10 INJECTION INTRAMUSCULAR; INTRAVENOUS EVERY 4 HOURS PRN
Status: DISCONTINUED | OUTPATIENT
Start: 2025-06-07 | End: 2025-06-14 | Stop reason: HOSPADM

## 2025-06-07 RX ORDER — LABETALOL HYDROCHLORIDE 5 MG/ML
10 INJECTION, SOLUTION INTRAVENOUS EVERY 4 HOURS PRN
Status: DISCONTINUED | OUTPATIENT
Start: 2025-06-07 | End: 2025-06-14 | Stop reason: HOSPADM

## 2025-06-07 RX ORDER — EZETIMIBE 10 MG/1
10 TABLET ORAL DAILY
Status: DISCONTINUED | OUTPATIENT
Start: 2025-06-08 | End: 2025-06-14 | Stop reason: HOSPADM

## 2025-06-07 RX ORDER — ONDANSETRON 2 MG/ML
4 INJECTION INTRAMUSCULAR; INTRAVENOUS EVERY 4 HOURS PRN
Status: DISCONTINUED | OUTPATIENT
Start: 2025-06-07 | End: 2025-06-07

## 2025-06-07 RX ORDER — ROSUVASTATIN CALCIUM 20 MG/1
20 TABLET, COATED ORAL EVERY EVENING
Status: DISCONTINUED | OUTPATIENT
Start: 2025-06-07 | End: 2025-06-11

## 2025-06-07 RX ORDER — DIAZEPAM 10 MG/2ML
5 INJECTION, SOLUTION INTRAMUSCULAR; INTRAVENOUS
Status: DISCONTINUED | OUTPATIENT
Start: 2025-06-07 | End: 2025-06-13

## 2025-06-07 RX ORDER — GENTAMICIN SULFATE 1 MG/G
1 CREAM TOPICAL DAILY
Status: DISCONTINUED | OUTPATIENT
Start: 2025-06-08 | End: 2025-06-14 | Stop reason: HOSPADM

## 2025-06-07 RX ORDER — PROMETHAZINE HYDROCHLORIDE 25 MG/1
12.5-25 TABLET ORAL EVERY 4 HOURS PRN
Status: DISCONTINUED | OUTPATIENT
Start: 2025-06-07 | End: 2025-06-14 | Stop reason: HOSPADM

## 2025-06-07 RX ORDER — LORAZEPAM 2 MG/ML
2 INJECTION INTRAMUSCULAR
Status: DISCONTINUED | OUTPATIENT
Start: 2025-06-07 | End: 2025-06-07

## 2025-06-07 RX ORDER — FERROUS SULFATE 325(65) MG
325 TABLET ORAL DAILY
COMMUNITY

## 2025-06-07 RX ORDER — HEPARIN SODIUM 5000 [USP'U]/ML
5000 INJECTION, SOLUTION INTRAVENOUS; SUBCUTANEOUS EVERY 8 HOURS
Status: DISCONTINUED | OUTPATIENT
Start: 2025-06-07 | End: 2025-06-07

## 2025-06-07 RX ORDER — GABAPENTIN 100 MG/1
100 CAPSULE ORAL 3 TIMES DAILY
Status: DISCONTINUED | OUTPATIENT
Start: 2025-06-08 | End: 2025-06-14 | Stop reason: HOSPADM

## 2025-06-07 RX ORDER — ONDANSETRON 4 MG/1
4 TABLET, ORALLY DISINTEGRATING ORAL EVERY 4 HOURS PRN
Status: DISCONTINUED | OUTPATIENT
Start: 2025-06-07 | End: 2025-06-07

## 2025-06-07 RX ORDER — SEVELAMER CARBONATE 800 MG/1
800 TABLET, FILM COATED ORAL
Status: DISCONTINUED | OUTPATIENT
Start: 2025-06-08 | End: 2025-06-14 | Stop reason: HOSPADM

## 2025-06-07 RX ORDER — PROMETHAZINE HYDROCHLORIDE 12.5 MG/1
12.5-25 SUPPOSITORY RECTAL EVERY 4 HOURS PRN
Status: DISCONTINUED | OUTPATIENT
Start: 2025-06-07 | End: 2025-06-14 | Stop reason: HOSPADM

## 2025-06-07 RX ADMIN — ROSUVASTATIN CALCIUM 20 MG: 20 TABLET, FILM COATED ORAL at 23:42

## 2025-06-07 RX ADMIN — TRAZODONE HYDROCHLORIDE 100 MG: 100 TABLET ORAL at 23:42

## 2025-06-07 SDOH — ECONOMIC STABILITY: TRANSPORTATION INSECURITY
IN THE PAST 12 MONTHS, HAS THE LACK OF TRANSPORTATION KEPT YOU FROM MEDICAL APPOINTMENTS OR FROM GETTING MEDICATIONS?: NO

## 2025-06-07 SDOH — ECONOMIC STABILITY: TRANSPORTATION INSECURITY
IN THE PAST 12 MONTHS, HAS LACK OF RELIABLE TRANSPORTATION KEPT YOU FROM MEDICAL APPOINTMENTS, MEETINGS, WORK OR FROM GETTING THINGS NEEDED FOR DAILY LIVING?: NO

## 2025-06-07 ASSESSMENT — SOCIAL DETERMINANTS OF HEALTH (SDOH)

## 2025-06-08 ENCOUNTER — APPOINTMENT (OUTPATIENT)
Dept: RADIOLOGY | Facility: MEDICAL CENTER | Age: 60
DRG: 674 | End: 2025-06-08
Attending: STUDENT IN AN ORGANIZED HEALTH CARE EDUCATION/TRAINING PROGRAM
Payer: COMMERCIAL

## 2025-06-08 PROBLEM — B02.9 SHINGLES: Status: RESOLVED | Noted: 2025-04-28 | Resolved: 2025-06-08

## 2025-06-08 LAB
ALBUMIN SERPL BCP-MCNC: 3 G/DL (ref 3.2–4.9)
ALBUMIN/GLOB SERPL: 1.3 G/DL
ALP SERPL-CCNC: 82 U/L (ref 30–99)
ALT SERPL-CCNC: 17 U/L (ref 2–50)
ANION GAP SERPL CALC-SCNC: 20 MMOL/L (ref 7–16)
APTT PPP: 34.7 SEC (ref 24.7–36)
AST SERPL-CCNC: 11 U/L (ref 12–45)
BASOPHILS # BLD AUTO: 0.6 % (ref 0–1.8)
BASOPHILS # BLD: 0.03 K/UL (ref 0–0.12)
BILIRUB SERPL-MCNC: 0.2 MG/DL (ref 0.1–1.5)
BUN SERPL-MCNC: 78 MG/DL (ref 8–22)
CALCIUM ALBUM COR SERPL-MCNC: 10 MG/DL (ref 8.5–10.5)
CALCIUM SERPL-MCNC: 9.2 MG/DL (ref 8.5–10.5)
CHLORIDE SERPL-SCNC: 97 MMOL/L (ref 96–112)
CO2 SERPL-SCNC: 21 MMOL/L (ref 20–33)
CREAT SERPL-MCNC: 12.7 MG/DL (ref 0.5–1.4)
EKG IMPRESSION: NORMAL
EKG IMPRESSION: NORMAL
EOSINOPHIL # BLD AUTO: 0.29 K/UL (ref 0–0.51)
EOSINOPHIL NFR BLD: 5.6 % (ref 0–6.9)
ERYTHROCYTE [DISTWIDTH] IN BLOOD BY AUTOMATED COUNT: 54.6 FL (ref 35.9–50)
GFR SERPLBLD CREATININE-BSD FMLA CKD-EPI: 4 ML/MIN/1.73 M 2
GLOBULIN SER CALC-MCNC: 2.4 G/DL (ref 1.9–3.5)
GLUCOSE SERPL-MCNC: 132 MG/DL (ref 65–99)
HCT VFR BLD AUTO: 30.5 % (ref 42–52)
HGB BLD-MCNC: 9.8 G/DL (ref 14–18)
IMM GRANULOCYTES # BLD AUTO: 0.02 K/UL (ref 0–0.11)
IMM GRANULOCYTES NFR BLD AUTO: 0.4 % (ref 0–0.9)
INR PPP: 2.58 (ref 0.87–1.13)
INR PPP: 2.86 (ref 0.87–1.13)
LYMPHOCYTES # BLD AUTO: 0.92 K/UL (ref 1–4.8)
LYMPHOCYTES NFR BLD: 17.8 % (ref 22–41)
MCH RBC QN AUTO: 32 PG (ref 27–33)
MCHC RBC AUTO-ENTMCNC: 32.1 G/DL (ref 32.3–36.5)
MCV RBC AUTO: 99.7 FL (ref 81.4–97.8)
MONOCYTES # BLD AUTO: 0.45 K/UL (ref 0–0.85)
MONOCYTES NFR BLD AUTO: 8.7 % (ref 0–13.4)
NEUTROPHILS # BLD AUTO: 3.47 K/UL (ref 1.82–7.42)
NEUTROPHILS NFR BLD: 66.9 % (ref 44–72)
NRBC # BLD AUTO: 0 K/UL
NRBC BLD-RTO: 0 /100 WBC (ref 0–0.2)
PLATELET # BLD AUTO: 200 K/UL (ref 164–446)
PMV BLD AUTO: 9.2 FL (ref 9–12.9)
POTASSIUM SERPL-SCNC: 4.4 MMOL/L (ref 3.6–5.5)
PROT SERPL-MCNC: 5.4 G/DL (ref 6–8.2)
PROTHROMBIN TIME: 27.8 SEC (ref 12–14.6)
PROTHROMBIN TIME: 30.2 SEC (ref 12–14.6)
RBC # BLD AUTO: 3.06 M/UL (ref 4.7–6.1)
SODIUM SERPL-SCNC: 138 MMOL/L (ref 135–145)
UFH PPP CHRO-ACNC: <0.1 IU/ML
WBC # BLD AUTO: 5.2 K/UL (ref 4.8–10.8)

## 2025-06-08 PROCEDURE — 99233 SBSQ HOSP IP/OBS HIGH 50: CPT | Performed by: STUDENT IN AN ORGANIZED HEALTH CARE EDUCATION/TRAINING PROGRAM

## 2025-06-08 PROCEDURE — 85025 COMPLETE CBC W/AUTO DIFF WBC: CPT

## 2025-06-08 PROCEDURE — 95819 EEG AWAKE AND ASLEEP: CPT | Mod: 26 | Performed by: PSYCHIATRY & NEUROLOGY

## 2025-06-08 PROCEDURE — 95819 EEG AWAKE AND ASLEEP: CPT | Performed by: PSYCHIATRY & NEUROLOGY

## 2025-06-08 PROCEDURE — A9270 NON-COVERED ITEM OR SERVICE: HCPCS

## 2025-06-08 PROCEDURE — 700102 HCHG RX REV CODE 250 W/ 637 OVERRIDE(OP): Performed by: INTERNAL MEDICINE

## 2025-06-08 PROCEDURE — 700111 HCHG RX REV CODE 636 W/ 250 OVERRIDE (IP): Performed by: STUDENT IN AN ORGANIZED HEALTH CARE EDUCATION/TRAINING PROGRAM

## 2025-06-08 PROCEDURE — 700117 HCHG RX CONTRAST REV CODE 255: Mod: JZ | Performed by: STUDENT IN AN ORGANIZED HEALTH CARE EDUCATION/TRAINING PROGRAM

## 2025-06-08 PROCEDURE — 85610 PROTHROMBIN TIME: CPT

## 2025-06-08 PROCEDURE — 700111 HCHG RX REV CODE 636 W/ 250 OVERRIDE (IP)

## 2025-06-08 PROCEDURE — 70553 MRI BRAIN STEM W/O & W/DYE: CPT

## 2025-06-08 PROCEDURE — 90945 DIALYSIS ONE EVALUATION: CPT

## 2025-06-08 PROCEDURE — A9270 NON-COVERED ITEM OR SERVICE: HCPCS | Performed by: STUDENT IN AN ORGANIZED HEALTH CARE EDUCATION/TRAINING PROGRAM

## 2025-06-08 PROCEDURE — 36415 COLL VENOUS BLD VENIPUNCTURE: CPT

## 2025-06-08 PROCEDURE — 4A10X4Z MONITORING OF CENTRAL NERVOUS ELECTRICAL ACTIVITY, EXTERNAL APPROACH: ICD-10-PCS | Performed by: PSYCHIATRY & NEUROLOGY

## 2025-06-08 PROCEDURE — A9579 GAD-BASE MR CONTRAST NOS,1ML: HCPCS | Mod: JZ | Performed by: STUDENT IN AN ORGANIZED HEALTH CARE EDUCATION/TRAINING PROGRAM

## 2025-06-08 PROCEDURE — 700102 HCHG RX REV CODE 250 W/ 637 OVERRIDE(OP)

## 2025-06-08 PROCEDURE — 700102 HCHG RX REV CODE 250 W/ 637 OVERRIDE(OP): Performed by: STUDENT IN AN ORGANIZED HEALTH CARE EDUCATION/TRAINING PROGRAM

## 2025-06-08 PROCEDURE — 80053 COMPREHEN METABOLIC PANEL: CPT

## 2025-06-08 PROCEDURE — 93010 ELECTROCARDIOGRAM REPORT: CPT | Performed by: INTERNAL MEDICINE

## 2025-06-08 PROCEDURE — 85520 HEPARIN ASSAY: CPT

## 2025-06-08 PROCEDURE — 85730 THROMBOPLASTIN TIME PARTIAL: CPT

## 2025-06-08 PROCEDURE — A9270 NON-COVERED ITEM OR SERVICE: HCPCS | Performed by: INTERNAL MEDICINE

## 2025-06-08 PROCEDURE — 770020 HCHG ROOM/CARE - TELE (206)

## 2025-06-08 RX ORDER — GADOTERIDOL 279.3 MG/ML
20 INJECTION INTRAVENOUS ONCE
Status: COMPLETED | OUTPATIENT
Start: 2025-06-08 | End: 2025-06-08

## 2025-06-08 RX ORDER — HEPARIN SODIUM 5000 [USP'U]/100ML
0-30 INJECTION, SOLUTION INTRAVENOUS CONTINUOUS
Status: DISCONTINUED | OUTPATIENT
Start: 2025-06-08 | End: 2025-06-08

## 2025-06-08 RX ORDER — DIAZEPAM 10 MG/2ML
2.5 INJECTION, SOLUTION INTRAMUSCULAR; INTRAVENOUS ONCE
Status: COMPLETED | OUTPATIENT
Start: 2025-06-08 | End: 2025-06-08

## 2025-06-08 RX ORDER — HEPARIN SODIUM 1000 [USP'U]/ML
40 INJECTION, SOLUTION INTRAVENOUS; SUBCUTANEOUS PRN
Status: DISCONTINUED | OUTPATIENT
Start: 2025-06-08 | End: 2025-06-08

## 2025-06-08 RX ORDER — HEPARIN SODIUM 1000 [USP'U]/ML
9000 INJECTION, SOLUTION INTRAVENOUS; SUBCUTANEOUS
Status: DISCONTINUED | OUTPATIENT
Start: 2025-06-08 | End: 2025-06-12

## 2025-06-08 RX ORDER — CALCIUM CARBONATE 500 MG/1
500 TABLET, CHEWABLE ORAL ONCE
Status: ACTIVE | OUTPATIENT
Start: 2025-06-08 | End: 2025-06-09

## 2025-06-08 RX ORDER — HEPARIN SODIUM 1000 [USP'U]/ML
INJECTION, SOLUTION INTRAVENOUS; SUBCUTANEOUS
Status: COMPLETED
Start: 2025-06-08 | End: 2025-06-08

## 2025-06-08 RX ORDER — OMEPRAZOLE 20 MG/1
20 CAPSULE, DELAYED RELEASE ORAL DAILY
Status: DISCONTINUED | OUTPATIENT
Start: 2025-06-08 | End: 2025-06-14 | Stop reason: HOSPADM

## 2025-06-08 RX ADMIN — HEPARIN SODIUM 9000 UNITS: 1000 INJECTION, SOLUTION INTRAVENOUS; SUBCUTANEOUS at 18:30

## 2025-06-08 RX ADMIN — ASPIRIN 81 MG: 81 TABLET, COATED ORAL at 05:03

## 2025-06-08 RX ADMIN — TRAZODONE HYDROCHLORIDE 100 MG: 100 TABLET ORAL at 21:00

## 2025-06-08 RX ADMIN — GABAPENTIN 100 MG: 100 CAPSULE ORAL at 16:51

## 2025-06-08 RX ADMIN — OMEPRAZOLE 20 MG: 20 CAPSULE, DELAYED RELEASE ORAL at 23:17

## 2025-06-08 RX ADMIN — GENTAMICIN SULFATE 1 APPLICATION: 1 CREAM TOPICAL at 18:45

## 2025-06-08 RX ADMIN — EZETIMIBE 10 MG: 10 TABLET ORAL at 05:03

## 2025-06-08 RX ADMIN — GABAPENTIN 100 MG: 100 CAPSULE ORAL at 14:33

## 2025-06-08 RX ADMIN — CARVEDILOL 3.12 MG: 3.12 TABLET, FILM COATED ORAL at 16:51

## 2025-06-08 RX ADMIN — SEVELAMER CARBONATE 800 MG: 800 TABLET, FILM COATED ORAL at 08:44

## 2025-06-08 RX ADMIN — GABAPENTIN 100 MG: 100 CAPSULE ORAL at 05:03

## 2025-06-08 RX ADMIN — GENTAMICIN SULFATE 1 APPLICATION: 1 CREAM TOPICAL at 06:00

## 2025-06-08 RX ADMIN — CARVEDILOL 3.12 MG: 3.12 TABLET, FILM COATED ORAL at 08:44

## 2025-06-08 RX ADMIN — SEVELAMER CARBONATE 800 MG: 800 TABLET, FILM COATED ORAL at 14:32

## 2025-06-08 RX ADMIN — ROSUVASTATIN CALCIUM 20 MG: 20 TABLET, FILM COATED ORAL at 16:51

## 2025-06-08 RX ADMIN — DIAZEPAM 2.5 MG: 10 INJECTION, SOLUTION INTRAMUSCULAR; INTRAVENOUS at 17:11

## 2025-06-08 RX ADMIN — SEVELAMER CARBONATE 800 MG: 800 TABLET, FILM COATED ORAL at 16:51

## 2025-06-08 RX ADMIN — GADOTERIDOL 20 ML: 279.3 INJECTION, SOLUTION INTRAVENOUS at 17:25

## 2025-06-08 ASSESSMENT — ENCOUNTER SYMPTOMS
SORE THROAT: 0
LOSS OF CONSCIOUSNESS: 1
MYALGIAS: 0
SENSORY CHANGE: 0
SEIZURES: 1
NAUSEA: 0
ABDOMINAL PAIN: 0
DIZZINESS: 0
BLURRED VISION: 0
CHILLS: 0
SPEECH CHANGE: 0
HEADACHES: 1
SHORTNESS OF BREATH: 0
FOCAL WEAKNESS: 0
FEVER: 0
VOMITING: 0

## 2025-06-08 ASSESSMENT — FIBROSIS 4 INDEX: FIB4 SCORE: 0.79

## 2025-06-08 ASSESSMENT — PAIN DESCRIPTION - PAIN TYPE: TYPE: ACUTE PAIN

## 2025-06-08 NOTE — PROGRESS NOTES
"4 Eyes Skin Assessment Completed by Doris Pride RN and Alba Li RN.    Skin assessment is primarily focused on high risk bony prominences. Pay special attention to skin beneath and around medical devices, high risk bony prominences, skin to skin areas and areas where the patient lacks sensation to feel pain and areas where the patient previously had breakdown.     Head (Occipital):  WDL   Ears (Under Medical Devices): WDL   Nose (Under Medical Devices): WDL   Mouth:  WDL   Neck: Bruising   Breast/Chest:  Scar   Shoulder Blades:  WDL   Spine:   WDL   (R) Arm/Elbow/Hand: WDL   (L) Arm/Elbow/Hand: WDL   Abdomen: Scar from hernia repair-scabbed, dry, intact; R PD cath; laparoscopic scars   Pannus/Groin:  WDL   Sacrum/Coccyx:   WDL   (R) Ischial Tuberosity (Sit Bones):  WDL   (L) Ischial Tuberosity (Sit Bones):  WDL   (R) Leg:  WDL   (L) Leg:  WDL   (R) Heel:  WDL   (R) Foot/Toe: WDL   (L) Heel: WDL   (L) Foot/Toe:  Scabbed 1st two toes \"I clipped my nails too close\"       DEVICES IN USE:   Respiratory Devices:  NA, patient on room air  Feeding Devices:  N/A   Lines & BP Monitoring Devices:  Peripheral IV and PD catheter    Orthopedic Devices:  N/A  Miscellaneous Devices:  N/A    PROTOCOL INTERVENTIONS:   Standard/Trauma Bed:  Already in place    WOUND PHOTOS:   N/A no wounds identified    WOUND CONSULT:   N/A, no advanced wound care needs identified    "

## 2025-06-08 NOTE — H&P
Hospital Medicine History & Physical Note    Date of Service  6/7/2025    Primary Care Physician  Drew T. Blumberg, D.O.    Consultants  Nephrology    Code Status  Full Code    Chief Complaint  seizures    History of Presenting Illness  Gurdeep Guerra is a 60 y.o. male who presented 6/7/2025 with witnessed seizure episodes.  Patient had several episodes of seizures at home, he does not remember any of the events.  Significant other witnessed patient to have loss of consciousness, clenching of his jaws and noted to have tremors of his extremities.  Episodes would eventually resolve spontaneously.  He was taken to outside facility ER for further evaluation.    At outside facility ER,  White blood cell count 5.7 hemoglobin 11.1 platelet count 226  Sodium 138 potassium 5.5 chloride 100 bicarbonate 23 glucose 109 BUN 80 creatinine 13.5  INR 3.4 BNP 5524  Lactic acid 1.0  CT chest showing previous sternotomy, mildly to moderately enlarged heart with previous aortic valvuloplasty, severe coronary calcification throughout all coronary vessels without evidence of pericardial effusion  CT abdomen pelvis showing mildly atrophied kidneys, 6 mm calcification in the central right kidney and probable vascular calcification in the lower left renal hilum.  No hydronephrosis or ureterolithiasis is seen.    Patient was given 1 dose of Veltassa.    Patient was transferred to [facility] as patient receives peritoneal dialysis every night for 10 hours.    I discussed the plan of care with patient.    Review of Systems  ROS    Past Medical History   has a past medical history of Anesthesia, Aortic stenosis (11/2023), Arthritis, Chronic cough, Dialysis patient (HCC), Dyspnea on exertion (05/23/2023), Dyspnea on exertion (05/23/2023), Elevated troponin (05/23/2023), Fatty liver, Heart burn, Heart murmur, High cholesterol, Hypertension (2009), Iron deficiency, Liver cirrhosis (HCC) (12/21/2023), NSTEMI (non-ST elevated myocardial  infarction) (Formerly [facility]) (05/23/2023), Pain, Pain in the chest (05/23/2023), Pain in the chest (05/23/2023), Paroxysmal A-fib (HCC) (11/11/2023), Peritoneal dialysis status ([facility]) (04/30/2024), PONV (postoperative nausea and vomiting), Renal disorder (2007), Sleep apnea (2000), Snoring (1990), and Splenomegaly (12/21/2023).    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     Family History  family history includes Heart Disease in his mother; Hyperlipidemia in his mother; Hypertension in his father.   Family history reviewed with patient. There is no family history that is pertinent to the chief complaint.     Social History   reports that he has never smoked. He has never used smokeless tobacco. He reports that he does not drink alcohol and does not use drugs.    Allergies  Allergies[1]    Medications  Prior to Admission Medications   Prescriptions Last Dose Informant Patient Reported? Taking?   acetaminophen (TYLENOL 8 HOUR ARTHRITIS PAIN) 650 MG CR tablet 6/7/2025 at  2:00 PM Patient, Significant Other Yes Yes   Sig: Take 650 mg by mouth every 6 hours as needed for Mild Pain.   acidophilus lactobacillus Cap 6/7/2025 at 10:00 AM Patient Yes Yes   Sig: Take 1 Capsule by mouth every day.   aspirin 81 MG EC tablet  6/7/2025 at 10:00 AM Patient Yes Yes   Sig: Take 81 mg by mouth every day.   calcitRIOL (ROCALTROL) 0.25 MCG Cap 5/17/2025 Patient No No   Sig: Take 1 Capsule by mouth every day.   carvedilol (COREG) 3.125 MG Tab 6/7/2025 at 10:00 AM  No Yes   Sig: TAKE 1 TABLET BY MOUTH TWICE DAILY WITH MEALS   esomeprazole (NEXIUM) 20 MG capsule 6/7/2025 at 10:00 AM Patient No Yes   Sig: Take 2 Capsules by mouth every morning before breakfast.   ezetimibe (ZETIA) 10 MG Tab 6/6/2025 at  9:00 PM  No Yes   Sig: Take 1 Tablet by mouth every day.   ferrous sulfate 325 (65 Fe) MG tablet 6/7/2025 at 10:00 AM  Yes Yes   Sig: Take 325 mg by mouth every day.   gabapentin (NEURONTIN) 100 MG Cap 6/6/2025 at  9:00 PM Patient No Yes   Sig: TAKE 1 CAPSULE BY MOUTH THREE TIMES DAILY   magnesium oxide (MAG-OX) 400 MG Tab tablet 6/7/2025 at 10:00 AM  Yes Yes   Sig: Take 200 mg by mouth every day.   rosuvastatin (CRESTOR) 20 MG Tab 6/6/2025 at  9:00 PM Patient No Yes   Sig: Take 1 Tablet by mouth every evening.   sevelamer carbonate (RENVELA) 800 MG Tab tablet 6/7/2025 at 11:00 AM Patient Yes Yes   Sig: Take 800 mg by mouth 3 times a day as needed.   traZODone (DESYREL) 50 MG Tab 6/6/2025 at  9:00 PM  No Yes   Sig: Take 2 Tablets by mouth every evening.   warfarin (COUMADIN) 2.5 MG Tab 6/6/2025 at  9:00 PM Patient No Yes   Sig: Take 1-2 Tablets by mouth every day. Take as directed by anticoag clinic.      Facility-Administered Medications: None       Physical Exam  Temp:  [36.6 °C (97.9 °F)] 36.6 °C (97.9 °F)  Pulse:  [45-72] 45  Resp:  [16-35] 22  BP: ()/(44-75) 114/75  SpO2:  [92 %-99 %] 92 %                          Physical Exam  Constitutional:       Appearance: Normal appearance. He is normal weight.   HENT:      Head: Normocephalic.      Nose: Nose normal.      Mouth/Throat:      Mouth: Mucous membranes are moist.   Eyes:      Pupils: Pupils are equal, round, and reactive to light.   Cardiovascular:      Rate and Rhythm: Normal rate and  "regular rhythm.      Pulses: Normal pulses.   Pulmonary:      Effort: Pulmonary effort is normal.      Breath sounds: Normal breath sounds.   Abdominal:      General: Abdomen is flat. Bowel sounds are normal.      Palpations: Abdomen is soft.      Comments: Peritoneal dialysis in place   Musculoskeletal:         General: Normal range of motion.      Cervical back: Neck supple.   Skin:     General: Skin is warm.   Neurological:      General: No focal deficit present.      Mental Status: He is alert and oriented to person, place, and time. Mental status is at baseline.   Psychiatric:         Mood and Affect: Mood normal.         Behavior: Behavior normal.         Thought Content: Thought content normal.         Judgment: Judgment normal.         Laboratory:  Recent Labs     06/07/25  1730   WBC 5.7   RBC 3.37*   HEMOGLOBIN 11.1*   HEMATOCRIT 33.6*   MCV 99.7*   MCH 32.9*   MCHC 33.0   RDW 15.3*   PLATELETCT 226   MPV 9.1     Recent Labs     06/07/25  1730   SODIUM 138   POTASSIUM 5.5*   CHLORIDE 100   CO2 23   GLUCOSE 109*   BUN 80*   CREATININE 13.5*   CALCIUM 9.1     Recent Labs     06/07/25  1730   ALTSGPT 27   ASTSGOT 16   ALKPHOSPHAT 94   TBILIRUBIN 0.5   GLUCOSE 109*     Recent Labs     06/07/25  1818   INR 3.40     Recent Labs     06/07/25  1730   NTPROBNP 5524*         No results for input(s): \"TROPONINT\" in the last 72 hours.    Imaging:  No orders to display       EKG:  I have personally reviewed the images and compared with prior images.    Assessment/Plan:  Justification for Admission Status  I anticipate this patient will require at least two midnights for appropriate medical management, necessitating inpatient admission because patient has suspected new onset seizures    Patient will need a Telemetry bed on MEDICAL service .  The need is secondary to new onset seizures.    * New onset seizure (HCC)  Assessment & Plan  Noted to have 3 seizure episodes at home, his significant other reports loss of " consciousness with shaking of his extremities, patient denies history of seizures  MRI brain with and without contrast, CT head at outside facility negative  EEG  Ativan as needed    Hyperkalemia- (present on admission)  Assessment & Plan  Potassium 5.5 without any evidence of EKG changes  Nephrology consulted, peritoneal dialysis tomorrow  Recheck labs in the morning    A-fib (HCC)- (present on admission)  Assessment & Plan  Currently in sinus  On coumadin    History of mechanical aortic valve replacement- (present on admission)  Assessment & Plan  History of mechanical aortic valve replacement, INR 3.4 at outside hospital  Continue Coumadin for now    Hyperlipidemia  Assessment & Plan  Crestor         VTE prophylaxis: Coumadin       [1]   Allergies  Allergen Reactions    Allopurinol Itching    Hydralazine Hcl Unspecified     Pt states he had a reaction similar to lupus

## 2025-06-08 NOTE — PROGRESS NOTES
Inpatient Anticoagulation Service Note for 6/8/2025      Reason for Anticoagulation: On-X Aortic Valve Replacement        Hemoglobin Value: (!) 9.8  Hematocrit Value: (!) 30.5  Lab Platelet Value: 200  Target INR: 2.0 to 3.0 (Per Dr Bloch note 12/13/24)     Date/Time INR Value    06/08/25 1207 2.58     06/08/25 0617 2.86       Date/Time Dose (mg)    06/08/25 1256 0      Home Dose: 5 mg daily   Average Dose (mg): 0  Significant Interactions: Not Applicable  Bridge Therapy: No (If less than 5 days and overlap therapy discontinued -- document reason (i.e. Bleed Risk))    (If still on overlap therapy, if No -- document reason (i.e. Bleed Risk))    Comments: Possible surgery 6/9 holding warfarin, patient being placed on heparin drip until after surgery     Plan:  Hold, Procedure 6/9  Education Material Provided?: No (Prior Warfarin patient)    Pharmacist suggested discharge dosing: Continue with home dosing of 5 mg daily      Robin Walsh, EstefanyD

## 2025-06-08 NOTE — HOSPITAL COURSE
This is a 54-year-old female with a past medical history significant for ESRD on PD, mechanical MVR (on Coumadin )with aortic aneurysm dissection ended on 6/7/2025 with seizure-like episode.  Apparently patient had several episode of seizure-like activity at home stated that he does not remember what happened    Patient girlfriend has noted this episode and noted to have loss of consciousness clenching of his jaws, he was then taken to outlying facility was brought here for higher level of care    EEG did not show any seizure, MRI of the brain did not show any evidence of mesial temporal sclerosis.  considering his seizure-like activity, I discussed with neurology who recommended continuing Keppra.  Currently patient is on Keppra 500 mg p.o. Dr Peñaloza, daily after dialysis .    workup CT chest showing previous aortic valvuloplasty severe CAD; CT abdomen pelvis showing atrophic kidney, 6mm vascular calcification in the left lower renal hilum.    During the stay in the hospital, nephrology was consulted, patient was started on hemodialysis TTS vascular was consulted, patient underwent creation of left brachiocephalic AV fistula.  Currently his INR is therapeutic.  Patient will follow-up with Coumadin clinic as an outpatient.  Considering patient blood pressure on the lower side, patient will be discharged home on midodrine, he will follow-up with nephrology as an outpatient.     His cortisol is noted to be l patient was started yesterday but he declined to take this at home; he was  patient is recommended to follow-up as an outpatient with PCP.  interval events:  -- Patient is alert, awake, answering questions appropriately, vital sign has been reviewed  --Patient stated that he has shaking recanting of discharge and amnestic episode.  I discussed with the neurologist Dr.Luigi Howell; after discussing with him, will start Keppra 250 twice daily ; will dose according to renal function  -- vascular following,  will follow their  recommendation, continue heparin drip along with Coumadin, patient INR needs to be 2-3  --Nephrology following, plan is to start dialysis TTS  -- he will be going for dialysis today

## 2025-06-08 NOTE — CARE PLAN
The patient is Watcher - Medium risk of patient condition declining or worsening    Shift Goals  Clinical Goals: seizure precautions, monitor for syncopal episodes, safety  Patient Goals: comfort, rest, updates    Progress made toward(s) clinical / shift goals:    Problem: Knowledge Deficit - Standard  Goal: Patient and family/care givers will demonstrate understanding of plan of care, disease process/condition, diagnostic tests and medications  Outcome: Progressing     Problem: Seizure Precautions  Goal: Implementation of seizure precautions  Outcome: Progressing     Problem: Hemodynamics  Goal: Patient's hemodynamics, fluid balance and neurologic status will be stable or improve  Outcome: Progressing     Problem: Infection - Standard  Goal: Patient will remain free from infection  Outcome: Progressing       Patient is not progressing towards the following goals:      Problem: Fluid Volume  Goal: Fluid volume balance will be maintained  Outcome: Not Progressing

## 2025-06-08 NOTE — ASSESSMENT & PLAN NOTE
Noted to have 3 seizure episodes at home, his significant other reports loss of consciousness with shaking of his extremities, patient denies history of seizures  EEG was unremarkable his MRI brain was also unremarkable   I discussed the case with neurology, considering patient concerning feature, please have the patient on low-dose of Keppra 250 twice daily,  Continue seizure, aspiration, fall precaution, will provide follow-up with neurology as an outpatient

## 2025-06-08 NOTE — CONSULTS
[facility] -  CONSULTATION NOTE               Author: Justice Mac M.D. Date & Time: 6/8/2025  7:47 AM       REASON FOR CONSULTATION:   Seizures in a dialysis patient with azotemia    CHIEF COMPLAINT:   Fatigue    HISTORY OF PRESENT ILLNESS:    60-year-old male with ESRD on peritoneal dialysis who presented after new onset seizure episodes.  Recent hospitalization at [facility] with concern for failing peritoneal dialysis.  He was transiently transition to hemodialysis and felt improved on HD but  wanted to try one more attempt at PD with increased outpatient Rx.  His outpatient prescription was increased to 6 x 2 L fills over 10 hours.  He has continued to feel suboptimal and plan was set in place to transition to hemodialysis after outpatient permacath placement, which has not happened yet. He then had several episodes of seizures at home on day of admission.  Eventually transferred from outside facility for further management.  Upon arrival to [facility] found to have a potassium of 5.5. Underwent PD last night. Potassium resolved. He has some confusion today and feels overall off. No chest pain or SoB. No abd pain. Pending EEG      REVIEW OF SYSTEMS:    12 point review of systems performed, negative other than stated above    PAST MEDICAL HISTORY:   Past Medical History[1]    PAST SURGICAL HISTORY:   Past Surgical History[2]    FAMILY HISTORY:   No family history of kidney disease    SOCIAL HISTORY:   No smoking, no etoh, no illicit drugs.    HOME MEDICATIONS:   Reviewed and documented in chart    ALLERGIES:  Allopurinol and Hydralazine hcl    PHYSICAL EXAM:  VS:  /58   Pulse 77   Temp 36.6 °C (97.9 °F) (Temporal)   Resp 16   Wt 84 kg (185 lb 3 oz)   SpO2 91%   BMI 29.00 kg/m²   GENERAL: no acute distress  CV: RRR, No edema  RESP: non-labored  GI: Soft  MSK: No joint deformities   SKIN: No concerning rashes  NEURO: AOx3  PSYCH: Cooperative    LABS:  Recent Results (from the  past 24 hours)   CBC WITH DIFFERENTIAL    Collection Time: 06/07/25  5:30 PM   Result Value Ref Range    WBC 5.7 4.8 - 10.8 K/uL    RBC 3.37 (L) 4.70 - 6.10 M/uL    Hemoglobin 11.1 (L) 14.0 - 18.0 g/dL    Hematocrit 33.6 (L) 42.0 - 52.0 %    MCV 99.7 (H) 80.0 - 94.0 fL    MCH 32.9 (H) 27.0 - 31.0 pg    MCHC 33.0 33.0 - 37.0 g/dL    RDW 15.3 (H) 11.5 - 14.5 %    Platelet Count 226 130 - 400 K/uL    MPV 9.1 7.4 - 10.4 fL    Neutrophils Automated 72.6 (H) 39.0 - 70.0 %    Lymphocytes Automated 13.1 (L) 21.0 - 50.0 %    Monocytes Automated 8.6 2.0 - 9.0 %    Eosinophils Automated 4.9 0.0 - 5.0 %    Basophils Automated 0.5 0.0 - 3.0 %    Abs Neutrophils Automated 4.2 1.8 - 7.7 K/uL    Abs Lymph Automated 0.8 (L) 1.2 - 4.8 K/uL    Absolute Monocytes Automated 0.5 0.3 - 0.8 K/uL    Eosinophil Count, Blood 0.28 0.00 - 0.50 K/uL    Absolute Basophils Automated 0.0 0.0 - 0.1 K/uL   COMP METABOLIC PANEL    Collection Time: 06/07/25  5:30 PM   Result Value Ref Range    Sodium 138 136 - 145 mmol/L    Potassium 5.5 (H) 3.5 - 5.1 mmol/L    Chloride 100 98 - 107 mmol/L    Co2 23 21 - 32 mmol/L    Anion Gap 21 (H) 10 - 18 mmol/L    Glucose 109 (H) 74 - 99 mg/dL    Bun 80 (H) 7 - 18 mg/dL    Creatinine 13.5 (HH) 0.8 - 1.3 mg/dL    Calcium 9.1 8.5 - 11.0 mg/dL    AST(SGOT) 16 15 - 37 U/L    ALT(SGPT) 27 12 - 78 U/L    Alkaline Phosphatase 94 46 - 116 U/L    Total Bilirubin 0.5 0.2 - 1.0 mg/dL    Albumin 2.8 (L) 3.4 - 5.0 g/dL    Total Protein 6.3 (L) 6.4 - 8.2 g/dL    A-G Ratio 0.8    TROPONIN    Collection Time: 06/07/25  5:30 PM   Result Value Ref Range    Troponin I 25.2 0.0 - 60.3 pg/mL   proBrain Natriuretic Peptide, NT    Collection Time: 06/07/25  5:30 PM   Result Value Ref Range    NT-proBNP 5524 (H) 0 - 125 pg/mL   MAGNESIUM    Collection Time: 06/07/25  5:30 PM   Result Value Ref Range    Magnesium 2.1 1.8 - 2.4 mg/dL   PHOSPHORUS    Collection Time: 06/07/25  5:30 PM   Result Value Ref Range    Phosphorus 6.1 (H) 2.6 - 4.7  mg/dL   LACTIC ACID    Collection Time: 25  5:30 PM   Result Value Ref Range    Lactic Acid 1.0 0.4 - 2.0 mmol/L   ESTIMATED GFR    Collection Time: 25  5:30 PM   Result Value Ref Range    GFR (CKD-EPI) 4 (A) >60 mL/min/1.73 m 2   BLOOD CULTURE    Collection Time: 25  6:11 PM    Specimen: Peripheral; Blood   Result Value Ref Range    Blood Culture (Source ) R AC    BLOOD CULTURE    Collection Time: 25  6:18 PM    Specimen: Peripheral; Blood   Result Value Ref Range    Blood Culture (Source ) L Hand    PROTHROMBIN TIME    Collection Time: 25  6:18 PM   Result Value Ref Range    PT 33.7 (H) 9.3 - 11.7 sec    INR 3.40    EKG    Collection Time: 25 10:24 PM   Result Value Ref Range    Report       [facility]    Test Date:  2025  Pt Name:    LORIE KAYE               Department: [facility]  MRN:        [id number]                      Room:       [facility]  Gender:     Male                         Technician: DAJA  :        1965                   Requested By:THERON NEWMAN  Order #:    [id number]                    Reading MD:    Measurements  Intervals                                Axis  Rate:       67                           P:          46  CA:         189                          QRS:        -1  QRSD:       88                           T:          77  QT:         419  QTc:        443    Interpretive Statements  Sinus rhythm  Consider left atrial enlargement  Probable anteroseptal infarct, old  Compared to ECG 2025 10:12:58  Myocardial infarct finding now present  Ventricular premature complex(es) no longer present     EKG    Collection Time: 25 10:25 PM   Result Value Ref Range    Report       [facility]    Test Date:  2025  Pt Name:    LORIE KAYE               Department: [facility]  MRN:        [id number]                      Room:       Off The Floor  Gender:     Male                         Technician: DAJA  :        1965                    Requested By:JEAN GREEN  Order #:    [id number]                    Reading MD:    Measurements  Intervals                                Axis  Rate:       69                           P:          43  NY:         192                          QRS:        -3  QRSD:       89                           T:          76  QT:         407  QTc:        436    Interpretive Statements  Sinus rhythm  Probable anteroseptal infarct, old  Minimal ST elevation, inferior leads  Compared to ECG 2025 10:12:58  Myocardial infarct finding now present  ST (T wave) deviation now present  Ventricular premature complex(es) no longer present     EKG    Collection Time: 25 10:25 PM   Result Value Ref Range    Report       [facility]    Test Date:  2025  Pt Name:    LORIE KAYE               Department: 171  MRN:        [id number]                      Room:       T706  Gender:     Male                         Technician: DAJA  :        1965                   Requested By:THERON NEWMAN  Order #:    [id number]                    Reading MD:    Measurements  Intervals                                Axis  Rate:       69                           P:          43  NY:         192                          QRS:        -3  QRSD:       89                           T:          76  QT:         407  QTc:        436    Interpretive Statements  Sinus rhythm  Probable anteroseptal infarct, old  Minimal ST elevation, inferior leads  Compared to ECG 2025 22:24:37  ST (T wave) deviation now present  Myocardial infarct finding still present     Comp Metabolic Panel (CMP)    Collection Time: 25  6:17 AM   Result Value Ref Range    Sodium 138 135 - 145 mmol/L    Potassium 4.4 3.6 - 5.5 mmol/L    Chloride 97 96 - 112 mmol/L    Co2 21 20 - 33 mmol/L    Anion Gap 20.0 (H) 7.0 - 16.0    Glucose 132 (H) 65 - 99 mg/dL    Bun 78 (H) 8 - 22 mg/dL    Creatinine 12.70 (HH) 0.50 - 1.40 mg/dL    Calcium 9.2 8.5 - 10.5 mg/dL     Correct Calcium 10.0 8.5 - 10.5 mg/dL    AST(SGOT) 11 (L) 12 - 45 U/L    ALT(SGPT) 17 2 - 50 U/L    Alkaline Phosphatase 82 30 - 99 U/L    Total Bilirubin 0.2 0.1 - 1.5 mg/dL    Albumin 3.0 (L) 3.2 - 4.9 g/dL    Total Protein 5.4 (L) 6.0 - 8.2 g/dL    Globulin 2.4 1.9 - 3.5 g/dL    A-G Ratio 1.3 g/dL   CBC with Differential    Collection Time: 06/08/25  6:17 AM   Result Value Ref Range    WBC 5.2 4.8 - 10.8 K/uL    RBC 3.06 (L) 4.70 - 6.10 M/uL    Hemoglobin 9.8 (L) 14.0 - 18.0 g/dL    Hematocrit 30.5 (L) 42.0 - 52.0 %    MCV 99.7 (H) 81.4 - 97.8 fL    MCH 32.0 27.0 - 33.0 pg    MCHC 32.1 (L) 32.3 - 36.5 g/dL    RDW 54.6 (H) 35.9 - 50.0 fL    Platelet Count 200 164 - 446 K/uL    MPV 9.2 9.0 - 12.9 fL    Neutrophils-Polys 66.90 44.00 - 72.00 %    Lymphocytes 17.80 (L) 22.00 - 41.00 %    Monocytes 8.70 0.00 - 13.40 %    Eosinophils 5.60 0.00 - 6.90 %    Basophils 0.60 0.00 - 1.80 %    Immature Granulocytes 0.40 0.00 - 0.90 %    Nucleated RBC 0.00 0.00 - 0.20 /100 WBC    Neutrophils (Absolute) 3.47 1.82 - 7.42 K/uL    Lymphs (Absolute) 0.92 (L) 1.00 - 4.80 K/uL    Monos (Absolute) 0.45 0.00 - 0.85 K/uL    Eos (Absolute) 0.29 0.00 - 0.51 K/uL    Baso (Absolute) 0.03 0.00 - 0.12 K/uL    Immature Granulocytes (abs) 0.02 0.00 - 0.11 K/uL    NRBC (Absolute) 0.00 K/uL   Prothrombin Time    Collection Time: 06/08/25  6:17 AM   Result Value Ref Range    PT 30.2 (H) 12.0 - 14.6 sec    INR 2.86 (H) 0.87 - 1.13   ESTIMATED GFR    Collection Time: 06/08/25  6:17 AM   Result Value Ref Range    GFR (CKD-EPI) 4 (A) >60 mL/min/1.73 m 2       (click the triangle to expand results)    IMAGING:  MR-BRAIN-W/O    (Results Pending)       ASSESSMENT:  # ESRD  -Concerned that he is now failed peritoneal dialysis and had been pending transition to HD outpatient   # Hyperkalemia   - resolved   # HTN  # Anemia of CKD  # Gout  # Chest Pain  # Afib   # New onset seizures     SUGGESTIONS  -Continue night peritoneal dialsyis for now  -Will  coordinate permacath placement (and possibly PD cath removal) tomorrow. Vascular vs. IR.   -CM assistance in securing HD unit  -No dietary protein restrictions  -Phos binder with meals  -Home BP meds  -Dose all meds per ESRD    Discussed with hospitalist     Thank you for this consult, we will continue to follow.    Justice Mac MD               [1]   Past Medical History:  Diagnosis Date    Anesthesia     Hiccups for over 24 hours after a previous sugery.    Aortic stenosis 11/2023    AVR replacement    Arthritis     Osteoarthritis    Chronic cough     dry, pt reports since heart surgery    Dialysis patient (HCC)     peritoneal daily    Dyspnea on exertion 05/23/2023    Dyspnea on exertion 05/23/2023    Elevated troponin 05/23/2023    Fatty liver     Heart burn     controlled with Nexium    Heart murmur     High cholesterol     All always    Hypertension 2009    Infectious disease     Iron deficiency     Liver cirrhosis ([facility]) 12/21/2023    New onset seizure ([facility]) 06/07/2025    NSTEMI (non-ST elevated myocardial infarction) ([facility]) 05/23/2023    no stents placed; Cardiologist Dr. Sharma    Pain     left inguinal, back, left hip    Pain in the chest 05/23/2023    Pain in the chest 05/23/2023    Paroxysmal A-fib ([facility]) 11/11/2023    Peritoneal dialysis status ([facility]) 04/30/2024    PONV (postoperative nausea and vomiting)     Renal disorder 2007    Stage IV; end stage renal disease; Nephrologist Tiffanie Oseguera 04/28/2025    Sleep apnea 2000    does not use CPAP    Snoring 1990    Splenomegaly 12/21/2023   [2]   Past Surgical History:  Procedure Laterality Date    INGUINAL HERNIA REPAIR Left 4/21/2025    Procedure: OPEN LEFT INGUINAL HERNIA REPAIR WITH MESH;  Surgeon: Keagan Dinh M.D.;  Location: SURGERY [facility];  Service: General    AZ INJ LUMBAR/SACRAL,W/ IMAGING Bilateral 04/10/2025    Procedure: LUMBAR EPIDURAL STEROID INJECTION, BILATERAL L5-S1 INTERLAMINAR, UNDER FLUOROSCOPY;  Surgeon: Benito LAUGHLIN  OSCAR Herrera;  Location: SURGERY [facility];  Service: Orthopedics    IL NJX AA&/STRD TFRML EPI LUMBAR/SACRAL 1 LEVEL Bilateral 06/06/2024    Procedure: Bilateral L5 Transforaminal Epidural Steroid Injection under Fluoroscopy;  Surgeon: Benito Herrera D.O.;  Location: SURGERY [facility];  Service: Pain Management    AORTIC VALVE REPLACEMENT  11/03/2023    Procedure: AORTIC VALVE REPLACEMENT, ASCENDING AORTIC ANEURYSM REPAIR, TRANSESOPHAGEAL ECHOCARDIOGRAM;  Surgeon: Osmel Blanca M.D.;  Location: SURGERY [facility];  Service: Cardiothoracic    AORTIC ASCENDING DISSECTION  11/03/2023    Procedure: REPAIR, ANEURYSM OR DISSECTION, AORTA, ASCENDING;  Surgeon: Osmel Blanca M.D.;  Location: SURGERY [facility];  Service: Cardiothoracic    ECHOCARDIOGRAM, TRANSESOPHAGEAL, INTRAOPERATIVE  11/03/2023    Procedure: ECHOCARDIOGRAM, TRANSESOPHAGEAL, INTRAOPERATIVE;  Surgeon: Osmel Blanca M.D.;  Location: SURGERY [facility];  Service: Cardiothoracic    RESTORATE HEMOSTAS  11/03/2023    Procedure: RESTORATION OF HEMOSTATIS;  Surgeon: Osmel Blanca M.D.;  Location: SURGERY [facility];  Service: Cardiothoracic    STERNOTOMY  11/03/2023    Procedure: MEDIASTINAL EXPLORATION;  Surgeon: Osmel Blanca M.D.;  Location: [facility];  Service: Cardiothoracic    CATH PLACEMENT CAPD Right 09/06/2023    Procedure: LAPAROSCOPIC INSERTION OF PERITONEAL DIALYSIS CATHETER;  Surgeon: Dontrell Sales M.D.;  Location: SURGERY [facility];  Service: Vascular    IL INJ LUMBAR/SACRAL,W/ IMAGING Left 08/24/2023    Procedure: LUMBAR EPIDURAL STEROID INJECTION, LEFT L5-S1 INTERLAMINAR UNDER FLUOROSCOPY;  Surgeon: Benito Herrera D.O.;  Location: SURGERY [facility];  Service: Orthopedics    CATH PLACEMENT CAPD N/A 05/15/2023    Procedure: LAPAROSCOPIC PLACEMENT OF PERITONEAL DIALYSIS CATHERTER;  Surgeon: Shikha Alexander M.D.;  Location: SURGERY SAME DAY [facility];  Service: General    UMBILICAL HERNIA REPAIR  2012 4  separate surgeries between [id number]    OTHER  2007    Kidney biopsy, can't recall laterality,    HIP ARTHROSCOPY Bilateral 2002    TONSILLECTOMY N/A 1987    HAND SURGERY Right 1985    Hand and wrist    PAROTIDECTOMY Right     SHOULDER ARTHROSCOPY Bilateral     Can't recall date

## 2025-06-08 NOTE — PROGRESS NOTES
[facility] Progress Note     CCPD x 10 hours, 2000 mL fills x 6 cycles using all 2.5 % PD solution ordered per Dr. RADHAMES Mac    Orders reviewed,verified and updated, CCPD cycler therapy settings verified.    CCPD treatment initiated at 0247 without any issues  Patient and PD access assessed prior to therapy    Patient AOx4, resting calmly, interactive, (-) abd distention, denies pain/abd discomfort, stable VS.  Patient with intact and patent RLQ PD catheter aseptically connected per policy.    RLQ PD catheter and exit site in good condition, no signs of infection noted, cleansed with antiseptic and dressings changed per policy     Initial drain: 52  mL     Report given to primary care nurse Doris Pride RN     Patient on dwell 1/5 prior to departure from bedside.     Please contact on call dialysis RN for any issues with persistent alarms/assistance with troubleshooting or patient not tolerating therapy.      For on-call Dialysis RN, pls contact [facility] at [phone] or call [employer] PD service support hotline at [phone]

## 2025-06-08 NOTE — ASSESSMENT & PLAN NOTE
Potassium 5.5 without any evidence of EKG changes  Nephrology consulted, peritoneal dialysis per nephro   K 4.9

## 2025-06-08 NOTE — PROGRESS NOTES
[facility] Progress Note     CCPD ordered by Dr. Mac. Disconnected aseptically from PD Cycler at 1300.     Pt A/O x 4, not in distress, on room air, no bleeding, and has no edema, but with c/o mild chest pain; PCN was made aware when she was at the bedside.    Effluent clear and yellow, with fibrins; Dr. Mac was updated and he ordered for heparin 500 units/L for tonight CCPD treatment.    No alarms on the machine was noted or reported before disconnection.   See e-flowsheet for details    24 hour UF= 2062 ml (I. Drain= 52 ml + Total UF= 2010 ml - Last fill = 0 ml)    Report given to ASHUTOSH Renae RN.

## 2025-06-08 NOTE — PROGRESS NOTES
[facility] DIRECT ADMISSION REPORT    Transferring facility & physician: [facility]  Referring Provider: Polo Boykin    Chief complaint: Hyperkalemia    Reason for Transfer: Need for dialysis    Pertinent history & patient course: 60 years old male with a past medical history of chronic kidney disease currently on peritoneal dialysis presented to the transferring facility with seizure-like activity followed by confusion lasted for 45 minutes.  At the transferring facility was found to have hyperkalemia.  Transferring physician is concerned that the patient will need dialysis.     Consultants to be called upon arrival: Nephrology, may need IR or vascular for dialysis catheter placement.  May need neurology.     Code Status: Full code per transferring provider, I personally verified with the transferring provider patient's code status and the transferring provider has confirmed this with the patient.    Patient accepted for transfer: Yes    Accepting [facility] Facility: [facility] Nursing to notify the Triage Coordinator in the RTOC via Voalte or Phone ext. [phone] when patient arrives to the unit. The Triage Coordinator will assign the admitting provider.    Admission status: Inpatient.     Floor requested: Telemetry    The admitting provider is the point of contact for questions or concerns regarding patient's care.

## 2025-06-08 NOTE — PROGRESS NOTES
"Per MRI tech Lew Pepper III:   \"Additionally, per MRI protocol, we do not push contrast for patients whose GFR is less than 30 unless they are to go immediately for dialysis following their scan\"  \"Since his GFR is 4, we would need doctor approval to change the exam to a non-contrast study\"    Information relayed to FAHEEM Sorensen. PA approved on noncontrast MRI  "

## 2025-06-08 NOTE — ASSESSMENT & PLAN NOTE
Pt with ESRD on PD, was in the process of switching to HD   Discussed with her surgery today he is postop left upper extremity fistula creation, permacath placement he has been started on hemodialysis today  Discussed with vascular surgery and nephrology  He will need a hemodialysis chair prior to discharge

## 2025-06-08 NOTE — PROGRESS NOTES
Received bedside report from RN, pt care assumed, VSS, pt assessment complete. Pt AAOx4, with 0/0 of pain at this time. No signs of acute distress noted at this time. Plan of care discussed with pt and verbalizes no questions. Pt denies any additional needs at this time. Bed locked/in lowest position, bed alarm on, pt educated on fall risk and verbalized understanding, call light within reach, hourly rounding initiated. Peritoneal dialysis in process at this time.

## 2025-06-08 NOTE — ASSESSMENT & PLAN NOTE
Currently in sinus  On coumadin at baseline, heparin bridge for now  Today's INR is 2.15, will stop heparin drip

## 2025-06-08 NOTE — PROGRESS NOTES
Pt received to floor, pt care assumed, tele box on.  Pt denies any additional needs at this time. Bed in lowest position, pt educated on fall risk and verbalized understanding, call light within reach.

## 2025-06-08 NOTE — PROGRESS NOTES
Monitor summary:        Rhythm: SR, ST  Rate:   Ectopy: (o)big, (o)trig, (r)PVC  Measurements: .20/.05/.47        12hr chart check

## 2025-06-08 NOTE — ASSESSMENT & PLAN NOTE
History of mechanical aortic valve replacement, INR 3.4 at outside hospital   -- patient underwent placement of a PermCath and creation of AV fistula by Dr. Sales   -- Will continue heparin drip along with Coumadin per pharmacy protocol  INR 2.15

## 2025-06-08 NOTE — PROGRESS NOTES
"Hospital Medicine Daily Progress Note    Date of Service  6/8/2025    Chief Complaint  Gurdeep Guerra is a 60 y.o. male admitted 6/7/2025 with seizure like activity     Hospital Course  Gurdeep Guerra is a 60 y.o. male with past medical history of ESRD on PD, mechanical MVR with aortic aneurysm dissection  who presented 6/7/2025 with witnessed seizure episodes.  Patient had several episodes of seizure likely activity at home, he does not recall the event but does not that his   Head did not feel right since Friday\" reports feeling foggy and noted several episodes where his legs here shaking and he couldn't control them, this happened several times. He does not remember the events on Saturaday. Reportedly his significant other witnessed patient to have loss of consciousness, clenching of his jaws and noted to have tremors of his extremities.  Episodes eventually resolve spontaneously.  He was taken to outside facility ER for further evaluation.     At outside facility ER, cell counts are stable. Sodium 138 potassium 5.5 chloride 100 bicarbonate 23 glucose 109 BUN 80 creatinine 13.5, INR 3.4 BNP 5524, Lactic acid 1.0  CT chest showing previous sternotomy, mildly to moderately enlarged heart with previous aortic valvuloplasty, severe coronary calcification throughout all coronary vessels without evidence of pericardial effusion  CT abdomen pelvis showing mildly atrophied kidneys, 6 mm calcification in the central right kidney and probable vascular calcification in the lower left renal hilum.  No hydronephrosis or ureterolithiasis is seen.  CT head negative.   Patient was given 1 dose of Veltassa for K 5.5    Interval Problem Update  Pt seen at examined at bedside. He is awake and alert x 4, at his cognitive baseline. Does report a headache. Denies focal deficits  - vitals stable, labs reviewed and stable   - currently receiving HD  - had planned to undergo HD line placement to transition to HD. Discussed with " nephrology who will discuss with vascular. NPO at [facility] for potential procedure   - pt on warfarin for MMVR, his INR is 2.58, hold warfarin, currently holding bridge as he is still therapeutic. Repeat INR in AM     I have discussed this patient's plan of care and discharge plan at IDT rounds today with Case Management, Nursing, Nursing leadership, and other members of the IDT team.    Consultants/Specialty  nephrology and vascular surgery    Code Status  Full Code    Disposition  The patient is not medically cleared for discharge to home or a post-acute facility.  Anticipate discharge to: home with close outpatient follow-up    I have placed the appropriate orders for post-discharge needs.    Review of Systems  Review of Systems   Constitutional:  Positive for malaise/fatigue. Negative for chills and fever.   HENT:  Negative for congestion and sore throat.    Eyes:  Negative for blurred vision.   Respiratory:  Negative for shortness of breath.    Cardiovascular:  Negative for chest pain.   Gastrointestinal:  Negative for abdominal pain, nausea and vomiting.   Musculoskeletal:  Positive for joint pain. Negative for myalgias.   Neurological:  Positive for seizures, loss of consciousness and headaches. Negative for dizziness, sensory change, speech change and focal weakness.        Physical Exam  Temp:  [36.6 °C (97.9 °F)-36.7 °C (98.1 °F)] 36.6 °C (97.9 °F)  Pulse:  [45-80] 74  Resp:  [16-35] 18  BP: ()/(44-75) 111/71  SpO2:  [91 %-99 %] 92 %    Physical Exam  Vitals and nursing note reviewed.   Constitutional:       General: He is not in acute distress.     Appearance: He is ill-appearing.   HENT:      Head: Normocephalic and atraumatic.      Comments: Post surgical changes to the Rt neck      Mouth/Throat:      Mouth: Mucous membranes are moist.   Eyes:      Extraocular Movements: Extraocular movements intact.      Conjunctiva/sclera: Conjunctivae normal.   Cardiovascular:      Rate and Rhythm: Normal rate.       Heart sounds: Murmur heard.   Pulmonary:      Effort: Pulmonary effort is normal.   Abdominal:      General: Bowel sounds are normal.      Palpations: Abdomen is soft.      Comments: PD cath in place   Musculoskeletal:      Cervical back: Neck supple.   Skin:     General: Skin is warm.   Neurological:      General: No focal deficit present.      Mental Status: He is alert and oriented to person, place, and time. Mental status is at baseline.      Cranial Nerves: No cranial nerve deficit.   Psychiatric:         Mood and Affect: Mood normal.         Behavior: Behavior normal.         Thought Content: Thought content normal.         Judgment: Judgment normal.         Fluids  No intake or output data in the 24 hours ending 06/08/25 1338     Laboratory  Recent Labs     06/07/25  1730 06/08/25  0617   WBC 5.7 5.2   RBC 3.37* 3.06*   HEMOGLOBIN 11.1* 9.8*   HEMATOCRIT 33.6* 30.5*   MCV 99.7* 99.7*   MCH 32.9* 32.0   MCHC 33.0 32.1*   RDW 15.3* 54.6*   PLATELETCT 226 200   MPV 9.1 9.2     Recent Labs     06/07/25  1730 06/08/25  0617   SODIUM 138 138   POTASSIUM 5.5* 4.4   CHLORIDE 100 97   CO2 23 21   GLUCOSE 109* 132*   BUN 80* 78*   CREATININE 13.5* 12.70*   CALCIUM 9.1 9.2     Recent Labs     06/07/25  1818 06/08/25  0617 06/08/25  1207   APTT  --   --  34.7   INR 3.40 2.86* 2.58*               Imaging  MR-BRAIN-WITH & W/O    (Results Pending)        Assessment/Plan  * New onset seizure (HCC)  Assessment & Plan  Noted to have 3 seizure episodes at home, his significant other reports loss of consciousness with shaking of his extremities, patient denies history of seizures  Pt reports focal seizure like symptoms in the proceeding day, thought it was due to low BP, states his BP had been intermittently low with SBP 80-90, possible convulsive syncope?  MRI brain with and without contrast ordered   CT head at outside facility negative  EEG pending   Monitor on tele   Ativan as needed  Fall and seizure precautions      Hyperkalemia- (present on admission)  Assessment & Plan  Potassium 5.5 without any evidence of EKG changes  Nephrology consulted, peritoneal dialysis per nephro   K 4.4 today     Peritoneal dialysis status (HCC)- (present on admission)  Assessment & Plan  Pt with ESRD on PD, was in the process of switching to HD   Nephrology following, reaching to vascular for placement of HD cath and removal of PD cath, possibly tomorrow, NPO at [facility]  Hold warfarin given potential procedure, will start heparin bridge given MVR likely tomorrow as pt is still therapeutic today     A-fib (HCC)- (present on admission)  Assessment & Plan  Currently in sinus  On coumadin at baseline, therapeutic   Rate controlled     History of mechanical aortic valve replacement- (present on admission)  Assessment & Plan  History of mechanical aortic valve replacement, INR 3.4 at outside hospital  2.58 today, holding warfarin will bridge with heparin pending INR tomorrow pending placement of HD cath and removal of PD cath     Hyperlipidemia  Assessment & Plan  Crestor          VTE prophylaxis:    therapeutic anticoagulation with coumadin dosing per pharmacy, adjust PRN      I have performed a physical exam and reviewed and updated ROS and Plan today (6/8/2025). In review of yesterday's note (6/7/2025), there are no changes except as documented above.    Greater than 51 minutes spent prepping to see patient (e.g. review of tests) obtaining and/or reviewing separately obtained history. Performing a medically appropriate examination and/ evaluation.  Counseling and educating the patient/family/caregiver.  Ordering medications, tests, or procedures.  Referring and communicating with other health care professionals.  Documenting clinical information in EPIC.  Independently interpreting results and communicating results to patient/family/caregiver.  Care coordination.

## 2025-06-08 NOTE — PROCEDURES
[facility]    Inpatient Standard Video Electroencephalogram Report      Patient Name: Gurdeep Guerra  MRN: [id number]  #: [id number]  Date of Service: 06/08/25  Total Recording Time: 0 hours and 30 minutes.  Referring Provider: Ernestine Chan M.D.    INDICATION:  Gurdeep Guerra 60 y.o. male. This standard, inpatient, EEG was requested to evaluate for seizure(s).    CURRENT ANTI-SEIZURE AND OTHER PERTINENT MEDICATIONS:     Current Facility-Administered Medications:     acetaminophen    promethazine    promethazine    prochlorperazine    labetalol    aspirin    carvedilol    ezetimibe    gabapentin    rosuvastatin    sevelamer carbonate    traZODone    MD Alert...Warfarin per Pharmacy    diazePAM    gentamicin    TECHNIQUE: Standard inpatient video EEG was set up by a Neurodiagnostic technologist who performed education to the patient and staff. A minimum of 23 electrodes and 23 channel recording was setup and performed by Neurodiagnostic technologist, in accordance with the international 10-20 system. Impedence, electrode integrity, and technical impressions were documented. The study was reviewed in bipolar and referential montages. The recording examined the patient in the awake, drowsy, and sleep state(s).     DESCRIPTION OF THE RECORD:  EEG background: During maximal wakefulness, the background was continuous, symmetrical, and 9 Hz posterior dominant rhythm.  Reactivity and state changes were present.  During drowsiness, a loss of myogenic artifact and theta/delta frequencies were seen.     Sleep was captured and was characterized by diffuse background delta/theta activity with a loss of myogenic artifact.  N2 sleep transients in the form of sleep spindles and vertex waves were seen in the leads over the central regions.     ACTIVATION PROCEDURES:   NA    ICTAL AND INTERICTAL FINDINGS:   No focal or generalized epileptiform activity noted.     No persistent regional slowing was seen during this  routine study.      No electroclinical or electrographic seizures were reported or recorded during the study.     EKG: Sampling of the EKG recording showed an abnormal heart rhythm - please correlate clinically.     EVENTS:  No clinical events recorded or reported    INTERPRETATION:  This video EEG recording in the awake, drowsy, and sleep state(s) was within broad range of normal:  Epileptiform discharges: No definitive epileptiform discharges or other epileptiform phenomena seen.   No seizures.     As always, an absence of seizures/epileptiform discharges does not exclude the diagnosis of seizures/epilepsy. If clinical suspicion persists, a prolonged EEG monitoring might be considered.     Dean Burns MD  Neurology Attending, Epilepsy Program  [facility]

## 2025-06-09 ENCOUNTER — ANESTHESIA (OUTPATIENT)
Dept: SURGERY | Facility: MEDICAL CENTER | Age: 60
DRG: 674 | End: 2025-06-09
Payer: COMMERCIAL

## 2025-06-09 ENCOUNTER — ANESTHESIA EVENT (OUTPATIENT)
Dept: SURGERY | Facility: MEDICAL CENTER | Age: 60
DRG: 674 | End: 2025-06-09
Payer: COMMERCIAL

## 2025-06-09 ENCOUNTER — APPOINTMENT (OUTPATIENT)
Dept: RADIOLOGY | Facility: MEDICAL CENTER | Age: 60
DRG: 674 | End: 2025-06-09
Attending: SURGERY
Payer: COMMERCIAL

## 2025-06-09 ENCOUNTER — DOCUMENTATION (OUTPATIENT)
Dept: VASCULAR LAB | Facility: MEDICAL CENTER | Age: 60
End: 2025-06-09
Payer: COMMERCIAL

## 2025-06-09 ENCOUNTER — TELEPHONE (OUTPATIENT)
Dept: CARDIOLOGY | Facility: MEDICAL CENTER | Age: 60
End: 2025-06-09
Payer: COMMERCIAL

## 2025-06-09 LAB
ALBUMIN SERPL BCP-MCNC: 3.1 G/DL (ref 3.2–4.9)
ALBUMIN/GLOB SERPL: 1.3 G/DL
ALP SERPL-CCNC: 79 U/L (ref 30–99)
ALT SERPL-CCNC: 20 U/L (ref 2–50)
AMMONIA PLAS-SCNC: 31 UMOL/L (ref 11–45)
ANION GAP SERPL CALC-SCNC: 21 MMOL/L (ref 7–16)
APTT PPP: 28.7 SEC (ref 24.7–36)
AST SERPL-CCNC: 12 U/L (ref 12–45)
BILIRUB SERPL-MCNC: 0.2 MG/DL (ref 0.1–1.5)
BUN SERPL-MCNC: 75 MG/DL (ref 8–22)
CALCIUM ALBUM COR SERPL-MCNC: 10.1 MG/DL (ref 8.5–10.5)
CALCIUM SERPL-MCNC: 9.4 MG/DL (ref 8.5–10.5)
CHLORIDE SERPL-SCNC: 96 MMOL/L (ref 96–112)
CO2 SERPL-SCNC: 20 MMOL/L (ref 20–33)
CREAT SERPL-MCNC: 12.8 MG/DL (ref 0.5–1.4)
ERYTHROCYTE [DISTWIDTH] IN BLOOD BY AUTOMATED COUNT: 54.4 FL (ref 35.9–50)
GFR SERPLBLD CREATININE-BSD FMLA CKD-EPI: 4 ML/MIN/1.73 M 2
GLOBULIN SER CALC-MCNC: 2.3 G/DL (ref 1.9–3.5)
GLUCOSE SERPL-MCNC: 97 MG/DL (ref 65–99)
HCT VFR BLD AUTO: 30.7 % (ref 42–52)
HGB BLD-MCNC: 9.9 G/DL (ref 14–18)
INR PPP: 1.44 (ref 0.87–1.13)
INR PPP: 1.89 (ref 0.87–1.13)
MAGNESIUM SERPL-MCNC: 2.1 MG/DL (ref 1.5–2.5)
MCH RBC QN AUTO: 32.2 PG (ref 27–33)
MCHC RBC AUTO-ENTMCNC: 32.2 G/DL (ref 32.3–36.5)
MCV RBC AUTO: 100 FL (ref 81.4–97.8)
PLATELET # BLD AUTO: 216 K/UL (ref 164–446)
PMV BLD AUTO: 9.4 FL (ref 9–12.9)
POTASSIUM SERPL-SCNC: 4.8 MMOL/L (ref 3.6–5.5)
PROT SERPL-MCNC: 5.4 G/DL (ref 6–8.2)
PROTHROMBIN TIME: 17.6 SEC (ref 12–14.6)
PROTHROMBIN TIME: 21.8 SEC (ref 12–14.6)
RBC # BLD AUTO: 3.07 M/UL (ref 4.7–6.1)
SODIUM SERPL-SCNC: 137 MMOL/L (ref 135–145)
TSH SERPL DL<=0.005 MIU/L-ACNC: 1.94 UIU/ML (ref 0.38–5.33)
UFH PPP CHRO-ACNC: <0.1 IU/ML
WBC # BLD AUTO: 4.8 K/UL (ref 4.8–10.8)

## 2025-06-09 PROCEDURE — 84443 ASSAY THYROID STIM HORMONE: CPT

## 2025-06-09 PROCEDURE — 160002 HCHG RECOVERY MINUTES (STAT): Performed by: SURGERY

## 2025-06-09 PROCEDURE — 110454 HCHG SHELL REV 250: Performed by: SURGERY

## 2025-06-09 PROCEDURE — 700111 HCHG RX REV CODE 636 W/ 250 OVERRIDE (IP): Performed by: STUDENT IN AN ORGANIZED HEALTH CARE EDUCATION/TRAINING PROGRAM

## 2025-06-09 PROCEDURE — 82140 ASSAY OF AMMONIA: CPT

## 2025-06-09 PROCEDURE — 700102 HCHG RX REV CODE 250 W/ 637 OVERRIDE(OP): Performed by: STUDENT IN AN ORGANIZED HEALTH CARE EDUCATION/TRAINING PROGRAM

## 2025-06-09 PROCEDURE — 90945 DIALYSIS ONE EVALUATION: CPT

## 2025-06-09 PROCEDURE — 700102 HCHG RX REV CODE 250 W/ 637 OVERRIDE(OP)

## 2025-06-09 PROCEDURE — 85730 THROMBOPLASTIN TIME PARTIAL: CPT

## 2025-06-09 PROCEDURE — 160009 HCHG ANES TIME/MIN: Performed by: SURGERY

## 2025-06-09 PROCEDURE — 770020 HCHG ROOM/CARE - TELE (206)

## 2025-06-09 PROCEDURE — A9270 NON-COVERED ITEM OR SERVICE: HCPCS | Performed by: STUDENT IN AN ORGANIZED HEALTH CARE EDUCATION/TRAINING PROGRAM

## 2025-06-09 PROCEDURE — A9270 NON-COVERED ITEM OR SERVICE: HCPCS | Performed by: ANESTHESIOLOGY

## 2025-06-09 PROCEDURE — 36558 INSERT TUNNELED CV CATH: CPT | Mod: RT | Performed by: SURGERY

## 2025-06-09 PROCEDURE — 49422 REMOVE TUNNELED IP CATH: CPT | Performed by: SURGERY

## 2025-06-09 PROCEDURE — 700102 HCHG RX REV CODE 250 W/ 637 OVERRIDE(OP): Performed by: SURGERY

## 2025-06-09 PROCEDURE — 700105 HCHG RX REV CODE 258: Performed by: ANESTHESIOLOGY

## 2025-06-09 PROCEDURE — 83735 ASSAY OF MAGNESIUM: CPT

## 2025-06-09 PROCEDURE — 700111 HCHG RX REV CODE 636 W/ 250 OVERRIDE (IP): Performed by: ANESTHESIOLOGY

## 2025-06-09 PROCEDURE — C1750 CATH, HEMODIALYSIS,LONG-TERM: HCPCS | Performed by: SURGERY

## 2025-06-09 PROCEDURE — 85027 COMPLETE CBC AUTOMATED: CPT

## 2025-06-09 PROCEDURE — 85520 HEPARIN ASSAY: CPT

## 2025-06-09 PROCEDURE — 85610 PROTHROMBIN TIME: CPT

## 2025-06-09 PROCEDURE — 160028 HCHG SURGERY MINUTES - 1ST 30 MINS LEVEL 3: Performed by: SURGERY

## 2025-06-09 PROCEDURE — 700111 HCHG RX REV CODE 636 W/ 250 OVERRIDE (IP): Performed by: SURGERY

## 2025-06-09 PROCEDURE — 700102 HCHG RX REV CODE 250 W/ 637 OVERRIDE(OP): Performed by: NURSE PRACTITIONER

## 2025-06-09 PROCEDURE — 160048 HCHG OR STATISTICAL LEVEL 1-5: Performed by: SURGERY

## 2025-06-09 PROCEDURE — 160015 HCHG STAT PREOP MINUTES: Performed by: SURGERY

## 2025-06-09 PROCEDURE — 36821 AV FUSION DIRECT ANY SITE: CPT | Performed by: SURGERY

## 2025-06-09 PROCEDURE — 160035 HCHG PACU - 1ST 60 MINS PHASE I: Performed by: SURGERY

## 2025-06-09 PROCEDURE — 80053 COMPREHEN METABOLIC PANEL: CPT

## 2025-06-09 PROCEDURE — A9270 NON-COVERED ITEM OR SERVICE: HCPCS | Performed by: SURGERY

## 2025-06-09 PROCEDURE — 0WPG03Z REMOVAL OF INFUSION DEVICE FROM PERITONEAL CAVITY, OPEN APPROACH: ICD-10-PCS | Performed by: SURGERY

## 2025-06-09 PROCEDURE — 36415 COLL VENOUS BLD VENIPUNCTURE: CPT

## 2025-06-09 PROCEDURE — A9270 NON-COVERED ITEM OR SERVICE: HCPCS

## 2025-06-09 PROCEDURE — A9270 NON-COVERED ITEM OR SERVICE: HCPCS | Performed by: NURSE PRACTITIONER

## 2025-06-09 PROCEDURE — 700101 HCHG RX REV CODE 250: Performed by: SURGERY

## 2025-06-09 PROCEDURE — 160039 HCHG SURGERY MINUTES - EA ADDL 1 MIN LEVEL 3: Performed by: SURGERY

## 2025-06-09 PROCEDURE — 99233 SBSQ HOSP IP/OBS HIGH 50: CPT | Performed by: STUDENT IN AN ORGANIZED HEALTH CARE EDUCATION/TRAINING PROGRAM

## 2025-06-09 PROCEDURE — 71045 X-RAY EXAM CHEST 1 VIEW: CPT

## 2025-06-09 PROCEDURE — 99221 1ST HOSP IP/OBS SF/LOW 40: CPT | Mod: 25 | Performed by: SURGERY

## 2025-06-09 PROCEDURE — 5A1D70Z PERFORMANCE OF URINARY FILTRATION, INTERMITTENT, LESS THAN 6 HOURS PER DAY: ICD-10-PCS | Performed by: INTERNAL MEDICINE

## 2025-06-09 PROCEDURE — 700101 HCHG RX REV CODE 250: Performed by: ANESTHESIOLOGY

## 2025-06-09 PROCEDURE — 700102 HCHG RX REV CODE 250 W/ 637 OVERRIDE(OP): Performed by: ANESTHESIOLOGY

## 2025-06-09 PROCEDURE — 02HV33Z INSERTION OF INFUSION DEVICE INTO SUPERIOR VENA CAVA, PERCUTANEOUS APPROACH: ICD-10-PCS | Performed by: SURGERY

## 2025-06-09 PROCEDURE — 03180ZD BYPASS LEFT BRACHIAL ARTERY TO UPPER ARM VEIN, OPEN APPROACH: ICD-10-PCS | Performed by: SURGERY

## 2025-06-09 PROCEDURE — 700111 HCHG RX REV CODE 636 W/ 250 OVERRIDE (IP): Performed by: INTERNAL MEDICINE

## 2025-06-09 DEVICE — CATHETER HEMOSPLIT 19CM (5EA/CA): Type: IMPLANTABLE DEVICE | Site: ABDOMEN | Status: FUNCTIONAL

## 2025-06-09 RX ORDER — MIDAZOLAM HYDROCHLORIDE 1 MG/ML
1 INJECTION INTRAMUSCULAR; INTRAVENOUS
Status: DISCONTINUED | OUTPATIENT
Start: 2025-06-09 | End: 2025-06-09 | Stop reason: HOSPADM

## 2025-06-09 RX ORDER — HYDROMORPHONE HYDROCHLORIDE 1 MG/ML
0.2 INJECTION, SOLUTION INTRAMUSCULAR; INTRAVENOUS; SUBCUTANEOUS
Status: DISCONTINUED | OUTPATIENT
Start: 2025-06-09 | End: 2025-06-09 | Stop reason: HOSPADM

## 2025-06-09 RX ORDER — OXYCODONE HYDROCHLORIDE 5 MG/1
5 TABLET ORAL EVERY 4 HOURS PRN
Refills: 0 | Status: DISCONTINUED | OUTPATIENT
Start: 2025-06-09 | End: 2025-06-14 | Stop reason: HOSPADM

## 2025-06-09 RX ORDER — HEPARIN SODIUM 5000 [USP'U]/100ML
0-30 INJECTION, SOLUTION INTRAVENOUS CONTINUOUS
Status: DISCONTINUED | OUTPATIENT
Start: 2025-06-09 | End: 2025-06-12

## 2025-06-09 RX ORDER — SODIUM CHLORIDE 9 MG/ML
100 INJECTION, SOLUTION INTRAVENOUS
Status: DISCONTINUED | OUTPATIENT
Start: 2025-06-09 | End: 2025-06-14 | Stop reason: HOSPADM

## 2025-06-09 RX ORDER — HEPARIN SODIUM 1000 [USP'U]/ML
INJECTION, SOLUTION INTRAVENOUS; SUBCUTANEOUS PRN
Status: DISCONTINUED | OUTPATIENT
Start: 2025-06-09 | End: 2025-06-09 | Stop reason: SURG

## 2025-06-09 RX ORDER — ONDANSETRON 2 MG/ML
INJECTION INTRAMUSCULAR; INTRAVENOUS PRN
Status: DISCONTINUED | OUTPATIENT
Start: 2025-06-09 | End: 2025-06-09 | Stop reason: SURG

## 2025-06-09 RX ORDER — ONDANSETRON 2 MG/ML
4 INJECTION INTRAMUSCULAR; INTRAVENOUS
Status: DISCONTINUED | OUTPATIENT
Start: 2025-06-09 | End: 2025-06-09 | Stop reason: HOSPADM

## 2025-06-09 RX ORDER — OXYCODONE HYDROCHLORIDE 10 MG/1
10 TABLET ORAL EVERY 4 HOURS PRN
Refills: 0 | Status: DISCONTINUED | OUTPATIENT
Start: 2025-06-09 | End: 2025-06-14 | Stop reason: HOSPADM

## 2025-06-09 RX ORDER — EPHEDRINE SULFATE 50 MG/ML
INJECTION, SOLUTION INTRAVENOUS PRN
Status: DISCONTINUED | OUTPATIENT
Start: 2025-06-09 | End: 2025-06-09 | Stop reason: SURG

## 2025-06-09 RX ORDER — HEPARIN SODIUM 1000 [USP'U]/ML
80 INJECTION, SOLUTION INTRAVENOUS; SUBCUTANEOUS ONCE
Status: DISCONTINUED | OUTPATIENT
Start: 2025-06-09 | End: 2025-06-09

## 2025-06-09 RX ORDER — DIPHENHYDRAMINE HYDROCHLORIDE 50 MG/ML
12.5 INJECTION, SOLUTION INTRAMUSCULAR; INTRAVENOUS
Status: DISCONTINUED | OUTPATIENT
Start: 2025-06-09 | End: 2025-06-09 | Stop reason: HOSPADM

## 2025-06-09 RX ORDER — LIDOCAINE HYDROCHLORIDE 20 MG/ML
INJECTION, SOLUTION EPIDURAL; INFILTRATION; INTRACAUDAL; PERINEURAL PRN
Status: DISCONTINUED | OUTPATIENT
Start: 2025-06-09 | End: 2025-06-09 | Stop reason: SURG

## 2025-06-09 RX ORDER — HYDROMORPHONE HYDROCHLORIDE 1 MG/ML
0.1 INJECTION, SOLUTION INTRAMUSCULAR; INTRAVENOUS; SUBCUTANEOUS
Status: DISCONTINUED | OUTPATIENT
Start: 2025-06-09 | End: 2025-06-09 | Stop reason: HOSPADM

## 2025-06-09 RX ORDER — HYDROCODONE BITARTRATE AND ACETAMINOPHEN 5; 325 MG/1; MG/1
1-2 TABLET ORAL EVERY 4 HOURS PRN
Refills: 0 | Status: DISCONTINUED | OUTPATIENT
Start: 2025-06-09 | End: 2025-06-09

## 2025-06-09 RX ORDER — IPRATROPIUM BROMIDE AND ALBUTEROL SULFATE 2.5; .5 MG/3ML; MG/3ML
3 SOLUTION RESPIRATORY (INHALATION)
Status: DISCONTINUED | OUTPATIENT
Start: 2025-06-09 | End: 2025-06-09 | Stop reason: HOSPADM

## 2025-06-09 RX ORDER — HEPARIN SODIUM 1000 [USP'U]/ML
1700 INJECTION, SOLUTION INTRAVENOUS; SUBCUTANEOUS
Status: DISCONTINUED | OUTPATIENT
Start: 2025-06-09 | End: 2025-06-14 | Stop reason: HOSPADM

## 2025-06-09 RX ORDER — PHENYLEPHRINE HYDROCHLORIDE 10 MG/ML
INJECTION, SOLUTION INTRAMUSCULAR; INTRAVENOUS; SUBCUTANEOUS PRN
Status: DISCONTINUED | OUTPATIENT
Start: 2025-06-09 | End: 2025-06-09 | Stop reason: SURG

## 2025-06-09 RX ORDER — HEPARIN SODIUM 5000 [USP'U]/100ML
0-30 INJECTION, SOLUTION INTRAVENOUS CONTINUOUS
Status: DISCONTINUED | OUTPATIENT
Start: 2025-06-09 | End: 2025-06-09

## 2025-06-09 RX ORDER — HEPARIN SODIUM 1000 [USP'U]/ML
1600 INJECTION, SOLUTION INTRAVENOUS; SUBCUTANEOUS
Status: DISCONTINUED | OUTPATIENT
Start: 2025-06-09 | End: 2025-06-14 | Stop reason: HOSPADM

## 2025-06-09 RX ORDER — HYDROMORPHONE HYDROCHLORIDE 1 MG/ML
0.5 INJECTION, SOLUTION INTRAMUSCULAR; INTRAVENOUS; SUBCUTANEOUS
Status: DISCONTINUED | OUTPATIENT
Start: 2025-06-09 | End: 2025-06-09 | Stop reason: HOSPADM

## 2025-06-09 RX ORDER — MIDAZOLAM HYDROCHLORIDE 1 MG/ML
INJECTION INTRAMUSCULAR; INTRAVENOUS PRN
Status: DISCONTINUED | OUTPATIENT
Start: 2025-06-09 | End: 2025-06-09 | Stop reason: SURG

## 2025-06-09 RX ORDER — HEPARIN SODIUM,PORCINE 1000/ML
VIAL (ML) INJECTION
Status: DISCONTINUED | OUTPATIENT
Start: 2025-06-09 | End: 2025-06-09 | Stop reason: HOSPADM

## 2025-06-09 RX ORDER — DEXAMETHASONE SODIUM PHOSPHATE 4 MG/ML
INJECTION, SOLUTION INTRA-ARTICULAR; INTRALESIONAL; INTRAMUSCULAR; INTRAVENOUS; SOFT TISSUE PRN
Status: DISCONTINUED | OUTPATIENT
Start: 2025-06-09 | End: 2025-06-09 | Stop reason: SURG

## 2025-06-09 RX ORDER — OXYCODONE HCL 5 MG/5 ML
10 SOLUTION, ORAL ORAL
Status: COMPLETED | OUTPATIENT
Start: 2025-06-09 | End: 2025-06-09

## 2025-06-09 RX ORDER — SODIUM CHLORIDE 9 MG/ML
INJECTION, SOLUTION INTRAVENOUS
Status: DISCONTINUED | OUTPATIENT
Start: 2025-06-09 | End: 2025-06-09 | Stop reason: SURG

## 2025-06-09 RX ORDER — CEFAZOLIN SODIUM 1 G/3ML
INJECTION, POWDER, FOR SOLUTION INTRAMUSCULAR; INTRAVENOUS PRN
Status: DISCONTINUED | OUTPATIENT
Start: 2025-06-09 | End: 2025-06-09 | Stop reason: SURG

## 2025-06-09 RX ORDER — SODIUM CHLORIDE, SODIUM LACTATE, POTASSIUM CHLORIDE, CALCIUM CHLORIDE 600; 310; 30; 20 MG/100ML; MG/100ML; MG/100ML; MG/100ML
INJECTION, SOLUTION INTRAVENOUS CONTINUOUS
Status: DISCONTINUED | OUTPATIENT
Start: 2025-06-09 | End: 2025-06-09 | Stop reason: HOSPADM

## 2025-06-09 RX ORDER — BUPIVACAINE HCL/EPINEPHRINE 0.5-1:200K
VIAL (ML) INJECTION
Status: DISCONTINUED | OUTPATIENT
Start: 2025-06-09 | End: 2025-06-09 | Stop reason: HOSPADM

## 2025-06-09 RX ORDER — MEPERIDINE HYDROCHLORIDE 50 MG/ML
25 INJECTION INTRAMUSCULAR; INTRAVENOUS; SUBCUTANEOUS
Status: DISCONTINUED | OUTPATIENT
Start: 2025-06-09 | End: 2025-06-09 | Stop reason: HOSPADM

## 2025-06-09 RX ORDER — HEPARIN SODIUM 1000 [USP'U]/ML
40 INJECTION, SOLUTION INTRAVENOUS; SUBCUTANEOUS PRN
Status: DISCONTINUED | OUTPATIENT
Start: 2025-06-09 | End: 2025-06-12

## 2025-06-09 RX ORDER — OXYCODONE HCL 5 MG/5 ML
5 SOLUTION, ORAL ORAL
Status: COMPLETED | OUTPATIENT
Start: 2025-06-09 | End: 2025-06-09

## 2025-06-09 RX ORDER — PROTAMINE SULFATE 10 MG/ML
INJECTION, SOLUTION INTRAVENOUS PRN
Status: DISCONTINUED | OUTPATIENT
Start: 2025-06-09 | End: 2025-06-09 | Stop reason: SURG

## 2025-06-09 RX ORDER — WARFARIN SODIUM 5 MG/1
5 TABLET ORAL DAILY
Status: DISCONTINUED | OUTPATIENT
Start: 2025-06-09 | End: 2025-06-14 | Stop reason: HOSPADM

## 2025-06-09 RX ORDER — HEPARIN SODIUM 1000 [USP'U]/ML
40 INJECTION, SOLUTION INTRAVENOUS; SUBCUTANEOUS PRN
Status: DISCONTINUED | OUTPATIENT
Start: 2025-06-09 | End: 2025-06-09

## 2025-06-09 RX ADMIN — EPHEDRINE SULFATE 10 MG: 50 INJECTION, SOLUTION INTRAVENOUS at 13:00

## 2025-06-09 RX ADMIN — HYDROCODONE BITARTRATE AND ACETAMINOPHEN 2 TABLET: 5; 325 TABLET ORAL at 17:58

## 2025-06-09 RX ADMIN — HEPARIN SODIUM 1600 UNITS: 1000 INJECTION, SOLUTION INTRAVENOUS; SUBCUTANEOUS at 18:12

## 2025-06-09 RX ADMIN — HEPARIN SODIUM 18 UNITS/KG/HR: 5000 INJECTION, SOLUTION INTRAVENOUS at 18:43

## 2025-06-09 RX ADMIN — SODIUM CHLORIDE: 9 INJECTION, SOLUTION INTRAVENOUS at 11:43

## 2025-06-09 RX ADMIN — EZETIMIBE 10 MG: 10 TABLET ORAL at 06:00

## 2025-06-09 RX ADMIN — PHENYLEPHRINE HYDROCHLORIDE 200 MCG: 10 INJECTION INTRAVENOUS at 11:53

## 2025-06-09 RX ADMIN — ROCURONIUM BROMIDE 50 MG: 10 INJECTION, SOLUTION INTRAVENOUS at 11:48

## 2025-06-09 RX ADMIN — PHENYLEPHRINE HYDROCHLORIDE 200 MCG: 10 INJECTION INTRAVENOUS at 11:58

## 2025-06-09 RX ADMIN — EPHEDRINE SULFATE 10 MG: 50 INJECTION, SOLUTION INTRAVENOUS at 12:01

## 2025-06-09 RX ADMIN — EPHEDRINE SULFATE 10 MG: 50 INJECTION, SOLUTION INTRAVENOUS at 12:24

## 2025-06-09 RX ADMIN — ROCURONIUM BROMIDE 20 MG: 10 INJECTION, SOLUTION INTRAVENOUS at 13:15

## 2025-06-09 RX ADMIN — TRAZODONE HYDROCHLORIDE 100 MG: 100 TABLET ORAL at 20:58

## 2025-06-09 RX ADMIN — HEPARIN SODIUM 1700 UNITS: 1000 INJECTION, SOLUTION INTRAVENOUS; SUBCUTANEOUS at 18:12

## 2025-06-09 RX ADMIN — ASPIRIN 81 MG: 81 TABLET, COATED ORAL at 06:00

## 2025-06-09 RX ADMIN — SUGAMMADEX 200 MG: 100 INJECTION, SOLUTION INTRAVENOUS at 13:25

## 2025-06-09 RX ADMIN — GABAPENTIN 100 MG: 100 CAPSULE ORAL at 17:58

## 2025-06-09 RX ADMIN — HEPARIN SODIUM 8000 UNITS: 1000 INJECTION, SOLUTION INTRAVENOUS; SUBCUTANEOUS at 12:31

## 2025-06-09 RX ADMIN — PROTAMINE SULFATE 50 MG: 10 INJECTION, SOLUTION INTRAVENOUS at 12:51

## 2025-06-09 RX ADMIN — EPHEDRINE SULFATE 10 MG: 50 INJECTION, SOLUTION INTRAVENOUS at 11:55

## 2025-06-09 RX ADMIN — GABAPENTIN 100 MG: 100 CAPSULE ORAL at 06:00

## 2025-06-09 RX ADMIN — DEXAMETHASONE SODIUM PHOSPHATE 8 MG: 4 INJECTION INTRA-ARTICULAR; INTRALESIONAL; INTRAMUSCULAR; INTRAVENOUS; SOFT TISSUE at 11:58

## 2025-06-09 RX ADMIN — PROPOFOL 150 MG: 10 INJECTION, EMULSION INTRAVENOUS at 11:48

## 2025-06-09 RX ADMIN — ROSUVASTATIN CALCIUM 20 MG: 20 TABLET, FILM COATED ORAL at 17:58

## 2025-06-09 RX ADMIN — SEVELAMER CARBONATE 800 MG: 800 TABLET, FILM COATED ORAL at 17:58

## 2025-06-09 RX ADMIN — WARFARIN SODIUM 5 MG: 5 TABLET ORAL at 17:58

## 2025-06-09 RX ADMIN — FENTANYL CITRATE 100 MCG: 50 INJECTION, SOLUTION INTRAMUSCULAR; INTRAVENOUS at 11:48

## 2025-06-09 RX ADMIN — PHENYLEPHRINE HYDROCHLORIDE 200 MCG: 10 INJECTION INTRAVENOUS at 12:43

## 2025-06-09 RX ADMIN — PHENYLEPHRINE HYDROCHLORIDE 200 MCG: 10 INJECTION INTRAVENOUS at 13:00

## 2025-06-09 RX ADMIN — LIDOCAINE HYDROCHLORIDE 40 MG: 20 INJECTION, SOLUTION EPIDURAL; INFILTRATION; INTRACAUDAL; PERINEURAL at 11:48

## 2025-06-09 RX ADMIN — MIDAZOLAM HYDROCHLORIDE 2 MG: 1 INJECTION, SOLUTION INTRAMUSCULAR; INTRAVENOUS at 11:43

## 2025-06-09 RX ADMIN — OXYCODONE 5 MG: 5 TABLET ORAL at 21:29

## 2025-06-09 RX ADMIN — OMEPRAZOLE 20 MG: 20 CAPSULE, DELAYED RELEASE ORAL at 18:41

## 2025-06-09 RX ADMIN — PHENYLEPHRINE HYDROCHLORIDE 200 MCG: 10 INJECTION INTRAVENOUS at 12:03

## 2025-06-09 RX ADMIN — ONDANSETRON 4 MG: 2 INJECTION INTRAMUSCULAR; INTRAVENOUS at 11:58

## 2025-06-09 RX ADMIN — CEFAZOLIN 2 G: 1 INJECTION, POWDER, FOR SOLUTION INTRAMUSCULAR; INTRAVENOUS at 11:43

## 2025-06-09 RX ADMIN — OXYCODONE HYDROCHLORIDE 5 MG: 5 SOLUTION ORAL at 14:09

## 2025-06-09 ASSESSMENT — COGNITIVE AND FUNCTIONAL STATUS - GENERAL
CLIMB 3 TO 5 STEPS WITH RAILING: A LITTLE
DAILY ACTIVITIY SCORE: 23
DRESSING REGULAR UPPER BODY CLOTHING: A LITTLE
WALKING IN HOSPITAL ROOM: A LITTLE
MOBILITY SCORE: 22
SUGGESTED CMS G CODE MODIFIER MOBILITY: CJ
SUGGESTED CMS G CODE MODIFIER DAILY ACTIVITY: CI

## 2025-06-09 ASSESSMENT — ENCOUNTER SYMPTOMS
SEIZURES: 1
ABDOMINAL PAIN: 0
LOSS OF CONSCIOUSNESS: 1
HEADACHES: 1
MYALGIAS: 0
BLURRED VISION: 0
DIZZINESS: 0
SHORTNESS OF BREATH: 0
NAUSEA: 0
SORE THROAT: 0
SPEECH CHANGE: 0
CHILLS: 0
VOMITING: 0
FEVER: 0
FOCAL WEAKNESS: 0
SENSORY CHANGE: 0

## 2025-06-09 ASSESSMENT — PAIN DESCRIPTION - PAIN TYPE
TYPE: ACUTE PAIN

## 2025-06-09 ASSESSMENT — PAIN SCALES - GENERAL: PAIN_LEVEL: 4

## 2025-06-09 ASSESSMENT — FIBROSIS 4 INDEX: FIB4 SCORE: 0.72

## 2025-06-09 NOTE — PROGRESS NOTES
Bedside report received and patient care assumed. Pt is resting in bed, A&O 4, with complaints of abdominal pain, and is on room air. Tele box on. All fall precautions are in place, belongings at bedside table.  Pt was updated on POC, no questions or concerns. Pt educated on use of call light for assistance.

## 2025-06-09 NOTE — ANESTHESIA POSTPROCEDURE EVALUATION
Patient: Gurdeep Guerra    Procedure Summary       Date: 06/09/25 Room / Location: [facility] 06 / SURGERY [facility]    Anesthesia Start: 1143 Anesthesia Stop: 1342    Procedures:       CREATION, AV FISTULA (Left: Arm Upper)      INSERTION, PERMCATHETER (Right: Chest)      REMOVAL, CATHETER, CAPD (Right: Abdomen) Diagnosis:     Surgeons: Dontrell Sales M.D. Responsible Provider: Sergio Flaherty M.D.    Anesthesia Type: general ASA Status: 4            Final Anesthesia Type: general  Last vitals  BP   Blood Pressure: 94/64    Temp   (P) 36 °C (96.8 °F)    Pulse   64   Resp   (!) (P) 8    SpO2   (P) 100 %      Anesthesia Post Evaluation    Patient location during evaluation: PACU  Patient participation: complete - patient participated  Level of consciousness: awake and alert  Pain score: 4    Airway patency: patent  Anesthetic complications: no  Cardiovascular status: hemodynamically stable  Respiratory status: acceptable  Hydration status: euvolemic    PONV: none          No notable events documented.     Nurse Pain Score: 0 (NPRS)

## 2025-06-09 NOTE — CONSULTS
Vascular Surgery          New Patient Consultation    Patient:Gurdeep Guerra  MRN:[id number]    Date: 6/9/2025    Referring Provider: Ernestine Chan M.d.    Consulting Physician: Dontrell Sales MD  _____________________________________________________    Reason for consultation:  Dialysis access.      HPI:  This is a 60 y.o. male with multiple comorbidities including end-stage renal disease who has been on peritoneal dialysis for about 2 years.  Patient is being transitioned to hemodialysis.  Vascular surgery has been consulted to remove the peritoneal dialysis catheter, place a PermCath, and also create a fistula if possible.  When I came to see the patient he was alert and conversant in no distress.    Past Medical History[1]    Past Surgical History[2]    Current Medications[3]    Social History     Socioeconomic History    Marital status:      Spouse name: Not on file    Number of children: Not on file    Years of education: Not on file    Highest education level: Master's degree (e.g., MA, MS, Kenn, MEd, MSW, ELIECER)   Occupational History    Not on file   Tobacco Use    Smoking status: Never    Smokeless tobacco: Never   Vaping Use    Vaping status: Never Used   Substance and Sexual Activity    Alcohol use: Never    Drug use: Never    Sexual activity: Yes     Partners: Female     Birth control/protection: Pill   Other Topics Concern    Not on file   Social History Narrative    Not on file     Social Drivers of Health     Financial Resource Strain: Low Risk  (5/29/2024)    Overall Financial Resource Strain (CARDIA)     Difficulty of Paying Living Expenses: Not hard at all   Food Insecurity: No Food Insecurity (6/7/2025)    Hunger Vital Sign     Worried About Running Out of Food in the Last Year: Never true     Ran Out of Food in the Last Year: Never true   Transportation Needs: No Transportation Needs (6/7/2025)    PRAPARE - Transportation     Lack of Transportation (Medical): No      Lack of Transportation (Non-Medical): No   Physical Activity: Insufficiently Active (5/29/2024)    Exercise Vital Sign     Days of Exercise per Week: 7 days     Minutes of Exercise per Session: 20 min   Stress: No Stress Concern Present (5/29/2024)    [employer] [place] [employer] - Occupational Stress Questionnaire     Feeling of Stress : Not at all   Social Connections: Socially Isolated (5/29/2024)    Social Connection and Isolation Panel [NHANES]     Frequency of Communication with Friends and Family: Once a week     Frequency of Social Gatherings with Friends and Family: Once a week     Attends Christian Services: Never     Active Member of Clubs or Organizations: No     Attends Club or Organization Meetings: Never     Marital Status:    Intimate Partner Violence: Not At Risk (6/7/2025)    Humiliation, Afraid, Rape, and Kick questionnaire     Fear of Current or Ex-Partner: No     Emotionally Abused: No     Physically Abused: No     Sexually Abused: No   Housing Stability: Low Risk  (6/7/2025)    Housing Stability Vital Sign     Unable to Pay for Housing in the Last Year: No     Number of Times Moved in the Last Year: 0     Homeless in the Last Year: No       Family History   Problem Relation Age of Onset    Hyperlipidemia Mother     Heart Disease Mother     Hypertension Father        Allergies:  Allopurinol and Hydralazine hcl    Review of Systems:    Review of systems is noncontributory except as per HPI      Physical Exam:  BP 94/64   Pulse 64   Temp 36.5 °C (97.7 °F) (Temporal)   Resp 14   Wt 87.6 kg (193 lb 2 oz)   SpO2 97%   BMI 30.25 kg/m²     Constitutional: Alert, oriented, no acute distress  HEENT:  Normocephalic and atraumatic, EOMI  Neck:   Supple, no JVD,   Cardiovascular: Regular rate and rhythm,   Pulmonary:  Good air entry bilaterally,    Abdominal:  Soft, non-tender, non-distended  Musculoskeletal: No tenderness, no deformity  Neurological:  CN II-XII grossly intact,  no focal deficits  Skin:   Skin is warm and dry. No rash noted.  Vascular exam:  Extremities warm and adequately perfused    Labs:  Recent Labs     06/07/25 1730 06/08/25 0617 06/09/25  0401   WBC 5.7 5.2 4.8   RBC 3.37* 3.06* 3.07*   HEMOGLOBIN 11.1* 9.8* 9.9*   HEMATOCRIT 33.6* 30.5* 30.7*   MCV 99.7* 99.7* 100.0*   MCH 32.9* 32.0 32.2   MCHC 33.0 32.1* 32.2*   RDW 15.3* 54.6* 54.4*   PLATELETCT 226 200 216   MPV 9.1 9.2 9.4     Recent Labs     06/07/25  1730 06/08/25  0617 06/09/25  0401   SODIUM 138 138 137   POTASSIUM 5.5* 4.4 4.8   CHLORIDE 100 97 96   CO2 23 21 20   GLUCOSE 109* 132* 97   BUN 80* 78* 75*   CREATININE 13.5* 12.70* 12.80*   CALCIUM 9.1 9.2 9.4     Recent Labs     06/08/25  0617 06/08/25  1207 06/09/25  0401   APTT  --  34.7  --    INR 2.86* 2.58* 1.89*     Recent Labs     06/07/25 1730 06/07/25  1818 06/08/25 0617 06/08/25  1207 06/09/25  0401   ASTSGOT 16  --  11*  --  12   ALTSGPT 27  --  17  --  20   TBILIRUBIN 0.5  --  0.2  --  0.2   ALKPHOSPHAT 94  --  82  --  79   GLOBULIN  --   --  2.4  --  2.3   INR  --    < > 2.86* 2.58* 1.89*   AMMONIA  --   --   --   --  31    < > = values in this interval not displayed.               Assessment/Plan:   -  End-stage renal disease on dialysis    Planning insertion of tunneled hemodialysis catheter, creation of left upper extremity fistula, and removal of CAPD catheter today. I explained the details of the operation, alternatives, and potential risks, including but not limited to bleeding, infection, and risks of anesthesia.  All questions were answered. Patient understands and agrees to proceed.          Dontrell Sales MD  [facility]   Vo[employer] preferred or call my office [phone]  __________________________________________________________________  Patient:Gurdeep Guerra   MRN:[id number]   CSN:[id number]               [1]   Past Medical History:  Diagnosis Date    Anesthesia     Hiccups for over 24 hours after a previous sugery.     Aortic stenosis 11/2023    AVR replacement    Arthritis     Osteoarthritis    Chronic cough     dry, pt reports since heart surgery    Dialysis patient (HCC)     peritoneal daily    Dyspnea on exertion 05/23/2023    Dyspnea on exertion 05/23/2023    Elevated troponin 05/23/2023    Fatty liver     Heart burn     controlled with Nexium    Heart murmur     High cholesterol     All always    Hypertension 2009    Infectious disease     Iron deficiency     Liver cirrhosis (HCC) 12/21/2023    New onset seizure (HCC) 06/07/2025    NSTEMI (non-ST elevated myocardial infarction) (HCC) 05/23/2023    no stents placed; Cardiologist Dr. Sharma    Pain     left inguinal, back, left hip    Pain in the chest 05/23/2023    Pain in the chest 05/23/2023    Paroxysmal A-fib (HCC) 11/11/2023    Peritoneal dialysis status (HCC) 04/30/2024    PONV (postoperative nausea and vomiting)     Renal disorder 2007    Stage IV; end stage renal disease; Nephrologist Tiffanie Oseguera 04/28/2025    Sleep apnea 2000    does not use CPAP    Snoring 1990    Splenomegaly 12/21/2023   [2]   Past Surgical History:  Procedure Laterality Date    INGUINAL HERNIA REPAIR Left 04/21/2025    Procedure: OPEN LEFT INGUINAL HERNIA REPAIR WITH MESH;  Surgeon: Keagan Dinh M.D.;  Location: SURGERY [facility];  Service: General    ID INJ LUMBAR/SACRAL,W/ IMAGING Bilateral 04/10/2025    Procedure: LUMBAR EPIDURAL STEROID INJECTION, BILATERAL L5-S1 INTERLAMINAR, UNDER FLUOROSCOPY;  Surgeon: Benito Herrera D.O.;  Location: SURGERY [facility];  Service: Orthopedics    ID NJX AA&/STRD TFRML EPI LUMBAR/SACRAL 1 LEVEL Bilateral 06/06/2024    Procedure: Bilateral L5 Transforaminal Epidural Steroid Injection under Fluoroscopy;  Surgeon: Benito Herrera D.O.;  Location: SURGERY [facility];  Service: Pain Management    AORTIC VALVE REPLACEMENT  11/03/2023    Procedure: AORTIC VALVE REPLACEMENT, ASCENDING AORTIC ANEURYSM REPAIR, TRANSESOPHAGEAL  ECHOCARDIOGRAM;  Surgeon: Osmel Blanca M.D.;  Location: SURGERY [facility];  Service: Cardiothoracic    AORTIC ASCENDING DISSECTION  11/03/2023    Procedure: REPAIR, ANEURYSM OR DISSECTION, AORTA, ASCENDING;  Surgeon: Osmel Blanca M.D.;  Location: [facility];  Service: Cardiothoracic    ECHOCARDIOGRAM, TRANSESOPHAGEAL, INTRAOPERATIVE  11/03/2023    Procedure: ECHOCARDIOGRAM, TRANSESOPHAGEAL, INTRAOPERATIVE;  Surgeon: Osmel Blanca M.D.;  Location: SURGERY [facility];  Service: Cardiothoracic    RESTORATE HEMOSTAS  11/03/2023    Procedure: RESTORATION OF HEMOSTATIS;  Surgeon: Osmel Blanca M.D.;  Location: SURGERY [facility];  Service: Cardiothoracic    STERNOTOMY  11/03/2023    Procedure: MEDIASTINAL EXPLORATION;  Surgeon: Osmel Blanca M.D.;  Location: [facility];  Service: Cardiothoracic    CATH PLACEMENT CAPD Right 09/06/2023    Procedure: LAPAROSCOPIC INSERTION OF PERITONEAL DIALYSIS CATHETER;  Surgeon: Dontrell Sales M.D.;  Location: SURGERY [facility];  Service: Vascular    MA INJ LUMBAR/SACRAL,W/ IMAGING Left 08/24/2023    Procedure: LUMBAR EPIDURAL STEROID INJECTION, LEFT L5-S1 INTERLAMINAR UNDER FLUOROSCOPY;  Surgeon: Benito Herrera D.O.;  Location: SURGERY [facility];  Service: Orthopedics    CATH PLACEMENT CAPD N/A 05/15/2023    Procedure: LAPAROSCOPIC PLACEMENT OF PERITONEAL DIALYSIS CATHERTER;  Surgeon: Shikha Alexander M.D.;  Location: SURGERY [facility];  Service: General    UMBILICAL HERNIA REPAIR  2012    4 separate surgeries between [id number]    OTHER  2007    Kidney biopsy, can't recall laterality,    HIP ARTHROSCOPY Bilateral 2002    TONSILLECTOMY N/A 1987    HAND SURGERY Right 1985    Hand and wrist    PAROTIDECTOMY Right     SHOULDER ARTHROSCOPY Bilateral     Can't recall date   [3]   Current Facility-Administered Medications   Medication Dose Route Frequency Provider Last Rate Last Admin    [MAR Hold] heparin intracatheter (for DIALYSIS USE ONLY)  9,000 Units  9,000 Units Intraperitoneal DIALYSIS PRN Justice Mac M.D.   9,000 Units at 06/08/25 1830    [MAR Hold] calcium carbonate (Tums) chewable tab 500 mg  500 mg Oral Once TEX Noel        [MAR Hold] omeprazole (PriLOSEC) capsule 20 mg  20 mg Oral DAILY Antwan Sorensen P.A.-C.   20 mg at 06/08/25 2317    [MAR Hold] acetaminophen (Tylenol) tablet 650 mg  650 mg Oral Q6HRS PRN Jarocho Alexander M.D.        [MAR Hold] promethazine (Phenergan) tablet 12.5-25 mg  12.5-25 mg Oral Q4HRS PRN Jarocho Alexander M.D.        [MAR Hold] promethazine (Phenergan) suppository 12.5-25 mg  12.5-25 mg Rectal Q4HRS PRROSA Alexander M.D.        [MAR Hold] prochlorperazine (Compazine) injection 5-10 mg  5-10 mg Intravenous Q4HRS PRN Jarocho Alexander M.D.        [MAR Hold] labetalol (Normodyne/Trandate) injection 10 mg  10 mg Intravenous Q4HRS PRN Jarocho Alexander M.D.        [MAR Hold] aspirin EC tablet 81 mg  81 mg Oral DAILY Jarocho Alexander M.D.   81 mg at 06/09/25 0600    [MAR Hold] carvedilol (Coreg) tablet 3.125 mg  3.125 mg Oral BID WITH MEALS Jarocho Alexander M.D.   3.125 mg at 06/08/25 1651    [MAR Hold] ezetimibe (Zetia) tablet 10 mg  10 mg Oral DAILY Jarocho Alexander M.D.   10 mg at 06/09/25 0600    [MAR Hold] gabapentin (Neurontin) capsule 100 mg  100 mg Oral TID Jarocho Alexander M.D.   100 mg at 06/09/25 0600    [MAR Hold] rosuvastatin (Crestor) tablet 20 mg  20 mg Oral Q EVENING Jarocho Alexander M.D.   20 mg at 06/08/25 1651    [MAR Hold] sevelamer carbonate (Renvela) tablet 800 mg  800 mg Oral TID WITH MEALS Jarocho Alexander M.D.   800 mg at 06/08/25 1651    [MAR Hold] traZODone (Desyrel) tablet 100 mg  100 mg Oral Nightly Jarocho Alexander M.D.   100 mg at 06/08/25 2100    [MAR Hold] MD Alert...Warfarin per Pharmacy   Other PHARMACY TO DOSE Jarocho Alexander M.D.        [MAR Hold] diazePAM (Valium) injection 5 mg  5 mg Intravenous Q5 MIN PRN Jarocho Alexander M.D.         [MAR Hold] gentamicin (Garamycin) 0.1 % cream 1 Application  1 Application Topical DAILY Justice Mac M.D.   1 Application at 06/08/25 4100

## 2025-06-09 NOTE — PROGRESS NOTES
[facility] Progress Note     Call received from PCN regarding patient's complains of abdominal discomfort and pressure that is worsening. Patient's on 3rd cycle out of 6. Dr Freed notified and ordered to change fill volume to 1.8L; and if doesn't work okay to discontinue treatment for the night.     Upon arrival, patient lying on his left side complaining of abdominal discomfort. PD RN tried to change fill volume, but unable. Tried to call [employer] hotline, but was on hold for representative. Patient then requested if he can just discontinue treatment for the night. Disconnected aseptically from PD Cycler at 12:55am after bypass with 25 minutes left on 4th dwell. PD catheter secured to abdomen, dressing CDI.      Effluent clear with no fibrins noted. Patient reports abdominal discomfort almost gone. He appears stable post treatment.     24 hour UF= 373 ml (I. Drain= 53 ml + Total UF= 320ml - Last fill = 0 ml)     Report given to RADHAMES Diane RN.

## 2025-06-09 NOTE — TELEPHONE ENCOUNTER
Last OV: 5/1/2025  Proposed Surgery: catheter insertion   Surgery Date: TBD   Requesting Office Name: [facility]   Fax Number: [phone]  Preference of Location (default is surgery center unless specified by Cardiologist or JADEN)  Prior Clearance Addressed: No    Is this a general clearance? YES   Anticoags/Antiplatelets: Warfarin and Aspirin  Anticoags/Antiplatelet managed by Cardiology? No Provider to advise.    Outstanding Cardiac Imaging : Yes  Echo.   Clearance to provider to review  Ablation, Cardioversion, Stent, Cardiac Devices, Catheterization, Watchman: Yes  Date : 12/20/2023   TAVR/Valve, Mitral Clip, Watchman (including open heart),: Completed over 6 months post procedure- Send clearance letter    Recent Cardiac Hospitalization: Yes  Date:  5/28/2025            When: Hospitalized in the last 3 months. Forward to provider to review.   History (cardiac history): Past Medical History[1]        Is this a dental clearance? NO  Ablation, Cardioversion, Watchman, Stents, Cath, Devices within the last 3 months? No   If yes- Send dental wait letter, do not forward to provider for review.     TAVR / Valve, Mitral clip within the last 6 months? No  If yes- Send dental wait letter, do not forward to provider for review.     If completing a general clearance, continue per protocol.           Surgical Clearance Letter Sent: No Provider to advise.   **Scan clearance request letter into [facility].**          [1]   Past Medical History:  Diagnosis Date    Anesthesia     Hiccups for over 24 hours after a previous sugery.    Aortic stenosis 11/2023    AVR replacement    Arthritis     Osteoarthritis    Chronic cough     dry, pt reports since heart surgery    Dialysis patient (HCC)     peritoneal daily    Dyspnea on exertion 05/23/2023    Dyspnea on exertion 05/23/2023    Elevated troponin 05/23/2023    Fatty liver     Heart burn     controlled with Nexium    Heart murmur     High cholesterol     All always    Hypertension  2009    Infectious disease     Iron deficiency     Liver cirrhosis (HCC) 12/21/2023    New onset seizure (HCC) 06/07/2025    NSTEMI (non-ST elevated myocardial infarction) (HCC) 05/23/2023    no stents placed; Cardiologist Dr. Sharma    Pain     left inguinal, back, left hip    Pain in the chest 05/23/2023    Pain in the chest 05/23/2023    Paroxysmal A-fib (HCC) 11/11/2023    Peritoneal dialysis status (HCC) 04/30/2024    PONV (postoperative nausea and vomiting)     Renal disorder 2007    Stage IV; end stage renal disease; Nephrologist Tiffanie Oseguera 04/28/2025    Sleep apnea 2000    does not use CPAP    Snoring 1990    Splenomegaly 12/21/2023

## 2025-06-09 NOTE — PROGRESS NOTES
"Hospital Medicine Daily Progress Note    Date of Service  6/9/2025    Chief Complaint  Gurdeep Guerra is a 60 y.o. male admitted 6/7/2025 with seizure like activity     Hospital Course  Gurdeep Guerra is a 60 y.o. male with past medical history of ESRD on PD, mechanical MVR with aortic aneurysm dissection  who presented 6/7/2025 with witnessed seizure episodes.  Patient had several episodes of seizure likely activity at home, he does not recall the event but does not that his   Head did not feel right since Friday\" reports feeling foggy and noted several episodes where his legs here shaking and he couldn't control them, this happened several times. He does not remember the events on Saturaday. Reportedly his significant other witnessed patient to have loss of consciousness, clenching of his jaws and noted to have tremors of his extremities.  Episodes eventually resolve spontaneously.  He was taken to outside facility ER for further evaluation.     At outside facility ER, cell counts are stable. Sodium 138 potassium 5.5 chloride 100 bicarbonate 23 glucose 109 BUN 80 creatinine 13.5, INR 3.4 BNP 5524, Lactic acid 1.0  CT chest showing previous sternotomy, mildly to moderately enlarged heart with previous aortic valvuloplasty, severe coronary calcification throughout all coronary vessels without evidence of pericardial effusion  CT abdomen pelvis showing mildly atrophied kidneys, 6 mm calcification in the central right kidney and probable vascular calcification in the lower left renal hilum.  No hydronephrosis or ureterolithiasis is seen.  CT head negative.   Patient was given 1 dose of Veltassa for K 5.5    Interval Problem Update  Pt seen at examined at bedside. He is awake and alert x 4, at his cognitive baseline. No acute issues. Doing well post operatively. Pain controlled   - vitals stable, labs reviewed and stable   - currently receiving HD  - underwent permacath placement, fistula placement and " removal of PD cath. Will need HD chair set up prior to discharge   - INR 1.8, start heparin infusion for bridging back to warfarin   - after chart review and discussion with pharmacy, pt has MV repair and a ON-X mechanical AV not mitral. Given hx of flutter as well INR goal 2-3   - EEG and MRI negative. Unclear etiology of seizure likely activity, pt reportedly low BP in the preceding days and feeling unwell, possible convulsive syncope. Had echo last month which showed no sign findings.     I have discussed this patient's plan of care and discharge plan at IDT rounds today with Case Management, Nursing, Nursing leadership, and other members of the IDT team.    Consultants/Specialty  nephrology and vascular surgery    Code Status  Full Code    Disposition  The patient is not medically cleared for discharge to home or a post-acute facility.      I have placed the appropriate orders for post-discharge needs.    Review of Systems  Review of Systems   Constitutional:  Positive for malaise/fatigue. Negative for chills and fever.   HENT:  Negative for congestion and sore throat.    Eyes:  Negative for blurred vision.   Respiratory:  Negative for shortness of breath.    Cardiovascular:  Negative for chest pain.   Gastrointestinal:  Negative for abdominal pain, nausea and vomiting.   Musculoskeletal:  Positive for joint pain. Negative for myalgias.   Neurological:  Positive for seizures, loss of consciousness and headaches. Negative for dizziness, sensory change, speech change and focal weakness.        Physical Exam  Temp:  [36 °C (96.8 °F)-36.9 °C (98.4 °F)] 36.5 °C (97.7 °F)  Pulse:  [64-75] 74  Resp:  [8-17] 17  BP: ()/(50-72) 111/72  SpO2:  [93 %-100 %] 93 %    Physical Exam  Vitals and nursing note reviewed.   Constitutional:       General: He is not in acute distress.     Appearance: He is not ill-appearing.   HENT:      Head: Normocephalic and atraumatic.      Comments: Post surgical changes to the Rt neck       Mouth/Throat:      Mouth: Mucous membranes are moist.   Eyes:      Extraocular Movements: Extraocular movements intact.      Conjunctiva/sclera: Conjunctivae normal.   Cardiovascular:      Rate and Rhythm: Normal rate.      Heart sounds: Murmur heard.   Pulmonary:      Effort: Pulmonary effort is normal.   Abdominal:      General: Bowel sounds are normal.      Palpations: Abdomen is soft.      Comments: PD cath in place   Musculoskeletal:      Cervical back: Neck supple.      Comments: Post up LUE fistula creation/dressing in place, new Rt chest HD cath. Post op dressing over abdomen     Skin:     General: Skin is warm.   Neurological:      General: No focal deficit present.      Mental Status: He is alert and oriented to person, place, and time. Mental status is at baseline.      Cranial Nerves: No cranial nerve deficit.   Psychiatric:         Mood and Affect: Mood normal.         Behavior: Behavior normal.         Thought Content: Thought content normal.         Judgment: Judgment normal.         Fluids    Intake/Output Summary (Last 24 hours) at 6/9/2025 1556  Last data filed at 6/9/2025 1331  Gross per 24 hour   Intake 1190 ml   Output 350 ml   Net 840 ml        Laboratory  Recent Labs     06/07/25  1730 06/08/25  0617 06/09/25  0401   WBC 5.7 5.2 4.8   RBC 3.37* 3.06* 3.07*   HEMOGLOBIN 11.1* 9.8* 9.9*   HEMATOCRIT 33.6* 30.5* 30.7*   MCV 99.7* 99.7* 100.0*   MCH 32.9* 32.0 32.2   MCHC 33.0 32.1* 32.2*   RDW 15.3* 54.6* 54.4*   PLATELETCT 226 200 216   MPV 9.1 9.2 9.4     Recent Labs     06/07/25  1730 06/08/25  0617 06/09/25  0401   SODIUM 138 138 137   POTASSIUM 5.5* 4.4 4.8   CHLORIDE 100 97 96   CO2 23 21 20   GLUCOSE 109* 132* 97   BUN 80* 78* 75*   CREATININE 13.5* 12.70* 12.80*   CALCIUM 9.1 9.2 9.4     Recent Labs     06/08/25  0617 06/08/25  1207 06/09/25  0401   APTT  --  34.7  --    INR 2.86* 2.58* 1.89*               Imaging  DX-PORTABLE FLUORO > 1 HOUR   Preliminary Result      Portable  fluoroscopy utilized for 3 seconds.      INTERPRETING LOCATION: [address], [place], [address]      DX-CHEST-LIMITED (1 VIEW)   Preliminary Result      Digitized intraoperative radiograph is submitted for review. This examination is not for diagnostic purpose but for guidance during a surgical procedure. Please see the patient's chart for full procedural details.         INTERPRETING LOCATION: [address], [place], [address]      MR-BRAIN-WITH & W/O   Final Result      1.  Mild chronic microvascular ischemic type changes.   2.  No acute intracranial abnormality or pathologic enhancement.   3.  No evidence of mesial temporal sclerosis.           Assessment/Plan  * New onset seizure (HCC)  Assessment & Plan  Noted to have 3 seizure episodes at home, his significant other reports loss of consciousness with shaking of his extremities, patient denies history of seizures  Pt reports focal seizure like symptoms in the proceeding day, thought it was due to low BP, states his BP had been intermittently low with SBP 80-90, possible convulsive syncope?   EEG was unremarkable his MRI brain was also unremarkable no history of previous seizure events  Will not initiate antiepileptic therapy at this time  Recent echo does not show any evidence of significant valvular pathology his AV valve appeared to be functioning well no significant mitral stenosis  Monitor his blood pressure, orthostatic vitals  Monitor on tele   Ativan as needed  Fall and seizure precautions   Will need to have DMV drivers license form completed prior to his discharge    Hyperkalemia- (present on admission)  Assessment & Plan  Potassium 5.5 without any evidence of EKG changes  Nephrology consulted, peritoneal dialysis per nephro   K 4.4 today     Peritoneal dialysis status (HCC)- (present on admission)  Assessment & Plan  Pt with ESRD on PD, was in the process of switching to HD   Discussed with her surgery today he is postop left upper extremity fistula creation,  permacath placement he has been started on hemodialysis today  Discussed with vascular surgery and nephrology  He will need a hemodialysis chair prior to discharge    A-fib (HCC)- (present on admission)  Assessment & Plan  Currently in sinus  On coumadin at baseline, heparin bridge for now  Goal INR 2-3 after chart review and discussion with pharmacy   Rate controlled     History of mechanical aortic valve replacement- (present on admission)  Assessment & Plan  History of mechanical aortic valve replacement, INR 3.4 at outside hospital  1.85 today, patient was in OR today for fistula creation and permacath placement  Start heparin infusion for bridging, plan to start warfarin this evening goal INR 2-3    Hyperlipidemia  Assessment & Plan  Crestor          VTE prophylaxis:    therapeutic anticoagulation with weight-based heparin gtt, pharmacy to adjust PRN      I have performed a physical exam and reviewed and updated ROS and Plan today (6/9/2025). In review of yesterday's note (6/8/2025), there are no changes except as documented above.    Greater than 51 minutes spent prepping to see patient (e.g. review of tests) obtaining and/or reviewing separately obtained history. Performing a medically appropriate examination and/ evaluation.  Counseling and educating the patient/family/caregiver.  Ordering medications, tests, or procedures.  Referring and communicating with other health care professionals.  Documenting clinical information in EPIC.  Independently interpreting results and communicating results to patient/family/caregiver.  Care coordination.

## 2025-06-09 NOTE — ANESTHESIA TIME REPORT
Anesthesia Start and Stop Event Times       Date Time Event    6/9/2025 1025 Ready for Procedure     1143 Anesthesia Start     1342 Anesthesia Stop          Responsible Staff  06/09/25      Name Role Begin End    Sergio Flaherty M.D. Anesth 1143 1342          Overtime Reason:  no overtime (within assigned shift)    Comments:

## 2025-06-09 NOTE — PROGRESS NOTES
[facility] -  PROGRESS NOTE               Author: Justice Mac M.D. Date & Time: 6/9/2025  7:59 AM     HPI:  60-year-old male with ESRD on peritoneal dialysis who presented after new onset seizure episodes.  Recent hospitalization at [facility] with concern for failing peritoneal dialysis.  He was transiently transition to hemodialysis and felt improved on HD but  wanted to try one more attempt at PD with increased outpatient Rx.  His outpatient prescription was increased to 6 x 2 L fills over 10 hours.  He has continued to feel suboptimal and plan was set in place to transition to hemodialysis after outpatient permacath placement, which has not happened yet. He then had several episodes of seizures at home on day of admission.  Eventually transferred from outside facility for further management.  Upon arrival to [facility] found to have a potassium of 5.5. Underwent PD last night. Potassium resolved. He has some confusion today and feels overall off. No chest pain or SoB. No abd pain. Pending EEG     DAILY NEPHROLOGY SUMMARY:  6/8: Consult done  6/9: Tolerated PD, no further seizure activity, pending permacath and PD cath removal and fistula creation    PAST FAMILY HISTORY: Reviewed and Unchanged  SOCIAL HISTORY: Reviewed and Unchanged  CURRENT MEDICATIONS: Reviewed  IMAGING STUDIES: Reviewed    ROS  10 Point ROS performed, negative other than stated above    PHYSICAL EXAM  VS:  BP 96/58   Pulse 70   Temp 36.8 °C (98.2 °F) (Temporal)   Resp 15   Wt 87.6 kg (193 lb 2 oz)   SpO2 96%   BMI 30.25 kg/m²   GENERAL: no acute distress  CV: RRR, No edema  RESP: non-labored  GI: Soft  MSK: No joint deformities   SKIN: No concerning rashes  NEURO: AOx3  PSYCH: Cooperative    Fluids:  In: 590 [P.O.:240]  Out: 2385     LABS:  Recent Results (from the past 24 hours)   aPTT    Collection Time: 06/08/25 12:07 PM   Result Value Ref Range    APTT 34.7 24.7 - 36.0 sec   Prothrombin Time    Collection  Time: 25 12:07 PM   Result Value Ref Range    PT 27.8 (H) 12.0 - 14.6 sec    INR 2.58 (H) 0.87 - 1.13   Heparin Xa (Unfractionated)    Collection Time: 25 12:07 PM   Result Value Ref Range    Heparin Xa (UFH) <0.10 IU/mL   EKG    Collection Time: 25  4:36 PM   Result Value Ref Range    Report       [facility]    Test Date:  2025  Pt Name:    LORIE KAYE               Department: 171  MRN:        [id number]                      Room:       T706  Gender:     Male                         Technician: DAJA  :        1965                   Requested By:THERON NEWMAN  Order #:    [id number]                    Reading MD: Yaneth Hurtado    Measurements  Intervals                                Axis  Rate:       67                           P:          46  CT:         189                          QRS:        -1  QRSD:       88                           T:          77  QT:         419  QTc:        443    Interpretive Statements  Sinus rhythm  Consider left atrial enlargement  Probable anteroseptal infarct, old  Compared to ECG 2025 10:12:58  Myocardial infarct finding now present  Ventricular premature complex(es) no longer present  Electronically Signed On 2025 16:36:10 PDT by Yaneth Kedia     EKG    Collection Time: 25  4:36 PM   Result Value Ref Range    Report       [facility]    Test Date:  2025  Pt Name:    LORIE KAYE               Department: 171  MRN:        [id number]                      Room:       T706  Gender:     Male                         Technician: DAJA  :        1965                   Requested By:THERON NEWMAN  Order #:    [id number]                    Reading MD: Yaneth Hurtado    Measurements  Intervals                                Axis  Rate:       69                           P:          43  CT:         192                          QRS:        -3  QRSD:       89                           T:          76  QT:          407  QTc:        436    Interpretive Statements  Sinus rhythm  Probable anteroseptal infarct, old  Compared to ECG 06/07/2025 22:24:37  Myocardial infarct finding still present  Electronically Signed On 06- 16:36:33 PDT by Yaneth Hurtado     Prothrombin Time    Collection Time: 06/09/25  4:01 AM   Result Value Ref Range    PT 21.8 (H) 12.0 - 14.6 sec    INR 1.89 (H) 0.87 - 1.13   CBC WITHOUT DIFFERENTIAL    Collection Time: 06/09/25  4:01 AM   Result Value Ref Range    WBC 4.8 4.8 - 10.8 K/uL    RBC 3.07 (L) 4.70 - 6.10 M/uL    Hemoglobin 9.9 (L) 14.0 - 18.0 g/dL    Hematocrit 30.7 (L) 42.0 - 52.0 %    .0 (H) 81.4 - 97.8 fL    MCH 32.2 27.0 - 33.0 pg    MCHC 32.2 (L) 32.3 - 36.5 g/dL    RDW 54.4 (H) 35.9 - 50.0 fL    Platelet Count 216 164 - 446 K/uL    MPV 9.4 9.0 - 12.9 fL   AMMONIA    Collection Time: 06/09/25  4:01 AM   Result Value Ref Range    Ammonia 31 11 - 45 umol/L       (click the triangle to expand results)      ASSESSMENT:  # ESRD  -Concerned that he is now failed peritoneal dialysis and had been pending transition to HD outpatient   # Hyperkalemia   - resolved   # HTN  # Anemia of CKD  # Gout  # Chest Pain  # Afib   # New onset seizures     SUGGESTIONS  -Pending PD cath removal, permacath placement and fistula creation today  -Will dialyze today after permacath placemen and MWF schedule going forward   -CM assistance in securing HD unit  -APPLE with HD  -No dietary protein restrictions  -Phos binder with meals  -Home BP meds  -Dose all meds per ESRD     Discussed with hospitalist      Thank you,     Justice Mac MD

## 2025-06-09 NOTE — PROGRESS NOTES
Inpatient Anticoagulation Service Note for 6/9/2025      Reason for Anticoagulation: On-X Aortic Valve Replacement     Hemoglobin Value: (!) 9.9  Hematocrit Value: (!) 30.7  Lab Platelet Value: 216  Target INR: 2.0 to 3.0     Date/Time INR Value    06/09/25 0401 1.89     06/08/25 1207 2.58     06/08/25 0617 2.86       Date/Time Dose (mg)    06/09/25 1610 5     06/08/25 1256 0      Average Dose (mg):  (PTA dose: warfarin 5 mg PO daily)  Significant Interactions: Antiplatelet Medications  Bridge Therapy: No  Reversal Agent Administered: Not Applicable    Assessment:   60-year-old male with PMH significant for ESRD on HD, MV repair (per OSH records), On-X AV replacement (2023) & reported history of Afib/Flutter on warfarin, followed by [facility]. Unclear INR goal in the chart, per anticoagulation clinic notes/OSH notes, INR goal is listed as 1.5-2 given On-X AVR in 2023. However, patient does have a noted history of Afib/Flutter as well. Discussed with MD, will dose warfarin with INR goal of 2-3 inpatient to account for Afib as well.   Outpatient anticoagulation clinic notes reviewed. Most recent appointment was on 5/21, patient was instructed to take warfarin 2.5 mg x1 on 5/21 then resume warfarin 5 mg PO daily thereafter until next appointment in ~ 4 weeks.   Last wafarin dose was prior to admission on 6/6. INR was 3.4 on admission. Warfarin held the last 2 days prior to OR today. S/p LUE AVF creation, permcath insertion, and PD catheter removal this afternoon.   CBC/CMP ~ baseline. No new DDIs noted. INR 1.9 this morning. Heparin gtt initiated this evening for bridging. Will resume warfarin at 5 mg tonight and re-assess INR tomorrow.     Plan:  5 mg   Education Material Provided?: No (established warfarin)    Pharmacist suggested discharge dosing: TBD pending INR trends. Likely warfarin 2.5 mg every Tue/Thu/Sat and 5 mg AOD. Patient will need close follow up with anticoagulation clinic within 2-3  days of discharge.      Maryam Kaminski, PharmD

## 2025-06-09 NOTE — PROGRESS NOTES
VASCULAR SURGERY SERVICE                        Progress Note  _________________________________    OR today for LUE AVF creation, permcath insertion, and removal of peritoneal catheter    I explained the details of the operation, alternatives, and potential risks, including but not limited to bleeding, infection, and risks of anesthesia.  All questions were answered. Patient understands and agrees to proceed.    Dontrell Sales MD  [facility]   [employer] preferred or call my office [phone]  __________________________________________________  Patient:Gurdeep Guerra   MRN:[id number]

## 2025-06-09 NOTE — PROGRESS NOTES
[facility] Progress Note    [employer] ordered by Dr. Mac. Connected aseptically to PD Cycler at 1845 for 10 hours using 3(2.5)% PD solution with 500u/L of heparin.    Pt stable, VSS, alert and oriented.   PD exit site CDI, No s/sx of infection, dressing changed.     Education provided to primary RN regarding troubleshooting.     Report given to ASHUTOSH Renae RN.     Call [facility]/On Call at [phone] for further assistance.

## 2025-06-09 NOTE — CARE PLAN
Problem: Knowledge Deficit - Standard  Goal: Patient and family/care givers will demonstrate understanding of plan of care, disease process/condition, diagnostic tests and medications  Outcome: Progressing     Problem: Seizure Precautions  Goal: Implementation of seizure precautions  Outcome: Progressing     Problem: Infection - Standard  Goal: Patient will remain free from infection  Outcome: Progressing   The patient is Stable - Low risk of patient condition declining or worsening    Shift Goals  Clinical Goals: Peritoneal dialysis, VSS, Safety, Education  Patient Goals: Rest  Family Goals: FARIDA    Progress made toward(s) clinical / shift goals:      Patient is not progressing towards the following goals:

## 2025-06-09 NOTE — CARE PLAN
The patient is Stable - Low risk of patient condition declining or worsening    Shift Goals  Clinical Goals: Peritoneal dialysis, VSS, Safety, Education  Patient Goals: Rest  Family Goals: FARIDA    Progress made toward(s) clinical / shift goals:      Problem: Knowledge Deficit - Standard  Goal: Patient and family/care givers will demonstrate understanding of plan of care, disease process/condition, diagnostic tests and medications  Outcome: Progressing     Problem: Seizure Precautions  Goal: Implementation of seizure precautions  Outcome: Progressing     Problem: Hemodynamics  Goal: Patient's hemodynamics, fluid balance and neurologic status will be stable or improve  Outcome: Progressing     Problem: Fluid Volume  Goal: Fluid volume balance will be maintained  Outcome: Progressing     Problem: Infection - Standard  Goal: Patient will remain free from infection  Outcome: Progressing     Problem: Pain - Standard  Goal: Alleviation of pain or a reduction in pain to the patient’s comfort goal  Outcome: Progressing

## 2025-06-09 NOTE — ANESTHESIA PROCEDURE NOTES
Airway    Date/Time: 6/9/2025 11:49 AM    Performed by: Sergio Flaherty M.D.  Authorized by: Sergio Flaherty M.D.    Location:  OR  Urgency:  Elective  Indications for Airway Management:  Anesthesia      Spontaneous Ventilation: absent    Sedation Level:  Deep  Preoxygenated: Yes    Patient Position:  Sniffing  Final Airway Type:  Endotracheal airway  Final Endotracheal Airway:  ETT  Cuffed: Yes    Technique Used for Successful ETT Placement:  Direct laryngoscopy    Insertion Site:  Oral  Blade Type:  Donna  Laryngoscope Blade/Videolaryngoscope Blade Size:  3  ETT Size (mm):  8.0  Measured from:  Teeth  ETT to Teeth (cm):  24  Placement Verified by: auscultation and capnometry    Cormack-Lehane Classification:  Grade I - full view of glottis  Number of Attempts at Approach:  1

## 2025-06-09 NOTE — DISCHARGE PLANNING
Case Management Discharge Planning    Admission Date: 6/7/2025  GMLOS: 4.4  ALOS: 2    6-Clicks ADL Score: 23  6-Clicks Mobility Score: 22      Anticipated Discharge Dispo:      DME Needed: No    Action(s) Taken: Updated in IDT rounds. Patient is anticipated to go into surgery today. Patients MRI and EEG were negative. MD stated that he will need an HD chair. LMSW [facility] dialysis coordinator to start the process. Patient lives in [facility] and would need a chair closer to home.    Addendum @1336: received a response from [facility] dialysis coordinator, she advised that she spoke to TANNER Magdaleno this morning and they are working on an in center HD chair for him their or in [place]. She stated that there should be confirmation tomorrow.    Escalations Completed: None    Medically Clear: No    Next Steps: Case management to follow for further discharge planning.    Barriers to Discharge: Medical clearance and Dialysis    Is the patient up for discharge tomorrow: No

## 2025-06-09 NOTE — PROGRESS NOTES
[facility] Progress Notes     Hemodialysis treatment x 3 hours completed as ordered per Dr. RADHAMES Mac.  Treatment performed at bedside started at 1505 and ended at 1806.       Net UF Removed:  2000 mL      Dialysis Input: 500 mL (200 mL NS prime + 300 mLNS rinseback)  Dialysis Output: 2500 mL     Hemodialysis treatment orders and labs reviewed, treatment location verified.  Treatment plan reviewed with patient, verbalized understanding, consent for treatment obtained.  Patient and access assessed pre, during and post treatment.  Patient s/p insertion of R IJ tunneled HD catheter with good patency and flow.  S/p creation of LUE AVF to left arm.  Patient tolerated treatment well with soft BPs, remained asymptomatic otherwise.   UF goal reached as tolerated.  Patient stable overall during and post treatment, no complaints.  See Dialysis Flowsheets under media for details.     Post tx access: Right IJ/chest tunneled HD catheter flushed with saline then locked with Heparin 1000 units/mL per designated amount in each lumen (see MAR) then clamped, capped aseptically and labeled properly. Heparin lock to be aspirated prior to next dialysis/CVC use by dialysis RN only. Please do not flush or draw from ports.  CVC dressings clean, dry and intact-initial dressing post placement.    Please notify Nephrologist/Dialysis for follow-up.     Report given to primary care nurse FRANKLIN Hernandez RN.

## 2025-06-09 NOTE — ANESTHESIA PREPROCEDURE EVALUATION
Case: [id number] Date/Time: 06/09/25 1032    Procedures:       CREATION, AV FISTULA (Left)      INSERTION, PERMCATHETER      REMOVAL, CATHETER, CAPD    Location: [facility] OR 06 / SURGERY [facility]    Surgeons: Dontrell Sales M.D.            Relevant Problems   ANESTHESIA   (positive) Obstructive sleep apnea syndrome      PULMONARY   (positive) Dyspnea on exertion      NEURO   (positive) New onset seizure (HCC)      CARDIAC   (positive) A-fib (HCC)   (positive) Aortic stenosis   (positive) Coronary artery disease due to lipid rich plaque   (positive) Dyspnea on exertion   (positive) Heart murmur   (positive) Hypertension   (positive) Paroxysmal atrial flutter (HCC)      GI   (positive) Gastroesophageal reflux disease         (positive) ESRD (end stage renal disease) (HCC)   (positive) FSGS (focal segmental glomerulosclerosis)   (positive) Peritoneal dialysis status (HCC)      Other   (positive) Chronic gout of multiple sites   (positive) Splenomegaly       Physical Exam    Airway   Mallampati: III  TM distance: >3 FB  Neck ROM: full       Cardiovascular - normal exam  Rhythm: regular  Rate: normal    (-) murmur     Dental - normal exam           Pulmonary - normal examBreath sounds clear to auscultation     Abdominal    Neurological - normal exam                   Anesthesia Plan    ASA 4       Plan - general       Airway plan will be ETT          Induction: intravenous    Postoperative Plan: Postoperative administration of opioids is intended.    Pertinent diagnostic labs and testing reviewed    Informed Consent:    Anesthetic plan and risks discussed with patient.    Use of blood products discussed with: patient whom consented to blood products.

## 2025-06-09 NOTE — LETTER
"PROCEDURE/SURGERY CLEARANCE FORM    Date: 6/10/2025   Patient Name: Gurdeep Guerra    Dear Surgeon or Proceduralist,     The above patient is cleared to have the following procedure/surgery:  catheter insertion   Additional comments: \"[facility]     Pt can hold warfarin 5 days prior to procedure without bridging with Lovenox d/t ESRD on HD. Pt has AC appt on 06/18/25, however since pt is currently admitted, will follow for discharge and ensure pt makes it to AC appt or to r/s if needed.     Veronica Bedolla, PharmD\"     Thank you for your request for cardiac stratification of our mutual patient Gurdeep Guerra 1965. We [facility]; and to the best of our understanding this patient has not had stenting, ablation, watchman, cardiothoracic surgery or hospitalization for cardiovascular reasons in the past 6 months.  Gurdeep Guerra has been seen within the past 15 months and is considered to have non-modifiable cardiac risk for this low-risk procedure/surgery. They may proceed from a cardiovascular standpoint and may hold their antiplatelet/anticoagulation as briefly as possible. Please have patient resume this medication when hemodynamically stable to do so.     Aspirin or Prasugrel   - hold 7 days prior to procedure/surgery, resume when hemodynamically stable      Clopidrogrel or Ticagrelor  - hold 7 days for all neurological procedures, hold 5 days prior to all other procedure/surgery,  resume when hemodynamically stable     Warfarin - hold 7 days for all neurological procedures, hold 5 days prior to all other procedure/surgery and coordinate with [facility] ([phone]) INR testing and dose management.      Pradaxa/Xarelto/Eliquis/Savesya - hold 1 day prior to procedure for low bleeding risk procedure, 2 days for high bleeding risk procedure, or consider holding 3 days or longer for patients with reduced kidney function (CrCl <30mL/min) or " spinal/cranial surgeries/procedures.      If they have a mechanical heart valve, please coordinate with [facility] ([phone]) the proper management of their anticoagulant in the periprocedural or perioperative period.      Some patients have higher risk for cardiovascular complications or holding medication. If our patient has had prior complications of holding antiplatelet or anticoagulants in the past and we have seen them after these events, we have addressed these concerns with the patient. They are at an unknown degree of increased risk for recurrent complication.  You may hold anticoagulation/antiplatelets for the procedure or surgery if the benefits of the procedure or surgery outweigh this nonmodifiable risk.      If Gurdeep Guerra 1965 has new symptoms of heart failure decompensation, unstable arrythmia, or angina please reach out and we will assess the patient.      If you have other patient-specific concerns, please feel free to reach out to the patient's cardiologist directly at [phone].     Thank you,   [facility]    __ _Electronically signed________  Provider: Dontrell Sharma M.D.

## 2025-06-09 NOTE — PROGRESS NOTES
Patient initially scheduled for vascular medicine appointment today  He was unable to attend because he is an inpatient  Will ask MA to reschedule low back medicine follow-up after discharge    Michael Bloch, MD  Vascular Care

## 2025-06-09 NOTE — OP REPORT
VASCULAR SURGERY SERVICE  Operative Note  __________________________________________________________    Date:  2025    Patient: Gurdeep Guerra  :  1965  MRN:  [id number]  __________________________________________________________    Preoperative Diagnosis:  - End-stage renal disease    Postoperative Diagnosis:  - End-stage renal disease    Procedure:  - Creation of a left brachiocephalic AV fistula (11527)  - Percutaneous access of the right IJ vein with ultrasound guidance and insertion of a right IJ tunneled cuffed dual lumen dialysis catheter (57795, 92964)  - Removal of CAPD catheter (62720)  __________________________________________________________    Surgeon:                                 Dontrell Sales MD    Assistant:   None    Anesthesia:                             General plus local anesthetic    EBL:                                        minimal     Complications:                        none    Disposition:                             Tolerated well, sent to recovery in stable condition      __________________________________________________________       DESCRIPTION OF PROCEDURE:  Following informed consent, patient was placed supine on the operating table and general anesthesia was administered. Patient was prepped and draped in the usual sterile fashion.  Surgical time-out was called and correct.  Patient received preoperative antibiotics.       Local anesthetic was infiltrated over the brachial artery and the nearby cephalic vein.  I then made a transverse incision overlying the vein and artery. The vein was dissected out circumferentially for a length of approximately 3 cm.  I then dissected further to identify the brachial artery, this was dissected out for a length of approximately 2 cm and it was encircled with vessel loops proximally and distally.  The patient was then systemically heparinized.  The cephalic vein was transected and dilated with vessel dilators and a 3 mm  vessel dilator was found to pass easily. A longitudinal arteriotomy was made in the artery.  The artery was antegrade flushed and then backbled and it was found to have brisk bleeding in both directions.   At this point, a torres was created on the vein and it was anastomosed to the artery using a running 6-0 Prolene suture line.  The artery was backbled and forward flushed prior to completing the anastomosis and the vein was also backbled as well.  The lumen was irrigated with heparinized saline.  The anastomosis was completed and the vein was allowed to perfuse and was found to have a good thrill.  The distal artery was then opened and it was found to have a brisk Doppler signal.  Topical hemostatic was applied and the patient's heparin was reversed with protamine. The incision was closed with multiple layers of absorbable suture and a dressing was applied.    Local anesthetic was infiltrated over the target vein.  Ultrasound was used to access the IJ vein and the wire passed easily.  A stab incision was made at the insertion point and a counter incision was made on the chest.  The new catheter was tunneled from the chest insertion point up to the neck incision.  The venotomy was serially dilated and then the catheter was placed through the peel-away sheath down into the vena cava and correct position was verified with fluoroscopy.  The catheter aspirated and flushed well. It was anchored in place with nylon suture.  It was flushed and then locked with concentrated heparin and then a bandage was applied as well as caps on the catheter. The skin incision at the base of the neck was closed with subcuticular absorbable suture and bandaged.      Local anesthetic was infiltrated in the skin overlying the course of the catheter between the 2 cuffs.  A transverse incision was made and the subcutaneous tissue was dissected with cautery to identify the catheter.  The catheter was put on traction to identify each cuff and  then the surrounding subcutaneous tissue and fascia was dissected free from each cuff, freeing it and allowing the catheter to be removed.  The catheter removed easily from the peritoneal space with no resistance.  The wound was irrigated and suctioned clean.  The fascia was closed with a 2-0 Vicryl suture.  The incision itself was closed with multiple layers of absorbable suture and then Steri-Strips and a dry sterile bandage were placed.       All counts were correct.  Patient tolerated the procedure well and was sent to recovery in stable condition.     ____________________________________________________    Dontrell Sales MD  [facility]

## 2025-06-09 NOTE — OR NURSING
Pt awake and oriented X4. VSS, thrill present on left arm fistula, dermabond CDI, elevated on pillow, warm blanket in place.  Right permacath  dressing CDI. Abdomen soft, dressing CDI, ice to site.   Pt tolerating orange juice, mild pain left arm, medicated per mar

## 2025-06-10 LAB
ANION GAP SERPL CALC-SCNC: 14 MMOL/L (ref 7–16)
BUN SERPL-MCNC: 45 MG/DL (ref 8–22)
CALCIUM SERPL-MCNC: 9 MG/DL (ref 8.5–10.5)
CHLORIDE SERPL-SCNC: 93 MMOL/L (ref 96–112)
CO2 SERPL-SCNC: 26 MMOL/L (ref 20–33)
CREAT SERPL-MCNC: 8.49 MG/DL (ref 0.5–1.4)
ERYTHROCYTE [DISTWIDTH] IN BLOOD BY AUTOMATED COUNT: 53.2 FL (ref 35.9–50)
GFR SERPLBLD CREATININE-BSD FMLA CKD-EPI: 7 ML/MIN/1.73 M 2
GLUCOSE SERPL-MCNC: 114 MG/DL (ref 65–99)
HCT VFR BLD AUTO: 32.1 % (ref 42–52)
HGB BLD-MCNC: 10.4 G/DL (ref 14–18)
INR PPP: 1.58 (ref 0.87–1.13)
MCH RBC QN AUTO: 32.2 PG (ref 27–33)
MCHC RBC AUTO-ENTMCNC: 32.4 G/DL (ref 32.3–36.5)
MCV RBC AUTO: 99.4 FL (ref 81.4–97.8)
PLATELET # BLD AUTO: 221 K/UL (ref 164–446)
PMV BLD AUTO: 9.2 FL (ref 9–12.9)
POTASSIUM SERPL-SCNC: 5.1 MMOL/L (ref 3.6–5.5)
PROTHROMBIN TIME: 18.9 SEC (ref 12–14.6)
RBC # BLD AUTO: 3.23 M/UL (ref 4.7–6.1)
SODIUM SERPL-SCNC: 133 MMOL/L (ref 135–145)
UFH PPP CHRO-ACNC: 0.54 IU/ML
UFH PPP CHRO-ACNC: 0.61 IU/ML
WBC # BLD AUTO: 7.9 K/UL (ref 4.8–10.8)

## 2025-06-10 PROCEDURE — 36415 COLL VENOUS BLD VENIPUNCTURE: CPT

## 2025-06-10 PROCEDURE — 700102 HCHG RX REV CODE 250 W/ 637 OVERRIDE(OP)

## 2025-06-10 PROCEDURE — 700102 HCHG RX REV CODE 250 W/ 637 OVERRIDE(OP): Performed by: STUDENT IN AN ORGANIZED HEALTH CARE EDUCATION/TRAINING PROGRAM

## 2025-06-10 PROCEDURE — 700102 HCHG RX REV CODE 250 W/ 637 OVERRIDE(OP): Performed by: NURSE PRACTITIONER

## 2025-06-10 PROCEDURE — 99233 SBSQ HOSP IP/OBS HIGH 50: CPT | Performed by: HOSPITALIST

## 2025-06-10 PROCEDURE — A9270 NON-COVERED ITEM OR SERVICE: HCPCS

## 2025-06-10 PROCEDURE — 700111 HCHG RX REV CODE 636 W/ 250 OVERRIDE (IP): Mod: JZ

## 2025-06-10 PROCEDURE — 700111 HCHG RX REV CODE 636 W/ 250 OVERRIDE (IP)

## 2025-06-10 PROCEDURE — A9270 NON-COVERED ITEM OR SERVICE: HCPCS | Performed by: NURSE PRACTITIONER

## 2025-06-10 PROCEDURE — 90935 HEMODIALYSIS ONE EVALUATION: CPT

## 2025-06-10 PROCEDURE — A9270 NON-COVERED ITEM OR SERVICE: HCPCS | Performed by: STUDENT IN AN ORGANIZED HEALTH CARE EDUCATION/TRAINING PROGRAM

## 2025-06-10 PROCEDURE — 80048 BASIC METABOLIC PNL TOTAL CA: CPT

## 2025-06-10 PROCEDURE — 85610 PROTHROMBIN TIME: CPT

## 2025-06-10 PROCEDURE — 85027 COMPLETE CBC AUTOMATED: CPT

## 2025-06-10 PROCEDURE — 700102 HCHG RX REV CODE 250 W/ 637 OVERRIDE(OP): Performed by: HOSPITALIST

## 2025-06-10 PROCEDURE — 770020 HCHG ROOM/CARE - TELE (206)

## 2025-06-10 PROCEDURE — 700111 HCHG RX REV CODE 636 W/ 250 OVERRIDE (IP): Performed by: STUDENT IN AN ORGANIZED HEALTH CARE EDUCATION/TRAINING PROGRAM

## 2025-06-10 PROCEDURE — 85520 HEPARIN ASSAY: CPT

## 2025-06-10 PROCEDURE — A9270 NON-COVERED ITEM OR SERVICE: HCPCS | Performed by: HOSPITALIST

## 2025-06-10 RX ORDER — LEVETIRACETAM 250 MG/1
500 TABLET ORAL EVERY EVENING
Status: DISCONTINUED | OUTPATIENT
Start: 2025-06-10 | End: 2025-06-14 | Stop reason: HOSPADM

## 2025-06-10 RX ORDER — HEPARIN SODIUM 1000 [USP'U]/ML
INJECTION, SOLUTION INTRAVENOUS; SUBCUTANEOUS
Status: COMPLETED
Start: 2025-06-10 | End: 2025-06-10

## 2025-06-10 RX ORDER — HYDROMORPHONE HYDROCHLORIDE 1 MG/ML
0.25 INJECTION, SOLUTION INTRAMUSCULAR; INTRAVENOUS; SUBCUTANEOUS EVERY 4 HOURS PRN
Status: DISCONTINUED | OUTPATIENT
Start: 2025-06-10 | End: 2025-06-14 | Stop reason: HOSPADM

## 2025-06-10 RX ORDER — MIDODRINE HYDROCHLORIDE 5 MG/1
10 TABLET ORAL
Status: DISCONTINUED | OUTPATIENT
Start: 2025-06-10 | End: 2025-06-11

## 2025-06-10 RX ADMIN — HEPARIN SODIUM 1600 UNITS: 1000 INJECTION, SOLUTION INTRAVENOUS; SUBCUTANEOUS at 17:47

## 2025-06-10 RX ADMIN — SEVELAMER CARBONATE 800 MG: 800 TABLET, FILM COATED ORAL at 11:52

## 2025-06-10 RX ADMIN — OXYCODONE HYDROCHLORIDE 10 MG: 10 TABLET ORAL at 18:30

## 2025-06-10 RX ADMIN — OXYCODONE HYDROCHLORIDE 10 MG: 10 TABLET ORAL at 14:06

## 2025-06-10 RX ADMIN — LEVETIRACETAM 500 MG: 250 TABLET, FILM COATED ORAL at 18:31

## 2025-06-10 RX ADMIN — TRAZODONE HYDROCHLORIDE 100 MG: 100 TABLET ORAL at 20:13

## 2025-06-10 RX ADMIN — WARFARIN SODIUM 5 MG: 5 TABLET ORAL at 18:37

## 2025-06-10 RX ADMIN — GABAPENTIN 100 MG: 100 CAPSULE ORAL at 11:52

## 2025-06-10 RX ADMIN — HEPARIN SODIUM 18 UNITS/KG/HR: 5000 INJECTION, SOLUTION INTRAVENOUS at 14:14

## 2025-06-10 RX ADMIN — HEPARIN SODIUM 1700 UNITS: 1000 INJECTION, SOLUTION INTRAVENOUS; SUBCUTANEOUS at 17:48

## 2025-06-10 RX ADMIN — EZETIMIBE 10 MG: 10 TABLET ORAL at 05:08

## 2025-06-10 RX ADMIN — OMEPRAZOLE 20 MG: 20 CAPSULE, DELAYED RELEASE ORAL at 06:00

## 2025-06-10 RX ADMIN — SEVELAMER CARBONATE 800 MG: 800 TABLET, FILM COATED ORAL at 18:29

## 2025-06-10 RX ADMIN — OXYCODONE HYDROCHLORIDE 10 MG: 10 TABLET ORAL at 07:32

## 2025-06-10 RX ADMIN — OXYCODONE HYDROCHLORIDE 10 MG: 10 TABLET ORAL at 01:05

## 2025-06-10 RX ADMIN — PROMETHAZINE HYDROCHLORIDE 25 MG: 25 TABLET ORAL at 08:06

## 2025-06-10 RX ADMIN — GABAPENTIN 100 MG: 100 CAPSULE ORAL at 18:30

## 2025-06-10 RX ADMIN — HYDROMORPHONE HYDROCHLORIDE 0.25 MG: 1 INJECTION, SOLUTION INTRAMUSCULAR; INTRAVENOUS; SUBCUTANEOUS at 20:12

## 2025-06-10 RX ADMIN — SEVELAMER CARBONATE 800 MG: 800 TABLET, FILM COATED ORAL at 07:33

## 2025-06-10 RX ADMIN — MIDODRINE HYDROCHLORIDE 10 MG: 5 TABLET ORAL at 16:07

## 2025-06-10 RX ADMIN — ASPIRIN 81 MG: 81 TABLET, COATED ORAL at 05:07

## 2025-06-10 RX ADMIN — CARVEDILOL 3.12 MG: 3.12 TABLET, FILM COATED ORAL at 07:32

## 2025-06-10 RX ADMIN — GABAPENTIN 100 MG: 100 CAPSULE ORAL at 05:07

## 2025-06-10 RX ADMIN — HYDROMORPHONE HYDROCHLORIDE 0.25 MG: 1 INJECTION, SOLUTION INTRAMUSCULAR; INTRAVENOUS; SUBCUTANEOUS at 03:45

## 2025-06-10 RX ADMIN — ROSUVASTATIN CALCIUM 20 MG: 20 TABLET, FILM COATED ORAL at 18:29

## 2025-06-10 ASSESSMENT — PAIN DESCRIPTION - PAIN TYPE
TYPE: ACUTE PAIN
TYPE: ACUTE PAIN;SURGICAL PAIN
TYPE: ACUTE PAIN

## 2025-06-10 ASSESSMENT — FIBROSIS 4 INDEX: FIB4 SCORE: 0.72

## 2025-06-10 ASSESSMENT — ENCOUNTER SYMPTOMS
NAUSEA: 0
VOMITING: 0
BLURRED VISION: 0
MYALGIAS: 0
HEADACHES: 1
ABDOMINAL PAIN: 0
SHORTNESS OF BREATH: 0
SORE THROAT: 0
DIZZINESS: 0
SEIZURES: 1
LOSS OF CONSCIOUSNESS: 1

## 2025-06-10 NOTE — PROGRESS NOTES
Monitor Summary  Rhythm: SR  Rate: 79-93  Ectopy: (F) PVC couplets, trigeminal PVC's  Measurements: .18/.06/.35     --NO IMAGE AVAILABLE--

## 2025-06-10 NOTE — PROGRESS NOTES
"[facility] -  PROGRESS NOTE               Author: Justice Mac M.D. Date & Time: 6/10/2025  7:50 AM     HPI:  60-year-old male with ESRD on peritoneal dialysis who presented after new onset seizure episodes.  Recent hospitalization at [facility] with concern for failing peritoneal dialysis.  He was transiently transition to hemodialysis and felt improved on HD but  wanted to try one more attempt at PD with increased outpatient Rx.  His outpatient prescription was increased to 6 x 2 L fills over 10 hours.  He has continued to feel suboptimal and plan was set in place to transition to hemodialysis after outpatient permacath placement, which has not happened yet. He then had several episodes of seizures at home on day of admission.  Eventually transferred from outside facility for further management.  Upon arrival to [facility] found to have a potassium of 5.5. Underwent PD last night. Potassium resolved. He has some confusion today and feels overall off. No chest pain or SoB. No abd pain. Pending EEG     DAILY NEPHROLOGY SUMMARY:  6/8: Consult done  6/9: Tolerated PD, no further seizure activity, pending permacath and PD cath removal and fistula creation  6/10: s/p permacath placement, AVF creation and PD cath removal, tolerated HD, pain at l. Arm by AVF site. No chest pain or SOB    PAST FAMILY HISTORY: Reviewed and Unchanged  SOCIAL HISTORY: Reviewed and Unchanged  CURRENT MEDICATIONS: Reviewed  IMAGING STUDIES: Reviewed    ROS  10 Point ROS performed, negative other than stated above    PHYSICAL EXAM  VS:  /60   Pulse 84   Temp 36.5 °C (97.7 °F) (Temporal)   Resp 20   Ht 1.702 m (5' 7\")   Wt 80.9 kg (178 lb 5.6 oz)   SpO2 99%   BMI 27.93 kg/m²   GENERAL: no acute distress  CV: RRR, No edema  RESP: non-labored  GI: Soft  MSK: No joint deformities   SKIN: No concerning rashes  NEURO: AOx3  PSYCH: Cooperative    Fluids:  In: 1500 [I.V.:600; Dialysis:500]  Out: 2530 "     LABS:  Recent Results (from the past 24 hours)   Heparin Xa (Unfractionated)    Collection Time: 06/09/25  6:10 PM   Result Value Ref Range    Heparin Xa (UFH) <0.10 IU/mL   Prothrombin Time    Collection Time: 06/09/25  6:10 PM   Result Value Ref Range    PT 17.6 (H) 12.0 - 14.6 sec    INR 1.44 (H) 0.87 - 1.13   APTT    Collection Time: 06/09/25  6:10 PM   Result Value Ref Range    APTT 28.7 24.7 - 36.0 sec   Heparin Xa (Unfractionated)    Collection Time: 06/10/25  1:02 AM   Result Value Ref Range    Heparin Xa (UFH) 0.54 IU/mL   Prothrombin Time    Collection Time: 06/10/25  5:53 AM   Result Value Ref Range    PT 18.9 (H) 12.0 - 14.6 sec    INR 1.58 (H) 0.87 - 1.13   Basic Metabolic Panel    Collection Time: 06/10/25  5:53 AM   Result Value Ref Range    Sodium 133 (L) 135 - 145 mmol/L    Potassium 5.1 3.6 - 5.5 mmol/L    Chloride 93 (L) 96 - 112 mmol/L    Co2 26 20 - 33 mmol/L    Glucose 114 (H) 65 - 99 mg/dL    Bun 45 (H) 8 - 22 mg/dL    Creatinine 8.49 (HH) 0.50 - 1.40 mg/dL    Calcium 9.0 8.5 - 10.5 mg/dL    Anion Gap 14.0 7.0 - 16.0   CBC WITHOUT DIFFERENTIAL    Collection Time: 06/10/25  5:53 AM   Result Value Ref Range    WBC 7.9 4.8 - 10.8 K/uL    RBC 3.23 (L) 4.70 - 6.10 M/uL    Hemoglobin 10.4 (L) 14.0 - 18.0 g/dL    Hematocrit 32.1 (L) 42.0 - 52.0 %    MCV 99.4 (H) 81.4 - 97.8 fL    MCH 32.2 27.0 - 33.0 pg    MCHC 32.4 32.3 - 36.5 g/dL    RDW 53.2 (H) 35.9 - 50.0 fL    Platelet Count 221 164 - 446 K/uL    MPV 9.2 9.0 - 12.9 fL   Heparin Xa (Unfractionated)    Collection Time: 06/10/25  5:53 AM   Result Value Ref Range    Heparin Xa (UFH) 0.61 IU/mL   ESTIMATED GFR    Collection Time: 06/10/25  5:53 AM   Result Value Ref Range    GFR (CKD-EPI) 7 (A) >60 mL/min/1.73 m 2       (click the triangle to expand results)      ASSESSMENT:  # ESRD  -Concerned that he is now failed peritoneal dialysis and had been pending transition to HD outpatient   -Converted to HD while inpatient   -s/p permacath and AVF  creation on 6/9  # Hyperkalemia   - resolved   # HTN  # Anemia of CKD  # Gout  # Chest Pain  # Afib   # New onset seizures     SUGGESTIONS  -No HD today, plan to switch to MWF schedule going forward  -CM assistance in securing HD unit  -APPLE with HD  -No dietary protein restrictions  -Phos binder with meals  -Home BP meds  -Dose all meds per ESRD    Ok for discharge from neph standpoint when HD unit secured     Discussed with hospitalist     Thank you,     Justice Mac MD

## 2025-06-10 NOTE — PROGRESS NOTES
Inpatient Anticoagulation Service Note for 6/10/2025      Reason for Anticoagulation: On-X Aortic Valve Replacement, Atrial Fibrillation                   Hemoglobin Value: (!) 10.4  Hematocrit Value: (!) 32.1  Lab Platelet Value: 221  Target INR: 2.0 to 3.0     Date/Time INR Value    06/10/25 0553 1.58     06/09/25 1810 1.44     06/09/25 0401 1.89     06/08/25 1207 2.58     06/08/25 0617 2.86       Date/Time Dose (mg)    06/10/25 1353 5     06/09/25 1610 5     06/08/25 1256 0      Average Dose (mg):  (PTA dose: warfarin 5 mg PO daily)  Significant Interactions: Antiplatelet Medications  Bridge Therapy: Yes     Assessment:  60-year-old male with PMH significant for ESRD on HD, MV repair (per OSH records), On-X AV replacement (2023) & reported history of Afib/Flutter on warfarin, followed by [facility]. Unclear INR goal in the chart, per anticoagulation clinic notes/OSH notes, INR goal is listed as 1.5-2 given On-X AVR in 2023. However, patient does have a noted history of Afib/Flutter as well. Warfarin INR goal adjusted to 2-3 inpatient to account for Afib as well per discussion with MD.   Outpatient anticoagulation clinic notes reviewed. Most recent appointment was on 5/21, patient was instructed to take warfarin 2.5 mg x1 on 5/21 then resume warfarin 5 mg PO daily thereafter until next appointment in ~ 4 weeks.   CBC/CMP ~ baseline. No new DDIs noted.   Per vascular surgery note today: small hematoma at the incision site in his left antecubital fossa. It is not tense or expanding and therefore should be self-limiting. We will monitor for now. I recommended that we place an ice pack on it throughout the day to help reduce the swelling.   Monitor closely, may need to hold heparin gtt if worsening   INR sub-therapeutic as expected due to holding warfarin doses on 6/7 & 6/8. Warfarin was resumed last night. Anticipating we will start seeing a trend up tomorrow since it takes ~48 hours to see  warfarin effects on INR. Continue with 5 mg tonight + heparin gtt for bridging.     Plan:  5 mg   Education Material Provided?: No (established warfarin)    Pharmacist suggested discharge dosing: TBD pending INR trends. Likely warfarin 5 mg PO daily with close INR follow up within 48 hours of discharge.      Maryam Kaminski, PharmD

## 2025-06-10 NOTE — DISCHARGE PLANNING
Outpatient Dialysis Note    Patient has been placed for in center HD at:    [facility] ([facility])  [address]  [place] [address]    Ph: [phone]    Schedule: Tues, Thurs, Sat   Time: 10:00 AM    Patient to start Saturday, 6/14 at 10:00 AM    JOSE Michael and nephrologist  Dr. Mac notified of placement confirmation.       Xitlaly Reyes   Dialysis Coordinator / [facility]   Ph: [phone]

## 2025-06-10 NOTE — PROGRESS NOTES
Heparin drip started when HD finished.  MD made aware earlier for late start of heparin due to patient having hemodialysis.

## 2025-06-10 NOTE — CARE PLAN
The patient is Stable - Low risk of patient condition declining or worsening    Shift Goals  Clinical Goals: VSS, Pain control, Safety  Patient Goals: Rest, Pain control  Family Goals: FARIDA    Progress made toward(s) clinical / shift goals:      Problem: Knowledge Deficit - Standard  Goal: Patient and family/care givers will demonstrate understanding of plan of care, disease process/condition, diagnostic tests and medications  Outcome: Progressing     Problem: Seizure Precautions  Goal: Implementation of seizure precautions  Outcome: Progressing     Problem: Hemodynamics  Goal: Patient's hemodynamics, fluid balance and neurologic status will be stable or improve  Outcome: Progressing     Problem: Fluid Volume  Goal: Fluid volume balance will be maintained  Outcome: Progressing     Problem: Infection - Standard  Goal: Patient will remain free from infection  Outcome: Progressing     Problem: Pain - Standard  Goal: Alleviation of pain or a reduction in pain to the patient’s comfort goal  Outcome: Progressing

## 2025-06-10 NOTE — PROGRESS NOTES
"                 VASCULAR SURGERY               Inpatient Progress Note  _________________________________    Vitals   BP 92/59 Comment: RN notified   Pulse 66   Temp 36.6 °C (97.9 °F) (Temporal)   Resp 18   Ht 1.702 m (5' 7\")   Wt 80.9 kg (178 lb 5.6 oz)   SpO2 96%   BMI 27.93 kg/m²   _________________________________    History:  6/9/25 KEVIN Todd AVF, remove CAPD cath (Mónica/Ronald)    Today:  Stable overnight.  Patient is reporting left arm discomfort within expectations for the procedure    He has developed a small hematoma at the incision site in his left antecubital fossa.  It is not tense or expanding and therefore should be self-limiting.  We will monitor for now.  I recommended that we place an ice pack on it throughout the day to help reduce the swelling.    Fistula is patent with a strong thrill    Left radial and ulnar Doppler signals are present on exam.  Motor and sensory exam of the left hand is intact      A/P)  No major concerns from vascular surgery standpoint.  The hematoma in the left antecubital fossa should be self-limiting and we will monitor it for now.  If it is persistent or worsening I will need to take the patient back to washout the hematoma.    Appreciate Hospitalist service's support  Following along      Dontrell Sales MD  [facility]  Voalte preferred or call my office [phone]  __________________________________________________________________  Patient:Gurdeep Guerra   MRN:[id number]   CSN:[id number]    "

## 2025-06-10 NOTE — PROGRESS NOTES
Bedside reported received from night shift. Pt on tele running SR 97. Pt reporting a lot of pain in the lt arm stating his arm is cold. Able to move finger normally reported some numbness to his fingers.  Call light within reach and all needs met.      0940 Message sent to vascular surg about lt arm condition. Primary MD had already been notified.

## 2025-06-10 NOTE — DISCHARGE PLANNING
Care Transition Team Assessment    Patient is a 60 year-old male admitted for new onset seizure. Please see patient's H&P for prior medical history. LMSW met with patient at bedside to complete assessment. Patient is A&Ox4 and able to verify the information on the face sheet. Patient lives with girlfriend Paulette Connors in a 1 story house with 1 steps to enter, Paulette (p) [phone]; emergency contact. No Advance Directive on file, patient currently working on completing AD and will provide hospital with copy. Prior to admission patient is independent with ADL's and IADL’s. Patient uses a cane. Patient reported that Paulette Connors is good support for when he is medically clear to discharge home. Patient reports, they work at [employer], and is working on getting short-term disability while he gets his medical situation under control. The patient's PCP is Dr. Drew Blumberg. Patient's preferred pharmacy is [facility] in [facility] . Patient denies a history of SNF/IPR nor HHC use in the past. Patient denies any SA or MH concerns. Patient's confirmed medical coverage via [place] [employer] and secondary Medicare. Patient has means of transportation when medically cleared and girlfriend Paulette will provide transport once stable for discharge.      Patient requested a work note for his work. LMSW provided a work note stating when he was admitted and anticipated discharge date of 6/11/2025.    Information Source  Orientation Level: Oriented X4  Information Given By: Patient  Who is responsible for making decisions for patient? : Patient    Readmission Evaluation  Is this a readmission?: No    Elopement Risk  Legal Hold: No  Ambulatory or Self Mobile in Wheelchair: Yes  Disoriented: No  Psychiatric Symptoms: None  History of Wandering: No  Elopement this Admit: No  Vocalizing Wanting to Leave: No  Displays Behaviors, Body Language Wanting to Leave: No-Not at Risk for Elopement  Elopement Risk: Not at Risk for  Elopement    Interdisciplinary Discharge Planning  Lives with - Patient's Self Care Capacity: Significant Other  Patient or legal guardian wants to designate a caregiver: Yes  Caregiver name: Paulette Connors  Caregiver contact info: [phone]  Support Systems: Spouse / Significant Other  Housing / Facility: 1 Story Apartment / Condo    Discharge Preparedness  What is your plan after discharge?: Home with help  What are your discharge supports?: Partner  Prior Functional Level: Ambulatory  Difficulity with ADLs: None  Difficulity with IADLs: None    Functional Assesment  Prior Functional Level: Ambulatory    Finances  Financial Barriers to Discharge: No  Prescription Coverage: Yes    Vision / Hearing Impairment  Vision Impairment : No  Right Eye Vision: Impaired, Wears Glasses  Left Eye Vision: Impaired, Wears Glasses  Hearing Impairment : No         Advance Directive  Advance Directive?: None  Advance Directive offered?: AD Booklet given    Domestic Abuse  Possible Abuse/Neglect Reported to:: Not Applicable    Psychological Assessment  History of Substance Abuse: None  History of Psychiatric Problems: No  Non-compliant with Treatment: No  Newly Diagnosed Illness: No    Discharge Risks or Barriers  Patient risk factors: Complex medical needs, Readmission    Anticipated Discharge Information  Discharge Disposition: Discharged to home/self care (01)

## 2025-06-10 NOTE — PROGRESS NOTES
Hospital Medicine Daily Progress Note    Date of Service  6/10/2025    Chief Complaint  Gurdeep Guerra is a 60 y.o. male admitted 6/7/2025 with seizure like activity     Hospital Course        This is a 54-year-old female with a past medical history significant for ESRD on PD, mechanical MVR (on Coumadin )with aortic aneurysm dissection ended on 6/7/2025 with seizure-like episode.  Apparently patient had several episode of seizure-like activity at home stated that he does not remember what happened    Patient girlfriend has noted this episode and noted to have loss of consciousness clenching of his jaws, [facility] for higher level of care    EEG did not show any seizure, MRI of the brain did not show any evidence of mesial temporal sclerosis.  Workup CT chest showing previous aortic valvuloplasty severe CAD; CT abdomen pelvis showing atrophic kidney, 6mm vascular calcification in the left lower renal hilum.    During the stay in the hospital, nephrology was consulted, patient was started on hemodialysis TTS vascular was consulted, patient underwent creation of left brachiocephalic AV fistula.  Currently patient is on heparin drip along with Coumadin    Interval events:  -- Patient is alert, awake, answering questions appropriately, vital sign has been reviewed  --Patient stated that he has shaking recanting of discharge and amnestic episode.  I discussed with the neurologist Dr.Luigi Howell; after discussing with him, will start Keppra 250 twice daily ; will dose according to renal function  -- vascular following, will follow their  recommendation, continue heparin drip along with Coumadin, patient INR needs to be 2-3  --Nephrology following, plan is to start dialysis TTS  -- he will be going for dialysis today   Blood pressure is noted to be on the low side, will provide midodrine 5 tid  I have discussed this patient's plan of care and discharge plan at IDT  rounds today with Case Management, Nursing, Nursing leadership, and other members of the IDT team.    Consultants/Specialty  nephrology and vascular surgery    Code Status  Full Code    Disposition  The patient is not medically cleared for discharge to home or a post-acute facility.  Anticipate discharge to: home with close outpatient follow-up    I have placed the appropriate orders for post-discharge needs.    Review of Systems  Review of Systems   Constitutional:  Positive for malaise/fatigue.   HENT:  Negative for congestion and sore throat.    Eyes:  Negative for blurred vision.   Respiratory:  Negative for shortness of breath.    Cardiovascular:  Negative for chest pain.   Gastrointestinal:  Negative for abdominal pain, nausea and vomiting.   Musculoskeletal:  Positive for joint pain. Negative for myalgias.   Neurological:  Positive for seizures, loss of consciousness and headaches. Negative for dizziness.        Physical Exam  Temp:  [36.3 °C (97.3 °F)-36.6 °C (97.9 °F)] 36.6 °C (97.9 °F)  Pulse:  [61-99] 66  Resp:  [16-20] 18  BP: ()/(53-72) 92/59  SpO2:  [93 %-100 %] 96 %    Physical Exam  Vitals and nursing note reviewed.   Constitutional:       General: He is not in acute distress.     Appearance: Normal appearance. He is not ill-appearing.   HENT:      Head: Normocephalic and atraumatic.      Comments: Post surgical changes to the Rt neck   Eyes:      Extraocular Movements: Extraocular movements intact.   Cardiovascular:      Rate and Rhythm: Normal rate.      Heart sounds: Murmur heard.   Pulmonary:      Effort: Pulmonary effort is normal.   Abdominal:      General: Bowel sounds are normal.      Palpations: Abdomen is soft.      Comments: PD cath in place   Musculoskeletal:      Cervical back: Neck supple.      Comments: Post up LUE fistula creation/dressing in place, new Rt chest HD cath. Post op dressing over abdomen     Neurological:      Mental Status: He is alert and oriented to person,  place, and time. Mental status is at baseline.      Cranial Nerves: No cranial nerve deficit.   Psychiatric:         Mood and Affect: Mood normal.         Behavior: Behavior normal.         Thought Content: Thought content normal.         Judgment: Judgment normal.         Fluids    Intake/Output Summary (Last 24 hours) at 6/10/2025 1431  Last data filed at 6/9/2025 1910  Gross per 24 hour   Intake 900 ml   Output 2500 ml   Net -1600 ml        Laboratory  Recent Labs     06/08/25  0617 06/09/25  0401 06/10/25  0553   WBC 5.2 4.8 7.9   RBC 3.06* 3.07* 3.23*   HEMOGLOBIN 9.8* 9.9* 10.4*   HEMATOCRIT 30.5* 30.7* 32.1*   MCV 99.7* 100.0* 99.4*   MCH 32.0 32.2 32.2   MCHC 32.1* 32.2* 32.4   RDW 54.6* 54.4* 53.2*   PLATELETCT 200 216 221   MPV 9.2 9.4 9.2     Recent Labs     06/08/25  0617 06/09/25  0401 06/10/25  0553   SODIUM 138 137 133*   POTASSIUM 4.4 4.8 5.1   CHLORIDE 97 96 93*   CO2 21 20 26   GLUCOSE 132* 97 114*   BUN 78* 75* 45*   CREATININE 12.70* 12.80* 8.49*   CALCIUM 9.2 9.4 9.0     Recent Labs     06/08/25  1207 06/09/25  0401 06/09/25  1810 06/10/25  0553   APTT 34.7  --  28.7  --    INR 2.58* 1.89* 1.44* 1.58*               Imaging  DX-PORTABLE FLUORO > 1 HOUR   Final Result      Portable fluoroscopy utilized for 3 seconds.      INTERPRETING LOCATION: [address] [facility], [address]      DX-CHEST-LIMITED (1 VIEW)   Final Result      Digitized intraoperative radiograph is submitted for review. This examination is not for diagnostic purpose but for guidance during a surgical procedure. Please see the patient's chart for full procedural details.         INTERPRETING LOCATION: [address] [facility], [address]      MR-BRAIN-WITH & W/O   Final Result      1.  Mild chronic microvascular ischemic type changes.   2.  No acute intracranial abnormality or pathologic enhancement.   3.  No evidence of mesial temporal sclerosis.           Assessment/Plan  * New onset seizure (HCC)  Assessment & Plan  Noted to have 3 seizure  episodes at home, his significant other reports loss of consciousness with shaking of his extremities, patient denies history of seizures  EEG was unremarkable his MRI brain was also unremarkable   I discussed the case with neurology, considering patient concerning feature, please have the patient on low-dose of Keppra 250 twice daily,  Continue Cipro precaution aspiration, fall precaution, will provide follow-up with neurology as an outpatient    Hyperkalemia- (present on admission)  Assessment & Plan  Potassium 5.5 without any evidence of EKG changes  Nephrology consulted, peritoneal dialysis per nephro   K 5.1 today     Peritoneal dialysis status (HCC)- (present on admission)  Assessment & Plan  Pt with ESRD on PD, was in the process of switching to HD   Discussed with her surgery today he is postop left upper extremity fistula creation, permacath placement he has been started on hemodialysis today  Discussed with vascular surgery and nephrology  He will need a hemodialysis chair prior to discharge    A-fib (HCC)- (present on admission)  Assessment & Plan  Currently in sinus  On coumadin at baseline, heparin bridge for now  Goal INR 2-3 after chart review and discussion with pharmacy   Rate controlled     History of mechanical aortic valve replacement- (present on admission)  Assessment & Plan  History of mechanical aortic valve replacement, INR 3.4 at outside hospital   -- patient underwent placement of a PermCath and creation of AV fistula by Dr. Sales   -- Will continue heparin drip along with Coumadin per pharmacy protocol    Hyperlipidemia  Assessment & Plan  Crestor          VTE prophylaxis: heparin       Greater than 55  minutes spent prepping to see patient (e.g. review of tests) obtaining and/or reviewing separately obtained history. Performing a medically appropriate examination and/ evaluation.  Counseling and educating the patient/family/caregiver.  Ordering medications, tests, or procedures.   Referring and communicating with other health care professionals.  Documenting clinical information in EPIC.  Independently interpreting results and communicating results to patient/family/caregiver.  Care coordination.

## 2025-06-10 NOTE — CARE PLAN
The patient is Stable - Low risk of patient condition declining or worsening    Shift Goals  Clinical Goals: VSS  Patient Goals: comfort  Family Goals: FARIDA    Progress made toward(s) clinical / shift goals:    Problem: Seizure Precautions  Goal: Implementation of seizure precautions  Outcome: Progressing     Problem: Hemodynamics  Goal: Patient's hemodynamics, fluid balance and neurologic status will be stable or improve  Outcome: Progressing     Problem: Pain - Standard  Goal: Alleviation of pain or a reduction in pain to the patient’s comfort goal  Outcome: Progressing       Patient is not progressing towards the following goals:

## 2025-06-10 NOTE — PROGRESS NOTES
"[facility] -  PROGRESS NOTE               Author: Justice Mac M.D. Date & Time: 6/10/2025  12:45 PM     HPI:  60-year-old male with ESRD on peritoneal dialysis who presented after new onset seizure episodes.  Recent hospitalization at [facility] with concern for failing peritoneal dialysis.  He was transiently transition to hemodialysis and felt improved on HD but  wanted to try one more attempt at PD with increased outpatient Rx.  His outpatient prescription was increased to 6 x 2 L fills over 10 hours.  He has continued to feel suboptimal and plan was set in place to transition to hemodialysis after outpatient permacath placement, which has not happened yet. He then had several episodes of seizures at home on day of admission.  Eventually transferred from outside facility for further management.  Upon arrival to [facility] found to have a potassium of 5.5. Underwent PD last night. Potassium resolved. He has some confusion today and feels overall off. No chest pain or SoB. No abd pain. Pending EEG     DAILY NEPHROLOGY SUMMARY:  6/8: Consult done  6/9: Tolerated PD, no further seizure activity, pending permacath and PD cath removal and fistula creation  6/10: s/p permacath placement, AVF creation and PD cath removal, tolerated HD, pain at l. Arm by AVF site. No chest pain or SOB    PAST FAMILY HISTORY: Reviewed and Unchanged  SOCIAL HISTORY: Reviewed and Unchanged  CURRENT MEDICATIONS: Reviewed  IMAGING STUDIES: Reviewed    ROS  10 Point ROS performed, negative other than stated above    PHYSICAL EXAM  VS:  BP 92/59 Comment: RN notified   Pulse 66   Temp 36.6 °C (97.9 °F) (Temporal)   Resp 18   Ht 1.702 m (5' 7\")   Wt 80.9 kg (178 lb 5.6 oz)   SpO2 96%   BMI 27.93 kg/m²   GENERAL: no acute distress  CV: RRR, No edema  RESP: non-labored  GI: Soft  MSK: No joint deformities   SKIN: No concerning rashes  NEURO: AOx3  PSYCH: Cooperative  ACCESS: LUE AVF with ecchymosis     Fluids:  In: " 1500 [I.V.:600; Dialysis:500]  Out: 2530     LABS:  Recent Results (from the past 24 hours)   Heparin Xa (Unfractionated)    Collection Time: 06/09/25  6:10 PM   Result Value Ref Range    Heparin Xa (UFH) <0.10 IU/mL   Prothrombin Time    Collection Time: 06/09/25  6:10 PM   Result Value Ref Range    PT 17.6 (H) 12.0 - 14.6 sec    INR 1.44 (H) 0.87 - 1.13   APTT    Collection Time: 06/09/25  6:10 PM   Result Value Ref Range    APTT 28.7 24.7 - 36.0 sec   Heparin Xa (Unfractionated)    Collection Time: 06/10/25  1:02 AM   Result Value Ref Range    Heparin Xa (UFH) 0.54 IU/mL   Prothrombin Time    Collection Time: 06/10/25  5:53 AM   Result Value Ref Range    PT 18.9 (H) 12.0 - 14.6 sec    INR 1.58 (H) 0.87 - 1.13   Basic Metabolic Panel    Collection Time: 06/10/25  5:53 AM   Result Value Ref Range    Sodium 133 (L) 135 - 145 mmol/L    Potassium 5.1 3.6 - 5.5 mmol/L    Chloride 93 (L) 96 - 112 mmol/L    Co2 26 20 - 33 mmol/L    Glucose 114 (H) 65 - 99 mg/dL    Bun 45 (H) 8 - 22 mg/dL    Creatinine 8.49 (HH) 0.50 - 1.40 mg/dL    Calcium 9.0 8.5 - 10.5 mg/dL    Anion Gap 14.0 7.0 - 16.0   CBC WITHOUT DIFFERENTIAL    Collection Time: 06/10/25  5:53 AM   Result Value Ref Range    WBC 7.9 4.8 - 10.8 K/uL    RBC 3.23 (L) 4.70 - 6.10 M/uL    Hemoglobin 10.4 (L) 14.0 - 18.0 g/dL    Hematocrit 32.1 (L) 42.0 - 52.0 %    MCV 99.4 (H) 81.4 - 97.8 fL    MCH 32.2 27.0 - 33.0 pg    MCHC 32.4 32.3 - 36.5 g/dL    RDW 53.2 (H) 35.9 - 50.0 fL    Platelet Count 221 164 - 446 K/uL    MPV 9.2 9.0 - 12.9 fL   Heparin Xa (Unfractionated)    Collection Time: 06/10/25  5:53 AM   Result Value Ref Range    Heparin Xa (UFH) 0.61 IU/mL   ESTIMATED GFR    Collection Time: 06/10/25  5:53 AM   Result Value Ref Range    GFR (CKD-EPI) 7 (A) >60 mL/min/1.73 m 2       (click the triangle to expand results)      ASSESSMENT:  # ESRD  -Concerned that he is now failed peritoneal dialysis and had been pending transition to HD outpatient   -Converted to HD  while inpatient   -s/p permacath and AVF creation on 6/9  # Hyperkalemia   - resolved   # HTN  # Anemia of CKD  # Gout  # Chest Pain  # Afib   # New onset seizures     SUGGESTIONS  -HD today for TTS schedule going forward (Confirmed TTS outpatient schedule going forward)  -APPLE with HD  -No dietary protein restrictions  -Phos binder with meals  -Home BP meds  -Dose all meds per ESRD    Ok for discharge from neph standpoint when HD unit secured     Discussed with hospitalist     Thank you,     Justice Mac MD

## 2025-06-10 NOTE — PROGRESS NOTES
Bedside report received and patient care assumed. Pt is resting in bed, A&O 4, with complaints of abdominal pain and left arm pain, and is on 1L NC. Tele box on. All fall precautions are in place, belongings at bedside table.  Pt was updated on POC, no questions or concerns. Pt educated on use of call light for assistance.

## 2025-06-10 NOTE — TELEPHONE ENCOUNTER
[facility]    Pt can hold warfarin 5 days prior to procedure without bridging with Lovenox d/t ESRD on HD. Pt has AC appt on 06/18/25, however since pt is currently admitted, will follow for discharge and ensure pt makes it to AC appt or to r/s if needed.    Veronica Bedolla, PharmD

## 2025-06-11 ENCOUNTER — APPOINTMENT (OUTPATIENT)
Dept: RADIOLOGY | Facility: MEDICAL CENTER | Age: 60
DRG: 674 | End: 2025-06-11
Attending: HOSPITALIST
Payer: COMMERCIAL

## 2025-06-11 LAB
ALBUMIN SERPL BCP-MCNC: 2.9 G/DL (ref 3.2–4.9)
ANION GAP SERPL CALC-SCNC: 10 MMOL/L (ref 7–16)
BUN SERPL-MCNC: 25 MG/DL (ref 8–22)
CALCIUM ALBUM COR SERPL-MCNC: 9.5 MG/DL (ref 8.5–10.5)
CALCIUM SERPL-MCNC: 8.6 MG/DL (ref 8.5–10.5)
CHLORIDE SERPL-SCNC: 96 MMOL/L (ref 96–112)
CO2 SERPL-SCNC: 29 MMOL/L (ref 20–33)
CORTIS SERPL-MCNC: 3.7 UG/DL (ref 0–23)
CREAT SERPL-MCNC: 5.77 MG/DL (ref 0.5–1.4)
ERYTHROCYTE [DISTWIDTH] IN BLOOD BY AUTOMATED COUNT: 55.6 FL (ref 35.9–50)
GFR SERPLBLD CREATININE-BSD FMLA CKD-EPI: 11 ML/MIN/1.73 M 2
GLUCOSE SERPL-MCNC: 99 MG/DL (ref 65–99)
HCT VFR BLD AUTO: 30.3 % (ref 42–52)
HGB BLD-MCNC: 9.7 G/DL (ref 14–18)
INR PPP: 1.83 (ref 0.87–1.13)
MCH RBC QN AUTO: 32.4 PG (ref 27–33)
MCHC RBC AUTO-ENTMCNC: 32 G/DL (ref 32.3–36.5)
MCV RBC AUTO: 101.3 FL (ref 81.4–97.8)
PHOSPHATE SERPL-MCNC: 4.9 MG/DL (ref 2.5–4.5)
PLATELET # BLD AUTO: 198 K/UL (ref 164–446)
PMV BLD AUTO: 9.1 FL (ref 9–12.9)
POTASSIUM SERPL-SCNC: 4.5 MMOL/L (ref 3.6–5.5)
PROTHROMBIN TIME: 21.3 SEC (ref 12–14.6)
RBC # BLD AUTO: 2.99 M/UL (ref 4.7–6.1)
SODIUM SERPL-SCNC: 135 MMOL/L (ref 135–145)
UFH PPP CHRO-ACNC: 0.49 IU/ML
WBC # BLD AUTO: 5.1 K/UL (ref 4.8–10.8)

## 2025-06-11 PROCEDURE — 85520 HEPARIN ASSAY: CPT

## 2025-06-11 PROCEDURE — 700102 HCHG RX REV CODE 250 W/ 637 OVERRIDE(OP): Performed by: NURSE PRACTITIONER

## 2025-06-11 PROCEDURE — 700111 HCHG RX REV CODE 636 W/ 250 OVERRIDE (IP): Mod: JZ | Performed by: STUDENT IN AN ORGANIZED HEALTH CARE EDUCATION/TRAINING PROGRAM

## 2025-06-11 PROCEDURE — 700102 HCHG RX REV CODE 250 W/ 637 OVERRIDE(OP): Performed by: HOSPITALIST

## 2025-06-11 PROCEDURE — A9270 NON-COVERED ITEM OR SERVICE: HCPCS | Performed by: STUDENT IN AN ORGANIZED HEALTH CARE EDUCATION/TRAINING PROGRAM

## 2025-06-11 PROCEDURE — 85610 PROTHROMBIN TIME: CPT

## 2025-06-11 PROCEDURE — 700102 HCHG RX REV CODE 250 W/ 637 OVERRIDE(OP): Performed by: STUDENT IN AN ORGANIZED HEALTH CARE EDUCATION/TRAINING PROGRAM

## 2025-06-11 PROCEDURE — 700111 HCHG RX REV CODE 636 W/ 250 OVERRIDE (IP): Performed by: STUDENT IN AN ORGANIZED HEALTH CARE EDUCATION/TRAINING PROGRAM

## 2025-06-11 PROCEDURE — A9270 NON-COVERED ITEM OR SERVICE: HCPCS | Performed by: NURSE PRACTITIONER

## 2025-06-11 PROCEDURE — A9270 NON-COVERED ITEM OR SERVICE: HCPCS | Performed by: HOSPITALIST

## 2025-06-11 PROCEDURE — 770020 HCHG ROOM/CARE - TELE (206)

## 2025-06-11 PROCEDURE — 85027 COMPLETE CBC AUTOMATED: CPT

## 2025-06-11 PROCEDURE — 700111 HCHG RX REV CODE 636 W/ 250 OVERRIDE (IP): Mod: JZ

## 2025-06-11 PROCEDURE — 700102 HCHG RX REV CODE 250 W/ 637 OVERRIDE(OP)

## 2025-06-11 PROCEDURE — A9270 NON-COVERED ITEM OR SERVICE: HCPCS

## 2025-06-11 PROCEDURE — 99233 SBSQ HOSP IP/OBS HIGH 50: CPT | Performed by: HOSPITALIST

## 2025-06-11 PROCEDURE — 36415 COLL VENOUS BLD VENIPUNCTURE: CPT

## 2025-06-11 PROCEDURE — 82533 TOTAL CORTISOL: CPT

## 2025-06-11 PROCEDURE — 76536 US EXAM OF HEAD AND NECK: CPT

## 2025-06-11 PROCEDURE — 80069 RENAL FUNCTION PANEL: CPT

## 2025-06-11 RX ORDER — METOPROLOL TARTRATE 25 MG/1
12.5 TABLET, FILM COATED ORAL 2 TIMES DAILY
Status: DISCONTINUED | OUTPATIENT
Start: 2025-06-11 | End: 2025-06-13

## 2025-06-11 RX ORDER — MIDODRINE HYDROCHLORIDE 5 MG/1
15 TABLET ORAL
Status: DISCONTINUED | OUTPATIENT
Start: 2025-06-11 | End: 2025-06-14 | Stop reason: HOSPADM

## 2025-06-11 RX ORDER — TRAZODONE HYDROCHLORIDE 50 MG/1
50 TABLET ORAL NIGHTLY
Status: DISCONTINUED | OUTPATIENT
Start: 2025-06-11 | End: 2025-06-13

## 2025-06-11 RX ORDER — METOPROLOL TARTRATE 25 MG/1
12.5 TABLET, FILM COATED ORAL 2 TIMES DAILY
Status: DISCONTINUED | OUTPATIENT
Start: 2025-06-11 | End: 2025-06-11

## 2025-06-11 RX ORDER — ATORVASTATIN CALCIUM 80 MG/1
80 TABLET, FILM COATED ORAL EVERY EVENING
Status: DISCONTINUED | OUTPATIENT
Start: 2025-06-11 | End: 2025-06-14 | Stop reason: HOSPADM

## 2025-06-11 RX ADMIN — ATORVASTATIN CALCIUM 80 MG: 80 TABLET, FILM COATED ORAL at 17:01

## 2025-06-11 RX ADMIN — GABAPENTIN 100 MG: 100 CAPSULE ORAL at 16:46

## 2025-06-11 RX ADMIN — LEVETIRACETAM 500 MG: 250 TABLET, FILM COATED ORAL at 16:47

## 2025-06-11 RX ADMIN — EZETIMIBE 10 MG: 10 TABLET ORAL at 05:17

## 2025-06-11 RX ADMIN — MIDODRINE HYDROCHLORIDE 15 MG: 5 TABLET ORAL at 16:47

## 2025-06-11 RX ADMIN — WARFARIN SODIUM 5 MG: 5 TABLET ORAL at 16:51

## 2025-06-11 RX ADMIN — TRAZODONE HYDROCHLORIDE 50 MG: 50 TABLET ORAL at 20:52

## 2025-06-11 RX ADMIN — OXYCODONE HYDROCHLORIDE 10 MG: 10 TABLET ORAL at 20:52

## 2025-06-11 RX ADMIN — MIDODRINE HYDROCHLORIDE 15 MG: 5 TABLET ORAL at 12:21

## 2025-06-11 RX ADMIN — SEVELAMER CARBONATE 800 MG: 800 TABLET, FILM COATED ORAL at 12:21

## 2025-06-11 RX ADMIN — MIDODRINE HYDROCHLORIDE 10 MG: 5 TABLET ORAL at 07:40

## 2025-06-11 RX ADMIN — SEVELAMER CARBONATE 800 MG: 800 TABLET, FILM COATED ORAL at 07:40

## 2025-06-11 RX ADMIN — GABAPENTIN 100 MG: 100 CAPSULE ORAL at 05:18

## 2025-06-11 RX ADMIN — GABAPENTIN 100 MG: 100 CAPSULE ORAL at 12:21

## 2025-06-11 RX ADMIN — HYDROMORPHONE HYDROCHLORIDE 0.25 MG: 1 INJECTION, SOLUTION INTRAMUSCULAR; INTRAVENOUS; SUBCUTANEOUS at 04:10

## 2025-06-11 RX ADMIN — OMEPRAZOLE 20 MG: 20 CAPSULE, DELAYED RELEASE ORAL at 05:18

## 2025-06-11 RX ADMIN — ASPIRIN 81 MG: 81 TABLET, COATED ORAL at 05:18

## 2025-06-11 RX ADMIN — HEPARIN SODIUM 18 UNITS/KG/HR: 5000 INJECTION, SOLUTION INTRAVENOUS at 09:14

## 2025-06-11 RX ADMIN — PROCHLORPERAZINE EDISYLATE 10 MG: 5 INJECTION, SOLUTION INTRAMUSCULAR; INTRAVENOUS at 19:41

## 2025-06-11 RX ADMIN — SEVELAMER CARBONATE 800 MG: 800 TABLET, FILM COATED ORAL at 16:48

## 2025-06-11 RX ADMIN — OXYCODONE 5 MG: 5 TABLET ORAL at 01:21

## 2025-06-11 ASSESSMENT — PAIN DESCRIPTION - PAIN TYPE
TYPE: ACUTE PAIN;SURGICAL PAIN
TYPE: ACUTE PAIN

## 2025-06-11 ASSESSMENT — CHA2DS2 SCORE
DIABETES: NO
HYPERTENSION: YES
SEX: MALE
PRIOR STROKE OR TIA OR THROMBOEMBOLISM: NO
CHF OR LEFT VENTRICULAR DYSFUNCTION: YES
AGE 65 TO 74: NO
AGE 75 OR GREATER: NO
CHA2DS2 VASC SCORE: 3
VASCULAR DISEASE: YES

## 2025-06-11 ASSESSMENT — ENCOUNTER SYMPTOMS
SORE THROAT: 0
NAUSEA: 0
BLURRED VISION: 0
DIZZINESS: 0
LOSS OF CONSCIOUSNESS: 1
SHORTNESS OF BREATH: 0
SEIZURES: 1
HEADACHES: 1
MYALGIAS: 0
ABDOMINAL PAIN: 0
VOMITING: 0

## 2025-06-11 ASSESSMENT — FIBROSIS 4 INDEX: FIB4 SCORE: 0.79

## 2025-06-11 NOTE — PROGRESS NOTES
Hospital Medicine Daily Progress Note    Date of Service  6/11/2025    Chief Complaint  Gurdeep Guerra is a 60 y.o. male admitted 6/7/2025 with seizure like activity     Hospital Course        This is a 54-year-old female with a past medical history significant for ESRD on PD, mechanical MVR (on Coumadin )with aortic aneurysm dissection ended on 6/7/2025 with seizure-like episode.  Apparently patient had several episode of seizure-like activity at home stated that he does not remember what happened    Patient girlfriend has noted this episode and noted to have loss of consciousness clenching of his jaws, [facility] level [facility] care    EEG did not show any seizure, MRI of the brain did not show any evidence of mesial temporal sclerosis.  Workup CT chest showing previous aortic valvuloplasty severe CAD; CT abdomen pelvis showing atrophic kidney, 6mm vascular calcification in the left lower renal hilum.    During the stay in the hospital, nephrology was consulted, patient was started on hemodialysis TTS vascular was consulted, patient underwent creation of left brachiocephalic AV fistula.  Currently patient is on heparin drip along with Coumadin    Interval events:  -- Patient is alert, awake, answering questions appropriately, vital sign has been reviewed  --Patient stated that he has shaking recanting of discharge and amnestic episode.  I discussed with the neurologist Dr.Luigi Howell; after discussing with him, will start Keppra 250 twice daily ; will dose according to renal function  -- vascular following, will follow their  recommendation, continue heparin drip along with Coumadin, patient INR needs to be 2-3  --Nephrology following, plan is to start dialysis TTS  -- he will be going for dialysis today   Blood pressure is noted to be on the low side, will provide midodrine 10    6/ 11:  -- Patient is alert, awake, answering questions appropriate,  patient has been reviewed, patient is noted to be on the lower blood pressure, will continue midodrine  -- Will continue heparin drip, INR at 1.3, goal is 2.  Patient creatinine 5.77, patient underwent dialysis yesterday, will be getting TTS.  Nephrology following, appreciate nephrology recommendation.  --Patient noted to have a hematoma in the left antecubital fossa ; will monitor, patient said that his hand tingling has been getting better  -- Patient has been complaining of swelling in the neck where PermCath was placed, discussed with surgery no showed    I have discussed the plan of care with nephrology and vascular surgery  I have discussed this patient's plan of care and discharge plan at IDT rounds today with Case Management, Nursing, Nursing leadership, and other members of the IDT team.    Consultants/Specialty  nephrology and vascular surgery    Code Status  Full Code    Disposition  The patient is not medically cleared for discharge to home or a post-acute facility.      I have placed the appropriate orders for post-discharge needs.    Review of Systems  Review of Systems   Constitutional:  Positive for malaise/fatigue.   HENT:  Negative for congestion and sore throat.    Eyes:  Negative for blurred vision.   Respiratory:  Negative for shortness of breath.    Cardiovascular:  Negative for chest pain.   Gastrointestinal:  Negative for abdominal pain, nausea and vomiting.   Musculoskeletal:  Positive for joint pain. Negative for myalgias.   Neurological:  Positive for seizures, loss of consciousness and headaches. Negative for dizziness.        Physical Exam  Temp:  [36.5 °C (97.7 °F)-36.9 °C (98.4 °F)] 36.5 °C (97.7 °F)  Pulse:  [62-83] 83  Resp:  [12-15] 14  BP: (92-97)/(55-57) 92/55  SpO2:  [90 %-92 %] 90 %    Physical Exam  Vitals and nursing note reviewed.   Constitutional:       General: He is not in acute distress.     Appearance: Normal appearance. He is not ill-appearing.   HENT:      Head:  Normocephalic and atraumatic.      Comments: Post surgical changes to the Rt neck   Eyes:      Extraocular Movements: Extraocular movements intact.   Cardiovascular:      Rate and Rhythm: Normal rate.      Heart sounds: Murmur heard.   Pulmonary:      Effort: Pulmonary effort is normal.   Abdominal:      General: Bowel sounds are normal.      Palpations: Abdomen is soft.      Comments: PD cath in place   Musculoskeletal:      Cervical back: Neck supple.      Comments: Post up LUE fistula creation/dressing in place, new Rt chest HD cath. Post op dressing over abdomen     Neurological:      Mental Status: He is alert and oriented to person, place, and time. Mental status is at baseline.      Cranial Nerves: No cranial nerve deficit.   Psychiatric:         Mood and Affect: Mood normal.         Behavior: Behavior normal.         Thought Content: Thought content normal.         Judgment: Judgment normal.         Fluids    Intake/Output Summary (Last 24 hours) at 6/11/2025 1337  Last data filed at 6/10/2025 1757  Gross per 24 hour   Intake 700 ml   Output 700 ml   Net 0 ml        Laboratory  Recent Labs     06/09/25  0401 06/10/25  0553 06/11/25  0047   WBC 4.8 7.9 5.1   RBC 3.07* 3.23* 2.99*   HEMOGLOBIN 9.9* 10.4* 9.7*   HEMATOCRIT 30.7* 32.1* 30.3*   .0* 99.4* 101.3*   MCH 32.2 32.2 32.4   MCHC 32.2* 32.4 32.0*   RDW 54.4* 53.2* 55.6*   PLATELETCT 216 221 198   MPV 9.4 9.2 9.1     Recent Labs     06/09/25  0401 06/10/25  0553 06/11/25  0047   SODIUM 137 133* 135   POTASSIUM 4.8 5.1 4.5   CHLORIDE 96 93* 96   CO2 20 26 29   GLUCOSE 97 114* 99   BUN 75* 45* 25*   CREATININE 12.80* 8.49* 5.77*   CALCIUM 9.4 9.0 8.6     Recent Labs     06/09/25  1810 06/10/25  0553 06/11/25  0153   APTT 28.7  --   --    INR 1.44* 1.58* 1.83*               Imaging  US-SOFT TISSUES OF HEAD - NECK   Final Result      1.  No abnormalities identified.   2.  No suspicious lymph nodes identified in the lateral neck.      DX-PORTABLE FLUORO  > 1 HOUR   Final Result      Portable fluoroscopy utilized for 3 seconds.      INTERPRETING LOCATION: [address], [place], [address]      DX-CHEST-LIMITED (1 VIEW)   Final Result      Digitized intraoperative radiograph is submitted for review. This examination is not for diagnostic purpose but for guidance during a surgical procedure. Please see the patient's chart for full procedural details.         INTERPRETING LOCATION: [address], [place], [address]      MR-BRAIN-WITH & W/O   Final Result      1.  Mild chronic microvascular ischemic type changes.   2.  No acute intracranial abnormality or pathologic enhancement.   3.  No evidence of mesial temporal sclerosis.           Assessment/Plan  * New onset seizure (HCC)  Assessment & Plan  Noted to have 3 seizure episodes at home, his significant other reports loss of consciousness with shaking of his extremities, patient denies history of seizures  EEG was unremarkable his MRI brain was also unremarkable   I discussed the case with neurology, considering patient concerning feature, please have the patient on low-dose of Keppra 250 twice daily,  Continue seizure, aspiration, fall precaution, will provide follow-up with neurology as an outpatient    Hyperkalemia- (present on admission)  Assessment & Plan  Potassium 5.5 without any evidence of EKG changes  Nephrology consulted, peritoneal dialysis per nephro   K 4.5    Peritoneal dialysis status (HCC)- (present on admission)  Assessment & Plan  Pt with ESRD on PD, was in the process of switching to HD   Discussed with her surgery today he is postop left upper extremity fistula creation, permacath placement he has been started on hemodialysis today  Discussed with vascular surgery and nephrology  He will need a hemodialysis chair prior to discharge    A-fib (HCC)- (present on admission)  Assessment & Plan  Currently in sinus  On coumadin at baseline, heparin bridge for now  Goal INR 2-3 after chart review and discussion with  pharmacy   Rate controlled     History of mechanical aortic valve replacement- (present on admission)  Assessment & Plan  History of mechanical aortic valve replacement, INR 3.4 at outside hospital   -- patient underwent placement of a PermCath and creation of AV fistula by Dr. Sales   -- Will continue heparin drip along with Coumadin per pharmacy protocol  INR sub-therpautic    Hyperlipidemia  Assessment & Plan  Crestor          VTE prophylaxis: heparin       Greater than 52  minutes spent prepping to see patient (e.g. review of tests) obtaining and/or reviewing separately obtained history. Performing a medically appropriate examination and/ evaluation.  Counseling and educating the patient/family/caregiver.  Ordering medications, tests, or procedures.  Referring and communicating with other health care professionals.  Documenting clinical information in EPIC.  Independently interpreting results and communicating results to patient/family/caregiver.  Care coordination.

## 2025-06-11 NOTE — CARE PLAN
The patient is Stable - Low risk of patient condition declining or worsening    Shift Goals  Clinical Goals: Pain management, monitor L arm, VSS  Patient Goals: pain management  Family Goals: FARIDA    Progress made toward(s) clinical / shift goals:    Problem: Knowledge Deficit - Standard  Goal: Patient and family/care givers will demonstrate understanding of plan of care, disease process/condition, diagnostic tests and medications  Outcome: Progressing     Problem: Seizure Precautions  Goal: Implementation of seizure precautions  Outcome: Progressing     Problem: Pain - Standard  Goal: Alleviation of pain or a reduction in pain to the patient’s comfort goal  Outcome: Progressing       Patient is not progressing towards the following goals:

## 2025-06-11 NOTE — PROGRESS NOTES
Hemodialysis ordered by Dr. Mac. Treatment started at 1457 and ended at 1757. Pt stable, vss, no c/o post tx. No UF this tx d/t hypotension during tx. NS bolus 200 ml. Decreased UFG. Dr. Mac notified and ok to run even this tx. Report to RADHAMES Doyle RN. See flow sheets for details in chart review media.

## 2025-06-11 NOTE — PROGRESS NOTES
"[facility] -  PROGRESS NOTE               Author: Justice Mac M.D. Date & Time: 6/11/2025  8:40 AM     HPI:  60-year-old male with ESRD on peritoneal dialysis who presented after new onset seizure episodes.  Recent hospitalization at [facility] with concern for failing peritoneal dialysis.  He was transiently transition to hemodialysis and felt improved on HD but  wanted to try one more attempt at PD with increased outpatient Rx.  His outpatient prescription was increased to 6 x 2 L fills over 10 hours.  He has continued to feel suboptimal and plan was set in place to transition to hemodialysis after outpatient permacath placement, which has not happened yet. He then had several episodes of seizures at home on day of admission.  Eventually transferred from outside facility for further management.  Upon arrival to [facility] found to have a potassium of 5.5. Underwent PD last night. Potassium resolved. He has some confusion today and feels overall off. No chest pain or SoB. No abd pain. Pending EEG     DAILY NEPHROLOGY SUMMARY:  6/8: Consult done  6/9: Tolerated PD, no further seizure activity, pending permacath and PD cath removal and fistula creation  6/10: s/p permacath placement, AVF creation and PD cath removal, tolerated HD, pain at l. Arm by AVF site. No chest pain or SOB  6/11: tolerated HD with minimal no UF, L. Arm pain improving, no chest pain or SOB    PAST FAMILY HISTORY: Reviewed and Unchanged  SOCIAL HISTORY: Reviewed and Unchanged  CURRENT MEDICATIONS: Reviewed  IMAGING STUDIES: Reviewed    ROS  10 Point ROS performed, negative other than stated above    PHYSICAL EXAM  VS:  BP 92/55 Comment: RN notified   Pulse 83   Temp 36.5 °C (97.7 °F) (Temporal)   Resp 14   Ht 1.702 m (5' 7\")   Wt 83 kg (182 lb 15.7 oz)   SpO2 90%   BMI 28.66 kg/m²   GENERAL: no acute distress  CV: RRR, No edema  RESP: non-labored  GI: Soft  MSK: No joint deformities   SKIN: No concerning " mariah  NEURO: AOx3  PSYCH: Cooperative  ACCESS: LUE AVF with ecchymosis     Fluids:  In: 700 [Dialysis:700]  Out: 700     LABS:  Recent Results (from the past 24 hours)   CORTISOL    Collection Time: 06/11/25 12:47 AM   Result Value Ref Range    Cortisol 3.7 0.0 - 23.0 ug/dL   CBC WITHOUT DIFFERENTIAL    Collection Time: 06/11/25 12:47 AM   Result Value Ref Range    WBC 5.1 4.8 - 10.8 K/uL    RBC 2.99 (L) 4.70 - 6.10 M/uL    Hemoglobin 9.7 (L) 14.0 - 18.0 g/dL    Hematocrit 30.3 (L) 42.0 - 52.0 %    .3 (H) 81.4 - 97.8 fL    MCH 32.4 27.0 - 33.0 pg    MCHC 32.0 (L) 32.3 - 36.5 g/dL    RDW 55.6 (H) 35.9 - 50.0 fL    Platelet Count 198 164 - 446 K/uL    MPV 9.1 9.0 - 12.9 fL   Renal Function Panel    Collection Time: 06/11/25 12:47 AM   Result Value Ref Range    Sodium 135 135 - 145 mmol/L    Potassium 4.5 3.6 - 5.5 mmol/L    Chloride 96 96 - 112 mmol/L    Co2 29 20 - 33 mmol/L    Anion Gap 10.0 7.0 - 16.0    Glucose 99 65 - 99 mg/dL    Creatinine 5.77 (HH) 0.50 - 1.40 mg/dL    Bun 25 (H) 8 - 22 mg/dL    Calcium 8.6 8.5 - 10.5 mg/dL    Correct Calcium 9.5 8.5 - 10.5 mg/dL    Phosphorus 4.9 (H) 2.5 - 4.5 mg/dL    Albumin 2.9 (L) 3.2 - 4.9 g/dL   ESTIMATED GFR    Collection Time: 06/11/25 12:47 AM   Result Value Ref Range    GFR (CKD-EPI) 11 (A) >60 mL/min/1.73 m 2   Heparin Anti-Xa    Collection Time: 06/11/25  1:53 AM   Result Value Ref Range    Heparin Xa (UFH) 0.49 IU/mL   Prothrombin Time    Collection Time: 06/11/25  1:53 AM   Result Value Ref Range    PT 21.3 (H) 12.0 - 14.6 sec    INR 1.83 (H) 0.87 - 1.13       (click the triangle to expand results)      ASSESSMENT:  # ESRD  -Concerned that he is now failed peritoneal dialysis and had been pending transition to HD outpatient   -Converted to HD while inpatient   -s/p permacath and AVF creation on 6/9  # Hyperkalemia   - resolved   # HTN  # Anemia of CKD  # Gout  # Chest Pain  # Afib   # New onset seizures     SUGGESTIONS  -No HD today, continue TTS  schedule going forward (Confirmed TTS outpatient schedule at [facility])  -APPLE with HD  -No dietary protein restrictions  -Phos binder with meals  -Home BP meds  -Dose all meds per ESRD    Ok for discharge from neph standpoint when HD unit secured     Discussed with hospitalist     Thank you,     Justice Mac MD

## 2025-06-11 NOTE — DOCUMENTATION QUERY
[facility]                                                                       Query Response Note      PATIENT:               LORIE KAYE  ACCT #:                  [id number]  MRN:                     [id number]  :                      1965  ADMIT DATE:       2025 9:49 PM  DISCH DATE:          RESPONDING  PROVIDER #:        [id number]           QUERY TEXT:    Review of lab findings showed elevated anion gap in this pt with known ESRD requiring transition from PD to HD. Please clarify the clinical relevance of the documented findings.    The patient's clinical indicators include:   Nephro:   - ESRD: Concern for failing peritoneal dialysis.   - His outpatient was increased to 6 x 2 L fills over 10 hours. He has continued to feel suboptimal, and plan was set in place to transition to hemodialysis.     Daily anion gap: 21, 20.0, 21.0, 14 (s/p HD)     Risk Factors: ESRD with concern for failing PD  Treatment: HD transition (s/p creation AV fistula, R IJ TDC placement), nephrology consult, lab monitoring     Thank you for your time and attention,  DA Kelly RN CCDS    Note: If you agree with a diagnosis listed, please remember to include it in your concurrent daily documentation and onto the Discharge Summary.  Options provided:   -- Metabolic acidosis is clinically significant and ruled in   -- Findings of no clinical significance   -- Other explanation, (Please specify the other explanation)   -- Unable to determine      Query created by: Lianne Child on 6/10/2025 10:40 AM    RESPONSE TEXT:    Metabolic acidosis is clinically significant and ruled in          Electronically signed by:  ALEXANDRA JOHNSON 2025 7:33 AM

## 2025-06-11 NOTE — CARE PLAN
The patient is Stable - Low risk of patient condition declining or worsening    Shift Goals  Clinical Goals: hemodynamic stability  Patient Goals: rest, comfort  Family Goals: FARIDA    Progress made toward(s) clinical / shift goals:        Problem: Knowledge Deficit - Standard  Goal: Patient and family/care givers will demonstrate understanding of plan of care, disease process/condition, diagnostic tests and medications  Outcome: Progressing     Problem: Hemodynamics  Goal: Patient's hemodynamics, fluid balance and neurologic status will be stable or improve  Outcome: Progressing       Patient is not progressing towards the following goals:

## 2025-06-11 NOTE — PROGRESS NOTES
NOC ApRN CROSS COVER NOTE    Notified by bedside RN of patient complaining of neck tenderness, and increased swelling at site of prior HD catheter, will order ultrasound for better evaluation.    Mariam Craig, MSN, APRN  [facility] Hospitalist    Please note this dictation was created using voice recognition software.  I have made every reasonable attempt to correct obvious errors, but there may be errors of grammar and possibly content that I did not discover before finalizing the note.

## 2025-06-11 NOTE — PROGRESS NOTES
Report received from day shift RN, assumed care of pt. Pt A&Ox 4. Plan of care discussed with pt, labs and chart reviewed. All needs met at this time. Tele box on. On 1L via NC. Call light within reach, bed locked and in lowest position. All fall precautions and hourly rounding in place.

## 2025-06-11 NOTE — PROGRESS NOTES
Inpatient Anticoagulation Service Note for 6/11/2025      Reason for Anticoagulation: On-X Aortic Valve Replacement, Atrial Fibrillation   [id number] VASc Score: 3      High risk for bleed: Yes   Hemoglobin Value: (!) 9.7  Hematocrit Value: (!) 30.3  Lab Platelet Value: 198  Target INR: 2.0 to 3.0     Date/Time INR Value    06/11/25 0153 1.83     06/10/25 0553 1.58     06/09/25 1810 1.44     06/09/25 0401 1.89     06/08/25 1207 2.58     06/08/25 0617 2.86       Date/Time Dose (mg)    06/11/25 1508 5     06/10/25 1353 5     06/09/25 1610 5     06/08/25 1256 0      Average Dose (mg):  (PTA dose: warfarin 5 mg PO daily)  Significant Interactions: Antiplatelet Medications  Bridge Therapy: Yes - heparin gtt     Assessment:  60-year-old male with PMH significant for ESRD on HD, MV repair (per OSH records), On-X AV replacement (2023) & reported history of Afib/Flutter on warfarin, followed by [facility]. Unclear INR goal in the chart, per anticoagulation clinic notes/OSH notes, INR goal is listed as 1.5-2 given On-X AVR in 2023. However, patient does have a noted history of Afib/Flutter as well. Warfarin INR goal adjusted to 2-3 inpatient to account for Afib as well per discussion with MD.   Outpatient anticoagulation clinic notes reviewed. Most recent appointment was on 5/21, patient was instructed to take warfarin 2.5 mg x1 on 5/21 then resume warfarin 5 mg PO daily thereafter until next appointment in ~ 4 weeks.   CBC/CMP ~ baseline. No new DDIs noted.   Per vascular surgery note yesterday - small hematoma at the incision site in his left antecubital fossa. It is not tense or expanding and therefore should be self-limiting. We will monitor for now. I recommended that we place an ice pack on it throughout the day to help reduce the swelling.   Improving over the last 24 hours per discussion at rounds today.   INR sub-therapeutic as expected due to holding warfarin doses for a few days. Warfarin was  resumed on 6/9 with appropriate trend up in INR noted since. Anticipating therapeutic INR in the next 1-2 days on current dose. Continue warfarin 5 mg.     Plan:  5 mg   Education Material Provided?: No (established warfarin)    Pharmacist suggested discharge dosing: TBD pending INR trends. Likely continue previous home dose of warfarin 5 mg PO daily with close INR follow up within 2-3 days of discharge.      Maryam Kaminski, PharmD

## 2025-06-12 PROBLEM — I95.9 HYPOTENSION: Status: ACTIVE | Noted: 2025-06-12

## 2025-06-12 LAB
ALBUMIN SERPL BCP-MCNC: 2.7 G/DL (ref 3.2–4.9)
ANION GAP SERPL CALC-SCNC: 12 MMOL/L (ref 7–16)
BUN SERPL-MCNC: 40 MG/DL (ref 8–22)
CALCIUM ALBUM COR SERPL-MCNC: 9.4 MG/DL (ref 8.5–10.5)
CALCIUM SERPL-MCNC: 8.4 MG/DL (ref 8.5–10.5)
CHLORIDE SERPL-SCNC: 94 MMOL/L (ref 96–112)
CO2 SERPL-SCNC: 26 MMOL/L (ref 20–33)
CORTIS SERPL-MCNC: 8.4 UG/DL (ref 0–23)
CREAT SERPL-MCNC: 7.6 MG/DL (ref 0.5–1.4)
ERYTHROCYTE [DISTWIDTH] IN BLOOD BY AUTOMATED COUNT: 55.6 FL (ref 35.9–50)
GFR SERPLBLD CREATININE-BSD FMLA CKD-EPI: 8 ML/MIN/1.73 M 2
GLUCOSE SERPL-MCNC: 96 MG/DL (ref 65–99)
HCT VFR BLD AUTO: 26.9 % (ref 42–52)
HGB BLD-MCNC: 8.5 G/DL (ref 14–18)
INR PPP: 2.07 (ref 0.87–1.13)
MCH RBC QN AUTO: 32.2 PG (ref 27–33)
MCHC RBC AUTO-ENTMCNC: 31.6 G/DL (ref 32.3–36.5)
MCV RBC AUTO: 101.9 FL (ref 81.4–97.8)
PHOSPHATE SERPL-MCNC: 5.7 MG/DL (ref 2.5–4.5)
PLATELET # BLD AUTO: 220 K/UL (ref 164–446)
PMV BLD AUTO: 9.2 FL (ref 9–12.9)
POTASSIUM SERPL-SCNC: 4.9 MMOL/L (ref 3.6–5.5)
PROTHROMBIN TIME: 23.4 SEC (ref 12–14.6)
RBC # BLD AUTO: 2.64 M/UL (ref 4.7–6.1)
SODIUM SERPL-SCNC: 132 MMOL/L (ref 135–145)
UFH PPP CHRO-ACNC: 0.39 IU/ML
WBC # BLD AUTO: 5.2 K/UL (ref 4.8–10.8)

## 2025-06-12 PROCEDURE — A9270 NON-COVERED ITEM OR SERVICE: HCPCS | Performed by: STUDENT IN AN ORGANIZED HEALTH CARE EDUCATION/TRAINING PROGRAM

## 2025-06-12 PROCEDURE — 700111 HCHG RX REV CODE 636 W/ 250 OVERRIDE (IP): Mod: JZ,TB

## 2025-06-12 PROCEDURE — 700111 HCHG RX REV CODE 636 W/ 250 OVERRIDE (IP): Performed by: STUDENT IN AN ORGANIZED HEALTH CARE EDUCATION/TRAINING PROGRAM

## 2025-06-12 PROCEDURE — 85027 COMPLETE CBC AUTOMATED: CPT

## 2025-06-12 PROCEDURE — 80069 RENAL FUNCTION PANEL: CPT

## 2025-06-12 PROCEDURE — 700102 HCHG RX REV CODE 250 W/ 637 OVERRIDE(OP): Performed by: HOSPITALIST

## 2025-06-12 PROCEDURE — 770020 HCHG ROOM/CARE - TELE (206)

## 2025-06-12 PROCEDURE — 82533 TOTAL CORTISOL: CPT

## 2025-06-12 PROCEDURE — 36415 COLL VENOUS BLD VENIPUNCTURE: CPT

## 2025-06-12 PROCEDURE — 700102 HCHG RX REV CODE 250 W/ 637 OVERRIDE(OP): Performed by: STUDENT IN AN ORGANIZED HEALTH CARE EDUCATION/TRAINING PROGRAM

## 2025-06-12 PROCEDURE — A9270 NON-COVERED ITEM OR SERVICE: HCPCS | Performed by: HOSPITALIST

## 2025-06-12 PROCEDURE — 700102 HCHG RX REV CODE 250 W/ 637 OVERRIDE(OP)

## 2025-06-12 PROCEDURE — 90935 HEMODIALYSIS ONE EVALUATION: CPT

## 2025-06-12 PROCEDURE — 85520 HEPARIN ASSAY: CPT

## 2025-06-12 PROCEDURE — 99232 SBSQ HOSP IP/OBS MODERATE 35: CPT | Performed by: HOSPITALIST

## 2025-06-12 PROCEDURE — A9270 NON-COVERED ITEM OR SERVICE: HCPCS

## 2025-06-12 PROCEDURE — 85610 PROTHROMBIN TIME: CPT

## 2025-06-12 RX ORDER — HEPARIN SODIUM 1000 [USP'U]/ML
INJECTION, SOLUTION INTRAVENOUS; SUBCUTANEOUS
Status: COMPLETED
Start: 2025-06-12 | End: 2025-06-12

## 2025-06-12 RX ORDER — HYDROCORTISONE 5 MG/1
5 TABLET ORAL
Status: DISCONTINUED | OUTPATIENT
Start: 2025-06-12 | End: 2025-06-14

## 2025-06-12 RX ORDER — HYDROCORTISONE 10 MG/1
10 TABLET ORAL EVERY MORNING
Status: DISCONTINUED | OUTPATIENT
Start: 2025-06-12 | End: 2025-06-14

## 2025-06-12 RX ADMIN — GABAPENTIN 100 MG: 100 CAPSULE ORAL at 11:40

## 2025-06-12 RX ADMIN — HEPARIN SODIUM 1600 UNITS: 1000 INJECTION, SOLUTION INTRAVENOUS; SUBCUTANEOUS at 09:50

## 2025-06-12 RX ADMIN — TRAZODONE HYDROCHLORIDE 50 MG: 50 TABLET ORAL at 21:56

## 2025-06-12 RX ADMIN — LEVETIRACETAM 500 MG: 250 TABLET, FILM COATED ORAL at 16:52

## 2025-06-12 RX ADMIN — MIDODRINE HYDROCHLORIDE 15 MG: 5 TABLET ORAL at 16:53

## 2025-06-12 RX ADMIN — WARFARIN SODIUM 5 MG: 5 TABLET ORAL at 16:57

## 2025-06-12 RX ADMIN — HEPARIN SODIUM 18 UNITS/KG/HR: 5000 INJECTION, SOLUTION INTRAVENOUS at 04:25

## 2025-06-12 RX ADMIN — SEVELAMER CARBONATE 800 MG: 800 TABLET, FILM COATED ORAL at 16:54

## 2025-06-12 RX ADMIN — ASPIRIN 81 MG: 81 TABLET, COATED ORAL at 05:05

## 2025-06-12 RX ADMIN — EZETIMIBE 10 MG: 10 TABLET ORAL at 05:05

## 2025-06-12 RX ADMIN — HEPARIN SODIUM 1700 UNITS: 1000 INJECTION, SOLUTION INTRAVENOUS; SUBCUTANEOUS at 09:50

## 2025-06-12 RX ADMIN — METOPROLOL TARTRATE 12.5 MG: 25 TABLET, FILM COATED ORAL at 05:05

## 2025-06-12 RX ADMIN — OMEPRAZOLE 20 MG: 20 CAPSULE, DELAYED RELEASE ORAL at 05:05

## 2025-06-12 RX ADMIN — GABAPENTIN 100 MG: 100 CAPSULE ORAL at 16:52

## 2025-06-12 RX ADMIN — HYDROCORTISONE 5 MG: 5 TABLET ORAL at 16:54

## 2025-06-12 RX ADMIN — EPOETIN ALFA-EPBX 4000 UNITS: 4000 INJECTION, SOLUTION INTRAVENOUS; SUBCUTANEOUS at 08:29

## 2025-06-12 RX ADMIN — ATORVASTATIN CALCIUM 80 MG: 80 TABLET, FILM COATED ORAL at 16:53

## 2025-06-12 RX ADMIN — GABAPENTIN 100 MG: 100 CAPSULE ORAL at 05:05

## 2025-06-12 RX ADMIN — MIDODRINE HYDROCHLORIDE 15 MG: 5 TABLET ORAL at 11:40

## 2025-06-12 RX ADMIN — MIDODRINE HYDROCHLORIDE 15 MG: 5 TABLET ORAL at 07:20

## 2025-06-12 ASSESSMENT — PAIN DESCRIPTION - PAIN TYPE: TYPE: ACUTE PAIN

## 2025-06-12 ASSESSMENT — ENCOUNTER SYMPTOMS
HEADACHES: 1
SHORTNESS OF BREATH: 0
MYALGIAS: 0
SEIZURES: 1
ABDOMINAL PAIN: 0
VOMITING: 0
SORE THROAT: 0
BLURRED VISION: 0
NAUSEA: 0
DIZZINESS: 0
LOSS OF CONSCIOUSNESS: 1

## 2025-06-12 ASSESSMENT — FIBROSIS 4 INDEX: FIB4 SCORE: 0.71

## 2025-06-12 NOTE — PROGRESS NOTES
Monitor Summary  Rhythm: SR  Rate: 69-90  Ectopy: (R) PVC's with couplets with sinus arrests ranging from 3.6 to 5.6 seconds  Measurements: .20/.07/.41

## 2025-06-12 NOTE — PROGRESS NOTES
"[facility] -  PROGRESS NOTE               Author: Justice Mac M.D. Date & Time: 6/12/2025  8:31 AM     HPI:  60-year-old male with ESRD on peritoneal dialysis who presented after new onset seizure episodes.  Recent hospitalization at [facility] with concern for failing peritoneal dialysis.  He was transiently transition to hemodialysis and felt improved on HD but  wanted to try one more attempt at PD with increased outpatient Rx.  His outpatient prescription was increased to 6 x 2 L fills over 10 hours.  He has continued to feel suboptimal and plan was set in place to transition to hemodialysis after outpatient permacath placement, which has not happened yet. He then had several episodes of seizures at home on day of admission.  Eventually transferred from outside facility for further management.  Upon arrival to [facility] found to have a potassium of 5.5. Underwent PD last night. Potassium resolved. He has some confusion today and feels overall off. No chest pain or SoB. No abd pain. Pending EEG     DAILY NEPHROLOGY SUMMARY:  6/8: Consult done  6/9: Tolerated PD, no further seizure activity, pending permacath and PD cath removal and fistula creation  6/10: s/p permacath placement, AVF creation and PD cath removal, tolerated HD, pain at l. Arm by AVF site. No chest pain or SOB  6/11: tolerated HD with minimal no UF, L. Arm pain improving, no chest pain or SOB  6/12: Ongoing soft blood pressures-limiting UF, hematoma site of new AVF resolving,     PAST FAMILY HISTORY: Reviewed and Unchanged  SOCIAL HISTORY: Reviewed and Unchanged  CURRENT MEDICATIONS: Reviewed  IMAGING STUDIES: Reviewed    ROS  10 Point ROS performed, negative other than stated above    PHYSICAL EXAM  VS:  BP (!) 80/41 Comment: Nurse notified   Pulse 75   Temp 36.9 °C (98.4 °F) (Temporal)   Resp 16   Ht 1.702 m (5' 7\")   Wt 83.2 kg (183 lb 6.8 oz)   SpO2 99%   BMI 28.73 kg/m²   GENERAL: no acute distress  CV: RRR, " No edema  RESP: non-labored  GI: Soft  MSK: No joint deformities   SKIN: No concerning rashes  NEURO: AOx3  PSYCH: Cooperative  ACCESS: LUE AVF with ecchymosis     Fluids:  In: -   Out: 150     LABS:  Recent Results (from the past 24 hours)   CBC WITHOUT DIFFERENTIAL    Collection Time: 06/12/25  1:13 AM   Result Value Ref Range    WBC 5.2 4.8 - 10.8 K/uL    RBC 2.64 (L) 4.70 - 6.10 M/uL    Hemoglobin 8.5 (L) 14.0 - 18.0 g/dL    Hematocrit 26.9 (L) 42.0 - 52.0 %    .9 (H) 81.4 - 97.8 fL    MCH 32.2 27.0 - 33.0 pg    MCHC 31.6 (L) 32.3 - 36.5 g/dL    RDW 55.6 (H) 35.9 - 50.0 fL    Platelet Count 220 164 - 446 K/uL    MPV 9.2 9.0 - 12.9 fL   Renal Function Panel    Collection Time: 06/12/25  1:13 AM   Result Value Ref Range    Sodium 132 (L) 135 - 145 mmol/L    Potassium 4.9 3.6 - 5.5 mmol/L    Chloride 94 (L) 96 - 112 mmol/L    Co2 26 20 - 33 mmol/L    Anion Gap 12.0 7.0 - 16.0    Glucose 96 65 - 99 mg/dL    Creatinine 7.60 (HH) 0.50 - 1.40 mg/dL    Bun 40 (H) 8 - 22 mg/dL    Calcium 8.4 (L) 8.5 - 10.5 mg/dL    Correct Calcium 9.4 8.5 - 10.5 mg/dL    Phosphorus 5.7 (H) 2.5 - 4.5 mg/dL    Albumin 2.7 (L) 3.2 - 4.9 g/dL   Heparin Xa (Unfractionated)    Collection Time: 06/12/25  1:13 AM   Result Value Ref Range    Heparin Xa (UFH) 0.39 IU/mL   Prothrombin Time    Collection Time: 06/12/25  1:13 AM   Result Value Ref Range    PT 23.4 (H) 12.0 - 14.6 sec    INR 2.07 (H) 0.87 - 1.13   ESTIMATED GFR    Collection Time: 06/12/25  1:13 AM   Result Value Ref Range    GFR (CKD-EPI) 8 (A) >60 mL/min/1.73 m 2       (click the triangle to expand results)      ASSESSMENT:  # ESRD  -Concerned that he is now failed peritoneal dialysis and had been pending transition to HD outpatient   -Converted to HD while inpatient   -s/p permacath and AVF creation on 6/9  # Hyperkalemia   - resolved   # HTN  # Anemia of CKD  # Gout  # Chest Pain  # Afib   # New onset seizures     SUGGESTIONS  -HD today, continue TTS schedule going forward  (Confirmed TTS outpatient schedule at [facility])  -Midodrine  -limit narcotics, may be contributing to low BPs  -APPLE with HD  -No dietary protein restrictions  -Phos binder with meals  -Holding home BP meds for now  -Dose all meds per ESRD    Ok for discharge from neph standpoint when HD unit secured     Discussed with hospitalist     Thank you,     Justice Mac MD

## 2025-06-12 NOTE — CARE PLAN
The patient is Stable - Low risk of patient condition declining or worsening    Shift Goals  Clinical Goals: VSS, safety, pain manegement  Patient Goals: pain control, sleep  Family Goals: FARIDA    Progress made toward(s) clinical / shift goals:    Problem: Knowledge Deficit - Standard  Goal: Patient and family/care givers will demonstrate understanding of plan of care, disease process/condition, diagnostic tests and medications  Outcome: Progressing     Problem: Seizure Precautions  Goal: Implementation of seizure precautions  Outcome: Progressing     Problem: Pain - Standard  Goal: Alleviation of pain or a reduction in pain to the patient’s comfort goal  Outcome: Progressing       Patient is not progressing towards the following goals:

## 2025-06-12 NOTE — PROGRESS NOTES
[facility] Progress Note       HD treatment ordered today by Dr. RADHAMES Mac x 3 hours. Initiated tx at 0652 and ended at 0952.    Patient has low BP pre treatment. Received Midodrine at the beginning of tx for BP support.  SBPs 70s-100s during treatment. Asymptomatic throughout tx. Denies s/s of hypotension.  Dr. Mac at bedside during tx and updated on patient's BP readings.    Net UF = 0ML    Locked CVC with heparin. Changed dressing. No s/s of infection. Clean, dry, intact.

## 2025-06-12 NOTE — PROGRESS NOTES
Inpatient Anticoagulation Service Note for 6/12/2025      Reason for Anticoagulation: On-X Aortic Valve Replacement, Atrial Fibrillation   [id number] VASc Score: 3      High risk for bleed: Yes   Comments: see note    Hemoglobin Value: (!) 8.5  Hematocrit Value: (!) 26.9  Lab Platelet Value: 220  Target INR: 2.0 to 3.0     Date/Time INR Value    06/12/25 0113 2.07     06/11/25 0153 1.83     06/10/25 0553 1.58     06/09/25 1810 1.44     06/09/25 0401 1.89     06/08/25 1207 2.58     06/08/25 0617 2.86       Date/Time Dose (mg)    06/12/25 0907 5     06/11/25 1508 5     06/10/25 1353 5     06/09/25 1610 5     06/08/25 1256 0      Average Dose (mg):  (Home warfarin dose: 5mg po daily)  Significant Interactions: Antiplatelet Medications  Bridge Therapy: Yes  Bridge Therapy Start Date: 06/09/25  Days of Overlap Therapy: 3   INR Value Greater than 2 Prior to Discontinuation of Parenteral Anticoagulation: Not Applicable   Reversal Agent Administered: Not Applicable    Comments: Patient continues on home warfarin for On-X AVR, AFib. INR therapeutic (note some discrepancy in INR goal, per previosus discussions with MD this admission goal INR 2-3). DDI noted above. Prior warfarin doses received. Renal diet ordered, adequate intake per chart review (% of dinner consumed). INR therapeutic. Will continue home warfarin dose of 5mg po daily with repeat INR tomorrow. Note, day 3 of heparin bridge but pt likely only sub-therapeutic d/t held doses (prior to procedure) and w/o acute clot - risk vs benefit stopping heparin bridge today vs continue x 5 days of therapy; will discuss w/ MD.     Plan:  warfarin   Education Material Provided?: No    Pharmacist suggested discharge dosing: warfarin 5mg po daily with repeat INR within 2-3 days of discharge.      Lindsey Drew, PharmD, BCCCP

## 2025-06-12 NOTE — PROGRESS NOTES
Patient continuously getting up out of bed and taking off 02. Fall prevention education as well as 02 education provided. Patient refusing bed alarm, oxygen and vitals. MD notified.

## 2025-06-12 NOTE — PROGRESS NOTES
Hospital Medicine Daily Progress Note    Date of Service  6/12/2025    Chief Complaint  Gurdeep Guerra is a 60 y.o. male admitted 6/7/2025 with seizure like activity     Hospital Course        This is a 54-year-old female with a past medical history significant for ESRD on PD, mechanical MVR (on Coumadin )with aortic aneurysm dissection ended on 6/7/2025 with seizure-like episode.  Apparently patient had several episode of seizure-like activity at home stated that he does not remember what happened    Patient girlfriend has noted this episode and noted to have loss of consciousness clenching of his jaws, [facility] for higher level of care    EEG did not show any seizure, MRI of the brain did not show any evidence of mesial temporal sclerosis.  Workup CT chest showing previous aortic valvuloplasty severe CAD; CT abdomen pelvis showing atrophic kidney, 6mm vascular calcification in the left lower renal hilum.    During the stay in the hospital, nephrology was consulted, patient was started on hemodialysis TTS vascular was consulted, patient underwent creation of left brachiocephalic AV fistula.  Currently patient is on heparin drip along with Coumadin    Interval events:  -- Patient is alert, awake, answering questions appropriately, vital sign has been reviewed  --Patient stated that he has shaking recanting of discharge and amnestic episode.  I discussed with the neurologist Dr.Luigi Howell; after discussing with him, will start Keppra 250 twice daily ; will dose according to renal function  -- vascular following, will follow their  recommendation, continue heparin drip along with Coumadin, patient INR needs to be 2-3  --Nephrology following, plan is to start dialysis TTS  -- he will be going for dialysis today   Blood pressure is noted to be on the low side, will provide midodrine 10    6/ 11:  -- Patient is alert, awake, answering questions appropriate,  patient has been reviewed, patient is noted to be on the lower blood pressure, will continue midodrine  -- Will continue heparin drip, INR at 1.3, goal is 2.  Patient creatinine 5.77, patient underwent dialysis yesterday, will be getting TTS.  Nephrology following, appreciate nephrology recommendation.  --Patient noted to have a hematoma in the left antecubital fossa ; will monitor, patient said that his hand tingling has been getting better  -- Patient has been complaining of swelling in the neck where PermCath was placed, discussed with surgery no showed      6/12:  -- Patient is alert, awake, answer question appropriately, he is receiving dialysis today.  His blood pressure noted to be on the lower side, a.m. cortisol at 8, will provide hydrocortisone 10 at a.m. and 5 PM.  Will see how his blood pressure will be, if it continue to follow, proceed nephrology recommendation.  Patient INR is at goal, will stop heparin drip and will continue Coumadin, referral to Coumadin clinic has been made.  Labs reviewed, sodium at 132, hemoglobin 8.5, monitor  Discussed the plan of care with vascular surgery and the nephrology    I have discussed the plan of care with nephrology and vascular surgery  I have discussed this patient's plan of care and discharge plan at IDT rounds today with Case Management, Nursing, Nursing leadership, and other members of the IDT team.    Consultants/Specialty  nephrology and vascular surgery    Code Status  Full Code    Disposition  Medically Cleared  I have placed the appropriate orders for post-discharge needs.    Review of Systems  Review of Systems   Constitutional:  Positive for malaise/fatigue.   HENT:  Negative for congestion and sore throat.    Eyes:  Negative for blurred vision.   Respiratory:  Negative for shortness of breath.    Cardiovascular:  Negative for chest pain.   Gastrointestinal:  Negative for abdominal pain, nausea and vomiting.   Musculoskeletal:  Positive for joint pain.  Negative for myalgias.   Neurological:  Positive for seizures, loss of consciousness and headaches. Negative for dizziness.        Physical Exam  Temp:  [36.7 °C (98.1 °F)-37 °C (98.6 °F)] 36.9 °C (98.4 °F)  Pulse:  [68-78] 75  Resp:  [16-17] 16  BP: ()/(41-59) 93/57  SpO2:  [92 %-100 %] 99 %    Physical Exam  Vitals and nursing note reviewed.   Constitutional:       General: He is not in acute distress.     Appearance: Normal appearance. He is not ill-appearing.   HENT:      Head: Normocephalic and atraumatic.      Comments: Post surgical changes to the Rt neck   Eyes:      Extraocular Movements: Extraocular movements intact.   Cardiovascular:      Rate and Rhythm: Normal rate.      Heart sounds: Murmur heard.   Pulmonary:      Effort: Pulmonary effort is normal.   Abdominal:      General: Bowel sounds are normal.      Palpations: Abdomen is soft.      Comments: PD cath in place   Musculoskeletal:      Cervical back: Neck supple.      Comments: Post up LUE fistula creation/dressing in place, new Rt chest HD cath. Post op dressing over abdomen     Neurological:      Mental Status: He is alert and oriented to person, place, and time. Mental status is at baseline.      Cranial Nerves: No cranial nerve deficit.   Psychiatric:         Mood and Affect: Mood normal.         Behavior: Behavior normal.         Thought Content: Thought content normal.         Judgment: Judgment normal.         Fluids    Intake/Output Summary (Last 24 hours) at 6/12/2025 1226  Last data filed at 6/12/2025 1007  Gross per 24 hour   Intake 600 ml   Output 750 ml   Net -150 ml        Laboratory  Recent Labs     06/10/25  0553 06/11/25  0047 06/12/25  0113   WBC 7.9 5.1 5.2   RBC 3.23* 2.99* 2.64*   HEMOGLOBIN 10.4* 9.7* 8.5*   HEMATOCRIT 32.1* 30.3* 26.9*   MCV 99.4* 101.3* 101.9*   MCH 32.2 32.4 32.2   MCHC 32.4 32.0* 31.6*   RDW 53.2* 55.6* 55.6*   PLATELETCT 221 198 220   MPV 9.2 9.1 9.2     Recent Labs     06/10/25  0553 06/11/25  0047  06/12/25  0113   SODIUM 133* 135 132*   POTASSIUM 5.1 4.5 4.9   CHLORIDE 93* 96 94*   CO2 26 29 26   GLUCOSE 114* 99 96   BUN 45* 25* 40*   CREATININE 8.49* 5.77* 7.60*   CALCIUM 9.0 8.6 8.4*     Recent Labs     06/09/25  1810 06/10/25  0553 06/11/25  0153 06/12/25  0113   APTT 28.7  --   --   --    INR 1.44* 1.58* 1.83* 2.07*               Imaging  US-SOFT TISSUES OF HEAD - NECK   Final Result      1.  No abnormalities identified.   2.  No suspicious lymph nodes identified in the lateral neck.      DX-PORTABLE FLUORO > 1 HOUR   Final Result      Portable fluoroscopy utilized for 3 seconds.      INTERPRETING LOCATION: [address] , [place], [address]      DX-CHEST-LIMITED (1 VIEW)   Final Result      Digitized intraoperative radiograph is submitted for review. This examination is not for diagnostic purpose but for guidance during a surgical procedure. Please see the patient's chart for full procedural details.         INTERPRETING LOCATION: [address] , [place], [address]      MR-BRAIN-WITH & W/O   Final Result      1.  Mild chronic microvascular ischemic type changes.   2.  No acute intracranial abnormality or pathologic enhancement.   3.  No evidence of mesial temporal sclerosis.           Assessment/Plan  * New onset seizure (HCC)  Assessment & Plan  Noted to have 3 seizure episodes at home, his significant other reports loss of consciousness with shaking of his extremities, patient denies history of seizures  EEG was unremarkable his MRI brain was also unremarkable   I discussed the case with neurology, considering patient concerning feature, please have the patient on low-dose of Keppra 250 twice daily,  Continue seizure, aspiration, fall precaution, will provide follow-up with neurology as an outpatient    Hypotension  Assessment & Plan  On midodrine and cortisol    Hyperkalemia- (present on admission)  Assessment & Plan  Potassium 5.5 without any evidence of EKG changes  Nephrology consulted, peritoneal dialysis per  nephro   K 4.9    Peritoneal dialysis status (HCC)- (present on admission)  Assessment & Plan  Pt with ESRD on PD, was in the process of switching to HD   Discussed with her surgery today he is postop left upper extremity fistula creation, permacath placement he has been started on hemodialysis today  Discussed with vascular surgery and nephrology  He will need a hemodialysis chair prior to discharge    A-fib (HCC)- (present on admission)  Assessment & Plan  Currently in sinus  On coumadin at baseline, heparin bridge for now  Today's INR is 2, will stop heparin drip    History of mechanical aortic valve replacement- (present on admission)  Assessment & Plan  History of mechanical aortic valve replacement, INR 3.4 at outside hospital   -- patient underwent placement of a PermCath and creation of AV fistula by Dr. Sales   -- Will continue heparin drip along with Coumadin per pharmacy protocol  INR 2.0    Hyperlipidemia  Assessment & Plan  Crestor          VTE prophylaxis: heparin       I have performed a physical exam and reviewed and updated ROS and Plan today (6/12/2025). In review of yesterday's note (6/11/2025), there are no changes except as documented above.

## 2025-06-12 NOTE — DISCHARGE PLANNING
Case Management Discharge Planning    Admission Date: 6/7/2025  GMLOS: 5.5  ALOS: 5    6-Clicks ADL Score: 23  6-Clicks Mobility Score: 22      Anticipated Discharge Dispo: Discharge Disposition: Discharged to home/self care (01)    DME Needed: No    Action(s) Taken: LMSW contacted [facility] to verify HD chair. Patient still has a chair through Friday for when he is discharged.    Updated in IDT rounds, patient has low blood pressure and is hypotensive. Pt will receive dialysis today. Pt is anticipated to leave 10x10 tomorrow.      Escalations Completed: None    Medically Clear: No    Next Steps: Case management to follow for further discharge planning.    Barriers to Discharge: Medical clearance    Is the patient up for discharge tomorrow: No

## 2025-06-12 NOTE — PROGRESS NOTES
Report received from Rn. Assumed pt care. Pt is resting in bed getting dialysis. Pt A&O x 4, no complaints of pain. Fall precautions in place, call light and belongings within reach, bed in lowest position. No signs of distress.

## 2025-06-12 NOTE — PROGRESS NOTES
"                 VASCULAR SURGERY               Inpatient Progress Note  _________________________________    Vitals   BP 93/58   Pulse 83   Temp 36.5 °C (97.7 °F) (Temporal)   Resp 14   Ht 1.702 m (5' 7\")   Wt 83 kg (182 lb 15.7 oz)   SpO2 90%   BMI 28.66 kg/m²   _________________________________    History:  6/9/25 KEVIN Todd AVF, remove CAPD cath (Mónica/Ronald)    Today:  Stable overnight.  Patient is reporting improvement in the left arm. Hematoma in left AC fossa improved significantly    Fistula is patent with a strong thrill    Left radial and ulnar Doppler signals are present on exam.  Motor and sensory exam of the left hand is intact      A/P)  No major concerns from vascular surgery standpoint.  Hematoma resolving    Appreciate Hospitalist service's support    Patient can follow-up with the [facility] access center for his ongoing dialysis access needs, and he can follow-up with me as needed if concerns arise    Vascular surgery signing off but available anytime, if questions or concerns arise please notify vascular surgeon on call      Dontrell Sales MD  [facility]  Voalte preferred or call my office [phone]  __________________________________________________________________  Patient:Gurdeep Guerra   MRN:[id number]   CSN:[id number]    "

## 2025-06-13 LAB
EKG IMPRESSION: NORMAL
INR PPP: 2.15 (ref 0.87–1.13)
PROTHROMBIN TIME: 24.2 SEC (ref 12–14.6)

## 2025-06-13 PROCEDURE — A9270 NON-COVERED ITEM OR SERVICE: HCPCS | Performed by: HOSPITALIST

## 2025-06-13 PROCEDURE — 93010 ELECTROCARDIOGRAM REPORT: CPT | Performed by: INTERNAL MEDICINE

## 2025-06-13 PROCEDURE — 770020 HCHG ROOM/CARE - TELE (206)

## 2025-06-13 PROCEDURE — 700102 HCHG RX REV CODE 250 W/ 637 OVERRIDE(OP): Performed by: HOSPITALIST

## 2025-06-13 PROCEDURE — 93005 ELECTROCARDIOGRAM TRACING: CPT | Mod: TC | Performed by: HOSPITALIST

## 2025-06-13 PROCEDURE — 85610 PROTHROMBIN TIME: CPT

## 2025-06-13 PROCEDURE — A9270 NON-COVERED ITEM OR SERVICE: HCPCS | Performed by: STUDENT IN AN ORGANIZED HEALTH CARE EDUCATION/TRAINING PROGRAM

## 2025-06-13 PROCEDURE — 51798 US URINE CAPACITY MEASURE: CPT

## 2025-06-13 PROCEDURE — 99233 SBSQ HOSP IP/OBS HIGH 50: CPT | Performed by: HOSPITALIST

## 2025-06-13 PROCEDURE — 700102 HCHG RX REV CODE 250 W/ 637 OVERRIDE(OP): Performed by: STUDENT IN AN ORGANIZED HEALTH CARE EDUCATION/TRAINING PROGRAM

## 2025-06-13 PROCEDURE — 700102 HCHG RX REV CODE 250 W/ 637 OVERRIDE(OP)

## 2025-06-13 PROCEDURE — A9270 NON-COVERED ITEM OR SERVICE: HCPCS

## 2025-06-13 RX ORDER — HYDROCORTISONE 5 MG/1
5 TABLET ORAL
Qty: 30 TABLET | Refills: 0 | Status: SHIPPED | OUTPATIENT
Start: 2025-06-13 | End: 2025-06-14

## 2025-06-13 RX ORDER — OXYCODONE HYDROCHLORIDE 5 MG/1
5 TABLET ORAL EVERY 8 HOURS PRN
Qty: 9 TABLET | Refills: 0 | Status: SHIPPED | OUTPATIENT
Start: 2025-06-13 | End: 2025-06-17

## 2025-06-13 RX ORDER — ATORVASTATIN CALCIUM 80 MG/1
80 TABLET, FILM COATED ORAL EVERY EVENING
Qty: 100 TABLET | Refills: 3 | Status: SHIPPED | OUTPATIENT
Start: 2025-06-13 | End: 2026-07-18

## 2025-06-13 RX ORDER — LEVETIRACETAM 500 MG/1
500 TABLET ORAL EVERY EVENING
Qty: 60 TABLET | Refills: 0 | Status: SHIPPED | OUTPATIENT
Start: 2025-06-13 | End: 2025-06-27

## 2025-06-13 RX ORDER — MIDODRINE HYDROCHLORIDE 5 MG/1
10 TABLET ORAL
Qty: 90 TABLET | Refills: 0 | Status: SHIPPED | OUTPATIENT
Start: 2025-06-13 | End: 2025-06-25

## 2025-06-13 RX ORDER — HYDROCORTISONE 10 MG/1
10 TABLET ORAL EVERY MORNING
Qty: 30 TABLET | Refills: 0 | Status: SHIPPED | OUTPATIENT
Start: 2025-06-14

## 2025-06-13 RX ADMIN — WARFARIN SODIUM 5 MG: 5 TABLET ORAL at 18:11

## 2025-06-13 RX ADMIN — ATORVASTATIN CALCIUM 80 MG: 80 TABLET, FILM COATED ORAL at 17:41

## 2025-06-13 RX ADMIN — GABAPENTIN 100 MG: 100 CAPSULE ORAL at 17:41

## 2025-06-13 ASSESSMENT — ENCOUNTER SYMPTOMS
SHORTNESS OF BREATH: 0
NAUSEA: 0
VOMITING: 0
LOSS OF CONSCIOUSNESS: 1
SEIZURES: 1
SORE THROAT: 0
BLURRED VISION: 0
HEADACHES: 1
ABDOMINAL PAIN: 0
DIZZINESS: 0
MYALGIAS: 0

## 2025-06-13 ASSESSMENT — COGNITIVE AND FUNCTIONAL STATUS - GENERAL
SUGGESTED CMS G CODE MODIFIER MOBILITY: CJ
CLIMB 3 TO 5 STEPS WITH RAILING: A LITTLE
SUGGESTED CMS G CODE MODIFIER DAILY ACTIVITY: CI
DAILY ACTIVITIY SCORE: 23
MOBILITY SCORE: 22
DRESSING REGULAR UPPER BODY CLOTHING: A LITTLE
WALKING IN HOSPITAL ROOM: A LITTLE

## 2025-06-13 ASSESSMENT — PAIN DESCRIPTION - PAIN TYPE
TYPE: ACUTE PAIN
TYPE: ACUTE PAIN

## 2025-06-13 NOTE — PROGRESS NOTES
Hospital Medicine Daily Progress Note    Date of Service  6/13/2025    Chief Complaint  Gurdeep Guerra is a 60 y.o. male admitted 6/7/2025 with seizure like activity     Hospital Course        This is a 54-year-old female with a past medical history significant for ESRD on PD, mechanical MVR (on Coumadin )with aortic aneurysm dissection ended on 6/7/2025 with seizure-like episode.  Apparently patient had several episode of seizure-like activity at home stated that he does not remember what happened    Patient girlfriend has noted this episode and noted to have loss of consciousness clenching of his jaws, [facility] level [facility] care    EEG did not show any seizure, MRI of the brain did not show any evidence of mesial temporal sclerosis.  Workup CT chest showing previous aortic valvuloplasty severe CAD; CT abdomen pelvis showing atrophic kidney, 6mm vascular calcification in the left lower renal hilum.    During the stay in the hospital, nephrology was consulted, patient was started on hemodialysis TTS vascular was consulted, patient underwent creation of left brachiocephalic AV fistula.  Currently patient is on heparin drip along with Coumadin    Interval events:  -- Patient is alert, awake, answering questions appropriately, vital sign has been reviewed  --Patient stated that he has shaking recanting of discharge and amnestic episode.  I discussed with the neurologist Dr.Luigi Howell; after discussing with him, will start Keppra 250 twice daily ; will dose according to renal function  -- vascular following, will follow their  recommendation, continue heparin drip along with Coumadin, patient INR needs to be 2-3  --Nephrology following, plan is to start dialysis TTS  -- he will be going for dialysis today   Blood pressure is noted to be on the low side, will provide midodrine 10    6/ 11:  -- Patient is alert, awake, answering questions appropriate,  patient has been reviewed, patient is noted to be on the lower blood pressure, will continue midodrine  -- Will continue heparin drip, INR at 1.3, goal is 2.  Patient creatinine 5.77, patient underwent dialysis yesterday, will be getting TTS.  Nephrology following, appreciate nephrology recommendation.  --Patient noted to have a hematoma in the left antecubital fossa ; will monitor, patient said that his hand tingling has been getting better  -- Patient has been complaining of swelling in the neck where PermCath was placed, discussed with surgery no showed      6/12:  -- Patient is alert, awake, answer question appropriately, he is receiving dialysis today.  His blood pressure noted to be on the lower side, a.m. cortisol at 8, will provide hydrocortisone 10 at a.m. and 5 PM.  Will see how his blood pressure will be, if it continue to follow, proceed nephrology recommendation.  Patient INR is at goal, will stop heparin drip and will continue Coumadin, referral to Coumadin clinic has been made.  Labs reviewed, sodium at 132, hemoglobin 8.5, monitor  Discussed the plan of care with vascular surgery and the nephrology    I have discussed the plan of care with nephrology and vascular surgery    6/13:  -- Patient is alert, awake, answer question appropriately, early in the morning, patient is adamant about going home.  He did repeat blood pressure at 1109 am 85/45, patient is complaining of dizziness.  --Went to the bedside, discussed with the patient like he did request for him to stay 1 more day.  --Labs reviewed, patient hemoglobin dropped 3 points, today at 8.5, monitor  --Patient is on midodrine.  3 times daily with meals will see how his blood pressure will be tomorrow   --  Neph following and will follow their recs    I have discussed this patient's plan of care and discharge plan at IDT rounds today with Case Management, Nursing, Nursing leadership, and other members of the IDT  team.    Consultants/Specialty  nephrology and vascular surgery    Code Status  Full Code    Disposition  The patient is not medically cleared for discharge to home or a post-acute facility.  Anticipate discharge to: home with close outpatient follow-up    I have placed the appropriate orders for post-discharge needs.    Review of Systems  Review of Systems   Constitutional:  Positive for malaise/fatigue.   HENT:  Negative for congestion and sore throat.    Eyes:  Negative for blurred vision.   Respiratory:  Negative for shortness of breath.    Cardiovascular:  Negative for chest pain.   Gastrointestinal:  Negative for abdominal pain, nausea and vomiting.   Musculoskeletal:  Positive for joint pain. Negative for myalgias.   Neurological:  Positive for seizures, loss of consciousness and headaches. Negative for dizziness.        Physical Exam  Temp:  [37 °C (98.6 °F)] 37 °C (98.6 °F)  Pulse:  [65-75] 67  Resp:  [16-18] 16  BP: ()/(45-61) 106/61  SpO2:  [94 %-96 %] 95 %    Physical Exam  Vitals and nursing note reviewed.   Constitutional:       General: He is not in acute distress.     Appearance: Normal appearance. He is not ill-appearing.   HENT:      Head: Normocephalic and atraumatic.      Comments: Post surgical changes to the Rt neck   Eyes:      Extraocular Movements: Extraocular movements intact.   Cardiovascular:      Rate and Rhythm: Normal rate.      Heart sounds: Murmur heard.   Pulmonary:      Effort: Pulmonary effort is normal.   Abdominal:      General: Bowel sounds are normal.      Palpations: Abdomen is soft.      Comments: PD cath in place   Musculoskeletal:      Cervical back: Neck supple.      Comments: Post up LUE fistula creation/dressing in place, new Rt chest HD cath. Post op dressing over abdomen     Neurological:      Mental Status: He is alert and oriented to person, place, and time. Mental status is at baseline.      Cranial Nerves: No cranial nerve deficit.   Psychiatric:          Mood and Affect: Mood normal.         Behavior: Behavior normal.         Thought Content: Thought content normal.         Judgment: Judgment normal.         Fluids    Intake/Output Summary (Last 24 hours) at 6/13/2025 1407  Last data filed at 6/13/2025 1256  Gross per 24 hour   Intake 350 ml   Output 300 ml   Net 50 ml        Laboratory  Recent Labs     06/11/25  0047 06/12/25  0113   WBC 5.1 5.2   RBC 2.99* 2.64*   HEMOGLOBIN 9.7* 8.5*   HEMATOCRIT 30.3* 26.9*   .3* 101.9*   MCH 32.4 32.2   MCHC 32.0* 31.6*   RDW 55.6* 55.6*   PLATELETCT 198 220   MPV 9.1 9.2     Recent Labs     06/11/25  0047 06/12/25  0113   SODIUM 135 132*   POTASSIUM 4.5 4.9   CHLORIDE 96 94*   CO2 29 26   GLUCOSE 99 96   BUN 25* 40*   CREATININE 5.77* 7.60*   CALCIUM 8.6 8.4*     Recent Labs     06/11/25  0153 06/12/25  0113 06/13/25  0652   INR 1.83* 2.07* 2.15*               Imaging  US-SOFT TISSUES OF HEAD - NECK   Final Result      1.  No abnormalities identified.   2.  No suspicious lymph nodes identified in the lateral neck.      DX-PORTABLE FLUORO > 1 HOUR   Final Result      Portable fluoroscopy utilized for 3 seconds.      INTERPRETING LOCATION: [address] [place] [address], [facility]      DX-CHEST-LIMITED (1 VIEW)   Final Result      Digitized intraoperative radiograph is submitted for review. This examination is not for diagnostic purpose but for guidance during a surgical procedure. Please see the patient's chart for full procedural details.         INTERPRETING LOCATION: [address] [place] [address], [id number]      MR-BRAIN-WITH & W/O   Final Result      1.  Mild chronic microvascular ischemic type changes.   2.  No acute intracranial abnormality or pathologic enhancement.   3.  No evidence of mesial temporal sclerosis.           Assessment/Plan  * New onset seizure (HCC)  Assessment & Plan  Noted to have 3 seizure episodes at home, his significant other reports loss of consciousness with shaking of his extremities, patient denies history of  seizures  EEG was unremarkable his MRI brain was also unremarkable   I discussed the case with neurology, considering patient concerning feature, please have the patient on low-dose of Keppra 250 twice daily,  Continue seizure, aspiration, fall precaution, will provide follow-up with neurology as an outpatient    Hypotension  Assessment & Plan  On midodrine and cortisol    Hyperkalemia- (present on admission)  Assessment & Plan  Potassium 5.5 without any evidence of EKG changes  Nephrology consulted, peritoneal dialysis per nephro   K 4.9    Peritoneal dialysis status (HCC)- (present on admission)  Assessment & Plan  Pt with ESRD on PD, was in the process of switching to HD   Discussed with her surgery today he is postop left upper extremity fistula creation, permacath placement he has been started on hemodialysis today  Discussed with vascular surgery and nephrology  He will need a hemodialysis chair prior to discharge    A-fib (HCC)- (present on admission)  Assessment & Plan  Currently in sinus  On coumadin at baseline, heparin bridge for now  Today's INR is 2.15, will stop heparin drip    History of mechanical aortic valve replacement- (present on admission)  Assessment & Plan  History of mechanical aortic valve replacement, INR 3.4 at outside hospital   -- patient underwent placement of a PermCath and creation of AV fistula by Dr. Sales   -- Will continue heparin drip along with Coumadin per pharmacy protocol  INR 2.15    Hyperlipidemia  Assessment & Plan  Crestor          VTE prophylaxis: heparin     Total time spent 51 minutes. I spent greater than 50% of the time for patient care, counseling, and coordination on this date, including unit/floor time, and face-to-face time with the patient as per interval events, my own review of patient's imaging and lab analysis and developing my assessment and plan above.    I have performed a physical exam and reviewed and updated ROS and Plan today (6/13/2025). In review  of yesterday's note (6/12/2025), there are no changes except as documented above.

## 2025-06-13 NOTE — Clinical Note
Member Name: Gurdeep Guerra   Member Number: [id number]   Reference Number: [id number]   Approved Services: Consultation   Approved Service Dates: 06/13/2025 - 06/13/2026   Requesting Provider: Elías Shirley   Requested Provider: [facility]     Dear Gurdeep Guerra:     The following medical service(s) requested by Elías Shirley have been approved:    Procedure Code Procedure Code Name Requested Quantity Approved Quantity Status   02995 (CPT®) AL OFFICE/OUTPATIENT NEW MODERATE MDM 45 MINUTES 1 1 Authorized   48691 (CPT®) AL OFFICE/OUTPATIENT ESTABLISHED MOD MDM 30 MIN 98 98 Authorized       Approved Quantity means the number of visits approved for medication treatments and/or medical services.    The services should be provided by [facility] no later than 06/13/2026. Please contact the provider listed below with any questions.     Provider Information:  [facility]  [phone]    Your plan benefit may require a deductible, co-payment or coinsurance for these services. This authorization does not guarantee [facility] will pay the claim for services that you receive. Payment by [facility] for these services is subject to the terms of your Evidence of Coverage or Summary Plan Description, your eligibility at the time of service, and confirmation of benefit coverage.    For any questions or additional information, please contact Customer Service:    [facility]  Customer Service: [phone] or toll free [phone]  TTY users dial: 711   Call Center Hours: Mon - Fri 7 AM to 8 PM PST   Office Hours: Mon - Fri 8 AM to 5 PM [facility]   E-mail: [email or web address]   Website: [email or web address]       This information is available for free in other languages. Please contact Customer Service at the phone number above for more information. [facility] complies with applicable Federal civil rights laws and does not  discriminate on the basis of race, color, national origin, age, disability or sex.      Sincerely,     Healthcare Utilization Management Department     Cc: [facility]   Elías Shirley

## 2025-06-13 NOTE — CARE PLAN
The patient is Stable - Low risk of patient condition declining or worsening    Shift Goals  Clinical Goals: VSS, safety  Patient Goals: pain control, sleep  Family Goals: FARIDA    Progress made toward(s) clinical / shift goals:    Problem: Knowledge Deficit - Standard  Goal: Patient and family/care givers will demonstrate understanding of plan of care, disease process/condition, diagnostic tests and medications  Outcome: Progressing     Problem: Hemodynamics  Goal: Patient's hemodynamics, fluid balance and neurologic status will be stable or improve  6/13/2025 1518 by Pratibha Gonzalez, R.N.  Outcome: Progressing  6/13/2025 1518 by Pratibha Gonzalez R.N.  Outcome: Progressing     Problem: Fluid Volume  Goal: Fluid volume balance will be maintained  Outcome: Progressing     Problem: Fall Risk  Goal: Patient will remain free from falls  Outcome: Progressing     Problem: Respiratory  Goal: Patient will achieve/maintain optimum respiratory ventilation and gas exchange  Outcome: Progressing     Problem: Mobility  Goal: Patient's capacity to carry out activities will improve  Outcome: Progressing       Patient is not progressing towards the following goals:

## 2025-06-13 NOTE — PROGRESS NOTES
Monitor Summary:    SR 74-84  (F) [facility]  [place]  (R) [place]    .18/.08/.40

## 2025-06-13 NOTE — CARE PLAN
The patient is Stable - Low risk of patient condition declining or worsening    Shift Goals  Clinical Goals: VSS, safety  Patient Goals: pain control, sleep  Family Goals: FARIDA    Progress made toward(s) clinical / shift goals:    Problem: Knowledge Deficit - Standard  Goal: Patient and family/care givers will demonstrate understanding of plan of care, disease process/condition, diagnostic tests and medications  Outcome: Progressing     Problem: Seizure Precautions  Goal: Implementation of seizure precautions  Outcome: Progressing     Problem: Infection - Standard  Goal: Patient will remain free from infection  Outcome: Progressing     Problem: Pain - Standard  Goal: Alleviation of pain or a reduction in pain to the patient’s comfort goal  Outcome: Progressing       Patient is not progressing towards the following goals:

## 2025-06-13 NOTE — PROGRESS NOTES
[facility] -  PROGRESS NOTE               Author: Justice Mac M.D. Date & Time: 6/13/2025  12:27 PM     HPI:  60-year-old male with ESRD on peritoneal dialysis who presented after new onset seizure episodes.  Recent hospitalization at [facility] with concern for failing peritoneal dialysis.  He was transiently transition to hemodialysis and felt improved on HD but  wanted to try one more attempt at PD with increased outpatient Rx.  His outpatient prescription was increased to 6 x 2 L fills over 10 hours.  He has continued to feel suboptimal and plan was set in place to transition to hemodialysis after outpatient permacath placement, which has not happened yet. He then had several episodes of seizures at home on day of admission.  Eventually transferred from outside facility for further management.  Upon arrival to [facility] found to have a potassium of 5.5. Underwent PD last night. Potassium resolved. He has some confusion today and feels overall off. No chest pain or SoB. No abd pain. Pending EEG     DAILY NEPHROLOGY SUMMARY:  6/8: Consult done  6/9: Tolerated PD, no further seizure activity, pending permacath and PD cath removal and fistula creation  6/10: s/p permacath placement, AVF creation and PD cath removal, tolerated HD, pain at l. Arm by AVF site. No chest pain or SOB  6/11: tolerated HD with minimal no UF, L. Arm pain improving, no chest pain or SOB  6/12: Ongoing soft blood pressures-limiting UF, hematoma site of new AVF resolving  6/13: Tolerated HD-limited UF due to low Bps, checked cortisol level which was drawn at midnight which was 3-some concern for relative adrenal insufficiency and started on steroids.  Ongoing dizziness. Ongoing low Bps, insisted on discharging home to primary team.    PAST FAMILY HISTORY: Reviewed and Unchanged  SOCIAL HISTORY: Reviewed and Unchanged  CURRENT MEDICATIONS: Reviewed  IMAGING STUDIES: Reviewed    ROS  10 Point ROS performed, negative  "other than stated above    PHYSICAL EXAM  VS:  BP (!) 85/45 Comment: Nurse notified  Pulse 67   Temp 37 °C (98.6 °F) (Temporal)   Resp 16   Ht 1.702 m (5' 7\")   Wt 83.2 kg (183 lb 6.8 oz)   SpO2 95%   BMI 28.73 kg/m²   GENERAL: no acute distress  CV: RR, No edema  RESP: non-labored  GI: Soft  MSK: No joint deformities   SKIN: No concerning rashes  NEURO: No tremor  PSYCH: Cooperative  ACCESS: LUE AVF with ecchymosis     Fluids:  In: 750 [P.O.:150; Dialysis:600]  Out: 700     LABS:  Recent Results (from the past 24 hours)   Prothrombin Time    Collection Time: 06/13/25  6:52 AM   Result Value Ref Range    PT 24.2 (H) 12.0 - 14.6 sec    INR 2.15 (H) 0.87 - 1.13       (click the triangle to expand results)      ASSESSMENT:  # ESRD  -Concerned that he is now failed peritoneal dialysis and had been pending transition to HD outpatient   -Converted to HD while inpatient   -s/p permacath and AVF creation on 6/9  # Hyperkalemia   - resolved   # Hypotension  - On stress dose steroids via primary although suspect cortisol level nondiagnostic given timing  -Midodrine  -Ruling out other etiologies  # HTN  # Anemia of CKD  -with Acute worsening  # Gout  # Chest Pain  # Afib   # New onset seizures     SUGGESTIONS  -No HD today, continue TTS schedule going forward (Confirmed TTS outpatient schedule at [facility])  -Midodrine  -limit narcotics, may be contributing to low Bps  -Given In hemoglobin may need further blood loss workup  -APPLE with HD  -No dietary protein restrictions  -Phos binder with meals  -Holding home BP meds  -Dose all meds per ESRD  -Recent blood pressures and ongoing symptoms favor remaining inpatient.     Discussed with hospitalist     Thank you,     Justice Mac MD    "

## 2025-06-13 NOTE — PROGRESS NOTES
Pt had episode of chest pressure/pain that radiated to LA. Also endorsed dizziness and SOB. Vitals stable (see flowsheet) stat EKG with no acute changes.     Left hand numbness since AV fistula created. 1+ pulse and thrill palpable. Known to MD.    Dr Shirley notified. New order for GI cocktail acknowledged.

## 2025-06-13 NOTE — PROGRESS NOTES
Bedside report received, assumed care of patient at change of shift. Chart, labs, and orders reviewed. Pt resting in bed, breathing even and unlabored on RA, denies pain and in no acute distress. A&O x4. Tele monitoring in place. Fall precautions including bed alarm in place and education provided. Call light within reach, bed locked and in lowest position, denies other needs at this time.     Pt very agitated with hospitalization, refusing to take any medications, wants to speak to MD about discharging. Sitting at EOB and education provided about needing to be in chair or in bed. Pt continues to refuse.

## 2025-06-13 NOTE — PROGRESS NOTES
Pharmacy Warfarin Monitoring     Date: 6/13/2025  Reason for Anticoagulation: On-X Aortic Valve Replacement, Atrial Fibrillation   Target INR: 2.0 to 3.0    [id number] VASc Score: 3  HAS-BLED Score: 0     Hemoglobin Value: (!) 8.5  Hematocrit Value: (!) 26.9  Lab Platelet Value: 220     Date/Time INR Value    06/13/25 0652 2.15     06/12/25 0113 2.07     06/11/25 0153 1.83     06/10/25 0553 1.58     06/09/25 1810 1.44     06/09/25 0401 1.89     06/08/25 1207 2.58     06/08/25 0617 2.86       Date/Time Dose (mg)    06/13/25 1540 5     06/12/25 0907 5     06/11/25 1508 5     06/10/25 1353 5     06/09/25 1610 5     06/08/25 1256 0      Home warfarin dose: 5mg po daily  Significant Interactions: Antiplatelet Medications    Bridge Therapy: Yes, now completed  Bridge Therapy Start Date: 06/09/25  Days of Overlap Therapy: 3  INR Value Greater than 2 Prior to Discontinuation of Parenteral Anticoagulation: Yes     Reversal Agent Administered: Not Applicable    Comments:  See previous notes regarding INR goal.  INR currently at goal with home regimen.  Continue home dosing.    Education Material Provided?: No - chronic warfarin patient  Pharmacist suggested discharge dosing: Continue home dosing of 5 mg daily     Thank you!  Malena Arriaga, PharmD, BCCCP

## 2025-06-14 ENCOUNTER — PHARMACY VISIT (OUTPATIENT)
Dept: PHARMACY | Facility: MEDICAL CENTER | Age: 60
End: 2025-06-14
Payer: COMMERCIAL

## 2025-06-14 VITALS
BODY MASS INDEX: 29.48 KG/M2 | WEIGHT: 187.83 LBS | TEMPERATURE: 97.3 F | RESPIRATION RATE: 16 BRPM | HEART RATE: 78 BPM | OXYGEN SATURATION: 96 % | SYSTOLIC BLOOD PRESSURE: 108 MMHG | HEIGHT: 67 IN | DIASTOLIC BLOOD PRESSURE: 64 MMHG

## 2025-06-14 LAB
ALBUMIN SERPL BCP-MCNC: 2.8 G/DL (ref 3.2–4.9)
ANION GAP SERPL CALC-SCNC: 13 MMOL/L (ref 7–16)
APPEARANCE UR: CLEAR
BACTERIA #/AREA URNS HPF: ABNORMAL /HPF
BASOPHILS # BLD AUTO: 0.4 % (ref 0–1.8)
BASOPHILS # BLD: 0.02 K/UL (ref 0–0.12)
BILIRUB UR QL STRIP.AUTO: NEGATIVE
BUN SERPL-MCNC: 40 MG/DL (ref 8–22)
CALCIUM ALBUM COR SERPL-MCNC: 9.4 MG/DL (ref 8.5–10.5)
CALCIUM SERPL-MCNC: 8.4 MG/DL (ref 8.5–10.5)
CASTS URNS QL MICRO: ABNORMAL /LPF (ref 0–2)
CHLORIDE SERPL-SCNC: 98 MMOL/L (ref 96–112)
CO2 SERPL-SCNC: 25 MMOL/L (ref 20–33)
COLOR UR: YELLOW
CREAT SERPL-MCNC: 7.71 MG/DL (ref 0.5–1.4)
EOSINOPHIL # BLD AUTO: 0.32 K/UL (ref 0–0.51)
EOSINOPHIL NFR BLD: 7 % (ref 0–6.9)
EPITHELIAL CELLS 1715: ABNORMAL /HPF (ref 0–5)
ERYTHROCYTE [DISTWIDTH] IN BLOOD BY AUTOMATED COUNT: 56.1 FL (ref 35.9–50)
GFR SERPLBLD CREATININE-BSD FMLA CKD-EPI: 8 ML/MIN/1.73 M 2
GLUCOSE SERPL-MCNC: 85 MG/DL (ref 65–99)
GLUCOSE UR STRIP.AUTO-MCNC: NEGATIVE MG/DL
HCT VFR BLD AUTO: 25.4 % (ref 42–52)
HGB BLD-MCNC: 8.4 G/DL (ref 14–18)
IMM GRANULOCYTES # BLD AUTO: 0.01 K/UL (ref 0–0.11)
IMM GRANULOCYTES NFR BLD AUTO: 0.2 % (ref 0–0.9)
INR PPP: 2.34 (ref 0.87–1.13)
KETONES UR STRIP.AUTO-MCNC: NEGATIVE MG/DL
LEUKOCYTE ESTERASE UR QL STRIP.AUTO: ABNORMAL
LYMPHOCYTES # BLD AUTO: 0.58 K/UL (ref 1–4.8)
LYMPHOCYTES NFR BLD: 12.7 % (ref 22–41)
MCH RBC QN AUTO: 33.1 PG (ref 27–33)
MCHC RBC AUTO-ENTMCNC: 33.1 G/DL (ref 32.3–36.5)
MCV RBC AUTO: 100 FL (ref 81.4–97.8)
MICRO URNS: ABNORMAL
MONOCYTES # BLD AUTO: 0.53 K/UL (ref 0–0.85)
MONOCYTES NFR BLD AUTO: 11.6 % (ref 0–13.4)
NEUTROPHILS # BLD AUTO: 3.12 K/UL (ref 1.82–7.42)
NEUTROPHILS NFR BLD: 68.1 % (ref 44–72)
NITRITE UR QL STRIP.AUTO: NEGATIVE
NRBC # BLD AUTO: 0 K/UL
NRBC BLD-RTO: 0 /100 WBC (ref 0–0.2)
PH UR STRIP.AUTO: 8.5 [PH] (ref 5–8)
PHOSPHATE SERPL-MCNC: 5.3 MG/DL (ref 2.5–4.5)
PLATELET # BLD AUTO: 206 K/UL (ref 164–446)
PMV BLD AUTO: 9.1 FL (ref 9–12.9)
POTASSIUM SERPL-SCNC: 4.6 MMOL/L (ref 3.6–5.5)
PROT UR QL STRIP: 100 MG/DL
PROTHROMBIN TIME: 25.8 SEC (ref 12–14.6)
RBC # BLD AUTO: 2.54 M/UL (ref 4.7–6.1)
RBC # URNS HPF: ABNORMAL /HPF (ref 0–2)
RBC UR QL AUTO: ABNORMAL
SODIUM SERPL-SCNC: 136 MMOL/L (ref 135–145)
SP GR UR STRIP.AUTO: 1.01
UROBILINOGEN UR STRIP.AUTO-MCNC: 0.2 EU/DL
WBC # BLD AUTO: 4.6 K/UL (ref 4.8–10.8)
WBC #/AREA URNS HPF: ABNORMAL /HPF

## 2025-06-14 PROCEDURE — 85025 COMPLETE CBC W/AUTO DIFF WBC: CPT

## 2025-06-14 PROCEDURE — 700102 HCHG RX REV CODE 250 W/ 637 OVERRIDE(OP): Performed by: HOSPITALIST

## 2025-06-14 PROCEDURE — 85610 PROTHROMBIN TIME: CPT

## 2025-06-14 PROCEDURE — 80069 RENAL FUNCTION PANEL: CPT

## 2025-06-14 PROCEDURE — A9270 NON-COVERED ITEM OR SERVICE: HCPCS | Performed by: STUDENT IN AN ORGANIZED HEALTH CARE EDUCATION/TRAINING PROGRAM

## 2025-06-14 PROCEDURE — A9270 NON-COVERED ITEM OR SERVICE: HCPCS | Performed by: NURSE PRACTITIONER

## 2025-06-14 PROCEDURE — 81001 URINALYSIS AUTO W/SCOPE: CPT

## 2025-06-14 PROCEDURE — 99239 HOSP IP/OBS DSCHRG MGMT >30: CPT | Performed by: HOSPITALIST

## 2025-06-14 PROCEDURE — A9270 NON-COVERED ITEM OR SERVICE: HCPCS

## 2025-06-14 PROCEDURE — 700111 HCHG RX REV CODE 636 W/ 250 OVERRIDE (IP): Mod: JZ,TB | Performed by: INTERNAL MEDICINE

## 2025-06-14 PROCEDURE — 700102 HCHG RX REV CODE 250 W/ 637 OVERRIDE(OP)

## 2025-06-14 PROCEDURE — 90935 HEMODIALYSIS ONE EVALUATION: CPT

## 2025-06-14 PROCEDURE — 700102 HCHG RX REV CODE 250 W/ 637 OVERRIDE(OP): Performed by: STUDENT IN AN ORGANIZED HEALTH CARE EDUCATION/TRAINING PROGRAM

## 2025-06-14 PROCEDURE — 700102 HCHG RX REV CODE 250 W/ 637 OVERRIDE(OP): Performed by: NURSE PRACTITIONER

## 2025-06-14 PROCEDURE — A9270 NON-COVERED ITEM OR SERVICE: HCPCS | Performed by: HOSPITALIST

## 2025-06-14 PROCEDURE — 87086 URINE CULTURE/COLONY COUNT: CPT

## 2025-06-14 RX ADMIN — EPOETIN ALFA-EPBX 4000 UNITS: 4000 INJECTION, SOLUTION INTRAVENOUS; SUBCUTANEOUS at 11:04

## 2025-06-14 RX ADMIN — HYDROCORTISONE 10 MG: 10 TABLET ORAL at 05:19

## 2025-06-14 RX ADMIN — OXYCODONE HYDROCHLORIDE 10 MG: 10 TABLET ORAL at 03:00

## 2025-06-14 RX ADMIN — ASPIRIN 81 MG: 81 TABLET, COATED ORAL at 05:20

## 2025-06-14 RX ADMIN — HEPARIN SODIUM 1700 UNITS: 1000 INJECTION, SOLUTION INTRAVENOUS; SUBCUTANEOUS at 12:35

## 2025-06-14 RX ADMIN — GABAPENTIN 100 MG: 100 CAPSULE ORAL at 05:19

## 2025-06-14 RX ADMIN — GABAPENTIN 100 MG: 100 CAPSULE ORAL at 11:53

## 2025-06-14 RX ADMIN — EZETIMIBE 10 MG: 10 TABLET ORAL at 05:20

## 2025-06-14 RX ADMIN — OMEPRAZOLE 20 MG: 20 CAPSULE, DELAYED RELEASE ORAL at 05:19

## 2025-06-14 RX ADMIN — HEPARIN SODIUM 1600 UNITS: 1000 INJECTION, SOLUTION INTRAVENOUS; SUBCUTANEOUS at 12:35

## 2025-06-14 ASSESSMENT — FIBROSIS 4 INDEX: FIB4 SCORE: 0.71

## 2025-06-14 ASSESSMENT — PAIN DESCRIPTION - PAIN TYPE
TYPE: ACUTE PAIN
TYPE: ACUTE PAIN;SURGICAL PAIN

## 2025-06-14 NOTE — CARE PLAN
The patient is Stable - Low risk of patient condition declining or worsening    Shift Goals  Clinical Goals: VSS, safety  Patient Goals: comfort, rest  Family Goals: FARIDA    Progress made toward(s) clinical / shift goals:    Problem: Knowledge Deficit - Standard  Goal: Patient and family/care givers will demonstrate understanding of plan of care, disease process/condition, diagnostic tests and medications  Description: Target End Date:  1-3 days or as soon as patient condition allowsDocument in Patient Education1.  Patient and family/caregiver oriented to unit, equipment, visitation policy and means for communicating concern2.  Complete/review Learning Assessment3.  Assess knowledge level of disease process/condition, treatment plan, diagnostic tests and medications4.  Explain disease process/condition, treatment plan, diagnostic tests and medications  Outcome: Progressing     Problem: Seizure Precautions  Goal: Implementation of seizure precautions  Description: Target End Date:  Prior to discharge or change in level of care1.  Padded side rails up at all times2.  Suction equipment and oxygen delivery system at bedside3.  Continuous pulse oximeter in use4.  Implement fall precautions, bed alarm on, bed in lowest position5.  IV access (per order)6.  Provide low stimulus environment, avoid exposure to triggers7.  Instruct patient to use call light/seizure button if having warning signs of impending seizure  Outcome: Progressing     Problem: Hemodynamics  Goal: Patient's hemodynamics, fluid balance and neurologic status will be stable or improve  Description: Target End Date:  Prior to discharge or change in level of careDocument on Assessment and I/O flowsheet templates1.  Monitor vital signs, pulse oximetry and cardiac monitor per provider order and/or policy2.  Maintain blood pressure per provider order3.  Hemodynamic monitoring per provider order4.  Manage IV fluids and IV infusions5.  Monitor intake and output6.   Daily weights per unit policy or provider order7.  Assess peripheral pulses and capillary refill8.  Assess color and body temperature9.  Position patient for maximum circulation/cardiac [id number]. Monitor for signs/symptoms of excessive [id number]. Assess mental status, restlessness and changes in level of [id number]. Monitor temperature and report fever or hypothermia to provider immediately. Consideration of targeted temperature management.  Outcome: Progressing     Problem: Pain - Standard  Goal: Alleviation of pain or a reduction in pain to the patient’s comfort goal  Description: Target End Date:  Prior to discharge or change in level of careDocument on Vitals flowsheet1.  Document pain using the appropriate pain scale per order or unit policy2.  Educate and implement non-pharmacologic comfort measures (i.e. relaxation, distraction, massage, cold/heat therapy, etc.)3.  Pain management medications as ordered4.  Reassess pain after pain med administration per policy5.  If opiods administered assess patient's response to pain medication is appropriate per POSS sedation scale6.  Follow pain management plan developed in collaboration with patient and interdisciplinary team (including palliative care or pain specialists if applicable)  Outcome: Progressing     Problem: Fall Risk  Goal: Patient will remain free from falls  Description: Target End Date:  Prior to discharge or change in level of care    Document interventions on the Velasquez Hosea Fall Risk Assessment    1.  Assess for fall risk factors  2.  Implement fall precautions  Outcome: Progressing     Problem: Respiratory  Goal: Patient will achieve/maintain optimum respiratory ventilation and gas exchange  Description: Target End Date:  Prior to discharge or change in level of care    Document on Assessment flowsheet    1.  Assess and monitor rate, rhythm, depth and effort of respiration  2.  Breath sounds assessed qshift and/or as needed  3.  Assess O2  saturation, administer/titrate oxygen as ordered  4.  Position patient for maximum ventilatory efficiency  5.  Turn, cough, and deep breath with splinting to improve effectiveness  6.  Collaborate with RT to administer medication/treatments per order  7.  Encourage use of incentive spirometer and encourage patient to cough after use and utilize splinting techniques if applicable  8.  Airway suctioning  9.  Monitor sputum production for changes in color, consistency and frequency  10. Perform frequent oral hygiene  11. Alternate physical activity with rest periods  Outcome: Progressing     Problem: Mobility  Goal: Patient's capacity to carry out activities will improve  Description: Target End Date:  Prior to discharge or change in level of care    1.  Assess for barriers to mobility/activity  2.  Implement activity per interdisciplinary team recommendations  3.  Target activity level identified and patient/family/caregiver aware of goal  4.  Provide assistive devices  5.  Instruct patient/caregiver on proper use of assistive/adaptive devices  6.  Schedule activities and rest periods to decrease effects of fatigue  7.  Encourage mobilization to extent of ability  8.  Maintain proper body alignment  9.  Provide adequate pain management to allow progressive mobilization  10. Implement pace maker precautions as needed  Outcome: Progressing       Patient is not progressing towards the following goals:

## 2025-06-14 NOTE — PROGRESS NOTES
Bedside report received, assumed care of patient at change of shift. Chart, labs, and orders reviewed. Pt resting in bed, breathing even and unlabored on 0.5L, denies pain and in no acute distress. A&O x4. Tele monitoring in place. Fall precautions including bed alarm in place and education provided. Call light within reach, bed locked and in lowest position, denies other needs at this time.

## 2025-06-14 NOTE — DISCHARGE SUMMARY
Discharge Summary    CHIEF COMPLAINT ON ADMISSION  No chief complaint on file.      Reason for Admission  Nephrology (Hyperkalemia)     Admission Date  6/7/2025    CODE STATUS  Full Code    HPI & HOSPITAL COURSE  This is a 54-year-old female with a past medical history significant for ESRD on PD, mechanical MVR (on Coumadin )with aortic aneurysm dissection ended on 6/7/2025 with seizure-like episode.  Apparently patient had several episode of seizure-like activity at home stated that he does not remember what happened.    Patient girlfriend has noted this episode and noted to have loss of consciousness clenching of his jaws, he was then taken to [facility] facility was brought here for higher level of care. EEG did not show any seizure, MRI of the brain did not show any evidence of mesial temporal sclerosis.  considering his seizure-like activity, I discussed with neurology who recommended continuing Keppra.  Currently patient is on Keppra 500 mg p.o. Dr Peñaloza, daily after dialysis .    Workup CT chest showing previous aortic valvuloplasty severe CAD; CT abdomen pelvis showing atrophic kidney, 6mm vascular calcification in the left lower renal hilum.    During the stay in the hospital, nephrology was consulted, patient was started on hemodialysis TTS vascular was consulted, patient underwent creation of left brachiocephalic AV fistula by Dr Sales, complicated with hematoma; but stable.  He has been on coumadin for MVR and a-fib, INr therapeutic. Patient will follow-up with Coumadin clinic as an outpatient.  Considering patient blood pressure on the lower side, patient will be discharged home on midodrine, he will follow-up with nephrology as an outpatient.     His cortisol is noted to be  low, patient was started on steroid but he declined to take this at home; he was  patient is recommended to follow-up as an outpatient with PCP.    Also his hemoglobin is noted to be in the lower side at 8.4; yesterday 8.5, no  active bleeding noted.  He will follow-up with nephrology as an outpatient    Therefore, he is discharged in good and stable condition to home with close outpatient follow-up.    The patient met 2-midnight criteria for an inpatient stay at the time of discharge.    Discharge Date  6/14/2025      FOLLOW UP ITEMS POST DISCHARGE  Drew T. Blumberg, D.O.      DISCHARGE DIAGNOSES  Principal Problem:    New onset seizure (HCC) (POA: Unknown)  Active Problems:    Hyperlipidemia (POA: Unknown)    History of mechanical aortic valve replacement (POA: Yes)    A-fib (HCC) (POA: Yes)    Peritoneal dialysis status (HCC) (POA: Yes)    Hyperkalemia (POA: Yes)    Hypotension (POA: Unknown)  Resolved Problems:    Infectious disease (POA: Unknown)      FOLLOW UP  Future Appointments   Date Time Provider Department Center   6/18/2025  1:00 PM DARLENE DANIEL [facility]   9/9/2025  3:30 PM Drew T. Blumberg, D.O. [facility] None   9/11/2025  9:00 AM EMILIA Brink None     No follow-up provider specified.    MEDICATIONS ON DISCHARGE     Medication List        START taking these medications        Instructions   atorvastatin 80 MG tablet  Commonly known as: Lipitor   Take 1 Tablet by mouth every evening.  Dose: 80 mg     hydrocortisone 10 MG Tabs  Commonly known as: Cortef   Doctor's comments:    Take 1 Tablet by mouth every morning.  Dose: 10 mg     levETIRAcetam 500 MG Tabs  Commonly known as: Keppra   Take 1 Tablet by mouth every evening for 60 days.  Dose: 500 mg     midodrine 5 MG Tabs  Commonly known as: Proamatine   Take 2 Tablets by mouth 3 times a day with meals for 30 days.  Dose: 10 mg     oxyCODONE immediate-release 5 MG Tabs  Commonly known as: Roxicodone   Take 1 Tablet by mouth every 8 hours as needed for Severe Pain for up to 3 days.  Dose: 5 mg            CONTINUE taking these medications        Instructions   acidophilus lactobacillus Caps   Take 1 Capsule by mouth every day.  Dose: 1 Capsule     calcitRIOL  0.25 MCG Caps  Commonly known as: Rocaltrol   Take 1 Capsule by mouth every day.  Dose: 0.25 mcg     esomeprazole 20 MG capsule  Commonly known as: NexIUM   Take 2 Capsules by mouth every morning before breakfast.  Dose: 40 mg     ezetimibe 10 MG Tabs  Commonly known as: Zetia   Take 1 Tablet by mouth every day.  Dose: 10 mg     ferrous sulfate 325 (65 Fe) MG tablet   Take 325 mg by mouth every day.  Dose: 325 mg     gabapentin 100 MG Caps  Commonly known as: Neurontin   TAKE 1 CAPSULE BY MOUTH THREE TIMES DAILY  Dose: 100 mg     magnesium oxide 400 MG Tabs tablet  Commonly known as: Mag-Ox   Take 200 mg by mouth every day.  Dose: 200 mg     sevelamer carbonate 800 MG Tabs tablet  Commonly known as: Renvela   Take 800 mg by mouth 3 times a day as needed.  Dose: 800 mg     Tylenol 8 Hour Arthritis Pain 650 MG CR tablet  Generic drug: acetaminophen   Take 650 mg by mouth every 6 hours as needed for Mild Pain.  Dose: 650 mg     warfarin 2.5 MG Tabs  Commonly known as: Coumadin   Doctor's comments: This prescription is transmitted by a pharmacist under the authority of a collaborative practice agreement.  Take 1-2 Tablets by mouth every day. Take as directed by anticoag clinic.  Dose: 2.5-5 mg            STOP taking these medications      aspirin 81 MG EC tablet     carvedilol 3.125 MG Tabs  Commonly known as: Coreg     rosuvastatin 20 MG Tabs  Commonly known as: Crestor     traZODone 50 MG Tabs  Commonly known as: Desyrel              Allergies  Allergies[1]    DIET  Orders Placed This Encounter   Procedures    Diet Order Diet: Renal     Standing Status:   Standing     Number of Occurrences:   1     Diet::   Renal [8]       ACTIVITY  As tolerated.  Weight bearing as tolerated    CONSULTATIONS  Vascular  Nephrology    PROCEDURES     Creation of a left brachiocephalic AV fistula (07016)  - Percutaneous access of the right IJ vein with ultrasound guidance and insertion of a right IJ tunneled cuffed dual lumen dialysis  catheter (56722, 24858)  - Removal of CAPD catheter (47690)  LABORATORY  Lab Results   Component Value Date    SODIUM 136 06/14/2025    POTASSIUM 4.6 06/14/2025    CHLORIDE 98 06/14/2025    CO2 25 06/14/2025    GLUCOSE 85 06/14/2025    BUN 40 (H) 06/14/2025    CREATININE 7.71 (HH) 06/14/2025    GLOMRATE 7 (L) 04/02/2023        Lab Results   Component Value Date    WBC 4.6 (L) 06/14/2025    HEMOGLOBIN 8.4 (L) 06/14/2025    HEMATOCRIT 25.4 (L) 06/14/2025    PLATELETCT 206 06/14/2025        Total time of the discharge process exceeds 39 minutes.       [1]   Allergies  Allergen Reactions    Allopurinol Itching    Hydralazine Hcl Unspecified     Pt states he had a reaction similar to lupus

## 2025-06-14 NOTE — PROGRESS NOTES
[facility] Progress Note     Hemodialysis treatment ordered today per Dr. Phyllis Bernal x 3 hours.   Treatment initiated at 09:32am end time 12:32pm.      Patient completed treatment, had left arm cramps at last 30 mins, resolved after uf off; see electronic flow sheet for details.      Net UF 1500 mL.      Post tx, CVC flushed with saline then locked with heparin 1000 units/mL per designated amount in each wing then clamped and capped. Aspirate heparin prior to next CVC use. Dressing changed per protocol.      Report given to Primary RN.

## 2025-06-14 NOTE — PROGRESS NOTES
Monitor Summary  Rhythm: SR  Rate: 63-74  Ectopy: rare couplet, rare bigem, freq pvc's  Measurements: 0.19/0.08/0.32  ---12 hr Chart Review---

## 2025-06-14 NOTE — PROGRESS NOTES
Bedside shift report received from outgoing RN and pt care assumed. Pt is AAOx4, VSS, on 0.5L via NC satting 97%, c/o 0/10 pain. Pt updated on plan of care, no questions or concerns at this time.  Tele Box on, rate and rhythm verified and being monitored.  Bed in lowest position, brakes on, call light and pt belongings within reach. Fall precautions maintained. Hourly rounding initiated. Care ongoing.

## 2025-06-14 NOTE — PROGRESS NOTES
Discharge orders received.  Patient arrived to the discharge lounge.  PIV removed by floor RN. Meds to beds medications verified by discharge RN, patient did not want Atorvastatin, Keppra, or Midodrine, bag with other medications given to patient.  Instructions given, medications reviewed and general discharge education provided to patient.  Follow up appointments discussed.  Patient verbalized understanding of dc instructions and prescriptions.  Patient signed discharge instructions.  Patient verbalized he had all belongings with him, Denied having any home medications locked in our inpatient pharmacy that  he needs back. Patient wheeled  from discharge lounge to private vehicle. Patient left via car with spouse to home in stable condition.

## 2025-06-14 NOTE — PROGRESS NOTES
[facility] -  PROGRESS NOTE               Author: TEX iPneda Date & Time: 6/14/2025  11:31 AM     HPI:  60-year-old male with ESRD on peritoneal dialysis who presented after new onset seizure episodes.  Recent hospitalization at [facility] with concern for failing peritoneal dialysis.  He was transiently transition to hemodialysis and felt improved on HD but  wanted to try one more attempt at PD with increased outpatient Rx.  His outpatient prescription was increased to 6 x 2 L fills over 10 hours.  He has continued to feel suboptimal and plan was set in place to transition to hemodialysis after outpatient permacath placement, which has not happened yet. He then had several episodes of seizures at home on day of admission.  Eventually transferred from outside facility for further management.  Upon arrival to [facility] found to have a potassium of 5.5. Underwent PD last night. Potassium resolved. He has some confusion today and feels overall off. No chest pain or SoB. No abd pain. Pending EEG     DAILY NEPHROLOGY SUMMARY:  6/8: Consult done  6/9: Tolerated PD, no further seizure activity, pending permacath and PD cath removal and fistula creation  6/10: s/p permacath placement, AVF creation and PD cath removal, tolerated HD, pain at l. Arm by AVF site. No chest pain or SOB  6/11: tolerated HD with minimal no UF, L. Arm pain improving, no chest pain or SOB  6/12: Ongoing soft blood pressures-limiting UF, hematoma site of new AVF resolving  6/13: Tolerated HD-limited UF due to low Bps, checked cortisol level which was drawn at midnight which was 3-some concern for relative adrenal insufficiency and started on steroids.  Ongoing dizziness. Ongoing low Bps, insisted on discharging home to primary team.  6/14: patient seen during dialysis, tolerating well, chair time confirmed with [facility]  TTS 10:15 am    PAST FAMILY HISTORY: Reviewed and Unchanged  SOCIAL HISTORY:  "Reviewed and Unchanged  CURRENT MEDICATIONS: Reviewed  IMAGING STUDIES: Reviewed    ROS  10 Point ROS performed, negative other than stated above    PHYSICAL EXAM  VS:  /52   Pulse 68   Temp 36.5 °C (97.7 °F) (Temporal)   Resp 16   Ht 1.702 m (5' 7\")   Wt 85.2 kg (187 lb 13.3 oz)   SpO2 94%   BMI 29.42 kg/m²   GENERAL: no acute distress  CV: RR, No edema  RESP: non-labored  GI: Soft  MSK: No joint deformities   SKIN: No concerning rashes  NEURO: No tremor  PSYCH: Cooperative  ACCESS: LUE AVF with ecchymosis     Fluids:  In: 400 [P.O.:400]  Out: 300     LABS:  Recent Results (from the past 24 hours)   EKG    Collection Time: 25  4:11 PM   Result Value Ref Range    Report       [facility]    Test Date:  2025  Pt Name:    LORIE KAYE               Department: 171  MRN:        [id number]                      Room:       [facility]  Gender:     Male                         Technician: [facility]  :        1965                   Requested By:ALEXANDRA JOHNSON  Order #:    [id number]                    Reading MD: Phuc Killian MD    Measurements  Intervals                                Axis  Rate:       76                           P:          48  IL:         192                          QRS:        6  QRSD:       77                           T:          58  QT:         415  QTc:        467    Interpretive Statements  Sinus rhythm  Ventricular trigeminy  Nonspecific T abnormalities, lateral leads  Compared to ECG 2025 22:25:57  Ventricular premature complex(es) now present    Electronically Signed On 2025 16:11:39 PDT by Phuc Killian MD     URINALYSIS    Collection Time: 25  2:50 AM    Specimen: Urine, Cath   Result Value Ref Range    Color Yellow     Character Clear     Specific Gravity 1.006 <1.035    Ph 8.5 (A) 5.0 - 8.0    Glucose Negative Negative mg/dL    Ketones Negative Negative mg/dL    Protein 100 (A) Negative mg/dL    Bilirubin Negative Negative    Urobilinogen, Urine " 0.2 <=1.0 EU/dL    Nitrite Negative Negative    Leukocyte Esterase Large (A) Negative    Occult Blood Trace (A) Negative    Micro Urine Req Microscopic    URINE MICROSCOPIC (W/UA)    Collection Time: 06/14/25  2:50 AM   Result Value Ref Range    WBC 21-50 (A) /hpf    RBC 3-5 (A) 0 - 2 /hpf    Bacteria None Seen None /hpf    Epithelial Cells 0-2 0 - 5 /hpf    Urine Casts 0-2 0 - 2 /lpf   Prothrombin Time    Collection Time: 06/14/25  6:34 AM   Result Value Ref Range    PT 25.8 (H) 12.0 - 14.6 sec    INR 2.34 (H) 0.87 - 1.13   CBC WITH DIFFERENTIAL    Collection Time: 06/14/25  6:34 AM   Result Value Ref Range    WBC 4.6 (L) 4.8 - 10.8 K/uL    RBC 2.54 (L) 4.70 - 6.10 M/uL    Hemoglobin 8.4 (L) 14.0 - 18.0 g/dL    Hematocrit 25.4 (L) 42.0 - 52.0 %    .0 (H) 81.4 - 97.8 fL    MCH 33.1 (H) 27.0 - 33.0 pg    MCHC 33.1 32.3 - 36.5 g/dL    RDW 56.1 (H) 35.9 - 50.0 fL    Platelet Count 206 164 - 446 K/uL    MPV 9.1 9.0 - 12.9 fL    Neutrophils-Polys 68.10 44.00 - 72.00 %    Lymphocytes 12.70 (L) 22.00 - 41.00 %    Monocytes 11.60 0.00 - 13.40 %    Eosinophils 7.00 (H) 0.00 - 6.90 %    Basophils 0.40 0.00 - 1.80 %    Immature Granulocytes 0.20 0.00 - 0.90 %    Nucleated RBC 0.00 0.00 - 0.20 /100 WBC    Neutrophils (Absolute) 3.12 1.82 - 7.42 K/uL    Lymphs (Absolute) 0.58 (L) 1.00 - 4.80 K/uL    Monos (Absolute) 0.53 0.00 - 0.85 K/uL    Eos (Absolute) 0.32 0.00 - 0.51 K/uL    Baso (Absolute) 0.02 0.00 - 0.12 K/uL    Immature Granulocytes (abs) 0.01 0.00 - 0.11 K/uL    NRBC (Absolute) 0.00 K/uL   Renal Function Panel    Collection Time: 06/14/25  6:34 AM   Result Value Ref Range    Sodium 136 135 - 145 mmol/L    Potassium 4.6 3.6 - 5.5 mmol/L    Chloride 98 96 - 112 mmol/L    Co2 25 20 - 33 mmol/L    Anion Gap 13.0 7.0 - 16.0    Glucose 85 65 - 99 mg/dL    Creatinine 7.71 (HH) 0.50 - 1.40 mg/dL    Bun 40 (H) 8 - 22 mg/dL    Calcium 8.4 (L) 8.5 - 10.5 mg/dL    Correct Calcium 9.4 8.5 - 10.5 mg/dL    Phosphorus 5.3 (H)  2.5 - 4.5 mg/dL    Albumin 2.8 (L) 3.2 - 4.9 g/dL   ESTIMATED GFR    Collection Time: 06/14/25  6:34 AM   Result Value Ref Range    GFR (CKD-EPI) 8 (A) >60 mL/min/1.73 m 2       (click the triangle to expand results)      ASSESSMENT:  # ESRD  -Concerned that he is now failed peritoneal dialysis and had been pending transition to HD outpatient   -Converted to HD while inpatient   -s/p permacath and AVF creation on 6/9  # Hyperkalemia   - resolved   # Hypotension  - On stress dose steroids via primary although suspect cortisol level nondiagnostic given timing  -Midodrine  -Ruling out other etiologies  # HTN  # Anemia of CKD  -with Acute worsening  # Gout  # Chest Pain  # Afib   # New onset seizures     SUGGESTIONS  - iHD today  - continue TTS schedule going forward (Confirmed TTS outpatient schedule at [facility], 10 am)  - using TDC right chest, new AVF left arm created 6/9 good bruit and thrill, PD cath removed  -Midodrine - not received several doses and BP is in acceptable range   - Patient tolerated dialysis without Midodrine  - Hgb remains stable for 48 hrs, suspect initial drop from bleeding post AVF creation, bruise is stable per vascular surgeon  -limit narcotics, may be contributing to low Bps  -Given In hemoglobin may need further blood loss workup  -APPLE with HD  -No dietary protein restrictions  -Phos binder with meals  -Holding home BP meds  -Dose all meds per ESRD  -OK to discharge       Discussed with hospitalist     Thank you

## 2025-06-17 DIAGNOSIS — I25.10 CORONARY ARTERY DISEASE INVOLVING NATIVE CORONARY ARTERY OF NATIVE HEART WITHOUT ANGINA PECTORIS: ICD-10-CM

## 2025-06-17 DIAGNOSIS — Z98.890 S/P ABLATION OF ATRIAL FIBRILLATION: ICD-10-CM

## 2025-06-17 DIAGNOSIS — Z86.79 S/P ABLATION OF ATRIAL FIBRILLATION: ICD-10-CM

## 2025-06-17 DIAGNOSIS — Z01.818 PRE-TRANSPLANT EVALUATION FOR KIDNEY TRANSPLANT: ICD-10-CM

## 2025-06-17 DIAGNOSIS — Z99.2 ESRD (END STAGE RENAL DISEASE) ON DIALYSIS (HCC): Primary | ICD-10-CM

## 2025-06-17 DIAGNOSIS — Z95.2 S/P AVR (AORTIC VALVE REPLACEMENT): ICD-10-CM

## 2025-06-17 DIAGNOSIS — N18.6 ESRD (END STAGE RENAL DISEASE) ON DIALYSIS (HCC): Primary | ICD-10-CM

## 2025-06-17 LAB
BACTERIA UR CULT: NORMAL
SIGNIFICANT IND 70042: NORMAL
SITE SITE: NORMAL
SOURCE SOURCE: NORMAL

## 2025-06-17 NOTE — Clinical Note
Member Name: Gurdeep Guerra   Member Number: [id number]   Reference Number: [id number]   Approved Services: Consultation   Approved Service Dates: 06/17/2025 - 06/17/2026   Requesting Provider: Hilda Miller   Requested Provider: Michael J Bloch     Dear Gurdeep Guerra:     The following medical service(s) requested by Hilda Miller have been approved:    Procedure Code Procedure Code Name Requested Quantity Approved Quantity Status   37729 (CPT®) ID OFFICE/OUTPATIENT NEW MODERATE MDM 45 MINUTES 1 1 Authorized   11160 (CPT®) ID OFFICE/OUTPATIENT ESTABLISHED MOD MDM 30 MIN 5 5 Authorized       Approved Quantity means the number of visits approved for medication treatments and/or medical services.    The services should be provided by Michael J Bloch no later than 06/17/2026. Please contact the provider listed below with any questions.     Provider Information:  Michael J Bloch  [phone]    Your plan benefit may require a deductible, co-payment or coinsurance for these services. This authorization does not guarantee [employer] will pay the claim for services that you receive. Payment by [employer] for these services is subject to the terms of your Evidence of Coverage or Summary Plan Description, your eligibility at the time of service, and confirmation of benefit coverage.    For any questions or additional information, please contact Customer Service:    [employer]  Customer Service: [phone] or toll free [phone]  TTY users dial: [phone]   Call Center Hours: Mon - Fri 7 AM to 8 PM PST   Office Hours: Mon - Fri 8 AM to 5 PM [facility]   E-mail: [email or web address]   Website: [email or web address]       This information is available for free in other languages. Please contact Customer Service at the phone number above for more information. [employer] complies with applicable Federal civil rights laws and does not discriminate on the basis of race, color,  national origin, age, disability or sex.      Sincerely,     Healthcare Utilization Management Department     Cc: Michael J Bloch Narisorn Atsava-Svate

## 2025-06-17 NOTE — TELEPHONE ENCOUNTER
Patient discharged on 06/14/25. Called and LVM for pt to confirm his appt tomorrow at 1pm. Advised to call the clinic back if he has any issues or concerns, provided call back number.    Robbin Davis, EstefanyD, BCACP

## 2025-06-18 ENCOUNTER — APPOINTMENT (OUTPATIENT)
Dept: MEDICAL GROUP | Facility: PHYSICIAN GROUP | Age: 60
End: 2025-06-18
Payer: COMMERCIAL

## 2025-06-19 ENCOUNTER — TELEPHONE (OUTPATIENT)
Dept: HEALTH INFORMATION MANAGEMENT | Facility: OTHER | Age: 60
End: 2025-06-19
Payer: COMMERCIAL

## 2025-06-19 NOTE — Clinical Note
REFERRAL APPROVAL NOTICE         Sent on June 18, 2025                   Gurdeep Guerra  [address] [facility] [place] [address]  [facility] [address]                   Dear Mr. Guerra,    After a careful review of the medical information and benefit coverage, [facility] has processed your referral. See below for additional details.    If applicable, you must be actively enrolled with your insurance for coverage of the authorized service. If you have any questions regarding your coverage, please contact your insurance directly.    REFERRAL INFORMATION   Referral #:  [id number]  Referred-To Department    Referred-By Provider:  Vascular Medicine    Hilda Ortiz M.D.   Vascular Medicine      [address] [place] [address] [address] [place] [id number]  [phone] [address] [place] [id number]  [phone]    Referral Start Date:  06/17/2025  Referral End Date:   06/17/2026             SCHEDULING  If you do not already have an appointment, please call [phone] to make an appointment.     MORE INFORMATION  If you do not already have a [employer] account, sign up at: [email or web address]  You can access your medical information, make appointments, see lab results, billing information, and more.  If you have questions regarding this referral, please contact  the [facility] Referrals department at:             [phone]. Monday - Friday 8:00AM - 5:00PM.     Sincerely,    [facility]

## 2025-06-22 NOTE — PROGRESS NOTES
History of Present Illness:   Patient states they are here for Dm exam  Denies NTB to feet  Most recent A1C is 7.4 March 2025  Unable to safely trim nails on own    Past Medical History  Past Medical History:   Diagnosis Date    Diabetes mellitus (CMS/[facility])     Disease of thyroid gland     Hypertension        Medications and Allergies have been reviewed.    Review Of Systems:  GENERAL: No weight loss, malaise or fevers.  HEENT: Negative for frequent or significant headaches,   RESPIRATORY: Negative for cough, wheezing or shortness of breath.  CARDIOVASCULAR: Negative for chest pain, leg swelling or palpitations.    Physical Exam:  Vascular exam: Dorsalis pedis and Posterior Tibial pulses palpable 2/4 bilateral. Capillary refill time less than 3 seconds b/l. No Hair growth noted to digits. No edema noted. Skin temp warm to warm from proximal to distal b/l. +varicosities noted.     Neurologic exam: Vibratory, protective and proprioception intact b/l. No neurologic deficits noted.      Musculoskeletal exam: Muscle strength 5/5 for all major muscle groups tested in the lower extremity b/l. No gross deformities noted b/l.      Dermatologic exam: Nails 1-5 b/l are thickened, elongated and discolored. Webspaces 1-4 b/l are clean, dry and intact. No primary or secondary skin lesions noted. No open lesions or wounds noted at this time .      1. Diabetes mellitus without complication (CMS/[facility])        2. Complication of diabetes mellitus (CMS/[facility])        3. Pain in both feet        4. Comprehensive diabetic foot examination, type 2 DM, encounter for (CMS/[facility])          Patient examined and evaluated.   Patient educated on proper diabetic foot care and education dispensed.  Nails 1-5 b/l were debrided in thickness and length with nail cutting forceps.  A1C revd, 7.4  Keep sugars under   Low pedal risk noted at this time  Discussed questions patient had regarding shoe gear, why nails grow faster some times vs others  Patient to  [facility]    24 hour UF: 511 mL. (Total  mL + I drain 51 mL - last fill 0 mL )    Patient is alert and oriented x 4.No discomfort and pain reported. VS within normal limits. PD catheter on Right lower abdominal quadrant, catherter intact, dressing is CDI. No reported concerns and alarms from PCN.   Patient aseptically disconnected from [facility]CP[employer] cycler at 0600. Effluent is clear,yellow and no fibrin noted.      Report given to PCN MARISSA Casper RN    See dialysis treatment flow sheet for details.   follow up in 6 mos or sooner if any problems arise.   Patient was in agreement to this plan. All questions answered.    PCP Jose Alejandro Hamilton 6/10/2025       Gabriela Zamora DPM  [phone]  Option 2  Fax: [phone]

## 2025-06-25 PROBLEM — G47.34 NOCTURNAL HYPOXEMIA: Status: ACTIVE | Noted: 2025-06-25

## 2025-06-27 ENCOUNTER — TELEPHONE (OUTPATIENT)
Dept: VASCULAR LAB | Facility: MEDICAL CENTER | Age: 60
End: 2025-06-27

## 2025-06-27 ENCOUNTER — TELEMEDICINE (OUTPATIENT)
Dept: CARDIOLOGY | Facility: MEDICAL CENTER | Age: 60
End: 2025-06-27
Attending: INTERNAL MEDICINE
Payer: MEDICARE

## 2025-06-27 VITALS
SYSTOLIC BLOOD PRESSURE: 109 MMHG | BODY MASS INDEX: 27.47 KG/M2 | DIASTOLIC BLOOD PRESSURE: 53 MMHG | HEART RATE: 76 BPM | HEIGHT: 67 IN | WEIGHT: 175 LBS

## 2025-06-27 DIAGNOSIS — Z95.2 HISTORY OF MECHANICAL AORTIC VALVE REPLACEMENT: Primary | ICD-10-CM

## 2025-06-27 DIAGNOSIS — I48.92 PAROXYSMAL ATRIAL FLUTTER (HCC): ICD-10-CM

## 2025-06-27 DIAGNOSIS — Z86.79 S/P ASCENDING AORTIC ANEURYSM REPAIR: ICD-10-CM

## 2025-06-27 DIAGNOSIS — Z98.890 S/P ASCENDING AORTIC ANEURYSM REPAIR: ICD-10-CM

## 2025-06-27 DIAGNOSIS — I15.0 RENOVASCULAR HYPERTENSION: ICD-10-CM

## 2025-06-27 DIAGNOSIS — Z98.890 H/O CARDIAC RADIOFREQUENCY ABLATION: ICD-10-CM

## 2025-06-27 DIAGNOSIS — Z99.2 PERITONEAL DIALYSIS STATUS (HCC): ICD-10-CM

## 2025-06-27 PROCEDURE — 99212 OFFICE O/P EST SF 10 MIN: CPT | Performed by: INTERNAL MEDICINE

## 2025-06-27 ASSESSMENT — FIBROSIS 4 INDEX: FIB4 SCORE: 1.61

## 2025-06-27 NOTE — PROGRESS NOTES
Cardiology Telemedicine Visit: Established Patient   This encounter was conducted via Teams.   Verbal consent was obtained. Patient's identity was verified.  The patient was located at home  The patient was in the [facility]    Assessment and Plan:   PCP: Drew T. Blumberg, D.O.  The following treatment plan was discussed:     1. History of mechanical aortic valve replacement    2. S/P ascending aortic aneurysm repair    3. Peritoneal dialysis status (HCC)    4. H/O cardiac radiofrequency ablation 12/20/23    5. Paroxysmal atrial flutter (HCC)        Gurdepe Guerra is having increased dyspnea and PND - probably related to volume overload/under dialysis. He is also having trouble with hypotension.  I recommend challenging the dry weight and offered catheter directed dialysis with admission following right heart catheterization.  He will speak with his nephrologist.    He will resume aspirin to be taken in conjunction with warfarin-which is the recommended approach with a mechanical On-X valve    He will remain on atorvastatin and Zetia.    Follow up: 3 months    Subjective:     Chief Complaint   Patient presents with    Follow-Up     DX: S/P AVR (aortic valve replacement)     History: Gurdeep Guerra is a 60 y.o. male with history of end-stage renal disease (PD), flutter ablation with Dr. Velasquez presenting for follow-up of mechanical aortic valve  (23 On-X), root repair (30 Hemashield) November 2023 along with small vessel obstructive coronary artery disease limited to the obtuse marginal.     He was hospitalized for fluid overload. Now transitioned to hemodialysis. Still short of breath, on nocturnal O2.     ROS   Denies any recent fevers or chills. No nausea or vomiting. No chest pains or shortness of breath. All other systems reviewed and negative except as per the HPI       Objective:   Vitals obtained by patient:  /53 (BP Location: Right arm, Patient Position: Sitting, BP Cuff Size: Adult)    "Pulse 76   Ht 1.702 m (5' 7\")   Wt 79.4 kg (175 lb)   BMI 27.41 kg/m²     Physical Exam:  Constitutional: Alert, no distress, well-groomed.  Skin: No rashes in visible areas.  Eye: Round. Conjunctiva clear, lids normal. No icterus.   ENMT: Lips pink without lesions, good dentition, moist mucous membranes. Phonation normal.  Neck: No masses, no thyromegaly. Moves freely without pain.  Respiratory: Unlabored respiratory effort, no cough or audible wheeze  Psych: Alert and oriented x3, normal affect and mood.     The ASCVD Risk score (Sean DK, et al., 2019) failed to calculate.  Allergies   Allergen Reactions    Allopurinol Itching    Hydralazine Hcl Unspecified     Pt states he had a reaction similar to lupus     Current medicines (including changes today)  Current Outpatient Medications   Medication Sig Dispense Refill    gabapentin (NEURONTIN) 100 MG Cap TAKE 1 CAPSULE BY MOUTH THREE TIMES DAILY (Patient taking differently: Take 2 Capsules by mouth every day.) 90 Capsule 2    atorvastatin (LIPITOR) 80 MG tablet Take 1 Tablet by mouth every evening. 100 Tablet 3    hydrocortisone (CORTEF) 10 MG Tab Take 1 Tablet by mouth every morning. 30 Tablet 0    ferrous sulfate 325 (65 Fe) MG tablet Take 325 mg by mouth every day.      magnesium oxide (MAG-OX) 400 MG Tab tablet Take 200 mg by mouth every day.      ezetimibe (ZETIA) 10 MG Tab Take 1 Tablet by mouth every day. 100 Tablet 3    acetaminophen (TYLENOL 8 HOUR ARTHRITIS PAIN) 650 MG CR tablet Take 650 mg by mouth every 6 hours as needed for Mild Pain.      acidophilus lactobacillus Cap Take 1 Capsule by mouth every day.      sevelamer carbonate (RENVELA) 800 MG Tab tablet Take 800 mg by mouth 3 times a day as needed.      warfarin (COUMADIN) 2.5 MG Tab Take 1-2 Tablets by mouth every day. Take as directed by anticoag clinic. 200 Tablet 1    calcitRIOL (ROCALTROL) 0.25 MCG Cap Take 1 Capsule by mouth every day. 30 Capsule 11    esomeprazole (NEXIUM) 20 MG capsule " Take 2 Capsules by mouth every morning before breakfast. 180 Capsule 1    levETIRAcetam (KEPPRA) 500 MG Tab Take 1 Tablet by mouth every evening for 60 days. (Patient not taking: Reported on 6/27/2025) 60 Tablet 0     No current facility-administered medications for this visit.     Patient Active Problem List    Diagnosis Date Noted    Nocturnal hypoxemia 06/25/2025    Hypotension 06/12/2025    New onset seizure (HCC) 06/07/2025    Tremor 06/04/2025    Administrative encounter 06/04/2025    Sleep apnea-like behavior 05/09/2025    Left inguinal hernia 05/08/2025    Hyperkalemia 04/29/2025    Abdominal pain 04/28/2025    S/P hernia repair 04/28/2025    Postoperative pain 04/21/2025    Osteoarthritis of spine with radiculopathy, lumbar region 11/01/2024    Obstructive sleep apnea syndrome 07/10/2024    Lumbar radiculopathy 05/29/2024    Peritoneal dialysis status ([facility]) 04/30/2024    H/O cardiac radiofrequency ablation 12/20/23 01/10/2024    Physical deconditioning 12/29/2023    Generalized abdominal pain 12/29/2023    Splenomegaly 12/21/2023    A-fib ([facility]) 12/16/2023    S/P ascending aortic aneurysm repair 12/13/2023    Nausea and vomiting 12/05/2023    C. difficile colitis 11/27/2023    History of mechanical aortic valve replacement 11/20/2023    Hypomagnesemia 11/17/2023    Pericardial effusion 11/16/2023    Anxiety 11/14/2023    Paroxysmal atrial flutter ([facility]) 11/11/2023    GIB (gastrointestinal bleeding) 11/11/2023    Insomnia 11/11/2023    Loculated pleural effusion 11/11/2023    Shock ([facility]) 11/06/2023    ESRD (end stage renal disease) ([facility]) 11/03/2023    Status post cardiac surgery 11/03/2023    Dyslipidemia 09/21/2023    Coronary artery disease due to lipid rich plaque 05/26/2023    Hyperphosphatemia 05/24/2023    Pain in the chest 05/23/2023    Dyspnea on exertion 05/23/2023    Chronic metabolic acidosis 05/23/2023    Skin lesions 03/03/2023    Lump of skin of right upper extremity 03/03/2023    Chronic gout of  multiple sites 06/09/2022    Sialolithiasis 06/09/2022    Heart murmur 04/28/2022    FSGS (focal segmental glomerulosclerosis) 10/11/2021    Aortic stenosis 07/01/2021    Hypertension 06/26/2020    Hyperlipidemia 06/26/2020    Gastroesophageal reflux disease 06/26/2020     Family History   Problem Relation Age of Onset    Hyperlipidemia Mother     Heart Disease Mother     Hypertension Father     Parkinson's Disease Maternal Grandmother      He  has a past medical history of Anesthesia, Aortic stenosis (11/2023), Arthritis, Back pain, Chronic cough, Dialysis patient ([facility]), Dyspnea on exertion (05/23/2023), Dyspnea on exertion (05/23/2023), Elevated troponin (05/23/2023), Fatty liver, Heart burn, Heart murmur, High cholesterol, Hypertension (2013), Infectious disease, Iron deficiency, Kidney problem (2007), Liver cirrhosis ([facility]) (12/21/2023), New onset seizure ([facility]) (06/07/2025), NSTEMI (non-ST elevated myocardial infarction) ([facility]) (05/23/2023), Pain, Pain in the chest (05/23/2023), Pain in the chest (05/23/2023), Paroxysmal A-fib ([facility]) (11/11/2023), Peritoneal dialysis status ([facility]) (04/30/2024), PONV (postoperative nausea and vomiting), Renal disorder, Shingles (04/28/2025), Sleep apnea (2000), Snoring (1990), and Splenomegaly (12/21/2023).    He has no past medical history of Acute nasopharyngitis, Anginal syndrome ([facility]), Asthma, Blood clotting disorder ([facility]), Bowel habit changes, Breath shortness, Bronchitis, Cancer ([facility]), Carcinoma in situ of respiratory system, Cataract, Congestive heart failure ([facility]), COPD (chronic obstructive pulmonary disease) ([facility]), Coughing blood, Dental disorder, Diabetes ([facility]), Disorder of thyroid, Emphysema of lung ([facility]), Glaucoma, Gynecological disorder, Hemorrhagic disorder ([facility]), Hepatitis A, Hepatitis B, Hepatitis C, Hiatus hernia syndrome, Indigestion, Jaundice, Pacemaker, Pneumonia, Pregnant, Psychiatric problem, Rheumatic fever, Stroke ([facility]), Tuberculosis, Urinary bladder disorder,  or Urinary incontinence.  He  has a past surgical history that includes other (2007); umbilical hernia repair (2012); shoulder arthroscopy (Bilateral); hip arthroscopy (Bilateral, 2002); tonsillectomy (N/A, 1987); hand surgery (Right, 1985); cath placement capd (N/A, 05/15/2023); pr inj lumbar/sacral,w/ imaging (Left, 08/24/2023); cath placement capd (Right, 09/06/2023); aortic valve replacement (11/03/2023); aortic ascending dissection (11/03/2023); echocardiogram, transesophageal, intraoperative (11/03/2023); restorate hemostas (11/03/2023); sternotomy (11/03/2023); pr njx aa&/strd tfrml epi lumbar/sacral 1 level (Bilateral, 06/06/2024); pr inj lumbar/sacral,w/ imaging (Bilateral, 04/10/2025); parotidectomy (Right); inguinal hernia repair (Left, 04/21/2025); av fistula creation (Left, 06/09/2025); cath placement (Right, 06/09/2025); cath placement capd (Right, 06/09/2025); and general surgery.  Past Medical History:   Diagnosis Date    Anesthesia     Hiccups for over 24 hours after a previous sugery.    Aortic stenosis 11/2023    AVR replacement    Arthritis     Osteoarthritis    Back pain     Chronic cough     dry, pt reports since heart surgery    Dialysis patient ([facility])     peritoneal daily    Dyspnea on exertion 05/23/2023    Dyspnea on exertion 05/23/2023    Elevated troponin 05/23/2023    Fatty liver     Heart burn     controlled with Nexium    Heart murmur     High cholesterol     All always    Hypertension 2013    Infectious disease     Iron deficiency     Kidney problem 2007    Liver cirrhosis ([facility]) 12/21/2023    New onset seizure ([facility]) 06/07/2025    NSTEMI (non-ST elevated myocardial infarction) ([facility]) 05/23/2023    no stents placed; Cardiologist Dr. Sharma    Pain     left inguinal, back, left hip    Pain in the chest 05/23/2023    Pain in the chest 05/23/2023    Paroxysmal A-fib ([facility]) 11/11/2023    Peritoneal dialysis status ([facility]) 04/30/2024    PONV (postoperative nausea and vomiting)     Always    Renal  disorder     Stage IV; end stage renal disease; Nephrologist Tiffanie Oseguera 04/28/2025    Sleep apnea 2000    does not use CPAP    Snoring 1990    Splenomegaly 12/21/2023     Allergies   Allergen Reactions    Allopurinol Itching    Hydralazine Hcl Unspecified     Pt states he had a reaction similar to lupus     Outpatient Encounter Medications as of 6/27/2025   Medication Sig Dispense Refill    gabapentin (NEURONTIN) 100 MG Cap TAKE 1 CAPSULE BY MOUTH THREE TIMES DAILY (Patient taking differently: Take 2 Capsules by mouth every day.) 90 Capsule 2    atorvastatin (LIPITOR) 80 MG tablet Take 1 Tablet by mouth every evening. 100 Tablet 3    hydrocortisone (CORTEF) 10 MG Tab Take 1 Tablet by mouth every morning. 30 Tablet 0    ferrous sulfate 325 (65 Fe) MG tablet Take 325 mg by mouth every day.      magnesium oxide (MAG-OX) 400 MG Tab tablet Take 200 mg by mouth every day.      ezetimibe (ZETIA) 10 MG Tab Take 1 Tablet by mouth every day. 100 Tablet 3    acetaminophen (TYLENOL 8 HOUR ARTHRITIS PAIN) 650 MG CR tablet Take 650 mg by mouth every 6 hours as needed for Mild Pain.      acidophilus lactobacillus Cap Take 1 Capsule by mouth every day.      sevelamer carbonate (RENVELA) 800 MG Tab tablet Take 800 mg by mouth 3 times a day as needed.      warfarin (COUMADIN) 2.5 MG Tab Take 1-2 Tablets by mouth every day. Take as directed by anticoag clinic. 200 Tablet 1    calcitRIOL (ROCALTROL) 0.25 MCG Cap Take 1 Capsule by mouth every day. 30 Capsule 11    esomeprazole (NEXIUM) 20 MG capsule Take 2 Capsules by mouth every morning before breakfast. 180 Capsule 1    levETIRAcetam (KEPPRA) 500 MG Tab Take 1 Tablet by mouth every evening for 60 days. (Patient not taking: Reported on 6/27/2025) 60 Tablet 0     No facility-administered encounter medications on file as of 6/27/2025.     Social History     Socioeconomic History    Marital status:      Spouse name: Not on file    Number of children: Not on file     Years of education: Not on file    Highest education level: Master's degree (e.g., MA, MS, Kenn, MEd, MSW, ELIECER)   Occupational History    Not on file   Tobacco Use    Smoking status: Never    Smokeless tobacco: Never   Vaping Use    Vaping status: Never Used   Substance and Sexual Activity    Alcohol use: Never    Drug use: Never    Sexual activity: Yes     Partners: Female     Birth control/protection: Pill   Other Topics Concern    Not on file   Social History Narrative    Not on file     Social Drivers of Health     Financial Resource Strain: Low Risk  (5/29/2024)    Overall Financial Resource Strain (CARDIA)     Difficulty of Paying Living Expenses: Not hard at all   Food Insecurity: Low Risk  (6/18/2025)    Received from [facility]    [facility] Food Insecurity     In the last month, did you feel there was not enough money for food?: No   Transportation Needs: Low Risk  (6/18/2025)    Received from [facility]    [facility] Transportation Needs     In the last month, have you not seen a doctor because you didn't have a way to get to the clinic or hospital?: No   Physical Activity: Insufficiently Active (5/29/2024)    Exercise Vital Sign     Days of Exercise per Week: 7 days     Minutes of Exercise per Session: 20 min   Stress: No Stress Concern Present (5/29/2024)    [facility] [place] [employer] - Occupational Stress Questionnaire     Feeling of Stress : Not at all   Social Connections: Socially Isolated (5/29/2024)    Social Connection and Isolation Panel [NHANES]     Frequency of Communication with Friends and Family: Once a week     Frequency of Social Gatherings with Friends and Family: Once a week     Attends Restoration Services: Never     Active Member of Clubs or Organizations: No     Attends Club or Organization Meetings: Never     Marital Status:    Intimate Partner Violence: Not At Risk (6/7/2025)    Humiliation, Afraid, Rape, and Kick questionnaire     Fear  of Current or Ex-Partner: No     Emotionally Abused: No     Physically Abused: No     Sexually Abused: No   Housing Stability: Low Risk  (6/18/2025)    Received from [facility]    [facility] Housing Needs     In the last month, was there a time when you were not able to pay your mortgage or rent?: No     In the last month, have you slept outside, in a shelter, in a car, or any place not meant for sleeping?: Not on file       Studies  Lab Results   Component Value Date/Time    CHOLSTRLTOT 205 (H) 03/12/2025 03:35 PM     (H) 03/12/2025 03:35 PM    HDL 33 (A) 03/12/2025 03:35 PM    TRIGLYCERIDE 218 (H) 03/12/2025 03:35 PM       Lab Results   Component Value Date/Time    SODIUM 139 06/18/2025 08:08 AM    POTASSIUM 3.9 06/18/2025 08:08 AM    CHLORIDE 101 06/18/2025 08:08 AM    CO2 33 (H) 06/18/2025 08:08 AM    GLUCOSE 92 06/18/2025 08:08 AM    BUN 30 (H) 06/18/2025 08:08 AM    CREATININE 5.9 (HH) 06/18/2025 08:08 AM    BUNCREATRAT 17 05/09/2022 09:49 AM    GLOMRATE 7 (L) 04/02/2023 11:54 AM      Lab Results   Component Value Date/Time    PROTHROMBTM 21.9 (H) 06/18/2025 08:08 AM    INR 2.17 06/18/2025 08:08 AM      Lab Results   Component Value Date/Time    WBC 6.2 06/18/2025 08:08 AM    RBC 3.06 (L) 06/20/2025 12:16 AM    RBC 3.26 (L) 06/18/2025 08:08 AM    HEMOGLOBIN 10.2 (L) 06/20/2025 12:16 AM    HEMOGLOBIN 10.7 (L) 06/18/2025 08:08 AM    HEMATOCRIT 29.7 (L) 06/20/2025 12:16 AM    HEMATOCRIT 33.2 (L) 06/18/2025 08:08 AM    MCV 96.8 06/20/2025 12:16 AM    .8 (H) 06/18/2025 08:08 AM    MCH 33.3 06/20/2025 12:16 AM    MCH 32.8 (H) 06/18/2025 08:08 AM    MCHC 34.4 06/20/2025 12:16 AM    MCHC 32.2 (L) 06/18/2025 08:08 AM    MPV 7 06/20/2025 12:16 AM    MPV 9.4 06/18/2025 08:08 AM    NEUTSPOLYS 71.8 06/20/2025 12:16 AM    NEUTSPOLYS 68.10 06/14/2025 06:34 AM    LYMPHOCYTES 13 06/20/2025 12:16 AM    LYMPHOCYTES 12.70 (L) 06/14/2025 06:34 AM    MONOCYTES 11.9 06/20/2025 12:16 AM    MONOCYTES 11.60  06/14/2025 06:34 AM    EOSINOPHILS 2.7 06/20/2025 12:16 AM    EOSINOPHILS 7.00 (H) 06/14/2025 06:34 AM    EOSINOPHILS 1 11/28/2023 02:30 PM    BASOPHILS 0.6 06/20/2025 12:16 AM    BASOPHILS 0.40 06/14/2025 06:34 AM        () Today's E/M visit is associated with medical care services that serve as the continuing focal point for all needed health care services and/or with medical care services that  are part of ongoing care related to a patient's single, serious condition, or a complex condition: This includes  furnishing services to patients on an ongoing basis that result in care that is personalized  to the patient. The services result in a comprehensive, longitudinal, and continuous  relationship with the patient and involve delivery of team-based care that is accessible, coordinated with other practitioners and providers, and integrated with the broader health  care landscape.

## 2025-06-27 NOTE — TELEPHONE ENCOUNTER
Called and left message to reschedule f/u with Dr Michael Bloch for Vascular Medicine.      Lo Lazo, Med Ass't  [facility]  Ph. [phone]  Fx. [phone]

## 2025-06-27 NOTE — TELEPHONE ENCOUNTER
----- Message from Physician Michael Bloch, M.D. sent at 6/9/2025  2:46 PM PDT -----  Regarding: r/s inpatient  Patient missed appt with me due to being an inpatient.   Pls reschedule with apn after d/c

## 2025-07-02 ENCOUNTER — TELEPHONE (OUTPATIENT)
Facility: MEDICAL CENTER | Age: 60
End: 2025-07-02
Payer: MEDICARE

## 2025-07-02 NOTE — TELEPHONE ENCOUNTER
Outgoing call to touch base with patient after recent hospital admissions. Voicemail left informing patient that transplant coordinator would be keeping an eye on his chart for updates. Direct call back number provided encouraging patient to call with any questions.    Tracie German R.N.   Transplant Coordinator  [facility] [place]

## 2025-07-08 ENCOUNTER — TELEPHONE (OUTPATIENT)
Facility: MEDICAL CENTER | Age: 60
End: 2025-07-08
Payer: MEDICARE

## 2025-07-08 ENCOUNTER — PATIENT MESSAGE (OUTPATIENT)
Dept: CARDIOLOGY | Facility: MEDICAL CENTER | Age: 60
End: 2025-07-08
Payer: MEDICARE

## 2025-07-08 NOTE — TELEPHONE ENCOUNTER
"Incoming call from patient with health status updates. Patient stated that he was transitioned from PD to HD in May, with fistula surgery in process. Confirmed that patient still sees Dr. Sharma and Dr. Bloch. Informed patient that a cardiac clearance letter will likely be needed. Confirmed that patient plans to follow-up with his established pulmonologist. Discussed admission in June for \"new onset seizures\"; patient stated that he believes it was not true seizure activity, but that his blood pressure was dropping too low and he was \"passing out\". Patients states with medication adjustments he has been feeling better. Confirmed patient has RTI contact information, all questions answered at this time.     Tracie German R.N.   Transplant Coordinator  [facility] Transplant [place]                "

## 2025-07-09 ENCOUNTER — TELEMEDICINE (OUTPATIENT)
Dept: VASCULAR LAB | Facility: MEDICAL CENTER | Age: 60
End: 2025-07-09
Attending: NURSE PRACTITIONER
Payer: MEDICARE

## 2025-07-09 DIAGNOSIS — E78.2 MIXED HYPERLIPIDEMIA: ICD-10-CM

## 2025-07-09 DIAGNOSIS — N18.6 ESRD (END STAGE RENAL DISEASE) (HCC): ICD-10-CM

## 2025-07-09 DIAGNOSIS — I95.3 HEMODIALYSIS-ASSOCIATED HYPOTENSION: Primary | ICD-10-CM

## 2025-07-09 PROBLEM — I95.1 ORTHOSTATIC HYPOTENSION: Status: ACTIVE | Noted: 2025-06-12

## 2025-07-09 PROCEDURE — 99214 OFFICE O/P EST MOD 30 MIN: CPT | Mod: 95 | Performed by: NURSE PRACTITIONER

## 2025-07-09 PROCEDURE — G2211 COMPLEX E/M VISIT ADD ON: HCPCS | Mod: 95 | Performed by: NURSE PRACTITIONER

## 2025-07-09 RX ORDER — FLUDROCORTISONE ACETATE 0.1 MG/1
0.2 TABLET ORAL DAILY
Qty: 200 TABLET | Refills: 3 | Status: SHIPPED | OUTPATIENT
Start: 2025-07-09

## 2025-07-09 NOTE — PROGRESS NOTES
VASCULAR MEDICINE CLINIC - Follow up VISIT  07/09/025    Gurdeep Guerra is a 59 y.o. male  who has been referred for vascular medicine evaluation and management   Referring provider: Osmel Blanca M.D.     This visit was conducted via [employer] video call using secure and encrypted video conferencing technology to reduce spread of and/or potential exposures to COVID.  The patient was in a private location (home) in the [facility].    The patient's identity was confirmed and verbal consent was obtained for this virtual visit.     Subjective      HPI:   Here for f/u of aortic aneurysm s/p repair and valvular heart dz s/p SAVR  Had surgery late 2023  He feels that from a cardiovascular standpoint he is getting stronger but he still has quite a bit of fatigue particularly in the morning  Recently hospitalized in [facility] due to acute pulmonary edema   He was switched to hemodialysis   During his hospitalization he had frequent hypotension 70/40's  Trial of midodrine 10mg TID but pt reports it didn't help so he declined taking it   Completed a course of hydrocortisone outpatient with an endocrinologist- he does not care to see him again  Has been taking florinef 0.1mg daily  Denies syncope   No dizziness or lightheadedness but feels sick post-HD  Never had problems with hypotension, has always been hypertensive  No longer on anti-HTN meds  Saw cards who recently started him on zetia  On warfarin and asa without bleeding     Social History     Tobacco Use    Smoking status: Never    Smokeless tobacco: Never   Vaping Use    Vaping status: Never Used   Substance Use Topics    Alcohol use: Never    Drug use: Never     DIET AND EXERCISE:  Weight Change: Fluctuates  Diet: Common adult  Exercise: limited        Objective    There were no vitals filed for this visit.   BP Readings from Last 4 Encounters:   06/27/25 109/53   06/25/25 92/64   06/18/25 122/71   06/14/25 108/64      There is no height or weight on  "file to calculate BMI.   Wt Readings from Last 4 Encounters:   06/27/25 79.4 kg (175 lb)   06/25/25 78.9 kg (174 lb)   06/18/25 85 kg (187 lb 6.3 oz)   06/14/25 85.2 kg (187 lb 13.3 oz)      Physical Exam  Constitutional:       General: He is not in acute distress.     Appearance: He is not diaphoretic.   HENT:      Head: Normocephalic and atraumatic.   Eyes:      General: No scleral icterus.     Conjunctiva/sclera: Conjunctivae normal.   Pulmonary:      Effort: Pulmonary effort is normal. No respiratory distress.   Skin:     Coloration: Skin is not pale.   Neurological:      Mental Status: He is alert and oriented to person, place, and time.      Cranial Nerves: No cranial nerve deficit.   Psychiatric:         Mood and Affect: Mood and affect normal.         Behavior: Behavior normal.        DATA REVIEW    Lab Results   Component Value Date/Time    CHOLSTRLTOT 205 (H) 03/12/2025 03:35 PM     (H) 03/12/2025 03:35 PM    HDL 33 (A) 03/12/2025 03:35 PM    TRIGLYCERIDE 218 (H) 03/12/2025 03:35 PM       Lab Results   Component Value Date/Time     (H) 03/12/2025 03:35 PM     (H) 05/23/2023 11:08 AM     (H) 11/12/2020 07:44 AM     (H) 06/30/2020 02:17 PM    LDL see below 03/17/2020 10:00 AM     No results found for: \"LIPOPROTA\"  No results found for: \"APOB\"  Lab Results   Component Value Date/Time    SODIUM 139 06/18/2025 08:08 AM    POTASSIUM 3.9 06/18/2025 08:08 AM    CHLORIDE 101 06/18/2025 08:08 AM    CO2 33 (H) 06/18/2025 08:08 AM    GLUCOSE 92 06/18/2025 08:08 AM    BUN 30 (H) 06/18/2025 08:08 AM    CREATININE 5.9 (HH) 06/18/2025 08:08 AM    BUNCREATRAT 17 05/09/2022 09:49 AM    GLOMRATE 7 (L) 04/02/2023 11:54 AM     Lab Results   Component Value Date/Time    ALKPHOSPHAT 82 06/18/2025 08:08 AM    ASTSGOT 22 06/18/2025 08:08 AM    ALTSGPT 15 06/18/2025 08:08 AM    TBILIRUBIN 0.6 06/18/2025 08:08 AM       Lab Results   Component Value Date/Time    HBA1C 5.9 (H) 05/17/2025 06:43 PM    " "HBA1C 5.5 03/12/2025 03:35 PM       No results found for: \"MICROALBCALC\", \"MALBCRT\", \"MALBEXCR\", \"EAXOLN11\", \"MICROALBUR\", \"MICRALB\", \"UMICROALBUM\", \"MICROALBTIM\"      Cardiovascular Imaging:    Echo march 2024  Normal EF  Well seated mech avr    Zio patch August 2024  Minimum heart rate 52 and maximum 187 with average heart rate 75  Multiple runs of V. Tach  Rare SVT  Ventricular bigeminy and trigeminy occasionally present  No A-fib        Medical Decision Making:  Today's Assessment / Status / Plan:     1. Hemodialysis-associated hypotension  fludrocortisone (FLORINEF) 0.1 MG Tab      2. ESRD (end stage renal disease) ([facility])        3. Mixed hyperlipidemia  Lipid Profile        Etiology of Established CVD if Present:     Valvular heart dz s/p mechanical SAVR with on-x valve 2023 - valve functioning well on echo. Normal EF.   - defer further f/u including surveillance imaging to [facility]    2. Ascending aortic aneurysm s/p ascending repair 2023 - doing well post operatively after long recovery. Appears stable on echo  - will forgo f/u CTA given ESRD  - medical management and lifestyle mod per below  -Previously recommended screening echo of all siblings every 5 years or so (no kids) - especially his twin  -using SDH we previously decided to forgo genetic testing    3.  Rhythm abnormality-had postoperative atrial fibrillation with no known recurrence.  Currently with palpitations and irregular heartbeat and long-term monitor with V. tach and SVT.  He does have occasional lightheadedness.  Difficult to know if this is the cause of his symptoms.  Defer management of rhythm and rate and decisions about anticoag to cards.  I recommended he make a follow-up appointment with them and I will also message his cardiologist    LIPID MANAGEMENT  Qualifies for Statin Therapy Based on 2018 ACC/AHA Guidelines: yes  10-yr ASCVD risk score: The ASCVD Risk score (Sean DK, et al., 2019) failed to calculate., 7.5 - <20% \"intermediate " "risk\"   Major ASCVD events: None    High-risk conditions: CKD (egfr 15-59)  Risk-enhancers: subclinical CAD seen on cath  Currently on Statin: Yes but with intermittent adherence  Tx goals:   LDL-C <100 mg/dl (perhaps <70)  apoB <90 mg/dl (perhaps <70)  At goal? Unknown -he did not get his blood work done  He feels atorvastatin worsen his joint pain, but he has no angelique myalgias  Plan:   -Continue rosuvastatin 20 mg daily and take every day  -Continue zetia   -Stop and call every day if considerable myalgias  -Recheck lipid panel, apoB, lp(a) prior to next visit per previous    BLOOD PRESSURE MANAGEMENT  BP Goal ACC/AHA (2017) goal <130/80  Home BP at goal:  variable, 70/50's after dialysis   Office BP at goal:  unknown  Contributing factors: ESRD on HD   No longer on carvedilol  Now having significant problems with hypotension post-HD  He states his BP is controlled during HD, but once he gets home BP 70/50's  Plan:   - increase florinef to 0.2mg daily-- take post-HD  - follow up with neph tomorrow as planned  - drink water (up to free water limit) if symptomatic  - eat a small amount of sodium (up to limit per neph)   - trial of elevating HOB as occasionally feels dizzy with standing-- possible orthostatic component but as discussed, likely not the cause of his hypotension     GLYCEMIC MANAGEMENT Normal  - recheck fasting glucose    ANTITHROMBOTIC THERAPY:   Patient with On-X SAVR  - continue indefinite low dose asa  - continue indefinite warfarin -will maintain INR 2.0-3.0 pending follow-up with cardiology but since he has no recurrent atrial fibrillation hopefully we can decrease his INR range if cardiology in agreement  - f/u in anticoag clinic- INR montiored in hospital-- needs outpatient appt-- will msg team to follow up     LIFESTYLE INTERVENTIONS:    Tobacco Use:  reports that he has never smoked. He has never used smokeless tobacco.   - continued complete avoidance of all tobacco products     Physical " Activity: limited by back - recommendations per neurosurg    Weight Management and/or Nutrition: Mediterranean      OTHER:     # ESRD with FSGS on HD - defer management to nephrology.  I did let him know that we will be doing transplants here in [place] in the future.   - Defer to nephrology     # Pleural effusions post op - defer further management to cards, pulm, nephrology    # DJD Back - signfiicantly limits ability to exercise. defer to neurosurg    Studies to Be Obtained: per cards  Labs to Be Obtained: lipid, in addition to labs ordered by neph    Follow up in: 3 months with Dr. Bloch Ashlee A Crawford, A.PSAAD   [facility]  [facility]  [phone]    Cc: D Blumberg; JUAN Sharma

## 2025-07-09 NOTE — PATIENT COMMUNICATION
See alternate mychart message encounter. Recommended that the patient call their nephrologist to discuss shortness of breath and high BNP.

## 2025-07-10 ENCOUNTER — PATIENT MESSAGE (OUTPATIENT)
Dept: CARDIOLOGY | Facility: MEDICAL CENTER | Age: 60
End: 2025-07-10
Payer: MEDICARE

## 2025-07-10 DIAGNOSIS — R00.1 BRADYCARDIA: Primary | ICD-10-CM

## 2025-07-11 NOTE — PATIENT COMMUNICATION
Dontrell Sharma M.D. to Mikki Tobar R.N. (Selected Message)      7/10/25  6:10 PM  Lets do a holter or zio - 3-5 days of monitoring should be good.   Thx  BE  ----------------------------------------------------------------------------------  Mikki Tobar R.N. to Dontrell Sharma M.D.  JKASSI      7/10/25  5:11 PM  BE- Please advise. Pt has concerns about low heart rate.  He said he had a low heart rate witnessed by dialysis team in the 35s. He was also concerned about low blood pressure so I am unsure if the dizziness fatigue is related to BP and dialysis of HR. Do you want to order a zio?   -------------------------------------------------------------------------

## 2025-07-18 ENCOUNTER — TELEPHONE (OUTPATIENT)
Dept: CARDIOLOGY | Facility: MEDICAL CENTER | Age: 60
End: 2025-07-18
Payer: MEDICARE

## 2025-07-18 NOTE — TELEPHONE ENCOUNTER
BE    Caller:  Gurdeep Guerra    Topic/issue:   Gurdeep is calling to discuss his script for the:   Rosuvastatin. He was not told it was being stopped.     He is out of this medication.     Callback Number:   [phone]    Thank you,   Sara LEE

## 2025-07-21 NOTE — TELEPHONE ENCOUNTER
Phone Number Called: [phone]     Call outcome: Spoke to patient regarding message below.    Message: Called to inquire if patient is taking Atorvastatin or Rosuvastatin. Per patient, he is taking Rosuvastatin as he has developed muscle aches Atorvastatin. He is not sure why Rosuvastatin was changed to Atorvastatin in the hospital. He is also asking for refill on Warfarin as he is almost out of medication. RN to reach out to SC as BE is OOO and patient is almost out of Warfarin. No further questions at this time.

## 2025-07-23 ENCOUNTER — TELEPHONE (OUTPATIENT)
Dept: VASCULAR LAB | Facility: MEDICAL CENTER | Age: 60
End: 2025-07-23
Payer: MEDICARE

## 2025-07-23 RX ORDER — ROSUVASTATIN CALCIUM 20 MG/1
20 TABLET, COATED ORAL EVERY EVENING
COMMUNITY

## 2025-07-23 NOTE — TELEPHONE ENCOUNTER
Phone Number Called: [phone]     Call outcome: Unable to reach    Message: Called to inform patient of SC recommendations. Unable to reach. Left VM for call back.     -------------------------    TEX Ocasio to Me  (Selected Message)        7/23/25  3:14 PM  Yes okay to re-check lipid prior to changing statin dosing. SC

## 2025-07-23 NOTE — TELEPHONE ENCOUNTER
Refill request sent to anticoagulation clinic (was renewed by PCP yesterday). Patient is not established with [facility]. Called patient and scheduled for soonest available (patient soonest date is 8/20/25).     Estefany HewittD

## 2025-07-23 NOTE — TELEPHONE ENCOUNTER
Phone Number Called: [phone]    Call outcome: Spoke to patient regarding message below.    Message: Called to inform patient of SC recommendations below. Per patient, he began Zetia per BE on 5/1. Noted in MAR. He is asking if he should have his lipids rechecked since he last had them check 3/2025 before increasing Rosuvastatin to 40mg qpm. RN to reach out to SC.       ------------------------    TEX Ocasio to Me  (Selected Message)        7/21/25  1:11 PM  His cholesterol was not controlled on rosuvastatin 20 mg, we can try doing rosuvastatin 40 mg QPM and repeat lipid/cmp in 6 months for review. Warfarin needs to be through coumadin clinic.

## 2025-07-23 NOTE — TELEPHONE ENCOUNTER
Noted response below.     -----------------------    Me to Sofiya Last PharmD   SD      7/23/25  4:27 PM  Teddy Arriaza,      Thank you for letting us know!  Sofiya Last PharmD to Me  (Selected Message)        7/23/25  4:21 PM  Hello,   Patient is not established for anticoagulation yet, but PCP did renew Rx. We have an appt to establish care with him on 8/20/25.

## 2025-07-24 DIAGNOSIS — Z79.01 CHRONIC ANTICOAGULATION: ICD-10-CM

## 2025-07-24 NOTE — TELEPHONE ENCOUNTER
SC    Caller: Gurdeep Guerra     Topic/issue: Patient is returning the RN call     Callback Number: [phone]     Thank you,  Cristela ELLIS

## 2025-07-24 NOTE — TELEPHONE ENCOUNTER
Phone Number Called: [phone]     Call outcome: Did not leave a detailed message. Requested patient to call back.    Message: Called to inform patient of SC recommendations. Unable to reach. Left VM for call back.     ----------------------    TEX Ocasio to Me  (Selected Message)        7/23/25  3:14 PM  Yes okay to re-check lipid prior to changing statin dosing. SC

## 2025-07-25 ENCOUNTER — TELEPHONE (OUTPATIENT)
Dept: HEALTH INFORMATION MANAGEMENT | Facility: OTHER | Age: 60
End: 2025-07-25
Payer: MEDICARE

## 2025-07-28 ENCOUNTER — TELEPHONE (OUTPATIENT)
Dept: CARDIOLOGY | Facility: MEDICAL CENTER | Age: 60
End: 2025-07-28
Payer: MEDICARE

## 2025-07-28 NOTE — TELEPHONE ENCOUNTER
Phone Number Called: [phone]     Call outcome: Did not leave a detailed message. Requested patient to call back.    Message: Called to inform patient of SC recommendations below. Unable to reach. Left VM for call back.     -----------------------    TEX Ocasio to Me  (Selected Message)        7/23/25  3:14 PM  Yes okay to re-check lipid prior to changing statin dosing. SC

## 2025-07-28 NOTE — TELEPHONE ENCOUNTER
Phone Number Called: [phone]    Call outcome: Did not leave a detailed message. Requested patient to call back.    Message: Called to inform patient of SC recommendations below.   Unable to reach. Left VM for call back     -------------------------     TEX Ocasio to Me  (Selected Message)        7/23/25  3:14 PM  Yes okay to re-check lipid prior to changing statin dosing. SC

## 2025-07-28 NOTE — TELEPHONE ENCOUNTER
BE    Caller: Gurdeep Guerra    Topic/issue: MEDICAL ADVICE    Gurdeep states that he received a call from SC and is returning her call. He states that there was no VM left with information regarding the reason of the call. Please advise.    Thank you,  Bridger RICCI    Callback Number: [phone] (home)

## 2025-08-15 PROBLEM — H01.009 CHRONIC BLEPHARITIS: Status: ACTIVE | Noted: 2025-08-15

## 2025-08-20 DIAGNOSIS — Z79.01 CHRONIC ANTICOAGULATION: ICD-10-CM

## 2025-08-21 ENCOUNTER — NON-PROVIDER VISIT (OUTPATIENT)
Dept: CARDIOLOGY | Facility: MEDICAL CENTER | Age: 60
End: 2025-08-21
Attending: INTERNAL MEDICINE
Payer: MEDICARE

## 2025-08-21 DIAGNOSIS — R00.1 BRADYCARDIA: ICD-10-CM

## 2025-08-25 ENCOUNTER — TELEPHONE (OUTPATIENT)
Facility: MEDICAL CENTER | Age: 60
End: 2025-08-25
Payer: MEDICARE

## 2025-08-25 ENCOUNTER — TELEPHONE (OUTPATIENT)
Dept: CARDIOLOGY | Facility: MEDICAL CENTER | Age: 60
End: 2025-08-25
Payer: MEDICARE

## 2025-08-27 ENCOUNTER — TELEPHONE (OUTPATIENT)
Dept: VASCULAR LAB | Facility: MEDICAL CENTER | Age: 60
End: 2025-08-27
Payer: MEDICARE

## 2025-09-03 ENCOUNTER — APPOINTMENT (OUTPATIENT)
Dept: MEDICAL GROUP | Facility: PHYSICIAN GROUP | Age: 60
End: 2025-09-03
Payer: MEDICARE

## 2025-09-17 ENCOUNTER — APPOINTMENT (OUTPATIENT)
Dept: MEDICAL GROUP | Facility: PHYSICIAN GROUP | Age: 60
End: 2025-09-17
Payer: MEDICARE

## (undated) DEVICE — CHLORAPREP 26 ML APPLICATOR - ORANGE TINT(25/CA)

## (undated) DEVICE — SET EXTENSION WITH 2 PORTS (48EA/CA) ***PART #2C8610 IS A SUBSTITUTE*****

## (undated) DEVICE — TRAY SURESTEP FOLEY TEMP SENSING 16FR (10EA/CA) ORDER  #18764 FOR TEMP FOLEY ONLY

## (undated) DEVICE — DRAPE C-ARM LARGE 41IN X 74 IN - (10/BX 2BX/CA)

## (undated) DEVICE — SUTURE 4-0 30CM STRATAFIX SPIRAL PS-2 (12EA/BX)

## (undated) DEVICE — WIRE STEEL 5-0 B&S 20 OHS - 5/PK 12PK/BX ITEM. D5329 OR D6625 CAN BE USED AS A SUB

## (undated) DEVICE — GLOVE BIOGEL INDICATOR SZ 8 SURGICAL PF LTX - (50/BX 4BX/CA)

## (undated) DEVICE — DERMABOND ADVANCED - (12EA/BX)

## (undated) DEVICE — SUTURE 4-0 MONOCRYL PLUS PS-1 - 27 INCH (36/BX)

## (undated) DEVICE — SODIUM CHL IRRIGATION 0.9% 1000ML (12EA/CA)

## (undated) DEVICE — GLOVE BIOGEL INDICATOR SZ 7.5 SURGICAL PF LTX - (50PR/BX 4BX/CA)

## (undated) DEVICE — TROCAR 5X100 NON BLADED Z-TH - READ KII (6/BX)

## (undated) DEVICE — SPONGE DRAIN 4 X 4IN 6-PLY - (2/PK25PK/BX12BX/CS)

## (undated) DEVICE — TROCAR Z THREAD11MM OPTICAL - NON BLADED(6/BX)

## (undated) DEVICE — MINICAP EXTENDED LIFE TRANSFER SET (6EA/CA)

## (undated) DEVICE — TUBE E-T HI-LO CUFF 6.5MM (10EA/BX)

## (undated) DEVICE — TRANSDUCER BIFURCATED MONITORING KIT (10EA/CA)

## (undated) DEVICE — SYS DLV COST CLS RM TEMP - INJECTATE (CO-SET II) (10EA/CA)

## (undated) DEVICE — GLOVE BIOGEL PI INDICATOR SZ 8.0 SURGICAL PF LF -(50/BX 4BX/CA)

## (undated) DEVICE — Device

## (undated) DEVICE — SUCTION INSTRUMENT YANKAUER BULBOUS TIP W/O VENT (50EA/CA)

## (undated) DEVICE — SLEEVE VASO CALF MED - (10PR/CA)

## (undated) DEVICE — GOWN WARMING STANDARD FLEX - (30/CA)

## (undated) DEVICE — SENSOR CEREBRAL AND SOMATIC MONITORING (20/CA)

## (undated) DEVICE — SET SUCTION/IRRIGATION WITH DISPOSABLE TIP (6/CA )PART #0250-070-520 IS A SUB

## (undated) DEVICE — TUBING CLEARLINK DUO-VENT - C-FLO (48EA/CA)

## (undated) DEVICE — SLEEVE VASO DVT COMPRESSION CALF MED - (10PR/CA)

## (undated) DEVICE — PAD PREP 24 X 48 CUFFED - (100/CA)

## (undated) DEVICE — PACK AV FISTULA (2EA/CA)

## (undated) DEVICE — BLADE STERNUM SAW SURGICAL 32.0 X 6.4 MM STERILE (1/EA)

## (undated) DEVICE — SUTURE 5 SURGICAL STEEL V-40 - (12/BX) CCS CURRENT

## (undated) DEVICE — SET TUBING PNEUMOCLEAR HIGH FLOW SMOKE EVACUATION (10EA/BX)

## (undated) DEVICE — SHEET TRANSVERSE LAP - (12EA/CA)

## (undated) DEVICE — HEMOSTAT SURG ABSORBABLE - 4 X 8 IN SURGICEL (24EA/CA)

## (undated) DEVICE — TUBE CONNECTING SUCTION - CLEAR PLASTIC STERILE 72 IN (50EA/CA)

## (undated) DEVICE — TOWEL STOP TIMEOUT SAFETY FLAG (40EA/CA)

## (undated) DEVICE — NEEDLE NON SAFETY 25 GA X 1 1/2 IN HYPO (100EA/BX)

## (undated) DEVICE — CANISTER SUCTION 3000ML MECHANICAL FILTER AUTO SHUTOFF MEDI-VAC NONSTERILE LF DISP  (40EA/CA)

## (undated) DEVICE — SUTURE 2-0 ETHIBOND V-5 30 (12EA/BX)"

## (undated) DEVICE — DEVICE KIT COR-KNOT COMBO2 DEVICES & 12 KNOTS PER KIT (6KT/CA)

## (undated) DEVICE — ORGANIZER SUTURE GABBAY-FRAT - ER STERILE (3/SET 4ST/BX)

## (undated) DEVICE — BAG SPONGE COUNT 10.25 X 32 - BLUE (250/CA)

## (undated) DEVICE — CELLSAVER STAT

## (undated) DEVICE — TUBE CHEST 32FR. STRAIGHT - (10EA/CA)

## (undated) DEVICE — SUTURE 5-0 PROLENE C-1 D/A 24 (36PK/BX)"

## (undated) DEVICE — SUTURE 2-0 ETHILON FS - (36/BX) 18 INCH

## (undated) DEVICE — PAD LAP STERILE 18 X 18 - (5/PK 40PK/CA)

## (undated) DEVICE — BLADE SURGICAL #11 - (50/BX)

## (undated) DEVICE — KIT SURGIFLO W/OUT THROMBIN - (6EA/BX)

## (undated) DEVICE — SYSTEM CHEST DRAIN ADULT/PEDS W/AUTO TRANSFUSION CAPABILITY SAHARA (6EA/CA)

## (undated) DEVICE — TRAY SKIN SCRUB PVP WET (20EA/CA) PART #DYND70356 DISCONTINUED

## (undated) DEVICE — GOWN SURGEONS X-LARGE - DISP. (30/CA)

## (undated) DEVICE — SENSOR OXIMETER ADULT SPO2 RD SET (20EA/BX)

## (undated) DEVICE — IMPLANTABLE DEVICE: Type: IMPLANTABLE DEVICE | Site: ABDOMEN | Status: NON-FUNCTIONAL

## (undated) DEVICE — GLOVE BIOGEL PI INDICATOR SZ 7.5 SURGICAL PF LF -(50/BX 4BX/CA)

## (undated) DEVICE — SUTURE 1 ETHIBOND CTX C/R 8-1 - (12/BX)

## (undated) DEVICE — SUTURE 0 VICRYL PLUS CTX - 36 INCH (36/BX)

## (undated) DEVICE — SUTURE GENERAL

## (undated) DEVICE — COVER LIGHT HANDLE FLEXIBLE - SOFT (2EA/PK 80PK/CA)

## (undated) DEVICE — ADHESIVE DERMABOND HVD MINI (12EA/BX)

## (undated) DEVICE — ELECTRODE DUAL RETURN W/ CORD - (50/PK)

## (undated) DEVICE — GLOVE SIZE 7.0 SURGEON ACCELERATOR FREE GREEN (50PR/BX 4BX/CA)

## (undated) DEVICE — KIT  I.V. START (100EA/CA)

## (undated) DEVICE — SEALER VESSEL HARMONIC ACE PLUS WITH ADVANCED HEMOSTASIS 36CM (1/EA)

## (undated) DEVICE — DECANTER FLD BLS - (50/CA)

## (undated) DEVICE — SUTURE 5-0 PROLENE RB-1 D/A 36 (36PK/BX)"

## (undated) DEVICE — GLOVE BIOGEL PI INDICATOR SZ 6.5 SURGICAL PF LF - (50/BX 4BX/CA)

## (undated) DEVICE — SUTURE 0 COATED VICRYL 6-18IN - (12PK/BX)

## (undated) DEVICE — SUTURE OHS

## (undated) DEVICE — TRAY MULTI-LUMEN 7FR PRESSURE W/MAX BARRIER AND BIOPATCH - (5/CA)

## (undated) DEVICE — GLOVE BIOGEL PI ORTHO SZ 6 SURGICAL PF LF (40PR/BX)

## (undated) DEVICE — SUTURE 3-0 PROLENE SH 30 (36PK/BX)"

## (undated) DEVICE — BRONCHOSCOPE VIDEO GLIDESCOPE BFLEX OD3.8 MM (5EA/PK)

## (undated) DEVICE — SUTURE 3-0 VICRYL PLUS SH - 27 INCH (36/BX)

## (undated) DEVICE — SUTURE 4-0 MONOCRYL PLUS PS-2 - 27 INCH (36/BX)

## (undated) DEVICE — LACTATED RINGERS INJ 1000 ML - (14EA/CA 60CA/PF)

## (undated) DEVICE — SET LEADWIRE 5 LEAD BEDSIDE DISPOSABLE ECG (1SET OF 5/EA)

## (undated) DEVICE — LEAD PACING TEMP MYO - (12/BX)

## (undated) DEVICE — FIBRILLAR SURGICEL 4X4 - 10/CA

## (undated) DEVICE — ELECTRODE RADIOLUCNT SOLID GEL DEFIB PADS (12EA/CA)

## (undated) DEVICE — PACK MINOR BASIN - (4EA/CA)

## (undated) DEVICE — DRAPE STRLE REG TOWEL 18X24 - (10/BX 4BX/CA)"

## (undated) DEVICE — SUTURE 0 VICRYL PLUS CT-2 - 27 INCH (36/BX)

## (undated) DEVICE — BANDAGE ELASTIC 4 IN X 5 YDS - LATEX FREE(10/BX 5BX/CA)

## (undated) DEVICE — SYRINGE NON SAFETY 5 CC 21 GA X 1-1/2 IN (100/BX 4BX/CA)

## (undated) DEVICE — PACK LAP CHOLE OR - (2EA/CA)

## (undated) DEVICE — SUTURE 4-0 PROLENE RB-1 D/A 36 (36PK/BX)"

## (undated) DEVICE — DRAPE ABDOMINAL STERILE LAPAROSCOPIC 102IN X 121IN 77IN (12EA/CA)

## (undated) DEVICE — BLADE SURGICAL #15 - (50/BX 3BX/CA)

## (undated) DEVICE — CLIP LG INTNL HRZN TI ESCP LGT - (20/BX)

## (undated) DEVICE — NEEDLE INSFL 120MM 14GA VRRS - (20/BX)

## (undated) DEVICE — COVER LIGHT HANDLE ALC PLUS DISP (18EA/BX)

## (undated) DEVICE — GOWN SURGEONS LARGE - (32/CA)

## (undated) DEVICE — GLOVE BIOGEL SZ 7.5 SURGICAL PF LTX - (50PR/BX 4BX/CA)

## (undated) DEVICE — GAUZE FLUFF STERILE 2-PLY 36 X 36 (100EA/CA)

## (undated) DEVICE — GELAQUASONIC 100 ULTRASOUND - 48/BX 20GM STERILE FOIL POUCH

## (undated) DEVICE — INSERT STEALTH #3 - (10/BX)

## (undated) DEVICE — DRESSING TRANSPARENT FILM TEGADERM 2.375 X 2.75"  (100EA/BX)"

## (undated) DEVICE — CANISTER SUCTION RIGID RED 1500CC (40EA/CA)

## (undated) DEVICE — BOVIE BLADE COATED - (50/PK)

## (undated) DEVICE — MICRODRIP PRIMARY VENTED 60 (48EA/CA) THIS WAS PART #2C8428 WHICH WAS DISCONTINUED

## (undated) DEVICE — SYRINGE 10 ML CONTROL LL (25EA/BX 4BX/CA)

## (undated) DEVICE — SET FLUID WARMING STANDARD FLOW - (10/CA)

## (undated) DEVICE — SODIUM CHL. INJ. 0.9% 500ML (24EA/CA 50CA/PF)

## (undated) DEVICE — TUBE E-T HI-LO CUFF 7.0MM (10EA/PK)

## (undated) DEVICE — COVER PROBE STERILE CONE (12EA/CA)

## (undated) DEVICE — MEDICINE CUP STERILE 2 OZ (100/CA)

## (undated) DEVICE — BAG RESUSCITATION DISPOSABLE - WITH MASK (10 EA/CA)

## (undated) DEVICE — SPONGE GAUZESTER 4 X 4 4PLY - (128PK/CA)

## (undated) DEVICE — PENCIL ELECTSURG 10FT BTN SWH - (50/CA)

## (undated) DEVICE — INSERT STEALTH #5 - (10/BX)

## (undated) DEVICE — SPONGE XRAY 8X4 STERL. 12PL - (10EA/TY 80TY/CA)

## (undated) DEVICE — POLY UMBILICAL TAPE 1/8X30 - (36/BX)

## (undated) DEVICE — QUICKLOADS COR-KNOT TITANIUM - (12/BX)

## (undated) DEVICE — ELECTRODE 5MM LHK LAPSCP STERILE DISP- MEGADYNE  (5/CA)

## (undated) DEVICE — CLIP MED INTNL HRZN TI ESCP - (25/BX)

## (undated) DEVICE — FILTER BLOOD TRANSFUSION - (40/CA) (PALL)

## (undated) DEVICE — PACK MAJOR BASIN - (2EA/CA)

## (undated) DEVICE — SET BIFURCATED BLOOD - (48EA/CS)

## (undated) DEVICE — SHEET THYROID - (10EA/CA)

## (undated) DEVICE — SUTURE 0 PROLENE CT-1 30 (36PK/BX)"

## (undated) DEVICE — BLANKET UNDERBODY ADULT - (10/CA)

## (undated) DEVICE — DRAPE LAPAROTOMY T SHEET - (12EA/CA)

## (undated) DEVICE — D-5-W INJ. 500 ML - (24EA/CA)

## (undated) DEVICE — PTFE PLED STER - (250/CA)

## (undated) DEVICE — SOD. CHL. INJ. 0.9% 250 ML - (36/CA 50CA/PF)

## (undated) DEVICE — SUTURE 2-0 ETHIBOND V-5 - (6/BX)

## (undated) DEVICE — DRAPE LARGE 3 QUARTER - (20/CA)

## (undated) DEVICE — GLOVE BIOGEL INDICATOR SZ 8.5 SURGICAL PF LTX - (50/BX 4BX/CA)

## (undated) DEVICE — PACK CV DRAPING/BASIN 2PART - (1/CA)

## (undated) DEVICE — SUTURE 4-0 PROLENE V-7 D/A (36PK/BX)

## (undated) DEVICE — CLIP SM INTNL HRZN TI ESCP LGT - (24EA/PK 25PK/BX)

## (undated) DEVICE — GLOVE BIOGEL SZ 8 SURGICAL PF LTX - (50PR/BX 4BX/CA)

## (undated) DEVICE — CAP DISCONNECT MINICAP (60/CA)

## (undated) DEVICE — DRAPE LOWER EXTREMETY - (6/CA)

## (undated) DEVICE — CANNULA O2 COMFORT SOFT EAR ADULT 7 FT TUBING (50/CA)

## (undated) DEVICE — CELLSAVER PACK

## (undated) DEVICE — GLOVE SZ 6.5 BIOGEL PI MICRO - PF LF (50PR/BX)

## (undated) DEVICE — SPONGE GAUZESTER. 2X2 4-PL - (2/PK 50PK/BX 30BX/CS)

## (undated) DEVICE — SUTURE 2-0 VICRYL PLUS SH - 27 INCH (36/BX)

## (undated) DEVICE — KIT RADIAL ARTERY 20GA W/MAX BARRIER AND BIOPATCH  (5EA/CA) #10740 IS FOR THE SET RADIAL ARTERIAL

## (undated) DEVICE — WATER IRRIGATION STERILE 1000ML (12EA/CA)

## (undated) DEVICE — SUTURE 6-0 PROLENE C-1 D/A 24 (36PK/BX)"

## (undated) DEVICE — CANISTER SUCTION 3000ML MECHANICAL FILTER AUTO SHUTOFF MEDI-VAC NONSTERILE LF DISP (40EA/CA)

## (undated) DEVICE — TUBE CHEST 32FR. RIGHT ANGLED (10EA/CA)

## (undated) DEVICE — SYRINGE 20 ML LL (50EA/BX 4BX/CA)

## (undated) DEVICE — SUTURE 4-0 SILK 12 X 18 INCH - (36/BX)

## (undated) DEVICE — TOWELS CLOTH SURGICAL - (4/PK 20PK/CA)

## (undated) DEVICE — SUTURE 2-0 VICRYL PLUS CT-1 36 (36PK/BX)"

## (undated) DEVICE — SUTURE 3-0 VICRYL PLUS SH - 8X 18 INCH (12/BX)

## (undated) DEVICE — SOD. CHL. INJ. 0.9% 1000 ML - (14EA/CA 60CA/PF)

## (undated) DEVICE — STOPCOCK MALE 4-WAY - (50/CA)

## (undated) DEVICE — DRAIN PENROSE STERILE 1/4 X - 18 IN (25EA/BX)

## (undated) DEVICE — GLOVE BIOGEL SZ 7 SURGICAL PF LTX - (50PR/BX 4BX/CA)

## (undated) DEVICE — NEEDLE NON SAFETY HYPO 22 GA X 1 1/2 IN (100/BX)

## (undated) DEVICE — SLEEVE, VASO, THIGH, MED

## (undated) DEVICE — GLOVE SZ 7.5 LF PROTEXIS (50PR/BX)

## (undated) DEVICE — ARMBOARD  SMALL IV 9 INLONG - (25EA/CA)

## (undated) DEVICE — SUTURE 0 ETHIBOND MO6 C/R - (12/BX) 8-18 INCH ETHICON

## (undated) DEVICE — MASK OXYGEN VNYL ADLT MED CONC WITH 7 FOOT TUBING  - (50EA/CA)